# Patient Record
Sex: MALE | Race: BLACK OR AFRICAN AMERICAN | NOT HISPANIC OR LATINO | Employment: OTHER | URBAN - METROPOLITAN AREA
[De-identification: names, ages, dates, MRNs, and addresses within clinical notes are randomized per-mention and may not be internally consistent; named-entity substitution may affect disease eponyms.]

---

## 2017-01-13 ENCOUNTER — ALLSCRIPTS OFFICE VISIT (OUTPATIENT)
Dept: WOUND CARE | Facility: HOSPITAL | Age: 37
End: 2017-01-13
Payer: MEDICARE

## 2017-01-13 PROCEDURE — 97606 NEG PRS WND THER DME>50 SQCM: CPT | Performed by: SURGERY

## 2017-01-13 PROCEDURE — 11046 DBRDMT MUSC&/FSCA EA ADDL: CPT | Performed by: SURGERY

## 2017-01-13 PROCEDURE — 11043 DBRDMT MUSC&/FSCA 1ST 20/<: CPT | Performed by: SURGERY

## 2017-01-19 ENCOUNTER — ALLSCRIPTS OFFICE VISIT (OUTPATIENT)
Dept: OTHER | Facility: OTHER | Age: 37
End: 2017-01-19

## 2017-01-19 LAB — HBA1C MFR BLD HPLC: 9 %

## 2017-01-27 ENCOUNTER — ALLSCRIPTS OFFICE VISIT (OUTPATIENT)
Dept: WOUND CARE | Facility: HOSPITAL | Age: 37
End: 2017-01-27
Payer: MEDICARE

## 2017-01-27 PROCEDURE — 11046 DBRDMT MUSC&/FSCA EA ADDL: CPT | Performed by: SURGERY

## 2017-01-27 PROCEDURE — 97606 NEG PRS WND THER DME>50 SQCM: CPT | Performed by: SURGERY

## 2017-01-27 PROCEDURE — 11043 DBRDMT MUSC&/FSCA 1ST 20/<: CPT | Performed by: SURGERY

## 2017-02-02 ENCOUNTER — APPOINTMENT (OUTPATIENT)
Dept: LAB | Facility: HOSPITAL | Age: 37
End: 2017-02-02
Payer: MEDICARE

## 2017-02-02 DIAGNOSIS — A09 INFECTIOUS GASTROENTERITIS AND COLITIS: ICD-10-CM

## 2017-02-02 DIAGNOSIS — B96.89 OTHER SPECIFIED BACTERIAL AGENTS AS THE CAUSE OF DISEASES CLASSIFIED ELSEWHERE: ICD-10-CM

## 2017-02-02 PROCEDURE — 87493 C DIFF AMPLIFIED PROBE: CPT

## 2017-02-03 LAB — C DIFF TOX GENS STL QL NAA+PROBE: ABNORMAL

## 2017-02-08 ENCOUNTER — ALLSCRIPTS OFFICE VISIT (OUTPATIENT)
Dept: OTHER | Facility: OTHER | Age: 37
End: 2017-02-08

## 2017-02-10 ENCOUNTER — ALLSCRIPTS OFFICE VISIT (OUTPATIENT)
Dept: WOUND CARE | Facility: HOSPITAL | Age: 37
End: 2017-02-10
Payer: MEDICARE

## 2017-02-10 PROCEDURE — 11043 DBRDMT MUSC&/FSCA 1ST 20/<: CPT | Performed by: SURGERY

## 2017-02-10 PROCEDURE — 97606 NEG PRS WND THER DME>50 SQCM: CPT | Performed by: SURGERY

## 2017-02-10 PROCEDURE — 11046 DBRDMT MUSC&/FSCA EA ADDL: CPT | Performed by: SURGERY

## 2017-02-24 ENCOUNTER — ALLSCRIPTS OFFICE VISIT (OUTPATIENT)
Dept: WOUND CARE | Facility: HOSPITAL | Age: 37
End: 2017-02-24
Payer: MEDICARE

## 2017-02-24 PROCEDURE — 11046 DBRDMT MUSC&/FSCA EA ADDL: CPT | Performed by: SURGERY

## 2017-02-24 PROCEDURE — 11043 DBRDMT MUSC&/FSCA 1ST 20/<: CPT | Performed by: SURGERY

## 2017-02-24 PROCEDURE — 97605 NEG PRS WND THER DME<=50SQCM: CPT | Performed by: SURGERY

## 2017-03-08 ENCOUNTER — ALLSCRIPTS OFFICE VISIT (OUTPATIENT)
Dept: OTHER | Facility: OTHER | Age: 37
End: 2017-03-08

## 2017-03-10 ENCOUNTER — ALLSCRIPTS OFFICE VISIT (OUTPATIENT)
Dept: WOUND CARE | Facility: HOSPITAL | Age: 37
End: 2017-03-10
Payer: MEDICARE

## 2017-03-10 PROCEDURE — 11046 DBRDMT MUSC&/FSCA EA ADDL: CPT | Performed by: SURGERY

## 2017-03-10 PROCEDURE — 11043 DBRDMT MUSC&/FSCA 1ST 20/<: CPT | Performed by: SURGERY

## 2017-03-17 ENCOUNTER — HOSPITAL ENCOUNTER (INPATIENT)
Facility: HOSPITAL | Age: 37
LOS: 13 days | Discharge: HOME HEALTH | DRG: 871 | End: 2017-03-30
Attending: EMERGENCY MEDICINE | Admitting: INTERNAL MEDICINE
Payer: MEDICARE

## 2017-03-17 ENCOUNTER — APPOINTMENT (EMERGENCY)
Dept: RADIOLOGY | Facility: HOSPITAL | Age: 37
DRG: 871 | End: 2017-03-17
Payer: MEDICARE

## 2017-03-17 DIAGNOSIS — R65.10 SIRS (SYSTEMIC INFLAMMATORY RESPONSE SYNDROME) (HCC): ICD-10-CM

## 2017-03-17 DIAGNOSIS — IMO0001 HISTORY OF CLOSTRIDIUM DIFFICILE: ICD-10-CM

## 2017-03-17 DIAGNOSIS — L89.150 DECUBITUS ULCER OF SACRAL REGION, UNSTAGEABLE (HCC): ICD-10-CM

## 2017-03-17 DIAGNOSIS — Z43.3 COLOSTOMY CARE (HCC): ICD-10-CM

## 2017-03-17 DIAGNOSIS — M86.9: ICD-10-CM

## 2017-03-17 DIAGNOSIS — Z97.8 CHRONIC INDWELLING FOLEY CATHETER: ICD-10-CM

## 2017-03-17 DIAGNOSIS — L89.93 DECUBITUS ULCER, STAGE 3 WITH INFECTION (HCC): Primary | ICD-10-CM

## 2017-03-17 DIAGNOSIS — D64.9 ANEMIA: ICD-10-CM

## 2017-03-17 DIAGNOSIS — L98.429 ULCER OF SACRAL REGION, STAGE 4 (HCC): ICD-10-CM

## 2017-03-17 DIAGNOSIS — L08.9 DECUBITUS ULCER, STAGE 3 WITH INFECTION (HCC): Primary | ICD-10-CM

## 2017-03-17 DIAGNOSIS — G82.20 PARAPLEGIA (HCC): Chronic | ICD-10-CM

## 2017-03-17 LAB
ALBUMIN SERPL BCP-MCNC: 1.6 G/DL (ref 3.5–5)
ALP SERPL-CCNC: 81 U/L (ref 46–116)
ALT SERPL W P-5'-P-CCNC: 9 U/L (ref 12–78)
ANION GAP SERPL CALCULATED.3IONS-SCNC: 5 MMOL/L (ref 4–13)
ANISOCYTOSIS BLD QL SMEAR: PRESENT
APTT PPP: 37 SECONDS (ref 24–36)
AST SERPL W P-5'-P-CCNC: 11 U/L (ref 5–45)
BACTERIA UR QL AUTO: ABNORMAL /HPF
BASOPHILS # BLD MANUAL: 0 THOUSAND/UL (ref 0–0.1)
BASOPHILS NFR MAR MANUAL: 0 % (ref 0–1)
BILIRUB SERPL-MCNC: 0.13 MG/DL (ref 0.2–1)
BILIRUB UR QL STRIP: NEGATIVE
BUN SERPL-MCNC: 31 MG/DL (ref 5–25)
CALCIUM SERPL-MCNC: 8.1 MG/DL (ref 8.3–10.1)
CHLORIDE SERPL-SCNC: 108 MMOL/L (ref 100–108)
CLARITY UR: ABNORMAL
CO2 SERPL-SCNC: 27 MMOL/L (ref 21–32)
COLOR UR: YELLOW
CREAT SERPL-MCNC: 1.7 MG/DL (ref 0.6–1.3)
EOSINOPHIL # BLD MANUAL: 0 THOUSAND/UL (ref 0–0.4)
EOSINOPHIL NFR BLD MANUAL: 0 % (ref 0–6)
ERYTHROCYTE [DISTWIDTH] IN BLOOD BY AUTOMATED COUNT: 15.5 % (ref 11.6–15.1)
GFR SERPL CREATININE-BSD FRML MDRD: 55.5 ML/MIN/1.73SQ M
GLUCOSE SERPL-MCNC: 132 MG/DL (ref 65–140)
GLUCOSE SERPL-MCNC: 145 MG/DL (ref 65–140)
GLUCOSE SERPL-MCNC: 209 MG/DL (ref 65–140)
GLUCOSE SERPL-MCNC: 35 MG/DL (ref 65–140)
GLUCOSE SERPL-MCNC: 47 MG/DL (ref 65–140)
GLUCOSE UR STRIP-MCNC: NEGATIVE MG/DL
HCT VFR BLD AUTO: 22.4 % (ref 36.5–49.3)
HGB BLD-MCNC: 7 G/DL (ref 12–17)
HGB UR QL STRIP.AUTO: ABNORMAL
INR PPP: 1.16 (ref 0.86–1.16)
KETONES UR STRIP-MCNC: NEGATIVE MG/DL
LACTATE SERPL-SCNC: 1.5 MMOL/L (ref 0.5–2)
LEUKOCYTE ESTERASE UR QL STRIP: ABNORMAL
LYMPHOCYTES # BLD AUTO: 1.87 THOUSAND/UL (ref 0.6–4.47)
LYMPHOCYTES # BLD AUTO: 11 % (ref 14–44)
MCH RBC QN AUTO: 26.8 PG (ref 26.8–34.3)
MCHC RBC AUTO-ENTMCNC: 31.3 G/DL (ref 31.4–37.4)
MCV RBC AUTO: 86 FL (ref 82–98)
MONOCYTES # BLD AUTO: 0 THOUSAND/UL (ref 0–1.22)
MONOCYTES NFR BLD: 0 % (ref 4–12)
NEUTROPHILS # BLD MANUAL: 15.14 THOUSAND/UL (ref 1.85–7.62)
NEUTS BAND NFR BLD MANUAL: 2 % (ref 0–8)
NEUTS SEG NFR BLD AUTO: 87 % (ref 43–75)
NITRITE UR QL STRIP: POSITIVE
NON-SQ EPI CELLS URNS QL MICRO: ABNORMAL /HPF
PH UR STRIP.AUTO: 6.5 [PH] (ref 4.5–8)
PLATELET # BLD AUTO: 406 THOUSANDS/UL (ref 149–390)
PLATELET BLD QL SMEAR: ADEQUATE
PMV BLD AUTO: 9.5 FL (ref 8.9–12.7)
POTASSIUM SERPL-SCNC: 5 MMOL/L (ref 3.5–5.3)
PROT SERPL-MCNC: 7.4 G/DL (ref 6.4–8.2)
PROT UR STRIP-MCNC: ABNORMAL MG/DL
PROTHROMBIN TIME: 14.8 SECONDS (ref 12–14.3)
RBC # BLD AUTO: 2.61 MILLION/UL (ref 3.88–5.62)
RBC #/AREA URNS AUTO: ABNORMAL /HPF
SODIUM SERPL-SCNC: 140 MMOL/L (ref 136–145)
SP GR UR STRIP.AUTO: 1.02 (ref 1–1.03)
TOTAL CELLS COUNTED SPEC: 100
TRI-PHOS CRY URNS QL MICRO: ABNORMAL /HPF
UROBILINOGEN UR QL STRIP.AUTO: 0.2 E.U./DL
WBC # BLD AUTO: 17.01 THOUSAND/UL (ref 4.31–10.16)
WBC #/AREA URNS AUTO: ABNORMAL /HPF

## 2017-03-17 PROCEDURE — 82948 REAGENT STRIP/BLOOD GLUCOSE: CPT

## 2017-03-17 PROCEDURE — 36415 COLL VENOUS BLD VENIPUNCTURE: CPT | Performed by: EMERGENCY MEDICINE

## 2017-03-17 PROCEDURE — 71010 HB CHEST X-RAY 1 VIEW FRONTAL (PORTABLE): CPT

## 2017-03-17 PROCEDURE — 87086 URINE CULTURE/COLONY COUNT: CPT | Performed by: EMERGENCY MEDICINE

## 2017-03-17 PROCEDURE — 99285 EMERGENCY DEPT VISIT HI MDM: CPT

## 2017-03-17 PROCEDURE — 83036 HEMOGLOBIN GLYCOSYLATED A1C: CPT | Performed by: PHYSICIAN ASSISTANT

## 2017-03-17 PROCEDURE — 85007 BL SMEAR W/DIFF WBC COUNT: CPT | Performed by: EMERGENCY MEDICINE

## 2017-03-17 PROCEDURE — 85730 THROMBOPLASTIN TIME PARTIAL: CPT | Performed by: EMERGENCY MEDICINE

## 2017-03-17 PROCEDURE — 85610 PROTHROMBIN TIME: CPT | Performed by: EMERGENCY MEDICINE

## 2017-03-17 PROCEDURE — 87040 BLOOD CULTURE FOR BACTERIA: CPT | Performed by: EMERGENCY MEDICINE

## 2017-03-17 PROCEDURE — 81001 URINALYSIS AUTO W/SCOPE: CPT | Performed by: EMERGENCY MEDICINE

## 2017-03-17 PROCEDURE — 83605 ASSAY OF LACTIC ACID: CPT | Performed by: EMERGENCY MEDICINE

## 2017-03-17 PROCEDURE — 85027 COMPLETE CBC AUTOMATED: CPT | Performed by: EMERGENCY MEDICINE

## 2017-03-17 PROCEDURE — 80053 COMPREHEN METABOLIC PANEL: CPT | Performed by: EMERGENCY MEDICINE

## 2017-03-17 RX ORDER — AMPICILLIN AND SULBACTAM 1; .5 G/1; G/1
1.5 INJECTION, POWDER, FOR SOLUTION INTRAMUSCULAR; INTRAVENOUS EVERY 6 HOURS
Status: DISCONTINUED | OUTPATIENT
Start: 2017-03-17 | End: 2017-03-17 | Stop reason: CLARIF

## 2017-03-17 RX ORDER — DEXTROSE MONOHYDRATE 25 G/50ML
INJECTION, SOLUTION INTRAVENOUS
Status: COMPLETED
Start: 2017-03-17 | End: 2017-03-17

## 2017-03-17 RX ORDER — MORPHINE SULFATE 2 MG/ML
2 INJECTION, SOLUTION INTRAMUSCULAR; INTRAVENOUS EVERY 4 HOURS PRN
Status: DISCONTINUED | OUTPATIENT
Start: 2017-03-17 | End: 2017-03-18

## 2017-03-17 RX ORDER — SODIUM CHLORIDE 9 MG/ML
75 INJECTION, SOLUTION INTRAVENOUS CONTINUOUS
Status: DISCONTINUED | OUTPATIENT
Start: 2017-03-17 | End: 2017-03-22

## 2017-03-17 RX ORDER — MORPHINE SULFATE 2 MG/ML
2 INJECTION, SOLUTION INTRAMUSCULAR; INTRAVENOUS ONCE
Status: COMPLETED | OUTPATIENT
Start: 2017-03-17 | End: 2017-03-17

## 2017-03-17 RX ORDER — DEXTROSE MONOHYDRATE 25 G/50ML
25 INJECTION, SOLUTION INTRAVENOUS ONCE
Status: DISCONTINUED | OUTPATIENT
Start: 2017-03-17 | End: 2017-03-17

## 2017-03-17 RX ORDER — DILTIAZEM HYDROCHLORIDE 5 MG/ML
10 INJECTION INTRAVENOUS EVERY 6 HOURS PRN
Status: DISCONTINUED | OUTPATIENT
Start: 2017-03-17 | End: 2017-03-30 | Stop reason: HOSPADM

## 2017-03-17 RX ORDER — SODIUM CHLORIDE 9 MG/ML
125 INJECTION, SOLUTION INTRAVENOUS CONTINUOUS
Status: DISCONTINUED | OUTPATIENT
Start: 2017-03-17 | End: 2017-03-19

## 2017-03-17 RX ORDER — BACLOFEN 10 MG/1
20 TABLET ORAL 2 TIMES DAILY
Status: DISCONTINUED | OUTPATIENT
Start: 2017-03-17 | End: 2017-03-30 | Stop reason: HOSPADM

## 2017-03-17 RX ORDER — 0.9 % SODIUM CHLORIDE 0.9 %
3 VIAL (ML) INJECTION AS NEEDED
Status: DISCONTINUED | OUTPATIENT
Start: 2017-03-17 | End: 2017-03-30 | Stop reason: HOSPADM

## 2017-03-17 RX ORDER — ACETAMINOPHEN 325 MG/1
650 TABLET ORAL ONCE
Status: COMPLETED | OUTPATIENT
Start: 2017-03-17 | End: 2017-03-17

## 2017-03-17 RX ORDER — ASCORBIC ACID 500 MG
500 TABLET ORAL DAILY
Status: DISCONTINUED | OUTPATIENT
Start: 2017-03-18 | End: 2017-03-30 | Stop reason: HOSPADM

## 2017-03-17 RX ORDER — PANTOPRAZOLE SODIUM 40 MG/1
40 TABLET, DELAYED RELEASE ORAL
Status: DISCONTINUED | OUTPATIENT
Start: 2017-03-18 | End: 2017-03-30 | Stop reason: HOSPADM

## 2017-03-17 RX ORDER — MELATONIN
1000 DAILY
Status: DISCONTINUED | OUTPATIENT
Start: 2017-03-18 | End: 2017-03-22

## 2017-03-17 RX ORDER — FERROUS SULFATE 325(65) MG
325 TABLET ORAL
Status: DISCONTINUED | OUTPATIENT
Start: 2017-03-17 | End: 2017-03-30 | Stop reason: HOSPADM

## 2017-03-17 RX ORDER — DEXTROSE MONOHYDRATE 25 G/50ML
50 INJECTION, SOLUTION INTRAVENOUS ONCE
Status: COMPLETED | OUTPATIENT
Start: 2017-03-17 | End: 2017-03-17

## 2017-03-17 RX ADMIN — INSULIN DETEMIR 10 UNITS: 100 INJECTION, SOLUTION SUBCUTANEOUS at 21:19

## 2017-03-17 RX ADMIN — SODIUM CHLORIDE 1.5 G: 9 INJECTION, SOLUTION INTRAVENOUS at 21:19

## 2017-03-17 RX ADMIN — RIVAROXABAN 20 MG: 20 TABLET, FILM COATED ORAL at 20:00

## 2017-03-17 RX ADMIN — SODIUM CHLORIDE 2169 ML: 0.9 INJECTION, SOLUTION INTRAVENOUS at 13:10

## 2017-03-17 RX ADMIN — MORPHINE SULFATE 2 MG: 2 INJECTION, SOLUTION INTRAMUSCULAR; INTRAVENOUS at 21:20

## 2017-03-17 RX ADMIN — ACETAMINOPHEN 650 MG: 325 TABLET, FILM COATED ORAL at 14:02

## 2017-03-17 RX ADMIN — SODIUM CHLORIDE 3 G: 9 INJECTION, SOLUTION INTRAVENOUS at 15:12

## 2017-03-17 RX ADMIN — MORPHINE SULFATE 2 MG: 2 INJECTION, SOLUTION INTRAMUSCULAR; INTRAVENOUS at 16:31

## 2017-03-17 RX ADMIN — INSULIN LISPRO 1 UNITS: 100 INJECTION, SOLUTION INTRAVENOUS; SUBCUTANEOUS at 21:21

## 2017-03-17 RX ADMIN — SODIUM CHLORIDE 75 ML/HR: 0.9 INJECTION, SOLUTION INTRAVENOUS at 19:10

## 2017-03-17 RX ADMIN — Medication 325 MG: at 19:09

## 2017-03-17 RX ADMIN — DEXTROSE MONOHYDRATE 50 ML: 25 INJECTION, SOLUTION INTRAVENOUS at 14:51

## 2017-03-17 RX ADMIN — BACLOFEN 20 MG: 10 TABLET ORAL at 19:08

## 2017-03-18 LAB
ANION GAP SERPL CALCULATED.3IONS-SCNC: 8 MMOL/L (ref 4–13)
BACTERIA UR CULT: NORMAL
BUN SERPL-MCNC: 28 MG/DL (ref 5–25)
CALCIUM SERPL-MCNC: 7.4 MG/DL (ref 8.3–10.1)
CHLORIDE SERPL-SCNC: 106 MMOL/L (ref 100–108)
CO2 SERPL-SCNC: 22 MMOL/L (ref 21–32)
CREAT SERPL-MCNC: 1.66 MG/DL (ref 0.6–1.3)
ERYTHROCYTE [DISTWIDTH] IN BLOOD BY AUTOMATED COUNT: 15.8 % (ref 11.6–15.1)
EST. AVERAGE GLUCOSE BLD GHB EST-MCNC: 197 MG/DL
GFR SERPL CREATININE-BSD FRML MDRD: 57.1 ML/MIN/1.73SQ M
GLUCOSE SERPL-MCNC: 258 MG/DL (ref 65–140)
GLUCOSE SERPL-MCNC: 266 MG/DL (ref 65–140)
GLUCOSE SERPL-MCNC: 277 MG/DL (ref 65–140)
GLUCOSE SERPL-MCNC: 348 MG/DL (ref 65–140)
HBA1C MFR BLD: 8.5 % (ref 4.2–6.3)
HCT VFR BLD AUTO: 21.2 % (ref 36.5–49.3)
HGB BLD-MCNC: 6.7 G/DL (ref 12–17)
MCH RBC QN AUTO: 26.8 PG (ref 26.8–34.3)
MCHC RBC AUTO-ENTMCNC: 31.6 G/DL (ref 31.4–37.4)
MCV RBC AUTO: 85 FL (ref 82–98)
PLATELET # BLD AUTO: 370 THOUSANDS/UL (ref 149–390)
PMV BLD AUTO: 9.9 FL (ref 8.9–12.7)
POTASSIUM SERPL-SCNC: 4.8 MMOL/L (ref 3.5–5.3)
RBC # BLD AUTO: 2.5 MILLION/UL (ref 3.88–5.62)
SODIUM SERPL-SCNC: 136 MMOL/L (ref 136–145)
WBC # BLD AUTO: 12.05 THOUSAND/UL (ref 4.31–10.16)

## 2017-03-18 PROCEDURE — 85027 COMPLETE CBC AUTOMATED: CPT | Performed by: PHYSICIAN ASSISTANT

## 2017-03-18 PROCEDURE — 82948 REAGENT STRIP/BLOOD GLUCOSE: CPT

## 2017-03-18 PROCEDURE — 80048 BASIC METABOLIC PNL TOTAL CA: CPT | Performed by: PHYSICIAN ASSISTANT

## 2017-03-18 RX ORDER — HYDROMORPHONE HYDROCHLORIDE 2 MG/1
1 TABLET ORAL
Status: DISCONTINUED | OUTPATIENT
Start: 2017-03-18 | End: 2017-03-18

## 2017-03-18 RX ORDER — OXYCODONE HYDROCHLORIDE 10 MG/1
10 TABLET ORAL EVERY 4 HOURS PRN
Status: DISCONTINUED | OUTPATIENT
Start: 2017-03-18 | End: 2017-03-30 | Stop reason: HOSPADM

## 2017-03-18 RX ORDER — VANCOMYCIN HYDROCHLORIDE 1 G/200ML
15 INJECTION, SOLUTION INTRAVENOUS EVERY 24 HOURS
Status: DISCONTINUED | OUTPATIENT
Start: 2017-03-18 | End: 2017-03-27

## 2017-03-18 RX ADMIN — HYDROMORPHONE HYDROCHLORIDE 1 MG: 1 INJECTION, SOLUTION INTRAMUSCULAR; INTRAVENOUS; SUBCUTANEOUS at 15:51

## 2017-03-18 RX ADMIN — SODIUM CHLORIDE 75 ML/HR: 0.9 INJECTION, SOLUTION INTRAVENOUS at 08:55

## 2017-03-18 RX ADMIN — BACLOFEN 20 MG: 10 TABLET ORAL at 17:28

## 2017-03-18 RX ADMIN — VANCOMYCIN HYDROCHLORIDE 1000 MG: 1 INJECTION, SOLUTION INTRAVENOUS at 17:28

## 2017-03-18 RX ADMIN — VITAMIN D, TAB 1000IU (100/BT) 1000 UNITS: 25 TAB at 08:54

## 2017-03-18 RX ADMIN — HYDROMORPHONE HYDROCHLORIDE 1 MG: 1 INJECTION, SOLUTION INTRAMUSCULAR; INTRAVENOUS; SUBCUTANEOUS at 21:12

## 2017-03-18 RX ADMIN — BACLOFEN 20 MG: 10 TABLET ORAL at 08:54

## 2017-03-18 RX ADMIN — Medication 325 MG: at 17:28

## 2017-03-18 RX ADMIN — Medication 325 MG: at 12:15

## 2017-03-18 RX ADMIN — RIVAROXABAN 20 MG: 20 TABLET, FILM COATED ORAL at 17:28

## 2017-03-18 RX ADMIN — OXYCODONE HYDROCHLORIDE AND ACETAMINOPHEN 500 MG: 500 TABLET ORAL at 08:54

## 2017-03-18 RX ADMIN — SODIUM CHLORIDE 1.5 G: 9 INJECTION, SOLUTION INTRAVENOUS at 03:16

## 2017-03-18 RX ADMIN — MORPHINE SULFATE 2 MG: 2 INJECTION, SOLUTION INTRAMUSCULAR; INTRAVENOUS at 01:30

## 2017-03-18 RX ADMIN — INSULIN LISPRO 5 UNITS: 100 INJECTION, SOLUTION INTRAVENOUS; SUBCUTANEOUS at 21:11

## 2017-03-18 RX ADMIN — INSULIN LISPRO 2 UNITS: 100 INJECTION, SOLUTION INTRAVENOUS; SUBCUTANEOUS at 12:14

## 2017-03-18 RX ADMIN — MORPHINE SULFATE 2 MG: 2 INJECTION, SOLUTION INTRAMUSCULAR; INTRAVENOUS at 09:36

## 2017-03-18 RX ADMIN — INSULIN LISPRO 2 UNITS: 100 INJECTION, SOLUTION INTRAVENOUS; SUBCUTANEOUS at 09:01

## 2017-03-18 RX ADMIN — MORPHINE SULFATE 2 MG: 2 INJECTION, SOLUTION INTRAMUSCULAR; INTRAVENOUS at 05:41

## 2017-03-18 RX ADMIN — SODIUM CHLORIDE 1.5 G: 9 INJECTION, SOLUTION INTRAVENOUS at 09:00

## 2017-03-18 RX ADMIN — SODIUM CHLORIDE 1.5 G: 9 INJECTION, SOLUTION INTRAVENOUS at 14:30

## 2017-03-18 RX ADMIN — INSULIN LISPRO 4 UNITS: 100 INJECTION, SOLUTION INTRAVENOUS; SUBCUTANEOUS at 17:28

## 2017-03-18 RX ADMIN — INSULIN DETEMIR 10 UNITS: 100 INJECTION, SOLUTION SUBCUTANEOUS at 08:54

## 2017-03-18 RX ADMIN — Medication 325 MG: at 08:54

## 2017-03-18 RX ADMIN — PANTOPRAZOLE SODIUM 40 MG: 40 TABLET, DELAYED RELEASE ORAL at 08:54

## 2017-03-18 RX ADMIN — VANCOMYCIN HYDROCHLORIDE 125 MG: 500 INJECTION, POWDER, LYOPHILIZED, FOR SOLUTION INTRAVENOUS at 14:53

## 2017-03-18 RX ADMIN — INSULIN DETEMIR 10 UNITS: 100 INJECTION, SOLUTION SUBCUTANEOUS at 21:11

## 2017-03-19 ENCOUNTER — APPOINTMENT (INPATIENT)
Dept: CT IMAGING | Facility: HOSPITAL | Age: 37
DRG: 871 | End: 2017-03-19
Payer: MEDICARE

## 2017-03-19 LAB
ANION GAP SERPL CALCULATED.3IONS-SCNC: 6 MMOL/L (ref 4–13)
BASOPHILS # BLD AUTO: 0.02 THOUSANDS/ΜL (ref 0–0.1)
BASOPHILS NFR BLD AUTO: 0 % (ref 0–1)
BUN SERPL-MCNC: 33 MG/DL (ref 5–25)
CALCIUM SERPL-MCNC: 7.3 MG/DL (ref 8.3–10.1)
CHLORIDE SERPL-SCNC: 108 MMOL/L (ref 100–108)
CO2 SERPL-SCNC: 23 MMOL/L (ref 21–32)
CREAT SERPL-MCNC: 1.7 MG/DL (ref 0.6–1.3)
EOSINOPHIL # BLD AUTO: 0.12 THOUSAND/ΜL (ref 0–0.61)
EOSINOPHIL NFR BLD AUTO: 1 % (ref 0–6)
ERYTHROCYTE [DISTWIDTH] IN BLOOD BY AUTOMATED COUNT: 15.8 % (ref 11.6–15.1)
GFR SERPL CREATININE-BSD FRML MDRD: 55.5 ML/MIN/1.73SQ M
GLUCOSE SERPL-MCNC: 341 MG/DL (ref 65–140)
GLUCOSE SERPL-MCNC: 376 MG/DL (ref 65–140)
GLUCOSE SERPL-MCNC: 393 MG/DL (ref 65–140)
GLUCOSE SERPL-MCNC: 416 MG/DL (ref 65–140)
GLUCOSE SERPL-MCNC: 421 MG/DL (ref 65–140)
GLUCOSE SERPL-MCNC: 440 MG/DL (ref 65–140)
GLUCOSE SERPL-MCNC: >500 MG/DL (ref 65–140)
HCT VFR BLD AUTO: 18.7 % (ref 36.5–49.3)
HGB BLD-MCNC: 5.9 G/DL (ref 12–17)
LYMPHOCYTES # BLD AUTO: 1.65 THOUSANDS/ΜL (ref 0.6–4.47)
LYMPHOCYTES NFR BLD AUTO: 16 % (ref 14–44)
MCH RBC QN AUTO: 26.8 PG (ref 26.8–34.3)
MCHC RBC AUTO-ENTMCNC: 31.6 G/DL (ref 31.4–37.4)
MCV RBC AUTO: 85 FL (ref 82–98)
MONOCYTES # BLD AUTO: 0.85 THOUSAND/ΜL (ref 0.17–1.22)
MONOCYTES NFR BLD AUTO: 8 % (ref 4–12)
NEUTROPHILS # BLD AUTO: 7.75 THOUSANDS/ΜL (ref 1.85–7.62)
NEUTS SEG NFR BLD AUTO: 75 % (ref 43–75)
NRBC BLD AUTO-RTO: 0 /100 WBCS
PLATELET # BLD AUTO: 329 THOUSANDS/UL (ref 149–390)
PMV BLD AUTO: 10 FL (ref 8.9–12.7)
POTASSIUM SERPL-SCNC: 4.7 MMOL/L (ref 3.5–5.3)
RBC # BLD AUTO: 2.2 MILLION/UL (ref 3.88–5.62)
SODIUM SERPL-SCNC: 137 MMOL/L (ref 136–145)
WBC # BLD AUTO: 10.39 THOUSAND/UL (ref 4.31–10.16)

## 2017-03-19 PROCEDURE — 87493 C DIFF AMPLIFIED PROBE: CPT | Performed by: INTERNAL MEDICINE

## 2017-03-19 PROCEDURE — 74176 CT ABD & PELVIS W/O CONTRAST: CPT

## 2017-03-19 PROCEDURE — 80048 BASIC METABOLIC PNL TOTAL CA: CPT | Performed by: INTERNAL MEDICINE

## 2017-03-19 PROCEDURE — 82948 REAGENT STRIP/BLOOD GLUCOSE: CPT

## 2017-03-19 PROCEDURE — 85025 COMPLETE CBC W/AUTO DIFF WBC: CPT | Performed by: INTERNAL MEDICINE

## 2017-03-19 RX ORDER — FUROSEMIDE 10 MG/ML
20 INJECTION INTRAMUSCULAR; INTRAVENOUS DAILY
Status: COMPLETED | OUTPATIENT
Start: 2017-03-19 | End: 2017-03-20

## 2017-03-19 RX ORDER — FUROSEMIDE 10 MG/ML
20 INJECTION INTRAMUSCULAR; INTRAVENOUS DAILY
Status: DISCONTINUED | OUTPATIENT
Start: 2017-03-20 | End: 2017-03-19

## 2017-03-19 RX ADMIN — INSULIN LISPRO 8 UNITS: 100 INJECTION, SOLUTION INTRAVENOUS; SUBCUTANEOUS at 11:43

## 2017-03-19 RX ADMIN — PANTOPRAZOLE SODIUM 40 MG: 40 TABLET, DELAYED RELEASE ORAL at 06:04

## 2017-03-19 RX ADMIN — INSULIN DETEMIR 10 UNITS: 100 INJECTION, SOLUTION SUBCUTANEOUS at 08:24

## 2017-03-19 RX ADMIN — NYSTATIN 500000 UNITS: 100000 SUSPENSION ORAL at 22:13

## 2017-03-19 RX ADMIN — SODIUM CHLORIDE 75 ML/HR: 0.9 INJECTION, SOLUTION INTRAVENOUS at 00:47

## 2017-03-19 RX ADMIN — BACLOFEN 20 MG: 10 TABLET ORAL at 17:05

## 2017-03-19 RX ADMIN — HYDROMORPHONE HYDROCHLORIDE 1 MG: 1 INJECTION, SOLUTION INTRAMUSCULAR; INTRAVENOUS; SUBCUTANEOUS at 13:43

## 2017-03-19 RX ADMIN — Medication 325 MG: at 08:25

## 2017-03-19 RX ADMIN — INSULIN LISPRO 8 UNITS: 100 INJECTION, SOLUTION INTRAVENOUS; SUBCUTANEOUS at 17:05

## 2017-03-19 RX ADMIN — HYDROMORPHONE HYDROCHLORIDE 1 MG: 1 INJECTION, SOLUTION INTRAMUSCULAR; INTRAVENOUS; SUBCUTANEOUS at 22:12

## 2017-03-19 RX ADMIN — HYDROMORPHONE HYDROCHLORIDE 1 MG: 1 INJECTION, SOLUTION INTRAMUSCULAR; INTRAVENOUS; SUBCUTANEOUS at 05:33

## 2017-03-19 RX ADMIN — FUROSEMIDE 20 MG: 10 INJECTION, SOLUTION INTRAMUSCULAR; INTRAVENOUS at 19:15

## 2017-03-19 RX ADMIN — Medication 325 MG: at 17:05

## 2017-03-19 RX ADMIN — VANCOMYCIN HYDROCHLORIDE 1000 MG: 1 INJECTION, SOLUTION INTRAVENOUS at 16:10

## 2017-03-19 RX ADMIN — HYDROMORPHONE HYDROCHLORIDE 1 MG: 1 INJECTION, SOLUTION INTRAMUSCULAR; INTRAVENOUS; SUBCUTANEOUS at 17:05

## 2017-03-19 RX ADMIN — Medication 325 MG: at 11:42

## 2017-03-19 RX ADMIN — NYSTATIN 500000 UNITS: 100000 SUSPENSION ORAL at 17:04

## 2017-03-19 RX ADMIN — INSULIN LISPRO 4 UNITS: 100 INJECTION, SOLUTION INTRAVENOUS; SUBCUTANEOUS at 17:05

## 2017-03-19 RX ADMIN — HYDROMORPHONE HYDROCHLORIDE 1 MG: 1 INJECTION, SOLUTION INTRAMUSCULAR; INTRAVENOUS; SUBCUTANEOUS at 09:51

## 2017-03-19 RX ADMIN — RIVAROXABAN 20 MG: 20 TABLET, FILM COATED ORAL at 17:05

## 2017-03-19 RX ADMIN — SODIUM CHLORIDE 75 ML/HR: 0.9 INJECTION, SOLUTION INTRAVENOUS at 17:51

## 2017-03-19 RX ADMIN — VITAMIN D, TAB 1000IU (100/BT) 1000 UNITS: 25 TAB at 08:25

## 2017-03-19 RX ADMIN — INSULIN DETEMIR 15 UNITS: 100 INJECTION, SOLUTION SUBCUTANEOUS at 22:12

## 2017-03-19 RX ADMIN — HYDROMORPHONE HYDROCHLORIDE 1 MG: 1 INJECTION, SOLUTION INTRAMUSCULAR; INTRAVENOUS; SUBCUTANEOUS at 00:29

## 2017-03-19 RX ADMIN — OXYCODONE HYDROCHLORIDE AND ACETAMINOPHEN 500 MG: 500 TABLET ORAL at 08:25

## 2017-03-19 RX ADMIN — INSULIN LISPRO 5 UNITS: 100 INJECTION, SOLUTION INTRAVENOUS; SUBCUTANEOUS at 11:42

## 2017-03-19 RX ADMIN — INSULIN LISPRO 5 UNITS: 100 INJECTION, SOLUTION INTRAVENOUS; SUBCUTANEOUS at 08:25

## 2017-03-19 RX ADMIN — BACLOFEN 20 MG: 10 TABLET ORAL at 08:25

## 2017-03-20 PROBLEM — M86.9 OSTEOMYELITIS OF RIGHT FEMUR (HCC): Status: ACTIVE | Noted: 2017-03-20

## 2017-03-20 PROBLEM — A41.9 SEPSIS (HCC): Status: ACTIVE | Noted: 2017-03-17

## 2017-03-20 LAB
C DIFF TOX GENS STL QL NAA+PROBE: NORMAL
GLUCOSE SERPL-MCNC: 165 MG/DL (ref 65–140)
GLUCOSE SERPL-MCNC: 172 MG/DL (ref 65–140)
GLUCOSE SERPL-MCNC: 222 MG/DL (ref 65–140)
GLUCOSE SERPL-MCNC: 241 MG/DL (ref 65–140)

## 2017-03-20 PROCEDURE — 82948 REAGENT STRIP/BLOOD GLUCOSE: CPT

## 2017-03-20 RX ADMIN — INSULIN DETEMIR 15 UNITS: 100 INJECTION, SOLUTION SUBCUTANEOUS at 08:35

## 2017-03-20 RX ADMIN — HYDROMORPHONE HYDROCHLORIDE 1 MG: 1 INJECTION, SOLUTION INTRAMUSCULAR; INTRAVENOUS; SUBCUTANEOUS at 02:38

## 2017-03-20 RX ADMIN — INSULIN DETEMIR 15 UNITS: 100 INJECTION, SOLUTION SUBCUTANEOUS at 22:09

## 2017-03-20 RX ADMIN — BACLOFEN 20 MG: 10 TABLET ORAL at 17:28

## 2017-03-20 RX ADMIN — BACLOFEN 20 MG: 10 TABLET ORAL at 08:34

## 2017-03-20 RX ADMIN — RIVAROXABAN 20 MG: 20 TABLET, FILM COATED ORAL at 17:28

## 2017-03-20 RX ADMIN — Medication 325 MG: at 12:12

## 2017-03-20 RX ADMIN — NYSTATIN 500000 UNITS: 100000 SUSPENSION ORAL at 17:28

## 2017-03-20 RX ADMIN — Medication 325 MG: at 17:28

## 2017-03-20 RX ADMIN — VITAMIN D, TAB 1000IU (100/BT) 1000 UNITS: 25 TAB at 08:34

## 2017-03-20 RX ADMIN — NYSTATIN 500000 UNITS: 100000 SUSPENSION ORAL at 12:12

## 2017-03-20 RX ADMIN — HYDROMORPHONE HYDROCHLORIDE 1 MG: 1 INJECTION, SOLUTION INTRAMUSCULAR; INTRAVENOUS; SUBCUTANEOUS at 20:20

## 2017-03-20 RX ADMIN — INSULIN LISPRO 8 UNITS: 100 INJECTION, SOLUTION INTRAVENOUS; SUBCUTANEOUS at 09:01

## 2017-03-20 RX ADMIN — HYDROMORPHONE HYDROCHLORIDE 1 MG: 1 INJECTION, SOLUTION INTRAMUSCULAR; INTRAVENOUS; SUBCUTANEOUS at 15:11

## 2017-03-20 RX ADMIN — VANCOMYCIN HYDROCHLORIDE 1000 MG: 1 INJECTION, SOLUTION INTRAVENOUS at 17:28

## 2017-03-20 RX ADMIN — INSULIN LISPRO 2 UNITS: 100 INJECTION, SOLUTION INTRAVENOUS; SUBCUTANEOUS at 08:36

## 2017-03-20 RX ADMIN — OXYCODONE HYDROCHLORIDE AND ACETAMINOPHEN 500 MG: 500 TABLET ORAL at 08:34

## 2017-03-20 RX ADMIN — NYSTATIN 500000 UNITS: 100000 SUSPENSION ORAL at 22:10

## 2017-03-20 RX ADMIN — HYDROMORPHONE HYDROCHLORIDE 1 MG: 1 INJECTION, SOLUTION INTRAMUSCULAR; INTRAVENOUS; SUBCUTANEOUS at 10:19

## 2017-03-20 RX ADMIN — INSULIN LISPRO 2 UNITS: 100 INJECTION, SOLUTION INTRAVENOUS; SUBCUTANEOUS at 12:13

## 2017-03-20 RX ADMIN — INSULIN LISPRO 8 UNITS: 100 INJECTION, SOLUTION INTRAVENOUS; SUBCUTANEOUS at 18:01

## 2017-03-20 RX ADMIN — Medication 325 MG: at 08:33

## 2017-03-20 RX ADMIN — NYSTATIN 500000 UNITS: 100000 SUSPENSION ORAL at 08:41

## 2017-03-20 RX ADMIN — INSULIN LISPRO 2 UNITS: 100 INJECTION, SOLUTION INTRAVENOUS; SUBCUTANEOUS at 17:28

## 2017-03-20 RX ADMIN — HYDROMORPHONE HYDROCHLORIDE 1 MG: 1 INJECTION, SOLUTION INTRAMUSCULAR; INTRAVENOUS; SUBCUTANEOUS at 06:01

## 2017-03-20 RX ADMIN — FUROSEMIDE 20 MG: 10 INJECTION, SOLUTION INTRAMUSCULAR; INTRAVENOUS at 08:38

## 2017-03-20 RX ADMIN — IRON SUCROSE 100 MG: 20 INJECTION, SOLUTION INTRAVENOUS at 08:40

## 2017-03-20 RX ADMIN — INSULIN LISPRO 8 UNITS: 100 INJECTION, SOLUTION INTRAVENOUS; SUBCUTANEOUS at 12:19

## 2017-03-21 ENCOUNTER — ANESTHESIA EVENT (INPATIENT)
Dept: GASTROENTEROLOGY | Facility: HOSPITAL | Age: 37
DRG: 871 | End: 2017-03-21
Payer: MEDICARE

## 2017-03-21 PROBLEM — E87.5 HYPERKALEMIA: Status: ACTIVE | Noted: 2017-03-21

## 2017-03-21 LAB
ANION GAP SERPL CALCULATED.3IONS-SCNC: 4 MMOL/L (ref 4–13)
ATRIAL RATE: 79 BPM
BASOPHILS # BLD AUTO: 0.04 THOUSANDS/ΜL (ref 0–0.1)
BASOPHILS NFR BLD AUTO: 0 % (ref 0–1)
BUN SERPL-MCNC: 33 MG/DL (ref 5–25)
CALCIUM SERPL-MCNC: 7.9 MG/DL (ref 8.3–10.1)
CHLORIDE SERPL-SCNC: 111 MMOL/L (ref 100–108)
CO2 SERPL-SCNC: 23 MMOL/L (ref 21–32)
CREAT SERPL-MCNC: 1.44 MG/DL (ref 0.6–1.3)
EOSINOPHIL # BLD AUTO: 0.2 THOUSAND/ΜL (ref 0–0.61)
EOSINOPHIL NFR BLD AUTO: 2 % (ref 0–6)
ERYTHROCYTE [DISTWIDTH] IN BLOOD BY AUTOMATED COUNT: 15.4 % (ref 11.6–15.1)
FOLATE SERPL-MCNC: 12.2 NG/ML (ref 3.1–17.5)
GFR SERPL CREATININE-BSD FRML MDRD: >60 ML/MIN/1.73SQ M
GLUCOSE SERPL-MCNC: 151 MG/DL (ref 65–140)
GLUCOSE SERPL-MCNC: 188 MG/DL (ref 65–140)
GLUCOSE SERPL-MCNC: 190 MG/DL (ref 65–140)
GLUCOSE SERPL-MCNC: 221 MG/DL (ref 65–140)
GLUCOSE SERPL-MCNC: 282 MG/DL (ref 65–140)
HCT VFR BLD AUTO: 18.1 % (ref 36.5–49.3)
HEMOCCULT STL QL: POSITIVE
HGB BLD-MCNC: 5.5 G/DL (ref 12–17)
IRON SERPL-MCNC: 33 UG/DL (ref 65–175)
LYMPHOCYTES # BLD AUTO: 1.71 THOUSANDS/ΜL (ref 0.6–4.47)
LYMPHOCYTES NFR BLD AUTO: 17 % (ref 14–44)
MCH RBC QN AUTO: 26.2 PG (ref 26.8–34.3)
MCHC RBC AUTO-ENTMCNC: 30.4 G/DL (ref 31.4–37.4)
MCV RBC AUTO: 86 FL (ref 82–98)
MONOCYTES # BLD AUTO: 0.63 THOUSAND/ΜL (ref 0.17–1.22)
MONOCYTES NFR BLD AUTO: 6 % (ref 4–12)
NEUTROPHILS # BLD AUTO: 7.77 THOUSANDS/ΜL (ref 1.85–7.62)
NEUTS SEG NFR BLD AUTO: 75 % (ref 43–75)
P AXIS: 60 DEGREES
PLATELET # BLD AUTO: 406 THOUSANDS/UL (ref 149–390)
PMV BLD AUTO: 10.4 FL (ref 8.9–12.7)
POTASSIUM SERPL-SCNC: 4.5 MMOL/L (ref 3.5–5.3)
POTASSIUM SERPL-SCNC: 6.3 MMOL/L (ref 3.5–5.3)
PR INTERVAL: 146 MS
QRS AXIS: 67 DEGREES
QRSD INTERVAL: 82 MS
QT INTERVAL: 368 MS
QTC INTERVAL: 421 MS
RBC # BLD AUTO: 2.1 MILLION/UL (ref 3.88–5.62)
SODIUM SERPL-SCNC: 138 MMOL/L (ref 136–145)
T WAVE AXIS: 77 DEGREES
TIBC SERPL-MCNC: 193 UG/DL (ref 250–450)
VANCOMYCIN TROUGH SERPL-MCNC: 18.8 UG/ML (ref 10–20)
VENTRICULAR RATE: 79 BPM
WBC # BLD AUTO: 10.35 THOUSAND/UL (ref 4.31–10.16)

## 2017-03-21 PROCEDURE — 83550 IRON BINDING TEST: CPT | Performed by: INTERNAL MEDICINE

## 2017-03-21 PROCEDURE — 93005 ELECTROCARDIOGRAM TRACING: CPT | Performed by: INTERNAL MEDICINE

## 2017-03-21 PROCEDURE — 84132 ASSAY OF SERUM POTASSIUM: CPT | Performed by: INTERNAL MEDICINE

## 2017-03-21 PROCEDURE — 83540 ASSAY OF IRON: CPT | Performed by: INTERNAL MEDICINE

## 2017-03-21 PROCEDURE — 80048 BASIC METABOLIC PNL TOTAL CA: CPT | Performed by: INTERNAL MEDICINE

## 2017-03-21 PROCEDURE — 82746 ASSAY OF FOLIC ACID SERUM: CPT | Performed by: INTERNAL MEDICINE

## 2017-03-21 PROCEDURE — 80202 ASSAY OF VANCOMYCIN: CPT | Performed by: INTERNAL MEDICINE

## 2017-03-21 PROCEDURE — 82272 OCCULT BLD FECES 1-3 TESTS: CPT | Performed by: INTERNAL MEDICINE

## 2017-03-21 PROCEDURE — 85025 COMPLETE CBC W/AUTO DIFF WBC: CPT | Performed by: INTERNAL MEDICINE

## 2017-03-21 PROCEDURE — 82948 REAGENT STRIP/BLOOD GLUCOSE: CPT

## 2017-03-21 RX ORDER — DEXTROSE MONOHYDRATE 25 G/50ML
INJECTION, SOLUTION INTRAVENOUS
Status: DISPENSED
Start: 2017-03-21 | End: 2017-03-21

## 2017-03-21 RX ORDER — CALCIUM GLUCONATE 94 MG/ML
1 INJECTION, SOLUTION INTRAVENOUS ONCE
Status: DISCONTINUED | OUTPATIENT
Start: 2017-03-21 | End: 2017-03-21 | Stop reason: CLARIF

## 2017-03-21 RX ORDER — ALBUTEROL SULFATE 2.5 MG/3ML
2.5 SOLUTION RESPIRATORY (INHALATION) ONCE
Status: DISCONTINUED | OUTPATIENT
Start: 2017-03-21 | End: 2017-03-30

## 2017-03-21 RX ORDER — DEXTROSE MONOHYDRATE 25 G/50ML
50 INJECTION, SOLUTION INTRAVENOUS ONCE
Status: COMPLETED | OUTPATIENT
Start: 2017-03-21 | End: 2017-03-21

## 2017-03-21 RX ORDER — SODIUM POLYSTYRENE SULFONATE 15 G/60ML
30 SUSPENSION ORAL; RECTAL ONCE
Status: DISCONTINUED | OUTPATIENT
Start: 2017-03-21 | End: 2017-03-22

## 2017-03-21 RX ORDER — CYANOCOBALAMIN 1000 UG/ML
1000 INJECTION INTRAMUSCULAR; SUBCUTANEOUS
Status: DISCONTINUED | OUTPATIENT
Start: 2017-03-21 | End: 2017-03-30 | Stop reason: HOSPADM

## 2017-03-21 RX ADMIN — INSULIN DETEMIR 15 UNITS: 100 INJECTION, SOLUTION SUBCUTANEOUS at 21:16

## 2017-03-21 RX ADMIN — INSULIN LISPRO 1 UNITS: 100 INJECTION, SOLUTION INTRAVENOUS; SUBCUTANEOUS at 11:50

## 2017-03-21 RX ADMIN — HYDROMORPHONE HYDROCHLORIDE 1 MG: 1 INJECTION, SOLUTION INTRAMUSCULAR; INTRAVENOUS; SUBCUTANEOUS at 03:29

## 2017-03-21 RX ADMIN — INSULIN LISPRO 8 UNITS: 100 INJECTION, SOLUTION INTRAVENOUS; SUBCUTANEOUS at 17:38

## 2017-03-21 RX ADMIN — INSULIN DETEMIR 15 UNITS: 100 INJECTION, SOLUTION SUBCUTANEOUS at 11:51

## 2017-03-21 RX ADMIN — HYDROMORPHONE HYDROCHLORIDE 1 MG: 1 INJECTION, SOLUTION INTRAMUSCULAR; INTRAVENOUS; SUBCUTANEOUS at 09:40

## 2017-03-21 RX ADMIN — VANCOMYCIN HYDROCHLORIDE 1000 MG: 1 INJECTION, SOLUTION INTRAVENOUS at 17:36

## 2017-03-21 RX ADMIN — Medication 325 MG: at 08:35

## 2017-03-21 RX ADMIN — Medication 325 MG: at 17:35

## 2017-03-21 RX ADMIN — Medication 325 MG: at 11:50

## 2017-03-21 RX ADMIN — CALCIUM GLUCONATE 1 G: 94 INJECTION, SOLUTION INTRAVENOUS at 10:04

## 2017-03-21 RX ADMIN — INSULIN LISPRO 2 UNITS: 100 INJECTION, SOLUTION INTRAVENOUS; SUBCUTANEOUS at 17:36

## 2017-03-21 RX ADMIN — OXYCODONE HYDROCHLORIDE AND ACETAMINOPHEN 500 MG: 500 TABLET ORAL at 08:35

## 2017-03-21 RX ADMIN — NYSTATIN 500000 UNITS: 100000 SUSPENSION ORAL at 22:16

## 2017-03-21 RX ADMIN — CYANOCOBALAMIN 1000 MCG: 1000 INJECTION, SOLUTION INTRAMUSCULAR at 11:50

## 2017-03-21 RX ADMIN — BACLOFEN 20 MG: 10 TABLET ORAL at 17:35

## 2017-03-21 RX ADMIN — NYSTATIN 500000 UNITS: 100000 SUSPENSION ORAL at 11:50

## 2017-03-21 RX ADMIN — HYDROMORPHONE HYDROCHLORIDE 1 MG: 1 INJECTION, SOLUTION INTRAMUSCULAR; INTRAVENOUS; SUBCUTANEOUS at 14:31

## 2017-03-21 RX ADMIN — VITAMIN D, TAB 1000IU (100/BT) 1000 UNITS: 25 TAB at 08:35

## 2017-03-21 RX ADMIN — HYDROMORPHONE HYDROCHLORIDE 1 MG: 1 INJECTION, SOLUTION INTRAMUSCULAR; INTRAVENOUS; SUBCUTANEOUS at 20:01

## 2017-03-21 RX ADMIN — SODIUM CHLORIDE: 9 INJECTION INTRAMUSCULAR; INTRAVENOUS; SUBCUTANEOUS at 09:40

## 2017-03-21 RX ADMIN — BACLOFEN 20 MG: 10 TABLET ORAL at 08:35

## 2017-03-21 RX ADMIN — DEXTROSE MONOHYDRATE 50 ML: 25 INJECTION, SOLUTION INTRAVENOUS at 08:34

## 2017-03-21 RX ADMIN — INSULIN LISPRO 8 UNITS: 100 INJECTION, SOLUTION INTRAVENOUS; SUBCUTANEOUS at 12:00

## 2017-03-21 RX ADMIN — NYSTATIN 500000 UNITS: 100000 SUSPENSION ORAL at 17:35

## 2017-03-22 ENCOUNTER — GENERIC CONVERSION - ENCOUNTER (OUTPATIENT)
Dept: OTHER | Facility: OTHER | Age: 37
End: 2017-03-22

## 2017-03-22 ENCOUNTER — ANESTHESIA (INPATIENT)
Dept: GASTROENTEROLOGY | Facility: HOSPITAL | Age: 37
DRG: 871 | End: 2017-03-22
Payer: MEDICARE

## 2017-03-22 PROBLEM — E87.5 HYPERKALEMIA: Status: RESOLVED | Noted: 2017-03-21 | Resolved: 2017-03-22

## 2017-03-22 LAB
ANION GAP SERPL CALCULATED.3IONS-SCNC: 7 MMOL/L (ref 4–13)
ANISOCYTOSIS BLD QL SMEAR: PRESENT
BACTERIA BLD CULT: NORMAL
BACTERIA BLD CULT: NORMAL
BASOPHILS # BLD MANUAL: 0 THOUSAND/UL (ref 0–0.1)
BASOPHILS NFR MAR MANUAL: 0 % (ref 0–1)
BUN SERPL-MCNC: 36 MG/DL (ref 5–25)
CALCIUM SERPL-MCNC: 7.7 MG/DL (ref 8.3–10.1)
CHLORIDE SERPL-SCNC: 112 MMOL/L (ref 100–108)
CO2 SERPL-SCNC: 22 MMOL/L (ref 21–32)
CREAT SERPL-MCNC: 1.51 MG/DL (ref 0.6–1.3)
EOSINOPHIL # BLD MANUAL: 0.09 THOUSAND/UL (ref 0–0.4)
EOSINOPHIL NFR BLD MANUAL: 1 % (ref 0–6)
ERYTHROCYTE [DISTWIDTH] IN BLOOD BY AUTOMATED COUNT: 15.7 % (ref 11.6–15.1)
GFR SERPL CREATININE-BSD FRML MDRD: >60 ML/MIN/1.73SQ M
GLUCOSE SERPL-MCNC: 103 MG/DL (ref 65–140)
GLUCOSE SERPL-MCNC: 151 MG/DL (ref 65–140)
GLUCOSE SERPL-MCNC: 151 MG/DL (ref 65–140)
GLUCOSE SERPL-MCNC: 157 MG/DL (ref 65–140)
GLUCOSE SERPL-MCNC: 162 MG/DL (ref 65–140)
GLUCOSE SERPL-MCNC: 33 MG/DL (ref 65–140)
GLUCOSE SERPL-MCNC: 335 MG/DL (ref 65–140)
GLUCOSE SERPL-MCNC: 390 MG/DL (ref 65–140)
GLUCOSE SERPL-MCNC: 46 MG/DL (ref 65–140)
HCT VFR BLD AUTO: 15.7 % (ref 36.5–49.3)
HGB BLD-MCNC: 4.9 G/DL (ref 12–17)
HYPERCHROMIA BLD QL SMEAR: PRESENT
LYMPHOCYTES # BLD AUTO: 2.15 THOUSAND/UL (ref 0.6–4.47)
LYMPHOCYTES # BLD AUTO: 23 % (ref 14–44)
MCH RBC QN AUTO: 26.1 PG (ref 26.8–34.3)
MCHC RBC AUTO-ENTMCNC: 31.2 G/DL (ref 31.4–37.4)
MCV RBC AUTO: 84 FL (ref 82–98)
MONOCYTES # BLD AUTO: 0.65 THOUSAND/UL (ref 0–1.22)
MONOCYTES NFR BLD: 7 % (ref 4–12)
NEUTROPHILS # BLD MANUAL: 6.44 THOUSAND/UL (ref 1.85–7.62)
NEUTS BAND NFR BLD MANUAL: 5 % (ref 0–8)
NEUTS SEG NFR BLD AUTO: 64 % (ref 43–75)
NRBC BLD AUTO-RTO: 0 /100 WBCS
PLATELET # BLD AUTO: 358 THOUSANDS/UL (ref 149–390)
PLATELET BLD QL SMEAR: ADEQUATE
PMV BLD AUTO: 10.2 FL (ref 8.9–12.7)
POTASSIUM SERPL-SCNC: 5.1 MMOL/L (ref 3.5–5.3)
RBC # BLD AUTO: 1.88 MILLION/UL (ref 3.88–5.62)
SODIUM SERPL-SCNC: 141 MMOL/L (ref 136–145)
TOTAL CELLS COUNTED SPEC: 100
WBC # BLD AUTO: 9.33 THOUSAND/UL (ref 4.31–10.16)

## 2017-03-22 PROCEDURE — 0DJ08ZZ INSPECTION OF UPPER INTESTINAL TRACT, VIA NATURAL OR ARTIFICIAL OPENING ENDOSCOPIC: ICD-10-PCS | Performed by: INTERNAL MEDICINE

## 2017-03-22 PROCEDURE — 85007 BL SMEAR W/DIFF WBC COUNT: CPT | Performed by: INTERNAL MEDICINE

## 2017-03-22 PROCEDURE — 80048 BASIC METABOLIC PNL TOTAL CA: CPT | Performed by: INTERNAL MEDICINE

## 2017-03-22 PROCEDURE — 85027 COMPLETE CBC AUTOMATED: CPT | Performed by: INTERNAL MEDICINE

## 2017-03-22 PROCEDURE — 82948 REAGENT STRIP/BLOOD GLUCOSE: CPT

## 2017-03-22 RX ORDER — PROPOFOL 10 MG/ML
INJECTION, EMULSION INTRAVENOUS AS NEEDED
Status: DISCONTINUED | OUTPATIENT
Start: 2017-03-22 | End: 2017-03-22 | Stop reason: SURG

## 2017-03-22 RX ORDER — DEXTROSE MONOHYDRATE 25 G/50ML
25 INJECTION, SOLUTION INTRAVENOUS ONCE
Status: COMPLETED | OUTPATIENT
Start: 2017-03-22 | End: 2017-03-22

## 2017-03-22 RX ORDER — MORPHINE SULFATE 2 MG/ML
2 INJECTION, SOLUTION INTRAMUSCULAR; INTRAVENOUS ONCE
Status: COMPLETED | OUTPATIENT
Start: 2017-03-22 | End: 2017-03-22

## 2017-03-22 RX ADMIN — NYSTATIN 500000 UNITS: 100000 SUSPENSION ORAL at 22:05

## 2017-03-22 RX ADMIN — LIDOCAINE HYDROCHLORIDE 50 MG: 20 INJECTION, SOLUTION INTRAVENOUS at 15:06

## 2017-03-22 RX ADMIN — DEXTROSE MONOHYDRATE 25 ML: 25 INJECTION, SOLUTION INTRAVENOUS at 16:14

## 2017-03-22 RX ADMIN — BACLOFEN 20 MG: 10 TABLET ORAL at 17:38

## 2017-03-22 RX ADMIN — Medication 325 MG: at 17:38

## 2017-03-22 RX ADMIN — NYSTATIN 500000 UNITS: 100000 SUSPENSION ORAL at 17:38

## 2017-03-22 RX ADMIN — INSULIN LISPRO 5 UNITS: 100 INJECTION, SOLUTION INTRAVENOUS; SUBCUTANEOUS at 01:07

## 2017-03-22 RX ADMIN — INSULIN DETEMIR 15 UNITS: 100 INJECTION, SOLUTION SUBCUTANEOUS at 11:04

## 2017-03-22 RX ADMIN — HYDROMORPHONE HYDROCHLORIDE 1 MG: 1 INJECTION, SOLUTION INTRAMUSCULAR; INTRAVENOUS; SUBCUTANEOUS at 00:47

## 2017-03-22 RX ADMIN — MORPHINE SULFATE 2 MG: 2 INJECTION, SOLUTION INTRAMUSCULAR; INTRAVENOUS at 23:09

## 2017-03-22 RX ADMIN — IRON SUCROSE 100 MG: 20 INJECTION, SOLUTION INTRAVENOUS at 10:31

## 2017-03-22 RX ADMIN — HYDROMORPHONE HYDROCHLORIDE 1 MG: 1 INJECTION, SOLUTION INTRAMUSCULAR; INTRAVENOUS; SUBCUTANEOUS at 05:51

## 2017-03-22 RX ADMIN — VANCOMYCIN HYDROCHLORIDE 1000 MG: 1 INJECTION, SOLUTION INTRAVENOUS at 18:14

## 2017-03-22 RX ADMIN — HYDROMORPHONE HYDROCHLORIDE 1 MG: 1 INJECTION, SOLUTION INTRAMUSCULAR; INTRAVENOUS; SUBCUTANEOUS at 10:31

## 2017-03-22 RX ADMIN — INSULIN LISPRO 1 UNITS: 100 INJECTION, SOLUTION INTRAVENOUS; SUBCUTANEOUS at 17:38

## 2017-03-22 RX ADMIN — INSULIN DETEMIR 15 UNITS: 100 INJECTION, SOLUTION SUBCUTANEOUS at 21:58

## 2017-03-22 RX ADMIN — HYDROMORPHONE HYDROCHLORIDE 1 MG: 1 INJECTION, SOLUTION INTRAMUSCULAR; INTRAVENOUS; SUBCUTANEOUS at 14:06

## 2017-03-22 RX ADMIN — SODIUM CHLORIDE: 0.9 INJECTION, SOLUTION INTRAVENOUS at 15:00

## 2017-03-22 RX ADMIN — PROPOFOL 150 MG: 10 INJECTION, EMULSION INTRAVENOUS at 15:06

## 2017-03-22 RX ADMIN — INSULIN LISPRO 8 UNITS: 100 INJECTION, SOLUTION INTRAVENOUS; SUBCUTANEOUS at 17:40

## 2017-03-22 RX ADMIN — HYDROMORPHONE HYDROCHLORIDE 1 MG: 1 INJECTION, SOLUTION INTRAMUSCULAR; INTRAVENOUS; SUBCUTANEOUS at 17:40

## 2017-03-22 RX ADMIN — INSULIN LISPRO 6 UNITS: 100 INJECTION, SOLUTION INTRAVENOUS; SUBCUTANEOUS at 22:00

## 2017-03-23 LAB
ANION GAP SERPL CALCULATED.3IONS-SCNC: 6 MMOL/L (ref 4–13)
BASOPHILS # BLD AUTO: 0.05 THOUSANDS/ΜL (ref 0–0.1)
BASOPHILS NFR BLD AUTO: 1 % (ref 0–1)
BUN SERPL-MCNC: 31 MG/DL (ref 5–25)
CALCIUM SERPL-MCNC: 7.6 MG/DL (ref 8.3–10.1)
CHLORIDE SERPL-SCNC: 111 MMOL/L (ref 100–108)
CO2 SERPL-SCNC: 22 MMOL/L (ref 21–32)
CREAT SERPL-MCNC: 1.43 MG/DL (ref 0.6–1.3)
EOSINOPHIL # BLD AUTO: 0.21 THOUSAND/ΜL (ref 0–0.61)
EOSINOPHIL NFR BLD AUTO: 2 % (ref 0–6)
ERYTHROCYTE [DISTWIDTH] IN BLOOD BY AUTOMATED COUNT: 15.7 % (ref 11.6–15.1)
GFR SERPL CREATININE-BSD FRML MDRD: >60 ML/MIN/1.73SQ M
GLUCOSE SERPL-MCNC: 111 MG/DL (ref 65–140)
GLUCOSE SERPL-MCNC: 113 MG/DL (ref 65–140)
GLUCOSE SERPL-MCNC: 157 MG/DL (ref 65–140)
GLUCOSE SERPL-MCNC: 186 MG/DL (ref 65–140)
GLUCOSE SERPL-MCNC: 99 MG/DL (ref 65–140)
HCT VFR BLD AUTO: 17.6 % (ref 36.5–49.3)
HGB BLD-MCNC: 5.6 G/DL (ref 12–17)
LYMPHOCYTES # BLD AUTO: 1.94 THOUSANDS/ΜL (ref 0.6–4.47)
LYMPHOCYTES NFR BLD AUTO: 21 % (ref 14–44)
MCH RBC QN AUTO: 26.8 PG (ref 26.8–34.3)
MCHC RBC AUTO-ENTMCNC: 31.8 G/DL (ref 31.4–37.4)
MCV RBC AUTO: 84 FL (ref 82–98)
MONOCYTES # BLD AUTO: 0.52 THOUSAND/ΜL (ref 0.17–1.22)
MONOCYTES NFR BLD AUTO: 6 % (ref 4–12)
NEUTROPHILS # BLD AUTO: 6.43 THOUSANDS/ΜL (ref 1.85–7.62)
NEUTS SEG NFR BLD AUTO: 70 % (ref 43–75)
NRBC BLD AUTO-RTO: 0 /100 WBCS
PLATELET # BLD AUTO: 349 THOUSANDS/UL (ref 149–390)
PMV BLD AUTO: 10 FL (ref 8.9–12.7)
POTASSIUM SERPL-SCNC: 4.8 MMOL/L (ref 3.5–5.3)
RBC # BLD AUTO: 2.09 MILLION/UL (ref 3.88–5.62)
SODIUM SERPL-SCNC: 139 MMOL/L (ref 136–145)
WBC # BLD AUTO: 9.15 THOUSAND/UL (ref 4.31–10.16)

## 2017-03-23 PROCEDURE — 82948 REAGENT STRIP/BLOOD GLUCOSE: CPT

## 2017-03-23 PROCEDURE — 80048 BASIC METABOLIC PNL TOTAL CA: CPT | Performed by: INTERNAL MEDICINE

## 2017-03-23 PROCEDURE — 85025 COMPLETE CBC W/AUTO DIFF WBC: CPT | Performed by: INTERNAL MEDICINE

## 2017-03-23 RX ADMIN — NYSTATIN 500000 UNITS: 100000 SUSPENSION ORAL at 21:20

## 2017-03-23 RX ADMIN — INSULIN DETEMIR 15 UNITS: 100 INJECTION, SOLUTION SUBCUTANEOUS at 21:19

## 2017-03-23 RX ADMIN — INSULIN LISPRO 8 UNITS: 100 INJECTION, SOLUTION INTRAVENOUS; SUBCUTANEOUS at 18:48

## 2017-03-23 RX ADMIN — INSULIN LISPRO 1 UNITS: 100 INJECTION, SOLUTION INTRAVENOUS; SUBCUTANEOUS at 18:49

## 2017-03-23 RX ADMIN — RIVAROXABAN 20 MG: 20 TABLET, FILM COATED ORAL at 08:57

## 2017-03-23 RX ADMIN — HYDROMORPHONE HYDROCHLORIDE 1 MG: 1 INJECTION, SOLUTION INTRAMUSCULAR; INTRAVENOUS; SUBCUTANEOUS at 16:28

## 2017-03-23 RX ADMIN — INSULIN DETEMIR 15 UNITS: 100 INJECTION, SOLUTION SUBCUTANEOUS at 08:57

## 2017-03-23 RX ADMIN — BACLOFEN 20 MG: 10 TABLET ORAL at 08:57

## 2017-03-23 RX ADMIN — Medication 325 MG: at 12:09

## 2017-03-23 RX ADMIN — NYSTATIN 500000 UNITS: 100000 SUSPENSION ORAL at 12:09

## 2017-03-23 RX ADMIN — Medication 325 MG: at 08:57

## 2017-03-23 RX ADMIN — NYSTATIN 500000 UNITS: 100000 SUSPENSION ORAL at 08:57

## 2017-03-23 RX ADMIN — HYDROMORPHONE HYDROCHLORIDE 1 MG: 1 INJECTION, SOLUTION INTRAMUSCULAR; INTRAVENOUS; SUBCUTANEOUS at 02:45

## 2017-03-23 RX ADMIN — HYDROMORPHONE HYDROCHLORIDE 1 MG: 1 INJECTION, SOLUTION INTRAMUSCULAR; INTRAVENOUS; SUBCUTANEOUS at 10:08

## 2017-03-23 RX ADMIN — INSULIN LISPRO 8 UNITS: 100 INJECTION, SOLUTION INTRAVENOUS; SUBCUTANEOUS at 08:57

## 2017-03-23 RX ADMIN — NYSTATIN 500000 UNITS: 100000 SUSPENSION ORAL at 18:00

## 2017-03-23 RX ADMIN — INSULIN LISPRO 8 UNITS: 100 INJECTION, SOLUTION INTRAVENOUS; SUBCUTANEOUS at 12:09

## 2017-03-23 RX ADMIN — Medication 325 MG: at 16:28

## 2017-03-23 RX ADMIN — VANCOMYCIN HYDROCHLORIDE 1000 MG: 1 INJECTION, SOLUTION INTRAVENOUS at 16:27

## 2017-03-23 RX ADMIN — OXYCODONE HYDROCHLORIDE AND ACETAMINOPHEN 500 MG: 500 TABLET ORAL at 08:57

## 2017-03-23 RX ADMIN — BACLOFEN 20 MG: 10 TABLET ORAL at 18:00

## 2017-03-24 LAB
GLUCOSE SERPL-MCNC: 128 MG/DL (ref 65–140)
GLUCOSE SERPL-MCNC: 172 MG/DL (ref 65–140)
GLUCOSE SERPL-MCNC: 188 MG/DL (ref 65–140)
GLUCOSE SERPL-MCNC: 244 MG/DL (ref 65–140)
GLUCOSE SERPL-MCNC: 40 MG/DL (ref 65–140)

## 2017-03-24 PROCEDURE — 82948 REAGENT STRIP/BLOOD GLUCOSE: CPT

## 2017-03-24 RX ADMIN — INSULIN LISPRO 8 UNITS: 100 INJECTION, SOLUTION INTRAVENOUS; SUBCUTANEOUS at 18:05

## 2017-03-24 RX ADMIN — INSULIN DETEMIR 15 UNITS: 100 INJECTION, SOLUTION SUBCUTANEOUS at 09:07

## 2017-03-24 RX ADMIN — OXYCODONE HYDROCHLORIDE 10 MG: 10 TABLET ORAL at 21:52

## 2017-03-24 RX ADMIN — INSULIN LISPRO 1 UNITS: 100 INJECTION, SOLUTION INTRAVENOUS; SUBCUTANEOUS at 21:52

## 2017-03-24 RX ADMIN — INSULIN LISPRO 2 UNITS: 100 INJECTION, SOLUTION INTRAVENOUS; SUBCUTANEOUS at 09:08

## 2017-03-24 RX ADMIN — RIVAROXABAN 20 MG: 20 TABLET, FILM COATED ORAL at 09:07

## 2017-03-24 RX ADMIN — NYSTATIN 500000 UNITS: 100000 SUSPENSION ORAL at 18:05

## 2017-03-24 RX ADMIN — Medication 325 MG: at 13:22

## 2017-03-24 RX ADMIN — IRON SUCROSE 100 MG: 20 INJECTION, SOLUTION INTRAVENOUS at 09:28

## 2017-03-24 RX ADMIN — Medication 325 MG: at 18:05

## 2017-03-24 RX ADMIN — INSULIN LISPRO 8 UNITS: 100 INJECTION, SOLUTION INTRAVENOUS; SUBCUTANEOUS at 09:28

## 2017-03-24 RX ADMIN — NYSTATIN 500000 UNITS: 100000 SUSPENSION ORAL at 21:52

## 2017-03-24 RX ADMIN — VANCOMYCIN HYDROCHLORIDE 1000 MG: 1 INJECTION, SOLUTION INTRAVENOUS at 18:05

## 2017-03-24 RX ADMIN — OXYCODONE HYDROCHLORIDE AND ACETAMINOPHEN 500 MG: 500 TABLET ORAL at 09:07

## 2017-03-24 RX ADMIN — BACLOFEN 20 MG: 10 TABLET ORAL at 09:08

## 2017-03-24 RX ADMIN — NYSTATIN 500000 UNITS: 100000 SUSPENSION ORAL at 13:21

## 2017-03-24 RX ADMIN — Medication 325 MG: at 09:07

## 2017-03-24 RX ADMIN — INSULIN DETEMIR 15 UNITS: 100 INJECTION, SOLUTION SUBCUTANEOUS at 21:52

## 2017-03-24 RX ADMIN — BACLOFEN 20 MG: 10 TABLET ORAL at 18:07

## 2017-03-24 RX ADMIN — COLLAGENASE SANTYL: 250 OINTMENT TOPICAL at 09:12

## 2017-03-24 RX ADMIN — INSULIN LISPRO 8 UNITS: 100 INJECTION, SOLUTION INTRAVENOUS; SUBCUTANEOUS at 13:22

## 2017-03-24 RX ADMIN — INSULIN LISPRO 1 UNITS: 100 INJECTION, SOLUTION INTRAVENOUS; SUBCUTANEOUS at 13:23

## 2017-03-25 LAB
ANION GAP SERPL CALCULATED.3IONS-SCNC: 4 MMOL/L (ref 4–13)
BASOPHILS # BLD AUTO: 0.05 THOUSANDS/ΜL (ref 0–0.1)
BASOPHILS NFR BLD AUTO: 1 % (ref 0–1)
BUN SERPL-MCNC: 39 MG/DL (ref 5–25)
CALCIUM SERPL-MCNC: 7.8 MG/DL (ref 8.3–10.1)
CHLORIDE SERPL-SCNC: 112 MMOL/L (ref 100–108)
CO2 SERPL-SCNC: 23 MMOL/L (ref 21–32)
CREAT SERPL-MCNC: 1.59 MG/DL (ref 0.6–1.3)
EOSINOPHIL # BLD AUTO: 0.16 THOUSAND/ΜL (ref 0–0.61)
EOSINOPHIL NFR BLD AUTO: 2 % (ref 0–6)
ERYTHROCYTE [DISTWIDTH] IN BLOOD BY AUTOMATED COUNT: 16.4 % (ref 11.6–15.1)
GFR SERPL CREATININE-BSD FRML MDRD: 60 ML/MIN/1.73SQ M
GLUCOSE SERPL-MCNC: 117 MG/DL (ref 65–140)
GLUCOSE SERPL-MCNC: 207 MG/DL (ref 65–140)
GLUCOSE SERPL-MCNC: 231 MG/DL (ref 65–140)
GLUCOSE SERPL-MCNC: 299 MG/DL (ref 65–140)
GLUCOSE SERPL-MCNC: 78 MG/DL (ref 65–140)
HCT VFR BLD AUTO: 16.6 % (ref 36.5–49.3)
HGB BLD-MCNC: 5.2 G/DL (ref 12–17)
LYMPHOCYTES # BLD AUTO: 2.44 THOUSANDS/ΜL (ref 0.6–4.47)
LYMPHOCYTES NFR BLD AUTO: 31 % (ref 14–44)
MCH RBC QN AUTO: 26.5 PG (ref 26.8–34.3)
MCHC RBC AUTO-ENTMCNC: 31.3 G/DL (ref 31.4–37.4)
MCV RBC AUTO: 85 FL (ref 82–98)
MONOCYTES # BLD AUTO: 0.51 THOUSAND/ΜL (ref 0.17–1.22)
MONOCYTES NFR BLD AUTO: 6 % (ref 4–12)
NEUTROPHILS # BLD AUTO: 4.76 THOUSANDS/ΜL (ref 1.85–7.62)
NEUTS SEG NFR BLD AUTO: 60 % (ref 43–75)
NRBC BLD AUTO-RTO: 0 /100 WBCS
PLATELET # BLD AUTO: 353 THOUSANDS/UL (ref 149–390)
PMV BLD AUTO: 9.9 FL (ref 8.9–12.7)
POTASSIUM SERPL-SCNC: 4.9 MMOL/L (ref 3.5–5.3)
RBC # BLD AUTO: 1.96 MILLION/UL (ref 3.88–5.62)
SODIUM SERPL-SCNC: 139 MMOL/L (ref 136–145)
WBC # BLD AUTO: 7.92 THOUSAND/UL (ref 4.31–10.16)

## 2017-03-25 PROCEDURE — 82948 REAGENT STRIP/BLOOD GLUCOSE: CPT

## 2017-03-25 PROCEDURE — 85025 COMPLETE CBC W/AUTO DIFF WBC: CPT | Performed by: INTERNAL MEDICINE

## 2017-03-25 PROCEDURE — 80048 BASIC METABOLIC PNL TOTAL CA: CPT | Performed by: INTERNAL MEDICINE

## 2017-03-25 RX ORDER — SIMETHICONE 80 MG
80 TABLET,CHEWABLE ORAL EVERY 6 HOURS PRN
Status: DISCONTINUED | OUTPATIENT
Start: 2017-03-25 | End: 2017-03-30 | Stop reason: HOSPADM

## 2017-03-25 RX ADMIN — NYSTATIN 500000 UNITS: 100000 SUSPENSION ORAL at 21:06

## 2017-03-25 RX ADMIN — OXYCODONE HYDROCHLORIDE AND ACETAMINOPHEN 500 MG: 500 TABLET ORAL at 08:42

## 2017-03-25 RX ADMIN — NYSTATIN 500000 UNITS: 100000 SUSPENSION ORAL at 12:45

## 2017-03-25 RX ADMIN — INSULIN DETEMIR 15 UNITS: 100 INJECTION, SOLUTION SUBCUTANEOUS at 08:42

## 2017-03-25 RX ADMIN — INSULIN LISPRO 4 UNITS: 100 INJECTION, SOLUTION INTRAVENOUS; SUBCUTANEOUS at 21:07

## 2017-03-25 RX ADMIN — OXYCODONE HYDROCHLORIDE 10 MG: 10 TABLET ORAL at 15:46

## 2017-03-25 RX ADMIN — COLLAGENASE SANTYL: 250 OINTMENT TOPICAL at 08:43

## 2017-03-25 RX ADMIN — INSULIN LISPRO 8 UNITS: 100 INJECTION, SOLUTION INTRAVENOUS; SUBCUTANEOUS at 12:46

## 2017-03-25 RX ADMIN — OXYCODONE HYDROCHLORIDE 10 MG: 10 TABLET ORAL at 22:39

## 2017-03-25 RX ADMIN — BACLOFEN 20 MG: 10 TABLET ORAL at 17:59

## 2017-03-25 RX ADMIN — Medication 325 MG: at 15:46

## 2017-03-25 RX ADMIN — INSULIN LISPRO 8 UNITS: 100 INJECTION, SOLUTION INTRAVENOUS; SUBCUTANEOUS at 08:44

## 2017-03-25 RX ADMIN — RIVAROXABAN 20 MG: 20 TABLET, FILM COATED ORAL at 08:43

## 2017-03-25 RX ADMIN — NYSTATIN 500000 UNITS: 100000 SUSPENSION ORAL at 08:44

## 2017-03-25 RX ADMIN — INSULIN LISPRO 1 UNITS: 100 INJECTION, SOLUTION INTRAVENOUS; SUBCUTANEOUS at 08:44

## 2017-03-25 RX ADMIN — OXYCODONE HYDROCHLORIDE 10 MG: 10 TABLET ORAL at 02:51

## 2017-03-25 RX ADMIN — VANCOMYCIN HYDROCHLORIDE 1000 MG: 1 INJECTION, SOLUTION INTRAVENOUS at 15:42

## 2017-03-25 RX ADMIN — Medication 325 MG: at 12:43

## 2017-03-25 RX ADMIN — NYSTATIN 500000 UNITS: 100000 SUSPENSION ORAL at 18:00

## 2017-03-25 RX ADMIN — BACLOFEN 20 MG: 10 TABLET ORAL at 08:42

## 2017-03-25 RX ADMIN — INSULIN DETEMIR 15 UNITS: 100 INJECTION, SOLUTION SUBCUTANEOUS at 21:07

## 2017-03-25 RX ADMIN — Medication 325 MG: at 08:42

## 2017-03-25 RX ADMIN — OXYCODONE HYDROCHLORIDE 10 MG: 10 TABLET ORAL at 08:45

## 2017-03-26 ENCOUNTER — ANESTHESIA EVENT (INPATIENT)
Dept: GASTROENTEROLOGY | Facility: HOSPITAL | Age: 37
DRG: 871 | End: 2017-03-26
Payer: MEDICARE

## 2017-03-26 LAB
GLUCOSE SERPL-MCNC: 113 MG/DL (ref 65–140)
GLUCOSE SERPL-MCNC: 141 MG/DL (ref 65–140)
GLUCOSE SERPL-MCNC: 230 MG/DL (ref 65–140)
GLUCOSE SERPL-MCNC: 306 MG/DL (ref 65–140)

## 2017-03-26 PROCEDURE — 82948 REAGENT STRIP/BLOOD GLUCOSE: CPT

## 2017-03-26 RX ADMIN — INSULIN LISPRO 3 UNITS: 100 INJECTION, SOLUTION INTRAVENOUS; SUBCUTANEOUS at 22:17

## 2017-03-26 RX ADMIN — OXYCODONE HYDROCHLORIDE 10 MG: 10 TABLET ORAL at 20:12

## 2017-03-26 RX ADMIN — Medication 325 MG: at 16:52

## 2017-03-26 RX ADMIN — INSULIN LISPRO 8 UNITS: 100 INJECTION, SOLUTION INTRAVENOUS; SUBCUTANEOUS at 16:46

## 2017-03-26 RX ADMIN — NYSTATIN 500000 UNITS: 100000 SUSPENSION ORAL at 08:55

## 2017-03-26 RX ADMIN — INSULIN LISPRO 3 UNITS: 100 INJECTION, SOLUTION INTRAVENOUS; SUBCUTANEOUS at 16:46

## 2017-03-26 RX ADMIN — POLYETHYLENE GLYCOL 3350, SODIUM SULFATE ANHYDROUS, SODIUM BICARBONATE, SODIUM CHLORIDE, POTASSIUM CHLORIDE 4000 ML: 236; 22.74; 6.74; 5.86; 2.97 POWDER, FOR SOLUTION ORAL at 16:59

## 2017-03-26 RX ADMIN — Medication 325 MG: at 08:55

## 2017-03-26 RX ADMIN — BACLOFEN 20 MG: 10 TABLET ORAL at 08:54

## 2017-03-26 RX ADMIN — NYSTATIN 500000 UNITS: 100000 SUSPENSION ORAL at 16:54

## 2017-03-26 RX ADMIN — NYSTATIN 500000 UNITS: 100000 SUSPENSION ORAL at 22:16

## 2017-03-26 RX ADMIN — OXYCODONE HYDROCHLORIDE AND ACETAMINOPHEN 500 MG: 500 TABLET ORAL at 08:55

## 2017-03-26 RX ADMIN — OXYCODONE HYDROCHLORIDE 10 MG: 10 TABLET ORAL at 08:55

## 2017-03-26 RX ADMIN — INSULIN DETEMIR 15 UNITS: 100 INJECTION, SOLUTION SUBCUTANEOUS at 22:16

## 2017-03-26 RX ADMIN — OXYCODONE HYDROCHLORIDE 10 MG: 10 TABLET ORAL at 14:30

## 2017-03-26 RX ADMIN — NYSTATIN 500000 UNITS: 100000 SUSPENSION ORAL at 12:34

## 2017-03-26 RX ADMIN — INSULIN DETEMIR 15 UNITS: 100 INJECTION, SOLUTION SUBCUTANEOUS at 08:55

## 2017-03-26 RX ADMIN — Medication 325 MG: at 12:30

## 2017-03-26 RX ADMIN — BACLOFEN 20 MG: 10 TABLET ORAL at 16:52

## 2017-03-26 RX ADMIN — VANCOMYCIN HYDROCHLORIDE 1000 MG: 1 INJECTION, SOLUTION INTRAVENOUS at 16:46

## 2017-03-26 RX ADMIN — COLLAGENASE SANTYL: 250 OINTMENT TOPICAL at 08:55

## 2017-03-27 ENCOUNTER — GENERIC CONVERSION - ENCOUNTER (OUTPATIENT)
Dept: OTHER | Facility: OTHER | Age: 37
End: 2017-03-27

## 2017-03-27 ENCOUNTER — ANESTHESIA (INPATIENT)
Dept: GASTROENTEROLOGY | Facility: HOSPITAL | Age: 37
DRG: 871 | End: 2017-03-27
Payer: MEDICARE

## 2017-03-27 LAB
ANION GAP SERPL CALCULATED.3IONS-SCNC: 7 MMOL/L (ref 4–13)
BASOPHILS # BLD AUTO: 0.05 THOUSANDS/ΜL (ref 0–0.1)
BASOPHILS NFR BLD AUTO: 1 % (ref 0–1)
BUN SERPL-MCNC: 50 MG/DL (ref 5–25)
CALCIUM SERPL-MCNC: 7.8 MG/DL (ref 8.3–10.1)
CHLORIDE SERPL-SCNC: 109 MMOL/L (ref 100–108)
CO2 SERPL-SCNC: 25 MMOL/L (ref 21–32)
CREAT SERPL-MCNC: 1.61 MG/DL (ref 0.6–1.3)
EOSINOPHIL # BLD AUTO: 0.14 THOUSAND/ΜL (ref 0–0.61)
EOSINOPHIL NFR BLD AUTO: 3 % (ref 0–6)
ERYTHROCYTE [DISTWIDTH] IN BLOOD BY AUTOMATED COUNT: 17.1 % (ref 11.6–15.1)
GFR SERPL CREATININE-BSD FRML MDRD: 59.1 ML/MIN/1.73SQ M
GLUCOSE SERPL-MCNC: 117 MG/DL (ref 65–140)
GLUCOSE SERPL-MCNC: 119 MG/DL (ref 65–140)
GLUCOSE SERPL-MCNC: 137 MG/DL (ref 65–140)
GLUCOSE SERPL-MCNC: 160 MG/DL (ref 65–140)
GLUCOSE SERPL-MCNC: 25 MG/DL (ref 65–140)
GLUCOSE SERPL-MCNC: 292 MG/DL (ref 65–140)
GLUCOSE SERPL-MCNC: 300 MG/DL (ref 65–140)
GLUCOSE SERPL-MCNC: <20 MG/DL (ref 65–140)
HCT VFR BLD AUTO: 17.5 % (ref 36.5–49.3)
HGB BLD-MCNC: 5.6 G/DL (ref 12–17)
LYMPHOCYTES # BLD AUTO: 2.1 THOUSANDS/ΜL (ref 0.6–4.47)
LYMPHOCYTES NFR BLD AUTO: 38 % (ref 14–44)
MCH RBC QN AUTO: 26.9 PG (ref 26.8–34.3)
MCHC RBC AUTO-ENTMCNC: 32 G/DL (ref 31.4–37.4)
MCV RBC AUTO: 84 FL (ref 82–98)
MONOCYTES # BLD AUTO: 0.65 THOUSAND/ΜL (ref 0.17–1.22)
MONOCYTES NFR BLD AUTO: 12 % (ref 4–12)
NEUTROPHILS # BLD AUTO: 2.62 THOUSANDS/ΜL (ref 1.85–7.62)
NEUTS SEG NFR BLD AUTO: 46 % (ref 43–75)
NRBC BLD AUTO-RTO: 0 /100 WBCS
PLATELET # BLD AUTO: 356 THOUSANDS/UL (ref 149–390)
PMV BLD AUTO: 9.2 FL (ref 8.9–12.7)
POTASSIUM SERPL-SCNC: 4 MMOL/L (ref 3.5–5.3)
RBC # BLD AUTO: 2.08 MILLION/UL (ref 3.88–5.62)
SODIUM SERPL-SCNC: 141 MMOL/L (ref 136–145)
WBC # BLD AUTO: 5.56 THOUSAND/UL (ref 4.31–10.16)

## 2017-03-27 PROCEDURE — 80048 BASIC METABOLIC PNL TOTAL CA: CPT | Performed by: INTERNAL MEDICINE

## 2017-03-27 PROCEDURE — 82948 REAGENT STRIP/BLOOD GLUCOSE: CPT

## 2017-03-27 PROCEDURE — 0DJD8ZZ INSPECTION OF LOWER INTESTINAL TRACT, VIA NATURAL OR ARTIFICIAL OPENING ENDOSCOPIC: ICD-10-PCS | Performed by: INTERNAL MEDICINE

## 2017-03-27 PROCEDURE — 85025 COMPLETE CBC W/AUTO DIFF WBC: CPT | Performed by: INTERNAL MEDICINE

## 2017-03-27 RX ORDER — DEXTROSE AND SODIUM CHLORIDE 5; .9 G/100ML; G/100ML
75 INJECTION, SOLUTION INTRAVENOUS CONTINUOUS
Status: DISCONTINUED | OUTPATIENT
Start: 2017-03-27 | End: 2017-03-30

## 2017-03-27 RX ORDER — DEXTROSE MONOHYDRATE 25 G/50ML
INJECTION, SOLUTION INTRAVENOUS
Status: COMPLETED
Start: 2017-03-27 | End: 2017-03-27

## 2017-03-27 RX ORDER — PROPOFOL 10 MG/ML
INJECTION, EMULSION INTRAVENOUS AS NEEDED
Status: DISCONTINUED | OUTPATIENT
Start: 2017-03-27 | End: 2017-03-27 | Stop reason: SURG

## 2017-03-27 RX ADMIN — OXYCODONE HYDROCHLORIDE 10 MG: 10 TABLET ORAL at 00:31

## 2017-03-27 RX ADMIN — DEXTROSE AND SODIUM CHLORIDE 150 ML: 5; .9 INJECTION, SOLUTION INTRAVENOUS at 17:12

## 2017-03-27 RX ADMIN — DEXTROSE MONOHYDRATE 50 ML: 25 INJECTION, SOLUTION INTRAVENOUS at 07:20

## 2017-03-27 RX ADMIN — BACLOFEN 20 MG: 10 TABLET ORAL at 09:04

## 2017-03-27 RX ADMIN — OXYCODONE HYDROCHLORIDE 10 MG: 10 TABLET ORAL at 17:54

## 2017-03-27 RX ADMIN — OXYCODONE HYDROCHLORIDE 10 MG: 10 TABLET ORAL at 22:16

## 2017-03-27 RX ADMIN — DEXTROSE MONOHYDRATE 50 ML: 25 INJECTION, SOLUTION INTRAVENOUS at 07:15

## 2017-03-27 RX ADMIN — NYSTATIN 500000 UNITS: 100000 SUSPENSION ORAL at 17:56

## 2017-03-27 RX ADMIN — PROPOFOL 50 MG: 10 INJECTION, EMULSION INTRAVENOUS at 17:11

## 2017-03-27 RX ADMIN — COLLAGENASE SANTYL: 250 OINTMENT TOPICAL at 09:04

## 2017-03-27 RX ADMIN — PROPOFOL 100 MG: 10 INJECTION, EMULSION INTRAVENOUS at 17:05

## 2017-03-27 RX ADMIN — OXYCODONE HYDROCHLORIDE AND ACETAMINOPHEN 500 MG: 500 TABLET ORAL at 09:04

## 2017-03-27 RX ADMIN — PROPOFOL 50 MG: 10 INJECTION, EMULSION INTRAVENOUS at 17:10

## 2017-03-27 RX ADMIN — Medication 325 MG: at 17:54

## 2017-03-27 RX ADMIN — OXYCODONE HYDROCHLORIDE 10 MG: 10 TABLET ORAL at 09:08

## 2017-03-27 RX ADMIN — IRON SUCROSE 100 MG: 20 INJECTION, SOLUTION INTRAVENOUS at 09:02

## 2017-03-27 RX ADMIN — POLYETHYLENE GLYCOL 3350, SODIUM SULFATE ANHYDROUS, SODIUM BICARBONATE, SODIUM CHLORIDE, POTASSIUM CHLORIDE 4000 ML: 236; 22.74; 6.74; 5.86; 2.97 POWDER, FOR SOLUTION ORAL at 18:11

## 2017-03-27 RX ADMIN — NYSTATIN 500000 UNITS: 100000 SUSPENSION ORAL at 21:39

## 2017-03-27 RX ADMIN — BACLOFEN 20 MG: 10 TABLET ORAL at 17:54

## 2017-03-27 RX ADMIN — DEXTROSE AND SODIUM CHLORIDE 75 ML/HR: 5; .9 INJECTION, SOLUTION INTRAVENOUS at 10:30

## 2017-03-27 RX ADMIN — Medication 325 MG: at 09:03

## 2017-03-28 ENCOUNTER — ANESTHESIA EVENT (INPATIENT)
Dept: GASTROENTEROLOGY | Facility: HOSPITAL | Age: 37
DRG: 871 | End: 2017-03-28
Payer: MEDICARE

## 2017-03-28 LAB
GLUCOSE SERPL-MCNC: 142 MG/DL (ref 65–140)
GLUCOSE SERPL-MCNC: 219 MG/DL (ref 65–140)
GLUCOSE SERPL-MCNC: 284 MG/DL (ref 65–140)
GLUCOSE SERPL-MCNC: 462 MG/DL (ref 65–140)

## 2017-03-28 PROCEDURE — 82948 REAGENT STRIP/BLOOD GLUCOSE: CPT

## 2017-03-28 RX ORDER — ZINC SULFATE 50(220)MG
220 CAPSULE ORAL DAILY
Status: DISCONTINUED | OUTPATIENT
Start: 2017-03-28 | End: 2017-03-30 | Stop reason: HOSPADM

## 2017-03-28 RX ADMIN — OXYCODONE HYDROCHLORIDE 10 MG: 10 TABLET ORAL at 14:45

## 2017-03-28 RX ADMIN — INSULIN LISPRO 5 UNITS: 100 INJECTION, SOLUTION INTRAVENOUS; SUBCUTANEOUS at 12:41

## 2017-03-28 RX ADMIN — Medication 325 MG: at 12:41

## 2017-03-28 RX ADMIN — NYSTATIN 500000 UNITS: 100000 SUSPENSION ORAL at 17:44

## 2017-03-28 RX ADMIN — OXYCODONE HYDROCHLORIDE 10 MG: 10 TABLET ORAL at 08:52

## 2017-03-28 RX ADMIN — PANTOPRAZOLE SODIUM 40 MG: 40 TABLET, DELAYED RELEASE ORAL at 06:00

## 2017-03-28 RX ADMIN — NYSTATIN 500000 UNITS: 100000 SUSPENSION ORAL at 08:52

## 2017-03-28 RX ADMIN — BACLOFEN 20 MG: 10 TABLET ORAL at 17:45

## 2017-03-28 RX ADMIN — Medication 325 MG: at 17:45

## 2017-03-28 RX ADMIN — OXYCODONE HYDROCHLORIDE AND ACETAMINOPHEN 500 MG: 500 TABLET ORAL at 08:52

## 2017-03-28 RX ADMIN — POLYETHYLENE GLYCOL 3350, SODIUM SULFATE ANHYDROUS, SODIUM BICARBONATE, SODIUM CHLORIDE, POTASSIUM CHLORIDE 4000 ML: 236; 22.74; 6.74; 5.86; 2.97 POWDER, FOR SOLUTION ORAL at 17:59

## 2017-03-28 RX ADMIN — OXYCODONE HYDROCHLORIDE 10 MG: 10 TABLET ORAL at 02:19

## 2017-03-28 RX ADMIN — INSULIN LISPRO 2 UNITS: 100 INJECTION, SOLUTION INTRAVENOUS; SUBCUTANEOUS at 17:55

## 2017-03-28 RX ADMIN — Medication 1 TABLET: at 17:45

## 2017-03-28 RX ADMIN — NYSTATIN 500000 UNITS: 100000 SUSPENSION ORAL at 21:30

## 2017-03-28 RX ADMIN — OXYCODONE HYDROCHLORIDE 10 MG: 10 TABLET ORAL at 21:30

## 2017-03-28 RX ADMIN — INSULIN DETEMIR 15 UNITS: 100 INJECTION, SOLUTION SUBCUTANEOUS at 09:00

## 2017-03-28 RX ADMIN — INSULIN LISPRO 3 UNITS: 100 INJECTION, SOLUTION INTRAVENOUS; SUBCUTANEOUS at 08:54

## 2017-03-28 RX ADMIN — Medication 325 MG: at 08:52

## 2017-03-28 RX ADMIN — NYSTATIN 500000 UNITS: 100000 SUSPENSION ORAL at 12:41

## 2017-03-28 RX ADMIN — INSULIN LISPRO 8 UNITS: 100 INJECTION, SOLUTION INTRAVENOUS; SUBCUTANEOUS at 12:42

## 2017-03-28 RX ADMIN — Medication 220 MG: at 17:44

## 2017-03-28 RX ADMIN — INSULIN LISPRO 8 UNITS: 100 INJECTION, SOLUTION INTRAVENOUS; SUBCUTANEOUS at 17:44

## 2017-03-28 RX ADMIN — BACLOFEN 20 MG: 10 TABLET ORAL at 08:53

## 2017-03-28 RX ADMIN — COLLAGENASE SANTYL: 250 OINTMENT TOPICAL at 20:00

## 2017-03-29 ENCOUNTER — ANESTHESIA (INPATIENT)
Dept: GASTROENTEROLOGY | Facility: HOSPITAL | Age: 37
DRG: 871 | End: 2017-03-29
Payer: MEDICARE

## 2017-03-29 LAB
GLUCOSE SERPL-MCNC: 131 MG/DL (ref 65–140)
GLUCOSE SERPL-MCNC: 136 MG/DL (ref 65–140)
GLUCOSE SERPL-MCNC: 168 MG/DL (ref 65–140)
GLUCOSE SERPL-MCNC: 170 MG/DL (ref 65–140)
GLUCOSE SERPL-MCNC: 437 MG/DL (ref 65–140)
GLUCOSE SERPL-MCNC: 55 MG/DL (ref 65–140)
GLUCOSE SERPL-MCNC: 70 MG/DL (ref 65–140)

## 2017-03-29 PROCEDURE — 82948 REAGENT STRIP/BLOOD GLUCOSE: CPT

## 2017-03-29 PROCEDURE — 0DJD8ZZ INSPECTION OF LOWER INTESTINAL TRACT, VIA NATURAL OR ARTIFICIAL OPENING ENDOSCOPIC: ICD-10-PCS | Performed by: INTERNAL MEDICINE

## 2017-03-29 RX ORDER — DEXTROSE MONOHYDRATE 25 G/50ML
25 INJECTION, SOLUTION INTRAVENOUS ONCE
Status: DISCONTINUED | OUTPATIENT
Start: 2017-03-29 | End: 2017-03-29

## 2017-03-29 RX ORDER — PROPOFOL 10 MG/ML
INJECTION, EMULSION INTRAVENOUS AS NEEDED
Status: DISCONTINUED | OUTPATIENT
Start: 2017-03-29 | End: 2017-03-29 | Stop reason: SURG

## 2017-03-29 RX ORDER — SODIUM CHLORIDE 9 MG/ML
125 INJECTION, SOLUTION INTRAVENOUS CONTINUOUS
Status: CANCELLED | OUTPATIENT
Start: 2017-03-29

## 2017-03-29 RX ORDER — DEXTROSE MONOHYDRATE 25 G/50ML
INJECTION, SOLUTION INTRAVENOUS
Status: COMPLETED
Start: 2017-03-29 | End: 2017-03-29

## 2017-03-29 RX ORDER — SODIUM CHLORIDE 9 MG/ML
INJECTION, SOLUTION INTRAVENOUS CONTINUOUS PRN
Status: DISCONTINUED | OUTPATIENT
Start: 2017-03-29 | End: 2017-03-29 | Stop reason: SURG

## 2017-03-29 RX ADMIN — Medication 1 TABLET: at 08:45

## 2017-03-29 RX ADMIN — Medication 325 MG: at 16:57

## 2017-03-29 RX ADMIN — Medication 220 MG: at 08:44

## 2017-03-29 RX ADMIN — NYSTATIN 500000 UNITS: 100000 SUSPENSION ORAL at 08:46

## 2017-03-29 RX ADMIN — OXYCODONE HYDROCHLORIDE AND ACETAMINOPHEN 500 MG: 500 TABLET ORAL at 08:45

## 2017-03-29 RX ADMIN — INSULIN LISPRO 1 UNITS: 100 INJECTION, SOLUTION INTRAVENOUS; SUBCUTANEOUS at 16:57

## 2017-03-29 RX ADMIN — PROPOFOL 50 MG: 10 INJECTION, EMULSION INTRAVENOUS at 15:06

## 2017-03-29 RX ADMIN — OXYCODONE HYDROCHLORIDE 10 MG: 10 TABLET ORAL at 16:57

## 2017-03-29 RX ADMIN — PROPOFOL 10 MG: 10 INJECTION, EMULSION INTRAVENOUS at 15:14

## 2017-03-29 RX ADMIN — NYSTATIN 500000 UNITS: 100000 SUSPENSION ORAL at 19:53

## 2017-03-29 RX ADMIN — OXYCODONE HYDROCHLORIDE 10 MG: 10 TABLET ORAL at 08:45

## 2017-03-29 RX ADMIN — PROPOFOL 10 MG: 10 INJECTION, EMULSION INTRAVENOUS at 15:08

## 2017-03-29 RX ADMIN — DEXTROSE MONOHYDRATE 25 ML: 25 INJECTION, SOLUTION INTRAVENOUS at 01:55

## 2017-03-29 RX ADMIN — DEXTROSE AND SODIUM CHLORIDE 75 ML/HR: 5; .9 INJECTION, SOLUTION INTRAVENOUS at 08:48

## 2017-03-29 RX ADMIN — INSULIN LISPRO 6 UNITS: 100 INJECTION, SOLUTION INTRAVENOUS; SUBCUTANEOUS at 21:50

## 2017-03-29 RX ADMIN — NYSTATIN 500000 UNITS: 100000 SUSPENSION ORAL at 21:49

## 2017-03-29 RX ADMIN — OXYCODONE HYDROCHLORIDE 10 MG: 10 TABLET ORAL at 21:49

## 2017-03-29 RX ADMIN — OXYCODONE HYDROCHLORIDE 10 MG: 10 TABLET ORAL at 02:20

## 2017-03-29 RX ADMIN — INSULIN DETEMIR 15 UNITS: 100 INJECTION, SOLUTION SUBCUTANEOUS at 21:50

## 2017-03-29 RX ADMIN — Medication 325 MG: at 08:45

## 2017-03-29 RX ADMIN — INSULIN LISPRO 8 UNITS: 100 INJECTION, SOLUTION INTRAVENOUS; SUBCUTANEOUS at 16:57

## 2017-03-29 RX ADMIN — PROPOFOL 10 MG: 10 INJECTION, EMULSION INTRAVENOUS at 15:12

## 2017-03-29 RX ADMIN — BACLOFEN 20 MG: 10 TABLET ORAL at 17:03

## 2017-03-29 RX ADMIN — LIDOCAINE HYDROCHLORIDE 40 MG: 20 INJECTION, SOLUTION INTRAVENOUS at 15:06

## 2017-03-29 RX ADMIN — BACLOFEN 20 MG: 10 TABLET ORAL at 08:45

## 2017-03-29 RX ADMIN — SODIUM CHLORIDE: 0.9 INJECTION, SOLUTION INTRAVENOUS at 15:06

## 2017-03-29 RX ADMIN — PROPOFOL 20 MG: 10 INJECTION, EMULSION INTRAVENOUS at 15:15

## 2017-03-29 RX ADMIN — IRON SUCROSE 100 MG: 20 INJECTION, SOLUTION INTRAVENOUS at 08:44

## 2017-03-29 RX ADMIN — PROPOFOL 10 MG: 10 INJECTION, EMULSION INTRAVENOUS at 15:10

## 2017-03-30 VITALS
OXYGEN SATURATION: 99 % | WEIGHT: 159.39 LBS | HEIGHT: 76 IN | HEART RATE: 85 BPM | DIASTOLIC BLOOD PRESSURE: 85 MMHG | SYSTOLIC BLOOD PRESSURE: 137 MMHG | RESPIRATION RATE: 18 BRPM | BODY MASS INDEX: 19.41 KG/M2 | TEMPERATURE: 96.3 F

## 2017-03-30 LAB
GLUCOSE SERPL-MCNC: 154 MG/DL (ref 65–140)
GLUCOSE SERPL-MCNC: 380 MG/DL (ref 65–140)

## 2017-03-30 PROCEDURE — 82948 REAGENT STRIP/BLOOD GLUCOSE: CPT

## 2017-03-30 RX ORDER — HYDROMORPHONE HYDROCHLORIDE 4 MG/1
4 TABLET ORAL EVERY 4 HOURS PRN
Qty: 30 TABLET | Refills: 0 | Status: SHIPPED | OUTPATIENT
Start: 2017-03-30 | End: 2017-04-09

## 2017-03-30 RX ADMIN — OXYCODONE HYDROCHLORIDE 10 MG: 10 TABLET ORAL at 08:04

## 2017-03-30 RX ADMIN — Medication 1 TABLET: at 08:03

## 2017-03-30 RX ADMIN — NYSTATIN 500000 UNITS: 100000 SUSPENSION ORAL at 13:02

## 2017-03-30 RX ADMIN — Medication 325 MG: at 13:02

## 2017-03-30 RX ADMIN — INSULIN LISPRO 8 UNITS: 100 INJECTION, SOLUTION INTRAVENOUS; SUBCUTANEOUS at 08:02

## 2017-03-30 RX ADMIN — INSULIN LISPRO 1 UNITS: 100 INJECTION, SOLUTION INTRAVENOUS; SUBCUTANEOUS at 13:01

## 2017-03-30 RX ADMIN — OXYCODONE HYDROCHLORIDE AND ACETAMINOPHEN 500 MG: 500 TABLET ORAL at 08:04

## 2017-03-30 RX ADMIN — OXYCODONE HYDROCHLORIDE 10 MG: 10 TABLET ORAL at 13:03

## 2017-03-30 RX ADMIN — INSULIN LISPRO 5 UNITS: 100 INJECTION, SOLUTION INTRAVENOUS; SUBCUTANEOUS at 08:02

## 2017-03-30 RX ADMIN — Medication 325 MG: at 08:04

## 2017-03-30 RX ADMIN — BACLOFEN 20 MG: 10 TABLET ORAL at 08:03

## 2017-03-30 RX ADMIN — NYSTATIN 500000 UNITS: 100000 SUSPENSION ORAL at 08:03

## 2017-03-30 RX ADMIN — Medication 220 MG: at 08:03

## 2017-03-30 RX ADMIN — OXYCODONE HYDROCHLORIDE 10 MG: 10 TABLET ORAL at 03:25

## 2017-03-30 RX ADMIN — INSULIN LISPRO 8 UNITS: 100 INJECTION, SOLUTION INTRAVENOUS; SUBCUTANEOUS at 13:01

## 2017-03-30 RX ADMIN — COLLAGENASE SANTYL: 250 OINTMENT TOPICAL at 08:02

## 2017-03-30 RX ADMIN — INSULIN DETEMIR 15 UNITS: 100 INJECTION, SOLUTION SUBCUTANEOUS at 08:04

## 2017-04-03 ENCOUNTER — ALLSCRIPTS OFFICE VISIT (OUTPATIENT)
Dept: WOUND CARE | Facility: HOSPITAL | Age: 37
End: 2017-04-03
Payer: MEDICARE

## 2017-04-03 PROCEDURE — 11046 DBRDMT MUSC&/FSCA EA ADDL: CPT | Performed by: SURGERY

## 2017-04-03 PROCEDURE — 97606 NEG PRS WND THER DME>50 SQCM: CPT | Performed by: SURGERY

## 2017-04-03 PROCEDURE — 11043 DBRDMT MUSC&/FSCA 1ST 20/<: CPT | Performed by: SURGERY

## 2017-04-05 ENCOUNTER — ALLSCRIPTS OFFICE VISIT (OUTPATIENT)
Dept: OTHER | Facility: OTHER | Age: 37
End: 2017-04-05

## 2017-04-05 ENCOUNTER — GENERIC CONVERSION - ENCOUNTER (OUTPATIENT)
Dept: OTHER | Facility: OTHER | Age: 37
End: 2017-04-05

## 2017-04-05 DIAGNOSIS — D50.9 IRON DEFICIENCY ANEMIA: ICD-10-CM

## 2017-04-17 ENCOUNTER — ALLSCRIPTS OFFICE VISIT (OUTPATIENT)
Dept: WOUND CARE | Facility: HOSPITAL | Age: 37
End: 2017-04-17
Payer: MEDICARE

## 2017-04-17 ENCOUNTER — GENERIC CONVERSION - ENCOUNTER (OUTPATIENT)
Dept: OTHER | Facility: OTHER | Age: 37
End: 2017-04-17

## 2017-04-17 PROCEDURE — 97606 NEG PRS WND THER DME>50 SQCM: CPT | Performed by: SURGERY

## 2017-04-21 ENCOUNTER — HOSPITAL ENCOUNTER (EMERGENCY)
Facility: HOSPITAL | Age: 37
Discharge: HOME | End: 2017-04-21
Attending: EMERGENCY MEDICINE | Admitting: EMERGENCY MEDICINE
Payer: MEDICARE

## 2017-04-21 VITALS
TEMPERATURE: 98.3 F | HEART RATE: 75 BPM | RESPIRATION RATE: 16 BRPM | BODY MASS INDEX: 18.38 KG/M2 | WEIGHT: 151.01 LBS | SYSTOLIC BLOOD PRESSURE: 123 MMHG | DIASTOLIC BLOOD PRESSURE: 71 MMHG | OXYGEN SATURATION: 97 %

## 2017-04-21 DIAGNOSIS — E16.2 HYPOGLYCEMIA: ICD-10-CM

## 2017-04-21 DIAGNOSIS — S31.829A WOUND OF LEFT BUTTOCK, INITIAL ENCOUNTER: Primary | ICD-10-CM

## 2017-04-21 LAB
ANION GAP BLD CALC-SCNC: 17 MMOL/L (ref 4–13)
BUN BLD-MCNC: 25 MG/DL (ref 5–25)
CA-I BLD-SCNC: 1.08 MMOL/L (ref 1.12–1.32)
CHLORIDE BLD-SCNC: 110 MMOL/L (ref 100–108)
CREAT BLD-MCNC: 2 MG/DL (ref 0.6–1.3)
GFR SERPL CREATININE-BSD FRML MDRD: 46 ML/MIN/1.73SQ M
GLUCOSE SERPL-MCNC: 117 MG/DL (ref 65–140)
GLUCOSE SERPL-MCNC: 34 MG/DL (ref 65–140)
HCT VFR BLD CALC: 22 % (ref 36.5–49.3)
HGB BLDA-MCNC: 7.5 G/DL (ref 12–17)
PCO2 BLD: 19 MMOL/L (ref 21–32)
POTASSIUM BLD-SCNC: 4 MMOL/L (ref 3.5–5.3)
SODIUM BLD-SCNC: 140 MMOL/L (ref 136–145)
SPECIMEN SOURCE: ABNORMAL

## 2017-04-21 PROCEDURE — 99284 EMERGENCY DEPT VISIT MOD MDM: CPT

## 2017-04-21 PROCEDURE — 85014 HEMATOCRIT: CPT

## 2017-04-21 PROCEDURE — 82948 REAGENT STRIP/BLOOD GLUCOSE: CPT

## 2017-04-21 PROCEDURE — 80047 BASIC METABLC PNL IONIZED CA: CPT

## 2017-04-21 RX ORDER — HYDROMORPHONE HYDROCHLORIDE 4 MG/1
4 TABLET ORAL EVERY 4 HOURS PRN
COMMUNITY
End: 2017-10-11 | Stop reason: HOSPADM

## 2017-05-01 ENCOUNTER — ALLSCRIPTS OFFICE VISIT (OUTPATIENT)
Dept: WOUND CARE | Facility: HOSPITAL | Age: 37
End: 2017-05-01
Payer: MEDICARE

## 2017-05-01 PROCEDURE — 11043 DBRDMT MUSC&/FSCA 1ST 20/<: CPT | Performed by: SURGERY

## 2017-05-01 PROCEDURE — 11046 DBRDMT MUSC&/FSCA EA ADDL: CPT | Performed by: SURGERY

## 2017-05-01 PROCEDURE — 97605 NEG PRS WND THER DME<=50SQCM: CPT | Performed by: SURGERY

## 2017-05-01 PROCEDURE — 97606 NEG PRS WND THER DME>50 SQCM: CPT | Performed by: SURGERY

## 2017-05-15 ENCOUNTER — ALLSCRIPTS OFFICE VISIT (OUTPATIENT)
Dept: WOUND CARE | Facility: HOSPITAL | Age: 37
End: 2017-05-15
Payer: MEDICARE

## 2017-05-15 PROCEDURE — 97606 NEG PRS WND THER DME>50 SQCM: CPT | Performed by: SURGERY

## 2017-05-15 PROCEDURE — 11046 DBRDMT MUSC&/FSCA EA ADDL: CPT | Performed by: SURGERY

## 2017-05-15 PROCEDURE — 11043 DBRDMT MUSC&/FSCA 1ST 20/<: CPT | Performed by: SURGERY

## 2017-05-29 ENCOUNTER — LAB REQUISITION (OUTPATIENT)
Dept: LAB | Facility: HOSPITAL | Age: 37
End: 2017-05-29
Payer: MEDICARE

## 2017-05-29 ENCOUNTER — GENERIC CONVERSION - ENCOUNTER (OUTPATIENT)
Dept: OTHER | Facility: OTHER | Age: 37
End: 2017-05-29

## 2017-05-29 DIAGNOSIS — L89.154 STAGE IV PRESSURE ULCER OF SACRAL REGION (HCC): ICD-10-CM

## 2017-05-29 DIAGNOSIS — G82.20 PARAPLEGIA (HCC): ICD-10-CM

## 2017-05-29 DIAGNOSIS — Z43.3 ENCOUNTER FOR ATTENTION TO COLOSTOMY (HCC): ICD-10-CM

## 2017-05-29 DIAGNOSIS — E10.9 TYPE 1 DIABETES MELLITUS WITHOUT COMPLICATIONS (HCC): ICD-10-CM

## 2017-05-29 PROCEDURE — 87493 C DIFF AMPLIFIED PROBE: CPT | Performed by: FAMILY MEDICINE

## 2017-05-30 ENCOUNTER — TRANSCRIBE ORDERS (OUTPATIENT)
Dept: LAB | Facility: HOSPITAL | Age: 37
End: 2017-05-30

## 2017-05-30 LAB — C DIFF TOX GENS STL QL NAA+PROBE: ABNORMAL

## 2017-06-01 ENCOUNTER — GENERIC CONVERSION - ENCOUNTER (OUTPATIENT)
Dept: OTHER | Facility: OTHER | Age: 37
End: 2017-06-01

## 2017-06-05 ENCOUNTER — GENERIC CONVERSION - ENCOUNTER (OUTPATIENT)
Dept: OTHER | Facility: OTHER | Age: 37
End: 2017-06-05

## 2017-06-05 ENCOUNTER — ALLSCRIPTS OFFICE VISIT (OUTPATIENT)
Dept: WOUND CARE | Facility: HOSPITAL | Age: 37
End: 2017-06-05
Payer: MEDICARE

## 2017-06-05 PROCEDURE — 97606 NEG PRS WND THER DME>50 SQCM: CPT | Performed by: SURGERY

## 2017-06-05 PROCEDURE — 99213 OFFICE O/P EST LOW 20 MIN: CPT | Performed by: SURGERY

## 2017-06-05 PROCEDURE — 11043 DBRDMT MUSC&/FSCA 1ST 20/<: CPT | Performed by: SURGERY

## 2017-06-05 PROCEDURE — 11046 DBRDMT MUSC&/FSCA EA ADDL: CPT | Performed by: SURGERY

## 2017-06-06 ENCOUNTER — GENERIC CONVERSION - ENCOUNTER (OUTPATIENT)
Dept: OTHER | Facility: OTHER | Age: 37
End: 2017-06-06

## 2017-06-09 ENCOUNTER — GENERIC CONVERSION - ENCOUNTER (OUTPATIENT)
Dept: OTHER | Facility: OTHER | Age: 37
End: 2017-06-09

## 2017-06-12 ENCOUNTER — GENERIC CONVERSION - ENCOUNTER (OUTPATIENT)
Dept: OTHER | Facility: OTHER | Age: 37
End: 2017-06-12

## 2017-06-15 ENCOUNTER — GENERIC CONVERSION - ENCOUNTER (OUTPATIENT)
Dept: OTHER | Facility: OTHER | Age: 37
End: 2017-06-15

## 2017-06-15 ENCOUNTER — ANESTHESIA EVENT (OUTPATIENT)
Dept: GASTROENTEROLOGY | Facility: HOSPITAL | Age: 37
End: 2017-06-15
Payer: MEDICARE

## 2017-06-15 ENCOUNTER — HOSPITAL ENCOUNTER (OUTPATIENT)
Facility: HOSPITAL | Age: 37
Setting detail: OUTPATIENT SURGERY
Discharge: HOME | End: 2017-06-15
Attending: INTERNAL MEDICINE | Admitting: INTERNAL MEDICINE
Payer: MEDICARE

## 2017-06-15 ENCOUNTER — ANESTHESIA (OUTPATIENT)
Dept: GASTROENTEROLOGY | Facility: HOSPITAL | Age: 37
End: 2017-06-15
Payer: MEDICARE

## 2017-06-15 VITALS
RESPIRATION RATE: 16 BRPM | OXYGEN SATURATION: 100 % | TEMPERATURE: 98.3 F | WEIGHT: 184 LBS | DIASTOLIC BLOOD PRESSURE: 53 MMHG | HEIGHT: 75 IN | HEART RATE: 78 BPM | BODY MASS INDEX: 22.88 KG/M2 | SYSTOLIC BLOOD PRESSURE: 103 MMHG

## 2017-06-15 DEVICE — FECAL MICROBIOTA PREP FOR COLONOSCOPIC OR ENEMA ADMIN: Type: IMPLANTABLE DEVICE | Site: RECTUM | Status: FUNCTIONAL

## 2017-06-15 RX ORDER — PROPOFOL 10 MG/ML
INJECTION, EMULSION INTRAVENOUS CONTINUOUS PRN
Status: DISCONTINUED | OUTPATIENT
Start: 2017-06-15 | End: 2017-06-15 | Stop reason: SURG

## 2017-06-15 RX ORDER — LIDOCAINE HYDROCHLORIDE 10 MG/ML
INJECTION, SOLUTION EPIDURAL; INFILTRATION; INTRACAUDAL; PERINEURAL AS NEEDED
Status: DISCONTINUED | OUTPATIENT
Start: 2017-06-15 | End: 2017-06-15 | Stop reason: SURG

## 2017-06-15 RX ORDER — SODIUM CHLORIDE 9 MG/ML
125 INJECTION, SOLUTION INTRAVENOUS CONTINUOUS
Status: DISCONTINUED | OUTPATIENT
Start: 2017-06-15 | End: 2017-06-15 | Stop reason: HOSPADM

## 2017-06-15 RX ORDER — PROPOFOL 10 MG/ML
INJECTION, EMULSION INTRAVENOUS AS NEEDED
Status: DISCONTINUED | OUTPATIENT
Start: 2017-06-15 | End: 2017-06-15 | Stop reason: SURG

## 2017-06-15 RX ADMIN — LIDOCAINE HYDROCHLORIDE 50 MG: 10 INJECTION, SOLUTION EPIDURAL; INFILTRATION; INTRACAUDAL; PERINEURAL at 11:05

## 2017-06-15 RX ADMIN — SODIUM CHLORIDE: 0.9 INJECTION, SOLUTION INTRAVENOUS at 10:49

## 2017-06-15 RX ADMIN — PROPOFOL 120 MCG/KG/MIN: 10 INJECTION, EMULSION INTRAVENOUS at 11:06

## 2017-06-15 RX ADMIN — PROPOFOL 50 MG: 10 INJECTION, EMULSION INTRAVENOUS at 11:05

## 2017-06-15 NOTE — ANESTHESIA POSTPROCEDURE EVALUATION
Post-Op Assessment Note      CV Status:  Stable    Mental Status:  Awake and lethargic    Hydration Status:  Euvolemic    PONV Controlled:  None    Airway Patency:  Patent    Post Op Vitals Reviewed: Yes          Staff: CRNA           /53 (06/15/17 1134)    Temp      Pulse 76 (06/15/17 1134)   Resp 16 (06/15/17 1134)    SpO2 100 % (06/15/17 1134)

## 2017-06-19 ENCOUNTER — APPOINTMENT (OUTPATIENT)
Dept: WOUND CARE | Facility: HOSPITAL | Age: 37
End: 2017-06-19
Payer: MEDICARE

## 2017-06-19 PROCEDURE — 11044 DBRDMT BONE 1ST 20 SQ CM/<: CPT | Performed by: SURGERY

## 2017-06-19 PROCEDURE — 11047 DBRDMT BONE EACH ADDL: CPT | Performed by: SURGERY

## 2017-06-19 PROCEDURE — 11043 DBRDMT MUSC&/FSCA 1ST 20/<: CPT | Performed by: SURGERY

## 2017-06-19 PROCEDURE — 11046 DBRDMT MUSC&/FSCA EA ADDL: CPT | Performed by: SURGERY

## 2017-06-28 ENCOUNTER — APPOINTMENT (EMERGENCY)
Dept: RADIOLOGY | Facility: HOSPITAL | Age: 37
DRG: 811 | End: 2017-06-28
Payer: MEDICARE

## 2017-06-28 ENCOUNTER — HOSPITAL ENCOUNTER (INPATIENT)
Facility: HOSPITAL | Age: 37
LOS: 4 days | Discharge: HOME HEALTH | DRG: 811 | End: 2017-07-03
Attending: EMERGENCY MEDICINE | Admitting: INTERNAL MEDICINE
Payer: MEDICARE

## 2017-06-28 DIAGNOSIS — K92.2 GI BLEED: ICD-10-CM

## 2017-06-28 DIAGNOSIS — T14.8XXA WOUND INFECTION: ICD-10-CM

## 2017-06-28 DIAGNOSIS — L98.429 ULCER OF SACRAL REGION, STAGE 4 (HCC): ICD-10-CM

## 2017-06-28 DIAGNOSIS — G82.20 PARAPLEGIA (HCC): Chronic | ICD-10-CM

## 2017-06-28 DIAGNOSIS — N17.9 AKI (ACUTE KIDNEY INJURY) (HCC): ICD-10-CM

## 2017-06-28 DIAGNOSIS — L08.9 WOUND INFECTION: ICD-10-CM

## 2017-06-28 DIAGNOSIS — D64.9 ANEMIA: ICD-10-CM

## 2017-06-28 DIAGNOSIS — D62 ACUTE BLOOD LOSS ANEMIA: Primary | ICD-10-CM

## 2017-06-28 LAB
ANION GAP SERPL CALCULATED.3IONS-SCNC: 7 MMOL/L (ref 4–13)
BUN SERPL-MCNC: 35 MG/DL (ref 5–25)
CALCIUM SERPL-MCNC: 7.5 MG/DL (ref 8.3–10.1)
CHLORIDE SERPL-SCNC: 107 MMOL/L (ref 100–108)
CO2 SERPL-SCNC: 22 MMOL/L (ref 21–32)
CREAT SERPL-MCNC: 2.14 MG/DL (ref 0.6–1.3)
GFR SERPL CREATININE-BSD FRML MDRD: 42.6 ML/MIN/1.73SQ M
GLUCOSE SERPL-MCNC: 175 MG/DL (ref 65–140)
LACTATE SERPL-SCNC: 1.1 MMOL/L (ref 0.5–2)
POTASSIUM SERPL-SCNC: 4.2 MMOL/L (ref 3.5–5.3)
SODIUM SERPL-SCNC: 136 MMOL/L (ref 136–145)

## 2017-06-28 PROCEDURE — 87040 BLOOD CULTURE FOR BACTERIA: CPT | Performed by: EMERGENCY MEDICINE

## 2017-06-28 PROCEDURE — 86901 BLOOD TYPING SEROLOGIC RH(D): CPT | Performed by: EMERGENCY MEDICINE

## 2017-06-28 PROCEDURE — 83605 ASSAY OF LACTIC ACID: CPT | Performed by: EMERGENCY MEDICINE

## 2017-06-28 PROCEDURE — 36415 COLL VENOUS BLD VENIPUNCTURE: CPT | Performed by: EMERGENCY MEDICINE

## 2017-06-28 PROCEDURE — 96375 TX/PRO/DX INJ NEW DRUG ADDON: CPT

## 2017-06-28 PROCEDURE — 96365 THER/PROPH/DIAG IV INF INIT: CPT

## 2017-06-28 PROCEDURE — 72220 X-RAY EXAM SACRUM TAILBONE: CPT

## 2017-06-28 PROCEDURE — 80048 BASIC METABOLIC PNL TOTAL CA: CPT | Performed by: EMERGENCY MEDICINE

## 2017-06-28 PROCEDURE — 86900 BLOOD TYPING SEROLOGIC ABO: CPT | Performed by: EMERGENCY MEDICINE

## 2017-06-28 PROCEDURE — 86850 RBC ANTIBODY SCREEN: CPT | Performed by: EMERGENCY MEDICINE

## 2017-06-28 PROCEDURE — 85007 BL SMEAR W/DIFF WBC COUNT: CPT | Performed by: EMERGENCY MEDICINE

## 2017-06-28 PROCEDURE — 85027 COMPLETE CBC AUTOMATED: CPT | Performed by: EMERGENCY MEDICINE

## 2017-06-28 RX ORDER — ONDANSETRON 2 MG/ML
4 INJECTION INTRAMUSCULAR; INTRAVENOUS ONCE
Status: COMPLETED | OUTPATIENT
Start: 2017-06-28 | End: 2017-06-28

## 2017-06-28 RX ORDER — VANCOMYCIN HYDROCHLORIDE 1 G/200ML
15 INJECTION, SOLUTION INTRAVENOUS ONCE
Status: COMPLETED | OUTPATIENT
Start: 2017-06-28 | End: 2017-06-28

## 2017-06-28 RX ORDER — MORPHINE SULFATE 4 MG/ML
4 INJECTION, SOLUTION INTRAMUSCULAR; INTRAVENOUS ONCE
Status: COMPLETED | OUTPATIENT
Start: 2017-06-28 | End: 2017-06-28

## 2017-06-28 RX ADMIN — VANCOMYCIN HYDROCHLORIDE 1000 MG: 1 INJECTION, SOLUTION INTRAVENOUS at 22:36

## 2017-06-28 RX ADMIN — MORPHINE SULFATE 4 MG: 4 INJECTION, SOLUTION INTRAMUSCULAR; INTRAVENOUS at 22:34

## 2017-06-28 RX ADMIN — ONDANSETRON 4 MG: 2 INJECTION INTRAMUSCULAR; INTRAVENOUS at 22:32

## 2017-06-29 PROBLEM — L08.9 WOUND INFECTION: Status: ACTIVE | Noted: 2017-06-29

## 2017-06-29 PROBLEM — T14.8XXA WOUND INFECTION: Status: ACTIVE | Noted: 2017-06-29

## 2017-06-29 LAB
ABO GROUP BLD: NORMAL
ANION GAP SERPL CALCULATED.3IONS-SCNC: 7 MMOL/L (ref 4–13)
ANISOCYTOSIS BLD QL SMEAR: PRESENT
APTT PPP: 52 SECONDS (ref 23–35)
BASOPHILS # BLD AUTO: 0.02 THOUSANDS/ΜL (ref 0–0.1)
BASOPHILS # BLD AUTO: 0.11 THOUSAND/UL (ref 0–0.1)
BASOPHILS NFR BLD AUTO: 0 % (ref 0–1)
BASOPHILS NFR MAR MANUAL: 1 % (ref 0–1)
BLD GP AB SCN SERPL QL: NEGATIVE
BUN SERPL-MCNC: 33 MG/DL (ref 5–25)
CALCIUM SERPL-MCNC: 7.5 MG/DL (ref 8.3–10.1)
CHLORIDE SERPL-SCNC: 107 MMOL/L (ref 100–108)
CO2 SERPL-SCNC: 21 MMOL/L (ref 21–32)
CREAT SERPL-MCNC: 1.98 MG/DL (ref 0.6–1.3)
EOSINOPHIL # BLD AUTO: 0 THOUSAND/UL (ref 0–0.61)
EOSINOPHIL # BLD AUTO: 0.16 THOUSAND/ΜL (ref 0–0.61)
EOSINOPHIL NFR BLD AUTO: 2 % (ref 0–6)
EOSINOPHIL NFR BLD MANUAL: 0 % (ref 0–6)
ERYTHROCYTE [DISTWIDTH] IN BLOOD BY AUTOMATED COUNT: 17.9 % (ref 11.6–15.1)
ERYTHROCYTE [DISTWIDTH] IN BLOOD BY AUTOMATED COUNT: 18.1 % (ref 11.6–15.1)
GFR SERPL CREATININE-BSD FRML MDRD: 46.6 ML/MIN/1.73SQ M
GLUCOSE SERPL-MCNC: 101 MG/DL (ref 65–140)
GLUCOSE SERPL-MCNC: 222 MG/DL (ref 65–140)
GLUCOSE SERPL-MCNC: 397 MG/DL (ref 65–140)
GLUCOSE SERPL-MCNC: 427 MG/DL (ref 65–140)
GLUCOSE SERPL-MCNC: 498 MG/DL (ref 65–140)
HCT VFR BLD AUTO: 12.1 % (ref 36.5–49.3)
HCT VFR BLD AUTO: 12.6 % (ref 36.5–49.3)
HGB BLD-MCNC: 3.6 G/DL (ref 12–17)
HGB BLD-MCNC: 3.8 G/DL (ref 12–17)
HYPERCHROMIA BLD QL SMEAR: PRESENT
INR PPP: 1.64 (ref 0.86–1.16)
LYMPHOCYTES # BLD AUTO: 0.96 THOUSAND/UL (ref 0.6–4.47)
LYMPHOCYTES # BLD AUTO: 1.25 THOUSANDS/ΜL (ref 0.6–4.47)
LYMPHOCYTES # BLD AUTO: 9 %
LYMPHOCYTES NFR BLD AUTO: 12 % (ref 14–44)
MCH RBC QN AUTO: 22.8 PG (ref 26.8–34.3)
MCH RBC QN AUTO: 23.2 PG (ref 26.8–34.3)
MCHC RBC AUTO-ENTMCNC: 29.8 G/DL (ref 31.4–37.4)
MCHC RBC AUTO-ENTMCNC: 30.2 G/DL (ref 31.4–37.4)
MCV RBC AUTO: 77 FL (ref 82–98)
MCV RBC AUTO: 77 FL (ref 82–98)
MONOCYTES # BLD AUTO: 0.32 THOUSAND/UL (ref 0–1.22)
MONOCYTES # BLD AUTO: 0.79 THOUSAND/ΜL (ref 0.17–1.22)
MONOCYTES NFR BLD AUTO: 3 % (ref 4–12)
MONOCYTES NFR BLD AUTO: 8 % (ref 4–12)
NEUTROPHILS # BLD AUTO: 8.3 THOUSANDS/ΜL (ref 1.85–7.62)
NEUTS BAND NFR BLD MANUAL: 15 % (ref 0–8)
NEUTS SEG # BLD: 9.31 THOUSAND/UL (ref 1.81–6.82)
NEUTS SEG NFR BLD AUTO: 72 %
NEUTS SEG NFR BLD AUTO: 78 % (ref 43–75)
NRBC BLD AUTO-RTO: 0 /100 WBCS
NRBC BLD AUTO-RTO: 0 /100 WBCS
PLATELET # BLD AUTO: 403 THOUSANDS/UL (ref 149–390)
PLATELET # BLD AUTO: 422 THOUSANDS/UL (ref 149–390)
PLATELET BLD QL SMEAR: ABNORMAL
PMV BLD AUTO: 9.7 FL (ref 8.9–12.7)
PMV BLD AUTO: 9.8 FL (ref 8.9–12.7)
POIKILOCYTOSIS BLD QL SMEAR: PRESENT
POTASSIUM SERPL-SCNC: 4.8 MMOL/L (ref 3.5–5.3)
PROTHROMBIN TIME: 19.5 SECONDS (ref 12.1–14.4)
RBC # BLD AUTO: 1.58 MILLION/UL (ref 3.88–5.62)
RBC # BLD AUTO: 1.64 MILLION/UL (ref 3.88–5.62)
RH BLD: POSITIVE
SODIUM SERPL-SCNC: 135 MMOL/L (ref 136–145)
SPECIMEN EXPIRATION DATE: NORMAL
TARGETS BLD QL SMEAR: PRESENT
TOTAL CELLS COUNTED SPEC: 100
WBC # BLD AUTO: 10.52 THOUSAND/UL (ref 4.31–10.16)
WBC # BLD AUTO: 10.7 THOUSAND/UL (ref 4.31–10.16)

## 2017-06-29 PROCEDURE — 87186 SC STD MICRODIL/AGAR DIL: CPT | Performed by: EMERGENCY MEDICINE

## 2017-06-29 PROCEDURE — 87147 CULTURE TYPE IMMUNOLOGIC: CPT | Performed by: EMERGENCY MEDICINE

## 2017-06-29 PROCEDURE — 85610 PROTHROMBIN TIME: CPT | Performed by: EMERGENCY MEDICINE

## 2017-06-29 PROCEDURE — 36415 COLL VENOUS BLD VENIPUNCTURE: CPT | Performed by: EMERGENCY MEDICINE

## 2017-06-29 PROCEDURE — 85025 COMPLETE CBC W/AUTO DIFF WBC: CPT | Performed by: NURSE PRACTITIONER

## 2017-06-29 PROCEDURE — 99285 EMERGENCY DEPT VISIT HI MDM: CPT

## 2017-06-29 PROCEDURE — 87070 CULTURE OTHR SPECIMN AEROBIC: CPT | Performed by: EMERGENCY MEDICINE

## 2017-06-29 PROCEDURE — 80048 BASIC METABOLIC PNL TOTAL CA: CPT | Performed by: NURSE PRACTITIONER

## 2017-06-29 PROCEDURE — 87077 CULTURE AEROBIC IDENTIFY: CPT | Performed by: EMERGENCY MEDICINE

## 2017-06-29 PROCEDURE — 87205 SMEAR GRAM STAIN: CPT | Performed by: EMERGENCY MEDICINE

## 2017-06-29 PROCEDURE — 85730 THROMBOPLASTIN TIME PARTIAL: CPT | Performed by: EMERGENCY MEDICINE

## 2017-06-29 PROCEDURE — 82948 REAGENT STRIP/BLOOD GLUCOSE: CPT

## 2017-06-29 RX ORDER — MELATONIN
1000 DAILY
Status: DISCONTINUED | OUTPATIENT
Start: 2017-06-29 | End: 2017-06-30

## 2017-06-29 RX ORDER — BACLOFEN 10 MG/1
20 TABLET ORAL 2 TIMES DAILY
Status: DISCONTINUED | OUTPATIENT
Start: 2017-06-29 | End: 2017-07-03 | Stop reason: HOSPADM

## 2017-06-29 RX ORDER — OXYBUTYNIN CHLORIDE 5 MG/1
5 TABLET ORAL 3 TIMES DAILY
Status: DISCONTINUED | OUTPATIENT
Start: 2017-06-29 | End: 2017-07-03 | Stop reason: HOSPADM

## 2017-06-29 RX ORDER — HYDROMORPHONE HYDROCHLORIDE 2 MG/1
4 TABLET ORAL EVERY 4 HOURS PRN
Status: DISCONTINUED | OUTPATIENT
Start: 2017-06-29 | End: 2017-06-30

## 2017-06-29 RX ORDER — HYDROMORPHONE HYDROCHLORIDE 2 MG/1
4 TABLET ORAL EVERY 4 HOURS PRN
Status: DISCONTINUED | OUTPATIENT
Start: 2017-06-29 | End: 2017-06-29

## 2017-06-29 RX ORDER — ASCORBIC ACID 500 MG
500 TABLET ORAL DAILY
Status: DISCONTINUED | OUTPATIENT
Start: 2017-06-29 | End: 2017-07-03 | Stop reason: HOSPADM

## 2017-06-29 RX ORDER — FERROUS SULFATE 325(65) MG
325 TABLET ORAL
Status: DISCONTINUED | OUTPATIENT
Start: 2017-06-29 | End: 2017-07-02

## 2017-06-29 RX ADMIN — INSULIN LISPRO 2 UNITS: 100 INJECTION, SOLUTION INTRAVENOUS; SUBCUTANEOUS at 11:28

## 2017-06-29 RX ADMIN — OXYCODONE HYDROCHLORIDE AND ACETAMINOPHEN 500 MG: 500 TABLET ORAL at 08:52

## 2017-06-29 RX ADMIN — HYDROMORPHONE HYDROCHLORIDE 4 MG: 2 TABLET ORAL at 19:40

## 2017-06-29 RX ADMIN — INSULIN LISPRO 5 UNITS: 100 INJECTION, SOLUTION INTRAVENOUS; SUBCUTANEOUS at 15:56

## 2017-06-29 RX ADMIN — FERROUS SULFATE TAB 325 MG (65 MG ELEMENTAL FE) 325 MG: 325 (65 FE) TAB at 15:57

## 2017-06-29 RX ADMIN — OXYBUTYNIN CHLORIDE 5 MG: 5 TABLET ORAL at 08:52

## 2017-06-29 RX ADMIN — NYSTATIN 500000 UNITS: 100000 SUSPENSION ORAL at 08:52

## 2017-06-29 RX ADMIN — VITAMIN D, TAB 1000IU (100/BT) 1000 UNITS: 25 TAB at 08:52

## 2017-06-29 RX ADMIN — FERROUS SULFATE TAB 325 MG (65 MG ELEMENTAL FE) 325 MG: 325 (65 FE) TAB at 11:28

## 2017-06-29 RX ADMIN — BACLOFEN 20 MG: 20 TABLET ORAL at 17:18

## 2017-06-29 RX ADMIN — OXYBUTYNIN CHLORIDE 5 MG: 5 TABLET ORAL at 22:13

## 2017-06-29 RX ADMIN — FERROUS SULFATE TAB 325 MG (65 MG ELEMENTAL FE) 325 MG: 325 (65 FE) TAB at 08:52

## 2017-06-29 RX ADMIN — HYDROMORPHONE HYDROCHLORIDE 4 MG: 2 TABLET ORAL at 03:40

## 2017-06-29 RX ADMIN — INSULIN DETEMIR 16 UNITS: 100 INJECTION, SOLUTION SUBCUTANEOUS at 22:15

## 2017-06-29 RX ADMIN — INSULIN LISPRO 5 UNITS: 100 INJECTION, SOLUTION INTRAVENOUS; SUBCUTANEOUS at 22:15

## 2017-06-29 RX ADMIN — OXYBUTYNIN CHLORIDE 5 MG: 5 TABLET ORAL at 15:57

## 2017-06-29 RX ADMIN — BACLOFEN 20 MG: 20 TABLET ORAL at 08:52

## 2017-06-29 RX ADMIN — RIVAROXABAN 20 MG: 20 TABLET, FILM COATED ORAL at 09:48

## 2017-06-29 RX ADMIN — NYSTATIN 500000 UNITS: 100000 SUSPENSION ORAL at 17:18

## 2017-06-29 RX ADMIN — NYSTATIN 500000 UNITS: 100000 SUSPENSION ORAL at 11:28

## 2017-06-30 LAB
ANION GAP SERPL CALCULATED.3IONS-SCNC: 6 MMOL/L (ref 4–13)
ANION GAP SERPL CALCULATED.3IONS-SCNC: 8 MMOL/L (ref 4–13)
BACTERIA UR QL AUTO: ABNORMAL /HPF
BASOPHILS # BLD AUTO: 0.03 THOUSANDS/ΜL (ref 0–0.1)
BASOPHILS NFR BLD AUTO: 0 % (ref 0–1)
BILIRUB UR QL STRIP: NEGATIVE
BUN SERPL-MCNC: 37 MG/DL (ref 5–25)
BUN SERPL-MCNC: 38 MG/DL (ref 5–25)
CALCIUM SERPL-MCNC: 7.4 MG/DL (ref 8.3–10.1)
CALCIUM SERPL-MCNC: 7.6 MG/DL (ref 8.3–10.1)
CHLORIDE SERPL-SCNC: 104 MMOL/L (ref 100–108)
CHLORIDE SERPL-SCNC: 106 MMOL/L (ref 100–108)
CLARITY UR: CLEAR
CO2 SERPL-SCNC: 20 MMOL/L (ref 21–32)
CO2 SERPL-SCNC: 22 MMOL/L (ref 21–32)
COLOR UR: YELLOW
CREAT SERPL-MCNC: 2.03 MG/DL (ref 0.6–1.3)
CREAT SERPL-MCNC: 2.09 MG/DL (ref 0.6–1.3)
EOSINOPHIL # BLD AUTO: 0.14 THOUSAND/ΜL (ref 0–0.61)
EOSINOPHIL NFR BLD AUTO: 1 % (ref 0–6)
ERYTHROCYTE [DISTWIDTH] IN BLOOD BY AUTOMATED COUNT: 17.9 % (ref 11.6–15.1)
FERRITIN SERPL-MCNC: 30 NG/ML (ref 8–388)
FOLATE SERPL-MCNC: 6.6 NG/ML (ref 3.1–17.5)
GFR SERPL CREATININE-BSD FRML MDRD: 43.8 ML/MIN/1.73SQ M
GFR SERPL CREATININE-BSD FRML MDRD: 45.3 ML/MIN/1.73SQ M
GLUCOSE SERPL-MCNC: 193 MG/DL (ref 65–140)
GLUCOSE SERPL-MCNC: 207 MG/DL (ref 65–140)
GLUCOSE SERPL-MCNC: 225 MG/DL (ref 65–140)
GLUCOSE SERPL-MCNC: 229 MG/DL (ref 65–140)
GLUCOSE SERPL-MCNC: 256 MG/DL (ref 65–140)
GLUCOSE SERPL-MCNC: 353 MG/DL (ref 65–140)
GLUCOSE SERPL-MCNC: 405 MG/DL (ref 65–140)
GLUCOSE SERPL-MCNC: 460 MG/DL (ref 65–140)
GLUCOSE UR STRIP-MCNC: NEGATIVE MG/DL
HCT VFR BLD AUTO: 14.5 % (ref 36.5–49.3)
HGB BLD-MCNC: 4.1 G/DL (ref 12–17)
HGB BLD-MCNC: 4.4 G/DL (ref 12–17)
HGB UR QL STRIP.AUTO: ABNORMAL
IRON SATN MFR SERPL: 7 %
IRON SERPL-MCNC: 13 UG/DL (ref 65–175)
KETONES UR STRIP-MCNC: NEGATIVE MG/DL
LEUKOCYTE ESTERASE UR QL STRIP: ABNORMAL
LYMPHOCYTES # BLD AUTO: 1.54 THOUSANDS/ΜL (ref 0.6–4.47)
LYMPHOCYTES NFR BLD AUTO: 13 % (ref 14–44)
MCH RBC QN AUTO: 23.9 PG (ref 26.8–34.3)
MCHC RBC AUTO-ENTMCNC: 30.3 G/DL (ref 31.4–37.4)
MCV RBC AUTO: 79 FL (ref 82–98)
MONOCYTES # BLD AUTO: 0.51 THOUSAND/ΜL (ref 0.17–1.22)
MONOCYTES NFR BLD AUTO: 4 % (ref 4–12)
NEUTROPHILS # BLD AUTO: 10 THOUSANDS/ΜL (ref 1.85–7.62)
NEUTS SEG NFR BLD AUTO: 82 % (ref 43–75)
NITRITE UR QL STRIP: POSITIVE
NON-SQ EPI CELLS URNS QL MICRO: ABNORMAL /HPF
PH UR STRIP.AUTO: 7 [PH] (ref 4.5–8)
PLATELET # BLD AUTO: 561 THOUSANDS/UL (ref 149–390)
PMV BLD AUTO: 9.9 FL (ref 8.9–12.7)
POTASSIUM SERPL-SCNC: 4.3 MMOL/L (ref 3.5–5.3)
POTASSIUM SERPL-SCNC: 4.4 MMOL/L (ref 3.5–5.3)
PROT UR STRIP-MCNC: ABNORMAL MG/DL
RBC # BLD AUTO: 1.84 MILLION/UL (ref 3.88–5.62)
RBC #/AREA URNS AUTO: ABNORMAL /HPF
SODIUM SERPL-SCNC: 132 MMOL/L (ref 136–145)
SODIUM SERPL-SCNC: 134 MMOL/L (ref 136–145)
SP GR UR STRIP.AUTO: 1.01 (ref 1–1.03)
TIBC SERPL-MCNC: 186 UG/DL (ref 250–450)
UROBILINOGEN UR QL STRIP.AUTO: 0.2 E.U./DL
VIT B12 SERPL-MCNC: 1949 PG/ML (ref 100–900)
WBC # BLD AUTO: 12.22 THOUSAND/UL (ref 4.31–10.16)
WBC #/AREA URNS AUTO: ABNORMAL /HPF

## 2017-06-30 PROCEDURE — 81001 URINALYSIS AUTO W/SCOPE: CPT | Performed by: INTERNAL MEDICINE

## 2017-06-30 PROCEDURE — 82948 REAGENT STRIP/BLOOD GLUCOSE: CPT

## 2017-06-30 PROCEDURE — 83550 IRON BINDING TEST: CPT | Performed by: INTERNAL MEDICINE

## 2017-06-30 PROCEDURE — 82746 ASSAY OF FOLIC ACID SERUM: CPT | Performed by: INTERNAL MEDICINE

## 2017-06-30 PROCEDURE — 83540 ASSAY OF IRON: CPT | Performed by: INTERNAL MEDICINE

## 2017-06-30 PROCEDURE — 80048 BASIC METABOLIC PNL TOTAL CA: CPT | Performed by: NURSE PRACTITIONER

## 2017-06-30 PROCEDURE — 82607 VITAMIN B-12: CPT | Performed by: INTERNAL MEDICINE

## 2017-06-30 PROCEDURE — 85025 COMPLETE CBC W/AUTO DIFF WBC: CPT | Performed by: INTERNAL MEDICINE

## 2017-06-30 PROCEDURE — 85018 HEMOGLOBIN: CPT | Performed by: NURSE PRACTITIONER

## 2017-06-30 PROCEDURE — 82728 ASSAY OF FERRITIN: CPT | Performed by: INTERNAL MEDICINE

## 2017-06-30 RX ORDER — MORPHINE SULFATE 4 MG/ML
4 INJECTION, SOLUTION INTRAMUSCULAR; INTRAVENOUS EVERY 4 HOURS PRN
Status: DISCONTINUED | OUTPATIENT
Start: 2017-06-30 | End: 2017-07-03 | Stop reason: HOSPADM

## 2017-06-30 RX ORDER — MELATONIN
2000 DAILY
Status: DISCONTINUED | OUTPATIENT
Start: 2017-07-01 | End: 2017-07-03 | Stop reason: HOSPADM

## 2017-06-30 RX ADMIN — FERROUS SULFATE TAB 325 MG (65 MG ELEMENTAL FE) 325 MG: 325 (65 FE) TAB at 17:19

## 2017-06-30 RX ADMIN — BACLOFEN 20 MG: 20 TABLET ORAL at 09:38

## 2017-06-30 RX ADMIN — NYSTATIN 500000 UNITS: 100000 SUSPENSION ORAL at 11:31

## 2017-06-30 RX ADMIN — INSULIN LISPRO 4 UNITS: 100 INJECTION, SOLUTION INTRAVENOUS; SUBCUTANEOUS at 17:39

## 2017-06-30 RX ADMIN — INSULIN DETEMIR 20 UNITS: 100 INJECTION, SOLUTION SUBCUTANEOUS at 21:29

## 2017-06-30 RX ADMIN — MORPHINE SULFATE 4 MG: 4 INJECTION, SOLUTION INTRAMUSCULAR; INTRAVENOUS at 11:31

## 2017-06-30 RX ADMIN — OXYBUTYNIN CHLORIDE 5 MG: 5 TABLET ORAL at 21:25

## 2017-06-30 RX ADMIN — INSULIN LISPRO 4 UNITS: 100 INJECTION, SOLUTION INTRAVENOUS; SUBCUTANEOUS at 13:40

## 2017-06-30 RX ADMIN — INSULIN LISPRO 3 UNITS: 100 INJECTION, SOLUTION INTRAVENOUS; SUBCUTANEOUS at 09:39

## 2017-06-30 RX ADMIN — NYSTATIN 500000 UNITS: 100000 SUSPENSION ORAL at 09:37

## 2017-06-30 RX ADMIN — BACLOFEN 20 MG: 20 TABLET ORAL at 17:19

## 2017-06-30 RX ADMIN — OXYCODONE HYDROCHLORIDE AND ACETAMINOPHEN 500 MG: 500 TABLET ORAL at 09:38

## 2017-06-30 RX ADMIN — NYSTATIN 500000 UNITS: 100000 SUSPENSION ORAL at 17:20

## 2017-06-30 RX ADMIN — FERROUS SULFATE TAB 325 MG (65 MG ELEMENTAL FE) 325 MG: 325 (65 FE) TAB at 11:32

## 2017-06-30 RX ADMIN — FERROUS SULFATE TAB 325 MG (65 MG ELEMENTAL FE) 325 MG: 325 (65 FE) TAB at 09:37

## 2017-06-30 RX ADMIN — OXYBUTYNIN CHLORIDE 5 MG: 5 TABLET ORAL at 17:20

## 2017-06-30 RX ADMIN — RIVAROXABAN 20 MG: 20 TABLET, FILM COATED ORAL at 09:38

## 2017-06-30 RX ADMIN — INSULIN LISPRO 12 UNITS: 100 INJECTION, SOLUTION INTRAVENOUS; SUBCUTANEOUS at 02:18

## 2017-06-30 RX ADMIN — MORPHINE SULFATE 4 MG: 4 INJECTION, SOLUTION INTRAMUSCULAR; INTRAVENOUS at 15:59

## 2017-06-30 RX ADMIN — MORPHINE SULFATE 4 MG: 4 INJECTION, SOLUTION INTRAMUSCULAR; INTRAVENOUS at 21:25

## 2017-06-30 RX ADMIN — OXYBUTYNIN CHLORIDE 5 MG: 5 TABLET ORAL at 09:38

## 2017-06-30 RX ADMIN — NYSTATIN 500000 UNITS: 100000 SUSPENSION ORAL at 21:34

## 2017-06-30 RX ADMIN — VITAMIN D, TAB 1000IU (100/BT) 1000 UNITS: 25 TAB at 09:38

## 2017-07-01 LAB
ANION GAP SERPL CALCULATED.3IONS-SCNC: 6 MMOL/L (ref 4–13)
BACTERIA WND AEROBE CULT: NORMAL
BUN SERPL-MCNC: 39 MG/DL (ref 5–25)
CALCIUM SERPL-MCNC: 7.7 MG/DL (ref 8.3–10.1)
CHLORIDE SERPL-SCNC: 105 MMOL/L (ref 100–108)
CO2 SERPL-SCNC: 22 MMOL/L (ref 21–32)
CREAT SERPL-MCNC: 2.08 MG/DL (ref 0.6–1.3)
CREAT UR-MCNC: 35.3 MG/DL
GFR SERPL CREATININE-BSD FRML MDRD: 44 ML/MIN/1.73SQ M
GLUCOSE SERPL-MCNC: 238 MG/DL (ref 65–140)
GLUCOSE SERPL-MCNC: 240 MG/DL (ref 65–140)
GLUCOSE SERPL-MCNC: 244 MG/DL (ref 65–140)
GLUCOSE SERPL-MCNC: 324 MG/DL (ref 65–140)
GLUCOSE SERPL-MCNC: 333 MG/DL (ref 65–140)
GLUCOSE SERPL-MCNC: 336 MG/DL (ref 65–140)
GLUCOSE SERPL-MCNC: 343 MG/DL (ref 65–140)
GRAM STN SPEC: NORMAL
POTASSIUM SERPL-SCNC: 4.4 MMOL/L (ref 3.5–5.3)
PROT UR-MCNC: 176 MG/DL
PROT/CREAT UR: 4.99 MG/G{CREAT} (ref 0–0.1)
SODIUM SERPL-SCNC: 133 MMOL/L (ref 136–145)

## 2017-07-01 PROCEDURE — 80048 BASIC METABOLIC PNL TOTAL CA: CPT | Performed by: INTERNAL MEDICINE

## 2017-07-01 PROCEDURE — 82570 ASSAY OF URINE CREATININE: CPT | Performed by: PHYSICIAN ASSISTANT

## 2017-07-01 PROCEDURE — 82948 REAGENT STRIP/BLOOD GLUCOSE: CPT

## 2017-07-01 PROCEDURE — 84156 ASSAY OF PROTEIN URINE: CPT | Performed by: PHYSICIAN ASSISTANT

## 2017-07-01 RX ORDER — ONDANSETRON 2 MG/ML
4 INJECTION INTRAMUSCULAR; INTRAVENOUS EVERY 4 HOURS PRN
Status: DISCONTINUED | OUTPATIENT
Start: 2017-07-01 | End: 2017-07-03 | Stop reason: HOSPADM

## 2017-07-01 RX ORDER — SODIUM HYPOCHLORITE 2.5 MG/ML
1 SOLUTION TOPICAL DAILY
Status: DISCONTINUED | OUTPATIENT
Start: 2017-07-01 | End: 2017-07-03 | Stop reason: HOSPADM

## 2017-07-01 RX ORDER — ACETAMINOPHEN 325 MG/1
650 TABLET ORAL EVERY 4 HOURS PRN
Status: DISCONTINUED | OUTPATIENT
Start: 2017-07-01 | End: 2017-07-03 | Stop reason: HOSPADM

## 2017-07-01 RX ADMIN — NYSTATIN 500000 UNITS: 100000 SUSPENSION ORAL at 21:33

## 2017-07-01 RX ADMIN — INSULIN DETEMIR 26 UNITS: 100 INJECTION, SOLUTION SUBCUTANEOUS at 21:33

## 2017-07-01 RX ADMIN — MORPHINE SULFATE 4 MG: 4 INJECTION, SOLUTION INTRAMUSCULAR; INTRAVENOUS at 12:51

## 2017-07-01 RX ADMIN — NYSTATIN 500000 UNITS: 100000 SUSPENSION ORAL at 17:00

## 2017-07-01 RX ADMIN — NYSTATIN 500000 UNITS: 100000 SUSPENSION ORAL at 08:40

## 2017-07-01 RX ADMIN — HYOSCYAMINE SULFATE 1 APPLICATION: 16 SOLUTION at 13:30

## 2017-07-01 RX ADMIN — MORPHINE SULFATE 4 MG: 4 INJECTION, SOLUTION INTRAMUSCULAR; INTRAVENOUS at 16:56

## 2017-07-01 RX ADMIN — OXYBUTYNIN CHLORIDE 5 MG: 5 TABLET ORAL at 16:55

## 2017-07-01 RX ADMIN — MORPHINE SULFATE 4 MG: 4 INJECTION, SOLUTION INTRAMUSCULAR; INTRAVENOUS at 02:25

## 2017-07-01 RX ADMIN — FERROUS SULFATE TAB 325 MG (65 MG ELEMENTAL FE) 325 MG: 325 (65 FE) TAB at 16:55

## 2017-07-01 RX ADMIN — BACLOFEN 20 MG: 20 TABLET ORAL at 17:00

## 2017-07-01 RX ADMIN — FERROUS SULFATE TAB 325 MG (65 MG ELEMENTAL FE) 325 MG: 325 (65 FE) TAB at 08:40

## 2017-07-01 RX ADMIN — OXYCODONE HYDROCHLORIDE AND ACETAMINOPHEN 500 MG: 500 TABLET ORAL at 08:40

## 2017-07-01 RX ADMIN — RIVAROXABAN 20 MG: 20 TABLET, FILM COATED ORAL at 08:40

## 2017-07-01 RX ADMIN — IRON SUCROSE 100 MG: 20 INJECTION, SOLUTION INTRAVENOUS at 11:15

## 2017-07-01 RX ADMIN — FERROUS SULFATE TAB 325 MG (65 MG ELEMENTAL FE) 325 MG: 325 (65 FE) TAB at 11:43

## 2017-07-01 RX ADMIN — INSULIN LISPRO 8 UNITS: 100 INJECTION, SOLUTION INTRAVENOUS; SUBCUTANEOUS at 00:09

## 2017-07-01 RX ADMIN — INSULIN LISPRO 8 UNITS: 100 INJECTION, SOLUTION INTRAVENOUS; SUBCUTANEOUS at 06:31

## 2017-07-01 RX ADMIN — MORPHINE SULFATE 4 MG: 4 INJECTION, SOLUTION INTRAMUSCULAR; INTRAVENOUS at 21:33

## 2017-07-01 RX ADMIN — MORPHINE SULFATE 4 MG: 4 INJECTION, SOLUTION INTRAMUSCULAR; INTRAVENOUS at 08:41

## 2017-07-01 RX ADMIN — OXYBUTYNIN CHLORIDE 5 MG: 5 TABLET ORAL at 08:41

## 2017-07-01 RX ADMIN — VITAMIN D, TAB 1000IU (100/BT) 2000 UNITS: 25 TAB at 08:41

## 2017-07-01 RX ADMIN — NYSTATIN 500000 UNITS: 100000 SUSPENSION ORAL at 11:43

## 2017-07-01 RX ADMIN — BACLOFEN 20 MG: 20 TABLET ORAL at 08:41

## 2017-07-01 RX ADMIN — OXYBUTYNIN CHLORIDE 5 MG: 5 TABLET ORAL at 21:33

## 2017-07-02 LAB
ANION GAP SERPL CALCULATED.3IONS-SCNC: 3 MMOL/L (ref 4–13)
BUN SERPL-MCNC: 42 MG/DL (ref 5–25)
CA-I BLD-SCNC: 1.03 MMOL/L (ref 1.12–1.32)
CALCIUM SERPL-MCNC: 7.6 MG/DL (ref 8.3–10.1)
CHLORIDE SERPL-SCNC: 108 MMOL/L (ref 100–108)
CO2 SERPL-SCNC: 24 MMOL/L (ref 21–32)
CREAT SERPL-MCNC: 2 MG/DL (ref 0.6–1.3)
ERYTHROCYTE [DISTWIDTH] IN BLOOD BY AUTOMATED COUNT: 17.7 % (ref 11.6–15.1)
GFR SERPL CREATININE-BSD FRML MDRD: 46 ML/MIN/1.73SQ M
GLUCOSE SERPL-MCNC: 101 MG/DL (ref 65–140)
GLUCOSE SERPL-MCNC: 105 MG/DL (ref 65–140)
GLUCOSE SERPL-MCNC: 109 MG/DL (ref 65–140)
GLUCOSE SERPL-MCNC: 110 MG/DL (ref 65–140)
GLUCOSE SERPL-MCNC: 139 MG/DL (ref 65–140)
GLUCOSE SERPL-MCNC: 140 MG/DL (ref 65–140)
GLUCOSE SERPL-MCNC: 247 MG/DL (ref 65–140)
HCT VFR BLD AUTO: 12.3 % (ref 36.5–49.3)
HGB BLD-MCNC: 3.6 G/DL (ref 12–17)
MCH RBC QN AUTO: 22.9 PG (ref 26.8–34.3)
MCHC RBC AUTO-ENTMCNC: 29.3 G/DL (ref 31.4–37.4)
MCV RBC AUTO: 78 FL (ref 82–98)
PLATELET # BLD AUTO: 496 THOUSANDS/UL (ref 149–390)
PMV BLD AUTO: 9.9 FL (ref 8.9–12.7)
POTASSIUM SERPL-SCNC: 4.9 MMOL/L (ref 3.5–5.3)
RBC # BLD AUTO: 1.57 MILLION/UL (ref 3.88–5.62)
SODIUM SERPL-SCNC: 135 MMOL/L (ref 136–145)
WBC # BLD AUTO: 7.37 THOUSAND/UL (ref 4.31–10.16)

## 2017-07-02 PROCEDURE — 85027 COMPLETE CBC AUTOMATED: CPT | Performed by: INTERNAL MEDICINE

## 2017-07-02 PROCEDURE — 80048 BASIC METABOLIC PNL TOTAL CA: CPT | Performed by: PHYSICIAN ASSISTANT

## 2017-07-02 PROCEDURE — 87086 URINE CULTURE/COLONY COUNT: CPT | Performed by: INTERNAL MEDICINE

## 2017-07-02 PROCEDURE — 82330 ASSAY OF CALCIUM: CPT | Performed by: INTERNAL MEDICINE

## 2017-07-02 PROCEDURE — 82948 REAGENT STRIP/BLOOD GLUCOSE: CPT

## 2017-07-02 RX ORDER — CALCIUM CARBONATE 200(500)MG
500 TABLET,CHEWABLE ORAL 2 TIMES DAILY
Status: COMPLETED | OUTPATIENT
Start: 2017-07-02 | End: 2017-07-03

## 2017-07-02 RX ORDER — FAMOTIDINE 20 MG/1
20 TABLET, FILM COATED ORAL 2 TIMES DAILY
Status: DISCONTINUED | OUTPATIENT
Start: 2017-07-02 | End: 2017-07-03 | Stop reason: HOSPADM

## 2017-07-02 RX ADMIN — NYSTATIN 500000 UNITS: 100000 SUSPENSION ORAL at 13:00

## 2017-07-02 RX ADMIN — Medication 500 MG: at 17:19

## 2017-07-02 RX ADMIN — HYOSCYAMINE SULFATE 1 APPLICATION: 16 SOLUTION at 15:32

## 2017-07-02 RX ADMIN — OXYBUTYNIN CHLORIDE 5 MG: 5 TABLET ORAL at 17:00

## 2017-07-02 RX ADMIN — OXYBUTYNIN CHLORIDE 5 MG: 5 TABLET ORAL at 22:18

## 2017-07-02 RX ADMIN — FAMOTIDINE 20 MG: 20 TABLET ORAL at 17:19

## 2017-07-02 RX ADMIN — INSULIN LISPRO 4 UNITS: 100 INJECTION, SOLUTION INTRAVENOUS; SUBCUTANEOUS at 00:16

## 2017-07-02 RX ADMIN — BACLOFEN 20 MG: 20 TABLET ORAL at 09:29

## 2017-07-02 RX ADMIN — RIVAROXABAN 20 MG: 20 TABLET, FILM COATED ORAL at 09:30

## 2017-07-02 RX ADMIN — FERROUS SULFATE TAB 325 MG (65 MG ELEMENTAL FE) 325 MG: 325 (65 FE) TAB at 08:00

## 2017-07-02 RX ADMIN — NYSTATIN 500000 UNITS: 100000 SUSPENSION ORAL at 09:30

## 2017-07-02 RX ADMIN — MORPHINE SULFATE 4 MG: 4 INJECTION, SOLUTION INTRAMUSCULAR; INTRAVENOUS at 13:29

## 2017-07-02 RX ADMIN — NYSTATIN 500000 UNITS: 100000 SUSPENSION ORAL at 17:00

## 2017-07-02 RX ADMIN — MORPHINE SULFATE 4 MG: 4 INJECTION, SOLUTION INTRAMUSCULAR; INTRAVENOUS at 17:29

## 2017-07-02 RX ADMIN — INSULIN LISPRO 8 UNITS: 100 INJECTION, SOLUTION INTRAVENOUS; SUBCUTANEOUS at 13:57

## 2017-07-02 RX ADMIN — MORPHINE SULFATE 4 MG: 4 INJECTION, SOLUTION INTRAMUSCULAR; INTRAVENOUS at 09:29

## 2017-07-02 RX ADMIN — MORPHINE SULFATE 4 MG: 4 INJECTION, SOLUTION INTRAMUSCULAR; INTRAVENOUS at 02:20

## 2017-07-02 RX ADMIN — INSULIN LISPRO 8 UNITS: 100 INJECTION, SOLUTION INTRAVENOUS; SUBCUTANEOUS at 17:16

## 2017-07-02 RX ADMIN — BACLOFEN 20 MG: 20 TABLET ORAL at 17:19

## 2017-07-02 RX ADMIN — NYSTATIN 500000 UNITS: 100000 SUSPENSION ORAL at 22:18

## 2017-07-02 RX ADMIN — OXYCODONE HYDROCHLORIDE AND ACETAMINOPHEN 500 MG: 500 TABLET ORAL at 09:30

## 2017-07-02 RX ADMIN — VITAMIN D, TAB 1000IU (100/BT) 2000 UNITS: 25 TAB at 09:30

## 2017-07-02 RX ADMIN — OXYBUTYNIN CHLORIDE 5 MG: 5 TABLET ORAL at 09:31

## 2017-07-02 RX ADMIN — INSULIN DETEMIR 26 UNITS: 100 INJECTION, SOLUTION SUBCUTANEOUS at 22:20

## 2017-07-02 RX ADMIN — MORPHINE SULFATE 4 MG: 4 INJECTION, SOLUTION INTRAMUSCULAR; INTRAVENOUS at 22:18

## 2017-07-03 ENCOUNTER — GENERIC CONVERSION - ENCOUNTER (OUTPATIENT)
Dept: OTHER | Facility: OTHER | Age: 37
End: 2017-07-03

## 2017-07-03 VITALS
TEMPERATURE: 96.2 F | OXYGEN SATURATION: 100 % | DIASTOLIC BLOOD PRESSURE: 76 MMHG | HEART RATE: 93 BPM | SYSTOLIC BLOOD PRESSURE: 115 MMHG | WEIGHT: 155.42 LBS | RESPIRATION RATE: 18 BRPM | BODY MASS INDEX: 18.93 KG/M2 | HEIGHT: 76 IN

## 2017-07-03 PROBLEM — N17.9 AKI (ACUTE KIDNEY INJURY) (HCC): Status: ACTIVE | Noted: 2017-07-03

## 2017-07-03 PROBLEM — L89.90 DECUBITUS ULCERS: Status: ACTIVE | Noted: 2017-07-03

## 2017-07-03 PROBLEM — T14.8XXD WOUND HEALING, DELAYED: Status: ACTIVE | Noted: 2017-06-29

## 2017-07-03 PROBLEM — I10 HTN (HYPERTENSION), BENIGN: Status: ACTIVE | Noted: 2017-07-03

## 2017-07-03 PROBLEM — G89.29 CHRONIC PAIN: Status: ACTIVE | Noted: 2017-07-03

## 2017-07-03 PROBLEM — N31.9 NEUROGENIC BLADDER: Status: ACTIVE | Noted: 2017-07-03

## 2017-07-03 PROBLEM — B37.0 THRUSH: Status: ACTIVE | Noted: 2017-07-03

## 2017-07-03 PROBLEM — K21.9 GERD (GASTROESOPHAGEAL REFLUX DISEASE): Status: ACTIVE | Noted: 2017-07-03

## 2017-07-03 PROBLEM — D50.9 IRON DEFICIENCY ANEMIA: Status: ACTIVE | Noted: 2017-07-03

## 2017-07-03 LAB
BACTERIA UR CULT: NORMAL
GLUCOSE SERPL-MCNC: 177 MG/DL (ref 65–140)
GLUCOSE SERPL-MCNC: 179 MG/DL (ref 65–140)
GLUCOSE SERPL-MCNC: 191 MG/DL (ref 65–140)
GLUCOSE SERPL-MCNC: 208 MG/DL (ref 65–140)
GLUCOSE SERPL-MCNC: 232 MG/DL (ref 65–140)

## 2017-07-03 PROCEDURE — 82948 REAGENT STRIP/BLOOD GLUCOSE: CPT

## 2017-07-03 RX ORDER — ACETAMINOPHEN 325 MG/1
650 TABLET ORAL EVERY 6 HOURS PRN
Qty: 30 TABLET | Refills: 0 | Status: SHIPPED | OUTPATIENT
Start: 2017-07-03 | End: 2018-05-15 | Stop reason: ALTCHOICE

## 2017-07-03 RX ORDER — FAMOTIDINE 20 MG/1
20 TABLET, FILM COATED ORAL 2 TIMES DAILY
Qty: 60 TABLET | Refills: 0 | Status: SHIPPED | OUTPATIENT
Start: 2017-07-03 | End: 2018-05-28 | Stop reason: SDUPTHER

## 2017-07-03 RX ORDER — CHOLECALCIFEROL (VITAMIN D3) 25 MCG
2000 CAPSULE ORAL DAILY
Qty: 30 CAPSULE | Refills: 0 | Status: SHIPPED | OUTPATIENT
Start: 2017-07-03

## 2017-07-03 RX ORDER — SODIUM HYPOCHLORITE 2.5 MG/ML
1 SOLUTION TOPICAL DAILY
Qty: 473 ML | Refills: 0 | Status: SHIPPED | OUTPATIENT
Start: 2017-07-03 | End: 2018-09-07

## 2017-07-03 RX ADMIN — NYSTATIN 500000 UNITS: 100000 SUSPENSION ORAL at 11:56

## 2017-07-03 RX ADMIN — MORPHINE SULFATE 4 MG: 4 INJECTION, SOLUTION INTRAMUSCULAR; INTRAVENOUS at 16:59

## 2017-07-03 RX ADMIN — NYSTATIN 500000 UNITS: 100000 SUSPENSION ORAL at 17:01

## 2017-07-03 RX ADMIN — VITAMIN D, TAB 1000IU (100/BT) 2000 UNITS: 25 TAB at 08:11

## 2017-07-03 RX ADMIN — INSULIN LISPRO 4 UNITS: 100 INJECTION, SOLUTION INTRAVENOUS; SUBCUTANEOUS at 12:26

## 2017-07-03 RX ADMIN — OXYBUTYNIN CHLORIDE 5 MG: 5 TABLET ORAL at 08:11

## 2017-07-03 RX ADMIN — OXYBUTYNIN CHLORIDE 5 MG: 5 TABLET ORAL at 16:59

## 2017-07-03 RX ADMIN — NYSTATIN 500000 UNITS: 100000 SUSPENSION ORAL at 08:11

## 2017-07-03 RX ADMIN — FAMOTIDINE 20 MG: 20 TABLET ORAL at 17:00

## 2017-07-03 RX ADMIN — OXYCODONE HYDROCHLORIDE AND ACETAMINOPHEN 500 MG: 500 TABLET ORAL at 08:11

## 2017-07-03 RX ADMIN — FAMOTIDINE 20 MG: 20 TABLET ORAL at 08:11

## 2017-07-03 RX ADMIN — INSULIN LISPRO 2 UNITS: 100 INJECTION, SOLUTION INTRAVENOUS; SUBCUTANEOUS at 05:31

## 2017-07-03 RX ADMIN — INSULIN LISPRO 4 UNITS: 100 INJECTION, SOLUTION INTRAVENOUS; SUBCUTANEOUS at 00:45

## 2017-07-03 RX ADMIN — BACLOFEN 20 MG: 20 TABLET ORAL at 17:00

## 2017-07-03 RX ADMIN — MORPHINE SULFATE 4 MG: 4 INJECTION, SOLUTION INTRAMUSCULAR; INTRAVENOUS at 05:21

## 2017-07-03 RX ADMIN — MORPHINE SULFATE 4 MG: 4 INJECTION, SOLUTION INTRAMUSCULAR; INTRAVENOUS at 09:24

## 2017-07-03 RX ADMIN — INSULIN LISPRO 2 UNITS: 100 INJECTION, SOLUTION INTRAVENOUS; SUBCUTANEOUS at 17:01

## 2017-07-03 RX ADMIN — IRON SUCROSE 100 MG: 20 INJECTION, SOLUTION INTRAVENOUS at 08:11

## 2017-07-03 RX ADMIN — RIVAROXABAN 20 MG: 20 TABLET, FILM COATED ORAL at 08:11

## 2017-07-03 RX ADMIN — Medication 500 MG: at 08:10

## 2017-07-03 RX ADMIN — BACLOFEN 20 MG: 20 TABLET ORAL at 08:11

## 2017-07-03 RX ADMIN — HYOSCYAMINE SULFATE 1 APPLICATION: 16 SOLUTION at 11:08

## 2017-07-04 LAB
BACTERIA BLD CULT: NORMAL
BACTERIA BLD CULT: NORMAL

## 2017-07-05 ENCOUNTER — GENERIC CONVERSION - ENCOUNTER (OUTPATIENT)
Dept: OTHER | Facility: OTHER | Age: 37
End: 2017-07-05

## 2017-07-14 ENCOUNTER — APPOINTMENT (OUTPATIENT)
Dept: WOUND CARE | Facility: HOSPITAL | Age: 37
End: 2017-07-14
Payer: MEDICARE

## 2017-07-14 PROCEDURE — 11046 DBRDMT MUSC&/FSCA EA ADDL: CPT | Performed by: SURGERY

## 2017-07-14 PROCEDURE — 11042 DBRDMT SUBQ TIS 1ST 20SQCM/<: CPT | Performed by: SURGERY

## 2017-07-14 PROCEDURE — 11043 DBRDMT MUSC&/FSCA 1ST 20/<: CPT | Performed by: SURGERY

## 2017-07-14 PROCEDURE — 11047 DBRDMT BONE EACH ADDL: CPT | Performed by: SURGERY

## 2017-07-14 PROCEDURE — 11045 DBRDMT SUBQ TISS EACH ADDL: CPT | Performed by: SURGERY

## 2017-07-14 PROCEDURE — 11044 DBRDMT BONE 1ST 20 SQ CM/<: CPT | Performed by: SURGERY

## 2017-07-18 ENCOUNTER — ALLSCRIPTS OFFICE VISIT (OUTPATIENT)
Dept: OTHER | Facility: OTHER | Age: 37
End: 2017-07-18

## 2017-07-18 DIAGNOSIS — M79.89 OTHER DISORDERS OF SOFT TISSUE: ICD-10-CM

## 2017-07-18 DIAGNOSIS — D64.9 ANEMIA: ICD-10-CM

## 2017-07-18 DIAGNOSIS — A04.72 ENTEROCOLITIS DUE TO CLOSTRIDIUM DIFFICILE: ICD-10-CM

## 2017-07-18 DIAGNOSIS — Z79.01 LONG TERM CURRENT USE OF ANTICOAGULANT: ICD-10-CM

## 2017-07-18 DIAGNOSIS — I82.409 ACUTE EMBOLISM AND THROMBOSIS OF DEEP VEIN OF LOWER EXTREMITY (HCC): ICD-10-CM

## 2017-07-18 DIAGNOSIS — N18.30 CHRONIC KIDNEY DISEASE, STAGE III (MODERATE) (HCC): ICD-10-CM

## 2017-07-18 DIAGNOSIS — D50.9 IRON DEFICIENCY ANEMIA: ICD-10-CM

## 2017-07-18 DIAGNOSIS — E61.1 IRON DEFICIENCY: ICD-10-CM

## 2017-07-21 ENCOUNTER — APPOINTMENT (OUTPATIENT)
Dept: LAB | Facility: CLINIC | Age: 37
End: 2017-07-21
Payer: MEDICARE

## 2017-07-21 ENCOUNTER — ALLSCRIPTS OFFICE VISIT (OUTPATIENT)
Dept: OTHER | Facility: OTHER | Age: 37
End: 2017-07-21

## 2017-07-21 DIAGNOSIS — Z79.01 LONG TERM CURRENT USE OF ANTICOAGULANT: ICD-10-CM

## 2017-07-21 DIAGNOSIS — D64.9 ANEMIA: ICD-10-CM

## 2017-07-21 DIAGNOSIS — E61.1 IRON DEFICIENCY: ICD-10-CM

## 2017-07-21 DIAGNOSIS — N18.30 CHRONIC KIDNEY DISEASE, STAGE III (MODERATE) (HCC): ICD-10-CM

## 2017-07-21 LAB
ANION GAP SERPL CALCULATED.3IONS-SCNC: 2 MMOL/L (ref 4–13)
ANISOCYTOSIS BLD QL SMEAR: PRESENT
BASOPHILS # BLD AUTO: 0.1 THOUSAND/UL (ref 0–0.1)
BASOPHILS NFR MAR MANUAL: 1 % (ref 0–1)
BUN SERPL-MCNC: 23 MG/DL (ref 5–25)
CALCIUM SERPL-MCNC: 7.5 MG/DL (ref 8.3–10.1)
CHLORIDE SERPL-SCNC: 111 MMOL/L (ref 100–108)
CO2 SERPL-SCNC: 23 MMOL/L (ref 21–32)
CREAT SERPL-MCNC: 1.78 MG/DL (ref 0.6–1.3)
EOSINOPHIL # BLD AUTO: 0.1 THOUSAND/UL (ref 0–0.61)
EOSINOPHIL NFR BLD MANUAL: 1 % (ref 0–6)
ERYTHROCYTE [DISTWIDTH] IN BLOOD BY AUTOMATED COUNT: 21 % (ref 11.6–15.1)
GFR SERPL CREATININE-BSD FRML MDRD: 52.7 ML/MIN/1.73SQ M
GLUCOSE SERPL-MCNC: 217 MG/DL (ref 65–140)
HCT VFR BLD AUTO: 14.8 % (ref 36.5–49.3)
HGB BLD-MCNC: 4.4 G/DL (ref 12–17)
HYPERCHROMIA BLD QL SMEAR: PRESENT
IRON SATN MFR SERPL: 5 %
IRON SERPL-MCNC: 11 UG/DL (ref 65–175)
LYMPHOCYTES # BLD AUTO: 1.5 THOUSAND/UL (ref 0.6–4.47)
LYMPHOCYTES # BLD AUTO: 15 % (ref 14–44)
MCH RBC QN AUTO: 23.2 PG (ref 26.8–34.3)
MCHC RBC AUTO-ENTMCNC: 29.6 G/DL (ref 31.4–37.4)
MCV RBC AUTO: 78 FL (ref 82–98)
MONOCYTES # BLD AUTO: 0.2 THOUSAND/UL (ref 0–1.22)
MONOCYTES NFR BLD AUTO: 2 % (ref 4–12)
NEUTS BAND NFR BLD MANUAL: 3 % (ref 0–8)
NEUTS SEG # BLD: 8.1 THOUSAND/UL (ref 1.81–6.82)
NEUTS SEG NFR BLD AUTO: 78 % (ref 43–75)
OVALOCYTES BLD QL SMEAR: PRESENT
PLATELET # BLD AUTO: 500 THOUSANDS/UL (ref 149–390)
PLATELET BLD QL SMEAR: ABNORMAL
PMV BLD AUTO: 6.9 FL (ref 8.9–12.7)
POTASSIUM SERPL-SCNC: 4.8 MMOL/L (ref 3.5–5.3)
RBC # BLD AUTO: 1.89 MILLION/UL (ref 3.88–5.62)
SODIUM SERPL-SCNC: 136 MMOL/L (ref 136–145)
TARGETS BLD QL SMEAR: PRESENT
TIBC SERPL-MCNC: 204 UG/DL (ref 250–450)
TOTAL CELLS COUNTED SPEC: 100
WBC # BLD AUTO: 10 THOUSAND/UL (ref 4.31–10.16)
WBC NRBC COR # BLD: 10 THOUSAND/UL (ref 4.31–10.16)

## 2017-07-21 PROCEDURE — 85007 BL SMEAR W/DIFF WBC COUNT: CPT

## 2017-07-21 PROCEDURE — 83540 ASSAY OF IRON: CPT

## 2017-07-21 PROCEDURE — 80048 BASIC METABOLIC PNL TOTAL CA: CPT

## 2017-07-21 PROCEDURE — 85027 COMPLETE CBC AUTOMATED: CPT

## 2017-07-21 PROCEDURE — 83550 IRON BINDING TEST: CPT

## 2017-07-21 PROCEDURE — 36415 COLL VENOUS BLD VENIPUNCTURE: CPT

## 2017-07-25 ENCOUNTER — ALLSCRIPTS OFFICE VISIT (OUTPATIENT)
Dept: OTHER | Facility: OTHER | Age: 37
End: 2017-07-25

## 2017-07-27 RX ORDER — SODIUM CHLORIDE 9 MG/ML
20 INJECTION, SOLUTION INTRAVENOUS CONTINUOUS
Status: DISCONTINUED | OUTPATIENT
Start: 2017-07-28 | End: 2017-07-31 | Stop reason: HOSPADM

## 2017-07-28 ENCOUNTER — GENERIC CONVERSION - ENCOUNTER (OUTPATIENT)
Dept: OTHER | Facility: OTHER | Age: 37
End: 2017-07-28

## 2017-07-28 ENCOUNTER — HOSPITAL ENCOUNTER (OUTPATIENT)
Dept: INFUSION CENTER | Facility: CLINIC | Age: 37
Discharge: HOME | End: 2017-07-28
Payer: MEDICARE

## 2017-07-28 VITALS
TEMPERATURE: 97.7 F | DIASTOLIC BLOOD PRESSURE: 56 MMHG | SYSTOLIC BLOOD PRESSURE: 99 MMHG | HEART RATE: 101 BPM | RESPIRATION RATE: 16 BRPM

## 2017-07-28 LAB
ERYTHROCYTE [DISTWIDTH] IN BLOOD BY AUTOMATED COUNT: 20.3 % (ref 11.6–15.1)
GRANULOCYTES NFR BLD AUTO: 74.7 % (ref 47–80)
GRANULOCYTES NFR BLD: 6.3 THOUSAND/ΜL (ref 1.85–7.82)
HCT VFR BLD AUTO: 14.2 % (ref 36.5–49.3)
HGB BLD-MCNC: 4.1 G/DL (ref 12–17)
LYMPHOCYTES # BLD AUTO: 1.8 THOUSANDS/ΜL (ref 0.6–4.47)
LYMPHOCYTES NFR BLD AUTO: 21 % (ref 14–44)
MCH RBC QN AUTO: 22 PG (ref 26.8–34.3)
MCHC RBC AUTO-ENTMCNC: 28.8 G/DL (ref 31.4–37.4)
MCV RBC AUTO: 77 FL (ref 82–98)
MONOCYTES # BLD AUTO: 0.4 THOUSAND/ΜL (ref 0.17–1.22)
MONOCYTES NFR BLD AUTO: 4 % (ref 4–12)
PLATELET # BLD AUTO: 476 THOUSANDS/UL (ref 149–390)
PMV BLD AUTO: 7.2 FL (ref 8.9–12.7)
RBC # BLD AUTO: 1.85 MILLION/UL (ref 3.88–5.62)
WBC # BLD AUTO: 8.4 THOUSAND/UL (ref 4.31–10.16)
WBC NRBC COR # BLD: 8.4 THOUSAND/UL (ref 4.31–10.16)

## 2017-07-28 PROCEDURE — 85025 COMPLETE CBC W/AUTO DIFF WBC: CPT | Performed by: INTERNAL MEDICINE

## 2017-07-28 PROCEDURE — 96365 THER/PROPH/DIAG IV INF INIT: CPT

## 2017-07-28 RX ADMIN — IRON SUCROSE 200 MG: 20 INJECTION, SOLUTION INTRAVENOUS at 13:43

## 2017-07-28 RX ADMIN — SODIUM CHLORIDE 20 ML/HR: 0.9 INJECTION, SOLUTION INTRAVENOUS at 13:43

## 2017-07-28 NOTE — PROGRESS NOTES
Pt received IV venofer without complications  Pt aware of critically low hemoglobin level  States he does not wish to go to the hospital and will not accept a blood transfusion  Asymptomatic  Instructed to report to the ER if symptoms do arise  Pt aware of next infusion appointment  AVS provided

## 2017-07-28 NOTE — PROGRESS NOTES
CBC drawn  Hgb 4 1  Marie Villalobos RN aware  Xenia Fischer spoke with pt last week about low hemoglobin levels and states that pt refuses to go to the hospital or receive blood products (pt is a Faith)

## 2017-07-28 NOTE — PLAN OF CARE
Problem: Potential for Falls  Goal: Patient will remain free of falls  INTERVENTIONS:  - Assess patient frequently for physical needs  -  Identify cognitive and physical deficits and behaviors that affect risk of falls    -  [place] fall precautions as indicated by assessment   - Educate patient/family on patient safety including physical limitations  - Instruct patient to call for assistance with activity based on assessment  - Modify environment to reduce risk of injury  - Consider OT/PT consult to assist with strengthening/mobility   Outcome: Progressing

## 2017-08-03 RX ORDER — SODIUM CHLORIDE 9 MG/ML
20 INJECTION, SOLUTION INTRAVENOUS CONTINUOUS
Status: DISCONTINUED | OUTPATIENT
Start: 2017-08-04 | End: 2017-08-07 | Stop reason: HOSPADM

## 2017-08-04 ENCOUNTER — HOSPITAL ENCOUNTER (OUTPATIENT)
Dept: INFUSION CENTER | Facility: CLINIC | Age: 37
Discharge: HOME | End: 2017-08-04
Payer: MEDICARE

## 2017-08-04 VITALS
HEART RATE: 104 BPM | DIASTOLIC BLOOD PRESSURE: 58 MMHG | SYSTOLIC BLOOD PRESSURE: 107 MMHG | TEMPERATURE: 98.5 F | RESPIRATION RATE: 18 BRPM

## 2017-08-04 PROCEDURE — 96374 THER/PROPH/DIAG INJ IV PUSH: CPT

## 2017-08-04 RX ADMIN — SODIUM CHLORIDE 20 ML/HR: 0.9 INJECTION, SOLUTION INTRAVENOUS at 13:55

## 2017-08-04 RX ADMIN — IRON SUCROSE 200 MG: 20 INJECTION, SOLUTION INTRAVENOUS at 13:56

## 2017-08-04 NOTE — PLAN OF CARE
Problem: Potential for Falls  Goal: Patient will remain free of falls  INTERVENTIONS:  - Assess patient frequently for physical needs  -  Identify cognitive and physical deficits and behaviors that affect risk of falls    -  [place] fall precautions as indicated by assessment   - Educate patient/family on patient safety including physical limitations  - Instruct patient to call for assistance with activity based on assessment  - Modify environment to reduce risk of injury  - Consider OT/PT consult to assist with strengthening/mobility   Outcome: Progressing

## 2017-08-07 ENCOUNTER — APPOINTMENT (OUTPATIENT)
Dept: WOUND CARE | Facility: HOSPITAL | Age: 37
End: 2017-08-07
Payer: MEDICARE

## 2017-08-07 PROCEDURE — 11043 DBRDMT MUSC&/FSCA 1ST 20/<: CPT | Performed by: SURGERY

## 2017-08-07 PROCEDURE — 11045 DBRDMT SUBQ TISS EACH ADDL: CPT | Performed by: SURGERY

## 2017-08-07 PROCEDURE — 11046 DBRDMT MUSC&/FSCA EA ADDL: CPT | Performed by: SURGERY

## 2017-08-07 PROCEDURE — 99215 OFFICE O/P EST HI 40 MIN: CPT | Performed by: SURGERY

## 2017-08-07 PROCEDURE — 11042 DBRDMT SUBQ TIS 1ST 20SQCM/<: CPT | Performed by: SURGERY

## 2017-08-10 RX ORDER — SODIUM CHLORIDE 9 MG/ML
20 INJECTION, SOLUTION INTRAVENOUS CONTINUOUS
Status: DISCONTINUED | OUTPATIENT
Start: 2017-08-11 | End: 2017-08-14 | Stop reason: HOSPADM

## 2017-08-11 ENCOUNTER — HOSPITAL ENCOUNTER (OUTPATIENT)
Dept: INFUSION CENTER | Facility: CLINIC | Age: 37
Discharge: HOME | End: 2017-08-11
Payer: MEDICARE

## 2017-08-11 VITALS
SYSTOLIC BLOOD PRESSURE: 93 MMHG | DIASTOLIC BLOOD PRESSURE: 62 MMHG | RESPIRATION RATE: 18 BRPM | TEMPERATURE: 99.3 F | HEART RATE: 114 BPM

## 2017-08-11 PROCEDURE — 96365 THER/PROPH/DIAG IV INF INIT: CPT

## 2017-08-11 RX ADMIN — IRON SUCROSE 200 MG: 20 INJECTION, SOLUTION INTRAVENOUS at 13:43

## 2017-08-11 RX ADMIN — SODIUM CHLORIDE 20 ML/HR: 0.9 INJECTION, SOLUTION INTRAVENOUS at 13:28

## 2017-08-11 NOTE — PLAN OF CARE
Problem: Potential for Falls  Goal: Patient will remain free of falls  INTERVENTIONS:  - Assess patient frequently for physical needs  -  Identify cognitive and physical deficits and behaviors that affect risk of falls    -  [place] fall precautions as indicated by assessment   - Educate patient/family on patient safety including physical limitations  - Instruct patient to call for assistance with activity based on assessment  - Modify environment to reduce risk of injury  - Consider OT/PT consult to assist with strengthening/mobility   Outcome: Progressing

## 2017-08-15 ENCOUNTER — LAB REQUISITION (OUTPATIENT)
Dept: LAB | Facility: HOSPITAL | Age: 37
End: 2017-08-15
Payer: MEDICARE

## 2017-08-15 DIAGNOSIS — A04.72 ENTEROCOLITIS DUE TO CLOSTRIDIUM DIFFICILE: ICD-10-CM

## 2017-08-15 PROCEDURE — 87493 C DIFF AMPLIFIED PROBE: CPT | Performed by: FAMILY MEDICINE

## 2017-08-16 LAB — C DIFF TOX GENS STL QL NAA+PROBE: ABNORMAL

## 2017-08-17 ENCOUNTER — HOSPITAL ENCOUNTER (OUTPATIENT)
Dept: NON INVASIVE DIAGNOSTICS | Facility: CLINIC | Age: 37
Discharge: HOME | End: 2017-08-17
Payer: MEDICARE

## 2017-08-17 DIAGNOSIS — M79.89 OTHER DISORDERS OF SOFT TISSUE: ICD-10-CM

## 2017-08-17 DIAGNOSIS — I82.409 ACUTE EMBOLISM AND THROMBOSIS OF DEEP VEIN OF LOWER EXTREMITY (HCC): ICD-10-CM

## 2017-08-17 PROCEDURE — 93971 EXTREMITY STUDY: CPT

## 2017-08-17 RX ORDER — SODIUM CHLORIDE 9 MG/ML
20 INJECTION, SOLUTION INTRAVENOUS CONTINUOUS
Status: DISCONTINUED | OUTPATIENT
Start: 2017-08-18 | End: 2017-08-21 | Stop reason: HOSPADM

## 2017-08-18 ENCOUNTER — HOSPITAL ENCOUNTER (OUTPATIENT)
Dept: INFUSION CENTER | Facility: CLINIC | Age: 37
Discharge: HOME | End: 2017-08-18
Payer: MEDICARE

## 2017-08-24 RX ORDER — SODIUM CHLORIDE 9 MG/ML
20 INJECTION, SOLUTION INTRAVENOUS CONTINUOUS
Status: DISCONTINUED | OUTPATIENT
Start: 2017-08-25 | End: 2017-08-28 | Stop reason: HOSPADM

## 2017-08-25 ENCOUNTER — HOSPITAL ENCOUNTER (OUTPATIENT)
Dept: INFUSION CENTER | Facility: CLINIC | Age: 37
Discharge: HOME | End: 2017-08-25
Payer: MEDICARE

## 2017-08-28 DIAGNOSIS — Z79.01 LONG TERM CURRENT USE OF ANTICOAGULANT: ICD-10-CM

## 2017-08-28 DIAGNOSIS — N18.30 CHRONIC KIDNEY DISEASE, STAGE III (MODERATE) (HCC): ICD-10-CM

## 2017-08-28 DIAGNOSIS — G47.9 SLEEP DISORDER: ICD-10-CM

## 2017-08-28 DIAGNOSIS — E61.1 IRON DEFICIENCY: ICD-10-CM

## 2017-08-28 DIAGNOSIS — D64.9 ANEMIA: ICD-10-CM

## 2017-08-29 NOTE — ANESTHESIA PREPROCEDURE EVALUATION
Review of Systems/Medical History  Patient summary reviewed    No history of anesthetic complications     Cardiovascular  EKG reviewed, Dysrhythmias, atrial fibrillation,    Pulmonary  Smoker cigarette smoker , Tobacco cessation counseling given,   Comment: H/o Drug abuse. Denies any IV usage.     GI/Hepatic    GERD,          Comment: Suprapubic catheter due to hyperactive bladder     Endo/Other  Diabetes type 2 Insulin,      GYN       Hematology  Anemia anemia of chronic disease,     Musculoskeletal       Neurology    Neuromuscular disease , Spinal cord lesion,   Comment: Paraplegia due to some spinal cord compression? Spinal Blood clot? Psychology           Physical Exam    Airway    Mallampati score: II  TM Distance: >3 FB  Neck ROM: full     Dental   Comment: Poor dentation. Many missing teeth. Denies any loose teeth.,     Cardiovascular  Cardiovascular exam normal    Pulmonary  Pulmonary exam normal     Other Findings        Anesthesia Plan  ASA Score- 3       Anesthesia Type- IV sedation with anesthesia        Induction-       Informed Consent  Anesthetic plan and risks discussed with patient    
Family/Self

## 2017-09-01 ENCOUNTER — ALLSCRIPTS OFFICE VISIT (OUTPATIENT)
Dept: OTHER | Facility: OTHER | Age: 37
End: 2017-09-01

## 2017-09-05 ENCOUNTER — GENERIC CONVERSION - ENCOUNTER (OUTPATIENT)
Dept: OTHER | Facility: OTHER | Age: 37
End: 2017-09-05

## 2017-09-18 ENCOUNTER — APPOINTMENT (INPATIENT)
Dept: RADIOLOGY | Facility: HOSPITAL | Age: 37
DRG: 871 | End: 2017-09-18
Payer: MEDICARE

## 2017-09-18 ENCOUNTER — HOSPITAL ENCOUNTER (INPATIENT)
Facility: HOSPITAL | Age: 37
LOS: 23 days | Discharge: NURSING, REHAB OR LONG-TERM CARE | DRG: 871 | End: 2017-10-11
Attending: EMERGENCY MEDICINE | Admitting: INTERNAL MEDICINE
Payer: MEDICARE

## 2017-09-18 DIAGNOSIS — Z97.8 CHRONIC INDWELLING FOLEY CATHETER: ICD-10-CM

## 2017-09-18 DIAGNOSIS — F32.A DEPRESSION (EMOTION): ICD-10-CM

## 2017-09-18 DIAGNOSIS — N17.9 AKI (ACUTE KIDNEY INJURY) (HCC): ICD-10-CM

## 2017-09-18 DIAGNOSIS — Z02.79 ENCOUNTER FOR EVALUATION OF ABILITY TO MAKE DECISIONS REGARDING CARE: ICD-10-CM

## 2017-09-18 DIAGNOSIS — N48.21 PENILE ABSCESS: ICD-10-CM

## 2017-09-18 DIAGNOSIS — G82.20 PARAPLEGIA (HCC): ICD-10-CM

## 2017-09-18 DIAGNOSIS — D64.9 ANEMIA: ICD-10-CM

## 2017-09-18 DIAGNOSIS — N18.9 CHRONIC RENAL INSUFFICIENCY: ICD-10-CM

## 2017-09-18 DIAGNOSIS — L89.90 DECUBITUS SKIN ULCER: ICD-10-CM

## 2017-09-18 DIAGNOSIS — A41.9 SEPSIS (HCC): Primary | ICD-10-CM

## 2017-09-18 DIAGNOSIS — R41.0 DELIRIUM: ICD-10-CM

## 2017-09-18 DIAGNOSIS — R82.71 ASYMPTOMATIC BACTERIURIA: ICD-10-CM

## 2017-09-18 DIAGNOSIS — N39.0 UTI (URINARY TRACT INFECTION): ICD-10-CM

## 2017-09-18 DIAGNOSIS — T31.0 BURN ANY DEGREE INVOLVING LESS THAN 10 PERCENT OF BODY SURFACE: ICD-10-CM

## 2017-09-18 DIAGNOSIS — L89.90 DECUBITUS ULCERS: ICD-10-CM

## 2017-09-18 PROBLEM — N18.30 STAGE 3 CHRONIC KIDNEY DISEASE (HCC): Status: ACTIVE | Noted: 2017-09-18

## 2017-09-18 PROBLEM — R06.02 SOB (SHORTNESS OF BREATH): Status: ACTIVE | Noted: 2017-09-18

## 2017-09-18 PROBLEM — D75.839 THROMBOCYTOSIS: Status: ACTIVE | Noted: 2017-09-18

## 2017-09-18 LAB
ALBUMIN SERPL BCP-MCNC: 1.4 G/DL (ref 3.5–5)
ALP SERPL-CCNC: 107 U/L (ref 46–116)
ALT SERPL W P-5'-P-CCNC: 13 U/L (ref 12–78)
ANION GAP SERPL CALCULATED.3IONS-SCNC: 9 MMOL/L (ref 4–13)
ANISOCYTOSIS BLD QL SMEAR: PRESENT
APTT PPP: 33 SECONDS (ref 23–35)
AST SERPL W P-5'-P-CCNC: 18 U/L (ref 5–45)
ATRIAL RATE: 127 BPM
BACTERIA UR QL AUTO: ABNORMAL /HPF
BASOPHILS # BLD MANUAL: 0 THOUSAND/UL (ref 0–0.1)
BASOPHILS NFR MAR MANUAL: 0 % (ref 0–1)
BILIRUB SERPL-MCNC: 0.12 MG/DL (ref 0.2–1)
BILIRUB UR QL STRIP: NEGATIVE
BUN SERPL-MCNC: 32 MG/DL (ref 5–25)
CALCIUM SERPL-MCNC: 8 MG/DL (ref 8.3–10.1)
CHLORIDE SERPL-SCNC: 108 MMOL/L (ref 100–108)
CLARITY UR: ABNORMAL
CO2 SERPL-SCNC: 18 MMOL/L (ref 21–32)
COLOR UR: YELLOW
CREAT SERPL-MCNC: 2.01 MG/DL (ref 0.6–1.3)
EOSINOPHIL # BLD MANUAL: 0.14 THOUSAND/UL (ref 0–0.4)
EOSINOPHIL NFR BLD MANUAL: 1 % (ref 0–6)
ERYTHROCYTE [DISTWIDTH] IN BLOOD BY AUTOMATED COUNT: 21.6 % (ref 11.6–15.1)
GFR SERPL CREATININE-BSD FRML MDRD: 48 ML/MIN/1.73SQ M
GLUCOSE SERPL-MCNC: 115 MG/DL (ref 65–140)
GLUCOSE SERPL-MCNC: 55 MG/DL (ref 65–140)
GLUCOSE SERPL-MCNC: 85 MG/DL (ref 65–140)
GLUCOSE UR STRIP-MCNC: NEGATIVE MG/DL
HCT VFR BLD AUTO: 25.6 % (ref 36.5–49.3)
HGB BLD-MCNC: 8.2 G/DL (ref 12–17)
HGB UR QL STRIP.AUTO: ABNORMAL
INR PPP: 1.19 (ref 0.86–1.16)
KETONES UR STRIP-MCNC: NEGATIVE MG/DL
LACTATE SERPL-SCNC: 1.1 MMOL/L (ref 0.5–2)
LEUKOCYTE ESTERASE UR QL STRIP: ABNORMAL
LG PLATELETS BLD QL SMEAR: PRESENT
LYMPHOCYTES # BLD AUTO: 1.11 THOUSAND/UL (ref 0.6–4.47)
LYMPHOCYTES # BLD AUTO: 8 % (ref 14–44)
MCH RBC QN AUTO: 26.1 PG (ref 26.8–34.3)
MCHC RBC AUTO-ENTMCNC: 32 G/DL (ref 31.4–37.4)
MCV RBC AUTO: 82 FL (ref 82–98)
MONOCYTES # BLD AUTO: 0.42 THOUSAND/UL (ref 0–1.22)
MONOCYTES NFR BLD: 3 % (ref 4–12)
NEUTROPHILS # BLD MANUAL: 12.2 THOUSAND/UL (ref 1.85–7.62)
NEUTS BAND NFR BLD MANUAL: 4 % (ref 0–8)
NEUTS SEG NFR BLD AUTO: 84 % (ref 43–75)
NITRITE UR QL STRIP: POSITIVE
NON-SQ EPI CELLS URNS QL MICRO: ABNORMAL /HPF
NRBC BLD AUTO-RTO: 0 /100 WBCS
OVALOCYTES BLD QL SMEAR: PRESENT
P AXIS: 54 DEGREES
PH UR STRIP.AUTO: 5.5 [PH] (ref 4.5–8)
PLATELET # BLD AUTO: 432 THOUSANDS/UL (ref 149–390)
PLATELET BLD QL SMEAR: ABNORMAL
PMV BLD AUTO: 9.6 FL (ref 8.9–12.7)
POTASSIUM SERPL-SCNC: 4.3 MMOL/L (ref 3.5–5.3)
PR INTERVAL: 128 MS
PROT SERPL-MCNC: 7.7 G/DL (ref 6.4–8.2)
PROT UR STRIP-MCNC: ABNORMAL MG/DL
PROTHROMBIN TIME: 15.2 SECONDS (ref 12.1–14.4)
QRS AXIS: 85 DEGREES
QRSD INTERVAL: 78 MS
QT INTERVAL: 290 MS
QTC INTERVAL: 421 MS
RBC # BLD AUTO: 3.14 MILLION/UL (ref 3.88–5.62)
RBC #/AREA URNS AUTO: ABNORMAL /HPF
SODIUM SERPL-SCNC: 135 MMOL/L (ref 136–145)
SP GR UR STRIP.AUTO: 1.01 (ref 1–1.03)
T WAVE AXIS: 85 DEGREES
TOTAL CELLS COUNTED SPEC: 100
UROBILINOGEN UR QL STRIP.AUTO: 0.2 E.U./DL
VENTRICULAR RATE: 127 BPM
WBC # BLD AUTO: 13.86 THOUSAND/UL (ref 4.31–10.16)
WBC #/AREA URNS AUTO: ABNORMAL /HPF

## 2017-09-18 PROCEDURE — 85730 THROMBOPLASTIN TIME PARTIAL: CPT | Performed by: EMERGENCY MEDICINE

## 2017-09-18 PROCEDURE — 87086 URINE CULTURE/COLONY COUNT: CPT | Performed by: EMERGENCY MEDICINE

## 2017-09-18 PROCEDURE — 71020 HB CHEST X-RAY 2VW FRONTAL&LATL: CPT

## 2017-09-18 PROCEDURE — 36415 COLL VENOUS BLD VENIPUNCTURE: CPT | Performed by: EMERGENCY MEDICINE

## 2017-09-18 PROCEDURE — 82948 REAGENT STRIP/BLOOD GLUCOSE: CPT

## 2017-09-18 PROCEDURE — 85610 PROTHROMBIN TIME: CPT | Performed by: EMERGENCY MEDICINE

## 2017-09-18 PROCEDURE — 96365 THER/PROPH/DIAG IV INF INIT: CPT

## 2017-09-18 PROCEDURE — 85027 COMPLETE CBC AUTOMATED: CPT | Performed by: EMERGENCY MEDICINE

## 2017-09-18 PROCEDURE — 83605 ASSAY OF LACTIC ACID: CPT | Performed by: EMERGENCY MEDICINE

## 2017-09-18 PROCEDURE — 93005 ELECTROCARDIOGRAM TRACING: CPT | Performed by: EMERGENCY MEDICINE

## 2017-09-18 PROCEDURE — 99285 EMERGENCY DEPT VISIT HI MDM: CPT

## 2017-09-18 PROCEDURE — 87077 CULTURE AEROBIC IDENTIFY: CPT | Performed by: INTERNAL MEDICINE

## 2017-09-18 PROCEDURE — 85007 BL SMEAR W/DIFF WBC COUNT: CPT | Performed by: EMERGENCY MEDICINE

## 2017-09-18 PROCEDURE — 87186 SC STD MICRODIL/AGAR DIL: CPT | Performed by: EMERGENCY MEDICINE

## 2017-09-18 PROCEDURE — 87040 BLOOD CULTURE FOR BACTERIA: CPT | Performed by: EMERGENCY MEDICINE

## 2017-09-18 PROCEDURE — 81001 URINALYSIS AUTO W/SCOPE: CPT | Performed by: EMERGENCY MEDICINE

## 2017-09-18 PROCEDURE — 96375 TX/PRO/DX INJ NEW DRUG ADDON: CPT

## 2017-09-18 PROCEDURE — 87077 CULTURE AEROBIC IDENTIFY: CPT | Performed by: EMERGENCY MEDICINE

## 2017-09-18 PROCEDURE — 80053 COMPREHEN METABOLIC PANEL: CPT | Performed by: EMERGENCY MEDICINE

## 2017-09-18 RX ORDER — VANCOMYCIN HYDROCHLORIDE 1 G/200ML
15 INJECTION, SOLUTION INTRAVENOUS ONCE
Status: COMPLETED | OUTPATIENT
Start: 2017-09-18 | End: 2017-09-18

## 2017-09-18 RX ORDER — SODIUM CHLORIDE 9 MG/ML
125 INJECTION, SOLUTION INTRAVENOUS CONTINUOUS
Status: DISCONTINUED | OUTPATIENT
Start: 2017-09-18 | End: 2017-09-18

## 2017-09-18 RX ORDER — SODIUM HYPOCHLORITE 2.5 MG/ML
1 SOLUTION TOPICAL DAILY
Status: DISCONTINUED | OUTPATIENT
Start: 2017-09-19 | End: 2017-10-11 | Stop reason: HOSPADM

## 2017-09-18 RX ORDER — OXYBUTYNIN CHLORIDE 5 MG/1
15 TABLET, EXTENDED RELEASE ORAL DAILY
Status: DISCONTINUED | OUTPATIENT
Start: 2017-09-19 | End: 2017-09-26

## 2017-09-18 RX ORDER — HYDROMORPHONE HCL 110MG/55ML
1 PATIENT CONTROLLED ANALGESIA SYRINGE INTRAVENOUS ONCE AS NEEDED
Status: COMPLETED | OUTPATIENT
Start: 2017-09-18 | End: 2017-09-18

## 2017-09-18 RX ORDER — FERROUS SULFATE 325(65) MG
175 TABLET ORAL
Status: DISCONTINUED | OUTPATIENT
Start: 2017-09-19 | End: 2017-10-11 | Stop reason: HOSPADM

## 2017-09-18 RX ORDER — FAMOTIDINE 20 MG/1
20 TABLET, FILM COATED ORAL 2 TIMES DAILY
Status: DISCONTINUED | OUTPATIENT
Start: 2017-09-18 | End: 2017-10-11 | Stop reason: HOSPADM

## 2017-09-18 RX ORDER — BACLOFEN 10 MG/1
20 TABLET ORAL 2 TIMES DAILY
Status: DISCONTINUED | OUTPATIENT
Start: 2017-09-18 | End: 2017-10-11 | Stop reason: HOSPADM

## 2017-09-18 RX ORDER — ACETAMINOPHEN 325 MG/1
650 TABLET ORAL ONCE
Status: COMPLETED | OUTPATIENT
Start: 2017-09-18 | End: 2017-09-18

## 2017-09-18 RX ORDER — HYDROMORPHONE HYDROCHLORIDE 2 MG/1
4 TABLET ORAL EVERY 4 HOURS PRN
Status: DISCONTINUED | OUTPATIENT
Start: 2017-09-18 | End: 2017-10-06

## 2017-09-18 RX ORDER — ONDANSETRON 2 MG/ML
4 INJECTION INTRAMUSCULAR; INTRAVENOUS EVERY 6 HOURS PRN
Status: DISCONTINUED | OUTPATIENT
Start: 2017-09-18 | End: 2017-10-11 | Stop reason: HOSPADM

## 2017-09-18 RX ORDER — ASCORBIC ACID 500 MG
500 TABLET ORAL DAILY
Status: DISCONTINUED | OUTPATIENT
Start: 2017-09-19 | End: 2017-10-11 | Stop reason: HOSPADM

## 2017-09-18 RX ORDER — SODIUM CHLORIDE 9 MG/ML
75 INJECTION, SOLUTION INTRAVENOUS CONTINUOUS
Status: DISCONTINUED | OUTPATIENT
Start: 2017-09-18 | End: 2017-09-22

## 2017-09-18 RX ORDER — ACETAMINOPHEN 325 MG/1
650 TABLET ORAL EVERY 6 HOURS PRN
Status: DISCONTINUED | OUTPATIENT
Start: 2017-09-18 | End: 2017-10-10

## 2017-09-18 RX ADMIN — SODIUM CHLORIDE 75 ML/HR: 0.9 INJECTION, SOLUTION INTRAVENOUS at 21:29

## 2017-09-18 RX ADMIN — HYDROMORPHONE HYDROCHLORIDE 4 MG: 4 TABLET ORAL at 21:29

## 2017-09-18 RX ADMIN — SODIUM CHLORIDE 1000 ML: 0.9 INJECTION, SOLUTION INTRAVENOUS at 15:55

## 2017-09-18 RX ADMIN — CEFEPIME 2000 MG: 2 INJECTION, POWDER, FOR SOLUTION INTRAMUSCULAR; INTRAVENOUS at 16:22

## 2017-09-18 RX ADMIN — FAMOTIDINE 20 MG: 20 TABLET ORAL at 21:29

## 2017-09-18 RX ADMIN — BACLOFEN 20 MG: 20 TABLET ORAL at 21:28

## 2017-09-18 RX ADMIN — ACETAMINOPHEN 650 MG: 325 TABLET, FILM COATED ORAL at 16:05

## 2017-09-18 RX ADMIN — HYDROMORPHONE HYDROCHLORIDE 1 MG: 2 INJECTION, SOLUTION INTRAMUSCULAR; INTRAVENOUS; SUBCUTANEOUS at 17:40

## 2017-09-18 RX ADMIN — VANCOMYCIN HYDROCHLORIDE 1000 MG: 1 INJECTION, SOLUTION INTRAVENOUS at 16:59

## 2017-09-18 NOTE — ED PROVIDER NOTES
History  Chief Complaint   Patient presents with    Chills     pt arrives from wound clinic c/o fever and chill, pt states this has been going on 3-4 days, no other complaints at this time, no medications for fever    Wound Check     51-year-old male presents from the wound clinic for evaluation of fevers with shaking chills that have been going on for close to a week  The patient has a history of recurrent infections and sepsis due to some degree of noncompliance as well as multiple chronic wounds  The patient states that he has had no change in the loose output through his colostomy  He does noted new odor to the urine that is coming from his suprapubic catheter  He denies any cough or respiratory symptoms  History provided by:  Patient  Wound Check          Prior to Admission Medications   Prescriptions Last Dose Informant Patient Reported? Taking? Cholecalciferol (VITAMIN D-3) 1000 units CAPS   No Yes   Sig: Take 2 capsules by mouth daily   HYDROmorphone (DILAUDID) 4 mg tablet   Yes Yes   Sig: Take 4 mg by mouth every 4 (four) hours as needed for moderate pain   Rivaroxaban (XARELTO PO)   Yes Yes   Sig: Take 20 mg by mouth  acetaminophen (TYLENOL) 325 mg tablet   No Yes   Sig: Take 2 tablets by mouth every 6 (six) hours as needed for mild pain or fever   ascorbic acid (VITAMIN C) 250 mg tablet   Yes Yes   Sig: Take 500 mg by mouth daily     baclofen 20 mg tablet   Yes Yes   Sig: Take 20 mg by mouth 2 (two) times a day     famotidine (PEPCID) 20 mg tablet   No Yes   Sig: Take 1 tablet by mouth 2 (two) times a day   ferrous sulfate 325 (65 Fe) mg tablet   Yes Yes   Sig: Take 125 mg by mouth 3 (three) times a day with meals     insulin detemir (LEVEMIR) 100 units/mL subcutaneous injection   No Yes   Sig: Inject 26 Units under the skin daily at bedtime   insulin lispro (HumaLOG) 100 units/mL injection   No Yes   Sig: Inject 12 Units under the skin 3 (three) times a day with meals for 30 days oxybutynin (DITROPAN-XL) 5 mg 24 hr tablet   Yes Yes   Sig: Take 15 mg by mouth daily     sodium hypochlorite (DAKIN'S HALF-STRENGTH) external solution   No Yes   Sig: Apply 1 application topically daily      Facility-Administered Medications: None       Past Medical History:   Diagnosis Date    Ambulatory dysfunction     Atrial fibrillation     Chronic indwelling Ortega catheter     Clostridium difficile infection 08/11/2016    also positive 9/2016, 5/29/2017, 8/15/2017    Colostomy on examination     History of creation of ostomy     OAB (overactive bladder)     Paraplegia     S/P unilateral BKA (below knee amputation)     Right    Sebaceous cyst removed in 2017    Tobacco abuse     Type 1 diabetes mellitus        Past Surgical History:   Procedure Laterality Date    BELOW KNEE LEG AMPUTATION      COLONOSCOPY N/A 3/27/2017    Procedure: COLONOSCOPY;  Surgeon: Anais Hartman MD;  Location: [facility]; Service:     COLONOSCOPY N/A 3/29/2017    Procedure: COLONOSCOPY;  Surgeon: Anais Hartman MD;  Location: [facility]; Service:     COLONOSCOPY N/A 6/15/2017    Procedure: COLONOSCOPY with FMT;  Surgeon: Andreina Tran MD;  Location: [facility]; Service: Gastroenterology    ESOPHAGOGASTRODUODENOSCOPY N/A 3/22/2017    Procedure: ESOPHAGOGASTRODUODENOSCOPY (EGD); Surgeon: Matt Owusu DO;  Location: [facility]; Service:        Family History   Problem Relation Age of Onset    Hyperlipidemia Mother     Hypertension Mother     Leukemia Brother     Diabetes Paternal Grandfather      I have reviewed and agree with the history as documented      Social History   Substance Use Topics    Smoking status: Current Every Day Smoker     Types: Cigars    Smokeless tobacco: Current User      Comment: 4-5 cigars/day    Alcohol use No        Review of Systems    Physical Exam  ED Triage Vitals [09/18/17 1513]   Temperature Pulse Respirations Blood Pressure SpO2   100 3 °F (37 9 °C) (!) 125 20 128/81 100 % Temp Source Heart Rate Source Patient Position - Orthostatic VS BP Location FiO2 (%)   Oral Monitor -- Right arm --      Pain Score       6           Physical Exam   Constitutional: He is oriented to person, place, and time  He appears cachectic  He appears ill  HENT:   Head: Normocephalic and atraumatic  Right Ear: External ear normal    Left Ear: External ear normal    Eyes: EOM are normal  Pupils are equal, round, and reactive to light  Neck: Normal range of motion  No JVD present  Cardiovascular: Regular rhythm and normal heart sounds  Tachycardia present  No murmur heard  Pulmonary/Chest: Effort normal and breath sounds normal  No respiratory distress  Abdominal: Soft  There is no tenderness  Musculoskeletal: He exhibits no edema  Neurological: He is alert and oriented to person, place, and time  Skin:   Multiple decubitus ulcers noted  There are decubitus ulcers to the patient's right BKA, penis, sacrum, bilateral buttocks   Psychiatric: He has a normal mood and affect  Nursing note and vitals reviewed        ED Medications  Medications   sodium chloride 0 9 % bolus 1,000 mL (0 mL Intravenous Stopped 9/18/17 1738)   acetaminophen (TYLENOL) tablet 650 mg (650 mg Oral Given 9/18/17 1605)   vancomycin (VANCOCIN) IVPB (premix) 1,000 mg (1,000 mg Intravenous New Bag 9/18/17 1659)   cefepime (MAXIPIME) 2 g/50 mL dextrose IVPB (0 mg Intravenous Stopped 9/18/17 1657)   HYDROmorphone (DILAUDID) 2 mg/mL injection 1 mg (1 mg Intravenous Given 9/18/17 1740)       Diagnostic Studies  Labs Reviewed   PROTIME-INR - Abnormal        Result Value Ref Range Status    Protime 15 2 (*) 12 1 - 14 4 seconds Final    INR 1 19 (*) 0 86 - 1 16 Final   CBC AND DIFFERENTIAL - Abnormal     WBC 13 86 (*) 4 31 - 10 16 Thousand/uL Final    RBC 3 14 (*) 3 88 - 5 62 Million/uL Final    Hemoglobin 8 2 (*) 12 0 - 17 0 g/dL Final    Hematocrit 25 6 (*) 36 5 - 49 3 % Final    MCH 26 1 (*) 26 8 - 34 3 pg Final    RDW 21 6 (*) 11 6 - 15 1 % Final    Platelets 966 (*) 452 - 390 Thousands/uL Final    MCV 82  82 - 98 fL Final    MCHC 32 0  31 4 - 37 4 g/dL Final    MPV 9 6  8 9 - 12 7 fL Final    nRBC 0  /100 WBCs Final    Comment: This is an appended report  These results have been appended to a previously preliminary verified report  Narrative: This is an appended report  These results have been appended to a previously verified report  COMPREHENSIVE METABOLIC PANEL - Abnormal     Sodium 135 (*) 136 - 145 mmol/L Final    CO2 18 (*) 21 - 32 mmol/L Final    BUN 32 (*) 5 - 25 mg/dL Final    Creatinine 2 01 (*) 0 60 - 1 30 mg/dL Final    Comment: Standardized to IDMS reference method    Glucose 55 (*) 65 - 140 mg/dL Final    Comment:   If the patient is fasting, the ADA then defines impaired fasting glucose as > 100 mg/dL and diabetes as > or equal to 123 mg/dL  Specimen collection should occur prior to Sulfasalazine administration due to the potential for falsely depressed results  Specimen collection should occur prior to Sulfapyridine administration due to the potential for falsely elevated results  Calcium 8 0 (*) 8 3 - 10 1 mg/dL Final    Albumin 1 4 (*) 3 5 - 5 0 g/dL Final    Total Bilirubin 0 12 (*) 0 20 - 1 00 mg/dL Final    Potassium 4 3  3 5 - 5 3 mmol/L Final    Chloride 108  100 - 108 mmol/L Final    Anion Gap 9  4 - 13 mmol/L Final    AST 18  5 - 45 U/L Final    Comment:   Specimen collection should occur prior to Sulfasalazine administration due to the potential for falsely depressed results  ALT 13  12 - 78 U/L Final    Comment:   Specimen collection should occur prior to Sulfasalazine administration due to the potential for falsely depressed results       Alkaline Phosphatase 107  46 - 116 U/L Final    Total Protein 7 7  6 4 - 8 2 g/dL Final    eGFR 48  ml/min/1 73sq m Final    Narrative:     National Kidney Disease Education Program recommendations are as follows:  GFR calculation is accurate only with a steady state creatinine  Chronic Kidney disease less than 60 ml/min/1 73 sq  meters  Kidney failure less than 15 ml/min/1 73 sq  meters  UA W REFLEX TO MICROSCOPIC WITH REFLEX TO CULTURE - Abnormal     Leukocytes, UA Large (*) Negative Final    Nitrite, UA Positive (*) Negative Final    Protein,  (2+) (*) Negative mg/dl Final    Blood, UA Moderate (*) Negative, Trace-Intact Final    Color, UA Yellow   Final    Clarity, UA Slightly Cloudy   Final    Specific [place], UA 1 015  1 003 - 1 030 Final    pH, UA 5 5  4 5 - 8 0 Final    Glucose, UA Negative  Negative mg/dl Final    Ketones, UA Negative  Negative mg/dl Final    Urobilinogen, UA 0 2  0 2, 1 0 E U /dl E U /dl Final    Bilirubin, UA Negative  Negative Final   URINE MICROSCOPIC - Abnormal     RBC, UA 30-50 (*) None Seen, 0-5 /hpf Final    WBC, UA Innumerable (*) None Seen, 0-5, 5-55, 5-65 /hpf Final    Epithelial Cells Moderate (*) None Seen, Occasional /hpf Final    Bacteria, UA Moderate (*) None Seen, Occasional /hpf Final   MANUAL DIFFERENTIAL(PHLEBS DO NOT ORDER) - Abnormal     Segmented % 84 (*) 43 - 75 % Final    Lymphocytes % 8 (*) 14 - 44 % Final    Monocytes % 3 (*) 4 - 12 % Final    Absolute Neutrophils 12 20 (*) 1 85 - 7 62 Thousand/uL Final    Platelet Estimate Increased (*) Adequate Final    Bands % 4  0 - 8 % Final    Eosinophils % 1  0 - 6 % Final    Basophils % 0  0 - 1 % Final    Lymphocytes Absolute 1 11  0 60 - 4 47 Thousand/uL Final    Monocytes Absolute 0 42  0 00 - 1 22 Thousand/uL Final    Eosinophils Absolute 0 14  0 00 - 0 40 Thousand/uL Final    Basophils Absolute 0 00  0 00 - 0 10 Thousand/uL Final    Total Counted 100   Final    Anisocytosis Present   Final    Ovalocytes Present   Final    Large Platelet Present   Final   APTT - Normal    PTT 33  23 - 35 seconds Final    Narrative:      Therapeutic Heparin Range = 60-90 seconds   LACTIC ACID, PLASMA - Normal    LACTIC ACID 1 1  0 5 - 2 0 mmol/L Final    Narrative: Result may be elevated if tourniquet was used during collection     POCT GLUCOSE - Normal    POC Glucose 85  65 - 140 mg/dl Final   BLOOD CULTURE   BLOOD CULTURE   URINE CULTURE       XR chest pa & lateral    (Results Pending)       Procedures  ECG 12 Lead Documentation  Date/Time: 9/18/2017 3:35 PM  Performed by: Herve Whitaker  Authorized by: Santi JAY     Indications / Diagnosis:  Fever  ECG reviewed by me, the ED Provider: yes    Patient location:  ED  Previous ECG:     Previous ECG:  Unavailable  Interpretation:     Interpretation: normal    Rate:     ECG rate:  127    ECG rate assessment: tachycardic    Rhythm:     Rhythm: sinus tachycardia    Ectopy:     Ectopy: none    QRS:     QRS axis:  Normal    QRS intervals:  Normal  Conduction:     Conduction: normal    ST segments:     ST segments:  Normal  T waves:     T waves: normal    CriticalCare Time  Performed by: Herve Whitaker  Authorized by: Santi JAY     Critical care provider statement:     Critical care time (minutes):  35    Critical care time was exclusive of:  Separately billable procedures and treating other patients and teaching time    Critical care was necessary to treat or prevent imminent or life-threatening deterioration of the following conditions:  Sepsis    Critical care was time spent personally by me on the following activities:  Blood draw for specimens, obtaining history from patient or surrogate, development of treatment plan with patient or surrogate, discussions with consultants, evaluation of patient's response to treatment, examination of patient, ordering and performing treatments and interventions, ordering and review of laboratory studies, ordering and review of radiographic studies, re-evaluation of patient's condition, review of old charts and interpretation of cardiac output measurements    I assumed direction of critical care for this patient from another provider in my specialty: no            Phone Contacts  ED Phone Contact    ED Course  ED Course as of Sep 18 1848   Mon Sep 18, 2017   1702 I discussed the case with the hospitalist  We reviewed the HPI, pertinent PMH, ED course and workup  Hospitalist agreed with plan and will admit the patient to the hospital                           Initial Sepsis Screening     [address] Name 09/18/17 1604                Is the patient's history suggestive of a new or worsening infection? (!)  Yes (Proceed)  -ML        Suspected source of infection urinary tract infection;soft tissue  -ML        Are two or more of the following signs & symptoms of infection both present and new to the patient? (!)  Yes (Proceed)  -ML        Indicate SIRS criteria Tachycardia > 90 bpm;Leukocytosis (WBC > 62434 IJL)  -ML        If the answer is yes to both questions, suspicion of sepsis is present          If severe sepsis is present AND tissue hypoperfusion perists in the hour after fluid resuscitation or lactate > 4, the patient meets criteria for SEPTIC SHOCK          Are any of the following organ dysfunction criteria present within 6 hours of suspected infection and SIRS criteria that are NOT considered to be chronic conditions? (!)  Yes  -ML        Organ dysfunction          Date of presentation of severe sepsis          Time of presentation of severe sepsis          Tissue hypoperfusion persists in the hour after crystalloid fluid administration, evidenced, by either:          Was hypotension present within one hour of the conclusion of crystalloid fluid administration?           Date of presentation of septic shock          Time of presentation of septic shock            User Key  (r) = Recorded By, (t) = Taken By, (c) = Cosigned By    Initials Name Provider Type    REMBERTO Yoder DO Physician                  MDM  Number of Diagnoses or Management Options  Anemia: minor  Chronic renal insufficiency: minor  Decubitus skin ulcer: new and requires workup  Sepsis: new and requires workup  UTI (urinary tract infection): new and requires workup  Diagnosis management comments: Differential diagnosis:  Sepsis, pneumonia, urinary tract infection, decubitus ulcer infection, other    The plan is for a complete sepsis workup  The case was discussed with wound care who states that the patient likely does not have a primary source of infection from his chronic wounds  Amount and/or Complexity of Data Reviewed  Clinical lab tests: ordered and reviewed  Tests in the radiology section of CPT®: ordered and reviewed  Tests in the medicine section of CPT®: ordered and reviewed  Review and summarize past medical records: yes  Discuss the patient with other providers: yes  Independent visualization of images, tracings, or specimens: yes      CritCare Time    Disposition  Final diagnoses:   Sepsis   UTI (urinary tract infection)   Decubitus skin ulcer - multiple   Chronic renal insufficiency   Anemia     ED Disposition     ED Disposition Condition Comment    Admit  Case was discussed with Kim and the patient's admission status was agreed to be Admission Status: inpatient status to the service of Dr Bryan Moreira   Follow-up Information    None       Patient's Medications   Discharge Prescriptions    No medications on file     No discharge procedures on file      ED Provider  Electronically Signed by       Linda Hernandez DO  09/18/17 8310

## 2017-09-18 NOTE — ED NOTES
Multiple sacral wounds noted with dressing intact from wound care, do not remove dressings per Dr Cyndy Pierce as Dr Ryan Quezada is aware of wounds  Wound also noted to penis, right stump, and left lower leg        Emely Linton RN  09/18/17 0043

## 2017-09-18 NOTE — H&P
History and Physical - [facility] Internal Medicine    Patient Information: Jennie Borrego 40 y o  male MRN: [id number]  Unit/Bed#: ED 18 Encounter: [id number]  Admitting Physician: Shanna Antunez PA-C  PCP: Cornel Gu MD  Date of Admission:  09/18/17    Assessment/Plan:    Hospital Problem List:     Active Problems:    Paraplegia    Atrial fibrillation    Chronic suprapubic catheter    Colostomy care    S/P unilateral BKA (below knee amputation)    Tobacco abuse    Type 1 diabetes mellitus    Anemia    Sepsis    Wound healing, delayed    Decubitus ulcers    Chronic pain    Stage 3 chronic kidney disease    SOB (shortness of breath)    Thrombocytosis      Plan for the Primary Problem(s):  · Sepsis  · Suspect secondary to urinary tract infection less likely pneumonia  · By sinus tachycardia in the 121st, fever 101 3, and leukocytosis of 13 86  · Lactic acid 1 1, patient appears ill but nontoxic  · Urine culture is marked for moderate blood, positive nitrites and large leuks with innumerable WBCs and moderate bacteria, however this is not significantly changed from previous urinalyses, inpatient known for [facility] Klebsiella (CRE)  · D/w ID at length, given pt's primary s/sx are urine based will treat w/Cefepime and expand abx as needed given no evidence of severe sepsis or shock  · F/u blood cx, f/u urine cx  · D/w Dr Felipe Dunne who is an agreement    Plan for Additional Problems:   · SOB  · Sinus tachycardia on EKG w/o ST changes or T wave inversions in 2+ leads  · Pt on xarelto from previous arterial clots and A fib  · Will check CXR to r/o PNA at this time  · Will continue on telemetry for sepsis  · No further cardiac work up at this time    Pressure ulcers   Multiple sacral wounds, wound on penis/hallux of left foot 2* paraplegia   Per ED provider, discussion w/Dr Gael Kahnna wounds are doing well w/o obvious s/sx of infection   Wet to dry dressings   Consult [facility] for lwc mgmt   Turn pt q 2 h    Chronic pain   Pt on dilaudid PO for chronic sacral ulcerative pain   Will continue for now on current regimen    Paraplegia, T6   Consult PT/OT   Continue baclofen    A fib   Rate controlled previously off chronoctropic agents   Continue xarelto    CKD Stage III   BL appears to be 1 8 to 2 1 over last 3 mo   Bicarb of 18 concerning for non AG acidosis, lactic acid 1 1   Given mild hyponatremia will treat w/ NS 75cc/hr IVF   Repeat BMP in AM     Anemia   Normocytic   Pt w/hx of profound anemia concerning for iron deficiency on chronic kidney disease, now improved after IV venofer   Previously refused transfusion, significant improvement w/OP Venofer   No overt s/sx of GIB   Continue to monitor, continue FeSO4 from OP regimen     Thrombocytosis   Acute phase reactant   Continue to monitor    Chronic suprapubic cath   2* neurogenic bladder   Continue oxybutynin     DM I   Continue OP regimen    Tobacco abuse   Nicotine patch provided            VTE Prophylaxis: Heparin  / sequential compression device   Code Status: Full Code  POLST: There is no POLST form on file for this patient (pre-hospital)    Anticipated Length of Stay:  Patient will be admitted on an Inpatient basis with an anticipated length of stay of  Greater than 2 midnights  Justification for Hospital Stay: sepsis    Total Time for Visit, including Counseling / Coordination of Care: 45 minutes  Greater than 50% of this total time spent on direct patient counseling and coordination of care  Chief Complaint:   chills    History of Present Illness:    Kati Villagran  is a 40 y o  male who presents with PMH of paraplegia, suprapubic catheter secondary to neurogenic bladder, colostomy, multiple sacral wounds coming in the ED for chills sent in from the wound care clinic today  Patient is a patient of Dr Denys Salas is  He has been followed due to sacral wound as well as a wound on his penis in left lower extremity    He was seen by Dr Denys Salas who believed his wounds were improving and without infection per ED provider, however because the patient looked ill and was with rigors was sent to the ED  The patient he was found to be septic by tachycardia and fever and leukocytosis  On interview with the patient, he can't pinpoint any other symptom in a Mary however he does report that he had had a little bit more shortness of breath last night although he denies cough or sore throat or sinus congestion or sick contacts  He also noted that his suprapubic catheter we in bag was with more sediment and more malodorous the normal   He reports he has a history of thrush but was doing measures medical mild wash with improvement  He was last here for anemia which was suspected to be iron deficiency superimposed on chronic kidney disease  He was evaluated by EGD previously which was unremarkable but refused blood transfusions multiple times despite the critical low values  He was amenable to IV I benefit her that time and appears to have had improvement in his hemoglobin since  Review of Systems:    Review of Systems   Constitutional: Positive for chills  Negative for fever  HENT: Negative for congestion, rhinorrhea and sore throat  Respiratory: Positive for shortness of breath  Negative for cough and chest tightness  Cardiovascular: Negative for chest pain, palpitations and leg swelling  Gastrointestinal: Negative for abdominal pain, diarrhea, nausea and vomiting  Genitourinary: Negative for discharge, dysuria, frequency, hematuria and penile pain  Musculoskeletal: Positive for back pain  Skin: Positive for wound  Neurological: Positive for weakness  Negative for light-headedness and headaches  All other systems reviewed and are negative        Past Medical and Surgical History:     Past Medical History:   Diagnosis Date    Ambulatory dysfunction     Atrial fibrillation     Chronic indwelling Ortega catheter     Clostridium difficile infection 08/11/2016    also positive 9/2016, 5/29/2017, 8/15/2017    Colostomy on examination     History of creation of ostomy     OAB (overactive bladder)     Paraplegia     S/P unilateral BKA (below knee amputation)     Right    Sebaceous cyst removed in 2017    Tobacco abuse     Type 1 diabetes mellitus        Past Surgical History:   Procedure Laterality Date    BELOW KNEE LEG AMPUTATION      COLONOSCOPY N/A 3/27/2017    Procedure: COLONOSCOPY;  Surgeon: Alin Littlejohn MD;  [facility]; Service:     COLONOSCOPY N/A 3/29/2017    Procedure: COLONOSCOPY;  Surgeon: Alin Littlejohn MD;  [facility]; Service:     COLONOSCOPY N/A 6/15/2017    Procedure: COLONOSCOPY with FMT;  Surgeon: Ignacio Robbins MD;  [facility]: [facility]; Service: Gastroenterology    ESOPHAGOGASTRODUODENOSCOPY N/A 3/22/2017    Procedure: ESOPHAGOGASTRODUODENOSCOPY (EGD); Surgeon: Rachael Elizabeth DO;  [facility]: [facility]; Service:        Meds/Allergies:    Prior to Admission medications    Medication Sig Start Date End Date Taking? Authorizing Provider   acetaminophen (TYLENOL) 325 mg tablet Take 2 tablets by mouth every 6 (six) hours as needed for mild pain or fever 7/3/17  Yes Edi Banks MD   ascorbic acid (VITAMIN C) 250 mg tablet Take 500 mg by mouth daily     Yes Historical Provider, MD   baclofen 20 mg tablet Take 20 mg by mouth 2 (two) times a day     Yes Historical Provider, MD   Cholecalciferol (VITAMIN D-3) 1000 units CAPS Take 2 capsules by mouth daily 7/3/17  Yes Edi Banks MD   famotidine (PEPCID) 20 mg tablet Take 1 tablet by mouth 2 (two) times a day 7/3/17  Yes Edi Banks MD   ferrous sulfate 325 (65 Fe) mg tablet Take 125 mg by mouth 3 (three) times a day with meals     Yes Historical Provider, MD   HYDROmorphone (DILAUDID) 4 mg tablet Take 4 mg by mouth every 4 (four) hours as needed for moderate pain   Yes Historical Provider, MD   insulin detemir (LEVEMIR) 100 units/mL subcutaneous injection Inject 26 Units under the skin daily at bedtime 7/3/17  Yes Sharon Mclean MD   insulin lispro (HumaLOG) 100 units/mL injection Inject 12 Units under the skin 3 (three) times a day with meals for 30 days 7/3/17 9/18/17 Yes Sharon Mclean MD   oxybutynin (DITROPAN-XL) 5 mg 24 hr tablet Take 15 mg by mouth daily     Yes Historical Provider, MD   Rivaroxaban (XARELTO PO) Take 20 mg by mouth  Yes Historical Provider, MD   sodium hypochlorite (DAKIN'S HALF-STRENGTH) external solution Apply 1 application topically daily 7/3/17  Yes Sharon Mcelan MD     I have reviewed home medications with patient personally  Allergies: Allergies   Allergen Reactions    Ciprofloxacin Hcl     Cymbalta [Duloxetine Hcl]     Lyrica [Pregabalin]     Polymyxin B        Social History:     Marital Status: Single   Occupation:   Patient Pre-hospital Living Situation:   Patient Pre-hospital Level of Mobility:   Patient Pre-hospital Diet Restrictions:   Substance Use History:   History   Alcohol Use No     History   Smoking Status    Current Every Day Smoker    Types: Cigars   Smokeless Tobacco    Current User     Comment: 4-5 cigars/day     History   Drug Use    Types: Marijuana     Comment: every 3 months       Family History:    Family History   Problem Relation Age of Onset    Hyperlipidemia Mother     Hypertension Mother     Leukemia Brother     Diabetes Paternal Grandfather        Physical Exam:     Vitals:   Blood Pressure: 110/60 (09/18/17 1739)  Pulse: (!) 110 (09/18/17 1739)  Temperature: (!) 100 9 °F (38 3 °C) (09/18/17 1702)  Temp Source: Oral (09/18/17 1702)  Respirations: 20 (09/18/17 1739)  Weight - Scale: 68 4 kg (150 lb 12 7 oz) (09/18/17 1513)  SpO2: 100 % (09/18/17 1739)    Physical Exam   Constitutional: He appears well-developed  No distress  Appears ill, nontoxic, room is odorous   HENT:   Head: Normocephalic and atraumatic     Right Ear: External ear normal    Left Ear: External ear normal  Mouth/Throat: Oropharynx is clear and moist    Poor dentition noted   Eyes: Conjunctivae are normal  Pupils are equal, round, and reactive to light  Right eye exhibits no discharge  Left eye exhibits no discharge  Cardiovascular: Normal rate, normal heart sounds and intact distal pulses  Exam reveals no gallop and no friction rub  No murmur heard  Pulmonary/Chest: Effort normal  No respiratory distress  He has no wheezes  He has no rales  He exhibits no tenderness  Abdominal: Soft  He exhibits no distension  There is no tenderness  There is no rebound and no guarding  Colostomy noted, no surrounding erythema  Suprapubic catheter noted, no surrounding erythema   Musculoskeletal: He exhibits no edema  Right BKA noted   Lymphadenopathy:     He has no cervical adenopathy  Neurological: He is alert  Skin: Skin is warm and dry  No erythema  Sacral wounds dressed, open no surrounding erythema on sacral wound   Psychiatric: He has a normal mood and affect  Vitals reviewed  (  Be Sure to Include Physical Exam: Delete this entire line when you have entered your exam)    Additional Data:     Lab Results: I have personally reviewed pertinent reports  Results from last 7 days  Lab Units 09/18/17  1533   WBC Thousand/uL 13 86*   HEMOGLOBIN g/dL 8 2*   HEMATOCRIT % 25 6*   PLATELETS Thousands/uL 432*   LYMPHO PCT % 8*   MONO PCT MAN % 3*   EOSINO PCT MANUAL % 1       Results from last 7 days  Lab Units 09/18/17  1534   SODIUM mmol/L 135*   POTASSIUM mmol/L 4 3   CHLORIDE mmol/L 108   CO2 mmol/L 18*   BUN mg/dL 32*   CREATININE mg/dL 2 01*   CALCIUM mg/dL 8 0*   TOTAL PROTEIN g/dL 7 7   BILIRUBIN TOTAL mg/dL 0 12*   ALK PHOS U/L 107   ALT U/L 13   AST U/L 18   GLUCOSE RANDOM mg/dL 55*       Results from last 7 days  Lab Units 09/18/17  1534   INR  1 19*       Imaging: will follow up CXR  No results found      EKG, Pathology, and Other Studies Reviewed on Admission:   · EKG: Sinus Tachycardia  · No axis deviation, no ST to T-wave inversions in 2+ leads    Allscripts / Epic Records Reviewed: Yes     ** Please Note: This note has been constructed using a voice recognition system   **

## 2017-09-18 NOTE — ED NOTES
Lactic acid x 2 not needed as first lactic acid <2, ok to cancel per Dr Wagner Salinas, RN  09/18/17 0760

## 2017-09-19 ENCOUNTER — APPOINTMENT (INPATIENT)
Dept: CT IMAGING | Facility: HOSPITAL | Age: 37
DRG: 871 | End: 2017-09-19
Payer: MEDICARE

## 2017-09-19 PROBLEM — T31.0 BURN ANY DEGREE INVOLVING LESS THAN 10 PERCENT OF BODY SURFACE: Status: ACTIVE | Noted: 2017-09-18

## 2017-09-19 LAB
ANION GAP SERPL CALCULATED.3IONS-SCNC: 11 MMOL/L (ref 4–13)
BUN SERPL-MCNC: 29 MG/DL (ref 5–25)
CALCIUM SERPL-MCNC: 7.9 MG/DL (ref 8.3–10.1)
CHLORIDE SERPL-SCNC: 110 MMOL/L (ref 100–108)
CO2 SERPL-SCNC: 17 MMOL/L (ref 21–32)
CREAT SERPL-MCNC: 1.68 MG/DL (ref 0.6–1.3)
ERYTHROCYTE [DISTWIDTH] IN BLOOD BY AUTOMATED COUNT: 21.7 % (ref 11.6–15.1)
GFR SERPL CREATININE-BSD FRML MDRD: 59 ML/MIN/1.73SQ M
GLUCOSE SERPL-MCNC: 118 MG/DL (ref 65–140)
GLUCOSE SERPL-MCNC: 132 MG/DL (ref 65–140)
GLUCOSE SERPL-MCNC: 141 MG/DL (ref 65–140)
GLUCOSE SERPL-MCNC: 159 MG/DL (ref 65–140)
GLUCOSE SERPL-MCNC: 233 MG/DL (ref 65–140)
GLUCOSE SERPL-MCNC: 289 MG/DL (ref 65–140)
HCT VFR BLD AUTO: 24.3 % (ref 36.5–49.3)
HGB BLD-MCNC: 7.4 G/DL (ref 12–17)
MAGNESIUM SERPL-MCNC: 1.8 MG/DL (ref 1.6–2.6)
MCH RBC QN AUTO: 25.2 PG (ref 26.8–34.3)
MCHC RBC AUTO-ENTMCNC: 30.5 G/DL (ref 31.4–37.4)
MCV RBC AUTO: 83 FL (ref 82–98)
PHOSPHATE SERPL-MCNC: 3.7 MG/DL (ref 2.7–4.5)
PLATELET # BLD AUTO: 486 THOUSANDS/UL (ref 149–390)
PMV BLD AUTO: 10.6 FL (ref 8.9–12.7)
POTASSIUM SERPL-SCNC: 4.2 MMOL/L (ref 3.5–5.3)
RBC # BLD AUTO: 2.94 MILLION/UL (ref 3.88–5.62)
SODIUM SERPL-SCNC: 138 MMOL/L (ref 136–145)
WBC # BLD AUTO: 15.72 THOUSAND/UL (ref 4.31–10.16)

## 2017-09-19 PROCEDURE — 85027 COMPLETE CBC AUTOMATED: CPT | Performed by: PHYSICIAN ASSISTANT

## 2017-09-19 PROCEDURE — 72192 CT PELVIS W/O DYE: CPT

## 2017-09-19 PROCEDURE — 84100 ASSAY OF PHOSPHORUS: CPT | Performed by: PHYSICIAN ASSISTANT

## 2017-09-19 PROCEDURE — 83735 ASSAY OF MAGNESIUM: CPT | Performed by: PHYSICIAN ASSISTANT

## 2017-09-19 PROCEDURE — 80048 BASIC METABOLIC PNL TOTAL CA: CPT | Performed by: PHYSICIAN ASSISTANT

## 2017-09-19 PROCEDURE — 82948 REAGENT STRIP/BLOOD GLUCOSE: CPT

## 2017-09-19 RX ADMIN — INSULIN LISPRO 12 UNITS: 100 INJECTION, SOLUTION INTRAVENOUS; SUBCUTANEOUS at 09:17

## 2017-09-19 RX ADMIN — RIVAROXABAN 20 MG: 20 TABLET, FILM COATED ORAL at 09:18

## 2017-09-19 RX ADMIN — SODIUM CHLORIDE 75 ML/HR: 0.9 INJECTION, SOLUTION INTRAVENOUS at 11:37

## 2017-09-19 RX ADMIN — OXYCODONE HYDROCHLORIDE AND ACETAMINOPHEN 500 MG: 500 TABLET ORAL at 09:18

## 2017-09-19 RX ADMIN — CEFAZOLIN SODIUM 1000 MG: 1 SOLUTION INTRAVENOUS at 16:24

## 2017-09-19 RX ADMIN — INSULIN LISPRO 4 UNITS: 100 INJECTION, SOLUTION INTRAVENOUS; SUBCUTANEOUS at 22:08

## 2017-09-19 RX ADMIN — HYDROMORPHONE HYDROCHLORIDE 4 MG: 4 TABLET ORAL at 09:19

## 2017-09-19 RX ADMIN — HYDROMORPHONE HYDROCHLORIDE 4 MG: 4 TABLET ORAL at 01:45

## 2017-09-19 RX ADMIN — INSULIN LISPRO 2 UNITS: 100 INJECTION, SOLUTION INTRAVENOUS; SUBCUTANEOUS at 16:24

## 2017-09-19 RX ADMIN — CEFEPIME HYDROCHLORIDE 2000 MG: 2 INJECTION, POWDER, FOR SOLUTION INTRAVENOUS at 04:43

## 2017-09-19 RX ADMIN — VANCOMYCIN 125 MG: KIT at 16:24

## 2017-09-19 RX ADMIN — FERROUS SULFATE TAB 325 MG (65 MG ELEMENTAL FE) 162.5 MG: 325 (65 FE) TAB at 16:24

## 2017-09-19 RX ADMIN — OXYBUTYNIN CHLORIDE 15 MG: 5 TABLET, EXTENDED RELEASE ORAL at 09:18

## 2017-09-19 RX ADMIN — BACLOFEN 20 MG: 20 TABLET ORAL at 09:18

## 2017-09-19 RX ADMIN — HYDROMORPHONE HYDROCHLORIDE 4 MG: 4 TABLET ORAL at 17:37

## 2017-09-19 RX ADMIN — INSULIN DETEMIR 13 UNITS: 100 INJECTION, SOLUTION SUBCUTANEOUS at 22:03

## 2017-09-19 RX ADMIN — BACLOFEN 20 MG: 20 TABLET ORAL at 17:31

## 2017-09-19 RX ADMIN — FAMOTIDINE 20 MG: 20 TABLET ORAL at 17:31

## 2017-09-19 RX ADMIN — INSULIN LISPRO 12 UNITS: 100 INJECTION, SOLUTION INTRAVENOUS; SUBCUTANEOUS at 16:23

## 2017-09-19 RX ADMIN — CEFAZOLIN SODIUM 1000 MG: 1 SOLUTION INTRAVENOUS at 22:03

## 2017-09-19 RX ADMIN — HYDROMORPHONE HYDROCHLORIDE 4 MG: 4 TABLET ORAL at 22:02

## 2017-09-19 RX ADMIN — VANCOMYCIN 125 MG: KIT at 22:03

## 2017-09-19 RX ADMIN — FAMOTIDINE 20 MG: 20 TABLET ORAL at 09:19

## 2017-09-19 RX ADMIN — FERROUS SULFATE TAB 325 MG (65 MG ELEMENTAL FE) 162.5 MG: 325 (65 FE) TAB at 09:18

## 2017-09-19 RX ADMIN — HYOSCYAMINE SULFATE 1 APPLICATION: 16 SOLUTION at 11:42
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
## 2017-09-19 NOTE — CASE MANAGEMENT
Initial Clinical Review    Admission: Date/Time/Statement: 9/18/17 @ 1709     Orders Placed This Encounter   Procedures    Inpatient Admission (expected length of stay for this patient is greater than two midnights)     Standing Status:   Standing     Number of Occurrences:   1     Order Specific Question:   Admitting Physician     Answer:   Shelly Mota [[id number]]     Order Specific Question:   Level of Care     Answer:   Med Surg [[id number]]     Order Specific Question:   Estimated length of stay     Answer:   More than 2 Midnights     Order Specific Question:   Certification     Answer:   I certify that inpatient services are medically necessary for this patient for a duration of greater than two midnights  See H&P and MD Progress Notes for additional information about the patient's course of treatment  ED: Date/Time/Mode of Arrival:   ED Arrival Information     Expected Arrival Acuity Means of Arrival Escorted By Service Admission Type    - 9/18/2017 14:57 Emergent Wheelchair Self General Medicine Emergency    Arrival Complaint    Wound Check; Chills        Chief Complaint:   Chief Complaint   Patient presents with    Chills     pt arrives from wound clinic c/o fever and chill, pt states this has been going on 3-4 days, no other complaints at this time, no medications for fever    Wound Check       History of Illness: 42-year-old male presents from the wound clinic for evaluation of fevers with shaking chills that have been going on for close to a week  The patient has a history of recurrent infections and sepsis due to some degree of noncompliance as well as multiple chronic wounds  The patient states that he has had no change in the loose output through his colostomy  He does noted new odor to the urine that is coming from his suprapubic catheter  He denies any cough or respiratory symptoms  Multiple decubitus ulcers noted    There are decubitus ulcers to the patient's right BKA, penis, sacrum, bilateral buttocks     ED Vital Signs:   ED Triage Vitals   Temperature Pulse Respirations Blood Pressure SpO2   09/18/17 1513 09/18/17 1513 09/18/17 1513 09/18/17 1513 09/18/17 1513   100 3 °F (37 9 °C) (!) 125 20 128/81 100 %      Temp Source Heart Rate Source Patient Position - Orthostatic VS BP Location FiO2 (%)   09/18/17 1513 09/18/17 1513 09/18/17 1900 09/18/17 1513 --   Oral Monitor Lying Right arm       Pain Score       09/18/17 1513       6        Wt Readings from Last 1 Encounters:   09/18/17 68 4 kg (150 lb 12 7 oz)       Vital Signs (abnormal):   09/18/17 1900 97 8 °F (36 6 °C) 100 21 95/69 100 % -- CP   09/18/17 1830 98 8 °F (37 1 °C) 101 18 110/62 100 % -- SUZI   09/18/17 1739 --  110 20 110/60 100 % -- SUZI   09/18/17 1702  100 9 °F (38 3 °C) -- -- -- -- -- KMS   09/18/17 1626  101 3 °F (38 5 °C)  125 20 133/67 100 %         Abnormal Labs/Diagnostic Test Results:  Wbc's 13 86,   H/h 8 2 / 25 6,   Plats 432,  Na 135,  co2  18,  Bun 32,  Cr 2 01,  Glu 55,  Ca 8 0,  Alb 1 4,  t bili 0 12,  PT 15 2,  INR 1 19,   segs 84%,  Abs nuet 12 20  Urine: large leukocytes,  + nitrite,  2+ protein,  Mod blood; Mod bacteria,  innum wbc's  Urine and BC's  pending  CXR: No active pulmonary disease    EKG: Sinus tachycardia    ED Treatment:   Medication Administration from 09/18/2017 1457 to 09/18/2017 1924       Date/Time Order Dose Route Action Action by Comments     09/18/2017 1738 sodium chloride 0 9 % bolus 1,000 mL 0 mL Intravenous Stopped Jos Meyer RN      09/18/2017 1555 sodium chloride 0 9 % bolus 1,000 mL 1,000 mL Intravenous [place]      09/18/2017 1605 acetaminophen (TYLENOL) tablet 650 mg 650 mg Oral Given Jos Meyer RN      09/18/2017 2735 vancomycin (VANCOCIN) IVPB (premix) 1,000 mg 0 mg/kg Intravenous Stopped Jos Meyer RN      09/18/2017 9096 vancomycin (VANCOCIN) IVPB (premix) 1,000 mg 1,000 mg Intravenous Brittany [address] Shawna Ogren, [place]      09/18/2017 8837 cefepime (MAXIPIME) 2 g/50 mL dextrose IVPB 0 mg Intravenous Stopped Jayden Koo RN      09/18/2017 1622 cefepime (MAXIPIME) 2 g/50 mL dextrose IVPB 2,000 mg Intravenous New Bag Alan Figueroa, [facility]      09/18/2017 1588 HYDROmorphone (DILAUDID) 2 mg/mL injection 1 mg 1 mg Intravenous Given Alan Figueroa RN           Past Medical/Surgical History:    Active Ambulatory Problems     Diagnosis Date Noted    Diabetes mellitus     Paraplegia     Paraplegia     Atrial fibrillation     Chronic suprapubic catheter     Colostomy care     OAB (overactive bladder)     S/P unilateral BKA (below knee amputation)     Sebaceous cyst     Tobacco abuse     Type 1 diabetes mellitus     Ulcer of sacral region, stage 4 09/01/2016    Anemia 09/03/2016    Chronic indwelling Ortega catheter     Sepsis 03/17/2017    Osteomyelitis of right femur 03/20/2017    Wound healing, delayed 06/29/2017    Iron deficiency anemia 07/03/2017    Decubitus ulcers 07/03/2017    HTN (hypertension), benign 07/03/2017    LEONILA (acute kidney injury) 07/03/2017    Neurogenic bladder 07/03/2017    GERD (gastroesophageal reflux disease) 07/03/2017    Thrush 07/03/2017    Chronic pain 07/03/2017     Resolved Ambulatory Problems     Diagnosis Date Noted    Clostridium difficile infection 08/11/2016    Hyperkalemia 03/21/2017     Past Medical History:   Diagnosis Date    Ambulatory dysfunction     Atrial fibrillation     Chronic indwelling Ortega catheter     Clostridium difficile infection 08/11/2016    Colostomy on examination     History of creation of ostomy     OAB (overactive bladder)     Paraplegia     S/P unilateral BKA (below knee amputation)     Sebaceous cyst removed in 2017    Tobacco abuse     Type 1 diabetes mellitus        Admitting Diagnosis: Chills [R68 83]  UTI (urinary tract infection) [N39 0]  Anemia [D64 9]  Decubitus skin ulcer [L89 90]  Decubitus ulcers [L89 90]  Chronic renal insufficiency [N18 9]  Sepsis [A41 9]    Age/Sex: 40 y o  male    Assessment/Plan:   Plan for the Primary Problem(s):  · Sepsis  ? Suspect secondary to urinary tract infection less likely pneumonia  ? By sinus tachycardia in the 121st, fever 101 3, and leukocytosis of 13 86  ? Lactic acid 1 1, patient appears ill but nontoxic  ? Urine culture is marked for moderate blood, positive nitrites and large leuks with innumerable WBCs and moderate bacteria, however this is not significantly changed from previous urinalyses, inpatient known for [facility] Klebsiella (CRE)  ? D/w ID at length, given pt's primary s/sx are urine based will treat w/Cefepime and expand abx as needed given no evidence of severe sepsis or shock  ? F/u blood cx, f/u urine cx  ? D/w Dr Ya Reis who is an agreement     Plan for Additional Problems:   · SOB  ? Sinus tachycardia on EKG w/o ST changes or T wave inversions in 2+ leads  ? Pt on xarelto from previous arterial clots and A fib  ? Will check CXR to r/o PNA at this time  ? Will continue on telemetry for sepsis  ?  No further cardiac work up at this time     Pressure ulcers                  Multiple sacral wounds, wound on penis/hallux of left foot 2* paraplegia                  Per ED provider, discussion w/Dr Abram Evans wounds are doing well w/o obvious s/sx of infection                  Wet to dry dressings                  Consult john kiran lw mgmt                  Turn pt q 2 h     Chronic pain                  Pt on dilaudid PO for chronic sacral ulcerative pain                  Will continue for now on current regimen     Paraplegia, T6                  Consult PT/OT                  Continue baclofen     A fib                  Rate controlled previously off chronoctropic agents                  Continue xarelto     CKD Stage III                  BL appears to be 1 8 to 2 1 over last 3 mo                  Bicarb of 18 concerning for non AG acidosis, lactic acid 1 1                  Given mild hyponatremia will treat w/ NS 75cc/hr IVF                  Repeat BMP in AM                    Anemia                  Normocytic                  Pt w/hx of profound anemia concerning for iron deficiency on chronic kidney disease, now improved after IV venofer                  Previously refused transfusion, significant improvement w/OP Venofer                  No overt s/sx of GIB                  Continue to monitor, continue FeSO4 from OP regimen     Thrombocytosis                  Acute phase reactant                  Continue to monitor     Chronic suprapubic cath                  2* neurogenic bladder                  Continue oxybutynin                    DM I                  Continue OP regimen     Tobacco abuse                  Nicotine patch provided     VTE Prophylaxis: Heparin  / sequential compression device   Code Status: Full Code  POLST: There is no POLST form on file for this patient (pre-hospital)  Anticipated Length of Stay:  Patient will be admitted on an Inpatient basis with an anticipated length of stay of  Greater than 2 midnights  Justification for Hospital Stay: sepsis    Admission Orders:  Contact Isolation  TELE  Consult ID  Consult Surgery  SCD's    Scheduled Meds:   ascorbic acid 500 mg Oral Daily   baclofen 20 mg Oral BID   cefepime 2,000 mg Intravenous Q12H   famotidine 20 mg Oral BID   ferrous sulfate 162 5 mg Oral TID With Meals   insulin detemir 26 Units Subcutaneous HS   insulin lispro 1-5 Units Subcutaneous TID AC   insulin lispro 1-6 Units Subcutaneous HS   insulin lispro 12 Units Subcutaneous TID With Meals   nicotine 1 patch Transdermal Daily   oxybutynin 15 mg Oral Daily   rivaroxaban 20 mg Oral Daily With Breakfast   sodium hypochlorite 1 application Topical Daily     Continuous Infusions:   sodium chloride 75 mL/hr Last Rate: 75 mL/hr (09/19/17 1137)     PRN Meds:   acetaminophen    HYDROmorphone IV x 1 and po x 1;    And x po 2 9/19    ondansetron
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
## 2017-09-19 NOTE — PLAN OF CARE
Problem: Potential for Falls  Goal: Patient will remain free of falls  INTERVENTIONS:  - Assess patient frequently for physical needs  -  Identify cognitive and physical deficits and behaviors that affect risk of falls  -  [place] fall precautions as indicated by assessment   - Educate patient/family on patient safety including physical limitations  - Instruct patient to call for assistance with activity based on assessment  - Modify environment to reduce risk of injury  - Consider OT/PT consult to assist with strengthening/mobility   Outcome: Progressing      Problem: Prexisting or High Potential for Compromised Skin Integrity  Goal: Skin integrity is maintained or improved  INTERVENTIONS:  - Identify patients at risk for skin breakdown  - Assess and monitor skin integrity  - Assess and monitor nutrition and hydration status  - Monitor labs (i e  albumin)  - Assess for incontinence   - Turn and reposition patient  - Assist with mobility/ambulation  - Relieve pressure over bony prominences  - Avoid friction and shearing  - Provide appropriate hygiene as needed including keeping skin clean and dry  - Evaluate need for skin moisturizer/barrier cream  - Collaborate with interdisciplinary team (i e  Nutrition, Rehabilitation, etc )   - Patient/family teaching   Outcome: Progressing      Problem: Nutrition/Hydration-ADULT  Goal: Nutrient/Hydration intake appropriate for improving, restoring or maintaining nutritional needs  Monitor and assess patient's nutrition/hydration status for malnutrition (ex- brittle hair, bruises, dry skin, pale skin and conjunctiva, muscle wasting, smooth red tongue, and disorientation)  Collaborate with interdisciplinary team and initiate plan and interventions as ordered  Monitor patient's weight and dietary intake as ordered or per policy  Utilize nutrition screening tool and intervene per policy   Determine patient's food preferences and provide high-protein, high-caloric foods as appropriate       INTERVENTIONS:  - Monitor oral intake, urinary output, labs, and treatment plans  - Assess nutrition and hydration status and recommend course of action  - Evaluate amount of meals eaten  - Assist patient with eating if necessary   - Allow adequate time for meals  - Recommend/ encourage appropriate diets, oral nutritional supplements, and vitamin/mineral supplements  - Order, calculate, and assess calorie counts as needed  - Recommend, monitor, and adjust tube feedings and TPN/PPN based on assessed needs  - Assess need for intravenous fluids  - Provide specific nutrition/hydration education as appropriate  - Include patient/family/caregiver in decisions related to nutrition   Outcome: Progressing      Problem: PAIN - ADULT  Goal: Verbalizes/displays adequate comfort level or baseline comfort level  Interventions:  - Encourage patient to monitor pain and request assistance  - Assess pain using appropriate pain scale  - Administer analgesics based on type and severity of pain and evaluate response  - Implement non-pharmacological measures as appropriate and evaluate response  - Consider cultural and social influences on pain and pain management  - Notify physician/advanced practitioner if interventions unsuccessful or patient reports new pain  Outcome: Progressing      Problem: INFECTION - ADULT  Goal: Absence or prevention of progression during hospitalization  INTERVENTIONS:  - Assess and monitor for signs and symptoms of infection  - Monitor lab/diagnostic results  - Monitor all insertion sites, i e  indwelling lines, tubes, and drains  - Monitor endotracheal (as able) and nasal secretions for changes in amount and color  - [place] appropriate cooling/warming therapies per order  - Administer medications as ordered  - Instruct and encourage patient and family to use good hand hygiene technique  - Identify and instruct in appropriate isolation precautions for identified infection/condition  Outcome: Progressing    Goal: Absence of fever/infection during neutropenic period  INTERVENTIONS:  - Monitor WBC  - Implement neutropenic guidelines  Outcome: Progressing      Problem: SAFETY ADULT  Goal: Maintain or return to baseline ADL function  INTERVENTIONS:  -  Assess patient's ability to carry out ADLs; assess patient's baseline for ADL function and identify physical deficits which impact ability to perform ADLs (bathing, care of mouth/teeth, toileting, grooming, dressing, etc )  - Assess/evaluate cause of self-care deficits   - Assess range of motion  - Assess patient's mobility; develop plan if impaired  - Assess patient's need for assistive devices and provide as appropriate  - Encourage maximum independence but intervene and supervise when necessary  ¯ Involve family in performance of ADLs  ¯ Assess for home care needs following discharge   ¯ Request OT consult to assist with ADL evaluation and planning for discharge  ¯ Provide patient education as appropriate  Outcome: Progressing    Goal: Maintain or return mobility status to optimal level  INTERVENTIONS:  - Assess patient's baseline mobility status (ambulation, transfers, stairs, etc )    - Identify cognitive and physical deficits and behaviors that affect mobility  - Identify mobility aids required to assist with transfers and/or ambulation (gait belt, sit-to-stand, lift, walker, cane, etc )  - [place] fall precautions as indicated by assessment  - Record patient progress and toleration of activity level on Mobility SBAR; progress patient to next Phase/Stage  - Instruct patient to call for assistance with activity based on assessment  - Request Rehabilitation consult to assist with strengthening/weightbearing, etc   Outcome: Progressing      Problem: DISCHARGE PLANNING  Goal: Discharge to home or other facility with appropriate resources  INTERVENTIONS:  - Identify barriers to discharge w/patient and caregiver  - Arrange for needed discharge resources and transportation as appropriate  - Identify discharge learning needs (meds, wound care, etc )  - Arrange for interpretive services to assist at discharge as needed  - Refer to Case Management Department for coordinating discharge planning if the patient needs post-hospital services based on physician/advanced practitioner order or complex needs related to functional status, cognitive ability, or social support system  Outcome: Progressing      Problem: Knowledge Deficit  Goal: Patient/family/caregiver demonstrates understanding of disease process, treatment plan, medications, and discharge instructions  Complete learning assessment and assess knowledge base    Interventions:  - Provide teaching at level of understanding  - Provide teaching via preferred learning methods  Outcome: Progressing

## 2017-09-20 ENCOUNTER — ANESTHESIA EVENT (OUTPATIENT)
Dept: PERIOP | Facility: HOSPITAL | Age: 37
End: 2017-09-20

## 2017-09-20 ENCOUNTER — ANESTHESIA (OUTPATIENT)
Dept: PERIOP | Facility: HOSPITAL | Age: 37
End: 2017-09-20

## 2017-09-20 LAB
ANION GAP SERPL CALCULATED.3IONS-SCNC: 10 MMOL/L (ref 4–13)
APTT PPP: 56 SECONDS (ref 23–35)
BUN SERPL-MCNC: 32 MG/DL (ref 5–25)
CALCIUM SERPL-MCNC: 8.2 MG/DL (ref 8.3–10.1)
CHLORIDE SERPL-SCNC: 111 MMOL/L (ref 100–108)
CO2 SERPL-SCNC: 18 MMOL/L (ref 21–32)
CREAT SERPL-MCNC: 1.96 MG/DL (ref 0.6–1.3)
ERYTHROCYTE [DISTWIDTH] IN BLOOD BY AUTOMATED COUNT: 21.3 % (ref 11.6–15.1)
GFR SERPL CREATININE-BSD FRML MDRD: 49 ML/MIN/1.73SQ M
GLUCOSE SERPL-MCNC: 164 MG/DL (ref 65–140)
GLUCOSE SERPL-MCNC: 166 MG/DL (ref 65–140)
GLUCOSE SERPL-MCNC: 180 MG/DL (ref 65–140)
GLUCOSE SERPL-MCNC: 198 MG/DL (ref 65–140)
GLUCOSE SERPL-MCNC: 198 MG/DL (ref 65–140)
GLUCOSE SERPL-MCNC: 237 MG/DL (ref 65–140)
HCT VFR BLD AUTO: 25.4 % (ref 36.5–49.3)
HGB BLD-MCNC: 8.1 G/DL (ref 12–17)
INR PPP: 1.84 (ref 0.86–1.16)
MCH RBC QN AUTO: 26 PG (ref 26.8–34.3)
MCHC RBC AUTO-ENTMCNC: 31.9 G/DL (ref 31.4–37.4)
MCV RBC AUTO: 81 FL (ref 82–98)
PLATELET # BLD AUTO: 470 THOUSANDS/UL (ref 149–390)
PMV BLD AUTO: 9.7 FL (ref 8.9–12.7)
POTASSIUM SERPL-SCNC: 4.7 MMOL/L (ref 3.5–5.3)
PROTHROMBIN TIME: 21.4 SECONDS (ref 12.1–14.4)
RBC # BLD AUTO: 3.12 MILLION/UL (ref 3.88–5.62)
SODIUM SERPL-SCNC: 139 MMOL/L (ref 136–145)
WBC # BLD AUTO: 14.21 THOUSAND/UL (ref 4.31–10.16)

## 2017-09-20 PROCEDURE — 85610 PROTHROMBIN TIME: CPT | Performed by: INTERNAL MEDICINE

## 2017-09-20 PROCEDURE — 82948 REAGENT STRIP/BLOOD GLUCOSE: CPT

## 2017-09-20 PROCEDURE — 80048 BASIC METABOLIC PNL TOTAL CA: CPT | Performed by: INTERNAL MEDICINE

## 2017-09-20 PROCEDURE — 85730 THROMBOPLASTIN TIME PARTIAL: CPT | Performed by: INTERNAL MEDICINE

## 2017-09-20 PROCEDURE — 85027 COMPLETE CBC AUTOMATED: CPT | Performed by: INTERNAL MEDICINE

## 2017-09-20 RX ORDER — CLINDAMYCIN PHOSPHATE 600 MG/50ML
600 INJECTION INTRAVENOUS EVERY 8 HOURS
Status: DISCONTINUED | OUTPATIENT
Start: 2017-09-20 | End: 2017-09-21

## 2017-09-20 RX ORDER — FENTANYL CITRATE 50 UG/ML
50 INJECTION, SOLUTION INTRAMUSCULAR; INTRAVENOUS
Status: CANCELLED | OUTPATIENT
Start: 2017-09-20

## 2017-09-20 RX ORDER — HYDROMORPHONE HCL 110MG/55ML
0.5 PATIENT CONTROLLED ANALGESIA SYRINGE INTRAVENOUS
Status: CANCELLED | OUTPATIENT
Start: 2017-09-20

## 2017-09-20 RX ORDER — ONDANSETRON 2 MG/ML
4 INJECTION INTRAMUSCULAR; INTRAVENOUS EVERY 6 HOURS PRN
Status: CANCELLED | OUTPATIENT
Start: 2017-09-20

## 2017-09-20 RX ORDER — VANCOMYCIN HYDROCHLORIDE 1 G/200ML
15 INJECTION, SOLUTION INTRAVENOUS EVERY 12 HOURS
Status: DISCONTINUED | OUTPATIENT
Start: 2017-09-20 | End: 2017-09-22

## 2017-09-20 RX ADMIN — FERROUS SULFATE TAB 325 MG (65 MG ELEMENTAL FE) 162.5 MG: 325 (65 FE) TAB at 10:41

## 2017-09-20 RX ADMIN — INSULIN LISPRO 12 UNITS: 100 INJECTION, SOLUTION INTRAVENOUS; SUBCUTANEOUS at 12:16

## 2017-09-20 RX ADMIN — INSULIN DETEMIR 13 UNITS: 100 INJECTION, SOLUTION SUBCUTANEOUS at 22:22

## 2017-09-20 RX ADMIN — FAMOTIDINE 20 MG: 20 TABLET ORAL at 10:40

## 2017-09-20 RX ADMIN — PIPERACILLIN SODIUM AND TAZOBACTAM SODIUM 3.38 G: 36; 4.5 INJECTION, POWDER, FOR SOLUTION INTRAVENOUS at 16:40

## 2017-09-20 RX ADMIN — HYDROMORPHONE HYDROCHLORIDE 4 MG: 4 TABLET ORAL at 22:22

## 2017-09-20 RX ADMIN — HYDROMORPHONE HYDROCHLORIDE 4 MG: 4 TABLET ORAL at 06:11

## 2017-09-20 RX ADMIN — CEFAZOLIN SODIUM 1000 MG: 1 SOLUTION INTRAVENOUS at 06:11

## 2017-09-20 RX ADMIN — VANCOMYCIN 125 MG: KIT at 10:41

## 2017-09-20 RX ADMIN — HYOSCYAMINE SULFATE 1 APPLICATION: 16 SOLUTION at 10:42

## 2017-09-20 RX ADMIN — FAMOTIDINE 20 MG: 20 TABLET ORAL at 18:32

## 2017-09-20 RX ADMIN — INSULIN LISPRO 2 UNITS: 100 INJECTION, SOLUTION INTRAVENOUS; SUBCUTANEOUS at 22:21

## 2017-09-20 RX ADMIN — INSULIN LISPRO 12 UNITS: 100 INJECTION, SOLUTION INTRAVENOUS; SUBCUTANEOUS at 18:32

## 2017-09-20 RX ADMIN — OXYBUTYNIN CHLORIDE 15 MG: 5 TABLET, EXTENDED RELEASE ORAL at 10:40

## 2017-09-20 RX ADMIN — VANCOMYCIN 125 MG: KIT at 22:22

## 2017-09-20 RX ADMIN — VANCOMYCIN HYDROCHLORIDE 1000 MG: 1 INJECTION, SOLUTION INTRAVENOUS at 18:38

## 2017-09-20 RX ADMIN — SODIUM CHLORIDE 75 ML/HR: 0.9 INJECTION, SOLUTION INTRAVENOUS at 20:01

## 2017-09-20 RX ADMIN — FERROUS SULFATE TAB 325 MG (65 MG ELEMENTAL FE) 162.5 MG: 325 (65 FE) TAB at 16:39

## 2017-09-20 RX ADMIN — INSULIN LISPRO 2 UNITS: 100 INJECTION, SOLUTION INTRAVENOUS; SUBCUTANEOUS at 18:33

## 2017-09-20 RX ADMIN — BACLOFEN 20 MG: 20 TABLET ORAL at 18:31

## 2017-09-20 RX ADMIN — PIPERACILLIN SODIUM AND TAZOBACTAM SODIUM 3.38 G: 36; 4.5 INJECTION, POWDER, FOR SOLUTION INTRAVENOUS at 22:22

## 2017-09-20 RX ADMIN — INSULIN LISPRO 1 UNITS: 100 INJECTION, SOLUTION INTRAVENOUS; SUBCUTANEOUS at 12:17

## 2017-09-20 RX ADMIN — AMPICILLIN SODIUM AND SULBACTAM SODIUM 3 G: 2; 1 INJECTION, POWDER, FOR SOLUTION INTRAMUSCULAR; INTRAVENOUS at 12:18

## 2017-09-20 RX ADMIN — BACLOFEN 20 MG: 20 TABLET ORAL at 10:41

## 2017-09-20 RX ADMIN — OXYCODONE HYDROCHLORIDE AND ACETAMINOPHEN 500 MG: 500 TABLET ORAL at 10:40

## 2017-09-20 RX ADMIN — CLINDAMYCIN PHOSPHATE 600 MG: 12 INJECTION, SOLUTION INTRAMUSCULAR; INTRAVENOUS at 17:39
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
## 2017-09-20 NOTE — PLAN OF CARE

## 2017-09-20 NOTE — PLAN OF CARE
DISCHARGE PLANNING     Discharge to home or other facility with appropriate resources Progressing        INFECTION - ADULT     Absence or prevention of progression during hospitalization Progressing     Absence of fever/infection during neutropenic period Progressing        Knowledge Deficit     Patient/family/caregiver demonstrates understanding of disease process, treatment plan, medications, and discharge instructions Progressing        Nutrition/Hydration-ADULT     Nutrient/Hydration intake appropriate for improving, restoring or maintaining nutritional needs Progressing        PAIN - ADULT     Verbalizes/displays adequate comfort level or baseline comfort level Progressing        Potential for Falls     Patient will remain free of falls Progressing        Prexisting or High Potential for Compromised Skin Integrity     Skin integrity is maintained or improved Progressing        SAFETY ADULT     Maintain or return to baseline ADL function Progressing     Maintain or return mobility status to optimal level Progressing
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
## 2017-09-20 NOTE — SOCIAL WORK
CM met with patient to address d/c needs; CM introduced self and discussed role  Patient resides with this mother in a ranch home with ramp entrance  Patient has a electric w/c, hospital bed, and slide board at home  Patient reports having home care approx 3 days per week, stating services are provided by [facility]  [facility] [address]  Previous CM notes indicate patient had [facility]  for home care; referral sent to check status  Patient reports he will need transportation assistance at d/c  Rx coverage available, scripts filled at [facility]  Patient reports having family support available  Cm following

## 2017-09-21 LAB
BACTERIA UR CULT: NORMAL
BACTERIA UR CULT: NORMAL
GLUCOSE SERPL-MCNC: 100 MG/DL (ref 65–140)
GLUCOSE SERPL-MCNC: 154 MG/DL (ref 65–140)
GLUCOSE SERPL-MCNC: 192 MG/DL (ref 65–140)
GLUCOSE SERPL-MCNC: 211 MG/DL (ref 65–140)
GLUCOSE SERPL-MCNC: 48 MG/DL (ref 65–140)

## 2017-09-21 PROCEDURE — 0VJSXZZ INSPECTION OF PENIS, EXTERNAL APPROACH: ICD-10-PCS | Performed by: UROLOGY

## 2017-09-21 PROCEDURE — 82948 REAGENT STRIP/BLOOD GLUCOSE: CPT

## 2017-09-21 RX ORDER — HEPARIN SODIUM 5000 [USP'U]/ML
5000 INJECTION, SOLUTION INTRAVENOUS; SUBCUTANEOUS EVERY 8 HOURS SCHEDULED
Status: DISCONTINUED | OUTPATIENT
Start: 2017-09-21 | End: 2017-09-23

## 2017-09-21 RX ORDER — DEXTROSE MONOHYDRATE 25 G/50ML
INJECTION, SOLUTION INTRAVENOUS
Status: COMPLETED
Start: 2017-09-21 | End: 2017-09-21

## 2017-09-21 RX ORDER — CLINDAMYCIN PHOSPHATE 900 MG/50ML
900 INJECTION INTRAVENOUS EVERY 8 HOURS
Status: DISCONTINUED | OUTPATIENT
Start: 2017-09-21 | End: 2017-09-22

## 2017-09-21 RX ADMIN — FERROUS SULFATE TAB 325 MG (65 MG ELEMENTAL FE) 162.5 MG: 325 (65 FE) TAB at 09:36

## 2017-09-21 RX ADMIN — OXYBUTYNIN CHLORIDE 15 MG: 5 TABLET, EXTENDED RELEASE ORAL at 09:35

## 2017-09-21 RX ADMIN — CLINDAMYCIN PHOSPHATE 600 MG: 12 INJECTION, SOLUTION INTRAMUSCULAR; INTRAVENOUS at 09:36

## 2017-09-21 RX ADMIN — CLINDAMYCIN PHOSPHATE 900 MG: 18 INJECTION, SOLUTION INTRAMUSCULAR; INTRAVENOUS at 17:02

## 2017-09-21 RX ADMIN — INSULIN DETEMIR 13 UNITS: 100 INJECTION, SOLUTION SUBCUTANEOUS at 21:36

## 2017-09-21 RX ADMIN — BACLOFEN 20 MG: 20 TABLET ORAL at 09:35

## 2017-09-21 RX ADMIN — VANCOMYCIN 125 MG: KIT at 21:37

## 2017-09-21 RX ADMIN — PIPERACILLIN SODIUM AND TAZOBACTAM SODIUM 3.38 G: 36; 4.5 INJECTION, POWDER, FOR SOLUTION INTRAVENOUS at 11:23

## 2017-09-21 RX ADMIN — VANCOMYCIN HYDROCHLORIDE 1000 MG: 1 INJECTION, SOLUTION INTRAVENOUS at 18:27

## 2017-09-21 RX ADMIN — INSULIN LISPRO 12 UNITS: 100 INJECTION, SOLUTION INTRAVENOUS; SUBCUTANEOUS at 18:30

## 2017-09-21 RX ADMIN — PIPERACILLIN SODIUM AND TAZOBACTAM SODIUM 3.38 G: 36; 4.5 INJECTION, POWDER, FOR SOLUTION INTRAVENOUS at 17:58

## 2017-09-21 RX ADMIN — INSULIN LISPRO 12 UNITS: 100 INJECTION, SOLUTION INTRAVENOUS; SUBCUTANEOUS at 09:37

## 2017-09-21 RX ADMIN — OXYCODONE HYDROCHLORIDE AND ACETAMINOPHEN 500 MG: 500 TABLET ORAL at 09:36

## 2017-09-21 RX ADMIN — PIPERACILLIN SODIUM AND TAZOBACTAM SODIUM 3.38 G: 36; 4.5 INJECTION, POWDER, FOR SOLUTION INTRAVENOUS at 03:17

## 2017-09-21 RX ADMIN — SODIUM CHLORIDE 75 ML/HR: 0.9 INJECTION, SOLUTION INTRAVENOUS at 11:24

## 2017-09-21 RX ADMIN — HYDROMORPHONE HYDROCHLORIDE 4 MG: 4 TABLET ORAL at 09:35

## 2017-09-21 RX ADMIN — VANCOMYCIN 125 MG: KIT at 09:37

## 2017-09-21 RX ADMIN — CLINDAMYCIN PHOSPHATE 600 MG: 12 INJECTION, SOLUTION INTRAMUSCULAR; INTRAVENOUS at 00:28

## 2017-09-21 RX ADMIN — HEPARIN SODIUM 5000 UNITS: 5000 INJECTION, SOLUTION INTRAVENOUS; SUBCUTANEOUS at 21:36

## 2017-09-21 RX ADMIN — DEXTROSE MONOHYDRATE 50 ML: 25 INJECTION, SOLUTION INTRAVENOUS at 11:24

## 2017-09-21 RX ADMIN — BACLOFEN 20 MG: 20 TABLET ORAL at 17:02

## 2017-09-21 RX ADMIN — PIPERACILLIN SODIUM AND TAZOBACTAM SODIUM 3.38 G: 36; 4.5 INJECTION, POWDER, FOR SOLUTION INTRAVENOUS at 21:37

## 2017-09-21 RX ADMIN — FERROUS SULFATE TAB 325 MG (65 MG ELEMENTAL FE) 162.5 MG: 325 (65 FE) TAB at 17:02

## 2017-09-21 RX ADMIN — FAMOTIDINE 20 MG: 20 TABLET ORAL at 17:02

## 2017-09-21 RX ADMIN — HEPARIN SODIUM 5000 UNITS: 5000 INJECTION, SOLUTION INTRAVENOUS; SUBCUTANEOUS at 14:25

## 2017-09-21 RX ADMIN — FAMOTIDINE 20 MG: 20 TABLET ORAL at 09:36

## 2017-09-21 RX ADMIN — INSULIN LISPRO 2 UNITS: 100 INJECTION, SOLUTION INTRAVENOUS; SUBCUTANEOUS at 21:36

## 2017-09-21 RX ADMIN — INSULIN LISPRO 3 UNITS: 100 INJECTION, SOLUTION INTRAVENOUS; SUBCUTANEOUS at 18:30

## 2017-09-21 RX ADMIN — VANCOMYCIN HYDROCHLORIDE 1000 MG: 1 INJECTION, SOLUTION INTRAVENOUS at 04:09

## 2017-09-21 NOTE — PROGRESS NOTES
The patient was very sleepy today but he has been afebrile overnight  His white blood count decreased to 14,000 yesterday  No hemodynamic instability  I inspected the wound  No changes to my exam   I pushed on the boil in the right proximal shaft and was able to express some pus and then I was actually able to place my finger in through the boil in place my finger up along the shaft of the penis under the skin both proximally and distally  I was able to connect the distal coil with the proximal boil with my finger and then I packed this with Kerlix  About 5 cc of pus came out  I probed with my finger until I could go no further  Although a formal debridement would be ideal, given that he is still undecided about having surgery in the operating room, we may be able to make some progress with the debridement I did at the bedside  I informed the patient my findings and what I did and we will not proceed with surgery today with see how he does with the opening of this wound and having it packed wet to dry  The gas in his corporal bodies near his ischial tuberosities may be communication from his decubitus of the ischial tuberosities and may be chronic, but it would not be a bad thing to explored in the operating room clean it out a bit    See how he does with the dressing changes etc
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
## 2017-09-21 NOTE — PROGRESS NOTES
Wounds checked  Lower back and Right hip are granulating well with some slough  Ischial wound large with some slough but could not manually connect through perineum    Penis wounds packed  Continue dakins to wounds and kerlex to penis wound
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
## 2017-09-22 ENCOUNTER — HOSPITAL ENCOUNTER (OUTPATIENT)
Dept: INFUSION CENTER | Facility: CLINIC | Age: 37
Discharge: HOME | End: 2017-09-22
Payer: MEDICARE

## 2017-09-22 LAB
ANION GAP SERPL CALCULATED.3IONS-SCNC: 8 MMOL/L (ref 4–13)
BUN SERPL-MCNC: 26 MG/DL (ref 5–25)
CALCIUM SERPL-MCNC: 8.3 MG/DL (ref 8.3–10.1)
CHLORIDE SERPL-SCNC: 109 MMOL/L (ref 100–108)
CO2 SERPL-SCNC: 21 MMOL/L (ref 21–32)
CREAT SERPL-MCNC: 1.73 MG/DL (ref 0.6–1.3)
ERYTHROCYTE [DISTWIDTH] IN BLOOD BY AUTOMATED COUNT: 21.2 % (ref 11.6–15.1)
GFR SERPL CREATININE-BSD FRML MDRD: 57 ML/MIN/1.73SQ M
GLUCOSE SERPL-MCNC: 106 MG/DL (ref 65–140)
GLUCOSE SERPL-MCNC: 106 MG/DL (ref 65–140)
GLUCOSE SERPL-MCNC: 141 MG/DL (ref 65–140)
GLUCOSE SERPL-MCNC: 27 MG/DL (ref 65–140)
GLUCOSE SERPL-MCNC: 372 MG/DL (ref 65–140)
GLUCOSE SERPL-MCNC: 391 MG/DL (ref 65–140)
GLUCOSE SERPL-MCNC: 87 MG/DL (ref 65–140)
HCT VFR BLD AUTO: 27.2 % (ref 36.5–49.3)
HGB BLD-MCNC: 8.7 G/DL (ref 12–17)
MCH RBC QN AUTO: 25.9 PG (ref 26.8–34.3)
MCHC RBC AUTO-ENTMCNC: 32 G/DL (ref 31.4–37.4)
MCV RBC AUTO: 81 FL (ref 82–98)
PLATELET # BLD AUTO: 477 THOUSANDS/UL (ref 149–390)
PMV BLD AUTO: 9.6 FL (ref 8.9–12.7)
POTASSIUM SERPL-SCNC: 4.2 MMOL/L (ref 3.5–5.3)
RBC # BLD AUTO: 3.36 MILLION/UL (ref 3.88–5.62)
SODIUM SERPL-SCNC: 138 MMOL/L (ref 136–145)
VANCOMYCIN TROUGH SERPL-MCNC: 51 UG/ML (ref 10–20)
WBC # BLD AUTO: 11.72 THOUSAND/UL (ref 4.31–10.16)

## 2017-09-22 PROCEDURE — 85027 COMPLETE CBC AUTOMATED: CPT | Performed by: INTERNAL MEDICINE

## 2017-09-22 PROCEDURE — 80048 BASIC METABOLIC PNL TOTAL CA: CPT | Performed by: INTERNAL MEDICINE

## 2017-09-22 PROCEDURE — 80202 ASSAY OF VANCOMYCIN: CPT | Performed by: INTERNAL MEDICINE

## 2017-09-22 PROCEDURE — 82948 REAGENT STRIP/BLOOD GLUCOSE: CPT

## 2017-09-22 RX ORDER — DEXTROSE MONOHYDRATE 25 G/50ML
INJECTION, SOLUTION INTRAVENOUS
Status: COMPLETED
Start: 2017-09-22 | End: 2017-09-22

## 2017-09-22 RX ADMIN — FAMOTIDINE 20 MG: 20 TABLET ORAL at 09:14

## 2017-09-22 RX ADMIN — CLINDAMYCIN PHOSPHATE 900 MG: 18 INJECTION, SOLUTION INTRAMUSCULAR; INTRAVENOUS at 09:13

## 2017-09-22 RX ADMIN — BACLOFEN 20 MG: 20 TABLET ORAL at 18:21

## 2017-09-22 RX ADMIN — INSULIN LISPRO 4 UNITS: 100 INJECTION, SOLUTION INTRAVENOUS; SUBCUTANEOUS at 18:20

## 2017-09-22 RX ADMIN — OXYBUTYNIN CHLORIDE 15 MG: 5 TABLET, EXTENDED RELEASE ORAL at 09:14

## 2017-09-22 RX ADMIN — PIPERACILLIN SODIUM AND TAZOBACTAM SODIUM 3.38 G: 36; 4.5 INJECTION, POWDER, FOR SOLUTION INTRAVENOUS at 10:15

## 2017-09-22 RX ADMIN — INSULIN DETEMIR 5 UNITS: 100 INJECTION, SOLUTION SUBCUTANEOUS at 21:40

## 2017-09-22 RX ADMIN — SODIUM CHLORIDE 75 ML/HR: 0.9 INJECTION, SOLUTION INTRAVENOUS at 03:11

## 2017-09-22 RX ADMIN — VANCOMYCIN 125 MG: KIT at 21:40

## 2017-09-22 RX ADMIN — PIPERACILLIN SODIUM AND TAZOBACTAM SODIUM 3.38 G: 36; 4.5 INJECTION, POWDER, FOR SOLUTION INTRAVENOUS at 21:47

## 2017-09-22 RX ADMIN — CLINDAMYCIN PHOSPHATE 900 MG: 18 INJECTION, SOLUTION INTRAMUSCULAR; INTRAVENOUS at 00:29

## 2017-09-22 RX ADMIN — INSULIN LISPRO 6 UNITS: 100 INJECTION, SOLUTION INTRAVENOUS; SUBCUTANEOUS at 21:41

## 2017-09-22 RX ADMIN — PIPERACILLIN SODIUM AND TAZOBACTAM SODIUM 3.38 G: 36; 4.5 INJECTION, POWDER, FOR SOLUTION INTRAVENOUS at 04:20

## 2017-09-22 RX ADMIN — FERROUS SULFATE TAB 325 MG (65 MG ELEMENTAL FE) 162.5 MG: 325 (65 FE) TAB at 18:19

## 2017-09-22 RX ADMIN — OXYCODONE HYDROCHLORIDE AND ACETAMINOPHEN 500 MG: 500 TABLET ORAL at 09:14

## 2017-09-22 RX ADMIN — PIPERACILLIN SODIUM AND TAZOBACTAM SODIUM 3.38 G: 36; 4.5 INJECTION, POWDER, FOR SOLUTION INTRAVENOUS at 18:20

## 2017-09-22 RX ADMIN — HEPARIN SODIUM 5000 UNITS: 5000 INJECTION, SOLUTION INTRAVENOUS; SUBCUTANEOUS at 14:21

## 2017-09-22 RX ADMIN — FERROUS SULFATE TAB 325 MG (65 MG ELEMENTAL FE) 162.5 MG: 325 (65 FE) TAB at 09:14

## 2017-09-22 RX ADMIN — HEPARIN SODIUM 5000 UNITS: 5000 INJECTION, SOLUTION INTRAVENOUS; SUBCUTANEOUS at 21:40

## 2017-09-22 RX ADMIN — SODIUM CHLORIDE 75 ML/HR: 0.9 INJECTION, SOLUTION INTRAVENOUS at 04:21

## 2017-09-22 RX ADMIN — DEXTROSE MONOHYDRATE 50 ML: 25 INJECTION, SOLUTION INTRAVENOUS at 08:22

## 2017-09-22 RX ADMIN — BACLOFEN 20 MG: 20 TABLET ORAL at 09:14

## 2017-09-22 RX ADMIN — FAMOTIDINE 20 MG: 20 TABLET ORAL at 18:19

## 2017-09-22 NOTE — PROGRESS NOTES
PT Vancomycin Trough drawn at 03:30 09/22/2017  Vancomycin Trough is returned with a result of 51 0  PT 04:00 administration of vancomycin (VANCOCIN) IVPB (premix) 1,000 mg is held  PT is resting comfortably in bed with the call bell within reach  Will continue to monitor

## 2017-09-22 NOTE — PROGRESS NOTES
Progress Note - Christine Plan  40 y o  male MRN: [id number]    Unit/Bed#: [place] 2 -01 Encounter: [id number]    Assessment/Plan:    Sepsis    Gram-positive shima bacteremia      Monitor with surgery and Infectious Disease      Been on clindamycin Zosyn and vancomycin      Holding vancomycin with elevated trough    Abscess   Perineum wound surgery evaluation      Continue local care and Dakin's    Chronic kidney disease 3 baseline creatinine 1 8 to 2 0 stable    Diabetes   low glucose this a m  will decrease Levemir and Humalog      Continue sliding scale and ADA    T6 paraplegia   baclofen and supportive measures     Chronic sacral decubitus ulcers monitor with Dr Arizmendi Current present on admission    AFib     holding Xarelto for OR       Rate controlled    Chronic anemia   S/P IV iron hemoglobin stable 8 1 continue p o  iron           Subjective:   Feels sleepy today, denies chest pain shortness of breath nausea vomiting diarrhea appetite okay chronic pain remains    Objective:     Vitals: Blood pressure 137/69, pulse 74, temperature (!) 96 9 °F (36 1 °C), temperature source Tympanic, resp  rate 18, height 6' 4" (1 93 m), weight 68 4 kg (150 lb 12 7 oz), SpO2 100 %  ,Body mass index is 18 36 kg/m²  Results from last 7 days  Lab Units 09/20/17  0444   WBC Thousand/uL 14 21*   HEMOGLOBIN g/dL 8 1*   HEMATOCRIT % 25 4*   PLATELETS Thousands/uL 470*   INR  1 84*       Results from last 7 days  Lab Units 09/20/17  0444  09/18/17  1534   SODIUM mmol/L 139  < > 135*   POTASSIUM mmol/L 4 7  < > 4 3   CHLORIDE mmol/L 111*  < > 108   CO2 mmol/L 18*  < > 18*   BUN mg/dL 32*  < > 32*   CREATININE mg/dL 1 96*  < > 2 01*   CALCIUM mg/dL 8 2*  < > 8 0*   TOTAL PROTEIN g/dL  --   --  7 7   BILIRUBIN TOTAL mg/dL  --   --  0 12*   ALK PHOS U/L  --   --  107   ALT U/L  --   --  13   AST U/L  --   --  18   GLUCOSE RANDOM mg/dL 198*  < > 55*   < > = values in this interval not displayed      Scheduled Meds:    ascorbic acid 500 mg Oral Daily   baclofen 20 mg Oral BID   clindamycin 900 mg Intravenous Q8H   famotidine 20 mg Oral BID   ferrous sulfate 162 5 mg Oral TID With Meals   heparin (porcine) 5,000 Units Subcutaneous Q8H [facility]   insulin detemir 5 Units Subcutaneous HS   insulin lispro 1-5 Units Subcutaneous TID AC   insulin lispro 1-6 Units Subcutaneous HS   insulin lispro 10 Units Subcutaneous TID With Meals   nicotine 1 patch Transdermal Daily   oxybutynin 15 mg Oral Daily   piperacillin-tazobactam 3 375 g Intravenous Q6H   sodium hypochlorite 1 application Topical Daily   vancomycin 125 mg Oral Q12H [facility]       Continuous Infusions:    sodium chloride 75 mL/hr Last Rate: 75 mL/hr (09/22/17 0421)      Physical exam:  General appearance:  Alert no distress interaction appropriate   Head/Eyes:  Supple pale PERRL EOMI  Neck:  Supple  Lungs:  Decreased BS bilateral no wheezing rhonchi or rales  Heart: normal S1 S2 regular  Abdomen: Soft nontender with bowel sounds  Extremities:  Right BKA no edema  Skin: no rash    Invasive Devices     Peripheral Intravenous Line            Peripheral IV 09/18/17 Right Antecubital 3 days          Drain            Suprapubic Catheter Latex 44358 days    Colostomy Descending/sigmoid  days                  VTE Pharmacologic Prophylaxis:  heparin   VTE Mechanical Prophylaxis:  heparin         Counseling / Coordination of Care  Total floor / unit time spent today 30   minutes  Greater than 50% of total time was spent with the patient and / or family counseling and / or coordination of care  A description of the counseling / coordination of care:  Discussed with surgery

## 2017-09-22 NOTE — CASE MANAGEMENT
Continued Stay Review    Date: 9/22/2017    Vital Signs: /90   Pulse 93   Temp (!) 96 6 °F (35 9 °C) (Tympanic)   Resp 18   Ht 6' 4" (1 93 m)   Wt 68 4 kg (150 lb 12 7 oz)   SpO2 100%   BMI 18 36 kg/m²     Medications:   Scheduled Meds:   ascorbic acid 500 mg Oral Daily   baclofen 20 mg Oral BID   famotidine 20 mg Oral BID   ferrous sulfate 162 5 mg Oral TID With Meals   heparin (porcine) 5,000 Units Subcutaneous Q8H [address]   insulin detemir 5 Units Subcutaneous HS   insulin lispro 1-5 Units Subcutaneous TID AC   insulin lispro 1-6 Units Subcutaneous HS   insulin lispro 10 Units Subcutaneous TID With Meals   nicotine 1 patch Transdermal Daily   oxybutynin 15 mg Oral Daily   piperacillin-tazobactam 3 375 g Intravenous Q6H   sodium hypochlorite 1 application Topical Daily   vancomycin 125 mg Oral Q12H [address]     Continuous Infusions:    PRN Meds:   acetaminophen    HYDROmorphone    ondansetron    WOUND repacked  with [place] soaked with sterile saline and packed deeply  no plans for the operating room at this time        Age/Sex: 40 y o  male   Feels sleepy today, denies chest pain shortness of breath nausea vomiting diarrhea appetite okay chronic pain remains    Assessment/Plan:   Sepsis                                                                             Gram-positive shima bacteremia                                                                                          Monitor with surgery and Infectious Disease                                                                                          Been on clindamycin Zosyn and vancomycin                                                                                          Holding vancomycin with elevated trough     Abscess                                                                Perineum wound surgery evaluation                                                                                          Continue local care and Dakin's     Chronic kidney disease 3                 baseline creatinine 1 8 to 2 0 stable     Diabetes                                                                low glucose this a m  will decrease Levemir and Humalog                                                                                          Continue sliding scale and ADA     T6 paraplegia                                                        baclofen and supportive measures                   Chronic sacral decubitus ulcers                   monitor with Dr Nella Esposito present on admission     AFib                                                                                                       holding Xarelto for OR                                                                                                                Rate controlled     Chronic anemia                                                                 S/P IV iron hemoglobin stable 8 1 continue p o  iron                                                                                                                       Discharge Plan: to be determined
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
## 2017-09-22 NOTE — PROGRESS NOTES
Afebrile and vital signs stable  Overall the appearance of the penis looks better  No erythema and think that the swelling at the bases going down  Nursing is doing a good job of packing the wound with Kerlix  I removed the Kerlix and probed the wound  No new pus pockets  I was able to get my finger under the skin all the way to near the pubic symphysis along side the corporal bodies and the tunic albuginea, and the penis appears to be intact  I was able to get my finger all the way through under the skin to along the glans as well  I repacked the entire will wound with [place] soaked with sterile saline and packed deeply  I have no plans for the operating room at this time  We can continue doing the dressing changes as they are and I think he will heal   I reassured the patient that I think he is making good progress with this wound care  I would aggressively probed with my finger for other pus pockets and can find none other

## 2017-09-23 LAB
GLUCOSE SERPL-MCNC: 301 MG/DL (ref 65–140)
GLUCOSE SERPL-MCNC: 331 MG/DL (ref 65–140)
GLUCOSE SERPL-MCNC: 341 MG/DL (ref 65–140)
GLUCOSE SERPL-MCNC: 380 MG/DL (ref 65–140)
LABORATORY COMMENT REPORT: NORMAL

## 2017-09-23 PROCEDURE — 82948 REAGENT STRIP/BLOOD GLUCOSE: CPT

## 2017-09-23 RX ADMIN — RIVAROXABAN 20 MG: 20 TABLET, FILM COATED ORAL at 16:48

## 2017-09-23 RX ADMIN — FAMOTIDINE 20 MG: 20 TABLET ORAL at 17:49

## 2017-09-23 RX ADMIN — VANCOMYCIN 125 MG: KIT at 21:39

## 2017-09-23 RX ADMIN — HYOSCYAMINE SULFATE 1 APPLICATION: 16 SOLUTION at 08:54

## 2017-09-23 RX ADMIN — PIPERACILLIN SODIUM AND TAZOBACTAM SODIUM 3.38 G: 36; 4.5 INJECTION, POWDER, FOR SOLUTION INTRAVENOUS at 21:40

## 2017-09-23 RX ADMIN — HYDROMORPHONE HYDROCHLORIDE 4 MG: 4 TABLET ORAL at 06:31

## 2017-09-23 RX ADMIN — FAMOTIDINE 20 MG: 20 TABLET ORAL at 08:45

## 2017-09-23 RX ADMIN — PIPERACILLIN SODIUM AND TAZOBACTAM SODIUM 3.38 G: 36; 4.5 INJECTION, POWDER, FOR SOLUTION INTRAVENOUS at 16:48

## 2017-09-23 RX ADMIN — PIPERACILLIN SODIUM AND TAZOBACTAM SODIUM 3.38 G: 36; 4.5 INJECTION, POWDER, FOR SOLUTION INTRAVENOUS at 04:09

## 2017-09-23 RX ADMIN — INSULIN LISPRO 5 UNITS: 100 INJECTION, SOLUTION INTRAVENOUS; SUBCUTANEOUS at 21:39

## 2017-09-23 RX ADMIN — METOPROLOL TARTRATE 25 MG: 25 TABLET ORAL at 11:30

## 2017-09-23 RX ADMIN — VANCOMYCIN 125 MG: KIT at 08:45

## 2017-09-23 RX ADMIN — INSULIN DETEMIR 10 UNITS: 100 INJECTION, SOLUTION SUBCUTANEOUS at 21:39

## 2017-09-23 RX ADMIN — METOPROLOL TARTRATE 25 MG: 25 TABLET ORAL at 21:39

## 2017-09-23 RX ADMIN — HEPARIN SODIUM 5000 UNITS: 5000 INJECTION, SOLUTION INTRAVENOUS; SUBCUTANEOUS at 06:31

## 2017-09-23 RX ADMIN — BACLOFEN 20 MG: 20 TABLET ORAL at 08:44

## 2017-09-23 RX ADMIN — INSULIN LISPRO 4 UNITS: 100 INJECTION, SOLUTION INTRAVENOUS; SUBCUTANEOUS at 13:25

## 2017-09-23 RX ADMIN — PIPERACILLIN SODIUM AND TAZOBACTAM SODIUM 3.38 G: 36; 4.5 INJECTION, POWDER, FOR SOLUTION INTRAVENOUS at 10:28

## 2017-09-23 RX ADMIN — INSULIN LISPRO 4 UNITS: 100 INJECTION, SOLUTION INTRAVENOUS; SUBCUTANEOUS at 08:45

## 2017-09-23 RX ADMIN — HYDROMORPHONE HYDROCHLORIDE 4 MG: 4 TABLET ORAL at 16:47

## 2017-09-23 RX ADMIN — HYDROMORPHONE HYDROCHLORIDE 4 MG: 4 TABLET ORAL at 22:28

## 2017-09-23 RX ADMIN — OXYBUTYNIN CHLORIDE 15 MG: 5 TABLET, EXTENDED RELEASE ORAL at 08:45

## 2017-09-23 RX ADMIN — FERROUS SULFATE TAB 325 MG (65 MG ELEMENTAL FE) 162.5 MG: 325 (65 FE) TAB at 13:23

## 2017-09-23 RX ADMIN — OXYCODONE HYDROCHLORIDE AND ACETAMINOPHEN 500 MG: 500 TABLET ORAL at 08:44

## 2017-09-23 RX ADMIN — HYDROMORPHONE HYDROCHLORIDE 4 MG: 4 TABLET ORAL at 01:34

## 2017-09-23 RX ADMIN — FERROUS SULFATE TAB 325 MG (65 MG ELEMENTAL FE) 162.5 MG: 325 (65 FE) TAB at 16:48

## 2017-09-23 RX ADMIN — FERROUS SULFATE TAB 325 MG (65 MG ELEMENTAL FE) 162.5 MG: 325 (65 FE) TAB at 06:31

## 2017-09-23 RX ADMIN — BACLOFEN 20 MG: 20 TABLET ORAL at 17:49

## 2017-09-23 RX ADMIN — INSULIN LISPRO 3 UNITS: 100 INJECTION, SOLUTION INTRAVENOUS; SUBCUTANEOUS at 17:49
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
## 2017-09-23 NOTE — PROGRESS NOTES
Patient awake, resting comfortably in bed  Denies any pains/concerns at this time  Dressings clean dry and intact  No changes from previous assessment  Will continue to monitor patient for changes

## 2017-09-23 NOTE — PROGRESS NOTES
Progress Note - Deion Vazquez  40 y o  male MRN: [id number]    Unit/Bed#: [place] [facility] 2 -01 Encounter: [id number]    Assessment/Plan:    Lactobacillus Sepsis  patient is now stable continue Zosyn per ID    UTI/Klebsiella   Zosyn patient denies symptoms associated         Penile cellulitis/abscess Reviewed with ID and Urology      Improving with local care and antibiotics    Chronic sacral decubitus ulcer continue local care and position change    History of C diff  continue p o  Vanco while on antibiotics     Anemia   appears chronic continue p o  iron and monitor    Diabetes   Erratic intake erratic control      Increase AC Humalog with detemir      Continue sliding scale and ADA     CKD 3    appears baseline creatinine 1 6 to 2 0 tobacco abuse    Tobacco abuse  patch provided recommendations cessation    T6 injury   paraplegia continue baclofen    AFib    add beta-blocker for rate control and restart Xarelto     Subjective:   Feels better denies chest pain shortness of breath nausea vomiting no fevers or chills appetite good    Objective:     Vitals: Blood pressure 169/60, pulse 84, temperature 97 8 °F (36 6 °C), temperature source Tympanic, resp  rate 18, height 6' 4" (1 93 m), weight 68 4 kg (150 lb 12 7 oz), SpO2 100 %  ,Body mass index is 18 36 kg/m²          Results from last 7 days  Lab Units 09/22/17  1154 09/20/17  0444   WBC Thousand/uL 11 72* 14 21*   HEMOGLOBIN g/dL 8 7* 8 1*   HEMATOCRIT % 27 2* 25 4*   PLATELETS Thousands/uL 477* 470*   INR   --  1 84*       Results from last 7 days  Lab Units 09/22/17  1154  09/18/17  1534   SODIUM mmol/L 138  < > 135*   POTASSIUM mmol/L 4 2  < > 4 3   CHLORIDE mmol/L 109*  < > 108   CO2 mmol/L 21  < > 18*   BUN mg/dL 26*  < > 32*   CREATININE mg/dL 1 73*  < > 2 01*   CALCIUM mg/dL 8 3  < > 8 0*   TOTAL PROTEIN g/dL  --   --  7 7   BILIRUBIN TOTAL mg/dL  --   --  0 12*   ALK PHOS U/L  --   --  107   ALT U/L  --   --  13   AST U/L  --   --  18   GLUCOSE RANDOM mg/dL 141*  < > 55*   < > = values in this interval not displayed  Scheduled Meds:    ascorbic acid 500 mg Oral Daily   baclofen 20 mg Oral BID   famotidine 20 mg Oral BID   ferrous sulfate 162 5 mg Oral TID With Meals   heparin (porcine) 5,000 Units Subcutaneous Q8H [facility]   insulin detemir 5 Units Subcutaneous HS   insulin lispro 1-5 Units Subcutaneous TID AC   insulin lispro 1-6 Units Subcutaneous HS   insulin lispro 10 Units Subcutaneous TID With Meals   nicotine 1 patch Transdermal Daily   oxybutynin 15 mg Oral Daily   piperacillin-tazobactam 3 375 g Intravenous Q6H   sodium hypochlorite 1 application Topical Daily   vancomycin 125 mg Oral Q12H [facility]       Continuous Infusions:     Physical exam:  General appearance:  Alert and orient x3 no distress interaction appropriate  Head/Eyes:  Nonicteric pale PERRL EOMI  Neck:  Supple  Lungs: CTA bilateral no wheezing rhonchi or rales  Heart: normal S1 S2 regular  Abdomen: Soft nontender with bowel sounds  Extremities: 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  Skin: no rash    Invasive Devices     Peripheral Intravenous Line            Peripheral IV 09/23/17 Right Forearm less than 1 day          Drain            Suprapubic Catheter Latex 43948 days    Colostomy Descending/sigmoid  days                  VTE Pharmacologic Prophylaxis:  hep   VTE Mechanical Prophylaxis:  Xarelto                     Counseling / Coordination of Care  Total floor / unit time spent today 30  minutes  Greater than 50% of total time was spent with the patient and / or family counseling and / or coordination of care  A description of the counseling / coordination of care: Lupillo Wesley   Discussed with ID and urology

## 2017-09-24 LAB
GLUCOSE SERPL-MCNC: 123 MG/DL (ref 65–140)
GLUCOSE SERPL-MCNC: 237 MG/DL (ref 65–140)
GLUCOSE SERPL-MCNC: 376 MG/DL (ref 65–140)
GLUCOSE SERPL-MCNC: 469 MG/DL (ref 65–140)
GLUCOSE SERPL-MCNC: 484 MG/DL (ref 65–140)

## 2017-09-24 PROCEDURE — 82948 REAGENT STRIP/BLOOD GLUCOSE: CPT

## 2017-09-24 RX ORDER — METOPROLOL TARTRATE 50 MG/1
50 TABLET, FILM COATED ORAL EVERY 12 HOURS SCHEDULED
Status: DISCONTINUED | OUTPATIENT
Start: 2017-09-24 | End: 2017-10-08

## 2017-09-24 RX ADMIN — METOPROLOL TARTRATE 25 MG: 25 TABLET ORAL at 08:34

## 2017-09-24 RX ADMIN — BACLOFEN 20 MG: 20 TABLET ORAL at 08:35

## 2017-09-24 RX ADMIN — INSULIN LISPRO 4 UNITS: 100 INJECTION, SOLUTION INTRAVENOUS; SUBCUTANEOUS at 13:09

## 2017-09-24 RX ADMIN — VANCOMYCIN 125 MG: KIT at 21:16

## 2017-09-24 RX ADMIN — INSULIN DETEMIR 20 UNITS: 100 INJECTION, SOLUTION SUBCUTANEOUS at 21:17

## 2017-09-24 RX ADMIN — METOPROLOL TARTRATE 50 MG: 50 TABLET ORAL at 21:17

## 2017-09-24 RX ADMIN — INSULIN LISPRO 2 UNITS: 100 INJECTION, SOLUTION INTRAVENOUS; SUBCUTANEOUS at 16:52

## 2017-09-24 RX ADMIN — HYDROMORPHONE HYDROCHLORIDE 4 MG: 4 TABLET ORAL at 22:39

## 2017-09-24 RX ADMIN — FERROUS SULFATE TAB 325 MG (65 MG ELEMENTAL FE) 162.5 MG: 325 (65 FE) TAB at 16:52

## 2017-09-24 RX ADMIN — INSULIN LISPRO 5 UNITS: 100 INJECTION, SOLUTION INTRAVENOUS; SUBCUTANEOUS at 08:40

## 2017-09-24 RX ADMIN — HYDROMORPHONE HYDROCHLORIDE 4 MG: 4 TABLET ORAL at 17:26

## 2017-09-24 RX ADMIN — HYOSCYAMINE SULFATE 1 APPLICATION: 16 SOLUTION at 08:39

## 2017-09-24 RX ADMIN — VANCOMYCIN 125 MG: KIT at 08:37

## 2017-09-24 RX ADMIN — OXYCODONE HYDROCHLORIDE AND ACETAMINOPHEN 500 MG: 500 TABLET ORAL at 08:35

## 2017-09-24 RX ADMIN — PIPERACILLIN SODIUM AND TAZOBACTAM SODIUM 3.38 G: 36; 4.5 INJECTION, POWDER, FOR SOLUTION INTRAVENOUS at 10:47

## 2017-09-24 RX ADMIN — FERROUS SULFATE TAB 325 MG (65 MG ELEMENTAL FE) 162.5 MG: 325 (65 FE) TAB at 08:35

## 2017-09-24 RX ADMIN — HYDROMORPHONE HYDROCHLORIDE 4 MG: 4 TABLET ORAL at 11:00

## 2017-09-24 RX ADMIN — RIVAROXABAN 20 MG: 20 TABLET, FILM COATED ORAL at 16:52

## 2017-09-24 RX ADMIN — FERROUS SULFATE TAB 325 MG (65 MG ELEMENTAL FE) 162.5 MG: 325 (65 FE) TAB at 13:09

## 2017-09-24 RX ADMIN — PIPERACILLIN SODIUM AND TAZOBACTAM SODIUM 3.38 G: 36; 4.5 INJECTION, POWDER, FOR SOLUTION INTRAVENOUS at 21:17

## 2017-09-24 RX ADMIN — OXYBUTYNIN CHLORIDE 15 MG: 5 TABLET, EXTENDED RELEASE ORAL at 08:34

## 2017-09-24 RX ADMIN — PIPERACILLIN SODIUM AND TAZOBACTAM SODIUM 3.38 G: 36; 4.5 INJECTION, POWDER, FOR SOLUTION INTRAVENOUS at 03:59

## 2017-09-24 RX ADMIN — BACLOFEN 20 MG: 20 TABLET ORAL at 17:04

## 2017-09-24 RX ADMIN — FAMOTIDINE 20 MG: 20 TABLET ORAL at 08:35

## 2017-09-24 RX ADMIN — PIPERACILLIN SODIUM AND TAZOBACTAM SODIUM 3.38 G: 36; 4.5 INJECTION, POWDER, FOR SOLUTION INTRAVENOUS at 16:51

## 2017-09-24 RX ADMIN — FAMOTIDINE 20 MG: 20 TABLET ORAL at 17:04

## 2017-09-24 NOTE — PROGRESS NOTES
Progress Note - Urology Progress  Dariela Polio  40 y o  male MRN: [id number]  Unit/Bed#: [place] -01 Encounter: [id number]    Assessment:  Wound care progressing  Discussed with ID doc, we're not sure if this penile wound communicates to the sacrum or not  Will need re-CT scan and input from Gen Surg / wound care  Plan:        Subjective/Objective   Chief Complaint:       Subjective:       Objective:       Blood pressure 154/82, pulse 85, temperature (!) 97 2 °F (36 2 °C), temperature source Tympanic, resp  rate 20, height 6' 4" (1 93 m), weight 68 4 kg (150 lb 12 7 oz), SpO2 99 %  ,Body mass index is 18 36 kg/m²        Intake/Output Summary (Last 24 hours) at 09/24/17 0911  Last data filed at 09/24/17 0700   Gross per 24 hour   Intake                0 ml   Output             4375 ml   Net            -4375 ml       Invasive Devices     Peripheral Intravenous Line            Peripheral IV 09/23/17 Right Forearm 1 day          Drain            Suprapubic Catheter Latex 43916 days    Colostomy Descending/sigmoid  days                Physical Exam: General appearance: alert, appears stated age and cooperative  Male genitalia: wound packing in place, no active purulence    Lab, Imaging and other studies:CBC: No results found for: WBC, HGB, HCT, MCV, PLT, ADJUSTEDWBC, MCH, MCHC, RDW, MPV, NRBC, CMP: No results found for: NA, K, CL, CO2, ANIONGAP, BUN, CREATININE, GLUCOSE, CALCIUM, AST, ALT, ALKPHOS, PROT, ALBUMIN, BILITOT, EGFR  }
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
## 2017-09-24 NOTE — PROGRESS NOTES
Progress Note - Zachery Black  40 y o  male MRN: [id number]    Unit/Bed#: [place] [facility] 2 -01 Encounter: [id number]    Assessment/Plan:    Lactobacillus sepsis  clinically improving continue antibiotics Zosyn    Penile cellulitis/abscess reviewed ID and Urology notes       Patient has refer refused surgical exploration      Discussing reimaging OR exploration with Urology/surgery        Chronic sacral decubitus ulcer local care and position changes    Hypertension   elevated will increase Lopressor to 50 milligrams twice daily     Diabetes   poor control will increase addendum ear and Humalog      Discussed carbohydrate restriction with patient    Klebsiella bacteriuria continue Zosyn patient has chronic suprapubic cath    CKD 3    stable creatinine    T6 injury   functional paraplegia, continue baclofen    AFib    rate control with beta-blocker and continue Xarelto    Subjective:   Feels better denies chest pain shortness of breath nausea vomiting diarrhea no fevers or chills appetite good    Objective:     Vitals: Blood pressure 154/82, pulse 85, temperature (!) 97 2 °F (36 2 °C), temperature source Tympanic, resp  rate 20, height 6' 4" (1 93 m), weight 68 4 kg (150 lb 12 7 oz), SpO2 99 %  ,Body mass index is 18 36 kg/m²          Results from last 7 days  Lab Units 09/22/17  1154 09/20/17  0444   WBC Thousand/uL 11 72* 14 21*   HEMOGLOBIN g/dL 8 7* 8 1*   HEMATOCRIT % 27 2* 25 4*   PLATELETS Thousands/uL 477* 470*   INR   --  1 84*       Results from last 7 days  Lab Units 09/22/17  1154  09/18/17  1534   SODIUM mmol/L 138  < > 135*   POTASSIUM mmol/L 4 2  < > 4 3   CHLORIDE mmol/L 109*  < > 108   CO2 mmol/L 21  < > 18*   BUN mg/dL 26*  < > 32*   CREATININE mg/dL 1 73*  < > 2 01*   CALCIUM mg/dL 8 3  < > 8 0*   TOTAL PROTEIN g/dL  --   --  7 7   BILIRUBIN TOTAL mg/dL  --   --  0 12*   ALK PHOS U/L  --   --  107   ALT U/L  --   --  13   AST U/L  --   --  18   GLUCOSE RANDOM mg/dL 141*  < > 55*   < > = values in this interval not displayed  Scheduled Meds:    ascorbic acid 500 mg Oral Daily   baclofen 20 mg Oral BID   famotidine 20 mg Oral BID   ferrous sulfate 162 5 mg Oral TID With Meals   insulin detemir 20 Units Subcutaneous HS   insulin lispro 1-5 Units Subcutaneous TID AC   insulin lispro 1-6 Units Subcutaneous HS   insulin lispro 20 Units Subcutaneous TID With Meals   metoprolol tartrate 50 mg Oral Q12H [facility]   nicotine 1 patch Transdermal Daily   oxybutynin 15 mg Oral Daily   piperacillin-tazobactam 3 375 g Intravenous Q6H   rivaroxaban 20 mg Oral Daily With Dinner   sodium hypochlorite 1 application Topical Daily   vancomycin 125 mg Oral Q12H [facility]       Continuous Infusions:     Physical exam:  General appearance:  Alert orient x3 interaction appropriate   Head/Eyes:  Nonicteric pale PERRL EOMI  Neck:  Supple  Lungs: CTA bilateral no wheezing rhonchi or rales  Heart: normal S1 S2 regular  Abdomen: Soft nontender with bowel sounds  Extremities: 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

## 2017-09-25 ENCOUNTER — APPOINTMENT (INPATIENT)
Dept: MRI IMAGING | Facility: HOSPITAL | Age: 37
DRG: 871 | End: 2017-09-25
Payer: MEDICARE

## 2017-09-25 ENCOUNTER — APPOINTMENT (INPATIENT)
Dept: CT IMAGING | Facility: HOSPITAL | Age: 37
DRG: 871 | End: 2017-09-25
Payer: MEDICARE

## 2017-09-25 PROBLEM — Z86.19 HISTORY OF CLOSTRIDIUM DIFFICILE INFECTION: Status: ACTIVE | Noted: 2017-09-25

## 2017-09-25 PROBLEM — N48.29: Status: ACTIVE | Noted: 2017-09-18

## 2017-09-25 PROBLEM — N48.21 PENILE ABSCESS: Status: ACTIVE | Noted: 2017-09-18

## 2017-09-25 PROBLEM — R82.71 ASYMPTOMATIC BACTERIURIA: Status: ACTIVE | Noted: 2017-09-25

## 2017-09-25 LAB
BACTERIA BLD CULT: NORMAL
GLUCOSE SERPL-MCNC: 151 MG/DL (ref 65–140)
GLUCOSE SERPL-MCNC: 183 MG/DL (ref 65–140)
GLUCOSE SERPL-MCNC: 297 MG/DL (ref 65–140)
GLUCOSE SERPL-MCNC: 320 MG/DL (ref 65–140)
GRAM STN SPEC: NORMAL

## 2017-09-25 PROCEDURE — 72192 CT PELVIS W/O DYE: CPT

## 2017-09-25 PROCEDURE — 82948 REAGENT STRIP/BLOOD GLUCOSE: CPT

## 2017-09-25 RX ADMIN — HYOSCYAMINE SULFATE 1 APPLICATION: 16 SOLUTION at 10:57

## 2017-09-25 RX ADMIN — VANCOMYCIN 125 MG: KIT at 08:52

## 2017-09-25 RX ADMIN — METOPROLOL TARTRATE 50 MG: 50 TABLET ORAL at 08:45

## 2017-09-25 RX ADMIN — INSULIN LISPRO 2 UNITS: 100 INJECTION, SOLUTION INTRAVENOUS; SUBCUTANEOUS at 13:15

## 2017-09-25 RX ADMIN — PIPERACILLIN SODIUM AND TAZOBACTAM SODIUM 3.38 G: 36; 4.5 INJECTION, POWDER, FOR SOLUTION INTRAVENOUS at 10:57

## 2017-09-25 RX ADMIN — OXYCODONE HYDROCHLORIDE AND ACETAMINOPHEN 500 MG: 500 TABLET ORAL at 08:45

## 2017-09-25 RX ADMIN — HYDROMORPHONE HYDROCHLORIDE 4 MG: 4 TABLET ORAL at 21:28

## 2017-09-25 RX ADMIN — INSULIN LISPRO 8 UNITS: 100 INJECTION, SOLUTION INTRAVENOUS; SUBCUTANEOUS at 18:02

## 2017-09-25 RX ADMIN — RIVAROXABAN 20 MG: 20 TABLET, FILM COATED ORAL at 17:44

## 2017-09-25 RX ADMIN — OXYBUTYNIN CHLORIDE 15 MG: 5 TABLET, EXTENDED RELEASE ORAL at 08:45

## 2017-09-25 RX ADMIN — PIPERACILLIN SODIUM AND TAZOBACTAM SODIUM 3.38 G: 36; 4.5 INJECTION, POWDER, FOR SOLUTION INTRAVENOUS at 04:30

## 2017-09-25 RX ADMIN — VANCOMYCIN 125 MG: KIT at 21:30

## 2017-09-25 RX ADMIN — INSULIN LISPRO 1 UNITS: 100 INJECTION, SOLUTION INTRAVENOUS; SUBCUTANEOUS at 08:47

## 2017-09-25 RX ADMIN — METOPROLOL TARTRATE 50 MG: 50 TABLET ORAL at 21:30

## 2017-09-25 RX ADMIN — BACLOFEN 20 MG: 20 TABLET ORAL at 08:45

## 2017-09-25 RX ADMIN — INSULIN LISPRO 4 UNITS: 100 INJECTION, SOLUTION INTRAVENOUS; SUBCUTANEOUS at 21:46

## 2017-09-25 RX ADMIN — PIPERACILLIN SODIUM AND TAZOBACTAM SODIUM 3.38 G: 36; 4.5 INJECTION, POWDER, FOR SOLUTION INTRAVENOUS at 16:47

## 2017-09-25 RX ADMIN — HYDROMORPHONE HYDROCHLORIDE 4 MG: 4 TABLET ORAL at 04:40

## 2017-09-25 RX ADMIN — INSULIN DETEMIR 10 UNITS: 100 INJECTION, SOLUTION SUBCUTANEOUS at 10:57

## 2017-09-25 RX ADMIN — FAMOTIDINE 20 MG: 20 TABLET ORAL at 08:45

## 2017-09-25 RX ADMIN — PIPERACILLIN SODIUM AND TAZOBACTAM SODIUM 3.38 G: 36; 4.5 INJECTION, POWDER, FOR SOLUTION INTRAVENOUS at 21:31

## 2017-09-25 RX ADMIN — HYDROMORPHONE HYDROCHLORIDE 4 MG: 4 TABLET ORAL at 17:46

## 2017-09-25 RX ADMIN — INSULIN DETEMIR 10 UNITS: 100 INJECTION, SOLUTION SUBCUTANEOUS at 21:30

## 2017-09-25 RX ADMIN — BACLOFEN 20 MG: 20 TABLET ORAL at 17:44

## 2017-09-25 RX ADMIN — FERROUS SULFATE TAB 325 MG (65 MG ELEMENTAL FE) 162.5 MG: 325 (65 FE) TAB at 13:14

## 2017-09-25 RX ADMIN — FERROUS SULFATE TAB 325 MG (65 MG ELEMENTAL FE) 162.5 MG: 325 (65 FE) TAB at 17:45

## 2017-09-25 RX ADMIN — FERROUS SULFATE TAB 325 MG (65 MG ELEMENTAL FE) 162.5 MG: 325 (65 FE) TAB at 08:45

## 2017-09-25 RX ADMIN — FAMOTIDINE 20 MG: 20 TABLET ORAL at 17:46
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
## 2017-09-25 NOTE — CONSULTS
Patient has multiple pressure injury wounds being followed by Dr Josephine Mcdonough from the acute care surgery team (he sees the patient in the outpatient wound care center as well) and a penile abscess being followed by Dr Greg Molina from urology  Patient seen to ensure proper bed surface and preventative measures are in use--patient has a clinitron bed at home  Verbal order was placed for clinitron bed to be delivered per conversation with Dr Marcelo Rivas  Wound care orders also placed as verbal order per Dr Judie Licona instructions  Assisted patient in obtaining necessary ostomy supplies to change his colostomy appliance  Patient is independent in changing pouch  Plan of care reviewed with patient, primary RN, Javid Marin, and Dr Marcelo Rivas  Wound care team will sign-off and acute care surgery will continue to manage pressure injury wounds  Urology following penile abscess  Please contact the wound care team with any questions at extension [phone]    Yash SHARIFN, RN, CCRN

## 2017-09-25 NOTE — PLAN OF CARE
INFECTION - ADULT     Absence or prevention of progression during hospitalization Progressing     Absence of fever/infection during neutropenic period Progressing        Knowledge Deficit     Patient/family/caregiver demonstrates understanding of disease process, treatment plan, medications, and discharge instructions Progressing        Nutrition/Hydration-ADULT     Nutrient/Hydration intake appropriate for improving, restoring or maintaining nutritional needs Progressing        PAIN - ADULT     Verbalizes/displays adequate comfort level or baseline comfort level Progressing        Potential for Falls     Patient will remain free of falls Progressing        Prexisting or High Potential for Compromised Skin Integrity     Skin integrity is maintained or improved Progressing        SAFETY ADULT     Maintain or return to baseline ADL function Progressing     Maintain or return mobility status to optimal level Progressing

## 2017-09-25 NOTE — PLAN OF CARE
Problem: Potential for Falls  Goal: Patient will remain free of falls  INTERVENTIONS:  - Assess patient frequently for physical needs  -  Identify cognitive and physical deficits and behaviors that affect risk of falls  -  [place] fall precautions as indicated by assessment   - Educate patient/family on patient safety including physical limitations  - Instruct patient to call for assistance with activity based on assessment  - Modify environment to reduce risk of injury  - Consider OT/PT consult to assist with strengthening/mobility   Outcome: Progressing      Problem: Prexisting or High Potential for Compromised Skin Integrity  Goal: Skin integrity is maintained or improved  INTERVENTIONS:  - Identify patients at risk for skin breakdown  - Assess and monitor skin integrity  - Assess and monitor nutrition and hydration status  - Monitor labs (i e  albumin)  - Assess for incontinence   - Turn and reposition patient  - Assist with mobility/ambulation  - Relieve pressure over bony prominences  - Avoid friction and shearing  - Provide appropriate hygiene as needed including keeping skin clean and dry  - Evaluate need for skin moisturizer/barrier cream  - Collaborate with interdisciplinary team (i e  Nutrition, Rehabilitation, etc )   - Patient/family teaching   Outcome: Progressing      Problem: Nutrition/Hydration-ADULT  Goal: Nutrient/Hydration intake appropriate for improving, restoring or maintaining nutritional needs  Monitor and assess patient's nutrition/hydration status for malnutrition (ex- brittle hair, bruises, dry skin, pale skin and conjunctiva, muscle wasting, smooth red tongue, and disorientation)  Collaborate with interdisciplinary team and initiate plan and interventions as ordered  Monitor patient's weight and dietary intake as ordered or per policy  Utilize nutrition screening tool and intervene per policy   Determine patient's food preferences and provide high-protein, high-caloric foods as appropriate       INTERVENTIONS:  - Monitor oral intake, urinary output, labs, and treatment plans  - Assess nutrition and hydration status and recommend course of action  - Evaluate amount of meals eaten  - Assist patient with eating if necessary   - Allow adequate time for meals  - Recommend/ encourage appropriate diets, oral nutritional supplements, and vitamin/mineral supplements  - Order, calculate, and assess calorie counts as needed  - Recommend, monitor, and adjust tube feedings and TPN/PPN based on assessed needs  - Assess need for intravenous fluids  - Provide specific nutrition/hydration education as appropriate  - Include patient/family/caregiver in decisions related to nutrition   Outcome: Progressing      Problem: PAIN - ADULT  Goal: Verbalizes/displays adequate comfort level or baseline comfort level  Interventions:  - Encourage patient to monitor pain and request assistance  - Assess pain using appropriate pain scale  - Administer analgesics based on type and severity of pain and evaluate response  - Implement non-pharmacological measures as appropriate and evaluate response  - Consider cultural and social influences on pain and pain management  - Notify physician/advanced practitioner if interventions unsuccessful or patient reports new pain   Outcome: Progressing      Problem: INFECTION - ADULT  Goal: Absence or prevention of progression during hospitalization  INTERVENTIONS:  - Assess and monitor for signs and symptoms of infection  - Monitor lab/diagnostic results  - Monitor all insertion sites, i e  indwelling lines, tubes, and drains  - Monitor endotracheal (as able) and nasal secretions for changes in amount and color  - [place] appropriate cooling/warming therapies per order  - Administer medications as ordered  - Instruct and encourage patient and family to use good hand hygiene technique  - Identify and instruct in appropriate isolation precautions for identified infection/condition Outcome: Progressing    Goal: Absence of fever/infection during neutropenic period  INTERVENTIONS:  - Monitor WBC  - Implement neutropenic guidelines   Outcome: Progressing      Problem: SAFETY ADULT  Goal: Maintain or return to baseline ADL function  INTERVENTIONS:  -  Assess patient's ability to carry out ADLs; assess patient's baseline for ADL function and identify physical deficits which impact ability to perform ADLs (bathing, care of mouth/teeth, toileting, grooming, dressing, etc )  - Assess/evaluate cause of self-care deficits   - Assess range of motion  - Assess patient's mobility; develop plan if impaired  - Assess patient's need for assistive devices and provide as appropriate  - Encourage maximum independence but intervene and supervise when necessary  ¯ Involve family in performance of ADLs  ¯ Assess for home care needs following discharge   ¯ Request OT consult to assist with ADL evaluation and planning for discharge  ¯ Provide patient education as appropriate   Outcome: Progressing    Goal: Maintain or return mobility status to optimal level  INTERVENTIONS:  - Assess patient's baseline mobility status (ambulation, transfers, stairs, etc )    - Identify cognitive and physical deficits and behaviors that affect mobility  - Identify mobility aids required to assist with transfers and/or ambulation (gait belt, sit-to-stand, lift, walker, cane, etc )  - [place] fall precautions as indicated by assessment  - Record patient progress and toleration of activity level on Mobility SBAR; progress patient to next Phase/Stage  - Instruct patient to call for assistance with activity based on assessment  - Request Rehabilitation consult to assist with strengthening/weightbearing, etc    Outcome: Progressing      Problem: DISCHARGE PLANNING  Goal: Discharge to home or other facility with appropriate resources  INTERVENTIONS:  - Identify barriers to discharge w/patient and caregiver  - Arrange for needed discharge resources and transportation as appropriate  - Identify discharge learning needs (meds, wound care, etc )  - Arrange for interpretive services to assist at discharge as needed  - Refer to Case Management Department for coordinating discharge planning if the patient needs post-hospital services based on physician/advanced practitioner order or complex needs related to functional status, cognitive ability, or social support system   Outcome: Progressing      Problem: Knowledge Deficit  Goal: Patient/family/caregiver demonstrates understanding of disease process, treatment plan, medications, and discharge instructions  Complete learning assessment and assess knowledge base    Interventions:  - Provide teaching at level of understanding  - Provide teaching via preferred learning methods   Outcome: Progressing      Problem: DISCHARGE PLANNING - CARE MANAGEMENT  Goal: Discharge to post-acute care or home with appropriate resources  INTERVENTIONS:  - Conduct assessment to determine patient/family and health care team treatment goals, and need for post-acute services based on payer coverage, community resources, and patient preferences, and barriers to discharge  - Address psychosocial, clinical, and financial barriers to discharge as identified in assessment in conjunction with the patient/family and health care team  - Arrange appropriate level of post-acute services according to patient's   needs and preference and payer coverage in collaboration with the physician and health care team  - Communicate with and update the patient/family, physician, and health care team regarding progress on the discharge plan  - Arrange appropriate transportation to post-acute venues   Outcome: Progressing

## 2017-09-26 PROBLEM — R33.9 URINARY RETENTION: Status: ACTIVE | Noted: 2017-09-26

## 2017-09-26 LAB
ALBUMIN SERPL BCP-MCNC: 1.3 G/DL (ref 3.5–5)
ALP SERPL-CCNC: 75 U/L (ref 46–116)
ALT SERPL W P-5'-P-CCNC: 8 U/L (ref 12–78)
ANION GAP SERPL CALCULATED.3IONS-SCNC: 11 MMOL/L (ref 4–13)
AST SERPL W P-5'-P-CCNC: 10 U/L (ref 5–45)
BILIRUB SERPL-MCNC: 0.2 MG/DL (ref 0.2–1)
BUN SERPL-MCNC: 41 MG/DL (ref 5–25)
CALCIUM SERPL-MCNC: 8.1 MG/DL (ref 8.3–10.1)
CHLORIDE SERPL-SCNC: 109 MMOL/L (ref 100–108)
CO2 SERPL-SCNC: 19 MMOL/L (ref 21–32)
CREAT SERPL-MCNC: 2.69 MG/DL (ref 0.6–1.3)
ERYTHROCYTE [DISTWIDTH] IN BLOOD BY AUTOMATED COUNT: 20.8 % (ref 11.6–15.1)
GFR SERPL CREATININE-BSD FRML MDRD: 33 ML/MIN/1.73SQ M
GLUCOSE SERPL-MCNC: 183 MG/DL (ref 65–140)
GLUCOSE SERPL-MCNC: 185 MG/DL (ref 65–140)
GLUCOSE SERPL-MCNC: 197 MG/DL (ref 65–140)
GLUCOSE SERPL-MCNC: 216 MG/DL (ref 65–140)
GLUCOSE SERPL-MCNC: 219 MG/DL (ref 65–140)
GLUCOSE SERPL-MCNC: 81 MG/DL (ref 65–140)
HCT VFR BLD AUTO: 24.9 % (ref 36.5–49.3)
HGB BLD-MCNC: 7.7 G/DL (ref 12–17)
MCH RBC QN AUTO: 25.2 PG (ref 26.8–34.3)
MCHC RBC AUTO-ENTMCNC: 30.9 G/DL (ref 31.4–37.4)
MCV RBC AUTO: 81 FL (ref 82–98)
PLATELET # BLD AUTO: 425 THOUSANDS/UL (ref 149–390)
PMV BLD AUTO: 9.7 FL (ref 8.9–12.7)
POTASSIUM SERPL-SCNC: 4.8 MMOL/L (ref 3.5–5.3)
PROT SERPL-MCNC: 7.4 G/DL (ref 6.4–8.2)
RBC # BLD AUTO: 3.06 MILLION/UL (ref 3.88–5.62)
SODIUM SERPL-SCNC: 139 MMOL/L (ref 136–145)
WBC # BLD AUTO: 13.16 THOUSAND/UL (ref 4.31–10.16)

## 2017-09-26 PROCEDURE — 85027 COMPLETE CBC AUTOMATED: CPT | Performed by: INTERNAL MEDICINE

## 2017-09-26 PROCEDURE — 82948 REAGENT STRIP/BLOOD GLUCOSE: CPT

## 2017-09-26 PROCEDURE — 80053 COMPREHEN METABOLIC PANEL: CPT | Performed by: INTERNAL MEDICINE

## 2017-09-26 RX ORDER — QUETIAPINE FUMARATE 25 MG/1
25 TABLET, FILM COATED ORAL
Status: DISCONTINUED | OUTPATIENT
Start: 2017-09-26 | End: 2017-09-27

## 2017-09-26 RX ORDER — SODIUM CHLORIDE 9 MG/ML
75 INJECTION, SOLUTION INTRAVENOUS CONTINUOUS
Status: DISCONTINUED | OUTPATIENT
Start: 2017-09-26 | End: 2017-09-29

## 2017-09-26 RX ADMIN — BACLOFEN 20 MG: 20 TABLET ORAL at 09:55

## 2017-09-26 RX ADMIN — SODIUM CHLORIDE 75 ML/HR: 0.9 INJECTION, SOLUTION INTRAVENOUS at 09:55

## 2017-09-26 RX ADMIN — INSULIN DETEMIR 12 UNITS: 100 INJECTION, SOLUTION SUBCUTANEOUS at 09:56

## 2017-09-26 RX ADMIN — FERROUS SULFATE TAB 325 MG (65 MG ELEMENTAL FE) 162.5 MG: 325 (65 FE) TAB at 12:43

## 2017-09-26 RX ADMIN — INSULIN LISPRO 4 UNITS: 100 INJECTION, SOLUTION INTRAVENOUS; SUBCUTANEOUS at 13:24

## 2017-09-26 RX ADMIN — FERROUS SULFATE TAB 325 MG (65 MG ELEMENTAL FE) 162.5 MG: 325 (65 FE) TAB at 16:54

## 2017-09-26 RX ADMIN — FAMOTIDINE 20 MG: 20 TABLET ORAL at 09:55

## 2017-09-26 RX ADMIN — METOPROLOL TARTRATE 50 MG: 50 TABLET ORAL at 20:19

## 2017-09-26 RX ADMIN — PIPERACILLIN SODIUM AND TAZOBACTAM SODIUM 3.38 G: 36; 4.5 INJECTION, POWDER, FOR SOLUTION INTRAVENOUS at 16:12

## 2017-09-26 RX ADMIN — PIPERACILLIN SODIUM AND TAZOBACTAM SODIUM 3.38 G: 36; 4.5 INJECTION, POWDER, FOR SOLUTION INTRAVENOUS at 04:10

## 2017-09-26 RX ADMIN — FERROUS SULFATE TAB 325 MG (65 MG ELEMENTAL FE) 162.5 MG: 325 (65 FE) TAB at 09:56

## 2017-09-26 RX ADMIN — VANCOMYCIN 125 MG: KIT at 20:19

## 2017-09-26 RX ADMIN — INSULIN LISPRO 2 UNITS: 100 INJECTION, SOLUTION INTRAVENOUS; SUBCUTANEOUS at 09:56

## 2017-09-26 RX ADMIN — HYDROMORPHONE HYDROCHLORIDE 4 MG: 4 TABLET ORAL at 20:19

## 2017-09-26 RX ADMIN — BACLOFEN 20 MG: 20 TABLET ORAL at 17:08

## 2017-09-26 RX ADMIN — APIXABAN 2.5 MG: 2.5 TABLET, FILM COATED ORAL at 09:55

## 2017-09-26 RX ADMIN — VANCOMYCIN 125 MG: KIT at 09:56

## 2017-09-26 RX ADMIN — OXYCODONE HYDROCHLORIDE AND ACETAMINOPHEN 500 MG: 500 TABLET ORAL at 09:55

## 2017-09-26 RX ADMIN — INSULIN DETEMIR 12 UNITS: 100 INJECTION, SOLUTION SUBCUTANEOUS at 22:00

## 2017-09-26 RX ADMIN — METOPROLOL TARTRATE 50 MG: 50 TABLET ORAL at 09:55

## 2017-09-26 RX ADMIN — PIPERACILLIN SODIUM AND TAZOBACTAM SODIUM 3.38 G: 36; 4.5 INJECTION, POWDER, FOR SOLUTION INTRAVENOUS at 21:54

## 2017-09-26 RX ADMIN — FAMOTIDINE 20 MG: 20 TABLET ORAL at 17:08

## 2017-09-26 RX ADMIN — APIXABAN 2.5 MG: 2.5 TABLET, FILM COATED ORAL at 17:08

## 2017-09-26 RX ADMIN — PIPERACILLIN SODIUM AND TAZOBACTAM SODIUM 3.38 G: 36; 4.5 INJECTION, POWDER, FOR SOLUTION INTRAVENOUS at 09:55

## 2017-09-26 RX ADMIN — INSULIN LISPRO 2 UNITS: 100 INJECTION, SOLUTION INTRAVENOUS; SUBCUTANEOUS at 16:55
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
## 2017-09-26 NOTE — SOCIAL WORK
Met with patient to discuss his goals for d/c  Patient wants to go home with home therapy   Cm also gave Medicare Bundle letter and explanation of Bundle program

## 2017-09-26 NOTE — PLAN OF CARE
Problem: Potential for Falls  Goal: Patient will remain free of falls  INTERVENTIONS:  - Assess patient frequently for physical needs  -  Identify cognitive and physical deficits and behaviors that affect risk of falls  -  [place] fall precautions as indicated by assessment   - Educate patient/family on patient safety including physical limitations  - Instruct patient to call for assistance with activity based on assessment  - Modify environment to reduce risk of injury  - Consider OT/PT consult to assist with strengthening/mobility   Outcome: Progressing      Problem: Prexisting or High Potential for Compromised Skin Integrity  Goal: Skin integrity is maintained or improved  INTERVENTIONS:  - Identify patients at risk for skin breakdown  - Assess and monitor skin integrity  - Assess and monitor nutrition and hydration status  - Monitor labs (i e  albumin)  - Assess for incontinence   - Turn and reposition patient  - Assist with mobility/ambulation  - Relieve pressure over bony prominences  - Avoid friction and shearing  - Provide appropriate hygiene as needed including keeping skin clean and dry  - Evaluate need for skin moisturizer/barrier cream  - Collaborate with interdisciplinary team (i e  Nutrition, Rehabilitation, etc )   - Patient/family teaching   Outcome: Progressing      Problem: Nutrition/Hydration-ADULT  Goal: Nutrient/Hydration intake appropriate for improving, restoring or maintaining nutritional needs  Monitor and assess patient's nutrition/hydration status for malnutrition (ex- brittle hair, bruises, dry skin, pale skin and conjunctiva, muscle wasting, smooth red tongue, and disorientation)  Collaborate with interdisciplinary team and initiate plan and interventions as ordered  Monitor patient's weight and dietary intake as ordered or per policy  Utilize nutrition screening tool and intervene per policy   Determine patient's food preferences and provide high-protein, high-caloric foods as appropriate       INTERVENTIONS:  - Monitor oral intake, urinary output, labs, and treatment plans  - Assess nutrition and hydration status and recommend course of action  - Evaluate amount of meals eaten  - Assist patient with eating if necessary   - Allow adequate time for meals  - Recommend/ encourage appropriate diets, oral nutritional supplements, and vitamin/mineral supplements  - Order, calculate, and assess calorie counts as needed  - Recommend, monitor, and adjust tube feedings and TPN/PPN based on assessed needs  - Assess need for intravenous fluids  - Provide specific nutrition/hydration education as appropriate  - Include patient/family/caregiver in decisions related to nutrition   Outcome: Progressing      Problem: PAIN - ADULT  Goal: Verbalizes/displays adequate comfort level or baseline comfort level  Interventions:  - Encourage patient to monitor pain and request assistance  - Assess pain using appropriate pain scale  - Administer analgesics based on type and severity of pain and evaluate response  - Implement non-pharmacological measures as appropriate and evaluate response  - Consider cultural and social influences on pain and pain management  - Notify physician/advanced practitioner if interventions unsuccessful or patient reports new pain   Outcome: Progressing      Problem: INFECTION - ADULT  Goal: Absence or prevention of progression during hospitalization  INTERVENTIONS:  - Assess and monitor for signs and symptoms of infection  - Monitor lab/diagnostic results  - Monitor all insertion sites, i e  indwelling lines, tubes, and drains  - Monitor endotracheal (as able) and nasal secretions for changes in amount and color  - [place] appropriate cooling/warming therapies per order  - Administer medications as ordered  - Instruct and encourage patient and family to use good hand hygiene technique  - Identify and instruct in appropriate isolation precautions for identified infection/condition Outcome: Progressing    Goal: Absence of fever/infection during neutropenic period  INTERVENTIONS:  - Monitor WBC  - Implement neutropenic guidelines   Outcome: Progressing      Problem: SAFETY ADULT  Goal: Maintain or return to baseline ADL function  INTERVENTIONS:  -  Assess patient's ability to carry out ADLs; assess patient's baseline for ADL function and identify physical deficits which impact ability to perform ADLs (bathing, care of mouth/teeth, toileting, grooming, dressing, etc )  - Assess/evaluate cause of self-care deficits   - Assess range of motion  - Assess patient's mobility; develop plan if impaired  - Assess patient's need for assistive devices and provide as appropriate  - Encourage maximum independence but intervene and supervise when necessary  ¯ Involve family in performance of ADLs  ¯ Assess for home care needs following discharge   ¯ Request OT consult to assist with ADL evaluation and planning for discharge  ¯ Provide patient education as appropriate   Outcome: Progressing    Goal: Maintain or return mobility status to optimal level  INTERVENTIONS:  - Assess patient's baseline mobility status (ambulation, transfers, stairs, etc )    - Identify cognitive and physical deficits and behaviors that affect mobility  - Identify mobility aids required to assist with transfers and/or ambulation (gait belt, sit-to-stand, lift, walker, cane, etc )  - [place] fall precautions as indicated by assessment  - Record patient progress and toleration of activity level on Mobility SBAR; progress patient to next Phase/Stage  - Instruct patient to call for assistance with activity based on assessment  - Request Rehabilitation consult to assist with strengthening/weightbearing, etc    Outcome: Progressing      Problem: DISCHARGE PLANNING  Goal: Discharge to home or other facility with appropriate resources  INTERVENTIONS:  - Identify barriers to discharge w/patient and caregiver  - Arrange for needed discharge resources and transportation as appropriate  - Identify discharge learning needs (meds, wound care, etc )  - Arrange for interpretive services to assist at discharge as needed  - Refer to Case Management Department for coordinating discharge planning if the patient needs post-hospital services based on physician/advanced practitioner order or complex needs related to functional status, cognitive ability, or social support system   Outcome: Progressing      Problem: Knowledge Deficit  Goal: Patient/family/caregiver demonstrates understanding of disease process, treatment plan, medications, and discharge instructions  Complete learning assessment and assess knowledge base    Interventions:  - Provide teaching at level of understanding  - Provide teaching via preferred learning methods   Outcome: Progressing      Problem: DISCHARGE PLANNING - CARE MANAGEMENT  Goal: Discharge to post-acute care or home with appropriate resources  INTERVENTIONS:  - Conduct assessment to determine patient/family and health care team treatment goals, and need for post-acute services based on payer coverage, community resources, and patient preferences, and barriers to discharge  - Address psychosocial, clinical, and financial barriers to discharge as identified in assessment in conjunction with the patient/family and health care team  - Arrange appropriate level of post-acute services according to patient's   needs and preference and payer coverage in collaboration with the physician and health care team  - Communicate with and update the patient/family, physician, and health care team regarding progress on the discharge plan  - Arrange appropriate transportation to post-acute venues   Outcome: Progressing      Problem: NEUROSENSORY - ADULT  Goal: Achieves maximal functionality and self care  INTERVENTIONS  - Monitor swallowing and airway patency with patient fatigue and changes in neurological status  - Encourage and assist patient to increase activity and self care with guidance from rehab services  - Encourage visually impaired, hearing impaired and aphasic patients to use assistive/communication devices  Outcome: Progressing      Problem: GENITOURINARY - ADULT  Goal: Maintains or returns to baseline urinary function  INTERVENTIONS:  - Assess urinary function  - Encourage oral fluids to ensure adequate hydration  - Administer IV fluids as ordered to ensure adequate hydration  - Administer ordered medications as needed  - Offer frequent toileting  - Follow urinary retention protocol if ordered  Outcome: Progressing    Goal: Urinary catheter remains patent  INTERVENTIONS:  - Assess patency of urinary catheter  - If patient has a chronic hanks, consider changing catheter if non-functioning  - Follow guidelines for intermittent irrigation of non-functioning urinary catheter  Outcome: Progressing      Problem: METABOLIC, FLUID AND ELECTROLYTES - ADULT  Goal: Glucose maintained within target range  INTERVENTIONS:  - Monitor Blood Glucose as ordered  - Assess for signs and symptoms of hyperglycemia and hypoglycemia  - Administer ordered medications to maintain glucose within target range  - Assess nutritional intake and initiate nutrition service referral as needed  Outcome: Progressing      Problem: SKIN/TISSUE INTEGRITY - ADULT  Goal: Skin integrity remains intact  INTERVENTIONS  - Identify patients at risk for skin breakdown  - Assess and monitor skin integrity  - Assess and monitor nutrition and hydration status  - Monitor labs (i e  albumin)  - Assess for incontinence   - Turn and reposition patient  - Assist with mobility/ambulation  - Relieve pressure over bony prominences  - Avoid friction and shearing  - Provide appropriate hygiene as needed including keeping skin clean and dry  - Evaluate need for skin moisturizer/barrier cream  - Collaborate with interdisciplinary team (i e  Nutrition, Rehabilitation, etc )   - Patient/family teaching  Outcome: Progressing    Goal: Incision(s), wounds(s) or drain site(s) healing without S/S of infection  INTERVENTIONS  - Assess and document risk factors for skin impairment   - Assess and document dressing, incision, wound bed, drain sites and surrounding tissue  - Initiate Nutrition services consult and/or wound management as needed  Outcome: Progressing
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
## 2017-09-26 NOTE — PLAN OF CARE
DISCHARGE PLANNING     Discharge to home or other facility with appropriate resources Progressing        GENITOURINARY - ADULT     Maintains or returns to baseline urinary function Progressing     Urinary catheter remains patent Progressing        INFECTION - ADULT     Absence or prevention of progression during hospitalization Progressing     Absence of fever/infection during neutropenic period Progressing        Knowledge Deficit     Patient/family/caregiver demonstrates understanding of disease process, treatment plan, medications, and discharge instructions Progressing        METABOLIC, FLUID AND ELECTROLYTES - ADULT     Glucose maintained within target range Progressing        NEUROSENSORY - ADULT     Achieves maximal functionality and self care Progressing        Nutrition/Hydration-ADULT     Nutrient/Hydration intake appropriate for improving, restoring or maintaining nutritional needs Progressing        PAIN - ADULT     Verbalizes/displays adequate comfort level or baseline comfort level Progressing        Potential for Falls     Patient will remain free of falls Progressing        Prexisting or High Potential for Compromised Skin Integrity     Skin integrity is maintained or improved Progressing        SAFETY ADULT     Maintain or return to baseline ADL function Progressing     Maintain or return mobility status to optimal level Progressing        SKIN/TISSUE INTEGRITY - ADULT     Skin integrity remains intact Progressing     Incision(s), wounds(s) or drain site(s) healing without S/S of infection Progressing

## 2017-09-26 NOTE — PROGRESS NOTES
Called by nursing in regards to results of CT abdomen and pelvis which revealed marked bladder distention despite suprapubic catheter  Pt is without complaints however is paraplegic  Will bladder scan to verify amount of fluid present and irrigate catheter for possible blockage  Monitor output of catheter

## 2017-09-26 NOTE — PROGRESS NOTES
Suprapubic catheter irrigated with 60 cc sterile NACL,removed 60 cc fluid cloudy urine  1150 collected from drainage bag  5 cc sterile H2O added to catheter ballloon  Balloon appearedcto be pulling through insertion site  Urine draining without issue  New secure system placed for catheter

## 2017-09-26 NOTE — PROGRESS NOTES
Pt disrobing and pulling at [facility] cath  Reoriented patient  Helped patient redress and gown applied  Pt stating " Get me that oatmeal over there", no oatmeal noted in room and reoriented patient and did note wound care supplies and basin near where patient was pointing  Patient afebrile, temp 96 9   BG =219  Dr Gaffney Estimable, attending paged and updated regarding patient  Will monitor

## 2017-09-26 NOTE — PROGRESS NOTES
When providing dinner tray to patient, pt stated to nurse " I need to tell you something as this is real"  Nurse listened and provided 1:1  Pt teary eyed and stated " this is reality and no one knows, look at that rosalia over there he knows this is reality and he has a heart"  This nurse reoriented patient and stated that I did not see another person in the room with us  This nurse asked patient if he was knew where he was, he refused to answer then stated " Please don't insult my intelligence"  This nurse reoriented patient that he is at [facility] in [facility] and we are here to care for him as he is a patient here  Nurse offered tissue to patient and he refused and stated " I dont need that , im stronger than that"  Pt continued to state this is real and this is reality but would not further elaborate  Pt accepted his dinner tray and nurse set patient up to eat

## 2017-09-27 LAB
GLUCOSE SERPL-MCNC: 110 MG/DL (ref 65–140)
GLUCOSE SERPL-MCNC: 138 MG/DL (ref 65–140)
GLUCOSE SERPL-MCNC: 212 MG/DL (ref 65–140)
GLUCOSE SERPL-MCNC: 26 MG/DL (ref 65–140)
GLUCOSE SERPL-MCNC: 486 MG/DL (ref 65–140)
GLUCOSE SERPL-MCNC: 70 MG/DL (ref 65–140)
GLUCOSE SERPL-MCNC: 77 MG/DL (ref 65–140)

## 2017-09-27 PROCEDURE — 82948 REAGENT STRIP/BLOOD GLUCOSE: CPT

## 2017-09-27 RX ORDER — DEXTROSE MONOHYDRATE 25 G/50ML
INJECTION, SOLUTION INTRAVENOUS
Status: COMPLETED
Start: 2017-09-27 | End: 2017-09-27

## 2017-09-27 RX ORDER — QUETIAPINE FUMARATE 25 MG/1
25 TABLET, FILM COATED ORAL
Status: DISCONTINUED | OUTPATIENT
Start: 2017-09-27 | End: 2017-10-11 | Stop reason: HOSPADM

## 2017-09-27 RX ORDER — HALOPERIDOL 5 MG/ML
2.5 INJECTION INTRAMUSCULAR EVERY 6 HOURS PRN
Status: DISCONTINUED | OUTPATIENT
Start: 2017-09-27 | End: 2017-10-02

## 2017-09-27 RX ADMIN — PIPERACILLIN SODIUM AND TAZOBACTAM SODIUM 3.38 G: 36; 4.5 INJECTION, POWDER, FOR SOLUTION INTRAVENOUS at 11:25

## 2017-09-27 RX ADMIN — SODIUM CHLORIDE 75 ML/HR: 0.9 INJECTION, SOLUTION INTRAVENOUS at 00:59

## 2017-09-27 RX ADMIN — QUETIAPINE FUMARATE 25 MG: 25 TABLET ORAL at 00:58

## 2017-09-27 RX ADMIN — HYOSCYAMINE SULFATE 1 APPLICATION: 16 SOLUTION at 11:32

## 2017-09-27 RX ADMIN — INSULIN LISPRO 6 UNITS: 100 INJECTION, SOLUTION INTRAVENOUS; SUBCUTANEOUS at 22:45

## 2017-09-27 RX ADMIN — APIXABAN 2.5 MG: 2.5 TABLET, FILM COATED ORAL at 22:13

## 2017-09-27 RX ADMIN — QUETIAPINE FUMARATE 25 MG: 25 TABLET ORAL at 22:15

## 2017-09-27 RX ADMIN — FAMOTIDINE 20 MG: 20 TABLET ORAL at 22:13

## 2017-09-27 RX ADMIN — BACLOFEN 20 MG: 20 TABLET ORAL at 22:13

## 2017-09-27 RX ADMIN — PIPERACILLIN SODIUM AND TAZOBACTAM SODIUM 3.38 G: 36; 4.5 INJECTION, POWDER, FOR SOLUTION INTRAVENOUS at 04:51

## 2017-09-27 RX ADMIN — DEXTROSE MONOHYDRATE 50 ML: 25 INJECTION, SOLUTION INTRAVENOUS at 08:02

## 2017-09-27 RX ADMIN — INSULIN DETEMIR 12 UNITS: 100 INJECTION, SOLUTION SUBCUTANEOUS at 22:44

## 2017-09-27 RX ADMIN — VANCOMYCIN 125 MG: KIT at 22:45

## 2017-09-27 NOTE — PROGRESS NOTES
[facility] Progress Note  Patient: Larry Chambers 40 y o  male   MRN: [id number]  PCP: Jose Domingo MD  Unit/Bed#: [facility] 2 -01 Encounter: [id number]  Date Of Visit: 09/27/17    Assessment  Principal Problem:    Penile abscess  Active Problems:    Paraplegia    Atrial fibrillation    Chronic suprapubic catheter    Colostomy care    S/P unilateral BKA (below knee amputation)    Tobacco abuse    Type 1 diabetes mellitus    Anemia    Sepsis    Decubitus ulcers    HTN (hypertension), benign    LEONILA (acute kidney injury)    Stage 3 chronic kidney disease    Thrombocytosis    Asymptomatic bacteriuria    History of Clostridium difficile infection    Urinary retention  Resolved Problems:    * No resolved hospital problems  *        Plan  1  Lactobacillus sepsis  Likely secondary to wounds and penile abscess  On Zosyn per Infectious Disease  2  Penile abscess  With gas tracking from ischial wound  Had lengthy discussion with surgery and Urology  At this point patient reluctant to undergo further surgery, surgeons to discuss with patient  3  Diabetes mellitus with hypoglycemia  Will decrease lispro  Continue current dose of Levemir 12 units b i d   4  History of C difficile  Continue vancomycin for suppression  5  Paroxysmal atrial fibrillation  Continue metoprolol and Eliquis for now  6  Acute kidney injury on chronic kidney disease stage 3  Likely secondary to urinary retention and sepsis  Unable to check labs today  Continue fluids for now and recheck labs in the morning  Suprapubic tube appears to be draining now  7  Essential hypertension  8  Metabolic encephalopathy  May be related to renal dysfunction however may have underlying mood disorder  Will have psychiatry evaluate    Discontinue quetiapine      VTE Prophylaxis: Apixaban (Eliquis)  Education and Discussions:  Discussed with surgery and Urology  Discharge Plan:  Still requiring IV antibiotics  Time Spent for Care: 35 Mins  More than 50% of total time spent on counseling and coordination of care as described above    ______________________________________________________________________________    Subjective:   Patient seen and examined  More bizarre today  Last night per nursing, bit off IV  Agitated; hypoglycemic    Objective:   Vitals: Blood pressure 162/86, pulse 73, temperature (!) 95 5 °F (35 3 °C), temperature source Tympanic, resp  rate 14, height 6' 4" (1 93 m), weight 68 4 kg (150 lb 12 7 oz), SpO2 100 %  Physical Exam:   General appearance: appears stated age, no distress and slowed mentation  Head: Normocephalic, without obvious abnormality, atraumatic  Lungs: clear to auscultation bilaterally  Heart: regular rate and rhythm  Abdomen: soft, ostomy in place  Back: negative  Extremities: s/p right BKA  Neurologic: lethargic    Additional Data:   Labs:    Results from last 7 days  Lab Units 09/26/17  0531 09/22/17  1154   WBC Thousand/uL 13 16* 11 72*   HEMOGLOBIN g/dL 7 7* 8 7*   HEMATOCRIT % 24 9* 27 2*   MCV fL 81* 81*   PLATELETS Thousands/uL 425* 477*       Results from last 7 days  Lab Units 09/26/17  0531 09/22/17  1154   SODIUM mmol/L 139 138   POTASSIUM mmol/L 4 8 4 2   CHLORIDE mmol/L 109* 109*   CO2 mmol/L 19* 21   ANION GAP mmol/L 11 8   BUN mg/dL 41* 26*   CREATININE mg/dL 2 69* 1 73*   CALCIUM mg/dL 8 1* 8 3   ALBUMIN g/dL 1 3*  --    BILIRUBIN TOTAL mg/dL 0 20  --    ALK PHOS U/L 75  --    ALT U/L 8*  --    AST U/L 10  --    EGFR ml/min/1 73sq m 33 57   GLUCOSE RANDOM mg/dL 197* 141*                        Results from last 7 days  Lab Units 09/27/17  0819 09/27/17  0757 09/27/17  0356 09/26/17  2102 09/26/17  1617 09/26/17  1146 09/26/17  0747 09/26/17  0020 09/25/17  2039 09/25/17  1745 09/25/17  1059 09/25/17  0732   POC GLUCOSE mg/dl 138 26* 110 81 183* 219* 185* 216* 297* 320* 183* 151*             * I Have Reviewed All Lab Data Listed Above      Cultures:           Imaging:  Imaging Reports Reviewed Today Include:       Scheduled Meds:  apixaban 2 5 mg Oral BID   ascorbic acid 500 mg Oral Daily   baclofen 20 mg Oral BID   famotidine 20 mg Oral BID   ferrous sulfate 162 5 mg Oral TID With Meals   insulin detemir 12 Units Subcutaneous Q12H [address]   insulin lispro 1-6 Units Subcutaneous HS   insulin lispro 2-12 Units Subcutaneous TID AC   insulin lispro 8 Units Subcutaneous TID With Meals   metoprolol tartrate 50 mg Oral Q12H [address]   nicotine 1 patch Transdermal Daily   piperacillin-tazobactam 3 375 g Intravenous Q6H   QUEtiapine 25 mg Oral HS   sodium hypochlorite 1 application Topical Daily   vancomycin 125 mg Oral Q12H [address]     Continuous Infusions:  sodium chloride 75 mL/hr Last Rate: 75 mL/hr (09/27/17 0059)     PRN Meds:    acetaminophen    HYDROmorphone    ondansetron     Mary Bradshaw DO

## 2017-09-27 NOTE — PROGRESS NOTES
Received a call from Olvin (patient's mother)  Mother stated she was concerned her son sounded tired over the telephone this afternoon when she called to check on him  Informed mother that per day shift report, Dr Alo Mcgee was contacted to make aware that patient was not sleeping and prescribed sleep aid at HS  Patient seen at the start of shift and seemed tired  Informed patient's mother that patient was seen at the bedside and was explained what medication he would receive for this shift  Patient verbalized understanding and was oriented X3 when seen tonight  Patient's mother satisfied with the provided information  Will continue to monitor  SD 9/26/2017 at 2004    Patient re-assessed and found confused while giving 2200 medication  Spoke to [employer] to make aware while she was rounding on the unit  Discussed patient previously medicated with prn Dilaudid for severe pain and has been sleeping ever since  Discussed if 2200 ordered dose of Seroquel should be held  [employer] stated to hold dose but to give dose if patient wakes up within the hour unable to fall back to sleep  Patient's BG was 81 mg/dl  Patient received juice and crackers prior to Lantus administration  Patient is currently sleeping  Will continue to monitor   SD 9/26/2017 at 2211

## 2017-09-27 NOTE — PROGRESS NOTES
Case discussed with patient's mother as patient lethargic  Patient was given 25 mg of quetiapine last night which resulted in excessive lethargy  Mother states that patient does not sleep well at home and does get confused at times  Will continue to monitor

## 2017-09-27 NOTE — PROGRESS NOTES
Pt is now awake and oriented to person, place, and situation  He is still agitated, but is now up and eating lunch  When asked if I could help him tidy up, he swatted my hand away and said "none of that"  Still seems confused, but improved since this AM  Jah aware of pt status
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
## 2017-09-27 NOTE — PROGRESS NOTES
Upon entering room pt was lethargic  Pt was unable to answer questions to identify orientation  BG 26 this am, 50mL dextrose given  BG now 138  Dr Robinson Stone notified

## 2017-09-27 NOTE — PROGRESS NOTES
Buttock/perineal wound reexamined today  The tissue was mostly granulation tissue with some mild amount of fibropurulent tissue  There is no obvious connection between this wound and the wounds in his penis  Continue with Dakin's  Penile abscess - plan per Urology  We will be available as needed
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
## 2017-09-27 NOTE — PROGRESS NOTES
Patient bit his IV line off  Blood and IV fluid noted on bed  Patient confused and unaware how this happened  IV was capped immediately  IV site remains intact  Patient appears confused  Paged on call RRT (Jayashree Enamorado) at [phone]  Awaiting response  Paged on call RRT again at [phone]  Received a call back from Jayashree Enamorado at   Suggested 1:1 supervision  Jayashree Enamorado declined and suggested Mitts and to give Seroquel instead  Will continue to monitor  SD 9/26/2017 at 2352     also notified via telephone

## 2017-09-27 NOTE — PROGRESS NOTES
The patient is afebrile and his vital signs are stable, but he is barely responding  When I tried to wake him up, all he would do is open his eyes  I cannot meaningfully communicate with him  On exam his suprapubic tube is draining urine with some sediment and, and the penis is basically unchanged  There is still some pus coming around the Kerlix that is packed into the wound  On repeat CT scan, he has gas in the corporal bodies tracking up from his ischial decubitus  Strictly speaking, this should be debrided and explored, but I do not think he can give consent at this time and he has steadfastly refused exploration prior to this because it may result in penectomy  I have read notes and I am not sure what is the source of his mental status changes and lethargy  We will follow along, and continue to do the dressing changes, but I doubt that he will ultimately undergo exploration

## 2017-09-28 PROBLEM — R41.0 DELIRIUM: Status: ACTIVE | Noted: 2017-09-28

## 2017-09-28 LAB
GLUCOSE SERPL-MCNC: 238 MG/DL (ref 65–140)
GLUCOSE SERPL-MCNC: 284 MG/DL (ref 65–140)
GLUCOSE SERPL-MCNC: 400 MG/DL (ref 65–140)
GLUCOSE SERPL-MCNC: 401 MG/DL (ref 65–140)
PREALB SERPL-MCNC: 14.6 MG/DL (ref 18–40)
T4 FREE SERPL-MCNC: 0.91 NG/DL (ref 0.76–1.46)
TSH SERPL DL<=0.05 MIU/L-ACNC: 0.63 UIU/ML (ref 0.36–3.74)

## 2017-09-28 PROCEDURE — 84134 ASSAY OF PREALBUMIN: CPT | Performed by: NURSE PRACTITIONER

## 2017-09-28 PROCEDURE — 84443 ASSAY THYROID STIM HORMONE: CPT | Performed by: NURSE PRACTITIONER

## 2017-09-28 PROCEDURE — 82948 REAGENT STRIP/BLOOD GLUCOSE: CPT

## 2017-09-28 PROCEDURE — 84439 ASSAY OF FREE THYROXINE: CPT | Performed by: NURSE PRACTITIONER

## 2017-09-28 RX ORDER — METRONIDAZOLE 500 MG/1
500 TABLET ORAL EVERY 8 HOURS SCHEDULED
Status: DISCONTINUED | OUTPATIENT
Start: 2017-09-28 | End: 2017-10-01

## 2017-09-28 RX ORDER — CEFDINIR 300 MG/1
300 CAPSULE ORAL EVERY 12 HOURS SCHEDULED
Status: DISCONTINUED | OUTPATIENT
Start: 2017-09-28 | End: 2017-10-01

## 2017-09-28 RX ADMIN — INSULIN LISPRO 6 UNITS: 100 INJECTION, SOLUTION INTRAVENOUS; SUBCUTANEOUS at 17:08

## 2017-09-28 RX ADMIN — HYDROMORPHONE HYDROCHLORIDE 4 MG: 4 TABLET ORAL at 09:28

## 2017-09-28 RX ADMIN — FERROUS SULFATE TAB 325 MG (65 MG ELEMENTAL FE) 162.5 MG: 325 (65 FE) TAB at 09:18

## 2017-09-28 RX ADMIN — VANCOMYCIN 125 MG: KIT at 09:20

## 2017-09-28 RX ADMIN — CEFDINIR 300 MG: 300 CAPSULE ORAL at 20:32

## 2017-09-28 RX ADMIN — METRONIDAZOLE 500 MG: 500 TABLET ORAL at 14:57

## 2017-09-28 RX ADMIN — INSULIN LISPRO 12 UNITS: 100 INJECTION, SOLUTION INTRAVENOUS; SUBCUTANEOUS at 12:33

## 2017-09-28 RX ADMIN — INSULIN DETEMIR 12 UNITS: 100 INJECTION, SOLUTION SUBCUTANEOUS at 21:14

## 2017-09-28 RX ADMIN — FAMOTIDINE 20 MG: 20 TABLET ORAL at 09:18

## 2017-09-28 RX ADMIN — METOPROLOL TARTRATE 50 MG: 50 TABLET ORAL at 20:33

## 2017-09-28 RX ADMIN — QUETIAPINE FUMARATE 25 MG: 25 TABLET ORAL at 20:34

## 2017-09-28 RX ADMIN — FERROUS SULFATE TAB 325 MG (65 MG ELEMENTAL FE) 162.5 MG: 325 (65 FE) TAB at 12:31

## 2017-09-28 RX ADMIN — INSULIN DETEMIR 12 UNITS: 100 INJECTION, SOLUTION SUBCUTANEOUS at 09:16

## 2017-09-28 RX ADMIN — FAMOTIDINE 20 MG: 20 TABLET ORAL at 17:17

## 2017-09-28 RX ADMIN — BACLOFEN 20 MG: 20 TABLET ORAL at 09:19

## 2017-09-28 RX ADMIN — HYDROMORPHONE HYDROCHLORIDE 4 MG: 4 TABLET ORAL at 20:41

## 2017-09-28 RX ADMIN — METRONIDAZOLE 500 MG: 500 TABLET ORAL at 21:24

## 2017-09-28 RX ADMIN — BACLOFEN 20 MG: 20 TABLET ORAL at 17:17

## 2017-09-28 RX ADMIN — FERROUS SULFATE TAB 325 MG (65 MG ELEMENTAL FE) 162.5 MG: 325 (65 FE) TAB at 16:00

## 2017-09-28 RX ADMIN — INSULIN LISPRO 3 UNITS: 100 INJECTION, SOLUTION INTRAVENOUS; SUBCUTANEOUS at 21:14

## 2017-09-28 RX ADMIN — METOPROLOL TARTRATE 50 MG: 50 TABLET ORAL at 09:19

## 2017-09-28 RX ADMIN — VANCOMYCIN 125 MG: KIT at 20:32

## 2017-09-28 RX ADMIN — INSULIN LISPRO 12 UNITS: 100 INJECTION, SOLUTION INTRAVENOUS; SUBCUTANEOUS at 09:17

## 2017-09-28 RX ADMIN — CEFDINIR 300 MG: 300 CAPSULE ORAL at 12:32

## 2017-09-28 RX ADMIN — APIXABAN 2.5 MG: 2.5 TABLET, FILM COATED ORAL at 17:17

## 2017-09-28 RX ADMIN — APIXABAN 2.5 MG: 2.5 TABLET, FILM COATED ORAL at 09:18

## 2017-09-28 NOTE — CONSULTS
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 inguinal region lymph node measures 3 1 x 1 6 cm  Moderate degenerative changes are seen within the right hip joint with the chondrocalcinosis  Degenerative changes are also seen around the left hip joint with chondrocalcinosis  Heterotopic ossification seen around the left hip joint Calcification along the bilateral iliopsoas tendons noted  Skin and PERINEUM and PENIS; there is deep decubitus ulceration noted along the right greater trochanter there is a associated periostitis along the right greater trochanter  A large area of ulceration is noted in the perineum and also in relation to the right ischial tuberosity  There is some associated the calcification and bone formation in this area  This large area of foot ulceration appears to communicate with the large amount of air within the right carpus cavernosum  Air is also noted within the left carpus cavernosum (associated likely fistulous communication between the right corpus cavernosum and ulceration noted within the perineum extending up to the right ischial tuberosity)    08/17/2017 Venous duplex UEs:RIGHT UPPER LIMB LIMITED: Evaluation shows no evidence of thrombus in the internal jugular vein, subclavian vein, and the brachiocephalic vein   LEFT UPPER LIMB: There is chronic superficial thrombophlebitis in the cephalic vein from the  distal forearm to the antecubital fossa  (known) No evidence of acute or chronic deep vein thrombosis  11/2014 Venous duplex UEs: RIGHT UPPER LIMB: ABNORMAL  Findings suggest subacute deep vein thrombosis in the subclavian vein  There is chronic superficial thrombophlebitis in the basilic vein in the proximal and mid upper arm  LEFT UPPER LIMB: ABNORMAL  No evidence of acute or chronic deep vein thrombosis  There is chronic superficial thrombophlebitis in the cephalic vein from the mid-forearm to the mid-upper arm, where it can no longer definitely be identified       VTE Prophylaxis: Sequential compression device (Venodyne)  Eliquis

## 2017-09-28 NOTE — PROGRESS NOTES
[facility] Internal [facility] Progress Note  Patient: Xenia Nicholson 40 y o  male   MRN: [id number]  PCP: Juan Antonio Miller MD  Unit/Bed#: [facility] 2 [facility] 87 207-01 Encounter: [id number]  Date Of Visit: 09/28/17    Assessment  Principal Problem:    Penile abscess  Active Problems:    Paraplegia    Atrial fibrillation    Chronic suprapubic catheter    Colostomy care    S/P unilateral BKA (below knee amputation)    Tobacco abuse    Type 1 diabetes mellitus    Ulcer of sacral region, stage 4    Anemia    Sepsis    Decubitus ulcers    HTN (hypertension), benign    LEONILA (acute kidney injury)    Stage 3 chronic kidney disease    Thrombocytosis    Asymptomatic bacteriuria    History of Clostridium difficile infection    Urinary retention    Delirium  Resolved Problems:    * No resolved hospital problems  *        Plan  1  Lactobacillus sepsis  Currently refusing treatment  Discussed case at length with patient's mother who wishes to standby his decision however will have Neurology evaluate to ensure patient is competent and reversible causes of delirium has been investigated  Patient and mother understands this could be life threatening and may lead to death  2  Penile abscess with gangrene  Patient refusing surgical amputation  3  Acute kidney injury on chronic kidney disease stage 3  Refusing blood work  4  Insulin-dependent diabetes with hyperglycemia  Refused insulin last night  Restarted this morning  Follow sugars  5  Paraplegia with history of spinal cord injury  6  Delirium  May be related to sleep disturbance versus antibiotics  Continue quetiapine  Neurology to evaluate  7  Paroxysmal atrial fibrillation  On metoprolol at Eliquis substituted for Xarelto given acute kidney injury  8  History of C difficile  On vancomycin oral for suppression while on antibiotics      VTE Prophylaxis: Apixaban (Eliquis)  Education and Discussions:  Extensive discussion with the patient's mother    Patient currently refusing treatment even antibiotics  ? competent, have Neurology evaluate for delirium  Discharge Plan:    Time Spent for Care:  35 Mins  More than 50% of total time spent on counseling and coordination of care as described above    ______________________________________________________________________________    Subjective:   Patient seen and examined  Less agitated did sleep last night  Still refusing treatment    Objective:   Vitals: Blood pressure 142/81, pulse 91, temperature 97 8 °F (36 6 °C), temperature source Tympanic, resp  rate 18, height 6' 4" (1 93 m), weight 68 4 kg (150 lb 12 7 oz), SpO2 100 %      Physical Exam:   General appearance: alert, appears stated age and cooperative  Head: Normocephalic, without obvious abnormality, atraumatic  Lungs: clear to auscultation bilaterally  Heart: regular rate and rhythm  Abdomen: Soft, ostomy in place  Back: negative  Extremities: status post right below-knee amputation  Neurologic:  Paraplegic, speech slow but appropriate    Additional Data:   Labs:    Results from last 7 days  Lab Units 09/26/17  0531 09/22/17  1154   WBC Thousand/uL 13 16* 11 72*   HEMOGLOBIN g/dL 7 7* 8 7*   HEMATOCRIT % 24 9* 27 2*   MCV fL 81* 81*   PLATELETS Thousands/uL 425* 477*       Results from last 7 days  Lab Units 09/26/17  0531 09/22/17  1154   SODIUM mmol/L 139 138   POTASSIUM mmol/L 4 8 4 2   CHLORIDE mmol/L 109* 109*   CO2 mmol/L 19* 21   ANION GAP mmol/L 11 8   BUN mg/dL 41* 26*   CREATININE mg/dL 2 69* 1 73*   CALCIUM mg/dL 8 1* 8 3   ALBUMIN g/dL 1 3*  --    BILIRUBIN TOTAL mg/dL 0 20  --    ALK PHOS U/L 75  --    ALT U/L 8*  --    AST U/L 10  --    EGFR ml/min/1 73sq m 33 57   GLUCOSE RANDOM mg/dL 197* 141*       Results from last 7 days  Lab Units 09/28/17  0815 09/27/17  2110 09/27/17  1648 09/27/17  1106 09/27/17  1023 09/27/17  0819 09/27/17  0757 09/27/17  0356 09/26/17  2102 09/26/17  1617 09/26/17  1146 09/26/17  0747   POC GLUCOSE mg/dl 400* 486* 212* 77 70 138 26* 110 81 183* 219* 185*             * I Have Reviewed All Lab Data Listed Above      Cultures:           Imaging:  Imaging Reports Reviewed Today Include:       Scheduled Meds:  apixaban 2 5 mg Oral BID   ascorbic acid 500 mg Oral Daily   baclofen 20 mg Oral BID   famotidine 20 mg Oral BID   ferrous sulfate 162 5 mg Oral TID With Meals   insulin detemir 12 Units Subcutaneous Q12H [address]   insulin lispro 1-6 Units Subcutaneous HS   insulin lispro 2-12 Units Subcutaneous TID AC   insulin lispro 5 Units Subcutaneous TID With Meals   metoprolol tartrate 50 mg Oral Q12H [address]   nicotine 1 patch Transdermal Daily   piperacillin-tazobactam 3 375 g Intravenous Q6H   QUEtiapine 25 mg Oral HS   sodium hypochlorite 1 application Topical Daily   vancomycin 125 mg Oral Q12H [address]     Continuous Infusions:  sodium chloride 75 mL/hr Last Rate: 75 mL/hr (09/27/17 0059)     PRN Meds:    acetaminophen    haloperidol lactate    HYDROmorphone    ondansetron     Jg Syed DO

## 2017-09-28 NOTE — PROGRESS NOTES
Progress Note - Urology Progress  Didi Urias  40 y o  male MRN: [id number]  Unit/Bed#: [facility] 2 -01 Encounter: [id number]    Assessment:  Penile abscess    Plan:  Pt is still declining exploration but he notes he does wish to talk to Dr Abraham Kuhn  Await Neuropsych evaluation re competency  Discussed with medicine        Subjective: No complaints  More awake today    Objective:    Blood pressure 125/73, pulse 88, temperature (!) 96 8 °F (36 °C), temperature source Tympanic, resp  rate 18, height 6' 4" (1 93 m), weight 68 4 kg (150 lb 12 7 oz), SpO2 100 %  ,Body mass index is 18 36 kg/m²        Intake/Output Summary (Last 24 hours) at 09/28/17 1722  Last data filed at 09/28/17 1444   Gross per 24 hour   Intake              700 ml   Output             3875 ml   Net            -3175 ml       Invasive Devices     Drain            Suprapubic Catheter Latex 27460 days    Colostomy Descending/sigmoid  days                Physical Exam: General appearance: appears stated age, cooperative and fatigued  Head: Normocephalic, without obvious abnormality  Neck: supple, symmetrical, trachea midline  Back: No CVAT  Abdomen: soft, non-tender; bowel sounds normal; no masses,  no organomegaly and +ostomy, SPT draining well  Penis- no erythema or crepitus- skin is viable, packing in place    Lab, Imaging and other studies:CBC: No results found for: WBC, HGB, HCT, MCV, PLT, ADJUSTEDWBC, MCH, MCHC, RDW, MPV, NRBC
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
## 2017-09-28 NOTE — PROGRESS NOTES
Pt has no IV access and refusing to let one be placed  Dr Leon Kahn made aware during the day that pt no longer has access

## 2017-09-28 NOTE — PLAN OF CARE
DISCHARGE PLANNING     Discharge to home or other facility with appropriate resources Progressing        DISCHARGE PLANNING - CARE MANAGEMENT     Discharge to post-acute care or home with appropriate resources Progressing        GENITOURINARY - ADULT     Maintains or returns to baseline urinary function Progressing     Urinary catheter remains patent Progressing        INFECTION - ADULT     Absence or prevention of progression during hospitalization Progressing     Absence of fever/infection during neutropenic period Progressing        Knowledge Deficit     Patient/family/caregiver demonstrates understanding of disease process, treatment plan, medications, and discharge instructions Progressing        METABOLIC, FLUID AND ELECTROLYTES - ADULT     Glucose maintained within target range Progressing        NEUROSENSORY - ADULT     Achieves maximal functionality and self care Progressing        Nutrition/Hydration-ADULT     Nutrient/Hydration intake appropriate for improving, restoring or maintaining nutritional needs Progressing        PAIN - ADULT     Verbalizes/displays adequate comfort level or baseline comfort level Progressing        Potential for Falls     Patient will remain free of falls Progressing        Prexisting or High Potential for Compromised Skin Integrity     Skin integrity is maintained or improved Progressing        SAFETY ADULT     Maintain or return to baseline ADL function Progressing     Maintain or return mobility status to optimal level Progressing        SKIN/TISSUE INTEGRITY - ADULT     Skin integrity remains intact Progressing     Incision(s), wounds(s) or drain site(s) healing without S/S of infection Progressing

## 2017-09-29 PROBLEM — Z91.19 NONCOMPLIANCE BY REFUSING INTERVENTION OR SUPPORT: Status: ACTIVE | Noted: 2017-09-29

## 2017-09-29 PROBLEM — Z91.199 NONCOMPLIANCE BY REFUSING INTERVENTION OR SUPPORT: Status: ACTIVE | Noted: 2017-09-29

## 2017-09-29 LAB
ALBUMIN SERPL BCP-MCNC: 1.5 G/DL (ref 3.5–5)
ALP SERPL-CCNC: 75 U/L (ref 46–116)
ALT SERPL W P-5'-P-CCNC: 12 U/L (ref 12–78)
ANION GAP SERPL CALCULATED.3IONS-SCNC: 9 MMOL/L (ref 4–13)
AST SERPL W P-5'-P-CCNC: 12 U/L (ref 5–45)
BILIRUB SERPL-MCNC: 0.13 MG/DL (ref 0.2–1)
BUN SERPL-MCNC: 46 MG/DL (ref 5–25)
CALCIUM SERPL-MCNC: 8.2 MG/DL (ref 8.3–10.1)
CHLORIDE SERPL-SCNC: 111 MMOL/L (ref 100–108)
CO2 SERPL-SCNC: 19 MMOL/L (ref 21–32)
CREAT SERPL-MCNC: 2.05 MG/DL (ref 0.6–1.3)
ERYTHROCYTE [DISTWIDTH] IN BLOOD BY AUTOMATED COUNT: 21.7 % (ref 11.6–15.1)
FOLATE SERPL-MCNC: 5.1 NG/ML (ref 3.1–17.5)
GFR SERPL CREATININE-BSD FRML MDRD: 46 ML/MIN/1.73SQ M
GLUCOSE SERPL-MCNC: 151 MG/DL (ref 65–140)
GLUCOSE SERPL-MCNC: 186 MG/DL (ref 65–140)
GLUCOSE SERPL-MCNC: 191 MG/DL (ref 65–140)
GLUCOSE SERPL-MCNC: 198 MG/DL (ref 65–140)
GLUCOSE SERPL-MCNC: 201 MG/DL (ref 65–140)
GLUCOSE SERPL-MCNC: 286 MG/DL (ref 65–140)
HCT VFR BLD AUTO: 23.6 % (ref 36.5–49.3)
HGB BLD-MCNC: 7.3 G/DL (ref 12–17)
MCH RBC QN AUTO: 25.5 PG (ref 26.8–34.3)
MCHC RBC AUTO-ENTMCNC: 30.9 G/DL (ref 31.4–37.4)
MCV RBC AUTO: 83 FL (ref 82–98)
PLATELET # BLD AUTO: 407 THOUSANDS/UL (ref 149–390)
PMV BLD AUTO: 9.6 FL (ref 8.9–12.7)
POTASSIUM SERPL-SCNC: 4.8 MMOL/L (ref 3.5–5.3)
PROT SERPL-MCNC: 7.9 G/DL (ref 6.4–8.2)
RBC # BLD AUTO: 2.86 MILLION/UL (ref 3.88–5.62)
SODIUM SERPL-SCNC: 139 MMOL/L (ref 136–145)
WBC # BLD AUTO: 16.42 THOUSAND/UL (ref 4.31–10.16)

## 2017-09-29 PROCEDURE — 84425 ASSAY OF VITAMIN B-1: CPT | Performed by: NURSE PRACTITIONER

## 2017-09-29 PROCEDURE — 82948 REAGENT STRIP/BLOOD GLUCOSE: CPT

## 2017-09-29 PROCEDURE — 80053 COMPREHEN METABOLIC PANEL: CPT | Performed by: INTERNAL MEDICINE

## 2017-09-29 PROCEDURE — 82746 ASSAY OF FOLIC ACID SERUM: CPT | Performed by: NURSE PRACTITIONER

## 2017-09-29 PROCEDURE — 85027 COMPLETE CBC AUTOMATED: CPT | Performed by: INTERNAL MEDICINE

## 2017-09-29 RX ORDER — HYDROMORPHONE HCL 110MG/55ML
1 PATIENT CONTROLLED ANALGESIA SYRINGE INTRAVENOUS ONCE
Status: COMPLETED | OUTPATIENT
Start: 2017-09-29 | End: 2017-09-29

## 2017-09-29 RX ORDER — SODIUM CHLORIDE 9 MG/ML
125 INJECTION, SOLUTION INTRAVENOUS CONTINUOUS
Status: DISCONTINUED | OUTPATIENT
Start: 2017-09-29 | End: 2017-10-02

## 2017-09-29 RX ADMIN — METRONIDAZOLE 500 MG: 500 TABLET ORAL at 05:33

## 2017-09-29 RX ADMIN — CEFDINIR 300 MG: 300 CAPSULE ORAL at 21:00

## 2017-09-29 RX ADMIN — BACLOFEN 20 MG: 20 TABLET ORAL at 08:19

## 2017-09-29 RX ADMIN — VANCOMYCIN 125 MG: KIT at 08:28

## 2017-09-29 RX ADMIN — HYDROMORPHONE HYDROCHLORIDE 4 MG: 4 TABLET ORAL at 18:25

## 2017-09-29 RX ADMIN — CEFDINIR 300 MG: 300 CAPSULE ORAL at 08:19

## 2017-09-29 RX ADMIN — FAMOTIDINE 20 MG: 20 TABLET ORAL at 08:20

## 2017-09-29 RX ADMIN — METOPROLOL TARTRATE 50 MG: 50 TABLET ORAL at 21:04

## 2017-09-29 RX ADMIN — QUETIAPINE FUMARATE 25 MG: 25 TABLET ORAL at 20:00

## 2017-09-29 RX ADMIN — HYDROMORPHONE HYDROCHLORIDE 4 MG: 4 TABLET ORAL at 06:17

## 2017-09-29 RX ADMIN — METRONIDAZOLE 500 MG: 500 TABLET ORAL at 21:08

## 2017-09-29 RX ADMIN — METRONIDAZOLE 500 MG: 500 TABLET ORAL at 15:03

## 2017-09-29 RX ADMIN — APIXABAN 2.5 MG: 2.5 TABLET, FILM COATED ORAL at 17:06

## 2017-09-29 RX ADMIN — FERROUS SULFATE TAB 325 MG (65 MG ELEMENTAL FE) 162.5 MG: 325 (65 FE) TAB at 17:06

## 2017-09-29 RX ADMIN — SODIUM CHLORIDE 100 ML/HR: 0.9 INJECTION, SOLUTION INTRAVENOUS at 17:22

## 2017-09-29 RX ADMIN — METOPROLOL TARTRATE 50 MG: 50 TABLET ORAL at 08:19

## 2017-09-29 RX ADMIN — HYOSCYAMINE SULFATE 1 APPLICATION: 16 SOLUTION at 10:23

## 2017-09-29 RX ADMIN — HYDROMORPHONE HYDROCHLORIDE 1 MG: 2 INJECTION, SOLUTION INTRAMUSCULAR; INTRAVENOUS; SUBCUTANEOUS at 21:01

## 2017-09-29 RX ADMIN — INSULIN LISPRO 2 UNITS: 100 INJECTION, SOLUTION INTRAVENOUS; SUBCUTANEOUS at 08:22

## 2017-09-29 RX ADMIN — APIXABAN 2.5 MG: 2.5 TABLET, FILM COATED ORAL at 08:20

## 2017-09-29 RX ADMIN — INSULIN LISPRO 2 UNITS: 100 INJECTION, SOLUTION INTRAVENOUS; SUBCUTANEOUS at 17:07

## 2017-09-29 RX ADMIN — INSULIN DETEMIR 12 UNITS: 100 INJECTION, SOLUTION SUBCUTANEOUS at 21:04

## 2017-09-29 RX ADMIN — FERROUS SULFATE TAB 325 MG (65 MG ELEMENTAL FE) 162.5 MG: 325 (65 FE) TAB at 08:18

## 2017-09-29 RX ADMIN — FERROUS SULFATE TAB 325 MG (65 MG ELEMENTAL FE) 162.5 MG: 325 (65 FE) TAB at 13:13

## 2017-09-29 RX ADMIN — FAMOTIDINE 20 MG: 20 TABLET ORAL at 17:06

## 2017-09-29 RX ADMIN — INSULIN LISPRO 2 UNITS: 100 INJECTION, SOLUTION INTRAVENOUS; SUBCUTANEOUS at 21:05

## 2017-09-29 RX ADMIN — INSULIN DETEMIR 12 UNITS: 100 INJECTION, SOLUTION SUBCUTANEOUS at 08:16

## 2017-09-29 RX ADMIN — OXYCODONE HYDROCHLORIDE AND ACETAMINOPHEN 500 MG: 500 TABLET ORAL at 08:20

## 2017-09-29 RX ADMIN — INSULIN LISPRO 2 UNITS: 100 INJECTION, SOLUTION INTRAVENOUS; SUBCUTANEOUS at 13:14

## 2017-09-29 RX ADMIN — VANCOMYCIN 125 MG: KIT at 21:08

## 2017-09-29 RX ADMIN — BACLOFEN 20 MG: 20 TABLET ORAL at 17:06

## 2017-09-29 NOTE — PROGRESS NOTES
[facility] Progress Note  Patient: Rosana Mendes  40 y o  male   MRN: [id number]  PCP: Goran Almanza MD  [facility] Encounter: [id number]  Date Of Visit: 09/29/17    Assessment  Principal Problem:    Penile abscess  Active Problems:    Paraplegia    Atrial fibrillation    Chronic suprapubic catheter    Colostomy care    S/P unilateral BKA (below knee amputation)    Tobacco abuse    Type 1 diabetes mellitus    Ulcer of sacral region, stage 4    Anemia    Sepsis    Decubitus ulcers    HTN (hypertension), benign    LEONILA (acute kidney injury)    Stage 3 chronic kidney disease    Thrombocytosis    Asymptomatic bacteriuria    History of Clostridium difficile infection    Urinary retention    Delirium    Noncompliance by refusing intervention or support  Resolved Problems:    * No resolved hospital problems  *        Plan  1  Lactobacillus sepsis  Secondary to penile cellulitis/abscess and possible gangrene  Cefdinir and metronidazole per Infectious Diseases  2  Penile cellulitis/abscess  Patient admittedly refusing surgical resection or penectomy  Understands risks of worsening infection which may lead to worsening sepsis or death  Patient mentation has much improved over the last 24 hours and seen by neuropsychology deemed competent to make medical necessary decisions  He wishes to seek second opinion with a urologist at [facility]  3  Acute kidney injury on chronic kidney disease stage 3  Secondary to obstructive nephropathy  Improved, continue IV fluids  4  Insulin pen Diabetes with hyperglycemia  Stabilizing as insulin has been restarted  5  History of C difficile infection  On vancomycin for suppression while on antibiotics  6  Paroxysmal atrial fibrillation  On metoprolol, Eliquis substituted for Xarelto given a KI  7  Delirium  Likely secondary to sleep disturbance, improved with quetiapine    [address] Neurology in neuropsychology evaluation  8  Possible depression  Psychiatry to evaluate  9  Paraplegia with history of spinal cord injury      VTE Prophylaxis: Apixaban (Eliquis)  Education and Discussions:  Patient clear that he does not want surgical intervention understanding that this may lead to poor outcome and death  He wishes to follow up with a urologist with [facility] for second opinion  Discharge Plan:  Monitor over weekend to ensure stability with possible discharge home Monday so patient can follow up with a urologist for second opinion  Time Spent for Care:  35 Mins  More than 50% of total time spent on counseling and coordination of care as described above    ______________________________________________________________________________    Subjective:   Patient seen and examined  Wishes to continue antibiotics however not amendable to surgery    Objective:   Vitals: Blood pressure 140/84, pulse 91, temperature 97 8 °F (36 6 °C), temperature source Tympanic, resp  rate 18, height 6' 4" (1 93 m), weight 68 4 kg (150 lb 12 7 oz), SpO2 100 %      Physical Exam:   General appearance: alert, appears stated age and cooperative  Head: Normocephalic, without obvious abnormality, atraumatic  Lungs: clear to auscultation bilaterally  Heart: regular rate and rhythm  Abdomen: Soft, ostomy in place  Back: negative, range of motion normal  Extremities: Status post right BKA  Neurologic: Grossly normal    Additional Data:   Labs:    Results from last 7 days  Lab Units 09/29/17  1148 09/26/17  0531   WBC Thousand/uL 16 42* 13 16*   HEMOGLOBIN g/dL 7 3* 7 7*   HEMATOCRIT % 23 6* 24 9*   MCV fL 83 81*   PLATELETS Thousands/uL 407* 425*       Results from last 7 days  Lab Units 09/29/17  1148 09/26/17  0531   SODIUM mmol/L 139 139   POTASSIUM mmol/L 4 8 4 8   CHLORIDE mmol/L 111* 109*   CO2 mmol/L 19* 19*   ANION GAP mmol/L 9 11   BUN mg/dL 46* 41*   CREATININE mg/dL 2 05* 2 69*   CALCIUM mg/dL 8 2* 8 1*   ALBUMIN g/dL 1 5* 1 3*   BILIRUBIN TOTAL mg/dL 0 13* 0 20   ALK PHOS U/L 75 75   ALT U/L 12 8*   AST U/L 12 10   EGFR ml/min/1 73sq m 46 33   GLUCOSE RANDOM mg/dL 191* 197*                        Results from last 7 days  Lab Units 09/29/17  1546 09/29/17  1129 09/29/17  0737 09/29/17  0109 09/28/17  2053 09/28/17  1623 09/28/17  1114 09/28/17  0815 09/27/17  2110 09/27/17  1648 09/27/17  1106 09/27/17  1023   POC GLUCOSE mg/dl 186* 198* 151* 286* 238* 284* 401* 400* 486* 212* 77 70           Results from last 7 days  Lab Units 09/28/17  0531   TSH 3RD GENERATON uIU/mL 0 634   FREE T4 ng/dL 0 91     * I Have Reviewed All Lab Data Listed Above      Cultures:           Imaging:  Imaging Reports Reviewed Today Include:       Scheduled Meds:  apixaban 2 5 mg Oral BID   ascorbic acid 500 mg Oral Daily   baclofen 20 mg Oral BID   cefdinir 300 mg Oral Q12H [address]   famotidine 20 mg Oral BID   ferrous sulfate 162 5 mg Oral TID With Meals   insulin detemir 12 Units Subcutaneous Q12H [address]   insulin lispro 1-6 Units Subcutaneous HS   insulin lispro 2-12 Units Subcutaneous TID AC   insulin lispro 5 Units Subcutaneous TID With Meals   metoprolol tartrate 50 mg Oral Q12H [address]   metroNIDAZOLE 500 mg Oral Q8H [address]   nicotine 1 patch Transdermal Daily   QUEtiapine 25 mg Oral HS   sodium hypochlorite 1 application Topical Daily   vancomycin 125 mg Oral Q12H [address]     Continuous Infusions:  sodium chloride 75 mL/hr Last Rate: 75 mL/hr (09/27/17 0059)     PRN Meds:    acetaminophen    haloperidol lactate    HYDROmorphone    ondansetron     Ole Maria L, DO

## 2017-09-29 NOTE — CONSULTS
Consultation - Neuropsychology/Psychology Department  Lea Armstrong  40 y o  male MRN: [id number]  Unit/Bed#: [facility] 2 -01 Encounter: [id number]        BACKGROUND:  Lea Armstrong  is a 40y o  year old male who was referred for a Neuropsychological Exam to assess cognitive functioning and comment on capacity to make medical decisions  History of Present Illness  Penile abscess  Physician Requesting Consult: Massiel Ramírez DO  Consults      Historical Information   Past Medical History:   Diagnosis Date    Ambulatory dysfunction     Anemia, iron deficiency     transfusion requiring    Atrial fibrillation     AVM (arteriovenous malformation) of duodenum, 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    Chronic deep vein thrombosis (DVT)     Chronic pain     Chronic suprapubic catheter     CKD (chronic kidney disease), stage III     Clostridium difficile infection 08/11/2016    also positive 9/2016, 5/29/2017, 8/15/2017  S/P fecal transplant    Colostomy on examination     GERD (gastroesophageal reflux disease)     History of creation of ostomy     Memory impairment 2011    s/p diabetic coma    Neurogenic bladder     OAB (overactive bladder)     Paraplegia     T3 transverse myelitis vs spinal stoke (AVM); 2012 extensive epidural abscess C7=> conus 2nd extension from deep parspinal abscess L4-S2/sacral decubitus; cord atrophy/myelomalcia T3=>conus     S/P unilateral BKA (below knee amputation)     Right    Sebaceous cyst removed in 2017    Tobacco abuse     Type 1 diabetes mellitus     w/ neuropathy, gastroparesis, retinopathy    Wounds, multiple     pressure ulcers with delayed healing     Past Surgical History:   Procedure Laterality Date    BELOW KNEE LEG AMPUTATION Right 2009    COLONOSCOPY N/A 3/27/2017    Procedure: COLONOSCOPY;  Surgeon: Chelsea Rocha MD;  Location: [facility];   Service:     COLONOSCOPY N/A 3/29/2017    Procedure: COLONOSCOPY;  Surgeon: Chelsea Rocha MD;  [facility];  Service:     COLONOSCOPY N/A 6/15/2017    Procedure: COLONOSCOPY with FMT;  Surgeon: Izabella Merino MD;  Location: [facility]; Service: Gastroenterology    ESOPHAGOGASTRODUODENOSCOPY N/A 3/22/2017    Procedure: ESOPHAGOGASTRODUODENOSCOPY (EGD); Surgeon: Mago Thibodeaux DO;  Location: [facility];   Service:      Social History   History   Alcohol Use No     History   Drug Use    Types: Marijuana     Comment: every 3 months     History   Smoking Status    Current Every Day Smoker    Types: Cigars   Smokeless Tobacco    Current User     Comment: 4-5 cigars/day     Family History:   Family History   Problem Relation Age of Onset    Hyperlipidemia Mother     Hypertension Mother     Leukemia Brother     Diabetes Paternal Grandfather        Meds/Allergies   current meds:   Current Facility-Administered Medications   Medication Dose Route Frequency    acetaminophen (TYLENOL) tablet 650 mg  650 mg Oral Q6H PRN    apixaban (ELIQUIS) tablet 2 5 mg  2 5 mg Oral BID    ascorbic acid (VITAMIN C) tablet 500 mg  500 mg Oral Daily    baclofen tablet 20 mg  20 mg Oral BID    cefdinir (OMNICEF) capsule 300 mg  300 mg Oral Q12H [address]    famotidine (PEPCID) tablet 20 mg  20 mg Oral BID    ferrous sulfate tablet 162 5 mg  162 5 mg Oral TID With Meals    haloperidol lactate (HALDOL) injection 2 5 mg  2 5 mg Intramuscular Q6H PRN    HYDROmorphone (DILAUDID) tablet 4 mg  4 mg Oral Q4H PRN    insulin detemir (LEVEMIR) subcutaneous injection 12 Units  12 Units Subcutaneous Q12H [address]    insulin lispro (HumaLOG) 100 units/mL subcutaneous injection 1-6 Units  1-6 Units Subcutaneous HS    insulin lispro (HumaLOG) 100 units/mL subcutaneous injection 2-12 Units  2-12 Units Subcutaneous TID AC    insulin lispro (HumaLOG) 100 units/mL subcutaneous injection 5 Units  5 Units Subcutaneous TID With Meals    metoprolol tartrate (LOPRESSOR) tablet 50 mg  50 mg Oral Q12H BERTHA    metroNIDAZOLE (FLAGYL) tablet 500 mg  500 mg Oral Q8H [address]    nicotine (NICODERM CQ) 7 mg/24hr TD 24 hr patch 1 patch  1 patch Transdermal Daily    ondansetron (ZOFRAN) injection 4 mg  4 mg Intravenous Q6H PRN    QUEtiapine (SEROquel) tablet 25 mg  25 mg Oral HS    sodium chloride 0 9 % infusion  75 mL/hr Intravenous Continuous    sodium hypochlorite (DAKIN'S HALF-STRENGTH) 0 25 % topical solution 1 application  1 application Topical Daily    vancomycin (VANCOCIN) oral solution 125 mg  125 mg Oral Q12H [address]       Allergies   Allergen Reactions    Ciprofloxacin Hcl     Cymbalta [Duloxetine Hcl]     Lyrica [Pregabalin]     Polymyxin B        Imaging Studies:       Family and Social Support:   No Data Recorded     Behavioral Observations: Alert, oriented x 3, cooperative; affect appeared pleasant, denied depressed mood and anxiety; no overt evidence of psychotic process; denied auditory and visual hallucinations; patient was fully aware of reason for hospitalization and discussed concerns regarding surgical procedure regarding penile abscess  Patient acknowledged that he may require surgery if the current treatment is unsuccessful  He admitted to difficulty with memory  Cognitive Examination    General Cognitive Functioning  MMSE = Oriented x 3    Attention/Concertration  Auditory Selective Attention = Average; Auditory Vigilance = Average; Information Processing Speed = Appears WNL's    Frontal Systems/Executive Functioning  Mental Flexibility/Cognitive Control = Borderline/mildly Impaired; Working Memory = Impaired; Abstract Reasoning = Average; Generative Ability = Low Average; Spatial Conceptualization = Average; Commonsense Reasoning and Judgement = Average/Low Average    Language Functioning  Confrontation naming = Average; Phonemic Fluency = Low Average; Semantic Retrieval = Low Average; Comprehension of Complex Ideational Material = Average;     Memory Functioning  Narrative Recall - Short Delay = Impaired;  Long Delay Narrative Recall = Impaired; Visual Recognition = Average    Visuo-Spatial Abilities Not Assessed    Functional Knowledge ® Health & Safety Knowledge = Within Normal Limits; Emotional Functioning: Denied depressed mood and anxiety  Summary/Impression: Results of Neuropsychological Exam revealed deficits in recall, working memory and mental flexibility  His profile is consistent with Mild Neurocognitive Disorder  On a measure assessing awareness of personal health status and ability to evaluate health problems, handle medical emergencies and take safety precautions, patient performed within normal limits  At this time, patient appears to have capacity to make informed medical decisions

## 2017-09-29 NOTE — PROGRESS NOTES
Patient assessed at the bedside  Patient has been AOX3  Pleasant and cooperative  Family was present visiting  Joseline well managed with prn Dilaudid (see MARS)  No significant changes from previous shifts  Called on call RRT and spoke with [facility]) regarding screening sepsis results (HR of 95 and elevated WBC)  No new order received  Ortega remains intact and in place  Patient ate 1/2 a turkey sandwich this evening  Discussed food intake related to blood sugar monitoring and control  Patient verbalized understanding regarding need to frequently monitor BG to promote adequate control  Patient has been ringing appropriately for this shift to request staff assistance  Bedside table and and call bell within reach  Frequent rounding implemented  Will continue to monitor   SD 9/28/2017
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
## 2017-09-29 NOTE — PROGRESS NOTES
Progress Note - General Surgery   Dariela Polio  40 y o  male MRN: [id number]  [facility] Encounter: [id number]    Assessment:  The patient has known penile abscess with air in the corpora cavernosa and has refused surgical debridement  Active problems include paraplegia atrial fibrillation chronic indwelling suprapubic catheter draining bladder colostomy history of below-knee amputation unilaterally tobacco abuse type 1 diabetes sacral ulcer anemia sepsis hypertension and acute kidney injury  Plan:  Continue present therapy with antibiotics fluid hydration and generalize support  Still suggest surgical debridement/penile amputation with patient refusing    Subjective/Objective     Subjective:  Refusing treatment surgically    Objective:     Blood pressure 159/89, pulse 84, temperature (!) 96 8 °F (36 °C), temperature source Tympanic, resp  rate 18, height 6' 4" (1 93 m), weight 68 4 kg (150 lb 12 7 oz), SpO2 100 %  ,Body mass index is 18 36 kg/m²        Intake/Output Summary (Last 24 hours) at 09/29/17 1033  Last data filed at 09/29/17 0601   Gross per 24 hour   Intake             1200 ml   Output             2125 ml   Net             -925 ml       Invasive Devices     Drain            Suprapubic Catheter Latex 24156 days    Colostomy Descending/sigmoid  days                Physical Exam:   General appearance Sullen,sleepy  Head normocephalic  Pulmonary clear  Heart regular rate rhythm  Abdomen soft with ostomy viable  All extremities right below-knee amputation  Suprapubic tube intact and draining clear urine  Penis skin appears viable with packing in place, no crepitus appreciated and no erythema  Lab, Imaging and other studies:CBC: No results found for: WBC, HGB, HCT, MCV, PLT, ADJUSTEDWBC, MCH, MCHC, RDW, MPV, NRBC, CMP: No results found for: NA, K, CL, CO2, ANIONGAP, BUN, CREATININE, GLUCOSE, CALCIUM, AST, ALT, ALKPHOS, PROT, ALBUMIN, BILITOT, EGFR   CT PELVIS WITHOUT IV CONTRAST     INDICATION:  Follow-up penile abscess       COMPARISON:  9/19/2017     TECHNIQUE: CT examination of the pelvis was performed without intravenous contrast   Reformatted images were created in axial, sagittal, and coronal planes        Radiation dose length product (DLP) for this visit:  412 mGy-cm   This examination, like all CT scans performed in the [facility], was performed utilizing techniques to minimize radiation dose exposure, including the use of iterative   reconstruction and automated exposure control       Enteric contrast was administered       FINDINGS:     VISUALIZED KIDNEYS/URETERS:  No significant abnormality identified in the partially imaged kidneys and ureters      REPRODUCTIVE ORGANS:  Unremarkable for patient's age      URINARY BLADDER:  There is bladder distention despite the presence of a suprapubic catheter      APPENDIX:  No findings to suggest appendicitis      VISUALIZED BOWEL:  Unremarkable      ABDOMINOPELVIC CAVITY:  No ascites or free intraperitoneal air  There is moderate bilateral pelvic lymphadenopathy as seen on the prior study though evaluation is limited due to the lack of intravenous and oral contrast      VISUALIZED VESSELS:  Unremarkable for patient's age  ABDOMINOPELVIC WALL/INGUINAL REGIONS:  Again seen is soft tissue gas extending from the perineum into the bilateral corpora cavernosa  The amount of gas may be slightly decreased compared to the prior study  There is a small focus of gas superficial to   the right corpora cavernosa which was not present previously  This gas extends to the skin surface] be related to interval treatment patient  There is diffuse subcutaneous edema   There is mild bilateral inguinal lymphadenopathy, similar to the prior   study      OSSEOUS STRUCTURES:  Again seen is a large ulceration overlying the right ischial tuberosity though there is no osseous destruction      IMPRESSION:     1   Large decubitus ulcer overlying the right ischial tuberosity again seen with gas extending into the perineum and corpora cavernosa bilaterally  The amount of gas appears slightly decreased compared to the prior study  However, there is a new focus of   soft tissue gas superficial to the right corpora cavernosa  This may be related to spread of infection versus the sequela of instrumentation and clinical correlation is recommended      2   Stable bilateral inguinal and pelvic lymphadenopathy though not well visualized due to lack of oral and intravenous contrast      3   Marked bladder distention despite suprapubic catheter      VTE Pharmacologic Prophylaxis: Reason for no pharmacologic prophylaxis on apixaban  VTE Mechanical Prophylaxis: reason for no mechanical VTE prophylaxis bka      On close examination of the patient's phallus, the skin appears to be completely viable except for an ulcerated area approximately 3 cm in diameter located on the dorsal lateral right penile shaft 1/3 of the way up from the penile pubic junction  There is no crepitus and no drainage  There does appear to be some granulation tissue at the base of the ulcer which appears to involve at least the right corpora cavernosum  At the present time the patient is still refusing any operative intervention and in view of his stability and in fact clinical improvement over the past number of days I suggest a more conservative approach in regard to any potential penile amputation  I do feel that at some point some surgical debridement  may be necessary, however at this time I do not feel that I would proceed with amputation unless the patient had recurrent uncontrolled sepsis or obvious progressive tissue necrosis

## 2017-09-30 LAB
BACTERIA ISLT: ABNORMAL
GLUCOSE SERPL-MCNC: 212 MG/DL (ref 65–140)
GLUCOSE SERPL-MCNC: 221 MG/DL (ref 65–140)
GLUCOSE SERPL-MCNC: 294 MG/DL (ref 65–140)
GLUCOSE SERPL-MCNC: 345 MG/DL (ref 65–140)

## 2017-09-30 PROCEDURE — 82948 REAGENT STRIP/BLOOD GLUCOSE: CPT

## 2017-09-30 RX ADMIN — HYOSCYAMINE SULFATE 1 APPLICATION: 16 SOLUTION at 09:23

## 2017-09-30 RX ADMIN — CEFDINIR 300 MG: 300 CAPSULE ORAL at 09:16

## 2017-09-30 RX ADMIN — INSULIN LISPRO 6 UNITS: 100 INJECTION, SOLUTION INTRAVENOUS; SUBCUTANEOUS at 09:14

## 2017-09-30 RX ADMIN — SODIUM CHLORIDE 100 ML/HR: 0.9 INJECTION, SOLUTION INTRAVENOUS at 21:42

## 2017-09-30 RX ADMIN — HYDROMORPHONE HYDROCHLORIDE 4 MG: 4 TABLET ORAL at 00:37

## 2017-09-30 RX ADMIN — APIXABAN 2.5 MG: 2.5 TABLET, FILM COATED ORAL at 09:16

## 2017-09-30 RX ADMIN — VANCOMYCIN 125 MG: KIT at 21:44

## 2017-09-30 RX ADMIN — INSULIN LISPRO 4 UNITS: 100 INJECTION, SOLUTION INTRAVENOUS; SUBCUTANEOUS at 17:23

## 2017-09-30 RX ADMIN — FERROUS SULFATE TAB 325 MG (65 MG ELEMENTAL FE) 162.5 MG: 325 (65 FE) TAB at 17:21

## 2017-09-30 RX ADMIN — METOPROLOL TARTRATE 50 MG: 50 TABLET ORAL at 09:16

## 2017-09-30 RX ADMIN — BACLOFEN 20 MG: 20 TABLET ORAL at 17:23

## 2017-09-30 RX ADMIN — FERROUS SULFATE TAB 325 MG (65 MG ELEMENTAL FE) 162.5 MG: 325 (65 FE) TAB at 13:08

## 2017-09-30 RX ADMIN — FAMOTIDINE 20 MG: 20 TABLET ORAL at 09:16

## 2017-09-30 RX ADMIN — FERROUS SULFATE TAB 325 MG (65 MG ELEMENTAL FE) 162.5 MG: 325 (65 FE) TAB at 09:15

## 2017-09-30 RX ADMIN — QUETIAPINE FUMARATE 25 MG: 25 TABLET ORAL at 21:42

## 2017-09-30 RX ADMIN — OXYCODONE HYDROCHLORIDE AND ACETAMINOPHEN 500 MG: 500 TABLET ORAL at 09:16

## 2017-09-30 RX ADMIN — CEFDINIR 300 MG: 300 CAPSULE ORAL at 21:42

## 2017-09-30 RX ADMIN — INSULIN LISPRO 8 UNITS: 100 INJECTION, SOLUTION INTRAVENOUS; SUBCUTANEOUS at 13:04

## 2017-09-30 RX ADMIN — INSULIN LISPRO 2 UNITS: 100 INJECTION, SOLUTION INTRAVENOUS; SUBCUTANEOUS at 21:43

## 2017-09-30 RX ADMIN — INSULIN DETEMIR 12 UNITS: 100 INJECTION, SOLUTION SUBCUTANEOUS at 09:13

## 2017-09-30 RX ADMIN — METRONIDAZOLE 500 MG: 500 TABLET ORAL at 21:42

## 2017-09-30 RX ADMIN — METOPROLOL TARTRATE 50 MG: 50 TABLET ORAL at 21:42

## 2017-09-30 RX ADMIN — VANCOMYCIN 125 MG: KIT at 09:22

## 2017-09-30 RX ADMIN — SODIUM CHLORIDE 100 ML/HR: 0.9 INJECTION, SOLUTION INTRAVENOUS at 12:59

## 2017-09-30 RX ADMIN — SODIUM CHLORIDE 100 ML/HR: 0.9 INJECTION, SOLUTION INTRAVENOUS at 03:10

## 2017-09-30 RX ADMIN — BACLOFEN 20 MG: 20 TABLET ORAL at 09:16

## 2017-09-30 RX ADMIN — FAMOTIDINE 20 MG: 20 TABLET ORAL at 17:22

## 2017-09-30 RX ADMIN — METRONIDAZOLE 500 MG: 500 TABLET ORAL at 05:12

## 2017-09-30 RX ADMIN — APIXABAN 2.5 MG: 2.5 TABLET, FILM COATED ORAL at 17:23

## 2017-09-30 RX ADMIN — INSULIN DETEMIR 12 UNITS: 100 INJECTION, SOLUTION SUBCUTANEOUS at 21:43

## 2017-09-30 RX ADMIN — METRONIDAZOLE 500 MG: 500 TABLET ORAL at 14:00

## 2017-09-30 NOTE — PROGRESS NOTES
UROLOGY PROGRESS NOTE   Patient Identifiers: eCcile Mi (MRN [id number])  Date of Service: 9/30/2017        Assessment:   Patient with penile abscess and air in corpora cavernosa likely extending from decubitus ulcer  Has refused surgical exploration and aggressive debridement  No apparent change in examination this morning as compared to previous examinations and notes      Plan:   -continue serial examinations and local wound care, the patient changes mind about surgical exploration under general anesthesia this can be arranged in the future, also potentially as an outpatient  Subjective:     24 HR EVENTS:   no significant events  Patient has  no complaints        Objective:     VITALS:    Vitals:    09/30/17 0734   BP: 146/83   Pulse: 88   Resp: 18   Temp: 99 1 °F (37 3 °C)   SpO2: 100%       INS & OUTS:  [unfilled]    LABS:  Lab Results   Component Value Date    HGB 7 3 (L) 09/29/2017    HCT 23 6 (L) 09/29/2017    WBC 16 42 (H) 09/29/2017     (H) 09/29/2017   ]    Lab Results   Component Value Date     09/29/2017    K 4 8 09/29/2017     (H) 09/29/2017    CO2 19 (L) 09/29/2017    BUN 46 (H) 09/29/2017    CREATININE 2 05 (H) 09/29/2017    CALCIUM 8 2 (L) 09/29/2017    GLUCOSE 191 (H) 09/29/2017   ]    INPATIENT MEDS:    Current Facility-Administered Medications:     acetaminophen (TYLENOL) tablet 650 mg, 650 mg, Oral, Q6H PRN, Elisa iPerce PA-C    apixaban (ELIQUIS) tablet 2 5 mg, 2 5 mg, Oral, BID, Milton Tyson DO, 2 5 mg at 09/29/17 1706    ascorbic acid (VITAMIN C) tablet 500 mg, 500 mg, Oral, Daily, Elisa Pierce PA-C, 500 mg at 09/29/17 0820    baclofen tablet 20 mg, 20 mg, Oral, BID, Elisa Pierce PA-C, 20 mg at 09/29/17 1706    cefdinir (OMNICEF) capsule 300 mg, 300 mg, Oral, Q12H [facility], Violette Ricks MD, 300 mg at 09/29/17 2100    famotidine (PEPCID) tablet 20 mg, 20 mg, Oral, BID, Elisa Pierce PA-C, 20 mg at 09/29/17 1706    ferrous sulfate tablet 162 5 mg, 162 5 mg, Oral, TID With Meals, Elisa Pierce PA-C, 162 5 mg at 09/29/17 1706    haloperidol lactate (HALDOL) injection 2 5 mg, 2 5 mg, Intramuscular, Q6H PRN, Yarelis Calderon,     HYDROmorphone (DILAUDID) tablet 4 mg, 4 mg, Oral, Q4H PRN, Elisa Pierce PA-C, 4 mg at 09/30/17 0037    insulin detemir (LEVEMIR) subcutaneous injection 12 Units, 12 Units, Subcutaneous, Q12H [address], Milton Tyson DO, 12 Units at 09/29/17 2104    insulin lispro (HumaLOG) 100 units/mL subcutaneous injection 1-6 Units, 1-6 Units, Subcutaneous, HS, Milton Tyson DO, 2 Units at 09/29/17 2105    insulin lispro (HumaLOG) 100 units/mL subcutaneous injection 2-12 Units, 2-12 Units, Subcutaneous, TID AC, 2 Units at 09/29/17 1707 **AND** Fingerstick Glucose (POCT), , , TID AC, Milton Tyson DO    insulin lispro (HumaLOG) 100 units/mL subcutaneous injection 5 Units, 5 Units, Subcutaneous, TID With Meals, Yarelis Calderon DO, 5 Units at 09/29/17 1707    metoprolol tartrate (LOPRESSOR) tablet 50 mg, 50 mg, Oral, Q12H [address], Maryam Bahena DO, 50 mg at 09/29/17 2104    metroNIDAZOLE (FLAGYL) tablet 500 mg, 500 mg, Oral, Q8H [address], Reinaldo Mota MD, 500 mg at 09/30/17 0512    nicotine (NICODERM CQ) 7 mg/24hr TD 24 hr patch 1 patch, 1 patch, Transdermal, Daily, Elisa Pierce PA-C    ondansetron (ZOFRAN) injection 4 mg, 4 mg, Intravenous, Q6H PRN, Elisa Pierce PA-C    QUEtiapine (SEROquel) tablet 25 mg, 25 mg, Oral, HS, Milton Tyson DO, 25 mg at 09/29/17 2000    sodium chloride 0 9 % infusion, 100 mL/hr, Intravenous, Continuous, Milton Tyson DO, Last Rate: 100 mL/hr at 09/30/17 0310, 100 mL/hr at 09/30/17 0310    sodium hypochlorite (DAKIN'S HALF-STRENGTH) 0 25 % topical solution 1 application, 1 application, Topical, Daily, Elisa Pierce PA-C, 1 application at 77/24/69 1023    vancomycin (VANCOCIN) oral solution 125 mg, 125 mg, Oral, Q12H [address], Reinaldo Mota MD, 125 mg at 09/29/17 2108      Physical Exam:   /83   Pulse 88   Temp 99 1 °F (37 3 °C) (Tympanic)   Resp 18   Ht 6' 4" (1 93 m)   Wt 68 4 kg (150 lb 12 7 oz)   SpO2 100%   BMI 18 36 kg/m²   GEN: Alert to examiner, tired and groggy    RESP: breathing comfortably with no accessory muscle use    ABD: mildly distended   INCISION: Patient has no incision, but the area of fibrinous tissue was palpated as indurated but does not appear to be particularly tender when examined   EXT: Patient with peripheral contractures   DRAINS: none  MCFADDEN: Suprapubic catheter and in place draining clear yellow urine  no clots       RADIOLOGY:   Reviewed pertinent studies

## 2017-09-30 NOTE — PROGRESS NOTES
Pt complaining of IV being uncomfortable  New IV placed in opposite arm for continuous IV fluids  Tolerated well

## 2017-10-01 ENCOUNTER — APPOINTMENT (INPATIENT)
Dept: CT IMAGING | Facility: HOSPITAL | Age: 37
DRG: 871 | End: 2017-10-01
Payer: MEDICARE

## 2017-10-01 LAB
ALBUMIN SERPL BCP-MCNC: 1.5 G/DL (ref 3.5–5)
ALP SERPL-CCNC: 72 U/L (ref 46–116)
ALT SERPL W P-5'-P-CCNC: 10 U/L (ref 12–78)
AMMONIA PLAS-SCNC: 18 UMOL/L (ref 11–35)
ANION GAP SERPL CALCULATED.3IONS-SCNC: 10 MMOL/L (ref 4–13)
ANION GAP SERPL CALCULATED.3IONS-SCNC: 14 MMOL/L (ref 4–13)
ARTERIAL PATENCY WRIST A: YES
AST SERPL W P-5'-P-CCNC: 7 U/L (ref 5–45)
BASE EXCESS BLDA CALC-SCNC: -13.8 MMOL/L
BASOPHILS # BLD MANUAL: 0 THOUSAND/UL (ref 0–0.1)
BASOPHILS NFR MAR MANUAL: 0 % (ref 0–1)
BILIRUB SERPL-MCNC: 0.18 MG/DL (ref 0.2–1)
BUN SERPL-MCNC: 57 MG/DL (ref 5–25)
BUN SERPL-MCNC: 64 MG/DL (ref 5–25)
CALCIUM SERPL-MCNC: 7.6 MG/DL (ref 8.3–10.1)
CALCIUM SERPL-MCNC: 8.1 MG/DL (ref 8.3–10.1)
CHLORIDE SERPL-SCNC: 114 MMOL/L (ref 100–108)
CHLORIDE SERPL-SCNC: 120 MMOL/L (ref 100–108)
CK SERPL-CCNC: 89 U/L (ref 39–308)
CO2 SERPL-SCNC: 14 MMOL/L (ref 21–32)
CO2 SERPL-SCNC: 14 MMOL/L (ref 21–32)
CREAT SERPL-MCNC: 2.97 MG/DL (ref 0.6–1.3)
CREAT SERPL-MCNC: 3.74 MG/DL (ref 0.6–1.3)
EOSINOPHIL # BLD MANUAL: 0.15 THOUSAND/UL (ref 0–0.4)
EOSINOPHIL NFR BLD MANUAL: 1 % (ref 0–6)
ERYTHROCYTE [DISTWIDTH] IN BLOOD BY AUTOMATED COUNT: 21.9 % (ref 11.6–15.1)
GFR SERPL CREATININE-BSD FRML MDRD: 22 ML/MIN/1.73SQ M
GFR SERPL CREATININE-BSD FRML MDRD: 30 ML/MIN/1.73SQ M
GLUCOSE SERPL-MCNC: 100 MG/DL (ref 65–140)
GLUCOSE SERPL-MCNC: 149 MG/DL (ref 65–140)
GLUCOSE SERPL-MCNC: 281 MG/DL (ref 65–140)
GLUCOSE SERPL-MCNC: 487 MG/DL (ref 65–140)
GLUCOSE SERPL-MCNC: 71 MG/DL (ref 65–140)
GLUCOSE SERPL-MCNC: 73 MG/DL (ref 65–140)
GLUCOSE SERPL-MCNC: 87 MG/DL (ref 65–140)
GLUCOSE SERPL-MCNC: 89 MG/DL (ref 65–140)
HCO3 BLDA-SCNC: 12.1 MMOL/L (ref 22–28)
HCT VFR BLD AUTO: 22.7 % (ref 36.5–49.3)
HGB BLD-MCNC: 6.9 G/DL (ref 12–17)
LACTATE SERPL-SCNC: 0.7 MMOL/L (ref 0.5–2)
LYMPHOCYTES # BLD AUTO: 1.49 THOUSAND/UL (ref 0.6–4.47)
LYMPHOCYTES # BLD AUTO: 10 % (ref 14–44)
MCH RBC QN AUTO: 25 PG (ref 26.8–34.3)
MCHC RBC AUTO-ENTMCNC: 30.4 G/DL (ref 31.4–37.4)
MCV RBC AUTO: 82 FL (ref 82–98)
MONOCYTES # BLD AUTO: 0.3 THOUSAND/UL (ref 0–1.22)
MONOCYTES NFR BLD: 2 % (ref 4–12)
NEUTROPHILS # BLD MANUAL: 12.93 THOUSAND/UL (ref 1.85–7.62)
NEUTS BAND NFR BLD MANUAL: 3 % (ref 0–8)
NEUTS SEG NFR BLD AUTO: 84 % (ref 43–75)
NON VENT ROOM AIR: 21 %
NRBC BLD AUTO-RTO: 0 /100 WBCS
O2 CT BLDA-SCNC: 10.1 ML/DL (ref 16–23)
OXYHGB MFR BLDA: 96.1 % (ref 94–97)
PCO2 BLDA: 27.6 MM HG (ref 36–44)
PH BLDA: 7.26 [PH] (ref 7.35–7.45)
PLATELET # BLD AUTO: 349 THOUSANDS/UL (ref 149–390)
PLATELET BLD QL SMEAR: ADEQUATE
PMV BLD AUTO: 10.1 FL (ref 8.9–12.7)
PO2 BLDA: 104.7 MM HG (ref 75–129)
POTASSIUM SERPL-SCNC: 5 MMOL/L (ref 3.5–5.3)
POTASSIUM SERPL-SCNC: 5.7 MMOL/L (ref 3.5–5.3)
PROT SERPL-MCNC: 7.6 G/DL (ref 6.4–8.2)
RBC # BLD AUTO: 2.76 MILLION/UL (ref 3.88–5.62)
SCHISTOCYTES BLD QL SMEAR: PRESENT
SODIUM SERPL-SCNC: 142 MMOL/L (ref 136–145)
SODIUM SERPL-SCNC: 144 MMOL/L (ref 136–145)
SPECIMEN SOURCE: ABNORMAL
TOTAL CELLS COUNTED SPEC: 100
WBC # BLD AUTO: 14.86 THOUSAND/UL (ref 4.31–10.16)

## 2017-10-01 PROCEDURE — 80048 BASIC METABOLIC PNL TOTAL CA: CPT | Performed by: NURSE PRACTITIONER

## 2017-10-01 PROCEDURE — 80053 COMPREHEN METABOLIC PANEL: CPT | Performed by: NURSE PRACTITIONER

## 2017-10-01 PROCEDURE — 85027 COMPLETE CBC AUTOMATED: CPT | Performed by: NURSE PRACTITIONER

## 2017-10-01 PROCEDURE — 82948 REAGENT STRIP/BLOOD GLUCOSE: CPT

## 2017-10-01 PROCEDURE — 82140 ASSAY OF AMMONIA: CPT | Performed by: NURSE PRACTITIONER

## 2017-10-01 PROCEDURE — 72125 CT NECK SPINE W/O DYE: CPT

## 2017-10-01 PROCEDURE — 70450 CT HEAD/BRAIN W/O DYE: CPT

## 2017-10-01 PROCEDURE — 82550 ASSAY OF CK (CPK): CPT | Performed by: INTERNAL MEDICINE

## 2017-10-01 PROCEDURE — 82805 BLOOD GASES W/O2 SATURATION: CPT | Performed by: NURSE PRACTITIONER

## 2017-10-01 PROCEDURE — 85007 BL SMEAR W/DIFF WBC COUNT: CPT | Performed by: NURSE PRACTITIONER

## 2017-10-01 PROCEDURE — 83605 ASSAY OF LACTIC ACID: CPT | Performed by: NURSE PRACTITIONER

## 2017-10-01 RX ORDER — FUROSEMIDE 10 MG/ML
40 INJECTION INTRAMUSCULAR; INTRAVENOUS ONCE
Status: COMPLETED | OUTPATIENT
Start: 2017-10-01 | End: 2017-10-01

## 2017-10-01 RX ORDER — DEXTROSE MONOHYDRATE 25 G/50ML
25 INJECTION, SOLUTION INTRAVENOUS ONCE
Status: COMPLETED | OUTPATIENT
Start: 2017-10-01 | End: 2017-10-01

## 2017-10-01 RX ADMIN — SODIUM BICARBONATE 100 ML/HR: 84 INJECTION, SOLUTION INTRAVENOUS at 17:48

## 2017-10-01 RX ADMIN — METOPROLOL TARTRATE 50 MG: 50 TABLET ORAL at 21:14

## 2017-10-01 RX ADMIN — PIPERACILLIN SODIUM,TAZOBACTAM SODIUM 2.25 G: 2; .25 INJECTION, POWDER, FOR SOLUTION INTRAVENOUS at 13:15

## 2017-10-01 RX ADMIN — INSULIN DETEMIR 12 UNITS: 100 INJECTION, SOLUTION SUBCUTANEOUS at 21:13

## 2017-10-01 RX ADMIN — VANCOMYCIN HYDROCHLORIDE 750 MG: 750 INJECTION, SOLUTION INTRAVENOUS at 04:26

## 2017-10-01 RX ADMIN — FUROSEMIDE 40 MG: 10 INJECTION, SOLUTION INTRAMUSCULAR; INTRAVENOUS at 11:57

## 2017-10-01 RX ADMIN — SODIUM CHLORIDE 1000 ML: 0.9 INJECTION, SOLUTION INTRAVENOUS at 03:45

## 2017-10-01 RX ADMIN — FAMOTIDINE 20 MG: 20 TABLET ORAL at 17:18

## 2017-10-01 RX ADMIN — APIXABAN 2.5 MG: 2.5 TABLET, FILM COATED ORAL at 17:18

## 2017-10-01 RX ADMIN — SODIUM CHLORIDE 100 ML/HR: 0.9 INJECTION, SOLUTION INTRAVENOUS at 03:42

## 2017-10-01 RX ADMIN — NICOTINE 1 PATCH: 7 PATCH, EXTENDED RELEASE TRANSDERMAL at 09:20

## 2017-10-01 RX ADMIN — INSULIN LISPRO 6 UNITS: 100 INJECTION, SOLUTION INTRAVENOUS; SUBCUTANEOUS at 21:14

## 2017-10-01 RX ADMIN — CEFEPIME HYDROCHLORIDE 1000 MG: 1 INJECTION, POWDER, FOR SOLUTION INTRAMUSCULAR; INTRAVENOUS at 04:25

## 2017-10-01 RX ADMIN — FERROUS SULFATE TAB 325 MG (65 MG ELEMENTAL FE) 162.5 MG: 325 (65 FE) TAB at 17:18

## 2017-10-01 RX ADMIN — DEXTROSE MONOHYDRATE 25 ML: 25 INJECTION, SOLUTION INTRAVENOUS at 11:56

## 2017-10-01 RX ADMIN — SODIUM BICARBONATE 100 ML/HR: 84 INJECTION, SOLUTION INTRAVENOUS at 04:50

## 2017-10-01 RX ADMIN — HYOSCYAMINE SULFATE 1 APPLICATION: 16 SOLUTION at 09:20

## 2017-10-01 RX ADMIN — INSULIN LISPRO 6 UNITS: 100 INJECTION, SOLUTION INTRAVENOUS; SUBCUTANEOUS at 16:43

## 2017-10-01 RX ADMIN — HYDROMORPHONE HYDROCHLORIDE 4 MG: 4 TABLET ORAL at 17:53

## 2017-10-01 RX ADMIN — PIPERACILLIN SODIUM,TAZOBACTAM SODIUM 2.25 G: 2; .25 INJECTION, POWDER, FOR SOLUTION INTRAVENOUS at 17:53

## 2017-10-01 RX ADMIN — QUETIAPINE FUMARATE 25 MG: 25 TABLET ORAL at 21:14

## 2017-10-01 RX ADMIN — BACLOFEN 20 MG: 20 TABLET ORAL at 17:18

## 2017-10-01 NOTE — PROGRESS NOTES
Spoke with patients mother, Maryam Pyle, at length regarding patients fall,  infection and need for surgical intervention  She states her son does not want any intervention and is aware he likely will die without further treatment  She states her son does not want anymore procedures and has been through enough in his life  She elected to make her son a DNR/DNI  She does not believe her son would want any aggressive treatment for his ongoing infection

## 2017-10-01 NOTE — PROGRESS NOTES
PT is a 40 YOM admitted for a Penile abscess  PT has refused surgery to amputate his gangrenous penis  PT was to be discharged on Monday home  PT has possibly become septic  PT is paraplegic and has a RT BKA, a suprapubic hanks catheter and a LLQ colostomy  PT was in a specialty bed due to stage IV decubitus ulcers on his buttocks bilaterally and his sacrum  PT is DM Type I  At 23:00 9/30/17 the RRNP was paged to update on PT condition, PT is tachy, 99 7 temp , hallucinating, urine milky tan/green, confused and lethargic  PT was then made Q4 VS    At 02:28 10/1/17 while rounding on the PT he  was found on the floor   PT had 3 side rails up on specialty bed  PT VS at this time T 97 5 Â°F (36 4 Â°C)   HR 93    RR18   /89    SaO2 100 %  A RR was called and PT was boarded and collared while C-spine was held  PT was transported to the ED for a  head CT  PT was then transferred to the step down unit bed 2

## 2017-10-01 NOTE — PROGRESS NOTES
Called CRNP to make aware of patient's status -- tachy, 99 7 temp , hallucinating, urine milky tan/green, confused and lethargic  No new orders   Will continue to monitor

## 2017-10-01 NOTE — CONSULTS
Consultation - Nephrology   Deion Vazquez  40 y o  male MRN: [id number]  Unit/Bed#: ICU 02 Encounter: [id number]    ASSESSMENT/PLAN:  1  Acute Kidney Injury: Most likely secondary to worsening sepsis, possible urinary retention, +/- ATN  Need to R/o Rhabdo  Currently on IVF with bicarb at 100 ml/hour  Will treat conservatively at this time until seen by palliative care  -Check CPK  -Consider U/A with micro  -Consider renal U/S if renal worsens  -Monitor lab work closely  -No urgent need for RRT at this time, will D/W primary care team     2  Chronic Kidney Disease III:  Likely secondary to DMI, HTN, and recurrent LEONILA in past  -Does not follow with nephrology    3  Acute metabolic encephalopathy:  likely secondary to progressing sepsis:   -Previously refusing ABX-  -Currently on ABX in ICU    4  Penile abscess: refusing surgical intervention  -Urology and surgery following    5  Paroxysmal atrial fibrillation    6  Diabetes mellitis: with hyperglycemia type II    7  Paraplegia:       HISTORY OF PRESENT ILLNESS:  Requesting Physician: Terri Espinal DO  Reason for Consult: LEONILA    Deion Vazquez  is a 40y o  year old male who has a history paraplegia, suprapubic catheter secondary to neurogenic bladder, colostomy, CKD III, previous LEONILA, DM I, and multiple sacral wounds who presented to ED from wound clinic with chills on 9/18/2017  He has been under care with the wound center for wound on his penis in left lower extremity  He has been diagnosed with lactobacillus sepsis from a penile abscess and has declined surgical intervention since admission  He even was evaluated by neuropsych and deemed component to make decisions  Over the last 24 hours, he has been having increasing confusion and falls and now transferred to ICU for acute metabolic encephalopathy and progressive sepsis along with worsening renal failure  A renal consultation is requested today for assistance in the management of LEONILA    On discussion the patient is not responding except for answering yes to everything  Per RN report, his catheter was blocked for some time with decrease U/O and after flushing, U/O increased  Bicarb drip started last evening for decrease bicarb  Per report, palliative care consulted for patient and family has expressed not wanting any further treatment to include surgical intervention or dialysis and now DNR/DNI  PAST MEDICAL HISTORY:  Past Medical History:   Diagnosis Date    Ambulatory dysfunction     Anemia, iron deficiency     transfusion requiring    Atrial fibrillation     AVM (arteriovenous malformation) of duodenum, 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    Chronic deep vein thrombosis (DVT)     Chronic pain     Chronic suprapubic catheter     CKD (chronic kidney disease), stage III     Clostridium difficile infection 08/11/2016    also positive 9/2016, 5/29/2017, 8/15/2017  S/P fecal transplant    Colostomy on examination     GERD (gastroesophageal reflux disease)     History of creation of ostomy     Memory impairment 2011    s/p diabetic coma    Neurogenic bladder     OAB (overactive bladder)     Paraplegia     T3 transverse myelitis vs spinal stoke (AVM); 2012 extensive epidural abscess C7=> conus 2nd extension from deep parspinal abscess L4-S2/sacral decubitus; cord atrophy/myelomalcia T3=>conus     S/P unilateral BKA (below knee amputation)     Right    Sebaceous cyst removed in 2017    Tobacco abuse     Type 1 diabetes mellitus     w/ neuropathy, gastroparesis, retinopathy    Wounds, multiple     pressure ulcers with delayed healing       PAST SURGICAL HISTORY:  Past Surgical History:   Procedure Laterality Date    BELOW KNEE LEG AMPUTATION Right 2009    COLONOSCOPY N/A 3/27/2017    Procedure: COLONOSCOPY;  Surgeon: Alin Littlejohn MD;  [facility]; Service:     COLONOSCOPY N/A 3/29/2017    Procedure: COLONOSCOPY;  Surgeon: Alin Littlejohn MD;  [facility];   Service:  COLONOSCOPY N/A 6/15/2017    Procedure: COLONOSCOPY with FMT;  Surgeon: Andreina Tran MD;  Location:  GI LAB; Service: Gastroenterology    ESOPHAGOGASTRODUODENOSCOPY N/A 3/22/2017    Procedure: ESOPHAGOGASTRODUODENOSCOPY (EGD); Surgeon: Matt Owusu DO;  Location: [facility];   Service:        ALLERGIES:  Allergies   Allergen Reactions    Ciprofloxacin Hcl     Cymbalta [Duloxetine Hcl]     Lyrica [Pregabalin]     Polymyxin B        SOCIAL HISTORY:  History   Alcohol Use No     History   Drug Use    Types: Marijuana     Comment: every 3 months     History   Smoking Status    Current Every Day Smoker    Types: Cigars   Smokeless Tobacco    Current User     Comment: 4-5 cigars/day       FAMILY HISTORY:  Family History   Problem Relation Age of Onset    Hyperlipidemia Mother     Hypertension Mother     Leukemia Brother     Diabetes Paternal Grandfather        MEDICATIONS:    Current Facility-Administered Medications:     acetaminophen (TYLENOL) tablet 650 mg, 650 mg, Oral, Q6H PRN, Elisa Pierce PA-C    apixaban (ELIQUIS) tablet 2 5 mg, 2 5 mg, Oral, BID, Milton Tyson DO, 2 5 mg at 09/30/17 1723    ascorbic acid (VITAMIN C) tablet 500 mg, 500 mg, Oral, Daily, Elisa Pierce PA-C, 500 mg at 09/30/17 0916    baclofen tablet 20 mg, 20 mg, Oral, BID, Elisa Pierce PA-C, 20 mg at 09/30/17 1723    cefepime (MAXIPIME) 1 g/50 mL dextrose IVPB, 1,000 mg, Intravenous, Q12H, Memory Ding, ERNESTO, Stopped at 10/01/17 0455    famotidine (PEPCID) tablet 20 mg, 20 mg, Oral, BID, Elisa Pierce PA-C, 20 mg at 09/30/17 1722    ferrous sulfate tablet 162 5 mg, 162 5 mg, Oral, TID With Meals, Elisa Pierce PA-C, 162 5 mg at 09/30/17 1721    haloperidol lactate (HALDOL) injection 2 5 mg, 2 5 mg, Intramuscular, Q6H PRN, Yojana Lawson DO    HYDROmorphone (DILAUDID) tablet 4 mg, 4 mg, Oral, Q4H PRN, Elisa Pierce PA-C, 4 mg at 09/30/17 0037    insulin detemir (LEVEMIR) subcutaneous injection 12 Units, 12 Units, Subcutaneous, Q12H [address], Milton Jah, DO, 12 Units at 09/30/17 2143    insulin lispro (HumaLOG) 100 units/mL subcutaneous injection 1-6 Units, 1-6 Units, Subcutaneous, HS, Milton Jah, DO, 2 Units at 09/30/17 2143    insulin lispro (HumaLOG) 100 units/mL subcutaneous injection 2-12 Units, 2-12 Units, Subcutaneous, TID AC, 4 Units at 09/30/17 1723 **AND** Fingerstick Glucose (POCT), , , TID AC, Milton Parkerng, DO    insulin lispro (HumaLOG) 100 units/mL subcutaneous injection 5 Units, 5 Units, Subcutaneous, TID With Meals, Ameena Lyn, DO, 5 Units at 09/30/17 1723    metoprolol tartrate (LOPRESSOR) tablet 50 mg, 50 mg, Oral, Q12H [address], Nohemy Herrera, DO, 50 mg at 09/30/17 2142    metroNIDAZOLE (FLAGYL) tablet 500 mg, 500 mg, Oral, Q8H [address], Meenu Nguyen MD, 500 mg at 09/30/17 2142    nicotine (NICODERM CQ) 7 mg/24hr TD 24 hr patch 1 patch, 1 patch, Transdermal, Daily, Elisa Pierce PA-C    ondansetron (ZOFRAN) injection 4 mg, 4 mg, Intravenous, Q6H PRN, Elisa Pierce PA-C    QUEtiapine (SEROquel) tablet 25 mg, 25 mg, Oral, HS, Milton Tyson, DO, 25 mg at 09/30/17 2142    sodium bicarbonate 150 mEq in dextrose 5 % 1,000 mL infusion, 100 mL/hr, Intravenous, Continuous, ERNESTO Lee, Last Rate: 100 mL/hr at 10/01/17 0450, 100 mL/hr at 10/01/17 0450    sodium chloride 0 9 % infusion, 125 mL/hr, Intravenous, Continuous, ERNESTO Barroso, Stopped at 10/01/17 0504    sodium hypochlorite (DAKIN'S HALF-STRENGTH) 0 25 % topical solution 1 application, 1 application, Topical, Daily, Elisa Pierce PA-C, 1 application at 31/04/23 0923    vancomycin (VANCOCIN) IVPB (premix) 750 mg, 12 5 mg/kg, Intravenous, Q12H, ERNESTO Barroso, Stopped at 10/01/17 1038    REVIEW OF SYSTEMS:  Unable to get ROS secondary to patients confusion       PHYSICAL EXAM:  Current Weight: Weight - Scale: 68 4 kg (150 lb 12 7 oz)  First Weight: Weight - Scale: 68 4 kg (150 lb 12 7 oz)  Vitals:    10/01/17 0740   BP: 159/94   Pulse: 72   Resp: (!) 8   Temp:    SpO2: 100%       Intake/Output Summary (Last 24 hours) at 10/01/17 0856  Last data filed at 10/01/17 0800   Gross per 24 hour   Intake          4771 66 ml   Output             5900 ml   Net         -1128 34 ml     General: not in acute distress  Skin: no rash  Eyes: pale conjunctivae, anicteric sclerae  ENT: Dry lips and mucous membranes  Neck: supple, no JVD  Chest: Essentially clear breath sounds, decreased bases  CVS:  normal rate, regular rhythm  Abdomen: soft, non-tender, non-distended, normoactive bowel sounds  Extremities: no edema of both legs, right BKA, left foot wounds  Neuro: awake, alert       Lab Results:   Lab Results   Component Value Date    WBC 14 86 (H) 10/01/2017    HGB 6 9 (LL) 10/01/2017    HCT 22 7 (L) 10/01/2017    MCV 82 10/01/2017     10/01/2017     Lab Results   Component Value Date    GLUCOSE 89 10/01/2017    CALCIUM 8 1 (L) 10/01/2017     10/01/2017    K 5 0 10/01/2017    CO2 14 (L) 10/01/2017     (H) 10/01/2017    BUN 64 (H) 10/01/2017    CREATININE 3 74 (H) 10/01/2017     Lab Results   Component Value Date    CALCIUM 8 1 (L) 10/01/2017    PHOS 3 7 09/19/2017     No results found for: LABPROT    Other Studies:
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
## 2017-10-01 NOTE — RAPID RESPONSE
Progress Note - Rapid Response   Bailee Garcia  40 y o  male MRN: [id number]    Time Called ( Time): 0216  Room#: 10/1/17  Arrival Time ( Time): 0217  Event End Time ( Time):0247  MEWS score at time of Rapid Response: 1  Primary reason for call: Other fall  Interventions:  Airway/Breathing:  No Intervention  Circulation: IV Fluid Bolus  Other Treatments: Ct head and neck       Assessment:   1  Acute metabolic encephalopathy likely secondary to progressing sepsis  2  Mechanical fall likely secondary to encephalopathy and sepsis  3  Sepsis without shock secondary to penile abscess  4  Penile abscess: refusing surgical intervention  5  Acute kidney injury on chronic kidney disease  6  Paroxysmal atrial fibrillation  7  Diabetes mellitis with hyperglycemia type II  8  Paraplegia  9  History of C  diff    Plan:   · Cat scan of head and neck  · CBC, CMP, lactate, ABG, ammonia  · IV fluids       HPI/Chief Complaint (Background/Situation):   Bailee Garcia  is a 40y o  year old male who has been hospitalized for the past 13 days for lactobacillus sepsis from  a penile abscess  The patient has declined surgical intervention since admission  He was seen by neuropsych and was deemed compenent to make his own decision  Over the course of his hospital stay the patients has had periods of confusion and agitation and then periods of lucidity  Over the course of the last 24 hours the patient has been having increasing lethargy with periods of confusion  The patient was seen at approximately 2300, and at that time was sleeping  At 0230, the nursing staff went to check on the patient and he was found on the floor  It appeared the patient was reaching for something and rolled out of bed  Upon my arrival, the patient was laying on the floor  vital signs were within normal limits  He was placed on a backboard for c-spine protection and will be transferred to cat scan       Historical Information Past Medical History:   Diagnosis Date    Ambulatory dysfunction     Anemia, iron deficiency     transfusion requiring    Atrial fibrillation     AVM (arteriovenous malformation) of duodenum, 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    Chronic deep vein thrombosis (DVT)     Chronic pain     Chronic suprapubic catheter     CKD (chronic kidney disease), stage III     Clostridium difficile infection 08/11/2016    also positive 9/2016, 5/29/2017, 8/15/2017  S/P fecal transplant    Colostomy on examination     GERD (gastroesophageal reflux disease)     History of creation of ostomy     Memory impairment 2011    s/p diabetic coma    Neurogenic bladder     OAB (overactive bladder)     Paraplegia     T3 transverse myelitis vs spinal stoke (AVM); 2012 extensive epidural abscess C7=> conus 2nd extension from deep parspinal abscess L4-S2/sacral decubitus; cord atrophy/myelomalcia T3=>conus     S/P unilateral BKA (below knee amputation)     Right    Sebaceous cyst removed in 2017    Tobacco abuse     Type 1 diabetes mellitus     w/ neuropathy, gastroparesis, retinopathy    Wounds, multiple     pressure ulcers with delayed healing     Past Surgical History:   Procedure Laterality Date    BELOW KNEE LEG AMPUTATION Right 2009    COLONOSCOPY N/A 3/27/2017    Procedure: COLONOSCOPY;  Surgeon: Kayy Thompson MD;  [facility]; Service:     COLONOSCOPY N/A 3/29/2017    Procedure: COLONOSCOPY;  Surgeon: Kayy Thompson MD;  [facility]; Service:     COLONOSCOPY N/A 6/15/2017    Procedure: COLONOSCOPY with FMT;  Surgeon: Callie Lozano MD;  [facility]; Service: Gastroenterology    ESOPHAGOGASTRODUODENOSCOPY N/A 3/22/2017    Procedure: ESOPHAGOGASTRODUODENOSCOPY (EGD); Surgeon: Joan Pemberton DO;  [facility];   Service:      Social History   History   Alcohol Use No     History   Drug Use    Types: Marijuana     Comment: every 3 months     History   Smoking Status    Current Every Day Smoker    Types: Cigars   Smokeless Tobacco    Current User     Comment: 4-5 cigars/day     Family History: non-contributory    Meds/Allergies     apixaban 2 5 mg Oral BID   ascorbic acid 500 mg Oral Daily   baclofen 20 mg Oral BID   cefdinir 300 mg Oral Q12H [address]   famotidine 20 mg Oral BID   ferrous sulfate 162 5 mg Oral TID With Meals   insulin detemir 12 Units Subcutaneous Q12H [address]   insulin lispro 1-6 Units Subcutaneous HS   insulin lispro 2-12 Units Subcutaneous TID AC   insulin lispro 5 Units Subcutaneous TID With Meals   metoprolol tartrate 50 mg Oral Q12H [address]   metroNIDAZOLE 500 mg Oral Q8H [address]   nicotine 1 patch Transdermal Daily   QUEtiapine 25 mg Oral HS   sodium hypochlorite 1 application Topical Daily   vancomycin 125 mg Oral Q12H [address]         sodium chloride 100 mL/hr Last Rate: 100 mL/hr (09/30/17 2142)       Allergies   Allergen Reactions    Ciprofloxacin Hcl     Cymbalta [Duloxetine Hcl]     Lyrica [Pregabalin]     Polymyxin B        ROS: unable to assess    Physical Exam:  Gen: lethragic  HEENT: NC/AT PERRLA  Neck: supple, no JVD, trachea midline, no adenopathy  Chest: CTA  Cor: Clear S1 and S2  Abd: Soft NT/ND +BS  Ext: no edema  Neuro: respondes to noxious stimuli, moves spontaneously  Skin: multiple abrasions, and decub      Intake/Output Summary (Last 24 hours) at 10/01/17 0246  Last data filed at 09/30/17 2317   Gross per 24 hour   Intake             1720 ml   Output             3150 ml   Net            -1430 ml       Respiratory    Lab Data (Last 4 hours)    None         O2/Vent Data (Last 4 hours)    None              Invasive Devices     Peripheral Intravenous Line            Peripheral IV 09/29/17 Left Forearm 1 day          Drain            Suprapubic Catheter Latex 67608 days    Colostomy Descending/sigmoid  days                DIAGNOSTIC DATA:    Lab: I have personally reviewed pertinent lab results     CBC:     Results from last 7 days  Lab Units 09/29/17  1148   WBC Thousand/uL 16 42*   HEMOGLOBIN g/dL 7 3*   HEMATOCRIT % 23 6*   PLATELETS Thousands/uL 407*     CMP:     Results from last 7 days  Lab Units 09/29/17  1148 09/26/17  0531   SODIUM mmol/L 139 139   POTASSIUM mmol/L 4 8 4 8   CHLORIDE mmol/L 111* 109*   CO2 mmol/L 19* 19*   BUN mg/dL 46* 41*   CREATININE mg/dL 2 05* 2 69*   CALCIUM mg/dL 8 2* 8 1*   TOTAL PROTEIN g/dL 7 9 7 4   BILIRUBIN TOTAL mg/dL 0 13* 0 20   ALK PHOS U/L 75 75   ALT U/L 12 8*   AST U/L 12 10   GLUCOSE RANDOM mg/dL 191* 197*     PT/INR:   No results found for: PT, INR,   Magnesium: No components found for: MAG,   Phosphorous: No results found for: PHOS    Microbiology:  Lab Results   Component Value Date    BLOODCX Lactobacillus 09/18/2017    BLOODCX Lactobacillus 09/18/2017    BLOODCX No Growth After 5 Days  06/28/2017    URINECX >100,000 cfu/ml - strain 1 Klebsiella pneumoniae 09/18/2017    URINECX >100,000 cfu/ml - strain 2 Klebsiella pneumoniae 09/18/2017    URINECX >100,000 cfu/ml Mixed Contaminants X4 07/03/2017    WOUNDCULT 4+ Growth of Beta Hemolytic Streptococcus Group C 06/29/2017    WOUNDCULT 3+ Growth of Escherichia coli 06/29/2017    WOUNDCULT 3+ Growth of Proteus mirabilis 06/29/2017         OUTCOME:   Transfer to step down unit  Family notified of transfer: yes  Family member contacted: Mother  Code Status: Prior  Critical Care Time: Total Critical Care time spent 15 minutes excluding procedures, teaching and family updates
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
## 2017-10-01 NOTE — PROGRESS NOTES
UROLOGY PROGRESS NOTE   Patient Identifiers: Carlin Montague (MRN [id number])  Date of Service: 10/1/2017        Assessment:   26-year-old man with multiple medical problems including amputations suprapubic catheter and penile soft tissue infection refusing surgical intervention     Plan:   -patient appears to have clinically worsened since yesterday and remains disoriented  Has refused aggressive debridement in the past and I verified this with multiple family members at the bedside this morning  I again described to them the pathologic process and that this may require complete penectomy for adequate debridement and they are steadfast that he would not want this and does not give informed consent to this procedure        Subjective:     24 HR EVENTS:   Patient was found down due to delirium or acute mental status changes due to progressing infection, rapid response was called      Patient has  Patient has no complaints to me this morning however he does not have meaningful responses to what I asked him      Objective:     VITALS:    Vitals:    10/01/17 1143   BP: 159/91   Pulse: 72   Resp: (!) 9   Temp: (!) 97 1 °F (36 2 °C)   SpO2: 100%       INS & OUTS:  [unfilled]    LABS:  Lab Results   Component Value Date    HGB 6 9 (LL) 10/01/2017    HCT 22 7 (L) 10/01/2017    WBC 14 86 (H) 10/01/2017     10/01/2017   ]    Lab Results   Component Value Date     10/01/2017    K 5 7 (H) 10/01/2017     (H) 10/01/2017    CO2 14 (L) 10/01/2017    BUN 57 (H) 10/01/2017    CREATININE 2 97 (H) 10/01/2017    CALCIUM 7 6 (L) 10/01/2017    GLUCOSE 71 10/01/2017   ]    INPATIENT MEDS:    Current Facility-Administered Medications:     acetaminophen (TYLENOL) tablet 650 mg, 650 mg, Oral, Q6H PRN, Elisa Pierce PA-C    apixaban (ELIQUIS) tablet 2 5 mg, 2 5 mg, Oral, BID, Milton Tyson DO, 2 5 mg at 09/30/17 1723    ascorbic acid (VITAMIN C) tablet 500 mg, 500 mg, Oral, Daily, Elisa Pierce PA-C, 500 mg at 09/30/17 0916    baclofen tablet 20 mg, 20 mg, Oral, BID, Elisa Pierce PA-C, 20 mg at 09/30/17 1723    famotidine (PEPCID) tablet 20 mg, 20 mg, Oral, BID, Elisa Pierce PA-C, 20 mg at 09/30/17 1722    ferrous sulfate tablet 162 5 mg, 162 5 mg, Oral, TID With Meals, Elisa Pierce PA-C, 162 5 mg at 09/30/17 1721    haloperidol lactate (HALDOL) injection 2 5 mg, 2 5 mg, Intramuscular, Q6H PRN, Cuba Blackburn DO    HYDROmorphone (DILAUDID) tablet 4 mg, 4 mg, Oral, Q4H PRN, Elisa Pierce PA-C, 4 mg at 09/30/17 0037    insulin detemir (LEVEMIR) subcutaneous injection 12 Units, 12 Units, Subcutaneous, Q12H [address], Milton Tyson DO, 12 Units at 09/30/17 2143    insulin lispro (HumaLOG) 100 units/mL subcutaneous injection 1-6 Units, 1-6 Units, Subcutaneous, HS, Milton Tyson DO, 2 Units at 09/30/17 2143    insulin lispro (HumaLOG) 100 units/mL subcutaneous injection 2-12 Units, 2-12 Units, Subcutaneous, TID AC, 4 Units at 09/30/17 1723 **AND** Fingerstick Glucose (POCT), , , TID AC, Milton Tyson DO    insulin lispro (HumaLOG) 100 units/mL subcutaneous injection 5 Units, 5 Units, Subcutaneous, TID With Meals, Cuba Blackburn DO, 5 Units at 09/30/17 1723    metoprolol tartrate (LOPRESSOR) tablet 50 mg, 50 mg, Oral, Q12H [address], Anoop Schofield, , 50 mg at 09/30/17 2142    nicotine (NICODERM CQ) 7 mg/24hr TD 24 hr patch 1 patch, 1 patch, Transdermal, Daily, Elisa Pierce PA-C, 1 patch at 10/01/17 0920    ondansetron (ZOFRAN) injection 4 mg, 4 mg, Intravenous, Q6H PRN, Elisa Pierce PA-C    piperacillin-tazobactam (ZOSYN) 2 25 g in sodium chloride 0 9 % 50 mL IVPB, 2 25 g, Intravenous, Q6H, Megan Faria MD    QUEtiapine (SEROquel) tablet 25 mg, 25 mg, Oral, HS, Milton Tyson DO, 25 mg at 09/30/17 2142    sodium bicarbonate 150 mEq in dextrose 5 % 1,000 mL infusion, 100 mL/hr, Intravenous, Continuous, ERNESTO Lee, Last Rate: 100 mL/hr at 10/01/17 0450, 100 mL/hr at 10/01/17 0450    sodium chloride 0 9 % infusion, 125 mL/hr, Intravenous, Continuous, Dharmesh, CRRIKI, Stopped at 10/01/17 0504    sodium hypochlorite (DAKIN'S HALF-STRENGTH) 0 25 % topical solution 1 application, 1 application, Topical, Daily, Elisa Pierce PA-C, 1 application at 51/14/09 0920      Physical Exam:   /91   Pulse 72   Temp (!) 97 1 °F (36 2 °C) (Temporal)   Resp (!) 9   Ht 6' 4" (1 93 m)   Wt 68 4 kg (150 lb 12 7 oz)   SpO2 100%   BMI 18 36 kg/m²   GEN: Disoriented, intermittently opens eyes, contracted    RESP: Patient breathing very slowly    ABD: mildly distended   INCISION: Not applicable   EXT: Amputations and contractures present   DRAINS: none  MCFADDEN: Suprapubic catheter is in place and draining urine with debris  no clots      Genitourinary:  The patient does have an opening on the right aspect of his penis with fibrinous debris at its base and apparent necrotic tissue with some pus that can be expressed  This represents breakdown of the area seen on examination yesterday indicating likely progressing infection      RADIOLOGY:   No new films for my review

## 2017-10-01 NOTE — PROGRESS NOTES
Family meeting    Present:  Patient, mother, father, sisters and relatives  Patient was transferred to step-down due to worsening encephalopathy  The patient has eyes closed however at times does open eyes and appears to understand current disease process and consequences if does not agree with recommended treatment  Family present also reiterates that patient is "stubborn" and is currently aware of situation  Family understands current disease process has been a gradual decline  At home, the patient was noncompliant and would sit in wheelchair for 8 hours at a time refusing to move  Even late night or early morning, he would be in same position playing video games  The patient has significant infection including gas and gangrene extending from decubitus ulcer into perineum and corpora cavernosum  This is likely the cause of the patient's Lactobacillus sepsis  Attempt to trial oral antibiotics failed  Currently on IV antibiotics however still refusing surgical debridement  Patient understands that without surgical debridement of decubitus wound and penile abscess with possible penectomy the patient may die  This is evidence by events last night including worsening renal failure and encephalopathy  At this point family members present understands severity  They wish to have private time to discuss with patient to help him make the best decision possible in his interest       Ct Head Wo Contrast  Result Date: 10/1/2017  Impression: No acute intracranial abnormality  Findings consistent with preliminary report issued by [email or web address]  Precocious vascular calcifications  Workstation performed: [id number]     Ct Spine Cervical Wo Contrast  Result Date: 10/1/2017  Impression: No cervical spine fracture or traumatic malalignment  Findings consistent with preliminary report issued by [facility]       Workstation performed: [id number]     Ct Pelvis Wo Contrast  Result Date: 9/25/2017  Impression: 1  Large decubitus ulcer overlying the right ischial tuberosity again seen with gas extending into the perineum and corpora cavernosa bilaterally  The amount of gas appears slightly decreased compared to the prior study  However, there is a new focus of  soft tissue gas superficial to the right corpora cavernosa  This may be related to spread of infection versus the sequela of instrumentation and clinical correlation is recommended  2   Stable bilateral inguinal and pelvic lymphadenopathy though not well visualized due to lack of oral and intravenous contrast  3   Marked bladder distention despite suprapubic catheter   ##imslh##imslh Workstation performed: [id number]

## 2017-10-02 LAB
ALBUMIN SERPL BCP-MCNC: 1.5 G/DL (ref 3.5–5)
ALP SERPL-CCNC: 75 U/L (ref 46–116)
ALT SERPL W P-5'-P-CCNC: 10 U/L (ref 12–78)
ANION GAP SERPL CALCULATED.3IONS-SCNC: 9 MMOL/L (ref 4–13)
AST SERPL W P-5'-P-CCNC: 8 U/L (ref 5–45)
BASOPHILS # BLD AUTO: 0.09 THOUSANDS/ΜL (ref 0–0.1)
BASOPHILS NFR BLD AUTO: 1 % (ref 0–1)
BILIRUB SERPL-MCNC: 0.13 MG/DL (ref 0.2–1)
BLD SMEAR INTERP: NORMAL
BUN SERPL-MCNC: 49 MG/DL (ref 5–25)
CALCIUM SERPL-MCNC: 8.1 MG/DL (ref 8.3–10.1)
CHLORIDE SERPL-SCNC: 111 MMOL/L (ref 100–108)
CK SERPL-CCNC: 16 U/L (ref 39–308)
CO2 SERPL-SCNC: 22 MMOL/L (ref 21–32)
CREAT SERPL-MCNC: 2.84 MG/DL (ref 0.6–1.3)
EOSINOPHIL # BLD AUTO: 0.26 THOUSAND/ΜL (ref 0–0.61)
EOSINOPHIL NFR BLD AUTO: 2 % (ref 0–6)
ERYTHROCYTE [DISTWIDTH] IN BLOOD BY AUTOMATED COUNT: 22.5 % (ref 11.6–15.1)
GFR SERPL CREATININE-BSD FRML MDRD: 31 ML/MIN/1.73SQ M
GLUCOSE SERPL-MCNC: 109 MG/DL (ref 65–140)
GLUCOSE SERPL-MCNC: 209 MG/DL (ref 65–140)
GLUCOSE SERPL-MCNC: 263 MG/DL (ref 65–140)
GLUCOSE SERPL-MCNC: 268 MG/DL (ref 65–140)
GLUCOSE SERPL-MCNC: 41 MG/DL (ref 65–140)
GLUCOSE SERPL-MCNC: 55 MG/DL (ref 65–140)
GLUCOSE SERPL-MCNC: 81 MG/DL (ref 65–140)
HCT VFR BLD AUTO: 21.5 % (ref 36.5–49.3)
HGB BLD-MCNC: 6.6 G/DL (ref 12–17)
LDH SERPL-CCNC: 141 U/L (ref 81–234)
LYMPHOCYTES # BLD AUTO: 1.59 THOUSANDS/ΜL (ref 0.6–4.47)
LYMPHOCYTES NFR BLD AUTO: 12 % (ref 14–44)
MCH RBC QN AUTO: 25.5 PG (ref 26.8–34.3)
MCHC RBC AUTO-ENTMCNC: 30.7 G/DL (ref 31.4–37.4)
MCV RBC AUTO: 83 FL (ref 82–98)
MONOCYTES # BLD AUTO: 0.94 THOUSAND/ΜL (ref 0.17–1.22)
MONOCYTES NFR BLD AUTO: 7 % (ref 4–12)
NEUTROPHILS # BLD AUTO: 10.26 THOUSANDS/ΜL (ref 1.85–7.62)
NEUTS SEG NFR BLD AUTO: 78 % (ref 43–75)
PLATELET # BLD AUTO: 403 THOUSANDS/UL (ref 149–390)
PMV BLD AUTO: 10.2 FL (ref 8.9–12.7)
POTASSIUM SERPL-SCNC: 4.3 MMOL/L (ref 3.5–5.3)
PROT SERPL-MCNC: 7.7 G/DL (ref 6.4–8.2)
RBC # BLD AUTO: 2.59 MILLION/UL (ref 3.88–5.62)
SODIUM SERPL-SCNC: 142 MMOL/L (ref 136–145)
VIT B1 BLD-SCNC: 174.5 NMOL/L (ref 66.5–200)
WBC # BLD AUTO: 13.14 THOUSAND/UL (ref 4.31–10.16)

## 2017-10-02 PROCEDURE — 82550 ASSAY OF CK (CPK): CPT | Performed by: NURSE PRACTITIONER

## 2017-10-02 PROCEDURE — 83010 ASSAY OF HAPTOGLOBIN QUANT: CPT | Performed by: INTERNAL MEDICINE

## 2017-10-02 PROCEDURE — 83615 LACTATE (LD) (LDH) ENZYME: CPT | Performed by: INTERNAL MEDICINE

## 2017-10-02 PROCEDURE — 82948 REAGENT STRIP/BLOOD GLUCOSE: CPT

## 2017-10-02 PROCEDURE — 80053 COMPREHEN METABOLIC PANEL: CPT | Performed by: INTERNAL MEDICINE

## 2017-10-02 PROCEDURE — 85025 COMPLETE CBC W/AUTO DIFF WBC: CPT | Performed by: INTERNAL MEDICINE

## 2017-10-02 RX ORDER — HYDROMORPHONE HCL 110MG/55ML
0.2 PATIENT CONTROLLED ANALGESIA SYRINGE INTRAVENOUS EVERY 4 HOURS PRN
Status: DISCONTINUED | OUTPATIENT
Start: 2017-10-02 | End: 2017-10-06

## 2017-10-02 RX ADMIN — BACLOFEN 20 MG: 20 TABLET ORAL at 17:21

## 2017-10-02 RX ADMIN — PIPERACILLIN SODIUM,TAZOBACTAM SODIUM 2.25 G: 2; .25 INJECTION, POWDER, FOR SOLUTION INTRAVENOUS at 11:45

## 2017-10-02 RX ADMIN — HYDROMORPHONE HYDROCHLORIDE 0.2 MG: 1 INJECTION, SOLUTION INTRAMUSCULAR; INTRAVENOUS; SUBCUTANEOUS at 15:23

## 2017-10-02 RX ADMIN — PIPERACILLIN SODIUM,TAZOBACTAM SODIUM 2.25 G: 2; .25 INJECTION, POWDER, FOR SOLUTION INTRAVENOUS at 18:05

## 2017-10-02 RX ADMIN — INSULIN LISPRO 6 UNITS: 100 INJECTION, SOLUTION INTRAVENOUS; SUBCUTANEOUS at 16:50

## 2017-10-02 RX ADMIN — PIPERACILLIN SODIUM,TAZOBACTAM SODIUM 2.25 G: 2; .25 INJECTION, POWDER, FOR SOLUTION INTRAVENOUS at 00:19

## 2017-10-02 RX ADMIN — HYDROMORPHONE HYDROCHLORIDE 4 MG: 4 TABLET ORAL at 09:48

## 2017-10-02 RX ADMIN — HYDROMORPHONE HYDROCHLORIDE 4 MG: 4 TABLET ORAL at 20:22

## 2017-10-02 RX ADMIN — HYDROMORPHONE HYDROCHLORIDE 0.2 MG: 2 INJECTION, SOLUTION INTRAMUSCULAR; INTRAVENOUS; SUBCUTANEOUS at 22:23

## 2017-10-02 RX ADMIN — PIPERACILLIN SODIUM,TAZOBACTAM SODIUM 2.25 G: 2; .25 INJECTION, POWDER, FOR SOLUTION INTRAVENOUS at 05:44

## 2017-10-02 RX ADMIN — FAMOTIDINE 20 MG: 20 TABLET ORAL at 08:14

## 2017-10-02 RX ADMIN — INSULIN DETEMIR 12 UNITS: 100 INJECTION, SOLUTION SUBCUTANEOUS at 08:14

## 2017-10-02 RX ADMIN — INSULIN LISPRO 3 UNITS: 100 INJECTION, SOLUTION INTRAVENOUS; SUBCUTANEOUS at 22:25

## 2017-10-02 RX ADMIN — FERROUS SULFATE TAB 325 MG (65 MG ELEMENTAL FE) 162.5 MG: 325 (65 FE) TAB at 11:45

## 2017-10-02 RX ADMIN — HYOSCYAMINE SULFATE 1 APPLICATION: 16 SOLUTION at 08:15

## 2017-10-02 RX ADMIN — APIXABAN 2.5 MG: 2.5 TABLET, FILM COATED ORAL at 08:14

## 2017-10-02 RX ADMIN — FAMOTIDINE 20 MG: 20 TABLET ORAL at 17:21

## 2017-10-02 RX ADMIN — FERROUS SULFATE TAB 325 MG (65 MG ELEMENTAL FE) 162.5 MG: 325 (65 FE) TAB at 08:14

## 2017-10-02 RX ADMIN — BACLOFEN 20 MG: 20 TABLET ORAL at 08:14

## 2017-10-02 RX ADMIN — INSULIN DETEMIR 8 UNITS: 100 INJECTION, SOLUTION SUBCUTANEOUS at 22:25

## 2017-10-02 RX ADMIN — OXYCODONE HYDROCHLORIDE AND ACETAMINOPHEN 500 MG: 500 TABLET ORAL at 08:14

## 2017-10-02 RX ADMIN — METOPROLOL TARTRATE 50 MG: 50 TABLET ORAL at 08:14

## 2017-10-02 RX ADMIN — FERROUS SULFATE TAB 325 MG (65 MG ELEMENTAL FE) 162.5 MG: 325 (65 FE) TAB at 17:21

## 2017-10-02 RX ADMIN — APIXABAN 2.5 MG: 2.5 TABLET, FILM COATED ORAL at 17:21

## 2017-10-02 RX ADMIN — QUETIAPINE FUMARATE 25 MG: 25 TABLET ORAL at 20:22

## 2017-10-02 RX ADMIN — METOPROLOL TARTRATE 50 MG: 50 TABLET ORAL at 22:25

## 2017-10-02 NOTE — WOUND OSTOMY CARE
Patient seen for bed re-evaluation--nursing team asking how to clean the clinitron bed (becoming stained with odor due to wound drainage)  Spoke with Dr Jacqueline Maxwell about switching to a low air-loss mattress to manage the moisture better  Dr Jacqueline Maxwell prefers the patient stay on the Clinitron bed for his wounds  [facility] contacted--to perform bed cover change 10/2/17 or 10/3/2017  Call confirmation #: [id number]  Patient and staff made aware  Please contact the wound care team with any questions at extension [phone]    Yamil SHARIFN, RN, CCRN
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
## 2017-10-02 NOTE — CONSULTS
Consultation - [place] Thomas Santana  40 y o  male MRN: [id number]  Unit/Bed#: ICU 02 Encounter: [id number]      Assessment/Plan     Assessment:  1  Sepsis/Bacteremia due to penile abscess with gas and gangrene, lactobacillus bacteremia  2  Paraplegia due to transverse myelitis  3  Suprapubic catheter  4  IDDM  5  H/O C Diff with stool transplant  6  LEONILA on CKD  7  H/O BKA  8  Chronic pain syndrome  9  Stage IV sacral ulcer  10  Delirium     Plan:  1  Add IV Dilaudid 0 2 mg q4H PRN for severe pain  2  Continue treatment for delirium and regulation of circadian rhythm   3  Goals- Long conversation with patient at bedside  He is able to tell me his medical diagnoses and proposed treatments but does not feel like he has been refusing interventions  He would like to ensure conservative measures are exhausted before discussion of aggressive measures  He feels that he needs time to determine how these treatments will affect him  (ie  He had refused antibiotics as he knew it could worsen kidney failure and he does not want dialysis-- but now that he has thought about it further, he is willing to proceed ) He worries that blood products will react to him and feels uncomfortable not knowing the human donor, however he states if it is necessary for survival, he would consent to blood  He would not engage in conversation about surgical debridement as he feels it is not indicated at this time as he wants longer time with antibiotics to see if this works for him  He is not ready for a comfort only approach to his care  I discussed this with his mother and she agrees with his requests  Discussed with both patient and mother that- although we will respect his wishes- without definitive treatment of infection, it is highly unlikely this will heal and we will need to have ongoing discussions  They agree to a multi-disciplinary team meeting- this is scheduled for Wednesday 10/4 at 0930 in patient's room   I will ask for SLIM and a representative from either Urology or ID to be in attendance  History of Present Illness   Physician Requesting Consult: Cheng Carlson MD  Reason for Consult / Principal Problem: goals  Hx and PE limited by: NA  HPI: Shanell Casillsa  is a 40y o  year old male who presented to [facility] on 9/18 for chills  He has a complex medical history including paraplegia, suprapubic catheter, colostomy, multiple sacral wounds and others as listed below  He was sent in from the wound care clinic due to concern for sepsis  He is currently on HD 14 and pertinent details of this prolonged hospitalization include:  - recurrent sepsis due to penile abscess and gas gangrene extending from perineum to corpus cavernosum  Multiple conversations have occurred between patient and urology, but has not consented to any procedure for debridement    - Gram positive shima bacteremia  - intermittent notes stating hallucinations  - On 9/26- he was confused and bit his IV line and had ongoing agitation throughout the night  - On 9/27- similar incident occurred with patient removing IV site and refusing medications  - On 9/29- deemed to have capacity by neuropsychiatry   - On 10/1 - RRT was called after he fell, he was more confused and transferred to the ICU  His mother was notified and she made him a DNR/DNI stating that he has had worsening quality of life and does not want aggressive interventions  Patient seen and examined today, no family available at bedside  He states that he is having ongoing issues with pain  Uses PO Dilaudid at home but "keeps busy" and now that he is not doing normal activity, he finds it fatiguing and fears that he is missing what doctors are telling him  He requests IV for the interim as he feels this is shorter acting  Opposed to ER opioids as this is how "junkies are made"     We had a long conversation about his current medical condition and his refusal of interventions, please see PLAN as this will indicate pertinent details of our conversation  I spoke with his mom as well- she agrees with Ovidio's plan of care for now  Inpatient consult to Palliative Care  Consult performed by: Niurka Yanez  Consult ordered by: Krystin Somers          Review of Systems   Constitutional: Positive for appetite change  Genitourinary: Positive for genital sores and penile pain  All other systems reviewed and are negative  Historical Information   Past Medical History:   Diagnosis Date    Ambulatory dysfunction     Anemia, iron deficiency     transfusion requiring    Atrial fibrillation     AVM (arteriovenous malformation) of duodenum, 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    Chronic deep vein thrombosis (DVT)     Chronic pain     Chronic suprapubic catheter     CKD (chronic kidney disease), stage III     Clostridium difficile infection 08/11/2016    also positive 9/2016, 5/29/2017, 8/15/2017  S/P fecal transplant    Colostomy on examination     GERD (gastroesophageal reflux disease)     History of creation of ostomy     Memory impairment 2011    s/p diabetic coma    Neurogenic bladder     OAB (overactive bladder)     Paraplegia     T3 transverse myelitis vs spinal stoke (AVM); 2012 extensive epidural abscess C7=> conus 2nd extension from deep parspinal abscess L4-S2/sacral decubitus; cord atrophy/myelomalcia T3=>conus     S/P unilateral BKA (below knee amputation)     Right    Sebaceous cyst removed in 2017    Tobacco abuse     Type 1 diabetes mellitus     w/ neuropathy, gastroparesis, retinopathy    Wounds, multiple     pressure ulcers with delayed healing     Past Surgical History:   Procedure Laterality Date    BELOW KNEE LEG AMPUTATION Right 2009    COLONOSCOPY N/A 3/27/2017    Procedure: COLONOSCOPY;  Surgeon: Angelina Hernández MD;  Location: [facility]; Service:     COLONOSCOPY N/A 3/29/2017    Procedure: COLONOSCOPY;  Surgeon: Angelina Hernández MD;  Location: [facility];   Service:    Yoav Bello COLONOSCOPY N/A 6/15/2017    Procedure: COLONOSCOPY with FMT;  Surgeon: Palma Bailey MD;  Location: [facility]; Service: Gastroenterology    ESOPHAGOGASTRODUODENOSCOPY N/A 3/22/2017    Procedure: ESOPHAGOGASTRODUODENOSCOPY (EGD); Surgeon: Ankita Wang DO;  Location: [facility];   Service:      Social History     Social History    Marital status: Single     Spouse name: N/A    Number of children: N/A    Years of education: N/A     Social History Main Topics    Smoking status: Current Every Day Smoker     Types: Cigars    Smokeless tobacco: Current User      Comment: 4-5 cigars/day    Alcohol use No    Drug use:      Types: Marijuana      Comment: every 3 months    Sexual activity: Not Asked     Other Topics Concern    None     Social History Narrative    None     Family History   Problem Relation Age of Onset    Hyperlipidemia Mother     Hypertension Mother     Leukemia Brother     Diabetes Paternal Grandfather        Meds/Allergies   all current active meds have been reviewed and current meds:   Current Facility-Administered Medications   Medication Dose Route Frequency    acetaminophen (TYLENOL) tablet 650 mg  650 mg Oral Q6H PRN    apixaban (ELIQUIS) tablet 2 5 mg  2 5 mg Oral BID    ascorbic acid (VITAMIN C) tablet 500 mg  500 mg Oral Daily    baclofen tablet 20 mg  20 mg Oral BID    famotidine (PEPCID) tablet 20 mg  20 mg Oral BID    ferrous sulfate tablet 162 5 mg  162 5 mg Oral TID With Meals    haloperidol lactate (HALDOL) injection 2 5 mg  2 5 mg Intramuscular Q6H PRN    HYDROmorphone (DILAUDID) tablet 4 mg  4 mg Oral Q4H PRN    insulin detemir (LEVEMIR) subcutaneous injection 12 Units  12 Units Subcutaneous Q12H [address]    insulin lispro (HumaLOG) 100 units/mL subcutaneous injection 1-6 Units  1-6 Units Subcutaneous HS    insulin lispro (HumaLOG) 100 units/mL subcutaneous injection 2-12 Units  2-12 Units Subcutaneous TID AC    insulin lispro (HumaLOG) 100 units/mL subcutaneous injection 5 Units  5 Units Subcutaneous TID With Meals    metoprolol tartrate (LOPRESSOR) tablet 50 mg  50 mg Oral Q12H [address]    nicotine (NICODERM CQ) 7 mg/24hr TD 24 hr patch 1 patch  1 patch Transdermal Daily    ondansetron (ZOFRAN) injection 4 mg  4 mg Intravenous Q6H PRN    piperacillin-tazobactam (ZOSYN) 2 25 g in sodium chloride 0 9 % 50 mL IVPB  2 25 g Intravenous Q6H    QUEtiapine (SEROquel) tablet 25 mg  25 mg Oral HS    sodium hypochlorite (DAKIN'S HALF-STRENGTH) 0 25 % topical solution 1 application  1 application Topical Daily       Palliative Care Medications: PO Dilaudid 4 mg PRN    Allergies   Allergen Reactions    Ciprofloxacin Hcl     Cymbalta [Duloxetine Hcl]     Lyrica [Pregabalin]     Polymyxin B        Objective     Physical Exam   Constitutional: He is oriented to person, place, and time  Chronically ill, no distress   HENT:   Head: Normocephalic and atraumatic  Mouth/Throat: No oropharyngeal exudate  Eyes: Right eye exhibits no discharge  Left eye exhibits no discharge  No scleral icterus  Neck: No JVD present  No thyromegaly present  Cardiovascular:   Tachycardic and regular    Pulmonary/Chest: Effort normal and breath sounds normal  No stridor  No respiratory distress  Abdominal: Soft  Bowel sounds are normal  He exhibits no distension  +ostomy with good output   Musculoskeletal: He exhibits no edema  S/p BKA   Neurological: He is alert and oriented to person, place, and time  Skin: Skin is warm and dry  Psychiatric: He has a normal mood and affect  Nursing note and vitals reviewed  Lab Results:   I have personally reviewed pertinent labs  , CBC:   Lab Results   Component Value Date    WBC 13 14 (H) 10/02/2017    HGB 6 6 (LL) 10/02/2017    HCT 21 5 (L) 10/02/2017    MCV 83 10/02/2017     (H) 10/02/2017    MCH 25 5 (L) 10/02/2017    MCHC 30 7 (L) 10/02/2017    RDW 22 5 (H) 10/02/2017    MPV 10 2 10/02/2017   , CMP:   Lab Results   Component Value Date  10/02/2017    K 4 3 10/02/2017     (H) 10/02/2017    CO2 22 10/02/2017    ANIONGAP 9 10/02/2017    BUN 49 (H) 10/02/2017    CREATININE 2 84 (H) 10/02/2017    GLUCOSE 209 (H) 10/02/2017    CALCIUM 8 1 (L) 10/02/2017    AST 8 10/02/2017    ALT 10 (L) 10/02/2017    ALKPHOS 75 10/02/2017    PROT 7 7 10/02/2017    ALBUMIN 1 5 (L) 10/02/2017    BILITOT 0 13 (L) 10/02/2017    EGFR 31 10/02/2017     Imaging Studies: I have personally reviewed pertinent reports  EKG, Pathology, and Other Studies: I have personally reviewed pertinent reports  Code Status: Level 3 - DNAR and DNI  Advance Directive and Living Will:      Power of :    POLST:      Counseling / Coordination of Care  Total floor / unit time spent today 75 minutes  Greater than 50% of total time was spent with the patient and / or family counseling and / or coordination of care   A description of the counseling / coordination of care: goals of care, options of care, psychosocial support

## 2017-10-02 NOTE — PROGRESS NOTES
Progress Note - Aleta Nova  40 y o  male MRN: [id number]    Unit/Bed#: ICU 02 Encounter: [id number]      Assessment/Plan:  1-lactobacillus sepsis, present on admission:  Most likely secondary to ulcers, gas and gangrene extending from his sacral decubitus ulcer into his perineum, and his corporate cavernosum  -patient has refused surgical debridement and intervention    -on antibiotics with Zosyn    2-decubitus ulcers with cellulitis, gas, in gangrene: extending from the sacral decubitus ulcer into the perineum and corpus cavernosum:  Patient has repeatedly refused surgical intervention and debridement  The urology service recommends surgical debridement, removal of gangrenous tissue, as well as possible penectomy, which patient has refused  -status post minor bedside manual debridement/drainage   -Patient had been seen by the [facility] neuropsychology service and deemed competent to make his medical decisions  Patient's family supports his right to make his own decisions  3-acute kidney injury on chronic kidney disease stage III:  Patient has a history of chronic kidney disease stage 3, with baseline creatinine approximately 1 8-2 1  He had worsening of his renal function, felt to be secondary to urinary obstruction from occluded catheter, however may also be secondary to worsening sepsis/ATN  -patient was transferred to the step-down unit   -started on IV fluids with bicarb    -creatinine slightly improved today    4-chronic sacral decubitus ulcers:  Continue local wound care: Followed by Dr Rosaria Jeans CAUTI versus asymptomatic bacteriuria: With chronic suprapubic catheter   -this was evaluated by the Urology service:  Continue antibiotics    6-history of recurrent C diff:   With previous fecal transplant:   -on p o  vancomycin for prophylaxis    7-status post toxic metabolic encephalopathy:  Patient with increased confusion yesterday, felt to be multifactorial, secondary to acute kidney injury, and infection  Improved today  8-paroxysmal atrial fibrillation:  Currently rate controlled  Continue metoprolol  On anticoagulation with Eliquis  9-T6 paraplegia:  Supportive measures    10-insulin-dependent diabetes/diabetes type 1:  With poor p  o  intake and hypoglycemia: We will decrease standing Levemir dose  Will eliminate pre meal Humalog  We will liberalize diet  11-chronic anemia:  Patient has a history of chronic, iron deficiency anemia  He had responded to IV Venofer as an outpatient  Patient had adamantly refused blood transfusions in the past    -once acute infection has resolved will order for IV Venofer     12-chronic pain:  Continue p o  Dilaudid  Continue IV Dilaudid for palliative care    13-history of multiple arterial clots:  Patient was on chronic anticoagulation with Xarelto as an outpatient  This is been switched to Eliquis due to his renal function  14-family: called mother and gave update  She verbalizes that patient continues to refuse surgery, despite their long family discussions  Palliative care team consultation appreciated    VTE Pharmacologic Prophylaxis: RX contraindicated due to: Eliquis  VTE Mechanical Prophylaxis: sequential compression device    Certification Statement: The patient will continue to require additional inpatient hospital stay due to need for further acute intervention for sepsis on IV antibiotics    Status: inpatient     Total time spent today:  Including reviewing patient's chart, discussion with patient, interview and exam, discussion with patient's nurse and his mother:  47 minutes    ===================================================================    Subjective:  Patient is examined and interviewed  His chart and records are reviewed  His hospital chart was reviewed  Patient currently notes he has pain in his buttock region  He notes the IV Dilaudid alleviates his pain    He declines any change in the IV Dilaudid dose  He denies any pain anywhere else  He denies any abdominal pain or cramping  He denies any headache or chest pain  He denies any nausea, vomiting, diarrhea  He denies any bloating or gas  He notes he is passing flatus  He denies any shortness of breath or cough  He denies any dizziness or lightheadedness  He denies any other complaints  Physical Exam:   Temp:  [97 8 °F (36 6 °C)-98 2 °F (36 8 °C)] 97 8 °F (36 6 °C)  HR:  [] 104  Resp:  [10-18] 15  BP: (129-179)/() 147/89    Gen:  Pleasant, non-tachypnic, non-dyspnic  Conversant  Sitting up in bed watching TV  Communicative  Smiling and joking  Heart: regular rate and rhythm, S1S2 present, no murmur, rub or gallop  Lungs: clear to ausculatation bilaterally  No wheezing, crackles, or rhonchi  No accessory muscle use or respiratory distress  Abd: soft, non-tender, mildly-distended  NABS, no guarding, rebound or peritoneal signs  Extremities:  Right BKA:  no clubbing, cyanosis or edema  1+pedal pulses left foot  Full range of motion bilateral lower extremity  Neuro: awake, alert  Fluent and goal directed speech  Follows commands  Good eye contact  Skin: warm and dry: no petechiae, purpura and rash  LABS:     Results from last 7 days  Lab Units 10/02/17  0449 10/01/17  0245 09/29/17  1148   WBC Thousand/uL 13 14* 14 86* 16 42*   HEMOGLOBIN g/dL 6 6* 6 9* 7 3*   HEMATOCRIT % 21 5* 22 7* 23 6*   PLATELETS Thousands/uL 403* 349 407*       Results from last 7 days  Lab Units 10/02/17  0449 10/01/17  0938 10/01/17  0246   SODIUM mmol/L 142 144 142   POTASSIUM mmol/L 4 3 5 7* 5 0   CHLORIDE mmol/L 111* 120* 114*   CO2 mmol/L 22 14* 14*   BUN mg/dL 49* 57* 64*   CREATININE mg/dL 2 84* 2 97* 3 74*   GLUCOSE RANDOM mg/dL 209* 71 89   CALCIUM mg/dL 8 1* 7 6* 8 1*       Hospital Data:    10/1:  CT cervical spine:No cervical spine fracture or traumatic malalignment  10/1:  CT brain: No acute intracranial abnormality   Findings consistent with preliminary report issued by [facility]  Precocious vascular calcifications    9/25:  CT abdomen/pelvis:  1   Large decubitus ulcer overlying the right ischial tuberosity again seen with gas extending into the perineum and corpora cavernosa bilaterally  The amount of gas appears slightly decreased compared to the prior study  However, there is a new focus of   soft tissue gas superficial to the right corpora cavernosa  This may be related to spread of infection versus the sequela of instrumentation and clinical correlation is recommended  2   Stable bilateral inguinal and pelvic lymphadenopathy though not well visualized due to lack of oral and intravenous contrast   3   Marked bladder distention despite suprapubic catheter    9/20:  CT abdomen/pelvis:  Large amount of  air noted within the right and left corpus cavernosum with associated likely fistulous communication between the right corpus cavernosum and ulceration noted within the perineum extending up to the right ischial tuberosity  Decubitus ulceration along the right greater trochanter with associated periostitis    9/19:  Chest x-ray:  No acute disease    9/18:  Urine culture:  Klebsiella pneumoniae x2 strain    9/18:  Blood cultures:  Lactobacillus x2      ---------------------------------------------------------------------------------------------------------------  This note has been constructed using a voice recognition system

## 2017-10-02 NOTE — PROGRESS NOTES
Patient requested his blood sugar be checked, when sugar was checked, it was 41  Patient was AXO and able to swallow, 2 cups of apple juice were given to patient  Patient requested je mayberry  Recheck of blood sugar was 55  Patient states he "feels better " Will continue to monitor blood sugar  Lunch tray was ordered
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
## 2017-10-02 NOTE — PROGRESS NOTES
Patient requesting pain medication for 7/10 lower back pain, where his wounds are  Patient is refusing to take the PO dilaudid that is ordered for him  Patient is requesting "morphine " Explained to patient that we do not want his pain level to go any higher, and that his pain might worsen when we do his dressing changes, however, patient continues to refuse PO pain medication  Will contact Dr Lorna Coates

## 2017-10-02 NOTE — PROGRESS NOTES
NEPHROLOGY PROGRESS NOTE    Shanell Casillas  40 y o  male MRN: [id number]  Unit/Bed#: ICU 02 Encounter: [id number]  Reason for Consult:  Acute on chronic kidney disease and metabolic acidosis    ASSESSMENT/PLAN:  1  Renal   Patient has acute on chronic kidney disease baseline creatinine reportedly is 2  He has had some improvement over the last 24 hours  Patient had extreme response to diuretic with 8 L of urine output  He presently is off IV fluids metabolic status improved with improved potassium level as well as bicarbonate level  All long this was a non gap metabolic acidosis there is improvement with after bicarb infusion will monitor off IV fluids as they were turned off this morning  BMP in a m  Monitor polyuria  Monitor for need of IV fluids    2  Sacral decubitus ulcer  Wound care and antibiotics  3   Penile soft tissue infection  Urology was following apparently patient did not want surgical intervention  SUBJECTIVE:  Review of Systems   Constitution: Negative for chills and fever  HENT: Negative  Eyes: Negative  Cardiovascular: Negative  Respiratory: Negative  Musculoskeletal:        Complains of pain in his buttock   Gastrointestinal: Negative  Genitourinary:        Catheter       OBJECTIVE:  Current Weight: Weight - Scale: 68 4 kg (150 lb 12 7 oz)  Vitals:Temp (24hrs), Av 9 °F (36 1 °C), Min:94 7 °F (34 8 °C), Max:98 2 °F (36 8 °C)  Current: Temperature: 98 2 °F (36 8 °C)   Blood pressure 156/98, pulse 76, temperature 98 2 °F (36 8 °C), temperature source Temporal, resp  rate (!) 10, height 6' 4" (1 93 m), weight 68 4 kg (150 lb 12 7 oz), SpO2 100 %  ,Body mass index is 18 36 kg/m²        Intake/Output Summary (Last 24 hours) at 10/02/17 0834  Last data filed at 10/02/17 0800   Gross per 24 hour   Intake             2110 ml   Output             9425 ml   Net            -7315 ml       Physical Exam: /98   Pulse 76   Temp 98 2 °F (36 8 °C) (Temporal) Resp (!) 10   Ht 6' 4" (1 93 m)   Wt 68 4 kg (150 lb 12 7 oz)   SpO2 100%   BMI 18 36 kg/m²   Physical Exam   Constitutional: No distress  HENT:   Mouth/Throat: No oropharyngeal exudate  Eyes: No scleral icterus  Neck: No JVD present  Cardiovascular: Normal rate  Exam reveals no friction rub  Pulmonary/Chest: Effort normal  No respiratory distress  Abdominal: Soft  Bowel sounds are normal  He exhibits no distension  There is no tenderness  Musculoskeletal: He exhibits no edema         Medications:    Current Facility-Administered Medications:     acetaminophen (TYLENOL) tablet 650 mg, 650 mg, Oral, Q6H PRN, Elisa Pierce PA-C    apixaban (ELIQUIS) tablet 2 5 mg, 2 5 mg, Oral, BID, Milton Tyson DO, 2 5 mg at 10/02/17 0814    ascorbic acid (VITAMIN C) tablet 500 mg, 500 mg, Oral, Daily, Elisa Pierce PA-C, 500 mg at 10/02/17 8280    baclofen tablet 20 mg, 20 mg, Oral, BID, Elisa Pierce PA-C, 20 mg at 10/02/17 0814    famotidine (PEPCID) tablet 20 mg, 20 mg, Oral, BID, Elisa Pierce PA-C, 20 mg at 10/02/17 0814    ferrous sulfate tablet 162 5 mg, 162 5 mg, Oral, TID With Meals, Elisa Pierce PA-C, 162 5 mg at 10/02/17 0814    haloperidol lactate (HALDOL) injection 2 5 mg, 2 5 mg, Intramuscular, Q6H PRN, Iggy Pelaez DO    HYDROmorphone (DILAUDID) tablet 4 mg, 4 mg, Oral, Q4H PRN, Elisa Pierce PA-C, 4 mg at 10/01/17 1753    insulin detemir (LEVEMIR) subcutaneous injection 12 Units, 12 Units, Subcutaneous, Q12H [address], Milton Tyson DO, 12 Units at 10/02/17 0814    insulin lispro (HumaLOG) 100 units/mL subcutaneous injection 1-6 Units, 1-6 Units, Subcutaneous, HS, Milton Tyson DO, 6 Units at 10/01/17 2114    insulin lispro (HumaLOG) 100 units/mL subcutaneous injection 2-12 Units, 2-12 Units, Subcutaneous, TID AC, 6 Units at 10/01/17 1643 **AND** Fingerstick Glucose (POCT), , , TID GABE, Milton Tyson,     insulin lispro (HumaLOG) 100 units/mL subcutaneous injection 5 Units, 5 Units, Subcutaneous, TID With Meals, Meraridarlene Fredericko, DO, 5 Units at 10/02/17 0815    metoprolol tartrate (LOPRESSOR) tablet 50 mg, 50 mg, Oral, Q12H [address], Kathryn Dankier, DO, 50 mg at 10/02/17 0814    nicotine (NICODERM CQ) 7 mg/24hr TD 24 hr patch 1 patch, 1 patch, Transdermal, Daily, Elisa Pierce PA-C, 1 patch at 10/01/17 0920    ondansetron (ZOFRAN) injection 4 mg, 4 mg, Intravenous, Q6H PRN, Elisa Pierce PA-C    piperacillin-tazobactam (ZOSYN) 2 25 g in sodium chloride 0 9 % 50 mL IVPB, 2 25 g, Intravenous, Q6H, Miguel Paulino MD, Last Rate: 100 mL/hr at 10/02/17 0544, 2 25 g at 10/02/17 0544    QUEtiapine (SEROquel) tablet 25 mg, 25 mg, Oral, HS, Milton Tyson DO, 25 mg at 10/01/17 2114    sodium hypochlorite (DAKIN'S HALF-STRENGTH) 0 25 % topical solution 1 application, 1 application, Topical, Daily, Elisa Pierce PA-C, 1 application at 91/40/90 0815    Laboratory Results:  Lab Results   Component Value Date    WBC 13 14 (H) 10/02/2017    HGB 6 6 (LL) 10/02/2017    HCT 21 5 (L) 10/02/2017    MCV 83 10/02/2017     (H) 10/02/2017     Lab Results   Component Value Date    GLUCOSE 209 (H) 10/02/2017    CALCIUM 8 1 (L) 10/02/2017     10/02/2017    K 4 3 10/02/2017    CO2 22 10/02/2017     (H) 10/02/2017    BUN 49 (H) 10/02/2017    CREATININE 2 84 (H) 10/02/2017     Lab Results   Component Value Date    CALCIUM 8 1 (L) 10/02/2017    PHOS 3 7 09/19/2017     No results found for: LABPROT

## 2017-10-03 LAB
BACTERIA BLD CULT: ABNORMAL
GLUCOSE SERPL-MCNC: 294 MG/DL (ref 65–140)
GLUCOSE SERPL-MCNC: 386 MG/DL (ref 65–140)
GLUCOSE SERPL-MCNC: 394 MG/DL (ref 65–140)
GLUCOSE SERPL-MCNC: 471 MG/DL (ref 65–140)
GRAM STN SPEC: ABNORMAL
HAPTOGLOB SERPL-MCNC: 214 MG/DL (ref 34–200)

## 2017-10-03 PROCEDURE — 82948 REAGENT STRIP/BLOOD GLUCOSE: CPT

## 2017-10-03 RX ADMIN — FAMOTIDINE 20 MG: 20 TABLET ORAL at 08:17

## 2017-10-03 RX ADMIN — FERROUS SULFATE TAB 325 MG (65 MG ELEMENTAL FE) 162.5 MG: 325 (65 FE) TAB at 16:46

## 2017-10-03 RX ADMIN — OXYCODONE HYDROCHLORIDE AND ACETAMINOPHEN 500 MG: 500 TABLET ORAL at 08:17

## 2017-10-03 RX ADMIN — INSULIN LISPRO 10 UNITS: 100 INJECTION, SOLUTION INTRAVENOUS; SUBCUTANEOUS at 08:14

## 2017-10-03 RX ADMIN — HYDROMORPHONE HYDROCHLORIDE 4 MG: 4 TABLET ORAL at 04:11

## 2017-10-03 RX ADMIN — METOPROLOL TARTRATE 50 MG: 50 TABLET ORAL at 08:17

## 2017-10-03 RX ADMIN — INSULIN LISPRO 10 UNITS: 100 INJECTION, SOLUTION INTRAVENOUS; SUBCUTANEOUS at 12:38

## 2017-10-03 RX ADMIN — QUETIAPINE FUMARATE 25 MG: 25 TABLET ORAL at 21:24

## 2017-10-03 RX ADMIN — INSULIN DETEMIR 8 UNITS: 100 INJECTION, SOLUTION SUBCUTANEOUS at 08:26

## 2017-10-03 RX ADMIN — BACLOFEN 20 MG: 20 TABLET ORAL at 08:17

## 2017-10-03 RX ADMIN — INSULIN LISPRO 6 UNITS: 100 INJECTION, SOLUTION INTRAVENOUS; SUBCUTANEOUS at 21:28

## 2017-10-03 RX ADMIN — FERROUS SULFATE TAB 325 MG (65 MG ELEMENTAL FE) 162.5 MG: 325 (65 FE) TAB at 12:37

## 2017-10-03 RX ADMIN — HYDROMORPHONE HYDROCHLORIDE 0.2 MG: 2 INJECTION, SOLUTION INTRAMUSCULAR; INTRAVENOUS; SUBCUTANEOUS at 08:13

## 2017-10-03 RX ADMIN — HYOSCYAMINE SULFATE 1 APPLICATION: 16 SOLUTION at 08:27

## 2017-10-03 RX ADMIN — INSULIN LISPRO 6 UNITS: 100 INJECTION, SOLUTION INTRAVENOUS; SUBCUTANEOUS at 16:46

## 2017-10-03 RX ADMIN — PIPERACILLIN SODIUM,TAZOBACTAM SODIUM 2.25 G: 2; .25 INJECTION, POWDER, FOR SOLUTION INTRAVENOUS at 00:42

## 2017-10-03 RX ADMIN — BACLOFEN 20 MG: 20 TABLET ORAL at 18:43

## 2017-10-03 RX ADMIN — HYDROMORPHONE HYDROCHLORIDE 0.2 MG: 2 INJECTION, SOLUTION INTRAMUSCULAR; INTRAVENOUS; SUBCUTANEOUS at 12:16

## 2017-10-03 RX ADMIN — PIPERACILLIN SODIUM,TAZOBACTAM SODIUM 2.25 G: 2; .25 INJECTION, POWDER, FOR SOLUTION INTRAVENOUS at 18:43

## 2017-10-03 RX ADMIN — HYDROMORPHONE HYDROCHLORIDE 0.2 MG: 2 INJECTION, SOLUTION INTRAMUSCULAR; INTRAVENOUS; SUBCUTANEOUS at 02:26

## 2017-10-03 RX ADMIN — INSULIN DETEMIR 8 UNITS: 100 INJECTION, SOLUTION SUBCUTANEOUS at 21:28

## 2017-10-03 RX ADMIN — HYDROMORPHONE HYDROCHLORIDE 4 MG: 4 TABLET ORAL at 15:07

## 2017-10-03 RX ADMIN — PIPERACILLIN SODIUM,TAZOBACTAM SODIUM 2.25 G: 2; .25 INJECTION, POWDER, FOR SOLUTION INTRAVENOUS at 12:29

## 2017-10-03 RX ADMIN — HYDROMORPHONE HYDROCHLORIDE 4 MG: 4 TABLET ORAL at 10:15

## 2017-10-03 RX ADMIN — FAMOTIDINE 20 MG: 20 TABLET ORAL at 18:43

## 2017-10-03 RX ADMIN — PIPERACILLIN SODIUM,TAZOBACTAM SODIUM 2.25 G: 2; .25 INJECTION, POWDER, FOR SOLUTION INTRAVENOUS at 05:55

## 2017-10-03 RX ADMIN — METOPROLOL TARTRATE 50 MG: 50 TABLET ORAL at 21:25

## 2017-10-03 RX ADMIN — APIXABAN 2.5 MG: 2.5 TABLET, FILM COATED ORAL at 18:43

## 2017-10-03 RX ADMIN — HYDROMORPHONE HYDROCHLORIDE 0.2 MG: 2 INJECTION, SOLUTION INTRAMUSCULAR; INTRAVENOUS; SUBCUTANEOUS at 16:47

## 2017-10-03 RX ADMIN — FERROUS SULFATE TAB 325 MG (65 MG ELEMENTAL FE) 162.5 MG: 325 (65 FE) TAB at 08:16

## 2017-10-03 RX ADMIN — APIXABAN 2.5 MG: 2.5 TABLET, FILM COATED ORAL at 08:17

## 2017-10-03 RX ADMIN — HYDROMORPHONE HYDROCHLORIDE 4 MG: 4 TABLET ORAL at 21:24
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
## 2017-10-03 NOTE — SOCIAL WORK
Dr Verenice Tucker requested CM arrange a family meeting for Oct 4th at [address] spoke with Dr Maya Guerrero who agreed  CM also spoke with Dr Gildardo Travis who requested CM contat Karyle Miser at his office  Karyle Miser is working on getting a member of the Urology team at the meeting  Cm will discuss with Dr Marcelo Rivas

## 2017-10-03 NOTE — PROGRESS NOTES
NEPHROLOGY PROGRESS NOTE    Mike Borges  40 y o  male MRN: [id number]  Unit/Bed#: [place]  Encounter: [id number]  Reason for Consult:  Acute on chronic kidney disease    ASSESSMENT/PLAN:  1  Renal     The patient had acute renal failure and baseline creatinine is 2  Looking at output polyuria has subsided and he made less than 3 L of urine yesterday  He has now been off IV fluids and will monitor labs  BMP was not done today but will monitor to make sure creatinine declines to baseline and electrolytes remained normal as well  No other significant changes  2   Sacral decubitus ulcer    3  Penil soft tissue infection  SUBJECTIVE:  Review of Systems   Constitution: Negative for chills and fever  HENT: Negative  Eyes: Negative  Cardiovascular: Negative  Respiratory: Negative for cough and shortness of breath  Musculoskeletal:        Discomfort in sacral area   Gastrointestinal: Negative  OBJECTIVE:  Current Weight: Weight - Scale: 68 4 kg (150 lb 12 7 oz)  Vitals:Temp (24hrs), Av 4 °F (36 3 °C), Min:96 6 °F (35 9 °C), Max:98 2 °F (36 8 °C)  Current: Temperature: (!) 96 7 °F (35 9 °C)   Blood pressure 165/91, pulse 87, temperature (!) 96 7 °F (35 9 °C), temperature source Tympanic, resp  rate 20, height 6' 4" (1 93 m), weight 68 4 kg (150 lb 12 7 oz), SpO2 99 %  ,Body mass index is 18 36 kg/m²  Intake/Output Summary (Last 24 hours) at 10/03/17 1226  Last data filed at 10/03/17 0101   Gross per 24 hour   Intake               50 ml   Output             1945 ml   Net            -1895 ml       Physical Exam: /91   Pulse 87   Temp (!) 96 7 °F (35 9 °C) (Tympanic)   Resp 20   Ht 6' 4" (1 93 m)   Wt 68 4 kg (150 lb 12 7 oz)   SpO2 99%   BMI 18 36 kg/m²   Physical Exam   Constitutional: No distress  HENT:   Mouth/Throat: No oropharyngeal exudate  Eyes: No scleral icterus  Neck: No JVD present  Cardiovascular: Normal rate    Exam reveals no friction rub     Pulmonary/Chest: Effort normal and breath sounds normal  No respiratory distress  He has no wheezes  He has no rales  Abdominal: Soft  Bowel sounds are normal  He exhibits no distension  There is no tenderness  There is no rebound         Medications:    Current Facility-Administered Medications:     acetaminophen (TYLENOL) tablet 650 mg, 650 mg, Oral, Q6H PRN, Elisa Pierce PA-C    apixaban (ELIQUIS) tablet 2 5 mg, 2 5 mg, Oral, BID, Milton Tyson DO, 2 5 mg at 10/03/17 0817    ascorbic acid (VITAMIN C) tablet 500 mg, 500 mg, Oral, Daily, Elisa Pierce PA-C, 500 mg at 10/03/17 0817    baclofen tablet 20 mg, 20 mg, Oral, BID, Elisa Pierce PA-C, 20 mg at 10/03/17 0817    famotidine (PEPCID) tablet 20 mg, 20 mg, Oral, BID, Elisa Pierce PA-C, 20 mg at 10/03/17 4472    ferrous sulfate tablet 162 5 mg, 162 5 mg, Oral, TID With Meals, Elisa Pierce PA-C, 162 5 mg at 10/03/17 0816    HYDROmorphone (DILAUDID) 2 mg/mL injection 0 2 mg, 0 2 mg, Intravenous, Q4H PRN, Heydi Aguiar MD, 0 2 mg at 10/03/17 1216    HYDROmorphone (DILAUDID) tablet 4 mg, 4 mg, Oral, Q4H PRN, Elisa Pierce PA-C, 4 mg at 10/03/17 1015    insulin detemir (LEVEMIR) subcutaneous injection 8 Units, 8 Units, Subcutaneous, Q12H [address], Heydi Aguiar MD, 8 Units at 10/03/17 0826    insulin lispro (HumaLOG) 100 units/mL subcutaneous injection 1-6 Units, 1-6 Units, Subcutaneous, HS, Milton Tyson DO, 3 Units at 10/02/17 2225    insulin lispro (HumaLOG) 100 units/mL subcutaneous injection 2-12 Units, 2-12 Units, Subcutaneous, TID AC, 10 Units at 10/03/17 0814 **AND** Fingerstick Glucose (POCT), , , TID AC, Milton Tyson DO    metoprolol tartrate (LOPRESSOR) tablet 50 mg, 50 mg, Oral, Q12H [address], Meño Jean Pierres, DO, 50 mg at 10/03/17 0817    ondansetron (ZOFRAN) injection 4 mg, 4 mg, Intravenous, Q6H PRN, Elisa Pierce PA-C    piperacillin-tazobactam (ZOSYN) 2 25 g in sodium chloride 0 9 % 50 mL IVPB, 2 25 g, Intravenous, Q6H, Jay Gallagher MD, Last Rate: 100 mL/hr at 10/03/17 0555, 2 25 g at 10/03/17 0555    QUEtiapine (SEROquel) tablet 25 mg, 25 mg, Oral, HS, Milton Tyson, , 25 mg at 10/02/17 2022    sodium hypochlorite (DAKIN'S HALF-STRENGTH) 0 25 % topical solution 1 application, 1 application, Topical, Daily, Elisa Pierce PA-C, 1 application at 19/33/13 0827    Laboratory Results:  Lab Results   Component Value Date    WBC 13 14 (H) 10/02/2017    HGB 6 6 (LL) 10/02/2017    HCT 21 5 (L) 10/02/2017    MCV 83 10/02/2017     (H) 10/02/2017     Lab Results   Component Value Date    GLUCOSE 209 (H) 10/02/2017    CALCIUM 8 1 (L) 10/02/2017     10/02/2017    K 4 3 10/02/2017    CO2 22 10/02/2017     (H) 10/02/2017    BUN 49 (H) 10/02/2017    CREATININE 2 84 (H) 10/02/2017     Lab Results   Component Value Date    CALCIUM 8 1 (L) 10/02/2017    PHOS 3 7 09/19/2017     No results found for: LABPROT

## 2017-10-03 NOTE — SOCIAL WORK
Family meeting with Palliative care and SLIM and CM tomorrow  Dr Alex Mejia is unable to attend  CM asked if he could be called at the office if the family has questions he agreed
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
## 2017-10-03 NOTE — PROGRESS NOTES
Progress Note - Marcia Gaspar  40 y o  male MRN: [id number]    Unit/Bed#: [facility] 68 2 -01 Encounter: [id number]      Assessment/Plan:  1-lactobacillus sepsis, present on admission:  Most likely secondary to ulcers, gas and gangrene extending from his sacral decubitus ulcer into his perineum, and his corporate cavernosum  -patient has refused surgical debridement and intervention up til now  He notes he is reconsidering this  -on antibiotics with Zosyn     2-decubitus ulcers with cellulitis, gas, in gangrene: extending from the sacral decubitus ulcer into the perineum and corpus cavernosum:  Patient has repeatedly refused surgical intervention and debridement during this admission  The urology service recommends surgical debridement, removal of gangrenous tissue, as well as possible penectomy, which patient has refused  -status post minor bedside manual debridement/drainage                        -Patient had been seen by the neuropsychology service and deemed competent to make his medical decisions  Patient's family supports his right to make his own decisions      3-acute kidney injury on chronic kidney disease stage III:  Patient has a history of chronic kidney disease stage 3, with baseline creatinine approximately 1 8-2 1  He had worsening of his renal function, felt to be secondary to urinary obstruction from occluded catheter, however may also be secondary to worsening sepsis/ATN  -s/p IV fluids with bicarb                        -creatinine slightly improved again today     4-chronic sacral decubitus ulcers:  Continue local wound care: Followed by Dr Feliz Merlin     5-Klebsiella CAUTI versus asymptomatic bacteriuria: With chronic suprapubic catheter                        -this was evaluated by the Urology service:  Continue antibiotics     6-history of recurrent C diff:   With previous fecal transplant: -on p o  vancomycin for prophylaxis     7-status post toxic metabolic encephalopathy:  Patient with increased confusion over the weekend, felt to be multifactorial, secondary to acute kidney injury, and infection  Improved  Now awake and alert      8-paroxysmal atrial fibrillation:  Currently rate controlled  Continue metoprolol  On anticoagulation with Eliquis      9-T6 paraplegia:  Supportive measures     10-insulin-dependent diabetes/diabetes type 1:  With variable BS  Pt with variable appetite and p  o  Intake, with prior hypoglycemia:     -will increase lantus dose again   -reorder pre-meal humalog      11-chronic anemia:  Patient has a history of chronic, iron deficiency anemia  He had responded to IV Venofer as an outpatient  Patient had adamantly refused blood transfusions in the past                         -once acute infection has resolved will order for IV Venofer      12-chronic pain:  Continue p o  Dilaudid  Continue IV Dilaudid for palliative care     13-history of multiple arterial clots:  Patient was on chronic anticoagulation with Xarelto as an outpatient  This is been switched to Eliquis due to his renal function     14-family: updated mother and sister at bedside with pt's permission  Dispo:  Pt now considering surgery- wishes to think further  Family meeting in am with palliative care  D/w urology NP- no one available from their service tomorrow morning for meeting'  D/w Dr Hugo Stone      VTE Pharmacologic Prophylaxis: RX contraindicated due to: Eliquis  VTE Mechanical Prophylaxis: sequential compression device      Certification Statement: The patient will continue to require additional inpatient hospital stay due to need for further acute intervention for sepsis, iv abx  Status: inpatient     ===================================================================    Subjective:  Pt denies any pain  Notes the IV dilaudid is alleviating his pain   Relates that the pain is mainly in his sacrum, buttocks, but well controlled  Denies any other pain  Denies any HA, cp, back pain or abd pain  Denies any sob/cough  Denies any n/v/d  Notes he is passing "normal" BM  In colostomy  Denies any dizziness, lightheadedness  Tolerating po  Pt notes he is considering having the surgery now  Physical Exam:   Temp:  [96 6 °F (35 9 °C)-99 2 °F (37 3 °C)] 99 2 °F (37 3 °C)  HR:  [] 102  Resp:  [16-20] 20  BP: (134-165)/(74-91) 134/74    Gen:  Pleasant, non-tachypnic, non-dyspnic  Conversant  Heart: regular rate and rhythm, S1S2 present, no murmur, rub or gallop  Lungs: clear to ausculatation bilaterally  No wheezing, crackles, or rhonchi  No accessory muscle use or respiratory distress  Abd: soft, non-tender, non-distended  NABS, no guarding, rebound or peritoneal signs  Extremities: R BKA  no clubbing, cyanosis or edema  2+left pedal pulses  Neuro: awake, alert  Fluent speech  LABS:     Results from last 7 days  Lab Units 10/02/17  0449 10/01/17  0245 09/29/17  1148   WBC Thousand/uL 13 14* 14 86* 16 42*   HEMOGLOBIN g/dL 6 6* 6 9* 7 3*   HEMATOCRIT % 21 5* 22 7* 23 6*   PLATELETS Thousands/uL 403* 349 407*       Results from last 7 days  Lab Units 10/02/17  0449 10/01/17  0938 10/01/17  0246   SODIUM mmol/L 142 144 142   POTASSIUM mmol/L 4 3 5 7* 5 0   CHLORIDE mmol/L 111* 120* 114*   CO2 mmol/L 22 14* 14*   BUN mg/dL 49* 57* 64*   CREATININE mg/dL 2 84* 2 97* 3 74*   GLUCOSE RANDOM mg/dL 209* 71 89   CALCIUM mg/dL 8 1* 7 6* 8 1*       Hospital Data:    10/1:  CT cervical spine:No cervical spine fracture or traumatic malalignment      10/1:  CT brain: No acute intracranial abnormality   Findings consistent with preliminary report issued by [facility]    Precocious vascular calcifications     9/25:  CT abdomen/pelvis:  1   Large decubitus ulcer overlying the right ischial tuberosity again seen with gas extending into the perineum and corpora cavernosa bilaterally  The amount of gas appears slightly decreased compared to the prior study  However, there is a new focus of   soft tissue gas superficial to the right corpora cavernosa  This may be related to spread of infection versus the sequela of instrumentation and clinical correlation is recommended  2   Stable bilateral inguinal and pelvic lymphadenopathy though not well visualized due to lack of oral and intravenous contrast   3   Marked bladder distention despite suprapubic catheter     9/20:  CT abdomen/pelvis:  Large amount of  air noted within the right and left corpus cavernosum with associated likely fistulous communication between the right corpus cavernosum and ulceration noted within the perineum extending up to the right ischial tuberosity  Decubitus ulceration along the right greater trochanter with associated periostitis     9/19:  Chest x-ray:  No acute disease     9/18:  Urine culture:  Klebsiella pneumoniae x2 strain     9/18:  Blood cultures:  Lactobacillus x2        ---------------------------------------------------------------------------------------------------------------  This note has been constructed using a voice recognition system

## 2017-10-04 ENCOUNTER — ANESTHESIA EVENT (INPATIENT)
Dept: PERIOP | Facility: HOSPITAL | Age: 37
DRG: 871 | End: 2017-10-04
Payer: MEDICARE

## 2017-10-04 LAB
GLUCOSE SERPL-MCNC: 375 MG/DL (ref 65–140)
GLUCOSE SERPL-MCNC: 418 MG/DL (ref 65–140)
GLUCOSE SERPL-MCNC: 419 MG/DL (ref 65–140)
GLUCOSE SERPL-MCNC: 466 MG/DL (ref 65–140)
GLUCOSE SERPL-MCNC: 487 MG/DL (ref 65–140)
GLUCOSE SERPL-MCNC: 494 MG/DL (ref 65–140)

## 2017-10-04 PROCEDURE — 82948 REAGENT STRIP/BLOOD GLUCOSE: CPT

## 2017-10-04 RX ADMIN — HYDROMORPHONE HYDROCHLORIDE 0.2 MG: 2 INJECTION, SOLUTION INTRAMUSCULAR; INTRAVENOUS; SUBCUTANEOUS at 16:27

## 2017-10-04 RX ADMIN — PIPERACILLIN SODIUM,TAZOBACTAM SODIUM 2.25 G: 2; .25 INJECTION, POWDER, FOR SOLUTION INTRAVENOUS at 12:08

## 2017-10-04 RX ADMIN — APIXABAN 2.5 MG: 2.5 TABLET, FILM COATED ORAL at 09:24

## 2017-10-04 RX ADMIN — INSULIN LISPRO 5 UNITS: 100 INJECTION, SOLUTION INTRAVENOUS; SUBCUTANEOUS at 13:18

## 2017-10-04 RX ADMIN — PIPERACILLIN SODIUM,TAZOBACTAM SODIUM 2.25 G: 2; .25 INJECTION, POWDER, FOR SOLUTION INTRAVENOUS at 18:33

## 2017-10-04 RX ADMIN — INSULIN LISPRO 12 UNITS: 100 INJECTION, SOLUTION INTRAVENOUS; SUBCUTANEOUS at 16:27

## 2017-10-04 RX ADMIN — HYDROMORPHONE HYDROCHLORIDE 4 MG: 4 TABLET ORAL at 18:33

## 2017-10-04 RX ADMIN — INSULIN LISPRO 10 UNITS: 100 INJECTION, SOLUTION INTRAVENOUS; SUBCUTANEOUS at 18:33

## 2017-10-04 RX ADMIN — BACLOFEN 20 MG: 20 TABLET ORAL at 09:24

## 2017-10-04 RX ADMIN — HYDROMORPHONE HYDROCHLORIDE 0.2 MG: 2 INJECTION, SOLUTION INTRAMUSCULAR; INTRAVENOUS; SUBCUTANEOUS at 12:06

## 2017-10-04 RX ADMIN — INSULIN LISPRO 12 UNITS: 100 INJECTION, SOLUTION INTRAVENOUS; SUBCUTANEOUS at 09:21

## 2017-10-04 RX ADMIN — PIPERACILLIN SODIUM,TAZOBACTAM SODIUM 2.25 G: 2; .25 INJECTION, POWDER, FOR SOLUTION INTRAVENOUS at 06:35

## 2017-10-04 RX ADMIN — INSULIN LISPRO 12 UNITS: 100 INJECTION, SOLUTION INTRAVENOUS; SUBCUTANEOUS at 13:19

## 2017-10-04 RX ADMIN — HYDROMORPHONE HYDROCHLORIDE 4 MG: 4 TABLET ORAL at 22:17

## 2017-10-04 RX ADMIN — INSULIN DETEMIR 16 UNITS: 100 INJECTION, SOLUTION SUBCUTANEOUS at 22:17

## 2017-10-04 RX ADMIN — PIPERACILLIN SODIUM,TAZOBACTAM SODIUM 2.25 G: 2; .25 INJECTION, POWDER, FOR SOLUTION INTRAVENOUS at 00:40

## 2017-10-04 RX ADMIN — HYDROMORPHONE HYDROCHLORIDE 0.2 MG: 2 INJECTION, SOLUTION INTRAMUSCULAR; INTRAVENOUS; SUBCUTANEOUS at 07:47

## 2017-10-04 RX ADMIN — BACLOFEN 20 MG: 20 TABLET ORAL at 18:33

## 2017-10-04 RX ADMIN — FAMOTIDINE 20 MG: 20 TABLET ORAL at 18:33

## 2017-10-04 RX ADMIN — INSULIN DETEMIR 12 UNITS: 100 INJECTION, SOLUTION SUBCUTANEOUS at 09:27

## 2017-10-04 RX ADMIN — METOPROLOL TARTRATE 50 MG: 50 TABLET ORAL at 09:24

## 2017-10-04 RX ADMIN — INSULIN LISPRO 5 UNITS: 100 INJECTION, SOLUTION INTRAVENOUS; SUBCUTANEOUS at 16:26

## 2017-10-04 RX ADMIN — QUETIAPINE FUMARATE 25 MG: 25 TABLET ORAL at 22:23

## 2017-10-04 RX ADMIN — OXYCODONE HYDROCHLORIDE AND ACETAMINOPHEN 500 MG: 500 TABLET ORAL at 09:24

## 2017-10-04 RX ADMIN — FAMOTIDINE 20 MG: 20 TABLET ORAL at 09:23

## 2017-10-04 RX ADMIN — METOPROLOL TARTRATE 50 MG: 50 TABLET ORAL at 22:21

## 2017-10-04 RX ADMIN — FERROUS SULFATE TAB 325 MG (65 MG ELEMENTAL FE) 162.5 MG: 325 (65 FE) TAB at 13:19

## 2017-10-04 RX ADMIN — FERROUS SULFATE TAB 325 MG (65 MG ELEMENTAL FE) 162.5 MG: 325 (65 FE) TAB at 09:24

## 2017-10-04 RX ADMIN — FERROUS SULFATE TAB 325 MG (65 MG ELEMENTAL FE) 162.5 MG: 325 (65 FE) TAB at 16:26

## 2017-10-04 RX ADMIN — INSULIN LISPRO 5 UNITS: 100 INJECTION, SOLUTION INTRAVENOUS; SUBCUTANEOUS at 09:21

## 2017-10-04 RX ADMIN — INSULIN LISPRO 6 UNITS: 100 INJECTION, SOLUTION INTRAVENOUS; SUBCUTANEOUS at 22:18

## 2017-10-04 NOTE — PROGRESS NOTES
Progress Note - Rosana Mendes  40 y o  male MRN: [id number]    Unit/Bed#: [facility] 2 -01 Encounter: [id number]      Assessment/Plan:  1-lactobacillus sepsis, present on admission:  Most likely secondary to ulcers, gas and gangrene extending from his sacral decubitus ulcer into his perineum, and his corporate cavernosum                         -patient has refused surgical debridement and intervention up til now  he discuss this with Urology this morning, and is now agreeable to surgery which is planned tomorrow                         -continue antibiotics with Zosyn     2-decubitus ulcers with cellulitis, gas, in gangrene: extending from the sacral decubitus ulcer into the perineum and corpus cavernosum:  Patient has repeatedly refused surgical intervention and debridement bus far during this admission               -The urology service recommends surgical debridement, removal of gangrenous tissue, as well as possible penectomy, which patient now agrees to, and is scheduled for tomorrow                           -status post minor bedside manual debridement/drainage                        -Patient had been seen by the neuropsychology service and deemed competent to make his medical decisions   Patient's family supports his right to make his own decisions  -preoperatively, his Eliquis will be held              -will type and cross in anticipation of blood transfusion preoperatively/perioperatively     3-acute kidney injury on chronic kidney disease stage III:  Patient has a history of chronic kidney disease stage 3, with baseline creatinine approximately 1 8-2  1  Raad Tirado had worsening of his renal function, felt to be secondary to urinary obstruction from occluded catheter, however may also be secondary to worsening sepsis/ATN                        -s/p IV fluids with bicarb                        -patient has refused blood work the past 2 days      4-chronic sacral decubitus ulcers:  Continue local wound care:  Followed by Dr Marcelo Syed asymptomatic bacteriuria:  With chronic suprapubic catheter                        -this was evaluated by the Urology service:  Continue antibiotics     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:                        -on p o  vancomycin for prophylaxis     7-status post toxic metabolic encephalopathy:  Patient with increased confusion over the weekend, felt to be multifactorial, secondary to acute kidney injury, and infection   Improved  Now awake and alert      8-paroxysmal atrial fibrillation:  Currently rate controlled   Continue metoprolol   On anticoagulation with Eliquis               -will hold Eliquis preoperatively      9-T6 paraplegia:  Supportive measures     10-insulin-dependent diabetes/diabetes type 1:  Patient had prior hypoglycemia while he was in the step-down unit  His insulin regimen had been decreased and his diet was liberalized to a regular diet                 -patient has had persistent hyperglycemia today, with excellent appetite and p o  Intake                   -will increase Levemir, increased pre meal Humalog  I will change diet back to a diabetic diet      11-chronic anemia:  Patient has a history of chronic, iron deficiency anemia   He had responded to IV Venofer as an outpatient  Rox Wray had adamantly refused blood transfusions in the past                         -once acute infection has resolved will order for IV Venofer              -during the palliative care meeting this morning, it was discussed with patient that if he were to proceed with surgery, he would require blood transfusions     12-chronic pain:  Continue p o  Dilaudid   Continue IV Dilaudid for palliative care     13-history of multiple arterial clots:  Patient was on chronic anticoagulation with Xarelto as an outpatient   This is been switched to Eliquis due to his renal function       14-family: updated mother via phone:  Updated her that pt had d/w the urologist proceeding with surgery in am       VTE Pharmacologic Prophylaxis: RX contraindicated due to: Eliquis-will hold this evening for surgery  VTE Mechanical Prophylaxis: sequential compression device    Certification Statement: The patient will continue to require additional inpatient hospital stay due to need for further acute intervention for surgery and iv abx    Status: inpatient     ===================================================================    Subjective:  Pt denies any current pain anywhere  He denies any shortness of breath or cough  He denies any nausea, vomiting  Denies any diarrhea in his colostomy bag  Notes he is eating with an excellent appetite  Denies any other complaints  Physical Exam:   Temp:  [98 9 °F (37 2 °C)-99 5 °F (37 5 °C)] 99 °F (37 2 °C)  HR:  [86-90] 90  Resp:  [19-20] 19  BP: (156-168)/(85-95) 160/85    Gen:  Pleasant, non-tachypnic, non-dyspnic  Conversant  Heart: regular rate and rhythm, S1S2 present, no murmur, rub or gallop  Lungs: clear to ausculatation bilaterally  No wheezing, crackles, or rhonchi  No accessory muscle use or respiratory distress  Abd: soft, non-tender, non-distended  NABS, no guarding, rebound or peritoneal signs  Extremities:  Right BKA  Left lower extremity without edema  2+ left pedal pulses  Neuro: awake, alert  Fluent and goal directed speech  Interactive        LABS:     Results from last 7 days  Lab Units 10/02/17  0449 10/01/17  0245 09/29/17  1148   WBC Thousand/uL 13 14* 14 86* 16 42*   HEMOGLOBIN g/dL 6 6* 6 9* 7 3*   HEMATOCRIT % 21 5* 22 7* 23 6*   PLATELETS Thousands/uL 403* 349 407*       Results from last 7 days  Lab Units 10/02/17  0449 10/01/17  0938 10/01/17  0246   SODIUM mmol/L 142 144 142   POTASSIUM mmol/L 4 3 5 7* 5 0   CHLORIDE mmol/L 111* 120* 114*   CO2 mmol/L 22 14* 14*   BUN mg/dL 49* 57* 64*   CREATININE mg/dL 2 84* 2 97* 3 74*   GLUCOSE RANDOM mg/dL 209* 71 89 CALCIUM mg/dL 8 1* 7 6* 8 1*       Hospital Data:  10/1:  CT cervical spine:No cervical spine fracture or traumatic malalignment      10/1:  CT brain: No acute intracranial abnormality   Findings consistent with preliminary report issued by [facility]   Precocious vascular calcifications     9/25:  CT abdomen/pelvis:  1   Large decubitus ulcer overlying the right ischial tuberosity again seen with gas extending into the perineum and corpora cavernosa bilaterally  The amount of gas appears slightly decreased compared to the prior study  However, there is a new focus of   soft tissue gas superficial to the right corpora cavernosa  This may be related to spread of infection versus the sequela of instrumentation and clinical correlation is recommended  2   Stable bilateral inguinal and pelvic lymphadenopathy though not well visualized due to lack of oral and intravenous contrast   3   Marked bladder distention despite suprapubic catheter     9/20:  CT abdomen/pelvis:  Large amount of  air noted within the right and left corpus cavernosum with associated likely fistulous communication between the right corpus cavernosum and ulceration noted within the perineum extending up to the right ischial tuberosity  Decubitus ulceration along the right greater trochanter with associated periostitis     9/19:  Chest x-ray:  No acute disease     9/18: Lucho Choi culture: Marques Blackburn pneumoniae x2 strain     9/18:  Blood cultures:  Lactobacillus x2             ---------------------------------------------------------------------------------------------------------------  This note has been constructed using a voice recognition system

## 2017-10-04 NOTE — PROGRESS NOTES
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 in the past polyuria has improved and will monitor electrolytes once these are attainable to make sure he is keeping up off of IV fluids with oral intake of fluids  3   Septicemia  Felt due to penile abscess with gas gangrene  Surgery seeing patient continue antibiotics but likely need surgical debridement to help heal the infection  4   Disposition  In reviewing the chart and other noted appears a palliative Care was consulted and have had some discussions with family and they are thinking about    SUBJECTIVE:  Review of Systems   Constitution: Positive for chills  Negative for fever  HENT: Negative  Eyes: Negative  Cardiovascular: Negative for chest pain and orthopnea  Respiratory: Negative for cough and shortness of breath  Musculoskeletal:        Pain in the perineal area   Gastrointestinal: Negative for bloating, abdominal pain and vomiting  OBJECTIVE:  Current Weight: Weight - Scale: 68 4 kg (150 lb 12 7 oz)  Vitals:Temp (24hrs), Av 2 °F (37 3 °C), Min:98 9 °F (37 2 °C), Max:99 5 °F (37 5 °C)  Current: Temperature: 98 9 °F (37 2 °C)   Blood pressure 156/91, pulse 86, temperature 98 9 °F (37 2 °C), temperature source Tympanic, resp  rate 20, height 6' 4" (1 93 m), weight 68 4 kg (150 lb 12 7 oz), SpO2 100 %  ,Body mass index is 18 36 kg/m²  Intake/Output Summary (Last 24 hours) at 10/04/17 1207  Last data filed at 10/04/17 0110   Gross per 24 hour   Intake              650 ml   Output             3150 ml   Net            -2500 ml       Physical Exam: /91   Pulse 86   Temp 98 9 °F (37 2 °C) (Tympanic)   Resp 20   Ht 6' 4" (1 93 m)   Wt 68 4 kg (150 lb 12 7 oz)   SpO2 100%   BMI 18 36 kg/m²   Physical Exam   Constitutional: No distress  HENT:   Mouth/Throat: No oropharyngeal exudate  Neck: No JVD present  Cardiovascular: Normal rate  Exam reveals no friction rub  Pulmonary/Chest: Effort normal  No respiratory distress  He has no wheezes  He has no rales  Abdominal: Soft  Bowel sounds are normal  He exhibits no distension  There is no tenderness  There is no rebound         Medications:    Current Facility-Administered Medications:     acetaminophen (TYLENOL) tablet 650 mg, 650 mg, Oral, Q6H PRN, Elisa Pierce PA-C    apixaban (ELIQUIS) tablet 2 5 mg, 2 5 mg, Oral, BID, Milton Tyson DO, 2 5 mg at 10/04/17 0924    ascorbic acid (VITAMIN C) tablet 500 mg, 500 mg, Oral, Daily, Elisa Pierce PA-C, 500 mg at 10/04/17 3624    baclofen tablet 20 mg, 20 mg, Oral, BID, MERRILL Keith-C, 20 mg at 10/04/17 0924    famotidine (PEPCID) tablet 20 mg, 20 mg, Oral, BID, SONA KeithC, 20 mg at 10/04/17 2778    ferrous sulfate tablet 162 5 mg, 162 5 mg, Oral, TID With Meals, Elisa Pierce PA-C, 162 5 mg at 10/04/17 0924    HYDROmorphone (DILAUDID) 2 mg/mL injection 0 2 mg, 0 2 mg, Intravenous, Q4H PRN, Diogenes Menard MD, 0 2 mg at 10/04/17 1206    HYDROmorphone (DILAUDID) tablet 4 mg, 4 mg, Oral, Q4H PRN, Elisa Pierce PA-C, 4 mg at 10/03/17 2124    insulin detemir (LEVEMIR) subcutaneous injection 12 Units, 12 Units, Subcutaneous, Q12H [facility], Diogenes Menard MD, 12 Units at 10/04/17 0927    insulin lispro (HumaLOG) 100 units/mL subcutaneous injection 1-6 Units, 1-6 Units, Subcutaneous, HS, Randi Bile, DO, 6 Units at 10/03/17 2128    insulin lispro (HumaLOG) 100 units/mL subcutaneous injection 2-12 Units, 2-12 Units, Subcutaneous, TID AC, 12 Units at 10/04/17 0921 **AND** Fingerstick Glucose (POCT), , , TID AC, Milton Tyson DO    insulin lispro (HumaLOG) 100 units/mL subcutaneous injection 5 Units, 5 Units, Subcutaneous, TID With Meals, Betty Titus MD, 5 Units at 10/04/17 0921    metoprolol tartrate (LOPRESSOR) tablet 50 mg, 50 mg, Oral, Q12H [address], Deb Sixto, DO, 50 mg at 10/04/17 0924    ondansetron (ZOFRAN) injection 4 mg, 4 mg, Intravenous, Q6H PRN, Elisa Pierce PA-C    piperacillin-tazobactam (ZOSYN) 2 25 g in sodium chloride 0 9 % 50 mL IVPB, 2 25 g, Intravenous, Q6H, Henry Heller MD, Last Rate: 100 mL/hr at 10/04/17 0635, 2 25 g at 10/04/17 0635    QUEtiapine (SEROquel) tablet 25 mg, 25 mg, Oral, HS, Milton Tyson DO, 25 mg at 10/03/17 2124    sodium hypochlorite (DAKIN'S HALF-STRENGTH) 0 25 % topical solution 1 application, 1 application, Topical, Daily, Elisa Pierce PA-C, 1 application at 25/52/84 0827    Laboratory Results:  Lab Results   Component Value Date    WBC 13 14 (H) 10/02/2017    HGB 6 6 (LL) 10/02/2017    HCT 21 5 (L) 10/02/2017    MCV 83 10/02/2017     (H) 10/02/2017     Lab Results   Component Value Date    GLUCOSE 209 (H) 10/02/2017    CALCIUM 8 1 (L) 10/02/2017     10/02/2017    K 4 3 10/02/2017    CO2 22 10/02/2017     (H) 10/02/2017    BUN 49 (H) 10/02/2017    CREATININE 2 84 (H) 10/02/2017     Lab Results   Component Value Date    CALCIUM 8 1 (L) 10/02/2017    PHOS 3 7 09/19/2017     No results found for: LABPROT

## 2017-10-04 NOTE — CASE MANAGEMENT
Continued Stay Review    Date: 10/4/2017    Vital Signs: /91   Pulse 86   Temp 98 9 °F (37 2 °C) (Tympanic)   Resp 20   Ht 6' 4" (1 93 m)   Wt 68 4 kg (150 lb 12 7 oz)   SpO2 100%   BMI 18 36 kg/m²     Medications:   Scheduled Meds:   apixaban 2 5 mg Oral BID   ascorbic acid 500 mg Oral Daily   baclofen 20 mg Oral BID   famotidine 20 mg Oral BID   ferrous sulfate 162 5 mg Oral TID With Meals   insulin detemir 12 Units Subcutaneous Q12H BERTHA   insulin lispro 1-6 Units Subcutaneous HS   insulin lispro 2-12 Units Subcutaneous TID AC   insulin lispro 5 Units Subcutaneous TID With Meals   metoprolol tartrate 50 mg Oral Q12H BERTHA   piperacillin-tazobactam 2 25 g Intravenous Q6H   QUEtiapine 25 mg Oral HS   sodium hypochlorite 1 application Topical Daily     Continuous Infusions:    PRN Meds:   acetaminophen    HYDROmorphone IV x 2    HYDROmorphone    ondansetron    Abnormal Labs/Diagnostic Results: no labs    Age/Sex: 40 y o  male     Assessment/Plan:   Assessment:  1  Sepsis/Bacteremia due to penile abscess with gas and gangrene, lactobacillus bacteremia  2  Paraplegia due to transverse myelitis  3  Suprapubic catheter  4  IDDM  5  H/O C Diff with stool transplant  6  LEONILA on CKD  7  H/O BKA  8  Chronic pain syndrome  9  Stage IV sacral ulcer  10  Delirium      Meeting with Palliative Care, CM and Attending  TX options dicussed    Currently on OR schedule for am    Discharge Plan: to be determined

## 2017-10-04 NOTE — PROGRESS NOTES
I had a discussion with the patient today  I had heard that he is now interested in surgery to include debridement of his penile wounds, and this will necessitate clean out the corporal bodies that are infected from his ischial tuberosity sacral decubitus  I explained the procedure to him that the bodies of erection will be debrided, asked him if he understood the risks and he said yes  Clinically he looks much better  He just finished breakfast so we cannot proceed today  He says he definitely "wishes to proceed with penile incision and debridement  Penectomy of course is a complication to be avoided, but sometimes it could not be avoided in these circumstances  NPO after midnight and we will put him on the schedule for tomorrow
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
## 2017-10-04 NOTE — ANESTHESIA PREPROCEDURE EVALUATION
Review of Systems/Medical History  Patient summary reviewed        Cardiovascular  Hypertension ,    Pulmonary       GI/Hepatic    GERD ,        Kidney disease CKD,        Endo/Other  Diabetes ,      GYN       Hematology  Anemia ,     Musculoskeletal    Comment: Chronic pain  Sacral decubs      Neurology    Neuromuscular disease ,   Comment: Paraplegia   Psychology           Physical Exam    Airway    Mallampati score: II  TM Distance: >3 FB  Neck ROM: full     Dental   No notable dental hx     Cardiovascular  Rhythm: regular, Rate: normal, Cardiovascular exam normal    Pulmonary  Pulmonary exam normal Breath sounds clear to auscultation,     Other Findings        Anesthesia Plan  ASA Score- 4       Anesthesia Type- general        Induction- intravenous      Informed Consent  Anesthetic plan and risks discussed with patient

## 2017-10-04 NOTE — PROGRESS NOTES
Progress note - Palliative and Supportive Care   Kim Damian  40 y o  male [id number]    Assessment:  1  Sepsis/Bacteremia due to penile abscess with gas and gangrene, lactobacillus bacteremia  2  Paraplegia due to transverse myelitis  3  Suprapubic catheter  4  IDDM  5  H/O C Diff with stool transplant  6  LEONILA on CKD  7  H/O BKA  8  Chronic pain syndrome  9  Stage IV sacral ulcer  10  Delirium     Plan:  1  Continue PRN use of IV and PO Dilaudid  2  Goals - Family meeting today with myself, POLLY Tracy, Car Briseno from Case Management, Dr Ronak Christian, the patient and his mother and at one point Dr Antonia Martines was on the phone as well to offer further insight  Pertinent details of this discussion:  - Review of infection and ID's recommendations -- specifically with a focus that infection cure will likely not be achieved without source control (surgery)  - If no surgery, but continued IV antibiotics, would need to prepare for the eventual "failure"   - Concern for CDiff recurrence  - Dr Antonia Martines spoke about his sacral wounds and did note that he may not be a candidate for a muscle flap  - Nutrition optimization and tight glucose control to promote healing  - Urologic surgery which details are deferred to their team  - Overall picture of what this will "look like" - ie  Rehab, possible future surgeries, need for complete compliance with blood draws, blood products, etc    - Alternate route of hospice care  Time was spent allowing patient and mom to ask questions and express concerns and all of this was addressed to their satisfaction  They would like time to think about this further    Tentative follow up Friday AM     NOTE: It does appear Mr Kaitlin Gross is on the OR schedule for tomorrow per Dr Karol Blevins     Code Status: DNR/DNI - Level 3   Power of :  presumed to be Mother by PA Act 169   Advance Directive / Living Will: no   POLST:  no      Interval history:       Patient seen today for family meeting  Pain controlled  Details of family discussion as above  MEDICATIONS / ALLERGIES:    all current active meds have been reviewed and current meds:   Current Facility-Administered Medications   Medication Dose Route Frequency    acetaminophen (TYLENOL) tablet 650 mg  650 mg Oral Q6H PRN    apixaban (ELIQUIS) tablet 2 5 mg  2 5 mg Oral BID    ascorbic acid (VITAMIN C) tablet 500 mg  500 mg Oral Daily    baclofen tablet 20 mg  20 mg Oral BID    famotidine (PEPCID) tablet 20 mg  20 mg Oral BID    ferrous sulfate tablet 162 5 mg  162 5 mg Oral TID With Meals    HYDROmorphone (DILAUDID) 2 mg/mL injection 0 2 mg  0 2 mg Intravenous Q4H PRN    HYDROmorphone (DILAUDID) tablet 4 mg  4 mg Oral Q4H PRN    insulin detemir (LEVEMIR) subcutaneous injection 12 Units  12 Units Subcutaneous Q12H [address]    insulin lispro (HumaLOG) 100 units/mL subcutaneous injection 1-6 Units  1-6 Units Subcutaneous HS    insulin lispro (HumaLOG) 100 units/mL subcutaneous injection 2-12 Units  2-12 Units Subcutaneous TID AC    insulin lispro (HumaLOG) 100 units/mL subcutaneous injection 5 Units  5 Units Subcutaneous TID With Meals    metoprolol tartrate (LOPRESSOR) tablet 50 mg  50 mg Oral Q12H BERTHA    ondansetron (ZOFRAN) injection 4 mg  4 mg Intravenous Q6H PRN    piperacillin-tazobactam (ZOSYN) 2 25 g in sodium chloride 0 9 % 50 mL IVPB  2 25 g Intravenous Q6H    QUEtiapine (SEROquel) tablet 25 mg  25 mg Oral HS    sodium hypochlorite (DAKIN'S HALF-STRENGTH) 0 25 % topical solution 1 application  1 application Topical Daily       Allergies   Allergen Reactions    Ciprofloxacin Hcl     Cymbalta [Duloxetine Hcl]     Lyrica [Pregabalin]     Polymyxin B        OBJECTIVE:    Physical Exam  Physical Exam   Constitutional: He is oriented to person, place, and time  No distress  Chronically ill appearing    HENT:   Head: Normocephalic and atraumatic  Eyes: EOM are normal  Right eye exhibits no discharge     Cardiovascular: Normal rate  Pulmonary/Chest: Effort normal  No respiratory distress  Abdominal: Soft  +ostomy   Genitourinary:   Genitourinary Comments: Did not examine   Musculoskeletal:   S/p BKA  S/p TMA   Neurological: He is alert and oriented to person, place, and time  Skin: Skin is warm and dry  There is pallor  Psychiatric: He has a normal mood and affect  Nursing note and vitals reviewed  Lab Results: I have personally reviewed pertinent labs  , CBC: No results found for: WBC, HGB, HCT, MCV, PLT, ADJUSTEDWBC, MCH, MCHC, RDW, MPV, NRBC, CMP: No results found for: NA, K, CL, CO2, ANIONGAP, BUN, CREATININE, GLUCOSE, CALCIUM, AST, ALT, ALKPHOS, PROT, ALBUMIN, BILITOT, EGFR  Imaging Studies: reviewed  EKG, Pathology, and Other Studies: reviewed    Counseling / Coordination of Care  Total floor / unit time spent today 75+ minutes  Greater than 50% of total time was spent with the patient and / or family counseling and / or coordination of care   A description of the counseling / coordination of care: goals of care, support

## 2017-10-04 NOTE — SOCIAL WORK
CARE CONFERENCE: at 0930 this morning:    Present and participating :  Patient   Rufino Arnold patients mother     Dr Abril Neff via telephone    Stew 4 RN       [facility] Student Shadowing      Team discussed with patient and his mother his disease process and asked him to explain in his own words  Patient Dr Jose Montiel explained that she spoke with Dr Ely Hicks and once the antibiotics are complete it is believed the infection will return and case the patient to go into septic shock  Dr Jose Montiel explained what septic shock was  Dr China Vences and Dr Jose Montiel explained that the patient needs multiple surgeries and a long at least 1 year period of healing and recovery  Dr Jose Montiel explained to the patient that  If he is going to have surgery he will need multiple blood transfusions and he will need to follow a diet and get his blood sugars under better control  Plus the paitent will need extensive rehab  Dr China Vences was unsure if the patient will qualify for a skin flap on his decubitus ulcers  He deferred the debridement of the patient's penis to Urology who could not be present but will see patient at a later time  Patients mother told the patient that the decision was his and his family would support him 100%  Dr Jose Montiel gave the patient until Friday to make a decision as to which directions he wants to go for treatment  Palliative VS curative

## 2017-10-05 ENCOUNTER — ANESTHESIA (INPATIENT)
Dept: PERIOP | Facility: HOSPITAL | Age: 37
DRG: 871 | End: 2017-10-05
Payer: MEDICARE

## 2017-10-05 LAB
ABO GROUP BLD: NORMAL
ANION GAP SERPL CALCULATED.3IONS-SCNC: 5 MMOL/L (ref 4–13)
BLD GP AB SCN SERPL QL: NEGATIVE
BUN SERPL-MCNC: 57 MG/DL (ref 5–25)
CALCIUM SERPL-MCNC: 8.5 MG/DL (ref 8.3–10.1)
CHLORIDE SERPL-SCNC: 111 MMOL/L (ref 100–108)
CO2 SERPL-SCNC: 22 MMOL/L (ref 21–32)
CREAT SERPL-MCNC: 2.66 MG/DL (ref 0.6–1.3)
ERYTHROCYTE [DISTWIDTH] IN BLOOD BY AUTOMATED COUNT: 22.2 % (ref 11.6–15.1)
GFR SERPL CREATININE-BSD FRML MDRD: 34 ML/MIN/1.73SQ M
GLUCOSE SERPL-MCNC: 115 MG/DL (ref 65–140)
GLUCOSE SERPL-MCNC: 154 MG/DL (ref 65–140)
GLUCOSE SERPL-MCNC: 268 MG/DL (ref 65–140)
GLUCOSE SERPL-MCNC: 46 MG/DL (ref 65–140)
GLUCOSE SERPL-MCNC: 47 MG/DL (ref 65–140)
GLUCOSE SERPL-MCNC: 55 MG/DL (ref 65–140)
GLUCOSE SERPL-MCNC: 63 MG/DL (ref 65–140)
GLUCOSE SERPL-MCNC: 68 MG/DL (ref 65–140)
GLUCOSE SERPL-MCNC: 74 MG/DL (ref 65–140)
GLUCOSE SERPL-MCNC: 81 MG/DL (ref 65–140)
GLUCOSE SERPL-MCNC: 91 MG/DL (ref 65–140)
HCT VFR BLD AUTO: 21.7 % (ref 36.5–49.3)
HGB BLD-MCNC: 6.5 G/DL (ref 12–17)
INR PPP: 1.04 (ref 0.86–1.16)
MCH RBC QN AUTO: 25.2 PG (ref 26.8–34.3)
MCHC RBC AUTO-ENTMCNC: 30 G/DL (ref 31.4–37.4)
MCV RBC AUTO: 84 FL (ref 82–98)
PLATELET # BLD AUTO: 373 THOUSANDS/UL (ref 149–390)
PMV BLD AUTO: 10.4 FL (ref 8.9–12.7)
POTASSIUM SERPL-SCNC: 4.7 MMOL/L (ref 3.5–5.3)
PROTHROMBIN TIME: 13.6 SECONDS (ref 12.1–14.4)
RBC # BLD AUTO: 2.58 MILLION/UL (ref 3.88–5.62)
RH BLD: POSITIVE
SODIUM SERPL-SCNC: 138 MMOL/L (ref 136–145)
SPECIMEN EXPIRATION DATE: NORMAL
WBC # BLD AUTO: 10.43 THOUSAND/UL (ref 4.31–10.16)

## 2017-10-05 PROCEDURE — 86923 COMPATIBILITY TEST ELECTRIC: CPT

## 2017-10-05 PROCEDURE — 80048 BASIC METABOLIC PNL TOTAL CA: CPT | Performed by: INTERNAL MEDICINE

## 2017-10-05 PROCEDURE — 85610 PROTHROMBIN TIME: CPT | Performed by: INTERNAL MEDICINE

## 2017-10-05 PROCEDURE — 85027 COMPLETE CBC AUTOMATED: CPT | Performed by: INTERNAL MEDICINE

## 2017-10-05 PROCEDURE — 86850 RBC ANTIBODY SCREEN: CPT | Performed by: INTERNAL MEDICINE

## 2017-10-05 PROCEDURE — 86900 BLOOD TYPING SEROLOGIC ABO: CPT | Performed by: INTERNAL MEDICINE

## 2017-10-05 PROCEDURE — 86901 BLOOD TYPING SEROLOGIC RH(D): CPT | Performed by: INTERNAL MEDICINE

## 2017-10-05 PROCEDURE — 30253N1 TRANSFUSE NONAUT RED BLOOD CELLS IN PERIPH ART, PERC: ICD-10-PCS | Performed by: INTERNAL MEDICINE

## 2017-10-05 PROCEDURE — P9021 RED BLOOD CELLS UNIT: HCPCS

## 2017-10-05 PROCEDURE — 82948 REAGENT STRIP/BLOOD GLUCOSE: CPT

## 2017-10-05 RX ORDER — SODIUM CHLORIDE 9 MG/ML
125 INJECTION, SOLUTION INTRAVENOUS CONTINUOUS
Status: DISCONTINUED | OUTPATIENT
Start: 2017-10-05 | End: 2017-10-05

## 2017-10-05 RX ORDER — DEXTROSE MONOHYDRATE 25 G/50ML
INJECTION, SOLUTION INTRAVENOUS
Status: COMPLETED
Start: 2017-10-05 | End: 2017-10-05

## 2017-10-05 RX ORDER — DEXTROSE AND SODIUM CHLORIDE 5; .9 G/100ML; G/100ML
75 INJECTION, SOLUTION INTRAVENOUS CONTINUOUS
Status: DISCONTINUED | OUTPATIENT
Start: 2017-10-05 | End: 2017-10-06

## 2017-10-05 RX ORDER — ONDANSETRON 2 MG/ML
4 INJECTION INTRAMUSCULAR; INTRAVENOUS ONCE AS NEEDED
Status: DISCONTINUED | OUTPATIENT
Start: 2017-10-05 | End: 2017-10-06

## 2017-10-05 RX ORDER — HYDROMORPHONE HCL 110MG/55ML
0.5 PATIENT CONTROLLED ANALGESIA SYRINGE INTRAVENOUS
Status: DISCONTINUED | OUTPATIENT
Start: 2017-10-05 | End: 2017-10-06

## 2017-10-05 RX ORDER — FENTANYL CITRATE/PF 50 MCG/ML
25 SYRINGE (ML) INJECTION
Status: DISCONTINUED | OUTPATIENT
Start: 2017-10-05 | End: 2017-10-06

## 2017-10-05 RX ORDER — DEXTROSE MONOHYDRATE 25 G/50ML
25 INJECTION, SOLUTION INTRAVENOUS ONCE
Status: COMPLETED | OUTPATIENT
Start: 2017-10-05 | End: 2017-10-05

## 2017-10-05 RX ADMIN — DEXTROSE MONOHYDRATE 25 ML: 25 INJECTION, SOLUTION INTRAVENOUS at 13:57

## 2017-10-05 RX ADMIN — METOPROLOL TARTRATE 50 MG: 50 TABLET ORAL at 09:11

## 2017-10-05 RX ADMIN — METOPROLOL TARTRATE 50 MG: 50 TABLET ORAL at 20:49

## 2017-10-05 RX ADMIN — HYDROMORPHONE HYDROCHLORIDE 0.2 MG: 2 INJECTION, SOLUTION INTRAMUSCULAR; INTRAVENOUS; SUBCUTANEOUS at 22:16

## 2017-10-05 RX ADMIN — OXYCODONE HYDROCHLORIDE AND ACETAMINOPHEN 500 MG: 500 TABLET ORAL at 09:11

## 2017-10-05 RX ADMIN — BACLOFEN 20 MG: 20 TABLET ORAL at 09:11

## 2017-10-05 RX ADMIN — DEXTROSE MONOHYDRATE 50 ML: 25 INJECTION, SOLUTION INTRAVENOUS at 08:11

## 2017-10-05 RX ADMIN — HYDROMORPHONE HYDROCHLORIDE 4 MG: 4 TABLET ORAL at 20:52

## 2017-10-05 RX ADMIN — FERROUS SULFATE TAB 325 MG (65 MG ELEMENTAL FE) 162.5 MG: 325 (65 FE) TAB at 17:09

## 2017-10-05 RX ADMIN — BACLOFEN 20 MG: 20 TABLET ORAL at 17:09

## 2017-10-05 RX ADMIN — FAMOTIDINE 20 MG: 20 TABLET ORAL at 17:09

## 2017-10-05 RX ADMIN — INSULIN LISPRO 2 UNITS: 100 INJECTION, SOLUTION INTRAVENOUS; SUBCUTANEOUS at 22:13

## 2017-10-05 RX ADMIN — PIPERACILLIN SODIUM,TAZOBACTAM SODIUM 2.25 G: 2; .25 INJECTION, POWDER, FOR SOLUTION INTRAVENOUS at 06:20

## 2017-10-05 RX ADMIN — FAMOTIDINE 20 MG: 20 TABLET ORAL at 09:11

## 2017-10-05 RX ADMIN — INSULIN DETEMIR 8 UNITS: 100 INJECTION, SOLUTION SUBCUTANEOUS at 22:18

## 2017-10-05 RX ADMIN — PIPERACILLIN SODIUM,TAZOBACTAM SODIUM 2.25 G: 2; .25 INJECTION, POWDER, FOR SOLUTION INTRAVENOUS at 18:09

## 2017-10-05 RX ADMIN — QUETIAPINE FUMARATE 25 MG: 25 TABLET ORAL at 20:49

## 2017-10-05 RX ADMIN — PIPERACILLIN SODIUM,TAZOBACTAM SODIUM 2.25 G: 2; .25 INJECTION, POWDER, FOR SOLUTION INTRAVENOUS at 00:20

## 2017-10-05 RX ADMIN — PIPERACILLIN SODIUM,TAZOBACTAM SODIUM 2.25 G: 2; .25 INJECTION, POWDER, FOR SOLUTION INTRAVENOUS at 12:06

## 2017-10-05 RX ADMIN — DEXTROSE MONOHYDRATE 50 ML: 25 INJECTION, SOLUTION INTRAVENOUS at 11:25

## 2017-10-05 RX ADMIN — INSULIN DETEMIR 8 UNITS: 100 INJECTION, SOLUTION SUBCUTANEOUS at 09:10

## 2017-10-05 NOTE — PROGRESS NOTES
Pt seen in holding and consent signed after discussion of reasons, risks and alternative treatments  Consent signed  After thinking it over, he withdrew consent while still in holding  He was being transfused at the time  Procedure cancelled after further discussion with pt and he was returned to floor

## 2017-10-05 NOTE — PROGRESS NOTES
Patient resting in bed, repositioned patient  Penile wound re-packed, R BKA dsg changed  Pt denies any pain/problems at this time  No changes from previous assessment  Will continue to monitor patient for changes
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
## 2017-10-05 NOTE — OR NURSING
Patient to holding room, signed consent with Dr Claire Multani   Conversation on DNR status discussed between  anesthesia and patient, patient states he does not want breathing tube or surgery  " This is much more than I thought I have status that has already been discussed, this is not what I want , I do not want surgery no matter these are my wishes "

## 2017-10-05 NOTE — PROGRESS NOTES
Pt with  DNR  And  DNI  Status   Pt  Refuses temporary use of ETT during procedure   Understands the risks   No longer wants surgery   Surgeon aware   All in agreement to cx case

## 2017-10-05 NOTE — PROGRESS NOTES
Progress Note - Kim Damian  40 y o  male MRN: [id number]    Unit/Bed#: [facility] -01 Encounter: [id number]      Assessment/Plan:  1-lactobacillus sepsis, present on admission:  Most likely secondary to ulcers, gas and gangrene extending from his sacral decubitus ulcer into his perineum, and his corporate cavernosum                         -after multiple discussions and family meetings, patient relates that he does wish to proceed with the scheduled surgery                        -continue antibiotics with Zosyn     2-decubitus ulcers with cellulitis, gas, in gangrene: extending from the sacral decubitus ulcer into the perineum and corpus cavernosum:  Patient has repeatedly refused surgical intervention and debridement bus far during this admission                                    -The urology service recommends surgical debridement, removal of gangrenous tissue, as well as possible penectomy, which patient agrees to, and is scheduled for this afternoon                                    -status post minor bedside manual debridement/drainage                                 -Patient had been seen by the neuropsychology service and deemed competent to make his medical decisions   Patient's family supports his right to make his own decisions  -preoperatively, his Eliquis will be held                                   -will type and cross in anticipation of blood transfusion preoperatively/perioperatively     3-acute kidney injury on chronic kidney disease stage III:  Patient has a history of chronic kidney disease stage 3, with baseline creatinine approximately 1 8-2  1  Noemi Silverman had worsening of his renal function, felt to be secondary to urinary obstruction from occluded catheter, however may also be secondary to worsening sepsis/ATN                                    -creatinine improved today to 2 6    Had peaked at 3 7     4-chronic sacral decubitus ulcers:  Continue local wound care:  Followed by Dr Patti Mathew asymptomatic bacteriuria:  With chronic suprapubic catheter                         -this was evaluated by the Urology service:  Continue antibiotics     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:                        -on p o  vancomycin for prophylaxis     7-status post toxic metabolic encephalopathy:  Patient with increased confusion over the weekend, felt to be multifactorial, secondary to acute kidney injury, and infection   Improved  Now awake and alert       8-paroxysmal atrial fibrillation:  Currently rate controlled   Continue metoprolol   On anticoagulation with Eliquis                                    -will hold Eliquis preoperatively      9-T6 paraplegia:  Supportive measures     10-insulin-dependent diabetes/diabetes type 1:  Patient has had variable blood sugars  Patient initially had hypoglycemia in the ICU so his insulin regimen was decrease in his diet was liberalized  He then had hyperglycemia the last few days on the medical floor  His insulin regimen was increased and his diet was restricted to a diabetic diet  Patient's blood sugar was low this morning however improved after D50   -as patient is a type 1 diabetic he was given Levemir this morning:  Instead of his scheduled 16 units he was given half of that dose at 8 units  -patient does have hypoglycemia this afternoon  He will be started on dextrose IV fluid infusion and given an amp of D50 now    Will monitor his blood sugars     11-chronic anemia:  Patient has a history of chronic, iron deficiency anemia   He had responded to IV Venofer as an outpatient   Patient had adamantly refused blood transfusions in the past                         -during the palliative care meeting yesterday, it was discussed with patient that if he were to proceed with surgery, he would require blood transfusions               -would recommend transfusing 2 units PRBC osiel-operatively, due to signficant anemia, bleeding concerns on recent NOAC, and need for adequate blood perfusion for post-operative healing/recovery              -I spoke with patient at the bedside regarding a blood transfusion  Patient notes that he DOES agree to the blood transfusion  As patient had previously declined blood transfusions, I had a prolonged discussion with him, to ensure that he understood that he was consenting to the transfusion, that he would be receiving blood products, and that he would be receiving the transfusion today  Pt verbalized understanding, and signed the consent form  He related that he had received transfusions in the distant past as well      12-chronic pain:  Continue p o  Dilaudid   Continue IV Dilaudid for palliative care     13-history of multiple arterial clots:  Patient was on chronic anticoagulation with Xarelto as an outpatient   This is been switched to Eliquis due to his renal function              -Eliquis on hold for surgery today  Will restart after surgery     14-family: updated mother via phone last evening  Spoke with her via phone this morning, as well as at bedside       VTE Pharmacologic Prophylaxis: RX contraindicated due to: Eliquis-will hold this evening for surgery  VTE Mechanical Prophylaxis: sequential compression device    Certification Statement: The patient will continue to require additional inpatient hospital stay due to need for further acute intervention for surgery today    Status: inpatient     D/w Urology service: They are aware he received eliquis yesterday morning but pm dose held  Agree with blood transfusion    ===================================================================    Subjective:  Patient denies any current pain  He notes his pain is controlled on his current medication doses  He denies any shortness of breath or cough  He denies any chest congestion or phlegm  He denies any nausea or vomiting    He notes yesterday he was tolerating adequate p  o   Patient's blood sugar was very low this morning  He relates he did feel shaky and lightheaded however he feels better now  Patient communicates that he is aware he is going for the surgery today  He communicates that he did agree to the surgery  He communicates that he does not have any additional questions about the surgery  Physical Exam:   Temp:  [97 2 °F (36 2 °C)-99 2 °F (37 3 °C)] 97 2 °F (36 2 °C)  HR:  [84-90] 84  Resp:  [16-19] 16  BP: (121-171)/(70-85) 121/70    Gen:  Pleasant, non-tachypnic, non-dyspnic  Heart: regular rate and rhythm, S1S2 present, no murmur, rub or gallop  Lungs: clear to ausculatation bilaterally  No wheezing, crackles, or rhonchi  No accessory muscle use or respiratory distress  Abd: soft, non-tender, non-distended  NABS, no guarding, rebound or peritoneal signs  Neuro:  Sleeping, but awakens to voice  Then becomes alert and communicative  Follows commands  Response to questions          LABS:     Results from last 7 days  Lab Units 10/05/17  0641 10/02/17  0449 10/01/17  0245   WBC Thousand/uL 10 43* 13 14* 14 86*   HEMOGLOBIN g/dL 6 5* 6 6* 6 9*   HEMATOCRIT % 21 7* 21 5* 22 7*   PLATELETS Thousands/uL 373 403* 349       Results from last 7 days  Lab Units 10/05/17  0641 10/02/17  0449 10/01/17  0938   SODIUM mmol/L 138 142 144   POTASSIUM mmol/L 4 7 4 3 5 7*   CHLORIDE mmol/L 111* 111* 120*   CO2 mmol/L 22 22 14*   BUN mg/dL 57* 49* 57*   CREATININE mg/dL 2 66* 2 84* 2 97*   GLUCOSE RANDOM mg/dL 74 209* 71   CALCIUM mg/dL 8 5 8 1* 7 6*       Hospital Data:    10/1:  CT cervical spine:No cervical spine fracture or traumatic malalignment      10/1:  CT brain: No acute intracranial abnormality   Findings consistent with preliminary report issued by [facility]   Precocious vascular calcifications     9/25:  CT abdomen/pelvis:  1   Large decubitus ulcer overlying the right ischial tuberosity again seen with gas extending into the perineum and corpora cavernosa bilaterally  The amount of gas appears slightly decreased compared to the prior study  However, there is a new focus of   soft tissue gas superficial to the right corpora cavernosa  This may be related to spread of infection versus the sequela of instrumentation and clinical correlation is recommended  2   Stable bilateral inguinal and pelvic lymphadenopathy though not well visualized due to lack of oral and intravenous contrast   3   Marked bladder distention despite suprapubic catheter     9/20:  CT abdomen/pelvis:  Large amount of  air noted within the right and left corpus cavernosum with associated likely fistulous communication between the right corpus cavernosum and ulceration noted within the perineum extending up to the right ischial tuberosity  Decubitus ulceration along the right greater trochanter with associated periostitis     9/19:  Chest x-ray:  No acute disease     9/18: Cindy Nash culture: Francisco Javier Chavira pneumoniae x2 strain     9/18:  Blood cultures:  Lactobacillus x2           ---------------------------------------------------------------------------------------------------------------  This note has been constructed using a voice recognition system

## 2017-10-05 NOTE — PROGRESS NOTES
NEPHROLOGY PROGRESS NOTE    Shanell Casillas  40 y o  male MRN: [id number]  Unit/Bed#: [facility] [address] 209- Encounter: [id number]  Reason for Consult:  Acute on chronic kidney disease    ASSESSMENT/PLAN:  1  Renal   Patient has acute on chronic kidney disease creatinine was checked it was slowly improving and has remained stable over the last few days  Continue to monitor on IV fluids  Maintenance IV fluids for now    2  Hypernatremia  Water deficit has been repleted monitor  3   Decubitus ulcer and gangrene  Patient has decided for surgery and this will be performed by Urology  SUBJECTIVE:  Review of Systems   Constitution: Negative for chills  Cardiovascular: Negative for chest pain and orthopnea  Respiratory: Negative for cough and shortness of breath  Gastrointestinal: Negative for bloating, abdominal pain and nausea  OBJECTIVE:  Current Weight: Weight - Scale: 68 4 kg (150 lb 12 7 oz)  Vitals:Temp (24hrs), Av 5 °F (36 9 °C), Min:97 2 °F (36 2 °C), Max:99 2 °F (37 3 °C)  Current: Temperature: (!) 97 2 °F (36 2 °C)   Blood pressure 121/70, pulse 84, temperature (!) 97 2 °F (36 2 °C), temperature source Tympanic, resp  rate 16, height 6' 4" (1 93 m), weight 68 4 kg (150 lb 12 7 oz), SpO2 100 %  ,Body mass index is 18 36 kg/m²  Intake/Output Summary (Last 24 hours) at 10/05/17 1234  Last data filed at 10/05/17 1122   Gross per 24 hour   Intake                0 ml   Output             3225 ml   Net            -3225 ml       Physical Exam: /70   Pulse 84   Temp (!) 97 2 °F (36 2 °C) (Tympanic)   Resp 16   Ht 6' 4" (1 93 m)   Wt 68 4 kg (150 lb 12 7 oz)   SpO2 100%   BMI 18 36 kg/m²   Physical Exam   Constitutional: No distress  Eyes: No scleral icterus  Neck: No JVD present  Cardiovascular: Normal rate  Exam reveals no friction rub  Pulmonary/Chest: Effort normal  No respiratory distress  He has no wheezes  He has no rales  Abdominal: Soft   Bowel sounds are normal  He exhibits no distension  There is no tenderness     Musculoskeletal:   Pain in the perineal area       Medications:    Current Facility-Administered Medications:     acetaminophen (TYLENOL) tablet 650 mg, 650 mg, Oral, Q6H PRN, Elisa Pierce PA-C    ascorbic acid (VITAMIN C) tablet 500 mg, 500 mg, Oral, Daily, Elisa Pierce PA-C, 500 mg at 10/05/17 0911    baclofen tablet 20 mg, 20 mg, Oral, BID, Elisa Pierce PA-C, 20 mg at 10/05/17 0911    dextrose 5 % and sodium chloride 0 9 % infusion, 75 mL/hr, Intravenous, Continuous, Ignatius Meckel, MD    dextrose 50 % IV solution 25 mL, 25 mL, Intravenous, Once, Ignatius Meckel, MD    famotidine (PEPCID) tablet 20 mg, 20 mg, Oral, BID, Elisa Pierce PA-C, 20 mg at 10/05/17 0911    ferrous sulfate tablet 162 5 mg, 162 5 mg, Oral, TID With Meals, Elisa Pierce PA-C, 162 5 mg at 10/04/17 1626    HYDROmorphone (DILAUDID) 2 mg/mL injection 0 2 mg, 0 2 mg, Intravenous, Q4H PRN, Ignatius Meckel, MD, 0 2 mg at 10/04/17 1627    HYDROmorphone (DILAUDID) tablet 4 mg, 4 mg, Oral, Q4H PRN, Elisa Pierce PA-C, 4 mg at 10/04/17 2217    insulin detemir (LEVEMIR) subcutaneous injection 8 Units, 8 Units, Subcutaneous, HS, Ignatius Meckel, MD    insulin lispro (HumaLOG) 100 units/mL subcutaneous injection 1-6 Units, 1-6 Units, Subcutaneous, HS, Milton Tyson DO, 6 Units at 10/04/17 2218    insulin lispro (HumaLOG) 100 units/mL subcutaneous injection 15 Units, 15 Units, Subcutaneous, TID With Meals, Ignatius Meckel, MD    insulin lispro (HumaLOG) 100 units/mL subcutaneous injection 2-12 Units, 2-12 Units, Subcutaneous, TID AC, 12 Units at 10/04/17 1627 **AND** Fingerstick Glucose (POCT), , , TID AC, Milton Tyson DO    metoprolol tartrate (LOPRESSOR) tablet 50 mg, 50 mg, Oral, Q12H [address], Nohemy Herrera DO, 50 mg at 10/05/17 0911    ondansetron (ZOFRAN) injection 4 mg, 4 mg, Intravenous, Q6H PRN, Alfredo Christian PA-C   piperacillin-tazobactam (ZOSYN) 2 25 g in sodium chloride 0 9 % 50 mL IVPB, 2 25 g, Intravenous, Q6H, Joanne Park MD, Last Rate: 100 mL/hr at 10/05/17 1206, 2 25 g at 10/05/17 1206    QUEtiapine (SEROquel) tablet 25 mg, 25 mg, Oral, HS, Milton Tyson, DO, 25 mg at 10/04/17 2223    sodium hypochlorite (DAKIN'S HALF-STRENGTH) 0 25 % topical solution 1 application, 1 application, Topical, Daily, Elisa Pierce PA-C, 1 application at 99/97/12 0827    Laboratory Results:  Lab Results   Component Value Date    WBC 10 43 (H) 10/05/2017    HGB 6 5 (LL) 10/05/2017    HCT 21 7 (L) 10/05/2017    MCV 84 10/05/2017     10/05/2017     Lab Results   Component Value Date    GLUCOSE 74 10/05/2017    CALCIUM 8 5 10/05/2017     10/05/2017    K 4 7 10/05/2017    CO2 22 10/05/2017     (H) 10/05/2017    BUN 57 (H) 10/05/2017    CREATININE 2 66 (H) 10/05/2017     Lab Results   Component Value Date    CALCIUM 8 5 10/05/2017    PHOS 3 7 09/19/2017     No results found for: LABPROT

## 2017-10-05 NOTE — PROGRESS NOTES
Brief Palliative Progress Note:    Events noted of patient's refusal of surgery  Follow up goals of care meeting planned for tomorrow AM at 0930  D/W Dr Johnson Slider       Eric Wynn,   Palliative and Supportive Care  [phone]

## 2017-10-06 LAB
ABO GROUP BLD BPU: NORMAL
BPU ID: NORMAL
GLUCOSE SERPL-MCNC: 105 MG/DL (ref 65–140)
GLUCOSE SERPL-MCNC: 110 MG/DL (ref 65–140)
GLUCOSE SERPL-MCNC: 208 MG/DL (ref 65–140)
GLUCOSE SERPL-MCNC: 289 MG/DL (ref 65–140)
GLUCOSE SERPL-MCNC: 314 MG/DL (ref 65–140)
UNIT DISPENSE STATUS: NORMAL
UNIT PRODUCT CODE: NORMAL
UNIT RH: NORMAL

## 2017-10-06 PROCEDURE — 82948 REAGENT STRIP/BLOOD GLUCOSE: CPT

## 2017-10-06 RX ORDER — HYDROMORPHONE HYDROCHLORIDE 2 MG/1
2 TABLET ORAL EVERY 4 HOURS PRN
Status: DISCONTINUED | OUTPATIENT
Start: 2017-10-06 | End: 2017-10-11 | Stop reason: HOSPADM

## 2017-10-06 RX ADMIN — FERROUS SULFATE TAB 325 MG (65 MG ELEMENTAL FE) 162.5 MG: 325 (65 FE) TAB at 09:04

## 2017-10-06 RX ADMIN — FAMOTIDINE 20 MG: 20 TABLET ORAL at 17:55

## 2017-10-06 RX ADMIN — HYDROMORPHONE HYDROCHLORIDE 4 MG: 4 TABLET ORAL at 03:39

## 2017-10-06 RX ADMIN — HYOSCYAMINE SULFATE 1 APPLICATION: 16 SOLUTION at 09:11

## 2017-10-06 RX ADMIN — FAMOTIDINE 20 MG: 20 TABLET ORAL at 09:05

## 2017-10-06 RX ADMIN — METOPROLOL TARTRATE 50 MG: 50 TABLET ORAL at 21:35

## 2017-10-06 RX ADMIN — FERROUS SULFATE TAB 325 MG (65 MG ELEMENTAL FE) 162.5 MG: 325 (65 FE) TAB at 12:57

## 2017-10-06 RX ADMIN — HYDROMORPHONE HYDROCHLORIDE 4 MG: 4 TABLET ORAL at 11:32

## 2017-10-06 RX ADMIN — APIXABAN 2.5 MG: 2.5 TABLET, FILM COATED ORAL at 11:32

## 2017-10-06 RX ADMIN — APIXABAN 2.5 MG: 2.5 TABLET, FILM COATED ORAL at 17:55

## 2017-10-06 RX ADMIN — PIPERACILLIN SODIUM,TAZOBACTAM SODIUM 2.25 G: 2; .25 INJECTION, POWDER, FOR SOLUTION INTRAVENOUS at 00:02

## 2017-10-06 RX ADMIN — INSULIN LISPRO 4 UNITS: 100 INJECTION, SOLUTION INTRAVENOUS; SUBCUTANEOUS at 12:58

## 2017-10-06 RX ADMIN — QUETIAPINE FUMARATE 25 MG: 25 TABLET ORAL at 21:35

## 2017-10-06 RX ADMIN — INSULIN DETEMIR 8 UNITS: 100 INJECTION, SOLUTION SUBCUTANEOUS at 21:36

## 2017-10-06 RX ADMIN — HYDROMORPHONE HYDROCHLORIDE 2 MG: 2 TABLET ORAL at 21:42

## 2017-10-06 RX ADMIN — OXYCODONE HYDROCHLORIDE AND ACETAMINOPHEN 500 MG: 500 TABLET ORAL at 09:05

## 2017-10-06 RX ADMIN — INSULIN LISPRO 6 UNITS: 100 INJECTION, SOLUTION INTRAVENOUS; SUBCUTANEOUS at 09:05

## 2017-10-06 RX ADMIN — METOPROLOL TARTRATE 50 MG: 50 TABLET ORAL at 09:04

## 2017-10-06 RX ADMIN — FERROUS SULFATE TAB 325 MG (65 MG ELEMENTAL FE) 162.5 MG: 325 (65 FE) TAB at 17:55

## 2017-10-06 RX ADMIN — BACLOFEN 20 MG: 20 TABLET ORAL at 09:05

## 2017-10-06 RX ADMIN — BACLOFEN 20 MG: 20 TABLET ORAL at 17:55

## 2017-10-06 NOTE — PALLIATIVE CARE CONFERENCE
Brief Palliative and Supportive Care note:    Update to decisional apparatus:  Alternate decision-maker: cousin Ayaan Mancia - [phone]    Other family: mother Loi Hinton - [phone]     : sister Huma Garrison - [phone]     We have discussed the life-and-death choice that is presented to pt at the current moment between curative surgery for deep-seated infection, and comfort-focused care with either no or suppressive antibiotics by mouth  In our interactions today, he was not competent  Multiple phone calls to multiple family members thus far have only yielded a willingness to participate in decision-making from pt's cousin Ayaan Mancia  At this moment, Ayaan Mancia is at bedside, and has conversed with pt about surgery  After discussing indications and risks Ayaan Mancia believe that the pt is willing to assent to surgery, and Ayaan Mancia himself will sign consent as Healthcare Representative per Act 169  After discussing with [facility] colleagues, they do not feel comfortable attempting any consent for procedure that the pt himself has twice revoked consent to  Hospice is again offered to Ayaan Mancia, and he would prefer to seek second opinion at [facility]      Janell Burgess MD  Palliative and Supportive Care  Pager: [phone]

## 2017-10-06 NOTE — PROGRESS NOTES
I spoke with Dr Tan Client today re possibility of surgery  The patient has refused surgery 2x- both times when mentally competent and with clear sensorium  I did do a modified drainage at the bedside while he was awake  He is adamant against taking the riskof penectomy, which may be necessary to rid him of infection  Given his steadfast refusal x 2, we cannot proceed with surgery  He also, due to his behaviors at home, at high risk for incurring these kinds of infections again  We will sign off and see peripherally  Maintain SPT and change monthly  His prognosis is very poor, and even if he had had aggressive early debridement, his overall prognosis would be poor due to his comorbidities

## 2017-10-06 NOTE — SOCIAL WORK
CM met with pt, pt's cousin Ankita Thompson [phone] and pt's dad Millie Conner wants to be transferred to [facility]: CM explained that the family will be responsible for finding an accepting MD at [facility]  Provided them "[phone]" -- family called while CM was present, was given the number for [facility] (who has admitting privileges at [facility])  CM suggested they call tomorrow as it is after hours now  CM explained that if there is a physician who is willing to consider case at [facility], [address] [facility] will send pt's medical information as needed  Family asked if pt can be d/c'ed from [facility] and family takes pt to ED at [address] [facility] will need to explore this further  CM following

## 2017-10-06 NOTE — PROGRESS NOTES
Progress note - Palliative and Supportive Care   Carles Sandifer  40 y o  male [id number]    Patient Active Problem List   Diagnosis    Diabetes mellitus ([place] [address] )    Paraplegia ([place] [address] )    Paraplegia ([place] [address] )    Atrial fibrillation ([facility] )    Chronic suprapubic catheter ([facility] )    Colostomy care ([place] [address] )    OAB (overactive bladder)    S/P unilateral BKA (below knee amputation) ([place] [address] )    Sebaceous cyst    Tobacco abuse    Type 1 diabetes mellitus (HCC)    Ulcer of sacral region, stage 4 (HCC)    Anemia    Chronic indwelling Ortega catheter    Sepsis ([place] [address] )    Osteomyelitis of right femur ([place] [address] )    Wound healing, delayed    Iron deficiency anemia    Decubitus ulcers    HTN (hypertension), benign    LEONILA (acute kidney injury) ([place] [address] )    Neurogenic bladder    GERD (gastroesophageal reflux disease)    Thrush    Chronic pain    Stage 3 chronic kidney disease    SOB (shortness of breath)    Thrombocytosis ([facility]    Penile abscess    Asymptomatic bacteriuria    History of Clostridium difficile infection    Urinary retention    Delirium    Noncompliance by refusing intervention or support      Plan:  1  Continue PRN use of IV and PO Dilaudid  2  Goals - evolving towards comfort cares   - Pt was not fully competent in my exam this morning, but clearly dissented from both surgical and medical cares within the past 12-24 hrs    - Dr Malik Quijano has had a clear and appropriate conversation with pt at bedside after breakfast, and they agreed to comfort cares at home, with no further hospital stay  - will need to confirm this plan with pt's family, as -- regardless of his decisional capacity -- he is a vulnerable adult and will need to have good support from family to have a safe and appropriate dispo plan  - We do believe that to not address this deep seated infection surgically, and to only use antibiotics, constitutes futile or non-beneficial treatment    This is based on our understanding that the infectious source of necrosis within the corpora cavernosa cannot be reached nor penetrated with antibiotics alone  The organisms within this nidus are certainly not benign, and it is my hypothesis that they -- or the inflammatory cascade that they have triggered -- are the reason for the pt's acute change in mental status  There is a significant chance that these organisms may evolve within this pt to resist suppressive antimicrobial therapy, and that without surgical excision of infected tissue, the pt will one day die of overwhelming infection by MDROs  Code Status: DNR/DNI - Level 3   Power of :  presumed to be mother Ezekiel Purcell by [place] Act 169   Advance Directive / Living Will: no   POLST:  no    Edie Gore MD  Palliative and Supportive Care  Pager: [phone]       Interval history:       Patient seen today for family meeting  Pain control acceptable  Appetite good  This fellow -- over the past 24 hrs -- has had several contacts and informants within our system, including input from ID, Uro, Gen Surg, [facility], and [facility]  His mother has been included in this process, given his baseline paraplegia and need for post-operative support and assistance, as well his fluctuating mental status  As recently as Wednesday, he was judged by multiple teammates, including Psych and NeuroPsych and PSC, to have adequate capacity to guide medical plan of care  At that time, during a prolonged family meeting, he was able to reflect, contextualize, and articulate his concerns, desires, and goals  At that time, he was disagreeable to surgery to remove infected/necrotic tissue and or his penis  He expressed an understanding that this decision would potentially allow a life-threatening infection to develop     This morning, JUDY Natarajan and I followed up the events of yesterday, in which he tore up his own consent paper in the PACU, and was transferred back to floor in the PM   In the middle of the night, he was verbally abusive to a nurse, and actively declined to participate with use of IV ABx  In conversation with me, he used inappropriate and odd words, and suggested that "I need a different view, you know, a view"  He then contnued to ernie, suggesting that the reason he is not healing "from the hole in my butt" is because he's not been allowed to eat, and that the reason he cancelled surgery last night was because "they put me in a box and started talking about product," referencing blood products  I have not yet been able to reach family to further discuss these events; they were scheduled to meet us at bedside today over an hour ago      MEDICATIONS / ALLERGIES:    all current active meds have been reviewed and current meds:   Current Facility-Administered Medications   Medication Dose Route Frequency    acetaminophen (TYLENOL) tablet 650 mg  650 mg Oral Q6H PRN    ascorbic acid (VITAMIN C) tablet 500 mg  500 mg Oral Daily    baclofen tablet 20 mg  20 mg Oral BID    dextrose 5 % and sodium chloride 0 9 % infusion  75 mL/hr Intravenous Continuous    famotidine (PEPCID) tablet 20 mg  20 mg Oral BID    fentaNYL (SUBLIMAZE) injection 25 mcg  25 mcg Intravenous Q5 Min PRN    ferrous sulfate tablet 162 5 mg  162 5 mg Oral TID With Meals    HYDROmorphone (DILAUDID) 2 mg/mL injection 0 2 mg  0 2 mg Intravenous Q4H PRN    HYDROmorphone (DILAUDID) 2 mg/mL injection 0 5 mg  0 5 mg Intravenous Q5 Min PRN    HYDROmorphone (DILAUDID) tablet 4 mg  4 mg Oral Q4H PRN    insulin detemir (LEVEMIR) subcutaneous injection 8 Units  8 Units Subcutaneous HS    insulin lispro (HumaLOG) 100 units/mL subcutaneous injection 1-6 Units  1-6 Units Subcutaneous HS    insulin lispro (HumaLOG) 100 units/mL subcutaneous injection 15 Units  15 Units Subcutaneous TID With Meals    insulin lispro (HumaLOG) 100 units/mL subcutaneous injection 2-12 Units  2-12 Units Subcutaneous TID AC    metoprolol tartrate (LOPRESSOR) tablet 50 mg  50 mg Oral Q12H [address]    ondansetron (ZOFRAN) injection 4 mg  4 mg Intravenous Q6H PRN    ondansetron (ZOFRAN) injection 4 mg  4 mg Intravenous Once PRN    piperacillin-tazobactam (ZOSYN) 2 25 g in sodium chloride 0 9 % 50 mL IVPB  2 25 g Intravenous Q6H    QUEtiapine (SEROquel) tablet 25 mg  25 mg Oral HS    sodium hypochlorite (DAKIN'S HALF-STRENGTH) 0 25 % topical solution 1 application  1 application Topical Daily       Allergies   Allergen Reactions    Ciprofloxacin Hcl     Cymbalta [Duloxetine Hcl]     Lyrica [Pregabalin]     Polymyxin B        OBJECTIVE:    Physical Exam  Physical Exam   Constitutional: No distress  Frail   HENT:   Head: Normocephalic and atraumatic  Right Ear: External ear normal    Left Ear: External ear normal    Mouth/Throat: Oropharynx is clear and moist    Missing several teeth   Eyes: Conjunctivae and EOM are normal  Pupils are equal, round, and reactive to light  Right eye exhibits no discharge  Left eye exhibits no discharge  Neck: No tracheal deviation present  Cardiovascular: Normal rate and regular rhythm  Pulmonary/Chest: Effort normal  No stridor  No respiratory distress  Abdominal: Soft  He exhibits no distension  scaphoid   Musculoskeletal: He exhibits deformity (BKA, as per baseline  I also appreciate muscular spasm and deformities of BUE consistent with baseline spinal injuries)  Neurological: He is alert  No cranial nerve deficit  Coordination normal    Baseline paraplegia   Skin: Skin is warm and dry  No rash noted  He is not diaphoretic  No erythema  No pallor  Psychiatric:   To my exam, this individual lacks good judgment and insight to participate in decision making  Lab Results: I have personally reviewed pertinent labs  , CBC: No results found for: WBC, HGB, HCT, MCV, PLT, ADJUSTEDWBC, MCH, MCHC, RDW, MPV, NRBC, CMP: No results found for: NA, K, CL, CO2, ANIONGAP, BUN, CREATININE, GLUCOSE, CALCIUM, AST, ALT, ALKPHOS, PROT, ALBUMIN, BILITOT, EGFR Several instances of low blood sugar are noted since yesterday AM when pt was made NPO  No new blood work today to otherwise illuminate reason for acute mental status change  Imaging Studies: none new  Reviewed imaging of brain, showing no sign to me of massive stroke that would account for propensity for delirium  EKG, Pathology, and Other Studies: none pertinent    Counseling / Coordination of Care  Total floor / unit time spent today 85+ minutes  Greater than 50% of total time was spent with the patient and / or family counseling and / or coordination of care  A description of the counseling / coordination of care: goals of care, support, consideration of hospice eligibility, review of decisional capacity and apparatus

## 2017-10-06 NOTE — PROGRESS NOTES
Progress Note - Regis Fernando  40 y o  male MRN: [id number]    Unit/Bed#: [place] [facility] 2 -01 Encounter: [id number]    Assessment/Plan:    Lactobacillus sepsis  continue Zosyn per Infectious Disease      Decubitus ulcers  cellulitis gas gangrene sacral decubitus into the perineum      And corpus cavernosum, patient possible surgery yesterday      But then refused at the last moment      Discussion today without of care consider home hospice      With refusing care repetitively    CKD 3    Cr stable at 2 66      Monitor with Cr    Anemia   accepted blood finally yesterday       Chronic with kidney failure and ongoing infection    Chronic sacral decubitus ulcer POA ongoing care provided by surgery    Diabetes   erratic intake erratic control      Continue detemir and Humalog encourage consistent intake      Continue ADA and sliding scale    AFib    rate control metoprolol restart Eliquis    T6 paraplegia  supportive measures continue     Subjective:   Feels okay today denies chest pain shortness of breath nausea vomiting diarrhea no fevers or chills tolerated breakfast no pain      Objective:     Vitals: Blood pressure 139/84, pulse 95, temperature 98 5 °F (36 9 °C), temperature source Tympanic, resp  rate 20, height 6' 4" (1 93 m), weight 68 4 kg (150 lb 12 7 oz), SpO2 99 %  ,Body mass index is 18 36 kg/m²          Results from last 7 days  Lab Units 10/05/17  0641   WBC Thousand/uL 10 43*   HEMOGLOBIN g/dL 6 5*   HEMATOCRIT % 21 7*   PLATELETS Thousands/uL 373   INR  1 04       Results from last 7 days  Lab Units 10/05/17  0641 10/02/17  0449   SODIUM mmol/L 138 142   POTASSIUM mmol/L 4 7 4 3   CHLORIDE mmol/L 111* 111*   CO2 mmol/L 22 22   BUN mg/dL 57* 49*   CREATININE mg/dL 2 66* 2 84*   CALCIUM mg/dL 8 5 8 1*   TOTAL PROTEIN g/dL  --  7 7   BILIRUBIN TOTAL mg/dL  --  0 13*   ALK PHOS U/L  --  75   ALT U/L  --  10*   AST U/L  --  8   GLUCOSE RANDOM mg/dL 74 209*       Scheduled Meds:    ascorbic acid 500 mg Oral Daily   baclofen 20 mg Oral BID   famotidine 20 mg Oral BID   ferrous sulfate 162 5 mg Oral TID With Meals   insulin detemir 8 Units Subcutaneous HS   insulin lispro 1-6 Units Subcutaneous HS   insulin lispro 15 Units Subcutaneous TID With Meals   insulin lispro 2-12 Units Subcutaneous TID AC   metoprolol tartrate 50 mg Oral Q12H BERTHA   piperacillin-tazobactam 2 25 g Intravenous Q6H   QUEtiapine 25 mg Oral HS   sodium hypochlorite 1 application Topical Daily       Continuous Infusions:    dextrose 5 % and sodium chloride 0 9 % 75 mL/hr     Physical exam:  General appearance:  Alert no distress  Head/Eyes:  Nonicteric pale PERRL EOMI  Neck:  Supple  Lungs:  Decreased BS bilateral no wheezing rhonchi or rales  Heart: normal S1 S2 irregular  Abdomen: Soft nontender with decreased bowel sounds colostomy    Invasive Devices     Drain            Suprapubic Catheter Latex 25729 days    Colostomy Descending/sigmoid  days                  VTE Pharmacologic Prophylaxis:  Eliquis   VTE Mechanical Prophylaxis:  Eliquis                     Counseling / Coordination of Care  Total floor / unit time spent today  30   minutes  Greater than 50% of total time was spent with the patient and / or family counseling and / or coordination of care    A description of the counseling / coordination of care:  Discussed with palliative care

## 2017-10-06 NOTE — PROGRESS NOTES
NEPHROLOGY PROGRESS NOTE    Christine Plan  40 y o  male MRN: [id number]  Unit/Bed#: [place] 2 - Encounter: [id number]  Reason for Consult:  Acute on chronic kidney disease    ASSESSMENT/PLAN:  1  Renal   Patient acute on chronic kidney disease reported baseline around 2 it has been running a little bit higher but has been improving when last checked  At this point patient has some chronic infection requiring surgical debridement but he is undecided whether not he is going to have this done in this is likely contributing in causing some nonoliguric ATN  Continue supportive care and monitor  Last sodium concentration had normalized  2   Sepsis and penile infection with deep tissue infection  The patient was actually scheduled to undergo surgery yesterday but apparently canceled 5 minutes before the operation now they are again having discussions about aggressiveness of care put potentially hospice care  Will await family and patient's decision  SUBJECTIVE:  Review of Systems   Constitution: Negative for chills  HENT: Negative  Eyes: Negative  Cardiovascular: Negative for chest pain and orthopnea  Respiratory: Negative for cough and shortness of breath  Gastrointestinal: Negative  OBJECTIVE:  Current Weight: Weight - Scale: 68 4 kg (150 lb 12 7 oz)  Vitals:Temp (24hrs), Av 3 °F (36 3 °C), Min:96 1 °F (35 6 °C), Max:98 5 °F (36 9 °C)  Current: Temperature: 98 5 °F (36 9 °C)   Blood pressure 139/84, pulse 95, temperature 98 5 °F (36 9 °C), temperature source Tympanic, resp  rate 20, height 6' 4" (1 93 m), weight 68 4 kg (150 lb 12 7 oz), SpO2 99 %  ,Body mass index is 18 36 kg/m²        Intake/Output Summary (Last 24 hours) at 10/06/17 1142  Last data filed at 10/06/17 0002   Gross per 24 hour   Intake              870 ml   Output             2075 ml   Net            -1205 ml       Physical Exam: /84   Pulse 95   Temp 98 5 °F (36 9 °C) (Tympanic)   Resp 20   Ht 6' 4" (1 93 m)   Wt 68 4 kg (150 lb 12 7 oz)   SpO2 99%   BMI 18 36 kg/m²   Physical Exam   Constitutional: No distress  HENT:   Mouth/Throat: No oropharyngeal exudate  Neck: Neck supple  No JVD present  Cardiovascular: Normal rate  Exam reveals no friction rub  Pulmonary/Chest: Effort normal and breath sounds normal  He has no wheezes  He has no rales  Abdominal: Soft  Bowel sounds are normal  He exhibits no distension  There is no tenderness         Medications:    Current Facility-Administered Medications:     acetaminophen (TYLENOL) tablet 650 mg, 650 mg, Oral, Q6H PRN, Elisa Pierce PA-C    apixaban (ELIQUIS) tablet 2 5 mg, 2 5 mg, Oral, BID, Marvin Shi DO, 2 5 mg at 10/06/17 1132    ascorbic acid (VITAMIN C) tablet 500 mg, 500 mg, Oral, Daily, Elisa Pierce PA-C, 500 mg at 10/06/17 0905    baclofen tablet 20 mg, 20 mg, Oral, BID, Elisa Pierce PA-C, 20 mg at 10/06/17 0905    famotidine (PEPCID) tablet 20 mg, 20 mg, Oral, BID, Elisa Pierce PA-C, 20 mg at 10/06/17 0905    ferrous sulfate tablet 162 5 mg, 162 5 mg, Oral, TID With Meals, Elisa Pierce PA-C, 162 5 mg at 10/06/17 0904    HYDROmorphone (DILAUDID) tablet 4 mg, 4 mg, Oral, Q4H PRN, Elisa Pierce PA-C, 4 mg at 10/06/17 1132    insulin detemir (LEVEMIR) subcutaneous injection 8 Units, 8 Units, Subcutaneous, HS, Deion Gamble MD, 8 Units at 10/05/17 2218    insulin lispro (HumaLOG) 100 units/mL subcutaneous injection 1-6 Units, 1-6 Units, Subcutaneous, HS, Milton Tyson DO, 2 Units at 10/05/17 2213    insulin lispro (HumaLOG) 100 units/mL subcutaneous injection 15 Units, 15 Units, Subcutaneous, TID With Meals, Deion Gamble MD, 15 Units at 10/06/17 0907    insulin lispro (HumaLOG) 100 units/mL subcutaneous injection 2-12 Units, 2-12 Units, Subcutaneous, TID AC, 6 Units at 10/06/17 0905 **AND** Fingerstick Glucose (POCT), , , TID AC, Milton Tyson DO    metoprolol tartrate (LOPRESSOR) tablet 50 mg, 50 mg, Oral, Q12H [address], Kathrynlauren Weemsier, DO, 50 mg at 10/06/17 0904    ondansetron (ZOFRAN) injection 4 mg, 4 mg, Intravenous, Q6H PRN, Elisa Pierce PA-C    ondansetron (ZOFRAN) injection 4 mg, 4 mg, Intravenous, Once PRN, Jose Lopez DO    piperacillin-tazobactam (ZOSYN) 2 25 g in sodium chloride 0 9 % 50 mL IVPB, 2 25 g, Intravenous, Q6H, Miguel Paulino MD, Last Rate: 100 mL/hr at 10/06/17 0002, 2 25 g at 10/06/17 0002    QUEtiapine (SEROquel) tablet 25 mg, 25 mg, Oral, HS, Milton Tyson, DO, 25 mg at 10/05/17 2049    sodium hypochlorite (DAKIN'S HALF-STRENGTH) 0 25 % topical solution 1 application, 1 application, Topical, Daily, Elisa Pierce PA-C, 1 application at 93/67/87 0911    Laboratory Results:  Lab Results   Component Value Date    WBC 10 43 (H) 10/05/2017    HGB 6 5 (LL) 10/05/2017    HCT 21 7 (L) 10/05/2017    MCV 84 10/05/2017     10/05/2017     Lab Results   Component Value Date    GLUCOSE 74 10/05/2017    CALCIUM 8 5 10/05/2017     10/05/2017    K 4 7 10/05/2017    CO2 22 10/05/2017     (H) 10/05/2017    BUN 57 (H) 10/05/2017    CREATININE 2 66 (H) 10/05/2017     Lab Results   Component Value Date    CALCIUM 8 5 10/05/2017    PHOS 3 7 09/19/2017     No results found for: LABPROT

## 2017-10-06 NOTE — PROGRESS NOTES
Remaining IV  Dislodged, removed per protocol  Refuses to have another IV installed   " I don't want no more of this  You can go now!  You ain't no doctor!"
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
## 2017-10-07 LAB
GLUCOSE SERPL-MCNC: 212 MG/DL (ref 65–140)
GLUCOSE SERPL-MCNC: 247 MG/DL (ref 65–140)
GLUCOSE SERPL-MCNC: 361 MG/DL (ref 65–140)
GLUCOSE SERPL-MCNC: 363 MG/DL (ref 65–140)
GLUCOSE SERPL-MCNC: 434 MG/DL (ref 65–140)
GLUCOSE SERPL-MCNC: 437 MG/DL (ref 65–140)

## 2017-10-07 PROCEDURE — 82948 REAGENT STRIP/BLOOD GLUCOSE: CPT

## 2017-10-07 RX ADMIN — INSULIN LISPRO 10 UNITS: 100 INJECTION, SOLUTION INTRAVENOUS; SUBCUTANEOUS at 12:57

## 2017-10-07 RX ADMIN — FERROUS SULFATE TAB 325 MG (65 MG ELEMENTAL FE) 162.5 MG: 325 (65 FE) TAB at 08:40

## 2017-10-07 RX ADMIN — METOPROLOL TARTRATE 50 MG: 50 TABLET ORAL at 08:41

## 2017-10-07 RX ADMIN — FERROUS SULFATE TAB 325 MG (65 MG ELEMENTAL FE) 162.5 MG: 325 (65 FE) TAB at 12:58

## 2017-10-07 RX ADMIN — OXYCODONE HYDROCHLORIDE AND ACETAMINOPHEN 500 MG: 500 TABLET ORAL at 08:41

## 2017-10-07 RX ADMIN — INSULIN LISPRO 12 UNITS: 100 INJECTION, SOLUTION INTRAVENOUS; SUBCUTANEOUS at 08:42

## 2017-10-07 RX ADMIN — HYDROMORPHONE HYDROCHLORIDE 2 MG: 2 TABLET ORAL at 20:31

## 2017-10-07 RX ADMIN — INSULIN LISPRO 3 UNITS: 100 INJECTION, SOLUTION INTRAVENOUS; SUBCUTANEOUS at 21:51

## 2017-10-07 RX ADMIN — FERROUS SULFATE TAB 325 MG (65 MG ELEMENTAL FE) 162.5 MG: 325 (65 FE) TAB at 17:56

## 2017-10-07 RX ADMIN — BACLOFEN 20 MG: 20 TABLET ORAL at 08:41

## 2017-10-07 RX ADMIN — FAMOTIDINE 20 MG: 20 TABLET ORAL at 08:41

## 2017-10-07 RX ADMIN — APIXABAN 2.5 MG: 2.5 TABLET, FILM COATED ORAL at 08:41

## 2017-10-07 RX ADMIN — INSULIN LISPRO 4 UNITS: 100 INJECTION, SOLUTION INTRAVENOUS; SUBCUTANEOUS at 17:56

## 2017-10-07 RX ADMIN — APIXABAN 2.5 MG: 2.5 TABLET, FILM COATED ORAL at 17:56

## 2017-10-07 RX ADMIN — BACLOFEN 20 MG: 20 TABLET ORAL at 17:56

## 2017-10-07 RX ADMIN — FAMOTIDINE 20 MG: 20 TABLET ORAL at 17:56

## 2017-10-07 RX ADMIN — HYOSCYAMINE SULFATE 1 APPLICATION: 16 SOLUTION at 17:55

## 2017-10-07 RX ADMIN — INSULIN DETEMIR 10 UNITS: 100 INJECTION, SOLUTION SUBCUTANEOUS at 21:51

## 2017-10-07 RX ADMIN — PIPERACILLIN SODIUM,TAZOBACTAM SODIUM 2.25 G: 2; .25 INJECTION, POWDER, FOR SOLUTION INTRAVENOUS at 22:15

## 2017-10-07 NOTE — PROGRESS NOTES
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,Ii the setting of a recent burn  CT suggests possible connection with ischial/perineal wounds although not appreciated on exam  Consider polymicrobial infection GPC/GNR/anaerobes  Status post minor bedside manual debridement/drainage  Improved but suspect still has necrotic/devitalized tissue given persistent drainage  However, unfortunately, patient continues to refuse further surgery  Continue Zosyn for now  If patient continues to refuse surgery, likely treat x 6 weeks empirically  Probability of relapse without surgery is essentially 100%      3   Chronic sacral decub ulcers  Not infected by report but may be communicating with penile process  Local wound care for now      4   Klebsiella CAUTI versus asymptomatic bacteruria  In the setting of a chronic SPC  Improved status post 14 days antibiotics  No further antibiotic needed for this      5   History of recurrent C  Diff  History of fecal transplant  High risk for reactivation in setting of broad-spectrum antibiotics  Continue p o  vancomycin prophylaxis while on systemic antibiotic      6   LEONILA on CKD  Seems due to blocked SPC, now draining  Creatinine slowly improving  Antibiotic dosages adjusted accordingly  Monitor creatinine      7   Disposition  Patient continues to refuse definitive surgery  I discussed with him in detail  Patient understands that infection cannot be eradicated by antibiotics alone    He will most likely die from this infection      Discussed 10 43* 13 14* 14 86*   HEMOGLOBIN g/dL 6 5* 6 6* 6 9*   PLATELETS Thousands/uL 373 403* 349           Imaging Studies:   I have personally reviewed pertinent imaging study reports and images in PACS  EKG, Pathology, and Other Studies:   I have personally reviewed pertinent reports

## 2017-10-07 NOTE — PROGRESS NOTES
Patient with diminished urine output this shift  Bladder scanned for >999 mL  Irrigated catheter with 60 mL NSS  60 mL of dark yellow cloudy urine returned  No clots noted  Catheter patent with 1250 mL cloudy yellow urine  Will continue to monitor and assess

## 2017-10-07 NOTE — PROGRESS NOTES
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 on zosyn in which he will need at least 6 weeks of IV antibiotics  Pt is currently refusing surgery  Pts family is working on transfer to "[facility]"  I spoke with his mother who stated Terry Liu will talk to the doctor on Monday  Case management is also helping with this  His family is well aware that without surgery this infection has a high rate of reoccurrence  Urology was able to preform a modified drainage but this will not be enough with out further surgery  Spoke with Terry Liu  He wants pt to have surgery  However urology refused to have surgery as pt refused this when competent  Psychiatry did evaluate the pt and he is not competetent to make medical decisions     Active problems  1  Chronic sacral decub ulcers- continue wound care    2  Klebsiella cauti vrs asymptomatic bacteruria due to chronic suprapubic catheter- pt improved with antibiotics  No further treatment needed    3  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  He is a high risk in the setting of broad spectrum antibiotic  Continue po vancomycin    4  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 now draining well  Creatinine improved  Check bmp in am       5  Acute/chronic anemia due to infection and ckd- pt had blood transfusion yesterday  Will check cbc this am       6  Type 2 diabetes- now with hyperglycemia  Will increase levemir slowly due to hypoglycemia in the past  Will continue to monitor       7  afib- continue rate control with metoprolol  Continue eliquis  8  T6 paraplegia- continue supportive measures       dispo-Pt not competent to make medical decisions   Will discuss with urology again on Monday                      Subjective:   Pt seen and examined  Pt lost IV site  Pt refused another IV placed  However he stated if his family was okay with it he will have it placed  Spoke with both his mother and zeina both agreed to have IV replaced  Pt was informed and he is okay with it  No other problems noted  Objective:     Vitals: Blood pressure 142/74, pulse 95, temperature 98 5 °F (36 9 °C), temperature source Tympanic, resp  rate 20, height 6' 4" (1 93 m), weight 68 4 kg (150 lb 12 7 oz), SpO2 99 %  ,Body mass index is 18 36 kg/m²  Lab, Imaging and other studies:    Results from last 7 days  Lab Units 10/05/17  0641   WBC Thousand/uL 10 43*   HEMOGLOBIN g/dL 6 5*   HEMATOCRIT % 21 7*   PLATELETS Thousands/uL 373   INR  1 04       Results from last 7 days  Lab Units 10/05/17  0641 10/02/17  0449   SODIUM mmol/L 138 142   POTASSIUM mmol/L 4 7 4 3   CHLORIDE mmol/L 111* 111*   CO2 mmol/L 22 22   BUN mg/dL 57* 49*   CREATININE mg/dL 2 66* 2 84*   CALCIUM mg/dL 8 5 8 1*   TOTAL PROTEIN g/dL  --  7 7   BILIRUBIN TOTAL mg/dL  --  0 13*   ALK PHOS U/L  --  75   ALT U/L  --  10*   AST U/L  --  8   GLUCOSE RANDOM mg/dL 74 209*       Results from last 7 days  Lab Units 10/02/17  0449 10/01/17  0938   CK TOTAL U/L 16* 89     Lab Results   Component Value Date    BLOODCX Lactobacillus species (A) 09/18/2017    BLOODCX Lactobacillus 09/18/2017    BLOODCX No Growth After 5 Days   06/28/2017    URINECX >100,000 cfu/ml - strain 1 Klebsiella pneumoniae 09/18/2017    URINECX >100,000 cfu/ml - strain 2 Klebsiella pneumoniae 09/18/2017    URINECX >100,000 cfu/ml Mixed Contaminants X4 07/03/2017    WOUNDCULT 4+ Growth of Beta Hemolytic Streptococcus Group C 06/29/2017    WOUNDCULT 3+ Growth of Escherichia coli 06/29/2017    WOUNDCULT 3+ Growth of Proteus mirabilis 06/29/2017     Physical exam:  Physical Exam  General appearance: alert, appears stated age and cooperative  Head: Normocephalic, without obvious abnormality, atraumatic  Eyes: conjunctivae/corneas clear  PERRL, EOM's intact  Fundi benign  Neck: no adenopathy, no carotid bruit, no JVD, supple, symmetrical, trachea midline and thyroid not enlarged, symmetric, no tenderness/mass/nodules  Lungs: listened anteriorly but ctabl  Heart: regular rate and rhythm, S1, S2 normal, no murmur, click, rub or gallop  Abdomen: soft, non-tender; bowel sounds normal; no masses,  no organomegaly  Neurologic: awake alert oriented x3 with prompting but pt could not tell me why he was in the hospital or what had happened during his hospital stay         VTE Pharmacologic Prophylaxis: eliquis  VTE Mechanical Prophylaxis: sequential compression device    Counseling / Coordination of Care  Total floor / unit time spent today 20 minutes     Current Length of Stay: 19 day(s)    Current Patient Status: Inpatient       Code Status: Level 3 - DNAR and DNI

## 2017-10-07 NOTE — PLAN OF CARE
Problem: Potential for Falls  Goal: Patient will remain free of falls  INTERVENTIONS:  - Assess patient frequently for physical needs  -  Identify cognitive and physical deficits and behaviors that affect risk of falls  -  [place] fall precautions as indicated by assessment   - Educate patient/family on patient safety including physical limitations  - Instruct patient to call for assistance with activity based on assessment  - Modify environment to reduce risk of injury  - Consider OT/PT consult to assist with strengthening/mobility   Outcome: Progressing      Problem: Prexisting or High Potential for Compromised Skin Integrity  Goal: Skin integrity is maintained or improved  INTERVENTIONS:  - Identify patients at risk for skin breakdown  - Assess and monitor skin integrity  - Assess and monitor nutrition and hydration status  - Monitor labs (i e  albumin)  - Assess for incontinence   - Turn and reposition patient  - Assist with mobility/ambulation  - Relieve pressure over bony prominences  - Avoid friction and shearing  - Provide appropriate hygiene as needed including keeping skin clean and dry  - Evaluate need for skin moisturizer/barrier cream  - Collaborate with interdisciplinary team (i e  Nutrition, Rehabilitation, etc )   - Patient/family teaching   Outcome: Progressing      Problem: Nutrition/Hydration-ADULT  Goal: Nutrient/Hydration intake appropriate for improving, restoring or maintaining nutritional needs  Monitor and assess patient's nutrition/hydration status for malnutrition (ex- brittle hair, bruises, dry skin, pale skin and conjunctiva, muscle wasting, smooth red tongue, and disorientation)  Collaborate with interdisciplinary team and initiate plan and interventions as ordered  Monitor patient's weight and dietary intake as ordered or per policy  Utilize nutrition screening tool and intervene per policy   Determine patient's food preferences and provide high-protein, high-caloric foods as appropriate       INTERVENTIONS:  - Monitor oral intake, urinary output, labs, and treatment plans  - Assess nutrition and hydration status and recommend course of action  - Evaluate amount of meals eaten  - Assist patient with eating if necessary   - Allow adequate time for meals  - Recommend/ encourage appropriate diets, oral nutritional supplements, and vitamin/mineral supplements  - Order, calculate, and assess calorie counts as needed  - Recommend, monitor, and adjust tube feedings and TPN/PPN based on assessed needs  - Assess need for intravenous fluids  - Provide specific nutrition/hydration education as appropriate  - Include patient/family/caregiver in decisions related to nutrition   Outcome: Progressing      Problem: PAIN - ADULT  Goal: Verbalizes/displays adequate comfort level or baseline comfort level  Interventions:  - Encourage patient to monitor pain and request assistance  - Assess pain using appropriate pain scale  - Administer analgesics based on type and severity of pain and evaluate response  - Implement non-pharmacological measures as appropriate and evaluate response  - Consider cultural and social influences on pain and pain management  - Notify physician/advanced practitioner if interventions unsuccessful or patient reports new pain   Outcome: Progressing      Problem: INFECTION - ADULT  Goal: Absence or prevention of progression during hospitalization  INTERVENTIONS:  - Assess and monitor for signs and symptoms of infection  - Monitor lab/diagnostic results  - Monitor all insertion sites, i e  indwelling lines, tubes, and drains  - Monitor endotracheal (as able) and nasal secretions for changes in amount and color  - [place] appropriate cooling/warming therapies per order  - Administer medications as ordered  - Instruct and encourage patient and family to use good hand hygiene technique  - Identify and instruct in appropriate isolation precautions for identified infection/condition Outcome: Progressing      Problem: SAFETY ADULT  Goal: Maintain or return to baseline ADL function  INTERVENTIONS:  -  Assess patient's ability to carry out ADLs; assess patient's baseline for ADL function and identify physical deficits which impact ability to perform ADLs (bathing, care of mouth/teeth, toileting, grooming, dressing, etc )  - Assess/evaluate cause of self-care deficits   - Assess range of motion  - Assess patient's mobility; develop plan if impaired  - Assess patient's need for assistive devices and provide as appropriate  - Encourage maximum independence but intervene and supervise when necessary  ¯ Involve family in performance of ADLs  ¯ Assess for home care needs following discharge   ¯ Request OT consult to assist with ADL evaluation and planning for discharge  ¯ Provide patient education as appropriate   Outcome: Progressing    Goal: Maintain or return mobility status to optimal level  INTERVENTIONS:  - Assess patient's baseline mobility status (ambulation, transfers, stairs, etc )    - Identify cognitive and physical deficits and behaviors that affect mobility  - Identify mobility aids required to assist with transfers and/or ambulation (gait belt, sit-to-stand, lift, walker, cane, etc )  - [place] fall precautions as indicated by assessment  - Record patient progress and toleration of activity level on Mobility SBAR; progress patient to next Phase/Stage  - Instruct patient to call for assistance with activity based on assessment  - Request Rehabilitation consult to assist with strengthening/weightbearing, etc    Outcome: Progressing      Problem: DISCHARGE PLANNING  Goal: Discharge to home or other facility with appropriate resources  INTERVENTIONS:  - Identify barriers to discharge w/patient and caregiver  - Arrange for needed discharge resources and transportation as appropriate  - Identify discharge learning needs (meds, wound care, etc )  - Arrange for interpretive services to assist at discharge as needed  - Refer to Case Management Department for coordinating discharge planning if the patient needs post-hospital services based on physician/advanced practitioner order or complex needs related to functional status, cognitive ability, or social support system   Outcome: Progressing      Problem: Knowledge Deficit  Goal: Patient/family/caregiver demonstrates understanding of disease process, treatment plan, medications, and discharge instructions  Complete learning assessment and assess knowledge base    Interventions:  - Provide teaching at level of understanding  - Provide teaching via preferred learning methods   Outcome: Progressing      Problem: DISCHARGE PLANNING - CARE MANAGEMENT  Goal: Discharge to post-acute care or home with appropriate resources  INTERVENTIONS:  - Conduct assessment to determine patient/family and health care team treatment goals, and need for post-acute services based on payer coverage, community resources, and patient preferences, and barriers to discharge  - Address psychosocial, clinical, and financial barriers to discharge as identified in assessment in conjunction with the patient/family and health care team  - Arrange appropriate level of post-acute services according to patient's   needs and preference and payer coverage in collaboration with the physician and health care team  - Communicate with and update the patient/family, physician, and health care team regarding progress on the discharge plan  - Arrange appropriate transportation to post-acute venues   Outcome: Progressing      Problem: NEUROSENSORY - ADULT  Goal: Achieves maximal functionality and self care  INTERVENTIONS  - Monitor swallowing and airway patency with patient fatigue and changes in neurological status  - Encourage and assist patient to increase activity and self care with guidance from rehab services  - Encourage visually impaired, hearing impaired and aphasic patients to use assistive/communication devices   Outcome: Progressing      Problem: GENITOURINARY - ADULT  Goal: Maintains or returns to baseline urinary function  INTERVENTIONS:  - Assess urinary function  - Encourage oral fluids to ensure adequate hydration  - Administer IV fluids as ordered to ensure adequate hydration  - Administer ordered medications as needed  - Offer frequent toileting  - Follow urinary retention protocol if ordered   Outcome: Progressing    Goal: Urinary catheter remains patent  INTERVENTIONS:  - Assess patency of urinary catheter  - If patient has a chronic hanks, consider changing catheter if non-functioning  - Follow guidelines for intermittent irrigation of non-functioning urinary catheter   Outcome: Progressing      Problem: METABOLIC, FLUID AND ELECTROLYTES - ADULT  Goal: Glucose maintained within target range  INTERVENTIONS:  - Monitor Blood Glucose as ordered  - Assess for signs and symptoms of hyperglycemia and hypoglycemia  - Administer ordered medications to maintain glucose within target range  - Assess nutritional intake and initiate nutrition service referral as needed   Outcome: Progressing      Problem: SKIN/TISSUE INTEGRITY - ADULT  Goal: Skin integrity remains intact  INTERVENTIONS  - Identify patients at risk for skin breakdown  - Assess and monitor skin integrity  - Assess and monitor nutrition and hydration status  - Monitor labs (i e  albumin)  - Assess for incontinence   - Turn and reposition patient  - Assist with mobility/ambulation  - Relieve pressure over bony prominences  - Avoid friction and shearing  - Provide appropriate hygiene as needed including keeping skin clean and dry  - Evaluate need for skin moisturizer/barrier cream  - Collaborate with interdisciplinary team (i e  Nutrition, Rehabilitation, etc )   - Patient/family teaching   Outcome: Progressing    Goal: Incision(s), wounds(s) or drain site(s) healing without S/S of infection  INTERVENTIONS  - Assess and document risk factors for skin impairment   - Assess and document dressing, incision, wound bed, drain sites and surrounding tissue  - Initiate Nutrition services consult and/or wound management as needed   Outcome: Progressing

## 2017-10-08 LAB
ANION GAP SERPL CALCULATED.3IONS-SCNC: 11 MMOL/L (ref 4–13)
BUN SERPL-MCNC: 75 MG/DL (ref 5–25)
CALCIUM SERPL-MCNC: 8.5 MG/DL (ref 8.3–10.1)
CHLORIDE SERPL-SCNC: 113 MMOL/L (ref 100–108)
CO2 SERPL-SCNC: 17 MMOL/L (ref 21–32)
CREAT SERPL-MCNC: 3 MG/DL (ref 0.6–1.3)
ERYTHROCYTE [DISTWIDTH] IN BLOOD BY AUTOMATED COUNT: 21.4 % (ref 11.6–15.1)
GFR SERPL CREATININE-BSD FRML MDRD: 29 ML/MIN/1.73SQ M
GLUCOSE SERPL-MCNC: 120 MG/DL (ref 65–140)
GLUCOSE SERPL-MCNC: 167 MG/DL (ref 65–140)
GLUCOSE SERPL-MCNC: 262 MG/DL (ref 65–140)
GLUCOSE SERPL-MCNC: 280 MG/DL (ref 65–140)
GLUCOSE SERPL-MCNC: 440 MG/DL (ref 65–140)
HCT VFR BLD AUTO: 26.7 % (ref 36.5–49.3)
HGB BLD-MCNC: 8.3 G/DL (ref 12–17)
MCH RBC QN AUTO: 26.3 PG (ref 26.8–34.3)
MCHC RBC AUTO-ENTMCNC: 31.1 G/DL (ref 31.4–37.4)
MCV RBC AUTO: 85 FL (ref 82–98)
PLATELET # BLD AUTO: 303 THOUSANDS/UL (ref 149–390)
PMV BLD AUTO: 10.4 FL (ref 8.9–12.7)
POTASSIUM SERPL-SCNC: 5.3 MMOL/L (ref 3.5–5.3)
RBC # BLD AUTO: 3.16 MILLION/UL (ref 3.88–5.62)
SODIUM SERPL-SCNC: 141 MMOL/L (ref 136–145)
WBC # BLD AUTO: 7.6 THOUSAND/UL (ref 4.31–10.16)

## 2017-10-08 PROCEDURE — 82948 REAGENT STRIP/BLOOD GLUCOSE: CPT

## 2017-10-08 PROCEDURE — 85027 COMPLETE CBC AUTOMATED: CPT | Performed by: INTERNAL MEDICINE

## 2017-10-08 PROCEDURE — 80048 BASIC METABOLIC PNL TOTAL CA: CPT | Performed by: INTERNAL MEDICINE

## 2017-10-08 RX ADMIN — FAMOTIDINE 20 MG: 20 TABLET ORAL at 08:35

## 2017-10-08 RX ADMIN — BACLOFEN 20 MG: 20 TABLET ORAL at 18:24

## 2017-10-08 RX ADMIN — PIPERACILLIN SODIUM,TAZOBACTAM SODIUM 2.25 G: 2; .25 INJECTION, POWDER, FOR SOLUTION INTRAVENOUS at 05:08

## 2017-10-08 RX ADMIN — PIPERACILLIN SODIUM,TAZOBACTAM SODIUM 2.25 G: 2; .25 INJECTION, POWDER, FOR SOLUTION INTRAVENOUS at 10:54

## 2017-10-08 RX ADMIN — INSULIN LISPRO 12 UNITS: 100 INJECTION, SOLUTION INTRAVENOUS; SUBCUTANEOUS at 08:31

## 2017-10-08 RX ADMIN — FERROUS SULFATE TAB 325 MG (65 MG ELEMENTAL FE) 162.5 MG: 325 (65 FE) TAB at 08:33

## 2017-10-08 RX ADMIN — FERROUS SULFATE TAB 325 MG (65 MG ELEMENTAL FE) 162.5 MG: 325 (65 FE) TAB at 18:24

## 2017-10-08 RX ADMIN — APIXABAN 2.5 MG: 2.5 TABLET, FILM COATED ORAL at 08:35

## 2017-10-08 RX ADMIN — INSULIN DETEMIR 15 UNITS: 100 INJECTION, SOLUTION SUBCUTANEOUS at 22:16

## 2017-10-08 RX ADMIN — SODIUM BICARBONATE 75 ML/HR: 84 INJECTION, SOLUTION INTRAVENOUS at 16:14

## 2017-10-08 RX ADMIN — FAMOTIDINE 20 MG: 20 TABLET ORAL at 18:24

## 2017-10-08 RX ADMIN — PIPERACILLIN SODIUM,TAZOBACTAM SODIUM 2.25 G: 2; .25 INJECTION, POWDER, FOR SOLUTION INTRAVENOUS at 17:44

## 2017-10-08 RX ADMIN — OXYCODONE HYDROCHLORIDE AND ACETAMINOPHEN 500 MG: 500 TABLET ORAL at 08:35

## 2017-10-08 RX ADMIN — HYDROMORPHONE HYDROCHLORIDE 2 MG: 2 TABLET ORAL at 13:47

## 2017-10-08 RX ADMIN — BACLOFEN 20 MG: 20 TABLET ORAL at 08:34

## 2017-10-08 RX ADMIN — METOPROLOL TARTRATE 50 MG: 50 TABLET ORAL at 08:35

## 2017-10-08 RX ADMIN — FERROUS SULFATE TAB 325 MG (65 MG ELEMENTAL FE) 162.5 MG: 325 (65 FE) TAB at 12:21

## 2017-10-08 RX ADMIN — HYOSCYAMINE SULFATE 1 APPLICATION: 16 SOLUTION at 08:36

## 2017-10-08 RX ADMIN — INSULIN LISPRO 1 UNITS: 100 INJECTION, SOLUTION INTRAVENOUS; SUBCUTANEOUS at 22:16

## 2017-10-08 RX ADMIN — PIPERACILLIN SODIUM,TAZOBACTAM SODIUM 2.25 G: 2; .25 INJECTION, POWDER, FOR SOLUTION INTRAVENOUS at 22:16

## 2017-10-08 RX ADMIN — APIXABAN 2.5 MG: 2.5 TABLET, FILM COATED ORAL at 18:24

## 2017-10-08 RX ADMIN — INSULIN LISPRO 6 UNITS: 100 INJECTION, SOLUTION INTRAVENOUS; SUBCUTANEOUS at 12:19

## 2017-10-08 NOTE — PROGRESS NOTES
Progress Note - Infectious Disease   [facility]  40 y o  male MRN: [id number]  Unit/Bed#: [facility] [address] 225-01 Encounter: [id number]      Impression/Recommendations:  1   Lactobacillus sepsis, POA, with fever and leukocytosis  This is most likely due to penile cellulitis/abscess  Patient is clinically improved  Sepsis resolved  Fever resolved  WBC near normalized  Continue antibiotics as below  Monitor temperature/WBC      2   Penile cellulitis/abscess and possible gangrene,Ii the setting of a recent burn  CT suggests possible connection with ischial/perineal wounds although not appreciated on exam  Consider polymicrobial infection GPC/GNR/anaerobes  Status post minor bedside manual debridement/drainage  Improved but suspect still has necrotic/devitalized tissue given persistent drainage  However, unfortunately, patient continues to refuse further surgery  Continue Zosyn for now  If patient continues to refuse surgery, likely treat x 6 weeks empirically  Probability of relapse without surgery is essentially 100%      3   Chronic sacral decub ulcers  Not infected by report but may be communicating with penile process  Local wound care for now      4   Klebsiella CAUTI versus asymptomatic bacteruria  In the setting of a chronic SPC  Improved status post 14 days antibiotics  No further antibiotic needed for this      5   History of recurrent C  Diff  History of fecal transplant  High risk for reactivation in setting of broad-spectrum antibiotics  Continue p o  vancomycin prophylaxis while on systemic antibiotic      6   LEONILA on CKD  Seems due to blocked SPC, now draining  Creatinine slowly improving but worse today  Antibiotic dosages adjusted accordingly  Monitor creatinine      7   Disposition  Patient continues to refuse definitive surgery  Issue regarding patient's incompetent; decision as outlined by Medicine Service    Awaiting urology re-evaluation for surgery      Discussed with patient in detail regarding the above plan      Antibiotics:  Zosyn (Antibiotics #18)  PO Vancomycin     Subjective:  Patient is stable  Pain controlled  Temperature stays down  No chills  He is tolerating antibiotic well  No nausea, vomiting or diarrhea  Objective:  Vitals:  HR:  [] 99  Resp:  [18-19] 18  BP: (129-166)/(70-89) 166/83  SpO2:  [96 %-100 %] 100 %  Temp (24hrs), Av 1 °F (36 2 °C), Min:96 2 °F (35 7 °C), Max:98 2 °F (36 8 °C)  Current: Temperature: 98 2 °F (36 8 °C)    Physical Exam:     General: Awake, alert, cooperative, no distress  Lungs: Expansion symmetric, no rales, no wheezing, respirations unlabored  Heart[de-identified]  Tachycardic with regular rhythm, S1 and S2 normal, no murmur  Abdomen: Soft, nondistended, non-tender, bowel sounds active all four quadrants,        no masses, no organomegaly  :  Stable purulent drainage  No tenderness  Extremities: No edema  No erythema/warmth  No ulcer  Nontender to palpation  Skin:  No rash  Invasive Devices     Peripheral Intravenous Line            Peripheral IV 10/07/17 Left Forearm less than 1 day          Drain            Suprapubic Catheter Latex 27977 days    Colostomy Descending/sigmoid  days                Labs studies:   I have personally reviewed pertinent labs      Results from last 7 days  Lab Units 10/08/17  0512 10/05/17  0641 10/02/17  0449   SODIUM mmol/L 141 138 142   POTASSIUM mmol/L 5 3 4 7 4 3   CHLORIDE mmol/L 113* 111* 111*   CO2 mmol/L 17* 22 22   ANION GAP mmol/L 11 5 9   BUN mg/dL 75* 57* 49*   CREATININE mg/dL 3 00* 2 66* 2 84*   EGFR ml/min/1 73sq m 29 34 31   GLUCOSE RANDOM mg/dL 280* 74 209*   CALCIUM mg/dL 8 5 8 5 8 1*   AST U/L  --   --  8   ALT U/L  --   --  10*   ALK PHOS U/L  --   --  75   TOTAL PROTEIN g/dL  --   --  7 7   ALBUMIN g/dL  --   --  1 5*   BILIRUBIN TOTAL mg/dL  --   --  0 13*       Results from last 7 days  Lab Units 10/08/17  0512 10/05/17  0641 10/02/17  0449   WBC Thousand/uL 7 60 10 43* 13 14*   HEMOGLOBIN g/dL 8 3* 6 5* 6 6*   PLATELETS Thousands/uL 303 373 403*           Imaging Studies:   I have personally reviewed pertinent imaging study reports and images in PACS  EKG, Pathology, and Other Studies:   I have personally reviewed pertinent reports

## 2017-10-08 NOTE — PROGRESS NOTES
NEPHROLOGY PROGRESS NOTE   Kath Jin  40 y o  male MRN: [id number]  Unit/Bed#: [facility] [address] 2 [address] 225-01 Encounter: [id number]  Reason for Consult: LEONILA    ASSESSMENT/PLAN:  1  Acute Kidney Injury- ATN secondary to sepsis  - restart IVF  2  Chronic Kidney Disease stage IV- Baseline creatinine 1 5 to 2  Etiology secondary to diabetes, hypertension, recurrent LEONILA  - does not follow with nephrology as an outpatient  3  Metabolic Acidosis  - start bicarbonate drip  4  Penile Abscess  - ? Surgical intervention as patient refused it when he was competent but family pushing for it to be done  - urology not comfortable doing surgery in patient who already refused  - patient may obtain a second opinion at [facility] AT THE [facility]  - receiving abx (zosyn)  5  Hypertension- BP controlled  6  Anemia- no blood products  7  Paraplegia    Disposition:  Need to clarify goals of care  Possible transfer to [facility] for a second opinion  SUBJECTIVE:  Patient agreeable to fluids  States he is going to transfer to [facility] tomorrow  States he is eating well  Denies SOB      OBJECTIVE:  Current Weight: Weight - Scale: 68 4 kg (150 lb 12 7 oz)  Vitals:    10/07/17 1544 10/07/17 2031 10/07/17 2300 10/08/17 0700   BP: 129/70 142/84 155/89 166/83   Pulse: 91 103 103 99   Resp: 19  18 18   Temp: (!) 97 °F (36 1 °C)  (!) 96 2 °F (35 7 °C) 98 2 °F (36 8 °C)   TempSrc: Tympanic  Tympanic Tympanic   SpO2: 98%  96% 100%   Weight:       Height:           Intake/Output Summary (Last 24 hours) at 10/08/17 1521  Last data filed at 10/08/17 1100   Gross per 24 hour   Intake             1030 ml   Output             2425 ml   Net            -1395 ml     General: NAD  Skin: no rash  HEENT: normocephalic atraumatic  Neck: supple  Chest: CTAB  Heart: RRR  Abdomen: soft, nt, nd  Extremities: no edema  Neuro: alert awake    Medications:    Current Facility-Administered Medications:     acetaminophen (TYLENOL) tablet 650 mg, 650 mg, Oral, Q6H PRN, Pieter Fernandez PA-C   apixaban (ELIQUIS) tablet 2 5 mg, 2 5 mg, Oral, BID, Deb Henson DO, 2 5 mg at 10/08/17 0835    ascorbic acid (VITAMIN C) tablet 500 mg, 500 mg, Oral, Daily, Elisa Pierce PA-C, 500 mg at 10/08/17 0386    baclofen tablet 20 mg, 20 mg, Oral, BID, Elisa Pierce PA-C, 20 mg at 10/08/17 0834    famotidine (PEPCID) tablet 20 mg, 20 mg, Oral, BID, Elisa Pierce PA-C, 20 mg at 10/08/17 8775    ferrous sulfate tablet 162 5 mg, 162 5 mg, Oral, TID With Meals, Elisa Pierce PA-C, 162 5 mg at 10/08/17 1221    HYDROmorphone (DILAUDID) tablet 2 mg, 2 mg, Oral, Q4H PRN, Deb Henson DO, 2 mg at 10/08/17 1347    insulin detemir (LEVEMIR) subcutaneous injection 15 Units, 15 Units, Subcutaneous, HS, Kay Carrillo DO    insulin lispro (HumaLOG) 100 units/mL subcutaneous injection 1-6 Units, 1-6 Units, Subcutaneous, HS, Milton Tyson DO, 3 Units at 10/07/17 2151    insulin lispro (HumaLOG) 100 units/mL subcutaneous injection 15 Units, 15 Units, Subcutaneous, TID With Meals, Betty Titus MD, 15 Units at 10/08/17 1219    insulin lispro (HumaLOG) 100 units/mL subcutaneous injection 2-12 Units, 2-12 Units, Subcutaneous, TID AC, 6 Units at 10/08/17 1219 **AND** Fingerstick Glucose (POCT), , , TID AC, Milton Tyson DO    metoprolol tartrate (LOPRESSOR) tablet 75 mg, 75 mg, Oral, Q12H [address], Edilma Smiley PA-C    ondansetron (ZOFRAN) injection 4 mg, 4 mg, Intravenous, Q6H PRN, Elisa Pierce PA-C    piperacillin-tazobactam (ZOSYN) 2 25 g in sodium chloride 0 9 % 50 mL IVPB, 2 25 g, Intravenous, Q6H, Henry Heller MD, Stopped at 10/08/17 1124    QUEtiapine (SEROquel) tablet 25 mg, 25 mg, Oral, HS, Milotn Tyson DO, 25 mg at 10/06/17 2135    sodium hypochlorite (DAKIN'S HALF-STRENGTH) 0 25 % topical solution 1 application, 1 application, Topical, Daily, Elisa Pierce PA-C, 1 application at 38/36/22 9702    Laboratory Results:    Results from last 7 days  Lab Units 10/08/17  9994 10/05/17  0641 10/02/17  0449   WBC Thousand/uL 7 60 10 43* 13 14*   HEMOGLOBIN g/dL 8 3* 6 5* 6 6*   HEMATOCRIT % 26 7* 21 7* 21 5*   PLATELETS Thousands/uL 303 373 403*   SODIUM mmol/L 141 138 142   POTASSIUM mmol/L 5 3 4 7 4 3   CHLORIDE mmol/L 113* 111* 111*   CO2 mmol/L 17* 22 22   BUN mg/dL 75* 57* 49*   CREATININE mg/dL 3 00* 2 66* 2 84*   CALCIUM mg/dL 8 5 8 5 8 1*   ALBUMIN g/dL  --   --  1 5*   TOTAL PROTEIN g/dL  --   --  7 7   GLUCOSE RANDOM mg/dL 280* 74 209*

## 2017-10-08 NOTE — PROGRESS NOTES
[facility] Internal Medicine Progress Note  Patient: Christine Sierra  40 y o  male   MRN: [id number]  PCP: Burak Cruz MD  Unit/Bed#: [facility] 225-01 Encounter: [id number]  Date Of Visit: 10/08/17      Assessment/plan  Principal problem  1  Lactobacillus sepsis- on zosyn per ID     2  Penile cellulitis/abscess with possible gangrene on zosyn in which he will need at least 6 weeks of IV antibiotics if pt does not have surgery  Pt is currently refusing surgery  Pts family is working on transfer to [facility]  I spoke with his mother who stated Rosanne Waite will talk to the doctor on Monday  Case management is also helping with this  His family is well aware that without surgery this infection has a high rate of reoccurrence  Urology was able to preform a modified drainage but this will not be enough with out further surgery  Spoke with Rosanne Waite  He wants pt to have surgery  However urology refused to have surgery as pt refused this when competent  Psychiatry did evaluate the pt and he is not competetent to make medical decisions  Will need to rediscuss with urology on Monday  Will also need to continue speaking with Rosanne Waite about this       Active problems  1  Chronic sacral decub ulcers- continue wound care     2  Klebsiella cauti vrs asymptomatic bacteruria due to chronic suprapubic catheter- pt improved with antibiotics  No further treatment needed     3  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  He is a high risk in the setting of broad spectrum antibiotic  Continue po vancomycin     4  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 now draining well  Creatinine worsened  Nephrology following  Check bmp in am       5  Acute/chronic anemia due to infection and ckd- h/h stable after blood transfusion       6  Type 2 diabetes- now with hyperglycemia  Continue to increase levemir slowly due to hypoglycemia in the past       7  afib- continue rate control with metoprolol  Continue eliquis     8  T6 paraplegia- continue supportive measures       dispo-Pt not competent to make medical decisions  Will discuss with urology again on Monday  Will need to discuss with zeina on Monday also  Will discuss again the extensiveness of the surgery          Subjective:   Pt seen and examined  Pt could not tell me the year or month  He did not know about a surgery  No other problems today  IV is working well  Objective:     Vitals: Blood pressure 166/83, pulse 99, temperature 98 2 °F (36 8 °C), temperature source Tympanic, resp  rate 18, height 6' 4" (1 93 m), weight 68 4 kg (150 lb 12 7 oz), SpO2 100 %  ,Body mass index is 18 36 kg/m²  Lab, Imaging and other studies:    Results from last 7 days  Lab Units 10/08/17  0512 10/05/17  0641   WBC Thousand/uL 7 60 10 43*   HEMOGLOBIN g/dL 8 3* 6 5*   HEMATOCRIT % 26 7* 21 7*   PLATELETS Thousands/uL 303 373   INR   --  1 04       Results from last 7 days  Lab Units 10/08/17  0512  10/02/17  0449   SODIUM mmol/L 141  < > 142   POTASSIUM mmol/L 5 3  < > 4 3   CHLORIDE mmol/L 113*  < > 111*   CO2 mmol/L 17*  < > 22   BUN mg/dL 75*  < > 49*   CREATININE mg/dL 3 00*  < > 2 84*   CALCIUM mg/dL 8 5  < > 8 1*   TOTAL PROTEIN g/dL  --   --  7 7   BILIRUBIN TOTAL mg/dL  --   --  0 13*   ALK PHOS U/L  --   --  75   ALT U/L  --   --  10*   AST U/L  --   --  8   GLUCOSE RANDOM mg/dL 280*  < > 209*   < > = values in this interval not displayed  Results from last 7 days  Lab Units 10/02/17  0449   CK TOTAL U/L 16*     Lab Results   Component Value Date    BLOODCX Lactobacillus species (A) 09/18/2017    BLOODCX Lactobacillus 09/18/2017    BLOODCX No Growth After 5 Days   06/28/2017    URINECX >100,000 cfu/ml - strain 1 Klebsiella pneumoniae 09/18/2017    URINECX >100,000 cfu/ml - strain 2 Klebsiella pneumoniae 09/18/2017    URINECX >100,000 cfu/ml Mixed Contaminants X4 07/03/2017    WOUNDCULT 4+ Growth of Beta Hemolytic Streptococcus Group C 06/29/2017    WOUNDCULT 3+ Growth of Escherichia coli 06/29/2017    WOUNDCULT 3+ Growth of Proteus mirabilis 06/29/2017     Physical exam:  Physical Exam  General appearance: alert, appears stated age and cooperative  Head: Normocephalic, without obvious abnormality, atraumatic  Eyes: conjunctivae/corneas clear  PERRL, EOM's intact  Fundi benign  Neck: no adenopathy, no carotid bruit, no JVD, supple, symmetrical, trachea midline and thyroid not enlarged, symmetric, no tenderness/mass/nodules  Lungs: clear to auscultation bilaterally  Heart: regular rate and rhythm, S1, S2 normal, no murmur, click, rub or gallop  Abdomen: soft, non-tender; bowel sounds normal; no masses,  no organomegaly  Extremities: extremities normal, atraumatic, no cyanosis or edema  Pulses: 2+ and symmetric  Skin: Skin color, texture, turgor normal  No rashes or lesions  Neurologic: awake alert oriented x2 person, place         VTE Pharmacologic Prophylaxis: eliquis  VTE Mechanical Prophylaxis: sequential compression device    Counseling / Coordination of Care  Total floor / unit time spent today 20 minutes     Current Length of Stay: 20 day(s)    Current Patient Status: Inpatient       Code Status: Level 3 - DNAR and DNI

## 2017-10-08 NOTE — PROGRESS NOTES
Pt  With BG of 440 this AM  Due to receive total of 27 units of Humalog between sliding scale and order insulin  Dr Gold Givens made aware  Will continue to monitor

## 2017-10-08 NOTE — PLAN OF CARE
DISCHARGE PLANNING     Discharge to home or other facility with appropriate resources Progressing        DISCHARGE PLANNING - CARE MANAGEMENT     Discharge to post-acute care or home with appropriate resources Progressing        GENITOURINARY - ADULT     Maintains or returns to baseline urinary function Progressing     Urinary catheter remains patent Progressing        INFECTION - ADULT     Absence or prevention of progression during hospitalization Progressing        Knowledge Deficit     Patient/family/caregiver demonstrates understanding of disease process, treatment plan, medications, and discharge instructions Progressing        METABOLIC, FLUID AND ELECTROLYTES - ADULT     Glucose maintained within target range Progressing        NEUROSENSORY - ADULT     Achieves maximal functionality and self care Progressing        Nutrition/Hydration-ADULT     Nutrient/Hydration intake appropriate for improving, restoring or maintaining nutritional needs Progressing        PAIN - ADULT     Verbalizes/displays adequate comfort level or baseline comfort level Progressing        Potential for Falls     Patient will remain free of falls Progressing        Prexisting or High Potential for Compromised Skin Integrity     Skin integrity is maintained or improved Progressing        SAFETY ADULT     Maintain or return to baseline ADL function Progressing     Maintain or return mobility status to optimal level Progressing        SKIN/TISSUE INTEGRITY - ADULT     Skin integrity remains intact Progressing     Incision(s), wounds(s) or drain site(s) healing without S/S of infection Progressing

## 2017-10-09 ENCOUNTER — GENERIC CONVERSION - ENCOUNTER (OUTPATIENT)
Dept: OTHER | Facility: OTHER | Age: 37
End: 2017-10-09

## 2017-10-09 LAB
ANION GAP SERPL CALCULATED.3IONS-SCNC: 8 MMOL/L (ref 4–13)
BUN SERPL-MCNC: 67 MG/DL (ref 5–25)
CALCIUM SERPL-MCNC: 8.8 MG/DL (ref 8.3–10.1)
CHLORIDE SERPL-SCNC: 112 MMOL/L (ref 100–108)
CO2 SERPL-SCNC: 21 MMOL/L (ref 21–32)
CREAT SERPL-MCNC: 3.32 MG/DL (ref 0.6–1.3)
GFR SERPL CREATININE-BSD FRML MDRD: 26 ML/MIN/1.73SQ M
GLUCOSE SERPL-MCNC: 171 MG/DL (ref 65–140)
GLUCOSE SERPL-MCNC: 175 MG/DL (ref 65–140)
GLUCOSE SERPL-MCNC: 185 MG/DL (ref 65–140)
GLUCOSE SERPL-MCNC: 325 MG/DL (ref 65–140)
GLUCOSE SERPL-MCNC: 342 MG/DL (ref 65–140)
GLUCOSE SERPL-MCNC: 38 MG/DL (ref 65–140)
GLUCOSE SERPL-MCNC: 47 MG/DL (ref 65–140)
POTASSIUM SERPL-SCNC: 5.4 MMOL/L (ref 3.5–5.3)
SODIUM SERPL-SCNC: 141 MMOL/L (ref 136–145)

## 2017-10-09 PROCEDURE — 82948 REAGENT STRIP/BLOOD GLUCOSE: CPT

## 2017-10-09 PROCEDURE — 80048 BASIC METABOLIC PNL TOTAL CA: CPT | Performed by: PHYSICIAN ASSISTANT

## 2017-10-09 RX ORDER — DEXTROSE MONOHYDRATE 25 G/50ML
INJECTION, SOLUTION INTRAVENOUS
Status: COMPLETED
Start: 2017-10-09 | End: 2017-10-09

## 2017-10-09 RX ORDER — DEXTROSE MONOHYDRATE 25 G/50ML
50 INJECTION, SOLUTION INTRAVENOUS ONCE
Status: COMPLETED | OUTPATIENT
Start: 2017-10-09 | End: 2017-10-09

## 2017-10-09 RX ADMIN — BACLOFEN 20 MG: 20 TABLET ORAL at 08:57

## 2017-10-09 RX ADMIN — SODIUM CHLORIDE 1000 ML: 0.9 INJECTION, SOLUTION INTRAVENOUS at 12:30

## 2017-10-09 RX ADMIN — PIPERACILLIN SODIUM,TAZOBACTAM SODIUM 2.25 G: 2; .25 INJECTION, POWDER, FOR SOLUTION INTRAVENOUS at 22:07

## 2017-10-09 RX ADMIN — METOPROLOL TARTRATE 75 MG: 50 TABLET ORAL at 08:57

## 2017-10-09 RX ADMIN — BACLOFEN 20 MG: 20 TABLET ORAL at 17:57

## 2017-10-09 RX ADMIN — DEXTROSE MONOHYDRATE 50 ML: 25 INJECTION, SOLUTION INTRAVENOUS at 12:24

## 2017-10-09 RX ADMIN — PIPERACILLIN SODIUM,TAZOBACTAM SODIUM 2.25 G: 2; .25 INJECTION, POWDER, FOR SOLUTION INTRAVENOUS at 16:37

## 2017-10-09 RX ADMIN — SODIUM BICARBONATE 75 ML/HR: 84 INJECTION, SOLUTION INTRAVENOUS at 10:23

## 2017-10-09 RX ADMIN — FERROUS SULFATE TAB 325 MG (65 MG ELEMENTAL FE) 162.5 MG: 325 (65 FE) TAB at 13:58

## 2017-10-09 RX ADMIN — FAMOTIDINE 20 MG: 20 TABLET ORAL at 08:57

## 2017-10-09 RX ADMIN — INSULIN LISPRO 2 UNITS: 100 INJECTION, SOLUTION INTRAVENOUS; SUBCUTANEOUS at 08:56

## 2017-10-09 RX ADMIN — FERROUS SULFATE TAB 325 MG (65 MG ELEMENTAL FE) 162.5 MG: 325 (65 FE) TAB at 17:57

## 2017-10-09 RX ADMIN — FERROUS SULFATE TAB 325 MG (65 MG ELEMENTAL FE) 162.5 MG: 325 (65 FE) TAB at 08:57

## 2017-10-09 RX ADMIN — HYOSCYAMINE SULFATE 1 APPLICATION: 16 SOLUTION at 08:55

## 2017-10-09 RX ADMIN — OXYCODONE HYDROCHLORIDE AND ACETAMINOPHEN 500 MG: 500 TABLET ORAL at 08:58

## 2017-10-09 RX ADMIN — METOPROLOL TARTRATE 75 MG: 50 TABLET ORAL at 22:07

## 2017-10-09 RX ADMIN — PIPERACILLIN SODIUM,TAZOBACTAM SODIUM 2.25 G: 2; .25 INJECTION, POWDER, FOR SOLUTION INTRAVENOUS at 11:21

## 2017-10-09 RX ADMIN — APIXABAN 2.5 MG: 2.5 TABLET, FILM COATED ORAL at 08:58

## 2017-10-09 RX ADMIN — PIPERACILLIN SODIUM,TAZOBACTAM SODIUM 2.25 G: 2; .25 INJECTION, POWDER, FOR SOLUTION INTRAVENOUS at 05:04

## 2017-10-09 RX ADMIN — FAMOTIDINE 20 MG: 20 TABLET ORAL at 17:57

## 2017-10-09 RX ADMIN — QUETIAPINE FUMARATE 25 MG: 25 TABLET ORAL at 22:07

## 2017-10-09 RX ADMIN — INSULIN DETEMIR 10 UNITS: 100 INJECTION, SOLUTION SUBCUTANEOUS at 22:07

## 2017-10-09 RX ADMIN — APIXABAN 2.5 MG: 2.5 TABLET, FILM COATED ORAL at 17:57

## 2017-10-09 RX ADMIN — INSULIN LISPRO 8 UNITS: 100 INJECTION, SOLUTION INTRAVENOUS; SUBCUTANEOUS at 16:37

## 2017-10-09 RX ADMIN — INSULIN LISPRO 5 UNITS: 100 INJECTION, SOLUTION INTRAVENOUS; SUBCUTANEOUS at 22:42

## 2017-10-09 NOTE — CASE MANAGEMENT
Continued Stay Review    Date:   10/9/2017    PROGRESS NOTE  10/5  Patient to holding room, signed consent with Dr Lyndsey Chou  Conversation on DNR status discussed between  anesthesia and patient, patient states he does not want breathing tube or surgery  " This is much more than I thought I have status that has already been discussed, this is not what I want , I do not want surgery no matter these are my wishes    Vital Signs: /86   Pulse 86   Temp (!) 96 °F (35 6 °C) (Tympanic)   Resp 18   Ht 6' 4" (1 93 m)   Wt 68 4 kg (150 lb 12 7 oz)   SpO2 100%   BMI 18 36 kg/m²     Medications:   Scheduled Meds:   apixaban 2 5 mg Oral BID   ascorbic acid 500 mg Oral Daily   baclofen 20 mg Oral BID   famotidine 20 mg Oral BID   ferrous sulfate 162 5 mg Oral TID With Meals   insulin detemir 10 Units Subcutaneous HS   insulin lispro 1-6 Units Subcutaneous HS   insulin lispro 15 Units Subcutaneous TID With Meals   insulin lispro 2-12 Units Subcutaneous TID AC   metoprolol tartrate 75 mg Oral Q12H BERTHA   piperacillin-tazobactam 2 25 g Intravenous Q6H   QUEtiapine 25 mg Oral HS   sodium hypochlorite 1 application Topical Daily     Continuous Infusions:   sodium bicarbonate infusion 75 mL/hr Last Rate: 75 mL/hr (10/09/17 1501)     PRN Meds:   acetaminophen    HYDROmorphone    ondansetron    Abnormal Labs/Diagnostic Results:    K  5 4  Chloride   112  BUN/Creat   67/3 32    Age/Sex: 40 y o  male     Assessment/Plan:   Lactobacillus sepsis- on zosyn per ID     2  Penile cellulitis/abscess with possible gangrene on zosyn in which he will need at least 6 weeks of IV antibiotics if pt does not have surgery  ID has stated that there is 100% chance that his infection will not be cured with IV antibiotics  There is also the risk of having a resistant bacteria after prolonged antibiotic use  Pt has both refused and agreed to surgery during his hospital stay  He is currently agreeing to it   Urology is hesitant to preform surgery since he continues to change his mind on going forward surgery or not due to how extensive the surgery will be  Pts family is working on transfer to [facility]  HIs cousin Edmar Madrigal is awaiting a call back for 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 now draining well  Creatinine worsened again today  Nephrology following  Possible degree of pre renal/dehydration  Giving 1 liter of NS  Check bmp in am       5  Acute/chronic anemia due to infection and ckd- h/h stable after blood transfusion  Check h/h in am  Type 2 diabetes- difficult to control  Pt had hyperglycemia yesterday  He has hypoglycemia today  Will decrease lantus  Will give an amp d50 now since his blood glucose is still in the 40s after eating  His control of diabetes is likely affected by his worsening creatinine        7  afib- continue rate control with metoprolol  Continue eliquis       8  T6 paraplegia- continue supportive measures       9  Hyperkalemia- per nephro  Should improve with IVF  Check bmp in am      Discharge Plan:   dispo-Pt not competent to make medical decisions  pts cousin wants pt go forward with surgery  Pt currently is agreeing to surgery  Awaiting to hear back from pts cousin and if [facility] will accept pt       I personally spoke with urology at [facility]  Dr Linda Rueda  He is going to talk it over with his chief but he does not think they will be accepting as we have these surgical services at our hospital  He urged me to talk with our urology team again

## 2017-10-09 NOTE — PROGRESS NOTES
Pt  BG at 1116 was 37  Pt  Given 8 oz OJ  BG re-checked, 47  Dr Patti Choi aware  Ordered to give one amp of D50  BG recheck after, 171  Dr Patti Choi again made aware  Ordered to hold lunch time insulin

## 2017-10-09 NOTE — PROGRESS NOTES
NEPHROLOGY PROGRESS NOTE   Benita Drake  40 y o  male MRN: [id number]  Unit/Bed#: [facility] 2 -01 Encounter: [id number]  Reason for Consult:  Acute kidney injury     ASSESSMENT/PLAN:  1  Acute kidney injury, suspecting ATN from sepsis  2  Acidosis, improved, on sodium bicarbonate drip  3  Suprapubic catheter placement  4  Paraplegia  5  Diarrhea  6  Stage 4 chronic kidney disease, baseline creatinine 1 5-2 0  7  Penile abscess/cellulitis  8  Hypertension  9  Anemia of chronic disease    · Overall patient has a negative balance given patient's urine output and diarrhea, slightly worse renal function  · Give 1 L normal saline bolus continue with IV fluids, sodium bicarb for the next 24 hours, more even to positive balance  · Hopeful plateau in serum creatinine      SUBJECTIVE:  Seen and examined  Patient awake alert  Pain seems to be under good control  No chest pain or shortness of breath      OBJECTIVE:  Current Weight: Weight - Scale: 68 4 kg (150 lb 12 7 oz)  Vitals:    10/08/17 0700 10/08/17 1614 10/08/17 2317 10/09/17 0725   BP: 166/83 155/89 147/79 (!) 175/91   Pulse: 99 83 101 86   Resp: 18 18 18 18   Temp: 98 2 °F (36 8 °C) (!) 95 4 °F (35 2 °C) (!) 96 °F (35 6 °C) (!) 96 °F (35 6 °C)   TempSrc: Tympanic Tympanic Tympanic Tympanic   SpO2: 100% 96% 99% 100%   Weight:       Height:           Intake/Output Summary (Last 24 hours) at 10/09/17 1123  Last data filed at 10/09/17 1022   Gross per 24 hour   Intake              600 ml   Output             5550 ml   Net            -4950 ml     GENERAL :  No distress  NECK:  Supple  CV:  Regular  RESP:  Clear to auscultation  ABD:  Soft  EXT:  No significant edema, noted wound  Psych:  Appropriate  SKIN:  No rash    Medications:    Current Facility-Administered Medications:     acetaminophen (TYLENOL) tablet 650 mg, 650 mg, Oral, Q6H PRN, Elisa Pierce PA-C    apixaban (ELIQUIS) tablet 2 5 mg, 2 5 mg, Oral, BID, Gina Innocent, DO, 2 5 mg at 10/09/17 4959    ascorbic acid (VITAMIN C) tablet 500 mg, 500 mg, Oral, Daily, Elisa Pierce PA-C, 500 mg at 10/09/17 0858    baclofen tablet 20 mg, 20 mg, Oral, BID, Elisa Pierce PA-C, 20 mg at 10/09/17 0857    famotidine (PEPCID) tablet 20 mg, 20 mg, Oral, BID, Elisa Pierce PA-C, 20 mg at 10/09/17 0857    ferrous sulfate tablet 162 5 mg, 162 5 mg, Oral, TID With Meals, Elisa Pierce PA-C, 162 5 mg at 10/09/17 0857    HYDROmorphone (DILAUDID) tablet 2 mg, 2 mg, Oral, Q4H PRN, Karen Nation DO, 2 mg at 10/08/17 1347    insulin detemir (LEVEMIR) subcutaneous injection 15 Units, 15 Units, Subcutaneous, HS, Kay Carrillo DO, 15 Units at 10/08/17 2216    insulin lispro (HumaLOG) 100 units/mL subcutaneous injection 1-6 Units, 1-6 Units, Subcutaneous, HS, Milton Tyson DO, 1 Units at 10/08/17 2216    insulin lispro (HumaLOG) 100 units/mL subcutaneous injection 15 Units, 15 Units, Subcutaneous, TID With Meals, Jamel Gifford MD, 15 Units at 10/09/17 0856    insulin lispro (HumaLOG) 100 units/mL subcutaneous injection 2-12 Units, 2-12 Units, Subcutaneous, TID AC, 2 Units at 10/09/17 0856 **AND** Fingerstick Glucose (POCT), , , TID AC, Milton Tyson DO    metoprolol tartrate (LOPRESSOR) tablet 75 mg, 75 mg, Oral, Q12H [address], Edilma Smiley PA-C, 75 mg at 10/09/17 0857    ondansetron (ZOFRAN) injection 4 mg, 4 mg, Intravenous, Q6H PRN, Elisa Pierce PA-C    piperacillin-tazobactam (ZOSYN) 2 25 g in sodium chloride 0 9 % 50 mL IVPB, 2 25 g, Intravenous, Q6H, Jay Gallagher MD, Last Rate: 100 mL/hr at 10/09/17 1121, 2 25 g at 10/09/17 1121    QUEtiapine (SEROquel) tablet 25 mg, 25 mg, Oral, HS, Milton Tyson DO, 25 mg at 10/06/17 2135    sodium bicarbonate 150 mEq in sterile water 1,000 mL infusion, 75 mL/hr, Intravenous, Continuous, Edilma Smiley PA-C, Last Rate: 75 mL/hr at 10/09/17 1023, 75 mL/hr at 10/09/17 1023    sodium chloride 0 9 % bolus 1,000 mL, 1,000 mL, Intravenous, Once, Areli Damico DO    sodium hypochlorite (DAKIN'S HALF-STRENGTH) 0 25 % topical solution 1 application, 1 application, Topical, Daily, Luisa Sanz PA-C, 1 application at 40/76/70 0855    Laboratory Results:    Results from last 7 days  Lab Units 10/09/17  0750 10/08/17  0512 10/05/17  0641   WBC Thousand/uL  --  7 60 10 43*   HEMOGLOBIN g/dL  --  8 3* 6 5*   HEMATOCRIT %  --  26 7* 21 7*   PLATELETS Thousands/uL  --  303 373   SODIUM mmol/L 141 141 138   POTASSIUM mmol/L 5 4* 5 3 4 7   CHLORIDE mmol/L 112* 113* 111*   CO2 mmol/L 21 17* 22   BUN mg/dL 67* 75* 57*   CREATININE mg/dL 3 32* 3 00* 2 66*   CALCIUM mg/dL 8 8 8 5 8 5   GLUCOSE RANDOM mg/dL 175* 280* 74

## 2017-10-09 NOTE — PROGRESS NOTES
[facility] Progress Note  Patient: Margareth Rivas  40 y o  male   MRN: [id number]  PCP: Faustino Bowen MD  Unit/Bed#: [facility] 225-01 Encounter: [id number]  Date Of Visit: 10/09/17   Addendum: spoke with Dr Hanane Gonzalez, Dr Tabatha Goodman, and cousin zeina  Dr Tabatha Goodman said that his surgery would be extensive  It would be difficult for him to heal from the surgery  He may need revisions  Also that pt had said he would go for surgery and then right before surgery he would refuse  He said he would be willing to preform surgery if pt consistently states he would like to go forward with surgery  Also that it would be good to optimize pt prior, such as nutrition status  I spoke with Pts cousin Mechelle Rodriguez about picc and IV antibiotics at [facility]  That way we could continue to manage pt and if he consistently states he would go forward with surgery over 2 to 3 weeks then we would pursue surgery  His cousin is okay with this plan  Will alert case management  I also updated his mother who agreed with plan  She also agreed to picc line if pt consented to it  I spoke with milad who consented to the picc line  Assessment/plan  Principal problem  1  Lactobacillus sepsis- on zosyn per ID     2  Penile cellulitis/abscess with possible gangrene on zosyn in which he will need at least 6 weeks of IV antibiotics if pt does not have surgery  ID has stated that there is 100% chance that his infection will not be cured with IV antibiotics  There is also the risk of having a resistant bacteria after prolonged antibiotic use  Pt has both refused and agreed to surgery during his hospital stay  He is currently agreeing to it  Urology is hesitant to preform surgery since he continues to change his mind on going forward surgery or not due to how extensive the surgery will be  Pts family is working on transfer to [facility] [employer]  HIs cousin Jurgen Cramer is awaiting a call back for [facility] urology  I did call Mechelle Rodriguez and left a voice message                         Active problems  1  Chronic sacral decub ulcers- continue wound care     2  Klebsiella cauti vrs asymptomatic bacteruria due to chronic suprapubic catheter- pt improved with antibiotics  No further treatment needed     3  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  He is a high risk in the setting of broad spectrum antibiotic  Continue po vancomycin     4  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 now draining well  Creatinine worsened again today  Nephrology following  Possible degree of pre renal/dehydration  Giving 1 liter of NS  Check bmp in am       5  Acute/chronic anemia due to infection and ckd- h/h stable after blood transfusion  Check h/h in am     6  Type 2 diabetes- difficult to control  Pt had hyperglycemia yesterday  He has hypoglycemia today  Will decrease lantus  Will give an amp d50 now since his blood glucose is still in the 40s after eating  His control of diabetes is likely affected by his worsening creatinine        7  afib- continue rate control with metoprolol  Continue eliquis       8  T6 paraplegia- continue supportive measures  9  Hyperkalemia- per nephro  Should improve with IVF  Check bmp in am       dispo-Pt not competent to make medical decisions  pts cousin wants pt go forward with surgery  Pt currently is agreeing to surgery  Awaiting to hear back from pts cousin and if [facility] will accept pt  I personally spoke with urology at [facility]  He is going to talk it over with his chief but he does not think they will be accepting as we have these surgical services at our hospital  He urged me to talk with our urology team again  Subjective:   Pt seen and examined  Pt is lucid today  He denies any problems  He understands why he is in the hospital  He understands what type of surgery is needed  He is agreeing to surgery  He has had low blood sugars today  No lightheadedness   No dizziness no f/c no cp no sob no n/v no abd pain    Objective:     Vitals: Blood pressure 158/86, pulse 86, temperature (!) 96 °F (35 6 °C), temperature source Tympanic, resp  rate 18, height 6' 4" (1 93 m), weight 68 4 kg (150 lb 12 7 oz), SpO2 100 %  ,Body mass index is 18 36 kg/m²  Lab, Imaging and other studies:    Results from last 7 days  Lab Units 10/08/17  0512 10/05/17  0641   WBC Thousand/uL 7 60 10 43*   HEMOGLOBIN g/dL 8 3* 6 5*   HEMATOCRIT % 26 7* 21 7*   PLATELETS Thousands/uL 303 373   INR   --  1 04       Results from last 7 days  Lab Units 10/09/17  0750   SODIUM mmol/L 141   POTASSIUM mmol/L 5 4*   CHLORIDE mmol/L 112*   CO2 mmol/L 21   BUN mg/dL 67*   CREATININE mg/dL 3 32*   CALCIUM mg/dL 8 8   GLUCOSE RANDOM mg/dL 175*         Lab Results   Component Value Date    BLOODCX Lactobacillus species (A) 09/18/2017    BLOODCX Lactobacillus 09/18/2017    BLOODCX No Growth After 5 Days  06/28/2017    URINECX >100,000 cfu/ml - strain 1 Klebsiella pneumoniae 09/18/2017    URINECX >100,000 cfu/ml - strain 2 Klebsiella pneumoniae 09/18/2017    URINECX >100,000 cfu/ml Mixed Contaminants X4 07/03/2017    WOUNDCULT 4+ Growth of Beta Hemolytic Streptococcus Group C 06/29/2017    WOUNDCULT 3+ Growth of Escherichia coli 06/29/2017    WOUNDCULT 3+ Growth of Proteus mirabilis 06/29/2017     Physical exam:  Physical Exam  General appearance: alert, appears stated age and cooperative  Head: Normocephalic, without obvious abnormality, atraumatic  Eyes: conjunctivae/corneas clear  PERRL, EOM's intact  Fundi benign  Neck: no adenopathy, no carotid bruit, no JVD, supple, symmetrical, trachea midline and thyroid not enlarged, symmetric, no tenderness/mass/nodules  Lungs: clear to auscultation bilaterally  Heart: regular rate and rhythm, S1, S2 normal, no murmur, click, rub or gallop  Abdomen: soft nt nd +bs   Skin: Skin color, texture, turgor normal  No rashes or lesions  Neurologic awake alert oriented x3 with very minimal confusion   Pt knew about why he was in the hospital  He knew what the surgery would entail  VTE Pharmacologic Prophylaxis: eliquis  VTE Mechanical Prophylaxis: sequential compression device    Counseling / Coordination of Care  Total floor / unit time spent today 30 minutes  minutes  Greater than 50% of total time was spent with the patient and / or family counseling and / or coordination of care  A description of the counseling / coordination of care: speaking with pt, With cousin zeina, with transfer center and will be speaking with urology at [facility]        Current Length of Stay: 21 day(s)    Current Patient Status: Inpatient       Code Status: Level 3 - DNAR and DNI

## 2017-10-09 NOTE — PROGRESS NOTES
Progress note - Palliative and Supportive Care   Shanell Casillas  40 y o  male [id number]    Patient Active Problem List   Diagnosis    Diabetes mellitus ([facility] )    Paraplegia ([facility] )    Paraplegia ([facility] )    Atrial fibrillation ([facility] )    Chronic suprapubic catheter ([facility] )    Colostomy care ([facility] )    OAB (overactive bladder)    S/P unilateral BKA (below knee amputation) ([facility] )    Sebaceous cyst    Tobacco abuse    Type 1 diabetes mellitus (HCC)    Ulcer of sacral region, stage 4 (HCC)    Anemia    Chronic indwelling Ortega catheter    Sepsis ([facility] )    Osteomyelitis of right femur ([place] [id number] )    Wound healing, delayed    Iron deficiency anemia    Decubitus ulcers    HTN (hypertension), benign    LEONILA (acute kidney injury) ([facility] )    Neurogenic bladder    GERD (gastroesophageal reflux disease)    Thrush    Chronic pain    Stage 3 chronic kidney disease    SOB (shortness of breath)    Thrombocytosis (HCC)    Penile abscess    Asymptomatic bacteriuria    History of Clostridium difficile infection    Urinary retention    Delirium    Noncompliance by refusing intervention or support      Plan:  1  Symptom management - wound pain, delirium   - Continue PO hydromorphone as first line for pain   - Will re-allow IV hydromorphone for breakthrough/severe pain, as pt has not recovered delirium since this med was held  Will restart at lower dose of 0 5mg q3hrs PRN  - Consider low-dose scheduled or drip medications for pain, depending on opioid demands and toxicities  2  Goals - limited life-prolonging   - Pt continues to show limited and fluctuating competence for decision-making  His cousin Faheem Knox has stepped forward as only available and willing family member to participate in decisions    - Given pt's previous ambivalence, and the significance of surgical risks and complications, [facility] is no longer offering surgical intervention for penile/corpus abscess     - Medical interventions for this abscess, per ID and SLIM services, are suppressive, but not curative  - When competent, pt previously requested DNR/DNI  - PSC to continue to facilitate discussions re: goals and means  We may need to transfer care to another network, vs indefinite ABx therapy at SNF or [facility]  Code Status: DNR/DNI - Level 3   Power of :  presumed to be cousin Gideon Srivastava () by [place]  Pt's mother, father, sisters have all declined to participate in decisions about cares  Advance Directive / Living Will: none   POLST:  none     We do believe that to not address this deep seated infection surgically, and to only use antibiotics, constitutes futile or non-beneficial treatment  This is based on our understanding that the infectious source of necrosis within the corpora cavernosa cannot be reached nor penetrated with antibiotics alone  The organisms within this nidus are certainly not benign, and it is my hypothesis that they -- or the inflammatory cascade that they have triggered -- are the reason for the pt's acute and now persistent change in mental status  There is a significant chance that these organisms may evolve within this pt to resist suppressive antimicrobial therapy, and that without surgical excision of infected tissue, the pt will one day die of overwhelming infection by MDROs  Brent Barboza MD  Palliative and Supportive Care  Pager: [phone]       Interval history:   After multiple contacts and f/ups on Friday, pt was felt to have poor capacity for decision-making, and his cousin Gideon Srivastava presented to bedside to lead decisional apparatus  Gideon Srivastava continued to request life-prolonging cares, including potentially disfiguring surgery  Ultimately, [facility] declined to continue to participate in discussing these cares, given concerns about pt's capacity and the perceived inappropriateness of surrogate decision-making by Gideon Srivastava    At that point, SLIM MD on Friday elected to drawdown opioid medications, in order to attempt to improve pt's alertness and appropriateness  Through the weekend, pt did not regain good capacity, and has been judged now by Psych to have poor capacity for decision-making  Pt has -- to multiple providers on multiple occasions over past three days -- demonstrated consistently poor orientation to time, place, and situation, and consistent dissent from surgery that would risk amputation of the penis  This morning in my conversation, he is able to articulate a choice between preserving his 'manhood' and prolonging his life by curing infection  He can also suggest that infection is a risk of surgery, but this is also the reason for the surgery to be done  He appeared to struggle a bit with this apparent contradiction, but then confirmed that he respects the opinion of his providers that this is a life-and-death scenario  Again, he confirmed that he is 'not using my manhood,' and that he does not wish any longer to  the way of life-sustaining cares  In separate conversations with CM/SW and Urology today, we agree that investigating outside/second opinions is likely the best approach in this gentleman's care  Efforts are ongoing to coordinate with Urology at [facility], per pt/family request   Family (mother Frieda Sandoval and cousin Rolanda Cintron) are also updated by phone, and in understanding -- if not agreement -- with proceedings  Rolanda Cintron did not  - an informative but de-identified Lizeth Grayson was left on         MEDICATIONS / ALLERGIES:    all current active meds have been reviewed and current meds:   Current Facility-Administered Medications   Medication Dose Route Frequency    acetaminophen (TYLENOL) tablet 650 mg  650 mg Oral Q6H PRN    apixaban (ELIQUIS) tablet 2 5 mg  2 5 mg Oral BID    ascorbic acid (VITAMIN C) tablet 500 mg  500 mg Oral Daily    baclofen tablet 20 mg  20 mg Oral BID    famotidine (PEPCID) tablet 20 mg  20 mg Oral BID    ferrous sulfate tablet 162 5 mg  162 5 mg Oral TID With Meals    HYDROmorphone (DILAUDID) tablet 2 mg  2 mg Oral Q4H PRN    insulin detemir (LEVEMIR) subcutaneous injection 15 Units  15 Units Subcutaneous HS    insulin lispro (HumaLOG) 100 units/mL subcutaneous injection 1-6 Units  1-6 Units Subcutaneous HS    insulin lispro (HumaLOG) 100 units/mL subcutaneous injection 15 Units  15 Units Subcutaneous TID With Meals    insulin lispro (HumaLOG) 100 units/mL subcutaneous injection 2-12 Units  2-12 Units Subcutaneous TID AC    metoprolol tartrate (LOPRESSOR) tablet 75 mg  75 mg Oral Q12H [facility]    ondansetron (ZOFRAN) injection 4 mg  4 mg Intravenous Q6H PRN    piperacillin-tazobactam (ZOSYN) 2 25 g in sodium chloride 0 9 % 50 mL IVPB  2 25 g Intravenous Q6H    QUEtiapine (SEROquel) tablet 25 mg  25 mg Oral HS    sodium bicarbonate 150 mEq in sterile water 1,000 mL infusion  75 mL/hr Intravenous Continuous    sodium hypochlorite (DAKIN'S HALF-STRENGTH) 0 25 % topical solution 1 application  1 application Topical Daily       Allergies   Allergen Reactions    Ciprofloxacin Hcl     Cymbalta [Duloxetine Hcl]     Lyrica [Pregabalin]     Polymyxin B        OBJECTIVE:    Physical Exam  Physical Exam   Constitutional: No distress  Frail, slender   HENT:   Head: Normocephalic and atraumatic  Right Ear: External ear normal    Left Ear: External ear normal    Eyes: Conjunctivae and EOM are normal  Pupils are equal, round, and reactive to light  Right eye exhibits no discharge  Left eye exhibits no discharge  Neck: No tracheal deviation present  Cardiovascular:   tachy   Pulmonary/Chest: Effort normal  No stridor  No respiratory distress  Abdominal: Soft    scaphoid   Musculoskeletal: He exhibits deformity (contractures of hands, wrists  Noticeable loss of muscle mass of interosseous areas, as well as face and forearms)  He exhibits no edema  Neurological: He is alert  He exhibits abnormal muscle tone (no movement of BLE  Spasms and contractures appreciated in bilat hands and wrists)  Orientation fair to person and situation  Skin: Skin is warm and dry  No rash noted  He is not diaphoretic  No erythema  No pallor  Psychiatric:   Pt has odd word choice, is hypophonic, but is not seeming to respond to internal stimuli today  He is redirectable, and answers fairly appropriately, given his educational and intellectual capacities  His eye contact is good, and he is consistent with his reasoning in our conversations  His associations are a bit loose  Lab Results: I have personally reviewed pertinent labs  , CBC: No results found for: WBC, HGB, HCT, MCV, PLT, ADJUSTEDWBC, MCH, MCHC, RDW, MPV, NRBC, CMP:   Lab Results   Component Value Date     10/09/2017    K 5 4 (H) 10/09/2017     (H) 10/09/2017    CO2 21 10/09/2017    ANIONGAP 8 10/09/2017    BUN 67 (H) 10/09/2017    CREATININE 3 32 (H) 10/09/2017    GLUCOSE 175 (H) 10/09/2017    CALCIUM 8 8 10/09/2017    EGFR 26 10/09/2017   progressive, dramatic injury to kidneys, presumably from dehydration and poor PO, possibly worsened by prolonged ABx therapy +/- sepsis    Imaging Studies: none new  Reviewed imaging of brain, showing no sign to me of massive stroke that would account for propensity for delirium  EKG, Pathology, and Other Studies: none pertinent    Counseling / Coordination of Care  Total floor / unit time spent today 85+ minutes  Greater than 50% of total time was spent with the patient and / or family counseling and / or coordination of care  A description of the counseling / coordination of care: goals of care, support, consideration of hospice eligibility, review of decisional capacity and apparatus  Extensive care coordination with SW/CM teams, legal/risk depts, and Uro service re: these issues
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
## 2017-10-10 ENCOUNTER — GENERIC CONVERSION - ENCOUNTER (OUTPATIENT)
Dept: OTHER | Facility: OTHER | Age: 37
End: 2017-10-10

## 2017-10-10 LAB
ANION GAP SERPL CALCULATED.3IONS-SCNC: 8 MMOL/L (ref 4–13)
BUN SERPL-MCNC: 47 MG/DL (ref 5–25)
CALCIUM SERPL-MCNC: 8.3 MG/DL (ref 8.3–10.1)
CHLORIDE SERPL-SCNC: 110 MMOL/L (ref 100–108)
CO2 SERPL-SCNC: 22 MMOL/L (ref 21–32)
CREAT SERPL-MCNC: 2.53 MG/DL (ref 0.6–1.3)
ERYTHROCYTE [DISTWIDTH] IN BLOOD BY AUTOMATED COUNT: 21.8 % (ref 11.6–15.1)
GFR SERPL CREATININE-BSD FRML MDRD: 36 ML/MIN/1.73SQ M
GLUCOSE SERPL-MCNC: 224 MG/DL (ref 65–140)
GLUCOSE SERPL-MCNC: 248 MG/DL (ref 65–140)
GLUCOSE SERPL-MCNC: 255 MG/DL (ref 65–140)
GLUCOSE SERPL-MCNC: 322 MG/DL (ref 65–140)
GLUCOSE SERPL-MCNC: 85 MG/DL (ref 65–140)
HCT VFR BLD AUTO: 28.1 % (ref 36.5–49.3)
HGB BLD-MCNC: 8.7 G/DL (ref 12–17)
MCH RBC QN AUTO: 26.3 PG (ref 26.8–34.3)
MCHC RBC AUTO-ENTMCNC: 31 G/DL (ref 31.4–37.4)
MCV RBC AUTO: 85 FL (ref 82–98)
PLATELET # BLD AUTO: 297 THOUSANDS/UL (ref 149–390)
PMV BLD AUTO: 10.3 FL (ref 8.9–12.7)
POTASSIUM SERPL-SCNC: 4.8 MMOL/L (ref 3.5–5.3)
RBC # BLD AUTO: 3.31 MILLION/UL (ref 3.88–5.62)
SODIUM SERPL-SCNC: 140 MMOL/L (ref 136–145)
WBC # BLD AUTO: 6.68 THOUSAND/UL (ref 4.31–10.16)

## 2017-10-10 PROCEDURE — C1751 CATH, INF, PER/CENT/MIDLINE: HCPCS

## 2017-10-10 PROCEDURE — 80048 BASIC METABOLIC PNL TOTAL CA: CPT | Performed by: INTERNAL MEDICINE

## 2017-10-10 PROCEDURE — 82948 REAGENT STRIP/BLOOD GLUCOSE: CPT

## 2017-10-10 PROCEDURE — 36569 INSJ PICC 5 YR+ W/O IMAGING: CPT

## 2017-10-10 PROCEDURE — 05H933Z INSERTION OF INFUSION DEVICE INTO RIGHT BRACHIAL VEIN, PERCUTANEOUS APPROACH: ICD-10-PCS | Performed by: INTERNAL MEDICINE

## 2017-10-10 PROCEDURE — 85027 COMPLETE CBC AUTOMATED: CPT | Performed by: INTERNAL MEDICINE

## 2017-10-10 RX ORDER — ACETAMINOPHEN 325 MG/1
650 TABLET ORAL 4 TIMES DAILY PRN
Status: DISCONTINUED | OUTPATIENT
Start: 2017-10-10 | End: 2017-10-11 | Stop reason: HOSPADM

## 2017-10-10 RX ADMIN — SODIUM CHLORIDE 1000 ML: 0.9 INJECTION, SOLUTION INTRAVENOUS at 10:13

## 2017-10-10 RX ADMIN — APIXABAN 2.5 MG: 2.5 TABLET, FILM COATED ORAL at 17:47

## 2017-10-10 RX ADMIN — FERROUS SULFATE TAB 325 MG (65 MG ELEMENTAL FE) 162.5 MG: 325 (65 FE) TAB at 09:04

## 2017-10-10 RX ADMIN — METOPROLOL TARTRATE 75 MG: 50 TABLET ORAL at 09:04

## 2017-10-10 RX ADMIN — INSULIN LISPRO 4 UNITS: 100 INJECTION, SOLUTION INTRAVENOUS; SUBCUTANEOUS at 09:03

## 2017-10-10 RX ADMIN — INSULIN LISPRO 5 UNITS: 100 INJECTION, SOLUTION INTRAVENOUS; SUBCUTANEOUS at 21:43

## 2017-10-10 RX ADMIN — HYOSCYAMINE SULFATE 1 APPLICATION: 16 SOLUTION at 09:07

## 2017-10-10 RX ADMIN — INSULIN LISPRO 4 UNITS: 100 INJECTION, SOLUTION INTRAVENOUS; SUBCUTANEOUS at 17:50

## 2017-10-10 RX ADMIN — PIPERACILLIN SODIUM,TAZOBACTAM SODIUM 2.25 G: 2; .25 INJECTION, POWDER, FOR SOLUTION INTRAVENOUS at 10:12

## 2017-10-10 RX ADMIN — PIPERACILLIN SODIUM,TAZOBACTAM SODIUM 2.25 G: 2; .25 INJECTION, POWDER, FOR SOLUTION INTRAVENOUS at 17:47

## 2017-10-10 RX ADMIN — HYDROMORPHONE HYDROCHLORIDE 2 MG: 2 TABLET ORAL at 15:31

## 2017-10-10 RX ADMIN — QUETIAPINE FUMARATE 25 MG: 25 TABLET ORAL at 21:40

## 2017-10-10 RX ADMIN — SODIUM BICARBONATE 75 ML/HR: 84 INJECTION, SOLUTION INTRAVENOUS at 04:20

## 2017-10-10 RX ADMIN — FERROUS SULFATE TAB 325 MG (65 MG ELEMENTAL FE) 162.5 MG: 325 (65 FE) TAB at 12:38

## 2017-10-10 RX ADMIN — BACLOFEN 20 MG: 20 TABLET ORAL at 09:04

## 2017-10-10 RX ADMIN — METOPROLOL TARTRATE 75 MG: 50 TABLET ORAL at 21:42

## 2017-10-10 RX ADMIN — OXYCODONE HYDROCHLORIDE AND ACETAMINOPHEN 500 MG: 500 TABLET ORAL at 09:04

## 2017-10-10 RX ADMIN — FERROUS SULFATE TAB 325 MG (65 MG ELEMENTAL FE) 162.5 MG: 325 (65 FE) TAB at 17:47

## 2017-10-10 RX ADMIN — PIPERACILLIN SODIUM,TAZOBACTAM SODIUM 2.25 G: 2; .25 INJECTION, POWDER, FOR SOLUTION INTRAVENOUS at 04:20

## 2017-10-10 RX ADMIN — PIPERACILLIN SODIUM,TAZOBACTAM SODIUM 2.25 G: 2; .25 INJECTION, POWDER, FOR SOLUTION INTRAVENOUS at 23:00

## 2017-10-10 RX ADMIN — HYDROMORPHONE HYDROCHLORIDE 2 MG: 2 TABLET ORAL at 21:53

## 2017-10-10 RX ADMIN — FAMOTIDINE 20 MG: 20 TABLET ORAL at 09:04

## 2017-10-10 RX ADMIN — SODIUM BICARBONATE 75 ML/HR: 84 INJECTION, SOLUTION INTRAVENOUS at 22:58

## 2017-10-10 RX ADMIN — BACLOFEN 20 MG: 20 TABLET ORAL at 17:47

## 2017-10-10 RX ADMIN — INSULIN DETEMIR 10 UNITS: 100 INJECTION, SOLUTION SUBCUTANEOUS at 21:45

## 2017-10-10 RX ADMIN — FAMOTIDINE 20 MG: 20 TABLET ORAL at 17:47

## 2017-10-10 RX ADMIN — APIXABAN 2.5 MG: 2.5 TABLET, FILM COATED ORAL at 09:04

## 2017-10-10 NOTE — PROGRESS NOTES
NEPHROLOGY PROGRESS NOTE   Lea Armstrong  40 y o  male MRN: [id number]  Unit/Bed#: [facility] [address] 2 [address] 87 [id number] Encounter: [id number]  Reason for Consult: LEONILA    ASSESSMENT/PLAN:  1  Acute kidney injury, suspecting ATN from sepsis  2  Acidosis, improved, on sodium bicarbonate drip  3  Suprapubic catheter placement  4  Paraplegia  5  Diarrhea  6  Stage 4 chronic kidney disease, baseline creatinine 1 5-2 0  7  Penile abscess/cellulitis  8  Hypertension  9  Anemia of chronic disease    · Renal function has improved today, again suspecting some component of ATN now with significant diuresis  · Continue with sodium bicarbonate, acidosis slow improving  · Given additional 1 L normal saline, still with a negative balance overall  · At 2 5 mg amlodipine      SUBJECTIVE:  Seen and examined  Patient awake alert  Still with significant diarrhea  Appetite remains fairly normal   Also states he had some leaking around his suprapubic catheter but this seems to have resolved      OBJECTIVE:  Current Weight: Weight - Scale: 68 4 kg (150 lb 12 7 oz)  Vitals:    10/09/17 1648 10/09/17 2215 10/09/17 2254 10/10/17 0902   BP: 160/96 163/91 159/92 (!) 177/88   Pulse:  (!) 107 (!) 110 84   Resp:   19 18   Temp:   98 1 °F (36 7 °C) (!) 96 4 °F (35 8 °C)   TempSrc:   Tympanic Tympanic   SpO2:   96% 100%   Weight:       Height:           Intake/Output Summary (Last 24 hours) at 10/10/17 0953  Last data filed at 10/10/17 0911   Gross per 24 hour   Intake             1050 ml   Output             5575 ml   Net            -4525 ml     GENERAL :  No distress, appears comfortable  NECK:  Supple  CV:  Regular  RESP:  Clear to auscultation  ABD:  Soft, nontender, noted suprapubic catheter  EXT:  No significant edema  Psych:  Appropriate  SKIN:  Noted in lower extremity wounds    Medications:    Current Facility-Administered Medications:     acetaminophen (TYLENOL) tablet 650 mg, 650 mg, Oral, Q6H PRN, Elisa Pierce PA-C    apixaban (ELIQUIS) tablet 2 5 mg, 2 5 mg, Oral, BID, Harrison Kappa, DO, 2 5 mg at 10/10/17 0904    ascorbic acid (VITAMIN C) tablet 500 mg, 500 mg, Oral, Daily, Elisa Pierce PA-C, 500 mg at 10/10/17 0904    baclofen tablet 20 mg, 20 mg, Oral, BID, Elisa Pierce PA-C, 20 mg at 10/10/17 0904    famotidine (PEPCID) tablet 20 mg, 20 mg, Oral, BID, Elisa Pierce PA-C, 20 mg at 10/10/17 0904    ferrous sulfate tablet 162 5 mg, 162 5 mg, Oral, TID With Meals, Elisa Pierce PA-C, 162 5 mg at 10/10/17 0904    HYDROmorphone (DILAUDID) tablet 2 mg, 2 mg, Oral, Q4H PRN, Harrison Kappa, DO, 2 mg at 10/08/17 1347    insulin detemir (LEVEMIR) subcutaneous injection 10 Units, 10 Units, Subcutaneous, HS, Kay Carrillo DO, 10 Units at 10/09/17 2207    insulin lispro (HumaLOG) 100 units/mL subcutaneous injection 1-6 Units, 1-6 Units, Subcutaneous, HS, Milton Tyson DO, 5 Units at 10/09/17 2242    insulin lispro (HumaLOG) 100 units/mL subcutaneous injection 15 Units, 15 Units, Subcutaneous, TID With Meals, Johana Mckeon MD, 15 Units at 10/10/17 0904    insulin lispro (HumaLOG) 100 units/mL subcutaneous injection 2-12 Units, 2-12 Units, Subcutaneous, TID AC, 4 Units at 10/10/17 0903 **AND** Fingerstick Glucose (POCT), , , TID AC, Milton Tyson DO    metoprolol tartrate (LOPRESSOR) tablet 75 mg, 75 mg, Oral, Q12H [facility], Edilma Smiley PA-C, 75 mg at 10/10/17 0904    ondansetron (ZOFRAN) injection 4 mg, 4 mg, Intravenous, Q6H PRN, Elisa Pierce PA-C    piperacillin-tazobactam (ZOSYN) 2 25 g in sodium chloride 0 9 % 50 mL IVPB, 2 25 g, Intravenous, Q6H, Reinaldo Mota MD, Last Rate: 100 mL/hr at 10/10/17 0420, 2 25 g at 10/10/17 0420    QUEtiapine (SEROquel) tablet 25 mg, 25 mg, Oral, HS, Milton Tyson DO, 25 mg at 10/09/17 2207    sodium bicarbonate 150 mEq in sterile water 1,000 mL infusion, 75 mL/hr, Intravenous, Continuous, Edilma Smiley PA-C, Last Rate: 75 mL/hr at 10/10/17 0420, 75 mL/hr at 10/10/17 0    sodium chloride 0 9 % bolus 1,000 mL, 1,000 mL, Intravenous, Once, Cruzito Damico, DO    sodium hypochlorite (DAKIN'S HALF-STRENGTH) 0 25 % topical solution 1 application, 1 application, Topical, Daily, Geronimo Muhammad PA-C, 1 application at 93/06/48 7071    Laboratory Results:    Results from last 7 days  Lab Units 10/10/17  0652 10/09/17  0750 10/08/17  0512 10/05/17  0641   WBC Thousand/uL 6 68  --  7 60 10 43*   HEMOGLOBIN g/dL 8 7*  --  8 3* 6 5*   HEMATOCRIT % 28 1*  --  26 7* 21 7*   PLATELETS Thousands/uL 297  --  303 373   SODIUM mmol/L 140 141 141 138   POTASSIUM mmol/L 4 8 5 4* 5 3 4 7   CHLORIDE mmol/L 110* 112* 113* 111*   CO2 mmol/L 22 21 17* 22   BUN mg/dL 47* 67* 75* 57*   CREATININE mg/dL 2 53* 3 32* 3 00* 2 66*   CALCIUM mg/dL 8 3 8 8 8 5 8 5   GLUCOSE RANDOM mg/dL 255* 175* 280* 74

## 2017-10-10 NOTE — PROGRESS NOTES
[facility] Internal Medicine Progress Note  Patient: Bennett Johnson  40 y o  male   MRN: [id number]  PCP: Linda Rodriguez MD  Unit/Bed#: [address] 2 [place] [address] Encounter: [id number]  Date Of Visit: 10/10/17      Assessment/plan  Principal problem  1  Lactobacillus sepsis- on zosyn per ID     2  Penile cellulitis/abscess with possible gangrene on zosyn  Pt will need 6 weeks of zosyn  He has 22 days left  ID has stated that there is 100% chance that his infection will not be cured with IV antibiotics  There is also the risk of having a resistant bacteria after prolonged antibiotic use  Pt has both refused and agreed to surgery during his hospital stay  He is currently agreeing to it  Urology is hesitant to preform surgery since he continues to change his mind on going forward surgery or not due to how extensive the surgery will be and the fact that it will be a very difficult surgery to preform in which he could need multiple revisions  Pt has been lucid for the last two days  He wants to go forward with the surgery  We are trying to optimize his chances by increasing his nutritional status and working on his renal failure  In the meantime ltach would be appropriate  This way urology can be reassured that pt wants to continue with surgery  S/p picc                         Active problems  1  Chronic sacral decub ulcers- continue wound care     2  Klebsiella cauti vrs asymptomatic bacteruria due to chronic suprapubic catheter- pt improved with antibiotics  No further treatment needed     3  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  He is a high risk in the setting of broad spectrum antibiotic  Continue po vancomycin     4  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 now draining well and pre renal creatinine improved  Appreciate nephrology recommendations  Giving another liter of normal saline  Check bmp in am  There could be a component of atn       5   Acute/chronic anemia due to infection and ckd- h/h stable after blood transfusion  H/h stable       6  Type 2 diabetes- difficult to control  Continue current insulin dose  Will continue to monitor       7  afib- continue rate control with metoprolol  Continue eliquis       8  T6 paraplegia- continue supportive measures       9  Hyperkalemia- resolved    10  htn- continue current treatment and adjust medications if bp remains elevated  dispo- looking for ltach for pt  He is s/p picc  If pt continues to consent for surgery will then proceed with surgery  Would not increase pain medications as pt has been lucid for the last two days  Subjective:   Pt seen and examined  Pt states he wants to go forward with the surgery  He understands what the surgery entails  No other problems  He is s/p picc    Objective:     Vitals: Blood pressure (!) 177/88, pulse 84, temperature (!) 96 4 °F (35 8 °C), temperature source Tympanic, resp  rate 18, height 6' 4" (1 93 m), weight 68 4 kg (150 lb 12 7 oz), SpO2 100 %  ,Body mass index is 18 36 kg/m²  Lab, Imaging and other studies:    Results from last 7 days  Lab Units 10/10/17  0652  10/05/17  0641   WBC Thousand/uL 6 68  < > 10 43*   HEMOGLOBIN g/dL 8 7*  < > 6 5*   HEMATOCRIT % 28 1*  < > 21 7*   PLATELETS Thousands/uL 297  < > 373   INR   --   --  1 04   < > = values in this interval not displayed  Results from last 7 days  Lab Units 10/10/17  0652   SODIUM mmol/L 140   POTASSIUM mmol/L 4 8   CHLORIDE mmol/L 110*   CO2 mmol/L 22   BUN mg/dL 47*   CREATININE mg/dL 2 53*   CALCIUM mg/dL 8 3   GLUCOSE RANDOM mg/dL 255*         Lab Results   Component Value Date    BLOODCX Lactobacillus species (A) 09/18/2017    BLOODCX Lactobacillus 09/18/2017    BLOODCX No Growth After 5 Days   06/28/2017    URINECX >100,000 cfu/ml - strain 1 Klebsiella pneumoniae 09/18/2017    URINECX >100,000 cfu/ml - strain 2 Klebsiella pneumoniae 09/18/2017    URINECX >100,000 cfu/ml Mixed Contaminants X4 07/03/2017    WOUNDCULT 4+ Growth of Beta Hemolytic Streptococcus Group C 06/29/2017    WOUNDCULT 3+ Growth of Escherichia coli 06/29/2017    WOUNDCULT 3+ Growth of Proteus mirabilis 06/29/2017       Physical exam:  Physical Exam  General appearance: alert, appears stated age and cooperative  Head: Normocephalic, without obvious abnormality, atraumatic  Eyes: conjunctivae/corneas clear  PERRL, EOM's intact  Fundi benign  Neck: no adenopathy, no carotid bruit, no JVD, supple, symmetrical, trachea midline and thyroid not enlarged, symmetric, no tenderness/mass/nodules  Lungs: clear to auscultation bilaterally  Heart:irregularly irregular s1 s2  Abdomen: soft, non-tender; bowel sounds normal; no masses,  no organomegaly  Extremities: bandage RLE c/d/I  Pulses: 2+ and symmetric  Skin: bandage RLE C/D/I  Neurologic:awake alert oriented x3  He understands why he is in the hospital  He understands what surgery he needs        VTE Pharmacologic Prophylaxis: eliquis  VTE Mechanical Prophylaxis: sequential compression device    Counseling / Coordination of Care  Total floor / unit time spent today 20 minutes      Current Length of Stay: 22 day(s)    Current Patient Status: Inpatient       Code Status: Level 3 - DNAR and DNI

## 2017-10-10 NOTE — PROCEDURES
Insert PICC line  Date/Time: 10/10/2017 1:43 PM  Performed by: Kofi Marcelo by: Kindra Sanz     Patient location:  Bedside  Consent:     Consent obtained:  Verbal (verbal consent by patient and Wesley Barger pts mother via phone)    Consent given by:  Patient and parent    Procedural risks discussed: consent obtained by physician  [place] protocol:     Procedure explained and questions answered to patient or proxy's satisfaction: yes      Relevant documents present and verified: yes      Test results available and properly labeled: yes      Imaging studies available: yes      Required blood products, implants, devices, and special equipment available: yes      Site/side marked: yes      Immediately prior to procedure, a time out was called: yes      Patient identity confirmed:  Verbally with patient, arm band, provided demographic data and hospital-assigned identification number  Pre-procedure details:     Hand hygiene: Hand hygiene performed prior to insertion      Sterile barrier technique: All elements of maximal sterile technique followed      Skin preparation:  ChloraPrep  Indications:     PICC line indications: long term antibiotics    Anesthesia (see MAR for exact dosages):      Anesthesia method:  Local infiltration (3ml)    Local anesthetic:  Lidocaine 1% w/o epi  Procedure details:     Location:  Brachial    Vessel type: vein      Laterality:  Right    Approach: percutaneous technique used      Patient position:  Flat    Procedural supplies:  Double lumen    Catheter size:  5 Fr    Landmarks identified: yes      Ultrasound guidance: yes      Sterile ultrasound techniques: Sterile gel and sterile probe covers were used      Number of attempts:  1    Successful placement: yes      Vessel of catheter tip end:  Sherlock 3CG confirmed (3cg procedure record sent to medical records)    Total catheter length (cm):  41    Catheter out on skin (cm):  0    Max flow rate:  999ml/hr    Arm circumference:

## 2017-10-10 NOTE — PROGRESS NOTES
Called to pts room to assess picc  Pt c/o picc did not feel right  Site wnl, dsg wnl, arm circumference 28cm, both ports positive blood return and flushes easily  Pt repositioned  Pt now drinking coffee and stated he feels better   Will cont to monitor
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
## 2017-10-11 VITALS
TEMPERATURE: 97.4 F | BODY MASS INDEX: 18.36 KG/M2 | HEIGHT: 76 IN | SYSTOLIC BLOOD PRESSURE: 147 MMHG | DIASTOLIC BLOOD PRESSURE: 89 MMHG | OXYGEN SATURATION: 99 % | WEIGHT: 150.79 LBS | RESPIRATION RATE: 18 BRPM | HEART RATE: 98 BPM

## 2017-10-11 LAB
GLUCOSE SERPL-MCNC: 371 MG/DL (ref 65–140)
GLUCOSE SERPL-MCNC: 484 MG/DL (ref 65–140)

## 2017-10-11 PROCEDURE — 82948 REAGENT STRIP/BLOOD GLUCOSE: CPT

## 2017-10-11 RX ORDER — HYDROMORPHONE HYDROCHLORIDE 2 MG/1
2 TABLET ORAL EVERY 4 HOURS PRN
Qty: 30 TABLET | Refills: 0
Start: 2017-10-11 | End: 2017-10-21

## 2017-10-11 RX ORDER — METOPROLOL TARTRATE 75 MG/1
75 TABLET, FILM COATED ORAL EVERY 12 HOURS SCHEDULED
Refills: 0
Start: 2017-10-11 | End: 2019-05-07

## 2017-10-11 RX ADMIN — APIXABAN 2.5 MG: 2.5 TABLET, FILM COATED ORAL at 08:26

## 2017-10-11 RX ADMIN — INSULIN LISPRO 12 UNITS: 100 INJECTION, SOLUTION INTRAVENOUS; SUBCUTANEOUS at 12:36

## 2017-10-11 RX ADMIN — FAMOTIDINE 20 MG: 20 TABLET ORAL at 08:27

## 2017-10-11 RX ADMIN — OXYCODONE HYDROCHLORIDE AND ACETAMINOPHEN 500 MG: 500 TABLET ORAL at 08:26

## 2017-10-11 RX ADMIN — BACLOFEN 20 MG: 20 TABLET ORAL at 08:26

## 2017-10-11 RX ADMIN — HYOSCYAMINE SULFATE 1 APPLICATION: 16 SOLUTION at 08:30

## 2017-10-11 RX ADMIN — PIPERACILLIN SODIUM,TAZOBACTAM SODIUM 2.25 G: 2; .25 INJECTION, POWDER, FOR SOLUTION INTRAVENOUS at 04:53

## 2017-10-11 RX ADMIN — SODIUM CHLORIDE 1000 ML: 0.9 INJECTION, SOLUTION INTRAVENOUS at 11:11

## 2017-10-11 RX ADMIN — FERROUS SULFATE TAB 325 MG (65 MG ELEMENTAL FE) 162.5 MG: 325 (65 FE) TAB at 08:26

## 2017-10-11 RX ADMIN — FERROUS SULFATE TAB 325 MG (65 MG ELEMENTAL FE) 162.5 MG: 325 (65 FE) TAB at 12:34

## 2017-10-11 RX ADMIN — METOPROLOL TARTRATE 75 MG: 50 TABLET ORAL at 08:29

## 2017-10-11 RX ADMIN — PIPERACILLIN SODIUM,TAZOBACTAM SODIUM 2.25 G: 2; .25 INJECTION, POWDER, FOR SOLUTION INTRAVENOUS at 10:21

## 2017-10-11 NOTE — PALLIATIVE CARE CONFERENCE
Brief Palliative and Supportive Care note:     Since our last visit, the [facility] attending JUDY Monreal has devised an appropriate plan of medical optimization prior to planned debridement, in order to promote pt's request to provide life-prolonging cares  As such, CM/SW team have organized and approved transfer of cares to [facility]  The goal will be improvement in pt's overall health -- via nutritional support, IV ABx, skilled wound cares, and safe and structured living conditions -- so that a potentially complicated and morbid surgery will have less chance of complications, such as wound dehiscence  The pt has, as stated during our last interview, requested life prolonging cares, and has been documented as in agreeement with this plan  Importantly, the pt's cousin Earl Jansen, most recently acting as HCRep, is also agreeable with this plan  Despite the fact that nearest appropriate and accepting facility is closer to [place], pt's mother Reagan Ash is called directly by me today, and offered this option for care  Whereas Reagan Ash has previously declined to consent her son either for surgery or hospice, she today appreciates that [facility] will serve his needs best at this time, and she is in agreement with the referral made by VALERIANO Ayoub  Please note that Reagan Ash still holds decisional capacity for this pt with limited capacity for clear decision-making; Earl Jansen was only used as an alternate when Reagan Ash specifically deferred the decisions about surgery vs hospice  We find it medically indicated, ethically responsible, and legally defensible to move forward with transfer to [facility] in [place]      Edwina Craft MD  Palliative and Supportive Care  Pager: [phone]

## 2017-10-11 NOTE — NURSING NOTE
Patient discharged via stretcher with transport team  Report called to Jillian Oliver at receiving facility
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
## 2017-10-11 NOTE — SOCIAL WORK
CM called [facility]  They can have a drive come to the hospital between 0700 and 0730 on Oct 12, 2017 to deliver the patients wheelchair to his home  CM spoke with patients mother Eleuterio Velázquez she is aware patient will receive a bill for the wheelchair being delivered to his home  CM notified RN

## 2017-10-11 NOTE — DISCHARGE INSTRUCTIONS
Check blood sugars 4 times daily 0700 1100 1600 2100  Position change every 2 hours  Wounds per wound care instructions

## 2017-10-11 NOTE — PROGRESS NOTES
Patient has been complaining about newly inserted PICC line  Patient reported it had been painful all day  Upon assessment, patient stated site felt painful to touch  Dressing remains intact  Site remained negative for swelling, redness or drainage  Patient refused to have PICC flushed  Patient also declined to have PICC wrapped in Kerlix to prevent inadvertently removing it at HS  Patient requested to contact on call RRT to report his discomfort  Called and spoke with [facility]  Explained to CRNP the patient's concern about newly inserted PICC  PICC previously assessed during day shift  Patient declined any nursing interventions including warm packs or prn  Offered active listening and emotional support  Patient and family reported feeling better being able to verbalize pt's needs  Bedside table and call bell remained within reach for safety  Patient did not have any further complaints and/or report   SD 10/10/2017 at 2356

## 2017-10-11 NOTE — SOCIAL WORK
Spoke with patients mother she stated that no one in the family can transport the patients wheelchair home
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
## 2017-10-11 NOTE — PROGRESS NOTES
Progress Note - Marcia Gaspar  40 y o  male MRN: [id number]    Unit/Bed#: [place] [address] 2 -01 Encounter: [id number]    Assessment/Plan:    Lactobacillus sepsis  complete 6 weeks of Zosyn 21 more day    Penile cellulitis/abscess possible gangrene patient has refused surgery with Urology      Planned discharge to [facility] and re-evaluate for surgery    Chronic sacral decubitus ulcer continue local care with position change    A KI/CKD    creatinine appears baseline 2 53    Anemia    hemoglobin stable at 8 7    Diabetes    erratic intake equals erratic control       Continue detemir plus sliding scale plus ADA    AFib     rate control with metoprolol continue Eliquis    T6 paraplegia    continue supportive measures    Subjective:   Feels good today offers no complaints denies chest pain shortness of breath nausea vomiting diarrhea no fevers appetite good    Objective:     Vitals: Blood pressure 147/89, pulse 98, temperature (!) 97 4 °F (36 3 °C), temperature source Tympanic, resp  rate 18, height 6' 4" (1 93 m), weight 68 4 kg (150 lb 12 7 oz), SpO2 99 %  ,Body mass index is 18 36 kg/m²  Results from last 7 days  Lab Units 10/10/17  0652  10/05/17  0641   WBC Thousand/uL 6 68  < > 10 43*   HEMOGLOBIN g/dL 8 7*  < > 6 5*   HEMATOCRIT % 28 1*  < > 21 7*   PLATELETS Thousands/uL 297  < > 373   INR   --   --  1 04   < > = values in this interval not displayed      Results from last 7 days  Lab Units 10/10/17  0652   SODIUM mmol/L 140   POTASSIUM mmol/L 4 8   CHLORIDE mmol/L 110*   CO2 mmol/L 22   BUN mg/dL 47*   CREATININE mg/dL 2 53*   CALCIUM mg/dL 8 3   GLUCOSE RANDOM mg/dL 255*       Scheduled Meds:    apixaban 2 5 mg Oral BID   ascorbic acid 500 mg Oral Daily   baclofen 20 mg Oral BID   famotidine 20 mg Oral BID   ferrous sulfate 162 5 mg Oral TID With Meals   insulin detemir 10 Units Subcutaneous HS   insulin lispro 1-6 Units Subcutaneous HS   insulin lispro 15 Units Subcutaneous TID With Meals   insulin lispro 2-12 Units Subcutaneous TID AC   metoprolol tartrate 75 mg Oral Q12H [address]   piperacillin-tazobactam 2 25 g Intravenous Q6H   QUEtiapine 25 mg Oral HS   sodium hypochlorite 1 application Topical Daily       Continuous Infusions:    sodium bicarbonate infusion 75 mL/hr Last Rate: Stopped (10/10/17 0734)     Physical exam:  General appearance:  Alert no distress interaction appropriate   Head/Eyes:  Nonicteric pale GERD EOMI  Neck:  Supple  Lungs:  Decreased BS bilateral no wheezing rhonchi or rales  Heart: normal S1 S2 irregular  Abdomen: Soft nontender with bowel sounds   Extremities:  Trace edema   Skin: no rash    Invasive Devices     Peripherally Inserted Central Catheter Line            PICC Line 10/10/17 Right Brachial less than 1 day          Peripheral Intravenous Line            Peripheral IV 10/07/17 Left Forearm 3 days          Drain            Suprapubic Catheter Latex 20010 days    Colostomy Descending/sigmoid  days                  VTE Pharmacologic Prophylaxis:  Eliquis   VTE Mechanical Prophylaxis:  Eliquis                    Counseling / Coordination of Care  Total floor / unit time spent today  30  minutes  Greater than 50% of total time was spent with the patient and / or family counseling and / or coordination of care    A description of the counseling / coordination of care:

## 2017-10-11 NOTE — PROGRESS NOTES
NEPHROLOGY PROGRESS NOTE   Ameena Gallardo  40 y o  male MRN: [id number]  Unit/Bed#: [place] 2 -01 Encounter: [id number]  Reason for Consult:  Acute kidney injury    ASSESSMENT/PLAN:  1  Acute kidney injury, suspecting ATN from sepsis  2  Stage 4 chronic kidney disease, baseline creatinine 1 5-2 0  3  Acidosis, improved, on sodium bicarbonate drip  4  Suprapubic catheter placement  5  Paraplegia  6  Diarrhea  7  Penile abscess/cellulitis  8  Hypertension  9  Anemia of chronic disease    Blood pressure appears slightly improved this morning  Continue with IV fluids, will give another 1 L saline bolus  Repeat serum chemistries and renal function, yesterday was improving  Urine output is adequate  Antibiotics per Infectious Disease    SUBJECTIVE:  Seen and examined  Patient awake alert  Still has fairly significant diarrhea although appetite remained stable, no nausea or vomiting  Denies any chest pain or shortness of breath      OBJECTIVE:  Current Weight: Weight - Scale: 68 4 kg (150 lb 12 7 oz)  Vitals:    10/10/17 1531 10/10/17 2137 10/10/17 2337 10/11/17 0737   BP: 162/94 (!) 172/94 (!) 174/97 147/89   Pulse: 93 94 86 98   Resp: 19 18 18 18   Temp: 98 8 °F (37 1 °C) (!) 97 4 °F (36 3 °C) 97 6 °F (36 4 °C) (!) 97 4 °F (36 3 °C)   TempSrc: Tympanic Tympanic Tympanic Tympanic   SpO2: 100%  100% 99%   Weight:       Height:           Intake/Output Summary (Last 24 hours) at 10/11/17 1036  Last data filed at 10/11/17 0700   Gross per 24 hour   Intake          4538 75 ml   Output             5750 ml   Net         -1211 25 ml     GENERAL :  No distress, appears comfortable  NECK:  Supple  CV:  Regular  RESP:  Clear to auscultation  ABD:  Soft  EXT:  No significant edema  Psych:  Appropriate  SKIN:  Multiple lower extremity wounds    Medications:    Current Facility-Administered Medications:     acetaminophen (TYLENOL) tablet 650 mg, 650 mg, Oral, 4x Daily PRN, ERNESTO Joseph    apixaban (ELIQUIS) tablet 2 5 mg, 2 5 mg, Oral, BID, Leanne Salinas DO, 2 5 mg at 10/11/17 1476    ascorbic acid (VITAMIN C) tablet 500 mg, 500 mg, Oral, Daily, Elisa Pierce PA-C, 500 mg at 10/11/17 8346    baclofen tablet 20 mg, 20 mg, Oral, BID, Elisa Pierce PA-C, 20 mg at 10/11/17 0826    famotidine (PEPCID) tablet 20 mg, 20 mg, Oral, BID, Elisa Pierce PA-C, 20 mg at 10/11/17 0827    ferrous sulfate tablet 162 5 mg, 162 5 mg, Oral, TID With Meals, Elisa Pierce PA-C, 162 5 mg at 10/11/17 0826    HYDROmorphone (DILAUDID) tablet 2 mg, 2 mg, Oral, Q4H PRN, Leanne Salinas DO, 2 mg at 10/10/17 2153    insulin detemir (LEVEMIR) subcutaneous injection 10 Units, 10 Units, Subcutaneous, HS, Kay Carrillo, , 10 Units at 10/10/17 2145    insulin lispro (HumaLOG) 100 units/mL subcutaneous injection 1-6 Units, 1-6 Units, Subcutaneous, HS, Milton Tyson DO, 5 Units at 10/10/17 2143    insulin lispro (HumaLOG) 100 units/mL subcutaneous injection 15 Units, 15 Units, Subcutaneous, TID With Meals, Fernando Laurent MD, 15 Units at 10/10/17 1750    insulin lispro (HumaLOG) 100 units/mL subcutaneous injection 2-12 Units, 2-12 Units, Subcutaneous, TID AC, 4 Units at 10/10/17 1750 **AND** Fingerstick Glucose (POCT), , , TID AC, Milton Tyson DO    metoprolol tartrate (LOPRESSOR) tablet 75 mg, 75 mg, Oral, Q12H [address], Edilma Smiley PA-C, 75 mg at 10/11/17 0829    ondansetron (ZOFRAN) injection 4 mg, 4 mg, Intravenous, Q6H PRN, Elisa Pierce PA-C    piperacillin-tazobactam (ZOSYN) 2 25 g in sodium chloride 0 9 % 50 mL IVPB, 2 25 g, Intravenous, Q6H, Kristen Riley MD, Last Rate: 100 mL/hr at 10/11/17 1021, 2 25 g at 10/11/17 1021    QUEtiapine (SEROquel) tablet 25 mg, 25 mg, Oral, HS, Milton Tyson DO, 25 mg at 10/10/17 2140    sodium bicarbonate 150 mEq in sterile water 1,000 mL infusion, 75 mL/hr, Intravenous, Continuous, Edilma Smiley PA-C, Stopped at 10/10/17 225    sodium hypochlorite (DAKIN'S HALF-STRENGTH) 0 25 % topical solution 1 application, 1 application, Topical, Daily, Giovanna Hackett PA-C, 1 application at 51/74/51 0830    Laboratory Results:    Results from last 7 days  Lab Units 10/10/17  0652 10/09/17  0750 10/08/17  0512 10/05/17  0641   WBC Thousand/uL 6 68  --  7 60 10 43*   HEMOGLOBIN g/dL 8 7*  --  8 3* 6 5*   HEMATOCRIT % 28 1*  --  26 7* 21 7*   PLATELETS Thousands/uL 297  --  303 373   SODIUM mmol/L 140 141 141 138   POTASSIUM mmol/L 4 8 5 4* 5 3 4 7   CHLORIDE mmol/L 110* 112* 113* 111*   CO2 mmol/L 22 21 17* 22   BUN mg/dL 47* 67* 75* 57*   CREATININE mg/dL 2 53* 3 32* 3 00* 2 66*   CALCIUM mg/dL 8 3 8 8 8 5 8 5   GLUCOSE RANDOM mg/dL 255* 175* 280* 74

## 2017-10-11 NOTE — SOCIAL WORK
Pt accepted at [facility]  In [facility]  SLETS arranged for 230 pm later called and changed to 2pm  Mother Trude Halsted notified and told of transport   CM asked family  wheelchair from Hospital

## 2017-10-12 ENCOUNTER — GENERIC CONVERSION - ENCOUNTER (OUTPATIENT)
Dept: OTHER | Facility: OTHER | Age: 37
End: 2017-10-12

## 2017-10-25 NOTE — PROGRESS NOTES
Assessment  1  Iron deficiency (280 9) (E61 1)   2  Chronic anticoagulation (V58 61) (Z79 01)   3  Stage III chronic kidney disease (585 3) (N18 3)   4  Difficulty sleeping (780 50) (G47 9)   5  Anemia (285 9) (D64 9)    Plan  Anemia, Chronic anticoagulation, Difficulty sleeping, Iron deficiency, Stage III chronic  kidney disease    · Temazepam 15 MG Oral Capsule; TAKE 1 CAPSULE AT BEDTIME AS NEEDED  FOR SLEEP   Rx By: Cleo Pope; Dispense: 30 Days ; #:30 Capsule; Refill: 0;For: Anemia, Chronic anticoagulation, Difficulty sleeping, Iron deficiency, Stage III chronic kidney disease; REID = N; Print Rx   · (1) CBC/PLT/DIFF; Status:Active; Requested for:01Sep2017;    Perform:[facility]; Due:01Sep2018; Ordered; For:Anemia, Chronic anticoagulation, Difficulty sleeping, Iron deficiency, Stage III chronic kidney disease; Ordered By:Dmitri Johnson;   · (1) CBC/PLT/DIFF; Status:Active; Requested for:27Nov2017;    Perform:[facility]; [facility]K:[id number]; Ordered; For:Anemia, Chronic anticoagulation, Difficulty sleeping, Iron deficiency, Stage III chronic kidney disease; Ordered By:Dmitri Johnson;   · (1) IRON SATURATION %, TIBC; Status:Active; Requested for:01Sep2017;    Perform:[facility]; Due:01Sep2018; Ordered; For:Anemia, Chronic anticoagulation, Difficulty sleeping, Iron deficiency, Stage III chronic kidney disease; Ordered By:Dmitri Johnson;   · (1) IRON SATURATION %, TIBC; Status:Active; Requested for:27Nov2017;    Perform:[facility]; [facility]:[id number]; Ordered; For:Anemia, Chronic anticoagulation, Difficulty sleeping, Iron deficiency, Stage III chronic kidney disease; Ordered By:Dmitri Johnson;   · (1) IRON; Status:Active; Requested for:01Sep2017;    Perform:[facility]; Due:01Sep2018; Ordered; For:Anemia, Chronic anticoagulation, Difficulty sleeping, Iron deficiency, Stage III chronic kidney disease; Ordered By:Dmitri Johnson;   · (1) IRON; Status:Active; Requested for:79Vay5641;    Perform:[facility]; [facility]:[id number]; Ordered; For:Anemia, Chronic anticoagulation, Difficulty sleeping, Iron deficiency, Stage III chronic kidney disease; Ordered By:Dmitri Johnson;   · Follow-up visit in 3 months Evaluation and Treatment  Follow-up  Status: Complete   Done: 42FOD1888 03:20PM   Ordered; For: Anemia, Chronic anticoagulation, Difficulty sleeping, Iron deficiency, Stage III chronic kidney disease; Ordered By: Darren Toney Performed:  Due: 34VAW4585; Last Updated By: Darryn Robertson; 9/1/2017 2:28:23 PM    Discussion/Summary  Discussion Summary:   There has been severe anemia likely based upon iron deficiency  In addition there is likely anemia of chronic kidney disease  In addition there may be anemia of chronic disease i e  skin ulcerations with delayed wound healing  The patient declined RBC transfusion until August 26, 2017 at which time he presented with severe symptomatic anemia and received transfusion of 4 units of red blood cells while an inpatient at [facility]  Esophagogastroduodenoscopy was done on August 28, 2017, 2 small duodenal AVMs which were treated successfully with APC by Dr Braulio Herrera, gastric biopsy was obtained  The patient is agreeable to proceeding with further iron sucrose provided that laboratory testing confirms iron deficiency as suspected  He is aware of the risk of iron sucrose including risk of hypersensitivity infusion reaction which could be potentially life-threatening  In that case that iron deficiency is documented, the plan is iron sucrose 200 mg IV weekly ? 3  In the case of progressive symptomatic anemia RBC transfusion is to be ordered if the patient is agreeable   hemoglobin does not improve to at least 10 g/dL with iron replacement, he may be a candidate for erythropoiesis stimulating agent provided that the erythropoietin level is inappropriately low    patient is to follow-up with his gastroenterologist Dr Porfirio Caceres including review of recent gastric biopsy  attention is to be sought for lightheadedness, shortness of breath, excessive fatigue, or if other new problems arise  Otherwise, I plan to see him again in 3 months  Chief Complaint  Chief Complaint Free Text Note Form: Lakhwinder Saavedra was seen in follow-up today in regards to iron deficiency anemia  History of Present Illness  HPI: [facility] from June 28, 2017 to July 3, 2017 with anemia and delayed wound healing of decubitus ulcerations of the presacral area, left medial foot, and posterior aspect of the upper right calf at the distal end of the right BKA  During the hospital stay he received iron sucrose 100 mg on 7/1/17 and 7/3/17 without incident  He continues to follow-up with Dr Dallas Mohamud in regards to wound care at the sites  He has been taking rivaroxaban 20 mg daily, there's been no nosebleeds, gingival bleeding or other bleeding  received 3 doses of iron sucrose 200 mg on 7/28/2017, 8//4/17, and 8/11/2017 which were well-tolerated  Because of severe symptomatic anemia i e  lethargy and syncope with hemoglobin 4 8 g/dL, he was admitted to [facility] from August 26, 2017 to August 30, 2017  He received transfusion of 4 units of red blood cells  (Previously he had declined RBC transfusion based upon Sabianist belief)  Esophagogastroduodenoscopy was done on August 28, 2017 there was mild diffuse erythema of the gastric body along the lesser curvature and 2 small duodenal AVMs which were treated successfully with APC by Dr Chasity Escobar, gastric biopsy was obtained  general sense of well-being has improved post RBC transfusion  He notes difficulty sleeping at nighttime and requested a sleeping medication  Previous Therapy:   1  Iron deficiency anemia   [facility], 3/24/17, 3/27/17, and 3/29/17 without incident  Long-term anticoagulation for management of 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 and paraplegia below T6 level  Review of Systems  Complete-Male:   Constitutional: He has been feeling well  Eyes: no eyesight problems  ENT: no nosebleeds-- and-- no hearing loss  Cardiovascular: no chest pain-- and-- no extremity edema  Respiratory: no shortness of breath  Gastrointestinal: His appetite has been good  , but-- no abdominal pain  Musculoskeletal: He has chronic mid back pain  Neurological: He is paraplegic , but-- no headache  Psychiatric: no emotional problems  Active Problems  1  Abnormal finding on urinalysis (791 9) (R82 90)   2  Abnormal kidney function study (794 4) (R94 4)   3  Abscess of face (682 0) (L02 01)   4  Atrial fibrillation (427 31) (I48 91)   5  Broken tooth with complication (675 49) (T03  5XXA)   6  C  difficile colitis (008 45) (A04 7)   7  Chronic anticoagulation (V58 61) (Z79 01)   8  Chronic pain syndrome (338 4) (G89 4)   9  Chronic skin ulcer (707 9) (L98 499)   10  Cigarette smoker (305 1) (F17 210)   11  Clostridium difficile infection (041 84) (B96 89)   12  Colostomy in place (V44 3) (Z93 3)   13  Cracked nails (703 8) (L60 3)   14  Decubitus ulcer of left hip, unstageable (707 04,707 25) (L89 220)   15  Decubitus ulcer of lower back, stage 3 (707 03,707 23) (L89 103)   16  Decubitus ulcer of right ischial area, stage III (017 48,214 35) (L89 313)   17  Diabetes mellitus (250 00) (E11 9)   18  Diabetic eye exam (V72 0,250 00) (E11 9,Z01 00)   19  Diabetic gastroparesis (250 60,536 3) (E11 43,K31 84)   20  Diabetic gastroparesis associated with type 1 diabetes mellitus (250 61,536 3)    (E10 43,K31 84)   21  Diarrhea of presumed infectious origin (009 3) (A09)   22  Dry skin (701 1) (L85 3)   23  DVT (deep venous thrombosis) (453 40) (I82 409)   24   Dysuria (788 1) (R30 0) 25  Encounter for diabetic foot exam (250 00) (E11 9)   26  GERD (gastroesophageal reflux disease) (530 81) (K21 9)   27  Infected sebaceous cyst (706 2) (L72 3,L08 9)   28  Insomnia (780 52) (G47 00)   29  Iron deficiency (280 9) (E61 1)   30  Iron deficiency anemia (280 9) (D50 9)   31  Iron deficiency anemia (280 9) (D50 9)   32  Iron deficiency anemia (280 9) (D50 9)   33  Leg muscle spasm (728 85) (M62 838)   34  Lesion of subcutaneous tissue (709 9) (L98 9)   35  Loose stools (787 7) (R19 5)   36  Neurogenic bladder (596 54) (N31 9)   37  Open wound of back, right, subsequent encounter (V58 89,876 0) (S21 201D)   38  Open wound of left foot with complication, initial encounter (892 1) (S91 302A)   39  Open wound of left foot with complication, subsequent encounter (V58 89,892 1)    (S91 302D)   40  Open wound of right lower extremity, initial encounter (891 0) (S81 801A)   41  Open wound of right lower extremity, subsequent encounter (V58 89,891 0) (S81 801D)   42  Oral thrush (112 0) (B37 0)   43  Paraplegic spinal paralysis (344 1) (G82 20)   44  Preop examination (V72 84) (Z01 818)   45  Prophylactic antibiotic (V58 62) (Z79 2)   46  Pustular folliculitis (172 7) (Y97 46)   47  Screening for depression (V79 0) (Z13 89)   48  Sebaceous cyst (706 2) (L72 3)   49  Stage III chronic kidney disease (585 3) (N18 3)   50  Status post below knee amputation of right lower extremity (V49 75) (Z89 511)   51  Swelling of lower extremity (729 81) (M79 89)   52  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)   53  Type 1 diabetes mellitus with sensory neuropathy (250 61,357 2) (E10 40)   54  Urinary tract infection (599 0) (N39 0)   55  Urinary tract infection associated with catheterization of urinary tract, unspecified    indwelling urinary catheter type, initial encounter (996 64,599 0) (T83 511A,N39 0)   56  UTI (urinary tract infection) (599 0) (N39 0)   57  Vitamin B deficiency (266 9) (E53 9)   58   Vitamin C deficiency (267) (E54)   59  Vitamin D deficiency (268 9) (E55 9)    Surgical History  1  History of Amputation Of Leg Below Knee   2  History of Back Surgery   3  History of Bladder Surgery   4  History of Surgical Delay Of Flap At Trunk    Family History  Mother    1  Family history of Hypertension  Father    2  No pertinent family history    Social History   · Alcohol use   · Cigarette smoker (305 1) (F17 210)   · Current every day smoker (305 1) (F17 200)    Current Meds   1  Alcohol Swabs 70 % Pad; USE 3 TIMES A DAY; Therapy: 93NVL0921 to (Evaluate:34Shj9450)  Requested for: 44ZNW2490; Last   Rx:06Jan2016 Ordered   2  Ammonium Lactate 12 % External Cream; APPLY  AND RUB  IN A THIN FILM TO   AFFECTED AREAS TWICE DAILY  (AM AND PM); Therapy: 28Dqq2890 to (Last Rx:90Apk8597)  Requested for: 41Mhv3133 Ordered   3  Baclofen 20 MG Oral Tablet; Take 1 tablet twice daily; Therapy: 55QAB6744 to (Evaluate:50Vaj8403)  Requested for: 33Rnf6489; Last   Rx:70Fqi2415 Ordered   4  Eda Contour Test In [employer]; TEST 4 TIMES A DAY; Therapy: 79OUY0916 to (Last Rx:32Arz1221)  Requested for: 36Brb4866 Ordered   5  Clindamycin Phosphate 1 % External Gel; APPLY AND GENTLY MASSAGE INTO   AFFECTED AREA(S) ONCE DAILY; Therapy: 91XIS9381 to (Evaluate:20Mar2017)  Requested for: 78YPF0057; Last   Rx:19Jan2017 Ordered   6  [employer] Vitamin B-12 1000 MCG Oral Tablet; take 1 capsule by mouth daily; Therapy: 72KKG4517 to (Evaluate:74Bjp4313)  Requested for: 22QHW8410; Last   Rx:03Jan2017 Ordered   7  Dakins (1/2 strength) 0 2-0 25 % External Solution; USE AS DIRECTED; Therapy: 79ZNP3451 to (Last Rx:10Mar2017)  Requested for: 00DQW1669 Ordered   8  Famotidine 20 MG Oral Tablet; take 1 tablet twice a day; Therapy: 33QIB8073 to (Anthony Carranza)  Requested for: 49Htz5881; Last   Rx:04Aug2017 Ordered   9  Ferrous Sulfate 325 (65 Fe) MG Oral Tablet; TAKE 1 TABLET BY MOUTH 3 TIMES A DAY;    Therapy: 90PMX3350 to (Evaluate:13Sja9237)  Requested for: 20Tsq9520; Last   Rx:79Qji1799 Ordered   10  FerrouSul 325 (65 Fe) MG Oral Tablet; TAKE 1 TABLET 3 TIMES DAILY; Therapy: 51FRD1353 to (Richard Mcbride)  Requested for: 76DIZ3802; Last    Rx:92Plz8407 Ordered   11  Glucerna Oral Liquid; Drink 2 cans a day; Therapy: 81DCY3354 to (Evaluate:43Qcw2566); Last Rx:86Gjz0328 Ordered   12  HumaLOG KwikPen 100 UNIT/ML Subcutaneous Solution Pen-injector; INJECT 4 UNITS    BEFORE MEALS; Therapy: 83Map9056 to (Evaluate:37Hqs1901)  Requested for: 61Ylj1551; Last    Rx:46Mkm5679 Ordered   13  Lancets Miscellaneous; TEST BLOOD SUGAR 3 TIMES A DAY; Therapy: 02JST2054 to (Evaluate:38Oll8313)  Requested for: 98BKY8474; Last    Rx:06Jan2016 Ordered   14  Levemir FlexTouch 100 UNIT/ML Subcutaneous Solution Pen-injector; 14 units in the    morning and 12 units in the evening; Therapy: 66ESK2823 to (Last Rx:72Mnl6249)  Requested for: 19Nyf2740 Ordered   15  Lidocaine HCl - 2 % External Gel; Apply to wound(s) PRN in [address] [place] [address]    prior to debridement for pain control; Therapy: (Recorded:13Jan2017) to Recorded   16  Multi-Vitamin Oral Tablet; take one tablet by mouth daily; Therapy: 19QIF1898 to Recorded   17  Nystatin 928107 UNIT/ML Mouth/Throat Suspension; SWISH AND SWALLOW 5 ML 4    TIMES A DAY  THEN CONTINUE FOR 48 HOURS AFTER THRUSH RESOLVES; Therapy: 08Aif8149 to (Evaluate:49Jmj6357)  Requested for: 09EAZ5743; Last    Rx:41Vbt3687 Ordered   18  Nystatin 819047 UNIT/ML Mouth/Throat Suspension; Therapy: (Recorded:65Fcy6140) to Recorded   19  Oxybutynin Chloride 5 MG Oral Tablet; TAKE 3 TABLET Daily; Therapy: 81OMQ5540 to (Evaluate:10Oct2017)  Requested for: 12Jun2017; Last    Rx:12Jun2017 Ordered   20  Vancomycin HCl - 125 MG Oral Capsule; Take 1 pill every 6 hours; Therapy: 98SAV9774 to (Tellis Pellet)  Requested for: 95DPN4777; Last    Rx:19Vgr9976 Ordered   21   Vitamin C 250 MG Oral Tablet; TAKE 2 TABLETS EVERY DAY; Therapy: 74HEE6341 to (Evaluate:02Jun2017)  Requested for: 22GXX6517; Last    QJ:51NCV4799 Ordered   22  Vitamin C 500 MG Oral Capsule; TAKE 1 CAPSULE DAILY; Therapy: 16GKC8131 to Recorded   23  Vitamin D3 1000 UNIT Oral Tablet; TAKE 1 TABLET DAILY; Therapy: 64UCP3381 to (Evaluate:09Uiy1107)  Requested for: 02Zgd5048; Last    Rx:61Yrb7721 Ordered   24  Xarelto 20 MG Oral Tablet; Take 1 tablet a day with food and stop coumadin; Therapy: 80CFT1824 to (Leatha Hernandez)  Requested for: 36NHH0053; Last    Rx:03Jan2017 Ordered    Allergies  1  Cipro SOLN   2  Cymbalta CPEP   3  Lyrica CAPS    Vitals  Vital Signs    Recorded: 01Sep2017 01:46PM   Temperature 97 3 F   Heart Rate 84   Respiration 16   Systolic 322   Diastolic 70   Height Unobtainable Yes   Weight Unobtainable Yes   O2 Saturation 96   Pain Scale 0     Physical Exam    Constitutional He appears well  Eyes EOMI  Ears, Nose, Mouth, and Throat Oropharynx clear  Pulmonary   Auscultation of lungs: Clear to auscultation, equal breath sounds bilaterally, no wheezes, no rales, no rhonci  Cardiovascular Heart has regular rate and rhythm  -- No lower extremity edema  Abdomen The left lower quadrant colostomy site is unremarkable  The suprapubic catheter exit site is unremarkable  Liver and spleen: No hepatomegaly or splenomegaly  Lymphatic   Palpation of lymph nodes in neck: No lymphadenopathy  -- No axillary or inguinal lymphadenopathy  Musculoskeletal He came to the office in a high back motorized wheelchair  -- The left foot is in a padded boot  Neurologic He is paraplegic  Psychiatric   Orientation to person, place and time: Normal     Mood and affect: Normal         ECOG 3       Results/Data  Results Form:   Results   Lab Results From July 21, 2017: Hemoglobin 4 4 with MCV 78  August 30, 2017: WBC 12 4, hemoglobin 8 2 with MCV 81, platelets 599, creatinine 2 08, alkaline phosphatase 105, bili 0 1, AST 7, ALT 13, PT/INR 1 5        Future Appointments    Date/Time Provider Specialty Site   12/01/2017 03:20 PM JUDY Turner   Hematology [facility]cology [facility]   09/20/2017 09:45 AM JUDY Pagan   Electronically signed by : JUDY Alcantara ; Sep  1 2017  8:05PM EST                       (Author)

## 2017-11-27 DIAGNOSIS — E61.1 IRON DEFICIENCY: ICD-10-CM

## 2017-11-27 DIAGNOSIS — N18.30 CHRONIC KIDNEY DISEASE, STAGE III (MODERATE) (HCC): ICD-10-CM

## 2017-11-27 DIAGNOSIS — D64.9 ANEMIA: ICD-10-CM

## 2017-11-27 DIAGNOSIS — G47.9 SLEEP DISORDER: ICD-10-CM

## 2017-11-27 DIAGNOSIS — Z79.01 LONG TERM CURRENT USE OF ANTICOAGULANT: ICD-10-CM

## 2017-12-09 ENCOUNTER — HOSPITAL ENCOUNTER (EMERGENCY)
Facility: HOSPITAL | Age: 37
Discharge: HOME | End: 2017-12-10
Attending: EMERGENCY MEDICINE | Admitting: EMERGENCY MEDICINE
Payer: MEDICARE

## 2017-12-09 DIAGNOSIS — T83.090A BLOCKED SUPRAPUBIC CATHETER, INITIAL ENCOUNTER (HCC): ICD-10-CM

## 2017-12-09 DIAGNOSIS — Z43.5 ENCOUNTER FOR CARE OR REPLACEMENT OF SUPRAPUBIC TUBE (HCC): ICD-10-CM

## 2017-12-09 DIAGNOSIS — N39.0 COMPLICATED UTI (URINARY TRACT INFECTION): Primary | ICD-10-CM

## 2017-12-09 LAB
BACTERIA UR QL AUTO: ABNORMAL /HPF
BILIRUB UR QL STRIP: NEGATIVE
CLARITY UR: ABNORMAL
COLOR UR: YELLOW
GLUCOSE SERPL-MCNC: 35 MG/DL (ref 65–140)
GLUCOSE SERPL-MCNC: 70 MG/DL (ref 65–140)
GLUCOSE UR STRIP-MCNC: NEGATIVE MG/DL
HGB UR QL STRIP.AUTO: ABNORMAL
KETONES UR STRIP-MCNC: NEGATIVE MG/DL
LEUKOCYTE ESTERASE UR QL STRIP: ABNORMAL
NITRITE UR QL STRIP: NEGATIVE
NON-SQ EPI CELLS URNS QL MICRO: ABNORMAL /HPF
PH UR STRIP.AUTO: 6 [PH] (ref 4.5–8)
PROT UR STRIP-MCNC: ABNORMAL MG/DL
RBC #/AREA URNS AUTO: ABNORMAL /HPF
SP GR UR STRIP.AUTO: 1.01 (ref 1–1.03)
UROBILINOGEN UR QL STRIP.AUTO: 0.2 E.U./DL
WBC #/AREA URNS AUTO: ABNORMAL /HPF

## 2017-12-09 PROCEDURE — 82948 REAGENT STRIP/BLOOD GLUCOSE: CPT

## 2017-12-09 PROCEDURE — 87086 URINE CULTURE/COLONY COUNT: CPT | Performed by: EMERGENCY MEDICINE

## 2017-12-09 PROCEDURE — 87077 CULTURE AEROBIC IDENTIFY: CPT | Performed by: EMERGENCY MEDICINE

## 2017-12-09 PROCEDURE — 87186 SC STD MICRODIL/AGAR DIL: CPT | Performed by: EMERGENCY MEDICINE

## 2017-12-09 PROCEDURE — 81001 URINALYSIS AUTO W/SCOPE: CPT | Performed by: EMERGENCY MEDICINE

## 2017-12-09 RX ORDER — LIDOCAINE HYDROCHLORIDE 20 MG/ML
JELLY TOPICAL ONCE
Status: COMPLETED | OUTPATIENT
Start: 2017-12-09 | End: 2017-12-09

## 2017-12-09 RX ORDER — CEPHALEXIN 500 MG/1
500 CAPSULE ORAL 4 TIMES DAILY
Qty: 28 CAPSULE | Refills: 0 | Status: SHIPPED | OUTPATIENT
Start: 2017-12-09 | End: 2017-12-16

## 2017-12-09 RX ORDER — CEPHALEXIN 250 MG/1
500 CAPSULE ORAL ONCE
Status: COMPLETED | OUTPATIENT
Start: 2017-12-09 | End: 2017-12-09

## 2017-12-09 RX ADMIN — CEPHALEXIN 500 MG: 250 CAPSULE ORAL at 22:09

## 2017-12-09 RX ADMIN — LIDOCAINE HYDROCHLORIDE: 20 JELLY TOPICAL at 21:00

## 2017-12-10 VITALS
OXYGEN SATURATION: 98 % | HEART RATE: 84 BPM | WEIGHT: 160 LBS | SYSTOLIC BLOOD PRESSURE: 135 MMHG | RESPIRATION RATE: 20 BRPM | BODY MASS INDEX: 19.48 KG/M2 | DIASTOLIC BLOOD PRESSURE: 72 MMHG | TEMPERATURE: 97.7 F

## 2017-12-10 LAB — GLUCOSE SERPL-MCNC: 133 MG/DL (ref 65–140)

## 2017-12-10 PROCEDURE — 82948 REAGENT STRIP/BLOOD GLUCOSE: CPT

## 2017-12-10 PROCEDURE — 99284 EMERGENCY DEPT VISIT MOD MDM: CPT

## 2017-12-10 NOTE — ED NOTES
Suprapubic catheter replaced by Dr Monty Amin with a 16F  Return of yellow hazy urine observed   Sample obtained     Omar Jordan RN  12/09/17 [id number]

## 2017-12-10 NOTE — DISCHARGE INSTRUCTIONS
Catheter-associated Urinary Tract Infection   WHAT YOU NEED TO KNOW:   A catheter-associated urinary tract infection (CAUTI) is an infection caused by an indwelling urinary catheter  An indwelling urinary catheter is a thin, flexible tube that is inserted into the bladder  It is left in place to drain urine  The infection may travel along the catheter and into the bladder or kidneys  DISCHARGE INSTRUCTIONS:   Return to the emergency department if:   · You have severe pain in your lower back or abdomen  · You have blood in your urine  · You stop urinating, or you urinate much less than usual   Contact your healthcare provider if:   · You have a fever  · Your symptoms do not improve or get worse  · You have questions or concerns about your condition or care  Medicines: You may need any of the following:  · Antibiotics  help treat an infection caused by bacteria  · Antifungals  help treat an infection caused by fungus  · Acetaminophen  decreases pain and fever  It is available without a doctor's order  Ask how much to take and how often to take it  Follow directions  Read the labels of all other medicines you are using to see if they also contain acetaminophen, or ask your doctor or pharmacist  Acetaminophen can cause liver damage if not taken correctly  Do not use more than 4 grams (4,000 milligrams) total of acetaminophen in one day  · NSAIDs , such as ibuprofen, help decrease swelling, pain, and fever  This medicine is available with or without a doctor's order  NSAIDs can cause stomach bleeding or kidney problems in certain people  If you take blood thinner medicine, always ask your healthcare provider if NSAIDs are safe for you  Always read the medicine label and follow directions  · Take your medicine as directed  Contact your healthcare provider if you think your medicine is not helping or if you have side effects  Tell him of her if you are allergic to any medicine   Keep a list of the medicines, vitamins, and herbs you take  Include the amounts, and when and why you take them  Bring the list or the pill bottles to follow-up visits  Carry your medicine list with you in case of an emergency  Self-care:   · Drink fluids as directed  Fluids may help your kidneys and bladder get rid of the infection  · Keep the catheter area clean  Clean your skin around the catheter as directed  Shower once a day  Do not take baths or go in hot tubs until your infection is gone  · Do not have sex  until your healthcare provider says it is okay  Sex may delay healing or cause another UTI  Prevent another CAUTI:   · Wash your hands before and after you use the bathroom or touch the catheter  Wash your hands to prevent the spread of infection to your urinary tract  · Clean all parts of your catheter as directed  Keep your catheter tubing clean  Do not place the catheter on the ground  Do not allow the drainage spout to touch the toilet  Use an alcohol swab to clean the end of drainage spout as directed  · Keep the drainage bag below your waist   This may prevent urine from moving back into your bladder, which can cause an infection  · Empty the urine bag as directed  This may prevent urine from flowing back into your bladder  · Women should wipe front to back  after a bowel movement  This may prevent germs from getting into the urinary tract  · Keep the catheter secured to your leg as directed  Use tape or a special catheter joaquin to prevent your catheter from being pulled  This may also prevent kinks that could cause the urine to move back into the bladder  Follow up with your healthcare provider as directed:  Write down your questions so you remember to ask them during your visits  © 2017 [employer] Information is for End User's use only and may not be sold, redistributed or otherwise used for commercial purposes   All illustrations and images included in [facility] are the copyrighted property of [employer]  or [employer]  The above information is an  only  It is not intended as medical advice for individual conditions or treatments  Talk to your doctor, nurse or pharmacist before following any medical regimen to see if it is safe and effective for you  How to Care for Your Suprapubic Catheter   WHAT YOU NEED TO KNOW:   A suprapubic catheter is a sterile (germ-free) tube that drains urine out of your bladder  It is inserted through a stoma (created opening) in your abdomen and into the bladder  The catheter has a small balloon filled with solution that holds the catheter inside your bladder  Suprapubic catheters are used when you have problems urinating because of a medical condition  A suprapubic catheter is also called an indwelling urinary catheter  DISCHARGE INSTRUCTIONS:   Catheter problem signs:  Know the signs of problems related to your catheter:  · Lower abdomen pain:  This can be a sign of infection  You may also have pain if the catheter is in the wrong place  · Urine leaking from your stoma or urethra:  Wet clothes or a wet bed may be signs that your catheter is not draining as it should  You may get some leaking of urine from your stoma or urethra if you have bladder spasms  A lot of urine leaking is not normal  The catheter may be blocked or in the wrong position  The drainage tubing may be blocked or kinked  Leaking urine can also be a sign of infection  · No urine draining from the catheter:  No urine drainage for 6 to 8 hours is a sign that your catheter is not working properly  Pain or fullness in you lower abdomen can also be signs of catheter blockage or infection  You may also feel restless  If you have any of these signs, do the following:     ¨ Check to see if the urine tubing is twisted or bent or if you are lying on the catheter or tubing      ¨ Make sure the urine bag and tubing are located below the level of your waist    ¨ Move to a different position  ¨ Contact your healthcare provider immediately if there is still no urine draining or if you continue to have pain or fullness or feel restless  Suprapubic catheter change problems:  Know what to do if you have any of these problems:  · Unplanned catheter change: Your catheter may accidently fall out or be pulled out  You or a person who helps care for you will need to learn how to put in a new catheter  This must be done right away  Your stoma can start to close up quickly if it does not have a catheter in it  · Old catheter does not come out easily:  Some types of catheter balloons get ridges on them or may hold their inflated shape after the water is removed  You may need a different type of catheter if this happens  A catheter that does not come out easily can damage your stoma and increase your risk for infection  Tell your healthcare provider if you have problems with removing your old catheter  · Not able to insert the new catheter: If you have trouble, cover the stoma with a sterile bandage and call your healthcare provider right away  · New catheter balloon in the wrong place:  A catheter balloon that is in the wrong place may cause pain when you try to fill it  ¨ Not inserted deep enough: This can cause pain and may damage your stoma if the catheter balloon is in the stoma when the balloon is filled  Damage to your stoma can make inserting a new catheter harder or lead to an infection  Insert more of the catheter and try to fill the balloon again  ¨ Inserted too deep:  A catheter that is inserted too far into your bladder can pass into your urethra  This can cause pain and leaking urine when the balloon is filled  In women the end of the new catheter may poke out of the urethra  Your catheter will not drain urine as it should if this happens and may damage your urethra   Pull the catheter back several inches and try to fill the balloon again  Tell your healthcare provider if this happens  Prevent infections:  Urinary catheter-based infections are common and can lead to serious illness and death  Proper care and cleaning of the catheter, the insertion site, and the urine drainage bag can help prevent infection  The following are ways you can help prevent catheter-based infections:  · Drinking liquids:  Adults should drink about 9 to 13 cups of liquid each day  One cup is 8 ounces  Good choices of liquids for most people include water, juice, and milk  Coffee, soup, and fruit may be counted in your daily liquid amount  Ask your caregiver how much liquid you should drink each day  · Good hand hygiene is the best way to prevent infections   Always wash your hands with soap and water before and after you touch your catheter, tubing, or drainage bag  Do this to remove germs on your hands before you touch these items  Do this after you touch these items to remove germs that may have been on them  Wear clean medical gloves when you care for your catheter or disconnect the drainage bag  This will help stop germs from getting into your catheter  Remind anyone who cares for your catheter or drainage system to wash his or her hands  · Ask your healthcare provider when your catheter will be removed or replaced with a new one  Your risk for infection is greater the longer you have a catheter  · Always do proper care and cleaning of the catheter, its insertion site, and the drainage bag  · Keep the catheter drainage system closed  · Keep the catheter tube secured to your skin or leg so that it drains well  Prevent drainage bag problems:   · Use good hand hygiene:  Keep your hands clean and as free of germs as possible  Always wash your hands before and after you touch the catheter or the insertion site  Wear clean medical gloves when you care for your catheter      · Allow gravity drainage and position the drainage bag properly:  Do not loop or kink the tubing so urine can flow out  Keep the drainage bag below the level of your waist  This helps stop urine from moving back up the tubing and into your bladder  · Keep the bag off the floor:  Do not let the drainage bag touch or lie on the floor  · Empty the drainage bag when needed: The weight of a full drainage bag can pull on and hurt your stoma  Empty the drainage bag every 3 to 6 hours or when it is ½ to ? full  · Clean and change the drainage bag as directed:  Ask your healthcare provider how often you should change the drainage bag  You may buy a special solution to clean the drainage bag, or you may make a solution with household bleach or vinegar and tap water  Wear medical gloves if you must disconnect the tubing  Do not allow the end of the catheter or tubing to touch anything  Clean the ends with a new alcohol pad or as directed before you reconnect them  Prevent catheter and stoma problems:   · Stoma site care:  Clean the skin around your stoma every day or as directed by your healthcare provider  Keep the area clean and dry to prevent skin problems  Look for redness, skin injuries, red spots, drainage, and swelling  Report any skin changes to your healthcare provider  · Know how far to insert your new catheter:  Know how far to insert the new catheter to prevent it from being in the stoma or urethra  Check the catheter position if you have discomfort or pain when you fill the catheter balloon  · Prevent stoma damage or loss of stoma:  Tell your healthcare provider if you have problems removing a catheter  A catheter that does not come out easily can damage your stoma and increases your risk for infection  You may need a different type of catheter  Your stoma can start to close off quickly if you wait longer than 5 to 10 minutes to insert a new one  Make sure you always have enough supplies to be able to change your catheter   Always keep an extra catheter with you  Healthcare providers may be able to save your stoma if you seek care immediately   · Prevent blockage of catheter or tubing:  Signs that your catheter or tubing is blocked or kinked include urine leaking from your stoma or urethra or urine not draining at all  Your risk for infection increases if the tube is blocked  Keep the tubing in a straight line when you hang your drainage bag to prevent it from getting blocked  · Secure your catheter well:  Catheters that are not secured can pull at the insertion site  This causes the stoma to get bigger and urine may start to leak out of the stoma  Make sure the device holding your catheter does not block the tubing  Change how you secure the catheter if you develop an overgrowth of skin at the insertion site  Secure the catheter in a different direction than usual, or secure the drainage bag to your other leg  Take your medicine as directed  Contact your healthcare provider if you think your medicine is not helping or if you have side effects  Tell him of her if you are allergic to any medicine  Keep a list of the medicines, vitamins, and herbs you take  Include the amounts, and when and why you take them  Bring the list or the pill bottles to follow-up visits  Carry your medicine list with you in case of an emergency  Follow up with your healthcare provider as directed:  Write down your questions so you remember to ask them during your visits  Contact your healthcare provider if:   · You have a fever  · You have changes in how your urine looks or smells, or you have blood in your urine  · You have overgrowth of skin at the insertion site that is getting larger  · Urine keeps draining out of the catheter insertion site or from your urethra  · There is less urine than usual or no urine draining into the drainage bag  · The closed drainage system has accidently come open or apart       · Your catheter keeps getting blocked  · Your catheter came out and you have replaced it with a new one  Return to the emergency department if:   · Your catheter becomes blocked and you cannot reach your healthcare provider  · Your catheter comes out and you cannot replace it yourself  · Your insertion site is red, has green or yellow discharge, smells bad, or is bleeding more than usual     · You have pain in your hip, back, pelvis, or lower abdomen  · You are confused or have other changes in the way that you think  © 2017 [facility] Information is for End User's use only and may not be sold, redistributed or otherwise used for commercial purposes  All illustrations and images included in [employer]Notes® are the copyrighted property of [employer]  or [employer]  The above information is an  only  It is not intended as medical advice for individual conditions or treatments  Talk to your doctor, nurse or pharmacist before following any medical regimen to see if it is safe and effective for you

## 2017-12-10 NOTE — ED NOTES
POC glucose = 133  Sleeping, aroused with stimulation, alert W/A  Speech clear, awaiting transport  Tolerating PO fluids        Anya Martin RN  12/10/17 [id number]

## 2017-12-10 NOTE — ED NOTES
Pt with slurred speech and diaphoresis - blood sugar check - 34  Pt had a PB&J sandwich at bedside - encouraged to eat, also provided with apple juice   Will continue to monitor     Renetta Shabazz RN  12/09/17 [id number]

## 2017-12-10 NOTE — ED NOTES
Pt for discharge  PCT calling transport companies for transport       Angella Days, RN  12/09/17 [id number]

## 2017-12-10 NOTE — ED PROVIDER NOTES
History  Chief Complaint   Patient presents with    Urinary Catheter Problem     Pt has supra-pubic catheter that will not flush  Sent from [facility] for a clogged suprapubic catheter  Is paraplegic and has a long standing suprapubic catheter  Normally has a 16F or 18F catheter but with a recent admission in [place] had it replaced with an 8F  Now clogged and unable to flush or drain  History provided by:  Patient and EMS personnel   used: No    Urinary Catheter Problem   Location:  Suprapubic  Quality:  Clogged/not draining  Severity:  Severe  Onset quality:  Gradual  Duration: repeated clogging for days  tonight unabel to flush and now not draining  Timing:  Constant  Progression:  Unchanged  Chronicity:  Recurrent  Context:  Had 8F placed during admission in [place]  Relieved by:  N/a  Worsened by:  N/a  Ineffective treatments:  N/a  Associated symptoms: no abdominal pain, no cough, no fever, no myalgias, no nausea, no shortness of breath and no vomiting    Associated symptoms comment:  Urine cloudy for a couple of days  States his urine is cloudy when he gets an infection      Prior to Admission Medications   Prescriptions Last Dose Informant Patient Reported? Taking? Cholecalciferol (VITAMIN D-3) 1000 units CAPS   No No   Sig: Take 2 capsules by mouth daily   acetaminophen (TYLENOL) 325 mg tablet   No No   Sig: Take 2 tablets by mouth every 6 (six) hours as needed for mild pain or fever   apixaban (ELIQUIS) 2 5 mg   No No   Sig: Take 1 tablet by mouth 2 (two) times a day   ascorbic acid (VITAMIN C) 250 mg tablet   Yes No   Sig: Take 500 mg by mouth daily     baclofen 20 mg tablet   Yes No   Sig: Take 20 mg by mouth 2 (two) times a day     famotidine (PEPCID) 20 mg tablet   No No   Sig: Take 1 tablet by mouth 2 (two) times a day   ferrous sulfate 325 (65 Fe) mg tablet   Yes No   Sig: Take 125 mg by mouth 3 (three) times a day with meals     insulin detemir (LEVEMIR) 100 units/mL subcutaneous injection   No No   Sig: Inject 10 Units under the skin daily at bedtime   insulin lispro (HumaLOG) 100 units/mL injection   No No   Sig: Inject 12 Units under the skin 3 (three) times a day with meals for 30 days   metoprolol tartrate 75 MG TABS   No No   Sig: Take 1 tablet by mouth every 12 (twelve) hours   oxybutynin (DITROPAN-XL) 5 mg 24 hr tablet   Yes No   Sig: Take 15 mg by mouth daily     sodium hypochlorite (DAKIN'S HALF-STRENGTH) external solution   No No   Sig: Apply 1 application topically daily      Facility-Administered Medications: None       Past Medical History:   Diagnosis Date    Ambulatory dysfunction     Anemia, iron deficiency     transfusion requiring    Atrial fibrillation ([facility] )     AVM (arteriovenous malformation) of duodenum, 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    [facility]DVT) ([facility])     Chronic pain     Chronic suprapubic catheter ([facility] )     CKD (chronic kidney disease), stage III     Clostridium difficile infection 08/11/2016    also positive 9/2016, 5/29/2017, 8/15/2017   S/P fecal transplant    Colostomy on examination ([facility] )     GERD (gastroesophageal reflux disease)     History of creation of ostomy     Memory impairment 2011    s/p diabetic coma    Neurogenic bladder     OAB (overactive bladder)     Paraplegia ([facility])     T3 transverse myelitis vs spinal stoke (AVM); 2012 extensive epidural abscess C7=> conus 2nd extension from deep parspinal abscess L4-S2/sacral decubitus; cord atrophy/myelomalcia T3=>conus     S/P unilateral BKA (below knee amputation) ([facility] )     Right    Sebaceous cyst removed in 2017    Tobacco abuse     Type 1 diabetes mellitus (HCC)     w/ neuropathy, gastroparesis, retinopathy    Wounds, multiple     pressure ulcers with delayed healing       Past Surgical History:   Procedure Laterality Date    BELOW KNEE LEG AMPUTATION Right 2009    COLONOSCOPY N/A 3/27/2017    Procedure: COLONOSCOPY; Surgeon: Aster Jimenez MD;  Location: [facility]; Service:     COLONOSCOPY N/A 3/29/2017    Procedure: COLONOSCOPY;  Surgeon: Aster Jimenez MD;  [facility]: [facility]; Service:     COLONOSCOPY N/A 6/15/2017    Procedure: COLONOSCOPY with FMT;  Surgeon: Marek Garrett MD;  Location:  GI LAB; Service: Gastroenterology    ESOPHAGOGASTRODUODENOSCOPY N/A 3/22/2017    Procedure: ESOPHAGOGASTRODUODENOSCOPY (EGD); Surgeon: Evelyn Stone DO;  Location: [facility]; Service:        Family History   Problem Relation Age of Onset    Hyperlipidemia Mother     Hypertension Mother     Leukemia Brother     Diabetes Paternal Grandfather      I have reviewed and agree with the history as documented  Social History   Substance Use Topics    Smoking status: Current Every Day Smoker     Types: Cigars    Smokeless tobacco: Current User      Comment: 4-5 cigars/day    Alcohol use No        Review of Systems   Constitutional: Negative for fever  Respiratory: Negative for cough and shortness of breath  Gastrointestinal: Negative for abdominal pain, nausea and vomiting  Musculoskeletal: Negative for myalgias  All other systems reviewed and are negative  Physical Exam  ED Triage Vitals [12/09/17 2042]   Temperature Pulse Respirations Blood Pressure SpO2   97 7 °F (36 5 °C) 84 17 117/73 100 %      Temp Source Heart Rate Source Patient Position - Orthostatic VS BP Location FiO2 (%)   Tympanic Monitor Sitting Right arm --      Pain Score       No Pain           Orthostatic Vital Signs  Vitals:    12/10/17 0031 12/10/17 0212 12/10/17 0213 12/10/17 0300   BP: (!) 142/101  145/73 135/72   Pulse: 88 84     Patient Position - Orthostatic VS: Lying Lying Lying Lying       Physical Exam   Constitutional: He appears well-developed and well-nourished  HENT:   Nose: Nose normal    Eyes: Conjunctivae are normal    Neck: Neck supple  Cardiovascular: Normal rate and regular rhythm      Pulmonary/Chest: Effort normal and breath sounds normal    Abdominal: Soft  Bowel sounds are increased  Musculoskeletal:   Right BKA   Neurological: He is alert  Skin: Skin is warm  Psychiatric: He has a normal mood and affect  Nursing note and vitals reviewed  ED Medications  Medications   lidocaine (URO-JET) 2 % topical gel ( Topical Given by Other 12/9/17 2100)   cephalexin (KEFLEX) capsule 500 mg (500 mg Oral Given 12/9/17 2209)       Diagnostic Studies  Results Reviewed     Procedure Component Value Units Date/Time    Fingerstick Glucose (POCT) [[id number]]  (Normal) Collected:  12/10/17 0030    Lab Status:  Final result Updated:  12/10/17 0033     POC Glucose 133 mg/dl     Fingerstick Glucose (POCT) [[id number]]  (Normal) Collected:  12/09/17 2302    Lab Status:  Final result Updated:  12/09/17 2303     POC Glucose 70 mg/dl     Fingerstick Glucose (POCT) [[id number]]  (Abnormal) Collected:  12/09/17 2233    Lab Status:  Final result Updated:  12/09/17 2259     POC Glucose 35 (LL) mg/dl     Urine Microscopic [[id number]]  (Abnormal) Collected:  12/09/17 2116    Lab Status:  Final result Specimen:  Urine from Urine, Suprapubic catheter Updated:  12/09/17 2158     RBC, UA Innumerable (A) /hpf      WBC, UA Innumerable (A) /hpf      Epithelial Cells  /hpf      Field obscured, unable to enumerate (A)     Bacteria, UA Innumerable (A) /hpf     Urine culture [[id number]] Collected:  12/09/17 2116    Lab Status:   In process Specimen:  Urine from Urine, Suprapubic catheter Updated:  12/09/17 2158    UA w Reflex to Microscopic w Reflex to Culture [[id number]]  (Abnormal) Collected:  12/09/17 2116    Lab Status:  Final result Specimen:  Urine from Urine, Suprapubic catheter Updated:  12/09/17 2150     Color, UA Yellow     Clarity, UA Turbid     Specific [place], UA 1 015     pH, UA 6 0     Leukocytes, UA Large (A)     Nitrite, UA Negative     Protein,  (2+) (A) mg/dl      Glucose, UA Negative mg/dl      Ketones, UA Negative mg/dl Urobilinogen, UA 0 2 E U /dl      Bilirubin, UA Negative     Blood, UA Large (A)                 No orders to display              Procedures  General Procedure  Date/Time: 12/9/2017 9:00 PM  Performed by: Patel Brooke  Authorized by: Patel Brooke     Patient location:  ED  Assisting Provider(s): No    Consent:     Consent obtained:  Verbal    Consent given by:  Patient    Risks discussed:  Bleeding, infection and pain    Alternatives discussed:  No treatment  Pre-procedure details:     Skin preparation:  Betadine    Preparation: Patient was prepped and draped in the usual sterile fashion    Anesthesia (see MAR for exact dosages): Anesthesia method:  Topical application    Topical anesthetic:  Lidocaine gel  Procedure Detail:     Equipment used:  16F hanks    Procedure note (site, laterality, method, findings):  8F suprapubic catheter was removed - able to successfully place a 16F catheter with good urine drainage             Phone Contacts  ED Phone Contact    ED Course  ED Course                                MDM  Number of Diagnoses or Management Options  Blocked suprapubic catheter, initial encounter [facility]): new and requires workup  Complicated UTI (urinary tract infection): new and requires workup  Encounter for care or replacement of suprapubic tube [facility]): new and requires workup     Amount and/or Complexity of Data Reviewed  Clinical lab tests: ordered and reviewed  Obtain history from someone other than the patient: yes      CritCare Time    Disposition  Final diagnoses:   Complicated UTI (urinary tract infection)   Blocked suprapubic catheter, initial encounter ([facility] )   Encounter for care or replacement of suprapubic tube [facility])     Time reflects when diagnosis was documented in both MDM as applicable and the Disposition within this note     Time User Action Codes Description Comment    12/9/2017 10:02 PM Carmen Brothers Add [G80 1] Complicated UTI (urinary tract infection)     12/9/2017 10:03 PM Kera Finn Add [T83 090A] Blocked suprapubic catheter, initial encounter ([facility] )     12/9/2017 10:03 PM Carmen Brothers Add [Z43 5] Encounter for care or replacement of suprapubic tube [facility])       ED Disposition     ED Disposition Condition Comment    Discharge  Benita Drake  discharge to home/self care      Condition at discharge: Good        Follow-up Information     Follow up With Specialties Details Why [address], [place] Family Medicine In 1 week If symptoms worsen [address]  [address] [place] [address]  [address]  [address]          Discharge Medication List as of 12/9/2017 10:06 PM      START taking these medications    Details   cephalexin (KEFLEX) 500 mg capsule Take 1 capsule by mouth 4 (four) times a day for 7 days, Starting Sat 12/9/2017, Until Sat 12/16/2017, Print         CONTINUE these medications which have NOT CHANGED    Details   acetaminophen (TYLENOL) 325 mg tablet Take 2 tablets by mouth every 6 (six) hours as needed for mild pain or fever, Starting Mon 7/3/2017, Print      apixaban (ELIQUIS) 2 5 mg Take 1 tablet by mouth 2 (two) times a day, Starting Wed 10/11/2017, No Print      ascorbic acid (VITAMIN C) 250 mg tablet Take 500 mg by mouth daily  , Until Discontinued, Historical Med      baclofen 20 mg tablet Take 20 mg by mouth 2 (two) times a day , Until Discontinued, Historical Med      Cholecalciferol (VITAMIN D-3) 1000 units CAPS Take 2 capsules by mouth daily, Starting Mon 7/3/2017, Print      famotidine (PEPCID) 20 mg tablet Take 1 tablet by mouth 2 (two) times a day, Starting Mon 7/3/2017, Print      ferrous sulfate 325 (65 Fe) mg tablet Take 125 mg by mouth 3 (three) times a day with meals  , Until Discontinued, Historical Med      insulin detemir (LEVEMIR) 100 units/mL subcutaneous injection Inject 10 Units under the skin daily at bedtime, Starting Wed 10/11/2017, No Print      insulin lispro (HumaLOG) 100 units/mL injection Inject 12 Units under the skin 3 (three) times a day with meals for 30 days, Starting Mon 7/3/2017, Until Mon 9/18/2017, No Print      metoprolol tartrate 75 MG TABS Take 1 tablet by mouth every 12 (twelve) hours, Starting Wed 10/11/2017, No Print      oxybutynin (DITROPAN-XL) 5 mg 24 hr tablet Take 15 mg by mouth daily  , Historical Med      sodium hypochlorite (DAKIN'S HALF-STRENGTH) external solution Apply 1 application topically daily, Starting Mon 7/3/2017, Print           No discharge procedures on file      ED Provider  Electronically Signed by           Blanca Mondragon DO  12/10/17 8894

## 2017-12-13 LAB
BACTERIA UR CULT: ABNORMAL

## 2017-12-15 LAB — EXTERNAL HIV SCREEN: NORMAL

## 2018-01-04 ENCOUNTER — GENERIC CONVERSION - ENCOUNTER (OUTPATIENT)
Dept: OTHER | Facility: OTHER | Age: 38
End: 2018-01-04

## 2018-01-09 NOTE — MISCELLANEOUS
Message  Received phone call from Doug Putnam at [facility] requesting visit notes in order to establish medical necessity for hospital bed  Office visit note from patient's visit at the [facility] on 9/9/16 was faxed to [facility] today  Active Problems    1  Abnormal finding on urinalysis (791 9) (R82 90)   2  Abnormal kidney function study (794 4) (R94 4)   3  Atrial fibrillation (427 31) (I48 91)   4  Broken tooth with complication (140 01) (T00  5XXA)   5  Chronic pain syndrome (338 4) (G89 4)   6  Chronic skin ulcer (707 9) (L98 499)   7  Cigarette smoker (305 1) (F17 210)   8  Clostridium difficile infection (041 84) (B96 89)   9  Colostomy in place (V44 3) (Z93 3)   10  Cracked nails (703 8) (L60 3)   11  Decubitus ulcer of right ischial area, stage III (616 58,535 36) (L89 313)   12  Decubitus ulcer, lower back, stage III (707 03,707 23) (L89 103)   13  Diabetes mellitus (250 00) (E11 9)   14  Diabetic gastroparesis (250 60,536 3) (P25 47,Y10 37)   15  Diabetic gastroparesis associated with type 1 diabetes mellitus (250 61,536 3)    (E10 43,K31 84)   16  Diarrhea of presumed infectious origin (009 3) (A09)   17  Dry skin (701 1) (L85 3)   18  DVT (deep venous thrombosis) (453 40) (I82 409)   19  Dysuria (788 1) (R30 0)   20  Encounter for diabetic foot exam (250 00) (E11 9)   21  GERD (gastroesophageal reflux disease) (530 81) (K21 9)   22  Infected sebaceous cyst (706 2) (L72 3,L08 9)   23  Insomnia (780 52) (G47 00)   24  Iron deficiency anemia (280 9) (D50 9)   25  Leg muscle spasm (728 85) (M62 838)   26  Lesion of subcutaneous tissue (709 9) (L98 9)   27  Loose stools (787 7) (R19 5)   28  Neurogenic bladder (596 54) (N31 9)   29  Open wound of back, right, subsequent encounter (V58 89,876 0) (S21 201D)   30  Paraplegic spinal paralysis (344 1) (G82 20)   31  Preop examination (V72 84) (Z01 818)   32  Prophylactic antibiotic (V58 62) (Z79 2)   33  Screening for depression (V79 0) (Z13 89)   34   Sebaceous cyst (706 2) (L72 3)   35  Status post below knee amputation of right lower extremity (V49 75) (Z89 511)   36  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)   37  Type 1 diabetes mellitus with sensory neuropathy (250 61,357 2) (E10 40)   38  Urinary tract infection (599 0) (N39 0)   39  Urinary tract infection associated with catheterization of urinary tract, unspecified    indwelling urinary catheter type, initial encounter (996 64,599 0) (T83 51XA,N39 0)   40  UTI (urinary tract infection) (599 0) (N39 0)   41  Vitamin B deficiency (266 9) (E53 9)   42  Vitamin C deficiency (267) (E54)   43  Vitamin D deficiency (268 9) (E55 9)    Current Meds   1  Alcohol Swabs 70 % Pad; USE 3 TIMES A DAY; Therapy: 14FSU6903 to (Evaluate:81Ttp2698)  Requested for: 45DII1072; Last   Rx:06Jan2016 Ordered   2  Ammonium Lactate 12 % External Cream; APPLY  AND RUB  IN A THIN FILM TO   AFFECTED AREAS TWICE DAILY  (AM AND PM); Therapy: 44Icz0622 to (Last Rx:64Air4553)  Requested for: 02Xkg2796 Ordered   3  Baclofen 20 MG Oral Tablet; Take 1 tablet twice daily; Therapy: 00PII8865 to (Denice Howell)  Requested for: 74Utk9180; Last   Rx:09Lyc7810 Ordered   4  Eda Contour Test In [employer]; TEST 4 TIMES A DAY; Therapy: 64NLA6602 to (Last Rx:63Tww9157)  Requested for: 81Nht6810 Ordered   5  [employer] Vitamin B-12 1000 MCG Oral Tablet; take 1 capsule by mouth daily; Therapy: 78ZTS5456 to (Evaluate:01Jan2016)  Requested for: 77CET6100; Last   Rx:05Jun2015 Ordered   6  FerrouSul 325 (65 Fe) MG Oral Tablet; TAKE 1 TABLET 3 TIMES DAILY; Therapy: 06XXB1911 to (Karina Ham)  Requested for: 37TTS4771; Last   Rx:47Lvu1816 Ordered   7  Glucerna Oral Liquid; Drink 2 cans a day; Therapy: 06ZCT2716 to (Evaluate:12Elb4244); Last Rx:27Jun2016 Ordered   8  HumaLOG KwikPen 100 UNIT/ML Subcutaneous Solution Pen-injector; INJECT 4   UNITS BEFORE MEALS;    Therapy: 20Jul2016 to (Evaluate:97Qvr1202)  Requested for: 22Jul2016; Last   Rx:20Jul2016 Ordered   9  Lancets Miscellaneous; TEST BLOOD SUGAR 3 TIMES A DAY; Therapy: 45THW6451 to (Evaluate:38Tij5254)  Requested for: 20WFQ3597; Last   Rx:06Jan2016 Ordered   10  Levemir FlexTouch 100 UNIT/ML Subcutaneous Solution Pen-injector; 14 units in the    morning and 12 units in the evening; Therapy: 13UGP0502 to (Last Rx:66Akc1513)  Requested for: 85Dfp1976 Ordered   11  Multi-Vitamin Oral Tablet; take one tablet by mouth daily; Therapy: 01JLT3652 to Recorded   12  Oxybutynin Chloride 5 MG Oral Tablet; TAKE 3 TABLET Daily; Therapy: 52NYK2810 to (Evaluate:59Pam3379)  Requested for: 37NPC1622; Last    Rx:87Wlh0233 Ordered   13  OxyCONTIN 20 MG Oral Tablet ER 12 Hour Abuse-Deterrent; TAKE 1 TABLET EVERY    12 HOURS; Therapy: 96PQJ4280 to (Evaluate:16Pwq7260) Recorded   14  Pantoprazole Sodium 40 MG Oral Tablet Delayed Release; TAKE 1 TABLET DAILY; Therapy: 76KWL4214 to (Evaluate:10Zlf4450)  Requested for: 13Vry6181; Last    Rx:74Dxl6996 Ordered   15  Penicillin V Potassium 500 MG Oral Tablet; TAKE 1 TABLET 3 TIMES DAILY UNTIL    GONE;    Therapy: 38KKE6828 to (Evaluate:42Jlh7320)  Requested for: 93UJG6707; Last    Rx:57Bid3747 Ordered   16  Sulfamethoxazole-Trimethoprim 800-160 MG Oral Tablet (Bactrim DS); TAKE 1 TABLET    TWICE DAILY UNTIL FINISHED; Therapy: 50Xgx7751 to (Francena Aver)  Requested for: 36Cqx0501; Last    Rx:25Lof3657 Ordered   17  Tetracycline HCl - 250 MG Oral Capsule; TAKE 1 CAPSULE EVERY 6 HOURS ON    EMPTY STOMACH; Therapy: 41Vgm2476 to (Francena Aver)  Requested for: 36Tvs0123; Last    Rx:07Tdq1042 Ordered   18  TraZODone HCl - 50 MG Oral Tablet; 1/2 tablet hs;    Therapy: 11QIG7468 to (Last Rx:43Yrj2887)  Requested for: 63TPB7703 Ordered   19  Vitamin C 250 MG Oral Tablet; TAKE 2 TABLETS EVERY DAY; Therapy: 96Gic6778 to (Evaluate:50Typ1307)  Requested for: 01UZX0498; Last    Rx:10Izg3253 Ordered   20   Vitamin C 500 MG Oral Capsule; TAKE 1 CAPSULE DAILY; Therapy: 07PET3818 to Recorded   21  Vitamin D3 1000 UNIT Oral Tablet; TAKE 1 TABLET DAILY; Therapy: 44QBR9593 to (Evaluate:23Ipn6501)  Requested for: 50VCK0362; Last    Rx:31Zvy6281 Ordered   22  Xarelto 20 MG Oral Tablet; Take 1 tablet a day with food and stop coumadin; Therapy: 16ARE9436 to (Evaluate:14Nov2016)  Requested for: 01XCB8976; Last    Rx:27Tid7815 Ordered    Allergies    1  Cipro SOLN   2  Cymbalta CPEP   3   Lyrica CAPS    Signatures   Electronically signed by : Natali Huerta RN; Sep 20 2016  3:55PM EST                       (Author)

## 2018-01-09 NOTE — MISCELLANEOUS
Discussion/Summary  Discussion Summary:   Patient canceled his appointment and could not come in until 15 days after discharge  We tried to contact and accommodate the patient as much as possible  History of Present Illness  TCM Communication [facility]: The patient is being contacted for follow-up after hospitalization  He was hospitalized at [facility]  The dates of hospitalization:, date of admission: 06/28/2017, date of discharge: 07/03/2017  Diagnosis: SACRAL ULCER; WOUND INFECTION  He was discharged to home  Medications reviewed and updated today  He scheduled a follow up appointment  The patient is currently asymptomatic  Counseling was provided to patient's family  PATIENTS MOTHER AURORA  Communication performed and completed by Kade aLzo   HPI: Patients mother Kuldip Salazar) has been contacted and was informed to bring the updated medication list and discharge   information  IN58      Active Problems    1  Abnormal finding on urinalysis (791 9) (R82 90)   2  Abnormal kidney function study (794 4) (R94 4)   3  Abscess of face (682 0) (L02 01)   4  Atrial fibrillation (427 31) (I48 91)   5  Broken tooth with complication (888 34) (H30  5XXA)   6  C  difficile colitis (008 45) (A04 7)   7  Chronic pain syndrome (338 4) (G89 4)   8  Chronic skin ulcer (707 9) (L98 499)   9  Cigarette smoker (305 1) (F17 210)   10  Clostridium difficile infection (041 84) (B96 89)   11  Colostomy in place (V44 3) (Z93 3)   12  Cracked nails (703 8) (L60 3)   13  Decubitus ulcer of left hip, unstageable (707 04,707 25) (L89 220)   14  Decubitus ulcer of lower back, stage 3 (707 03,707 23) (L89 103)   15  Decubitus ulcer of right ischial area, stage III (404 15,310 61) (L89 313)   16  Diabetes mellitus (250 00) (E11 9)   17  Diabetic eye exam (V72 0,250 00) (E11 9,Z01 00)   18  Diabetic gastroparesis (250 60,536 3) (E11 43,K31 84)   19  Diabetic gastroparesis associated with type 1 diabetes mellitus (860 75,276  3) (E10 43,K31 84)   20  Diarrhea of presumed infectious origin (009 3) (A09)   21  Dry skin (701 1) (L85 3)   22  DVT (deep venous thrombosis) (453 40) (I82 409)   23  Dysuria (788 1) (R30 0)   24  Encounter for diabetic foot exam (250 00) (E11 9)   25  GERD (gastroesophageal reflux disease) (530 81) (K21 9)   26  Infected sebaceous cyst (706 2) (L72 3,L08 9)   27  Insomnia (780 52) (G47 00)   28  Iron deficiency anemia (280 9) (D50 9)   29  Leg muscle spasm (728 85) (M62 838)   30  Lesion of subcutaneous tissue (709 9) (L98 9)   31  Loose stools (787 7) (R19 5)   32  Neurogenic bladder (596 54) (N31 9)   33  Open wound of back, right, subsequent encounter (V58 89,876 0) (S21 201D)   34  Open wound of left foot with complication, initial encounter (892 1) (S91 302A)   35  Open wound of left foot with complication, subsequent encounter (V58 89,892 1)    (S91 302D)   36  Open wound of right lower extremity, initial encounter (891 0) (S81 801A)   37  Open wound of right lower extremity, subsequent encounter (V58 89,891 0) (S81 801D)   38  Oral thrush (112 0) (B37 0)   39  Paraplegic spinal paralysis (344 1) (G82 20)   40  Preop examination (V72 84) (Z01 818)   41  Prophylactic antibiotic (V58 62) (Z79 2)   42  Pustular folliculitis (093 5) (J55 01)   43  Screening for depression (V79 0) (Z13 89)   44  Sebaceous cyst (706 2) (L72 3)   45  Status post below knee amputation of right lower extremity (V49 75) (Z89 511)   46  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)   47  Type 1 diabetes mellitus with sensory neuropathy (250 61,357 2) (E10 40)   48  Urinary tract infection (599 0) (N39 0)   49  Urinary tract infection associated with catheterization of urinary tract, unspecified    indwelling urinary catheter type, initial encounter (996 64,599 0) (T83 511A,N39 0)   50  UTI (urinary tract infection) (599 0) (N39 0)   51  Vitamin B deficiency (266 9) (E53 9)   52  Vitamin C deficiency (267) (E54)   53   Vitamin D deficiency (268 9) (E55 9)    Surgical History    1  History of Amputation Of Leg Below Knee   2  History of Back Surgery   3  History of Bladder Surgery   4  History of Surgical Delay Of Flap At Trunk    Family History  Mother    1  Family history of Hypertension  Father    2  No pertinent family history    Social History    · Alcohol use   · Cigarette smoker (305 1) (F17 210)   · Current every day smoker (305 1) (F17 200)    Current Meds   1  Alcohol Swabs 70 % Pad; USE 3 TIMES A DAY; Therapy: 14MEX9570 to (Evaluate:28Ffs3597)  Requested for: 26CNP2343; Last   Rx:2016 Ordered   2  Ammonium Lactate 12 % External Cream; APPLY  AND RUB  IN A THIN FILM TO   AFFECTED AREAS TWICE DAILY  (AM AND PM); Therapy: 83Ycy6774 to (Last Rx:21Lrs0145)  Requested for: 29Rwz6354 Ordered   3  Baclofen 20 MG Oral Tablet; Take 1 tablet twice daily; Therapy: 84EXG6934 to (Evaluate:50Qfj2710)  Requested for: 60Vdx8933; Last   Rx:62Ebt8577 Ordered   4  Eda Contour Test In [employer]; TEST 4 TIMES A DAY; Therapy: 30JKA0151 to (Last Rx:50Qdn5723)  Requested for: 35Pir9159 Ordered   5  Clindamycin Phosphate 1 % External Gel; APPLY AND GENTLY MASSAGE INTO   AFFECTED AREA(S) ONCE DAILY; Therapy: 59NAC4859 to (Evaluate:2017)  Requested for: 18WGJ8321; Last   Rx:2017 Ordered   6  [employer] Vitamin B-12 1000 MCG Oral Tablet; take 1 capsule by mouth daily; Therapy: 56VGB3098 to (Evaluate:75Abu0824)  Requested for: 63DHU6071; Last   Rx:2017 Ordered   7  Dakins (1/2 strength) 0 2-0 25 % External Solution; USE AS DIRECTED; Therapy: 50NXB1732 to (Last Rx:2017)  Requested for: 03GNI1491 Ordered   8  FerrouSul 325 (65 Fe) MG Oral Tablet; TAKE 1 TABLET 3 TIMES DAILY; Therapy: 52TEV4650 to (Clara Machado)  Requested for: 81YAR5704; Last   Rx:50Zhl4358 Ordered   9  Glucerna Oral Liquid; Drink 2 cans a day; Therapy: 72BJB6778 to (Evaluate:36Vfd9673); Last P86DME3905 Ordered   10   HumaLOG KwikPen 100 UNIT/ML Subcutaneous Solution Pen-injector; INJECT 4 UNITS    BEFORE MEALS; Therapy: 60Nla8724 to (Evaluate:95Yom5010)  Requested for: 48Zfz2810; Last    Rx:17Kzz7759 Ordered   11  Lancets Miscellaneous; TEST BLOOD SUGAR 3 TIMES A DAY; Therapy: 07HUR6845 to (Evaluate:39Yyn4200)  Requested for: 11RAV3516; Last    Rx:06Jan2016 Ordered   12  Levemir FlexTouch 100 UNIT/ML Subcutaneous Solution Pen-injector; 14 units in the    morning and 12 units in the evening; Therapy: 74RXD8136 to (Last Rx:44Nul6548)  Requested for: 41Oqb0127 Ordered   13  Lidocaine HCl - 2 % External Gel; Apply to wound(s) PRN in [facility]    prior to debridement for pain control; Therapy: (Recorded:13Jan2017) to Recorded   14  Multi-Vitamin Oral Tablet; take one tablet by mouth daily; Therapy: 67IDG7836 to Recorded   15  Nystatin 637240 UNIT/ML Mouth/Throat Suspension; SWISH AND SWALLOW  5 ML 4    times a day  then continue for 48 hours after thrush resolves; Therapy: 09Vck2312 to (Evaluate:22Mar2017)  Requested for: 43Pqb4539; Last    Rx:24Otf0695 Ordered   16  Nystatin 952263 UNIT/ML Mouth/Throat Suspension; Therapy: (Recorded:96Rfa3250) to Recorded   17  Oxybutynin Chloride 5 MG Oral Tablet; TAKE 3 TABLET Daily; Therapy: 02JPP0173 to (Evaluate:10Oct2017)  Requested for: 12Jun2017; Last    Rx:12Jun2017 Ordered   18  OxyCONTIN 20 MG Oral Tablet ER 12 Hour Abuse-Deterrent; TAKE 1 TABLET EVERY 12    HOURS; Therapy: 26ITN0567 to (Evaluate:81Fhv6630) Recorded   19  Pantoprazole Sodium 40 MG Oral Tablet Delayed Release; TAKE 1 TABLET DAILY; Therapy: 27ZRF9307 to (Evaluate:10Klt2016)  Requested for: 92Ozr8739; Last    Rx:17Rvn4270 Ordered   20  Vancomycin HCl - 125 MG Oral Capsule; TAKE 1 CAPSULE 4 TIMES DAILY; Therapy: 58CGV8135 to (Evaluate:29Jun2017)  Requested for: 54TYA3454; Last    Rx:45Rrx5969 Ordered   21  Vancomycin HCl - 125 MG Oral Capsule; Therapy: (Recorded:05Jun2017) to Recorded   22   Vitamin C 250 MG Oral Tablet; TAKE 2 TABLETS EVERY DAY; Therapy: 38HXQ9244 to (Evaluate:02Jun2017)  Requested for: 43IPT0117; Last    TH:33JGH5335 Ordered   23  Vitamin C 500 MG Oral Capsule; TAKE 1 CAPSULE DAILY; Therapy: 17IEJ1688 to Recorded   24  Vitamin D3 1000 UNIT Oral Tablet; TAKE 1 TABLET DAILY; Therapy: 84ENN0543 to (Evaluate:78Ajp9040)  Requested for: 27AXH1592; Last    Rx:45Pim1881 Ordered   25  Xarelto 20 MG Oral Tablet; Take 1 tablet a day with food and stop coumadin; Therapy: 71GVW2149 to (Nick Malhotra)  Requested for: 25FKL5151; Last    Rx:03Jan2017 Ordered    Allergies    1  Cipro SOLN   2  Cymbalta CPEP   3  Lyrica CAPS    Vitals  Signs   Recorded: 03BZZ5290 11:00AM   Temperature: 97 9 F  Heart Rate: 78  Respiration: 16  Systolic: 269  Diastolic: 66  O2 Saturation: 100    Future Appointments    Date/Time Provider Specialty Site   07/21/2017 11:00 AM JUDY Marc   Hematology Oncology [facility] MEDICAL ONCOLOGY   08/30/2017 01:30 PM JUDY Welch  Plastic Surgery BODY EVOLUTION PLASTIC RECON SURG   07/25/2017 03:00 PM Valery Saavedra, [facility] Gastroenterology Adult [facility] 9     Signatures   Electronically signed by : HAJA Herrear; Jul 19 2017 11:56AM EST                       (Author)    Electronically signed by : JUDY Knott ; Jul 19 2017 12:30PM EST

## 2018-01-10 NOTE — MISCELLANEOUS
To Whom It May Concern:                           Murphy Mckeon ,  [age], is a paraplegic gentleman that has von using the Quantum Q6 Edge Power  Wheelchair  He has a diagnosis of g82 20 and M48 00  He  insurance ID  number [id number]  and his secondary is [id number]  It is his primary means of mobility and depend on this power wheelchair for all his MRADLs  Saba Whelan still uses this power wheel chair and it requires repairs  Saba Whelan requires the following parts:  [employer] Part # [id number], Assy, W/electronics plate, tur-blance 2 power recline, version 2, e-13 which  does not hold the joystick securly  [employer] Part # [id number]- cushion, assy, back, 18" W, 24H-25"H, BREANNA-comfort plus which is torn  [employer] Part #[id number]-prbnq, assy, flat free, 14"x 3" drive, silver rim/gray tire, [id number]  ecach due to cracked spikes  Quantum Rehab Part # [id number]-Klqymh, assy, waterfall, black, 14", full length-2 each due to tear  Quantum [employer] Part #[id number]-controller, joystick module q-logic 2, 4 key, w/lcd color display and controll-1 each  which is cracked and does not work consistently  Quantum Rehab Part #[id number]-hneak, assy, low impact, black rim/gray tire, 6x2", w/bearings and spacer, e-120-4 each due to loss of treading   Please also provide him with any other repairs and  that labor necessary to do the repairs  The repair will require 16 units of labor to remove the old parts, install the new parts and perform diagnostic and safety inspection  The repair is necessary  to keep the power wheelchair safe and operational for Saba Whelan  As the treating physician following the car of Saba Whelan I have read and agree with the information in this report  I concur that the above mentioned items are  necessary          Electronically signed by:Christina Tate [facility]  Aug  5 2016 10:49AM EST

## 2018-01-10 NOTE — MISCELLANEOUS
Message  GI Reminder Recall [address]:   Date: 04/17/2017   Dear Esthela Sotelo:     Review of our records shows you are due for the following: Colonoscopy  Please call the following office to schedule your appointment:   [address], [address], [place], [place], [address] [facility] [phone]  We look forward to hearing from you!      Sincerely,   [facility]         Signatures   Electronically signed by : Citlalli Johnson, ; Apr 17 2017  3:31PM EST                       (Author)

## 2018-01-10 NOTE — RESULT NOTES
Verified Results  (1) C  DIFFICILE TOXIN BY PCR 09VOV9512 10:47AM Edmundo Ryan    Order Number: [id number]     Test Name Result Flag Reference   C  DIFFICILE TOXIN BY PCR  A NEGATIVE for C difficle toxin by PCR  POSITIVE for C difficle toxin by PCR  Plan  Clostridium difficile infection    · First-Vancomycin 25 25 MG/ML Oral Solution;  Take 5 mL (125mg) by mouth every  6 hours for 14 days

## 2018-01-10 NOTE — MISCELLANEOUS
History of Present Illness  TCM Communication [facility]: The patient is being contacted for follow-up after hospitalization  He was hospitalized at [facility]  The date of admission: 09/18/17, date of discharge: 10/11/2017  Diagnosis: SEPSIS, PENILE CELLULITIS  He was discharged [address]  Medications were not reviewed today  He did not schedule a follow up appointment  Counseling was provided to patient's family  I SPOKE WITH PT'S MOM AURORA TCMNV/NC  Communication performed and completed by Andreea Hicks CMA      Active Problems    1  Abnormal finding on urinalysis (791 9) (R82 90)   2  Abnormal kidney function study (794 4) (R94 4)   3  Abscess of face (682 0) (L02 01)   4  Anemia (285 9) (D64 9)   5  Atrial fibrillation (427 31) (I48 91)   6  Broken tooth with complication (298 93) (V43  5XXA)   7  C  difficile colitis (008 45) (A04 72)   8  Chronic anticoagulation (V58 61) (Z79 01)   9  Chronic pain syndrome (338 4) (G89 4)   10  Chronic skin ulcer (707 9) (L98 499)   11  Cigarette smoker (305 1) (F17 210)   12  Clostridium difficile infection (041 84) (B96 89)   13  Colostomy in place (V44 3) (Z93 3)   14  Cracked nails (703 8) (L60 3)   15  Decubitus ulcer of left hip, unstageable (707 04,707 25) (L89 220)   16  Decubitus ulcer of lower back, stage 3 (707 03,707 23) (L89 103)   17  Decubitus ulcer of right ischial area, stage III (052 00,227 28) (L89 313)   18  Diabetes mellitus (250 00) (E11 9)   19  Diabetic eye exam (V72 0,250 00) (E11 9,Z01 00)   20  Diabetic gastroparesis (250 60,536 3) (N41 20,I94 52)   21  Diabetic gastroparesis associated with type 1 diabetes mellitus (250 61,536 3)    (E10 43,K31 84)   22  Diarrhea of presumed infectious origin (009 3) (A09)   23  Difficulty sleeping (780 50) (G47 9)   24  Dry skin (701 1) (L85 3)   25  DVT (deep venous thrombosis) (453 40) (I82 409)   26  Dysuria (788 1) (R30 0)   27   Encounter for diabetic foot exam (250 00) (E11 9)   28  GERD (gastroesophageal reflux disease) (530 81) (K21 9)   29  Infected sebaceous cyst (706 2) (L72 3,L08 9)   30  Insomnia (780 52) (G47 00)   31  Iron deficiency (280 9) (E61 1)   32  Iron deficiency anemia (280 9) (D50 9)   33  Iron deficiency anemia (280 9) (D50 9)   34  Iron deficiency anemia (280 9) (D50 9)   35  Leg muscle spasm (728 85) (M62 838)   36  Lesion of subcutaneous tissue (709 9) (L98 9)   37  Loose stools (787 7) (R19 5)   38  Neurogenic bladder (596 54) (N31 9)   39  Open wound of back, right, subsequent encounter (V58 89,876 0) (S21 201D)   40  Open wound of left foot with complication, initial encounter (892 1) (S91 302A)   41  Open wound of left foot with complication, subsequent encounter (V58 89,892 1)    (S91 302D)   42  Open wound of right lower extremity, initial encounter (891 0) (S81 801A)   43  Open wound of right lower extremity, subsequent encounter (V58 89,891 0) (S81 801D)   44  Oral thrush (112 0) (B37 0)   45  Paraplegic spinal paralysis (344 1) (G82 20)   46  Preop examination (V72 84) (Z01 818)   47  Prophylactic antibiotic (V58 62) (Z79 2)   48  Pustular folliculitis (920 9) (N15 52)   49  Screening for depression (V79 0) (Z13 89)   50  Sebaceous cyst (706 2) (L72 3)   51  Stage III chronic kidney disease (585 3) (N18 3)   52  Status post below knee amputation of right lower extremity (V49 75) (Z89 511)   53  Swelling of lower extremity (729 81) (M79 89)   54  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)   55  Type 1 diabetes mellitus with sensory neuropathy (250 61,357 2) (E10 40)   56  Urinary tract infection (599 0) (N39 0)   57  Urinary tract infection associated with catheterization of urinary tract, unspecified    indwelling urinary catheter type, initial encounter (996 64,599 0) (T83 511A,N39 0)   58  UTI (urinary tract infection) (599 0) (N39 0)   59  Vitamin B deficiency (266 9) (E53 9)   60  Vitamin C deficiency (267) (E54)   61   Vitamin D deficiency (268 9) (E55 9)    Surgical History    1  History of Amputation Of Leg Below Knee   2  History of Back Surgery   3  History of Bladder Surgery   4  History of Surgical Delay Of Flap At Trunk    Family History  Mother    1  Family history of Hypertension  Father    2  No pertinent family history    Social History    · Alcohol use   · Cigarette smoker (305 1) (F17 210)   · Current every day smoker (305 1) (F17 200)    Current Meds   1  Alcohol Swabs 70 % Pad; USE 3 TIMES A DAY; Therapy: 43NMK9969 to (Evaluate:13Ear9333)  Requested for: 50KXC7737; Last   Rx:06Jan2016 Ordered   2  Ammonium Lactate 12 % External Cream; APPLY  AND RUB  IN A THIN FILM TO   AFFECTED AREAS TWICE DAILY  (AM AND PM); Therapy: 06Djf8405 to (Last Rx:80Mrn0259)  Requested for: 09Jue0133 Ordered   3  Baclofen 20 MG Oral Tablet; Take 1 tablet twice daily; Therapy: 06ZEC1033 to (Evaluate:81Hcu4562)  Requested for: 69Axr4683; Last   Rx:38Hcu8499 Ordered   4  Eda Contour Test In [employer]; TEST 4 TIMES A DAY; Therapy: 29ETG5168 to (Last Rx:58Wso3803)  Requested for: 14Bgp8731 Ordered   5  Clindamycin Phosphate 1 % External Gel; APPLY AND GENTLY MASSAGE INTO   AFFECTED AREA(S) ONCE DAILY; Therapy: 05DIM6738 to (Evaluate:20Mar2017)  Requested for: 84KXV0412; Last   Rx:19Jan2017 Ordered   6  [employer] Vitamin B-12 1000 MCG Oral Tablet; take 1 capsule by mouth daily; Therapy: 44TAQ1141 to (Evaluate:00Iyv4162)  Requested for: 80PJD2105; Last   Rx:03Jan2017 Ordered   7  Dakins (1/2 strength) 0 2-0 25 % External Solution; USE AS DIRECTED; Therapy: 60BSK1782 to (Last Rx:10Mar2017)  Requested for: 98UCR3624 Ordered   8  Famotidine 20 MG Oral Tablet; take 1 tablet twice a day; Therapy: 53YPE4941 to (Saige Olivarez)  Requested for: 67Src8736; Last   Rx:04Aug2017 Ordered   9  Ferrous Sulfate 325 (65 Fe) MG Oral Tablet; TAKE 1 TABLET BY MOUTH 3 TIMES A DAY;    Therapy: 47NHZ1317 to (Evaluate:75Rni7291)  Requested for: 66Xhu1671; Last   Rx:57Jnm1380 Ordered   10  FerrouSul 325 (65 Fe) MG Oral Tablet; TAKE 1 TABLET 3 TIMES DAILY; Therapy: 88TCP3453 to ([facility])  Requested for: 49AJW8596; Last    Rx:95Emr6855 Ordered   11  Glucerna Oral Liquid; Drink 2 cans a day; Therapy: 10UNY9588 to (Evaluate:30Hxl1525); Last Rx:29Xlf2439 Ordered   12  HumaLOG KwikPen 100 UNIT/ML Subcutaneous Solution Pen-injector; INJECT 4 UNITS    BEFORE MEALS; Therapy: 34Cbf4296 to (Evaluate:29Zdj2009)  Requested for: 77Kti1439; Last    Rx:04Fyn0021 Ordered   13  Lancets Miscellaneous; TEST BLOOD SUGAR 3 TIMES A DAY; Therapy: 01NAM6189 to (Evaluate:50Fah2628)  Requested for: 19LAH3906; Last    Rx:06Jan2016 Ordered   14  Levemir FlexTouch 100 UNIT/ML Subcutaneous Solution Pen-injector; INJECT 10 UNITS    TWICE A DAY SUBCUTANEOUSLY; Therapy: 22DZH9226 to (77 873 135)  Requested for: 41Wqy4848; Last    Rx:07Sep2017 Ordered   15  Lidocaine HCl - 2 % External Gel; Apply to wound(s) PRN in [address] [place] [address]    prior to debridement for pain control; Therapy: (Recorded:13Jan2017) to Recorded   16  Multi-Vitamin Oral Tablet; take one tablet by mouth daily; Therapy: 02ILF2450 to Recorded   17  Nystatin 580750 UNIT/ML Mouth/Throat Suspension; SWISH AND SWALLOW 5 ML 4    TIMES A DAY  THEN CONTINUE FOR 48 HOURS AFTER THRUSH RESOLVES; Therapy: 31Ftg6542 to (Evaluate:72Ejv5229)  Requested for: 16FGO3331; Last    Rx:21Dbv1383 Ordered   18  Nystatin 714832 UNIT/ML Mouth/Throat Suspension; Therapy: (Recorded:12Nfl4515) to Recorded   19  Oxybutynin Chloride 5 MG Oral Tablet; TAKE 3 TABLET Daily; Therapy: 01CND0819 to (Evaluate:10Oct2017)  Requested for: 12Jun2017; Last    Rx:12Jun2017 Ordered   20  Temazepam 15 MG Oral Capsule; TAKE 1 CAPSULE AT BEDTIME AS NEEDED FOR    SLEEP; Therapy: 71Pxh0116 to (Evaluate:01Oct2017); Last Rx:01Sep2017 Ordered   21  Vancomycin HCl - 125 MG Oral Capsule; Take 1 pill every 6 hours;     Therapy: 57NKP9016 to (Wendelin Gitelman)  Requested for: 73KDQ6477; Last    Rx:37Kwo6025 Ordered   22  Vitamin C 250 MG Oral Tablet; TAKE 2 TABLETS EVERY DAY; Therapy: 14VXM5317 to (Evaluate:02Jun2017)  Requested for: 39VTQ8031; Last    II:51QTU0756 Ordered   23  Vitamin C 500 MG Oral Capsule; TAKE 1 CAPSULE DAILY; Therapy: 33GPO3028 to Recorded   24  Vitamin D3 1000 UNIT Oral Tablet; TAKE 1 TABLET DAILY; Therapy: 24RKU7130 to (Evaluate:67Nab5238)  Requested for: 55Agf4695; Last    Rx:36Aef3162 Ordered   25  Xarelto 10 MG Oral Tablet; TAKE 1 TABLET (10 MG TOTAL) BY MOUTH DAILY WITH    DINNER; Therapy: 81Qyt8397 to (Evaluate:13Mar2018)  Requested for: 20Dqv9930; Last    Rx:93Ggv2983 Ordered   26  Xarelto 20 MG Oral Tablet; Take 1 tablet a day with food and stop coumadin; Therapy: 46PEC5747 to (Evaluate:12Mar2018)  Requested for: 12Rei1975; Last    Rx:97Bhw7561 Ordered   27  Xarelto 20 MG Oral Tablet; Take 1 tablet a day with food and stop coumadin; Therapy: 85CXO6238 to (Evaluate:12Mar2018)  Requested for: 73Ivf8715; Last    Rx:74Vnv3465 Ordered    Allergies    1  Cipro SOLN   2  Cymbalta CPEP   3   Lyrica CAPS    Message   Recorded as Task   Date: 10/11/2017 02:23 PM, Created By: System   Task Name: Hospital RONI   Assigned To: [facility],CLINICAL TEAM   Regarding Patient: Gilmer Bhatt, Status: Active   Comment:    System - 11 Oct 2017 2:23 PM     Patient discharged from hospital   Patient Name: Dede Mon  Patient YOB: 1980  Discharge Date: 10/11/2017  [facility] - 11 Oct 2017 2:26 PM     TASK REASSIGNED: Previously Assigned To Bubba GARNICA - 11 Oct 2017 4:14 PM     TASK REPLIED TO: Previously Assigned To [facility],CLINICAL TEAM  from reading the note looks like the patient has been transferred to [address] [place] [address] in [place] - 12 Oct 2017 12:16 PM     TASK REASSIGNED: Previously Assigned To [facility],CLINICAL TEAM  I talked to pt's mom Marley Fails and she confirms pt was transferred to [facility] in [facility]  TCMNV/NC     Future Appointments    Date/Time Provider Specialty Site   12/01/2017 03:20 PM JUDY Osman   Hematology Oncology [facility]CER CARE MEDICAL ONCOLOGY     Signatures   Electronically signed by : Paula Fernandez, ; Oct 12 2017 12:19PM EST                       (Author)    Electronically signed by : Heidy Walters [facility]; Oct 12 2017  1:32PM EST                          Electronically signed by : Mariah Opitz, M D ; Oct 12 2017  1:36PM EST                       (Author)

## 2018-01-11 NOTE — PROCEDURES
Procedures by Althea Vizcaino RN at 10/10/2017  1:43 PM      Author: Althea Vizcaino RN Service:  (none) Author Type:  Registered Nurse    Filed:  10/10/2017  1:46 PM Date of Service:  10/10/2017  1:43 PM Status:  Signed    : Althea Vizcaino RN (Registered Nurse)        Procedure Orders:       1  Insert PICC line [[id number]] ordered by Ronaldo Littlejohn DO at 10/09/17 1344                  Insert PICC  line  Date/Time: 10/10/2017 1:43 PM  Performed by: Vera Campos by: Wan Ellis     Patient location:  Bedside  Consent:     Consent obtained:  Verbal (verbal consent by patient and Mary Klineh pts mother via phone)    Consent given by:  Patient and parent    Procedural risks discussed: consent obtained by physician  Robert protocol:     Procedure explained and questions answered to patient or proxy's satisfaction: yes      Relevant documents present and verified: yes      Test results available and properly labeled: yes       Imaging studies available: yes      Required blood products, implants, devices, and special equipment available: yes      Site/side marked: yes      Immediately prior to procedure, a time out was called: yes      Patient identity confirmed:  Verbally with patient, arm band, provided demographic data and hospital-assigned identification number  Pre-procedure details:     Hand hygiene: Hand hygiene performed prior to insertion      Sterile barrier technique: All elements of maximal sterile technique followed      Skin preparation:  ChloraPrep  Indications:     PICC line indications: long term antibiotics    Anesthesia (see MAR for exact dosages):      Anesthesia method:  Local infiltration (3ml)    Local anesthetic:  Lidocaine 1% w/o epi  Procedure details:     Location:  Brachial    Vessel type: vein      Laterality:  Right    Approach: percutaneous technique used      Patient position:  Flat    Procedural supplies:  Double lumen    Catheter size:  5 Fr Landmarks identified: yes      Ultrasound guidance: yes      Sterile ultrasound techniques: Sterile gel and sterile probe covers were used      Number of attempts:  1    Successful placement: yes      Vessel of catheter tip end:  Sherlock 3CG confirmed (3cg procedure record sent to medical records)    Total catheter length (cm):  41    Catheter out on skin (cm):  0    Max flow rate:  999ml/hr    Arm circumference:  28cm  Post-procedure details:     Post-procedure:  Dressing applied and securement device placed    Assessment:  Blood return through all ports and free fluid flow (sherlock 3cg confirmed placement see procedure record)    Post-procedure complications: none      Patient tolerance of procedure:   Tolerated well, no immediate complications  Comments:      Lot#[id number]                     Received for:Provider  EPIC   Oct 10 2017  1:47PM [facility] Standard Time

## 2018-01-12 VITALS
HEART RATE: 80 BPM | TEMPERATURE: 97.1 F | OXYGEN SATURATION: 99 % | RESPIRATION RATE: 18 BRPM | SYSTOLIC BLOOD PRESSURE: 118 MMHG | DIASTOLIC BLOOD PRESSURE: 72 MMHG

## 2018-01-12 NOTE — MISCELLANEOUS
To Whom It May Concern:                           Gabriella Degroot ,  [age], is a paraplegic gentleman that has von using the Quantum Q6 Edge Power  Wheelchair  He has a diagnosis of g82 20 and M48 00  He  insurance ID  number [id number]  and his secondary is [id number]  It is his primary means of mobility and depend on this power wheelchair for all his MRADLs  Davon Jacob still uses this power wheel chair and it requires repairs  Davon Jacob requires [employer] part [id number], [id number], [id number], [id number],  These repairs require 5 units of labs and are necessary to keep  the power chair safe and operational   Thank you  Electronically signed by:Christina Tate [facility]  Aug  2 2016  1:14PM EST      Electronically signed by:Montse FERGUSON  Aug  2 2016   2:19PM EST    AMENDMENTS:  1         Electronically signed by:Christina Tate PAC  Sep  7 2016  4:27PM EST

## 2018-01-12 NOTE — MISCELLANEOUS
Provider Comments  Provider Comments:   Dear Sidney Camacho,    You missed your follow up appointment with Dr Ashly Al on 10/09/2017; please contact our office to reschedule at [phone]  We understand that many situations arise that occasionally prevents patients from keeping scheduled appointments  It is the policy of [facility] that patients notify us 24 hours in advance if unable to keep a scheduled appointment  Missed appointments jeopardize strong physician-patient relationships  The appointment you missed could have easily been made available to another patient if you had contacted us to cancel  We like to accommodate all of our patients, but when patients miss an appointment it prevents us from being able to help everyone  In the future, we request at least 24 hours' notice of cancellation so we can make your appointment available to someone else in need  Sincerely,    The Physicians and Staff of [facility]          Signatures   Electronically signed by :  Leida Valverde, ; Oct  9 2017  2:02PM EST                       (Author)

## 2018-01-12 NOTE — RESULT NOTES
Verified Results  (1) URINALYSIS w URINE C/S REFLEX (will reflex a microscopy if leukocytes, occult blood, or nitrites are not within normal limits) 35Xfq6904 03:10PM Cristian Rickettsod Order Number: [id number]   Order Number: [id number]     Test Name Result Flag Reference   COLOR Yellow     CLARITY Cloudy     PH UA 6 0  4 5-8 0   LEUKOCYTE ESTERASE UA Large A Negative   NITRITE UA Positive A Negative   PROTEIN  (2+) mg/dl A Negative   GLUCOSE  (1/2%) mg/dl A Negative   KETONES UA Negative mg/dl  Negative   UROBILINOGEN UA 0 2 E U /dl  0 2, 1 0 E U /dl   BILIRUBIN UA Negative  Negative   BLOOD UA Small A Negative   SPECIFIC GRAVITY UA 1 017  1 003-1 030   BACTERIA Innumerable /hpf A None Seen, Occasional   EPITHELIAL CELLS None Seen /hpf  None Seen, Occasional   OTHER OBSERVATIONS      WBCs Clumped  Yeast Cells Present   RBC UA None Seen /hpf  None Seen   WBC UA Innumerable /hpf A None Seen

## 2018-01-13 VITALS
HEIGHT: 76 IN | OXYGEN SATURATION: 97 % | HEART RATE: 101 BPM | BODY MASS INDEX: 22.16 KG/M2 | SYSTOLIC BLOOD PRESSURE: 110 MMHG | DIASTOLIC BLOOD PRESSURE: 60 MMHG | TEMPERATURE: 96.9 F | WEIGHT: 182 LBS

## 2018-01-13 VITALS
DIASTOLIC BLOOD PRESSURE: 84 MMHG | TEMPERATURE: 98.1 F | BODY MASS INDEX: 22.53 KG/M2 | WEIGHT: 185 LBS | RESPIRATION RATE: 18 BRPM | SYSTOLIC BLOOD PRESSURE: 138 MMHG | HEIGHT: 76 IN | HEART RATE: 92 BPM

## 2018-01-13 VITALS
WEIGHT: 185 LBS | TEMPERATURE: 97.9 F | HEART RATE: 92 BPM | SYSTOLIC BLOOD PRESSURE: 122 MMHG | RESPIRATION RATE: 18 BRPM | DIASTOLIC BLOOD PRESSURE: 68 MMHG | BODY MASS INDEX: 22.53 KG/M2 | HEIGHT: 76 IN

## 2018-01-13 VITALS
HEART RATE: 72 BPM | WEIGHT: 185 LBS | RESPIRATION RATE: 18 BRPM | BODY MASS INDEX: 22.53 KG/M2 | SYSTOLIC BLOOD PRESSURE: 116 MMHG | HEIGHT: 76 IN | DIASTOLIC BLOOD PRESSURE: 58 MMHG | TEMPERATURE: 97.4 F

## 2018-01-13 VITALS
HEART RATE: 84 BPM | TEMPERATURE: 97.3 F | OXYGEN SATURATION: 96 % | RESPIRATION RATE: 16 BRPM | DIASTOLIC BLOOD PRESSURE: 70 MMHG | SYSTOLIC BLOOD PRESSURE: 106 MMHG

## 2018-01-13 VITALS
TEMPERATURE: 97.4 F | RESPIRATION RATE: 18 BRPM | DIASTOLIC BLOOD PRESSURE: 60 MMHG | HEIGHT: 76 IN | OXYGEN SATURATION: 98 % | SYSTOLIC BLOOD PRESSURE: 110 MMHG | HEART RATE: 64 BPM

## 2018-01-13 NOTE — MISCELLANEOUS
Discussion/Summary  Received call from Marilee Pritchett, visiting nurse, with concern of foul smelling liquid diarrhea from patient's ostomy stoma  States patient with a history of c diff  Verbal order given for stool specimen for C-diff PCR  Also notes changes to the wound he is treating  Recommend follow up with patient's wound care provider at wound management       Signatures   Electronically signed by : Sanjuanita Sanchez; May 29 2017  9:21AM EST                       (Author)

## 2018-01-13 NOTE — RESULT NOTES
Message  We have tried to contact you multiple times regarding your recent positive c diff  I recommended that you take Vanco 125mg four times a day  I also feel that you should have a fecal transplant as we had previously discussed in the office & this should be done as soon as possible  Please contact our office at your earliest convenience at [phone]    Thanks  Lovely Whitman   Electronically signed by : Trini Velarde [facility]; Jun 1 2017 12:56PM EST                       (Author)

## 2018-01-13 NOTE — RESULT NOTES
Verified Results  (1) C  DIFFICILE TOXIN BY PCR 13Sep2016 09:18AM Yifan Moise Order Number: [id number]     Test Name Result Flag Reference   C  DIFFICILE TOXIN BY PCR   NEGATIVE for C difficle toxin by PCR  NEGATIVE for C difficle toxin by PCR      (1) URINALYSIS w URINE C/S REFLEX (will reflex a microscopy if leukocytes, occult blood, or nitrites are not within normal limits) 12Sep2016 03:10PM Yifan Moise Order Number: [id number]   Order Number: [id number]     Test Name Result Flag Reference   COLOR Yellow     CLARITY Cloudy     PH UA 6 0  4 5-8 0   LEUKOCYTE ESTERASE UA Large A Negative   NITRITE UA Positive A Negative   PROTEIN  (2+) mg/dl A Negative   GLUCOSE  (1/2%) mg/dl A Negative   KETONES UA Negative mg/dl  Negative   UROBILINOGEN UA 0 2 E U /dl  0 2, 1 0 E U /dl   BILIRUBIN UA Negative  Negative   BLOOD UA Small A Negative   SPECIFIC GRAVITY UA 1 017  1 003-1 030   BACTERIA Innumerable /hpf A None Seen, Occasional   EPITHELIAL CELLS None Seen /hpf  None Seen, Occasional   OTHER OBSERVATIONS      WBCs Clumped  Yeast Cells Present   RBC UA None Seen /hpf  None Seen   WBC UA Innumerable /hpf A None Seen   CLINICAL REPORT (Report) A    Test:        Urine culture  Specimen Type:   Urine  Specimen Date:   9/12/2016 3:10 PM  Result Date:    9/14/2016 12:11 PM  Result Status:   Final result  Abnormal:      Yes  Resulting Lab:   [place] [address]            Tel: [phone]      CULTURE                                       ------------------                                   >100,000 cfu/ml Klebsiella pneumoniae ESBL (Abnormal)     *** An Extended Spectrum Beta Lactamasae is being produced by this gram      negative organism  The antibiotics of choice are the carbapenems-imipenem, meropenem and      ertapenem  Treatment failure with cephems (cefoxitin and cefotetan) are common  Please note: This patient requires contact precautions   ***      SUSCEPTIBILITY                                   ------------------                                                       Klebsiella pneumoniae ESBL  METHOD                 DARREN  -------------------------------------  --------------------------  AMIKACIN ($$)              <=16 ug/ml  Susceptible  AMPICILLIN ($$)             >16 00 ug/ml Resistant  AMPICILLIN + SULBACTAM ($)       >16/8 ug/ml  Resistant  AZTREONAM ($$$)             >16 ug/ml   Resistant  CEFAZOLIN ($)              >16 00 ug/ml Resistant  CEFEPIME ($)              >16 ug/ml   Resistant  CEFOTAXIME ($)             >32 ug/ml   Resistant  CEFTAZIDIME ($$)            >16 ug/ml   Resistant  CEFTRIAXONE ($$)            >32 00 ug/ml Resistant  CEFUROXIME ($$)             >16 ug/ml   Resistant  CIPROFLOXACIN ($)            >2 00 ug/ml  Resistant  ERTAPENEM ($$$)             <=2 0 ug/ml  Susceptible  GENTAMICIN ($$)             8 ug/ml    Intermediate  IMIPENEM                <=4 ug/ml   Susceptible  LEVOFLOXACIN ($)            >4 00 ug/ml  Resistant  MEROPENEM ($$)             <=4 00 ug/ml Susceptible  NITROFURANTOIN             >64 ug/ml   Resistant  PIPERACILLIN + TAZOBACTAM ($$$)     >64 ug/ml   Resistant  TETRACYCLINE              <=4 ug/ml   Susceptible  TICARCILLIN/K CLAVULANATE        >64 ug/ml   Resistant  TOBRAMYCIN ($)             >8 ug/ml   Resistant  TRIMETHOPRIM + SULFAMETHOXAZOLE ($$$)  >2/38 ug/ml  Resistant

## 2018-01-14 VITALS
HEART RATE: 78 BPM | OXYGEN SATURATION: 100 % | SYSTOLIC BLOOD PRESSURE: 124 MMHG | DIASTOLIC BLOOD PRESSURE: 66 MMHG | TEMPERATURE: 97.9 F | RESPIRATION RATE: 16 BRPM

## 2018-01-14 VITALS
OXYGEN SATURATION: 99 % | BODY MASS INDEX: 20.95 KG/M2 | HEIGHT: 76 IN | HEART RATE: 71 BPM | SYSTOLIC BLOOD PRESSURE: 108 MMHG | TEMPERATURE: 97.7 F | WEIGHT: 172 LBS | DIASTOLIC BLOOD PRESSURE: 72 MMHG

## 2018-01-14 VITALS
SYSTOLIC BLOOD PRESSURE: 130 MMHG | RESPIRATION RATE: 18 BRPM | TEMPERATURE: 98.5 F | DIASTOLIC BLOOD PRESSURE: 70 MMHG | HEART RATE: 88 BPM

## 2018-01-14 VITALS
WEIGHT: 185 LBS | BODY MASS INDEX: 22.53 KG/M2 | HEART RATE: 100 BPM | TEMPERATURE: 100.8 F | RESPIRATION RATE: 18 BRPM | HEIGHT: 76 IN | SYSTOLIC BLOOD PRESSURE: 130 MMHG | DIASTOLIC BLOOD PRESSURE: 80 MMHG

## 2018-01-14 VITALS
DIASTOLIC BLOOD PRESSURE: 60 MMHG | HEIGHT: 76 IN | HEART RATE: 109 BPM | TEMPERATURE: 99.5 F | SYSTOLIC BLOOD PRESSURE: 118 MMHG | RESPIRATION RATE: 16 BRPM | OXYGEN SATURATION: 99 % | WEIGHT: 170 LBS | BODY MASS INDEX: 20.7 KG/M2

## 2018-01-14 VITALS
WEIGHT: 182 LBS | HEIGHT: 76 IN | SYSTOLIC BLOOD PRESSURE: 122 MMHG | BODY MASS INDEX: 22.16 KG/M2 | DIASTOLIC BLOOD PRESSURE: 68 MMHG | RESPIRATION RATE: 18 BRPM | TEMPERATURE: 97.9 F | HEART RATE: 92 BPM

## 2018-01-14 NOTE — RESULT NOTES
Verified Results  (1) STOOL CULTURE [id number] 09:18AM Dionna Course Order Number: [id number]     Test Name Result Flag Reference   CLINICAL REPORT (Report)     Test:        Stool culture  Specimen Type:   Stool  Specimen Date:   9/13/2016 9:18 AM  Result Date:    9/16/2016 9:30 AM  Result Status:   Final result  Resulting Lab:   [place] [address]            Tel: [phone]      CULTURE                                       ------------------                                   No Salmonella, Shigella or Campylobacter isolated      Shiga Toxin 1 NOT detected, Shiga Toxin 2 NOT detected

## 2018-01-15 VITALS
SYSTOLIC BLOOD PRESSURE: 130 MMHG | BODY MASS INDEX: 22.53 KG/M2 | WEIGHT: 185 LBS | DIASTOLIC BLOOD PRESSURE: 70 MMHG | HEART RATE: 104 BPM | TEMPERATURE: 98.1 F | RESPIRATION RATE: 18 BRPM | HEIGHT: 76 IN

## 2018-01-15 VITALS
WEIGHT: 185 LBS | RESPIRATION RATE: 18 BRPM | HEART RATE: 106 BPM | BODY MASS INDEX: 22.53 KG/M2 | DIASTOLIC BLOOD PRESSURE: 70 MMHG | TEMPERATURE: 99.2 F | SYSTOLIC BLOOD PRESSURE: 115 MMHG | HEIGHT: 76 IN

## 2018-01-15 NOTE — MISCELLANEOUS
To Whom It May Concern:                           Gabriella Degroot ,  [age], is a paraplegic gentleman that has von using the Quantum  Q6 Edge Power Wheelchair  He has a diagnosis of g82 20 and M48 00  He  insurance ID  number [id number]  and his secondary is [id number]  It is his primary means of mobility and depend on this power wheelchair for all  his MRADLs  Davon Jacob still uses this power wheel chair and it requires repairs  Davon Jacob requires the [employer], ticketstreetystick module Q-logi 2,4 key, w/LCD color display and control- 1 ach  The repair  will require 5 units of labor to remove the old parts, install the new parts and perform diagnostic and safety inspection  The repair is necessary to keep the power wheelchair safe and operational  for Davon Jacob  As the treating physician following the car of Davon Nidia I have read and agree with the information in this report  I concur that the above mentioned items are necessary  Electronically signed by:[facility]  [facility]  Aug  2 2016  1:14PM EST        Electronically signed by:Montse FERGUSON  Aug  2 2016  2:19PM EST       AMENDMENTS:  1  Patient also needs ROHO cushion due to history of sacral ulcers    Electronically signed by:Christina  [facility]  Aug  2 2016  2:22PM EST      Electronically signed by:Montse FERGUSON    Aug  2 2016  3:18PM EST

## 2018-01-16 NOTE — MISCELLANEOUS
June 6, 2017        Dear Marlene Odonnell:    Sahara Ferrell are scripts for bloodwork and stool specimen needed prior to your upcoming Fecal Transplant Procedure  Please have them done as soon as possible if you haven't done so already  Please call me at [phone] with any questions        Thank you,        OLGA Ramirez       Electronically signed by:Isabella Lujan   Jun 6 2017  2:11PM EST Author

## 2018-01-16 NOTE — MISCELLANEOUS
To Whom It May Concern;  Klaus Soliz, 1980, is in need of repair of his Quantum Q6 Edge wheelchair  He is in need of mulitple repairs and needs the left shoulder, joystick, seat and on/off to be repaired  Angella Castillo is a  type 1 diabetic paraplegic gentelman that will be wheelchair bound for life  Thank you               Electronically signed by:Christina SMYTH  Jun 2 2016  1:11PM EST

## 2018-01-16 NOTE — MISCELLANEOUS
History of Present Illness  TCM Communication [facility]: The patient is being contacted for follow-up after hospitalization and 9/16/16 AT [facility]  He was hospitalized at [facility]  The date of admission: 8/31/16, date of discharge: 9/3/16  Diagnosis: ULCER OF SACRAL REGION  He was discharged to home  Follow-up appointments with other specialists: GI FOR ANEMIA EVAL, DR Blaine Rocha W/I 1 WK FOR WOUND CARE  Communication performed and completed by ALISON ESQUEDA   HPI: IT WAS [address] [facility] [address] PANEL,FERRITIN,TIBC BUT PT DECLINED STAYING [facility] FOR FURTHER WORK UP AND WANTED TO GO HOME TO CONTINUE WORK UP AS OUTPT  STATED HE FELT WELL AND DENIED ANY WEAKNESS,FATIGUE,SOB,BLEEDING OR MELENA  Patient came in for [address] instead of appointmetn      Active Problems     1  Abnormal finding on urinalysis (791 9) (R82 90)   2  Abnormal kidney function study (794 4) (R94 4)   3  Atrial fibrillation (427 31) (I48 91)   4  Broken tooth with complication (936 10) (P86  5XXA)   5  Chronic pain syndrome (338 4) (G89 4)   6  Chronic skin ulcer (707 9) (L98 499)   7  Cigarette smoker (305 1) (F17 210)   8  Clostridium difficile infection (041 84) (B96 89)   9  Cracked nails (703 8) (L60 3)   10  Diabetic gastroparesis (250 60,536 3) (W60 77,W16 81)   11  Diabetic gastroparesis associated with type 1 diabetes mellitus (250 61,536 3)    (E10 43,K31 84)   12  Dry skin (701 1) (L85 3)   13  DVT (deep venous thrombosis) (453 40) (I82 409)   14  Dysuria (788 1) (R30 0)   15  Encounter for diabetic foot exam (250 00) (E11 9)   16  GERD (gastroesophageal reflux disease) (530 81) (K21 9)   17  Infected sebaceous cyst (706 2) (L72 3,L08 9)   18  Insomnia (780 52) (G47 00)   19  Iron deficiency anemia (280 9) (D50 9)   20  Leg muscle spasm (728 85) (M62 838)   21  Lesion of subcutaneous tissue (709 9) (L98 9)   22  Loose stools (787 7) (R19 5)   23  Neurogenic bladder (596 54) (N31 9)   24  Preop examination (V72 84) (Z01 818)   25  Prophylactic antibiotic (V58 62) (Z79 2)   26  Screening for depression (V79 0) (Z13 89)   27  Sebaceous cyst (706 2) (L72 3)   28  Status post below knee amputation of right lower extremity (V49 75) (Z89 511)   29  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)   30  Type 1 diabetes mellitus with sensory neuropathy (250 61,357 2) (E10 40)   31  Vitamin B deficiency (266 9) (E53 9)   32  Vitamin C deficiency (267) (E54)   33  Vitamin D deficiency (268 9) (E55 9)    Diabetes mellitus (250 00) (E11 9)       Paraplegic spinal paralysis (344 1) (G82 20)       Colostomy in place (V44 3) (Z93 3)       UTI (urinary tract infection) (599 0) (N39 0)          Surgical History    1  History of Amputation Of Leg Below Knee   2  History of Back Surgery   3  History of Bladder Surgery   4  History of Surgical Delay Of Flap At Trunk    Family History  Mother    1  Family history of Hypertension  Father    2  No pertinent family history    Social History    · Alcohol use   · Cigarette smoker (305 1) (F17 210)   · Current every day smoker (305 1) (F17 200)    Current Meds   1  Alcohol Swabs 70 % Pad; USE 3 TIMES A DAY; Therapy: 87NGR4482 to (Evaluate:30Zzx7215)  Requested for: 37ZEH0166; Last   Rx:06Jan2016 Ordered   2  Ammonium Lactate 12 % External Cream; APPLY  AND RUB  IN A THIN FILM TO   AFFECTED AREAS TWICE DAILY  (AM AND PM); Therapy: 09Vtx9459 to (Last Rx:80Zxr4978)  Requested for: 15Tyr9300 Ordered   3  Baclofen 20 MG Oral Tablet; Take 1 tablet twice daily; Therapy: 78KGN0557 to ([place])  Requested for: 93Ifa2078; Last   Rx:97Ydo9154 Ordered   4  Eda Contour Test In [employer]; TEST 4 TIMES A DAY; Therapy: 36WZG8660 to (Last Rx:28Mak0110)  Requested for: 29Kbi0464 Ordered   5  [employer] Vitamin B-12 1000 MCG Oral Tablet; take 1 capsule by mouth daily; Therapy: 84VCJ1948 to (Evaluate:01Jan2016)  Requested for: 31LME7446; Last   Rx:05Jun2015 Ordered   6   FerrouSul 325 (65 Fe) MG Oral Tablet; TAKE 1 TABLET 3 TIMES DAILY; Therapy: 47BJQ9665 to (Evaluate:27Jun2016)  Requested for: 13Vzt2943; Last   Rx:20Apr2016 Ordered   7  Glucerna Oral Liquid; Drink 2 cans a day; Therapy: 04DEL1733 to (Evaluate:23Wio1235); Last Rx:27Jun2016 Ordered   8  HumaLOG KwikPen 100 UNIT/ML Subcutaneous Solution Pen-injector; INJECT 4 UNITS   BEFORE MEALS; Therapy: 88Idk3167 to (Evaluate:17Oet2491)  Requested for: 97Fdi4338; Last   Rx:48Ptj4509 Ordered   9  Lancets Miscellaneous; TEST BLOOD SUGAR 3 TIMES A DAY; Therapy: 37WHW2993 to (Evaluate:23Jae5562)  Requested for: 76KRO0672; Last   Rx:06Jan2016 Ordered   10  Levemir FlexTouch 100 UNIT/ML Subcutaneous Solution Pen-injector; 14 units in the    morning and 12 units in the evening; Therapy: 13RAO4353 to (Last Rx:20Apr2016)  Requested for: 10Sco5275 Ordered   11  MetroNIDAZOLE 500 MG Oral Tablet; TAKE 1 TABLET 3 TIMES DAILY UNTIL GONE;    Therapy: 56BZR7300 to (Evaluate:79Eju2340)  Requested for: 59Rsx9771; Last    Rx:27Xsc2460 Ordered   12  Multi-Vitamin Oral Tablet; take one tablet by mouth daily; Therapy: 99RFS3044 to Recorded   13  Oxybutynin Chloride 5 MG Oral Tablet; TAKE 3 TABLET Daily; Therapy: 34IZE1999 to (Evaluate:12Oct2016)  Requested for: 81CHP5423; Last    Rx:14Jun2016 Ordered   14  OxyCONTIN 20 MG Oral Tablet ER 12 Hour Abuse-Deterrent; TAKE 1 TABLET EVERY 12    HOURS; Therapy: 12VPW8326 to (Evaluate:22Sqh5873) Recorded   15  Pantoprazole Sodium 40 MG Oral Tablet Delayed Release; TAKE 1 TABLET DAILY; Therapy: 68LQP6322 to (Evaluate:62Okh9811)  Requested for: 34Opp8638; Last    Rx:94Bjt0329 Ordered   16  Penicillin V Potassium 500 MG Oral Tablet; TAKE 1 TABLET 3 TIMES DAILY UNTIL GONE;    Therapy: 83ITY1830 to (Evaluate:56Jag3053)  Requested for: 77IPJ2235; Last    Rx:78Aze6922 Ordered   17  TraZODone HCl - 50 MG Oral Tablet; 1/2 tablet hs;    Therapy: 08AAF8682 to (Last Rx:97Qhv4170)  Requested for: 40YOC1133 Ordered   18   Vitamin C 250 MG Oral Tablet; TAKE 2 TABLETS EVERY DAY; Therapy: 22Xiu7668 to (Evaluate:53Xhp6800)  Requested for: 65JWG0174; Last    Rx:17Kqp0842 Ordered   19  Vitamin C 500 MG Oral Capsule; TAKE 1 CAPSULE DAILY; Therapy: 43XHR0412 to Recorded   20  Vitamin D3 1000 UNIT Oral Tablet; TAKE 1 TABLET DAILY; Therapy: 06RZG8357 to (Evaluate:24Sca2889)  Requested for: 51TEV7805; Last    Rx:14Nle4532 Ordered   21  Warfarin Sodium 1 MG Oral Tablet; take 1 tablet by mouth daily; Therapy: 47SPE4399 to (Evaluate:34Xqe3627)  Requested for: 13IQY1647; Last    Rx:33Wmd6280 Ordered   22  Xarelto 20 MG Oral Tablet; Take 1 tablet a day with food and stop coumadin; Therapy: 94ESJ7719 to (Ramiro Jamison)  Requested for: 19CXL7881; Last    Rx:06Sgc6682 Ordered   23  Xarelto 20 MG Oral Tablet; Take 1 tablet a day with food and stop coumadin; Therapy: 34EVH6076 to (Evaluate:14Nov2016)  Requested for: 59YUP6526; Last    Rx:93Cki3467 Ordered    Allergies    1  Cipro SOLN   2  Cymbalta CPEP   3   Lyrica CAPS    Future Appointments    Date/Time Provider Specialty Site   09/23/2016 11:15 AM Niraj Saha MD General Surgery [facility] WOUND [facility]   09/21/2016 11:20 AM Jeaneth Newsome MD Gastroenterology Adult [facility] 9     Signatures   Electronically signed by : Heidy Walters, [facility]; Sep 16 2016  9:39AM EST                       (Author)    Electronically signed by : Mariah Opitz, M D ; Sep 16 2016  2:00PM EST

## 2018-01-17 NOTE — MISCELLANEOUS
Dear Piper Iverson,  The Physicians and staff of [address] [place] [address]  wish to remind and invite you to take full advantage of the new preventative service offered by Medicare AT NO COST TO YOU! The name of the service is the [address]    This new service is NOT to replace  any routine of scheduled visits for acute illness, chronic disease management or office check-ups  **The Annual Wellness Visit is NOT a physical**  These new free to you annual visits are designed to address any  PREVENTATIVE SERVICES that you may require and DESERVE as well as identify any other specific risk factors that may exist relating to your overall health and well-being  Additionally, you will receive a [address] to address any needed  preventative services or health services developed around your own specific needs  At the time of this visit you may receive referrals for any needed services in regards to specific problems, social/medical concerns or health risk factors  You are entitled  to the free Annual Wellness Visit every 12 months with no out of pocket cost to you for the services performed in our office  If you participate with Medicare Advantage Plan (Medicare Blue, Aetna Medicare, [employer] - is now [employer] or other [employer]) you ARE eligible for this service  CALL TODAY AND SCHEDULE YOUR APPOINTMENT!  [phone]       Sincerely,            Estimado  Melvinia Christine y personal [address] [facility] recordarle e invitarle a aprovechar al jeannine el nuevo servicio preventivo ofrecido  por Medicare SIN COSTO ALGUNO PARA USTED  [place] del servicio es la "[place]"  Monse nuevo servicio NO debe reemplazar ninguna rutina de visitas programadas por enfermedad graves, manejo de enfermedades  crónicas o chequeos de oficina      ** La visita anual de bienestar NO es un examen físico **    Estas nuevas visitas anuales "gratuitas para usted"  están diseñadas para atender cualquier SERVICIO PREVENTATIVO que usted pueda requerir y [place]on identificar otros factores de riesgo específicos que puedan existir relacionados con smith john paul y bienestar general   Miguel Francesl  un PLAN DE ACCIÓN DE JOHN PAUL PERSONALIZADA para atender cualquier servicio preventivo o servicios de john paul necesarios desarrollados en torno a sergio propias necesidades específicas  En el momento de esta visita, puede recibir referencias para cualquier  servicio necesario en relación con problemas específicos, preocupaciones sociales / médicas o factores de riesgo para la john paul  Usted tiene derecho a la Visita Anual de Bienestar gratuita cada 12 meses sin ningún costo de bolsillo para los  servicios que se realizan en nuestra oficina  Si participa con el Plan Medicare Advantage (Medicare Blue, [employer], [employer] u otros planes de [employer]),  es elegible para anna servicio  PRASANTH ARREDONDO PARA PROGRAMAR SMITH LIVIA! !  [phone]       Sinceramente,

## 2018-01-18 NOTE — MISCELLANEOUS
Message  Message Free Text Note Form: Received call from Lanie Severs, visiting nurse on 9/11/16  Reports that when she visited patient, he had a temperature of 100 4  Patient reported to her that he noted stool from his colostomy which was the smell and consistency of c diff, which he has had in the past  She did recommend that patient consider going to ED for temp, diarrhea, possible dehydration or possible infection from his multiple pressure ulcers  Patient preferred to not go to ED  Recommended that patient increase PO fluids  If temp increases to 101 or greater, he was advised to go to ED  He was advised to call office on Monday am for appointment or come in as acute visit        Signatures   Electronically signed by : ERNESTO Maravilla; Sep 12 2016  6:00AM EST                       (Author)

## 2018-01-18 NOTE — MISCELLANEOUS
History of Present Illness  [facility] Communication [facility]: The patient is being contacted for follow-up after hospitalization  He was hospitalized at [facility]  The date of admission: 08/26/17, date of discharge: 08/30/17  Diagnosis: Low Hemoglobin  He was discharged to home  Medications were not reviewed today  He scheduled a follow up appointment  The patient is currently asymptomatic  Counseling was provided to the patient  I advised to bring d/c papers from [facility]  Communication performed and completed by Francy Goyal CMA      Active Problems    1  Abnormal finding on urinalysis (791 9) (R82 90)   2  Abnormal kidney function study (794 4) (R94 4)   3  Abscess of face (682 0) (L02 01)   4  Anemia (285 9) (D64 9)   5  Atrial fibrillation (427 31) (I48 91)   6  Broken tooth with complication (063 15) (P33  5XXA)   7  C  difficile colitis (008 45) (A04 7)   8  Chronic anticoagulation (V58 61) (Z79 01)   9  Chronic pain syndrome (338 4) (G89 4)   10  Chronic skin ulcer (707 9) (L98 499)   11  Cigarette smoker (305 1) (F17 210)   12  Clostridium difficile infection (041 84) (B96 89)   13  Colostomy in place (V44 3) (Z93 3)   14  Cracked nails (703 8) (L60 3)   15  Decubitus ulcer of left hip, unstageable (707 04,707 25) (L89 220)   16  Decubitus ulcer of lower back, stage 3 (707 03,707 23) (L89 103)   17  Decubitus ulcer of right ischial area, stage III (521 03,722 90) (L89 313)   18  Diabetes mellitus (250 00) (E11 9)   19  Diabetic eye exam (V72 0,250 00) (E11 9,Z01 00)   20  Diabetic gastroparesis (250 60,536 3) (Q20 34,O64 00)   21  Diabetic gastroparesis associated with type 1 diabetes mellitus (250 61,536 3)    (E10 43,K31 84)   22  Diarrhea of presumed infectious origin (009 3) (A09)   23  Difficulty sleeping (780 50) (G47 9)   24  Dry skin (701 1) (L85 3)   25  DVT (deep venous thrombosis) (453 40) (I82 409)   26  Dysuria (788 1) (R30 0)   27  Encounter for diabetic foot exam (250 00) (E11 9)   28   GERD (gastroesophageal reflux disease) (530 81) (K21 9)   29  Infected sebaceous cyst (706 2) (L72 3,L08 9)   30  Insomnia (780 52) (G47 00)   31  Iron deficiency (280 9) (E61 1)   32  Iron deficiency anemia (280 9) (D50 9)   33  Iron deficiency anemia (280 9) (D50 9)   34  Iron deficiency anemia (280 9) (D50 9)   35  Leg muscle spasm (728 85) (M62 838)   36  Lesion of subcutaneous tissue (709 9) (L98 9)   37  Loose stools (787 7) (R19 5)   38  Neurogenic bladder (596 54) (N31 9)   39  Open wound of back, right, subsequent encounter (V58 89,876 0) (S21 201D)   40  Open wound of left foot with complication, initial encounter (892 1) (S91 302A)   41  Open wound of left foot with complication, subsequent encounter (V58 89,892 1)    (S91 302D)   42  Open wound of right lower extremity, initial encounter (891 0) (S81 801A)   43  Open wound of right lower extremity, subsequent encounter (V58 89,891 0) (S81 801D)   44  Oral thrush (112 0) (B37 0)   45  Paraplegic spinal paralysis (344 1) (G82 20)   46  Preop examination (V72 84) (Z01 818)   47  Prophylactic antibiotic (V58 62) (Z79 2)   48  Pustular folliculitis (591 1) (H43 73)   49  Screening for depression (V79 0) (Z13 89)   50  Sebaceous cyst (706 2) (L72 3)   51  Stage III chronic kidney disease (585 3) (N18 3)   52  Status post below knee amputation of right lower extremity (V49 75) (Z89 511)   53  Swelling of lower extremity (729 81) (M79 89)   54  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)   55  Type 1 diabetes mellitus with sensory neuropathy (250 61,357 2) (E10 40)   56  Urinary tract infection (599 0) (N39 0)   57  Urinary tract infection associated with catheterization of urinary tract, unspecified    indwelling urinary catheter type, initial encounter (996 64,599 0) (T83 511A,N39 0)   58  UTI (urinary tract infection) (599 0) (N39 0)   59  Vitamin B deficiency (266 9) (E53 9)   60  Vitamin C deficiency (267) (E54)   61   Vitamin D deficiency (268 9) (E55 9)    Surgical History    1  History of Amputation Of Leg Below Knee   2  History of Back Surgery   3  History of Bladder Surgery   4  History of Surgical Delay Of Flap At Trunk    Family History  Mother    1  Family history of Hypertension  Father    2  No pertinent family history    Social History    · Alcohol use   · Cigarette smoker (305 1) (F17 210)   · Current every day smoker (305 1) (F17 200)    Current Meds   1  Alcohol Swabs 70 % Pad; USE 3 TIMES A DAY; Therapy: 74MBX0624 to (Evaluate:78Vjg7523)  Requested for: 56BCO3541; Last   Rx:06Jan2016 Ordered   2  Ammonium Lactate 12 % External Cream; APPLY  AND RUB  IN A THIN FILM TO   AFFECTED AREAS TWICE DAILY  (AM AND PM); Therapy: 72Cjj0919 to (Last Rx:50Hll4400)  Requested for: 36Chr4768 Ordered   3  Baclofen 20 MG Oral Tablet; Take 1 tablet twice daily; Therapy: 01DVN1387 to (Evaluate:43Dsj4007)  Requested for: 72Rxb8834; Last   Rx:82Odu0050 Ordered   4  Eda Contour Test In [employer]; TEST 4 TIMES A DAY; Therapy: 31XZO7448 to (Last Rx:99Nis1548)  Requested for: 73Vdp1678 Ordered   5  Clindamycin Phosphate 1 % External Gel; APPLY AND GENTLY MASSAGE INTO   AFFECTED AREA(S) ONCE DAILY; Therapy: 03IRJ4673 to (Evaluate:20Mar2017)  Requested for: 48JXX8008; Last   Rx:19Jan2017 Ordered   6  [employer] Vitamin B-12 1000 MCG Oral Tablet; take 1 capsule by mouth daily; Therapy: 11QHG4063 to (Evaluate:35Ikw2436)  Requested for: 17DYN3177; Last   Rx:03Jan2017 Ordered   7  Dakins (1/2 strength) 0 2-0 25 % External Solution; USE AS DIRECTED; Therapy: 13TSH5743 to (Last Rx:10Mar2017)  Requested for: 66AQD8350 Ordered   8  Famotidine 20 MG Oral Tablet; take 1 tablet twice a day; Therapy: 00CUH2130 to (Reece Millan)  Requested for: 45Qnc5243; Last   Rx:68Zsy2366 Ordered   9  Ferrous Sulfate 325 (65 Fe) MG Oral Tablet; TAKE 1 TABLET BY MOUTH 3 TIMES A DAY; Therapy: 12PVP2235 to (Evaluate:86Iby7578)  Requested for: 60Naa9391; Last   Rx:68Aot8312 Ordered   10   FerrouSul 325 (65 Fe) MG Oral Tablet; TAKE 1 TABLET 3 TIMES DAILY; Therapy: 69DOD8180 to (Floyce Curlin)  Requested for: 25ROB0995; Last    Rx:98Mgp3194 Ordered   11  Glucerna Oral Liquid; Drink 2 cans a day; Therapy: 68UUQ4656 to (Evaluate:86Zgc1124); Last Rx:76Sus1358 Ordered   12  HumaLOG KwikPen 100 UNIT/ML Subcutaneous Solution Pen-injector; INJECT 4 UNITS    BEFORE MEALS; Therapy: 78Fim0960 to (Evaluate:34Myg1506)  Requested for: 80Sem7586; Last    Rx:05Izh3855 Ordered   13  Lancets Miscellaneous; TEST BLOOD SUGAR 3 TIMES A DAY; Therapy: 36NRD0295 to (Evaluate:05Ekz8492)  Requested for: 66NOS9017; Last    Rx:06Jan2016 Ordered   14  Levemir FlexTouch 100 UNIT/ML Subcutaneous Solution Pen-injector; 14 units in the    morning and 12 units in the evening; Therapy: 50TSK4165 to (Last Rx:97Typ2352)  Requested for: 54Vpe6366 Ordered   15  Lidocaine HCl - 2 % External Gel; Apply to wound(s) PRN in [address] [place] [address]    prior to debridement for pain control; Therapy: (Recorded:13Jan2017) to Recorded   16  Multi-Vitamin Oral Tablet; take one tablet by mouth daily; Therapy: 03YEW9373 to Recorded   17  Nystatin 838181 UNIT/ML Mouth/Throat Suspension; SWISH AND SWALLOW 5 ML 4    TIMES A DAY  THEN CONTINUE FOR 48 HOURS AFTER THRUSH RESOLVES; Therapy: 01Lft3114 to (Evaluate:49Nzg4135)  Requested for: 75FJG9175; Last    Rx:38Ccg0762 Ordered   18  Nystatin 185987 UNIT/ML Mouth/Throat Suspension; Therapy: (Recorded:18Zpi5730) to Recorded   19  Oxybutynin Chloride 5 MG Oral Tablet; TAKE 3 TABLET Daily; Therapy: 71AQU5607 to (Evaluate:10Oct2017)  Requested for: 12Jun2017; Last    Rx:12Jun2017 Ordered   20  Temazepam 15 MG Oral Capsule; TAKE 1 CAPSULE AT BEDTIME AS NEEDED FOR    SLEEP; Therapy: 25Xes9918 to (Evaluate:01Oct2017); Last Rx:17Nej3588 Ordered   21  Vancomycin HCl - 125 MG Oral Capsule; Take 1 pill every 6 hours;     Therapy: 27JOA4776 to (Angelo Nageotte)  Requested for: 58KIM5596; Last Rx:60Rgu4852 Ordered   22  Vitamin C 250 MG Oral Tablet; TAKE 2 TABLETS EVERY DAY; Therapy: 64TIN0639 to (Evaluate:02Jun2017)  Requested for: 86OTB3818; Last    BM:12KCE6065 Ordered   23  Vitamin C 500 MG Oral Capsule; TAKE 1 CAPSULE DAILY; Therapy: 62MZP3569 to Recorded   24  Vitamin D3 1000 UNIT Oral Tablet; TAKE 1 TABLET DAILY; Therapy: 61CSZ2527 to (Evaluate:82Cxu6143)  Requested for: 13Vol3049; Last    Rx:06Lzp5403 Ordered   25  Xarelto 20 MG Oral Tablet; Take 1 tablet a day with food and stop coumadin; Therapy: 63AQJ8296 to (Forrest Chatterjee)  Requested for: 55RMX6968; Last    Rx:03Jan2017 Ordered    Allergies    1  Cipro SOLN   2  Cymbalta CPEP   3  Lyrica CAPS    Message   Recorded as Task   Date: 09/05/2017 09:54 AM, Created By: Elba Potter   Task Name: Follow Up   Assigned To: Maddie Mckee   Regarding Patient: Saurav Teixeira, Status: Active   Comment:    Elba Potter - 05 Sep 2017 9:54 AM     TASK CREATED    RONI    HOSPITAL: [facility]  ADMITTED: 8/26/17  AND    D/C: 8/30/17  REASON: LOW HEMOGLOBIN    PLEASE SPEAK TO MOTHER  AURORA Todd  [phone]   Heena Mendez - 05 Sep 2017 11:00 AM     TASK REASSIGNED: Previously Assigned To [facility] AT,CLINICAL TEAM  I spoke with Mrs Umair Castorena and pt can't be seen for next week due to others specialist sally already made  I need to contact again for week of 09/18   Maddie Mckee - 05 Sep 2017 1:38 PM     TASK REASSIGNED: Previously Assigned To Heena Mendez  Pt called us back to schedule his sally for next week  It was schedule for 09/13 at 1 pm with marcin  I advise pt's mom to bring d/c papers  Future Appointments    Date/Time Provider Specialty Site   12/01/2017 03:20 PM JUDY Yanez   Hematology Oncology [facility]ER CARE MEDICAL ONCOLOGY   09/20/2017 09:45 AM JUDY Corrigan  Plastic Surgery BODY EVOLUTION PLASTIC RECON SURG   09/13/2017 01:00 PM Marcin Tate, [facility] Signatures   Electronically signed by : Preet Thakur, ; Sep  5 2017  1:41PM EST                       (Author)    Electronically signed by : Svetlana Martinez, [facility]; Sep 22 2017 12:28PM EST                       (Author)    Electronically signed by : JUDY Stokes ; Sep 22 2017  2:20PM EST

## 2018-01-18 NOTE — MISCELLANEOUS
Message   Recorded as Task   Date: 07/03/2017 01:53 PM, Created By: Jovana Aleman   Task Name: Call Back   Assigned To: Tim Berg   Regarding Patient: Favio Small, Status: Active   Nany Martini - 03 Jul 2017 1:53 PM     TASK CREATED  Caller: Dr Maya Guerrero; Other  stated that the Dr Brook Corbett saw this pt at the [place] [address] and would like for us to get the pt scheduled for an iron infusion  Natasha [facility] - 90 Jul 2017 3:00 PM     TASK EDITED  Please schedule the patient for a hospital FU    scheduled patient a hospital f/u to see Dr Brook Corbett 7/21/17 at 11:00am  Patient is aware  Active Problems    1  Abnormal finding on urinalysis (791 9) (R82 90)   2  Abnormal kidney function study (794 4) (R94 4)   3  Abscess of face (682 0) (L02 01)   4  Atrial fibrillation (427 31) (I48 91)   5  Broken tooth with complication (764 36) (V96  5XXA)   6  C  difficile colitis (008 45) (A04 7)   7  Chronic pain syndrome (338 4) (G89 4)   8  Chronic skin ulcer (707 9) (L98 499)   9  Cigarette smoker (305 1) (F17 210)   10  Clostridium difficile infection (041 84) (B96 89)   11  Colostomy in place (V44 3) (Z93 3)   12  Cracked nails (703 8) (L60 3)   13  Decubitus ulcer of left hip, unstageable (707 04,707 25) (L89 220)   14  Decubitus ulcer of lower back, stage 3 (707 03,707 23) (L89 103)   15  Decubitus ulcer of right ischial area, stage III (271 51,659 53) (L89 313)   16  Diabetes mellitus (250 00) (E11 9)   17  Diabetic eye exam (V72 0,250 00) (E11 9,Z01 00)   18  Diabetic gastroparesis (250 60,536 3) (E11 43,K31 84)   19  Diabetic gastroparesis associated with type 1 diabetes mellitus (250 61,536 3)    (E10 43,K31 84)   20  Diarrhea of presumed infectious origin (009 3) (A09)   21  Dry skin (701 1) (L85 3)   22  DVT (deep venous thrombosis) (453 40) (I82 409)   23  Dysuria (788 1) (R30 0)   24  Encounter for diabetic foot exam (250 00) (E11 9)   25   GERD (gastroesophageal reflux disease) (530 81) (K21 9)   26  Infected sebaceous cyst (706 2) (L72 3,L08 9)   27  Insomnia (780 52) (G47 00)   28  Iron deficiency anemia (280 9) (D50 9)   29  Leg muscle spasm (728 85) (M62 838)   30  Lesion of subcutaneous tissue (709 9) (L98 9)   31  Loose stools (787 7) (R19 5)   32  Neurogenic bladder (596 54) (N31 9)   33  Open wound of back, right, subsequent encounter (V58 89,876 0) (S21 201D)   34  Open wound of left foot with complication, initial encounter (892 1) (S91 302A)   35  Open wound of left foot with complication, subsequent encounter (V58 89,892 1)    (S91 302D)   36  Open wound of right lower extremity, initial encounter (891 0) (S81 801A)   37  Open wound of right lower extremity, subsequent encounter (V58 89,891 0) (S81 801D)   38  Oral thrush (112 0) (B37 0)   39  Paraplegic spinal paralysis (344 1) (G82 20)   40  Preop examination (V72 84) (Z01 818)   41  Prophylactic antibiotic (V58 62) (Z79 2)   42  Pustular folliculitis (526 7) (Y96 64)   43  Screening for depression (V79 0) (Z13 89)   44  Sebaceous cyst (706 2) (L72 3)   45  Status post below knee amputation of right lower extremity (V49 75) (Z89 511)   46  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)   47  Type 1 diabetes mellitus with sensory neuropathy (250 61,357 2) (E10 40)   48  Urinary tract infection (599 0) (N39 0)   49  Urinary tract infection associated with catheterization of urinary tract, unspecified    indwelling urinary catheter type, initial encounter (996 64,599 0) (T83 511A,N39 0)   50  UTI (urinary tract infection) (599 0) (N39 0)   51  Vitamin B deficiency (266 9) (E53 9)   52  Vitamin C deficiency (267) (E54)   53  Vitamin D deficiency (268 9) (E55 9)    Current Meds   1  Alcohol Swabs 70 % Pad; USE 3 TIMES A DAY; Therapy: 74ESE1386 to (Evaluate:02Jmx9445)  Requested for: 63CZF6600; Last   Rx:06Jan2016 Ordered   2  Ammonium Lactate 12 % External Cream; APPLY  AND RUB  IN A THIN FILM TO   AFFECTED AREAS TWICE DAILY  (AM AND PM);   Therapy: 03MDI4265 to (Last Rx:59Tbk4514)  Requested for: 67Hkd1954 Ordered   3  Baclofen 20 MG Oral Tablet; Take 1 tablet twice daily; Therapy: 95AUR6517 to (Evaluate:80Wga7182)  Requested for: 64Tyt2646; Last   Rx:30Fwx7825 Ordered   4  Eda Contour Test In [employer]; TEST 4 TIMES A DAY; Therapy: 06SJM7205 to (Last Rx:17Wqs9019)  Requested for: 67Hek0861 Ordered   5  Clindamycin Phosphate 1 % External Gel; APPLY AND GENTLY MASSAGE INTO   AFFECTED AREA(S) ONCE DAILY; Therapy: 52SRU7463 to (Evaluate:20Mar2017)  Requested for: 64EUT4444; Last   Rx:19Jan2017 Ordered   6  [employer] Vitamin B-12 1000 MCG Oral Tablet; take 1 capsule by mouth daily; Therapy: 05QBA5494 to (Evaluate:81Qhk2593)  Requested for: 44ZLF5771; Last   Rx:03Jan2017 Ordered   7  Dakins (1/2 strength) 0 2-0 25 % External Solution; USE AS DIRECTED; Therapy: 13HRB6964 to (Last Rx:10Mar2017)  Requested for: 00BKW7640 Ordered   8  FerrouSul 325 (65 Fe) MG Oral Tablet; TAKE 1 TABLET 3 TIMES DAILY; Therapy: 37RPY0329 to (Ken Grossman)  Requested for: 63NHA8540; Last   Rx:12Tvi7162 Ordered   9  Glucerna Oral Liquid; Drink 2 cans a day; Therapy: 78LLG1831 to (Evaluate:34Cpz4437); Last US:57HDS7407 Ordered   10  HumaLOG KwikPen 100 UNIT/ML Subcutaneous Solution Pen-injector; INJECT 4    UNITS BEFORE MEALS; Therapy: 04Tlb8550 to (Evaluate:54Nzu5712)  Requested for: 37Bpc7268; Last    Rx:56Qwy4891 Ordered   11  Lancets Miscellaneous; TEST BLOOD SUGAR 3 TIMES A DAY; Therapy: 08VEI8681 to (Evaluate:65Bnl1034)  Requested for: 52NML4476; Last    Rx:06Jan2016 Ordered   12  Levemir FlexTouch 100 UNIT/ML Subcutaneous Solution Pen-injector; 14 units in the    morning and 12 units in the evening; Therapy: 25PZN5801 to (Last Rx:20Apr2016)  Requested for: 20Apr2016 Ordered   13  Lidocaine HCl - 2 % External Gel; Apply to wound(s) PRN in [address]    prior to debridement for pain control;     Therapy: (Recorded:13Jan2017) to Recorded   14  Multi-Vitamin Oral Tablet; take one tablet by mouth daily; Therapy: 65GOY0188 to Recorded   15  Nystatin 181971 UNIT/ML Mouth/Throat Suspension; SWISH AND SWALLOW  5 ML 4    times a day  then continue for 48 hours after thrush resolves; Therapy: 31Lmk1330 to (Evaluate:22Mar2017)  Requested for: 64Abv8331; Last    Rx:06Emg5939 Ordered   16  Nystatin 474002 UNIT/ML Mouth/Throat Suspension; Therapy: (Recorded:93Bgc0158) to Recorded   17  Oxybutynin Chloride 5 MG Oral Tablet; TAKE 3 TABLET Daily; Therapy: 66HEM6333 to (Evaluate:10Oct2017)  Requested for: 12Jun2017; Last    Rx:12Jun2017 Ordered   18  OxyCONTIN 20 MG Oral Tablet ER 12 Hour Abuse-Deterrent; TAKE 1 TABLET EVERY    12 HOURS; Therapy: 09USD9743 to (Evaluate:94Ubw0491) Recorded   19  Pantoprazole Sodium 40 MG Oral Tablet Delayed Release; TAKE 1 TABLET DAILY; Therapy: 19VXJ5099 to (Evaluate:07Bkz0533)  Requested for: 69Ziq4437; Last    Rx:75Jwm3942 Ordered   20  Vancomycin HCl - 125 MG Oral Capsule (Vancocin HCl); TAKE 1 CAPSULE 4 TIMES    DAILY; Therapy: 61QYW8032 to (Evaluate:29Jun2017)  Requested for: 26YJP7338; Last    Rx:77Uoe7970 Ordered   21  Vancomycin HCl - 125 MG Oral Capsule; Therapy: (Recorded:05Jun2017) to Recorded   22  Vitamin C 250 MG Oral Tablet; TAKE 2 TABLETS EVERY DAY; Therapy: 12SCK7646 to (Evaluate:02Jun2017)  Requested for: 44EJA5037; Last    WC:76HWH0601 Ordered   23  Vitamin C 500 MG Oral Capsule; TAKE 1 CAPSULE DAILY; Therapy: 39NEW0075 to Recorded   24  Vitamin D3 1000 UNIT Oral Tablet; TAKE 1 TABLET DAILY; Therapy: 55UJX2891 to (Evaluate:14Vjx5258)  Requested for: 54FYA4475; Last    Rx:20Oli5378 Ordered   25  Xarelto 20 MG Oral Tablet; Take 1 tablet a day with food and stop coumadin; Therapy: 54IGD7751 to (24-20-52-61)  Requested for: 58HEH9147; Last    Rx:03Jan2017 Ordered    Allergies    1  Cipro SOLN   2  Cymbalta CPEP   3   Lyrica CAPS    Signatures   Electronically signed by : Angelina Mandel, ; Jul  3 2017  3:05PM EST                       (Author)

## 2018-01-22 VITALS
DIASTOLIC BLOOD PRESSURE: 72 MMHG | SYSTOLIC BLOOD PRESSURE: 124 MMHG | TEMPERATURE: 98.6 F | RESPIRATION RATE: 18 BRPM | OXYGEN SATURATION: 100 % | HEART RATE: 88 BPM

## 2018-01-22 VITALS
WEIGHT: 185 LBS | BODY MASS INDEX: 22.53 KG/M2 | DIASTOLIC BLOOD PRESSURE: 72 MMHG | TEMPERATURE: 98.9 F | SYSTOLIC BLOOD PRESSURE: 136 MMHG | RESPIRATION RATE: 16 BRPM | HEIGHT: 76 IN | HEART RATE: 74 BPM

## 2018-01-23 NOTE — PROGRESS NOTES
Assessment    1  Decubitus ulcer of right ischial area, stage III (370 11,056 12) (L89 313)   2  Decubitus ulcer of lower back, stage 3 (707 03,707 23) (L89 103)   3  Open wound of right lower extremity, subsequent encounter (V58 89,891 0) (S81 801D)   4  Open wound of left foot with complication, initial encounter (892 1) (U29 119G)    Wound Care Orders/Instructions    Wound Identification and Instructions   Wound #2: right ischium    Wound Care Instructions  Aster Bliss  Dressing Type: Negative Pressure Wound Therapy Dressing  Directions:   Wash with mild soap and water, normal saline, wound cleanser  As specified, use: NSS  Apply 2% Topical Lidocaine anesthetic gel PRN to wound/ulcer prior to debridement for pain control  To periwound apply: Skin prep barrier  Dressing change frequency: Three times per week, M-W-Fto-- ROHO cushion  Pressure redistribution: [place]ron Bed, turn side to side, no back  Comments/Other:   NPWT @ 125 mmHg Continous   Wound #3: right lower back   Wound Care Instructions  Aster Bliss  Dressing Type: Negative Pressure Wound Therapy Dressing  Directions:   Wash with mild soap and water, normal saline, wound cleanser  As specified, use: NSS  Apply 2% Topical Lidocaine anesthetic gel PRN to wound/ulcer prior to debridement for pain control  To periwound apply: Skin prep barrier  Dressing change frequency: Three times per weekto-- ROHO cushion  Pressure redistribution: [place] Bed, turn side to side, no back  Comments/Other:   NPWT @ 125mmHg continuous   Wound #5: L posterior hip   Wound Care Instructions  Aster Bliss  Dressing Type: Plain gauze  Directions:   Secondary dressing apply: 1/2 ABD as needed  Secure with: Tape, medfix  Dressing change frequency: Three times per week  Comments/Other:   keep pressure off L hip as much as possible   Wound #6: R lateral Fibula   Wound Care Instructions  Aster Bliss     Dressing Type: Silver Alginate (Maxsorb AG, Silvercell, Aquacel Silver)  Directions:   Wash with mild soap and water, normal saline, wound cleanser  As specified, use: NSS  Apply 2% Topical Lidocaine anesthetic gel PRN to wound/ulcer prior to debridement for pain control  Secondary dressing apply: Gauze  Secure with: Chandler, tape  Dressing change frequency: Three times per week  Comments/Other:   keep pressure off L fibula   Wound #7: L medial foot multi   Wound Care Instructions  [address]  Dressing Type: Silver Alginate (Maxsorb AG, Silvercell, Aquacel Silver)  Wash with mild soap and water, normal saline, wound cleanser  As specified, use: NSS  Apply 2% Topical Lidocaine anesthetic gel PRN to wound/ulcer prior to debridement for pain control  Secondary dressing apply: Gauze  Secure with: Kerlix, tape  Dressing change frequency: Three times per weekto-- N pressure to affected area      Wound Goals  Wound Goals:   Healing Goals:   Fair healing potential, expect slow healing progress secondary to co-morbid conditions and advanced age  Wound base will be 25%    Patient will achieve appropriate pressure redistribution for wound prevention       Discussion/Summary    Mr Lali James Is a 25-year-old gentleman with a significant history of Paraplegia is here for long-term follow-up of his decubitus ulcers  The right ischial wound is improving with a very good base of granulation tissue  There is some undermining but the majority of the edge is showing epithelialization  The back/sacral wound has Some areas of dark discoloration consistent with ongoing pressure areas  There's been some improvement in other areas  Overall the wound is approximately the same size  Discussed options for possible evaluation by plastic surgery for flap closure  He is about plastic surgery at the end of the month  He is currently being treated for recurrent C  difficile colitis        Chief Complaint  Follow up for wound right ischium and lower back, L posterior hip and R fibula wounds  Patient presents with 2 new wounds on the L medial foot as a result of a "wrap being left on too long and too tight"  History of Present Illness    Wound Identification HPI   Wound #2: right ischium    Wound #3: right lower back    Wound #5: left posterior hip    Wound #6: R lateral Fibula    Wound #7: L medial foot multi          The patient came to Wound Care via wheelchair  The patient is being seen for a follow-up with MD at the [address] [place] [address]  The patient's identification was verified  A secondary verification process was completed  Orientation: oriented to person, oriented to place and oriented to time  Blood Glucose:  124 mg/dL as reported by patient  9/9/2016 Patient arrived with dressings intact  Copious amounts of strike through drainage is present from Right Ischial wound  Patient states that he has had this wound for over one year  Patient was in the hospital from 9/2/2016-9/3/2016 for evaluation of Right Ischial wound  Wound is also malodorous  Right Ischial and right lower back wounds presented with Alginate, 4x4,5x9 and Medfix tape  Right shoulder wound presents with packing, 4x4 and Medfix tape  While in the hospital patient had and I and D of a sebaceous cyst on his right shoulder  Right lower back wound has been open for two months  Patient is a Paraplegic and uses a ROHO cushion to offload while he is in his wheelchair  10/3/16 Patient arrived with dressings intact to right shoulder and right lower back and NPWT intact and sealed at 125mmHg continuous to right ischium  10/24/16 Pt arrived in electric wheelchair with NPWT running at 125 mmHg, dressing intact  no complaints offered    Wounds now measure larger  Patient using ROHO cushion in wheelchair and grp 2 mattress in bed    12/9/16 Pt arrived in electric wheelchair with NPWT intact running at 125 HHmg to R ischium wound and and R lower back with 150 cc of drainage in the yamila    Pt reports an inpatient hospital stay, he was admitted over a month ago and stayed in the hospital for four weeks  Pt was in the hospital for Pneumonia, and urosepsis  Pt also reports a fall while in the hospital and hurt his back and currently has some back pain and is using pain medication, oxycodone, to help the pain  Pt reports having cysts on face since his hospital stay  12-23-16 arrived today with NPWT dressing intact to ischium and back wounds  NPWT @ 125 mm/Hg continuous  Patient has what is believed by patient, a cyst on the R temple which he had since his last visit here  he reports the cyst is painful and has increased in size  He reports having "chills" last night and his blood sugars have been higher and more erratic than usual  He also reports "more sediment" in his urine  The cyst was injected with 5cc 1% lidocaine and lanced by Dr Laila Wooten, There was a moderate amount of purulent material expressed  1-13-17 Arrived today with NPWT dressing in place to R ischial and lower back wounds  He reports an increase in drainage in these wounds since last week and has had to change the canister with each dressing change  He reports feeling "more chills than usual" for the "last few days"  Temp today is 99 2  He reports he saw Dr Conor Smalls who is recommending surgery and the patient is declining this treatment at this time  He reports his [place]ron bed will be arriving today and is anxious to get back on it  The wound on the R temple is healed today  there is a small cyst like looking area on the R cheek today that he is requesting evaluation for 1/27/17 Patient arrives with NPWT intact on back and right hip Lower back wound increased is size and depth  Patient did receive his WAVE bed and has been sleeping in it and turning q 2 hours to reduce pressure  He has reduced the amount of milk he drinks and has been encouraged to resume the same amount   Patient has cyst on chin and cheek that have reduced in size over the last few days and no longer draining  2/10/17 Patient arrives with NPWT intact on lower and hip  New area of necrosis noted on lower back  Patient reports Wave bed is working correctly and he has tried to increase protein in his diet but unsure of the amount he is taking in daily  Adonis Ingles His face is clear of any cysts and he has a cleansing regime and completed antibiotics  Wound bases are pink and bleed easily  New undermining noted at lower back  2/24/17 Pt arrived in  transferred self into bed with minimal assist , Wound vac intact to Right ischium and Right lower back, with appx 200 cc of serosanguineous drainage noted  Wound is malordous , with increase in overall wound measurements noted  Pt states he has a issue with the bed, he says theres a lump right in the middle of the bed where he lays  1200- Spoke with Frances at [facility] and reported worsening wounds and that the patient feels "a bar across his back when he is in his bed"  Frances reports she will send a tech to evaluate the bed today  Jacques Villafuerte is aware of this and agreeable  3/10/2017 Patient arrived with NWPT on, intact, and set at 125mmHg continuos suction  Right lower back wound measures larger in size and depth, wound is malodorous and presents with a large amount of thick yellow drainage  Patient also presents with a 100 8 temperature  Patient states that he is feeling fine but two days ago he noticed an odor from his wound vac  Dr Jil Melgoza and nursing staff instructed patient to monitor his temperature and if he starts to feel sick to f/u with PCP or go to the Emergency Room  4/3/17 Patient arrives after discharge last week from [facility] with Systemic inflammatory response syndrome and treated for wound infections  He was discharged on wet todry dressings changes and is being seen for follow up today  He brought NPWT with him for application  Wounds with light green drainage very little odor  Patient admits to sitting up in his wheelchair 10 to 12 hours daily causing extended pressure to his sacrum and Ischuim  Patient was told on other occasions not to sit up for that period of time even with properly inflated ROHO cushion  Suprapubic catheter is patent and draining clear light yellow urine  Patient also has a colostomy  4/17/17: Arrived with wound vac intact to right ischium and right lower back wound at 125mmhg  States new wound on left hip and right LE since last visit  ABD to left posterior hip on arrival and gauze and timothy to right LE   5-1-17 arrived today with NPWT dressing intact to the R ischial and R lower back wounds  There is foul odor from these wounds  There is a dressing on the R fibula  Reports he now open to discussing flap surgery with Dr Connor Distance today  He reports he uses his clinitron bed and is in it "most of the time"  Reports he was hospitalized since his last visit with cold, fever of up to 101 then 100 6  He was treated with IV antibiotics  He reports his hgb was low and he was offered blood transfusion which he declined for Restorationist reasons but accepted treatment with iron  supplements  He was hospitalized for 1 week, according to patient  5/15/2017 Here for wound care follow-up, Patient arrived with NPWT off, patient states that lastnight at 1am the vac drape came apart and the machine was beeping so he turned it off and disconnected the machine from the wound  R fibula and L posterior hip both had dressings intact upon arrival  Patient denies any other problems at this time  6-5-17 Arrives today with NPWT dressing inplace to R ischium and R lower back wounds, dressings in place to R lateral fubula and L medial foot multi  He reports the new wounds on the L medial foot are a result of having a "wrap" on too long and too tight  He reports they have been present for approx 2 weeks  He reports he "has c diff again" for the last week  He is taking vancomycin caps   He is unsure of the reason he developed this again but was recently taking mystatin swish and swallow for a mouth condition  He reports he has re-positioned himself in his clinitron bed and so now has "no problems' with the bed, altho the lower back wound has some areas of DTI noted today  He reports he was running a fever "a couple of days ago" which was up to 101 3 but it is "normal again" at home and in the center today  He is tired today, sleeping most of the visit- he reports he "was up all night" reading  His face and lower extremities seem more edematous today and he tells us he has been eating fast food sandwiches and drinking chicken broth in an effort to "gain weight"  Discussed the connection between high sodium foods and weight gain and swelling  History of Falling: Yes = 25   Secondary Diagnosis: Yes = 15   Ambulatory Aid Furniture = 30   No = 0   Gait: Impaired = 20 -- Short steps with shuffle, may have difficulty arising from chair, head down, significantly impaired balance, requiring furniture, support person, or walking aid to walk  Mental Status: Oriented to own ability = 0   Total Score: 90     > 45 = High Risk       The most recent fall occurred Patient denies any recent falls, 5/1/2017  Number of falls in the last year were 1  Nutrition Assessment Screening: Food intake over the last 3 months due to the loss of appetite, digestive problems, chewing or swallowing difficulties is graded as: 2 = no decrease in food intake   Weight loss during the last 3 months: 3 = no weight loss   Mobility scored as: 0 = bed or chair bound  Psychological Stress and Acute Disease Scored as: 2 = The patient has not experienced psychological stress or acute disease in the last 3 months  Neuropsychological problems scored as: 2 = no psychological problems  Body Mass Index (BMI) scored as: 2 = BMI 21 to less than 23  Nutritional Assessment Screening Score: 8 - 11 points - At risk of malnutrition             Provider Wound Care HPI: Patient is here for follow-up for his right ischial and lower back decubitus ulcers  He still has a VAC dressing on his wounds  He was Hospitalized again and is here for follow-up  He has a new wound on his right BKA stump  He also has a new wound on his left ischium  He has 2 new wounds on his left foot         Pain Assessment   the patient states they do not have pain  (on a scale of 0 to 10, the patient rates the pain at 0 )   Abuse And Domestic Violence Screen    Yes, the patient is safe at home  The patient states no one is hurting them  Depression And Suicide Screen  No, the patient has not had thoughts of hurting themself  No, the patient has not felt depressed in the past 7 days  Prefered Language is  [place]  Primary Language is  English  Readiness To Learn: Receptive  Barriers To Learning: none  Preferred Learning: demonstration   Education Completed: disease/condition, further treatment/follow-up and treatment/procedure   Teaching Method: verbal and demonstration   Person Taught: patient   Evaluation Of Learning: verbalized/demonstrated understanding      Active Problems    1  Abnormal finding on urinalysis (791 9) (R82 90)   2  Abnormal kidney function study (794 4) (R94 4)   3  Abscess of face (682 0) (L02 01)   4  Atrial fibrillation (427 31) (I48 91)   5  Broken tooth with complication (933 87) (P99  5XXA)   6  C  difficile colitis (008 45) (A04 7)   7  Chronic pain syndrome (338 4) (G89 4)   8  Chronic skin ulcer (707 9) (L98 499)   9  Cigarette smoker (305 1) (F17 210)   10  Clostridium difficile infection (041 84) (B96 89)   11  Colostomy in place (V44 3) (Z93 3)   12  Cracked nails (703 8) (L60 3)   13  Decubitus ulcer of left hip, unstageable (707 04,707 25) (L89 220)   14  Decubitus ulcer of lower back, stage 3 (707 03,707 23) (L89 103)   15  Decubitus ulcer of right ischial area, stage III (534 96,619 68) (L89 313)   16  Diabetes mellitus (250 00) (E11 9)   17   Diabetic eye exam (V72 0,250 00) (E11 9,Z01 00)   18  Diabetic gastroparesis (250 60,536 3) (E11 43,K31 84)   19  Diabetic gastroparesis associated with type 1 diabetes mellitus (250 61,536 3)    (E10 43,K31 84)   20  Diarrhea of presumed infectious origin (009 3) (A09)   21  Dry skin (701 1) (L85 3)   22  DVT (deep venous thrombosis) (453 40) (I82 409)   23  Dysuria (788 1) (R30 0)   24  Encounter for diabetic foot exam (250 00) (E11 9)   25  GERD (gastroesophageal reflux disease) (530 81) (K21 9)   26  Infected sebaceous cyst (706 2) (L72 3,L08 9)   27  Insomnia (780 52) (G47 00)   28  Iron deficiency anemia (280 9) (D50 9)   29  Leg muscle spasm (728 85) (M62 838)   30  Lesion of subcutaneous tissue (709 9) (L98 9)   31  Loose stools (787 7) (R19 5)   32  Neurogenic bladder (596 54) (N31 9)   33  Open wound of back, right, subsequent encounter (V58 89,876 0) (S21 201D)   34  Open wound of right lower extremity, initial encounter (891 0) (S81 801A)   35  Open wound of right lower extremity, subsequent encounter (V58 89,891 0) (S81 801D)   36  Oral thrush (112 0) (B37 0)   37  Paraplegic spinal paralysis (344 1) (G82 20)   38  Preop examination (V72 84) (Z01 818)   39  Prophylactic antibiotic (V58 62) (Z79 2)   40  Pustular folliculitis (729 6) (E87 15)   41  Screening for depression (V79 0) (Z13 89)   42  Sebaceous cyst (706 2) (L72 3)   43  Status post below knee amputation of right lower extremity (V49 75) (Z89 511)   44  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)   45  Type 1 diabetes mellitus with sensory neuropathy (250 61,357 2) (E10 40)   46  Urinary tract infection (599 0) (N39 0)   47  Urinary tract infection associated with catheterization of urinary tract, unspecified    indwelling urinary catheter type, initial encounter (996 64,599 0) (T83 511A,N39 0)   48  UTI (urinary tract infection) (599 0) (N39 0)   49  Vitamin B deficiency (266 9) (E53 9)   50  Vitamin C deficiency (267) (E54)   51   Vitamin D deficiency (268 9) (E55 9)    Past Medical History    The active problems and past medical history were reviewed and updated today  Surgical History    1  History of Amputation Of Leg Below Knee   2  History of Back Surgery   3  History of Bladder Surgery   4  History of Surgical Delay Of Flap At Trunk    The surgical history was reviewed and updated today  Family History    1  Family history of Hypertension    2  No pertinent family history    The family history was reviewed and updated today  Social History    · Alcohol use   · Cigarette smoker (305 1) (F17 210)   · Current every day smoker (305 1) (F17 200)  The social history was reviewed and is unchanged  Current Meds   1  Alcohol Swabs 70 % Pad; USE 3 TIMES A DAY; Therapy: 74BZN2696 to (Evaluate:06Cyt8803)  Requested for: 10VZV1619; Last   Rx:06Jan2016 Ordered   2  Ammonium Lactate 12 % External Cream; APPLY  AND RUB  IN A THIN FILM TO   AFFECTED AREAS TWICE DAILY  (AM AND PM); Therapy: 96Jvy1292 to (Last Rx:92Kkb6138)  Requested for: 63Yrl6275 Ordered   3  Baclofen 20 MG Oral Tablet; Take 1 tablet twice daily; Therapy: 08UUG6560 to (Evaluate:64Ukr8774)  Requested for: 91Ztr6169; Last   Rx:84Ifi0427 Ordered   4  Eda Contour Test In [employer]; TEST 4 TIMES A DAY; Therapy: 22KRP9385 to (Last Rx:25Lxp8431)  Requested for: 04Gqa8556 Ordered   5  Clindamycin Phosphate 1 % External Gel; APPLY AND GENTLY MASSAGE INTO   AFFECTED AREA(S) ONCE DAILY; Therapy: 06VAL4813 to (Evaluate:20Mar2017)  Requested for: 39XZE7056; Last   Rx:19Jan2017 Ordered   6  [employer] Vitamin B-12 1000 MCG Oral Tablet; take 1 capsule by mouth daily; Therapy: 01IEG3928 to (Evaluate:33Pkl5422)  Requested for: 85TTG0356; Last   Rx:03Jan2017 Ordered   7  Dakins (1/2 strength) 0 2-0 25 % External Solution; USE AS DIRECTED; Therapy: 91DVA6404 to (Last Rx:10Mar2017)  Requested for: 11UOI3134 Ordered   8  FerrouSul 325 (65 Fe) MG Oral Tablet; TAKE 1 TABLET 3 TIMES DAILY;    Therapy: 58EXX8343 to Jeffrey Blackburn)  Requested for: 62QHS0560; Last   Rx:18Rkl3726 Ordered   9  Glucerna Oral Liquid; Drink 2 cans a day; Therapy: 17MRQ0872 to (Evaluate:15Xyj4174); Last NE:16PNP8999 Ordered   10  HumaLOG KwikPen 100 UNIT/ML Subcutaneous Solution Pen-injector; INJECT 4 UNITS    BEFORE MEALS; Therapy: 96Rro9593 to (Evaluate:07Dlo6128)  Requested for: 90Zhj9469; Last    Rx:11Xsp8980 Ordered   11  Lancets Miscellaneous; TEST BLOOD SUGAR 3 TIMES A DAY; Therapy: 18PYG7913 to (Evaluate:38Uuc2333)  Requested for: 70YWL9930; Last    Rx:06Jan2016 Ordered   12  Levemir FlexTouch 100 UNIT/ML Subcutaneous Solution Pen-injector; 14 units in the    morning and 12 units in the evening; Therapy: 77AWB1265 to (Last Rx:20Apr2016)  Requested for: 13Aao9029 Ordered   13  Lidocaine HCl - 2 % External Gel; Apply to wound(s) PRN in [address] [place] [address]    prior to debridement for pain control; Therapy: (Recorded:13Jan2017) to Recorded   14  Multi-Vitamin Oral Tablet; take one tablet by mouth daily; Therapy: 08PZF1291 to Recorded   15  Nystatin 288863 UNIT/ML Mouth/Throat Suspension; SWISH AND SWALLOW  5 ML 4    times a day  then continue for 48 hours after thrush resolves; Therapy: 21Rim8730 to (Evaluate:22Mar2017)  Requested for: 48Jmg1635; Last    Rx:26Ujf0229 Ordered   16  Nystatin 589290 UNIT/ML Mouth/Throat Suspension; Therapy: (Recorded:65Jup5805) to Recorded   17  Oxybutynin Chloride 5 MG Oral Tablet; TAKE 3 TABLET Daily; Therapy: 21AZL1709 to (Evaluate:14Jun2017)  Requested for: 25NHT3202; Last    Rx:66Gwd0541 Ordered   18  OxyCONTIN 20 MG Oral Tablet ER 12 Hour Abuse-Deterrent; TAKE 1 TABLET EVERY 12    HOURS; Therapy: 76DOL6172 to (Evaluate:52Ldw8409) Recorded   19  Pantoprazole Sodium 40 MG Oral Tablet Delayed Release; TAKE 1 TABLET DAILY; Therapy: 11LNI3269 to (Evaluate:02Oos3940)  Requested for: 58Zrz2741; Last    Rx:54Xiv1675 Ordered   20   Vancomycin HCl - 125 MG Oral Capsule; TAKE 1 CAPSULE 4 TIMES DAILY; Therapy: 77QJL8320 to (Evaluate:29Jun2017)  Requested for: 81RPW2968; Last    Rx:97Htj1294 Ordered   21  Vancomycin HCl - 125 MG Oral Capsule; Therapy: (Recorded:05Jun2017) to Recorded   22  Vitamin C 250 MG Oral Tablet; TAKE 2 TABLETS EVERY DAY; Therapy: 58SYC0955 to (Evaluate:02Jun2017)  Requested for: 59QMW1905; Last    CD:52OGG4581 Ordered   23  Vitamin C 500 MG Oral Capsule; TAKE 1 CAPSULE DAILY; Therapy: 53RJJ2450 to Recorded   24  Vitamin D3 1000 UNIT Oral Tablet; TAKE 1 TABLET DAILY; Therapy: 99VCL9144 to (Evaluate:10Sep2017)  Requested for: 87BDK8939; Last    Rx:21Lse9804 Ordered   25  Xarelto 20 MG Oral Tablet; Take 1 tablet a day with food and stop coumadin; Therapy: 75FJH3022 to (467 20 767)  Requested for: 15RDB5513; Last    Rx:03Jan2017 Ordered    The medication list was reviewed and updated today  Allergies    1  Cipro SOLN   2  Cymbalta CPEP   3  Lyrica CAPS    Vitals  Vital Signs    Recorded: 14PNY0574 03:11PM   Temperature 98 9 F, Tympanic   Heart Rate 74   Respiration 16   Systolic 165   Diastolic 72   Height 6 ft 4 in   Weight 185 lb    BMI Calculated 22 52   BSA Calculated 2 14   Pain Scale 0     Physical Exam    Wound #2 Assessment wound #2 Location:, R ischium, Care for this wound started on 9/9/2016  Wound Status: not healed  PressureUlcer Grade: Stage III  Length: 6cm x Width: 4 2cm x Depth: 1 5cm   Total: 25 2sq cm   Wound Volume: 37 8cm3       Undermining 1 6cm from 9 o'clock to 11 o'clock      Tissue type: Subcutaneous, Granulation and Slough   Color of Wound: Red - 50% and Pink - 50%   Exudate Amount: Large   Exudate Type: Serosangiunous   Odor: Malodorous   Exudate Color: dark red   Wound Edges: Intact, Rolled (epibolized) and detached edges   Periwound Skin Condition: Intact         Physician/Provider Wound #2 Exam   I agree with the nursing assessment and documentation       Wound #3 Assessment wound #3 Location:, R lower back  Care for this wound started on 9/9/2016   Wound Status: not healed  PressureUlcer Grade: Stage IV  Length: 9cm x Width: 13 5cm x Depth: 1 6cm   Total: 121 5sq cm   Wound Volume: 194 4cm3       Undermining 2 3cm from 11 o'clock to 12 o'clock   Undermining 2 2cm from 6 o'clock to 8 o'clock   Undermining 3 8cm from 1 o'clock to 3 o'clock    Tissue type: Subcutaneous, Slough, Fibrin, Eschar and SDTI   Color of Wound: Yellow - 30%, Black -, Pink - 50% and Grey - 10% 10% SDTI   Exudate Amount: Large   Exudate Type: Serosangiunous   Odor: Malodorous   Exudate Color: dark red   Wound Edges: Intact, Rolled (epibolized) and detached edges   Periwound Skin Condition: Intact   Comments: wound has deteriorated today  Physician/Provider Wound #3 Exam   I agree with the nursing assessment and documentation  Wound #5 Assessment wound #5 Location:, left posterior hip, Care for this wound started on 4/17/17  Wound Status: not healed  Pressure Ulcer Grade: Unstageable  Length: 4 2cm x Width: 2cm x Depth: 0cm   Total: 8 4sq cm   Wound Volume: 0cm3           Tissue type: Eschar and lifting at edges 100% dry brown eschar   Exudate Amount: None   Odor: None   Wound Edges: Intact   Periwound Skin Condition: Intact   Comments: wound is improving  Physician/Provider Wound #5 Exam   I agree with the nursing assessment and documentation  Wound #6 Assessment wound #6 Location:, right fibula, started on 4/17/17, Care for this wound started on  Wound Status: not healed  Pressure Ulcer Grade: Stage III  Length: 2 3cm x Width: 2 1cm x Depth: 0 1cm   Total: 4 83sq cm   Wound Volume: 0 483cm3       Undermining 0 3cm from 12 o'clock to 3 o'clock      Tissue type: Subcutaneous, Slough and pink non granulating tissue   Color of Wound: Yellow - 20% and Pink - 80%   Exudate Amount: Minimal   Exudate Type: Serosangiunous   Odor: None   Exudate Color:  Tan   Wound Edges: Intact   Periwound Skin Condition: Intact Physician/Provider Wound #6 Exam   I agree with the nursing assessment and documentation  Wound #7 Assessment wound #7 Location:, L medial foot multi, started on 5/22/17, Care for this wound started on 6-5-17  Wound Status: not healed  Pressure Ulcer Grade: Stage III  Length: 1 4cm x Width: 4cm x Depth: 0 2cm   Total: 5 6sq cm   Wound Volume: 1 12cm3           Tissue type: Subcutaneous and Slough   Color of Wound: Yellow - 60% and Pink - 40%   Exudate Amount: Minimal   Exudate Type: Serosangiunous   Exudate Color: Tan   Wound Edges: Intact and irregular   Periwound Skin Condition: Intact, Scaly, Edema   Comments: 2 areas measured  Physician/Provider Wound #7 Exam   I agree with the nursing assessment and documentation  Joshua Plascencia 1: Wound Nursing Care Plan   Problem: pressure injury to R ischium and R lower back, R fibula and left hip   Impaired Tissue Integrity related to:   Risk for Infection related to open wound:   Imbalanced Nutrition, less than body requirements related to inadequate intake and/or disease process:   Impaired Mobility related to:   Self Care Deficit related to wound dressing changes:   Risk for Unstable Blood Glucose related to increased metabolic demand required for wound healing and/or immune response to wound infection:   Risk for Fall related to criteria noted on Fall Risk Assessment:   Risk for Bleeding related to anticoagulant therapy:   Goals   Patient will achieve 100% epithelialization:  Patient will maintain skin integrity:  Patient will verbalize signs and symptoms of infection and when to notify physician or [facility]:    Patient will verbalize knowledge of and demonstrate adherence to interventions to achieve optimal nutrition required for wound healing:  Patient will not experience a fall:  Wound Nursing Care Interventions:   Provide moist wound healing:     Evaluate current medication regime for medications which may slow/impede wound healing:  Implement offloading measures (turn & reposition schedule/method, ortho shoes/boots, floating heels, crutches, specialty surfaces:  Teach and evaluate effectiveness of offloading measures:  Assess patient's nutrition on each wound care visit (including protein and fluid intake): Tomeka Guido Educate on diet and hydration required to enhance wound healing:  Assess mobility and how it may affect wound healing:  Assess patient's knowledge of diabetes management:  Complete Fall Risk Assessment upon admission to wound program and with change in condition/implement identified interventions:    Review medication record for anticoagulant therapy/notify physician if taking anticoagulants:  Other:  plan of care initiated 10/3/2016, reviewed and updated 12-23-16,1/27/17,4/3/17  Care plan updated 4/17/17, reviewed 6-5-17      Procedure      Wound #2: R ischium     Nurse Dressing Change:   Wound #2 The wound located on the R ischium  Applied 2% topical Lidocaine gel to wound/ulcer prior to debridement for pain control  Wound care rendered as per Physician/Advanced Practitioner order/plan  Order sent to Home Care  Return to [address] [place] [address] 2 weeks  Comments: [facility] to [facility],   It was noted that and 5 pieces of black sponge were removed from the wound bed  The canister contained largecc, of dark red drainage  Negative Pressure Wound Therapy Dressing Removal:  Prior to the procedure, the patient was identified using two identifiers, the general consent was signed, the proper site of procedure was identified, and a time out was taken  Patient arrived with negative pressure wound therapy dressing sealed and intact with the pressure set at 125mmHg continuous  Comments: no sticker on wound dressing upon arrival 2 pieces of black foam placed into the wound bed and 1 pieces of black sponge used as a bridge The foam dressing was sealed with clear adhesive film  Negative Pressure Wound Therapy was set at 125 mmHg as ordered  Comments: good seal obtained  CPT Code(s)   16986 VAC Application/Assess/Dsg Change > 50 sq cm    Debridement Muscle Fascia:   Wound was debrided to muscle/fascia  Prior to the procedure, the patient was identified using two identifiers, the general consent was signed, the proper site of procedure was identified, and a time out was taken  Anesthesia: Local anesthesia with 2% topical lidocaine was utilized prior to the procedure for pain control  #15 surgical blade and a curette was utilized to surgically excise devitalized tissue and/or slough through epidermis, dermis, subcutaneous tissue and into the muscle/fascia  The total sq cm excised was 25sq cm  There was minimal bleeding controlled with gentle pressure  the patient tolerated the procedure well without complication  CPT Code(s)   26810 - Excisional DebridementTo Muscle and/or Fascia; first 20 sq cm   43605 - Each Additional 20 sq cm - Excisional DebridementTo Muscle and/or Fascia  Wound #3: R lower back     Nurse Dressing Change:   Wound #2 The wound located on the R lower back  Applied 2% topical Lidocaine gel to wound/ulcer prior to debridement for pain control  Wound care rendered as per Physician/Advanced Practitioner order/plan  Order sent to Home Care  Return to [facility] 2 weeks  ,   It was noted that and 5 pieces of black sponge were removed from the wound bed  The canister contained largecc, of dark red drainage  Negative Pressure Wound Therapy Dressing Removal:  Prior to the procedure, the patient was identified using two identifiers, the general consent was signed, the proper site of procedure was identified, and a time out was taken  Patient arrived with negative pressure wound therapy dressing sealed and intact with the pressure set at 125mmHg continuous     Comments: no sticker on wound dressing upon arrival   Application of Negative Pressure Wound Therapy:   Prior to the procedure, the patient was identified using two identifiers, the general consent was signed, the proper site of procedure was identified, and a time out was taken  2 pieces of black foam placed into the wound bed and 1 pieces of black sponge used as a bridge The foam dressing was sealed with clear adhesive film  Negative Pressure Wound Therapy was set at 125 mmHg as ordered  Comments: good seal opbtained  CPT Code(s)   42011 VAC Application/Assess/Dsg Change > 50 sq cm    Debridement Muscle Fascia:   Wound was debrided to muscle/fascia  Prior to the procedure, the patient was identified using two identifiers, the general consent was signed, the proper site of procedure was identified, and a time out was taken  Anesthesia: Local anesthesia with 2% topical lidocaine was utilized prior to the procedure for pain control  #15 surgical blade and a curette was utilized to surgically excise devitalized tissue and/or slough through epidermis, dermis, subcutaneous tissue and into the muscle/fascia  The total sq cm excised was 95sq cm  There was minimal bleeding controlled with gentle pressure  the patient tolerated the procedure well without complication  CPT Code(s)   40406 - Excisional DebridementTo Muscle and/or Fascia; first 20 sq cm   69122 - Each Additional 20 sq cm - Excisional DebridementTo Muscle and/or Fascia  Wound #5: L posterior hip     Nurse Dressing Change:   Wound #5 The wound located on the L popsterior hip  Wound care rendered as per Physician/Advanced Practitioner order/plan  Order sent to Home Care  Return to [address] [place] [address] 2 weeks  Wound #6: R lateral fibula     Nurse Dressing Change:   Wound #6 The wound located on the R lateral fibula  Applied 2% topical Lidocaine gel to wound/ulcer prior to debridement for pain control  Wound care rendered as per Physician/Advanced Practitioner order/plan  Order sent to Home Care      [facility]  Debridement Muscle Fascia:   Wound was debrided to muscle/fascia  Prior to the procedure, the patient was identified using two identifiers, the general consent was signed, the proper site of procedure was identified, and a time out was taken  Anesthesia: Local anesthesia with 2% topical lidocaine was utilized prior to the procedure for pain control  A curette was utilized to surgically excise devitalized tissue and/or slough through epidermis, dermis, subcutaneous tissue and into the muscle/fascia  The total sq cm excised was 4sq cm  There was minimal bleeding controlled with gentle pressure  the patient tolerated the procedure well without complication  CPT Code(s)   97247 - Excisional DebridementTo Muscle and/or Fascia; first 20 sq cm  Wound #7: L medial foot multi     Nurse Dressing Change:   Wound #7 The wound located on the L medial foot multi  Applied 2% topical Lidocaine gel to wound/ulcer prior to debridement for pain control  Wound care rendered as per Physician/Advanced Practitioner order/plan  Order sent to Home Care  Return to [address] [place] [address] 2 weeks        Future Appointments    Date/Time Provider Specialty Site   06/19/2017 02:00 PM Chelsea Wiseman   06/15/2017 11:00 AM Elena Merino MD Gastroenterology Adult [address]   07/20/2017 02:30 PM JUDY Rodriguez Johnnie   Electronically signed by : Ranulfo Saavedra MD; Jun 14 2017 11:11AM EST                       (Author)

## 2018-01-23 NOTE — PROGRESS NOTES
Assessment    1  Oral thrush (112 0) (B37 0)   2  Decubitus ulcer of lower back, stage 3 (707 03,707 23) (L89 103)   3  Decubitus ulcer of right ischial area, stage III (988 46,780 90) (L89 313)   4  Paraplegic spinal paralysis (344 1) (G82 20)    Plan  Oral thrush    · Nystatin 304973 UNIT/ML Mouth/Throat Suspension; SWISH AND SWALLOW  5 ML  4 times a day  then continue for 48 hours after thrush resolves   Rx By: Christina Tate; Dispense: 30 Days ; #:600 ML; Refill: 0; For: Oral thrush; REID = N; Verified Transmission to [facility]/PHARM[facility]Y #[id number] Last Updated By: System, [employer]; 2/20/2017 3:45:24 PM    Wound Care Orders/Instructions    Wound Identification and Instructions   Wound #1: right shoulder- healed 10/3/16    Wound #2: right ischium    Wound Care Instructions  Discussed with Patient/Caregiver  Aster Bliss  Dressing Type: Negative Pressure Wound Therapy Dressing  Wash with mild soap and water, normal saline, wound cleanser  As specified, use: NSS  Apply specified dressing to wound base/bed  To periwound apply: Skin prep barrier  Dressing change frequency: Three times per week, M-W-Fto-- ROHO cushion  Pressure redistribution: Group II mattress, Clinitron Bed, turn side to side, no back  Comments/Other:   Apply 2% lidocaine gel to wounds prior to debridement  NPWT @ 125 mmHg continous   Wound #3: right lower back   Wound Care Instructions  Discussed with Patient/Caregiver  Aster Bliss  Dressing Type: Negative Pressure Wound Therapy Dressing  Wash with mild soap and water, normal saline, wound cleanser  As specified, use: NSS  Apply specified dressing to wound base/bed  To periwound apply: Skin prep barrier  Dressing change frequency:  Three times per weekto-- ROHO cushion  Pressure redistribution: Group II mattress, Cllinitron Bed, turn side to side, no back  Comments/Other:   Apply 2% lidocaine gel to wounds prior to debridement  NPWT @ 125 mmHg continous   Wound #4: R temple (HEALED 1-13-17)      Wound Goals  Wound Goals:   Healing Goals:   Fair healing potential secondary to moderate comorbid conditions  Wound base will be 25%    Patient will achieve appropriate pressure redistribution for wound prevention       Discussion/Summary    Mr Reji Reid Is a 59-year-old gentleman with a significant history of Paraplegia is here for long-term follow-up of his decubitus ulcers  He had a recent hospitalization at [facility] but is currently back at home  The right ischial wound is improving with a very good base of granulation tissue  The lower back wound has increased in size significantly with some necrotic tissue that was debrided  The quality of tissue is deteriorating  The patient states that his bed has a lump in the middle right at his lower back as the upper mattress and the bottom mattress are separate and the connection is in the middle  We will have someone evaluate his bed as this appears to be an ongoing source of pressure causing deterioration of his wound  Chief Complaint  Follow up for wound right ischium and lower back      History of Present Illness    Wound Identification HPI   Wound #1: right shoulder- healed 10/3/16    Wound #2: right ischium    Wound #3: right lower back    Wound #4: R temple (HEALED 1-13-17)          The patient came to Wound Care via wheelchair  The patient is being seen for a follow-up with MD at the [address] [place] [address]  A secondary verification process was completed  Orientation: oriented to person, oriented to place and oriented to time  Blood Glucose:  did not take today, yesterday was 346 mg/dL as reported by patient  9/9/2016 Patient arrived with dressings intact  Copious amounts of strike through drainage is present from Right Ischial wound  Patient states that he has had this wound for over one year  Patient was in the hospital from 9/2/2016-9/3/2016 for evaluation of Right Ischial wound   Wound is also malodorous  Right Ischial and right lower back wounds presented with Alginate, 4x4,5x9 and Medfix tape  Right shoulder wound presents with packing, 4x4 and Medfix tape  While in the hospital patient had and I and D of a sebaceous cyst on his right shoulder  Right lower back wound has been open for two months  Patient is a Paraplegic and uses a ROHO cushion to offload while he is in his wheelchair  10/3/16 Patient arrived with dressings intact to right shoulder and right lower back and NPWT intact and sealed at 125mmHg continuous to right ischium  10/24/16 Pt arrived in electric wheelchair with NPWT running at 125 mmHg, dressing intact  no complaints offered    Wounds now measure larger  Patient using ROHO cushion in wheelchair and grp 2 mattress in bed  12/9/16 Pt arrived in electric wheelchair with NPWT intact running at 125 HHmg to R ischium wound and and R lower back with 150 cc of drainage in the canister    Pt reports an inpatient hospital stay, he was admitted over a month ago and stayed in the hospital for four weeks  Pt was in the hospital for Pneumonia, and urosepsis  Pt also reports a fall while in the hospital and hurt his back and currently has some back pain and is using pain medication, oxycodone, to help the pain  Pt reports having cysts on face since his hospital stay  12-23-16 arrived today with NPWT dressing intact to ischium and back wounds  NPWT @ 125 mm/Hg continuous  Patient has what is believed by patient, a cyst on the R temple which he had since his last visit here  he reports the cyst is painful and has increased in size  He reports having "chills" last night and his blood sugars have been higher and more erratic than usual  He also reports "more sediment" in his urine  The cyst was injected with 5cc 1% lidocaine and lanced by Dr Franca Amado, There was a moderate amount of purulent material expressed  1-13-17 Arrived today with NPWT dressing in place to R ischial and lower back wounds   He reports an increase in drainage in these wounds since last week and has had to change the canister with each dressing change  He reports feeling "more chills than usual" for the "last few days"  Temp today is 99 2  He reports he saw Dr Georgie Clemons who is recommending surgery and the patient is declining this treatment at this time  He reports his Clintron bed will be arriving today and is anxious to get back on it  The wound on the R temple is healed today  there is a small cyst like looking area on the R cheek today that he is requesting evaluation for 1/27/17 Patient arrives with NPWT intact on back and right hip Lower back wound increased is size and depth  Patient did receive his WAVE bed and has been sleeping in it and turning q 2 hours to reduce pressure  He has reduced the amount of milk he drinks and has been encouraged to resume the same amount  Patient has cyst on chin and cheek that have reduced in size over the last few days and no longer draining  2/10/17 Patient arrives with NPWT intact on lower and hip  New area of necrosis noted on lower back  Patient reports Wave bed is working correctly and he has tried to increase protein in his diet but unsure of the amount he is taking in daily  Amber Prows His face is clear of any cysts and he has a cleansing regime and completed antibiotics  Wound bases are pink and bleed easily  New undermining noted at lower back  2/24/17 Pt arrived in  transferred self into bed with minimal assist , Wound vac intact to Right ischium and Right lower back, with appx 200 cc of serosanguineous drainage noted  Wound is malordous , with increase in overall wound measurements noted  Pt states he has a issue with the bed, he says theres a lump right in the middle of the bed where he lays  1200- Spoke with Frances at [facility] and reported worsening wounds and that the patient feels "a bar across his back when he is in his bed"   Frances reports she will send a tech to evaluate the bed today  America Slick is aware of this and agreeable  History of Falling: Yes = 25   Secondary Diagnosis: Yes = 15   Ambulatory Aid Furniture = 30   No = 0   Gait: Impaired = 20 -- Short steps with shuffle, may have difficulty arising from chair, head down, significantly impaired balance, requiring furniture, support person, or walking aid to walk  Mental Status: Oriented to own ability = 0   Total Score: 90     > 45 = High Risk       The most recent fall occurred 30 day(s) ago and Fall during hospital stay 11/1/16, denies falls since last visit 1-13-17  Number of falls in the last year were 1  Nutrition Assessment Screening: Food intake over the last 3 months due to the loss of appetite, digestive problems, chewing or swallowing difficulties is graded as: 2 = no decrease in food intake   Weight loss during the last 3 months: 3 = no weight loss   Mobility scored as: 0 = bed or chair bound  Psychological Stress and Acute Disease Scored as: 2 = The patient has not experienced psychological stress or acute disease in the last 3 months  Neuropsychological problems scored as: 2 = no psychological problems  Body Mass Index (BMI) scored as: 2 = BMI 21 to less than 23  Nutritional Assessment Screening Score: 8 - 11 points - At risk of malnutrition  Provider Wound Care HPI: Patient is here for follow-up for his right ischial and lower back decubitus ulcers  He still has a VAC dressing on his wounds  Pain Assessment   the patient states they do not have pain  (on a scale of 0 to 10, the patient rates the pain at 0 )   Abuse And Domestic Violence Screen    Yes, the patient is safe at home  The patient states no one is hurting them  Depression And Suicide Screen  No, the patient has not had thoughts of hurting themself  No, the patient has not felt depressed in the past 7 days  Prefered Language is  [place]  Primary Language is  English  Readiness To Learn: Receptive     Barriers To Learning: none  Preferred Learning: demonstration   Education Completed: disease/condition, further treatment/follow-up and treatment/procedure   Teaching Method: verbal and demonstration   Person Taught: patient   Evaluation Of Learning: verbalized/demonstrated understanding      Active Problems    1  Abnormal finding on urinalysis (791 9) (R82 90)   2  Abnormal kidney function study (794 4) (R94 4)   3  Abscess of face (682 0) (L02 01)   4  Atrial fibrillation (427 31) (I48 91)   5  Broken tooth with complication (752 28) (S61  5XXA)   6  Chronic pain syndrome (338 4) (G89 4)   7  Chronic skin ulcer (707 9) (L98 499)   8  Cigarette smoker (305 1) (F17 210)   9  Clostridium difficile infection (041 84) (B96 89)   10  Colostomy in place (V44 3) (Z93 3)   11  Cracked nails (703 8) (L60 3)   12  Decubitus ulcer of lower back, stage 3 (707 03,707 23) (L89 103)   13  Decubitus ulcer of right ischial area, stage III (913 13,437 16) (L89 313)   14  Diabetes mellitus (250 00) (E11 9)   15  Diabetic gastroparesis (250 60,536 3) (E11 43,K31 84)   16  Diabetic gastroparesis associated with type 1 diabetes mellitus (250 61,536 3)    (E10 43,K31 84)   17  Diarrhea of presumed infectious origin (009 3) (A09)   18  Dry skin (701 1) (L85 3)   19  DVT (deep venous thrombosis) (453 40) (I82 409)   20  Dysuria (788 1) (R30 0)   21  Encounter for diabetic foot exam (250 00) (E11 9)   22  GERD (gastroesophageal reflux disease) (530 81) (K21 9)   23  Infected sebaceous cyst (706 2) (L72 3,L08 9)   24  Insomnia (780 52) (G47 00)   25  Iron deficiency anemia (280 9) (D50 9)   26  Leg muscle spasm (728 85) (M62 838)   27  Lesion of subcutaneous tissue (709 9) (L98 9)   28  Loose stools (787 7) (R19 5)   29  Neurogenic bladder (596 54) (N31 9)   30  Open wound of back, right, subsequent encounter (V58 89,876 0) (S21 201D)   31  Oral thrush (112 0) (B37 0)   32  Paraplegic spinal paralysis (344 1) (G82 20)   33   Preop examination (V72 84) (Z01 818)   34  Prophylactic antibiotic (V58 62) (Z79 2)   35  Pustular folliculitis (195 5) (Q84 65)   36  Screening for depression (V79 0) (Z13 89)   37  Sebaceous cyst (706 2) (L72 3)   38  Status post below knee amputation of right lower extremity (V49 75) (Z89 511)   39  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)   40  Type 1 diabetes mellitus with sensory neuropathy (250 61,357 2) (E10 40)   41  Urinary tract infection (599 0) (N39 0)   42  Urinary tract infection associated with catheterization of urinary tract, unspecified    indwelling urinary catheter type, initial encounter (996 64,599 0) (T83 511A,N39 0)   43  UTI (urinary tract infection) (599 0) (N39 0)   44  Vitamin B deficiency (266 9) (E53 9)   45  Vitamin C deficiency (267) (E54)   46  Vitamin D deficiency (268 9) (E55 9)    Past Medical History    The active problems and past medical history were reviewed and updated today  Surgical History    1  History of Amputation Of Leg Below Knee   2  History of Back Surgery   3  History of Bladder Surgery   4  History of Surgical Delay Of Flap At Trunk    The surgical history was reviewed and updated today  Family History    1  Family history of Hypertension    2  No pertinent family history    The family history was reviewed and updated today  Social History    · Alcohol use   · Cigarette smoker (305 1) (F17 210)   · Current every day smoker (305 1) (F17 200)  The social history was reviewed and updated today  Current Meds   1  Alcohol Swabs 70 % Pad; USE 3 TIMES A DAY; Therapy: 66OBV5592 to (Evaluate:81Jlh1481)  Requested for: 96UGQ4820; Last   Rx:06Jan2016 Ordered   2  Ammonium Lactate 12 % External Cream; APPLY  AND RUB  IN A THIN FILM TO   AFFECTED AREAS TWICE DAILY  (AM AND PM); Therapy: 65Hya8552 to (Last Rx:42Ynb3035)  Requested for: 44Mxs2849 Ordered   3  Baclofen 20 MG Oral Tablet; Take 1 tablet twice daily;    Therapy: 73FRX4637 to (Evaluate:21Fwf0139)  Requested for: 42Clt6900; Last   Rx:91Pmz6626 Ordered   4  Eda Contour Test In [employer]; TEST 4 TIMES A DAY; Therapy: 54GFY2615 to (Last Rx:33Atl9129)  Requested for: 39Jgc1284 Ordered   5  Clindamycin Phosphate 1 % External Gel; APPLY AND GENTLY MASSAGE INTO   AFFECTED AREA(S) ONCE DAILY; Therapy: 43EOX9881 to (Evaluate:20Mar2017)  Requested for: 05CGO3849; Last   Rx:19Jan2017 Ordered   6  [employer] Vitamin B-12 1000 MCG Oral Tablet; take 1 capsule by mouth daily; Therapy: 27MFD7547 to (Evaluate:67Tsn3992)  Requested for: 18EBH7303; Last   Rx:03Jan2017 Ordered   7  FerrouSul 325 (65 Fe) MG Oral Tablet; TAKE 1 TABLET 3 TIMES DAILY; Therapy: 21OGB2069 to (Yanci Black)  Requested for: 70WLJ0915; Last   Rx:40Xig1718 Ordered   8  First-Vancomycin 25 25 MG/ML Oral Solution; Take 5 mL (125mg) by mouth every 6 hours   for 14 days; Therapy: 16UVB9477 to (22 570743)  Requested for: 28WEY0020; Last   Rx:55Phm6467 Ordered   9  Glucerna Oral Liquid; Drink 2 cans a day; Therapy: 08VNE5811 to (Evaluate:47Wqy1344); Last UD:75CLT9923 Ordered   10  HumaLOG KwikPen 100 UNIT/ML Subcutaneous Solution Pen-injector; INJECT 4 UNITS    BEFORE MEALS; Therapy: 23Ybd9152 to (Evaluate:91Vtg5984)  Requested for: 93Lbs5248; Last    Rx:43Tzk0775 Ordered   11  Lancets Miscellaneous; TEST BLOOD SUGAR 3 TIMES A DAY; Therapy: 51OKV4198 to (Evaluate:31Zsi4776)  Requested for: 87DPZ9616; Last    Rx:06Jan2016 Ordered   12  Levemir FlexTouch 100 UNIT/ML Subcutaneous Solution Pen-injector; 14 units in the    morning and 12 units in the evening; Therapy: 68OAZ0005 to (Last Rx:43Qhp7486)  Requested for: 48Gcv7203 Ordered   13  Lidocaine HCl - 2 % External Gel; Apply to wound(s) PRN in [address] [place] [address]    prior to debridement for pain control; Therapy: (Recorded:13Jan2017) to Recorded   14  Multi-Vitamin Oral Tablet; take one tablet by mouth daily; Therapy: 96KHB2951 to Recorded   15   Nystatin 637561 UNIT/ML Mouth/Throat Suspension; Therapy: (Recorded:91Qic9114) to Recorded   16  Oxybutynin Chloride 5 MG Oral Tablet; TAKE 3 TABLET Daily; Therapy: 36VNW0532 to (Evaluate:14Jun2017)  Requested for: 22GJB2760; Last    Rx:33Axp0020 Ordered   17  OxyCONTIN 20 MG Oral Tablet ER 12 Hour Abuse-Deterrent; TAKE 1 TABLET EVERY 12    HOURS; Therapy: 81YSO5601 to (Evaluate:31Dlj4939) Recorded   18  Pantoprazole Sodium 40 MG Oral Tablet Delayed Release; TAKE 1 TABLET DAILY; Therapy: 00OPH5738 to (Evaluate:09Rek6440)  Requested for: 72Zkj3408; Last    Rx:46Jfb1344 Ordered   19  Penicillin V Potassium 500 MG Oral Tablet; TAKE 1 TABLET 3 TIMES DAILY UNTIL GONE;    Therapy: 76CVF6544 to (Evaluate:10Mpx5777)  Requested for: 41PYS4172; Last    Rx:79Phi3936 Ordered   20  Tetracycline HCl - 250 MG Oral Capsule; TAKE 1 CAPSULE EVERY 6 HOURS ON EMPTY    STOMACH; Therapy: 51Uya7442 to (Mary Rome)  Requested for: 75Vol7273; Last    Rx:46Xyr0974 Ordered   21  Vancomycin HCl - 125 MG Oral Capsule; Take 1 pill every 6 hours; Therapy: 08JOP7892 to (Evaluate:77Ktr3147)  Requested for: 85JYY0283; Last    Rx:14Box1886 Ordered   22  Vancomycin HCl - 125 MG Oral Capsule; take 1 pill q 6 hrs x 1 wk, take 1 pill q    8hrs x 1 wk, 1 pill q 12hrs x 1 wk, 1 pill daily x 1 wk, 1 pill QOD x 1    wk; Therapy: 32KMR1583 to (Last Rx:83Nap0543)  Requested for: 50MOM5914 Ordered   23  Vitamin C 250 MG Oral Tablet; TAKE 2 TABLETS EVERY DAY; Therapy: 12BYZ3972 to (Evaluate:02Jun2017)  Requested for: 15KDN0476; Last    DM:78XSA8093 Ordered   24  Vitamin C 500 MG Oral Capsule; TAKE 1 CAPSULE DAILY; Therapy: 29STN3051 to Recorded   25  Vitamin D3 1000 UNIT Oral Tablet; TAKE 1 TABLET DAILY; Therapy: 87WJK8520 to (Evaluate:98Eqg3837)  Requested for: 09XQT0673; Last    Rx:69Nrn3848 Ordered   26  Xarelto 20 MG Oral Tablet; Take 1 tablet a day with food and stop coumadin;     Therapy: 24UUO1810 to (Rupa Marinelli)  Requested for: 70PKP4517; Last    RU:00PVT3579 Ordered    The medication list was reviewed and updated today  Allergies    1  Cipro SOLN   2  Cymbalta CPEP   3  Lyrica CAPS    Physical Exam    Wound #2 Assessment wound #2 Location:, R ischium, Care for this wound started on 9/9/2016  Wound Status: not healed  Non Healing Surgical: Full Thickness  Length: 6cm x Width: 6cm x Depth: 1 3cm   Total: 36sq cm   Wound Volume: 46 8cm3       Undermining 1 3cm from 1 o'clock to 3 o'clock      Tissue type: Subcutaneous, Granulation and Slough   Color of Wound: Red - 70%, Yellow - 30% and Pink -   Exudate Amount: Moderate   Exudate Type: Serosangiunous   Odor: Malodorous   Exudate Color: dark red   Wound Edges: Intact, Rolled (epibolized) and detached from wound bed   Periwound Skin Condition: Intact        Physician/Provider Wound #2 Exam   I agree with the nursing assessment and documentation  Wound #3 Assessment wound #3 Location:, R lower back  Care for this wound started on 9/9/2016   Wound Status: not healed  PressureUlcer Grade: Stage IV  Non Healing Surgical: Full Thickness  Length: 7cm x Width: 10cm x Depth: 1 3cm   Total: 70sq cm   Wound Volume: 91cm3       Undermining 3cm from 1 o'clock to 8 o'clock      Tissue type: Subcutaneous, Granulation and Slough   Color of Wound: Red - 20%, Yellow - 80% and Pink - 90%   Exudate Amount: Moderate   Exudate Type: Serosangiunous   Odor: None   Exudate Color: Tan and dark red   Wound Edges: Intact and Rolled (epibolized)   Periwound Skin Condition: Intact         Physician/Provider Wound #3 Exam   I agree with the nursing assessment and documentation  Joshua Plascencia 1:    Wound Nursing Care Plan   Problem: pressure injury to R ischium and R lower back   Impaired Tissue Integrity related to:   Risk for Infection related to open wound:   Imbalanced Nutrition, less than body requirements related to inadequate intake and/or disease process:   Impaired Mobility related to: Self Care Deficit related to wound dressing changes:   Risk for Unstable Blood Glucose related to increased metabolic demand required for wound healing and/or immune response to wound infection:   Risk for Fall related to criteria noted on Fall Risk Assessment:   Risk for Bleeding related to anticoagulant therapy:   Goals   Patient will achieve 100% epithelialization:  Patient will maintain skin integrity:  Patient will verbalize signs and symptoms of infection and when to notify physician or [facility]:    Patient will verbalize knowledge of and demonstrate adherence to interventions to achieve optimal nutrition required for wound healing:  Patient will not experience a fall:  Wound Nursing Care Interventions:   Provide moist wound healing:  Evaluate current medication regime for medications which may slow/impede wound healing:  Implement offloading measures (turn & reposition schedule/method, ortho shoes/boots, floating heels, crutches, specialty surfaces:  Teach and evaluate effectiveness of offloading measures:  Assess patient's nutrition on each wound care visit (including protein and fluid intake): Magali Montano Educate on diet and hydration required to enhance wound healing:  Assess mobility and how it may affect wound healing:  Assess patient's knowledge of diabetes management:  Complete Fall Risk Assessment upon admission to wound program and with change in condition/implement identified interventions:    Review medication record for anticoagulant therapy/notify physician if taking anticoagulants:  Other:  plan of care initiated 10/3/2016, reviewed and updated 12-23-16,1/27/17      Procedure      Wound #2: R ischium     Nurse Dressing Change:   Wound #2 The wound located on the R ischium  Wound care rendered as per Physician/Advanced Practitioner order/plan  Order sent to Home Care  Return to [address] [place] [address] 2 weeks    Comments:, Applied 2% lidiocaine gel to wounds prior to debridement  [facility] to [facility]  It was noted that and 1 pieces of black sponge were removed from the wound bed  The canister contained largecc, of dark red drainage  Negative Pressure Wound Therapy Dressing Removal:  Prior to the procedure, the patient was identified using two identifiers, the general consent was signed, the proper site of procedure was identified, and a time out was taken  Patient arrived with negative pressure wound therapy dressing sealed and intact with the pressure set at 125mmHg continuous  Comments: no communication sticker, 1 piece black removed from wound and 1 piece black for bridge  Application of Negative Pressure Wound Therapy:   Prior to the procedure, the patient was identified using two identifiers, the general consent was signed, the proper site of procedure was identified, and a time out was taken  1 pieces of black foam placed into the wound bed and 1 pieces of black sponge used as a bridge The foam dressing was sealed with clear adhesive film  Negative Pressure Wound Therapy was set at 125 mmHg as ordered  Comments: good seal obtained  CPT Code(s)   36881 VAC Application/Assess/Dsg Change > 50 sq cm    Debridement Muscle Fascia:   Wound was debrided to muscle/fascia  Prior to the procedure, the patient was identified using two identifiers, the general consent was signed, the proper site of procedure was identified, and a time out was taken  Anesthesia: was utilized prior to the procedure for pain control  2% lidocaine  A curette was utilized to surgically excise devitalized tissue and/or slough through epidermis, dermis, subcutaneous tissue and into the muscle/fascia  The total sq cm excised was 33sq cm  the patient tolerated the procedure well without complication  CPT Code(s)   80319 - Excisional DebridementTo Muscle and/or Fascia; first 20 sq cm   90353 - Each Additional 20 sq cm - Excisional DebridementTo Muscle and/or Fascia     Wound #3: R lower back     Nurse Dressing Change:   Wound #3 The wound located on the R lower back  Wound care rendered as per Physician/Advanced Practitioner order/plan  Order sent to Home Care  Return to [address] [place] [address] 2 weeks  Comments:, Applied 2% lidiocaine gel to wounds prior to debridement  Orders sent to [facility],   It was noted that and 1 pieces of black sponge were removed from the wound bed  The canister contained largecc, of dark red drainage  Negative Pressure Wound Therapy Dressing Removal:  Prior to the procedure, the patient was identified using two identifiers, the general consent was signed, the proper site of procedure was identified, and a time out was taken  Patient arrived with negative pressure wound therapy dressing sealed and intact with the pressure set at 125mmHg continuous  Comments: 1 piece black foam removed from wound bed and 1 piece for bridge; no communication sticker in place  Application of Negative Pressure Wound Therapy:   Prior to the procedure, the patient was identified using two identifiers, the general consent was signed, the proper site of procedure was identified, and a time out was taken  1 pieces of black foam placed into the wound bed and 1 pieces of black sponge used as a bridge The foam dressing was sealed with clear adhesive film  Negative Pressure Wound Therapy was set at 125 mmHg as ordered  Comments: good seal obtained  CPT Code(s)   73810 VAC Application/Assess/Dsg Change > 50 sq cm    Debridement Muscle Fascia:   Wound was debrided to muscle/fascia  Prior to the procedure, the patient was identified using two identifiers, the general consent was signed, the proper site of procedure was identified, and a time out was taken  Anesthesia: was utilized prior to the procedure for pain control  2% lidocaine     A curette was utilized to surgically excise devitalized tissue and/or slough through epidermis, dermis, subcutaneous tissue and into the muscle/fascia  The total sq cm excised was 50sq cm  There was minimal bleeding controlled with gentle pressure  the patient tolerated the procedure well without complication  CPT Code(s)   95805 - Excisional DebridementTo Muscle and/or Fascia; first 20 sq cm   30645 - Each Additional 20 sq cm - Excisional DebridementTo Muscle and/or Fascia        Future Appointments    Date/Time Provider Specialty Site   03/10/2017 11:15 AM Hiral Farooq MD General Surgery [facility]   03/08/2017 02:30 [facility]     Signatures   Electronically signed by : Haresh Basilio MD; Feb 27 2017  2:17PM EST                       (Author)

## 2018-01-23 NOTE — MISCELLANEOUS
Please contact our office at your convenience  It is important that we speak to you  REGARDING:   Porfavor contacte a  nuestra oficina  Es muy importante que hablemos con usted   SOBRE:         X Scheduling an  Appointment/ Programar siena Kath          Rescheduling an Appointment/ Re-programar siena Kath     Cancelling an Appointment/Cancelar smith Kath     Your Lab/ X-ray Results/Resultados         X Updating your Phone number or Address/ Actualizar smith [employer]          X  Verify your Primary Providor/ Verificar smith Proveedor primario     Other/Otro:    Please Contact Our Office to Schedule Your Appointment  It is Very Important That  You See Your Provider for your  Routine Medical Care  If you are seeing a New Providor,  Please Contact our Office so we can update our Records  Thank You  Comuníquese con nuestra oficina para programar smith kath  Es Muy Importante que usted robin smith proveedor para smith    [address] [facility] Floor de [place]  Si usted esta viendo un Proveedor  [place], [place] con Kern Jaswinder oficina para actualizar   nuestro registros  Umu

## 2018-01-24 NOTE — MISCELLANEOUS
Assessment   1  Chronic skin ulcer (707 9) (L98 499)1   2  Atrial fibrillation (427 31) (I48 91)1   3  Iron deficiency anemia (280 9) (D50 9)1      1 Amended By: Agustín Santiago; Apr 05 2017 2:31 PM EST    Discussion/Summary  Discussion Summary:   Continue follow-up with wound care, gastroenterology, endocrinology  Discussed with him decreasing unsure of the foods in his diet  He does currently have a lemonade with him and chicken nuggets  We discussed trying to avoid carbohydrate rich foods  He was started on sliding scale recently  I suggest that we repeat blood work in another 2 weeks to check his CBC  He is taking iron once a day was instructed to take it 3 times a day as it was prescribed  1    Medication SE Review and Pt Understands Tx: Possible side effects of new medications were reviewed with the patient/guardian today1  The treatment plan was reviewed with the patient/guardian  The patient/guardian understands and agrees with the treatment plan1    Self Referrals:   Self Referrals: No1        1 Amended By: Agustín Santiago; Apr 05 2017 2:32 PM EST    Chief Complaint  Chief Complaint Free Text Note Form: [facility] FU for sepsis  1        1 Amended By: Laurann Alpers; Apr 05 2017 2:05 PM EST    History of Present Illness  TCM Communication [facility]: The patient is being contacted for follow-up after hospitalization  He was hospitalized at [facility]  The date of admission: 3/17/17, date of discharge: 3/30/17  Diagnosis: SEPSIS  He was discharged to home  Medications were not reviewed today  He scheduled a follow up appointment  Follow-up appointments with other specialists: PCP-4/05/17 2PM  The patient is currently asymptomatic  Communication performed and completed by Johnny Ybarra   HPI: ADVISED PT TO BRING UPDATED MED LIST  NO QUESTIONS OR CONCERNS AT THIS TIME  70-year-old male admitted to [place] [address] for SIRS from decubitus ulcers   He was found to be anemic and was worn while in the hospital  He has slight pain over sacral ulcer only  He had positive stool and a colonoscopy was performed  Colonoscopy did have poor prep but nothing was found when done  Hgb decreased to 5  They had recommended blood transfussion  He is Muslim and refused blood  He denies any fatigue  No black stool or blood in stool  NO heartburn or acid  He is only taking iron 1 time a day  He has GI f/u  He did see endocrine yesterday and they started him on sliding scale  He does eat frequently and does not follow a diabetic diet  1        1 Amended By: Ese Peoples; Apr 05 2017 2:20 PM EST    Review of Systems  Complete-Male:   Constitutional:1  No fever or chills, feels well, no tiredness, no recent weight gain or weight loss1   Eyes:1  No complaints of eye pain, no red eyes, no discharge from eyes, no itchy eyes1   ENT:1  no complaints of earache, no hearing loss, no nosebleeds, no nasal discharge, no sore throat, no hoarseness1   Cardiovascular: 1  No complaints of slow heart rate, no fast heart rate, no chest pain, no palpitations, no leg claudication, no lower extremity1   Respiratory:1  No complaints of shortness of breath, no wheezing, no cough, no SOB on exertion, no orthopnea or PND1   Gastrointestinal:1  No complaints of abdominal pain, no constipation, no nausea or vomiting, no diarrhea or bloody stools1   Genitourinary:1  No complaints of dysuria, no incontinence, no hesitancy, no nocturia, no genital lesion, no testicular pain1   Musculoskeletal:1  back pain1 , but No complaints of arthralgia, no myalgias, no joint swelling or stiffness, no limb pain or swelling1   Integumentary:1  skin lesion1 , but No complaints of skin rash or skin lesions, no itching, no skin wound, no dry skin1   Neurological:1  No compliants of headache, no confusion, no convulsions, no numbness or tingling, no dizziness or fainting, no limb weakness, no difficulty walking1      Psychiatric:1  Is not suicidal, no sleep disturbances, no anxiety or depression, no change in personality, no emotional problems1   Endocrine:1  No complaints of proptosis, no hot flashes, no muscle weakness, no erectile dysfunction, no deepening of the voice, no feelings of weakness1   Hematologic/Lymphatic:1  No complaints of swollen glands, no swollen glands in the neck, does not bleed easily, no easy bruising1         1 Amended By: Krystina Hernadez; Apr 05 2017 2:30 PM EST    Active Problems   1  Abnormal finding on urinalysis (791 9) (R82 90)  2  Abnormal kidney function study (794 4) (R94 4)  3  Abscess of face (682 0) (L02 01)  4  Atrial fibrillation (427 31) (I48 91)  5  Broken tooth with complication (617 62) (Y07  5XXA)  6  Chronic pain syndrome (338 4) (G89 4)  7  Chronic skin ulcer (707 9) (L98 499)  8  Cigarette smoker (305 1) (F17 210)  9  Clostridium difficile infection (041 84) (B96 89)  10  Colostomy in place (V44 3) (Z93 3)  11  Cracked nails (703 8) (L60 3)  12  Decubitus ulcer of lower back, stage 3 (707 03,707 23) (L89 103)  13  Decubitus ulcer of right ischial area, stage III (637 95,914 52) (L89 313)  14  Diabetes mellitus (250 00) (E11 9)  15  Diabetic gastroparesis (250 60,536 3) (E11 43,K31 84)  16  Diabetic gastroparesis associated with type 1 diabetes mellitus (250 61,536 3)    (E10 43,K31 84)  17  Diarrhea of presumed infectious origin (009 3) (A09)  18  Dry skin (701 1) (L85 3)  19  DVT (deep venous thrombosis) (453 40) (I82 409)  20  Dysuria (788 1) (R30 0)  21  Encounter for diabetic foot exam (250 00) (E11 9)  22  GERD (gastroesophageal reflux disease) (530 81) (K21 9)  23  Infected sebaceous cyst (706 2) (L72 3,L08 9)  24  Insomnia (780 52) (G47 00)  25  Iron deficiency anemia (280 9) (D50 9)  26  Leg muscle spasm (728 85) (M62 838)  27  Lesion of subcutaneous tissue (709 9) (L98 9)  28  Loose stools (787 7) (R19 5)  29  Neurogenic bladder (596 54) (N31 9)  30   Open wound of back, right, subsequent encounter (V58 89,876 0) (S21 201D)  31  Oral thrush (112 0) (B37 0)  32  Paraplegic spinal paralysis (344 1) (G82 20)  33  Preop examination (V72 84) (Z01 818)  34  Prophylactic antibiotic (V58 62) (Z79 2)  35  Pustular folliculitis (632 9) (Q49 06)  36  Screening for depression (V79 0) (Z13 89)  37  Sebaceous cyst (706 2) (L72 3)  38  Status post below knee amputation of right lower extremity (V49 75) (Z89 511)  39  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)  40  Type 1 diabetes mellitus with sensory neuropathy (250 61,357 2) (E10 40)  41  Urinary tract infection (599 0) (N39 0)  42  Urinary tract infection associated with catheterization of urinary tract, unspecified    indwelling urinary catheter type, initial encounter (996 64,599 0) (T83 511A,N39 0)  43  UTI (urinary tract infection) (599 0) (N39 0)  44  Vitamin B deficiency (266 9) (E53 9)  45  Vitamin C deficiency (267) (E54)  46  Vitamin D deficiency (268 9) (E55 9)    Surgical History   1  History of Amputation Of Leg Below Knee  2  History of Back Surgery  3  History of Bladder Surgery  4  History of Surgical Delay Of Flap At Trunk  Surgical History Reviewed: The surgical history was reviewed and updated today  1        1 Amended By: Lexie Coto; Apr 05 2017 2:30 PM EST    Family History  Mother   1  Family history of Hypertension  Father   2  No pertinent family history  Family History Reviewed: The family history was reviewed and updated today  1        1 Amended By: Lexie Coto; Apr 05 2017 2:30 PM EST    Social History    · Alcohol use   · Cigarette smoker (305 1) (F17 210)   · Current every day smoker (305 1) (F17 200)    Social History Reviewed: The social history was reviewed and updated today  1        1 Amended By: Lexie Coto; Apr 05 2017 2:30 PM EST    Current Meds  1  Alcohol Swabs 70 % Pad; USE 3 TIMES A DAY; Therapy: 16NCE2862 to (Evaluate:42Enj8546)  Requested for: 28MNU1393; Last   Rx:06Jan2016 Ordered  2   Ammonium Lactate 12 % External Cream; APPLY  AND RUB IN A THIN FILM TO   AFFECTED AREAS TWICE DAILY  (AM AND PM); Therapy: 20Ztm3117 to (Last Rx:22Wst5525)  Requested for: 66Sge5682 Ordered  3  Baclofen 20 MG Oral Tablet; Take 1 tablet twice daily; Therapy: 84BEW8265 to (Evaluate:49Bjf3458)  Requested for: 12Ktm0961; Last   Rx:77Ixm8608 Ordered  4  Eda Contour Test In [employer]; TEST 4 TIMES A DAY; Therapy: 84GJO2471 to (Last Rx:80Xxm0256)  Requested for: 32Dxm3512 Ordered  5  Clindamycin Phosphate 1 % External Gel; APPLY AND GENTLY MASSAGE INTO   AFFECTED AREA(S) ONCE DAILY; Therapy: 83CAB8639 to (Evaluate:20Mar2017)  Requested for: 30YAN8277; Last   Rx:19Jan2017 Ordered  6  [employer] Vitamin B-12 1000 MCG Oral Tablet; take 1 capsule by mouth daily; Therapy: 46BAK3290 to (Evaluate:11Esj2521)  Requested for: 21IBV7864; Last   Rx:03Jan2017 Ordered  7  Dakins (1/2 strength) 0 2-0 25 % External Solution; USE AS DIRECTED; Therapy: 65HPR4431 to (Last Rx:10Mar2017)  Requested for: 14XWZ5686 Ordered  8  FerrouSul 325 (65 Fe) MG Oral Tablet; TAKE 1 TABLET 3 TIMES DAILY; Therapy: 66EGE6469 to (Evelina Elizondo)  Requested for: 35LOK8127; Last   Rx:47Fbr6576 Ordered  9  First-Vancomycin 25 25 MG/ML Oral Solution; Take 5 mL (125mg) by mouth every 6 hours   for 14 days; Therapy: 83LHO4442 to (77 873 135)  Requested for: 99MEU7827; Last   Rx:39Rcd3449 Ordered  10  Glucerna Oral Liquid; Drink 2 cans a day; Therapy: 64NXO9236 to (Evaluate:85Soo3615); Last Rx:35Zoq4939 Ordered  11  HumaLOG KwikPen 100 UNIT/ML Subcutaneous Solution Pen-injector; INJECT 4 UNITS    BEFORE MEALS; Therapy: 61Rfc7977 to (Evaluate:09Dvs3159)  Requested for: 66Aqq2414; Last    Rx:80Rhc7033 Ordered  12  Lancets Miscellaneous; TEST BLOOD SUGAR 3 TIMES A DAY; Therapy: 11LOE8544 to (Evaluate:69Duv5137)  Requested for: 37MXS9777; Last    Rx:06Jan2016 Ordered  13   Levemir FlexTouch 100 UNIT/ML Subcutaneous Solution Pen-injector; 14 units in the    morning and 12 units in the evening; Therapy: 81IUJ9935 to (Last Rx:20Apr2016)  Requested for: 27Nxh0806 Ordered  14  Lidocaine HCl - 2 % External Gel; Apply to wound(s) PRN in [facility]    prior to debridement for pain control; Therapy: (Recorded:13Jan2017) to Recorded  15  Multi-Vitamin Oral Tablet; take one tablet by mouth daily; Therapy: 09DUK8003 to Recorded  16  Nystatin 507362 UNIT/ML Mouth/Throat Suspension; SWISH AND SWALLOW  5 ML 4    times a day  then continue for 48 hours after thrush resolves; Therapy: 42Hpt5343 to (Evaluate:22Mar2017)  Requested for: 44Jhq1963; Last    Rx:16Uik0283 Ordered  17  Nystatin 554938 UNIT/ML Mouth/Throat Suspension; Therapy: (Recorded:82Ycg9720) to Recorded  18  Oxybutynin Chloride 5 MG Oral Tablet; TAKE 3 TABLET Daily; Therapy: 06LNL5203 to (Evaluate:14Jun2017)  Requested for: 00MLF8712; Last    Rx:38Tfz9313 Ordered  19  OxyCONTIN 20 MG Oral Tablet ER 12 Hour Abuse-Deterrent; TAKE 1 TABLET EVERY 12    HOURS; Therapy: 23AJN7228 to (Evaluate:37Lqr2804) Recorded  20  Pantoprazole Sodium 40 MG Oral Tablet Delayed Release; TAKE 1 TABLET DAILY; Therapy: 22TYP5221 to (Evaluate:89Llf9976)  Requested for: 03Sar0849; Last    Rx:93Ymd1848 Ordered  21  Penicillin V Potassium 500 MG Oral Tablet; TAKE 1 TABLET 3 TIMES DAILY UNTIL GONE;    Therapy: 11RVE6999 to (Evaluate:41Jql4492)  Requested for: 35KFJ1959; Last    Rx:14Uyf2119 Ordered  22  Tetracycline HCl - 250 MG Oral Capsule; TAKE 1 CAPSULE EVERY 6 HOURS ON EMPTY    STOMACH; Therapy: 28Xhp0579 to (Usama Ureña)  Requested for: 00Cpp2335; Last    Rx:23Tsd9987 Ordered  23  Vancomycin HCl - 125 MG Oral Capsule (Vancocin HCl); take 1 pill q 6 hrs x 1 wk, take 1    pill q 8hrs x 1 wk, 1 pill q 12hrs x 1 wk, 1 pill daily x 1 wk, 1 pill    QOD x 1 wk; Therapy: 25SSA7737 to (Last Rx:30Efg3940)  Requested for: 61TGG1581 Ordered  24  Vancomycin HCl - 125 MG Oral Capsule; Take 1 pill every 6 hours;     Therapy: 02VGL0240 to (Evaluate:89Cbw5164)  Requested for: 79TOC8913; Last    Rx:28Wjw4654 Ordered  25  Vitamin C 250 MG Oral Tablet; TAKE 2 TABLETS EVERY DAY; Therapy: 97HAN3019 to (Evaluate:44Pqs8307)  Requested for: 13QNA5290; Last    VU:29ECN6300 Ordered  26  Vitamin C 500 MG Oral Capsule; TAKE 1 CAPSULE DAILY; Therapy: 08UPL8277 to Recorded  27  Vitamin D3 1000 UNIT Oral Tablet; TAKE 1 TABLET DAILY; Therapy: 11UQA2465 to (Evaluate:91Sqi4414)  Requested for: 97VVD6451; Last    Rx:82Mhw8368 Ordered  28  Xarelto 20 MG Oral Tablet; Take 1 tablet a day with food and stop coumadin; Therapy: 52GDF2651 to (Chelsey Larson)  Requested for: 89WJP1389; Last    NU:39YQM0386 Ordered  Medication List Reviewed: The medication list was reviewed and updated today  1        1 Amended By: Giovanna Molina; Apr 05 2017 2:30 PM EST    Allergies   1  Cipro SOLN  2  Cymbalta CPEP  3  Lyrica CAPS    Physical Exam    Constitutional1    General appearance: No acute distress, well appearing and well nourished  1    Eyes1    Conjunctiva and lids: No swelling, erythema, or discharge  1    Ears, Nose, Mouth, and Throat1    External inspection of ears and nose: Normal 1    Pulmonary1    Respiratory effort: No increased work of breathing or signs of respiratory distress  1    Auscultation of lungs: Clear to auscultation, equal breath sounds bilaterally, no wheezes, no rales, no rhonci  1    Cardiovascular1    Palpation of heart: Normal PMI, no thrills  1    Auscultation of heart: Normal rate and rhythm, normal S1 and S2, without murmurs  1    Abdomen1    Abdomen: Non-tender, no masses  1    Liver and spleen: No hepatomegaly or splenomegaly  Frederick Crews 761   Psychiatric1    Orientation to person, place and time: Normal 1    Mood and affect: Normal 1          1 Amended By: Giovanna Molina;  Apr 05 2017 2:30 PM EST    Future Appointments    Date/Time Provider Specialty Site   05/16/2017 11:00 AM Nelson Saldivar MD Gastroenterology Adult  [facility] GASTROENTEROLOGY [place]   04/05/2017 02:00 PM Christina Tate, [address] [facility]     Signatures   Electronically signed by : HAJA Craig; Apr  3 2017 12:50PM EST                          Electronically signed by : JUDY Zamarripa ; Apr 4 2017 12:35PM EST                          Electronically signed by : HAJA Craig;  Apr 5 2017  2:33PM EST                       (Author)    Electronically signed by : JUDY Zamarripa ; Apr 5 2017  3:42PM EST

## 2018-02-06 LAB — HCV AB SER-ACNC: NEGATIVE

## 2018-04-23 ENCOUNTER — TELEPHONE (OUTPATIENT)
Dept: FAMILY MEDICINE CLINIC | Facility: CLINIC | Age: 38
End: 2018-04-23

## 2018-05-01 ENCOUNTER — TELEPHONE (OUTPATIENT)
Dept: FAMILY MEDICINE CLINIC | Facility: CLINIC | Age: 38
End: 2018-05-01

## 2018-05-03 NOTE — TELEPHONE ENCOUNTER
I contact main number and talked to pt's mom Heena Crandall and she states pt was back and for from [address] and [place] [address] reason of admission last time at [facility] was wound infection  and pt also got blood transfusion  Pt was d/c from [facility] last month and states x week  [facility],(Pt is bringing d/c papers), Kira is schedule for 05/15 1 pm with Christina DEGROOT

## 2018-05-03 NOTE — TELEPHONE ENCOUNTER
He needs to get it from his hematologist Dr Morteza Shelley  He is with [facility]  We also havent seen him in more than 6 months and wouldn't even have the right documentation for a prior auth  He should schedule follow up here also

## 2018-05-03 NOTE — TELEPHONE ENCOUNTER
I spoke again with Scotty Manzano and she states she left message for Nephrology office waiting on their call

## 2018-05-10 ENCOUNTER — APPOINTMENT (OUTPATIENT)
Dept: WOUND CARE | Facility: HOSPITAL | Age: 38
End: 2018-05-10
Payer: MEDICARE

## 2018-05-10 PROCEDURE — 11045 DBRDMT SUBQ TISS EACH ADDL: CPT | Performed by: FAMILY MEDICINE

## 2018-05-10 PROCEDURE — 11042 DBRDMT SUBQ TIS 1ST 20SQCM/<: CPT | Performed by: FAMILY MEDICINE

## 2018-05-10 PROCEDURE — 99215 OFFICE O/P EST HI 40 MIN: CPT | Performed by: FAMILY MEDICINE

## 2018-05-15 ENCOUNTER — OFFICE VISIT (OUTPATIENT)
Dept: FAMILY MEDICINE CLINIC | Facility: CLINIC | Age: 38
End: 2018-05-15
Payer: MEDICARE

## 2018-05-15 VITALS
HEART RATE: 100 BPM | SYSTOLIC BLOOD PRESSURE: 100 MMHG | DIASTOLIC BLOOD PRESSURE: 60 MMHG | TEMPERATURE: 98.7 F | OXYGEN SATURATION: 100 % | RESPIRATION RATE: 16 BRPM

## 2018-05-15 DIAGNOSIS — N48.21 PENILE ABSCESS: ICD-10-CM

## 2018-05-15 DIAGNOSIS — E10.22 TYPE 1 DIABETES MELLITUS WITH CHRONIC KIDNEY DISEASE ON CHRONIC DIALYSIS (HCC): ICD-10-CM

## 2018-05-15 DIAGNOSIS — N18.6 TYPE 1 DIABETES MELLITUS WITH CHRONIC KIDNEY DISEASE ON CHRONIC DIALYSIS (HCC): ICD-10-CM

## 2018-05-15 DIAGNOSIS — E16.2 HYPOGLYCEMIA: Primary | ICD-10-CM

## 2018-05-15 DIAGNOSIS — G82.20 PARAPLEGIA (HCC): Chronic | ICD-10-CM

## 2018-05-15 DIAGNOSIS — Z99.2 TYPE 1 DIABETES MELLITUS WITH CHRONIC KIDNEY DISEASE ON CHRONIC DIALYSIS (HCC): ICD-10-CM

## 2018-05-15 DIAGNOSIS — L98.429 ULCER OF SACRAL REGION, STAGE 4 (HCC): ICD-10-CM

## 2018-05-15 PROBLEM — N17.9 AKI (ACUTE KIDNEY INJURY) (HCC): Status: RESOLVED | Noted: 2017-07-03 | Resolved: 2018-05-15

## 2018-05-15 LAB
SL AMB POCT GLUCOSE BLD: 52
SL AMB POCT GLUCOSE BLD: 55
SL AMB POCT GLUCOSE BLD: 68
SL AMB POCT HEMOGLOBIN AIC: 8

## 2018-05-15 PROCEDURE — 83036 HEMOGLOBIN GLYCOSYLATED A1C: CPT | Performed by: PHYSICIAN ASSISTANT

## 2018-05-15 PROCEDURE — 99214 OFFICE O/P EST MOD 30 MIN: CPT | Performed by: PHYSICIAN ASSISTANT

## 2018-05-15 PROCEDURE — 82948 REAGENT STRIP/BLOOD GLUCOSE: CPT | Performed by: PHYSICIAN ASSISTANT

## 2018-05-15 RX ORDER — DICYCLOMINE HYDROCHLORIDE 10 MG/1
CAPSULE ORAL
COMMUNITY
Start: 2018-04-06 | End: 2018-12-04 | Stop reason: SDUPTHER

## 2018-05-15 RX ORDER — CARVEDILOL 6.25 MG/1
6.25 TABLET ORAL 2 TIMES DAILY
Refills: 0 | COMMUNITY
Start: 2018-04-26 | End: 2018-05-28 | Stop reason: SDUPTHER

## 2018-05-15 RX ORDER — MULTIVITAMIN
1 TABLET ORAL DAILY
COMMUNITY
Start: 2014-11-18 | End: 2018-12-04 | Stop reason: SDUPTHER

## 2018-05-15 RX ORDER — ZINC SULFATE 50(220)MG
220 CAPSULE ORAL DAILY
Qty: 30 CAPSULE | Refills: 5 | Status: SHIPPED | OUTPATIENT
Start: 2018-05-15 | End: 2018-11-20 | Stop reason: SDUPTHER

## 2018-05-15 RX ORDER — INSULIN ASPART 100 [IU]/ML
INJECTION, SOLUTION INTRAVENOUS; SUBCUTANEOUS
COMMUNITY
Start: 2018-04-21 | End: 2019-11-21 | Stop reason: SDUPTHER

## 2018-05-15 RX ORDER — RISPERIDONE 0.5 MG/1
0.5 TABLET, FILM COATED ORAL 2 TIMES DAILY
Refills: 0 | COMMUNITY
Start: 2018-04-26 | End: 2018-12-18 | Stop reason: SDUPTHER

## 2018-05-15 RX ORDER — OMEGA-3/DHA/EPA/FISH OIL 35-113.5MG
1000 TABLET,CHEWABLE ORAL DAILY
Refills: 5 | COMMUNITY
Start: 2018-04-11 | End: 2018-07-13 | Stop reason: SDUPTHER

## 2018-05-15 RX ORDER — ATORVASTATIN CALCIUM 20 MG/1
20 TABLET, FILM COATED ORAL
Refills: 0 | COMMUNITY
Start: 2018-04-26 | End: 2018-05-28 | Stop reason: SDUPTHER

## 2018-05-15 RX ORDER — CHOLECALCIFEROL (VITAMIN D3) 25 MCG
6 TABLET ORAL
COMMUNITY

## 2018-05-15 RX ORDER — HYDROMORPHONE HYDROCHLORIDE 2 MG/1
TABLET ORAL
Refills: 0 | COMMUNITY
Start: 2018-04-26 | End: 2018-10-09

## 2018-05-15 RX ORDER — INSULIN DETEMIR 100 [IU]/ML
INJECTION, SOLUTION SUBCUTANEOUS
COMMUNITY
Start: 2018-04-21 | End: 2018-07-11 | Stop reason: SDUPTHER

## 2018-05-15 NOTE — PROGRESS NOTES
Assessment/Plan:     Stage 5 chronic kidney disease on chronic dialysis ([facility] [address] )  ON dialysis M/W/F    Type 1 diabetes mellitus with chronic kidney disease on chronic dialysis (HCC)  A1C today was 8 0  He will be seeing endocrinology at [facility] soon  I have not changed anything to insulin regimen now  BS has been improving since hospitalized    Paraplegia [facility])  Rx for new wheelchair given    Penile abscess  Continue follow up with urology    Patient initially hypoglycemic and ate peanut butter crackers  Then given orange juice  Rechecked and by discharged blood sugar was improving and he was feeling better  Problem List Items Addressed This Visit        Endocrine    Type 1 diabetes mellitus with chronic kidney disease on chronic dialysis ([facility] [address] )     A1C today was 8 0  He will be seeing endocrinology at [facility] soon  I have not changed anything to insulin regimen now  BS has been improving since hospitalized         Relevant Medications    zinc sulfate (ZINCATE) 220 mg capsule    Blood Glucose Monitoring Suppl ([employer] CONTOUR MONITOR) w/Device KIT    glucose blood ([employer] CONTOUR TEST) test strip    SUSAN MICROLET LANCETS lancets    NOVOLOG FLEXPEN 100 UNIT/ML SOPN    NOVOLOG 100 UNIT/ML injection    LEVEMIR FLEXTOUCH subcutaneous injection pen 100 units/mL    Other Relevant Orders    POCT hemoglobin A1c (Completed)       Nervous and Auditory    Paraplegia (HCC) (Chronic)     Rx for new wheelchair given         Relevant Orders    Wheelchair       Musculoskeletal and Integument    Ulcer of sacral region, stage 4 (HCC)    Relevant Medications    zinc sulfate (ZINCATE) 220 mg capsule       Genitourinary    Penile abscess     Continue follow up with urology           Other Visit Diagnoses     Hypoglycemia    -  Primary    Relevant Orders    POCT blood glucose (Completed)    POCT blood glucose (Completed)    POCT blood glucose (Completed)           Subjective:     Patient ID: Anastacio Butt  is a 40 y o  male      HPI here for follow up from [facility] for multiple admissions  Last seen here on 7/18/17 and has been admitted to [facility] 12 times since then  He was last admitted on 5/4-5/8 for fever and hypotension  Maira Mejia He was initially treated with broad spectrum antibiotics  Plastics was consulted as well as urology and ID  Antibiotics  Were held per ID  He has hx of penile abscess and debridement due to fistula formation from ulcer in January  Blood cultures were negative and fevers resolved after 3 days  He saw wound care last week  He has not had fevers  He will be seeing endocrinology soon  BS has been more level  Today was 99  He has not eaten in hours though and is now having hypoglycemia  He does carry peanut butter crackers with him and is eating them now to increase his sugary  He is going to dialysis 3 times a week m/w/f  This started about 3 months  He is going in [place]  He is seeing nephrology and they recommended him to have a new wheelchair  Review of Systems   Constitutional: Negative for activity change, appetite change, fatigue, fever and unexpected weight change  HENT: Negative for dental problem, ear pain, hearing loss and sore throat  Eyes: Negative for visual disturbance  Respiratory: Negative for cough and wheezing  Gastrointestinal: Negative for abdominal pain, constipation, diarrhea and vomiting  Genitourinary: Negative for difficulty urinating and dysuria  Musculoskeletal: Positive for back pain and myalgias  Negative for arthralgias  Skin: Negative for rash  Neurological: Negative for dizziness and headaches  Psychiatric/Behavioral: Negative for behavioral problems and dysphoric mood  The patient is not nervous/anxious  Objective:     Physical Exam   Constitutional: He is oriented to person, place, and time  He appears well-developed and well-nourished  No distress  HENT:   Head: Normocephalic and atraumatic     Right Ear: External ear normal    Left Ear: External ear normal    Eyes: Conjunctivae are normal    Neck: Normal range of motion  Neck supple  No thyromegaly present  Cardiovascular: Normal rate, regular rhythm and normal heart sounds  No murmur heard  Pulmonary/Chest: Effort normal and breath sounds normal  No respiratory distress  He has no wheezes  Musculoskeletal:   Mechanical wheelchair   Lymphadenopathy:     He has no cervical adenopathy  Neurological: He is alert and oriented to person, place, and time  Psychiatric: He has a normal mood and affect  His behavior is normal    Nursing note and vitals reviewed  Vitals:    05/15/18 1313   BP: 100/60   BP Location: Right arm   Patient Position: Sitting   Cuff Size: Standard   Pulse: 100   Resp: 16   Temp: 98 7 °F (37 1 °C)   TempSrc: Tympanic   SpO2: 100%       Transitional Care Management Review:  Sarah Bustamante  is a 40 y o  male here for TCM follow up       During the TCM phone call patient stated:               To Silverman PA-C

## 2018-05-15 NOTE — ASSESSMENT & PLAN NOTE
A1C today was 8 0  He will be seeing endocrinology at [facility] soon  I have not changed anything to insulin regimen now    BS has been improving since hospitalized

## 2018-05-16 PROBLEM — B37.0 THRUSH: Status: RESOLVED | Noted: 2017-07-03 | Resolved: 2018-05-16

## 2018-05-16 RX ORDER — OXYBUTYNIN CHLORIDE 5 MG/1
15 TABLET ORAL DAILY
Refills: 0 | COMMUNITY
Start: 2018-04-26 | End: 2019-07-26 | Stop reason: SDUPTHER

## 2018-05-22 ENCOUNTER — TELEPHONE (OUTPATIENT)
Dept: FAMILY MEDICINE CLINIC | Facility: CLINIC | Age: 38
End: 2018-05-22

## 2018-05-22 DIAGNOSIS — N18.5 ANEMIA IN STAGE 5 CHRONIC KIDNEY DISEASE, NOT ON CHRONIC DIALYSIS (HCC): Primary | ICD-10-CM

## 2018-05-22 DIAGNOSIS — D63.1 ANEMIA IN STAGE 5 CHRONIC KIDNEY DISEASE, NOT ON CHRONIC DIALYSIS (HCC): Primary | ICD-10-CM

## 2018-05-22 NOTE — TELEPHONE ENCOUNTER
Mother called and stated that son was in the hospital on 5/4/18 -requested: Vitamin Capsules & Folic Acid (but  mother was unsure of the exact names and doses) -   If possible please send prescriptions

## 2018-05-23 NOTE — TELEPHONE ENCOUNTER
I sent in what was rx  I dont know if insurance willconver it since it is a vitamin   It was a bcomplex vitamin with folic acid

## 2018-05-28 DIAGNOSIS — E78.5 HYPERLIPIDEMIA, UNSPECIFIED HYPERLIPIDEMIA TYPE: ICD-10-CM

## 2018-05-28 DIAGNOSIS — I10 BENIGN HYPERTENSION: ICD-10-CM

## 2018-05-28 DIAGNOSIS — I82.90 BLOOD CLOT IN VEIN: ICD-10-CM

## 2018-05-28 DIAGNOSIS — K21.9 GASTROESOPHAGEAL REFLUX DISEASE, ESOPHAGITIS PRESENCE NOT SPECIFIED: Primary | ICD-10-CM

## 2018-05-28 RX ORDER — ATORVASTATIN CALCIUM 20 MG/1
TABLET, FILM COATED ORAL
Qty: 30 TABLET | Refills: 3 | Status: SHIPPED | OUTPATIENT
Start: 2018-05-28 | End: 2018-09-22 | Stop reason: SDUPTHER

## 2018-05-28 RX ORDER — APIXABAN 2.5 MG/1
TABLET, FILM COATED ORAL
Qty: 60 TABLET | Refills: 3 | Status: SHIPPED | OUTPATIENT
Start: 2018-05-28 | End: 2018-09-22 | Stop reason: SDUPTHER

## 2018-05-28 RX ORDER — FAMOTIDINE 20 MG/1
TABLET, FILM COATED ORAL
Qty: 30 TABLET | Refills: 3 | Status: SHIPPED | OUTPATIENT
Start: 2018-05-28 | End: 2018-09-22 | Stop reason: SDUPTHER

## 2018-05-28 RX ORDER — CARVEDILOL 6.25 MG/1
TABLET ORAL
Qty: 60 TABLET | Refills: 3 | Status: SHIPPED | OUTPATIENT
Start: 2018-05-28 | End: 2018-09-22 | Stop reason: SDUPTHER

## 2018-05-28 RX ORDER — OXYBUTYNIN CHLORIDE 5 MG/1
TABLET ORAL
Qty: 90 TABLET | OUTPATIENT
Start: 2018-05-28

## 2018-05-31 ENCOUNTER — TELEPHONE (OUTPATIENT)
Dept: FAMILY MEDICINE CLINIC | Facility: CLINIC | Age: 38
End: 2018-05-31

## 2018-05-31 ENCOUNTER — APPOINTMENT (OUTPATIENT)
Dept: WOUND CARE | Facility: HOSPITAL | Age: 38
End: 2018-05-31
Payer: MEDICARE

## 2018-05-31 PROCEDURE — 11045 DBRDMT SUBQ TISS EACH ADDL: CPT | Performed by: FAMILY MEDICINE

## 2018-05-31 PROCEDURE — 11042 DBRDMT SUBQ TIS 1ST 20SQCM/<: CPT | Performed by: FAMILY MEDICINE

## 2018-05-31 PROCEDURE — 11043 DBRDMT MUSC&/FSCA 1ST 20/<: CPT | Performed by: FAMILY MEDICINE

## 2018-05-31 PROCEDURE — 11046 DBRDMT MUSC&/FSCA EA ADDL: CPT | Performed by: FAMILY MEDICINE

## 2018-05-31 NOTE — TELEPHONE ENCOUNTER
Pt mom is calling requesting a new script be sent to [facility]    States script needs to say repairs for power wheelchair     [phone] phone  Pt spoke to oscar Flores pt    md signature

## 2018-06-14 ENCOUNTER — APPOINTMENT (OUTPATIENT)
Dept: WOUND CARE | Facility: HOSPITAL | Age: 38
End: 2018-06-14
Payer: MEDICARE

## 2018-06-14 PROCEDURE — 11042 DBRDMT SUBQ TIS 1ST 20SQCM/<: CPT | Performed by: FAMILY MEDICINE

## 2018-06-14 PROCEDURE — 11045 DBRDMT SUBQ TISS EACH ADDL: CPT | Performed by: FAMILY MEDICINE

## 2018-06-14 NOTE — TELEPHONE ENCOUNTER
[facility] faxed over a new form as the one filled out was not the correct one  Placed in your bin

## 2018-07-04 DIAGNOSIS — E10.22 TYPE 1 DIABETES MELLITUS WITH CHRONIC KIDNEY DISEASE ON CHRONIC DIALYSIS (HCC): ICD-10-CM

## 2018-07-04 DIAGNOSIS — N18.6 TYPE 1 DIABETES MELLITUS WITH CHRONIC KIDNEY DISEASE ON CHRONIC DIALYSIS (HCC): ICD-10-CM

## 2018-07-04 DIAGNOSIS — Z99.2 TYPE 1 DIABETES MELLITUS WITH CHRONIC KIDNEY DISEASE ON CHRONIC DIALYSIS (HCC): ICD-10-CM

## 2018-07-05 RX ORDER — INSULIN DETEMIR 100 [IU]/ML
INJECTION, SOLUTION SUBCUTANEOUS
Refills: 0 | Status: CANCELLED | OUTPATIENT
Start: 2018-07-05

## 2018-07-07 RX ORDER — OMEGA-3/DHA/EPA/FISH OIL 35-113.5MG
TABLET,CHEWABLE ORAL
Qty: 30 TABLET | Refills: 2 | Status: CANCELLED | OUTPATIENT
Start: 2018-07-07

## 2018-07-11 DIAGNOSIS — N18.6 TYPE 1 DIABETES MELLITUS WITH CHRONIC KIDNEY DISEASE ON CHRONIC DIALYSIS (HCC): ICD-10-CM

## 2018-07-11 DIAGNOSIS — E10.8 TYPE 1 DIABETES MELLITUS WITH COMPLICATION (HCC): Primary | ICD-10-CM

## 2018-07-11 DIAGNOSIS — E10.22 TYPE 1 DIABETES MELLITUS WITH CHRONIC KIDNEY DISEASE ON CHRONIC DIALYSIS (HCC): ICD-10-CM

## 2018-07-11 DIAGNOSIS — Z99.2 TYPE 1 DIABETES MELLITUS WITH CHRONIC KIDNEY DISEASE ON CHRONIC DIALYSIS (HCC): ICD-10-CM

## 2018-07-11 RX ORDER — PEN NEEDLE, DIABETIC 32GX 5/32"
NEEDLE, DISPOSABLE MISCELLANEOUS
Refills: 0 | COMMUNITY
Start: 2018-06-07 | End: 2018-07-11 | Stop reason: SDUPTHER

## 2018-07-11 NOTE — TELEPHONE ENCOUNTER
How much insulin is he taking  Also Christina's notes states that he has an upcoming appointment with Endocrinology, is he still planning on seeing them?

## 2018-07-12 ENCOUNTER — APPOINTMENT (OUTPATIENT)
Dept: WOUND CARE | Facility: HOSPITAL | Age: 38
End: 2018-07-12
Payer: MEDICARE

## 2018-07-12 PROCEDURE — 11042 DBRDMT SUBQ TIS 1ST 20SQCM/<: CPT | Performed by: FAMILY MEDICINE

## 2018-07-12 PROCEDURE — 11045 DBRDMT SUBQ TISS EACH ADDL: CPT | Performed by: FAMILY MEDICINE

## 2018-07-13 DIAGNOSIS — E53.9 VITAMIN B DEFICIENCY: Primary | ICD-10-CM

## 2018-07-13 NOTE — TELEPHONE ENCOUNTER
I spoke with pts mom who states pt does plan on still seeing endo but will need refills til that appt   He is taking levemir 14units 2X a day and Novolog in on a sliding scale depending on his sugar levels and 3xs a day

## 2018-07-18 RX ORDER — INSULIN DETEMIR 100 [IU]/ML
14 INJECTION, SOLUTION SUBCUTANEOUS 2 TIMES DAILY
Qty: 5 PEN | Refills: 1 | Status: SHIPPED | OUTPATIENT
Start: 2018-07-18 | End: 2018-10-09

## 2018-07-18 RX ORDER — OMEGA-3/DHA/EPA/FISH OIL 35-113.5MG
1000 TABLET,CHEWABLE ORAL DAILY
Qty: 90 TABLET | Refills: 0 | Status: SHIPPED | OUTPATIENT
Start: 2018-07-18 | End: 2018-09-03 | Stop reason: SDUPTHER

## 2018-07-18 RX ORDER — PEN NEEDLE, DIABETIC 32GX 5/32"
NEEDLE, DISPOSABLE MISCELLANEOUS
Qty: 100 EACH | Refills: 1 | Status: SHIPPED | OUTPATIENT
Start: 2018-07-18 | End: 2019-05-29 | Stop reason: SDUPTHER

## 2018-08-02 ENCOUNTER — APPOINTMENT (OUTPATIENT)
Dept: WOUND CARE | Facility: HOSPITAL | Age: 38
End: 2018-08-02
Payer: MEDICARE

## 2018-08-02 PROCEDURE — 11042 DBRDMT SUBQ TIS 1ST 20SQCM/<: CPT | Performed by: FAMILY MEDICINE

## 2018-08-02 PROCEDURE — 11043 DBRDMT MUSC&/FSCA 1ST 20/<: CPT | Performed by: FAMILY MEDICINE

## 2018-08-02 PROCEDURE — 11045 DBRDMT SUBQ TISS EACH ADDL: CPT | Performed by: FAMILY MEDICINE

## 2018-08-02 PROCEDURE — 11046 DBRDMT MUSC&/FSCA EA ADDL: CPT | Performed by: FAMILY MEDICINE

## 2018-08-16 ENCOUNTER — APPOINTMENT (OUTPATIENT)
Dept: WOUND CARE | Facility: HOSPITAL | Age: 38
End: 2018-08-16
Payer: MEDICARE

## 2018-08-16 PROCEDURE — 11042 DBRDMT SUBQ TIS 1ST 20SQCM/<: CPT | Performed by: FAMILY MEDICINE

## 2018-08-16 PROCEDURE — 11046 DBRDMT MUSC&/FSCA EA ADDL: CPT | Performed by: FAMILY MEDICINE

## 2018-08-16 PROCEDURE — 11045 DBRDMT SUBQ TISS EACH ADDL: CPT | Performed by: FAMILY MEDICINE

## 2018-08-16 PROCEDURE — 11043 DBRDMT MUSC&/FSCA 1ST 20/<: CPT | Performed by: FAMILY MEDICINE

## 2018-08-27 RX ORDER — PEN NEEDLE, DIABETIC 32GX 5/32"
NEEDLE, DISPOSABLE MISCELLANEOUS
Qty: 100 EACH | Refills: 5 | Status: SHIPPED | OUTPATIENT
Start: 2018-08-27 | End: 2019-05-29 | Stop reason: SDUPTHER

## 2018-08-29 ENCOUNTER — TELEPHONE (OUTPATIENT)
Dept: FAMILY MEDICINE CLINIC | Facility: CLINIC | Age: 38
End: 2018-08-29

## 2018-08-29 NOTE — TELEPHONE ENCOUNTER
pts mom is requesting a script to [address] [place] [address] for    Bed pads  X-large gloves  X-large stretch briefs    Also a script for dakin's solution half strength  For his wounds

## 2018-08-30 ENCOUNTER — APPOINTMENT (OUTPATIENT)
Dept: WOUND CARE | Facility: HOSPITAL | Age: 38
End: 2018-08-30
Payer: MEDICARE

## 2018-08-30 PROCEDURE — 97597 DBRDMT OPN WND 1ST 20 CM/<: CPT

## 2018-08-30 PROCEDURE — 11042 DBRDMT SUBQ TIS 1ST 20SQCM/<: CPT

## 2018-08-30 PROCEDURE — 11046 DBRDMT MUSC&/FSCA EA ADDL: CPT

## 2018-08-30 PROCEDURE — 11045 DBRDMT SUBQ TISS EACH ADDL: CPT

## 2018-08-30 PROCEDURE — 11043 DBRDMT MUSC&/FSCA 1ST 20/<: CPT

## 2018-08-31 NOTE — TELEPHONE ENCOUNTER
I need to know how many a month for gloves, how many a day for others and how ofter he is using the solution

## 2018-09-03 DIAGNOSIS — E53.9 VITAMIN B DEFICIENCY: ICD-10-CM

## 2018-09-03 RX ORDER — OMEGA-3/DHA/EPA/FISH OIL 35-113.5MG
TABLET,CHEWABLE ORAL
Qty: 30 TABLET | Refills: 1 | Status: SHIPPED | OUTPATIENT
Start: 2018-09-03 | End: 2018-11-01 | Stop reason: SDUPTHER

## 2018-09-07 DIAGNOSIS — N32.81 OAB (OVERACTIVE BLADDER): Primary | ICD-10-CM

## 2018-09-07 DIAGNOSIS — G82.20 PARAPLEGIA (HCC): Chronic | ICD-10-CM

## 2018-09-07 DIAGNOSIS — E10.22 TYPE 1 DIABETES MELLITUS WITH CHRONIC KIDNEY DISEASE ON CHRONIC DIALYSIS (HCC): ICD-10-CM

## 2018-09-07 DIAGNOSIS — L98.429 ULCER OF SACRAL REGION, STAGE 4 (HCC): ICD-10-CM

## 2018-09-07 DIAGNOSIS — Z99.2 TYPE 1 DIABETES MELLITUS WITH CHRONIC KIDNEY DISEASE ON CHRONIC DIALYSIS (HCC): ICD-10-CM

## 2018-09-07 DIAGNOSIS — N18.6 TYPE 1 DIABETES MELLITUS WITH CHRONIC KIDNEY DISEASE ON CHRONIC DIALYSIS (HCC): ICD-10-CM

## 2018-09-07 RX ORDER — DIAPER,BRIEF,ADULT, DISPOSABLE
EACH MISCELLANEOUS
Qty: 60 EACH | Refills: 11 | Status: SHIPPED | OUTPATIENT
Start: 2018-09-07 | End: 2019-01-30 | Stop reason: SDUPTHER

## 2018-09-07 RX ORDER — SODIUM HYPOCHLORITE 2.5 MG/ML
SOLUTION TOPICAL
Qty: 473 ML | Refills: 3 | Status: SHIPPED | OUTPATIENT
Start: 2018-09-07 | End: 2019-11-15 | Stop reason: SDUPTHER

## 2018-09-07 RX ORDER — UNDERPADS 23" X 36"
EACH MISCELLANEOUS
Qty: 60 EACH | Refills: 11 | Status: SHIPPED | OUTPATIENT
Start: 2018-09-07 | End: 2019-01-30 | Stop reason: SDUPTHER

## 2018-09-11 ENCOUNTER — TELEPHONE (OUTPATIENT)
Dept: FAMILY MEDICINE CLINIC | Facility: CLINIC | Age: 38
End: 2018-09-11

## 2018-09-20 ENCOUNTER — APPOINTMENT (OUTPATIENT)
Dept: WOUND CARE | Facility: HOSPITAL | Age: 38
End: 2018-09-20
Payer: MEDICARE

## 2018-09-20 PROCEDURE — 99214 OFFICE O/P EST MOD 30 MIN: CPT | Performed by: FAMILY MEDICINE

## 2018-09-21 ENCOUNTER — TELEPHONE (OUTPATIENT)
Dept: FAMILY MEDICINE CLINIC | Facility: CLINIC | Age: 38
End: 2018-09-21

## 2018-09-21 NOTE — TELEPHONE ENCOUNTER
Pt  Mother- Aleisha Tipton called to check on status of scripts for: sodium Hypochlorite - external solution, Gloves, Disposable undergarments, and Underpads -      Called [facility] - paperwork was faxed by BILL but [facility] do not have these supplies  Spoke to [employer]   And was told to fax to [employer] at [phone]    Faxed at 12:34 pm     Pt's mother was informed

## 2018-09-22 DIAGNOSIS — E78.5 HYPERLIPIDEMIA, UNSPECIFIED HYPERLIPIDEMIA TYPE: ICD-10-CM

## 2018-09-22 DIAGNOSIS — K21.9 GASTROESOPHAGEAL REFLUX DISEASE, ESOPHAGITIS PRESENCE NOT SPECIFIED: ICD-10-CM

## 2018-09-22 DIAGNOSIS — I10 BENIGN HYPERTENSION: ICD-10-CM

## 2018-09-22 DIAGNOSIS — I82.90 BLOOD CLOT IN VEIN: ICD-10-CM

## 2018-09-26 RX ORDER — ATORVASTATIN CALCIUM 20 MG/1
TABLET, FILM COATED ORAL
Qty: 30 TABLET | Refills: 0 | Status: SHIPPED | OUTPATIENT
Start: 2018-09-26 | End: 2018-10-23 | Stop reason: SDUPTHER

## 2018-09-26 RX ORDER — APIXABAN 2.5 MG/1
TABLET, FILM COATED ORAL
Qty: 60 TABLET | Refills: 3 | Status: SHIPPED | OUTPATIENT
Start: 2018-09-26 | End: 2019-01-14 | Stop reason: SDUPTHER

## 2018-09-26 RX ORDER — FAMOTIDINE 20 MG/1
TABLET, FILM COATED ORAL
Qty: 30 TABLET | Refills: 0 | Status: SHIPPED | OUTPATIENT
Start: 2018-09-26 | End: 2018-10-23 | Stop reason: SDUPTHER

## 2018-09-26 RX ORDER — CARVEDILOL 6.25 MG/1
TABLET ORAL
Qty: 60 TABLET | Refills: 0 | Status: SHIPPED | OUTPATIENT
Start: 2018-09-26 | End: 2018-10-23 | Stop reason: SDUPTHER

## 2018-10-04 ENCOUNTER — APPOINTMENT (OUTPATIENT)
Dept: WOUND CARE | Facility: HOSPITAL | Age: 38
End: 2018-10-04
Payer: MEDICARE

## 2018-10-04 PROCEDURE — 11046 DBRDMT MUSC&/FSCA EA ADDL: CPT | Performed by: FAMILY MEDICINE

## 2018-10-04 PROCEDURE — 99214 OFFICE O/P EST MOD 30 MIN: CPT | Performed by: FAMILY MEDICINE

## 2018-10-04 PROCEDURE — 11042 DBRDMT SUBQ TIS 1ST 20SQCM/<: CPT | Performed by: FAMILY MEDICINE

## 2018-10-04 PROCEDURE — 10060 I&D ABSCESS SIMPLE/SINGLE: CPT | Performed by: FAMILY MEDICINE

## 2018-10-04 PROCEDURE — 11043 DBRDMT MUSC&/FSCA 1ST 20/<: CPT | Performed by: FAMILY MEDICINE

## 2018-10-04 PROCEDURE — 11045 DBRDMT SUBQ TISS EACH ADDL: CPT | Performed by: FAMILY MEDICINE

## 2018-10-09 ENCOUNTER — OFFICE VISIT (OUTPATIENT)
Dept: FAMILY MEDICINE CLINIC | Facility: CLINIC | Age: 38
End: 2018-10-09
Payer: MEDICARE

## 2018-10-09 VITALS
TEMPERATURE: 96.2 F | SYSTOLIC BLOOD PRESSURE: 120 MMHG | HEART RATE: 80 BPM | DIASTOLIC BLOOD PRESSURE: 80 MMHG | RESPIRATION RATE: 18 BRPM | OXYGEN SATURATION: 98 %

## 2018-10-09 DIAGNOSIS — N18.6 STAGE 5 CHRONIC KIDNEY DISEASE ON CHRONIC DIALYSIS (HCC): ICD-10-CM

## 2018-10-09 DIAGNOSIS — Z99.2 TYPE 1 DIABETES MELLITUS WITH CHRONIC KIDNEY DISEASE ON CHRONIC DIALYSIS (HCC): Primary | ICD-10-CM

## 2018-10-09 DIAGNOSIS — E10.22 TYPE 1 DIABETES MELLITUS WITH CHRONIC KIDNEY DISEASE ON CHRONIC DIALYSIS (HCC): Primary | ICD-10-CM

## 2018-10-09 DIAGNOSIS — N18.6 TYPE 1 DIABETES MELLITUS WITH CHRONIC KIDNEY DISEASE ON CHRONIC DIALYSIS (HCC): Primary | ICD-10-CM

## 2018-10-09 DIAGNOSIS — Z86.19 HISTORY OF CLOSTRIDIUM DIFFICILE INFECTION: ICD-10-CM

## 2018-10-09 DIAGNOSIS — Z99.2 STAGE 5 CHRONIC KIDNEY DISEASE ON CHRONIC DIALYSIS (HCC): ICD-10-CM

## 2018-10-09 LAB — SL AMB POCT HEMOGLOBIN AIC: 7.1

## 2018-10-09 PROCEDURE — 83036 HEMOGLOBIN GLYCOSYLATED A1C: CPT | Performed by: PHYSICIAN ASSISTANT

## 2018-10-09 PROCEDURE — 99214 OFFICE O/P EST MOD 30 MIN: CPT | Performed by: PHYSICIAN ASSISTANT

## 2018-10-09 RX ORDER — OXYCODONE HYDROCHLORIDE 10 MG/1
10 TABLET ORAL 3 TIMES DAILY
Refills: 0 | COMMUNITY
Start: 2018-09-14 | End: 2021-02-09

## 2018-10-09 RX ORDER — POLYHEXAM BIGUAN/GAUZE BANDAGE 0.2%-2"X2"
BANDAGE TOPICAL
Refills: 5 | COMMUNITY
Start: 2018-09-17 | End: 2019-11-21 | Stop reason: SDUPTHER

## 2018-10-09 RX ORDER — FLASH GLUCOSE SENSOR
KIT MISCELLANEOUS
Qty: 2 EACH | Refills: 0 | Status: SHIPPED | OUTPATIENT
Start: 2018-10-09 | End: 2018-11-21

## 2018-10-09 RX ORDER — BLOOD-GLUCOSE METER
1 KIT MISCELLANEOUS 4 TIMES DAILY
Qty: 1 EACH | Refills: 0 | Status: SHIPPED | OUTPATIENT
Start: 2018-10-09 | End: 2018-10-15 | Stop reason: CLARIF

## 2018-10-09 NOTE — PROGRESS NOTES
Assessment/Plan:     Type 1 diabetes mellitus with chronic kidney disease on chronic dialysis [facility])  Lab Results   Component Value Date    HGBA1C 7 1 10/09/2018       No results for input(s): POCGLU in the last 72 hours  Blood Sugar Average: Last 72 hrs:  A1C is improved  Recommend continued follow up with endocrinology  He had influenza vaccine in [facility]  He should continue levemir 8 units BID and novolog 3 times a day  Referred for eye exam   Unable to do iris exam in room due to paraplegia  Stage 5 chronic kidney disease on chronic dialysis ([facility] )  Continue dialysis M/W/F    History of Clostridium difficile infection  Infection has resolved         Problem List Items Addressed This Visit        Endocrine    Type 1 diabetes mellitus with chronic kidney disease on chronic dialysis [facility]) - Primary     Lab Results   Component Value Date    HGBA1C 7 1 10/09/2018       No results for input(s): POCGLU in the last 72 hours  Blood Sugar Average: Last 72 hrs:  A1C is improved  Recommend continued follow up with endocrinology  He had influenza vaccine in [facility]  He should continue levemir 8 units BID and novolog 3 times a day  Referred for eye exam   Unable to do iris exam in room due to paraplegia  Relevant Medications    glucose monitoring kit (FREESTYLE) monitoring kit    Continuous Blood Gluc Sensor (FREESTYLE DARIEN SENSOR SYSTEM) MISC    insulin detemir (LEVEMIR) 100 units/mL subcutaneous injection    Other Relevant Orders    POCT hemoglobin A1c (Completed)    Ambulatory referral to Ophthalmology       Genitourinary    Stage 5 chronic kidney disease on chronic dialysis ([facility] )     Continue dialysis M/W/F            Other    History of Clostridium difficile infection     Infection has resolved                Subjective:     Patient ID: Maryann Castro  is a 45 y o  male      HPI 46 y/o M with type 1 DM with nephropathy, paraplegia, A  Fib, recurrent c diff with colostomy who went to [address] [place] [address] 321 ED on 9/13 for n/v and black stools  Admitted for 6 days after being found to be + for c  Diff again  Prior to this admission he was hospitalized for Bacteroides sepsis secondary to UTI, discharged on 7 days of Flagyl 500 mg tid  He was started on vancomycin at time of hospitalization and d/c with 9 more days of vanco  Stool is now well formed  He denies any n/v/abdomen pain  Blood sugar are typically in 140s but about 2 time a   8 units levemir of BID is now being used  He is using 4 units of novolog with meals and then adds in with carbs  He does need a new glucometer and he would like to switch to freestyle seamus  He goes to dialysis M/W/F    He continues to see PM and pain is better controlled on percocet now when compared to hydromorphone  Review of Systems   Constitutional: Negative for activity change, appetite change, fatigue, fever and unexpected weight change  HENT: Negative for dental problem, ear pain, hearing loss and sore throat  Eyes: Negative for visual disturbance  Respiratory: Negative for cough and wheezing  Gastrointestinal: Negative for abdominal pain, constipation, diarrhea and vomiting  Genitourinary: Negative for difficulty urinating and dysuria  Musculoskeletal: Positive for back pain  Negative for arthralgias and myalgias  Skin: Negative for rash  Neurological: Negative for dizziness and headaches  Psychiatric/Behavioral: Negative for behavioral problems  Objective:     Physical Exam   Constitutional: He is oriented to person, place, and time  He appears well-developed and well-nourished  No distress  HENT:   Head: Normocephalic and atraumatic  Right Ear: External ear normal    Left Ear: External ear normal    Eyes: Conjunctivae are normal    Neck: Normal range of motion  Neck supple  No thyromegaly present  Cardiovascular: Normal rate, regular rhythm and normal heart sounds  No murmur heard    Pulmonary/Chest: Effort normal and breath sounds normal  No respiratory distress  He has no wheezes  Abdominal: Soft  He exhibits no mass  There is no tenderness  There is no rebound and no guarding  Lymphadenopathy:     He has no cervical adenopathy  Neurological: He is alert and oriented to person, place, and time  Psychiatric: He has a normal mood and affect  His behavior is normal    Nursing note and vitals reviewed  Vitals:    10/09/18 1514   BP: 120/80   BP Location: Right arm   Patient Position: Sitting   Cuff Size: Standard   Pulse: 80   Resp: 18   Temp: (!) 96 2 °F (35 7 °C)   TempSrc: Tympanic   SpO2: 98%       Transitional Care Management Review:  Oneida Foote  is a 45 y o  male here for TCM follow up       During the TCM phone call patient stated:               Chelsea Melgar PA-C

## 2018-10-09 NOTE — ASSESSMENT & PLAN NOTE
Lab Results   Component Value Date    HGBA1C 7 1 10/09/2018       No results for input(s): POCGLU in the last 72 hours  Blood Sugar Average: Last 72 hrs:  A1C is improved  Recommend continued follow up with endocrinology  He [facility]  He should continue levemir 8 units BID and novolog 3 times a day  Referred for eye exam   Unable to do iris exam in room due to paraplegia

## 2018-10-15 DIAGNOSIS — N18.6 TYPE 1 DIABETES MELLITUS WITH CHRONIC KIDNEY DISEASE ON CHRONIC DIALYSIS (HCC): ICD-10-CM

## 2018-10-15 DIAGNOSIS — Z99.2 TYPE 1 DIABETES MELLITUS WITH CHRONIC KIDNEY DISEASE ON CHRONIC DIALYSIS (HCC): ICD-10-CM

## 2018-10-15 DIAGNOSIS — E10.22 TYPE 1 DIABETES MELLITUS WITH CHRONIC KIDNEY DISEASE ON CHRONIC DIALYSIS (HCC): ICD-10-CM

## 2018-10-18 ENCOUNTER — APPOINTMENT (OUTPATIENT)
Dept: WOUND CARE | Facility: HOSPITAL | Age: 38
End: 2018-10-18
Payer: MEDICARE

## 2018-10-18 ENCOUNTER — TELEPHONE (OUTPATIENT)
Dept: FAMILY MEDICINE CLINIC | Facility: CLINIC | Age: 38
End: 2018-10-18

## 2018-10-18 PROCEDURE — 11042 DBRDMT SUBQ TIS 1ST 20SQCM/<: CPT | Performed by: FAMILY MEDICINE

## 2018-10-18 PROCEDURE — 11045 DBRDMT SUBQ TISS EACH ADDL: CPT | Performed by: FAMILY MEDICINE

## 2018-10-23 DIAGNOSIS — E78.5 HYPERLIPIDEMIA, UNSPECIFIED HYPERLIPIDEMIA TYPE: ICD-10-CM

## 2018-10-23 DIAGNOSIS — K21.9 GASTROESOPHAGEAL REFLUX DISEASE, ESOPHAGITIS PRESENCE NOT SPECIFIED: ICD-10-CM

## 2018-10-23 DIAGNOSIS — I10 BENIGN HYPERTENSION: ICD-10-CM

## 2018-10-23 RX ORDER — FAMOTIDINE 20 MG/1
TABLET, FILM COATED ORAL
Qty: 30 TABLET | Refills: 5 | Status: SHIPPED | OUTPATIENT
Start: 2018-10-23 | End: 2020-02-28

## 2018-10-23 RX ORDER — ATORVASTATIN CALCIUM 20 MG/1
TABLET, FILM COATED ORAL
Qty: 30 TABLET | Refills: 5 | Status: SHIPPED | OUTPATIENT
Start: 2018-10-23 | End: 2019-05-31 | Stop reason: SDUPTHER

## 2018-10-23 RX ORDER — CARVEDILOL 6.25 MG/1
TABLET ORAL
Qty: 60 TABLET | Refills: 5 | Status: SHIPPED | OUTPATIENT
Start: 2018-10-23 | End: 2019-05-07

## 2018-11-01 DIAGNOSIS — E53.9 VITAMIN B DEFICIENCY: ICD-10-CM

## 2018-11-01 RX ORDER — OMEGA-3/DHA/EPA/FISH OIL 35-113.5MG
TABLET,CHEWABLE ORAL
Qty: 30 TABLET | Refills: 5 | Status: SHIPPED | OUTPATIENT
Start: 2018-11-01 | End: 2019-04-21 | Stop reason: SDUPTHER

## 2018-11-03 DIAGNOSIS — E10.22 TYPE 1 DIABETES MELLITUS WITH CHRONIC KIDNEY DISEASE ON CHRONIC DIALYSIS (HCC): ICD-10-CM

## 2018-11-03 DIAGNOSIS — Z99.2 TYPE 1 DIABETES MELLITUS WITH CHRONIC KIDNEY DISEASE ON CHRONIC DIALYSIS (HCC): ICD-10-CM

## 2018-11-03 DIAGNOSIS — N18.6 TYPE 1 DIABETES MELLITUS WITH CHRONIC KIDNEY DISEASE ON CHRONIC DIALYSIS (HCC): ICD-10-CM

## 2018-11-05 RX ORDER — INSULIN DETEMIR 100 [IU]/ML
14 INJECTION, SOLUTION SUBCUTANEOUS 2 TIMES DAILY
Qty: 5 PEN | Refills: 3 | Status: SHIPPED | OUTPATIENT
Start: 2018-11-05 | End: 2019-02-23 | Stop reason: SDUPTHER

## 2018-11-06 ENCOUNTER — TELEPHONE (OUTPATIENT)
Dept: FAMILY MEDICINE CLINIC | Facility: CLINIC | Age: 38
End: 2018-11-06

## 2018-11-06 NOTE — TELEPHONE ENCOUNTER
I contacted pt's bill Santiago and informed about refill, now [employer] states milad is using 10 units BID  I recommended to use as last discharge instructions from [facility] advised

## 2018-11-07 DIAGNOSIS — D63.8 ANEMIA OF CHRONIC DISEASE: Primary | ICD-10-CM

## 2018-11-08 ENCOUNTER — APPOINTMENT (OUTPATIENT)
Dept: WOUND CARE | Facility: HOSPITAL | Age: 38
End: 2018-11-08
Payer: MEDICARE

## 2018-11-08 PROCEDURE — 97598 DBRDMT OPN WND ADDL 20CM/<: CPT | Performed by: REGISTERED NURSE

## 2018-11-08 PROCEDURE — 97597 DBRDMT OPN WND 1ST 20 CM/<: CPT | Performed by: REGISTERED NURSE

## 2018-11-08 RX ORDER — FERROUS SULFATE 325(65) MG
TABLET ORAL
Qty: 100 TABLET | Refills: 2 | Status: SHIPPED | OUTPATIENT
Start: 2018-11-08 | End: 2019-02-08 | Stop reason: SDUPTHER

## 2018-11-20 DIAGNOSIS — N18.6 TYPE 1 DIABETES MELLITUS WITH CHRONIC KIDNEY DISEASE ON CHRONIC DIALYSIS (HCC): ICD-10-CM

## 2018-11-20 DIAGNOSIS — Z99.2 TYPE 1 DIABETES MELLITUS WITH CHRONIC KIDNEY DISEASE ON CHRONIC DIALYSIS (HCC): ICD-10-CM

## 2018-11-20 DIAGNOSIS — E10.22 TYPE 1 DIABETES MELLITUS WITH CHRONIC KIDNEY DISEASE ON CHRONIC DIALYSIS (HCC): ICD-10-CM

## 2018-11-20 DIAGNOSIS — L98.429 ULCER OF SACRAL REGION, STAGE 4 (HCC): ICD-10-CM

## 2018-11-20 RX ORDER — UREA 10 %
LOTION (ML) TOPICAL
Qty: 30 EACH | Refills: 5 | Status: SHIPPED | OUTPATIENT
Start: 2018-11-20 | End: 2019-05-25 | Stop reason: SDUPTHER

## 2018-11-21 ENCOUNTER — OFFICE VISIT (OUTPATIENT)
Dept: FAMILY MEDICINE CLINIC | Facility: CLINIC | Age: 38
End: 2018-11-21
Payer: MEDICARE

## 2018-11-21 VITALS
TEMPERATURE: 97.8 F | HEART RATE: 112 BPM | SYSTOLIC BLOOD PRESSURE: 90 MMHG | RESPIRATION RATE: 16 BRPM | OXYGEN SATURATION: 98 % | DIASTOLIC BLOOD PRESSURE: 60 MMHG

## 2018-11-21 DIAGNOSIS — Z99.2 TYPE 1 DIABETES MELLITUS WITH CHRONIC KIDNEY DISEASE ON CHRONIC DIALYSIS (HCC): Primary | ICD-10-CM

## 2018-11-21 DIAGNOSIS — N18.6 STAGE 5 CHRONIC KIDNEY DISEASE ON CHRONIC DIALYSIS (HCC): ICD-10-CM

## 2018-11-21 DIAGNOSIS — E10.22 TYPE 1 DIABETES MELLITUS WITH CHRONIC KIDNEY DISEASE ON CHRONIC DIALYSIS (HCC): Primary | ICD-10-CM

## 2018-11-21 DIAGNOSIS — Z99.2 STAGE 5 CHRONIC KIDNEY DISEASE ON CHRONIC DIALYSIS (HCC): ICD-10-CM

## 2018-11-21 DIAGNOSIS — B37.0 THRUSH: ICD-10-CM

## 2018-11-21 DIAGNOSIS — N18.6 TYPE 1 DIABETES MELLITUS WITH CHRONIC KIDNEY DISEASE ON CHRONIC DIALYSIS (HCC): Primary | ICD-10-CM

## 2018-11-21 PROCEDURE — 99214 OFFICE O/P EST MOD 30 MIN: CPT | Performed by: PHYSICIAN ASSISTANT

## 2018-11-21 RX ORDER — BLOOD SUGAR DIAGNOSTIC
STRIP MISCELLANEOUS
Qty: 300 EACH | Refills: 5 | Status: SHIPPED | OUTPATIENT
Start: 2018-11-21

## 2018-11-21 NOTE — ASSESSMENT & PLAN NOTE
Lab Results   Component Value Date    HGBA1C 7 1 10/09/2018       No results for input(s): POCGLU in the last 72 hours  Blood Sugar Average: Last 72 hrs:  Blood sugar has been improving   Continue regular follow up with endocrinology and check blood sugars regularly

## 2018-11-21 NOTE — PROGRESS NOTES
Assessment/Plan:    Type 1 diabetes mellitus with chronic kidney disease on chronic dialysis [facility])  Lab Results   Component Value Date    HGBA1C 7 1 10/09/2018       No results for input(s): POCGLU in the last 72 hours  Blood Sugar Average: Last 72 hrs:  Blood sugar has been improving  Continue regular follow up with endocrinology and check blood sugars regularly    Stage 5 chronic kidney disease on chronic dialysis ([facility] )  Continue dialysis         Problem List Items Addressed This Visit        Endocrine    Type 1 diabetes mellitus with chronic kidney disease on chronic dialysis [facility]) - Primary     Lab Results   Component Value Date    HGBA1C 7 1 10/09/2018       No results for input(s): POCGLU in the last 72 hours  Blood Sugar Average: Last 72 hrs:  Blood sugar has been improving  Continue regular follow up with endocrinology and check blood sugars regularly         Relevant Medications    Alcohol Swabs (ALCOHOL PADS) 70 % PADS       Genitourinary    Stage 5 chronic kidney disease on chronic dialysis ([facility] )     Continue dialysis           Other Visit Diagnoses     Thrush        Relevant Medications    nystatin (MYCOSTATIN) 100,000 units/mL suspension            Subjective:      Patient ID: Kade Amezcua  is a 45 y o  male  HPI 44 y/o M with DM on dialysis here for follow up of abdominal pain and fever  CT of abdomen showed possible fistula and abscess formation and surgery was consulted who did not feel he needed surgery  He was given IV ancef and transitioned to keflex  He denies any abdomen pain now  BM, urination and appetite all normal     He has noted thrush and is has decreased taste and coating on tongue  He has mild sorethoat with it  Blood sugar has been improved and endocrinology is considering putting him on pump  His insurance would not cover freestyle seamus        The following portions of the patient's history were reviewed and updated as appropriate:   He  has a past medical history of Ambulatory dysfunction; Anemia, iron deficiency; Atrial fibrillation ([facility] ); AVM (arteriovenous malformation) of duodenum, acquired; Chronic deep vein thrombosis (DVT) ([place] [address] ); Chronic pain; Chronic suprapubic catheter ([place] [address] ); CKD (chronic kidney disease), stage III ([place] [address] ); Clostridium difficile infection (08/11/2016); Colostomy on examination [facility]); GERD (gastroesophageal reflux disease); History of creation of ostomy [facility]); Memory impairment (2011); Neurogenic bladder; OAB (overactive bladder); Paraplegia ([place] [address] ); S/P unilateral BKA (below knee amputation) ([place] [address] ); Sebaceous cyst (removed in 2017); Tobacco abuse; and Wounds, multiple    He   Patient Active Problem List    Diagnosis Date Noted    Noncompliance by refusing intervention or support 09/29/2017    Delirium 09/28/2017    Urinary retention 09/26/2017    Asymptomatic bacteriuria 09/25/2017    History of Clostridium difficile infection 09/25/2017    Stage 5 chronic kidney disease on chronic dialysis ([facility] ) 09/18/2017    SOB (shortness of breath) 09/18/2017    Thrombocytosis ([place] [address] ) 09/18/2017    Penile abscess 09/18/2017    Iron deficiency anemia 07/03/2017    Decubitus ulcers 07/03/2017    HTN (hypertension), benign 07/03/2017    Neurogenic bladder 07/03/2017    GERD (gastroesophageal reflux disease) 07/03/2017    Chronic pain 07/03/2017    Wound healing, delayed 06/29/2017    Osteomyelitis of right femur ([facility] ) 03/20/2017    Sepsis ([facility] ) 03/17/2017    Chronic indwelling Ortega catheter     Anemia 09/03/2016    Ulcer of sacral region, stage 4 ([facility] ) 09/01/2016    Diabetes mellitus ([place] [address] )     Paraplegia (HCC)     Atrial fibrillation (HCC)     Chronic suprapubic catheter ([place] [address] )     Colostomy care ([place] [address] )     OAB (overactive bladder)     S/P unilateral BKA (below knee amputation) ([place] [address] )     Sebaceous cyst     Tobacco abuse     Type 1 diabetes mellitus with chronic kidney disease on chronic dialysis [facility])      He  has a past surgical history that includes Below knee leg amputation (Right, 2009); Colonoscopy (N/A, 3/27/2017); Esophagogastroduodenoscopy (N/A, 3/22/2017); Colonoscopy (N/A, 3/29/2017); and Colonoscopy (N/A, 6/15/2017)  His family history includes Diabetes in his paternal grandfather; Hyperlipidemia in his mother; Hypertension in his mother; Leukemia in his brother  He  reports that he has been smoking Cigars  He uses smokeless tobacco  He reports that he uses drugs, including Marijuana  He reports that he does not drink alcohol  Current Outpatient Prescriptions   Medication Sig Dispense Refill    Alcohol Swabs (ALCOHOL PADS) 70 % PADS by Does not apply route 5 (five) times a day 300 each 5    ascorbic acid (VITAMIN C) 250 mg tablet Take 500 mg by mouth daily        atorvastatin (LIPITOR) 20 mg tablet TAKE 1 TABLET BY MOUTH EVERYDAY AT BEDTIME 30 tablet 5    B Complex-C-Folic Acid TABS Take 1 capsule by mouth once for 1 dose 30 each 5    baclofen 20 mg tablet Take 20 mg by mouth 2 (two) times a day        [employer] MICROLET LANCETS lancets Use as instructed to check blood sugar 4 times a day  Dx e10 29 200 each 5    BD PEN NEEDLE KEL U/F 32G X 4 MM MISC USE 2 TIMES DAILY WITH LEVEMIR 100 each 5    BD PEN NEEDLE KEL U/F 32G X 4 MM MISC Use 2 times daily with levemir 100 each 1    Blood Glucose Monitoring Suppl ([employer] CONTOUR NEXT USB MONITOR) w/Device KIT Testing 4 times a day 1 kit 0    carvedilol (COREG) 6 25 mg tablet TAKE 1 TABLET BY MOUTH TWICE A DAY 60 tablet 5    Cholecalciferol (VITAMIN D-3) 1000 units CAPS Take 2 capsules by mouth daily 30 capsule 0    [employer] VITAMIN B-12 1000 MCG tablet TAKE 1 TABLET BY MOUTH EVERY DAY 30 tablet 5    dicyclomine (BENTYL) 10 mg capsule       Disposable Gloves MISC by Does not apply route 6 (six) times a day dx 180 each 11    ELIQUIS 2 5 MG TAKE 1 TABLET TWICE A DAY 60 tablet 3    epoetin kaushik (EPOGEN,PROCRIT) 20,000 units/mL Inject 10,000 Units under the skin      famotidine (PEPCID) 20 mg tablet TAKE 1 TABLET BY MOUTH EVERYDAY AT BEDTIME 30 tablet 5    ferrous sulfate 325 (65 Fe) mg tablet TAKE 1 TABLET BY MOUTH 3 TIMES A  tablet 2    glucose blood (SUSAN CONTOUR TEST) test strip Use as instructed to test blood sugar 4 times a day  Dx e10 29 200 each 3    glucose blood (SUSAN CONTOUR TEST) test strip Testing 4 times a day 100 each 5    Incontinence Supply Disposable (UNDERGARMENT) MISC Use 2 a day 60 each 11    Incontinence Supply Disposable (UNDERPADS) MISC Use 2 a day 60 each 11    insulin detemir (LEVEMIR) 100 units/mL subcutaneous injection Inject 8 Units under the skin 2 (two) times a day      insulin lispro (HumaLOG) 100 units/mL injection Inject 12 Units under the skin 3 (three) times a day with meals for 30 days 7 2 mL 0    LEVEMIR FLEXTOUCH 100 units/mL injection pen INJECT 14 UNITS UNDER THE SKIN 2 (TWO) TIMES A DAY 5 pen 3    Melatonin ER 3 MG TBCR Take 6 mg by mouth      metoprolol tartrate 75 MG TABS Take 1 tablet by mouth every 12 (twelve) hours  0    Multiple Vitamin (MULTI-VITAMIN DAILY) TABS Take 1 tablet by mouth daily      NOVOLOG 100 UNIT/ML injection       NOVOLOG FLEXPEN 100 UNIT/ML SOPN       nystatin (MYCOSTATIN) 100,000 units/mL suspension Apply 5 mL (500,000 Units total) to the mouth or throat 4 (four) times a day 473 mL 0    oxybutynin (DITROPAN) 5 mg tablet Take 15 mg by mouth daily  0    oxyCODONE (ROXICODONE) 10 MG TABS Take 10 mg by mouth 3 (three) times a day  0    risperiDONE (RisperDAL) 0 5 mg tablet Take 0 5 mg by mouth 2 (two) times a day  0    sertraline (ZOLOFT) 50 mg tablet Take 50 mg by mouth daily  0    sodium hypochlorite (DAKIN'S HALF-STRENGTH) external solution Use 2 times a day 473 mL 3    SUPER B COMPLEX & C TABS TAKE 1 CAPSULE BY MOUTH ONCE FOR 1 DOSE AS DIRECTED  5    Zinc Sulfate 220 (50 Zn) MG TABS TAKE 1 TABLET BY MOUTH ONCE A DAY 30 each 5     No current facility-administered medications for this visit  Current Outpatient Prescriptions on File Prior to Visit   Medication Sig    ascorbic acid (VITAMIN C) 250 mg tablet Take 500 mg by mouth daily      atorvastatin (LIPITOR) 20 mg tablet TAKE 1 TABLET BY MOUTH EVERYDAY AT BEDTIME    B Complex-C-Folic Acid TABS Take 1 capsule by mouth once for 1 dose    baclofen 20 mg tablet Take 20 mg by mouth 2 (two) times a day      SUSAN MICROLET LANCETS lancets Use as instructed to check blood sugar 4 times a day  Dx e10 29    BD PEN NEEDLE KEL U/F 32G X 4 MM MISC USE 2 TIMES DAILY WITH LEVEMIR    BD PEN NEEDLE KEL U/F 32G X 4 MM MISC Use 2 times daily with levemir    Blood Glucose Monitoring Suppl (SUSAN CONTOUR NEXT USB MONITOR) w/Device KIT Testing 4 times a day    carvedilol (COREG) 6 25 mg tablet TAKE 1 TABLET BY MOUTH TWICE A DAY    Cholecalciferol (VITAMIN D-3) 1000 units CAPS Take 2 capsules by mouth daily    [facility]S VITAMIN B-12 1000 MCG tablet TAKE 1 TABLET BY MOUTH EVERY DAY    dicyclomine (BENTYL) 10 mg capsule     Disposable Gloves MISC by Does not apply route 6 (six) times a day dx    ELIQUIS 2 5 MG TAKE 1 TABLET TWICE A DAY    epoetin kaushik (EPOGEN,PROCRIT) 20,000 units/mL Inject 10,000 Units under the skin    famotidine (PEPCID) 20 mg tablet TAKE 1 TABLET BY MOUTH EVERYDAY AT BEDTIME    ferrous sulfate 325 (65 Fe) mg tablet TAKE 1 TABLET BY MOUTH 3 TIMES A DAY    glucose blood (SUSAN CONTOUR TEST) test strip Use as instructed to test blood sugar 4 times a day  Dx e10 29    glucose blood (SUSAN CONTOUR TEST) test strip Testing 4 times a day    Incontinence Supply Disposable (UNDERGARMENT) MISC Use 2 a day    Incontinence Supply Disposable (UNDERPADS) MISC Use 2 a day    insulin detemir (LEVEMIR) 100 units/mL subcutaneous injection Inject 8 Units under the skin 2 (two) times a day    insulin lispro (HumaLOG) 100 units/mL injection Inject 12 Units under the skin 3 (three) times a day with meals for 30 days    LEVEMIR FLEXTOUCH 100 units/mL injection pen INJECT 14 UNITS UNDER THE SKIN 2 (TWO) TIMES A DAY    Melatonin ER 3 MG TBCR Take 6 mg by mouth    metoprolol tartrate 75 MG TABS Take 1 tablet by mouth every 12 (twelve) hours    Multiple Vitamin (MULTI-VITAMIN DAILY) TABS Take 1 tablet by mouth daily    NOVOLOG 100 UNIT/ML injection     NOVOLOG FLEXPEN 100 UNIT/ML SOPN     oxybutynin (DITROPAN) 5 mg tablet Take 15 mg by mouth daily    oxyCODONE (ROXICODONE) 10 MG TABS Take 10 mg by mouth 3 (three) times a day    risperiDONE (RisperDAL) 0 5 mg tablet Take 0 5 mg by mouth 2 (two) times a day    sertraline (ZOLOFT) 50 mg tablet Take 50 mg by mouth daily    sodium hypochlorite (DAKIN'S HALF-STRENGTH) external solution Use 2 times a day    SUPER B COMPLEX & C TABS TAKE 1 CAPSULE BY MOUTH ONCE FOR 1 DOSE AS DIRECTED    Zinc Sulfate 220 (50 Zn) MG TABS TAKE 1 TABLET BY MOUTH ONCE A DAY    [DISCONTINUED] Continuous Blood Gluc Sensor (Vov[employer]E SENSOR SYSTEM) MISC Insert sensor into upper arm and change every 2 weeks     No current facility-administered medications on file prior to visit  He is allergic to ciprofloxacin hcl; cymbalta [duloxetine hcl]; lyrica [pregabalin]; and polymyxin b       Review of Systems   Constitutional: Negative for activity change, appetite change, chills, fatigue and fever  HENT: Positive for mouth sores  Respiratory: Negative for shortness of breath  Cardiovascular: Negative for chest pain  Gastrointestinal: Negative for abdominal pain and diarrhea  Genitourinary: Negative for difficulty urinating  Skin: Positive for wound (chronic and healing)  Negative for rash  Objective:      BP 90/60 (BP Location: Right arm, Patient Position: Sitting, Cuff Size: Standard)   Pulse (!) 112   Temp 97 8 °F (36 6 °C) (Tympanic)   Resp 16   SpO2 98%          Physical Exam   Constitutional: He is oriented to person, place, and time  He appears well-developed and well-nourished  No distress     HENT: Head: Normocephalic and atraumatic  Right Ear: External ear normal    Left Ear: External ear normal    White coating on tongue   Eyes: Conjunctivae are normal    Neck: Normal range of motion  Neck supple  No thyromegaly present  Cardiovascular: Normal rate, regular rhythm and normal heart sounds  No murmur heard  Pulmonary/Chest: Effort normal and breath sounds normal  No respiratory distress  He has no wheezes  Abdominal: Soft  Bowel sounds are normal  He exhibits no distension  There is no tenderness  There is no rebound  Lymphadenopathy:     He has no cervical adenopathy  Neurological: He is alert and oriented to person, place, and time  Psychiatric: He has a normal mood and affect  His behavior is normal    Nursing note and vitals reviewed

## 2018-11-26 ENCOUNTER — TELEPHONE (OUTPATIENT)
Dept: FAMILY MEDICINE CLINIC | Facility: CLINIC | Age: 38
End: 2018-11-26

## 2018-11-26 NOTE — TELEPHONE ENCOUNTER
Just a fyi call    grady from [address] is calling to let us know that pt had a high blood pressure this morning before his medication of 180/96    States he took his medication right after     Call back number if needed   [phone]

## 2018-11-29 ENCOUNTER — APPOINTMENT (OUTPATIENT)
Dept: WOUND CARE | Facility: HOSPITAL | Age: 38
End: 2018-11-29
Payer: MEDICARE

## 2018-11-29 PROCEDURE — 11045 DBRDMT SUBQ TISS EACH ADDL: CPT | Performed by: SURGERY

## 2018-11-29 PROCEDURE — 11042 DBRDMT SUBQ TIS 1ST 20SQCM/<: CPT | Performed by: SURGERY

## 2018-12-04 DIAGNOSIS — Z00.00 HEALTHCARE MAINTENANCE: ICD-10-CM

## 2018-12-04 DIAGNOSIS — K58.9 IRRITABLE BOWEL SYNDROME, UNSPECIFIED TYPE: Primary | ICD-10-CM

## 2018-12-05 RX ORDER — DICYCLOMINE HYDROCHLORIDE 10 MG/1
10 CAPSULE ORAL
Qty: 90 CAPSULE | Refills: 1 | Status: SHIPPED | OUTPATIENT
Start: 2018-12-05 | End: 2019-01-26 | Stop reason: SDUPTHER

## 2018-12-05 RX ORDER — MULTIVITAMIN
1 TABLET ORAL DAILY
Qty: 90 TABLET | Refills: 2 | Status: SHIPPED | OUTPATIENT
Start: 2018-12-05 | End: 2019-08-20 | Stop reason: SDUPTHER

## 2018-12-13 DIAGNOSIS — D63.1 ANEMIA IN STAGE 5 CHRONIC KIDNEY DISEASE, NOT ON CHRONIC DIALYSIS (HCC): ICD-10-CM

## 2018-12-13 DIAGNOSIS — N18.5 ANEMIA IN STAGE 5 CHRONIC KIDNEY DISEASE, NOT ON CHRONIC DIALYSIS (HCC): ICD-10-CM

## 2018-12-13 RX ORDER — POLYHEXAM BIGUAN/GAUZE BANDAGE 0.2%-2"X2"
BANDAGE TOPICAL
Qty: 30 TABLET | Refills: 5 | Status: SHIPPED | OUTPATIENT
Start: 2018-12-13 | End: 2019-05-29 | Stop reason: SDUPTHER

## 2018-12-18 DIAGNOSIS — F31.9 BIPOLAR DEPRESSION (HCC): Primary | ICD-10-CM

## 2018-12-19 RX ORDER — RISPERIDONE 0.5 MG/1
0.5 TABLET, FILM COATED ORAL 2 TIMES DAILY
Qty: 60 TABLET | Refills: 1 | Status: SHIPPED | OUTPATIENT
Start: 2018-12-19 | End: 2019-02-06 | Stop reason: SDUPTHER

## 2018-12-20 ENCOUNTER — APPOINTMENT (OUTPATIENT)
Dept: WOUND CARE | Facility: HOSPITAL | Age: 38
End: 2018-12-20
Payer: MEDICARE

## 2018-12-20 PROCEDURE — 97598 DBRDMT OPN WND ADDL 20CM/<: CPT | Performed by: SURGERY

## 2018-12-20 PROCEDURE — 97597 DBRDMT OPN WND 1ST 20 CM/<: CPT | Performed by: SURGERY

## 2019-01-10 ENCOUNTER — APPOINTMENT (OUTPATIENT)
Dept: WOUND CARE | Facility: HOSPITAL | Age: 39
End: 2019-01-10
Payer: MEDICARE

## 2019-01-10 PROCEDURE — 97597 DBRDMT OPN WND 1ST 20 CM/<: CPT | Performed by: SURGERY

## 2019-01-10 PROCEDURE — 97598 DBRDMT OPN WND ADDL 20CM/<: CPT | Performed by: SURGERY

## 2019-01-11 ENCOUNTER — TELEPHONE (OUTPATIENT)
Dept: FAMILY MEDICINE CLINIC | Facility: CLINIC | Age: 39
End: 2019-01-11

## 2019-01-11 NOTE — TELEPHONE ENCOUNTER
Just a fyi call    [facility] is calling just to inform us they will continue his wound care services there       [phone]

## 2019-01-14 ENCOUNTER — TELEPHONE (OUTPATIENT)
Dept: FAMILY MEDICINE CLINIC | Facility: CLINIC | Age: 39
End: 2019-01-14

## 2019-01-14 DIAGNOSIS — G82.20 PARAPLEGIA (HCC): Primary | Chronic | ICD-10-CM

## 2019-01-14 DIAGNOSIS — I82.90 BLOOD CLOT IN VEIN: ICD-10-CM

## 2019-01-14 RX ORDER — APIXABAN 2.5 MG/1
TABLET, FILM COATED ORAL
Qty: 60 TABLET | Refills: 3 | Status: SHIPPED | OUTPATIENT
Start: 2019-01-14 | End: 2019-07-26 | Stop reason: SDUPTHER

## 2019-01-14 RX ORDER — WHEELCHAIR
EACH MISCELLANEOUS DAILY
Qty: 1 EACH | Refills: 0 | Status: SHIPPED | OUTPATIENT
Start: 2019-01-14

## 2019-01-14 RX ORDER — WHEELCHAIR
EACH MISCELLANEOUS DAILY
Qty: 1 EACH | Refills: 0 | Status: SHIPPED | OUTPATIENT
Start: 2019-01-14 | End: 2019-01-14 | Stop reason: SDUPTHER

## 2019-01-14 NOTE — TELEPHONE ENCOUNTER
Pt's mom states she needs a script sent to insurance for a ramp to be approved   She purchased it out of her pocket but is looking to be reimbursed

## 2019-01-26 DIAGNOSIS — K58.9 IRRITABLE BOWEL SYNDROME, UNSPECIFIED TYPE: ICD-10-CM

## 2019-01-28 RX ORDER — DICYCLOMINE HYDROCHLORIDE 10 MG/1
10 CAPSULE ORAL
Qty: 90 CAPSULE | Refills: 1 | Status: SHIPPED | OUTPATIENT
Start: 2019-01-28 | End: 2019-03-22 | Stop reason: SDUPTHER

## 2019-01-30 ENCOUNTER — TELEPHONE (OUTPATIENT)
Dept: FAMILY MEDICINE CLINIC | Facility: CLINIC | Age: 39
End: 2019-01-30

## 2019-01-30 DIAGNOSIS — N32.81 OAB (OVERACTIVE BLADDER): ICD-10-CM

## 2019-01-30 DIAGNOSIS — E10.22 TYPE 1 DIABETES MELLITUS WITH CHRONIC KIDNEY DISEASE ON CHRONIC DIALYSIS (HCC): ICD-10-CM

## 2019-01-30 DIAGNOSIS — N18.6 TYPE 1 DIABETES MELLITUS WITH CHRONIC KIDNEY DISEASE ON CHRONIC DIALYSIS (HCC): ICD-10-CM

## 2019-01-30 DIAGNOSIS — Z99.2 TYPE 1 DIABETES MELLITUS WITH CHRONIC KIDNEY DISEASE ON CHRONIC DIALYSIS (HCC): ICD-10-CM

## 2019-01-30 DIAGNOSIS — G82.20 PARAPLEGIA (HCC): Chronic | ICD-10-CM

## 2019-01-30 DIAGNOSIS — L98.429 ULCER OF SACRAL REGION, STAGE 4 (HCC): ICD-10-CM

## 2019-01-30 RX ORDER — UNDERPADS 23" X 36"
EACH MISCELLANEOUS
Qty: 60 EACH | Refills: 11 | Status: SHIPPED | OUTPATIENT
Start: 2019-01-30 | End: 2019-05-07 | Stop reason: SDUPTHER

## 2019-01-30 RX ORDER — DIAPER,BRIEF,ADULT, DISPOSABLE
EACH MISCELLANEOUS
Qty: 60 EACH | Refills: 11 | Status: SHIPPED | OUTPATIENT
Start: 2019-01-30 | End: 2019-05-07 | Stop reason: SDUPTHER

## 2019-01-30 NOTE — TELEPHONE ENCOUNTER
Pharmacy called stating the script for gloves was sent over but the gloves only come in quantity of 100 per box  So the script would need to say quantity 200 changing 7 times a day

## 2019-01-31 ENCOUNTER — APPOINTMENT (OUTPATIENT)
Dept: WOUND CARE | Facility: HOSPITAL | Age: 39
End: 2019-01-31
Payer: MEDICARE

## 2019-01-31 DIAGNOSIS — L98.429 ULCER OF SACRAL REGION, STAGE 4 (HCC): ICD-10-CM

## 2019-01-31 DIAGNOSIS — Z99.2 TYPE 1 DIABETES MELLITUS WITH CHRONIC KIDNEY DISEASE ON CHRONIC DIALYSIS (HCC): Primary | ICD-10-CM

## 2019-01-31 DIAGNOSIS — G82.20 PARAPLEGIA (HCC): Chronic | ICD-10-CM

## 2019-01-31 DIAGNOSIS — N32.81 OAB (OVERACTIVE BLADDER): ICD-10-CM

## 2019-01-31 DIAGNOSIS — E10.22 TYPE 1 DIABETES MELLITUS WITH CHRONIC KIDNEY DISEASE ON CHRONIC DIALYSIS (HCC): Primary | ICD-10-CM

## 2019-01-31 DIAGNOSIS — N18.6 TYPE 1 DIABETES MELLITUS WITH CHRONIC KIDNEY DISEASE ON CHRONIC DIALYSIS (HCC): Primary | ICD-10-CM

## 2019-01-31 PROCEDURE — 11045 DBRDMT SUBQ TISS EACH ADDL: CPT | Performed by: FAMILY MEDICINE

## 2019-01-31 PROCEDURE — 11042 DBRDMT SUBQ TIS 1ST 20SQCM/<: CPT | Performed by: FAMILY MEDICINE

## 2019-02-06 DIAGNOSIS — F31.9 BIPOLAR DEPRESSION (HCC): ICD-10-CM

## 2019-02-07 ENCOUNTER — TELEPHONE (OUTPATIENT)
Dept: FAMILY MEDICINE CLINIC | Facility: CLINIC | Age: 39
End: 2019-02-07

## 2019-02-07 DIAGNOSIS — R82.90 MALODOROUS URINE: Primary | ICD-10-CM

## 2019-02-07 RX ORDER — RISPERIDONE 0.5 MG/1
0.5 TABLET, FILM COATED ORAL 2 TIMES DAILY
Qty: 60 TABLET | Refills: 1 | Status: SHIPPED | OUTPATIENT
Start: 2019-02-07 | End: 2019-05-07 | Stop reason: SDUPTHER

## 2019-02-07 NOTE — TELEPHONE ENCOUNTER
Nurse called in stated pt urine has foul smell and is cloudy  Pt stated he has no pain and is drinking water  Nurse is wondering if you can order urine culture

## 2019-02-08 ENCOUNTER — TELEPHONE (OUTPATIENT)
Dept: FAMILY MEDICINE CLINIC | Facility: CLINIC | Age: 39
End: 2019-02-08

## 2019-02-08 DIAGNOSIS — D63.8 ANEMIA OF CHRONIC DISEASE: ICD-10-CM

## 2019-02-08 RX ORDER — FERROUS SULFATE 325(65) MG
TABLET ORAL
Qty: 100 TABLET | Refills: 1 | Status: SHIPPED | OUTPATIENT
Start: 2019-02-08 | End: 2019-05-07 | Stop reason: SDUPTHER

## 2019-02-11 ENCOUNTER — TELEPHONE (OUTPATIENT)
Dept: FAMILY MEDICINE CLINIC | Facility: CLINIC | Age: 39
End: 2019-02-11

## 2019-02-11 NOTE — TELEPHONE ENCOUNTER
[facility] is calling to let you know that pt is currently admitted to [facility]

## 2019-02-13 DIAGNOSIS — F31.9 BIPOLAR DEPRESSION (HCC): ICD-10-CM

## 2019-02-14 ENCOUNTER — TELEPHONE (OUTPATIENT)
Dept: FAMILY MEDICINE CLINIC | Facility: CLINIC | Age: 39
End: 2019-02-14

## 2019-02-14 ENCOUNTER — TRANSITIONAL CARE MANAGEMENT (OUTPATIENT)
Dept: FAMILY MEDICINE CLINIC | Facility: CLINIC | Age: 39
End: 2019-02-14

## 2019-02-20 ENCOUNTER — TELEPHONE (OUTPATIENT)
Dept: OTHER | Facility: OTHER | Age: 39
End: 2019-02-20

## 2019-02-21 DIAGNOSIS — D50.8 IRON DEFICIENCY ANEMIA SECONDARY TO INADEQUATE DIETARY IRON INTAKE: Primary | ICD-10-CM

## 2019-02-21 DIAGNOSIS — B37.0 THRUSH: ICD-10-CM

## 2019-02-21 DIAGNOSIS — R11.2 NAUSEA AND VOMITING, INTRACTABILITY OF VOMITING NOT SPECIFIED, UNSPECIFIED VOMITING TYPE: ICD-10-CM

## 2019-02-21 RX ORDER — ONDANSETRON 4 MG/1
4 TABLET, ORALLY DISINTEGRATING ORAL EVERY 8 HOURS PRN
COMMUNITY
Start: 2018-07-13 | End: 2019-02-21 | Stop reason: SDUPTHER

## 2019-02-21 RX ORDER — ASCORBIC ACID 250 MG
500 TABLET ORAL
COMMUNITY
Start: 2014-01-08 | End: 2021-02-25 | Stop reason: SDUPTHER

## 2019-02-21 NOTE — TELEPHONE ENCOUNTER
Abdoul Blanca called into the service to let the office know the patient needs his Vitamin C and  Nystain refilled

## 2019-02-22 RX ORDER — ONDANSETRON 4 MG/1
4 TABLET, ORALLY DISINTEGRATING ORAL EVERY 8 HOURS PRN
Qty: 20 TABLET | Refills: 2 | Status: SHIPPED | OUTPATIENT
Start: 2019-02-22 | End: 2019-08-06 | Stop reason: SDUPTHER

## 2019-02-22 RX ORDER — MULTIVIT WITH MINERALS/LUTEIN
500 TABLET ORAL DAILY
Qty: 90 TABLET | Refills: 1 | Status: SHIPPED | OUTPATIENT
Start: 2019-02-22 | End: 2019-05-07 | Stop reason: SDUPTHER

## 2019-02-23 DIAGNOSIS — Z99.2 TYPE 1 DIABETES MELLITUS WITH CHRONIC KIDNEY DISEASE ON CHRONIC DIALYSIS (HCC): ICD-10-CM

## 2019-02-23 DIAGNOSIS — N18.6 TYPE 1 DIABETES MELLITUS WITH CHRONIC KIDNEY DISEASE ON CHRONIC DIALYSIS (HCC): ICD-10-CM

## 2019-02-23 DIAGNOSIS — E10.22 TYPE 1 DIABETES MELLITUS WITH CHRONIC KIDNEY DISEASE ON CHRONIC DIALYSIS (HCC): ICD-10-CM

## 2019-02-24 RX ORDER — INSULIN DETEMIR 100 [IU]/ML
14 INJECTION, SOLUTION SUBCUTANEOUS 2 TIMES DAILY
Qty: 5 PEN | Refills: 3 | Status: SHIPPED | OUTPATIENT
Start: 2019-02-24 | End: 2019-10-03 | Stop reason: SDUPTHER

## 2019-02-28 ENCOUNTER — APPOINTMENT (OUTPATIENT)
Dept: WOUND CARE | Facility: HOSPITAL | Age: 39
End: 2019-02-28
Payer: MEDICARE

## 2019-02-28 PROCEDURE — 11045 DBRDMT SUBQ TISS EACH ADDL: CPT

## 2019-02-28 PROCEDURE — 11042 DBRDMT SUBQ TIS 1ST 20SQCM/<: CPT

## 2019-03-12 LAB
LEFT EYE DIABETIC RETINOPATHY: NORMAL
RIGHT EYE DIABETIC RETINOPATHY: NORMAL

## 2019-03-21 ENCOUNTER — APPOINTMENT (OUTPATIENT)
Dept: WOUND CARE | Facility: HOSPITAL | Age: 39
End: 2019-03-21
Payer: MEDICARE

## 2019-03-21 PROCEDURE — 11045 DBRDMT SUBQ TISS EACH ADDL: CPT | Performed by: FAMILY MEDICINE

## 2019-03-21 PROCEDURE — 11042 DBRDMT SUBQ TIS 1ST 20SQCM/<: CPT | Performed by: FAMILY MEDICINE

## 2019-03-22 DIAGNOSIS — K58.9 IRRITABLE BOWEL SYNDROME, UNSPECIFIED TYPE: ICD-10-CM

## 2019-03-22 RX ORDER — DICYCLOMINE HYDROCHLORIDE 10 MG/1
10 CAPSULE ORAL
Qty: 90 CAPSULE | Refills: 1 | Status: SHIPPED | OUTPATIENT
Start: 2019-03-22 | End: 2019-05-18 | Stop reason: SDUPTHER

## 2019-04-02 ENCOUNTER — TELEPHONE (OUTPATIENT)
Dept: FAMILY MEDICINE CLINIC | Facility: CLINIC | Age: 39
End: 2019-04-02

## 2019-04-11 ENCOUNTER — APPOINTMENT (OUTPATIENT)
Dept: WOUND CARE | Facility: HOSPITAL | Age: 39
End: 2019-04-11
Payer: MEDICARE

## 2019-04-11 PROCEDURE — 11042 DBRDMT SUBQ TIS 1ST 20SQCM/<: CPT | Performed by: FAMILY MEDICINE

## 2019-04-11 PROCEDURE — 11045 DBRDMT SUBQ TISS EACH ADDL: CPT | Performed by: FAMILY MEDICINE

## 2019-04-21 DIAGNOSIS — E53.9 VITAMIN B DEFICIENCY: ICD-10-CM

## 2019-04-22 RX ORDER — OMEGA-3/DHA/EPA/FISH OIL 35-113.5MG
TABLET,CHEWABLE ORAL
Qty: 30 TABLET | Refills: 5 | Status: SHIPPED | OUTPATIENT
Start: 2019-04-22 | End: 2019-09-24 | Stop reason: SDUPTHER

## 2019-04-26 ENCOUNTER — TELEPHONE (OUTPATIENT)
Dept: FAMILY MEDICINE CLINIC | Facility: CLINIC | Age: 39
End: 2019-04-26

## 2019-04-26 DIAGNOSIS — R82.998 DARK URINE: Primary | ICD-10-CM

## 2019-04-29 ENCOUNTER — TELEPHONE (OUTPATIENT)
Dept: FAMILY MEDICINE CLINIC | Facility: CLINIC | Age: 39
End: 2019-04-29

## 2019-04-29 DIAGNOSIS — N30.00 ACUTE CYSTITIS WITHOUT HEMATURIA: Primary | ICD-10-CM

## 2019-04-29 RX ORDER — SULFAMETHOXAZOLE AND TRIMETHOPRIM 800; 160 MG/1; MG/1
1 TABLET ORAL EVERY 12 HOURS SCHEDULED
Qty: 10 TABLET | Refills: 0 | Status: SHIPPED | OUTPATIENT
Start: 2019-04-29 | End: 2019-05-04

## 2019-05-02 ENCOUNTER — APPOINTMENT (OUTPATIENT)
Dept: WOUND CARE | Facility: HOSPITAL | Age: 39
End: 2019-05-02
Payer: MEDICARE

## 2019-05-02 PROCEDURE — 11042 DBRDMT SUBQ TIS 1ST 20SQCM/<: CPT | Performed by: FAMILY MEDICINE

## 2019-05-02 PROCEDURE — 11045 DBRDMT SUBQ TISS EACH ADDL: CPT | Performed by: FAMILY MEDICINE

## 2019-05-07 ENCOUNTER — OFFICE VISIT (OUTPATIENT)
Dept: FAMILY MEDICINE CLINIC | Facility: CLINIC | Age: 39
End: 2019-05-07

## 2019-05-07 ENCOUNTER — TELEPHONE (OUTPATIENT)
Dept: FAMILY MEDICINE CLINIC | Facility: CLINIC | Age: 39
End: 2019-05-07

## 2019-05-07 VITALS
RESPIRATION RATE: 16 BRPM | TEMPERATURE: 98.7 F | SYSTOLIC BLOOD PRESSURE: 90 MMHG | OXYGEN SATURATION: 97 % | HEART RATE: 100 BPM | DIASTOLIC BLOOD PRESSURE: 60 MMHG

## 2019-05-07 DIAGNOSIS — D75.839 THROMBOCYTOSIS: ICD-10-CM

## 2019-05-07 DIAGNOSIS — B07.9 FACIAL WART: ICD-10-CM

## 2019-05-07 DIAGNOSIS — N18.6 TYPE 1 DIABETES MELLITUS WITH CHRONIC KIDNEY DISEASE ON CHRONIC DIALYSIS (HCC): ICD-10-CM

## 2019-05-07 DIAGNOSIS — D63.8 ANEMIA OF CHRONIC DISEASE: ICD-10-CM

## 2019-05-07 DIAGNOSIS — G82.20 PARAPLEGIA (HCC): Chronic | ICD-10-CM

## 2019-05-07 DIAGNOSIS — Z99.2 TYPE 1 DIABETES MELLITUS WITH CHRONIC KIDNEY DISEASE ON CHRONIC DIALYSIS (HCC): ICD-10-CM

## 2019-05-07 DIAGNOSIS — F31.9 BIPOLAR DEPRESSION (HCC): ICD-10-CM

## 2019-05-07 DIAGNOSIS — B35.4 TINEA CORPORIS: ICD-10-CM

## 2019-05-07 DIAGNOSIS — L98.429 ULCER OF SACRAL REGION, STAGE 4 (HCC): ICD-10-CM

## 2019-05-07 DIAGNOSIS — D50.8 IRON DEFICIENCY ANEMIA SECONDARY TO INADEQUATE DIETARY IRON INTAKE: ICD-10-CM

## 2019-05-07 DIAGNOSIS — E10.22 TYPE 1 DIABETES MELLITUS WITH CHRONIC KIDNEY DISEASE ON CHRONIC DIALYSIS (HCC): ICD-10-CM

## 2019-05-07 DIAGNOSIS — Z43.3 COLOSTOMY CARE (HCC): ICD-10-CM

## 2019-05-07 DIAGNOSIS — I48.91 ATRIAL FIBRILLATION, UNSPECIFIED TYPE (HCC): Primary | Chronic | ICD-10-CM

## 2019-05-07 DIAGNOSIS — N32.81 OAB (OVERACTIVE BLADDER): ICD-10-CM

## 2019-05-07 DIAGNOSIS — Z79.4 INSULIN LONG-TERM USE (HCC): ICD-10-CM

## 2019-05-07 DIAGNOSIS — B35.4 TINEA CORPORIS: Primary | ICD-10-CM

## 2019-05-07 PROCEDURE — 99214 OFFICE O/P EST MOD 30 MIN: CPT | Performed by: PHYSICIAN ASSISTANT

## 2019-05-07 RX ORDER — UNDERPADS 23" X 36"
EACH MISCELLANEOUS
Qty: 60 EACH | Refills: 11 | Status: SHIPPED | OUTPATIENT
Start: 2019-05-07 | End: 2019-07-12 | Stop reason: SDUPTHER

## 2019-05-07 RX ORDER — RISPERIDONE 0.5 MG/1
0.5 TABLET, FILM COATED ORAL 2 TIMES DAILY
Qty: 60 TABLET | Refills: 5 | Status: SHIPPED | OUTPATIENT
Start: 2019-05-07 | End: 2019-09-25 | Stop reason: SDUPTHER

## 2019-05-07 RX ORDER — DIAPER,BRIEF,ADULT, DISPOSABLE
EACH MISCELLANEOUS
Qty: 4 EACH | Refills: 11 | Status: SHIPPED | OUTPATIENT
Start: 2019-05-07 | End: 2019-05-07 | Stop reason: SDUPTHER

## 2019-05-07 RX ORDER — CARVEDILOL 3.12 MG/1
3.12 TABLET ORAL 2 TIMES DAILY WITH MEALS
Qty: 180 TABLET | Refills: 1 | Status: SHIPPED | OUTPATIENT
Start: 2019-05-07 | End: 2019-10-28 | Stop reason: SDUPTHER

## 2019-05-07 RX ORDER — CLOTRIMAZOLE AND BETAMETHASONE DIPROPIONATE 10; .64 MG/G; MG/G
CREAM TOPICAL 2 TIMES DAILY
Qty: 30 G | Refills: 0 | Status: SHIPPED | OUTPATIENT
Start: 2019-05-07 | End: 2020-02-28

## 2019-05-07 RX ORDER — MULTIVIT WITH MINERALS/LUTEIN
500 TABLET ORAL DAILY
Qty: 90 TABLET | Refills: 1 | Status: SHIPPED | OUTPATIENT
Start: 2019-05-07 | End: 2019-05-29 | Stop reason: SDUPTHER

## 2019-05-07 RX ORDER — DIAPER,BRIEF,ADULT, DISPOSABLE
EACH MISCELLANEOUS
Qty: 4 EACH | Refills: 11 | Status: SHIPPED | OUTPATIENT
Start: 2019-05-07 | End: 2021-06-01 | Stop reason: SDUPTHER

## 2019-05-07 RX ORDER — FERROUS SULFATE 325(65) MG
TABLET ORAL
Qty: 100 TABLET | Refills: 1 | Status: SHIPPED | OUTPATIENT
Start: 2019-05-07 | End: 2019-06-05 | Stop reason: SDUPTHER

## 2019-05-08 ENCOUNTER — TELEPHONE (OUTPATIENT)
Dept: FAMILY MEDICINE CLINIC | Facility: CLINIC | Age: 39
End: 2019-05-08

## 2019-05-08 PROBLEM — Z99.2 DIALYSIS PATIENT (HCC): Status: ACTIVE | Noted: 2019-05-08

## 2019-05-08 PROBLEM — D75.839 THROMBOCYTOSIS: Status: RESOLVED | Noted: 2017-09-18 | Resolved: 2019-05-08

## 2019-05-08 PROBLEM — A41.9 SEPSIS (HCC): Status: RESOLVED | Noted: 2017-03-17 | Resolved: 2019-05-08

## 2019-05-08 PROBLEM — Z79.4 INSULIN LONG-TERM USE (HCC): Status: ACTIVE | Noted: 2019-05-08

## 2019-05-16 ENCOUNTER — APPOINTMENT (OUTPATIENT)
Dept: WOUND CARE | Facility: HOSPITAL | Age: 39
End: 2019-05-16
Payer: MEDICARE

## 2019-05-16 PROCEDURE — 11042 DBRDMT SUBQ TIS 1ST 20SQCM/<: CPT | Performed by: FAMILY MEDICINE

## 2019-05-16 PROCEDURE — 11045 DBRDMT SUBQ TISS EACH ADDL: CPT | Performed by: FAMILY MEDICINE

## 2019-05-18 DIAGNOSIS — K58.9 IRRITABLE BOWEL SYNDROME, UNSPECIFIED TYPE: ICD-10-CM

## 2019-05-21 ENCOUNTER — HOSPITAL ENCOUNTER (OUTPATIENT)
Dept: RADIOLOGY | Facility: HOSPITAL | Age: 39
Discharge: HOME | End: 2019-05-21
Attending: FAMILY MEDICINE
Payer: MEDICARE

## 2019-05-21 ENCOUNTER — TRANSCRIBE ORDERS (OUTPATIENT)
Dept: ADMINISTRATIVE | Facility: HOSPITAL | Age: 39
End: 2019-05-21

## 2019-05-21 DIAGNOSIS — L89.324 PRESSURE ULCER OF LEFT BUTTOCK, STAGE 4 (HCC): ICD-10-CM

## 2019-05-21 DIAGNOSIS — L89.324 PRESSURE ULCER OF LEFT BUTTOCK, STAGE 4 (HCC): Primary | ICD-10-CM

## 2019-05-21 PROCEDURE — 72220 X-RAY EXAM SACRUM TAILBONE: CPT

## 2019-05-21 PROCEDURE — 72170 X-RAY EXAM OF PELVIS: CPT

## 2019-05-23 RX ORDER — DICYCLOMINE HYDROCHLORIDE 10 MG/1
10 CAPSULE ORAL
Qty: 90 CAPSULE | Refills: 1 | Status: SHIPPED | OUTPATIENT
Start: 2019-05-23 | End: 2019-06-21 | Stop reason: SDUPTHER

## 2019-05-25 DIAGNOSIS — N18.6 TYPE 1 DIABETES MELLITUS WITH CHRONIC KIDNEY DISEASE ON CHRONIC DIALYSIS (HCC): ICD-10-CM

## 2019-05-25 DIAGNOSIS — L98.429 ULCER OF SACRAL REGION, STAGE 4 (HCC): ICD-10-CM

## 2019-05-25 DIAGNOSIS — E10.22 TYPE 1 DIABETES MELLITUS WITH CHRONIC KIDNEY DISEASE ON CHRONIC DIALYSIS (HCC): ICD-10-CM

## 2019-05-25 DIAGNOSIS — Z99.2 TYPE 1 DIABETES MELLITUS WITH CHRONIC KIDNEY DISEASE ON CHRONIC DIALYSIS (HCC): ICD-10-CM

## 2019-05-28 RX ORDER — UREA 10 %
LOTION (ML) TOPICAL
Qty: 30 EACH | Refills: 5 | Status: SHIPPED | OUTPATIENT
Start: 2019-05-28 | End: 2020-04-30 | Stop reason: SDUPTHER

## 2019-05-29 ENCOUNTER — TRANSITIONAL CARE MANAGEMENT (OUTPATIENT)
Dept: FAMILY MEDICINE CLINIC | Facility: CLINIC | Age: 39
End: 2019-05-29

## 2019-05-29 RX ORDER — MIDODRINE HYDROCHLORIDE 5 MG/1
5 TABLET ORAL
COMMUNITY
Start: 2019-02-13 | End: 2020-02-28

## 2019-05-29 RX ORDER — PANTOPRAZOLE SODIUM 20 MG/1
20 TABLET, DELAYED RELEASE ORAL DAILY
COMMUNITY
End: 2019-08-06 | Stop reason: SDUPTHER

## 2019-05-31 DIAGNOSIS — E78.5 HYPERLIPIDEMIA, UNSPECIFIED HYPERLIPIDEMIA TYPE: ICD-10-CM

## 2019-05-31 RX ORDER — ATORVASTATIN CALCIUM 20 MG/1
TABLET, FILM COATED ORAL
Qty: 30 TABLET | Refills: 5 | Status: SHIPPED | OUTPATIENT
Start: 2019-05-31 | End: 2019-09-24 | Stop reason: SDUPTHER

## 2019-06-05 DIAGNOSIS — D63.8 ANEMIA OF CHRONIC DISEASE: ICD-10-CM

## 2019-06-05 RX ORDER — FERROUS SULFATE 325(65) MG
TABLET ORAL
Qty: 100 TABLET | Refills: 2 | Status: SHIPPED | OUTPATIENT
Start: 2019-06-05 | End: 2019-08-12 | Stop reason: SDUPTHER

## 2019-06-06 ENCOUNTER — APPOINTMENT (OUTPATIENT)
Dept: WOUND CARE | Facility: HOSPITAL | Age: 39
End: 2019-06-06
Payer: MEDICARE

## 2019-06-06 PROCEDURE — 97597 DBRDMT OPN WND 1ST 20 CM/<: CPT | Performed by: FAMILY MEDICINE

## 2019-06-14 ENCOUNTER — TELEPHONE (OUTPATIENT)
Dept: FAMILY MEDICINE CLINIC | Facility: CLINIC | Age: 39
End: 2019-06-14

## 2019-06-17 DIAGNOSIS — R33.9 URINARY RETENTION: ICD-10-CM

## 2019-06-17 DIAGNOSIS — N31.9 NEUROGENIC BLADDER: ICD-10-CM

## 2019-06-17 DIAGNOSIS — N32.81 OAB (OVERACTIVE BLADDER): ICD-10-CM

## 2019-06-17 DIAGNOSIS — E10.22 TYPE 1 DIABETES MELLITUS WITH CHRONIC KIDNEY DISEASE ON CHRONIC DIALYSIS (HCC): Primary | ICD-10-CM

## 2019-06-17 DIAGNOSIS — N18.6 TYPE 1 DIABETES MELLITUS WITH CHRONIC KIDNEY DISEASE ON CHRONIC DIALYSIS (HCC): Primary | ICD-10-CM

## 2019-06-17 DIAGNOSIS — Z99.2 TYPE 1 DIABETES MELLITUS WITH CHRONIC KIDNEY DISEASE ON CHRONIC DIALYSIS (HCC): Primary | ICD-10-CM

## 2019-06-17 DIAGNOSIS — Z99.2 STAGE 5 CHRONIC KIDNEY DISEASE ON CHRONIC DIALYSIS (HCC): ICD-10-CM

## 2019-06-17 DIAGNOSIS — Z93.59 CHRONIC SUPRAPUBIC CATHETER (HCC): Chronic | ICD-10-CM

## 2019-06-17 DIAGNOSIS — N18.6 STAGE 5 CHRONIC KIDNEY DISEASE ON CHRONIC DIALYSIS (HCC): ICD-10-CM

## 2019-06-17 RX ORDER — UNDERPADS 23" X 36"
EACH MISCELLANEOUS
Qty: 180 EACH | Refills: 11 | Status: SHIPPED | OUTPATIENT
Start: 2019-06-17 | End: 2019-06-17 | Stop reason: SDUPTHER

## 2019-06-17 RX ORDER — UNDERPADS 23" X 36"
EACH MISCELLANEOUS
Qty: 180 EACH | Refills: 11 | Status: SHIPPED | OUTPATIENT
Start: 2019-06-17 | End: 2021-06-01 | Stop reason: SDUPTHER

## 2019-06-18 ENCOUNTER — TELEPHONE (OUTPATIENT)
Dept: FAMILY MEDICINE CLINIC | Facility: CLINIC | Age: 39
End: 2019-06-18

## 2019-06-18 NOTE — TELEPHONE ENCOUNTER
Giuliano Cruise from [employer] called asking for a script that needs to say adult briefs qty 180, 6 per day      [phone]  [phone]

## 2019-06-21 DIAGNOSIS — K58.9 IRRITABLE BOWEL SYNDROME, UNSPECIFIED TYPE: ICD-10-CM

## 2019-06-21 RX ORDER — DICYCLOMINE HYDROCHLORIDE 10 MG/1
10 CAPSULE ORAL
Qty: 90 CAPSULE | Refills: 3 | Status: SHIPPED | OUTPATIENT
Start: 2019-06-21 | End: 2019-09-13 | Stop reason: SDUPTHER

## 2019-06-27 ENCOUNTER — APPOINTMENT (OUTPATIENT)
Dept: WOUND CARE | Facility: HOSPITAL | Age: 39
End: 2019-06-27
Payer: MEDICARE

## 2019-06-27 PROCEDURE — 11045 DBRDMT SUBQ TISS EACH ADDL: CPT

## 2019-06-27 PROCEDURE — 11042 DBRDMT SUBQ TIS 1ST 20SQCM/<: CPT

## 2019-07-08 RX ORDER — PANTOPRAZOLE SODIUM 20 MG/1
20 TABLET, DELAYED RELEASE ORAL DAILY
Qty: 90 TABLET | Refills: 0 | OUTPATIENT
Start: 2019-07-08

## 2019-07-08 NOTE — TELEPHONE ENCOUNTER
In our records we have that he is taking famotidine and not taking the pantoprazole  It looks like according to [facility] records however it is not updated he is only taking the pantoprazole    I would recommend he continue just with the famotidine

## 2019-07-11 DIAGNOSIS — N32.81 OAB (OVERACTIVE BLADDER): ICD-10-CM

## 2019-07-11 DIAGNOSIS — N18.6 TYPE 1 DIABETES MELLITUS WITH CHRONIC KIDNEY DISEASE ON CHRONIC DIALYSIS (HCC): ICD-10-CM

## 2019-07-11 DIAGNOSIS — L98.429 ULCER OF SACRAL REGION, STAGE 4 (HCC): ICD-10-CM

## 2019-07-11 DIAGNOSIS — Z99.2 TYPE 1 DIABETES MELLITUS WITH CHRONIC KIDNEY DISEASE ON CHRONIC DIALYSIS (HCC): ICD-10-CM

## 2019-07-11 DIAGNOSIS — E10.22 TYPE 1 DIABETES MELLITUS WITH CHRONIC KIDNEY DISEASE ON CHRONIC DIALYSIS (HCC): ICD-10-CM

## 2019-07-11 DIAGNOSIS — G82.20 PARAPLEGIA (HCC): Chronic | ICD-10-CM

## 2019-07-11 NOTE — TELEPHONE ENCOUNTER
Mother of pt is requesting if possible to renew script for blue bed pads  Please send script to [facility]

## 2019-07-12 NOTE — TELEPHONE ENCOUNTER
Month ago I sent a script for under pads for 2 a day  I assume that is what the script was for  The blue chux pads  Can you call [facility] to find out if they just didn't get script or if maybe it is another underpad he was getting like a washable one

## 2019-07-12 NOTE — TELEPHONE ENCOUNTER
Script went to [facility], not [facility]  Pended again with [facility] as the the intended receiving pharmacy  I don't see any documentation that a physical script was faxed to [facility] for that

## 2019-07-15 RX ORDER — UNDERPADS 23" X 36"
EACH MISCELLANEOUS
Qty: 60 EACH | Refills: 11 | Status: SHIPPED | OUTPATIENT
Start: 2019-07-15 | End: 2021-06-01 | Stop reason: SDUPTHER

## 2019-07-18 ENCOUNTER — APPOINTMENT (OUTPATIENT)
Dept: WOUND CARE | Facility: HOSPITAL | Age: 39
End: 2019-07-18
Payer: MEDICARE

## 2019-07-18 PROCEDURE — 11045 DBRDMT SUBQ TISS EACH ADDL: CPT | Performed by: FAMILY MEDICINE

## 2019-07-18 PROCEDURE — 11042 DBRDMT SUBQ TIS 1ST 20SQCM/<: CPT | Performed by: FAMILY MEDICINE

## 2019-07-19 ENCOUNTER — TELEPHONE (OUTPATIENT)
Dept: FAMILY MEDICINE CLINIC | Facility: CLINIC | Age: 39
End: 2019-07-19

## 2019-07-19 DIAGNOSIS — R82.90 CLOUDY URINE: Primary | ICD-10-CM

## 2019-07-19 NOTE — TELEPHONE ENCOUNTER
Spoke with Mercy Mtz, RN / [facility] and he requested order be faxed to him at [phone]    It has been faxed, received fax transmittal confirmation "ok"

## 2019-07-19 NOTE — TELEPHONE ENCOUNTER
Josh from [facility] was checking pt caterer and noticed a foul smell and cloudiness in urine  He took a sample and would like to know if he can get an order to get the sample sent to urinalysis

## 2019-07-22 DIAGNOSIS — T83.511A URINARY TRACT INFECTION ASSOCIATED WITH INDWELLING URETHRAL CATHETER, INITIAL ENCOUNTER (HCC): Primary | ICD-10-CM

## 2019-07-22 DIAGNOSIS — N39.0 URINARY TRACT INFECTION ASSOCIATED WITH INDWELLING URETHRAL CATHETER, INITIAL ENCOUNTER (HCC): Primary | ICD-10-CM

## 2019-07-22 RX ORDER — SULFAMETHOXAZOLE AND TRIMETHOPRIM 800; 160 MG/1; MG/1
1 TABLET ORAL EVERY 12 HOURS SCHEDULED
Qty: 20 TABLET | Refills: 0 | Status: SHIPPED | OUTPATIENT
Start: 2019-07-22 | End: 2019-08-01

## 2019-07-26 DIAGNOSIS — N32.81 OAB (OVERACTIVE BLADDER): Primary | ICD-10-CM

## 2019-07-26 DIAGNOSIS — I82.90 BLOOD CLOT IN VEIN: ICD-10-CM

## 2019-07-26 RX ORDER — OXYBUTYNIN CHLORIDE 5 MG/1
15 TABLET ORAL DAILY
Qty: 90 TABLET | Refills: 3 | Status: SHIPPED | OUTPATIENT
Start: 2019-07-26 | End: 2019-10-14 | Stop reason: SDUPTHER

## 2019-08-06 ENCOUNTER — TELEPHONE (OUTPATIENT)
Dept: FAMILY MEDICINE CLINIC | Facility: CLINIC | Age: 39
End: 2019-08-06

## 2019-08-06 ENCOUNTER — OFFICE VISIT (OUTPATIENT)
Dept: FAMILY MEDICINE CLINIC | Facility: CLINIC | Age: 39
End: 2019-08-06

## 2019-08-06 DIAGNOSIS — N18.6 TYPE 1 DIABETES MELLITUS WITH CHRONIC KIDNEY DISEASE ON CHRONIC DIALYSIS (HCC): ICD-10-CM

## 2019-08-06 DIAGNOSIS — Z99.3 WHEELCHAIR DEPENDENT: ICD-10-CM

## 2019-08-06 DIAGNOSIS — R11.2 NAUSEA AND VOMITING, INTRACTABILITY OF VOMITING NOT SPECIFIED, UNSPECIFIED VOMITING TYPE: ICD-10-CM

## 2019-08-06 DIAGNOSIS — G89.29 CHRONIC RIGHT-SIDED THORACIC BACK PAIN: Primary | ICD-10-CM

## 2019-08-06 DIAGNOSIS — M54.6 CHRONIC RIGHT-SIDED THORACIC BACK PAIN: Primary | ICD-10-CM

## 2019-08-06 DIAGNOSIS — E10.22 TYPE 1 DIABETES MELLITUS WITH CHRONIC KIDNEY DISEASE ON CHRONIC DIALYSIS (HCC): ICD-10-CM

## 2019-08-06 DIAGNOSIS — J30.9 ALLERGIC RHINITIS, UNSPECIFIED SEASONALITY, UNSPECIFIED TRIGGER: ICD-10-CM

## 2019-08-06 DIAGNOSIS — K21.9 GASTROESOPHAGEAL REFLUX DISEASE WITHOUT ESOPHAGITIS: ICD-10-CM

## 2019-08-06 DIAGNOSIS — Z99.2 TYPE 1 DIABETES MELLITUS WITH CHRONIC KIDNEY DISEASE ON CHRONIC DIALYSIS (HCC): ICD-10-CM

## 2019-08-06 PROCEDURE — 99213 OFFICE O/P EST LOW 20 MIN: CPT | Performed by: PHYSICIAN ASSISTANT

## 2019-08-06 RX ORDER — ONDANSETRON 4 MG/1
4 TABLET, ORALLY DISINTEGRATING ORAL EVERY 8 HOURS PRN
Qty: 20 TABLET | Refills: 2 | Status: SHIPPED | OUTPATIENT
Start: 2019-08-06 | End: 2020-05-21 | Stop reason: SDUPTHER

## 2019-08-06 RX ORDER — LORATADINE 10 MG/1
10 TABLET ORAL DAILY
Qty: 90 TABLET | Refills: 1 | Status: SHIPPED | OUTPATIENT
Start: 2019-08-06 | End: 2020-02-28

## 2019-08-06 RX ORDER — PANTOPRAZOLE SODIUM 40 MG/1
40 TABLET, DELAYED RELEASE ORAL DAILY
Qty: 90 TABLET | Refills: 1 | Status: SHIPPED | OUTPATIENT
Start: 2019-08-06 | End: 2020-01-27 | Stop reason: SDUPTHER

## 2019-08-06 NOTE — TELEPHONE ENCOUNTER
Patient was seen today states if you are going to refer him to a podiatrist due to him having problem with his feet?

## 2019-08-06 NOTE — PROGRESS NOTES
Assessment/Plan:    Wheelchair dependent  Patient is in need to continue with power mobility device  Problem List Items Addressed This Visit        Digestive    GERD (gastroesophageal reflux disease)    Relevant Medications    pantoprazole (PROTONIX) 40 mg tablet       Endocrine    Type 1 diabetes mellitus with chronic kidney disease on chronic dialysis [facility])    Relevant Orders    Ambulatory referral to Podiatry       Other    Wheelchair dependent     Patient is in need to continue with power mobility device  Other Visit Diagnoses     Chronic right-sided thoracic back pain    -  Primary    Relevant Orders    XR spine thoracic 3 vw    Nausea and vomiting, intractability of vomiting not specified, unspecified vomiting type        Relevant Medications    pantoprazole (PROTONIX) 40 mg tablet    ondansetron (ZOFRAN-ODT) 4 mg disintegrating tablet    Allergic rhinitis, unspecified seasonality, unspecified trigger        Relevant Medications    loratadine (CLARITIN) 10 mg tablet            Subjective:      Patient ID: Carles Sandifer  is a 45 y o  male  HPI   27-year-old paraplegic male here for power mobility exam      He currently has an electric wheelchair and needs a new one  He has a history of paraplegia due to transverse myelitis and has an extensive history of lumbar laminectomies due to 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  He has medial history significant of injury to T6 causing paraplegia, diabetes, stroke, neurogenic bladder, chronic kidney disease stage 5 on dialysis, atrial fibrillation with DVT, ulcers of the sacral region  Due to spinal cord injury he  has no control or sensation of his  Legs  He also has a below the knee amputation on the right  Due to lack of ability to move below the waist cane,  Walker or scooter would not be sufficient  He is physically mentally able to operate a power mobility device in his home    His condition has been stable since diagnosed with paraplegia and would not be expected to improve  He has had duration and other medical problems due to this and has had sacral, lumbar ulcers in the past due to his ambulatory dysfunction and poor ability to shift weight     He has been fully  wheelchair dependent since 2014 and has been using power mobility equipment since  The following portions of the patient's history were reviewed and updated as appropriate:   He  has a past medical history of Ambulatory dysfunction, Anemia, iron deficiency, Atrial fibrillation ([facility] ), AVM (arteriovenous malformation) of duodenum, acquired, Chronic deep vein thrombosis (DVT) ([facility] ), Chronic pain, Chronic suprapubic catheter ([place] [address] ), Clostridium difficile infection (08/11/2016), Colostomy on examination ([place] [address] ), GERD (gastroesophageal reflux disease), Memory impairment (2011), Neurogenic bladder, OAB (overactive bladder), Paraplegia ([place] [address] ), S/P unilateral BKA (below knee amputation) ([place] [address] ), Sebaceous cyst (removed in 2017), Tobacco abuse, and Wounds, multiple    He   Patient Active Problem List    Diagnosis Date Noted    Wheelchair dependent 08/07/2019    Dialysis patient ([place] [address] ) 05/08/2019    Insulin long-term use ([place] [address] ) 05/08/2019    Noncompliance by refusing intervention or support 09/29/2017    Delirium 09/28/2017    Urinary retention 09/26/2017    Asymptomatic bacteriuria 09/25/2017    History of Clostridium difficile infection 09/25/2017    Stage 5 chronic kidney disease on chronic dialysis ([place] [address] ) 09/18/2017    SOB (shortness of breath) 09/18/2017    Penile abscess 09/18/2017    Iron deficiency anemia 07/03/2017    Decubitus ulcers 07/03/2017    HTN (hypertension), benign 07/03/2017    Neurogenic bladder 07/03/2017    GERD (gastroesophageal reflux disease) 07/03/2017    Chronic pain 07/03/2017    Wound healing, delayed 06/29/2017    Osteomyelitis of right femur ([facility] ) 03/20/2017    Chronic indwelling Ortega catheter     Anemia 09/03/2016    Ulcer of sacral region, stage 4 (HCC) 09/01/2016    Paraplegia (HCC)     Atrial fibrillation (HCC)     [facility]ub[facility] catheter [facility] )     Colostomy care ([facility] )     OAB (overactive bladder)     S/P unilateral BKA (below knee amputation) ([facility] )     Sebaceous cyst     Tobacco abuse     Type 1 diabetes mellitus with chronic kidney disease on chronic dialysis [facility])      He  has a past surgical history that includes Below knee leg amputation (Right, 2009); Colonoscopy (N/A, 3/27/2017); Esophagogastroduodenoscopy (N/A, 3/22/2017); Colonoscopy (N/A, 3/29/2017); and Colonoscopy (N/A, 6/15/2017)  His family history includes Diabetes in his paternal grandfather; Hyperlipidemia in his mother; Hypertension in his mother; Leukemia in his brother  He  reports that he has been smoking cigars  He uses smokeless tobacco  He reports that he has current or past drug history  Drug: Marijuana  He reports that he does not drink alcohol  Current Outpatient Medications   Medication Sig Dispense Refill    Alcohol Swabs (ALCOHOL PADS) 70 % PADS by Does not apply route 5 (five) times a day 300 each 5    apixaban (ELIQUIS) 2 5 mg Take 2 tablets (5 mg total) by mouth 2 (two) times a day 60 tablet 5    ascorbic acid (VITAMIN C) 250 MG tablet Take 500 mg by mouth      atorvastatin (LIPITOR) 20 mg tablet TAKE 1 TABLET BY MOUTH EVERYDAY AT BEDTIME 30 tablet 5    baclofen 20 mg tablet Take 20 mg by mouth 2 (two) times a day        [employer] MICROLET LANCETS lancets Use as instructed to check blood sugar 4 times a day  Dx e10 29 200 each 5    Blood Glucose Monitoring Suppl ([employer] CONTOUR NEXT USB MONITOR) w/Device KIT Testing 4 times a day 1 kit 0    carvedilol (COREG) 3 125 mg tablet Take 1 tablet (3 125 mg total) by mouth 2 (two) times a day with meals 180 tablet 1    Cholecalciferol (VITAMIN D-3) 1000 units CAPS Take 2 capsules by mouth daily 30 capsule 0    clotrimazole-betamethasone (LOTRISONE) 1-0 05 % cream Apply topically 2 (two) times a day 30 g 0    [employer] VITAMIN B-12 1000 MCG tablet TAKE 1 TABLET BY MOUTH EVERY DAY 30 tablet 5    dicyclomine (BENTYL) 10 mg capsule TAKE 1 CAPSULE (10 MG TOTAL) BY MOUTH 3 (THREE) TIMES A DAY BEFORE MEALS 90 capsule 3    Disposable Gloves MISC Use 7 times a day, 4 boxes of gloves XL  Dx type 1 DM, paraplegia 4 each 11    epoetin kaushik (EPOGEN,PROCRIT) 20,000 units/mL Inject 10,000 Units under the skin      famotidine (PEPCID) 20 mg tablet TAKE 1 TABLET BY MOUTH EVERYDAY AT BEDTIME 30 tablet 5    ferrous sulfate 325 (65 Fe) mg tablet TAKE 1 TABLET BY MOUTH THREE TIMES A  tablet 2    glucose blood (SUSAN CONTOUR TEST) test strip Use as instructed to test blood sugar 4 times a day  Dx e10 29 200 each 3    glucose blood (SUSAN CONTOUR TEST) test strip Testing 4 times a day 100 each 5    Incontinence Supply Disposable (INCONTINENCE BRIEF LARGE) MISC by Does not apply route 6 (six) times a day Size xl 180 each 11    Incontinence Supply Disposable (UNDERGARMENT) MISC Use 6 a day, 4 boxes, xl  Dx R51, paraplegia 4 each 11    Incontinence Supply Disposable (UNDERPADS) MISC Use 2 a day 60 each 11    insulin detemir (LEVEMIR) 100 units/mL subcutaneous injection Inject 8 Units under the skin 2 (two) times a day      LEVEMIR FLEXTOUCH 100 units/mL injection pen INJECT 14 UNITS UNDER THE SKIN 2 (TWO) TIMES A DAY 5 pen 3    Melatonin ER 3 MG TBCR Take 6 mg by mouth      midodrine (PROAMATINE) 5 mg tablet Take 5 mg by mouth      Misc   Devices [facility]) MISC by Does not apply route daily Wheelchair ramp, dx g82 20 1 each 0    Multiple Vitamin (MULTI-VITAMIN DAILY) TABS Take 1 tablet by mouth daily 90 tablet 2    NOVOLOG 100 UNIT/ML injection       NOVOLOG FLEXPEN 100 UNIT/ML SOPN       nystatin-triamcinolone (MYCOLOG-II) cream Apply topically 2 (two) times a day 30 g 0    ondansetron (ZOFRAN-ODT) 4 mg disintegrating tablet Take 1 tablet (4 mg total) by mouth every 8 (eight) hours as needed for nausea or vomiting 20 tablet 2    oxybutynin (DITROPAN) 5 mg tablet Take 3 tablets (15 mg total) by mouth daily 90 tablet 3    oxyCODONE (ROXICODONE) 10 MG TABS Take 10 mg by mouth 3 (three) times a day  0    pantoprazole (PROTONIX) 40 mg tablet Take 1 tablet (40 mg total) by mouth daily 90 tablet 1    risperiDONE (RisperDAL) 0 5 mg tablet Take 1 tablet (0 5 mg total) by mouth 2 (two) times a day 60 tablet 5    sertraline (ZOLOFT) 50 mg tablet Take 1 tablet (50 mg total) by mouth daily 30 tablet 5    sodium hypochlorite (DAKIN'S HALF-STRENGTH) external solution Use 2 times a day 473 mL 3    SUPER B COMPLEX & C TABS TAKE 1 CAPSULE BY MOUTH ONCE FOR 1 DOSE AS DIRECTED  5    Zinc Sulfate 220 (50 Zn) MG TABS TAKE 1 TABLET BY MOUTH EVERY DAY 30 each 5    insulin lispro (HumaLOG) 100 units/mL injection Inject 12 Units under the skin 3 (three) times a day with meals for 30 days 7 2 mL 0    loratadine (CLARITIN) 10 mg tablet Take 1 tablet (10 mg total) by mouth daily for 90 days 90 tablet 1     No current facility-administered medications for this visit  Current Outpatient Medications on File Prior to Visit   Medication Sig    Alcohol Swabs (ALCOHOL PADS) 70 % PADS by Does not apply route 5 (five) times a day    apixaban (ELIQUIS) 2 5 mg Take 2 tablets (5 mg total) by mouth 2 (two) times a day    ascorbic acid (VITAMIN C) 250 MG tablet Take 500 mg by mouth    atorvastatin (LIPITOR) 20 mg tablet TAKE 1 TABLET BY MOUTH EVERYDAY AT BEDTIME    baclofen 20 mg tablet Take 20 mg by mouth 2 (two) times a day      [employer]AN MICROLET LANCETS lancets Use as instructed to check blood sugar 4 times a day  Dx e10 29    Blood Glucose Monitoring Suppl ([employer] CONTOUR NEXT USB MONITOR) w/Device KIT Testing 4 times a day    carvedilol (COREG) 3 125 mg tablet Take 1 tablet (3 125 mg total) by mouth 2 (two) times a day with meals    Cholecalciferol (VITAMIN D-3) 1000 units CAPS Take 2 capsules by mouth daily    clotrimazole-betamethasone (LOTRISONE) 1-0 05 % cream Apply topically 2 (two) times a day    [facility]S VITAMIN B-12 1000 MCG tablet TAKE 1 TABLET BY MOUTH EVERY DAY    dicyclomine (BENTYL) 10 mg capsule TAKE 1 CAPSULE (10 MG TOTAL) BY MOUTH 3 (THREE) TIMES A DAY BEFORE MEALS    Disposable Gloves MISC Use 7 times a day, 4 boxes of gloves XL  Dx type 1 DM, paraplegia    epoetin kaushik (EPOGEN,PROCRIT) 20,000 units/mL Inject 10,000 Units under the skin    famotidine (PEPCID) 20 mg tablet TAKE 1 TABLET BY MOUTH EVERYDAY AT BEDTIME    ferrous sulfate 325 (65 Fe) mg tablet TAKE 1 TABLET BY MOUTH THREE TIMES A DAY    glucose blood (SUSAN CONTOUR TEST) test strip Use as instructed to test blood sugar 4 times a day  Dx e10 29    glucose blood (SUSAN CONTOUR TEST) test strip Testing 4 times a day    Incontinence Supply Disposable (INCONTINENCE BRIEF LARGE) MISC by Does not apply route 6 (six) times a day Size xl    Incontinence Supply Disposable (UNDERGARMENT) MISC Use 6 a day, 4 boxes, xl  Dx R51, paraplegia    Incontinence Supply Disposable (UNDERPADS) MISC Use 2 a day    insulin detemir (LEVEMIR) 100 units/mL subcutaneous injection Inject 8 Units under the skin 2 (two) times a day    LEVEMIR FLEXTOUCH 100 units/mL injection pen INJECT 14 UNITS UNDER THE SKIN 2 (TWO) TIMES A DAY    Melatonin ER 3 MG TBCR Take 6 mg by mouth    midodrine (PROAMATINE) 5 mg tablet Take 5 mg by mouth    Misc   Devices [facility]) MISC by Does not apply route daily Wheelchair ramp, dx g82 20    Multiple Vitamin (MULTI-VITAMIN DAILY) TABS Take 1 tablet by mouth daily    NOVOLOG 100 UNIT/ML injection     NOVOLOG FLEXPEN 100 UNIT/ML SOPN     nystatin-triamcinolone (MYCOLOG-II) cream Apply topically 2 (two) times a day    oxybutynin (DITROPAN) 5 mg tablet Take 3 tablets (15 mg total) by mouth daily    oxyCODONE (ROXICODONE) 10 MG TABS Take 10 mg by mouth 3 (three) times a day    risperiDONE (RisperDAL) 0 5 mg tablet Take 1 tablet (0 5 mg total) by mouth 2 (two) times a day    sertraline (ZOLOFT) 50 mg tablet Take 1 tablet (50 mg total) by mouth daily    sodium hypochlorite (DAKIN'S HALF-STRENGTH) external solution Use 2 times a day    SUPER B COMPLEX & C TABS TAKE 1 CAPSULE BY MOUTH ONCE FOR 1 DOSE AS DIRECTED    Zinc Sulfate 220 (50 Zn) MG TABS TAKE 1 TABLET BY MOUTH EVERY DAY    insulin lispro (HumaLOG) 100 units/mL injection Inject 12 Units under the skin 3 (three) times a day with meals for 30 days     No current facility-administered medications on file prior to visit  He is allergic to ciprofloxacin hcl; cymbalta [duloxetine hcl]; lyrica [pregabalin]; and polymyxin b       Review of Systems   Constitutional: Negative for chills and fever  HENT: Positive for congestion, postnasal drip and rhinorrhea  Cardiovascular: Negative for leg swelling  Gastrointestinal: Negative for diarrhea  Genitourinary: Negative for difficulty urinating  Musculoskeletal: Positive for back pain  Skin: Positive for wound (on left hand has 2 blisters x 2 weeks which have been present after bumping into a doord  no redness)  Objective:      /78 (BP Location: Left arm, Patient Position: Sitting, Cuff Size: Standard)   Pulse (!) 107   Temp 98 °F (36 7 °C) (Temporal)   Resp 18   Ht 6' 4" (1 93 m)   Wt 72 6 kg (160 lb)   SpO2 98%   BMI 19 48 kg/m²          Physical Exam   Constitutional: He is oriented to person, place, and time  He appears well-developed and well-nourished  HENT:   Head: Normocephalic and atraumatic  Right Ear: External ear normal    Left Ear: External ear normal    Cardiovascular: Normal rate and regular rhythm  Pulmonary/Chest: Effort normal and breath sounds normal    Abdominal:   Colostomy     Musculoskeletal: He exhibits tenderness (right thoracic, lumbar tenderness)  Wheelchair bound  Unable to assess standing balance  0/5 strength BLE  No AROM BLE    Flaccid tone BLE    UE 4/5 strength, normal AROM, normal tone  No LE sensation   Neurological: He is alert and oriented to person, place, and time  He displays abnormal reflex  A sensory deficit is present  He exhibits abnormal muscle tone  Coordination abnormal    Skin:   Blister 2cm  Left webspace1-2nd digit  Clear fluid oozing      Nursing note and vitals reviewed

## 2019-08-07 ENCOUNTER — TELEPHONE (OUTPATIENT)
Dept: FAMILY MEDICINE CLINIC | Facility: CLINIC | Age: 39
End: 2019-08-07

## 2019-08-07 VITALS
HEART RATE: 107 BPM | HEIGHT: 76 IN | RESPIRATION RATE: 18 BRPM | BODY MASS INDEX: 19.48 KG/M2 | TEMPERATURE: 98 F | WEIGHT: 160 LBS | SYSTOLIC BLOOD PRESSURE: 122 MMHG | DIASTOLIC BLOOD PRESSURE: 78 MMHG | OXYGEN SATURATION: 98 %

## 2019-08-07 PROBLEM — Z99.3 WHEELCHAIR DEPENDENT: Status: ACTIVE | Noted: 2019-08-07

## 2019-08-07 NOTE — TELEPHONE ENCOUNTER
I called pt and he had me call his mother to schedule the podiatry apt  I LM on VM([phone]) of mother to call back

## 2019-08-09 DIAGNOSIS — N18.5 ANEMIA IN STAGE 5 CHRONIC KIDNEY DISEASE, NOT ON CHRONIC DIALYSIS (HCC): ICD-10-CM

## 2019-08-09 DIAGNOSIS — D63.1 ANEMIA IN STAGE 5 CHRONIC KIDNEY DISEASE, NOT ON CHRONIC DIALYSIS (HCC): ICD-10-CM

## 2019-08-09 RX ORDER — B-COMPLEX WITH VITAMIN C
CAPSULE ORAL
Qty: 30 CAPSULE | Refills: 5 | Status: SHIPPED | OUTPATIENT
Start: 2019-08-09 | End: 2020-02-05

## 2019-08-12 DIAGNOSIS — D63.8 ANEMIA OF CHRONIC DISEASE: ICD-10-CM

## 2019-08-12 RX ORDER — FERROUS SULFATE 325(65) MG
TABLET ORAL
Qty: 90 TABLET | Refills: 3 | Status: SHIPPED | OUTPATIENT
Start: 2019-08-12 | End: 2019-10-07 | Stop reason: SDUPTHER

## 2019-08-15 ENCOUNTER — APPOINTMENT (OUTPATIENT)
Dept: WOUND CARE | Facility: HOSPITAL | Age: 39
End: 2019-08-15
Payer: MEDICARE

## 2019-08-15 PROCEDURE — 11042 DBRDMT SUBQ TIS 1ST 20SQCM/<: CPT | Performed by: EMERGENCY MEDICINE

## 2019-08-15 PROCEDURE — 11045 DBRDMT SUBQ TISS EACH ADDL: CPT | Performed by: FAMILY MEDICINE

## 2019-08-20 DIAGNOSIS — Z00.00 HEALTHCARE MAINTENANCE: ICD-10-CM

## 2019-08-20 RX ORDER — MULTIVITAMIN WITH FOLIC ACID 400 MCG
TABLET ORAL
Qty: 90 TABLET | Refills: 2 | Status: SHIPPED | OUTPATIENT
Start: 2019-08-20 | End: 2020-08-17

## 2019-08-23 ENCOUNTER — TELEPHONE (OUTPATIENT)
Dept: FAMILY MEDICINE CLINIC | Facility: CLINIC | Age: 39
End: 2019-08-23

## 2019-08-23 NOTE — TELEPHONE ENCOUNTER
Nurse line message: mother states she needs script sent to waiver program for additional hours  I called her to get more info, she said she just needs a script stating he requires 5 additional hours for his level of care faxed to Brisa Mtz at [phone] and she would like to be notified once it's faxed so she can follow up herself

## 2019-08-28 ENCOUNTER — TELEPHONE (OUTPATIENT)
Dept: FAMILY MEDICINE CLINIC | Facility: CLINIC | Age: 39
End: 2019-08-28

## 2019-08-28 DIAGNOSIS — G82.20 PARAPLEGIA (HCC): Chronic | ICD-10-CM

## 2019-08-28 DIAGNOSIS — E10.22 TYPE 1 DIABETES MELLITUS WITH CHRONIC KIDNEY DISEASE ON CHRONIC DIALYSIS (HCC): Primary | ICD-10-CM

## 2019-08-28 DIAGNOSIS — L89.159 PRESSURE INJURY OF SKIN OF SACRAL REGION, UNSPECIFIED INJURY STAGE: ICD-10-CM

## 2019-08-28 DIAGNOSIS — N18.6 TYPE 1 DIABETES MELLITUS WITH CHRONIC KIDNEY DISEASE ON CHRONIC DIALYSIS (HCC): Primary | ICD-10-CM

## 2019-08-28 DIAGNOSIS — Z99.2 TYPE 1 DIABETES MELLITUS WITH CHRONIC KIDNEY DISEASE ON CHRONIC DIALYSIS (HCC): Primary | ICD-10-CM

## 2019-08-28 NOTE — TELEPHONE ENCOUNTER
Pts mother called back regaridng this request again   Also Update on the FAX number [phone] BERNICE UOXLG

## 2019-08-28 NOTE — TELEPHONE ENCOUNTER
Consulted with Christina and she advised me she faxed a letter stating how many additional hours are needed for pt's home care and the reason why  Spoke with Lavinia Landaverde @ [facility] and asked her if she received letter  She advised me that the hours and reason for additional hours must be on a script  Advised Christina of my conversation with Lavinia Landaverde and what she is requesting now  She advised me she will take care of it

## 2019-08-28 NOTE — TELEPHONE ENCOUNTER
Pt's mother stated Tayebrianna Yareli from [address] [place] [address] never received form signed from doc to authorize more hours of care  Saw this was faxed on 8/20/19, advised her I will refax  She provided me Julia's fax # [phone]  She requested I put it to Julia's attention  Received fax transmittal response "ok"  Pt's mother had no other questions or concerns at the time

## 2019-08-30 ENCOUNTER — TELEPHONE (OUTPATIENT)
Dept: FAMILY MEDICINE CLINIC | Facility: CLINIC | Age: 39
End: 2019-08-30

## 2019-08-30 NOTE — TELEPHONE ENCOUNTER
Faxed current med list to Linda Plaza at [facility], she will be going to see him next week

## 2019-09-05 ENCOUNTER — APPOINTMENT (OUTPATIENT)
Dept: WOUND CARE | Facility: HOSPITAL | Age: 39
End: 2019-09-05
Payer: MEDICARE

## 2019-09-05 PROCEDURE — 11042 DBRDMT SUBQ TIS 1ST 20SQCM/<: CPT

## 2019-09-05 PROCEDURE — 11045 DBRDMT SUBQ TISS EACH ADDL: CPT

## 2019-09-05 NOTE — TELEPHONE ENCOUNTER
Attempted to contact Kwabena Holden / [facility] to see if she has received fax    LM on VM for her to contact me back and confirm

## 2019-09-05 NOTE — TELEPHONE ENCOUNTER
Mehdi Wong called me back and advised me she has not received Rx from [address] [place] [address] regarding 5 additional hours  She provided me fax # again, [phone]  Refaxed Rx and received fax transmittal confirmation "ok"  Requested on fax cover sheet for a confirmation call from Mehdi Wong  Have  Not received this call yet

## 2019-09-06 ENCOUNTER — TELEPHONE (OUTPATIENT)
Dept: FAMILY MEDICINE CLINIC | Facility: CLINIC | Age: 39
End: 2019-09-06

## 2019-09-06 DIAGNOSIS — R19.5 LOOSE STOOLS: Primary | ICD-10-CM

## 2019-09-06 NOTE — TELEPHONE ENCOUNTER
Visiting nurse from [facility] left message stating pt has been having on and off loose stools in his colostomy  Wanted to know if you want to order a stool sample

## 2019-09-11 NOTE — TELEPHONE ENCOUNTER
Refaxed script for additional hours to [facility] @ [facility]  Received fax transmittal response "ok"  Contacted [facility] and she confirmed she now received the fax  She is now stating she cannot submit this script because pt's caregiver is not trained for wound care  She advised me pt is currently receiving 52 hours of home care and if pt needs 5 additional hours, it would have to be for something that caregiver can provide  I explained to her that this was the info that was given to us for the script  She then stated that if pt needs additional hours, he has to be reassessed by provider  I let her know I truly wanted to help but all we have been speaking of for the past 2 weeks is a script and never an appt for pt to be reassessed  I asked her to please confirm if an appt is required so I can contact pt's mother and schedule  She then stated if pt's mother does not want to bring pt in to be reassessed she would just need a new script that would state a reason for the hours and it must be some type of care that pt's caregiver, who is a relative, can provide  I let her know that there is no other reason that was provided to us  She stated she will speak with pt's mother regarding her request for additional hours and let her know that if it is for wound care, they will not approve it because pt's caregiver is not a trained professional     Is there anything else you may think of that pt's mother would need additional hours for?

## 2019-09-11 NOTE — TELEPHONE ENCOUNTER
pt's mom Radha Duran is calling stating again ayanna has not received the script for more care hours    She would like a call from you to see if she able to receive the script herself      Please advise

## 2019-09-12 ENCOUNTER — TELEPHONE (OUTPATIENT)
Dept: FAMILY MEDICINE CLINIC | Facility: CLINIC | Age: 39
End: 2019-09-12

## 2019-09-12 NOTE — TELEPHONE ENCOUNTER
Pt's mother, Angelica Martinez, stated she spoke with Janna Perez @ [address] [place] [address] Drive told her she has not received script for additional hours  I advised her I spoke with Janna Perez yesterday regarding the hours and she did receive the script, which she confirmed over the phone with me yesterday, but she stated the additional hours will not be approved based on the reason stated on the script  I explained to her that Janna Perez stated whoever takes care of pt is not trained in wound care and that is why it won't be approved  Angelica Martinez stated she does not do wound care and Janna Perez knows that so she is not sure why the hours would be for wound care  I reminded her that that is what she had told me 2 weeks ago that the hours were for  I reminded her she told me pt needs additional hours because he has a wound that needs to be tended to  I advised her to contact Janna Perez and see what type of care would be approved and if there is any care that pt needs that she is trained to do and we can submit another script, to let us know  She agreed  She had no other questions or concerns at the time

## 2019-09-12 NOTE — TELEPHONE ENCOUNTER
Patient has outstanding X-ray imaging orders to be done  Mailed patient reminder letter, imaging orders, and walk-in office location guide

## 2019-09-13 ENCOUNTER — TELEPHONE (OUTPATIENT)
Dept: FAMILY MEDICINE CLINIC | Facility: CLINIC | Age: 39
End: 2019-09-13

## 2019-09-13 DIAGNOSIS — K58.9 IRRITABLE BOWEL SYNDROME, UNSPECIFIED TYPE: ICD-10-CM

## 2019-09-13 RX ORDER — DICYCLOMINE HYDROCHLORIDE 10 MG/1
10 CAPSULE ORAL
Qty: 270 CAPSULE | Refills: 1 | Status: SHIPPED | OUTPATIENT
Start: 2019-09-13 | End: 2020-03-27 | Stop reason: SDUPTHER

## 2019-09-13 NOTE — TELEPHONE ENCOUNTER
Luisa the  from [facility] called to verify where the pts hospital bed is from   I informed her I did not see any documentation of where but based on pts insurance it could have been sent to [facility]

## 2019-09-16 ENCOUNTER — TRANSITIONAL CARE MANAGEMENT (OUTPATIENT)
Dept: FAMILY MEDICINE CLINIC | Facility: CLINIC | Age: 39
End: 2019-09-16

## 2019-09-16 ENCOUNTER — TELEPHONE (OUTPATIENT)
Dept: FAMILY MEDICINE CLINIC | Facility: CLINIC | Age: 39
End: 2019-09-16

## 2019-09-16 NOTE — TELEPHONE ENCOUNTER
While on the phone with pt's mother for a TCM, mother advised me she spoke with Mathew Prescott again @ [facility] and she was instructed to contact us to update the reason for the additional 5 hours  She stated it must be read on the Rx as follows:    5 additional hours  Reason: To assist with patient's basic medical needs  Can you please place another order and let me know once it's signed so I may fax to Mathew Prescott? Thanks

## 2019-09-18 ENCOUNTER — TELEPHONE (OUTPATIENT)
Dept: FAMILY MEDICINE CLINIC | Facility: CLINIC | Age: 39
End: 2019-09-18

## 2019-09-18 DIAGNOSIS — G82.20 PARAPLEGIA (HCC): Primary | ICD-10-CM

## 2019-09-18 DIAGNOSIS — L89.159 PRESSURE INJURY OF SKIN OF SACRAL REGION, UNSPECIFIED INJURY STAGE: ICD-10-CM

## 2019-09-18 DIAGNOSIS — Z99.3 WHEELCHAIR DEPENDENT: ICD-10-CM

## 2019-09-18 DIAGNOSIS — E10.22 TYPE 1 DIABETES MELLITUS WITH CHRONIC KIDNEY DISEASE ON CHRONIC DIALYSIS (HCC): ICD-10-CM

## 2019-09-18 DIAGNOSIS — Z99.2 STAGE 5 CHRONIC KIDNEY DISEASE ON CHRONIC DIALYSIS (HCC): ICD-10-CM

## 2019-09-18 DIAGNOSIS — D63.1 ANEMIA IN STAGE 5 CHRONIC KIDNEY DISEASE, NOT ON CHRONIC DIALYSIS (HCC): ICD-10-CM

## 2019-09-18 DIAGNOSIS — Z99.2 TYPE 1 DIABETES MELLITUS WITH CHRONIC KIDNEY DISEASE ON CHRONIC DIALYSIS (HCC): ICD-10-CM

## 2019-09-18 DIAGNOSIS — N18.6 STAGE 5 CHRONIC KIDNEY DISEASE ON CHRONIC DIALYSIS (HCC): ICD-10-CM

## 2019-09-18 DIAGNOSIS — N18.5 ANEMIA IN STAGE 5 CHRONIC KIDNEY DISEASE, NOT ON CHRONIC DIALYSIS (HCC): ICD-10-CM

## 2019-09-18 DIAGNOSIS — N18.6 TYPE 1 DIABETES MELLITUS WITH CHRONIC KIDNEY DISEASE ON CHRONIC DIALYSIS (HCC): ICD-10-CM

## 2019-09-18 NOTE — TELEPHONE ENCOUNTER
New, updated Rx for 5 additional hours of home care faxed to [facility]    Received fax transmittal response "ok:"

## 2019-09-20 ENCOUNTER — TELEPHONE (OUTPATIENT)
Dept: FAMILY MEDICINE CLINIC | Facility: CLINIC | Age: 39
End: 2019-09-20

## 2019-09-20 NOTE — TELEPHONE ENCOUNTER
Form was dropped by patient, forwarded to provider        Extended family care    Placed in your folder(came through)

## 2019-09-24 DIAGNOSIS — E78.5 HYPERLIPIDEMIA, UNSPECIFIED HYPERLIPIDEMIA TYPE: ICD-10-CM

## 2019-09-24 DIAGNOSIS — E53.9 VITAMIN B DEFICIENCY: ICD-10-CM

## 2019-09-24 RX ORDER — ATORVASTATIN CALCIUM 20 MG/1
TABLET, FILM COATED ORAL
Qty: 90 TABLET | Refills: 1 | Status: SHIPPED | OUTPATIENT
Start: 2019-09-24 | End: 2020-03-30

## 2019-09-24 RX ORDER — OMEGA-3/DHA/EPA/FISH OIL 35-113.5MG
TABLET,CHEWABLE ORAL
Qty: 90 TABLET | Refills: 1 | Status: SHIPPED | OUTPATIENT
Start: 2019-09-24 | End: 2020-03-30

## 2019-09-25 DIAGNOSIS — F31.9 BIPOLAR DEPRESSION (HCC): ICD-10-CM

## 2019-09-25 RX ORDER — RISPERIDONE 0.5 MG/1
0.5 TABLET, FILM COATED ORAL 2 TIMES DAILY
Qty: 180 TABLET | Refills: 1 | Status: SHIPPED | OUTPATIENT
Start: 2019-09-25 | End: 2020-03-30

## 2019-09-25 NOTE — TELEPHONE ENCOUNTER
Being sent to [facility]   The [phone] is for [facility]

## 2019-09-25 NOTE — TELEPHONE ENCOUNTER
Patient's mom called stating they people for the waiver program hasn't received the letter and it be faxed to her at [phone]   She will make sure they get it

## 2019-09-25 NOTE — TELEPHONE ENCOUNTER
Is it being sent to mom now or the [place]    We keep doing his and we keep getting fax confirmation and they keep saying they arent getting it

## 2019-10-02 ENCOUNTER — TELEPHONE (OUTPATIENT)
Dept: FAMILY MEDICINE CLINIC | Facility: CLINIC | Age: 39
End: 2019-10-02

## 2019-10-02 NOTE — TELEPHONE ENCOUNTER
SIGNATURES NEEDED FOR [facility] HEALTH CARE FORM RECEIVED VIA FAX  Master Wolf PLACED IN YOUR BIN

## 2019-10-03 ENCOUNTER — TELEPHONE (OUTPATIENT)
Dept: FAMILY MEDICINE CLINIC | Facility: CLINIC | Age: 39
End: 2019-10-03

## 2019-10-03 DIAGNOSIS — N18.6 TYPE 1 DIABETES MELLITUS WITH CHRONIC KIDNEY DISEASE ON CHRONIC DIALYSIS (HCC): ICD-10-CM

## 2019-10-03 DIAGNOSIS — Z99.2 TYPE 1 DIABETES MELLITUS WITH CHRONIC KIDNEY DISEASE ON CHRONIC DIALYSIS (HCC): ICD-10-CM

## 2019-10-03 DIAGNOSIS — E10.22 TYPE 1 DIABETES MELLITUS WITH CHRONIC KIDNEY DISEASE ON CHRONIC DIALYSIS (HCC): ICD-10-CM

## 2019-10-03 RX ORDER — INSULIN DETEMIR 100 [IU]/ML
14 INJECTION, SOLUTION SUBCUTANEOUS 2 TIMES DAILY
Qty: 15 PEN | Refills: 3 | Status: SHIPPED | OUTPATIENT
Start: 2019-10-03 | End: 2020-02-26 | Stop reason: SDUPTHER

## 2019-10-03 NOTE — TELEPHONE ENCOUNTER
SIGNATURES NEEDED FOR extended family care FORM RECEIVED VIA FAX  Joaquín Garcia PLACED IN YOUR BIN

## 2019-10-03 NOTE — TELEPHONE ENCOUNTER
SIGNATURES NEEDED FOR [facility] HEALTH CARE FORM  FORM RECEIVED VIA FAX  Viaziz [employer] PLACED IN YOUR BIN

## 2019-10-07 ENCOUNTER — TELEPHONE (OUTPATIENT)
Dept: FAMILY MEDICINE CLINIC | Facility: CLINIC | Age: 39
End: 2019-10-07

## 2019-10-07 DIAGNOSIS — D63.8 ANEMIA OF CHRONIC DISEASE: ICD-10-CM

## 2019-10-07 RX ORDER — FERROUS SULFATE 325(65) MG
TABLET ORAL
Qty: 90 TABLET | Refills: 3 | Status: SHIPPED | OUTPATIENT
Start: 2019-10-07 | End: 2021-07-15 | Stop reason: SDUPTHER

## 2019-10-07 NOTE — TELEPHONE ENCOUNTER
SIGNATURES NEEDED FOR [employer] FORM RECEIVED VIA FAX  Jeremy Ríos PLACED IN YOUR BIN

## 2019-10-11 RX ORDER — CINACALCET 90 MG/1
90 TABLET, FILM COATED ORAL
COMMUNITY
Start: 2019-09-14 | End: 2019-12-13

## 2019-10-14 DIAGNOSIS — N32.81 OAB (OVERACTIVE BLADDER): ICD-10-CM

## 2019-10-14 RX ORDER — CINACALCET 90 MG/1
90 TABLET, FILM COATED ORAL
OUTPATIENT
Start: 2019-10-14 | End: 2020-01-12

## 2019-10-14 RX ORDER — OXYBUTYNIN CHLORIDE 5 MG/1
TABLET ORAL
Qty: 90 TABLET | Refills: 3 | Status: SHIPPED | OUTPATIENT
Start: 2019-10-14 | End: 2020-01-09

## 2019-10-14 NOTE — TELEPHONE ENCOUNTER
Spoke with Patti Lynn / [employer] pharmacy tech and advised him they should request this from nephrologist   He will update pt's file

## 2019-10-15 ENCOUNTER — TELEPHONE (OUTPATIENT)
Dept: FAMILY MEDICINE CLINIC | Facility: CLINIC | Age: 39
End: 2019-10-15

## 2019-10-15 NOTE — TELEPHONE ENCOUNTER
SIGNATURES NEEDED FOR [facility] HEALTH FORM RECEIVED VIA FAX  Irl Axel  PLACED IN YOUR BI N

## 2019-10-17 ENCOUNTER — APPOINTMENT (OUTPATIENT)
Dept: WOUND CARE | Facility: HOSPITAL | Age: 39
End: 2019-10-17
Payer: MEDICARE

## 2019-10-17 PROCEDURE — 97606 NEG PRS WND THER DME>50 SQCM: CPT

## 2019-10-17 PROCEDURE — 11045 DBRDMT SUBQ TISS EACH ADDL: CPT

## 2019-10-17 PROCEDURE — 11042 DBRDMT SUBQ TIS 1ST 20SQCM/<: CPT

## 2019-10-28 DIAGNOSIS — I48.91 ATRIAL FIBRILLATION, UNSPECIFIED TYPE (HCC): Chronic | ICD-10-CM

## 2019-10-28 RX ORDER — CARVEDILOL 3.12 MG/1
TABLET ORAL
Qty: 180 TABLET | Refills: 1 | Status: SHIPPED | OUTPATIENT
Start: 2019-10-28 | End: 2020-05-28

## 2019-11-05 ENCOUNTER — TELEPHONE (OUTPATIENT)
Dept: FAMILY MEDICINE CLINIC | Facility: CLINIC | Age: 39
End: 2019-11-05

## 2019-11-05 DIAGNOSIS — R19.7 DIARRHEA, UNSPECIFIED TYPE: Primary | ICD-10-CM

## 2019-11-05 NOTE — TELEPHONE ENCOUNTER
SIGNATURES NEEDED FOR extended family care FORM RECEIVED VIA FAX  Zari Baires WILL BE PLACED IN YOUR BIN

## 2019-11-05 NOTE — TELEPHONE ENCOUNTER
Gael Bustamante from home health called asking for either a faxed order or a verbal order to do a stool test for C  Diff  Gael Bustamante states that pts stool is very soft and has an odor

## 2019-11-06 ENCOUNTER — TELEPHONE (OUTPATIENT)
Dept: FAMILY MEDICINE CLINIC | Facility: CLINIC | Age: 39
End: 2019-11-06

## 2019-11-11 NOTE — PLAN OF CARE
DISCHARGE PLANNING     Discharge to home or other facility with appropriate resources Progressing        DISCHARGE PLANNING - CARE MANAGEMENT     Discharge to post-acute care or home with appropriate resources Progressing        GENITOURINARY - ADULT     Maintains or returns to baseline urinary function Progressing     Urinary catheter remains patent Progressing        INFECTION - ADULT     Absence or prevention of progression during hospitalization Progressing     Absence of fever/infection during neutropenic period Progressing        Knowledge Deficit     Patient/family/caregiver demonstrates understanding of disease process, treatment plan, medications, and discharge instructions Progressing        METABOLIC, FLUID AND ELECTROLYTES - ADULT     Glucose maintained within target range Progressing        NEUROSENSORY - ADULT     Achieves maximal functionality and self care Progressing        Nutrition/Hydration-ADULT     Nutrient/Hydration intake appropriate for improving, restoring or maintaining nutritional needs Progressing        PAIN - ADULT     Verbalizes/displays adequate comfort level or baseline comfort level Progressing        Potential for Falls     Patient will remain free of falls Progressing        Prexisting or High Potential for Compromised Skin Integrity     Skin integrity is maintained or improved Progressing        SAFETY ADULT     Maintain or return to baseline ADL function Progressing     Maintain or return mobility status to optimal level Progressing        SKIN/TISSUE INTEGRITY - ADULT     Skin integrity remains intact Progressing     Incision(s), wounds(s) or drain site(s) healing without S/S of infection Progressing Sent rx for ferrous sulfate 325 mg, 1 tab 2 times a day to patient's pharmacy. Patient informed.

## 2019-11-12 NOTE — TELEPHONE ENCOUNTER
Extended care facility called asking to please fill out form asap that way it is not out of compliance

## 2019-11-14 ENCOUNTER — APPOINTMENT (OUTPATIENT)
Dept: WOUND CARE | Facility: HOSPITAL | Age: 39
End: 2019-11-14
Payer: MEDICARE

## 2019-11-14 PROCEDURE — 11045 DBRDMT SUBQ TISS EACH ADDL: CPT

## 2019-11-14 PROCEDURE — 11042 DBRDMT SUBQ TIS 1ST 20SQCM/<: CPT

## 2019-11-14 PROCEDURE — 97605 NEG PRS WND THER DME<=50SQCM: CPT

## 2019-11-15 DIAGNOSIS — E10.22 TYPE 1 DIABETES MELLITUS WITH CHRONIC KIDNEY DISEASE ON CHRONIC DIALYSIS (HCC): ICD-10-CM

## 2019-11-15 DIAGNOSIS — Z99.2 TYPE 1 DIABETES MELLITUS WITH CHRONIC KIDNEY DISEASE ON CHRONIC DIALYSIS (HCC): ICD-10-CM

## 2019-11-15 DIAGNOSIS — N18.6 TYPE 1 DIABETES MELLITUS WITH CHRONIC KIDNEY DISEASE ON CHRONIC DIALYSIS (HCC): ICD-10-CM

## 2019-11-15 DIAGNOSIS — N32.81 OAB (OVERACTIVE BLADDER): ICD-10-CM

## 2019-11-15 DIAGNOSIS — L98.429 ULCER OF SACRAL REGION, STAGE 4 (HCC): ICD-10-CM

## 2019-11-15 DIAGNOSIS — G82.20 PARAPLEGIA (HCC): Chronic | ICD-10-CM

## 2019-11-15 RX ORDER — SODIUM HYPOCHLORITE 2.5 MG/ML
SOLUTION TOPICAL
Qty: 473 ML | Refills: 2 | Status: SHIPPED | OUTPATIENT
Start: 2019-11-15 | End: 2020-02-28

## 2019-11-19 LAB
LEFT EYE DIABETIC RETINOPATHY: NORMAL
RIGHT EYE DIABETIC RETINOPATHY: NORMAL

## 2019-11-21 ENCOUNTER — OFFICE VISIT (OUTPATIENT)
Dept: FAMILY MEDICINE CLINIC | Facility: CLINIC | Age: 39
End: 2019-11-21

## 2019-11-21 VITALS
SYSTOLIC BLOOD PRESSURE: 150 MMHG | BODY MASS INDEX: 19.48 KG/M2 | HEART RATE: 102 BPM | DIASTOLIC BLOOD PRESSURE: 90 MMHG | OXYGEN SATURATION: 97 % | TEMPERATURE: 98.5 F | HEIGHT: 76 IN | RESPIRATION RATE: 16 BRPM

## 2019-11-21 DIAGNOSIS — Z23 NEED FOR INFLUENZA VACCINATION: Primary | ICD-10-CM

## 2019-11-21 DIAGNOSIS — N18.6 TYPE 1 DIABETES MELLITUS WITH CHRONIC KIDNEY DISEASE ON CHRONIC DIALYSIS (HCC): ICD-10-CM

## 2019-11-21 DIAGNOSIS — R19.5 WATERY STOOLS: ICD-10-CM

## 2019-11-21 DIAGNOSIS — E10.22 TYPE 1 DIABETES MELLITUS WITH CHRONIC KIDNEY DISEASE ON CHRONIC DIALYSIS (HCC): ICD-10-CM

## 2019-11-21 DIAGNOSIS — I10 HTN (HYPERTENSION), BENIGN: ICD-10-CM

## 2019-11-21 DIAGNOSIS — Z99.2 TYPE 1 DIABETES MELLITUS WITH CHRONIC KIDNEY DISEASE ON CHRONIC DIALYSIS (HCC): ICD-10-CM

## 2019-11-21 PROBLEM — A04.71 RECURRENT CLOSTRIDIOIDES DIFFICILE DIARRHEA: Status: ACTIVE | Noted: 2019-11-21

## 2019-11-21 PROCEDURE — 99213 OFFICE O/P EST LOW 20 MIN: CPT | Performed by: PHYSICIAN ASSISTANT

## 2019-11-21 RX ORDER — CYCLOBENZAPRINE HCL 5 MG
5 TABLET ORAL 3 TIMES DAILY PRN
Refills: 0 | COMMUNITY
Start: 2019-09-20 | End: 2020-02-28

## 2019-11-21 RX ORDER — CALCIUM ACETATE 667 MG/1
CAPSULE ORAL
Refills: 3 | COMMUNITY
Start: 2019-08-26 | End: 2021-02-09

## 2019-11-21 RX ORDER — AMLODIPINE BESYLATE 10 MG/1
10 TABLET ORAL DAILY
COMMUNITY
Start: 2019-10-08 | End: 2020-10-07 | Stop reason: ALTCHOICE

## 2019-11-21 RX ORDER — CARVEDILOL 6.25 MG/1
6.25 TABLET ORAL
COMMUNITY
Start: 2019-10-07 | End: 2021-02-25

## 2019-11-21 NOTE — PROGRESS NOTES
Assessment/Plan:    HTN (hypertension), benign   Blood pressure was a little bit elevated today but recommend he continue with amlodipine, losartan and carvedilol  He will be having blood pressure recheck tomorrow with visiting nurse and I recommended that they call here with his levels  Recurrent Clostridioides difficile diarrhea    Stool culture was ordered and he is seeing GI         Problem List Items Addressed This Visit        Endocrine    Type 1 diabetes mellitus with chronic kidney disease on chronic dialysis [facility])       Cardiovascular and Mediastinum    HTN (hypertension), benign      Blood pressure was a little bit elevated today but recommend he continue with amlodipine, losartan and carvedilol  He will be having blood pressure recheck tomorrow with visiting nurse and I recommended that they call here with his levels  Relevant Medications    carvedilol (COREG) 6 25 mg tablet    amLODIPine (NORVASC) 10 mg tablet      Other Visit Diagnoses     Need for influenza vaccination    -  Primary    Watery stools        Relevant Orders    Clostridium difficile toxin by PCR            Subjective:      Patient ID: Tulio Sumner  is a 44 y o  male  HPI   80-year-old male with type 1 diabetes and chronic kidney disease on dialysis here for follow-up  From admission to [facility] on September 27th of October 7th for sepsis  He normally goes to dialysis M/W/F  He was given ceftriaxone and was transitioned to levofloxacin  He did have penile discharge that grew out E coli and Morganella and Proteus he is being 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  He is currently taking amlodipine 10 mg arm carvedilol 6 25 mg twice a day and losartan 100 mg  His blood pressure has been good at home  Initially it was higher but now it has been stable  EFRAIN Edwards is coming tomorrow  He has had loose stool since hospitalized  He denies abdomen pain   He has had multiple episodes of c diff in the past         The following portions of the patient's history were reviewed and updated as appropriate:   He  has a past medical history of Ambulatory dysfunction, Anemia, iron deficiency, Atrial fibrillation ([place] [address] ), AVM (arteriovenous malformation) of duodenum, acquired, Chronic deep vein thrombosis (DVT) ([place] [address] ), Chronic pain, Chronic suprapubic catheter ([place] [address] ), Clostridium difficile infection (08/11/2016), Colostomy on examination ([place] [address] ), GERD (gastroesophageal reflux disease), Memory impairment (2011), Neurogenic bladder, OAB (overactive bladder), Paraplegia ([place] [address] ), S/P unilateral BKA (below knee amputation) ([place] [address] ), Sebaceous cyst (removed in 2017), Tobacco abuse, and Wounds, multiple    He   Patient Active Problem List    Diagnosis Date Noted    Recurrent Clostridioides difficile diarrhea 11/21/2019    Wheelchair dependent 08/07/2019    Dialysis patient ([place] [address] ) 05/08/2019    Insulin long-term use ([place] [address] ) 05/08/2019    Noncompliance by refusing intervention or support 09/29/2017    Delirium 09/28/2017    Urinary retention 09/26/2017    Asymptomatic bacteriuria 09/25/2017    History of Clostridium difficile infection 09/25/2017    Stage 5 chronic kidney disease on chronic dialysis ([place] [address] ) 09/18/2017    SOB (shortness of breath) 09/18/2017    Penile abscess 09/18/2017    Iron deficiency anemia 07/03/2017    Decubitus ulcers 07/03/2017    HTN (hypertension), benign 07/03/2017    Neurogenic bladder 07/03/2017    GERD (gastroesophageal reflux disease) 07/03/2017    Chronic pain 07/03/2017    Wound healing, delayed 06/29/2017    Osteomyelitis of right femur ([place] [address] ) 03/20/2017    Chronic indwelling Ortega catheter     Anemia 09/03/2016    Ulcer of sacral region, stage 4 ([place] [address] ) 09/01/2016    Paraplegia (HCC)     Atrial fibrillation (HCC)     Chronic suprapubic catheter ([place] [address] )     Colostomy care ([place] [address] )     OAB (overactive bladder)     S/P unilateral BKA (below knee amputation) ([facility] [address] )     Sebaceous cyst     Tobacco abuse     Type 1 diabetes mellitus with chronic kidney disease on chronic dialysis [facility])      He  has a past surgical history that includes Below knee leg amputation (Right, 2009); Colonoscopy (N/A, 3/27/2017); Esophagogastroduodenoscopy (N/A, 3/22/2017); Colonoscopy (N/A, 3/29/2017); and Colonoscopy (N/A, 6/15/2017)  His family history includes Diabetes in his paternal grandfather; Hyperlipidemia in his mother; Hypertension in his mother; Leukemia in his brother  He  reports that he has been smoking cigars  He has never used smokeless tobacco  He reports that he drinks alcohol  He reports that he has current or past drug history  Drug: Marijuana  Current Outpatient Medications   Medication Sig Dispense Refill    Alcohol Swabs (ALCOHOL PADS) 70 % PADS by Does not apply route 5 (five) times a day 300 each 5    amLODIPine (NORVASC) 10 mg tablet Take 10 mg by mouth daily      apixaban (ELIQUIS) 2 5 mg Take 2 tablets (5 mg total) by mouth 2 (two) times a day 60 tablet 5    ascorbic acid (VITAMIN C) 250 MG tablet Take 500 mg by mouth      atorvastatin (LIPITOR) 20 mg tablet TAKE 1 TABLET BY MOUTH EVERYDAY AT BEDTIME 90 tablet 1    B Complex-C (SUPER B/C) CAPS TAKE 1 CAPSULE BY MOUTH ONCE FOR 1 DOSE AS DIRECTED 30 capsule 5    baclofen 20 mg tablet Take 20 mg by mouth 2 (two) times a day        SUSAN MICROLET LANCETS lancets Use as instructed to check blood sugar 4 times a day  Dx e10 29 200 each 5    Blood Glucose Monitoring Suppl (SUSAN CONTOUR NEXT USB MONITOR) w/Device KIT Testing 4 times a day 1 kit 0    carvedilol (COREG) 3 125 mg tablet TAKE 1 TABLET BY MOUTH TWICE A DAY WITH MEALS 180 tablet 1    carvedilol (COREG) 6 25 mg tablet Take 6 25 mg by mouth      Cholecalciferol (VITAMIN D-3) 1000 units CAPS Take 2 capsules by mouth daily 30 capsule 0    [facility]S VITAMIN B-12 1000 MCG tablet TAKE 1 TABLET BY MOUTH EVERY DAY 90 tablet 1    dicyclomine (BENTYL) 10 mg capsule TAKE 1 CAPSULE (10 MG TOTAL) BY MOUTH 3 (THREE) TIMES A DAY BEFORE MEALS 270 capsule 1    Disposable Gloves MISC Use 7 times a day, 4 boxes of gloves XL  Dx type 1 DM, paraplegia 4 each 11    epoetin kaushik (EPOGEN,PROCRIT) 20,000 units/mL Inject 10,000 Units under the skin      famotidine (PEPCID) 20 mg tablet TAKE 1 TABLET BY MOUTH EVERYDAY AT BEDTIME 30 tablet 5    ferrous sulfate 325 (65 Fe) mg tablet TAKE 1 TABLET BY MOUTH 3 TIMES A DAY 90 tablet 3    glucose blood (SUSAN CONTOUR TEST) test strip Use as instructed to test blood sugar 4 times a day  Dx e10 29 200 each 3    Incontinence Supply Disposable (INCONTINENCE BRIEF LARGE) MISC by Does not apply route 6 (six) times a day Size xl 180 each 11    Incontinence Supply Disposable (UNDERGARMENT) MISC Use 6 a day, 4 boxes, xl  Dx R51, paraplegia 4 each 11    Incontinence Supply Disposable (UNDERPADS) MISC Use 2 a day 60 each 11    insulin detemir (LEVEMIR) 100 units/mL subcutaneous injection Inject 6 5 Units under the skin 2 (two) times a day       Melatonin ER 3 MG TBCR Take 6 mg by mouth      midodrine (PROAMATINE) 5 mg tablet Take 5 mg by mouth      Misc   Devices [facility]) MISC by Does not apply route daily Wheelchair ramp, dx g82 20 1 each 0    Multiple Vitamin (DAILY-LICHA) TABS TAKE 1 TABLET BY MOUTH EVERY DAY 90 tablet 2    NOVOLOG 100 UNIT/ML injection Inject 5 Units under the skin 3 (three) times a day with meals       ondansetron (ZOFRAN-ODT) 4 mg disintegrating tablet Take 1 tablet (4 mg total) by mouth every 8 (eight) hours as needed for nausea or vomiting 20 tablet 2    oxybutynin (DITROPAN) 5 mg tablet TAKE 3 TABLETS BY MOUTH DAILY 90 tablet 3    oxyCODONE (ROXICODONE) 10 MG TABS Take 10 mg by mouth 3 (three) times a day  0    pantoprazole (PROTONIX) 40 mg tablet Take 1 tablet (40 mg total) by mouth daily 90 tablet 1    risperiDONE (RisperDAL) 0 5 mg tablet TAKE 1 TABLET (0 5 MG TOTAL) BY MOUTH 2 (TWO) TIMES A  tablet 1    sertraline (ZOLOFT) 50 mg tablet TAKE 1 TABLET BY MOUTH EVERY DAY 90 tablet 1    Zinc Sulfate 220 (50 Zn) MG TABS TAKE 1 TABLET BY MOUTH EVERY DAY 30 each 5    calcium acetate (PHOSLO) capsule TAKE 1 CAPSULE BY MOUTH THREE TIMES DAILY WITH MEALS  3    cinacalcet (SENSIPAR) 90 MG tablet Take 90 mg by mouth      clotrimazole-betamethasone (LOTRISONE) 1-0 05 % cream Apply topically 2 (two) times a day (Patient not taking: Reported on 11/21/2019) 30 g 0    cyclobenzaprine (FLEXERIL) 5 mg tablet Take 5 mg by mouth 3 (three) times a day as needed  0    insulin lispro (HumaLOG) 100 units/mL injection Inject 12 Units under the skin 3 (three) times a day with meals for 30 days (Patient not taking: Reported on 11/21/2019) 7 2 mL 0    LEVEMIR FLEXTOUCH 100 units/mL injection pen INJECT 14 UNITS UNDER THE SKIN 2 (TWO) TIMES A DAY (Patient not taking: Reported on 11/21/2019) 15 pen 3    loratadine (CLARITIN) 10 mg tablet Take 1 tablet (10 mg total) by mouth daily for 90 days 90 tablet 1    nystatin-triamcinolone (MYCOLOG-II) cream Apply topically 2 (two) times a day (Patient not taking: Reported on 11/21/2019) 30 g 0    sodium hypochlorite (DAKIN'S HALF-STRENGTH) external solution Use 2 times a day 473 mL 2     No current facility-administered medications for this visit  Current Outpatient Medications on File Prior to Visit   Medication Sig    Alcohol Swabs (ALCOHOL PADS) 70 % PADS by Does not apply route 5 (five) times a day    amLODIPine (NORVASC) 10 mg tablet Take 10 mg by mouth daily    apixaban (ELIQUIS) 2 5 mg Take 2 tablets (5 mg total) by mouth 2 (two) times a day    ascorbic acid (VITAMIN C) 250 MG tablet Take 500 mg by mouth    atorvastatin (LIPITOR) 20 mg tablet TAKE 1 TABLET BY MOUTH EVERYDAY AT BEDTIME    B Complex-C (SUPER B/C) CAPS TAKE 1 CAPSULE BY MOUTH ONCE FOR 1 DOSE AS DIRECTED    baclofen 20 mg tablet Take 20 mg by mouth 2 (two) times a day     [address] [facility] lancets Use as instructed to check blood sugar 4 times a day  Dx e10 29    Blood Glucose Monitoring Suppl (SUSAN CONTOUR NEXT USB MONITOR) w/Device KIT Testing 4 times a day    carvedilol (COREG) 3 125 mg tablet TAKE 1 TABLET BY MOUTH TWICE A DAY WITH MEALS    carvedilol (COREG) 6 25 mg tablet Take 6 25 mg by mouth    Cholecalciferol (VITAMIN D-3) 1000 units CAPS Take 2 capsules by mouth daily    CVS VITAMIN B-12 1000 MCG tablet TAKE 1 TABLET BY MOUTH EVERY DAY    dicyclomine (BENTYL) 10 mg capsule TAKE 1 CAPSULE (10 MG TOTAL) BY MOUTH 3 (THREE) TIMES A DAY BEFORE MEALS    Disposable Gloves MISC Use 7 times a day, 4 boxes of gloves XL  Dx type 1 DM, paraplegia    epoetin kaushik (EPOGEN,PROCRIT) 20,000 units/mL Inject 10,000 Units under the skin    famotidine (PEPCID) 20 mg tablet TAKE 1 TABLET BY MOUTH EVERYDAY AT BEDTIME    ferrous sulfate 325 (65 Fe) mg tablet TAKE 1 TABLET BY MOUTH 3 TIMES A DAY    glucose blood (SUSAN CONTOUR TEST) test strip Use as instructed to test blood sugar 4 times a day  Dx e10 29    Incontinence Supply Disposable (INCONTINENCE BRIEF LARGE) MISC by Does not apply route 6 (six) times a day Size xl    Incontinence Supply Disposable (UNDERGARMENT) MISC Use 6 a day, 4 boxes, xl  Dx R51, paraplegia    Incontinence Supply Disposable (UNDERPADS) MISC Use 2 a day    insulin detemir (LEVEMIR) 100 units/mL subcutaneous injection Inject 6 5 Units under the skin 2 (two) times a day     Melatonin ER 3 MG TBCR Take 6 mg by mouth    midodrine (PROAMATINE) 5 mg tablet Take 5 mg by mouth    Misc   Devices [facility]) MISC by Does not apply route daily Wheelchair ramp, dx g82 20    Multiple Vitamin (DAILY-LICHA) TABS TAKE 1 TABLET BY MOUTH EVERY DAY    NOVOLOG 100 UNIT/ML injection Inject 5 Units under the skin 3 (three) times a day with meals     ondansetron (ZOFRAN-ODT) 4 mg disintegrating tablet Take 1 tablet (4 mg total) by mouth every 8 (eight) hours as needed for nausea or vomiting  oxybutynin (DITROPAN) 5 mg tablet TAKE 3 TABLETS BY MOUTH DAILY    oxyCODONE (ROXICODONE) 10 MG TABS Take 10 mg by mouth 3 (three) times a day    pantoprazole (PROTONIX) 40 mg tablet Take 1 tablet (40 mg total) by mouth daily    risperiDONE (RisperDAL) 0 5 mg tablet TAKE 1 TABLET (0 5 MG TOTAL) BY MOUTH 2 (TWO) TIMES A DAY    sertraline (ZOLOFT) 50 mg tablet TAKE 1 TABLET BY MOUTH EVERY DAY    Zinc Sulfate 220 (50 Zn) MG TABS TAKE 1 TABLET BY MOUTH EVERY DAY    calcium acetate (PHOSLO) capsule TAKE 1 CAPSULE BY MOUTH THREE TIMES DAILY WITH MEALS    cinacalcet (SENSIPAR) 90 MG tablet Take 90 mg by mouth    clotrimazole-betamethasone (LOTRISONE) 1-0 05 % cream Apply topically 2 (two) times a day (Patient not taking: Reported on 11/21/2019)    cyclobenzaprine (FLEXERIL) 5 mg tablet Take 5 mg by mouth 3 (three) times a day as needed    insulin lispro (HumaLOG) 100 units/mL injection Inject 12 Units under the skin 3 (three) times a day with meals for 30 days (Patient not taking: Reported on 11/21/2019)    LEVEMIR FLEXTOUCH 100 units/mL injection pen INJECT 14 UNITS UNDER THE SKIN 2 (TWO) TIMES A DAY (Patient not taking: Reported on 11/21/2019)    loratadine (CLARITIN) 10 mg tablet Take 1 tablet (10 mg total) by mouth daily for 90 days    nystatin-triamcinolone (MYCOLOG-II) cream Apply topically 2 (two) times a day (Patient not taking: Reported on 11/21/2019)    sodium hypochlorite (DAKIN'S HALF-STRENGTH) external solution Use 2 times a day    [DISCONTINUED] glucose blood (SUSAN CONTOUR TEST) test strip Testing 4 times a day    [DISCONTINUED] NOVOLOG FLEXPEN 100 UNIT/ML SOPN     [DISCONTINUED] SUPER B COMPLEX & C TABS TAKE 1 CAPSULE BY MOUTH ONCE FOR 1 DOSE AS DIRECTED     No current facility-administered medications on file prior to visit        He is allergic to cefepime; ciprofloxacin hcl; cymbalta [duloxetine hcl]; lac bovis; lactose; lyrica [pregabalin]; penicillins; polymyxin b; and rosuvastatin       Review of Systems   Constitutional: Negative for activity change, appetite change, fatigue, fever and unexpected weight change  HENT: Negative for ear pain, hearing loss and sore throat  Eyes: Negative for visual disturbance  Respiratory: Negative for cough, shortness of breath and wheezing  Cardiovascular: Negative for chest pain  Gastrointestinal: Negative for abdominal pain, constipation, diarrhea and vomiting  Genitourinary: Negative for difficulty urinating and dysuria  Musculoskeletal: Positive for back pain and neck pain  Negative for arthralgias and myalgias  Skin:        Lesion on the lips   Neurological: Negative for dizziness and headaches  Psychiatric/Behavioral: Negative for behavioral problems  Objective:      /90 (BP Location: Left arm, Patient Position: Sitting, Cuff Size: Adult)   Pulse 102   Temp 98 5 °F (36 9 °C) (Tympanic)   Resp 16   Ht 6' 4" (1 93 m)   SpO2 97%   BMI 19 48 kg/m²          Physical Exam   Constitutional: He is oriented to person, place, and time  He appears well-developed and well-nourished  No distress  HENT:   Head: Normocephalic and atraumatic  Right Ear: External ear normal    Left Ear: External ear normal    Eyes: Conjunctivae are normal    Neck: Normal range of motion  Neck supple  No thyromegaly present  Cardiovascular: Normal rate, regular rhythm and normal heart sounds  No murmur heard  Pulmonary/Chest: Effort normal and breath sounds normal  No respiratory distress  He has no wheezes  Abdominal:   Colostomy intact     Lymphadenopathy:     He has no cervical adenopathy  Neurological: He is alert and oriented to person, place, and time  Psychiatric: He has a normal mood and affect  His behavior is normal    Nursing note and vitals reviewed

## 2019-11-22 ENCOUNTER — TELEPHONE (OUTPATIENT)
Dept: FAMILY MEDICINE CLINIC | Facility: CLINIC | Age: 39
End: 2019-11-22

## 2019-11-27 NOTE — TELEPHONE ENCOUNTER
Faxed forms Via [employer] to the following fax number [phone] I did receive confirmation

## 2019-11-29 ENCOUNTER — TELEPHONE (OUTPATIENT)
Dept: FAMILY MEDICINE CLINIC | Facility: CLINIC | Age: 39
End: 2019-11-29

## 2019-11-29 NOTE — TELEPHONE ENCOUNTER
SIGNATURES NEEDED FOR [facility] FORM RECEIVED VIA FAX  [employer] WILL BE PLACED IN YOUR BIN

## 2019-12-03 ENCOUNTER — TELEPHONE (OUTPATIENT)
Dept: FAMILY MEDICINE CLINIC | Facility: CLINIC | Age: 39
End: 2019-12-03

## 2019-12-03 NOTE — TELEPHONE ENCOUNTER
Contacted pt and spoke with mom, Tl Adames  Offered her an appt for pt but she advised he is booked all December so appt would have to be in January  She declined scheduling appt today and stated she will call back  She had no complaints for pt at the moment

## 2019-12-04 ENCOUNTER — TELEPHONE (OUTPATIENT)
Dept: FAMILY MEDICINE CLINIC | Facility: CLINIC | Age: 39
End: 2019-12-04

## 2019-12-04 NOTE — TELEPHONE ENCOUNTER
SIGNATURES NEEDED FOR extended family care FORM RECEIVED VIA FAX  WILL BE PLACED IN YOUR BIN AT ASSIGNED DELIVERY TIMES

## 2019-12-05 ENCOUNTER — TELEPHONE (OUTPATIENT)
Dept: FAMILY MEDICINE CLINIC | Facility: CLINIC | Age: 39
End: 2019-12-05

## 2019-12-05 ENCOUNTER — APPOINTMENT (OUTPATIENT)
Dept: WOUND CARE | Facility: HOSPITAL | Age: 39
End: 2019-12-05
Payer: MEDICARE

## 2019-12-05 PROCEDURE — 11045 DBRDMT SUBQ TISS EACH ADDL: CPT

## 2019-12-05 PROCEDURE — 11042 DBRDMT SUBQ TIS 1ST 20SQCM/<: CPT

## 2019-12-06 ENCOUNTER — TELEPHONE (OUTPATIENT)
Dept: FAMILY MEDICINE CLINIC | Facility: CLINIC | Age: 39
End: 2019-12-06

## 2019-12-06 NOTE — TELEPHONE ENCOUNTER
SIGNATURES NEEDED FOR [facility] home  Care  FORM RECEIVED VIA FAX  WILL BE PLACED IN YOUR BIN AT ASSIGNED DELIVERY TIMES        Order Number: [id number]

## 2019-12-09 NOTE — TELEPHONE ENCOUNTER
Faxed [facility] to the following fax number [phone] I did receive confirmation

## 2019-12-15 DIAGNOSIS — I82.90 BLOOD CLOT IN VEIN: ICD-10-CM

## 2019-12-15 DIAGNOSIS — D50.8 IRON DEFICIENCY ANEMIA SECONDARY TO INADEQUATE DIETARY IRON INTAKE: ICD-10-CM

## 2019-12-16 RX ORDER — ASCORBIC ACID 250 MG
TABLET ORAL
Qty: 90 TABLET | Refills: 1 | Status: SHIPPED | OUTPATIENT
Start: 2019-12-16 | End: 2020-03-18

## 2019-12-16 RX ORDER — APIXABAN 2.5 MG/1
TABLET, FILM COATED ORAL
Qty: 60 TABLET | Refills: 5 | Status: SHIPPED | OUTPATIENT
Start: 2019-12-16 | End: 2020-02-27

## 2019-12-26 ENCOUNTER — APPOINTMENT (OUTPATIENT)
Dept: WOUND CARE | Facility: HOSPITAL | Age: 39
End: 2019-12-26
Payer: MEDICARE

## 2019-12-26 PROCEDURE — 11042 DBRDMT SUBQ TIS 1ST 20SQCM/<: CPT

## 2019-12-26 PROCEDURE — 11045 DBRDMT SUBQ TISS EACH ADDL: CPT

## 2020-01-07 ENCOUNTER — TELEPHONE (OUTPATIENT)
Dept: FAMILY MEDICINE CLINIC | Facility: CLINIC | Age: 40
End: 2020-01-07

## 2020-01-07 ENCOUNTER — TRANSITIONAL CARE MANAGEMENT (OUTPATIENT)
Dept: FAMILY MEDICINE CLINIC | Facility: CLINIC | Age: 40
End: 2020-01-07

## 2020-01-07 NOTE — TELEPHONE ENCOUNTER
[address] [place] [address] care FORM RECEIVED VIA FAX  WILL BE PLACED IN YOUR BIN AT ASSIGNED DELIVERY TIMES

## 2020-01-09 ENCOUNTER — TELEPHONE (OUTPATIENT)
Dept: FAMILY MEDICINE CLINIC | Facility: CLINIC | Age: 40
End: 2020-01-09

## 2020-01-09 DIAGNOSIS — N32.81 OAB (OVERACTIVE BLADDER): ICD-10-CM

## 2020-01-09 RX ORDER — OXYBUTYNIN CHLORIDE 5 MG/1
TABLET ORAL
Qty: 270 TABLET | Refills: 0 | Status: SHIPPED | OUTPATIENT
Start: 2020-01-09 | End: 2020-04-06

## 2020-01-10 ENCOUNTER — TELEPHONE (OUTPATIENT)
Dept: FAMILY MEDICINE CLINIC | Facility: CLINIC | Age: 40
End: 2020-01-10

## 2020-01-15 ENCOUNTER — TELEPHONE (OUTPATIENT)
Dept: FAMILY MEDICINE CLINIC | Facility: CLINIC | Age: 40
End: 2020-01-15

## 2020-01-15 NOTE — TELEPHONE ENCOUNTER
SIGNATURES NEEDED FOR [facility] FORM RECEIVED VIA FAX  WILL BE PLACED IN YOUR BIN AT ASSIGNED DELIVERY TIMES        ORDER NUMBER [id number]

## 2020-01-17 ENCOUNTER — TELEPHONE (OUTPATIENT)
Dept: FAMILY MEDICINE CLINIC | Facility: CLINIC | Age: 40
End: 2020-01-17

## 2020-01-17 NOTE — TELEPHONE ENCOUNTER
Patient called and stated his feet are swollen and he is requesting a prescription for KEDS stockings to be able to wear for the swelling

## 2020-01-19 DIAGNOSIS — R60.0 LOWER EXTREMITY EDEMA: Primary | ICD-10-CM

## 2020-01-20 DIAGNOSIS — R60.0 LOWER EXTREMITY EDEMA: Primary | ICD-10-CM

## 2020-01-27 DIAGNOSIS — K21.9 GASTROESOPHAGEAL REFLUX DISEASE WITHOUT ESOPHAGITIS: ICD-10-CM

## 2020-01-27 DIAGNOSIS — R11.2 NAUSEA AND VOMITING, INTRACTABILITY OF VOMITING NOT SPECIFIED, UNSPECIFIED VOMITING TYPE: ICD-10-CM

## 2020-01-27 RX ORDER — PANTOPRAZOLE SODIUM 40 MG/1
TABLET, DELAYED RELEASE ORAL
Qty: 90 TABLET | Refills: 0 | Status: SHIPPED | OUTPATIENT
Start: 2020-01-27 | End: 2020-04-24

## 2020-01-31 ENCOUNTER — TELEPHONE (OUTPATIENT)
Dept: FAMILY MEDICINE CLINIC | Facility: CLINIC | Age: 40
End: 2020-01-31

## 2020-01-31 NOTE — TELEPHONE ENCOUNTER
SIGNATURES NEEDED FOR  FORM RECEIVED VIA FAX  WILL BE PLACED IN YOUR BIN AT ASSIGNED DELIVERY TIMES      Physicians orders

## 2020-02-04 DIAGNOSIS — N18.5 ANEMIA IN STAGE 5 CHRONIC KIDNEY DISEASE, NOT ON CHRONIC DIALYSIS (HCC): ICD-10-CM

## 2020-02-04 DIAGNOSIS — D63.1 ANEMIA IN STAGE 5 CHRONIC KIDNEY DISEASE, NOT ON CHRONIC DIALYSIS (HCC): ICD-10-CM

## 2020-02-05 RX ORDER — POLYHEXAM BIGUAN/GAUZE BANDAGE 0.2%-2"X2"
BANDAGE TOPICAL
Qty: 30 TABLET | Refills: 6 | Status: SHIPPED | OUTPATIENT
Start: 2020-02-05 | End: 2020-10-09

## 2020-02-07 ENCOUNTER — TELEPHONE (OUTPATIENT)
Dept: FAMILY MEDICINE CLINIC | Facility: CLINIC | Age: 40
End: 2020-02-07

## 2020-02-07 NOTE — TELEPHONE ENCOUNTER
Dr Jenifer Lema from [facility] called stating patient is being discharged today   TCM needed  He is being discharged on a coumadin regiment for AFIB  Visiting nurses are being set up for pt

## 2020-02-07 NOTE — TELEPHONE ENCOUNTER
Noted, will check back for his discharge summary on Monday and we will contact him to schedule an appt  I will f/u on his INR as well

## 2020-02-10 ENCOUNTER — TRANSITIONAL CARE MANAGEMENT (OUTPATIENT)
Dept: FAMILY MEDICINE CLINIC | Facility: CLINIC | Age: 40
End: 2020-02-10

## 2020-02-10 ENCOUNTER — TELEPHONE (OUTPATIENT)
Dept: FAMILY MEDICINE CLINIC | Facility: CLINIC | Age: 40
End: 2020-02-10

## 2020-02-10 NOTE — RESULT ENCOUNTER NOTE
Patients INR was in range  Per [facility] note he was to be taking 8mg  I would recommend recheck on Friday since he is new to medication

## 2020-02-10 NOTE — TELEPHONE ENCOUNTER
Mom called to schedule appt states pt is only available on 02/25/20 and 02/27/20 as he is already booked with other appts the whole month, and only would like to see Christina   Appt scheduled on 02/27/20 @320PM as an OVL

## 2020-02-10 NOTE — TELEPHONE ENCOUNTER
Mother called back, confirmed he got his blood drawn this morning by [facility]  I -called them at [phone] and they will call me with the results  They said [facility] so if I don't hear anything my later this afternoon, I will call there to see if I can get them   Just looping you in

## 2020-02-10 NOTE — TELEPHONE ENCOUNTER
LM for Pamela to call the office back   He was supposed to have INR done today, I don't know where that's being done (outpt lab, home draw) so I know where/when to expect results

## 2020-02-10 NOTE — TELEPHONE ENCOUNTER
[facility] called, patient was discharged from hospital recently, they need an order for Ambulatory referral for [address], teresita fax to [phone] or call [phone] per caller, did not leave name

## 2020-02-11 ENCOUNTER — TELEPHONE (OUTPATIENT)
Dept: FAMILY MEDICINE CLINIC | Facility: CLINIC | Age: 40
End: 2020-02-11

## 2020-02-11 NOTE — TELEPHONE ENCOUNTER
I guess not  The other INR from yesterday was 2 3  I think there is a delay because it was done on 2/7  I would stay the same and recheck in 3 days  I am a little confused as to what is going on since he has has been on the same dose and results are quite different   Maybe diet

## 2020-02-11 NOTE — TELEPHONE ENCOUNTER
Not sure, I saw a note from [facility] that they had dropped him to 8mg from 10mg because he had too much of a jump between INR checks  So we'll see what he is again on Friday since that's when Ambrosio Davis sees him again  I gave Ambrosio Davis a verbal to do the INR at his visit Friday   He will again call with results so we can give instruction

## 2020-02-11 NOTE — TELEPHONE ENCOUNTER
Received message from Yao De La Cruz nurse, PT/INR was 1 8/21 3    Patient taking 8mg daily and [facility] had put orders for him to have INR every 3rd day until he's therapeutic so I assume you don't want to change anything?

## 2020-02-12 DIAGNOSIS — N18.6 TYPE 1 DIABETES MELLITUS WITH CHRONIC KIDNEY DISEASE ON CHRONIC DIALYSIS (HCC): ICD-10-CM

## 2020-02-12 DIAGNOSIS — G82.20 PARAPLEGIA (HCC): ICD-10-CM

## 2020-02-12 DIAGNOSIS — Z99.2 TYPE 1 DIABETES MELLITUS WITH CHRONIC KIDNEY DISEASE ON CHRONIC DIALYSIS (HCC): ICD-10-CM

## 2020-02-12 DIAGNOSIS — E10.22 TYPE 1 DIABETES MELLITUS WITH CHRONIC KIDNEY DISEASE ON CHRONIC DIALYSIS (HCC): ICD-10-CM

## 2020-02-12 DIAGNOSIS — R11.2 NAUSEA AND VOMITING, INTRACTABILITY OF VOMITING NOT SPECIFIED, UNSPECIFIED VOMITING TYPE: Primary | ICD-10-CM

## 2020-02-12 DIAGNOSIS — I48.91 ATRIAL FIBRILLATION, UNSPECIFIED TYPE (HCC): ICD-10-CM

## 2020-02-12 DIAGNOSIS — N32.81 OAB (OVERACTIVE BLADDER): ICD-10-CM

## 2020-02-14 ENCOUNTER — TELEPHONE (OUTPATIENT)
Dept: FAMILY MEDICINE CLINIC | Facility: CLINIC | Age: 40
End: 2020-02-14

## 2020-02-14 NOTE — TELEPHONE ENCOUNTER
SIGNATURES NEEDED FOR [facility] FORM RECEIVED VIA FAX  WILL BE PLACED IN YOUR BIN AT ASSIGNED DELIVERY TIMES       Physician Orders

## 2020-02-19 ENCOUNTER — TELEPHONE (OUTPATIENT)
Dept: FAMILY MEDICINE CLINIC | Facility: CLINIC | Age: 40
End: 2020-02-19

## 2020-02-20 ENCOUNTER — TELEPHONE (OUTPATIENT)
Dept: FAMILY MEDICINE CLINIC | Facility: CLINIC | Age: 40
End: 2020-02-20

## 2020-02-24 ENCOUNTER — TRANSITIONAL CARE MANAGEMENT (OUTPATIENT)
Dept: FAMILY MEDICINE CLINIC | Facility: CLINIC | Age: 40
End: 2020-02-24

## 2020-02-24 ENCOUNTER — TELEPHONE (OUTPATIENT)
Dept: FAMILY MEDICINE CLINIC | Facility: CLINIC | Age: 40
End: 2020-02-24

## 2020-02-24 DIAGNOSIS — I48.91 ATRIAL FIBRILLATION, UNSPECIFIED TYPE (HCC): Primary | ICD-10-CM

## 2020-02-24 NOTE — TELEPHONE ENCOUNTER
When should they recheck?  Pt will also need an updated TCM call/updated appt 2/27 since he was released again it looks like

## 2020-02-25 ENCOUNTER — TELEPHONE (OUTPATIENT)
Dept: FAMILY MEDICINE CLINIC | Facility: CLINIC | Age: 40
End: 2020-02-25

## 2020-02-26 DIAGNOSIS — Z99.2 TYPE 1 DIABETES MELLITUS WITH CHRONIC KIDNEY DISEASE ON CHRONIC DIALYSIS (HCC): ICD-10-CM

## 2020-02-26 DIAGNOSIS — E10.22 TYPE 1 DIABETES MELLITUS WITH CHRONIC KIDNEY DISEASE ON CHRONIC DIALYSIS (HCC): ICD-10-CM

## 2020-02-26 DIAGNOSIS — N18.6 TYPE 1 DIABETES MELLITUS WITH CHRONIC KIDNEY DISEASE ON CHRONIC DIALYSIS (HCC): ICD-10-CM

## 2020-02-26 RX ORDER — INSULIN DETEMIR 100 [IU]/ML
14 INJECTION, SOLUTION SUBCUTANEOUS 2 TIMES DAILY
Qty: 15 PEN | Refills: 3 | Status: SHIPPED | OUTPATIENT
Start: 2020-02-26

## 2020-02-27 ENCOUNTER — OFFICE VISIT (OUTPATIENT)
Dept: FAMILY MEDICINE CLINIC | Facility: CLINIC | Age: 40
End: 2020-02-27

## 2020-02-27 VITALS
HEART RATE: 97 BPM | DIASTOLIC BLOOD PRESSURE: 82 MMHG | OXYGEN SATURATION: 98 % | SYSTOLIC BLOOD PRESSURE: 120 MMHG | RESPIRATION RATE: 18 BRPM | TEMPERATURE: 99 F

## 2020-02-27 DIAGNOSIS — D50.9 IRON DEFICIENCY ANEMIA, UNSPECIFIED IRON DEFICIENCY ANEMIA TYPE: ICD-10-CM

## 2020-02-27 DIAGNOSIS — K08.109 TOOTH LOSS: Primary | ICD-10-CM

## 2020-02-27 DIAGNOSIS — R56.9 SEIZURE (HCC): ICD-10-CM

## 2020-02-27 DIAGNOSIS — F31.9 BIPOLAR DEPRESSION (HCC): ICD-10-CM

## 2020-02-27 DIAGNOSIS — Z99.2 DIALYSIS PATIENT (HCC): ICD-10-CM

## 2020-02-27 PROCEDURE — 99496 TRANSJ CARE MGMT HIGH F2F 7D: CPT | Performed by: PHYSICIAN ASSISTANT

## 2020-02-27 NOTE — TELEPHONE ENCOUNTER
Faxed [facility] to the following number [phone] I did receive confirmation  In process to scan to pt's chart!

## 2020-02-27 NOTE — PROGRESS NOTES
Assessment/Plan:    Seizure (HCC)   Continue Keppra 100 b i d  And Dilantin 200 b i d  He is scheduled to see neurology  Bipolar depression ([place] [address] )    Doing well continue with Risperdal and Zoloft  Dialysis patient [facility])    Recommend that he discuss with his dialysis center if he is having a problem with the nursing staff there  He states that he feels more comfortable with some nurses and not others    Iron deficiency anemia   Continue with iron and vitamin C which according to [facility] records have been switch to once a day    Atrial fibrillation ([place] [address] )   Continue with Coumadin due for recheck tomorrow         Problem List Items Addressed This Visit        Other    Iron deficiency anemia      Continue with iron and vitamin C which according to [facility] records have been switch to once a day         Relevant Medications    epoetin kaushik (EPOGEN,PROCRIT) 20,000 units/mL    Dialysis patient [facility])       Recommend that he discuss with his dialysis center if he is having a problem with the nursing staff there  He states that he feels more comfortable with some nurses and not others         Bipolar depression ([place] [address] )       Doing well continue with Risperdal and Zoloft  Relevant Medications    hydrOXYzine HCL (ATARAX) 25 mg tablet    Seizure (HCC)      Continue Keppra 100 b i d  And Dilantin 200 b i d  He is scheduled to see neurology  Other Visit Diagnoses     Tooth loss    -  Primary    Relevant Orders    Ambulatory referral to Oral Maxillofacial Surgery            Subjective:      Patient ID: Neisha Borrero  is a 44 y o  male  HPI 15-year-old paraplegic male with type 1 diabetes and chronic kidney disease on dialysis here for follow-up from hospitalization  He was hospitalized  at [facility] on February 18th for urinary tract infection and SIRS  He was placed on a 3 regimen treatment of antibiotics    Infectious disease was consulted and was then monitored off antibiotics and he remained afebrile  He also has a complicated penile wound with fistula and history of neurogenic bladder  He was discharged on February 21st   His INR was not therapeutic while there and he was recommended that he continue 7 5 milligrams of Coumadin daily with a recheck on 2/24  VNA is coming tomorrow though  He was told he may get intermittent fevers because his bladder is colonized with bacter  He has had 2 fevers since his discharge   Fevers were 106 and 108  The tylenol kat it down  He is trying     Prior to this hospitalization he was hospitalized for seizures on 1/20  He was seen to have generalized shaking for about 1 minute  HE was kat to ED for eval and neuro was consulted  He had just finished dialysis when seizure occurred  He was found EGD to have frontal lobe seizures and was started on IV valproic acid Pes  He then spiked a fever and had blood cultures which were negative  He was placed IV antibiotics  He then suffered another seizure was transferred to the ICU where he was started on IV fosphenytoin and valproate  He was started on Keppra and Dilantin and his seizures had resolved on EEG  Is there was a concern that the Dilantin would decrease the anticoagulation affective his Eliquis and he was switched to Coumadin at that time  He has not since had any seizures and he does have an appointment neurology in the future      PHQ-9 Depression Screening    PHQ-9:    Frequency of the following problems over the past two weeks:       Little interest or pleasure in doing things:  0 - not at all  Feeling down, depressed, or hopeless:  0 - not at all  PHQ-2 Score:  0         TCM Call (since 1/28/2020)     Date and time call was made  2/24/2020  2:55 [facility] [place]  [place] reviewed    Patient was hospitial[facility] at  [facility]    Date of Admission  02/18/20    Date of discharge  02/20/20    Diagnosis  fever    Disposition  Home; Home health services    Were the patients medications reviewed and updated  No    Current Symptoms  None      TCM Call (since 1/28/2020)     Post hospital issues  None    Should patient be enrolled in anticoag monitoring? Yes <img src='C:FILES (X86)    Scheduled for follow up? Yes    Did you obtain your prescribed medications  Yes    Do you need help managing your prescriptions or medications  No    Is transportation to your appointment needed  No    I have advised the patient to call PCP with any new or worsening symptoms  Marzena Steele RN BSN        He needs to have dental work  They said he neeed to have teeth pulled in [place]  The following portions of the patient's history were reviewed and updated as appropriate:   He  has a past medical history of Ambulatory dysfunction, Anemia, iron deficiency, Atrial fibrillation ([place] [address] ), AVM (arteriovenous malformation) of duodenum, acquired, Chronic deep vein thrombosis (DVT) ([place] [address] ), Chronic pain, Chronic suprapubic catheter ([place] [address] ), Clostridium difficile infection (08/11/2016), Colostomy on examination ([place] [address] ), GERD (gastroesophageal reflux disease), Memory impairment (2011), Neurogenic bladder, OAB (overactive bladder), Paraplegia ([place] [address] ), S/P unilateral BKA (below knee amputation) ([place] [address] ), Sebaceous cyst (removed in 2017), Tobacco abuse, and Wounds, multiple    He   Patient Active Problem List    Diagnosis Date Noted    Bipolar depression ([place] [address] ) 02/27/2020    Seizure ([place] [address] ) 01/20/2020    Recurrent Clostridioides difficile diarrhea 11/21/2019    Wheelchair dependent 08/07/2019    Dialysis patient ([place] [address] ) 05/08/2019    Insulin long-term use ([place] [address] ) 05/08/2019    Noncompliance by refusing intervention or support 09/29/2017    Delirium 09/28/2017    Urinary retention 09/26/2017    Asymptomatic bacteriuria 09/25/2017    History of Clostridium difficile infection 09/25/2017    Stage 5 chronic kidney disease on chronic dialysis ([place] [address] ) 09/18/2017    SOB (shortness of breath) 09/18/2017    Penile abscess 09/18/2017    Iron deficiency anemia 07/03/2017    Decubitus ulcers 07/03/2017    HTN (hypertension), benign 07/03/2017    Neurogenic bladder 07/03/2017    GERD (gastroesophageal reflux disease) 07/03/2017    Chronic pain 07/03/2017    Wound healing, delayed 06/29/2017    Osteomyelitis of right femur ([facility] ) 03/20/2017    Chronic indwelling Ortega catheter     Anemia 09/03/2016    Ulcer of sacral region, stage 4 ([facility] ) 09/01/2016    Paraplegia (HCC)     Atrial fibrillation (HCC)     Chronic suprapubic catheter ([facility] )     [facility]     OAB (overactive bladder)     S/P unilateral BKA (below knee amputation) ([facility] )     Sebaceous cyst     Tobacco abuse     Type 1 diabetes mellitus with chronic kidney disease on chronic dialysis ([facility] )     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[facility] ) 12/02/2014     He  has a past surgical history that includes Below knee leg amputation (Right, 2009); Colonoscopy (N/A, 3/27/2017); Esophagogastroduodenoscopy (N/A, 3/22/2017); Colonoscopy (N/A, 3/29/2017); and Colonoscopy (N/A, 6/15/2017)  His family history includes Diabetes in his paternal grandfather; Hyperlipidemia in his mother; Hypertension in his mother; Leukemia in his brother  He  reports that he has been smoking cigars  He has never used smokeless tobacco  He reports that he drank alcohol  He reports that he has current or past drug history  Drug: Marijuana  Current Outpatient Medications   Medication Sig Dispense Refill    Alcohol Swabs (ALCOHOL PADS) 70 % PADS by Does not apply route 5 (five) times a day 300 each 5    amLODIPine (NORVASC) 10 mg tablet Take 10 mg by mouth daily      ascorbic acid (VITAMIN C) 250 MG tablet Take 500 mg by mouth      atorvastatin (LIPITOR) 20 mg tablet TAKE 1 TABLET BY MOUTH EVERYDAY AT BEDTIME 90 tablet 1    baclofen 20 mg tablet Take 20 mg by mouth 2 (two) times a day        SUSAN MICROLET LANCETS lancets Use as instructed to check blood sugar 4 times a day  Dx e10 29 200 each 5    Blood Glucose Monitoring Suppl (SUSAN CONTOUR NEXT USB MONITOR) w/Device KIT Testing 4 times a day 1 kit 0    calcitriol (ROCALTROL) 0 5 MCG capsule Take 2 mcg by mouth      calcium acetate (PHOSLO) capsule TAKE 1 CAPSULE BY MOUTH THREE TIMES DAILY WITH MEALS  3    carvedilol (COREG) 3 125 mg tablet TAKE 1 TABLET BY MOUTH TWICE A DAY WITH MEALS 180 tablet 1    carvedilol (COREG) 6 25 mg tablet Take 6 25 mg by mouth      Cholecalciferol (VITAMIN D-3) 1000 units CAPS Take 2 capsules by mouth daily 30 capsule 0    cinacalcet (SENSIPAR) 60 MG tablet Take 120 mg by mouth      CVS VITAMIN B-12 1000 MCG tablet TAKE 1 TABLET BY MOUTH EVERY DAY 90 tablet 1    CVS VITAMIN C 250 MG tablet TAKE 2 TABLETS (500 MG TOTAL) BY MOUTH DAILY 90 tablet 1    dicyclomine (BENTYL) 10 mg capsule TAKE 1 CAPSULE (10 MG TOTAL) BY MOUTH 3 (THREE) TIMES A DAY BEFORE MEALS 270 capsule 1    Disposable Gloves MISC Use 7 times a day, 4 boxes of gloves XL  Dx type 1 DM, paraplegia 4 each 11    epoetin kaushik (EPOGEN,PROCRIT) 20,000 units/mL Inject 10,000 Units under the skin      epoetin kaushik (EPOGEN,PROCRIT) 20,000 units/mL Inject 5,000 Units under the skin      ferrous sulfate 325 (65 Fe) mg tablet TAKE 1 TABLET BY MOUTH 3 TIMES A DAY 90 tablet 3    glucose blood (SUSAN CONTOUR TEST) test strip Use as instructed to test blood sugar 4 times a day  Dx e10 29 200 each 3    hydrOXYzine HCL (ATARAX) 25 mg tablet Take 25 mg by mouth 2 (two) times a day as needed      Incontinence Supply Disposable (INCONTINENCE BRIEF LARGE) MISC by Does not apply route 6 (six) times a day Size xl 180 each 11    Incontinence Supply Disposable (UNDERGARMENT) MISC Use 6 a day, 4 boxes, xl  Dx R51, paraplegia 4 each 11    Incontinence Supply Disposable (UNDERPADS) MISC Use 2 a day 60 each 11    insulin detemir (LEVEMIR) 100 units/mL subcutaneous injection Inject 6 5 Units under the skin 2 (two) times a day       LEVEMIR FLEXTOUCH 100 units/mL injection pen Inject 14 Units under the skin 2 (two) times a day 15 pen 3    levETIRAcetam (KEPPRA) 500 mg tablet Take 500 mg by mouth 2 (two) times a day      losartan (COZAAR) 100 MG tablet Take 100 mg by mouth daily      Melatonin ER 3 MG TBCR Take 6 mg by mouth      Misc  Devices [facility]) MISC by Does not apply route daily Wheelchair ramp, dx g82 20 1 each 0    Multiple Vitamin (DAILY-LICHA) TABS TAKE 1 TABLET BY MOUTH EVERY DAY 90 tablet 2    NOVOLOG 100 UNIT/ML injection Inject 5 Units under the skin 3 (three) times a day with meals       ondansetron (ZOFRAN-ODT) 4 mg disintegrating tablet Take 1 tablet (4 mg total) by mouth every 8 (eight) hours as needed for nausea or vomiting 20 tablet 2    oxybutynin (DITROPAN) 5 mg tablet TAKE 3 TABLETS BY MOUTH EVERY  tablet 0    oxyCODONE (ROXICODONE) 10 MG TABS Take 10 mg by mouth 3 (three) times a day  0    pantoprazole (PROTONIX) 40 mg tablet TAKE 1 TABLET BY MOUTH EVERY DAY 90 tablet 0    phenytoin extended (DILANTIN) 200 MG ER capsule Take 200 mg by mouth 2 (two) times a day      risperiDONE (RisperDAL) 0 5 mg tablet TAKE 1 TABLET (0 5 MG TOTAL) BY MOUTH 2 (TWO) TIMES A  tablet 1    sertraline (ZOLOFT) 50 mg tablet TAKE 1 TABLET BY MOUTH EVERY DAY 90 tablet 1    SUPER B COMPLEX & C TABS TAKE 1 CAPSULE BY MOUTH ONCE FOR 1 DOSE AS DIRECTED 30 tablet 6    warfarin (COUMADIN) 5 mg tablet Take 7 5 mg by mouth daily      Zinc Sulfate 220 (50 Zn) MG TABS TAKE 1 TABLET BY MOUTH EVERY DAY 30 each 5    carvedilol (COREG) 25 mg tablet Take 25 mg by mouth 2 (two) times a day with meals      CVS ACETAMINOPHEN EX  MG tablet TAKE 2 TABLETS BY MOUTH EVERY 8 HOURS AS NEEDED FOR MILD OR MODERATE PAIN (PAIN SCORE 1 6)   cyclobenzaprine (FLEXERIL) 10 mg tablet Take 10 mg by mouth 3 (three) times a day as needed  0     No current facility-administered medications for this visit        Current Outpatient Medications on File Prior to Visit   Medication Sig    Alcohol Swabs (ALCOHOL PADS) 70 % PADS by Does not apply route 5 (five) times a day    amLODIPine (NORVASC) 10 mg tablet Take 10 mg by mouth daily    ascorbic acid (VITAMIN C) 250 MG tablet Take 500 mg by mouth    atorvastatin (LIPITOR) 20 mg tablet TAKE 1 TABLET BY MOUTH EVERYDAY AT BEDTIME    baclofen 20 mg tablet Take 20 mg by mouth 2 (two) times a day      SUSAN MICROLET LANCETS lancets Use as instructed to check blood sugar 4 times a day  Dx e10 29    Blood Glucose Monitoring Suppl (SUSAN CONTOUR NEXT USB MONITOR) w/Device KIT Testing 4 times a day    calcitriol (ROCALTROL) 0 5 MCG capsule Take 2 mcg by mouth    calcium acetate (PHOSLO) capsule TAKE 1 CAPSULE BY MOUTH THREE TIMES DAILY WITH MEALS    carvedilol (COREG) 3 125 mg tablet TAKE 1 TABLET BY MOUTH TWICE A DAY WITH MEALS    carvedilol (COREG) 6 25 mg tablet Take 6 25 mg by mouth    Cholecalciferol (VITAMIN D-3) 1000 units CAPS Take 2 capsules by mouth daily    cinacalcet (SENSIPAR) 60 MG tablet Take 120 mg by mouth    CVS VITAMIN B-12 1000 MCG tablet TAKE 1 TABLET BY MOUTH EVERY DAY    CVS VITAMIN C 250 MG tablet TAKE 2 TABLETS (500 MG TOTAL) BY MOUTH DAILY    dicyclomine (BENTYL) 10 mg capsule TAKE 1 CAPSULE (10 MG TOTAL) BY MOUTH 3 (THREE) TIMES A DAY BEFORE MEALS    Disposable Gloves MISC Use 7 times a day, 4 boxes of gloves XL  Dx type 1 DM, paraplegia    epoetin kaushik (EPOGEN,PROCRIT) 20,000 units/mL Inject 10,000 Units under the skin    epoetin kaushik (EPOGEN,PROCRIT) 20,000 units/mL Inject 5,000 Units under the skin    ferrous sulfate 325 (65 Fe) mg tablet TAKE 1 TABLET BY MOUTH 3 TIMES A DAY    glucose blood (SUSAN CONTOUR TEST) test strip Use as instructed to test blood sugar 4 times a day  Dx e10 29    hydrOXYzine HCL (ATARAX) 25 mg tablet Take 25 mg by mouth 2 (two) times a day as needed    Incontinence Supply Disposable (INCONTINENCE BRIEF LARGE) MISC by Does not apply route 6 (six) times a day Size xl    Incontinence Supply Disposable (UNDERGARMENT) MISC Use 6 a day, 4 boxes, xl  Dx R51, paraplegia    Incontinence Supply Disposable (UNDERPADS) MISC Use 2 a day    insulin detemir (LEVEMIR) 100 units/mL subcutaneous injection Inject 6 5 Units under the skin 2 (two) times a day     LEVEMIR FLEXTOUCH 100 units/mL injection pen Inject 14 Units under the skin 2 (two) times a day    levETIRAcetam (KEPPRA) 500 mg tablet Take 500 mg by mouth 2 (two) times a day    losartan (COZAAR) 100 MG tablet Take 100 mg by mouth daily    Melatonin ER 3 MG TBCR Take 6 mg by mouth    Misc   Devices [facility]) MISC by Does not apply route daily Wheelchair ramp, dx g82 20    Multiple Vitamin (DAILY-LICHA) TABS TAKE 1 TABLET BY MOUTH EVERY DAY    NOVOLOG 100 UNIT/ML injection Inject 5 Units under the skin 3 (three) times a day with meals     ondansetron (ZOFRAN-ODT) 4 mg disintegrating tablet Take 1 tablet (4 mg total) by mouth every 8 (eight) hours as needed for nausea or vomiting    oxybutynin (DITROPAN) 5 mg tablet TAKE 3 TABLETS BY MOUTH EVERY DAY    oxyCODONE (ROXICODONE) 10 MG TABS Take 10 mg by mouth 3 (three) times a day    pantoprazole (PROTONIX) 40 mg tablet TAKE 1 TABLET BY MOUTH EVERY DAY    phenytoin extended (DILANTIN) 200 MG ER capsule Take 200 mg by mouth 2 (two) times a day    risperiDONE (RisperDAL) 0 5 mg tablet TAKE 1 TABLET (0 5 MG TOTAL) BY MOUTH 2 (TWO) TIMES A DAY    sertraline (ZOLOFT) 50 mg tablet TAKE 1 TABLET BY MOUTH EVERY DAY    SUPER B COMPLEX & C TABS TAKE 1 CAPSULE BY MOUTH ONCE FOR 1 DOSE AS DIRECTED    warfarin (COUMADIN) 5 mg tablet Take 7 5 mg by mouth daily    Zinc Sulfate 220 (50 Zn) MG TABS TAKE 1 TABLET BY MOUTH EVERY DAY    [DISCONTINUED] cyclobenzaprine (FLEXERIL) 5 mg tablet Take 5 mg by mouth 3 (three) times a day as needed    [DISCONTINUED] famotidine (PEPCID) 20 mg tablet TAKE 1 TABLET BY MOUTH EVERYDAY AT BEDTIME    carvedilol (COREG) 25 mg tablet Take 25 mg by mouth 2 (two) times a day with meals    CVS ACETAMINOPHEN EX  MG tablet TAKE 2 TABLETS BY MOUTH EVERY 8 HOURS AS NEEDED FOR MILD OR MODERATE PAIN (PAIN SCORE 1 6)   cyclobenzaprine (FLEXERIL) 10 mg tablet Take 10 mg by mouth 3 (three) times a day as needed    [DISCONTINUED] clotrimazole-betamethasone (LOTRISONE) 1-0 05 % cream Apply topically 2 (two) times a day (Patient not taking: Reported on 11/21/2019)    [DISCONTINUED] insulin lispro (HumaLOG) 100 units/mL injection Inject 12 Units under the skin 3 (three) times a day with meals for 30 days (Patient not taking: Reported on 11/21/2019)    [DISCONTINUED] loratadine (CLARITIN) 10 mg tablet Take 1 tablet (10 mg total) by mouth daily for 90 days    [DISCONTINUED] midodrine (PROAMATINE) 5 mg tablet Take 5 mg by mouth    [DISCONTINUED] nystatin-triamcinolone (MYCOLOG-II) cream Apply topically 2 (two) times a day (Patient not taking: Reported on 11/21/2019)    [DISCONTINUED] sodium hypochlorite (DAKIN'S HALF-STRENGTH) external solution Use 2 times a day (Patient not taking: Reported on 2/27/2020)     No current facility-administered medications on file prior to visit  He is allergic to cefepime; ciprofloxacin hcl; cymbalta [duloxetine hcl]; lac bovis; lactose; lyrica [pregabalin]; penicillins; polymyxin b; and rosuvastatin       Review of Systems   Constitutional: Positive for fever  Negative for chills  HENT: Positive for dental problem  Negative for hearing loss  Eyes: Negative for visual disturbance  Respiratory: Negative for cough and shortness of breath  Cardiovascular: Negative for chest pain  Gastrointestinal: Negative for blood in stool, diarrhea and vomiting  Genitourinary:         No discolored urine or changes in urination   Musculoskeletal: Positive for back pain  Neurological: Negative for seizures  Psychiatric/Behavioral: Negative for agitation and dysphoric mood  The patient is not nervous/anxious  Objective:      /82 (BP Location: Left arm, Patient Position: Sitting, Cuff Size: Standard)   Pulse 97   Temp 99 °F (37 2 °C) (Temporal)   Resp 18   SpO2 98%          Physical Exam   Constitutional: He is oriented to person, place, and time  He appears well-developed and well-nourished  No distress  HENT:   Head: Normocephalic and atraumatic  Right Ear: External ear normal    Left Ear: External ear normal    Eyes: Conjunctivae are normal    Neck: Normal range of motion  Neck supple  No thyromegaly present  Cardiovascular: Normal rate, regular rhythm and normal heart sounds  No murmur heard  Pulmonary/Chest: Effort normal and breath sounds normal  No respiratory distress  He has no wheezes  Musculoskeletal:     Wheelchair-bound   Lymphadenopathy:     He has no cervical adenopathy  Neurological: He is alert and oriented to person, place, and time  Psychiatric: He has a normal mood and affect  His behavior is normal    Nursing note and vitals reviewed

## 2020-02-28 ENCOUNTER — TELEPHONE (OUTPATIENT)
Dept: FAMILY MEDICINE CLINIC | Facility: CLINIC | Age: 40
End: 2020-02-28

## 2020-02-28 PROBLEM — R56.9 SEIZURE (HCC): Status: ACTIVE | Noted: 2020-01-20

## 2020-02-28 RX ORDER — LEVETIRACETAM 500 MG/1
500 TABLET ORAL 2 TIMES DAILY
COMMUNITY
Start: 2020-02-07 | End: 2020-05-21

## 2020-02-28 RX ORDER — WARFARIN SODIUM 5 MG/1
7.5 TABLET ORAL DAILY
COMMUNITY
Start: 2020-02-17 | End: 2020-03-18 | Stop reason: SDUPTHER

## 2020-02-28 RX ORDER — PHENYTOIN SODIUM 200 MG/1
200 CAPSULE, EXTENDED RELEASE ORAL 2 TIMES DAILY
COMMUNITY
Start: 2020-02-18 | End: 2021-01-29 | Stop reason: SDUPTHER

## 2020-02-28 RX ORDER — LOSARTAN POTASSIUM 100 MG/1
100 TABLET ORAL DAILY
COMMUNITY
Start: 2019-10-08 | End: 2020-10-07 | Stop reason: ALTCHOICE

## 2020-02-28 RX ORDER — CALCITRIOL 0.5 UG/1
2 CAPSULE, LIQUID FILLED ORAL
COMMUNITY
Start: 2020-01-06

## 2020-02-28 RX ORDER — CYCLOBENZAPRINE HCL 10 MG
10 TABLET ORAL 3 TIMES DAILY PRN
Refills: 0 | COMMUNITY
Start: 2019-11-28

## 2020-02-28 RX ORDER — CINACALCET 60 MG/1
120 TABLET, FILM COATED ORAL
COMMUNITY
Start: 2020-01-06 | End: 2020-04-05

## 2020-02-28 RX ORDER — CARVEDILOL 25 MG/1
25 TABLET ORAL 2 TIMES DAILY WITH MEALS
COMMUNITY
Start: 2020-02-07 | End: 2020-08-21

## 2020-02-28 RX ORDER — GLYCERIN/MIN OIL/POLYCARBOPHIL
GEL WITH APPLICATOR (GRAM) VAGINAL
COMMUNITY
Start: 2020-02-17

## 2020-02-28 RX ORDER — HYDROXYZINE HYDROCHLORIDE 25 MG/1
25 TABLET, FILM COATED ORAL 2 TIMES DAILY PRN
COMMUNITY
Start: 2020-02-07 | End: 2020-09-17 | Stop reason: SDUPTHER

## 2020-02-28 NOTE — ASSESSMENT & PLAN NOTE
Recommend that he discuss with his dialysis center if he is having a problem with the nursing staff there    He states that he feels more comfortable with some nurses and not others

## 2020-02-28 NOTE — ASSESSMENT & PLAN NOTE
Continue with iron and vitamin C which according to [facility] records have been switch to once a day

## 2020-03-02 ENCOUNTER — TELEPHONE (OUTPATIENT)
Dept: FAMILY MEDICINE CLINIC | Facility: CLINIC | Age: 40
End: 2020-03-02

## 2020-03-03 ENCOUNTER — TELEPHONE (OUTPATIENT)
Dept: FAMILY MEDICINE CLINIC | Facility: CLINIC | Age: 40
End: 2020-03-03

## 2020-03-03 NOTE — TELEPHONE ENCOUNTER
SIGNATURES NEEDED FOR [address] [facility] FORM RECEIVED VIA FAX  WILL BE PLACED IN YOUR BIN AT ASSIGNED DELIVERY TIMES

## 2020-03-05 ENCOUNTER — APPOINTMENT (OUTPATIENT)
Dept: WOUND CARE | Facility: HOSPITAL | Age: 40
End: 2020-03-05
Payer: MEDICARE

## 2020-03-05 PROCEDURE — 99214 OFFICE O/P EST MOD 30 MIN: CPT

## 2020-03-06 ENCOUNTER — TELEPHONE (OUTPATIENT)
Dept: FAMILY MEDICINE CLINIC | Facility: CLINIC | Age: 40
End: 2020-03-06

## 2020-03-06 NOTE — TELEPHONE ENCOUNTER
Pts mom called requesting we fax over pts problem list to her stating pt insurance is coming to the house for an evaluation      Fax number [phone]

## 2020-03-15 ENCOUNTER — TELEPHONE (OUTPATIENT)
Dept: OTHER | Facility: OTHER | Age: 40
End: 2020-03-15

## 2020-03-15 NOTE — TELEPHONE ENCOUNTER
Patient states he was put on medications during his admission in February and he has been itching since then and is wondering if it could be due to one of those meds  Has pus pimples on chest and head but itching not limited to just those areas

## 2020-03-16 ENCOUNTER — TELEPHONE (OUTPATIENT)
Dept: FAMILY MEDICINE CLINIC | Facility: CLINIC | Age: 40
End: 2020-03-16

## 2020-03-16 DIAGNOSIS — E10.22 TYPE 1 DIABETES MELLITUS WITH CHRONIC KIDNEY DISEASE ON CHRONIC DIALYSIS (HCC): ICD-10-CM

## 2020-03-16 DIAGNOSIS — L29.9 ITCHING: Primary | ICD-10-CM

## 2020-03-16 DIAGNOSIS — G82.20 PARAPLEGIA (HCC): Chronic | ICD-10-CM

## 2020-03-16 DIAGNOSIS — N18.6 TYPE 1 DIABETES MELLITUS WITH CHRONIC KIDNEY DISEASE ON CHRONIC DIALYSIS (HCC): ICD-10-CM

## 2020-03-16 DIAGNOSIS — L98.429 ULCER OF SACRAL REGION, STAGE 4 (HCC): ICD-10-CM

## 2020-03-16 DIAGNOSIS — N32.81 OAB (OVERACTIVE BLADDER): ICD-10-CM

## 2020-03-16 DIAGNOSIS — Z99.2 TYPE 1 DIABETES MELLITUS WITH CHRONIC KIDNEY DISEASE ON CHRONIC DIALYSIS (HCC): ICD-10-CM

## 2020-03-16 RX ORDER — SODIUM HYPOCHLORITE 2.5 MG/ML
SOLUTION TOPICAL
Qty: 473 ML | Refills: 2 | Status: SHIPPED | OUTPATIENT
Start: 2020-03-16 | End: 2020-04-06 | Stop reason: SDUPTHER

## 2020-03-16 RX ORDER — CETIRIZINE HYDROCHLORIDE 5 MG/1
5 TABLET ORAL DAILY
Qty: 90 TABLET | Refills: 0 | Status: SHIPPED | OUTPATIENT
Start: 2020-03-16 | End: 2020-07-05 | Stop reason: SDUPTHER

## 2020-03-16 NOTE — TELEPHONE ENCOUNTER
It may be one of the meds  I would recommend he call his neurologist   Meds were anti-seizure and coumadin  It is difficult to take him off these without consequences  I can all in cetirizine to help with itch

## 2020-03-16 NOTE — TELEPHONE ENCOUNTER
Please advise, looks like he was started on multiple meds at discharge  I also left a message with [facility], he was due for an INR last week and I didn't receive anything   I had LM for Hyacinth Merritt with instruction on 3/2/20

## 2020-03-16 NOTE — TELEPHONE ENCOUNTER
SIGNATURES NEEDED FOR [facility]  FORM RECEIVED VIA FAX  WILL BE PLACED IN YOUR BIN AT ASSIGNED DELIVERY TIMES

## 2020-03-18 DIAGNOSIS — I48.91 ATRIAL FIBRILLATION, UNSPECIFIED TYPE (HCC): Primary | Chronic | ICD-10-CM

## 2020-03-18 DIAGNOSIS — D50.8 IRON DEFICIENCY ANEMIA SECONDARY TO INADEQUATE DIETARY IRON INTAKE: ICD-10-CM

## 2020-03-18 RX ORDER — WARFARIN SODIUM 5 MG/1
7.5 TABLET ORAL DAILY
Qty: 135 TABLET | Refills: 1 | Status: SHIPPED | OUTPATIENT
Start: 2020-03-18 | End: 2020-09-07 | Stop reason: SDUPTHER

## 2020-03-18 RX ORDER — ASCORBIC ACID 250 MG
TABLET ORAL
Qty: 90 TABLET | Refills: 1 | Status: SHIPPED | OUTPATIENT
Start: 2020-03-18 | End: 2020-05-14

## 2020-03-20 LAB — INR PPP: 2.8 (ref 0.84–1.19)

## 2020-03-21 ENCOUNTER — ANTICOAG VISIT (OUTPATIENT)
Dept: FAMILY MEDICINE CLINIC | Facility: CLINIC | Age: 40
End: 2020-03-21

## 2020-03-21 DIAGNOSIS — I48.91 ATRIAL FIBRILLATION, UNSPECIFIED TYPE (HCC): Primary | ICD-10-CM

## 2020-03-27 DIAGNOSIS — K58.9 IRRITABLE BOWEL SYNDROME, UNSPECIFIED TYPE: ICD-10-CM

## 2020-03-27 RX ORDER — DICYCLOMINE HYDROCHLORIDE 10 MG/1
10 CAPSULE ORAL
Qty: 270 CAPSULE | Refills: 1 | Status: SHIPPED | OUTPATIENT
Start: 2020-03-27 | End: 2020-09-17

## 2020-03-27 NOTE — TELEPHONE ENCOUNTER
[address] [facility]  FORM RECEIVED VIA FAX  WILL BE PLACED IN YOUR BIN AT ASSIGNED DELIVERY TIMES

## 2020-03-30 DIAGNOSIS — E78.5 HYPERLIPIDEMIA, UNSPECIFIED HYPERLIPIDEMIA TYPE: ICD-10-CM

## 2020-03-30 DIAGNOSIS — F31.9 BIPOLAR DEPRESSION (HCC): ICD-10-CM

## 2020-03-30 DIAGNOSIS — E53.9 VITAMIN B DEFICIENCY: ICD-10-CM

## 2020-03-30 RX ORDER — RISPERIDONE 0.5 MG/1
0.5 TABLET, FILM COATED ORAL 2 TIMES DAILY
Qty: 180 TABLET | Refills: 1 | Status: SHIPPED | OUTPATIENT
Start: 2020-03-30 | End: 2020-09-22

## 2020-03-30 RX ORDER — OMEGA-3/DHA/EPA/FISH OIL 35-113.5MG
TABLET,CHEWABLE ORAL
Qty: 90 TABLET | Refills: 1 | Status: SHIPPED | OUTPATIENT
Start: 2020-03-30 | End: 2020-08-19

## 2020-03-30 RX ORDER — ATORVASTATIN CALCIUM 20 MG/1
TABLET, FILM COATED ORAL
Qty: 90 TABLET | Refills: 1 | Status: SHIPPED | OUTPATIENT
Start: 2020-03-30 | End: 2020-07-28 | Stop reason: SDUPTHER

## 2020-04-01 NOTE — TELEPHONE ENCOUNTER
Fax [facility]  to the following fax number [phone] I did receive confirmation, In process to be scanned into the pt's chart

## 2020-04-03 ENCOUNTER — ANTICOAG VISIT (OUTPATIENT)
Dept: FAMILY MEDICINE CLINIC | Facility: CLINIC | Age: 40
End: 2020-04-03

## 2020-04-03 DIAGNOSIS — E10.22 TYPE 1 DIABETES MELLITUS WITH CHRONIC KIDNEY DISEASE ON CHRONIC DIALYSIS (HCC): ICD-10-CM

## 2020-04-03 DIAGNOSIS — N18.6 TYPE 1 DIABETES MELLITUS WITH CHRONIC KIDNEY DISEASE ON CHRONIC DIALYSIS (HCC): ICD-10-CM

## 2020-04-03 DIAGNOSIS — G82.20 PARAPLEGIA (HCC): Chronic | ICD-10-CM

## 2020-04-03 DIAGNOSIS — L98.429 ULCER OF SACRAL REGION, STAGE 4 (HCC): ICD-10-CM

## 2020-04-03 DIAGNOSIS — Z99.2 TYPE 1 DIABETES MELLITUS WITH CHRONIC KIDNEY DISEASE ON CHRONIC DIALYSIS (HCC): ICD-10-CM

## 2020-04-03 DIAGNOSIS — I48.91 ATRIAL FIBRILLATION, UNSPECIFIED TYPE (HCC): Primary | ICD-10-CM

## 2020-04-03 DIAGNOSIS — N32.81 OAB (OVERACTIVE BLADDER): ICD-10-CM

## 2020-04-03 LAB — INR PPP: 3.3 (ref 0.84–1.19)

## 2020-04-05 DIAGNOSIS — N32.81 OAB (OVERACTIVE BLADDER): ICD-10-CM

## 2020-04-06 RX ORDER — SODIUM HYPOCHLORITE 2.5 MG/ML
SOLUTION TOPICAL
Qty: 473 ML | Refills: 2 | Status: SHIPPED | OUTPATIENT
Start: 2020-04-06 | End: 2021-02-25 | Stop reason: SDUPTHER

## 2020-04-06 RX ORDER — OXYBUTYNIN CHLORIDE 5 MG/1
TABLET ORAL
Qty: 270 TABLET | Refills: 0 | Status: SHIPPED | OUTPATIENT
Start: 2020-04-06 | End: 2020-07-05 | Stop reason: SDUPTHER

## 2020-04-10 ENCOUNTER — ANTICOAG VISIT (OUTPATIENT)
Dept: FAMILY MEDICINE CLINIC | Facility: CLINIC | Age: 40
End: 2020-04-10

## 2020-04-10 ENCOUNTER — TELEPHONE (OUTPATIENT)
Dept: FAMILY MEDICINE CLINIC | Facility: CLINIC | Age: 40
End: 2020-04-10

## 2020-04-10 DIAGNOSIS — I48.91 ATRIAL FIBRILLATION, UNSPECIFIED TYPE (HCC): Primary | ICD-10-CM

## 2020-04-10 LAB — INR PPP: 1.9 (ref 0.84–1.19)

## 2020-04-13 ENCOUNTER — TELEPHONE (OUTPATIENT)
Dept: FAMILY MEDICINE CLINIC | Facility: CLINIC | Age: 40
End: 2020-04-13

## 2020-04-14 ENCOUNTER — TELEPHONE (OUTPATIENT)
Dept: FAMILY MEDICINE CLINIC | Facility: CLINIC | Age: 40
End: 2020-04-14

## 2020-04-20 ENCOUNTER — TRANSITIONAL CARE MANAGEMENT (OUTPATIENT)
Dept: FAMILY MEDICINE CLINIC | Facility: CLINIC | Age: 40
End: 2020-04-20

## 2020-04-21 ENCOUNTER — TELEPHONE (OUTPATIENT)
Dept: FAMILY MEDICINE CLINIC | Facility: CLINIC | Age: 40
End: 2020-04-21

## 2020-04-23 DIAGNOSIS — R11.2 NAUSEA AND VOMITING, INTRACTABILITY OF VOMITING NOT SPECIFIED, UNSPECIFIED VOMITING TYPE: ICD-10-CM

## 2020-04-23 DIAGNOSIS — K21.9 GASTROESOPHAGEAL REFLUX DISEASE WITHOUT ESOPHAGITIS: ICD-10-CM

## 2020-04-24 ENCOUNTER — TRANSITIONAL CARE MANAGEMENT (OUTPATIENT)
Dept: FAMILY MEDICINE CLINIC | Facility: CLINIC | Age: 40
End: 2020-04-24

## 2020-04-24 ENCOUNTER — TELEPHONE (OUTPATIENT)
Dept: FAMILY MEDICINE CLINIC | Facility: CLINIC | Age: 40
End: 2020-04-24

## 2020-04-24 RX ORDER — PANTOPRAZOLE SODIUM 40 MG/1
TABLET, DELAYED RELEASE ORAL
Qty: 90 TABLET | Refills: 0 | Status: SHIPPED | OUTPATIENT
Start: 2020-04-24 | End: 2020-07-16

## 2020-04-27 ENCOUNTER — TELEPHONE (OUTPATIENT)
Dept: FAMILY MEDICINE CLINIC | Facility: CLINIC | Age: 40
End: 2020-04-27

## 2020-04-28 ENCOUNTER — OFFICE VISIT (OUTPATIENT)
Dept: FAMILY MEDICINE CLINIC | Facility: CLINIC | Age: 40
End: 2020-04-28

## 2020-04-28 ENCOUNTER — TELEPHONE (OUTPATIENT)
Dept: FAMILY MEDICINE CLINIC | Facility: CLINIC | Age: 40
End: 2020-04-28

## 2020-04-28 VITALS
DIASTOLIC BLOOD PRESSURE: 80 MMHG | TEMPERATURE: 99.5 F | RESPIRATION RATE: 16 BRPM | HEART RATE: 90 BPM | OXYGEN SATURATION: 99 % | SYSTOLIC BLOOD PRESSURE: 128 MMHG

## 2020-04-28 DIAGNOSIS — K13.0 LIP LESION: ICD-10-CM

## 2020-04-28 DIAGNOSIS — R06.02 SOB (SHORTNESS OF BREATH): Primary | ICD-10-CM

## 2020-04-28 DIAGNOSIS — Z00.00 MEDICARE ANNUAL WELLNESS VISIT, SUBSEQUENT: ICD-10-CM

## 2020-04-28 DIAGNOSIS — I48.91 ATRIAL FIBRILLATION, UNSPECIFIED TYPE (HCC): Chronic | ICD-10-CM

## 2020-04-28 DIAGNOSIS — L70.0 ACNE VULGARIS: ICD-10-CM

## 2020-04-28 DIAGNOSIS — K21.9 GASTROESOPHAGEAL REFLUX DISEASE WITHOUT ESOPHAGITIS: ICD-10-CM

## 2020-04-28 PROCEDURE — 99495 TRANSJ CARE MGMT MOD F2F 14D: CPT | Performed by: PHYSICIAN ASSISTANT

## 2020-04-28 PROCEDURE — G0439 PPPS, SUBSEQ VISIT: HCPCS | Performed by: PHYSICIAN ASSISTANT

## 2020-04-28 PROCEDURE — 3066F NEPHROPATHY DOC TX: CPT | Performed by: PHYSICIAN ASSISTANT

## 2020-04-28 RX ORDER — CLINDAMYCIN PHOSPHATE 10 MG/G
GEL TOPICAL 2 TIMES DAILY
Qty: 60 G | Refills: 2 | Status: SHIPPED | OUTPATIENT
Start: 2020-04-28 | End: 2020-10-19 | Stop reason: SDUPTHER

## 2020-04-28 RX ORDER — FAMOTIDINE 20 MG/1
20 TABLET, FILM COATED ORAL
Qty: 90 TABLET | Refills: 1 | Status: SHIPPED | OUTPATIENT
Start: 2020-04-28 | End: 2020-08-21

## 2020-04-30 ENCOUNTER — TELEPHONE (OUTPATIENT)
Dept: FAMILY MEDICINE CLINIC | Facility: CLINIC | Age: 40
End: 2020-04-30

## 2020-04-30 DIAGNOSIS — N18.6 TYPE 1 DIABETES MELLITUS WITH CHRONIC KIDNEY DISEASE ON CHRONIC DIALYSIS (HCC): ICD-10-CM

## 2020-04-30 DIAGNOSIS — Z99.2 TYPE 1 DIABETES MELLITUS WITH CHRONIC KIDNEY DISEASE ON CHRONIC DIALYSIS (HCC): ICD-10-CM

## 2020-04-30 DIAGNOSIS — E10.22 TYPE 1 DIABETES MELLITUS WITH CHRONIC KIDNEY DISEASE ON CHRONIC DIALYSIS (HCC): ICD-10-CM

## 2020-04-30 DIAGNOSIS — L98.429 ULCER OF SACRAL REGION, STAGE 4 (HCC): ICD-10-CM

## 2020-04-30 LAB — INR PPP: 2.2 (ref 0.84–1.19)

## 2020-04-30 RX ORDER — UREA 10 %
1 LOTION (ML) TOPICAL DAILY
Qty: 30 EACH | Refills: 5 | Status: SHIPPED | OUTPATIENT
Start: 2020-04-30 | End: 2020-12-15

## 2020-05-01 ENCOUNTER — TELEPHONE (OUTPATIENT)
Dept: FAMILY MEDICINE CLINIC | Facility: CLINIC | Age: 40
End: 2020-05-01

## 2020-05-04 ENCOUNTER — ANTICOAG VISIT (OUTPATIENT)
Dept: FAMILY MEDICINE CLINIC | Facility: CLINIC | Age: 40
End: 2020-05-04

## 2020-05-04 DIAGNOSIS — I48.91 ATRIAL FIBRILLATION, UNSPECIFIED TYPE (HCC): Primary | ICD-10-CM

## 2020-05-05 ENCOUNTER — TELEMEDICINE (OUTPATIENT)
Dept: DERMATOLOGY | Facility: CLINIC | Age: 40
End: 2020-05-05
Payer: MEDICARE

## 2020-05-05 DIAGNOSIS — L70.0 ACNE VULGARIS: ICD-10-CM

## 2020-05-05 DIAGNOSIS — K13.0 CHEILITIS: Primary | ICD-10-CM

## 2020-05-05 DIAGNOSIS — K13.0 LIP LESION: ICD-10-CM

## 2020-05-05 DIAGNOSIS — I48.91 ATRIAL FIBRILLATION, UNSPECIFIED TYPE (HCC): Primary | Chronic | ICD-10-CM

## 2020-05-05 PROCEDURE — 99203 OFFICE O/P NEW LOW 30 MIN: CPT | Performed by: STUDENT IN AN ORGANIZED HEALTH CARE EDUCATION/TRAINING PROGRAM

## 2020-05-05 RX ORDER — KETOCONAZOLE 20 MG/G
CREAM TOPICAL
Qty: 15 G | Refills: 1 | Status: SHIPPED | OUTPATIENT
Start: 2020-05-05 | End: 2020-08-21

## 2020-05-05 RX ORDER — DOXYCYCLINE 100 MG/1
TABLET ORAL
Qty: 60 TABLET | Refills: 0 | Status: SHIPPED | OUTPATIENT
Start: 2020-05-05 | End: 2020-05-27

## 2020-05-06 ENCOUNTER — TELEPHONE (OUTPATIENT)
Dept: FAMILY MEDICINE CLINIC | Facility: CLINIC | Age: 40
End: 2020-05-06

## 2020-05-07 ENCOUNTER — TELEPHONE (OUTPATIENT)
Dept: FAMILY MEDICINE CLINIC | Facility: CLINIC | Age: 40
End: 2020-05-07

## 2020-05-11 ENCOUNTER — TELEPHONE (OUTPATIENT)
Dept: FAMILY MEDICINE CLINIC | Facility: CLINIC | Age: 40
End: 2020-05-11

## 2020-05-12 ENCOUNTER — TRANSITIONAL CARE MANAGEMENT (OUTPATIENT)
Dept: FAMILY MEDICINE CLINIC | Facility: CLINIC | Age: 40
End: 2020-05-12

## 2020-05-13 ENCOUNTER — TELEPHONE (OUTPATIENT)
Dept: FAMILY MEDICINE CLINIC | Facility: CLINIC | Age: 40
End: 2020-05-13

## 2020-05-14 DIAGNOSIS — D50.8 IRON DEFICIENCY ANEMIA SECONDARY TO INADEQUATE DIETARY IRON INTAKE: ICD-10-CM

## 2020-05-14 RX ORDER — ASCORBIC ACID 250 MG
TABLET ORAL
Qty: 60 TABLET | Refills: 4 | Status: SHIPPED | OUTPATIENT
Start: 2020-05-14 | End: 2020-08-07

## 2020-05-20 ENCOUNTER — TELEPHONE (OUTPATIENT)
Dept: FAMILY MEDICINE CLINIC | Facility: CLINIC | Age: 40
End: 2020-05-20

## 2020-05-21 ENCOUNTER — TELEMEDICINE (OUTPATIENT)
Dept: FAMILY MEDICINE CLINIC | Facility: CLINIC | Age: 40
End: 2020-05-21

## 2020-05-21 VITALS — DIASTOLIC BLOOD PRESSURE: 75 MMHG | SYSTOLIC BLOOD PRESSURE: 131 MMHG

## 2020-05-21 DIAGNOSIS — N18.6 TYPE 1 DIABETES MELLITUS WITH CHRONIC KIDNEY DISEASE ON CHRONIC DIALYSIS (HCC): ICD-10-CM

## 2020-05-21 DIAGNOSIS — E10.22 TYPE 1 DIABETES MELLITUS WITH CHRONIC KIDNEY DISEASE ON CHRONIC DIALYSIS (HCC): ICD-10-CM

## 2020-05-21 DIAGNOSIS — L98.429 ULCER OF SACRAL REGION, STAGE 4 (HCC): ICD-10-CM

## 2020-05-21 DIAGNOSIS — R06.02 SOB (SHORTNESS OF BREATH): Primary | ICD-10-CM

## 2020-05-21 DIAGNOSIS — Z99.2 TYPE 1 DIABETES MELLITUS WITH CHRONIC KIDNEY DISEASE ON CHRONIC DIALYSIS (HCC): ICD-10-CM

## 2020-05-21 DIAGNOSIS — R56.9 SEIZURE (HCC): ICD-10-CM

## 2020-05-21 DIAGNOSIS — R11.2 NAUSEA AND VOMITING, INTRACTABILITY OF VOMITING NOT SPECIFIED, UNSPECIFIED VOMITING TYPE: ICD-10-CM

## 2020-05-21 PROCEDURE — 99214 OFFICE O/P EST MOD 30 MIN: CPT | Performed by: PHYSICIAN ASSISTANT

## 2020-05-21 RX ORDER — ONDANSETRON 4 MG/1
4 TABLET, ORALLY DISINTEGRATING ORAL EVERY 8 HOURS PRN
Qty: 30 TABLET | Refills: 1 | Status: SHIPPED | OUTPATIENT
Start: 2020-05-21 | End: 2020-08-21

## 2020-05-21 RX ORDER — LEVETIRACETAM 250 MG/1
TABLET ORAL
COMMUNITY
Start: 2020-04-28

## 2020-05-27 ENCOUNTER — TELEPHONE (OUTPATIENT)
Dept: FAMILY MEDICINE CLINIC | Facility: CLINIC | Age: 40
End: 2020-05-27

## 2020-05-27 DIAGNOSIS — L70.0 ACNE VULGARIS: ICD-10-CM

## 2020-05-27 RX ORDER — DOXYCYCLINE 100 MG/1
TABLET ORAL
Qty: 60 TABLET | Refills: 0 | Status: SHIPPED | OUTPATIENT
Start: 2020-05-27 | End: 2020-06-29

## 2020-05-28 ENCOUNTER — APPOINTMENT (OUTPATIENT)
Dept: WOUND CARE | Facility: HOSPITAL | Age: 40
End: 2020-05-28
Payer: MEDICARE

## 2020-05-28 DIAGNOSIS — I48.91 ATRIAL FIBRILLATION, UNSPECIFIED TYPE (HCC): Chronic | ICD-10-CM

## 2020-05-28 PROCEDURE — 97598 DBRDMT OPN WND ADDL 20CM/<: CPT

## 2020-05-28 PROCEDURE — 97597 DBRDMT OPN WND 1ST 20 CM/<: CPT

## 2020-05-28 PROCEDURE — 11042 DBRDMT SUBQ TIS 1ST 20SQCM/<: CPT

## 2020-05-28 PROCEDURE — 11045 DBRDMT SUBQ TISS EACH ADDL: CPT

## 2020-05-28 RX ORDER — CARVEDILOL 3.12 MG/1
TABLET ORAL
Qty: 180 TABLET | Refills: 1 | Status: SHIPPED | OUTPATIENT
Start: 2020-05-28 | End: 2020-11-04

## 2020-05-29 ENCOUNTER — TELEPHONE (OUTPATIENT)
Dept: FAMILY MEDICINE CLINIC | Facility: CLINIC | Age: 40
End: 2020-05-29

## 2020-05-29 ENCOUNTER — TELEPHONE (OUTPATIENT)
Dept: OTHER | Facility: OTHER | Age: 40
End: 2020-05-29

## 2020-05-29 LAB — INR PPP: 1.8 (ref 0.84–1.19)

## 2020-06-01 ENCOUNTER — ANTICOAG VISIT (OUTPATIENT)
Dept: FAMILY MEDICINE CLINIC | Facility: CLINIC | Age: 40
End: 2020-06-01

## 2020-06-01 DIAGNOSIS — I48.91 ATRIAL FIBRILLATION, UNSPECIFIED TYPE (HCC): Primary | ICD-10-CM

## 2020-06-02 ENCOUNTER — TELEPHONE (OUTPATIENT)
Dept: FAMILY MEDICINE CLINIC | Facility: CLINIC | Age: 40
End: 2020-06-02

## 2020-06-02 DIAGNOSIS — I10 HTN (HYPERTENSION), BENIGN: Primary | ICD-10-CM

## 2020-06-03 RX ORDER — CARVEDILOL 25 MG/1
TABLET ORAL
Qty: 180 TABLET | Refills: 0 | OUTPATIENT
Start: 2020-06-03

## 2020-06-05 ENCOUNTER — TELEPHONE (OUTPATIENT)
Dept: FAMILY MEDICINE CLINIC | Facility: CLINIC | Age: 40
End: 2020-06-05

## 2020-06-05 ENCOUNTER — ANTICOAG VISIT (OUTPATIENT)
Dept: FAMILY MEDICINE CLINIC | Facility: CLINIC | Age: 40
End: 2020-06-05

## 2020-06-05 DIAGNOSIS — I48.91 ATRIAL FIBRILLATION, UNSPECIFIED TYPE (HCC): Primary | ICD-10-CM

## 2020-06-05 LAB — INR PPP: 2.6 (ref 0.84–1.19)

## 2020-06-08 ENCOUNTER — TELEPHONE (OUTPATIENT)
Dept: FAMILY MEDICINE CLINIC | Facility: CLINIC | Age: 40
End: 2020-06-08

## 2020-06-09 ENCOUNTER — TELEPHONE (OUTPATIENT)
Dept: FAMILY MEDICINE CLINIC | Facility: CLINIC | Age: 40
End: 2020-06-09

## 2020-06-10 ENCOUNTER — TELEPHONE (OUTPATIENT)
Dept: FAMILY MEDICINE CLINIC | Facility: CLINIC | Age: 40
End: 2020-06-10

## 2020-06-11 ENCOUNTER — TELEPHONE (OUTPATIENT)
Dept: FAMILY MEDICINE CLINIC | Facility: CLINIC | Age: 40
End: 2020-06-11

## 2020-06-11 DIAGNOSIS — R19.5 CHANGE IN CONSISTENCY OF STOOL: Primary | ICD-10-CM

## 2020-06-11 NOTE — TELEPHONE ENCOUNTER
Patient called stating he thinks that he has c-diff again and wanted to talk to dr about getting a stool sample  Please advise?

## 2020-06-15 ENCOUNTER — TELEPHONE (OUTPATIENT)
Dept: FAMILY MEDICINE CLINIC | Facility: CLINIC | Age: 40
End: 2020-06-15

## 2020-06-15 DIAGNOSIS — N30.00 ACUTE CYSTITIS WITHOUT HEMATURIA: Primary | ICD-10-CM

## 2020-06-15 RX ORDER — SULFAMETHOXAZOLE AND TRIMETHOPRIM 800; 160 MG/1; MG/1
1 TABLET ORAL DAILY
Qty: 5 TABLET | Refills: 0 | Status: SHIPPED | OUTPATIENT
Start: 2020-06-15 | End: 2020-06-20

## 2020-06-16 ENCOUNTER — TELEPHONE (OUTPATIENT)
Dept: FAMILY MEDICINE CLINIC | Facility: CLINIC | Age: 40
End: 2020-06-16

## 2020-06-16 DIAGNOSIS — N30.00 ACUTE CYSTITIS WITHOUT HEMATURIA: Primary | ICD-10-CM

## 2020-06-16 RX ORDER — CIPROFLOXACIN 500 MG/1
500 TABLET, FILM COATED ORAL EVERY 24 HOURS
Qty: 7 TABLET | Refills: 0 | Status: SHIPPED | OUTPATIENT
Start: 2020-06-16 | End: 2020-06-23

## 2020-06-18 ENCOUNTER — APPOINTMENT (OUTPATIENT)
Dept: WOUND CARE | Facility: HOSPITAL | Age: 40
End: 2020-06-18
Payer: MEDICARE

## 2020-06-18 ENCOUNTER — TELEPHONE (OUTPATIENT)
Dept: FAMILY MEDICINE CLINIC | Facility: CLINIC | Age: 40
End: 2020-06-18

## 2020-06-18 PROCEDURE — 11045 DBRDMT SUBQ TISS EACH ADDL: CPT

## 2020-06-18 PROCEDURE — 11042 DBRDMT SUBQ TIS 1ST 20SQCM/<: CPT

## 2020-06-19 LAB — INR PPP: 2.9 (ref 0.84–1.19)

## 2020-06-22 ENCOUNTER — TELEPHONE (OUTPATIENT)
Dept: FAMILY MEDICINE CLINIC | Facility: CLINIC | Age: 40
End: 2020-06-22

## 2020-06-22 ENCOUNTER — ANTICOAG VISIT (OUTPATIENT)
Dept: FAMILY MEDICINE CLINIC | Facility: CLINIC | Age: 40
End: 2020-06-22

## 2020-06-22 DIAGNOSIS — I48.91 ATRIAL FIBRILLATION, UNSPECIFIED TYPE (HCC): Primary | ICD-10-CM

## 2020-06-22 NOTE — RESULT ENCOUNTER NOTE
INR was good   I recommend recheck this week on thur or Friday when [facility]NA comes due to being on antibiotics

## 2020-06-23 ENCOUNTER — TELEPHONE (OUTPATIENT)
Dept: FAMILY MEDICINE CLINIC | Facility: CLINIC | Age: 40
End: 2020-06-23

## 2020-06-23 NOTE — TELEPHONE ENCOUNTER
SIGNATURES NEEDED FOR [facility] health FORM RECEIVED VIA FAX  WILL BE PLACED IN YOUR BIN AT ASSIGNED DELIVERY TIMES      Order number [id number]        fabby dickerson

## 2020-06-24 ENCOUNTER — TELEPHONE (OUTPATIENT)
Dept: FAMILY MEDICINE CLINIC | Facility: CLINIC | Age: 40
End: 2020-06-24

## 2020-06-25 LAB — INR PPP: 3.9 (ref 0.84–1.19)

## 2020-06-26 ENCOUNTER — TELEPHONE (OUTPATIENT)
Dept: FAMILY MEDICINE CLINIC | Facility: CLINIC | Age: 40
End: 2020-06-26

## 2020-06-26 ENCOUNTER — ANTICOAG VISIT (OUTPATIENT)
Dept: FAMILY MEDICINE CLINIC | Facility: CLINIC | Age: 40
End: 2020-06-26

## 2020-06-29 ENCOUNTER — TELEPHONE (OUTPATIENT)
Dept: FAMILY MEDICINE CLINIC | Facility: CLINIC | Age: 40
End: 2020-06-29

## 2020-06-29 DIAGNOSIS — L70.0 ACNE VULGARIS: ICD-10-CM

## 2020-06-29 RX ORDER — DOXYCYCLINE 100 MG/1
TABLET ORAL
Qty: 60 TABLET | Refills: 0 | Status: SHIPPED | OUTPATIENT
Start: 2020-06-29 | End: 2020-06-29 | Stop reason: SINTOL

## 2020-07-01 ENCOUNTER — ANTICOAG VISIT (OUTPATIENT)
Dept: FAMILY MEDICINE CLINIC | Facility: CLINIC | Age: 40
End: 2020-07-01

## 2020-07-01 ENCOUNTER — TELEPHONE (OUTPATIENT)
Dept: FAMILY MEDICINE CLINIC | Facility: CLINIC | Age: 40
End: 2020-07-01

## 2020-07-01 LAB — INR PPP: 2.9 (ref 0.84–1.19)

## 2020-07-01 NOTE — TELEPHONE ENCOUNTER
Do we know where this form is as I have the company asking for the form  If unable to locate form there is another copy scanned into the patient's chart

## 2020-07-01 NOTE — TELEPHONE ENCOUNTER
SIGNATURES NEEDED FOR [facility] HEALTH FORM RECEIVED VIA FAX  WILL BE PLACED IN YOUR BIN AT ASSIGNED DELIVERY TIMES        ORDER # [phone] 7

## 2020-07-03 DIAGNOSIS — N32.81 OAB (OVERACTIVE BLADDER): ICD-10-CM

## 2020-07-03 DIAGNOSIS — L29.9 ITCHING: ICD-10-CM

## 2020-07-05 RX ORDER — OXYBUTYNIN CHLORIDE 5 MG/1
TABLET ORAL
Qty: 270 TABLET | Refills: 0 | Status: SHIPPED | OUTPATIENT
Start: 2020-07-05 | End: 2020-08-21

## 2020-07-05 RX ORDER — CETIRIZINE HYDROCHLORIDE 5 MG/1
TABLET ORAL
Qty: 90 TABLET | Refills: 0 | Status: SHIPPED | OUTPATIENT
Start: 2020-07-05 | End: 2020-08-31

## 2020-07-07 ENCOUNTER — TELEPHONE (OUTPATIENT)
Dept: FAMILY MEDICINE CLINIC | Facility: CLINIC | Age: 40
End: 2020-07-07

## 2020-07-07 NOTE — TELEPHONE ENCOUNTER
SIGNATURES NEEDED FOR [facility] FORM RECEIVED VIA FAX  WILL BE PLACED IN YOUR BIN AT ASSIGNED DELIVERY TIMES            certification period 06/29/2020-08/27/2020

## 2020-07-11 LAB — INR PPP: 2.1 (ref 0.84–1.19)

## 2020-07-13 ENCOUNTER — TELEPHONE (OUTPATIENT)
Dept: FAMILY MEDICINE CLINIC | Facility: CLINIC | Age: 40
End: 2020-07-13

## 2020-07-13 ENCOUNTER — ANTICOAG VISIT (OUTPATIENT)
Dept: FAMILY MEDICINE CLINIC | Facility: CLINIC | Age: 40
End: 2020-07-13

## 2020-07-13 NOTE — TELEPHONE ENCOUNTER
Pts mom states [facility] called and told her pt needs to have his INR read weekly, not biweekly  Their phone number is [phone]  Please advise

## 2020-07-16 ENCOUNTER — APPOINTMENT (OUTPATIENT)
Dept: WOUND CARE | Facility: HOSPITAL | Age: 40
End: 2020-07-16
Payer: MEDICARE

## 2020-07-16 DIAGNOSIS — K21.9 GASTROESOPHAGEAL REFLUX DISEASE WITHOUT ESOPHAGITIS: ICD-10-CM

## 2020-07-16 DIAGNOSIS — R11.2 NAUSEA AND VOMITING, INTRACTABILITY OF VOMITING NOT SPECIFIED, UNSPECIFIED VOMITING TYPE: ICD-10-CM

## 2020-07-16 PROCEDURE — 11042 DBRDMT SUBQ TIS 1ST 20SQCM/<: CPT

## 2020-07-16 PROCEDURE — 99214 OFFICE O/P EST MOD 30 MIN: CPT

## 2020-07-16 RX ORDER — PANTOPRAZOLE SODIUM 40 MG/1
TABLET, DELAYED RELEASE ORAL
Qty: 90 TABLET | Refills: 0 | Status: SHIPPED | OUTPATIENT
Start: 2020-07-16 | End: 2020-08-24

## 2020-07-17 ENCOUNTER — ANTICOAG VISIT (OUTPATIENT)
Dept: FAMILY MEDICINE CLINIC | Facility: CLINIC | Age: 40
End: 2020-07-17

## 2020-07-17 DIAGNOSIS — I48.91 ATRIAL FIBRILLATION, UNSPECIFIED TYPE (HCC): Primary | ICD-10-CM

## 2020-07-17 LAB — INR PPP: 4 (ref 0.84–1.19)

## 2020-07-21 ENCOUNTER — TELEPHONE (OUTPATIENT)
Dept: FAMILY MEDICINE CLINIC | Facility: CLINIC | Age: 40
End: 2020-07-21

## 2020-07-21 NOTE — TELEPHONE ENCOUNTER
SIGNATURES NEEDED FOR [facility] FORM RECEIVED VIA FAX  WILL BE PLACED IN YOUR BIN AT ASSIGNED DELIVERY TIMES        pcp pica

## 2020-07-22 ENCOUNTER — TELEPHONE (OUTPATIENT)
Dept: FAMILY MEDICINE CLINIC | Facility: CLINIC | Age: 40
End: 2020-07-22

## 2020-07-22 NOTE — TELEPHONE ENCOUNTER
SIGNATURES NEEDED FOR [address] [facility] FORM RECEIVED VIA FAX  WILL BE PLACED IN YOUR BIN AT ASSIGNED DELIVERY TIMES        Account # [de-identified]    Pica PCP

## 2020-07-23 ENCOUNTER — TELEPHONE (OUTPATIENT)
Dept: FAMILY MEDICINE CLINIC | Facility: CLINIC | Age: 40
End: 2020-07-23

## 2020-07-23 NOTE — TELEPHONE ENCOUNTER
[facility] lvm stating pt is being discharged from the hospital today  Pt was admitted for back pain  Pts INR was 1 1 today, pt was given 10mg of warfarin 7/22 and was given 15 mg of warfarin 7/23  Hospital ordering repeat INR to be drawn tomorrow and asks PCP to follow up on it, they will fax and call with the results  Pt to take 15mg of warfarin tomorrow  No signifcant findings related to back pain, recommend repeat ESR, CRP, and thoracic and lumber MRI   TCM call to be made once pt is D/C'd, appointment made

## 2020-07-24 ENCOUNTER — TELEPHONE (OUTPATIENT)
Dept: FAMILY MEDICINE CLINIC | Facility: CLINIC | Age: 40
End: 2020-07-24

## 2020-07-24 ENCOUNTER — ANTICOAG VISIT (OUTPATIENT)
Dept: FAMILY MEDICINE CLINIC | Facility: CLINIC | Age: 40
End: 2020-07-24

## 2020-07-24 NOTE — TELEPHONE ENCOUNTER
Toño Holland from [facility] home health called to inform you pt was recent;y d/c from the [facility] and they will now be resuming their care  States he will be seeing the pt 2xs a week  Wanted to inform you pts BP has been slightly elevated and has been on this trend recently  Today pts BP was 158/96 pt took his BP meds 45mins prior to this  States when he see's the pt next week he will contact us with an update

## 2020-07-24 NOTE — PROGRESS NOTES
Patient is at dialysis until 5:30/6pm and she tells me the discharge instructions have her giving him 10 and 15mg through the weekend  I was not comfortable with her giving him that much so I told her to return to 7 5mg every day except Monday 10mg and I'll follow up with her Monday morning  She is able to do an INR on him tomorrow and is aware of his goal range and to call the answering service to speak with a doctor if it's terribly under or over his 2-3 goal     They did not do an INR on him at home prior to dialysis

## 2020-07-27 ENCOUNTER — TELEPHONE (OUTPATIENT)
Dept: FAMILY MEDICINE CLINIC | Facility: CLINIC | Age: 40
End: 2020-07-27

## 2020-07-27 ENCOUNTER — ANTICOAG VISIT (OUTPATIENT)
Dept: FAMILY MEDICINE CLINIC | Facility: CLINIC | Age: 40
End: 2020-07-27

## 2020-07-27 LAB — INR PPP: 1.4 (ref 0.84–1.19)

## 2020-07-27 NOTE — PROGRESS NOTES
For now, have instructed to increase 10mg 2 additional days this week to try  Recheck next Monday and when Colonel Brian returns to the office tomorrow, if she wishes to provide other instruction, I will give her a call back

## 2020-07-28 ENCOUNTER — TELEMEDICINE (OUTPATIENT)
Dept: FAMILY MEDICINE CLINIC | Facility: CLINIC | Age: 40
End: 2020-07-28

## 2020-07-28 DIAGNOSIS — B37.0 THRUSH: ICD-10-CM

## 2020-07-28 DIAGNOSIS — R93.7 ABNORMAL MRI, THORACIC SPINE: ICD-10-CM

## 2020-07-28 DIAGNOSIS — H60.501 ACUTE OTITIS EXTERNA OF RIGHT EAR, UNSPECIFIED TYPE: Primary | ICD-10-CM

## 2020-07-28 DIAGNOSIS — R93.7 BONE MARROW EDEMA: ICD-10-CM

## 2020-07-28 DIAGNOSIS — E78.5 HYPERLIPIDEMIA, UNSPECIFIED HYPERLIPIDEMIA TYPE: ICD-10-CM

## 2020-07-28 DIAGNOSIS — R93.7 ABNORMAL MRI, LUMBAR SPINE: ICD-10-CM

## 2020-07-28 PROCEDURE — 99214 OFFICE O/P EST MOD 30 MIN: CPT | Performed by: PHYSICIAN ASSISTANT

## 2020-07-28 PROCEDURE — 3066F NEPHROPATHY DOC TX: CPT | Performed by: PHYSICIAN ASSISTANT

## 2020-07-28 RX ORDER — ATORVASTATIN CALCIUM 20 MG/1
TABLET, FILM COATED ORAL
Qty: 90 TABLET | Refills: 1 | Status: SHIPPED | OUTPATIENT
Start: 2020-07-28 | End: 2021-03-01

## 2020-07-28 RX ORDER — OFLOXACIN 3 MG/ML
10 SOLUTION AURICULAR (OTIC) DAILY
Qty: 10 ML | Refills: 0 | Status: SHIPPED | OUTPATIENT
Start: 2020-07-28 | End: 2021-02-11

## 2020-07-28 NOTE — PROGRESS NOTES
Assessment/Plan:    No problem-specific Assessment & Plan notes found for this encounter  Problem List Items Addressed This Visit     None      Visit Diagnoses     Acute otitis externa of right ear, unspecified type    -  Primary    Relevant Medications    ofloxacin (FLOXIN) 0 3 % otic solution    Abnormal MRI, lumbar spine        Relevant Orders    MRI thoracic spine wo contrast    MRI lumbar spine wo contrast    Abnormal MRI, thoracic spine        Relevant Orders    MRI thoracic spine wo contrast    MRI lumbar spine wo contrast    Bone marrow edema        Relevant Orders    MRI thoracic spine wo contrast    MRI lumbar spine wo contrast    C-reactive protein    Sedimentation rate, automated    Thrush        Relevant Medications    fluconazole (DIFLUCAN) 150 mg tablet            Subjective:      Patient ID: Boyd You  is a 44 y o  male  HPI  [de-identified] year male with history of transverse myelitis, diabetes type 1 with chronic kidney disease on dialysis calling for follow-up admission to [facility] on July 18th for increased back pain and nausea  He had increased back pain over the T5 region of the spine and MRI lumbar and thoracic spine were ordered abnormal edema was seen within the L3 bone marrow  He also underwent a MRI of the thoracic spine on July 19th  This showed significant edema of the lower thoracic and visualized upper lumbar posterior spinal muscles and soft tissues  An MRI of the lumbar spine was also obtained after the reading from thoracic spine  They had seen abnormal edema within the L3 marrow with the fracture deformity that could be osteomyelitis  There was also increased thickening and T1 signal shortening along the posterior longitudinal ligament region at L2 and L3 which they said could be ligamentous mineralization 1st epidural hemorrhage  There was also similar increased T2 signal shortening within the L3-L4 and L2/L3 disc    There was found to be severe canal stenosis at the level of L2 and L3 with crowding of the nerve roots of the cauda equine a and there was markedly abnormal increase edema within the paraspinal musculature along the iliopsoas and posterior paraspinal muscles He had an MRI of the cervical spine done on July 21st   He had increased signal of the C2-C3, C3/-C4, C4-C5, C5-C6 and C6-C7  This did not seem to be changed from 10/1/2019  The cervical spinal cord was suboptimally visualized however due to motion  Infectious disease was consulted for possibility of osteomyelitis but he was afebrile without leukocytosis  He was not given antibiotics  He was recommended to have a repeat CRP and sed rate done and have the MRIs of the thoracic and lumbar repeated in 6-8 weeks  Inflammatory markers are frequently elevated due to his chronic medical conditions  CRP was 172 and sed rate was 64  He does follow with pain management outpatient is pain is unchanged  He has not had any fevers or chills  He also has been having right ear discomfort for the last week  He did put a Q-tip in his ear and he had pain  He also had an abnormal color discharged had a foul odor  No fevers  He also notes increased white discoloration inside his mouth and in the corners of his mouth  He did see dermatology for this and they had recommended nystatin swish and swallow  He does not feel like it is helping  He does have a follow-up with them next month      The following portions of the patient's history were reviewed and updated as appropriate:   He  has a past medical history of Ambulatory dysfunction, Anemia, iron deficiency, Atrial fibrillation ([address] ), AVM (arteriovenous malformation) of duodenum, acquired, Chronic deep vein thrombosis (DVT) ([address] ), Chronic pain, Chronic suprapubic catheter ([address] ), Clostridium difficile infection (08/11/2016), Colostomy on examination [facility]), GERD (gastroesophageal reflux disease), Memory impairment (2011), Neurogenic bladder, OAB (overactive bladder), Paraplegia ([facility] ), S/P unilateral BKA (below knee amputation) ([place] [address] ), Sebaceous cyst (removed in 2017), Tobacco abuse, and Wounds, multiple  He   Patient Active Problem List    Diagnosis Date Noted    Bipolar depression ([place] [address] ) 02/27/2020    Seizure ([place] [address] ) 01/20/2020    Recurrent Clostridioides difficile diarrhea 11/21/2019    Wheelchair dependent 08/07/2019    Dialysis patient ([place] [address] ) 05/08/2019    Insulin long-term use ([place] [address] ) 05/08/2019    Noncompliance by refusing intervention or support 09/29/2017    Delirium 09/28/2017    Urinary retention 09/26/2017    Asymptomatic bacteriuria 09/25/2017    History of Clostridium difficile infection 09/25/2017    Stage 5 chronic kidney disease on chronic dialysis ([place] [address] ) 09/18/2017    SOB (shortness of breath) 09/18/2017    Penile abscess 09/18/2017    Iron deficiency anemia 07/03/2017    Decubitus ulcers 07/03/2017    HTN (hypertension), benign 07/03/2017    Neurogenic bladder 07/03/2017    GERD (gastroesophageal reflux disease) 07/03/2017    Chronic pain 07/03/2017    Wound healing, delayed 06/29/2017    Osteomyelitis of right femur ([facility] ) 03/20/2017    Chronic indwelling Ortega catheter     Anemia 09/03/2016    Ulcer of sacral region, stage 4 ([place] [address] ) 09/01/2016    Paraplegia (HCC)     Atrial fibrillation (HCC)     Chronic suprapubic catheter ([place] [address] )     Colostomy care ([place] [address] )     OAB (overactive bladder)     S/P unilateral BKA (below knee amputation) ([place] [address] )     Sebaceous cyst     Tobacco abuse     Type 1 diabetes mellitus with chronic kidney disease on chronic dialysis ([place] [address] )     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[place] [address] ) 12/02/2014     He  has a past surgical history that includes Below knee leg amputation (Right, 2009); Colonoscopy (N/A, 3/27/2017); Esophagogastroduodenoscopy (N/A, 3/22/2017);  Colonoscopy (N/A, 3/29/2017); and Colonoscopy (N/A, 6/15/2017)  His family history includes Diabetes in his paternal grandfather; Hyperlipidemia in his mother; Hypertension in his mother; Leukemia in his brother  He  reports that he has been smoking cigars  He has never used smokeless tobacco  He reports that he drank alcohol  He reports that he has current or past drug history  Drug: Marijuana  Current Outpatient Medications   Medication Sig Dispense Refill    Alcohol Swabs (ALCOHOL PADS) 70 % PADS by Does not apply route 5 (five) times a day 300 each 5    amLODIPine (NORVASC) 10 mg tablet Take 10 mg by mouth daily      ascorbic acid (VITAMIN C) 250 MG tablet Take 500 mg by mouth      atorvastatin (LIPITOR) 20 mg tablet TAKE 1 TABLET BY MOUTH EVERYDAY AT BEDTIME 90 tablet 1    baclofen 20 mg tablet Take 20 mg by mouth 2 (two) times a day   [employer] MICROLET LANCETS lancets Use as instructed to check blood sugar 4 times a day  Dx e10 29 200 each 5    Blood Glucose Monitoring Suppl ([employer] CONTOUR NEXT USB MONITOR) w/Device KIT Testing 4 times a day 1 kit 0    calcitriol (ROCALTROL) 0 5 MCG capsule Take 2 mcg by mouth      calcium acetate (PHOSLO) capsule TAKE 1 CAPSULE BY MOUTH THREE TIMES DAILY WITH MEALS  3    carvedilol (COREG) 25 mg tablet Take 25 mg by mouth 2 (two) times a day with meals      carvedilol (COREG) 3 125 mg tablet TAKE 1 TABLET BY MOUTH TWICE A DAY WITH MEALS 180 tablet 1    carvedilol (COREG) 6 25 mg tablet Take 6 25 mg by mouth      cetirizine (ZyrTEC) 5 MG tablet TAKE 1 TABLET BY MOUTH EVERY DAY 90 tablet 0    Cholecalciferol (VITAMIN D-3) 1000 units CAPS Take 2 capsules by mouth daily 30 capsule 0    clindamycin (CLINDAGEL) 1 % gel Apply topically 2 (two) times a day 60 g 2    [facility] ACETAMINOPHEN EX  MG tablet TAKE 2 TABLETS BY MOUTH EVERY 8 HOURS AS NEEDED FOR MILD OR MODERATE PAIN (PAIN SCORE 1 6)        [facility] VITAMIN B-12 1000 MCG tablet TAKE 1 TABLET BY MOUTH EVERY DAY 90 tablet 1    [facility] VITAMIN C 250 MG tablet TAKE 2 TABLETS (500 MG TOTAL) BY MOUTH DAILY 60 tablet 4    cyclobenzaprine (FLEXERIL) 10 mg tablet Take 10 mg by mouth 3 (three) times a day as needed  0    dicyclomine (BENTYL) 10 mg capsule Take 1 capsule (10 mg total) by mouth 3 (three) times a day before meals 270 capsule 1    Disposable Gloves MISC Use 7 times a day, 4 boxes of gloves XL  Dx type 1 DM, paraplegia 4 each 11    epoetin kaushik (EPOGEN,PROCRIT) 20,000 units/mL Inject 10,000 Units under the skin      epoetin kaushik (EPOGEN,PROCRIT) 20,000 units/mL Inject 5,000 Units under the skin      famotidine (PEPCID) 20 mg tablet Take 1 tablet (20 mg total) by mouth daily at bedtime 90 tablet 1    ferrous sulfate 325 (65 Fe) mg tablet TAKE 1 TABLET BY MOUTH 3 TIMES A DAY 90 tablet 3    fluconazole (DIFLUCAN) 150 mg tablet Take 1 tablet (150 mg total) by mouth once for 1 dose 1 tablet 0    glucose blood ([employer] CONTOUR TEST) test strip Use as instructed to test blood sugar 4 times a day  Dx e10 29 200 each 3    hydrOXYzine HCL (ATARAX) 25 mg tablet Take 25 mg by mouth 2 (two) times a day as needed      Incontinence Supply Disposable (INCONTINENCE BRIEF LARGE) MISC by Does not apply route 6 (six) times a day Size xl 180 each 11    Incontinence Supply Disposable (UNDERGARMENT) MISC Use 6 a day, 4 boxes, xl  Dx R51, paraplegia 4 each 11    Incontinence Supply Disposable (UNDERPADS) MISC Use 2 a day 60 each 11    insulin detemir (LEVEMIR) 100 units/mL subcutaneous injection Inject 6 5 Units under the skin 2 (two) times a day       ketoconazole (NIZORAL) 2 % cream Apply small amount to edges of mouth twice daily  Do not swallow 15 g 1    LEVEMIR FLEXTOUCH 100 units/mL injection pen Inject 14 Units under the skin 2 (two) times a day 15 pen 3    levETIRAcetam (KEPPRA) 250 mg tablet TAKE 1 TABLET (250 MG TOTAL) BY MOUTH 3 (THREE) TIMES A WEEK (MWF) AT 1200        losartan (COZAAR) 100 MG tablet Take 100 mg by mouth daily      Melatonin ER 3 MG TBCR Take 6 mg by mouth      Misc  Devices [facility]) MISC by Does not apply route daily Wheelchair ramp, dx g82 20 1 each 0    Multiple Vitamin (DAILY-LICHA) TABS TAKE 1 TABLET BY MOUTH EVERY DAY 90 tablet 2    NOVOLOG 100 UNIT/ML injection Inject 5 Units under the skin 3 (three) times a day with meals       ofloxacin (FLOXIN) 0 3 % otic solution Administer 10 drops to the right ear daily 10 mL 0    ondansetron (ZOFRAN-ODT) 4 mg disintegrating tablet Take 1 tablet (4 mg total) by mouth every 8 (eight) hours as needed for nausea or vomiting 30 tablet 1    oxybutynin (DITROPAN) 5 mg tablet TAKE 3 TABLETS BY MOUTH EVERY  tablet 0    oxyCODONE (ROXICODONE) 10 MG TABS Take 10 mg by mouth 3 (three) times a day  0    pantoprazole (PROTONIX) 40 mg tablet TAKE 1 TABLET BY MOUTH EVERY DAY 90 tablet 0    phenytoin extended (DILANTIN) 200 MG ER capsule Take 200 mg by mouth 2 (two) times a day      risperiDONE (RisperDAL) 0 5 mg tablet TAKE 1 TABLET (0 5 MG TOTAL) BY MOUTH 2 (TWO) TIMES A  tablet 1    sertraline (ZOLOFT) 50 mg tablet TAKE 1 TABLET BY MOUTH EVERY DAY 90 tablet 1    sodium hypochlorite (DAKIN'S HALF-STRENGTH) external solution USE AS DIRECTED TWICE DAILY 473 mL 2    SUPER B COMPLEX & C TABS TAKE 1 CAPSULE BY MOUTH ONCE FOR 1 DOSE AS DIRECTED 30 tablet 6    warfarin (COUMADIN) 5 mg tablet Take 1 5 tablets (7 5 mg total) by mouth daily 135 tablet 1    Zinc Sulfate 220 (50 Zn) MG TABS Take 1 tablet by mouth daily 30 each 5     No current facility-administered medications for this visit  Current Outpatient Medications on File Prior to Visit   Medication Sig    Alcohol Swabs (ALCOHOL PADS) 70 % PADS by Does not apply route 5 (five) times a day    amLODIPine (NORVASC) 10 mg tablet Take 10 mg by mouth daily    ascorbic acid (VITAMIN C) 250 MG tablet Take 500 mg by mouth    baclofen 20 mg tablet Take 20 mg by mouth 2 (two) times a day      [employer] MICROLET LANCETS lancets Use as instructed to check blood sugar 4 times a day  Dx e10 29    Blood Glucose Monitoring Suppl ([employer] CONTOUR NEXT USB MONITOR) w/Device KIT Testing 4 times a day    calcitriol (ROCALTROL) 0 5 MCG capsule Take 2 mcg by mouth    calcium acetate (PHOSLO) capsule TAKE 1 CAPSULE BY MOUTH THREE TIMES DAILY WITH MEALS    carvedilol (COREG) 25 mg tablet Take 25 mg by mouth 2 (two) times a day with meals    carvedilol (COREG) 3 125 mg tablet TAKE 1 TABLET BY MOUTH TWICE A DAY WITH MEALS    carvedilol (COREG) 6 25 mg tablet Take 6 25 mg by mouth    cetirizine (ZyrTEC) 5 MG tablet TAKE 1 TABLET BY MOUTH EVERY DAY    Cholecalciferol (VITAMIN D-3) 1000 units CAPS Take 2 capsules by mouth daily    clindamycin (CLINDAGEL) 1 % gel Apply topically 2 (two) times a day    [facility] ACETAMINOPHEN EX  MG tablet TAKE 2 TABLETS BY MOUTH EVERY 8 HOURS AS NEEDED FOR MILD OR MODERATE PAIN (PAIN SCORE 1 6)      [facility]S VITAMIN B-12 1000 MCG tablet TAKE 1 TABLET BY MOUTH EVERY DAY    [facility] VITAMIN C 250 MG tablet TAKE 2 TABLETS (500 MG TOTAL) BY MOUTH DAILY    cyclobenzaprine (FLEXERIL) 10 mg tablet Take 10 mg by mouth 3 (three) times a day as needed    dicyclomine (BENTYL) 10 mg capsule Take 1 capsule (10 mg total) by mouth 3 (three) times a day before meals    Disposable Gloves MISC Use 7 times a day, 4 boxes of gloves XL  Dx type 1 DM, paraplegia    epoetin kaushik (EPOGEN,PROCRIT) 20,000 units/mL Inject 10,000 Units under the skin    epoetin kaushik (EPOGEN,PROCRIT) 20,000 units/mL Inject 5,000 Units under the skin    famotidine (PEPCID) 20 mg tablet Take 1 tablet (20 mg total) by mouth daily at bedtime    ferrous sulfate 325 (65 Fe) mg tablet TAKE 1 TABLET BY MOUTH 3 TIMES A DAY    glucose blood (SUSAN CONTOUR TEST) test strip Use as instructed to test blood sugar 4 times a day  Dx e10 29    hydrOXYzine HCL (ATARAX) 25 mg tablet Take 25 mg by mouth 2 (two) times a day as needed    Incontinence Supply Disposable (INCONTINENCE BRIEF LARGE) MISC by Does not apply route 6 (six) times a day Size xl    Incontinence Supply Disposable (UNDERGARMENT) MISC Use 6 a day, 4 boxes, xl  Dx R51, paraplegia    Incontinence Supply Disposable (UNDERPADS) MISC Use 2 a day    insulin detemir (LEVEMIR) 100 units/mL subcutaneous injection Inject 6 5 Units under the skin 2 (two) times a day     ketoconazole (NIZORAL) 2 % cream Apply small amount to edges of mouth twice daily  Do not swallow    LEVEMIR FLEXTOUCH 100 units/mL injection pen Inject 14 Units under the skin 2 (two) times a day    levETIRAcetam (KEPPRA) 250 mg tablet TAKE 1 TABLET (250 MG TOTAL) BY MOUTH 3 (THREE) TIMES A WEEK (MWF) AT 1200   losartan (COZAAR) 100 MG tablet Take 100 mg by mouth daily    Melatonin ER 3 MG TBCR Take 6 mg by mouth    Misc   Devices [facility]) MISC by Does not apply route daily Wheelchair ramp, dx g82 20    Multiple Vitamin (DAILY-LICHA) TABS TAKE 1 TABLET BY MOUTH EVERY DAY    NOVOLOG 100 UNIT/ML injection Inject 5 Units under the skin 3 (three) times a day with meals     ondansetron (ZOFRAN-ODT) 4 mg disintegrating tablet Take 1 tablet (4 mg total) by mouth every 8 (eight) hours as needed for nausea or vomiting    oxybutynin (DITROPAN) 5 mg tablet TAKE 3 TABLETS BY MOUTH EVERY DAY    oxyCODONE (ROXICODONE) 10 MG TABS Take 10 mg by mouth 3 (three) times a day    pantoprazole (PROTONIX) 40 mg tablet TAKE 1 TABLET BY MOUTH EVERY DAY    phenytoin extended (DILANTIN) 200 MG ER capsule Take 200 mg by mouth 2 (two) times a day    risperiDONE (RisperDAL) 0 5 mg tablet TAKE 1 TABLET (0 5 MG TOTAL) BY MOUTH 2 (TWO) TIMES A DAY    sertraline (ZOLOFT) 50 mg tablet TAKE 1 TABLET BY MOUTH EVERY DAY    sodium hypochlorite (DAKIN'S HALF-STRENGTH) external solution USE AS DIRECTED TWICE DAILY    SUPER B COMPLEX & C TABS TAKE 1 CAPSULE BY MOUTH ONCE FOR 1 DOSE AS DIRECTED    warfarin (COUMADIN) 5 mg tablet Take 1 5 tablets (7 5 mg total) by mouth daily    Zinc Sulfate 220 (50 Zn) MG TABS Take 1 tablet by mouth daily    [DISCONTINUED] atorvastatin (LIPITOR) 20 mg tablet TAKE 1 TABLET BY MOUTH EVERYDAY AT BEDTIME     No current facility-administered medications on file prior to visit  He is allergic to cefepime; ciprofloxacin hcl; cymbalta [duloxetine hcl]; lac bovis; lactose; lyrica [pregabalin]; penicillins; polymyxin b; and rosuvastatin       Review of Systems   Constitutional: Negative for activity change, appetite change, fatigue, fever and unexpected weight change  HENT: Positive for ear pain  Negative for dental problem, hearing loss and sore throat  Eyes: Negative for visual disturbance  Respiratory: Negative for cough and wheezing  Cardiovascular: Negative for chest pain and leg swelling  Gastrointestinal: Negative for abdominal pain, constipation, diarrhea and vomiting  Genitourinary: Negative for difficulty urinating and dysuria  Musculoskeletal: Positive for back pain  Negative for arthralgias and myalgias  Skin: Positive for rash (in mouth)  Neurological: Negative for dizziness and headaches  Psychiatric/Behavioral: Negative for behavioral problems  Objective: There were no vitals taken for this visit  Physical Exam   Constitutional: He is oriented to person, place, and time  He appears well-developed and well-nourished  HENT:   Head: Normocephalic and atraumatic  Eyes: Conjunctivae are normal    Neck:   No visible masses   Pulmonary/Chest: Effort normal    Musculoskeletal:   wheelchair   Neurological: He is alert and oriented to person, place, and time  Psychiatric: He has a normal mood and affect   His behavior is normal

## 2020-07-29 RX ORDER — FLUCONAZOLE 150 MG/1
150 TABLET ORAL ONCE
Qty: 1 TABLET | Refills: 0 | Status: SHIPPED | OUTPATIENT
Start: 2020-07-29 | End: 2020-07-29

## 2020-07-30 ENCOUNTER — TELEPHONE (OUTPATIENT)
Dept: FAMILY MEDICINE CLINIC | Facility: CLINIC | Age: 40
End: 2020-07-30

## 2020-07-30 NOTE — TELEPHONE ENCOUNTER
SIGNATURES NEEDED FOR [facility] health FORM RECEIVED VIA FAX  WILL BE PLACED IN YOUR BIN AT ASSIGNED DELIVERY TIMES        Order number [id number]

## 2020-07-31 ENCOUNTER — TELEPHONE (OUTPATIENT)
Dept: FAMILY MEDICINE CLINIC | Facility: CLINIC | Age: 40
End: 2020-07-31

## 2020-07-31 NOTE — TELEPHONE ENCOUNTER
SIGNATURES NEEDED FOR [facility] HEALTH CARE FORM RECEIVED VIA FAX  WILL BE PLACED IN YOUR BIN AT ASSIGNED DELIVERY TIMES        Order # [id number] [phone]

## 2020-08-03 ENCOUNTER — ANTICOAG VISIT (OUTPATIENT)
Dept: FAMILY MEDICINE CLINIC | Facility: CLINIC | Age: 40
End: 2020-08-03

## 2020-08-03 LAB — INR PPP: 2.8 (ref 0.84–1.19)

## 2020-08-04 NOTE — TELEPHONE ENCOUNTER
[facility] form faxed to [phone] on 8/4     Fax confirmation received

## 2020-08-06 DIAGNOSIS — D50.8 IRON DEFICIENCY ANEMIA SECONDARY TO INADEQUATE DIETARY IRON INTAKE: ICD-10-CM

## 2020-08-06 LAB — INR PPP: 2.3 (ref 0.84–1.19)

## 2020-08-07 ENCOUNTER — ANTICOAG VISIT (OUTPATIENT)
Dept: FAMILY MEDICINE CLINIC | Facility: CLINIC | Age: 40
End: 2020-08-07

## 2020-08-07 RX ORDER — ASCORBIC ACID 250 MG
TABLET ORAL
Qty: 180 TABLET | Refills: 1 | Status: SHIPPED | OUTPATIENT
Start: 2020-08-07 | End: 2021-01-11 | Stop reason: SDUPTHER

## 2020-08-11 ENCOUNTER — TELEMEDICINE (OUTPATIENT)
Dept: FAMILY MEDICINE CLINIC | Facility: CLINIC | Age: 40
End: 2020-08-11

## 2020-08-11 DIAGNOSIS — I10 HTN (HYPERTENSION), BENIGN: ICD-10-CM

## 2020-08-11 DIAGNOSIS — M25.512 ACUTE PAIN OF LEFT SHOULDER: Primary | ICD-10-CM

## 2020-08-11 PROCEDURE — G2025 DIS SITE TELE SVCS RHC/FQHC: HCPCS | Performed by: PHYSICIAN ASSISTANT

## 2020-08-12 RX ORDER — LIDOCAINE 50 MG/G
OINTMENT TOPICAL AS NEEDED
Qty: 35.44 G | Refills: 0 | Status: SHIPPED | OUTPATIENT
Start: 2020-08-12

## 2020-08-12 NOTE — PROGRESS NOTES
Virtual Brief Visit    Assessment/Plan:    Problem List Items Addressed This Visit        Cardiovascular and Mediastinum    HTN (hypertension), benign     Continue on current medications at this time due to blood pressure being normal at dialysis at home  Recommend [facility] contact us if blood pressure is elevated tomorrow  Also they did not perform the labs that were ordered which was sed rate and CRP and I did refax it to them           Other Visit Diagnoses     Acute pain of left shoulder    -  Primary    Relevant Medications    lidocaine (XYLOCAINE) 5 % ointment                Reason for visit is   Chief Complaint   Patient presents with    Follow-up     lab work    Virtual Brief Visit        Encounter provider [address] [place] [address], [place]    Provider located at [address] [place] [address]  [address] [address] [phone]    Recent Visits  Date Type Provider Dept   08/11/20 Telemedicine Christina Tate PA-C  [facility]   Showing recent visits within past 7 days and meeting all other requirements     Future Appointments  No visits were found meeting these conditions  Showing future appointments within next 150 days and meeting all other requirements        After connecting through telephone, the patient was identified by name and date of birth  Terry Tamayo  was informed that this is a telemedicine visit and that the visit is being conducted through telephone  My office door was closed  No one else was in the room  He acknowledged consent and understanding of privacy and security of the platform  The patient has agreed to participate and understands he can discontinue the visit at any time  It was my intent to perform this visit via video technology but the patient was not able to do a video connection so the visit was completed via audio telephone alone  Patient is aware this is a billable service       Subjective    Terry Tamayo  is a 44 y o  male calling for follow-up of hypertension atrial fibrillation  He believes that his [facility] may have scheduled the appointment for him  The did see him last Friday and his blood pressure was running high at that time, but they are not coming back until tomorrow to recheck him  He does have a blood pressure cuff with him and he did check it today and was 118/65  While he has been at dialysis he has not had any issues with his blood pressure  He is feeling in his normal state health  He is not having any palpitations or chest pain  Karrie Nissen He does have a pain management appointment next week but has been having and electrical sensation on the side of his neck  At 1st it was just intermittent but now has been constant for about 4 days  He is not having any upper extremity weakness or numbness  No neck pain  HPI     Past Medical History:   Diagnosis Date    Ambulatory dysfunction     Anemia, iron deficiency     transfusion requiring    [facility]rial fibrillation [facility]     AVM (arteriovenous malformation) of duodenum, 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    [facility] deep vein thrombosis [facility]DVT) ([facility]     Chronic pain     Chronic suprapubic catheter ([facility] )     Clostridium difficile infection 08/11/2016    also positive 9/2016, 5/29/2017, 8/15/2017   S/P fecal transplant    Colostomy on examination ([facility]     GERD (gastroesophageal reflux disease)     Memory impairment 2011    s/p diabetic coma    Neurogenic bladder     OAB (overactive bladder)     Paraplegia (HCC)     T3 transverse myelitis vs spinal stoke (AVM); 2012 extensive epidural abscess C7=> conus 2nd extension from deep parspinal abscess L4-S2/sacral decubitus; cord atrophy/myelomalcia T3=>conus     S/P unilateral BKA (below knee amputation) ([facility] )     Right    Sebaceous cyst removed in 2017    Tobacco abuse     Wounds, multiple     pressure ulcers with delayed healing       Past Surgical History:   Procedure Laterality Date    BELOW KNEE LEG AMPUTATION Right 2009    COLONOSCOPY N/A 3/27/2017    Procedure: COLONOSCOPY;  Surgeon: Nils Moritz, MD;  Location: [facility]; Service:     COLONOSCOPY N/A 3/29/2017    Procedure: COLONOSCOPY;  Surgeon: Nils Moritz, MD;  Location: [facility]; Service:     COLONOSCOPY N/A 6/15/2017    Procedure: COLONOSCOPY with FMT;  Surgeon: Gary Toledo MD;  Location: [facility]; Service: Gastroenterology    ESOPHAGOGASTRODUODENOSCOPY N/A 3/22/2017    Procedure: ESOPHAGOGASTRODUODENOSCOPY (EGD); Surgeon: Devi Antonio DO;  Location: [facility]; Service:        Current Outpatient Medications   Medication Sig Dispense Refill    Alcohol Swabs (ALCOHOL PADS) 70 % PADS by Does not apply route 5 (five) times a day 300 each 5    amLODIPine (NORVASC) 10 mg tablet Take 10 mg by mouth daily      ascorbic acid (VITAMIN C) 250 MG tablet Take 500 mg by mouth      atorvastatin (LIPITOR) 20 mg tablet TAKE 1 TABLET BY MOUTH EVERYDAY AT BEDTIME 90 tablet 1    baclofen 20 mg tablet Take 20 mg by mouth 2 (two) times a day   Tel[employer]ix MICROLET LANCETS lancets Use as instructed to check blood sugar 4 times a day  Dx e10 29 200 each 5    Blood Glucose Monitoring Suppl ([employer] CONTOUR NEXT USB MONITOR) w/Device KIT Testing 4 times a day 1 kit 0    carvedilol (COREG) 25 mg tablet Take 25 mg by mouth 2 (two) times a day with meals      cetirizine (ZyrTEC) 5 MG tablet TAKE 1 TABLET BY MOUTH EVERY DAY 90 tablet 0    [employer] ACETAMINOPHEN EX  MG tablet TAKE 2 TABLETS BY MOUTH EVERY 8 HOURS AS NEEDED FOR MILD OR MODERATE PAIN (PAIN SCORE 1 6)        [facility] VITAMIN B-12 1000 MCG tablet TAKE 1 TABLET BY MOUTH EVERY DAY 90 tablet 1    [facility] Vitamin C 250 MG tablet TAKE 2 TABLETS (500 MG TOTAL) BY MOUTH DAILY 180 tablet 1    cyclobenzaprine (FLEXERIL) 10 mg tablet Take 10 mg by mouth 3 (three) times a day as needed  0    dicyclomine (BENTYL) 10 mg capsule Take 1 capsule (10 mg total) by mouth 3 (three) times a day before meals 270 capsule 1    Disposable Gloves MISC Use 7 times a day, 4 boxes of gloves XL  Dx type 1 DM, paraplegia 4 each 11    epoetin kaushik (EPOGEN,PROCRIT) 20,000 units/mL Inject 10,000 Units under the skin      epoetin kaushik (EPOGEN,PROCRIT) 20,000 units/mL Inject 5,000 Units under the skin      ferrous sulfate 325 (65 Fe) mg tablet TAKE 1 TABLET BY MOUTH 3 TIMES A DAY 90 tablet 3    glucose blood (SUSAN CONTOUR TEST) test strip Use as instructed to test blood sugar 4 times a day  Dx e10 29 200 each 3    hydrOXYzine HCL (ATARAX) 25 mg tablet Take 25 mg by mouth 2 (two) times a day as needed      Incontinence Supply Disposable (INCONTINENCE BRIEF LARGE) MISC by Does not apply route 6 (six) times a day Size xl 180 each 11    Incontinence Supply Disposable (UNDERGARMENT) MISC Use 6 a day, 4 boxes, xl  Dx R51, paraplegia 4 each 11    Incontinence Supply Disposable (UNDERPADS) MISC Use 2 a day 60 each 11    insulin detemir (LEVEMIR) 100 units/mL subcutaneous injection Inject 6 5 Units under the skin 2 (two) times a day       ketoconazole (NIZORAL) 2 % cream Apply small amount to edges of mouth twice daily  Do not swallow 15 g 1    LEVEMIR FLEXTOUCH 100 units/mL injection pen Inject 14 Units under the skin 2 (two) times a day 15 pen 3    losartan (COZAAR) 100 MG tablet Take 100 mg by mouth daily      Melatonin ER 3 MG TBCR Take 6 mg by mouth      Misc   Devices [facility]) MISC by Does not apply route daily Wheelchair ramp, dx g82 20 1 each 0    Multiple Vitamin (DAILY-LICHA) TABS TAKE 1 TABLET BY MOUTH EVERY DAY 90 tablet 2    NOVOLOG 100 UNIT/ML injection Inject 5 Units under the skin 3 (three) times a day with meals       ofloxacin (FLOXIN) 0 3 % otic solution Administer 10 drops to the right ear daily 10 mL 0    ondansetron (ZOFRAN-ODT) 4 mg disintegrating tablet Take 1 tablet (4 mg total) by mouth every 8 (eight) hours as needed for nausea or vomiting 30 tablet 1    oxybutynin (DITROPAN) 5 mg tablet TAKE 3 TABLETS BY MOUTH EVERY  tablet 0  oxyCODONE (ROXICODONE) 10 MG TABS Take 10 mg by mouth 3 (three) times a day  0    pantoprazole (PROTONIX) 40 mg tablet TAKE 1 TABLET BY MOUTH EVERY DAY 90 tablet 0    phenytoin extended (DILANTIN) 200 MG ER capsule Take 200 mg by mouth 2 (two) times a day      sertraline (ZOLOFT) 50 mg tablet TAKE 1 TABLET BY MOUTH EVERY DAY 90 tablet 1    sodium hypochlorite (DAKIN'S HALF-STRENGTH) external solution USE AS DIRECTED TWICE DAILY 473 mL 2    SUPER B COMPLEX & C TABS TAKE 1 CAPSULE BY MOUTH ONCE FOR 1 DOSE AS DIRECTED 30 tablet 6    warfarin (COUMADIN) 5 mg tablet Take 1 5 tablets (7 5 mg total) by mouth daily 135 tablet 1    Zinc Sulfate 220 (50 Zn) MG TABS Take 1 tablet by mouth daily 30 each 5    calcitriol (ROCALTROL) 0 5 MCG capsule Take 2 mcg by mouth      calcium acetate (PHOSLO) capsule TAKE 1 CAPSULE BY MOUTH THREE TIMES DAILY WITH MEALS  3    carvedilol (COREG) 3 125 mg tablet TAKE 1 TABLET BY MOUTH TWICE A DAY WITH MEALS (Patient not taking: Reported on 8/11/2020) 180 tablet 1    carvedilol (COREG) 6 25 mg tablet Take 6 25 mg by mouth      Cholecalciferol (VITAMIN D-3) 1000 units CAPS Take 2 capsules by mouth daily (Patient not taking: Reported on 8/11/2020) 30 capsule 0    clindamycin (CLINDAGEL) 1 % gel Apply topically 2 (two) times a day (Patient not taking: Reported on 8/11/2020) 60 g 2    famotidine (PEPCID) 20 mg tablet Take 1 tablet (20 mg total) by mouth daily at bedtime (Patient not taking: Reported on 8/11/2020) 90 tablet 1    levETIRAcetam (KEPPRA) 250 mg tablet TAKE 1 TABLET (250 MG TOTAL) BY MOUTH 3 (THREE) TIMES A WEEK (MWF) AT 1200   lidocaine (XYLOCAINE) 5 % ointment Apply topically as needed for mild pain 35 44 g 0    risperiDONE (RisperDAL) 0 5 mg tablet TAKE 1 TABLET (0 5 MG TOTAL) BY MOUTH 2 (TWO) TIMES A DAY (Patient not taking: Reported on 8/11/2020) 180 tablet 1     No current facility-administered medications for this visit           Allergies   Allergen Reactions    Cefepime      Other reaction(s): Other (See Comments)  encephalopathy; tolerates cefazolin 6/14, miguel Ceftriaxone    Ciprofloxacin Hcl     Cymbalta [Duloxetine Hcl]     Lac Bovis GI Intolerance    Lactose GI Intolerance    Lyrica [Pregabalin]     Penicillins Other (See Comments)     miguel Zosyn 08/28/17  Tolerates Ancef  Miguel Augmentin    Polymyxin B     Rosuvastatin Other (See Comments) and Palpitations     Weakness       Review of Systems   Constitutional: Negative for activity change, appetite change, fatigue, fever and unexpected weight change  HENT: Negative for ear pain and sore throat  Eyes: Negative for visual disturbance  Respiratory: Negative for cough and wheezing  Gastrointestinal: Negative for abdominal pain, constipation, diarrhea and vomiting  Genitourinary: Negative for difficulty urinating and dysuria  Musculoskeletal: Positive for back pain and neck pain  Negative for arthralgias and myalgias  Skin: Positive for wound (chornic, no change)  Negative for rash  Neurological: Negative for dizziness and headaches  Psychiatric/Behavioral: Negative for behavioral problems  There were no vitals filed for this visit  I spent 10 minutes directly with the patient during this visit    [address] Alexandra Almanzar  acknowledges that he has consented to an online visit or consultation  He understands that the online visit is based solely on information provided by him, and that, in the absence of a face-to-face physical evaluation by the physician, the diagnosis he receives is both limited and provisional in terms of accuracy and completeness  This is not intended to replace a full medical face-to-face evaluation by the physician  Edel Jovel  understands and accepts these terms

## 2020-08-12 NOTE — ASSESSMENT & PLAN NOTE
Continue on current medications at this time due to blood pressure being normal at dialysis at home  Recommend [facility] contact us if blood pressure is elevated tomorrow    Also they did not perform the labs that were ordered which was sed rate and CRP and I did refax it to them

## 2020-08-13 ENCOUNTER — OFFICE VISIT (OUTPATIENT)
Dept: WOUND CARE | Facility: HOSPITAL | Age: 40
End: 2020-08-13
Payer: MEDICARE

## 2020-08-13 VITALS
TEMPERATURE: 97.1 F | RESPIRATION RATE: 20 BRPM | HEART RATE: 72 BPM | SYSTOLIC BLOOD PRESSURE: 122 MMHG | DIASTOLIC BLOOD PRESSURE: 78 MMHG

## 2020-08-13 DIAGNOSIS — E11.65 UNCONTROLLED TYPE 2 DIABETES MELLITUS WITH HYPERGLYCEMIA (HCC): ICD-10-CM

## 2020-08-13 DIAGNOSIS — F17.200 NICOTINE DEPENDENCE, UNCOMPLICATED, UNSPECIFIED NICOTINE PRODUCT TYPE: ICD-10-CM

## 2020-08-13 DIAGNOSIS — L89.154 PRESSURE INJURY OF SACRAL REGION, STAGE 4 (HCC): Primary | ICD-10-CM

## 2020-08-13 DIAGNOSIS — L89.324 PRESSURE INJURY OF LEFT BUTTOCK, STAGE 4 (HCC): ICD-10-CM

## 2020-08-13 DIAGNOSIS — R10.13 EPIGASTRIC PAIN: ICD-10-CM

## 2020-08-13 DIAGNOSIS — L89.313 PRESSURE ULCER OF RIGHT BUTTOCK, STAGE 3 (HCC): ICD-10-CM

## 2020-08-13 DIAGNOSIS — R11.0 NAUSEA: ICD-10-CM

## 2020-08-13 DIAGNOSIS — L89.894 PRESSURE ULCER OF OTHER SITE, STAGE 4 (HCC): ICD-10-CM

## 2020-08-13 LAB — INR PPP: 3.2 (ref 0.84–1.19)

## 2020-08-13 PROCEDURE — 3066F NEPHROPATHY DOC TX: CPT | Performed by: FAMILY MEDICINE

## 2020-08-13 PROCEDURE — 11042 DBRDMT SUBQ TIS 1ST 20SQCM/<: CPT | Performed by: FAMILY MEDICINE

## 2020-08-13 PROCEDURE — 11045 DBRDMT SUBQ TISS EACH ADDL: CPT | Performed by: FAMILY MEDICINE

## 2020-08-13 PROCEDURE — 99213 OFFICE O/P EST LOW 20 MIN: CPT | Performed by: FAMILY MEDICINE

## 2020-08-13 PROCEDURE — NC001 PR NO CHARGE: Performed by: FAMILY MEDICINE

## 2020-08-13 RX ORDER — LIDOCAINE HYDROCHLORIDE 40 MG/ML
5 SOLUTION TOPICAL ONCE
Status: COMPLETED | OUTPATIENT
Start: 2020-08-13 | End: 2020-08-13

## 2020-08-13 RX ADMIN — LIDOCAINE HYDROCHLORIDE 5 ML: 40 SOLUTION TOPICAL at 12:55

## 2020-08-13 NOTE — PROGRESS NOTES
Same treatment applied to all four wounds    Wounds cleansed with Normal saline  Applied calcium alginate into wounds  Covered with 4x4 and ABD and secured with Medfix tape

## 2020-08-13 NOTE — PATIENT INSTRUCTIONS
Orders Placed This Encounter   Procedures    Wound cleansing and dressings     Treatment for all wounds:  Wash your hands with soap and water  Remove old dressing, discard into plastic bag and place in trash  Stop Dakins at this time and Cleanse the wound with saline or mild soap and water prior to applying a clean dressing  Do not use tissue or cotton balls  Do not scrub the wound  Pat dry using gauze  Shower yes do not get dressing wet  Apply Skin prep to skin surrounding wound  Apply calcium alginate to the all the wound    Cover with 4x4 and ABD  Secure with Medfix tape  Change dressing twice a day     Standing Status:   Future     Standing Expiration Date:   8/13/2021    Wound off loading     Continue to try and limit the amount of time spent sitting to get pressure off the wounds     Standing Status:   Future     Standing Expiration Date:   8/13/2021    Wound miscellaneous orders     Standing Status:   Future     Standing Expiration Date:   8/13/2021     Scheduling Instructions:      Continue to try and increase your protein      Continue to try and keep blood sugars as low as possible      Stop Smoking    Wound home care     Continue to see the patient twice a week for dressing changes     Standing Status:   Future     Standing Expiration Date:   8/13/2021

## 2020-08-13 NOTE — RESULT ENCOUNTER NOTE
INR was elevated  I would recommend we cut him back to only 10mg on Wednesday  Recheck next Monday   Also I keep faxing labs to [facility] to be done for cbc, crp and sedrate and I havent had them done yet

## 2020-08-13 NOTE — PROGRESS NOTES
Patient ID: Alex Munoz  is a 36 y o  male Date of Birth 1980     Chief Complaint  Chief Complaint   Patient presents with    Follow Up Wound Care Visit     Follow up for wounds to sacral and ischial areas       Allergies  Cefepime; Ciprofloxacin hcl; Cymbalta [duloxetine hcl]; Lac bovis; Lactose; Lyrica [pregabalin]; Penicillins; Polymyxin b; and Rosuvastatin    Assessment:  Stage IV pressure ulcers of the sacrum and buttocks  All surgically debrided today  No signs of infection  Nausea and abdominal pain with slow bowel transit via ostomy  Poor POA intake including fluids  No problem-specific Assessment & Plan notes found for this encounter  Diagnoses and all orders for this visit:    Pressure injury of sacral region, stage 4 (HCC)  -     Wound cleansing and dressings; Future  -     Wound off loading; Future  -     Wound miscellaneous orders; Future  -     Wound home care; Future  -     lidocaine (XYLOCAINE) 4 % topical solution 5 mL  -     Debridement    Pressure injury of left buttock, stage 4 (HCC)  -     lidocaine (XYLOCAINE) 4 % topical solution 5 mL  -     Debridement    Pressure ulcer of right buttock, stage 3 (HCC)  -     Debridement  -     lidocaine (XYLOCAINE) 4 % topical solution 5 mL    Pressure ulcer of other site, stage 4 (HCC)  -     lidocaine (XYLOCAINE) 4 % topical solution 5 mL  -     Debridement    Uncontrolled type 2 diabetes mellitus with hyperglycemia (HCC)    Nicotine dependence, uncomplicated, unspecified nicotine product type    Epigastric pain    Nausea    Other orders  -     Cancel: Debridement              Wound Ischium Left (Active)   Wound Image   08/13/20 1150   Wound Description Granulation tissue;Pink 08/13/20 1129   Pressure Injury Stage 4 08/13/20 1129   Irene-wound Assessment Intact; [place] 08/13/20 1129   Wound Length (cm) 4 5 cm 08/13/20 1129   Wound Width (cm) 3 5 cm 08/13/20 1129   Wound Depth (cm) 1 2 cm 08/13/20 1129   Wound Surface Area (cm^2) 15 75 cm^2 08/13/20 1129   Wound Volume (cm^3) 18 9 cm^3 08/13/20 1129   Calculated Wound Volume (cm^3) 18 9 cm^3 08/13/20 1129   Undermining 3 08/13/20 1129   Undermining is depth extending from 12-9 08/13/20 1129   Drainage Amount Moderate 08/13/20 1129   Drainage Description Serosanguineous 08/13/20 1129   Non-staged Wound Description Full thickness 08/13/20 1129   Treatments Cleansed 08/13/20 1129   Dressing Changed Changed 08/13/20 1129   Patient Tolerance Tolerated well 08/13/20 1129   Dressing Status Removed 08/13/20 1129       Wound Perineum (Active)   Wound Image   08/13/20 1150   Wound Description Granulation tissue;Pink 08/13/20 1127   Pressure Injury Stage 4 08/13/20 1127   Irene-wound Assessment [place] 08/13/20 1127   Wound Length (cm) 3 5 cm 08/13/20 1127   Wound Width (cm) 3 cm 08/13/20 1127   Wound Depth (cm) 1 cm 08/13/20 1127   Wound Surface Area (cm^2) 10 5 cm^2 08/13/20 1127   Wound Volume (cm^3) 10 5 cm^3 08/13/20 1127   Calculated Wound Volume (cm^3) 10 5 cm^3 08/13/20 1127   Undermining 1 08/13/20 1127   Undermining is depth extending from 5-10 08/13/20 1127   Drainage Amount Moderate 08/13/20 1127   Drainage Description Serosanguineous 08/13/20 1127   Non-staged Wound Description Full thickness 08/13/20 1127   Treatments Cleansed 08/13/20 1127   Dressing Changed Changed 08/13/20 1127   Patient Tolerance Tolerated well 08/13/20 1127   Dressing Status Removed 08/13/20 1127       Wound Ischium Right (Active)   Wound Image   08/13/20 1151   Wound Description Granulation tissue;Pink 08/13/20 1126   Pressure Injury Stage 3 08/13/20 1126   Irene-wound Assessment Intact;Dry;[place] 08/13/20 1126   Wound Length (cm) 1 cm 08/13/20 1126   Wound Width (cm) 2 cm 08/13/20 1126   Wound Depth (cm) 0 8 cm 08/13/20 1126   Wound Surface Area (cm^2) 2 cm^2 08/13/20 1126   Wound Volume (cm^3) 1 6 cm^3 08/13/20 1126   Calculated Wound Volume (cm^3) 1 6 cm^3 08/13/20 1126   Drainage Amount Small 08/13/20 1126   Drainage Description Serous 08/13/20 1126   Non-staged Wound Description Full thickness 08/13/20 1126   Treatments Cleansed 08/13/20 1126   Dressing Changed Changed 08/13/20 1126   Patient Tolerance Tolerated well 08/13/20 1126   Dressing Status Removed 08/13/20 1126       Wound Sacrum (Active)   Wound Image   08/13/20 1149   Wound Description Epithelialization;Granulation tissue; Other (Comment) 08/13/20 1124   Pressure Injury Stage 4 08/13/20 1124   Irene-wound Assessment Dry;Pink 08/13/20 1124   Wound Length (cm) 7 5 cm 08/13/20 1124   Wound Width (cm) 10 cm 08/13/20 1124   Wound Depth (cm) 1 cm 08/13/20 1124   Wound Surface Area (cm^2) 75 cm^2 08/13/20 1124   Wound Volume (cm^3) 75 cm^3 08/13/20 1124   Calculated Wound Volume (cm^3) 75 cm^3 08/13/20 1124   Undermining 3 08/13/20 1124   Undermining is depth extending from 4-8 08/13/20 1124   Drainage Amount Moderate 08/13/20 1124   Non-staged Wound Description Full thickness 08/13/20 1124   Treatments Cleansed 08/13/20 1124   Dressing Changed Changed 08/13/20 1124   Patient Tolerance Tolerated well 08/13/20 1124   Dressing Status Removed 08/13/20 1124                       Debridement   Wound Ischium Left    Consent obtained? verbal and written  Consent given by: patient  Immediately prior to the procedure a time out was called  Performed by: physician  Debridement type: surgical  Level of debridement: subcutaneous tissue  Pain control: lidocaine 5%  Post-debridement measurements  Length (cm): 4 5  Width (cm): 3 5  Depth (cm): 1 3  Percent debrided: 100%  Surface Area (cm^2): 15 75  Area debrided (cm^2): 15 75  Volume (cm^3): 20 48  Tissue and other material debrided: subcutaneous tissue  Devitalized tissue debrided: fibrin and necrotic debris  Instrument(s) utilized: curette  Bleeding: small  Hemostasis obtained with: not applicable  Procedural pain (0-10): insensate  Post-procedural pain: insensate   Response to treatment: procedure was tolerated well    Debridement Wound Ischium Right    Consent obtained? verbal and written  Consent given by: patient  Immediately prior to the procedure a time out was called  Performed by: physician  Debridement type: surgical  Level of debridement: subcutaneous tissue  Pain control: lidocaine 5%  Post-debridement measurements  Length (cm): 1  Width (cm): 2  Depth (cm): 0 9  Percent debrided: 100%  Surface Area (cm^2): 2  Area debrided (cm^2):  2  Volume (cm^3): 1 8  Tissue and other material debrided: subcutaneous tissue  Devitalized tissue debrided: fibrin and necrotic debris  Instrument(s) utilized: curette  Bleeding: medium and small  Hemostasis obtained with: not applicable  Procedural pain (0-10): insensate  Post-procedural pain: insensate   Response to treatment: procedure was tolerated well    Debridement   Wound Perineum    Consent obtained? verbal and written  Consent given by: patient  Immediately prior to the procedure a time out was called  Performed by: physician  Debridement type: surgical  Level of debridement: subcutaneous tissue  Pain control: lidocaine 5%  Post-debridement measurements  Length (cm): 3 5  Width (cm): 3  Depth (cm): 1 1  Percent debrided: 100%  Surface Area (cm^2): 10 5  Area debrided (cm^2): 10 5  Volume (cm^3): 11 55  Tissue and other material debrided: subcutaneous tissue  Devitalized tissue debrided: fibrin and necrotic debris  Instrument(s) utilized: curette  Bleeding: medium  Hemostasis obtained with: not applicable  Procedural pain (0-10): insensate  Post-procedural pain: insensate   Response to treatment: procedure was tolerated well    Debridement   Wound Sacrum    Consent obtained? verbal and written  Consent given by: patient  Immediately prior to the procedure a time out was called  Performed by: physician  Debridement type: surgical  Level of debridement: subcutaneous tissue  Pain control: lidocaine 5% and lidocaine 4%  Post-debridement measurements  Length (cm): 7 5  Width (cm): 10  Depth (cm): 1 1  Percent debrided: 100%  Surface Area (cm^2): 75  Area debrided (cm^2): 75  Volume (cm^3): 82 5  Tissue and other material debrided: subcutaneous tissue  Devitalized tissue debrided: fibrin  Instrument(s) utilized: curette and blade  Bleeding: large  Comment regarding bleeding: Surgifoam used in addition to pressure  Hemostasis obtained with: not applicable  Procedural pain (0-10): insensate  Post-procedural pain: insensate   Response to treatment: procedure was tolerated well          Plan:   See orders  Stop the Dakin's soaks  Alginate to the all the wounds  Advised to go to the emergency room for further evaluation of his abdominal  pain and nausea  Subjective:        Patient comes wound center in follow-up of his multiple pressure ulcers of the sacrum and buttocks  He states for the last three days he has not been feeling well with abdominal pains and nausea  His colostomy is not functioning as usual   He was recently hospitalized for spinal pain  He notes that is p o  Intake is not good  He has not had any fever or chills  He also believes he has lost weight  The following portions of the patient's history were reviewed and updated as appropriate: allergies, current medications, past family history, past medical history, past social history, past surgical history and problem list     Review of Systems   Constitutional: Positive for appetite change and unexpected weight change  Negative for activity change, chills, fatigue and fever  HENT: Negative for congestion, hearing loss and postnasal drip  Eyes: Negative for visual disturbance  Respiratory: Negative for cough and shortness of breath  Cardiovascular: Negative for chest pain and leg swelling  Gastrointestinal: Positive for abdominal pain, constipation (Slight) and nausea  Negative for diarrhea and vomiting  Genitourinary: Negative for dysuria and urgency          Indwelling Ortega catheter   Musculoskeletal: Positive for gait problem (Nonambulatory)  Skin: Positive for wound  Negative for rash  Neurological: Positive for weakness  Hematological: Does not bruise/bleed easily  Psychiatric/Behavioral: Positive for dysphoric mood  Objective:            /78   Pulse 72   Temp (!) 97 1 °F (36 2 °C)   Resp 20     Physical Exam  Vitals signs and nursing note reviewed  Constitutional:       Appearance: Normal appearance  He is underweight  HENT:      Head: Normocephalic and atraumatic  Abdominal:      General: Abdomen is flat  Comments: The abdomen around the ostomy is slightly full and tender  Skin:     Findings: Wound (Both buttocks and sacrum) present  Neurological:      Mental Status: He is alert and oriented to person, place, and time  Psychiatric:         Mood and Affect: Affect normal          Cognition and Memory: Cognition normal            Wound Instructions:  Orders Placed This Encounter   Procedures    Wound cleansing and dressings     Treatment for all wounds:  Wash your hands with soap and water  Remove old dressing, discard into plastic bag and place in trash  Stop Dakins at this time and Cleanse the wound with saline or mild soap and water prior to applying a clean dressing  Do not use tissue or cotton balls  Do not scrub the wound  Pat dry using gauze  Shower yes do not get dressing wet  Apply Skin prep to skin surrounding wound  Apply calcium alginate to the all the wound    Cover with 4x4 and ABD  Secure with Medfix tape  Change dressing twice a day     Standing Status:   Future     Standing Expiration Date:   8/13/2021    Wound off loading     Continue to try and limit the amount of time spent sitting to get pressure off the wounds     Standing Status:   Future     Standing Expiration Date:   8/13/2021    Wound miscellaneous orders     Standing Status:   Future     Standing Expiration Date:   8/13/2021     Scheduling Instructions:      Continue to try and increase your protein Continue to try and keep blood sugars as low as possible      Stop Smoking    Wound home care     Continue to see the patient twice a week for dressing changes     Standing Status:   Future     Standing Expiration Date:   8/13/2021    Debridement     This order was created via procedure documentation    Debridement     This order was created via procedure documentation    Debridement     This order was created via procedure documentation    Debridement     This order was created via procedure documentation

## 2020-08-14 ENCOUNTER — ANTICOAG VISIT (OUTPATIENT)
Dept: FAMILY MEDICINE CLINIC | Facility: CLINIC | Age: 40
End: 2020-08-14

## 2020-08-17 DIAGNOSIS — Z00.00 HEALTHCARE MAINTENANCE: ICD-10-CM

## 2020-08-17 LAB — INR PPP: 2.7 (ref 0.84–1.19)

## 2020-08-17 RX ORDER — MULTIVITAMIN WITH FOLIC ACID 400 MCG
TABLET ORAL
Qty: 90 TABLET | Refills: 2 | Status: SHIPPED | OUTPATIENT
Start: 2020-08-17 | End: 2021-06-29

## 2020-08-18 ENCOUNTER — TELEPHONE (OUTPATIENT)
Dept: FAMILY MEDICINE CLINIC | Facility: CLINIC | Age: 40
End: 2020-08-18

## 2020-08-18 ENCOUNTER — ANTICOAG VISIT (OUTPATIENT)
Dept: FAMILY MEDICINE CLINIC | Facility: CLINIC | Age: 40
End: 2020-08-18

## 2020-08-18 NOTE — TELEPHONE ENCOUNTER
[address] [place] [address] Plan of Care  FORM RECEIVED VIA FAX  WILL BE PLACED IN YOUR BIN AT ASSIGNED DELIVERY TIMES      8/6-10/4/2020

## 2020-08-19 DIAGNOSIS — E53.9 VITAMIN B DEFICIENCY: ICD-10-CM

## 2020-08-19 RX ORDER — OMEGA-3/DHA/EPA/FISH OIL 35-113.5MG
TABLET,CHEWABLE ORAL
Qty: 30 TABLET | Refills: 5 | Status: SHIPPED | OUTPATIENT
Start: 2020-08-19 | End: 2021-02-21 | Stop reason: SDUPTHER

## 2020-08-20 DIAGNOSIS — K13.0 LIP LESION: ICD-10-CM

## 2020-08-20 DIAGNOSIS — R11.2 NAUSEA AND VOMITING, INTRACTABILITY OF VOMITING NOT SPECIFIED, UNSPECIFIED VOMITING TYPE: ICD-10-CM

## 2020-08-20 DIAGNOSIS — K21.9 GASTROESOPHAGEAL REFLUX DISEASE WITHOUT ESOPHAGITIS: ICD-10-CM

## 2020-08-20 DIAGNOSIS — I10 HTN (HYPERTENSION), BENIGN: Primary | ICD-10-CM

## 2020-08-20 DIAGNOSIS — N32.81 OAB (OVERACTIVE BLADDER): ICD-10-CM

## 2020-08-21 ENCOUNTER — ANTICOAG VISIT (OUTPATIENT)
Dept: FAMILY MEDICINE CLINIC | Facility: CLINIC | Age: 40
End: 2020-08-21

## 2020-08-21 LAB — INR PPP: 2.7 (ref 0.84–1.19)

## 2020-08-21 RX ORDER — CARVEDILOL 25 MG/1
TABLET ORAL
Qty: 180 TABLET | Refills: 0 | Status: SHIPPED | OUTPATIENT
Start: 2020-08-21 | End: 2020-11-17

## 2020-08-21 RX ORDER — FAMOTIDINE 20 MG/1
TABLET, FILM COATED ORAL
Qty: 90 TABLET | Refills: 1 | Status: SHIPPED | OUTPATIENT
Start: 2020-08-21 | End: 2021-04-15

## 2020-08-21 RX ORDER — KETOCONAZOLE 20 MG/G
CREAM TOPICAL
Qty: 15 G | Refills: 1 | Status: SHIPPED | OUTPATIENT
Start: 2020-08-21 | End: 2022-03-14 | Stop reason: SDUPTHER

## 2020-08-21 RX ORDER — ONDANSETRON 4 MG/1
TABLET, ORALLY DISINTEGRATING ORAL
Qty: 30 TABLET | Refills: 1 | Status: SHIPPED | OUTPATIENT
Start: 2020-08-21 | End: 2021-03-29

## 2020-08-21 RX ORDER — OXYBUTYNIN CHLORIDE 5 MG/1
TABLET ORAL
Qty: 270 TABLET | Refills: 0 | Status: SHIPPED | OUTPATIENT
Start: 2020-08-21 | End: 2020-11-17

## 2020-08-24 RX ORDER — PANTOPRAZOLE SODIUM 40 MG/1
TABLET, DELAYED RELEASE ORAL
Qty: 90 TABLET | Refills: 0 | Status: SHIPPED | OUTPATIENT
Start: 2020-08-24 | End: 2021-04-23

## 2020-08-28 ENCOUNTER — ANTICOAG VISIT (OUTPATIENT)
Dept: FAMILY MEDICINE CLINIC | Facility: CLINIC | Age: 40
End: 2020-08-28

## 2020-08-28 LAB — INR PPP: 2.7 (ref 0.84–1.19)

## 2020-08-29 DIAGNOSIS — L29.9 ITCHING: ICD-10-CM

## 2020-08-31 RX ORDER — CETIRIZINE HYDROCHLORIDE 5 MG/1
TABLET ORAL
Qty: 90 TABLET | Refills: 1 | Status: SHIPPED | OUTPATIENT
Start: 2020-08-31 | End: 2021-11-08

## 2020-09-02 ENCOUNTER — TELEPHONE (OUTPATIENT)
Dept: FAMILY MEDICINE CLINIC | Facility: CLINIC | Age: 40
End: 2020-09-02

## 2020-09-02 NOTE — TELEPHONE ENCOUNTER
I didn't find any notes either  Cristobal James says he does  She said they've been good but he drank a whole bunch of tea with sugar last night   She is aware to call them if he has continued higher than usual sugars

## 2020-09-02 NOTE — TELEPHONE ENCOUNTER
[facility] nurse states pt reported his fasting sugar read HI then was 540 when he checked it with her there  Took his insulin, is currently asymptomatic

## 2020-09-02 NOTE — TELEPHONE ENCOUNTER
SIGNATURES NEEDED FOR ([facility])FORM RECEIVED VIA FAX  WILL BE PLACED IN YOUR BIN AT ASSIGNED DELIVERY TIMES

## 2020-09-02 NOTE — TELEPHONE ENCOUNTER
I was trying to look through notes for endocrine  He was seeing [facility] endo but I dont know the last he was seen   I know he was still going because last time he was talking about gettitng a pump    Can you recommend to them calling their office

## 2020-09-03 ENCOUNTER — TELEPHONE (OUTPATIENT)
Dept: FAMILY MEDICINE CLINIC | Facility: CLINIC | Age: 40
End: 2020-09-03

## 2020-09-03 DIAGNOSIS — B37.0 THRUSH: Primary | ICD-10-CM

## 2020-09-03 RX ORDER — FLUCONAZOLE 150 MG/1
150 TABLET ORAL ONCE
Qty: 1 TABLET | Refills: 1 | Status: SHIPPED | OUTPATIENT
Start: 2020-09-03 | End: 2020-09-03

## 2020-09-03 NOTE — TELEPHONE ENCOUNTER
SIGNATURES NEEDED FOR [facility]  FORM RECEIVED VIA FAX  WILL BE PLACED IN YOUR BIN AT ASSIGNED DELIVERY TIMES        Order number [id number]      Faxed [phone]

## 2020-09-04 ENCOUNTER — ANTICOAG VISIT (OUTPATIENT)
Dept: FAMILY MEDICINE CLINIC | Facility: CLINIC | Age: 40
End: 2020-09-04

## 2020-09-04 ENCOUNTER — TELEPHONE (OUTPATIENT)
Dept: FAMILY MEDICINE CLINIC | Facility: CLINIC | Age: 40
End: 2020-09-04

## 2020-09-04 LAB — INR PPP: 3.2 (ref 0.84–1.19)

## 2020-09-06 DIAGNOSIS — I48.91 ATRIAL FIBRILLATION, UNSPECIFIED TYPE (HCC): Chronic | ICD-10-CM

## 2020-09-07 RX ORDER — WARFARIN SODIUM 5 MG/1
7.5 TABLET ORAL DAILY
Qty: 135 TABLET | Refills: 1 | Status: SHIPPED | OUTPATIENT
Start: 2020-09-07 | End: 2021-01-29

## 2020-09-10 ENCOUNTER — ANTICOAG VISIT (OUTPATIENT)
Dept: FAMILY MEDICINE CLINIC | Facility: CLINIC | Age: 40
End: 2020-09-10

## 2020-09-10 LAB — INR PPP: 3.4 (ref 0.84–1.19)

## 2020-09-17 ENCOUNTER — ANTICOAG VISIT (OUTPATIENT)
Dept: FAMILY MEDICINE CLINIC | Facility: CLINIC | Age: 40
End: 2020-09-17

## 2020-09-17 DIAGNOSIS — L29.9 ITCHING: Primary | ICD-10-CM

## 2020-09-17 DIAGNOSIS — K58.9 IRRITABLE BOWEL SYNDROME, UNSPECIFIED TYPE: ICD-10-CM

## 2020-09-17 DIAGNOSIS — F31.9 BIPOLAR DEPRESSION (HCC): ICD-10-CM

## 2020-09-17 LAB — INR PPP: 2.2 (ref 0.84–1.19)

## 2020-09-17 RX ORDER — DICYCLOMINE HYDROCHLORIDE 10 MG/1
10 CAPSULE ORAL
Qty: 270 CAPSULE | Refills: 1 | Status: SHIPPED | OUTPATIENT
Start: 2020-09-17 | End: 2021-03-08 | Stop reason: SDUPTHER

## 2020-09-17 RX ORDER — HYDROXYZINE HYDROCHLORIDE 25 MG/1
25 TABLET, FILM COATED ORAL 2 TIMES DAILY PRN
Qty: 30 TABLET | Refills: 1 | Status: SHIPPED | OUTPATIENT
Start: 2020-09-17 | End: 2020-09-24

## 2020-09-22 DIAGNOSIS — F31.9 BIPOLAR DEPRESSION (HCC): ICD-10-CM

## 2020-09-22 RX ORDER — RISPERIDONE 0.5 MG/1
0.5 TABLET, FILM COATED ORAL 2 TIMES DAILY
Qty: 180 TABLET | Refills: 1 | Status: SHIPPED | OUTPATIENT
Start: 2020-09-22 | End: 2021-03-14

## 2020-09-23 ENCOUNTER — TELEPHONE (OUTPATIENT)
Dept: FAMILY MEDICINE CLINIC | Facility: CLINIC | Age: 40
End: 2020-09-23

## 2020-09-23 NOTE — TELEPHONE ENCOUNTER
Via nurse line:    Dangelo Links from [facility] states that pt is having swelling of both ears and they would like to know if pt can use the ofloxacin (FLOXIN) 0 3 % otic solution or should pt be scheduled for an appt?

## 2020-09-23 NOTE — TELEPHONE ENCOUNTER
Called Dustin MOSER, Unable to LVM mailbox full    Called nasim MOSER, LVM with detail message as per PCP

## 2020-09-23 NOTE — TELEPHONE ENCOUNTER
They can try that and if not any better after another 48 hours schedule same day if I dont have anything

## 2020-09-24 DIAGNOSIS — L29.9 ITCHING: ICD-10-CM

## 2020-09-24 RX ORDER — HYDROXYZINE HYDROCHLORIDE 25 MG/1
25 TABLET, FILM COATED ORAL 2 TIMES DAILY PRN
Qty: 90 TABLET | Refills: 0 | Status: SHIPPED | OUTPATIENT
Start: 2020-09-24 | End: 2020-11-09

## 2020-09-29 ENCOUNTER — TELEPHONE (OUTPATIENT)
Dept: FAMILY MEDICINE CLINIC | Facility: CLINIC | Age: 40
End: 2020-09-29

## 2020-10-07 ENCOUNTER — TELEPHONE (OUTPATIENT)
Dept: FAMILY MEDICINE CLINIC | Facility: CLINIC | Age: 40
End: 2020-10-07

## 2020-10-09 DIAGNOSIS — D63.1 ANEMIA IN STAGE 5 CHRONIC KIDNEY DISEASE, NOT ON CHRONIC DIALYSIS (HCC): ICD-10-CM

## 2020-10-09 DIAGNOSIS — N18.5 ANEMIA IN STAGE 5 CHRONIC KIDNEY DISEASE, NOT ON CHRONIC DIALYSIS (HCC): ICD-10-CM

## 2020-10-09 RX ORDER — POLYHEXAM BIGUAN/GAUZE BANDAGE 0.2%-2"X2"
BANDAGE TOPICAL
Qty: 90 TABLET | Refills: 2 | Status: SHIPPED | OUTPATIENT
Start: 2020-10-09

## 2020-10-14 ENCOUNTER — TRANSITIONAL CARE MANAGEMENT (OUTPATIENT)
Dept: FAMILY MEDICINE CLINIC | Facility: CLINIC | Age: 40
End: 2020-10-14

## 2020-10-19 DIAGNOSIS — L70.0 ACNE VULGARIS: ICD-10-CM

## 2020-10-19 RX ORDER — CLINDAMYCIN PHOSPHATE 10 MG/G
GEL TOPICAL
Qty: 60 G | Refills: 2 | Status: SHIPPED | OUTPATIENT
Start: 2020-10-19 | End: 2021-01-22

## 2020-10-22 ENCOUNTER — OFFICE VISIT (OUTPATIENT)
Dept: WOUND CARE | Facility: HOSPITAL | Age: 40
End: 2020-10-22
Payer: MEDICARE

## 2020-10-22 VITALS
RESPIRATION RATE: 18 BRPM | SYSTOLIC BLOOD PRESSURE: 118 MMHG | HEART RATE: 74 BPM | DIASTOLIC BLOOD PRESSURE: 64 MMHG | TEMPERATURE: 97.5 F

## 2020-10-22 DIAGNOSIS — L89.324 PRESSURE INJURY OF LEFT BUTTOCK, STAGE 4 (HCC): ICD-10-CM

## 2020-10-22 DIAGNOSIS — L89.894 PRESSURE ULCER OF OTHER SITE, STAGE 4 (HCC): ICD-10-CM

## 2020-10-22 DIAGNOSIS — L89.154 PRESSURE INJURY OF SACRAL REGION, STAGE 4 (HCC): Primary | ICD-10-CM

## 2020-10-22 PROCEDURE — 11045 DBRDMT SUBQ TISS EACH ADDL: CPT | Performed by: FAMILY MEDICINE

## 2020-10-22 PROCEDURE — 11042 DBRDMT SUBQ TIS 1ST 20SQCM/<: CPT | Performed by: FAMILY MEDICINE

## 2020-10-22 PROCEDURE — 99213 OFFICE O/P EST LOW 20 MIN: CPT | Performed by: FAMILY MEDICINE

## 2020-10-22 RX ORDER — LIDOCAINE HYDROCHLORIDE 40 MG/ML
5 SOLUTION TOPICAL ONCE
Status: COMPLETED | OUTPATIENT
Start: 2020-10-22 | End: 2020-10-22

## 2020-10-22 RX ADMIN — LIDOCAINE HYDROCHLORIDE 5 ML: 40 SOLUTION TOPICAL at 12:22

## 2020-10-30 ENCOUNTER — TELEPHONE (OUTPATIENT)
Dept: FAMILY MEDICINE CLINIC | Facility: CLINIC | Age: 40
End: 2020-10-30

## 2020-11-03 ENCOUNTER — OFFICE VISIT (OUTPATIENT)
Dept: FAMILY MEDICINE CLINIC | Facility: CLINIC | Age: 40
End: 2020-11-03

## 2020-11-03 ENCOUNTER — TELEPHONE (OUTPATIENT)
Dept: FAMILY MEDICINE CLINIC | Facility: CLINIC | Age: 40
End: 2020-11-03

## 2020-11-03 VITALS
HEART RATE: 97 BPM | DIASTOLIC BLOOD PRESSURE: 62 MMHG | SYSTOLIC BLOOD PRESSURE: 112 MMHG | RESPIRATION RATE: 18 BRPM | TEMPERATURE: 98.7 F | OXYGEN SATURATION: 98 %

## 2020-11-03 DIAGNOSIS — E10.22 TYPE 1 DIABETES MELLITUS WITH CHRONIC KIDNEY DISEASE ON CHRONIC DIALYSIS (HCC): ICD-10-CM

## 2020-11-03 DIAGNOSIS — Z99.2 TYPE 1 DIABETES MELLITUS WITH CHRONIC KIDNEY DISEASE ON CHRONIC DIALYSIS (HCC): ICD-10-CM

## 2020-11-03 DIAGNOSIS — R56.9 SEIZURE (HCC): ICD-10-CM

## 2020-11-03 DIAGNOSIS — Z23 FLU VACCINE NEED: Primary | ICD-10-CM

## 2020-11-03 DIAGNOSIS — N18.6 TYPE 1 DIABETES MELLITUS WITH CHRONIC KIDNEY DISEASE ON CHRONIC DIALYSIS (HCC): ICD-10-CM

## 2020-11-03 DIAGNOSIS — F31.9 BIPOLAR DEPRESSION (HCC): ICD-10-CM

## 2020-11-03 PROCEDURE — 99214 OFFICE O/P EST MOD 30 MIN: CPT | Performed by: PHYSICIAN ASSISTANT

## 2020-11-03 PROCEDURE — 90682 RIV4 VACC RECOMBINANT DNA IM: CPT

## 2020-11-03 PROCEDURE — G0008 ADMIN INFLUENZA VIRUS VAC: HCPCS

## 2020-11-03 RX ORDER — URINE ACETONE TEST STRIPS
STRIP MISCELLANEOUS
COMMUNITY
Start: 2020-09-18

## 2020-11-04 ENCOUNTER — TELEPHONE (OUTPATIENT)
Dept: FAMILY MEDICINE CLINIC | Facility: CLINIC | Age: 40
End: 2020-11-04

## 2020-11-04 DIAGNOSIS — R05.9 COUGH: Primary | ICD-10-CM

## 2020-11-04 PROBLEM — M86.9 OSTEOMYELITIS OF RIGHT FEMUR (HCC): Status: RESOLVED | Noted: 2017-03-20 | Resolved: 2020-11-04

## 2020-11-04 RX ORDER — BENZONATATE 100 MG/1
100 CAPSULE ORAL 3 TIMES DAILY PRN
Qty: 30 CAPSULE | Refills: 0 | Status: SHIPPED | OUTPATIENT
Start: 2020-11-04 | End: 2020-12-11

## 2020-11-09 DIAGNOSIS — L29.9 ITCHING: ICD-10-CM

## 2020-11-09 RX ORDER — HYDROXYZINE HYDROCHLORIDE 25 MG/1
25 TABLET, FILM COATED ORAL 2 TIMES DAILY PRN
Qty: 90 TABLET | Refills: 2 | Status: SHIPPED | OUTPATIENT
Start: 2020-11-09 | End: 2020-11-19 | Stop reason: SDUPTHER

## 2020-11-13 ENCOUNTER — TELEPHONE (OUTPATIENT)
Dept: FAMILY MEDICINE CLINIC | Facility: CLINIC | Age: 40
End: 2020-11-13

## 2020-11-13 DIAGNOSIS — R19.7 DIARRHEA OF PRESUMED INFECTIOUS ORIGIN: Primary | ICD-10-CM

## 2020-11-17 DIAGNOSIS — N32.81 OAB (OVERACTIVE BLADDER): ICD-10-CM

## 2020-11-17 DIAGNOSIS — I10 HTN (HYPERTENSION), BENIGN: ICD-10-CM

## 2020-11-17 RX ORDER — OXYBUTYNIN CHLORIDE 5 MG/1
TABLET ORAL
Qty: 270 TABLET | Refills: 1 | Status: SHIPPED | OUTPATIENT
Start: 2020-11-17 | End: 2021-07-15 | Stop reason: SDUPTHER

## 2020-11-17 RX ORDER — CARVEDILOL 25 MG/1
TABLET ORAL
Qty: 180 TABLET | Refills: 1 | Status: SHIPPED | OUTPATIENT
Start: 2020-11-17 | End: 2021-07-15 | Stop reason: SDUPTHER

## 2020-11-18 ENCOUNTER — TELEPHONE (OUTPATIENT)
Dept: FAMILY MEDICINE CLINIC | Facility: CLINIC | Age: 40
End: 2020-11-18

## 2020-11-19 ENCOUNTER — OFFICE VISIT (OUTPATIENT)
Dept: WOUND CARE | Facility: HOSPITAL | Age: 40
End: 2020-11-19
Payer: MEDICARE

## 2020-11-19 ENCOUNTER — TELEPHONE (OUTPATIENT)
Dept: FAMILY MEDICINE CLINIC | Facility: CLINIC | Age: 40
End: 2020-11-19

## 2020-11-19 VITALS
SYSTOLIC BLOOD PRESSURE: 160 MMHG | DIASTOLIC BLOOD PRESSURE: 90 MMHG | RESPIRATION RATE: 20 BRPM | TEMPERATURE: 98.9 F | HEART RATE: 100 BPM

## 2020-11-19 DIAGNOSIS — Z99.2 TYPE 1 DIABETES MELLITUS WITH CHRONIC KIDNEY DISEASE ON CHRONIC DIALYSIS (HCC): ICD-10-CM

## 2020-11-19 DIAGNOSIS — L89.894 PRESSURE ULCER OF OTHER SITE, STAGE 4 (HCC): ICD-10-CM

## 2020-11-19 DIAGNOSIS — L29.9 ITCHING: ICD-10-CM

## 2020-11-19 DIAGNOSIS — E10.22 TYPE 1 DIABETES MELLITUS WITH CHRONIC KIDNEY DISEASE ON CHRONIC DIALYSIS (HCC): ICD-10-CM

## 2020-11-19 DIAGNOSIS — L89.154 PRESSURE INJURY OF SACRAL REGION, STAGE 4 (HCC): Primary | ICD-10-CM

## 2020-11-19 DIAGNOSIS — L89.314 PRESSURE INJURY OF RIGHT ISCHIUM, STAGE 4 (HCC): ICD-10-CM

## 2020-11-19 DIAGNOSIS — N18.6 TYPE 1 DIABETES MELLITUS WITH CHRONIC KIDNEY DISEASE ON CHRONIC DIALYSIS (HCC): ICD-10-CM

## 2020-11-19 DIAGNOSIS — L89.324 PRESSURE INJURY OF LEFT ISCHIUM, STAGE 4 (HCC): ICD-10-CM

## 2020-11-19 PROCEDURE — 99214 OFFICE O/P EST MOD 30 MIN: CPT | Performed by: FAMILY MEDICINE

## 2020-11-19 PROCEDURE — 99213 OFFICE O/P EST LOW 20 MIN: CPT | Performed by: FAMILY MEDICINE

## 2020-11-19 RX ORDER — LIDOCAINE HYDROCHLORIDE 40 MG/ML
5 SOLUTION TOPICAL ONCE
Status: COMPLETED | OUTPATIENT
Start: 2020-11-19 | End: 2020-11-19

## 2020-11-19 RX ORDER — HYDROXYZINE HYDROCHLORIDE 25 MG/1
25 TABLET, FILM COATED ORAL 2 TIMES DAILY PRN
Qty: 90 TABLET | Refills: 2 | Status: SHIPPED | OUTPATIENT
Start: 2020-11-19 | End: 2021-02-09

## 2020-11-19 RX ADMIN — LIDOCAINE HYDROCHLORIDE 5 ML: 40 SOLUTION TOPICAL at 14:00

## 2020-11-20 ENCOUNTER — TELEPHONE (OUTPATIENT)
Dept: FAMILY MEDICINE CLINIC | Facility: CLINIC | Age: 40
End: 2020-11-20

## 2020-11-20 DIAGNOSIS — I10 HTN (HYPERTENSION), BENIGN: Primary | ICD-10-CM

## 2020-11-20 DIAGNOSIS — Z43.3 COLOSTOMY CARE (HCC): ICD-10-CM

## 2020-11-20 DIAGNOSIS — B37.0 THRUSH: ICD-10-CM

## 2020-11-20 RX ORDER — DOXAZOSIN 2 MG/1
2 TABLET ORAL EVERY EVENING
COMMUNITY
Start: 2020-10-12 | End: 2020-11-20 | Stop reason: SDUPTHER

## 2020-11-20 RX ORDER — HYDRALAZINE HYDROCHLORIDE 25 MG/1
TABLET, FILM COATED ORAL
COMMUNITY
Start: 2020-10-12 | End: 2020-11-20 | Stop reason: SDUPTHER

## 2020-11-20 RX ORDER — FLUCONAZOLE 150 MG/1
TABLET ORAL
COMMUNITY
Start: 2020-09-17 | End: 2020-11-20 | Stop reason: SDUPTHER

## 2020-11-20 RX ORDER — FLUCONAZOLE 150 MG/1
150 TABLET ORAL ONCE
Qty: 1 TABLET | Refills: 0 | Status: SHIPPED | OUTPATIENT
Start: 2020-11-20 | End: 2020-12-14

## 2020-11-20 RX ORDER — HYDRALAZINE HYDROCHLORIDE 25 MG/1
25 TABLET, FILM COATED ORAL 3 TIMES DAILY
Qty: 90 TABLET | Refills: 5 | Status: SHIPPED | OUTPATIENT
Start: 2020-11-20 | End: 2021-02-09

## 2020-11-20 RX ORDER — DOXAZOSIN 2 MG/1
2 TABLET ORAL EVERY EVENING
Qty: 30 TABLET | Refills: 2 | Status: SHIPPED | OUTPATIENT
Start: 2020-11-20 | End: 2021-02-11

## 2020-11-24 ENCOUNTER — TELEPHONE (OUTPATIENT)
Dept: FAMILY MEDICINE CLINIC | Facility: CLINIC | Age: 40
End: 2020-11-24

## 2020-12-07 ENCOUNTER — TELEPHONE (OUTPATIENT)
Dept: FAMILY MEDICINE CLINIC | Facility: CLINIC | Age: 40
End: 2020-12-07

## 2020-12-10 DIAGNOSIS — R05.9 COUGH: ICD-10-CM

## 2020-12-10 DIAGNOSIS — I10 HTN (HYPERTENSION), BENIGN: Primary | ICD-10-CM

## 2020-12-11 RX ORDER — AMLODIPINE BESYLATE 10 MG/1
TABLET ORAL
Qty: 90 TABLET | Refills: 3 | Status: SHIPPED | OUTPATIENT
Start: 2020-12-11 | End: 2021-07-15 | Stop reason: SDUPTHER

## 2020-12-11 RX ORDER — BENZONATATE 100 MG/1
CAPSULE ORAL
Qty: 30 CAPSULE | Refills: 0 | Status: SHIPPED | OUTPATIENT
Start: 2020-12-11 | End: 2022-02-17

## 2020-12-12 DIAGNOSIS — B37.0 THRUSH: ICD-10-CM

## 2020-12-14 RX ORDER — FLUCONAZOLE 150 MG/1
TABLET ORAL
Qty: 1 TABLET | Refills: 0 | Status: SHIPPED | OUTPATIENT
Start: 2020-12-14 | End: 2021-01-11 | Stop reason: SDUPTHER

## 2020-12-15 DIAGNOSIS — N18.6 TYPE 1 DIABETES MELLITUS WITH CHRONIC KIDNEY DISEASE ON CHRONIC DIALYSIS (HCC): ICD-10-CM

## 2020-12-15 DIAGNOSIS — L98.429 ULCER OF SACRAL REGION, STAGE 4 (HCC): ICD-10-CM

## 2020-12-15 DIAGNOSIS — Z99.2 TYPE 1 DIABETES MELLITUS WITH CHRONIC KIDNEY DISEASE ON CHRONIC DIALYSIS (HCC): ICD-10-CM

## 2020-12-15 DIAGNOSIS — E10.22 TYPE 1 DIABETES MELLITUS WITH CHRONIC KIDNEY DISEASE ON CHRONIC DIALYSIS (HCC): ICD-10-CM

## 2020-12-15 RX ORDER — UREA 10 %
LOTION (ML) TOPICAL
Qty: 90 EACH | Refills: 1 | Status: SHIPPED | OUTPATIENT
Start: 2020-12-15 | End: 2021-02-11 | Stop reason: SDUPTHER

## 2020-12-28 ENCOUNTER — TELEPHONE (OUTPATIENT)
Dept: FAMILY MEDICINE CLINIC | Facility: CLINIC | Age: 40
End: 2020-12-28

## 2020-12-29 ENCOUNTER — TELEPHONE (OUTPATIENT)
Dept: FAMILY MEDICINE CLINIC | Facility: CLINIC | Age: 40
End: 2020-12-29

## 2020-12-31 ENCOUNTER — OFFICE VISIT (OUTPATIENT)
Dept: WOUND CARE | Facility: HOSPITAL | Age: 40
End: 2020-12-31
Payer: MEDICARE

## 2020-12-31 VITALS
RESPIRATION RATE: 16 BRPM | TEMPERATURE: 99.6 F | SYSTOLIC BLOOD PRESSURE: 120 MMHG | DIASTOLIC BLOOD PRESSURE: 66 MMHG | HEART RATE: 90 BPM

## 2020-12-31 DIAGNOSIS — L89.154 PRESSURE INJURY OF SACRAL REGION, STAGE 4 (HCC): Primary | ICD-10-CM

## 2020-12-31 DIAGNOSIS — L89.314 PRESSURE INJURY OF RIGHT ISCHIUM, STAGE 4 (HCC): ICD-10-CM

## 2020-12-31 DIAGNOSIS — L89.324 PRESSURE INJURY OF LEFT ISCHIUM, STAGE 4 (HCC): ICD-10-CM

## 2020-12-31 DIAGNOSIS — L89.894 PRESSURE ULCER OF OTHER SITE, STAGE 4 (HCC): ICD-10-CM

## 2020-12-31 PROCEDURE — 99214 OFFICE O/P EST MOD 30 MIN: CPT | Performed by: FAMILY MEDICINE

## 2020-12-31 PROCEDURE — 99213 OFFICE O/P EST LOW 20 MIN: CPT | Performed by: FAMILY MEDICINE

## 2020-12-31 RX ORDER — LIDOCAINE HYDROCHLORIDE 40 MG/ML
5 SOLUTION TOPICAL ONCE
Status: COMPLETED | OUTPATIENT
Start: 2020-12-31 | End: 2020-12-31

## 2020-12-31 RX ADMIN — LIDOCAINE HYDROCHLORIDE 5 ML: 40 SOLUTION TOPICAL at 13:44

## 2021-01-05 ENCOUNTER — TELEPHONE (OUTPATIENT)
Dept: FAMILY MEDICINE CLINIC | Facility: CLINIC | Age: 41
End: 2021-01-05

## 2021-01-05 NOTE — TELEPHONE ENCOUNTER
SIGNATURES NEEDED FOR [employer] (switchbox, buttons, armrest body, armrest pad) work order# [id number] FORM RECEIVED VIA FAX  WILL BE PLACED IN YOUR BIN AT ASSIGNED DELIVERY TIMES

## 2021-01-06 ENCOUNTER — TELEPHONE (OUTPATIENT)
Dept: FAMILY MEDICINE CLINIC | Facility: CLINIC | Age: 41
End: 2021-01-06

## 2021-01-06 NOTE — TELEPHONE ENCOUNTER
SIGNATURES NEEDED FOR [facility] [place] (plan of care) FORM RECEIVED VIA FAX  WILL BE PLACED IN YOUR BIN AT ASSIGNED DELIVERY TIMES

## 2021-01-07 ENCOUNTER — TELEPHONE (OUTPATIENT)
Dept: FAMILY MEDICINE CLINIC | Facility: CLINIC | Age: 41
End: 2021-01-07

## 2021-01-07 NOTE — TELEPHONE ENCOUNTER
pts mom called left msg on the nurse line stating [employer] faxed a form to us 5xs to be completed and faxed back to them so they can fix the pts scooter  I called pts mom Ophelia Jillian and informed her that I do not see that we ever received the form  I confirmed that fax number and informed her I will keep her looped in if and when we receive the form

## 2021-01-07 NOTE — TELEPHONE ENCOUNTER
SIGNATURES NEEDED FOR [employer] FORM RECEIVED VIA FAX  WILL BE PLACED IN YOUR BIN AT ASSIGNED DELIVERY TIMES    (RE: Wheelchair Repairs)

## 2021-01-08 DIAGNOSIS — D50.8 IRON DEFICIENCY ANEMIA SECONDARY TO INADEQUATE DIETARY IRON INTAKE: ICD-10-CM

## 2021-01-08 DIAGNOSIS — B37.0 THRUSH: ICD-10-CM

## 2021-01-08 NOTE — TELEPHONE ENCOUNTER
Kitty Moore from [facility] is just calling on the status of this     Callback number [phone]

## 2021-01-11 RX ORDER — MULTIVIT WITH MINERALS/LUTEIN
TABLET ORAL
Qty: 180 TABLET | Refills: 1 | Status: SHIPPED | OUTPATIENT
Start: 2021-01-11

## 2021-01-11 RX ORDER — FLUCONAZOLE 150 MG/1
TABLET ORAL
Qty: 1 TABLET | Refills: 0 | Status: SHIPPED | OUTPATIENT
Start: 2021-01-11 | End: 2021-01-12

## 2021-01-14 ENCOUNTER — TELEPHONE (OUTPATIENT)
Dept: FAMILY MEDICINE CLINIC | Facility: CLINIC | Age: 41
End: 2021-01-14

## 2021-01-14 NOTE — TELEPHONE ENCOUNTER
Leonardo Carrera from [address] [place] [address] [facility] called stating pt has an upcoming preop  Asking if you can fax his paper work when completed to [phone] along with a list of his medications   Thank you

## 2021-01-14 NOTE — TELEPHONE ENCOUNTER
SIGNATURES NEEDED FOR [facility] care aid (order#[id number])FORM RECEIVED VIA FAX  WILL BE PLACED IN YOUR BIN AT ASSIGNED DELIVERY TIMES

## 2021-01-15 ENCOUNTER — TELEPHONE (OUTPATIENT)
Dept: FAMILY MEDICINE CLINIC | Facility: CLINIC | Age: 41
End: 2021-01-15

## 2021-01-15 NOTE — TELEPHONE ENCOUNTER
SIGNATURES NEEDED FOR [facility] FORM RECEIVED VIA FAX  WILL BE PLACED IN YOUR BIN AT ASSIGNED DELIVERY TIMES        Thanks,

## 2021-01-21 DIAGNOSIS — L70.0 ACNE VULGARIS: ICD-10-CM

## 2021-01-22 RX ORDER — CLINDAMYCIN PHOSPHATE 10 MG/G
GEL TOPICAL
Qty: 60 G | Refills: 2 | Status: SHIPPED | OUTPATIENT
Start: 2021-01-22 | End: 2021-04-23

## 2021-01-25 ENCOUNTER — TRANSITIONAL CARE MANAGEMENT (OUTPATIENT)
Dept: FAMILY MEDICINE CLINIC | Facility: CLINIC | Age: 41
End: 2021-01-25

## 2021-01-26 ENCOUNTER — TELEPHONE (OUTPATIENT)
Dept: FAMILY MEDICINE CLINIC | Facility: CLINIC | Age: 41
End: 2021-01-26

## 2021-01-26 NOTE — TELEPHONE ENCOUNTER
[address] [place] [address] [place] FORM RECEIVED VIA FAX  WILL BE PLACED IN YOUR BIN AT ASSIGNED DELIVERY TIMES        [facility]#[id number]

## 2021-01-28 ENCOUNTER — OFFICE VISIT (OUTPATIENT)
Dept: WOUND CARE | Facility: HOSPITAL | Age: 41
End: 2021-01-28
Payer: MEDICARE

## 2021-01-28 ENCOUNTER — TELEPHONE (OUTPATIENT)
Dept: FAMILY MEDICINE CLINIC | Facility: CLINIC | Age: 41
End: 2021-01-28

## 2021-01-28 VITALS
SYSTOLIC BLOOD PRESSURE: 106 MMHG | DIASTOLIC BLOOD PRESSURE: 68 MMHG | RESPIRATION RATE: 18 BRPM | HEART RATE: 88 BPM | TEMPERATURE: 98.6 F

## 2021-01-28 DIAGNOSIS — L89.220 PRESSURE ULCER OF LEFT HIP, UNSTAGEABLE (HCC): ICD-10-CM

## 2021-01-28 DIAGNOSIS — L89.324 PRESSURE INJURY OF LEFT ISCHIUM, STAGE 4 (HCC): ICD-10-CM

## 2021-01-28 DIAGNOSIS — L89.894 PRESSURE ULCER OF OTHER SITE, STAGE 4 (HCC): ICD-10-CM

## 2021-01-28 DIAGNOSIS — L89.154 PRESSURE INJURY OF SACRAL REGION, STAGE 4 (HCC): Primary | ICD-10-CM

## 2021-01-28 PROCEDURE — 11045 DBRDMT SUBQ TISS EACH ADDL: CPT | Performed by: FAMILY MEDICINE

## 2021-01-28 PROCEDURE — 99213 OFFICE O/P EST LOW 20 MIN: CPT | Performed by: FAMILY MEDICINE

## 2021-01-28 PROCEDURE — 11042 DBRDMT SUBQ TIS 1ST 20SQCM/<: CPT | Performed by: FAMILY MEDICINE

## 2021-01-28 PROCEDURE — 99214 OFFICE O/P EST MOD 30 MIN: CPT | Performed by: FAMILY MEDICINE

## 2021-01-28 RX ORDER — LIDOCAINE HYDROCHLORIDE 40 MG/ML
5 SOLUTION TOPICAL ONCE
Status: COMPLETED | OUTPATIENT
Start: 2021-01-28 | End: 2021-01-28

## 2021-01-28 RX ADMIN — LIDOCAINE HYDROCHLORIDE 5 ML: 40 SOLUTION TOPICAL at 15:33

## 2021-01-28 NOTE — PROGRESS NOTES
Patient ID: Santosh Jimenez  is a 36 y o  male Date of Birth 1980       Chief Complaint   Patient presents with    Follow Up Wound Care Visit     Follow up for perineum, left and right ischium, and sacral wounds  Allergies:  Cefepime, Ciprofloxacin hcl, Cymbalta [duloxetine hcl], Lac bovis, Lactose, Lyrica [pregabalin], Penicillins, Polymyxin b, and Rosuvastatin    Diagnosis:   Diagnosis ICD-10-CM Associated Orders   1  Pressure injury of sacral region, stage 4 ([facility])  L89 154 Wound cleansing and dressings     lidocaine (XYLOCAINE) 4 % topical solution 5 mL     Debridement   2  Pressure injury of left ischium, stage 4 ([facility])  L89 324 Wound cleansing and dressings     lidocaine (XYLOCAINE) 4 % topical solution 5 mL   3  Pressure ulcer of other site, stage 4 ([facility])  L89 894 Wound cleansing and dressings     lidocaine (XYLOCAINE) 4 % topical solution 5 mL     Debridement   4  Pressure ulcer of left hip, unstageable ([facility])  L89 220 Wound cleansing and dressings         Assessment & Plan:  Stage IV pressure injury of the sacrum which was surgically debrided today  Slight improvement  Stage IV pressure injury of the perineum surgically debrided today  Again, slight improvement  New unstageable pressure injury of the left hip after recent hospitalization  See orders  Will use Betadine on hypergranulation tissue of the left ischium and also on the unstageable pressure injury of the left hip  Continue with Dakin's packing on the other pressure injuries  Continued offload as much as possible  We will see patient again in one month  Subjective:   10/22/20:  Followup multiple pressure injuries to the buttocks and sacrum  He was hospitalized for back problems recently  Continues on hemodialysis  The patient states that his albumin has improved so that he may be able to get flap surgery  However when checking his most recent albumin was only 1 8  His total protein is elevated      11/19/20:  Followup multiple stage IV pressure or injuries to the buttocks and sacrum  Continues on hemodialysis  He is scheduled for plastic surgery for cyst on his forehead  Plastic surgery still will not do any flaps to his sacrum or buttocks because his albumin remains low  He has no other complaints  No fever, chills or cough  12/31/20: Followup multiple stage IV pressure injuries of the buttocks and sacrum  She notes some increased drainage and slight more odor  Never had his plastic surgery appointment for the cyst on his forehead  Denies any fever or chills  1/28/21:  Followup multiple stage IV pressure injuries of the buttocks, sacrum and perineum  Unfortunately, the patient was hospitalized for sepsis and back pain  Was found to have a fracture L3  He was given a back brace but only wears at home  He states that really does not give him very much relief  Unfortunately also while in the hospital, he developed a new pressure injury of his left hip          The following portions of the patient's history were reviewed and updated as appropriate:   Patient Active Problem List   Diagnosis    [facility]leg[facility]    Atrial fibrillation [facility]    Chronic suprapubic catheter ([facility]    Colostomy care ([facility]    OAB (overactive bladder)    S/P unilateral BKA (below knee amputation) ([facility] [address] [facility]    Sebaceous cyst    Tobacco abuse    Type 1 diabetes mellitus with chronic kidney disease on chronic dialysis ([facility] [address] [facility]    Ulcer of sacral region, stage 4 ([address] )    Anemia    Chronic indwelling Ortega catheter    Wound healing, delayed    Iron deficiency anemia    Decubitus ulcers    HTN (hypertension), benign    Neurogenic bladder    GERD (gastroesophageal reflux disease)    Chronic pain    Stage 5 chronic kidney disease on chronic dialysis (HCC)    SOB (shortness of breath)    Penile abscess    Asymptomatic bacteriuria    History of Clostridium difficile infection    Urinary retention    Delirium    Noncompliance by refusing intervention or support    Dialysis patient ([place] [address] )    Insulin long-term use ([place] [address] )    Wheelchair dependent    Recurrent Clostridioides difficile diarrhea    Bipolar depression ([place] [address] )    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[place] [address] )    Seizure ([place] [address] )    Pressure ulcer of sacral region, stage 4 ([place] [address] )     Past Medical History:   Diagnosis Date    Ambulatory dysfunction     Anemia, iron deficiency     transfusion requiring    Atrial fibrillation ([place] [address] )     AVM (arteriovenous malformation) of duodenum, 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    [facility] deep vein thrombosis (DVT) [facility])     Chronic pain     Chronic suprapubic catheter ([place] [address] )     Clostridium difficile infection 08/11/2016    also positive 9/2016, 5/29/2017, 8/15/2017  S/P fecal transplant    Colostomy on examination ([place] [address] )     GERD (gastroesophageal reflux disease)     Memory impairment 2011    s/p diabetic coma    Neurogenic bladder     OAB (overactive bladder)     Paraplegia (HCC)     T3 transverse myelitis vs spinal stoke ([facility]); 2012 extensive epidural abscess C7=> conus 2nd extension from deep parspinal abscess L4-S2/sacral decubitus; cord atrophy/myelomalcia T3=>conus     S/P unilateral BKA (below knee amputation) ([place] [address] )     Right    Sebaceous cyst removed in 2017    Tobacco abuse     Wounds, multiple     pressure ulcers with delayed healing     Past Surgical History:   Procedure Laterality Date    BELOW KNEE LEG AMPUTATION Right 2009    COLONOSCOPY N/A 3/27/2017    Procedure: COLONOSCOPY;  Surgeon: Francine Heard MD;  Location: [facility]; Service:     COLONOSCOPY N/A 3/29/2017    Procedure: COLONOSCOPY;  Surgeon: Francine Heard MD;  [facility]; Service:     COLONOSCOPY N/A 6/15/2017    Procedure: COLONOSCOPY with FMT;  Surgeon: Margot Blackburn MD;  [facility]: [facility]; Service: Gastroenterology    ESOPHAGOGASTRODUODENOSCOPY N/A 3/22/2017    Procedure: ESOPHAGOGASTRODUODENOSCOPY (EGD);   Surgeon: Poornima Herzog DO;  Location: [facility]; Service:      Family History   Problem Relation Age of Onset    Hyperlipidemia Mother     Hypertension Mother     Leukemia Brother     Diabetes Paternal Grandfather      Social History     Socioeconomic History    Marital status: Single     Spouse name: None    Number of children: None    Years of education: None    Highest education level: None   Occupational History    None   Social Needs    Financial resource strain: None    Food insecurity     Worry: None     Inability: None    Transportation needs     Medical: None     Non-medical: None   Tobacco Use    Smoking status: Current Every Day Smoker     Types: Cigars    Smokeless tobacco: Never Used    Tobacco comment: about 2 cigars/day   Substance and Sexual Activity    Alcohol use: Not Currently     Frequency: Monthly or less     Drinks per session: 1 or 2     Binge frequency: Never     Comment: 1 cup of wine every 3-4 months    Drug use: Not Currently     Types: Marijuana     Comment: rarely; once every 1-2 years    Sexual activity: None   Lifestyle    Physical activity     Days per week: None     Minutes per session: None    Stress: None   Relationships    Social connections     Talks on phone: None     Gets together: None     Attends Zoroastrian service: None     Active member of club or organization: None     Attends meetings of clubs or organizations: None     Relationship status: None    Intimate partner violence     Fear of current or ex partner: None     Emotionally abused: None     Physically abused: None     Forced sexual activity: None   Other Topics Concern    None   Social History Narrative    None       Current Outpatient Medications:     Alcohol Swabs (ALCOHOL PADS) 70 % PADS, by Does not apply route 5 (five) times a day, Disp: 300 each, Rfl: 5    amLODIPine (NORVASC) 10 mg tablet, TAKE 1 TABLET BY MOUTH EVERY DAY, Disp: 90 tablet, Rfl: 3    ammonium lactate (LAC-HYDRIN) 12 % cream, Apply topically, Disp: , Rfl:    ammonium lactate (LAC-HYDRIN) 12 % cream, , Disp: , Rfl:     apixaban (ELIQUIS) 5 mg, Take 5 mg by mouth 2 (two) times a day, Disp: , Rfl:     ascorbic acid (VITAMIN C) 250 MG tablet, Take 500 mg by mouth, Disp: , Rfl:     ascorbic acid (VITAMIN C) 250 mg tablet, TAKE 2 TABLETS (500 MG TOTAL) BY MOUTH DAILY, Disp: 180 tablet, Rfl: 1    atorvastatin (LIPITOR) 20 mg tablet, TAKE 1 TABLET BY MOUTH EVERYDAY AT BEDTIME, Disp: 90 tablet, Rfl: 1    B Complex-C-Folic Acid (Super B-Complex/Vit C/FA) TABS, , Disp: , Rfl:     baclofen 20 mg tablet, Take 20 mg by mouth 2 (two) times a day , Disp: , Rfl:     [employer] MICROLET LANCETS lancets, Use as instructed to check blood sugar 4 times a day Dx e10 29, Disp: 200 each, Rfl: 5    benzonatate (TESSALON PERLES) 100 mg capsule, TAKE 1 CAPSULE BY MOUTH THREE TIMES A DAY AS NEEDED FOR COUGH, Disp: 30 capsule, Rfl: 0    Blood Glucose Monitoring Suppl ([employer] CONTOUR NEXT USB MONITOR) w/Device KIT, Testing 4 times a day, Disp: 1 kit, Rfl: 0    calcitriol (ROCALTROL) 0 5 MCG capsule, Take 2 mcg by mouth, Disp: , Rfl:     calcium acetate (PHOSLO) capsule, TAKE 1 CAPSULE BY MOUTH THREE TIMES DAILY WITH MEALS, Disp: , Rfl: 3    carvedilol (COREG) 25 mg tablet, TAKE 1 TABLET BY MOUTH TWICE A DAY, Disp: 180 tablet, Rfl: 1    carvedilol (COREG) 6 25 mg tablet, Take 6 25 mg by mouth, Disp: , Rfl:     cetirizine (ZyrTEC) 10 mg tablet, Take 10 mg by mouth daily, Disp: , Rfl:     cetirizine (ZyrTEC) 5 MG tablet, TAKE 1 TABLET BY MOUTH EVERY DAY, Disp: 90 tablet, Rfl: 1    Cholecalciferol (VITAMIN D-3) 1000 units CAPS, Take 2 capsules by mouth daily (Patient not taking: Reported on 8/11/2020), Disp: 30 capsule, Rfl: 0    clindamycin (CLINDAGEL) 1 % gel, APPLY TO AFFECTED AREA TWICE A DAY, Disp: 60 g, Rfl: 2    [employer] ACETAMINOPHEN EX  MG tablet, TAKE 2 TABLETS BY MOUTH EVERY 8 HOURS AS NEEDED FOR MILD OR MODERATE PAIN (PAIN SCORE 1 6)  , Disp: , Rfl:     [employer] Vitamin B-12 1000 MCG tablet, TAKE 1 TABLET BY MOUTH EVERY DAY, Disp: 30 tablet, Rfl: 5    cyclobenzaprine (FLEXERIL) 10 mg tablet, Take 10 mg by mouth 3 (three) times a day as needed, Disp: , Rfl: 0    cyclobenzaprine (FLEXERIL) 5 mg tablet, , Disp: , Rfl:     dicyclomine (BENTYL) 10 mg capsule, TAKE 1 CAPSULE (10 MG TOTAL) BY MOUTH 3 (THREE) TIMES A DAY BEFORE MEALS, Disp: 270 capsule, Rfl: 1    Disposable Gloves MISC, Use 7 times a day, 4 boxes of gloves XL Dx type 1 DM, paraplegia, Disp: 4 each, Rfl: 11    doxazosin (CARDURA) 2 mg tablet, Take 1 tablet (2 mg total) by mouth every evening, Disp: 30 tablet, Rfl: 2    Eliquis 5 MG, Take 5 mg by mouth 2 (two) times a day, Disp: , Rfl:     epoetin kaushik (EPOGEN,PROCRIT) 20,000 units/mL, Inject 10,000 Units under the skin, Disp: , Rfl:     epoetin kaushik (EPOGEN,PROCRIT) 20,000 units/mL, Inject 5,000 Units under the skin, Disp: , Rfl:     famotidine (PEPCID) 20 mg tablet, TAKE 1 TABLET BY MOUTH DAILY AT BEDTIME, Disp: 90 tablet, Rfl: 1    ferrous sulfate 325 (65 Fe) mg tablet, TAKE 1 TABLET BY MOUTH 3 TIMES A DAY, Disp: 90 tablet, Rfl: 3    gabapentin (NEURONTIN) 300 mg capsule, , Disp: , Rfl:     glucose blood (SUSAN CONTOUR TEST) test strip, Use as instructed to test blood sugar 4 times a day Dx e10 29, Disp: 200 each, Rfl: 3    Heparin Sodium, Porcine, (heparin, porcine,) 1,000 units/mL, Heparin Sodium (Porcine) 1,000 Units/mL Catheter Lock Venous, Disp: , Rfl:     Heparin Sodium, Porcine, (heparin, porcine,) 1,000 units/mL, Heparin Sodium (Porcine) 1,000 Units/mL Systemic, Disp: , Rfl:     hydrALAZINE (APRESOLINE) 25 mg tablet, Take 1 tablet (25 mg total) by mouth 3 (three) times a day, Disp: 90 tablet, Rfl: 5    hydrOXYzine HCL (ATARAX) 25 mg tablet, Take 1 tablet (25 mg total) by mouth 2 (two) times a day as needed for itching, Disp: 90 tablet, Rfl: 2    Incontinence Supply Disposable (INCONTINENCE BRIEF LARGE) MISC, by Does not apply route 6 (six) times a day Size xl, Disp: 180 each, Rfl: 11    Incontinence Supply Disposable (UNDERGARMENT) MISC, Use 6 a day, 4 boxes, xl Dx R51, paraplegia, Disp: 4 each, Rfl: 11    Incontinence Supply Disposable (UNDERPADS) MISC, Use 2 a day, Disp: 60 each, Rfl: 11    insulin detemir (LEVEMIR) 100 units/mL subcutaneous injection, Inject 6 5 Units under the skin 2 (two) times a day , Disp: , Rfl:     ketoconazole (NIZORAL) 2 % cream, APPLY SMALL AMOUNT TO EDGES OF MOUTH TWICE DAILY  DO NOT SWALLOW, Disp: 15 g, Rfl: 1    Ketostix strip, USE WITH HYPERGLYCEMIA E10 65 **NOT COVERED**, Disp: , Rfl:     LEVEMIR FLEXTOUCH 100 units/mL injection pen, Inject 14 Units under the skin 2 (two) times a day, Disp: 15 pen, Rfl: 3    levETIRAcetam (KEPPRA) 250 mg tablet, TAKE 1 TABLET (250 MG TOTAL) BY MOUTH 3 (THREE) TIMES A WEEK (MW) AT 1200 , Disp: , Rfl:     lidocaine (XYLOCAINE) 5 % ointment, Apply topically as needed for mild pain, Disp: 35 44 g, Rfl: 0    Lidocaine HCl 4 % LIQD, Apply 1 application topically 3 (three) times a day as needed (pain), Disp: 73 mL, Rfl: 5    Lokelma 10 g PACK, , Disp: , Rfl:     Melatonin ER 3 MG TBCR, Take 6 mg by mouth, Disp: , Rfl:     Methoxy PEG-Epoetin Beta (MIRCERA IJ), 75 mcg Once every 2 weeks, Disp: , Rfl:     Misc   Devices [facility]) MISC, by Does not apply route daily Wheelchair ramp, dx g82 20, Disp: 1 each, Rfl: 0    Multiple Vitamin (Daily-Enriqueta) TABS, TAKE 1 TABLET BY MOUTH EVERY DAY, Disp: 90 tablet, Rfl: 2    Narcan 4 MG/0 1ML nasal spray, , Disp: , Rfl:     NOVOLOG 100 UNIT/ML injection, Inject 5 Units under the skin 3 (three) times a day with meals , Disp: , Rfl:     ofloxacin (FLOXIN) 0 3 % otic solution, Administer 10 drops to the right ear daily, Disp: 10 mL, Rfl: 0    ondansetron (ZOFRAN-ODT) 4 mg disintegrating tablet, DISSOLVE 1 TABLET IN MOUTH EVERY 8 HOURS AS NEEDED FOR NAUSEA AND VOMITING, Disp: 30 tablet, Rfl: 1    oxybutynin (DITROPAN) 5 mg tablet, TAKE 3 TABLETS BY MOUTH EVERY DAY, Disp: 270 tablet, Rfl: 1    oxyCODONE (ROXICODONE) 10 MG TABS, Take 10 mg by mouth 3 (three) times a day, Disp: , Rfl: 0    pantoprazole (PROTONIX) 40 mg tablet, TAKE 1 TABLET BY MOUTH EVERY DAY, Disp: 90 tablet, Rfl: 0    phenytoin extended (DILANTIN) 200 MG ER capsule, Take 200 mg by mouth 2 (two) times a day, Disp: , Rfl:     risperiDONE (RisperDAL) 0 5 mg tablet, TAKE 1 TABLET (0 5 MG TOTAL) BY MOUTH 2 (TWO) TIMES A DAY, Disp: 180 tablet, Rfl: 1    sodium hypochlorite (DAKIN'S HALF-STRENGTH) external solution, USE AS DIRECTED TWICE DAILY, Disp: 473 mL, Rfl: 2    Sodium Zirconium Cyclosilicate 10 g PACK, Take 10 g by mouth daily, Disp: , Rfl:     SUPER B COMPLEX & C TABS, TAKE 1 CAPSULE BY MOUTH ONCE FOR 1 DOSE AS DIRECTED, Disp: 90 tablet, Rfl: 2    warfarin (COUMADIN) 5 mg tablet, TAKE 1 5 TABLETS (7 5 MG TOTAL) BY MOUTH DAILY, Disp: 135 tablet, Rfl: 1    Zinc Sulfate 220 (50 Zn) MG TABS, TAKE 1 TABLET BY MOUTH EVERY DAY, Disp: 90 each, Rfl: 1    Current Facility-Administered Medications:     lidocaine (XYLOCAINE) 4 % topical solution 5 mL, 5 mL, Topical, Once, Maryam Salinas MD    Review of Systems   Constitutional: Negative for activity change, appetite change (Improving), chills, fatigue, fever and unexpected weight change  HENT: Negative for congestion, hearing loss and postnasal drip  Eyes: Negative for visual disturbance  Respiratory: Negative for cough and shortness of breath  Cardiovascular: Negative for chest pain and leg swelling  Gastrointestinal: Negative for abdominal pain, constipation (Slight), diarrhea, nausea and vomiting  Genitourinary: Negative for dysuria and urgency  Indwelling Ortega catheter   Musculoskeletal: Positive for gait problem (Nonambulatory)  Skin: Positive for wound (Sacral left ischial and left hip)  Negative for rash  Cyst on right  cheek   Neurological: Positive for weakness  Hematological: Does not bruise/bleed easily  Psychiatric/Behavioral: Positive for dysphoric mood  Objective:  /68   Pulse 88   Temp 98 6 °F (37 °C)   Resp 18   Pain Score: 0-No pain     Physical Exam  Vitals signs and nursing note reviewed  Constitutional:       Appearance: Normal appearance  He is underweight  HENT:      Head: Normocephalic and atraumatic  Abdominal:      General: Abdomen is flat  Comments: The abdomen around the ostomy is slightly full and tender  Skin:     Findings: Wound (Both buttocks and sacrum) present  Comments: Sacral ulcer with biofilm and slightly less undermining  Left ischial ulcer also with biofilm and undermining  Fibrin in both ulcers  The right buttock ulcer remains closed  There is a new pressure injury of the left hip  It is completely covered by dry eschar and therefore unstageable  Neurological:      Mental Status: He is alert and oriented to person, place, and time  Psychiatric:         Mood and Affect: Affect normal          Cognition and Memory: Cognition normal                          Wound 01/28/21 Pressure Injury Hip Left;Lateral (Active)   Wound Image Images linked 01/28/21 1443   Wound Description Eschar 01/28/21 1445   Pressure Injury Stage Unstageable 01/28/21 1445   Irene-wound Assessment Dry; Intact 01/28/21 1445   Wound Length (cm) 3 1 cm 01/28/21 1445   Wound Width (cm) 3 cm 01/28/21 1445   Wound Depth (cm) 0 cm 01/28/21 1445   Wound Surface Area (cm^2) 9 3 cm^2 01/28/21 1445   Wound Volume (cm^3) 0 cm^3 01/28/21 1445   Calculated Wound Volume (cm^3) 0 cm^3 01/28/21 1445   Drainage Amount Scant 01/28/21 1445   Drainage Description Serosanguineous; Mendieta 01/28/21 1445   Non-staged Wound Description Full thickness 01/28/21 1445       Debridement   Wound Sacrum    [facility] Protocol:  Consent: Verbal consent obtained  Written consent obtained    Consent given by: patient  Time out: Immediately prior to procedure a "time out" was called to verify the correct patient, procedure, equipment, support staff and site/side marked as required  Patient identity confirmed: verbally with patient      Performed by: physician  Debridement type: surgical  Level of debridement: subcutaneous tissue  Pain control: lidocaine 4%  Post-debridement measurements  Length (cm): 6 2  Width (cm): 11  Depth (cm): 1 6  Percent debrided: 100%  Surface Area (cm^2): 68 2  Area debrided (cm^2): 68 2  Volume (cm^3): 109 12  Tissue and other material debrided: dermis and subcutaneous tissue  Devitalized tissue debrided: biofilm and fibrin  Instrument(s) utilized: curette  Bleeding: medium  Hemostasis obtained with: pressure  Procedural pain (0-10): insensate  Post-procedural pain: insensate   Response to treatment: procedure was tolerated well  Debridement Comments: Extensive biofilm and fibrin was debrided from the base of the ulcer and the undermined areas  Debridement   Wound Perineum    Universal Protocol:  Consent: Verbal consent obtained  Written consent obtained  Consent given by: patient  Time out: Immediately prior to procedure a "time out" was called to verify the correct patient, procedure, equipment, support staff and site/side marked as required    Patient identity confirmed: verbally with patient      Performed by: physician  Debridement type: surgical  Level of debridement: subcutaneous tissue  Pain control: lidocaine 4%  Post-debridement measurements  Length (cm): 4 6  Width (cm): 3 7  Depth (cm): 1 2  Percent debrided: 100%  Surface Area (cm^2): 17 02  Area debrided (cm^2): 17 02  Volume (cm^3): 20 42  Tissue and other material debrided: dermis and subcutaneous tissue  Devitalized tissue debrided: biofilm and fibrin  Instrument(s) utilized: curette  Bleeding: medium  Hemostasis obtained with: pressure  Procedural pain (0-10): insensate  Post-procedural pain: insensate   Response to treatment: procedure was tolerated well  Debridement Comments: Biofilm and fibrin was debrided from the entire base of the ulcer including the undermined areas well  Wound Instructions:  Orders Placed This Encounter   Procedures    Wound cleansing and dressings     Wound cleansing and dressings   Sacral and perineal wounds:  Wash your hands with soap and water  Remove old dressing, discard into plastic bag and place in trash  Cleanse the wound with saline or mild soap and water prior to applying a clean dressing  Do not use tissue or cotton balls  Do not scrub the wound  Pat dry using gauze  Shower no; do not get dressing wet  Apply dakins moistened packing to left ischium, perineum, and sacrum  Cover with 4x4 and ABD  Secure with Medfix tape  Change dressing daily and PRN for breakthrough drainage (visiting nurses to do twice per week and family in between)    Left ischial wound:  Cleanse the wound with saline or mild soap and water prior to applying a clean dressing    Paint wound bed with betadine solution and allow to dry  Cover with dry gauze and 1/2 ABD  Secure with medfix tape  Change dressing daily and PRN  For strike-through drainage     Left hip dressing  Paint with betadine and allow to dry  Cover with bordered foam   Change dressing 3 x per week    Continue clinitron bed at home and turn at least every 1-2 hours  To try and limit the amount of time spent sitting in the wheelchair  Keep pressure off the wounds as much as humanly possible     Continue to try and increase your protein to at least 3 servings or more if possible                                  Continue to try and keep blood sugars as low as possible  Stop Smoking     Continue visiting nurses twice per week for dressing changes    Follow up 4 weeks    Today's wound treatment note:  Wounds cleansed with NSS  All wounds redressed as ordered above     Standing Status:   Future     Standing Expiration Date:   1/28/2022    Debridement     This order was created via procedure documentation    Debridement     This order was created via procedure documentation         Gabrielle Romero MD, CHT, CWS       Portions of the record may have been created with voice recognition software  Occasional wrong word or "sound alike" substitutions may have occurred due to the inherent limitations of voice recognition software  Read the chart carefully and recognize, using context, where substitutions have occurred

## 2021-01-28 NOTE — PATIENT INSTRUCTIONS
Orders Placed This Encounter   Procedures    Wound cleansing and dressings     Wound cleansing and dressings   Sacral and perineal wounds:  Wash your hands with soap and water  Remove old dressing, discard into plastic bag and place in trash  Cleanse the wound with saline or mild soap and water prior to applying a clean dressing  Do not use tissue or cotton balls  Do not scrub the wound  Pat dry using gauze  Shower no; do not get dressing wet  Apply dakins moistened packing to left ischium, perineum, and sacrum  Cover with 4x4 and ABD  Secure with Medfix tape  Change dressing daily and PRN for breakthrough drainage (visiting nurses to do twice per week and family in between)    Left ischial wound:  Cleanse the wound with saline or mild soap and water prior to applying a clean dressing    Paint wound bed with betadine solution and allow to dry  Cover with dry gauze and 1/2 ABD  Secure with medfix tape  Change dressing daily and PRN  For strike-through drainage     Left hip dressing  Paint with betadine and allow to dry  Cover with bordered foam   Change dressing 3 x per week    Continue clinitron bed at home and turn at least every 1-2 hours  To try and limit the amount of time spent sitting in the wheelchair  Keep pressure off the wounds as much as humanly possible     Continue to try and increase your protein to at least 3 servings or more if possible                                  Continue to try and keep blood sugars as low as possible  Stop Smoking     Continue visiting nurses twice per week for dressing changes    Follow up 4 weeks    Today's wound treatment note:  Wounds cleansed with NSS  All wounds redressed as ordered above     Standing Status:   Future     Standing Expiration Date:   1/28/2022    Debridement     This order was created via procedure documentation    Debridement     This order was created via procedure documentation

## 2021-01-28 NOTE — TELEPHONE ENCOUNTER
SIGNATURES NEEDED FOR [facility]  FORM RECEIVED VIA FAX  WILL BE PLACED IN YOUR BIN AT ASSIGNED DELIVERY TIMES

## 2021-01-29 ENCOUNTER — TELEPHONE (OUTPATIENT)
Dept: FAMILY MEDICINE CLINIC | Facility: CLINIC | Age: 41
End: 2021-01-29

## 2021-01-29 DIAGNOSIS — I48.91 ATRIAL FIBRILLATION, UNSPECIFIED TYPE (HCC): Primary | Chronic | ICD-10-CM

## 2021-01-29 DIAGNOSIS — R56.9 SEIZURE (HCC): ICD-10-CM

## 2021-01-29 RX ORDER — PHENYTOIN SODIUM 200 MG/1
200 CAPSULE, EXTENDED RELEASE ORAL 2 TIMES DAILY
Qty: 60 CAPSULE | Refills: 2 | Status: SHIPPED | OUTPATIENT
Start: 2021-01-29 | End: 2021-03-29 | Stop reason: SDUPTHER

## 2021-01-29 NOTE — TELEPHONE ENCOUNTER
[facility]  RECEIVED VIA FAX  WILL BE PLACED IN YOUR BIN AT ASSIGNED DELIVERY TIMES        Order #[id number]

## 2021-01-29 NOTE — TELEPHONE ENCOUNTER
I sent them both in  I would recommend getting the Dilantin from Neurology in the future    Just because they may want to adjust his dose

## 2021-01-29 NOTE — TELEPHONE ENCOUNTER
Patient's mother called trying to get status on medication  She said he is completely out and he needs then really bad  She said when he was inpatient that's when both if these meds were prescribed for him

## 2021-02-04 ENCOUNTER — TELEPHONE (OUTPATIENT)
Dept: FAMILY MEDICINE CLINIC | Facility: CLINIC | Age: 41
End: 2021-02-04

## 2021-02-04 NOTE — TELEPHONE ENCOUNTER
SIGNATURES NEEDED FOR [facility]  FORM RECEIVED VIA FAX  WILL BE PLACED IN YOUR BIN AT ASSIGNED DELIVERY TIMES

## 2021-02-05 RX ORDER — SODIUM HYPOCHLORITE 0.5 %
SOLUTION, NON-ORAL MISCELLANEOUS
COMMUNITY
Start: 2021-01-04 | End: 2021-02-25

## 2021-02-05 RX ORDER — HYDROMORPHONE HYDROCHLORIDE 4 MG/1
1 TABLET ORAL EVERY 4 HOURS
COMMUNITY
Start: 2021-01-22 | End: 2021-02-25 | Stop reason: SDUPTHER

## 2021-02-05 RX ORDER — SENNOSIDES 8.6 MG
TABLET ORAL
COMMUNITY
Start: 2021-01-21 | End: 2021-07-15 | Stop reason: SDUPTHER

## 2021-02-05 RX ORDER — SENNA PLUS 8.6 MG/1
8.6 TABLET ORAL 2 TIMES DAILY
COMMUNITY
Start: 2021-01-21 | End: 2021-02-25 | Stop reason: SDUPTHER

## 2021-02-05 RX ORDER — HYDROMORPHONE HYDROCHLORIDE 4 MG/1
TABLET ORAL
COMMUNITY
Start: 2021-01-25

## 2021-02-09 ENCOUNTER — CONSULT (OUTPATIENT)
Dept: FAMILY MEDICINE CLINIC | Facility: CLINIC | Age: 41
End: 2021-02-09

## 2021-02-09 ENCOUNTER — TELEPHONE (OUTPATIENT)
Dept: FAMILY MEDICINE CLINIC | Facility: CLINIC | Age: 41
End: 2021-02-09

## 2021-02-09 VITALS
SYSTOLIC BLOOD PRESSURE: 118 MMHG | OXYGEN SATURATION: 99 % | DIASTOLIC BLOOD PRESSURE: 72 MMHG | HEART RATE: 98 BPM | TEMPERATURE: 98.3 F

## 2021-02-09 DIAGNOSIS — K02.9 DENTAL CARIES: ICD-10-CM

## 2021-02-09 DIAGNOSIS — Z01.818 PREOP EXAM FOR INTERNAL MEDICINE: Primary | ICD-10-CM

## 2021-02-09 DIAGNOSIS — E10.22 TYPE 1 DIABETES MELLITUS WITH CHRONIC KIDNEY DISEASE ON CHRONIC DIALYSIS (HCC): ICD-10-CM

## 2021-02-09 DIAGNOSIS — B37.0 THRUSH: ICD-10-CM

## 2021-02-09 DIAGNOSIS — N18.6 TYPE 1 DIABETES MELLITUS WITH CHRONIC KIDNEY DISEASE ON CHRONIC DIALYSIS (HCC): ICD-10-CM

## 2021-02-09 DIAGNOSIS — Z99.2 TYPE 1 DIABETES MELLITUS WITH CHRONIC KIDNEY DISEASE ON CHRONIC DIALYSIS (HCC): ICD-10-CM

## 2021-02-09 DIAGNOSIS — R20.2 NOTALGIA PARESTHETICA: ICD-10-CM

## 2021-02-09 PROCEDURE — 99214 OFFICE O/P EST MOD 30 MIN: CPT | Performed by: PHYSICIAN ASSISTANT

## 2021-02-09 NOTE — TELEPHONE ENCOUNTER
SIGNATURES NEEDED FOR [facility]ED [facility]ILY [facility] FORM RECEIVED VIA FAX  WILL BE PLACED IN YOUR BIN AT ASSIGNED DELIVERY TIMES    *RESUMPTION OF CARE ORDERS

## 2021-02-09 NOTE — PROGRESS NOTES
Assessment/Plan:    Dental caries  Cleared for surgery  Can stop eliquis 2 days prior    Type 1 diabetes mellitus with chronic kidney disease on chronic dialysis [facility])    Lab Results   Component Value Date    HGBA1C 8 0 (H) 01/10/2021   Following with endo  Blood sugar have been improving and he will be getting insulin pump in the near future         Problem List Items Addressed This Visit        Digestive    Dental caries     Cleared for surgery  Can stop eliquis 2 days prior            Endocrine    Type 1 diabetes mellitus with chronic kidney disease on chronic dialysis [facility])       Lab Results   Component Value Date    HGBA1C 8 0 (H) 01/10/2021   Following with endo  Blood sugar have been improving and he will be getting insulin pump in the near future         Relevant Medications    glucose 4-6 GM-MG      Other Visit Diagnoses     Preop exam for internal medicine    -  Primary    Thrush        Relevant Medications    nystatin (MYCOSTATIN) 500,000 units/5 mL suspension    Notalgia paresthetica        Relevant Medications    Capsaicin 0 033 % CREA            Subjective:      Patient ID: Génesis Hawkins  is a 36 y o  male  HPI  Pre-Op Visit (Brief): The patient is being seen for a preoperative visit  The procedure is dental extraction  with Dr Cesar Diego  The indication for surgery is dental caries  Surgery is on 3/8/2021  He does also need clearance for Dr Kwan Barrera to have 2 large epidermal cysts removed on his face  Surgical Risk Assessment:   Prior Anesthesia: Yes   Prior adverse reaction to general anesthesia:No      Pertinent Past Medical History  Neck osteoarthrosis: No  Seizure disorder:Yes   Currently medicated and controlled  Last one about 5-6 months ago  Asthma:No  Angina:No  Arrhythmia:Yes A  Fib  Rate controlled and anticoagulated  CAD:No  CHF:Yes   Liver disease:No  Coagulation delay:No  Hypercoagulable state:Yes   pulmonary embolism:No  DVT:No  Use anticoagulants:Yes   Diabetes: Yes Insulin use:Yes   Thyroid disease:No  TMJ osteoarthrosis:No  Wear dentures:No  CVA:No  COPD:No  BROOK:No  Renal disease:Yes CKD stage V on dialysis  Low serum albumin:Yes   Obesity:No    Exercise Capacity  Are you able to walk two flights of stairs::No, wheelchair bound  Are you able to walk four blocks without symptoms:No, w/c bound    Lifestyle Factors  Alcohol use:No  Tobacco use:Yes   Illegal drug use:No    Symptoms  Easy bleeding:No  Easy bruising:No  Frequent nosebleeds:No  Chest pain:No  Cough:No  Dyspnea:No  Edema:No  Palpitations:No  Wheezing:No    Pertinent Family History:  Any family members with the following problems:  Rx to anesthesia:No  Bleeding problems:No  Sudden cardiac deaths:No        The following portions of the patient's history were reviewed and updated as appropriate:   He  has a past medical history of Ambulatory dysfunction, Anemia, iron deficiency, Atrial fibrillation ([place] [address] ), AVM (arteriovenous malformation) of duodenum, acquired, Chronic deep vein thrombosis (DVT) ([place] [address] ), Chronic pain, Chronic suprapubic catheter ([place] [address] ), Clostridium difficile infection (08/11/2016), Colostomy on examination ([place] [address] ), GERD (gastroesophageal reflux disease), Memory impairment (2011), Neurogenic bladder, OAB (overactive bladder), Paraplegia ([facility] ), S/P unilateral BKA (below knee amputation) ([facility] ), Sebaceous cyst (removed in 2017), Tobacco abuse, and Wounds, multiple    He   Patient Active Problem List    Diagnosis Date Noted    Chronic diastolic heart failure ([place] [address] ) 02/10/2021    Dental caries 02/10/2021    [facility] ulcer [facility] sacral region, [facility] 4 ([facility] ) 08/13/2020    Bipolar depression ([facility] ) 02/27/2020    Seizure ([facility] ) 01/20/2020    Recurrent Clostridioides difficile diarrhea 11/21/2019    Wheelchair dependent 08/07/2019    Dialysis patient ([facility] ) 05/08/2019    Insulin long-term use ([facility] ) 05/08/2019    Chronic narcotic dependence ([place] [address] ) 02/07/2018    Noncompliance by refusing intervention or support 09/29/2017    Delirium 09/28/2017    Urinary retention 09/26/2017    Asymptomatic bacteriuria 09/25/2017    History of Clostridium difficile infection 09/25/2017    Stage 5 chronic kidney disease on chronic dialysis [facility] ) 09/18/2017    SOB (shortness of breath) 09/18/2017    Penile abscess 09/18/2017    AVM (arteriovenous malformation) of duodenum, acquired 08/30/2017    Iron deficiency anemia 07/03/2017    Decubitus ulcers 07/03/2017    HTN (hypertension), benign 07/03/2017    Neurogenic bladder 07/03/2017    GERD (gastroesophageal reflux disease) 07/03/2017    Chronic pain 07/03/2017    Wound healing, delayed 06/29/2017    Chronic indwelling Ortega catheter     Anemia 09/03/2016    Ulcer of sacral region, stage 4 ([facility] ) 09/01/2016    Paraplegia (HCC)     Atrial fibrillation (HCC)     Chronic suprapubic catheter ([facility] )     Colostomy care ([place] [address] )     OAB (overactive bladder)     S/P unilateral BKA (below knee amputation) ([facility] )     Sebaceous cyst     Tobacco abuse     Type 1 diabetes mellitus with chronic kidney disease on chronic dialysis ([facility] )     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[facility] ) 12/02/2014     He  has a past surgical history that includes Below knee leg amputation (Right, 2009); Colonoscopy (N/A, 3/27/2017); Esophagogastroduodenoscopy (N/A, 3/22/2017); Colonoscopy (N/A, 3/29/2017); and Colonoscopy (N/A, 6/15/2017)  His family history includes Diabetes in his paternal grandfather; Hyperlipidemia in his mother; Hypertension in his mother; Leukemia in his brother  He  reports that he has been smoking cigars  He has never used smokeless tobacco  He reports previous alcohol use  He reports previous drug use  Drug: Marijuana    Current Outpatient Medications   Medication Sig Dispense Refill    Alcohol Swabs (ALCOHOL PADS) 70 % PADS by Does not apply route 5 (five) times a day 300 each 5    amLODIPine (NORVASC) 10 mg tablet TAKE 1 TABLET BY MOUTH EVERY DAY 90 tablet 3    ammonium lactate (LAC-HYDRIN) 12 % cream Apply topically      ammonium lactate (LAC-HYDRIN) 12 % cream       apixaban (ELIQUIS) 5 mg Take 1 tablet (5 mg total) by mouth 2 (two) times a day 60 tablet 8    ascorbic acid (VITAMIN C) 250 MG tablet Take 500 mg by mouth      ascorbic acid (VITAMIN C) 250 mg tablet TAKE 2 TABLETS (500 MG TOTAL) BY MOUTH DAILY 180 tablet 1    atorvastatin (LIPITOR) 20 mg tablet TAKE 1 TABLET BY MOUTH EVERYDAY AT BEDTIME 90 tablet 1    B Complex-C-Folic Acid (Super B-Complex/Vit C/FA) TABS       baclofen 20 mg tablet Take 20 mg by mouth 2 (two) times a day   Phizzle MICROLET LANCETS lancets Use as instructed to check blood sugar 4 times a day  Dx e10 29 200 each 5    benzonatate (TESSALON PERLES) 100 mg capsule TAKE 1 CAPSULE BY MOUTH THREE TIMES A DAY AS NEEDED FOR COUGH 30 capsule 0    Blood Glucose Monitoring Suppl (Phizzle CONTOUR NEXT USB MONITOR) w/Device KIT Testing 4 times a day 1 kit 0    calcitriol (ROCALTROL) 0 5 MCG capsule Take 2 mcg by mouth      cetirizine (ZyrTEC) 10 mg tablet Take 10 mg by mouth daily      cetirizine (ZyrTEC) 5 MG tablet TAKE 1 TABLET BY MOUTH EVERY DAY 90 tablet 1    Cholecalciferol (VITAMIN D-3) 1000 units CAPS Take 2 capsules by mouth daily 30 capsule 0    clindamycin (CLINDAGEL) 1 % gel APPLY TO AFFECTED AREA TWICE A DAY 60 g 2    CVS ACETAMINOPHEN EX  MG tablet TAKE 2 TABLETS BY MOUTH EVERY 8 HOURS AS NEEDED FOR MILD OR MODERATE PAIN (PAIN SCORE 1 6)        CVS Vitamin B-12 1000 MCG tablet TAKE 1 TABLET BY MOUTH EVERY DAY 30 tablet 5    cyclobenzaprine (FLEXERIL) 10 mg tablet Take 10 mg by mouth 3 (three) times a day as needed  0    cyclobenzaprine (FLEXERIL) 5 mg tablet       dicyclomine (BENTYL) 10 mg capsule TAKE 1 CAPSULE (10 MG TOTAL) BY MOUTH 3 (THREE) TIMES A DAY BEFORE MEALS 270 capsule 1    Disposable Gloves MISC Use 7 times a day, 4 boxes of gloves XL  Dx type 1 DM, paraplegia 4 each 11    doxazosin (CARDURA) 2 mg tablet Take 1 tablet (2 mg total) by mouth every evening 30 tablet 2    Eliquis 5 MG Take 5 mg by mouth 2 (two) times a day      epoetin kaushik (EPOGEN,PROCRIT) 20,000 units/mL Inject 10,000 Units under the skin      epoetin kaushik (EPOGEN,PROCRIT) 20,000 units/mL Inject 5,000 Units under the skin      famotidine (PEPCID) 20 mg tablet TAKE 1 TABLET BY MOUTH DAILY AT BEDTIME 90 tablet 1    ferrous sulfate 325 (65 Fe) mg tablet TAKE 1 TABLET BY MOUTH 3 TIMES A DAY 90 tablet 3    gabapentin (NEURONTIN) 300 mg capsule       glucose blood (SUSAN CONTOUR TEST) test strip Use as instructed to test blood sugar 4 times a day  Dx e10 29 200 each 3    Heparin Sodium, Porcine, (heparin, porcine,) 1,000 units/mL Heparin Sodium (Porcine) 1,000 Units/mL Catheter Lock Venous      HYDROmorphone (Dilaudid) 4 mg tablet Take 1 tablet by mouth every 4 (four) hours      HYDROmorphone (DILAUDID) 4 mg tablet TAKE 1 TABLET BY MOUTH EVERY 4 HOURS AS NEEDED FOR MODERATE PAIN (PAIN SCORE 4 6)  MAX  24 MG/DAY      Incontinence Supply Disposable (INCONTINENCE BRIEF LARGE) MISC by Does not apply route 6 (six) times a day Size xl 180 each 11    Incontinence Supply Disposable (UNDERGARMENT) MISC Use 6 a day, 4 boxes, xl  Dx R51, paraplegia 4 each 11    Incontinence Supply Disposable (UNDERPADS) MISC Use 2 a day 60 each 11    insulin detemir (LEVEMIR) 100 units/mL subcutaneous injection Inject 6 5 Units under the skin 2 (two) times a day       ketoconazole (NIZORAL) 2 % cream APPLY SMALL AMOUNT TO EDGES OF MOUTH TWICE DAILY  DO NOT SWALLOW 15 g 1    Ketostix strip USE WITH HYPERGLYCEMIA E10 65 **NOT COVERED**      LEVEMIR FLEXTOUCH 100 units/mL injection pen Inject 14 Units under the skin 2 (two) times a day 15 pen 3    levETIRAcetam (KEPPRA) 250 mg tablet TAKE 1 TABLET (250 MG TOTAL) BY MOUTH 3 (THREE) TIMES A WEEK (MWF) AT 1200        lidocaine (XYLOCAINE) 5 % ointment Apply topically as needed for mild pain 35 44 g 0    Lidocaine HCl 4 % LIQD Apply 1 application topically 3 (three) times a day as needed (pain) 73 mL 5    Lokelma 10 g PACK       Melatonin ER 3 MG TBCR Take 6 mg by mouth      Methoxy PEG-Epoetin Beta (MIRCERA IJ) 75 mcg Once every 2 weeks      Misc  Devices [facility]) [address] [facility] by Does not apply route daily Wheelchair ramp, dx g82 20 1 each 0    Multiple Vitamin (Daily-Enriqueta) TABS TAKE 1 TABLET BY MOUTH EVERY DAY 90 tablet 2    Narcan 4 MG/0 1ML nasal spray       NOVOLOG 100 UNIT/ML injection Inject 5 Units under the skin 3 (three) times a day with meals       ofloxacin (FLOXIN) 0 3 % otic solution Administer 10 drops to the right ear daily 10 mL 0    ondansetron (ZOFRAN-ODT) 4 mg disintegrating tablet DISSOLVE 1 TABLET IN MOUTH EVERY 8 HOURS AS NEEDED FOR NAUSEA AND VOMITING 30 tablet 1    oxybutynin (DITROPAN) 5 mg tablet TAKE 3 TABLETS BY MOUTH EVERY  tablet 1    pantoprazole (PROTONIX) 40 mg tablet TAKE 1 TABLET BY MOUTH EVERY DAY 90 tablet 0    phenytoin extended (DILANTIN) 200 MG ER capsule Take 1 capsule (200 mg total) by mouth 2 (two) times a day 60 capsule 2    risperiDONE (RisperDAL) 0 5 mg tablet TAKE 1 TABLET (0 5 MG TOTAL) BY MOUTH 2 (TWO) TIMES A  tablet 1    senna (Senokot) 8 6 MG tablet Take 8 6 mg by mouth 2 (two) times a day      senna (SENOKOT) 8 6 mg TAKE 1 TABLET (8 6 MG TOTAL) BY MOUTH 2 (TWO) TIMES A DAY   sodium hypochlorite (DAKIN'S HALF-STRENGTH) external solution USE AS DIRECTED TWICE DAILY 473 mL 2    sodium hypochlorite 0 5 percent USE AS DIRECTED **NOT COVERED**      Sodium Zirconium Cyclosilicate 10 g PACK Take 10 g by mouth daily      SUPER B COMPLEX & C TABS TAKE 1 CAPSULE BY MOUTH ONCE FOR 1 DOSE AS DIRECTED 90 tablet 2    Zinc Sulfate 220 (50 Zn) MG TABS TAKE 1 TABLET BY MOUTH EVERY DAY 90 each 1    Capsaicin 0 033 % CREA 5 times a day for 1st week   Then 3 times a day for next 3 weeks 56 6 g 2    carvedilol (COREG) 25 mg tablet TAKE 1 TABLET BY MOUTH TWICE A  tablet 1  carvedilol (COREG) 6 25 mg tablet Take 6 25 mg by mouth      glucose 4-6 GM-MG Chew 2 tablets daily as needed for low blood sugar 10 tablet 1    nystatin (MYCOSTATIN) 500,000 units/5 mL suspension Apply 5 mL (500,000 Units total) to the mouth or throat 4 (four) times a day 473 mL 1     No current facility-administered medications for this visit  Current Outpatient Medications on File Prior to Visit   Medication Sig    Alcohol Swabs (ALCOHOL PADS) 70 % PADS by Does not apply route 5 (five) times a day    amLODIPine (NORVASC) 10 mg tablet TAKE 1 TABLET BY MOUTH EVERY DAY    ammonium lactate (LAC-HYDRIN) 12 % cream Apply topically    ammonium lactate (LAC-HYDRIN) 12 % cream     apixaban (ELIQUIS) 5 mg Take 1 tablet (5 mg total) by mouth 2 (two) times a day    ascorbic acid (VITAMIN C) 250 MG tablet Take 500 mg by mouth    ascorbic acid (VITAMIN C) 250 mg tablet TAKE 2 TABLETS (500 MG TOTAL) BY MOUTH DAILY    atorvastatin (LIPITOR) 20 mg tablet TAKE 1 TABLET BY MOUTH EVERYDAY AT BEDTIME    B Complex-C-Folic Acid (Super B-Complex/Vit C/FA) TABS     baclofen 20 mg tablet Take 20 mg by mouth 2 (two) times a day   ProNerve MICROLET LANCETS lancets Use as instructed to check blood sugar 4 times a day  Dx e10 29    benzonatate (TESSALON PERLES) 100 mg capsule TAKE 1 CAPSULE BY MOUTH THREE TIMES A DAY AS NEEDED FOR COUGH    Blood Glucose Monitoring Suppl (ProNerve CONTOUR NEXT USB MONITOR) w/Device KIT Testing 4 times a day    calcitriol (ROCALTROL) 0 5 MCG capsule Take 2 mcg by mouth    cetirizine (ZyrTEC) 10 mg tablet Take 10 mg by mouth daily    cetirizine (ZyrTEC) 5 MG tablet TAKE 1 TABLET BY MOUTH EVERY DAY    Cholecalciferol (VITAMIN D-3) 1000 units CAPS Take 2 capsules by mouth daily    clindamycin (CLINDAGEL) 1 % gel APPLY TO AFFECTED AREA TWICE A DAY    CVS ACETAMINOPHEN EX  MG tablet TAKE 2 TABLETS BY MOUTH EVERY 8 HOURS AS NEEDED FOR MILD OR MODERATE PAIN (PAIN SCORE 1 6)      CVS Vitamin B-12 1000 MCG tablet TAKE 1 TABLET BY MOUTH EVERY DAY    cyclobenzaprine (FLEXERIL) 10 mg tablet Take 10 mg by mouth 3 (three) times a day as needed    cyclobenzaprine (FLEXERIL) 5 mg tablet     dicyclomine (BENTYL) 10 mg capsule TAKE 1 CAPSULE (10 MG TOTAL) BY MOUTH 3 (THREE) TIMES A DAY BEFORE MEALS    Disposable Gloves MISC Use 7 times a day, 4 boxes of gloves XL  Dx type 1 DM, paraplegia    doxazosin (CARDURA) 2 mg tablet Take 1 tablet (2 mg total) by mouth every evening    Eliquis 5 MG Take 5 mg by mouth 2 (two) times a day    epoetin kaushik (EPOGEN,PROCRIT) 20,000 units/mL Inject 10,000 Units under the skin    epoetin kaushik (EPOGEN,PROCRIT) 20,000 units/mL Inject 5,000 Units under the skin    famotidine (PEPCID) 20 mg tablet TAKE 1 TABLET BY MOUTH DAILY AT BEDTIME    ferrous sulfate 325 (65 Fe) mg tablet TAKE 1 TABLET BY MOUTH 3 TIMES A DAY    gabapentin (NEURONTIN) 300 mg capsule     glucose blood (SUSAN CONTOUR TEST) test strip Use as instructed to test blood sugar 4 times a day  Dx e10 29    Heparin Sodium, Porcine, (heparin, porcine,) 1,000 units/mL Heparin Sodium (Porcine) 1,000 Units/mL Catheter Lock Venous    HYDROmorphone (Dilaudid) 4 mg tablet Take 1 tablet by mouth every 4 (four) hours    HYDROmorphone (DILAUDID) 4 mg tablet TAKE 1 TABLET BY MOUTH EVERY 4 HOURS AS NEEDED FOR MODERATE PAIN (PAIN SCORE 4 6)  MAX  24 MG/DAY    Incontinence Supply Disposable (INCONTINENCE BRIEF LARGE) MISC by Does not apply route 6 (six) times a day Size xl    Incontinence Supply Disposable (UNDERGARMENT) MISC Use 6 a day, 4 boxes, xl  Dx R51, paraplegia    Incontinence Supply Disposable (UNDERPADS) MISC Use 2 a day    insulin detemir (LEVEMIR) 100 units/mL subcutaneous injection Inject 6 5 Units under the skin 2 (two) times a day     ketoconazole (NIZORAL) 2 % cream APPLY SMALL AMOUNT TO EDGES OF MOUTH TWICE DAILY   DO NOT SWALLOW    Ketostix strip USE WITH HYPERGLYCEMIA E10 65 **NOT COVERED**  LEVEMIR FLEXTOUCH 100 units/mL injection pen Inject 14 Units under the skin 2 (two) times a day    levETIRAcetam (KEPPRA) 250 mg tablet TAKE 1 TABLET (250 MG TOTAL) BY MOUTH 3 (THREE) TIMES A WEEK (MWF) AT 1200   lidocaine (XYLOCAINE) 5 % ointment Apply topically as needed for mild pain    Lidocaine HCl 4 % LIQD Apply 1 application topically 3 (three) times a day as needed (pain)    Lokelma 10 g PACK     Melatonin ER 3 MG TBCR Take 6 mg by mouth    Methoxy PEG-Epoetin Beta (MIRCERA IJ) 75 mcg Once every 2 weeks    Misc  Devices [facility]) [address] [facility] by Does not apply route daily Wheelchair ramp, dx g82 20    Multiple Vitamin (Daily-Enriqueta) TABS TAKE 1 TABLET BY MOUTH EVERY DAY    Narcan 4 MG/0 1ML nasal spray     NOVOLOG 100 UNIT/ML injection Inject 5 Units under the skin 3 (three) times a day with meals     ofloxacin (FLOXIN) 0 3 % otic solution Administer 10 drops to the right ear daily    ondansetron (ZOFRAN-ODT) 4 mg disintegrating tablet DISSOLVE 1 TABLET IN MOUTH EVERY 8 HOURS AS NEEDED FOR NAUSEA AND VOMITING    oxybutynin (DITROPAN) 5 mg tablet TAKE 3 TABLETS BY MOUTH EVERY DAY    pantoprazole (PROTONIX) 40 mg tablet TAKE 1 TABLET BY MOUTH EVERY DAY    phenytoin extended (DILANTIN) 200 MG ER capsule Take 1 capsule (200 mg total) by mouth 2 (two) times a day    risperiDONE (RisperDAL) 0 5 mg tablet TAKE 1 TABLET (0 5 MG TOTAL) BY MOUTH 2 (TWO) TIMES A DAY    senna (Senokot) 8 6 MG tablet Take 8 6 mg by mouth 2 (two) times a day    senna (SENOKOT) 8 6 mg TAKE 1 TABLET (8 6 MG TOTAL) BY MOUTH 2 (TWO) TIMES A DAY      sodium hypochlorite (DAKIN'S HALF-STRENGTH) external solution USE AS DIRECTED TWICE DAILY    sodium hypochlorite 0 5 percent USE AS DIRECTED **NOT COVERED**    Sodium Zirconium Cyclosilicate 10 g PACK Take 10 g by mouth daily    SUPER B COMPLEX & C TABS TAKE 1 CAPSULE BY MOUTH ONCE FOR 1 DOSE AS DIRECTED    Zinc Sulfate 220 (50 Zn) MG TABS TAKE 1 TABLET BY MOUTH EVERY DAY    carvedilol (COREG) 25 mg tablet TAKE 1 TABLET BY MOUTH TWICE A DAY    carvedilol (COREG) 6 25 mg tablet Take 6 25 mg by mouth    [DISCONTINUED] CVS Vitamin C 250 MG tablet TAKE 2 TABLETS (500 MG TOTAL) BY MOUTH DAILY    [DISCONTINUED] fluconazole (DIFLUCAN) 150 mg tablet TAKE 1 TABLET BY MOUTH AS ONE DOSE     No current facility-administered medications on file prior to visit  He is allergic to cefepime; ciprofloxacin hcl; cymbalta [duloxetine hcl]; lac bovis; lactose; lyrica [pregabalin]; penicillins; polymyxin b; and rosuvastatin       Review of Systems   Constitutional: Negative for activity change, appetite change, fatigue, fever and unexpected weight change  HENT: Positive for dental problem  Negative for ear pain, hearing loss and sore throat  White coating on tongue   Eyes: Negative for visual disturbance  Respiratory: Negative for cough and wheezing  Cardiovascular: Negative for chest pain  Gastrointestinal: Negative for abdominal pain, constipation, diarrhea (BM are loose  no c  diff stools) and vomiting  Endocrine: Negative for polydipsia, polyphagia and polyuria  Genitourinary: Negative for difficulty urinating and dysuria  Musculoskeletal: Positive for back pain  Negative for arthralgias and myalgias  Skin: Positive for wound (chronic)  Negative for rash  Skin itching     Neurological: Negative for dizziness, seizures and headaches  Psychiatric/Behavioral: Negative for behavioral problems  Objective:      /72 (BP Location: Left arm, Patient Position: Sitting, Cuff Size: Standard)   Pulse 98   Temp 98 3 °F (36 8 °C) (Temporal)   SpO2 99%          Physical Exam  Vitals signs and nursing note reviewed  Constitutional:       General: He is not in acute distress  Appearance: He is well-developed  HENT:      Head: Normocephalic and atraumatic        Right Ear: External ear normal       Left Ear: External ear normal    Eyes: Conjunctiva/sclera: Conjunctivae normal    Neck:      Musculoskeletal: Normal range of motion and neck supple  Thyroid: No thyromegaly  Cardiovascular:      Rate and Rhythm: Normal rate and regular rhythm  Heart sounds: Normal heart sounds  No murmur  Pulmonary:      Effort: Pulmonary effort is normal  No respiratory distress  Breath sounds: Normal breath sounds  No wheezing  Lymphadenopathy:      Cervical: No cervical adenopathy  Skin:     Findings: Lesion (right cheek cyst and cyst midforehead) present  No rash (no rash on back)  Neurological:      Mental Status: He is alert and oriented to person, place, and time     Psychiatric:         Mood and Affect: Mood normal          Behavior: Behavior normal

## 2021-02-09 NOTE — PROGRESS NOTES
Assessment/Plan:    No problem-specific Assessment & Plan notes found for this encounter  Problem List Items Addressed This Visit     None            Subjective:      Patient ID: Neisha Borrero  is a 36 y o  male  HPI  TCM Call (since 1/9/2021)     Date and time call was made  1/25/2021 12:56 PM    Hospital care reviewed  Records reviewed    Patient was hospitialized at  [facility]        Date of Admission  01/09/21    Date of discharge  01/21/21    Diagnosis  Sepsis    Disposition  Home    Were the patients medications reviewed and updated  No    Current Symptoms  None      TCM Call (since 1/9/2021)     Post hospital issues  None    Should patient be enrolled in anticoag monitoring? No    Scheduled for follow up? Yes    Did you obtain your prescribed medications  Yes    Do you need help managing your prescriptions or medications  No    Is transportation to your appointment needed  No    I have advised the patient to call PCP with any new or worsening symptoms  Elvira Paniagua/MA    Counseling  Family          The following portions of the patient's history were reviewed and updated as appropriate:   He  has a past medical history of Ambulatory dysfunction, Anemia, iron deficiency, Atrial fibrillation ([facility] [address] ), AVM (arteriovenous malformation) of duodenum, acquired, Chronic deep vein thrombosis (DVT) ([facility] [address] ), Chronic pain, Chronic suprapubic catheter ([facility] [address] ), Clostridium difficile infection (08/11/2016), [facility] ), GERD (gastroesophageal reflux disease), Memory impairment (2011), Neurogenic bladder, OAB (overactive bladder), Paraplegia ([facility] [address] ), S/P unilateral BKA (below knee amputation) ([facility] [address] ), Sebaceous cyst (removed in 2017), Tobacco abuse, and Wounds, multiple    He   Patient Active Problem List    Diagnosis Date Noted    Pressure ulcer of sacral region, stage 4 ([address] ) 08/13/2020    Bipolar depression ([facility] [address] ) 02/27/2020    Seizure ([facility] [address] ) 01/20/2020    Recurrent Clostridioides difficile diarrhea 11/21/2019    Wheelchair dependent 08/07/2019    Dialysis patient ([place] [address] ) 05/08/2019    Insulin long-term use ([place] [address] ) 05/08/2019    Noncompliance by refusing intervention or support 09/29/2017    Delirium 09/28/2017    Urinary retention 09/26/2017    Asymptomatic bacteriuria 09/25/2017    History of Clostridium difficile infection 09/25/2017    Stage 5 chronic kidney disease on chronic dialysis ([place] [address] ) 09/18/2017    SOB (shortness of breath) 09/18/2017    Penile abscess 09/18/2017    Iron deficiency anemia 07/03/2017    Decubitus ulcers 07/03/2017    HTN (hypertension), benign 07/03/2017    Neurogenic bladder 07/03/2017    GERD (gastroesophageal reflux disease) 07/03/2017    Chronic pain 07/03/2017    Wound healing, delayed 06/29/2017    Chronic indwelling Ortega catheter     Anemia 09/03/2016    Ulcer of sacral region, stage 4 ([place] [address] ) 09/01/2016    Paraplegia (HCC)     Atrial fibrillation (HCC)     Chronic suprapubic catheter ([place] [address] )     Colostomy care ([place] [address] )     OAB (overactive bladder)     S/P unilateral BKA (below knee amputation) ([place] [address] )     Sebaceous cyst     Tobacco abuse     Type 1 diabetes mellitus with chronic kidney disease on chronic dialysis ([place] [address] )     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[place] [address] ) 12/02/2014     He  has a past surgical history that includes Below knee leg amputation (Right, 2009); Colonoscopy (N/A, 3/27/2017); Esophagogastroduodenoscopy (N/A, 3/22/2017); Colonoscopy (N/A, 3/29/2017); and Colonoscopy (N/A, 6/15/2017)  His family history includes Diabetes in his paternal grandfather; Hyperlipidemia in his mother; Hypertension in his mother; Leukemia in his brother  He  reports that he has been smoking cigars  He has never used smokeless tobacco  He reports previous alcohol use  He reports previous drug use  Drug: Marijuana    Current Outpatient Medications   Medication Sig Dispense Refill    Alcohol Swabs (ALCOHOL PADS) 70 % PADS by Does not apply route 5 (five) times a day 300 each 5    amLODIPine (NORVASC) 10 mg tablet TAKE 1 TABLET BY MOUTH EVERY DAY 90 tablet 3    ammonium lactate (LAC-HYDRIN) 12 % cream Apply topically      ammonium lactate (LAC-HYDRIN) 12 % cream       apixaban (ELIQUIS) 5 mg Take 1 tablet (5 mg total) by mouth 2 (two) times a day 60 tablet 8    ascorbic acid (VITAMIN C) 250 MG tablet Take 500 mg by mouth      ascorbic acid (VITAMIN C) 250 mg tablet TAKE 2 TABLETS (500 MG TOTAL) BY MOUTH DAILY 180 tablet 1    atorvastatin (LIPITOR) 20 mg tablet TAKE 1 TABLET BY MOUTH EVERYDAY AT BEDTIME 90 tablet 1    B Complex-C-Folic Acid (Super B-Complex/Vit C/FA) TABS       baclofen 20 mg tablet Take 20 mg by mouth 2 (two) times a day   Videoflot MICROLET LANCETS lancets Use as instructed to check blood sugar 4 times a day  Dx e10 29 200 each 5    benzonatate (TESSALON PERLES) 100 mg capsule TAKE 1 CAPSULE BY MOUTH THREE TIMES A DAY AS NEEDED FOR COUGH 30 capsule 0    Blood Glucose Monitoring Suppl (Videoflot CONTOUR NEXT USB MONITOR) w/Device KIT Testing 4 times a day 1 kit 0    calcitriol (ROCALTROL) 0 5 MCG capsule Take 2 mcg by mouth      calcium acetate (PHOSLO) capsule TAKE 1 CAPSULE BY MOUTH THREE TIMES DAILY WITH MEALS  3    cetirizine (ZyrTEC) 10 mg tablet Take 10 mg by mouth daily      cetirizine (ZyrTEC) 5 MG tablet TAKE 1 TABLET BY MOUTH EVERY DAY 90 tablet 1    Cholecalciferol (VITAMIN D-3) 1000 units CAPS Take 2 capsules by mouth daily 30 capsule 0    clindamycin (CLINDAGEL) 1 % gel APPLY TO AFFECTED AREA TWICE A DAY 60 g 2    CVS ACETAMINOPHEN EX  MG tablet TAKE 2 TABLETS BY MOUTH EVERY 8 HOURS AS NEEDED FOR MILD OR MODERATE PAIN (PAIN SCORE 1 6)        CVS Vitamin B-12 1000 MCG tablet TAKE 1 TABLET BY MOUTH EVERY DAY 30 tablet 5    cyclobenzaprine (FLEXERIL) 10 mg tablet Take 10 mg by mouth 3 (three) times a day as needed  0    cyclobenzaprine (FLEXERIL) 5 mg tablet       dicyclomine (BENTYL) 10 mg capsule TAKE 1 CAPSULE (10 MG TOTAL) BY MOUTH 3 (THREE) TIMES A DAY BEFORE MEALS 270 capsule 1    Disposable Gloves MISC Use 7 times a day, 4 boxes of gloves XL  Dx type 1 DM, paraplegia 4 each 11    doxazosin (CARDURA) 2 mg tablet Take 1 tablet (2 mg total) by mouth every evening 30 tablet 2    Eliquis 5 MG Take 5 mg by mouth 2 (two) times a day      epoetin kaushik (EPOGEN,PROCRIT) 20,000 units/mL Inject 10,000 Units under the skin      epoetin kaushik (EPOGEN,PROCRIT) 20,000 units/mL Inject 5,000 Units under the skin      famotidine (PEPCID) 20 mg tablet TAKE 1 TABLET BY MOUTH DAILY AT BEDTIME 90 tablet 1    ferrous sulfate 325 (65 Fe) mg tablet TAKE 1 TABLET BY MOUTH 3 TIMES A DAY 90 tablet 3    gabapentin (NEURONTIN) 300 mg capsule       glucose blood (SUSAN CONTOUR TEST) test strip Use as instructed to test blood sugar 4 times a day  Dx e10 29 200 each 3    Heparin Sodium, Porcine, (heparin, porcine,) 1,000 units/mL Heparin Sodium (Porcine) 1,000 Units/mL Catheter Lock Venous      hydrALAZINE (APRESOLINE) 25 mg tablet Take 1 tablet (25 mg total) by mouth 3 (three) times a day 90 tablet 5    HYDROmorphone (Dilaudid) 4 mg tablet Take 1 tablet by mouth every 4 (four) hours      HYDROmorphone (DILAUDID) 4 mg tablet TAKE 1 TABLET BY MOUTH EVERY 4 HOURS AS NEEDED FOR MODERATE PAIN (PAIN SCORE 4 6)   MAX  24 MG/DAY      hydrOXYzine HCL (ATARAX) 25 mg tablet Take 1 tablet (25 mg total) by mouth 2 (two) times a day as needed for itching 90 tablet 2    Incontinence Supply Disposable (INCONTINENCE BRIEF LARGE) MISC by Does not apply route 6 (six) times a day Size xl 180 each 11    Incontinence Supply Disposable (UNDERGARMENT) MISC Use 6 a day, 4 boxes, xl  Dx R51, paraplegia 4 each 11    Incontinence Supply Disposable (UNDERPADS) MISC Use 2 a day 60 each 11    insulin detemir (LEVEMIR) 100 units/mL subcutaneous injection Inject 6 5 Units under the skin 2 (two) times a day       ketoconazole (NIZORAL) 2 % cream APPLY SMALL AMOUNT TO EDGES OF MOUTH TWICE DAILY  DO NOT SWALLOW 15 g 1    Ketostix strip USE WITH HYPERGLYCEMIA E10 65 **NOT COVERED**      LEVEMIR FLEXTOUCH 100 units/mL injection pen Inject 14 Units under the skin 2 (two) times a day 15 pen 3    levETIRAcetam (KEPPRA) 250 mg tablet TAKE 1 TABLET (250 MG TOTAL) BY MOUTH 3 (THREE) TIMES A WEEK (MWF) AT 1200   lidocaine (XYLOCAINE) 5 % ointment Apply topically as needed for mild pain 35 44 g 0    Lidocaine HCl 4 % LIQD Apply 1 application topically 3 (three) times a day as needed (pain) 73 mL 5    Lokelma 10 g PACK       Melatonin ER 3 MG TBCR Take 6 mg by mouth      Methoxy PEG-Epoetin Beta (MIRCERA IJ) 75 mcg Once every 2 weeks      Misc  Devices [facility]) [facility] by Does not apply route daily Wheelchair ramp, dx g82 20 1 each 0    Multiple Vitamin (Daily-Enriqueta) TABS TAKE 1 TABLET BY MOUTH EVERY DAY 90 tablet 2    Narcan 4 MG/0 1ML nasal spray       NOVOLOG 100 UNIT/ML injection Inject 5 Units under the skin 3 (three) times a day with meals       ofloxacin (FLOXIN) 0 3 % otic solution Administer 10 drops to the right ear daily 10 mL 0    ondansetron (ZOFRAN-ODT) 4 mg disintegrating tablet DISSOLVE 1 TABLET IN MOUTH EVERY 8 HOURS AS NEEDED FOR NAUSEA AND VOMITING 30 tablet 1    oxybutynin (DITROPAN) 5 mg tablet TAKE 3 TABLETS BY MOUTH EVERY  tablet 1    pantoprazole (PROTONIX) 40 mg tablet TAKE 1 TABLET BY MOUTH EVERY DAY 90 tablet 0    phenytoin extended (DILANTIN) 200 MG ER capsule Take 1 capsule (200 mg total) by mouth 2 (two) times a day 60 capsule 2    risperiDONE (RisperDAL) 0 5 mg tablet TAKE 1 TABLET (0 5 MG TOTAL) BY MOUTH 2 (TWO) TIMES A  tablet 1    senna (Senokot) 8 6 MG tablet Take 8 6 mg by mouth 2 (two) times a day      senna (SENOKOT) 8 6 mg TAKE 1 TABLET (8 6 MG TOTAL) BY MOUTH 2 (TWO) TIMES A DAY        sodium hypochlorite (DAKIN'S HALF-STRENGTH) external solution USE AS DIRECTED TWICE DAILY 473 mL 2    sodium hypochlorite 0 5 percent USE AS DIRECTED **NOT COVERED**      Sodium Zirconium Cyclosilicate 10 g PACK Take 10 g by mouth daily      SUPER B COMPLEX & C TABS TAKE 1 CAPSULE BY MOUTH ONCE FOR 1 DOSE AS DIRECTED 90 tablet 2    Zinc Sulfate 220 (50 Zn) MG TABS TAKE 1 TABLET BY MOUTH EVERY DAY 90 each 1    carvedilol (COREG) 25 mg tablet TAKE 1 TABLET BY MOUTH TWICE A  tablet 1    carvedilol (COREG) 6 25 mg tablet Take 6 25 mg by mouth      oxyCODONE (ROXICODONE) 10 MG TABS Take 10 mg by mouth 3 (three) times a day  0     No current facility-administered medications for this visit  Current Outpatient Medications on File Prior to Visit   Medication Sig    Alcohol Swabs (ALCOHOL PADS) 70 % PADS by Does not apply route 5 (five) times a day    amLODIPine (NORVASC) 10 mg tablet TAKE 1 TABLET BY MOUTH EVERY DAY    ammonium lactate (LAC-HYDRIN) 12 % cream Apply topically    ammonium lactate (LAC-HYDRIN) 12 % cream     apixaban (ELIQUIS) 5 mg Take 1 tablet (5 mg total) by mouth 2 (two) times a day    ascorbic acid (VITAMIN C) 250 MG tablet Take 500 mg by mouth    ascorbic acid (VITAMIN C) 250 mg tablet TAKE 2 TABLETS (500 MG TOTAL) BY MOUTH DAILY    atorvastatin (LIPITOR) 20 mg tablet TAKE 1 TABLET BY MOUTH EVERYDAY AT BEDTIME    B Complex-C-Folic Acid (Super B-Complex/Vit C/FA) TABS     baclofen 20 mg tablet Take 20 mg by mouth 2 (two) times a day      Insight Plus MICROLET LANCETS lancets Use as instructed to check blood sugar 4 times a day  Dx e10 29    benzonatate (TESSALON PERLES) 100 mg capsule TAKE 1 CAPSULE BY MOUTH THREE TIMES A DAY AS NEEDED FOR COUGH    Blood Glucose Monitoring Suppl (Insight Plus CONTOUR NEXT USB MONITOR) w/Device KIT Testing 4 times a day    calcitriol (ROCALTROL) 0 5 MCG capsule Take 2 mcg by mouth    calcium acetate (PHOSLO) capsule TAKE 1 CAPSULE BY MOUTH THREE TIMES DAILY WITH MEALS    cetirizine (ZyrTEC) 10 mg tablet Take 10 mg by mouth daily    cetirizine (ZyrTEC) 5 MG tablet TAKE 1 TABLET BY MOUTH EVERY DAY    Cholecalciferol (VITAMIN D-3) 1000 units CAPS Take 2 capsules by mouth daily    clindamycin (CLINDAGEL) 1 % gel APPLY TO AFFECTED AREA TWICE A DAY    CVS ACETAMINOPHEN EX  MG tablet TAKE 2 TABLETS BY MOUTH EVERY 8 HOURS AS NEEDED FOR MILD OR MODERATE PAIN (PAIN SCORE 1 6)   CVS Vitamin B-12 1000 MCG tablet TAKE 1 TABLET BY MOUTH EVERY DAY    cyclobenzaprine (FLEXERIL) 10 mg tablet Take 10 mg by mouth 3 (three) times a day as needed    cyclobenzaprine (FLEXERIL) 5 mg tablet     dicyclomine (BENTYL) 10 mg capsule TAKE 1 CAPSULE (10 MG TOTAL) BY MOUTH 3 (THREE) TIMES A DAY BEFORE MEALS    Disposable Gloves MISC Use 7 times a day, 4 boxes of gloves XL  Dx type 1 DM, paraplegia    doxazosin (CARDURA) 2 mg tablet Take 1 tablet (2 mg total) by mouth every evening    Eliquis 5 MG Take 5 mg by mouth 2 (two) times a day    epoetin kaushik (EPOGEN,PROCRIT) 20,000 units/mL Inject 10,000 Units under the skin    epoetin kaushik (EPOGEN,PROCRIT) 20,000 units/mL Inject 5,000 Units under the skin    famotidine (PEPCID) 20 mg tablet TAKE 1 TABLET BY MOUTH DAILY AT BEDTIME    ferrous sulfate 325 (65 Fe) mg tablet TAKE 1 TABLET BY MOUTH 3 TIMES A DAY    gabapentin (NEURONTIN) 300 mg capsule     glucose blood (SUSAN CONTOUR TEST) test strip Use as instructed to test blood sugar 4 times a day  Dx e10 29    Heparin Sodium, Porcine, (heparin, porcine,) 1,000 units/mL Heparin Sodium (Porcine) 1,000 Units/mL Catheter Lock Venous    hydrALAZINE (APRESOLINE) 25 mg tablet Take 1 tablet (25 mg total) by mouth 3 (three) times a day    HYDROmorphone (Dilaudid) 4 mg tablet Take 1 tablet by mouth every 4 (four) hours    HYDROmorphone (DILAUDID) 4 mg tablet TAKE 1 TABLET BY MOUTH EVERY 4 HOURS AS NEEDED FOR MODERATE PAIN (PAIN SCORE 4 6)   MAX  24 MG/DAY    hydrOXYzine HCL (ATARAX) 25 mg tablet Take 1 tablet (25 mg total) by mouth 2 (two) times a day as needed for itching    Incontinence Supply Disposable (INCONTINENCE BRIEF LARGE) MISC by Does not apply route 6 (six) times a day Size xl    Incontinence Supply Disposable (UNDERGARMENT) MISC Use 6 a day, 4 boxes, xl  Dx R51, paraplegia    Incontinence Supply Disposable (UNDERPADS) MISC Use 2 a day    insulin detemir (LEVEMIR) 100 units/mL subcutaneous injection Inject 6 5 Units under the skin 2 (two) times a day     ketoconazole (NIZORAL) 2 % cream APPLY SMALL AMOUNT TO EDGES OF MOUTH TWICE DAILY  DO NOT SWALLOW    Ketostix strip USE WITH HYPERGLYCEMIA E10 65 **NOT COVERED**    LEVEMIR FLEXTOUCH 100 units/mL injection pen Inject 14 Units under the skin 2 (two) times a day    levETIRAcetam (KEPPRA) 250 mg tablet TAKE 1 TABLET (250 MG TOTAL) BY MOUTH 3 (THREE) TIMES A WEEK (MWF) AT 1200   lidocaine (XYLOCAINE) 5 % ointment Apply topically as needed for mild pain    Lidocaine HCl 4 % LIQD Apply 1 application topically 3 (three) times a day as needed (pain)    Lokelma 10 g PACK     Melatonin ER 3 MG TBCR Take 6 mg by mouth    Methoxy PEG-Epoetin Beta (MIRCERA IJ) 75 mcg Once every 2 weeks    Misc   Devices [facility]) MISC by Does not apply route daily Wheelchair ramp, dx g82 20    Multiple Vitamin (Daily-Enriqueta) TABS TAKE 1 TABLET BY MOUTH EVERY DAY    Narcan 4 MG/0 1ML nasal spray     NOVOLOG 100 UNIT/ML injection Inject 5 Units under the skin 3 (three) times a day with meals     ofloxacin (FLOXIN) 0 3 % otic solution Administer 10 drops to the right ear daily    ondansetron (ZOFRAN-ODT) 4 mg disintegrating tablet DISSOLVE 1 TABLET IN MOUTH EVERY 8 HOURS AS NEEDED FOR NAUSEA AND VOMITING    oxybutynin (DITROPAN) 5 mg tablet TAKE 3 TABLETS BY MOUTH EVERY DAY    pantoprazole (PROTONIX) 40 mg tablet TAKE 1 TABLET BY MOUTH EVERY DAY    phenytoin extended (DILANTIN) 200 MG ER capsule Take 1 capsule (200 mg total) by mouth 2 (two) times a day    risperiDONE (RisperDAL) 0 5 mg tablet TAKE 1 TABLET (0 5 MG TOTAL) BY MOUTH 2 (TWO) TIMES A DAY    senna (Senokot) 8 6 MG tablet Take 8 6 mg by mouth 2 (two) times a day    senna (SENOKOT) 8 6 mg TAKE 1 TABLET (8 6 MG TOTAL) BY MOUTH 2 (TWO) TIMES A DAY   sodium hypochlorite (DAKIN'S HALF-STRENGTH) external solution USE AS DIRECTED TWICE DAILY    sodium hypochlorite 0 5 percent USE AS DIRECTED **NOT COVERED**    Sodium Zirconium Cyclosilicate 10 g PACK Take 10 g by mouth daily    SUPER B COMPLEX & C TABS TAKE 1 CAPSULE BY MOUTH ONCE FOR 1 DOSE AS DIRECTED    Zinc Sulfate 220 (50 Zn) MG TABS TAKE 1 TABLET BY MOUTH EVERY DAY    carvedilol (COREG) 25 mg tablet TAKE 1 TABLET BY MOUTH TWICE A DAY    carvedilol (COREG) 6 25 mg tablet Take 6 25 mg by mouth    oxyCODONE (ROXICODONE) 10 MG TABS Take 10 mg by mouth 3 (three) times a day    [DISCONTINUED] CVS Vitamin C 250 MG tablet TAKE 2 TABLETS (500 MG TOTAL) BY MOUTH DAILY    [DISCONTINUED] fluconazole (DIFLUCAN) 150 mg tablet TAKE 1 TABLET BY MOUTH AS ONE DOSE     No current facility-administered medications on file prior to visit  He is allergic to cefepime; ciprofloxacin hcl; cymbalta [duloxetine hcl]; lac bovis; lactose; lyrica [pregabalin]; penicillins; polymyxin b; and rosuvastatin       Review of Systems      Objective:      /72 (BP Location: Left arm, Patient Position: Sitting, Cuff Size: Standard)   Pulse 98   Temp 98 3 °F (36 8 °C) (Temporal)   SpO2 99%          Physical Exam

## 2021-02-10 PROBLEM — I50.32 CHRONIC DIASTOLIC HEART FAILURE (HCC): Status: ACTIVE | Noted: 2021-02-10

## 2021-02-10 PROBLEM — K02.9 DENTAL CARIES: Status: ACTIVE | Noted: 2021-02-10

## 2021-02-10 PROBLEM — F11.20 CHRONIC NARCOTIC DEPENDENCE (HCC): Status: ACTIVE | Noted: 2018-02-07

## 2021-02-10 PROBLEM — K31.819 AVM (ARTERIOVENOUS MALFORMATION) OF DUODENUM, ACQUIRED: Status: ACTIVE | Noted: 2017-08-30

## 2021-02-10 NOTE — ASSESSMENT & PLAN NOTE
Lab Results   Component Value Date    HGBA1C 8 0 (H) 01/10/2021   Following with endo    Blood sugar have been improving and he will be getting insulin pump in the near future

## 2021-02-11 DIAGNOSIS — I10 HTN (HYPERTENSION), BENIGN: Primary | ICD-10-CM

## 2021-02-11 DIAGNOSIS — N18.6 TYPE 1 DIABETES MELLITUS WITH CHRONIC KIDNEY DISEASE ON CHRONIC DIALYSIS (HCC): ICD-10-CM

## 2021-02-11 DIAGNOSIS — Z99.2 TYPE 1 DIABETES MELLITUS WITH CHRONIC KIDNEY DISEASE ON CHRONIC DIALYSIS (HCC): ICD-10-CM

## 2021-02-11 DIAGNOSIS — L98.429 ULCER OF SACRAL REGION, STAGE 4 (HCC): ICD-10-CM

## 2021-02-11 DIAGNOSIS — I10 HTN (HYPERTENSION), BENIGN: ICD-10-CM

## 2021-02-11 DIAGNOSIS — E10.22 TYPE 1 DIABETES MELLITUS WITH CHRONIC KIDNEY DISEASE ON CHRONIC DIALYSIS (HCC): ICD-10-CM

## 2021-02-11 RX ORDER — LOSARTAN POTASSIUM 100 MG/1
100 TABLET ORAL DAILY
Qty: 90 TABLET | Refills: 1 | Status: SHIPPED | OUTPATIENT
Start: 2021-02-11 | End: 2021-07-15 | Stop reason: SDUPTHER

## 2021-02-11 RX ORDER — DOXAZOSIN 2 MG/1
2 TABLET ORAL EVERY EVENING
Qty: 90 TABLET | Refills: 1 | Status: SHIPPED | OUTPATIENT
Start: 2021-02-11 | End: 2021-07-15 | Stop reason: SDUPTHER

## 2021-02-11 RX ORDER — UREA 10 %
1 LOTION (ML) TOPICAL DAILY
Qty: 90 EACH | Refills: 1 | Status: SHIPPED | OUTPATIENT
Start: 2021-02-11 | End: 2021-06-04

## 2021-02-11 NOTE — TELEPHONE ENCOUNTER
Mom called requesting refills on, however I dont see them on his med list      larsatan 100 mg     Zinc 220mg       Please advise

## 2021-02-16 RX ORDER — IBUPROFEN 600 MG/1
TABLET ORAL
Refills: 0 | OUTPATIENT
Start: 2021-02-16

## 2021-02-21 DIAGNOSIS — E53.9 VITAMIN B DEFICIENCY: ICD-10-CM

## 2021-02-21 RX ORDER — OMEGA-3/DHA/EPA/FISH OIL 35-113.5MG
TABLET,CHEWABLE ORAL
Qty: 30 TABLET | Refills: 5 | Status: SHIPPED | OUTPATIENT
Start: 2021-02-21 | End: 2021-07-15 | Stop reason: SDUPTHER

## 2021-02-22 ENCOUNTER — TELEPHONE (OUTPATIENT)
Dept: FAMILY MEDICINE CLINIC | Facility: CLINIC | Age: 41
End: 2021-02-22

## 2021-02-22 NOTE — TELEPHONE ENCOUNTER
SIGNATURES NEEDED FOR HOME HEALTH CARE FORM RECEIVED VIA FAX  WILL BE PLACED IN YOUR BIN AT ASSIGNED DELIVERY TIMES      ORDER # [id number]

## 2021-02-24 ENCOUNTER — TRANSITIONAL CARE MANAGEMENT (OUTPATIENT)
Dept: FAMILY MEDICINE CLINIC | Facility: CLINIC | Age: 41
End: 2021-02-24

## 2021-02-25 ENCOUNTER — OFFICE VISIT (OUTPATIENT)
Dept: WOUND CARE | Facility: HOSPITAL | Age: 41
End: 2021-02-25
Payer: MEDICARE

## 2021-02-25 VITALS
DIASTOLIC BLOOD PRESSURE: 60 MMHG | HEART RATE: 78 BPM | TEMPERATURE: 97.8 F | SYSTOLIC BLOOD PRESSURE: 100 MMHG | RESPIRATION RATE: 18 BRPM

## 2021-02-25 DIAGNOSIS — L89.894 PRESSURE ULCER OF OTHER SITE, STAGE 4 (HCC): ICD-10-CM

## 2021-02-25 DIAGNOSIS — L89.324 PRESSURE INJURY OF LEFT ISCHIUM, STAGE 4 (HCC): ICD-10-CM

## 2021-02-25 DIAGNOSIS — G82.20 PARAPLEGIA (HCC): Chronic | ICD-10-CM

## 2021-02-25 DIAGNOSIS — N18.6 TYPE 1 DIABETES MELLITUS WITH CHRONIC KIDNEY DISEASE ON CHRONIC DIALYSIS (HCC): ICD-10-CM

## 2021-02-25 DIAGNOSIS — N32.81 OAB (OVERACTIVE BLADDER): ICD-10-CM

## 2021-02-25 DIAGNOSIS — E10.22 TYPE 1 DIABETES MELLITUS WITH CHRONIC KIDNEY DISEASE ON CHRONIC DIALYSIS (HCC): ICD-10-CM

## 2021-02-25 DIAGNOSIS — Z99.2 TYPE 1 DIABETES MELLITUS WITH CHRONIC KIDNEY DISEASE ON CHRONIC DIALYSIS (HCC): ICD-10-CM

## 2021-02-25 DIAGNOSIS — L89.154 PRESSURE INJURY OF SACRAL REGION, STAGE 4 (HCC): Primary | ICD-10-CM

## 2021-02-25 DIAGNOSIS — L98.429 ULCER OF SACRAL REGION, STAGE 4 (HCC): ICD-10-CM

## 2021-02-25 PROCEDURE — 99214 OFFICE O/P EST MOD 30 MIN: CPT | Performed by: FAMILY MEDICINE

## 2021-02-25 PROCEDURE — 87070 CULTURE OTHR SPECIMN AEROBIC: CPT | Performed by: FAMILY MEDICINE

## 2021-02-25 PROCEDURE — 99213 OFFICE O/P EST LOW 20 MIN: CPT | Performed by: FAMILY MEDICINE

## 2021-02-25 PROCEDURE — 87077 CULTURE AEROBIC IDENTIFY: CPT | Performed by: FAMILY MEDICINE

## 2021-02-25 PROCEDURE — 87186 SC STD MICRODIL/AGAR DIL: CPT | Performed by: FAMILY MEDICINE

## 2021-02-25 PROCEDURE — 87205 SMEAR GRAM STAIN: CPT | Performed by: FAMILY MEDICINE

## 2021-02-25 RX ORDER — SODIUM HYPOCHLORITE 2.5 MG/ML
SOLUTION TOPICAL
Qty: 473 ML | Refills: 2 | Status: SHIPPED | OUTPATIENT
Start: 2021-02-25 | End: 2021-03-05 | Stop reason: SDUPTHER

## 2021-02-25 RX ORDER — LIDOCAINE HYDROCHLORIDE 40 MG/ML
5 SOLUTION TOPICAL ONCE
Status: COMPLETED | OUTPATIENT
Start: 2021-02-25 | End: 2021-02-25

## 2021-02-25 RX ADMIN — LIDOCAINE HYDROCHLORIDE 5 ML: 40 SOLUTION TOPICAL at 14:34

## 2021-02-25 NOTE — PRE-PROCEDURE INSTRUCTIONS
Pre-Surgery Instructions:   Medication Instructions    amLODIPine (NORVASC) 10 mg tablet Instructed patient per Anesthesia Guidelines   ammonium lactate (LAC-HYDRIN) 12 % cream Instructed patient per Anesthesia Guidelines   ammonium lactate (LAC-HYDRIN) 12 % cream Instructed patient per Anesthesia Guidelines   apixaban (ELIQUIS) 5 mg Instructed patient per Anesthesia Guidelines   ascorbic acid (VITAMIN C) 250 mg tablet Instructed patient per Anesthesia Guidelines   atorvastatin (LIPITOR) 20 mg tablet Instructed patient per Anesthesia Guidelines   B Complex-C-Folic Acid (Super B-Complex/Vit C/FA) TABS Instructed patient per Anesthesia Guidelines   baclofen 20 mg tablet Instructed patient per Anesthesia Guidelines   benzonatate (TESSALON PERLES) 100 mg capsule Instructed patient per Anesthesia Guidelines   calcitriol (ROCALTROL) 0 5 MCG capsule Instructed patient per Anesthesia Guidelines   Capsaicin 0 033 % CREA Instructed patient per Anesthesia Guidelines   carvedilol (COREG) 25 mg tablet Instructed patient per Anesthesia Guidelines   carvedilol (COREG) 6 25 mg tablet Instructed patient per Anesthesia Guidelines   cetirizine (ZyrTEC) 10 mg tablet Instructed patient per Anesthesia Guidelines   cetirizine (ZyrTEC) 5 MG tablet Instructed patient per Anesthesia Guidelines   Cholecalciferol (VITAMIN D-3) 1000 units CAPS Instructed patient per Anesthesia Guidelines   clindamycin (CLINDAGEL) 1 % gel Instructed patient per Anesthesia Guidelines   CVS ACETAMINOPHEN EX  MG tablet Instructed patient per Anesthesia Guidelines   CVS Vitamin B-12 1000 MCG tablet Instructed patient per Anesthesia Guidelines   cyclobenzaprine (FLEXERIL) 10 mg tablet Instructed patient per Anesthesia Guidelines   dicyclomine (BENTYL) 10 mg capsule Instructed patient per Anesthesia Guidelines   doxazosin (CARDURA) 2 mg tablet Instructed patient per Anesthesia Guidelines      epoetin kaushik (EPOGEN,PROCRIT) 20,000 units/mL Instructed patient per Anesthesia Guidelines   epoetin kaushik (EPOGEN,PROCRIT) 20,000 units/mL Instructed patient per Anesthesia Guidelines   famotidine (PEPCID) 20 mg tablet Instructed patient per Anesthesia Guidelines   ferrous sulfate 325 (65 Fe) mg tablet Instructed patient per Anesthesia Guidelines   gabapentin (NEURONTIN) 300 mg capsule Instructed patient per Anesthesia Guidelines   glucose 4-6 GM-MG Instructed patient per Anesthesia Guidelines   Heparin Sodium, Porcine, (heparin, porcine,) 1,000 units/mL Instructed patient per Anesthesia Guidelines   HYDROmorphone (DILAUDID) 4 mg tablet Instructed patient per Anesthesia Guidelines   insulin detemir (LEVEMIR) 100 units/mL subcutaneous injection Instructed patient per Anesthesia Guidelines   ketoconazole (NIZORAL) 2 % cream Instructed patient per Anesthesia Guidelines   LEVEMIR FLEXTOUCH 100 units/mL injection pen Instructed patient per Anesthesia Guidelines   levETIRAcetam (KEPPRA) 250 mg tablet Instructed patient per Anesthesia Guidelines   lidocaine (XYLOCAINE) 5 % ointment Instructed patient per Anesthesia Guidelines   Lidocaine HCl 4 % LIQD Instructed patient per Anesthesia Guidelines   Lokelma 10 g PACK Instructed patient per Anesthesia Guidelines   losartan (COZAAR) 100 MG tablet Instructed patient per Anesthesia Guidelines   Melatonin ER 3 MG TBCR Instructed patient per Anesthesia Guidelines   Methoxy PEG-Epoetin Beta (MIRCERA IJ) Instructed patient per Anesthesia Guidelines   Multiple Vitamin (Daily-Enriqueta) TABS Instructed patient per Anesthesia Guidelines   Narcan 4 MG/0 1ML nasal spray Instructed patient per Anesthesia Guidelines   NOVOLOG 100 UNIT/ML injection Instructed patient per Anesthesia Guidelines   nystatin (MYCOSTATIN) 500,000 units/5 mL suspension Instructed patient per Anesthesia Guidelines      ondansetron (ZOFRAN-ODT) 4 mg disintegrating tablet Instructed patient per Anesthesia Guidelines   oxybutynin (DITROPAN) 5 mg tablet Instructed patient per Anesthesia Guidelines   pantoprazole (PROTONIX) 40 mg tablet Instructed patient per Anesthesia Guidelines   phenytoin extended (DILANTIN) 200 MG ER capsule Instructed patient per Anesthesia Guidelines   risperiDONE (RisperDAL) 0 5 mg tablet Instructed patient per Anesthesia Guidelines   senna (SENOKOT) 8 6 mg Instructed patient per Anesthesia Guidelines   sodium hypochlorite (DAKIN'S HALF-STRENGTH) external solution Instructed patient per Anesthesia Guidelines   sodium hypochlorite 0 5 percent Instructed patient per Anesthesia Guidelines   Sodium Zirconium Cyclosilicate 10 g PACK Instructed patient per Anesthesia Guidelines   SUPER B COMPLEX & C TABS Instructed patient per Anesthesia Guidelines   Zinc Sulfate 220 (50 Zn) MG TABS Instructed patient per Anesthesia Guidelines  Hemodyalsis pt  Per physician, stop Eloquis 2 days prior to surgery      Verbalizes understanding of bathing instructions

## 2021-02-25 NOTE — PROGRESS NOTES
Patient ID: Xander Joiner  is a 36 y o  male Date of Birth 1980       Chief Complaint   Patient presents with    Follow Up Wound Care Visit     sacral wound       Allergies:  Cefepime, Ciprofloxacin hcl, Cymbalta [duloxetine hcl], Lac bovis, Lactose, Lyrica [pregabalin], Penicillins, Polymyxin b, and Rosuvastatin    Diagnosis:   Diagnosis ICD-10-CM Associated Orders   1  Pressure injury of sacral region, stage 4 ([facility])  L89 154 Wound cleansing and dressings     lidocaine (XYLOCAINE) 4 % topical solution 5 mL   2  Pressure injury of left ischium, stage 4 ([facility])  L89 324 Wound cleansing and dressings     lidocaine (XYLOCAINE) 4 % topical solution 5 mL   3  Pressure ulcer of other site, stage 4 ([facility])  L89 894 Wound cleansing and dressings     lidocaine (XYLOCAINE) 4 % topical solution 5 mL        Assessment :  Breakdown of left hip with stage IV pressure injury  No bone is exposed  There is malodor  No purulent drainage  Extensive tunneling and undermining  Other stage IV pressure injuries are unchanged or minimally improved  Recent hospitalization for sepsis  Unknown etiology at this time  Patient is going back for IR intervention for biopsy of L3  They do not want him on antibiotics  Plan:  Culture has been obtained from the left hip  Will only treat this if significant as per above  Will use Dakin's packing daily to the left hip  Wound Care to all the other pressure injuries as previous  Followup with his plastic surgeon as a ready scheduled and for his IR appointment  Subjective:   10/22/20:  Followup multiple pressure injuries to the buttocks and sacrum  He was hospitalized for back problems recently  Continues on hemodialysis  The patient states that his albumin has improved so that he may be able to get flap surgery  However when checking his most recent albumin was only 1 8  His total protein is elevated      11/19/20:  Followup multiple stage IV pressure or injuries to the buttocks and sacrum  Continues on hemodialysis  He is scheduled for plastic surgery for cyst on his forehead  Plastic surgery still will not do any flaps to his sacrum or buttocks because his albumin remains low  He has no other complaints  No fever, chills or cough  12/31/20: Followup multiple stage IV pressure injuries of the buttocks and sacrum  She notes some increased drainage and slight more odor  Never had his plastic surgery appointment for the cyst on his forehead  Denies any fever or chills  1/28/21:  Followup multiple stage IV pressure injuries of the buttocks, sacrum and perineum  Unfortunately, the patient was hospitalized for sepsis and back pain  Was found to have a fracture L3  He was given a back brace but only wears at home  He states that really does not give him very much relief  Unfortunately also while in the hospital, he developed a new pressure injury of his left hip  2/25/21:  Followup multiple stage IV pressure ulcers of the buttocks sacrum and perineum  Patient was hospitalized earlier this month for possible sepsis  He was found to have a burst fracture or osteo of L3  IR obtained specimen and was culture negative  However, no bone was obtained  He is scheduled to have another IR intervention March 16th for bone biopsy  Left hip ulcer broke down with large cavity  Otherwise, cultures were negative  He was discharged on no medications  He has not had any recent fever or chills    He did have fever when he was admitted to the hospital         The following portions of the patient's history were reviewed and updated as appropriate:   Patient Active Problem List   Diagnosis    Paraplegia ([facility] [address] [facility]    Atrial fibrillation ([facility] [facility]    Chronic suprapubic catheter ([facility] [address] [facility]    Colostomy care ([facility] [address] [facility]    OAB (overactive bladder)    S/P unilateral BKA (below knee amputation) ([facility] [address] )    Sebaceous cyst    Tobacco abuse    Type 1 diabetes mellitus with chronic kidney disease on chronic dialysis ([place] [address] )    Ulcer of sacral region, stage 4 ([facility])    Anemia    Chronic indwelling Ortega catheter    Wound healing, delayed    Iron deficiency anemia    Decubitus ulcers    HTN (hypertension), benign    Neurogenic bladder    GERD (gastroesophageal reflux disease)    Chronic pain    Stage 5 chronic kidney disease on chronic dialysis ([facility])    SOB (shortness of breath)    Penile abscess    Asymptomatic bacteriuria    History of Clostridium difficile infection    Urinary retention    Delirium    Noncompliance by refusing intervention or support    Dialysis patient ([place] [address] )    Insulin long-term use ([place] [address] )    Wheelchair dependent    Recurrent Clostridioides difficile diarrhea    Bipolar depression ([place] [address] )    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[place] [address] )    Seizure ([place] [address] )    Pressure ulcer of sacral region, stage 4 ([place] [address] )    AVM (arteriovenous malformation) of duodenum, acquired    Chronic narcotic dependence ([facility])    Chronic diastolic heart failure ([facility])    Dental caries     Past Medical History:   Diagnosis Date    Ambulatory dysfunction     Anemia     Anemia, iron deficiency     transfusion requiring    Atrial fibrillation ([place] [address] )     AVM (arteriovenous malformation) of duodenum, 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    Bacteriuria, asymptomatic     Bipolar disorder ([facility])     Chronic deep vein thrombosis (DVT) ([facility])     Chronic indwelling Ortega catheter     Chronic kidney disease     Chronic pain     Chronic pain disorder     Chronic suprapubic catheter ([place] [address] )     Clostridium difficile infection 08/11/2016    also positive 9/2016, 5/29/2017, 8/15/2017   S/P fecal transplant    Colostomy on examination ([place] [address] )     Decubitus ulcer     Delirium     Diabetes mellitus ([place] [address] )     GERD (gastroesophageal reflux disease)     Hemodialysis patient ([place] [address] )     Hypertension     Memory impairment 2011    s/p diabetic coma    Neurogenic bladder     OAB (overactive bladder)     Paraplegia ([facility])     T3 transverse myelitis vs spinal stoke (AVM); 2012 extensive epidural abscess C7=> conus 2nd extension from deep parspinal abscess L4-S2/sacral decubitus; cord atrophy/myelomalcia T3=>conus     Penile abscess     S/P unilateral BKA (below knee amputation) ([facility])     Right    Sebaceous cyst removed in 2017    Seizures ([facility])     Shortness of breath     Tobacco abuse     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[address] )     Urinary retention     Wheelchair dependent     Wounds, multiple     pressure ulcers with delayed healing     Past Surgical History:   Procedure Laterality Date    BELOW KNEE LEG AMPUTATION Right 2009    COLONOSCOPY N/A 3/27/2017    Procedure: COLONOSCOPY;  Surgeon: Cele Genao MD;  Location: [facility]; Service:     COLONOSCOPY N/A 3/29/2017    Procedure: COLONOSCOPY;  Surgeon: Cele Genao MD;  [facility]; Service:     COLONOSCOPY N/A 6/15/2017    Procedure: COLONOSCOPY with FMT;  Surgeon: Tonya Fernando MD;  Location: [facility]; Service: Gastroenterology    ESOPHAGOGASTRODUODENOSCOPY N/A 3/22/2017    Procedure: ESOPHAGOGASTRODUODENOSCOPY (EGD); Surgeon: Melissa Hatch DO;  Location: [facility];   Service:      Family History   Problem Relation Age of Onset    Hyperlipidemia Mother     Hypertension Mother     Leukemia Brother     Diabetes Paternal Grandfather      Social History     Socioeconomic History    Marital status: Single     Spouse name: None    Number of children: None    Years of education: None    Highest education level: None   Occupational History    None   Social Needs    Financial resource strain: None    Food insecurity     Worry: None     Inability: None    Transportation needs     Medical: None     Non-medical: None   Tobacco Use    Smoking status: Current Every Day Smoker     Types: Cigars    Smokeless tobacco: Never Used    Tobacco comment: about 2 cigars/day   Substance and Sexual Activity    Alcohol use: Not Currently     Frequency: Monthly or less     Drinks per session: 1 or 2     Binge frequency: Never     Comment: 1 cup of wine every 3-4 months    Drug use: Not Currently     Types: Marijuana     Comment: rarely; once every 1-2 years    Sexual activity: None   Lifestyle    Physical activity     Days per week: None     Minutes per session: None    Stress: None   Relationships    Social connections     Talks on phone: None     Gets together: None     Attends Sabianist service: None     Active member of club or organization: None     Attends meetings of clubs or organizations: None     Relationship status: None    Intimate partner violence     Fear of current or ex partner: None     Emotionally abused: None     Physically abused: None     Forced sexual activity: None   Other Topics Concern    None   Social History Narrative    None       Current Outpatient Medications:     Alcohol Swabs (ALCOHOL PADS) 70 % PADS, by Does not apply route 5 (five) times a day, Disp: 300 each, Rfl: 5    amLODIPine (NORVASC) 10 mg tablet, TAKE 1 TABLET BY MOUTH EVERY DAY, Disp: 90 tablet, Rfl: 3    ammonium lactate (LAC-HYDRIN) 12 % cream, Apply topically, Disp: , Rfl:     ammonium lactate (LAC-HYDRIN) 12 % cream, , Disp: , Rfl:     apixaban (ELIQUIS) 5 mg, Take 1 tablet (5 mg total) by mouth 2 (two) times a day (Patient taking differently: Take 5 mg by mouth 2 (two) times a day Per physician, stop 2 days prior to procedure), Disp: 60 tablet, Rfl: 8    ascorbic acid (VITAMIN C) 250 mg tablet, TAKE 2 TABLETS (500 MG TOTAL) BY MOUTH DAILY, Disp: 180 tablet, Rfl: 1    atorvastatin (LIPITOR) 20 mg tablet, TAKE 1 TABLET BY MOUTH EVERYDAY AT BEDTIME, Disp: 90 tablet, Rfl: 1    B Complex-C-Folic Acid (Super B-Complex/Vit C/FA) TABS, , Disp: , Rfl:     baclofen 20 mg tablet, Take 20 mg by mouth 2 (two) times a day , Disp: , Rfl:     SUSAN MICROLET LANCETS lancets, Use as instructed to check blood sugar 4 times a day Dx e10 29, Disp: 200 each, Rfl: 5    benzonatate (TESSALON PERLES) 100 mg capsule, TAKE 1 CAPSULE BY MOUTH THREE TIMES A DAY AS NEEDED FOR COUGH, Disp: 30 capsule, Rfl: 0    Blood Glucose Monitoring Suppl (SUSAN CONTOUR NEXT USB MONITOR) w/Device KIT, Testing 4 times a day, Disp: 1 kit, Rfl: 0    calcitriol (ROCALTROL) 0 5 MCG capsule, Take 2 mcg by mouth, Disp: , Rfl:     Capsaicin 0 033 % CREA, 5 times a day for 1st week  Then 3 times a day for next 3 weeks, Disp: 56 6 g, Rfl: 2    carvedilol (COREG) 25 mg tablet, TAKE 1 TABLET BY MOUTH TWICE A DAY, Disp: 180 tablet, Rfl: 1    carvedilol (COREG) 6 25 mg tablet, Take 6 25 mg by mouth, Disp: , Rfl:     cetirizine (ZyrTEC) 10 mg tablet, Take 10 mg by mouth daily, Disp: , Rfl:     cetirizine (ZyrTEC) 5 MG tablet, TAKE 1 TABLET BY MOUTH EVERY DAY, Disp: 90 tablet, Rfl: 1    Cholecalciferol (VITAMIN D-3) 1000 units CAPS, Take 2 capsules by mouth daily, Disp: 30 capsule, Rfl: 0    clindamycin (CLINDAGEL) 1 % gel, APPLY TO AFFECTED AREA TWICE A DAY, Disp: 60 g, Rfl: 2    [employer] ACETAMINOPHEN EX  MG tablet, TAKE 2 TABLETS BY MOUTH EVERY 8 HOURS AS NEEDED FOR MILD OR MODERATE PAIN (PAIN SCORE 1 6)  , Disp: , Rfl:     CVS Vitamin B-12 1000 MCG tablet, TAKE 1 TABLET BY MOUTH EVERY DAY, Disp: 30 tablet, Rfl: 5    cyclobenzaprine (FLEXERIL) 10 mg tablet, Take 10 mg by mouth 3 (three) times a day as needed, Disp: , Rfl: 0    cyclobenzaprine (FLEXERIL) 5 mg tablet, , Disp: , Rfl:     dicyclomine (BENTYL) 10 mg capsule, TAKE 1 CAPSULE (10 MG TOTAL) BY MOUTH 3 (THREE) TIMES A DAY BEFORE MEALS, Disp: 270 capsule, Rfl: 1    Disposable Gloves MISC, Use 7 times a day, 4 boxes of gloves XL Dx type 1 DM, paraplegia, Disp: 4 each, Rfl: 11    doxazosin (CARDURA) 2 mg tablet, TAKE 1 TABLET (2 MG TOTAL) BY MOUTH EVERY EVENING, Disp: 90 tablet, Rfl: 1    epoetin kaushik (EPOGEN,PROCRIT) 20,000 units/mL, Inject 10,000 Units under the skin, Disp: , Rfl:     epoetin kaushik (EPOGEN,PROCRIT) 20,000 units/mL, Inject 5,000 Units under the skin, Disp: , Rfl:     famotidine (PEPCID) 20 mg tablet, TAKE 1 TABLET BY MOUTH DAILY AT BEDTIME, Disp: 90 tablet, Rfl: 1    ferrous sulfate 325 (65 Fe) mg tablet, TAKE 1 TABLET BY MOUTH 3 TIMES A DAY, Disp: 90 tablet, Rfl: 3    gabapentin (NEURONTIN) 300 mg capsule, , Disp: , Rfl:     glucose 4-6 GM-MG, Chew 2 tablets daily as needed for low blood sugar, Disp: 10 tablet, Rfl: 1    glucose blood (SUSAN CONTOUR TEST) test strip, Use as instructed to test blood sugar 4 times a day Dx e10 29, Disp: 200 each, Rfl: 3    Heparin Sodium, Porcine, (heparin, porcine,) 1,000 units/mL, Heparin Sodium (Porcine) 1,000 Units/mL Catheter Lock Venous, Disp: , Rfl:     HYDROmorphone (DILAUDID) 4 mg tablet, TAKE 1 TABLET BY MOUTH EVERY 4 HOURS AS NEEDED FOR MODERATE PAIN (PAIN SCORE 4 6)  MAX  24 MG/DAY, Disp: , Rfl:     Incontinence Supply Disposable (INCONTINENCE BRIEF LARGE) MISC, by Does not apply route 6 (six) times a day Size xl, Disp: 180 each, Rfl: 11    Incontinence Supply Disposable (UNDERGARMENT) MISC, Use 6 a day, 4 boxes, xl Dx R51, paraplegia, Disp: 4 each, Rfl: 11    Incontinence Supply Disposable (UNDERPADS) MISC, Use 2 a day, Disp: 60 each, Rfl: 11    insulin detemir (LEVEMIR) 100 units/mL subcutaneous injection, Inject 6 5 Units under the skin 2 (two) times a day , Disp: , Rfl:     ketoconazole (NIZORAL) 2 % cream, APPLY SMALL AMOUNT TO EDGES OF MOUTH TWICE DAILY   DO NOT SWALLOW, Disp: 15 g, Rfl: 1    Ketostix strip, USE WITH HYPERGLYCEMIA E10 65 **NOT COVERED**, Disp: , Rfl:     LEVEMIR FLEXTOUCH 100 units/mL injection pen, Inject 14 Units under the skin 2 (two) times a day, Disp: 15 pen, Rfl: 3    levETIRAcetam (KEPPRA) 250 mg tablet, TAKE 1 TABLET (250 MG TOTAL) BY MOUTH 3 (THREE) TIMES A WEEK (MWF) AT 1200 , Disp: , Rfl:     lidocaine (XYLOCAINE) 5 % ointment, Apply topically as needed for mild pain, Disp: 35 44 g, Rfl: 0    Lidocaine HCl 4 % LIQD, Apply 1 application topically 3 (three) times a day as needed (pain), Disp: 73 mL, Rfl: 5    Lokelma 10 g PACK, , Disp: , Rfl:     losartan (COZAAR) 100 MG tablet, Take 1 tablet (100 mg total) by mouth daily, Disp: 90 tablet, Rfl: 1    Melatonin ER 3 MG TBCR, Take 6 mg by mouth, Disp: , Rfl:     Methoxy PEG-Epoetin Beta (MIRCERA IJ), 75 mcg Once every 2 weeks, Disp: , Rfl:     Misc   Devices [facility]) MISC, by Does not apply route daily Wheelchair ramp, dx g82 20, Disp: 1 each, Rfl: 0    Multiple Vitamin (Daily-Enriqueta) TABS, TAKE 1 TABLET BY MOUTH EVERY DAY, Disp: 90 tablet, Rfl: 2    Narcan 4 MG/0 1ML nasal spray, , Disp: , Rfl:     NOVOLOG 100 UNIT/ML injection, Inject 5 Units under the skin 3 (three) times a day with meals , Disp: , Rfl:     nystatin (MYCOSTATIN) 500,000 units/5 mL suspension, Apply 5 mL (500,000 Units total) to the mouth or throat 4 (four) times a day, Disp: 473 mL, Rfl: 1    ondansetron (ZOFRAN-ODT) 4 mg disintegrating tablet, DISSOLVE 1 TABLET IN MOUTH EVERY 8 HOURS AS NEEDED FOR NAUSEA AND VOMITING, Disp: 30 tablet, Rfl: 1    oxybutynin (DITROPAN) 5 mg tablet, TAKE 3 TABLETS BY MOUTH EVERY DAY, Disp: 270 tablet, Rfl: 1    pantoprazole (PROTONIX) 40 mg tablet, TAKE 1 TABLET BY MOUTH EVERY DAY, Disp: 90 tablet, Rfl: 0    phenytoin extended (DILANTIN) 200 MG ER capsule, Take 1 capsule (200 mg total) by mouth 2 (two) times a day, Disp: 60 capsule, Rfl: 2    risperiDONE (RisperDAL) 0 5 mg tablet, TAKE 1 TABLET (0 5 MG TOTAL) BY MOUTH 2 (TWO) TIMES A DAY, Disp: 180 tablet, Rfl: 1    senna (SENOKOT) 8 6 mg, TAKE 1 TABLET (8 6 MG TOTAL) BY MOUTH 2 (TWO) TIMES A DAY , Disp: , Rfl:     sodium hypochlorite (DAKIN'S HALF-STRENGTH) external solution, USE AS DIRECTED TWICE DAILY, Disp: 473 mL, Rfl: 2    sodium hypochlorite 0 5 percent, USE AS DIRECTED **NOT COVERED**, Disp: , Rfl:     Sodium Zirconium Cyclosilicate 10 g PACK, Take 10 g by mouth daily, Disp: , Rfl:     SUPER B COMPLEX & C TABS, TAKE 1 CAPSULE BY MOUTH ONCE FOR 1 DOSE AS DIRECTED, Disp: 90 tablet, Rfl: 2    Zinc Sulfate 220 (50 Zn) MG TABS, Take 1 tablet by mouth daily, Disp: 90 each, Rfl: 1  No current facility-administered medications for this visit  Review of Systems   Constitutional: Negative for activity change, appetite change (Improving), chills, fatigue, fever and unexpected weight change  HENT: Negative for congestion, hearing loss and postnasal drip  Eyes: Negative for visual disturbance  Respiratory: Negative for cough and shortness of breath  Cardiovascular: Negative for chest pain and leg swelling  Gastrointestinal: Negative for abdominal pain, constipation (Slight), diarrhea, nausea and vomiting  Genitourinary: Negative for dysuria and urgency  Indwelling Ortega catheter   Musculoskeletal: Positive for gait problem (Nonambulatory, paraplegic)  Skin: Positive for wound (Sacral left ischial and left hip)  Negative for rash  Cyst on right  cheek   Neurological: Positive for weakness  Hematological: Does not bruise/bleed easily  Psychiatric/Behavioral: Positive for dysphoric mood  Objective:  /60   Pulse 78   Temp 97 8 °F (36 6 °C)   Resp 18   Pain Score:   2     Physical Exam  Vitals signs and nursing note reviewed  Constitutional:       Appearance: Normal appearance  He is underweight  HENT:      Head: Normocephalic and atraumatic  Abdominal:      General: Abdomen is flat  Comments: The abdomen around the ostomy is slightly full and tender  Skin:     Findings: Wound (Both buttocks and sacrum) present  Comments: Sacral ulcerslightly less undermining  Left ischial ulcer also with undermining  Fibrin in both ulcers  The right buttock ulcer remains closed  The left hip ulcer is open with significant tunneling and undermining  Bone is not exposed  Some necrotic tissue in the base  Malodor is noted  Neurological:      Mental Status: He is alert and oriented to person, place, and time  Psychiatric:         Mood and Affect: Affect normal          Cognition and Memory: Cognition normal                          Wound Ischium Left (Active)   Wound Image Images linked 02/25/21 1401   Wound Description Granulation tissue;Slough 02/25/21 1414   Irene-wound Assessment Intact;Pink;Scar Tissue 02/25/21 1414   Wound Length (cm) 6 cm 02/25/21 1414   Wound Width (cm) 4 cm 02/25/21 1414   Wound Depth (cm) 1 2 cm 02/25/21 1414   Wound Surface Area (cm^2) 24 cm^2 02/25/21 1414   Wound Volume (cm^3) 28 8 cm^3 02/25/21 1414   Calculated Wound Volume (cm^3) 28 8 cm^3 02/25/21 1414   Change in Wound Size % -52 38 02/25/21 1414   Undermining 4 02/25/21 1414   Undermining is depth extending from 10-5 02/25/21 1414   Drainage Amount Large 02/25/21 1414   Drainage Description Serosanguineous 02/25/21 1414   Non-staged Wound Description Full thickness 02/25/21 1414   Treatments Cleansed 02/25/21 1414       Wound Perineum (Active)   Wound Image Images linked 02/25/21 1401   Wound Description Granulation tissue;Pink;Slough 02/25/21 1413   Irene-wound Assessment Scar Tissue 02/25/21 1413   Wound Length (cm) 4 cm 02/25/21 1413   Wound Width (cm) 3 cm 02/25/21 1413   Wound Depth (cm) 0 7 cm 02/25/21 1413   Wound Surface Area (cm^2) 12 cm^2 02/25/21 1413   Wound Volume (cm^3) 8 4 cm^3 02/25/21 1413   Calculated Wound Volume (cm^3) 8 4 cm^3 02/25/21 1413   Change in Wound Size % 20 02/25/21 1413   Undermining 1 02/25/21 1413   Undermining is depth extending from 6-9 02/25/21 1413   Drainage Amount Large 02/25/21 1413   Drainage Description Serosanguineous 02/25/21 1413   Non-staged Wound Description Full thickness 02/25/21 1413   Treatments Cleansed 02/25/21 1413       Wound Sacrum (Active)   Wound Image Images linked 02/25/21 1401   Wound Description Granulation tissue;Slough; Epithelialization 02/25/21 1412   Irene-wound Assessment Scar Tissue 02/25/21 1412   Wound Length (cm) 6 5 cm 02/25/21 1412   Wound Width (cm) 11 cm 02/25/21 1412   Wound Depth (cm) 1 6 cm 02/25/21 1412   Wound Surface Area (cm^2) 71 5 cm^2 02/25/21 1412   Wound Volume (cm^3) 114 4 cm^3 02/25/21 1412   Calculated Wound Volume (cm^3) 114 4 cm^3 02/25/21 1412   Change in Wound Size % -52 53 02/25/21 1412   Undermining 3 5 02/25/21 1412   Undermining is depth extending from 3-8 02/25/21 1412   Drainage Amount Large 02/25/21 1412   Drainage Description Serosanguineous 02/25/21 1412   Non-staged Wound Description Full thickness 02/25/21 1412   Treatments Cleansed 02/25/21 1412       Wound 01/28/21 Pressure Injury Hip Left;Lateral (Active)   Wound Image Images linked 02/25/21 1402   Wound Description Granulation tissue;Slough 02/25/21 1415   Pressure Injury Stage Stage 4 02/25/21 1415   Irene-wound Assessment Dry; Intact 02/25/21 1415   Wound Length (cm) 3 cm 02/25/21 1415   Wound Width (cm) 3 1 cm 02/25/21 1415   Wound Depth (cm) 1 8 cm 02/25/21 1415   Wound Surface Area (cm^2) 9 3 cm^2 02/25/21 1415   Wound Volume (cm^3) 16 74 cm^3 02/25/21 1415   Calculated Wound Volume (cm^3) 16 74 cm^3 02/25/21 1415   Tunneling in depth located at 8 7cm @ 11 o clock track 02/25/21 1415   Undermining 5 7 02/25/21 1415   Undermining is depth extending from 12-12 02/25/21 1415   Drainage Amount Large 02/25/21 1415   Drainage Description Serosanguineous; Mendieta 02/25/21 1415   Non-staged Wound Description Full thickness 02/25/21 1415   Treatments Cleansed 02/25/21 1415   Wound packed? Yes 02/25/21 1415   Packing- # removed 1 02/25/21 1415   Dressing Changed Changed 02/25/21 1415   Patient Tolerance Tolerated well 02/25/21 1415                    Wound Instructions:  Orders Placed This Encounter   Procedures    Wound cleansing and dressings     Wound cleansing and dressings       Wound cleansing and dressings          Sacral and perineal wounds:  Wash your hands with soap and water  Remove old dressing, discard into plastic bag and place in trash    Cleanse the wound with saline or mild soap and water prior to applying a clean dressing  Do not use tissue or cotton balls  Do not scrub the wound  Pat dry using gauze  Shower no; do not get dressing wet  Apply dakins moistened packing to left ischium, perineum, and sacrum  Cover with 4x4 and ABD  Secure with Medfix tape  Change dressing daily and PRN for breakthrough drainage (visiting nurses to do twice per week and family in between)     Left hip dressing  Cleanse with normal saline  Gently pack with dakins moistened gauze  Cover with ABD, secure with medfix tape  Change dressing daily      Continue clinitron bed at home and turn at least every 1-2 hours  To try and limit the amount of time spent sitting in the wheelchair  Keep pressure off the wounds as much as humanly possible     Continue to try and increase your protein to at least 3 servings or more if possible                                  Continue to try and keep blood sugars as low as possible  Stop Smoking     Continue visiting nurses twice per week for dressing changes     Follow up 4 weeks     Today's wound treatment note:  Wounds cleansed with NSS  All wounds redressed as ordered above     Standing Status:   Future     Standing Expiration Date:   2/25/2022       Total time spent today:  20 minutes  This includes reviewing the patient's chart, pertinent physician records and laboratory data  Records from [facility] admission were reviewed including the discharge summary and laboratory data  Jonathon Prado MD, CHT, CWS       Portions of the record may have been created with voice recognition software  Occasional wrong word or "sound alike" substitutions may have occurred due to the inherent limitations of voice recognition software  Read the chart carefully and recognize, using context, where substitutions have occurred

## 2021-02-25 NOTE — PATIENT INSTRUCTIONS
Orders Placed This Encounter   Procedures    Wound cleansing and dressings     Wound cleansing and dressings       Wound cleansing and dressings          Sacral and perineal wounds:  Wash your hands with soap and water  Remove old dressing, discard into plastic bag and place in trash  Cleanse the wound with saline or mild soap and water prior to applying a clean dressing  Do not use tissue or cotton balls  Do not scrub the wound  Pat dry using gauze  Shower no; do not get dressing wet  Apply dakins moistened packing to left ischium, perineum, and sacrum      Cover with 4x4 and ABD  Secure with Medfix tape  Change dressing daily and PRN for breakthrough drainage (visiting nurses to do twice per week and family in between)     Left hip dressing  Cleanse with normal saline  Gently pack with dakins moistened gauze  Cover with ABD, secure with medfix tape  Change dressing daily      Continue clinitron bed at home and turn at least every 1-2 hours  To try and limit the amount of time spent sitting in the wheelchair  Keep pressure off the wounds as much as humanly possible     Continue to try and increase your protein to at least 3 servings or more if possible                                  Continue to try and keep blood sugars as low as possible  Stop Smoking     Continue visiting nurses twice per week for dressing changes     Follow up 4 weeks     Today's wound treatment note:  Wounds cleansed with NSS  All wounds redressed as ordered above     Standing Status:   Future     Standing Expiration Date:   2/25/2022

## 2021-03-01 DIAGNOSIS — E78.5 HYPERLIPIDEMIA, UNSPECIFIED HYPERLIPIDEMIA TYPE: ICD-10-CM

## 2021-03-01 LAB
BACTERIA WND AEROBE CULT: ABNORMAL
BACTERIA WND AEROBE CULT: ABNORMAL
GRAM STN SPEC: ABNORMAL
GRAM STN SPEC: ABNORMAL

## 2021-03-01 RX ORDER — ATORVASTATIN CALCIUM 20 MG/1
TABLET, FILM COATED ORAL
Qty: 90 TABLET | Refills: 1 | Status: SHIPPED | OUTPATIENT
Start: 2021-03-01 | End: 2021-07-15 | Stop reason: SDUPTHER

## 2021-03-02 ENCOUNTER — OFFICE VISIT (OUTPATIENT)
Dept: FAMILY MEDICINE CLINIC | Facility: CLINIC | Age: 41
End: 2021-03-02

## 2021-03-02 DIAGNOSIS — K21.9 GASTROESOPHAGEAL REFLUX DISEASE WITHOUT ESOPHAGITIS: Primary | ICD-10-CM

## 2021-03-02 DIAGNOSIS — E10.22 TYPE 1 DIABETES MELLITUS WITH CHRONIC KIDNEY DISEASE ON CHRONIC DIALYSIS (HCC): ICD-10-CM

## 2021-03-02 DIAGNOSIS — N18.6 TYPE 1 DIABETES MELLITUS WITH CHRONIC KIDNEY DISEASE ON CHRONIC DIALYSIS (HCC): ICD-10-CM

## 2021-03-02 DIAGNOSIS — L89.154 PRESSURE ULCER OF SACRAL REGION, STAGE 4 (HCC): ICD-10-CM

## 2021-03-02 DIAGNOSIS — Z99.2 TYPE 1 DIABETES MELLITUS WITH CHRONIC KIDNEY DISEASE ON CHRONIC DIALYSIS (HCC): ICD-10-CM

## 2021-03-02 DIAGNOSIS — A04.71 RECURRENT CLOSTRIDIOIDES DIFFICILE DIARRHEA: ICD-10-CM

## 2021-03-02 PROBLEM — R45.0 NERVOUS: Status: ACTIVE | Noted: 2021-03-02

## 2021-03-02 PROCEDURE — 99495 TRANSJ CARE MGMT MOD F2F 14D: CPT | Performed by: PHYSICIAN ASSISTANT

## 2021-03-02 NOTE — ASSESSMENT & PLAN NOTE
Continue pantoprazole  He did recently have a blood taste in his mouth about 2 weeks ago but is now gone    Recommend seeing GI for follow-up and has been about 3 years

## 2021-03-02 NOTE — ASSESSMENT & PLAN NOTE
It is unclear if the feeling he has been having is related to stopping to lot it or just anxiety in general   Recommend starting the gabapentin as it may help with both

## 2021-03-02 NOTE — PROGRESS NOTES
Assessment/Plan:        Problem List Items Addressed This Visit        Digestive    GERD (gastroesophageal reflux disease) - Primary       Continue pantoprazole  He did recently have a blood taste in his mouth about 2 weeks ago but is now gone  Recommend seeing GI for follow-up and has been about 3 years         Relevant Orders    Ambulatory referral to Gastroenterology    Recurrent Clostridioides difficile diarrhea       The symptoms of C diff currently  He has been having larger amounts of stool we discussed this could be due to was eating  He will continue to monitor            Endocrine    Type 1 diabetes mellitus with chronic kidney disease on chronic dialysis [facility])       Lab Results   Component Value Date    HGBA1C 8 0 (H) 01/10/2021     Blood sugars have been improving  He is scheduled Endocrinology next week            Musculoskeletal and Integument    Pressure ulcer of sacral region, stage 4 ([address] )      Recently debrided  Continue follow-up with wound care  Reason for visit is RONI for fever  Encounter provider Jennifer Champion PA-C       Provider located at [address] [address] [phone]      Recent Visits  No visits were found meeting these conditions  Showing recent visits within past 7 days and meeting all other requirements     Today's Visits  Date Type Provider Dept   03/02/21 Office Visit [facility]   Showing today's visits and meeting all other requirements     Future Appointments  No visits were found meeting these conditions  Showing future appointments within next 150 days and meeting all other requirements        After connecting through [employer], the patient was identified by name and date of birth   Eveline Dominguez  was informed that this is a telemedicine visit and that the visit is being conducted through [facility] and patient was informed that this is a secure, HIPAA-compliant platform  He agrees to proceed     My office door was closed  No one else was in the room  He acknowledged consent and understanding of privacy and security of the video platform  The patient has agreed to participate and understands they can discontinue the visit at any time  Patient is aware this is a billable service  Subjective:     Patient ID: Finn Hernandez  is a 36 y o  male  HPI  36year old M on phone for follow up from [facility] on 2/20-2/23 for fever, sacral wound  He presented to   [facility]  with fever, back pain, increased and foul-smelling drainage from the sacral wound  He has been following with [facility] neurosurgeon  On admission in January he was found to have a large fluid collection at the L3 area which was either a burst fracture osteomyelitis  Aspiration was obtained however there was no bone   That was obtained saw osteomyelitis could not completely be ruled out  It was recommended that he have another bone biopsy in the future  Due to fever, tachycardia, elevated white blood cell count he was recommended to start on antibiotics  A left trochanteric wound was debrided  He did have cultures obtained which were negative antibiotics were then stop  He was recommended to continue Dakins solution  He is scheduled to continue to see moved care  He did take pictures which were sent to her asthma heart records today to be future since he is not able to show may now on video due to needing assistance    He stopped taking dilaudid because he was feeling more nervous  He stopped it about 2 weeks ago  He was sent gabapentin from pain management but did not start it yet/     He is taking oxycodone right now and it does calm the nervous feeling down  He had been tasting blood when he burped about 2 weeks ago  Review of Systems   Constitutional: Negative for chills and fever  Respiratory: Negative for cough and shortness of breath      Cardiovascular: Negative for chest pain  Gastrointestinal: Positive for blood in stool (may have about 2 weeks ago)  Negative for abdominal pain, diarrhea, nausea and vomiting  Genitourinary: Negative for difficulty urinating  Musculoskeletal: Positive for back pain, myalgias and neck pain  Skin: Positive for wound  Psychiatric/Behavioral: The patient is nervous/anxious  Objective: There were no vitals filed for this visit  Physical Exam  Constitutional:       Appearance: He is well-developed  HENT:      Head: Normocephalic and atraumatic  Eyes:      Conjunctiva/sclera: Conjunctivae normal    Neck:      Comments: No visible masses  Pulmonary:      Effort: Pulmonary effort is normal    Skin:     Findings: Lesion (mid forehead, looks cystic, about 4 cm) present  Neurological:      Mental Status: He is alert and oriented to person, place, and time  Psychiatric:         Mood and Affect: Mood normal          Behavior: Behavior normal              Transitional Care Management Review:  Alex Bucio  is a 36 y o  male here for TCM follow up  During the TCM phone call patient stated:    TCM Call (since 1/30/2021)     Date and time call was made  2/24/2021  9:30 AM    Hospital care reviewed  Records reviewed    Patient was hospitialized at  [facility]        Date of Admission  02/20/21    Date of discharge  02/23/21    Diagnosis  Sacral wound  Fever    Disposition  Home    Were the patients medications reviewed and updated  No    Current Symptoms  None      TCM Call (since 1/30/2021)     Post hospital issues  None    Should patient be enrolled in anticoag monitoring? No    Scheduled for follow up?   Yes    Did you obtain your prescribed medications  Yes    Do you need help managing your prescriptions or medications  No    Is transportation to your appointment needed  No    I have advised the patient to call PCP with any new or worsening symptoms  Owen Paniagua/ MA Counseling  Patient          I spent 16   minutes with the patient during this visit      Christina Tate PA-C

## 2021-03-02 NOTE — ASSESSMENT & PLAN NOTE
The symptoms of C diff currently  He has been having larger amounts of stool we discussed this could be due to was eating    He will continue to monitor

## 2021-03-02 NOTE — ASSESSMENT & PLAN NOTE
Lab Results   Component Value Date    HGBA1C 8 0 (H) 01/10/2021     Blood sugars have been improving    He is scheduled Endocrinology next week

## 2021-03-03 ENCOUNTER — TELEPHONE (OUTPATIENT)
Dept: FAMILY MEDICINE CLINIC | Facility: CLINIC | Age: 41
End: 2021-03-03

## 2021-03-03 NOTE — TELEPHONE ENCOUNTER
SIGNATURES NEEDED FOR [facility] HOME CARE  FORM RECEIVED VIA FAX  WILL BE PLACED IN YOUR BIN AT ASSIGNED DELIVERY TIMES        ORDER #[id number]

## 2021-03-05 RX ORDER — SODIUM HYPOCHLORITE 2.5 MG/ML
SOLUTION TOPICAL
Qty: 473 ML | Refills: 2 | Status: SHIPPED | OUTPATIENT
Start: 2021-03-05 | End: 2021-10-01 | Stop reason: SDUPTHER

## 2021-03-08 ENCOUNTER — ANESTHESIA (OUTPATIENT)
Dept: PERIOP | Facility: HOSPITAL | Age: 41
End: 2021-03-08
Payer: MEDICARE

## 2021-03-08 ENCOUNTER — ANESTHESIA EVENT (OUTPATIENT)
Dept: PERIOP | Facility: HOSPITAL | Age: 41
End: 2021-03-08
Payer: MEDICARE

## 2021-03-08 ENCOUNTER — HOSPITAL ENCOUNTER (OUTPATIENT)
Facility: HOSPITAL | Age: 41
Setting detail: OUTPATIENT SURGERY
Discharge: HOME | End: 2021-03-08
Attending: DENTIST | Admitting: DENTIST
Payer: MEDICARE

## 2021-03-08 VITALS
TEMPERATURE: 97.5 F | HEART RATE: 93 BPM | DIASTOLIC BLOOD PRESSURE: 90 MMHG | BODY MASS INDEX: 18.75 KG/M2 | OXYGEN SATURATION: 99 % | WEIGHT: 154 LBS | HEIGHT: 76 IN | RESPIRATION RATE: 16 BRPM | SYSTOLIC BLOOD PRESSURE: 149 MMHG

## 2021-03-08 DIAGNOSIS — K02.9 DENTAL CARIES: Primary | ICD-10-CM

## 2021-03-08 DIAGNOSIS — K58.9 IRRITABLE BOWEL SYNDROME, UNSPECIFIED TYPE: ICD-10-CM

## 2021-03-08 LAB
GLUCOSE SERPL-MCNC: 128 MG/DL (ref 65–140)
GLUCOSE SERPL-MCNC: 175 MG/DL (ref 65–140)

## 2021-03-08 PROCEDURE — 82948 REAGENT STRIP/BLOOD GLUCOSE: CPT

## 2021-03-08 RX ORDER — PROPOFOL 10 MG/ML
INJECTION, EMULSION INTRAVENOUS AS NEEDED
Status: DISCONTINUED | OUTPATIENT
Start: 2021-03-08 | End: 2021-03-08

## 2021-03-08 RX ORDER — FENTANYL CITRATE/PF 50 MCG/ML
25 SYRINGE (ML) INJECTION
Status: DISCONTINUED | OUTPATIENT
Start: 2021-03-08 | End: 2021-03-08 | Stop reason: HOSPADM

## 2021-03-08 RX ORDER — HYDROCODONE BITARTRATE AND ACETAMINOPHEN 5; 325 MG/1; MG/1
1 TABLET ORAL EVERY 6 HOURS PRN
Status: DISCONTINUED | OUTPATIENT
Start: 2021-03-08 | End: 2021-03-08 | Stop reason: HOSPADM

## 2021-03-08 RX ORDER — MIDAZOLAM HYDROCHLORIDE 2 MG/2ML
INJECTION, SOLUTION INTRAMUSCULAR; INTRAVENOUS AS NEEDED
Status: DISCONTINUED | OUTPATIENT
Start: 2021-03-08 | End: 2021-03-08

## 2021-03-08 RX ORDER — SODIUM CHLORIDE, SODIUM LACTATE, POTASSIUM CHLORIDE, CALCIUM CHLORIDE 600; 310; 30; 20 MG/100ML; MG/100ML; MG/100ML; MG/100ML
INJECTION, SOLUTION INTRAVENOUS CONTINUOUS PRN
Status: DISCONTINUED | OUTPATIENT
Start: 2021-03-08 | End: 2021-03-08

## 2021-03-08 RX ORDER — ONDANSETRON 2 MG/ML
4 INJECTION INTRAMUSCULAR; INTRAVENOUS ONCE AS NEEDED
Status: DISCONTINUED | OUTPATIENT
Start: 2021-03-08 | End: 2021-03-08 | Stop reason: HOSPADM

## 2021-03-08 RX ORDER — ONDANSETRON 2 MG/ML
INJECTION INTRAMUSCULAR; INTRAVENOUS AS NEEDED
Status: DISCONTINUED | OUTPATIENT
Start: 2021-03-08 | End: 2021-03-08

## 2021-03-08 RX ORDER — FENTANYL CITRATE/PF 50 MCG/ML
50 SYRINGE (ML) INJECTION
Status: DISCONTINUED | OUTPATIENT
Start: 2021-03-08 | End: 2021-03-08 | Stop reason: HOSPADM

## 2021-03-08 RX ORDER — HYDROCODONE BITARTRATE AND ACETAMINOPHEN 5; 325 MG/1; MG/1
1 TABLET ORAL EVERY 6 HOURS PRN
Qty: 15 TABLET | Refills: 0 | Status: SHIPPED | OUTPATIENT
Start: 2021-03-08 | End: 2021-03-18

## 2021-03-08 RX ORDER — CHLORHEXIDINE GLUCONATE 0.12 MG/ML
RINSE ORAL AS NEEDED
Status: DISCONTINUED | OUTPATIENT
Start: 2021-03-08 | End: 2021-03-08 | Stop reason: HOSPADM

## 2021-03-08 RX ORDER — DICYCLOMINE HYDROCHLORIDE 10 MG/1
10 CAPSULE ORAL
Qty: 270 CAPSULE | Refills: 1 | Status: SHIPPED | OUTPATIENT
Start: 2021-03-08 | End: 2021-09-07

## 2021-03-08 RX ORDER — LIDOCAINE HYDROCHLORIDE AND EPINEPHRINE 10; 10 MG/ML; UG/ML
INJECTION, SOLUTION INFILTRATION; PERINEURAL AS NEEDED
Status: DISCONTINUED | OUTPATIENT
Start: 2021-03-08 | End: 2021-03-08 | Stop reason: HOSPADM

## 2021-03-08 RX ORDER — HYDROCODONE BITARTRATE AND ACETAMINOPHEN 5; 325 MG/1; MG/1
1 TABLET ORAL EVERY 6 HOURS PRN
Status: CANCELLED | OUTPATIENT
Start: 2021-03-08

## 2021-03-08 RX ORDER — DIPHENHYDRAMINE HYDROCHLORIDE 50 MG/ML
12.5 INJECTION INTRAMUSCULAR; INTRAVENOUS ONCE AS NEEDED
Status: DISCONTINUED | OUTPATIENT
Start: 2021-03-08 | End: 2021-03-08 | Stop reason: HOSPADM

## 2021-03-08 RX ORDER — BUPIVACAINE HYDROCHLORIDE AND EPINEPHRINE 5; 5 MG/ML; UG/ML
INJECTION, SOLUTION PERINEURAL AS NEEDED
Status: DISCONTINUED | OUTPATIENT
Start: 2021-03-08 | End: 2021-03-08 | Stop reason: HOSPADM

## 2021-03-08 RX ORDER — SODIUM CHLORIDE, SODIUM LACTATE, POTASSIUM CHLORIDE, CALCIUM CHLORIDE 600; 310; 30; 20 MG/100ML; MG/100ML; MG/100ML; MG/100ML
50 INJECTION, SOLUTION INTRAVENOUS CONTINUOUS
Status: DISCONTINUED | OUTPATIENT
Start: 2021-03-08 | End: 2021-03-08 | Stop reason: HOSPADM

## 2021-03-08 RX ORDER — FENTANYL CITRATE 50 UG/ML
INJECTION, SOLUTION INTRAMUSCULAR; INTRAVENOUS AS NEEDED
Status: DISCONTINUED | OUTPATIENT
Start: 2021-03-08 | End: 2021-03-08

## 2021-03-08 RX ORDER — SODIUM CHLORIDE, SODIUM LACTATE, POTASSIUM CHLORIDE, CALCIUM CHLORIDE 600; 310; 30; 20 MG/100ML; MG/100ML; MG/100ML; MG/100ML
125 INJECTION, SOLUTION INTRAVENOUS CONTINUOUS
Status: DISCONTINUED | OUTPATIENT
Start: 2021-03-08 | End: 2021-03-08 | Stop reason: HOSPADM

## 2021-03-08 RX ORDER — HYDROMORPHONE HCL/PF 1 MG/ML
0.5 SYRINGE (ML) INJECTION
Status: DISCONTINUED | OUTPATIENT
Start: 2021-03-08 | End: 2021-03-08 | Stop reason: HOSPADM

## 2021-03-08 RX ORDER — CEFAZOLIN SODIUM 2 G/50ML
SOLUTION INTRAVENOUS AS NEEDED
Status: DISCONTINUED | OUTPATIENT
Start: 2021-03-08 | End: 2021-03-08

## 2021-03-08 RX ADMIN — MIDAZOLAM 2 MG: 1 INJECTION INTRAMUSCULAR; INTRAVENOUS at 10:05

## 2021-03-08 RX ADMIN — SODIUM CHLORIDE, SODIUM LACTATE, POTASSIUM CHLORIDE, AND CALCIUM CHLORIDE: .6; .31; .03; .02 INJECTION, SOLUTION INTRAVENOUS at 10:04

## 2021-03-08 RX ADMIN — FENTANYL CITRATE 50 MCG: 50 INJECTION INTRAMUSCULAR; INTRAVENOUS at 10:13

## 2021-03-08 RX ADMIN — PROPOFOL 200 MG: 10 INJECTION, EMULSION INTRAVENOUS at 10:13

## 2021-03-08 RX ADMIN — PROPOFOL 100 MG: 10 INJECTION, EMULSION INTRAVENOUS at 10:14

## 2021-03-08 RX ADMIN — CEFAZOLIN SODIUM 2000 MG: 2 SOLUTION INTRAVENOUS at 10:07

## 2021-03-08 RX ADMIN — HYDROCODONE BITARTRATE AND ACETAMINOPHEN 1 TABLET: 5; 325 TABLET ORAL at 16:13

## 2021-03-08 RX ADMIN — ONDANSETRON 4 MG: 2 INJECTION INTRAMUSCULAR; INTRAVENOUS at 10:20

## 2021-03-08 NOTE — DISCHARGE INSTRUCTIONS
Advance to Regular Diet as tolerated  Sinus Precautions- no nose blowing, no closed mouth sneezing    No spitting, rinsing, straws for 48 hours postop  Apply gauze pressure for 30 minutes for any intraoral bleeding  Minimize Bending Lifiting and Standing  Ice to face 20 minutes on/off for first 24hours  Elevate HOB 30 degrees

## 2021-03-08 NOTE — OP NOTE
OPERATIVE REPORT  PATIENT NAME: Mariama Regan  :  1980  MRN: [id number]  Pt Location:  OR ROOM 05    SURGERY DATE: 3/8/2021    Surgeon(s) and Role:     Carlos Eduardo Hays, DMD - Primary    Preop Diagnosis:  Dental caries, unspecified [K02 9]    Post-Op Diagnosis Codes:     * Dental caries, unspecified [K02 9]      Procedures  EXTRACTION TEETH # 2,3,6,10,12,13,14,18,19,20,21,22,23,24,25,26,27,28,29,30   Alveoplasty maxillary right and left quadrants  Alveoplasty mandibular right and left quadrants    Specimen(s):  No specimen    Estimated Blood Loss:   Minimal    Drains:  Colostomy Descending/sigmoid LLQ (Active)   Number of days: 8742       Suprapubic Catheter 16 Fr  (Active)   Number of days: 1185       Anesthesia Type:   General    Operative Indications:  Dental caries, unspecified [K02 9]      Operative Findings:  Terminal Dentition    Complications:   None    Procedure and Technique:  The patient was greeted in the preoperative area  All the risks and benefits of the procedure were once again explained and the risks of sinus communication as well as lower chin and lip numbness were explained in detail all questions were answered  Consent had already been signed  Care was then handed back to the anesthesia team     The patient was brought into the operating room by the anesthesia team and the patient was placed in a supine position where the patient remained for the rest of the case  Anesthesia was able to establish an Nasal intubation without any complications  Care was then handed back to the OMFS team     Patient was draped in sterile manner timeout was performed in which the patient was correctly identified by name medical record number as well as a site of the procedure be performed  Patient had received preoperative IV 2 grams Ancef Antibiotics  Once a timeout was completed oral cavity was thoroughly suctioned with the Yankauer suction the moist vaginal packing was used it as a throat pack  Patient was given local anesthesia at the sites of the extractions with 1% lidocaine with 1-100,000 epinephrine as local anesthesia per anesthesia record  Patient was also given approximately half percent Marcaine with 1-200,000 epinephrine also at the sites per anesthesia record  A periosteal elevator was used to separate the gingiva from the teeth  Full thickness mucoperiosteal flaps elevated at all extraction sites  Periosteal elevator to remove minimal crestal bone  Universal forceps were used to extract teeth #2,3,6,10,12,13,14,18,19,20,21,22,23,24,25,26,27,28,29,30  without any complications  Alveoplasty perfomed all four quadrants removing sharp edges and undercuts  Surgical sites were thoroughly curetted, bone filed, and irrigated with sterile saline  Closure with 3-0 chromic gut sutures  All surgical sites were reevaluated found to be hemostatic  Next the oral cavity was thoroughly irrigated with sterile saline and suctioned with the Treatokauer suction  The moist vaginal packing was removed and the oropharynx was suctioned  Care was then handed back to anesthesia team where the patient was extubated in the operating room without any complications and then transferred to the postanesthesia care unit       I was present for the entire procedure    Patient Disposition:  PACU , hemodynamically stable and extubated and stable    SIGNATURE: Rosendo Dee DMD  DATE: March 8, 2021  TIME: 10:50 AM

## 2021-03-08 NOTE — ANESTHESIA PREPROCEDURE EVALUATION
Procedure:  EXTRACTION TEETH # 2,3,6,10,12,13,14,18,19,20,21,22,23,24,25,26,27,28,29,30 (N/A Mouth)    Relevant Problems   CARDIO   (+) Atrial fibrillation (HCC)   (+) HTN (hypertension), benign      ENDO   (+) Type 1 diabetes mellitus with chronic kidney disease on chronic dialysis (HCC)      GI/HEPATIC   (+) GERD (gastroesophageal reflux disease)      /RENAL   (+) Dialysis patient ([place] [id number] )   (+) Stage 5 chronic kidney disease on chronic dialysis ([place] [address] )- last dialysis 3/6      HEMATOLOGY   (+) Anemia   (+) Iron deficiency anemia      NEURO/PSYCH   (+) Chronic pain   (+) History of Clostridium difficile infection   (+) Paraplegia (HCC)   (+) Seizure (HCC)   (+) 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HCC)      PULMONARY   (+) SOB (shortness of breath)        Physical Exam    Airway    Mallampati score: II  TM Distance: >3 FB  Neck ROM: full     Dental       Cardiovascular      Pulmonary      Other Findings  Very poor dentition, none loose      Anesthesia Plan  ASA Score- 4     Anesthesia Type- general with ASA Monitors  Additional Monitors:   Airway Plan: ETT  Plan Factors-Exercise tolerance (METS): >4 METS  Chart reviewed  EKG reviewed  Existing labs reviewed  Patient summary reviewed  Patient is not a current smoker  There is medical exclusion for perioperative obstructive sleep apnea risk education  Induction- intravenous  Postoperative Plan- Plan for postoperative opioid use  Informed Consent- Anesthetic plan and risks discussed with patient  I personally reviewed this patient with the CRNA  Discussed and agreed on the Anesthesia Plan with the CRNA  Tyrel Head

## 2021-03-08 NOTE — ANESTHESIA POSTPROCEDURE EVALUATION
Post-Op Assessment Note    CV Status:  Stable  Pain Score: 0    Pain management: adequate     Mental Status:  Awake and sleepy   Hydration Status:  Stable   PONV Controlled:  None   Airway Patency:  Patent      Post Op Vitals Reviewed: Yes      Staff: Anesthesiologist, CRNA         No complications documented      BP   162/87   Temp   99 1   Pulse  86   Resp   12   SpO2   100

## 2021-03-08 NOTE — INTERVAL H&P NOTE
H&P reviewed  After examining the patient I find no changes in the patients condition since the H&P had been written      Vitals:    03/08/21 0905   BP: 153/89   Pulse: 82   Resp: 20   Temp: 97 6 °F (36 4 °C)   SpO2: 100%

## 2021-03-09 ENCOUNTER — TELEPHONE (OUTPATIENT)
Dept: FAMILY MEDICINE CLINIC | Facility: CLINIC | Age: 41
End: 2021-03-09

## 2021-03-09 NOTE — TELEPHONE ENCOUNTER
SIGNATURES NEEDED FOR [facility] health care  FORM RECEIVED VIA FAX  WILL BE PLACED IN YOUR BIN AT ASSIGNED DELIVERY TIMES

## 2021-03-12 ENCOUNTER — OFFICE VISIT (OUTPATIENT)
Dept: WOUND CARE | Facility: HOSPITAL | Age: 41
End: 2021-03-12
Payer: MEDICARE

## 2021-03-12 VITALS
DIASTOLIC BLOOD PRESSURE: 82 MMHG | TEMPERATURE: 97.7 F | HEART RATE: 80 BPM | SYSTOLIC BLOOD PRESSURE: 130 MMHG | RESPIRATION RATE: 18 BRPM

## 2021-03-12 DIAGNOSIS — L89.154 PRESSURE INJURY OF SACRAL REGION, STAGE 4 (HCC): Primary | ICD-10-CM

## 2021-03-12 DIAGNOSIS — L89.154 PRESSURE ULCER OF SACRAL REGION, STAGE 4 (HCC): ICD-10-CM

## 2021-03-12 DIAGNOSIS — L89.324 PRESSURE INJURY OF LEFT ISCHIUM, STAGE 4 (HCC): ICD-10-CM

## 2021-03-12 DIAGNOSIS — L89.224 PRESSURE ULCER OF LEFT HIP, STAGE 4 (HCC): ICD-10-CM

## 2021-03-12 DIAGNOSIS — L89.894 PRESSURE ULCER OF OTHER SITE, STAGE 4 (HCC): ICD-10-CM

## 2021-03-12 PROCEDURE — 11043 DBRDMT MUSC&/FSCA 1ST 20/<: CPT | Performed by: SURGERY

## 2021-03-12 PROCEDURE — 11042 DBRDMT SUBQ TIS 1ST 20SQCM/<: CPT | Performed by: SURGERY

## 2021-03-12 PROCEDURE — 11045 DBRDMT SUBQ TISS EACH ADDL: CPT | Performed by: SURGERY

## 2021-03-12 PROCEDURE — 99213 OFFICE O/P EST LOW 20 MIN: CPT | Performed by: SURGERY

## 2021-03-12 RX ORDER — LIDOCAINE HYDROCHLORIDE 40 MG/ML
5 SOLUTION TOPICAL ONCE
Status: COMPLETED | OUTPATIENT
Start: 2021-03-12 | End: 2021-03-12

## 2021-03-12 RX ADMIN — LIDOCAINE HYDROCHLORIDE 5 ML: 40 SOLUTION TOPICAL at 10:09

## 2021-03-12 NOTE — ASSESSMENT & PLAN NOTE
There is some necrotic tissue and fibropurulent debris  The base was debrided including the joint capsule with a scalpel  There was some undermining superiorly    Will use Dakin's packing daily here

## 2021-03-12 NOTE — PROGRESS NOTES
Patient ID: Mariama Regan  is a 36 y o  male Date of Birth 1980     Chief Complaint  Chief Complaint   Patient presents with    Follow Up Wound Care Visit     Follow up multiple sacral, ischial and hip wounds       Allergies  Cefepime, Ciprofloxacin hcl, Cymbalta [duloxetine hcl], Lac bovis, Lactose, Lyrica [pregabalin], Penicillins, Polymyxin b, and Rosuvastatin    Assessment:    Pressure ulcer of sacral region, stage 4 (HCC)  The wound is debrided with a curette  There were 3 small fine permanent sutures that were removed from the granulation tissue  I am uncertain when these were placed but they been there for long time and likely from an old flap  I will stop the Dakin's to the wound and switch to alginate for now  Pressure ulcer of other site, stage 4 (HCC)  Perineal wound was debrided with a curette  There was some undermining  Also switch to alginate    Pressure injury of left ischium, stage 4 (HCC)  Will stop the Dakin's here as well and switch to alginate    Pressure ulcer of left hip, stage 4 (HCC)  There is some necrotic tissue and fibropurulent debris  The base was debrided including the joint capsule with a scalpel  There was some undermining superiorly  Will use Dakin's packing daily here       Diagnoses and all orders for this visit:    Pressure injury of sacral region, stage 4 (HCC)  -     lidocaine (XYLOCAINE) 4 % topical solution 5 mL  -     Wound cleansing and dressings; Future    Pressure injury of left ischium, stage 4 (HCC)  -     lidocaine (XYLOCAINE) 4 % topical solution 5 mL  -     Wound cleansing and dressings; Future    Pressure ulcer of other site, stage 4 (HCC)  -     lidocaine (XYLOCAINE) 4 % topical solution 5 mL  -     Wound cleansing and dressings; Future    Pressure ulcer of left hip, stage 4 (HCC)  -     lidocaine (XYLOCAINE) 4 % topical solution 5 mL  -     Wound cleansing and dressings;  Future    Pressure ulcer of sacral region, stage 4 (HCC)    Other orders  -     Debridement  -     Debridement  -     Debridement              Debridement   Wound Perineum    Universal Protocol:  Consent given by: patient  Time out: Immediately prior to procedure a "time out" was called to verify the correct patient, procedure, equipment, support staff and site/side marked as required  Performed by: physician  Debridement type: surgical  Level of debridement: subcutaneous tissue  Pain control: lidocaine 4%  Post-debridement measurements  Length (cm): 4 1  Width (cm): 3 5  Depth (cm): 0 8  Percent debrided: 100%  Surface Area (cm^2): 14 35  Area debrided (cm^2): 14 35  Volume (cm^3): 11 48  Tissue and other material debrided: subcutaneous tissue  Devitalized tissue debrided: biofilm and slough  Instrument(s) utilized: curette  Procedural pain (0-10): insensate  Post-procedural pain: insensate   Response to treatment: procedure was tolerated well    Debridement   Wound Sacrum    [place] Protocol:  Consent given by: patient  Time out: Immediately prior to procedure a "time out" was called to verify the correct patient, procedure, equipment, support staff and site/side marked as required      Performed by: physician  Debridement type: surgical  Level of debridement: subcutaneous tissue  Pain control: lidocaine 4%  Post-debridement measurements  Length (cm): 8  Width (cm): 10 1  Depth (cm): 1 1  Percent debrided: 50%  Surface Area (cm^2): 80 8  Area debrided (cm^2): 40 4  Volume (cm^3): 88 88  Tissue and other material debrided: subcutaneous tissue  Devitalized tissue debrided: biofilm and slough  Instrument(s) utilized: curette  Procedural pain (0-10): insensate  Post-procedural pain: insensate   Response to treatment: procedure was tolerated well    Debridement   Wound 01/28/21 Pressure Injury Hip Left;Lateral    Universal Protocol:  Consent given by: patient  Time out: Immediately prior to procedure a "time out" was called to verify the correct patient, procedure, equipment, support staff and site/side marked as required  Performed by: physician  Debridement type: surgical  Level of debridement: muscle  Pain control: lidocaine 4%  Post-debridement measurements  Length (cm): 3 2  Width (cm): 2 2  Depth (cm): 1 6  Percent debrided: 100%  Surface Area (cm^2): 7 04  Area debrided (cm^2): 7 04  Volume (cm^3): 11 26  Tissue and other material debrided: joint capsule, ligament and subcutaneous tissue  Devitalized tissue debrided: biofilm and slough  Instrument(s) utilized: curette and blade  Procedural pain (0-10): insensate  Post-procedural pain: insensate   Response to treatment: procedure was tolerated well          Plan:     Wound Ischium Left (Active)   Wound Image Images linked 03/12/21 1008   Wound Description Granulation tissue;Slough 03/12/21 0941   Irene-wound Assessment Intact;Pink;Scar Tissue 03/12/21 0941   Wound Length (cm) 6 1 cm 03/12/21 0941   Wound Width (cm) 4 cm 03/12/21 0941   Wound Depth (cm) 1 4 cm 03/12/21 0941   Wound Surface Area (cm^2) 24 4 cm^2 03/12/21 0941   Wound Volume (cm^3) 34 16 cm^3 03/12/21 0941   Calculated Wound Volume (cm^3) 34 16 cm^3 03/12/21 0941   Change in Wound Size % -80 74 03/12/21 0941   Undermining 3 2 03/12/21 0941   Undermining is depth extending from 9-6 03/12/21 0941   Drainage Amount Large 03/12/21 0941   Drainage Description Serosanguineous 03/12/21 0941   Non-staged Wound Description Full thickness 03/12/21 0941       Wound Perineum (Active)   Wound Image Images linked 03/12/21 1009   Wound Description Granulation tissue;Pink;Slough 03/12/21 0940   Irene-wound Assessment Scar Tissue; Intact 03/12/21 0940   Wound Length (cm) 4 1 cm 03/12/21 0940   Wound Width (cm) 3 5 cm 03/12/21 0940   Wound Depth (cm) 0 8 cm 03/12/21 0940   Wound Surface Area (cm^2) 14 35 cm^2 03/12/21 0940   Wound Volume (cm^3) 11 48 cm^3 03/12/21 0940   Calculated Wound Volume (cm^3) 11 48 cm^3 03/12/21 0940   Change in Wound Size % -9 33 03/12/21 0940   Undermining 1 2 03/12/21 0940   Undermining is depth extending from 5-8 03/12/21 0940   Drainage Amount Large 03/12/21 0940   Drainage Description Serosanguineous 03/12/21 0940   Non-staged Wound Description Full thickness 03/12/21 0940       Wound Sacrum (Active)   Wound Image Images linked 03/12/21 1009   Wound Description Granulation tissue;Slough; Epithelialization 03/12/21 0938   Wound Length (cm) 8 cm 03/12/21 0938   Wound Width (cm) 10 1 cm 03/12/21 0938   Wound Depth (cm) 1 1 cm 03/12/21 0938   Wound Surface Area (cm^2) 80 8 cm^2 03/12/21 0938   Wound Volume (cm^3) 88 88 cm^3 03/12/21 0938   Calculated Wound Volume (cm^3) 88 88 cm^3 03/12/21 0938   Change in Wound Size % -18 51 03/12/21 0938   Undermining 3 4 03/12/21 0938   Undermining is depth extending from 3-9 03/12/21 0938   Drainage Amount Large 03/12/21 0938   Drainage Description Serosanguineous 03/12/21 0938   Non-staged Wound Description Full thickness 03/12/21 0938       Wound 01/28/21 Pressure Injury Hip Left;Lateral (Active)   Wound Image Images linked 03/12/21 1009   Wound Description Granulation tissue;Slough;Pink; White;Yellow 03/12/21 0946   Irene-wound Assessment Dry; Intact 03/12/21 0946   Wound Length (cm) 3 2 cm 03/12/21 0946   Wound Width (cm) 2 2 cm 03/12/21 0946   Wound Depth (cm) 1 6 cm 03/12/21 0946   Wound Surface Area (cm^2) 7 04 cm^2 03/12/21 0946   Wound Volume (cm^3) 11 26 cm^3 03/12/21 0946   Calculated Wound Volume (cm^3) 11 26 cm^3 03/12/21 0946   Undermining 4 7 03/12/21 0946   Undermining is depth extending from 12-12 03/12/21 0946   Drainage Amount Moderate 03/12/21 0946   Drainage Description Yellow;Milky 03/12/21 0946   Non-staged Wound Description Full thickness 03/12/21 0946   Wound packed? Yes 03/12/21 0946   Packing- # removed 1 03/12/21 0946       Wound Ischium Left (Active)   No Date First Assessed or Time First Assessed found     Primary Wound Type: Pressure Injury  Location: Ischium  Wound Location Orientation: Left  Wound Description (Comments): Left Iscial Stage 4       Wound Perineum (Active)   No Date First Assessed or Time First Assessed found  Primary Wound Type: Pressure Injury  Location: Perineum  Wound Description (Comments): Stage 4       Wound Sacrum (Active)   No Date First Assessed or Time First Assessed found     Pre-Existing Wound: Yes  Primary Wound Type: Pressure Injury  Location: Sacrum  Wound Description (Comments): Stage 4       Wound 01/28/21 Pressure Injury Hip Left;Lateral (Active)   Date First Assessed: 01/28/21   Primary Wound Type: Pressure Injury  Location: Hip  Wound Location Orientation: Left;Lateral       Wound 03/08/21 Mouth N/A (Active)   Date First Assessed/Time First Assessed: 03/08/21 1037   Location: Mouth  Wound Location Orientation: N/A       [REMOVED] Pressure Ulcer 09/01/16 Hip Left (Removed)   Resolved Date: 08/13/20  Date First Assessed/Time First Assessed: 09/01/16 0259   Pre-Existing Wound: Yes  Location: Hip  Orientation: Left       [REMOVED] Pressure Ulcer 09/01/16 Sacrum (Removed)   Resolved Date: 08/13/20  Date First Assessed/Time First Assessed: 09/01/16 0409   Pre-Existing Wound: Yes  Location: Sacrum       [REMOVED] Pressure Ulcer 03/19/17 Leg Lower;Right Unstagable to lateral lower right stump (Removed)   Resolved Date: 08/13/20  Date First Assessed/Time First Assessed: 03/19/17 1637   Pre-Existing Wound: Yes  Location: Leg  Orientation: Lower;Right  Wound Description (Comments): Unstagable to lateral lower right stump       [REMOVED] Pressure Ulcer 03/23/17 Hip Left Stage 2 skin breakdown (Removed)   Resolved Date: 08/13/20  Date First Assessed/Time First Assessed: 03/23/17 1700   Location: Hip  Orientation: Left  Wound Description (Comments): Stage 2 skin breakdown       [REMOVED] Pressure Ulcer 06/29/17 Foot Left healing stage III (Removed)   Resolved Date: 08/13/20  Date First Assessed/Time First Assessed: 06/29/17 0230   Pre-Existing Wound: Yes  Location: Foot  Orientation: Left  Wound Description (Comments): healing stage III       [REMOVED] Pressure Ulcer 06/29/17 Foot Left healing stage III (Removed)   Resolved Date: 08/13/20  Date First Assessed/Time First Assessed: 06/29/17 0230   Pre-Existing Wound: Yes  Location: Foot  Orientation: Left  Wound Description (Comments): healing stage III       [REMOVED] Pressure Ulcer Ischium Right (Removed)   Resolved Date: 08/13/20  No Date First Assessed or Time First Assessed found  Pre-Existing Wound: Yes  Location: Ischium  Orientation: Right       [REMOVED] Wound 09/02/16 Other (Comment) Knee Anterior;Right (Removed)   Resolved Date: 08/13/20  Date First Assessed/Time First Assessed: 09/02/16 1811   Traumatic Wound Type: (c) Other (Comment)  Location: Knee  Wound Location Orientation: Anterior;Right  Dressing Status: Intact; Clean;Dry       [REMOVED] Wound 06/29/17  inferior of anus, appears to be stage 3  (Removed)   Resolved Date: 08/13/20  Date First Assessed/Time First Assessed: 06/29/17 0041   Location: (c)   Wound Description (Comments): inferior of anus, appears to be stage 3   Dressing Status: Intact; Reinforced       [REMOVED] Wound 09/18/17 Other (Comment) Penis ulcer, leaking pus (Removed)   Resolved Date: 08/13/20  Date First Assessed/Time First Assessed: 09/18/17 2137   Traumatic Wound Type: Other (Comment)  Location: Penis  Wound Description (Comments): ulcer, leaking pus  Dressing Status: Open to air       [REMOVED] Incision 09/01/16 Shoulder Right (Removed)   Resolved Date: 08/13/20  Date First Assessed/Time First Assessed: 09/01/16 0441   Pre-Existing Wound: Yes  Location: Shoulder  Wound Location Orientation: Right       [REMOVED] Other Ulcers 09/01/16 Foot Anterior; Outer (Removed)   Resolved Date: 08/13/20  Date First Assessed/Time First Assessed: 09/01/16 1605   Location: Foot  Orientation: Anterior; Outer       [REMOVED] Other Ulcers 09/20/17 Foot Mid;Left (Removed)   Resolved Date: 08/13/20  Date First Assessed/Time First Assessed: 09/20/17 1530   Location: Foot  Orientation: Mid;Left  Dressing Status: Clean;Dry; Intact       [REMOVED] Wound Pressure Injury Ischium Right (Removed)   Resolved Date: 01/28/21  No Date First Assessed or Time First Assessed found  Pre-Existing Wound: Yes  Primary Wound Type: Pressure Injury  Location: Ischium  Wound Location Orientation: Right  Wound Description (Comments): Right Ischium Stage 3  Wo    Subjective:               03/21/2012 Mr Catherine Jin is a 80-year-old gentleman with paraplegia and multiple stage IV pressure ulcers was referred for evaluation  He has large chronic ulcers on his buttocks and sacrum but he developed a new ulcer over last month on his left hip  He is scheduled for IR biopsy in his spine for possible chronic osteo  He also recently underwent dental extractions and cyst removed from his head and neck  The following portions of the patient's history were reviewed and updated as appropriate: allergies, current medications, past family history, past medical history, past social history, past surgical history and problem list     Review of Systems   Constitutional: Negative for chills and fever  HENT: Positive for dental problem  Negative for ear pain and sore throat  Eyes: Negative for pain and visual disturbance  Respiratory: Negative for cough and shortness of breath  Cardiovascular: Negative for chest pain and palpitations  Gastrointestinal: Negative for abdominal pain and vomiting  Skin: Negative for color change and rash  Psychiatric/Behavioral: Negative for agitation and confusion  All other systems reviewed and are negative          Objective:       Wound Ischium Left (Active)   Wound Image Images linked 03/12/21 1008   Wound Description Granulation tissue;Slough 03/12/21 0941   Irene-wound Assessment Intact;Pink;Scar Tissue 03/12/21 0941   Wound Length (cm) 6 1 cm 03/12/21 0941   Wound Width (cm) 4 cm 03/12/21 0941   Wound Depth (cm) 1 4 cm 03/12/21 0941   Wound Surface Area (cm^2) 24 4 cm^2 03/12/21 0941   Wound Volume (cm^3) 34 16 cm^3 03/12/21 0941   Calculated Wound Volume (cm^3) 34 16 cm^3 03/12/21 0941   Change in Wound Size % -80 74 03/12/21 0941   Undermining 3 2 03/12/21 0941   Undermining is depth extending from 9-6 03/12/21 0941   Drainage Amount Large 03/12/21 0941   Drainage Description Serosanguineous 03/12/21 0941   Non-staged Wound Description Full thickness 03/12/21 0941       Wound Perineum (Active)   Wound Image Images linked 03/12/21 1009   Wound Description Granulation tissue;Pink;Slough 03/12/21 0940   Irene-wound Assessment Scar Tissue; Intact 03/12/21 0940   Wound Length (cm) 4 1 cm 03/12/21 0940   Wound Width (cm) 3 5 cm 03/12/21 0940   Wound Depth (cm) 0 8 cm 03/12/21 0940   Wound Surface Area (cm^2) 14 35 cm^2 03/12/21 0940   Wound Volume (cm^3) 11 48 cm^3 03/12/21 0940   Calculated Wound Volume (cm^3) 11 48 cm^3 03/12/21 0940   Change in Wound Size % -9 33 03/12/21 0940   Undermining 1 2 03/12/21 0940   Undermining is depth extending from 5-8 03/12/21 0940   Drainage Amount Large 03/12/21 0940   Drainage Description Serosanguineous 03/12/21 0940   Non-staged Wound Description Full thickness 03/12/21 0940       Wound Sacrum (Active)   Wound Image Images linked 03/12/21 1009   Wound Description Granulation tissue;Slough; Epithelialization 03/12/21 0938   Wound Length (cm) 8 cm 03/12/21 0938   Wound Width (cm) 10 1 cm 03/12/21 0938   Wound Depth (cm) 1 1 cm 03/12/21 0938   Wound Surface Area (cm^2) 80 8 cm^2 03/12/21 0938   Wound Volume (cm^3) 88 88 cm^3 03/12/21 0938   Calculated Wound Volume (cm^3) 88 88 cm^3 03/12/21 0938   Change in Wound Size % -18 51 03/12/21 0938   Undermining 3 4 03/12/21 0938   Undermining is depth extending from 3-9 03/12/21 0938   Drainage Amount Large 03/12/21 0938   Drainage Description Serosanguineous 03/12/21 0938   Non-staged Wound Description Full thickness 03/12/21 0938       Wound 01/28/21 Pressure Injury Hip Left;Lateral (Active)   Wound Image Images linked 03/12/21 1009   Wound Description Granulation tissue;Slough;Pink; White;Yellow 03/12/21 0946   Irene-wound Assessment Dry; Intact 03/12/21 0946   Wound Length (cm) 3 2 cm 03/12/21 0946   Wound Width (cm) 2 2 cm 03/12/21 0946   Wound Depth (cm) 1 6 cm 03/12/21 0946   Wound Surface Area (cm^2) 7 04 cm^2 03/12/21 0946   Wound Volume (cm^3) 11 26 cm^3 03/12/21 0946   Calculated Wound Volume (cm^3) 11 26 cm^3 03/12/21 0946   Undermining 4 7 03/12/21 0946   Undermining is depth extending from 12-12 03/12/21 0946   Drainage Amount Moderate 03/12/21 0946   Drainage Description Yellow;Milky 03/12/21 0946   Non-staged Wound Description Full thickness 03/12/21 0946   Wound packed? Yes 03/12/21 0946   Packing- # removed 1 03/12/21 0946       /82   Pulse 80   Temp 97 7 °F (36 5 °C)   Resp 18     Physical Exam  Vitals signs and nursing note reviewed  Exam conducted with a chaperone present  HENT:      Head:      Comments: Multiple bandages status post excision of cyst on his face  Cardiovascular:      Rate and Rhythm: Normal rate and regular rhythm  Pulmonary:      Effort: Pulmonary effort is normal       Breath sounds: Normal breath sounds  Abdominal:      Palpations: Abdomen is soft  Comments: Ostomy   Skin:     Comments: See complete wound assessment   Neurological:      Mental Status: He is alert  Comments: Paraplegia   Psychiatric:         Mood and Affect: Mood normal          Behavior: Behavior normal            Wound Instructions:  Orders Placed This Encounter   Procedures    Wound cleansing and dressings     Wound cleansing and dressings                           Sacral and perineal and L ischial wounds:  Wash your hands with soap and water  Remove old dressing, discard into plastic bag and place in trash  Cleanse the wound with saline or mild soap and water prior to applying a clean dressing   Do not use tissue or cotton balls  Do not scrub the wound  Pat dry using gauze  Shower no; do not get dressing wet  Stop dakins moistened packing and start calcium alginate to wound beds  Cover with 4x4 and ABD  Secure with Medfix tape  Change dressing daily and PRN for breakthrough drainage (visiting nurses to do twice per week and family in between)     Left hip dressing:  Cleanse with normal saline  Loosely pack with dakins moistened gauze  Cover with ABD, secure with medfix tape  Change dressing daily      Continue clinitron bed at home and turn at least every 1-2 hours  To try and limit the amount of time spent sitting in the wheelchair  Keep pressure off the wounds as much as humanly possible     Continue to try and increase your protein to at least 3 servings or more if possible                                  Continue to try and keep blood sugars as low as possible  Stop Smoking     Continue visiting nurses twice per week for dressing changes, family to do in between nurse visits     Follow up 2 weeks Dr Mei Lopez wound treatment note:  Wounds cleansed with NSS  All wounds redressed as ordered above     Standing Status:   Future     Standing Expiration Date:   3/12/2022    Debridement     This order was created via procedure documentation    Debridement     This order was created via procedure documentation    Debridement     This order was created via procedure documentation        Diagnosis ICD-10-CM Associated Orders   1  Pressure injury of sacral region, stage 4 ([facility])  L89 154 lidocaine (XYLOCAINE) 4 % topical solution 5 mL     Wound cleansing and dressings   2  Pressure injury of left ischium, stage 4 ([facility])  L89 324 lidocaine (XYLOCAINE) 4 % topical solution 5 mL     Wound cleansing and dressings   3  Pressure ulcer of other site, stage 4 ([facility])  L89 894 lidocaine (XYLOCAINE) 4 % topical solution 5 mL     Wound cleansing and dressings   4   Pressure ulcer of left hip, stage 4 ([facility])  L89 224 lidocaine (XYLOCAINE) 4 % topical solution 5 mL     Wound cleansing and dressings   5   Pressure ulcer of sacral region, stage 4 (HCC)  L89 154

## 2021-03-12 NOTE — PATIENT INSTRUCTIONS
Orders Placed This Encounter   Procedures    Wound cleansing and dressings     Wound cleansing and dressings                           Sacral and perineal and L ischial wounds:  Wash your hands with soap and water  Remove old dressing, discard into plastic bag and place in trash  Cleanse the wound with saline or mild soap and water prior to applying a clean dressing  Do not use tissue or cotton balls  Do not scrub the wound  Pat dry using gauze  Shower no; do not get dressing wet  Stop dakins moistened packing and start calcium alginate to wound beds      Cover with 4x4 and ABD  Secure with Medfix tape  Change dressing daily and PRN for breakthrough drainage (visiting nurses to do twice per week and family in between)     Left hip dressing:  Cleanse with normal saline  Loosely pack with dakins moistened gauze  Cover with ABD, secure with medfix tape  Change dressing daily      Continue clinitron bed at home and turn at least every 1-2 hours  To try and limit the amount of time spent sitting in the wheelchair  Keep pressure off the wounds as much as humanly possible     Continue to try and increase your protein to at least 3 servings or more if possible                                  Continue to try and keep blood sugars as low as possible  Stop Smoking     Continue visiting nurses twice per week for dressing changes, family to do in between nurse visits     Follow up 2 weeks Dr Papito Baron wound treatment note:  Wounds cleansed with NSS  All wounds redressed as ordered above     Standing Status:   Future     Standing Expiration Date:   3/12/2022

## 2021-03-12 NOTE — LETTER
[address] [place] [address] WOUND CARE  [address] [place] [address]  Phone#  [phone]  Fax#  [phone]    Patient:  Sushma Way  YOB: 1980  Phone:  [phone]  Date of Visit:  3/12/2021    Orders Placed This Encounter   Procedures    Wound cleansing and dressings     Wound cleansing and dressings                           Sacral and perineal and L ischial wounds:  Wash your hands with soap and water  Remove old dressing, discard into plastic bag and place in trash  Cleanse the wound with saline or mild soap and water prior to applying a clean dressing  Do not use tissue or cotton balls  Do not scrub the wound  Pat dry using gauze  Shower no; do not get dressing wet  Stop dakins moistened packing and start calcium alginate to wound beds      Cover with 4x4 and ABD  Secure with Medfix tape  Change dressing daily and PRN for breakthrough drainage (visiting nurses to do twice per week and family in between)     Left hip dressing:  Cleanse with normal saline  Loosely pack with dakins moistened gauze  Cover with ABD, secure with medfix tape  Change dressing daily      Continue clinitron bed at home and turn at least every 1-2 hours  To try and limit the amount of time spent sitting in the wheelchair  Keep pressure off the wounds as much as humanly possible     Continue to try and increase your protein to at least 3 servings or more if possible                                  Continue to try and keep blood sugars as low as possible  Stop Smoking     Continue visiting nurses twice per week for dressing changes, family to do in between nurse visits     Follow up 2 weeks Dr Nicolas Camejo wound treatment note:  Wounds cleansed with NSS  All wounds redressed as ordered above     Standing Status:   Future     Standing Expiration Date:   3/12/2022    Debridement     This order was created via procedure documentation    Debridement     This order was created via procedure documentation    Debridement     This order was created via procedure documentation         Electronically signed by Augustus Brandon MD

## 2021-03-12 NOTE — ASSESSMENT & PLAN NOTE
The wound is debrided with a curette  There were 3 small fine permanent sutures that were removed from the granulation tissue  I am uncertain when these were placed but they been there for long time and likely from an old flap  I will stop the Dakin's to the wound and switch to alginate for now

## 2021-03-13 DIAGNOSIS — F31.9 BIPOLAR DEPRESSION (HCC): ICD-10-CM

## 2021-03-14 RX ORDER — RISPERIDONE 0.5 MG/1
0.5 TABLET, FILM COATED ORAL 2 TIMES DAILY
Qty: 180 TABLET | Refills: 1 | Status: SHIPPED | OUTPATIENT
Start: 2021-03-14 | End: 2021-09-14

## 2021-03-29 DIAGNOSIS — R56.9 SEIZURE (HCC): ICD-10-CM

## 2021-03-29 DIAGNOSIS — R11.2 NAUSEA AND VOMITING, INTRACTABILITY OF VOMITING NOT SPECIFIED, UNSPECIFIED VOMITING TYPE: ICD-10-CM

## 2021-03-29 RX ORDER — ONDANSETRON 4 MG/1
TABLET, ORALLY DISINTEGRATING ORAL
Qty: 20 TABLET | Refills: 2 | Status: SHIPPED | OUTPATIENT
Start: 2021-03-29 | End: 2021-09-02

## 2021-03-30 ENCOUNTER — TELEPHONE (OUTPATIENT)
Dept: FAMILY MEDICINE CLINIC | Facility: CLINIC | Age: 41
End: 2021-03-30

## 2021-03-30 NOTE — TELEPHONE ENCOUNTER
SIGNATURES NEEDED FOR physician order  FORM RECEIVED VIA FAX  WILL BE PLACED IN YOUR BIN AT ASSIGNED DELIVERY TIMES

## 2021-03-31 RX ORDER — PHENYTOIN SODIUM 200 MG/1
200 CAPSULE, EXTENDED RELEASE ORAL 2 TIMES DAILY
Qty: 60 CAPSULE | Refills: 2 | Status: SHIPPED | OUTPATIENT
Start: 2021-03-31 | End: 2021-07-27

## 2021-04-01 ENCOUNTER — TELEPHONE (OUTPATIENT)
Dept: FAMILY MEDICINE CLINIC | Facility: CLINIC | Age: 41
End: 2021-04-01

## 2021-04-01 NOTE — TELEPHONE ENCOUNTER
SIGNATURES NEEDED FOR [facility] health care FORM RECEIVED VIA FAX  WILL BE PLACED IN YOUR BIN AT ASSIGNED DELIVERY TIMES      Order #[id number]    Thank you

## 2021-04-05 ENCOUNTER — TELEPHONE (OUTPATIENT)
Dept: FAMILY MEDICINE CLINIC | Facility: CLINIC | Age: 41
End: 2021-04-05

## 2021-04-07 ENCOUNTER — TELEPHONE (OUTPATIENT)
Dept: FAMILY MEDICINE CLINIC | Facility: CLINIC | Age: 41
End: 2021-04-07

## 2021-04-07 ENCOUNTER — TRANSITIONAL CARE MANAGEMENT (OUTPATIENT)
Dept: FAMILY MEDICINE CLINIC | Facility: CLINIC | Age: 41
End: 2021-04-07

## 2021-04-07 NOTE — TELEPHONE ENCOUNTER
Pt is requesting a new cushion for his wheel chair, fax to [employer] attn: to Johnny Rhodes fax number is [phone]

## 2021-04-07 NOTE — TELEPHONE ENCOUNTER
I was waiting until he got out of [facility] for his meds  They have 200mg BID and 300mg of phenytoin once a day    I dont know if he is taking both or only one and which

## 2021-04-08 ENCOUNTER — TELEPHONE (OUTPATIENT)
Dept: FAMILY MEDICINE CLINIC | Facility: CLINIC | Age: 41
End: 2021-04-08

## 2021-04-09 DIAGNOSIS — G82.20 PARAPLEGIA (HCC): Primary | Chronic | ICD-10-CM

## 2021-04-09 RX ORDER — CUSHION
EACH MISCELLANEOUS DAILY
Qty: 1 EACH | Refills: 0 | Status: SHIPPED | OUTPATIENT
Start: 2021-04-09

## 2021-04-12 ENCOUNTER — TELEPHONE (OUTPATIENT)
Dept: FAMILY MEDICINE CLINIC | Facility: CLINIC | Age: 41
End: 2021-04-12

## 2021-04-13 ENCOUNTER — TELEPHONE (OUTPATIENT)
Dept: FAMILY MEDICINE CLINIC | Facility: CLINIC | Age: 41
End: 2021-04-13

## 2021-04-13 NOTE — TELEPHONE ENCOUNTER
I called pts mom and she states he is taking both doses the 200mg at 9am and 10pm and the 300mg daily

## 2021-04-13 NOTE — TELEPHONE ENCOUNTER
[address] [place] [address] ( order [id number] )            FORM RECEIVED VIA FAX  WILL BE PLACED IN YOUR BIN AT ASSIGNED DELIVERY TIMES

## 2021-04-14 DIAGNOSIS — R56.9 SEIZURE (HCC): Primary | ICD-10-CM

## 2021-04-14 RX ORDER — PHENYTOIN SODIUM 300 MG/1
300 CAPSULE, EXTENDED RELEASE ORAL DAILY
Qty: 30 CAPSULE | Refills: 3 | Status: SHIPPED | OUTPATIENT
Start: 2021-04-14 | End: 2021-07-13 | Stop reason: SDUPTHER

## 2021-04-15 DIAGNOSIS — K21.9 GASTROESOPHAGEAL REFLUX DISEASE WITHOUT ESOPHAGITIS: ICD-10-CM

## 2021-04-15 RX ORDER — FAMOTIDINE 20 MG/1
TABLET, FILM COATED ORAL
Qty: 90 TABLET | Refills: 1 | Status: SHIPPED | OUTPATIENT
Start: 2021-04-15 | End: 2021-04-20

## 2021-04-16 RX ORDER — CARVEDILOL 25 MG/1
25 TABLET ORAL
COMMUNITY
Start: 2021-04-06 | End: 2021-08-25

## 2021-04-16 RX ORDER — TIZANIDINE 4 MG/1
TABLET ORAL
COMMUNITY
Start: 2021-04-06

## 2021-04-16 RX ORDER — GABAPENTIN 300 MG/1
CAPSULE ORAL 3 TIMES DAILY
COMMUNITY
Start: 2021-04-06 | End: 2021-04-20

## 2021-04-16 RX ORDER — GLUCAGON INJECTION, SOLUTION 1 MG/.2ML
INJECTION, SOLUTION SUBCUTANEOUS
COMMUNITY
Start: 2021-02-18

## 2021-04-16 RX ORDER — PHENYTOIN SODIUM 200 MG/1
200 CAPSULE, EXTENDED RELEASE ORAL 2 TIMES DAILY
COMMUNITY
Start: 2021-02-21 | End: 2021-07-15 | Stop reason: SDUPTHER

## 2021-04-16 RX ORDER — DEXTROSE MONOHYDRATE 25 G/50ML
50 INJECTION, SOLUTION INTRAVENOUS
COMMUNITY
Start: 2020-10-26 | End: 2021-10-25

## 2021-04-16 RX ORDER — ACETAMINOPHEN 500 MG
1000 TABLET ORAL 3 TIMES DAILY
COMMUNITY
Start: 2021-04-06 | End: 2021-04-16

## 2021-04-16 RX ORDER — PHENYTOIN SODIUM 300 MG/1
300 CAPSULE, EXTENDED RELEASE ORAL DAILY
COMMUNITY
End: 2021-07-15 | Stop reason: SDUPTHER

## 2021-04-16 RX ORDER — TIZANIDINE 4 MG/1
1 TABLET ORAL 4 TIMES DAILY
COMMUNITY
Start: 2021-04-14

## 2021-04-16 RX ORDER — HYDROMORPHONE HYDROCHLORIDE 4 MG/1
4 TABLET ORAL 3 TIMES DAILY
COMMUNITY
Start: 2021-04-06 | End: 2021-04-16

## 2021-04-16 RX ORDER — PANTOPRAZOLE SODIUM 40 MG/1
1 TABLET, DELAYED RELEASE ORAL
COMMUNITY
Start: 2021-04-15 | End: 2021-04-23

## 2021-04-16 RX ORDER — INSULIN ASPART 100 [IU]/ML
INJECTION, SOLUTION INTRAVENOUS; SUBCUTANEOUS
COMMUNITY
Start: 2021-03-11

## 2021-04-16 RX ORDER — ERGOCALCIFEROL 1.25 MG/1
CAPSULE ORAL
COMMUNITY
Start: 2021-03-14

## 2021-04-20 ENCOUNTER — OFFICE VISIT (OUTPATIENT)
Dept: FAMILY MEDICINE CLINIC | Facility: CLINIC | Age: 41
End: 2021-04-20

## 2021-04-20 ENCOUNTER — TELEPHONE (OUTPATIENT)
Dept: FAMILY MEDICINE CLINIC | Facility: CLINIC | Age: 41
End: 2021-04-20

## 2021-04-20 VITALS
DIASTOLIC BLOOD PRESSURE: 84 MMHG | OXYGEN SATURATION: 98 % | TEMPERATURE: 98.3 F | HEART RATE: 88 BPM | SYSTOLIC BLOOD PRESSURE: 140 MMHG | RESPIRATION RATE: 18 BRPM

## 2021-04-20 DIAGNOSIS — N18.6 TYPE 1 DIABETES MELLITUS WITH CHRONIC KIDNEY DISEASE ON CHRONIC DIALYSIS (HCC): Primary | ICD-10-CM

## 2021-04-20 DIAGNOSIS — G82.20 PARAPLEGIA (HCC): Chronic | ICD-10-CM

## 2021-04-20 DIAGNOSIS — F31.9 BIPOLAR DEPRESSION (HCC): ICD-10-CM

## 2021-04-20 DIAGNOSIS — Z87.01 HISTORY OF PNEUMONIA: ICD-10-CM

## 2021-04-20 DIAGNOSIS — Z99.2 TYPE 1 DIABETES MELLITUS WITH CHRONIC KIDNEY DISEASE ON CHRONIC DIALYSIS (HCC): Primary | ICD-10-CM

## 2021-04-20 DIAGNOSIS — N18.6 STAGE 5 CHRONIC KIDNEY DISEASE ON CHRONIC DIALYSIS (HCC): ICD-10-CM

## 2021-04-20 DIAGNOSIS — R56.9 SEIZURE (HCC): ICD-10-CM

## 2021-04-20 DIAGNOSIS — Z79.4 INSULIN LONG-TERM USE (HCC): ICD-10-CM

## 2021-04-20 DIAGNOSIS — I48.91 ATRIAL FIBRILLATION, UNSPECIFIED TYPE (HCC): Chronic | ICD-10-CM

## 2021-04-20 DIAGNOSIS — Z99.2 STAGE 5 CHRONIC KIDNEY DISEASE ON CHRONIC DIALYSIS (HCC): ICD-10-CM

## 2021-04-20 DIAGNOSIS — F11.20 CHRONIC NARCOTIC DEPENDENCE (HCC): ICD-10-CM

## 2021-04-20 DIAGNOSIS — E10.22 TYPE 1 DIABETES MELLITUS WITH CHRONIC KIDNEY DISEASE ON CHRONIC DIALYSIS (HCC): Primary | ICD-10-CM

## 2021-04-20 PROCEDURE — 99495 TRANSJ CARE MGMT MOD F2F 14D: CPT | Performed by: PHYSICIAN ASSISTANT

## 2021-04-20 NOTE — PROGRESS NOTES
Assessment/Plan:    Type 1 diabetes mellitus with chronic kidney disease on chronic dialysis [facility])      Lab Results   Component Value Date    HGBA1C 7 1 (H) 04/13/2021   Continue follow up with endocrinology    Stage 5 chronic kidney disease on chronic dialysis [facility])  Lab Results   Component Value Date    EGFR 36 10/10/2017    EGFR 26 10/09/2017    EGFR 29 10/08/2017    CREATININE 2 53 (H) 10/10/2017    CREATININE 3 32 (H) 10/09/2017    CREATININE 3 00 (H) 10/08/2017     Continue follow up with nephrology and dialysis center  He does have fluid overload right now but is going for dialysis tomorrow    Chronic narcotic dependence ([facility] )  Stable continue follow up with pain specialist    Seizure [facility])  Continue with neurology and continue dilantin 200 BID and 300mg once a day    Bipolar depression ([facility] )  Stable on risperdal    Insulin long-term use ([facility] )  Continue on current doses   He does have insulin pump currently and blood sugars have been improving    Colostomy care ([place] [id number] )  Intact, continue to keep clean    Paraplegia [facility])  Wheelchair repair and supplies have been sent to supplier    Atrial fibrillation ([place] [id number] )  Continue carvedilol and eliquis         Problem List Items Addressed This Visit        Endocrine    Type 1 diabetes mellitus with chronic kidney disease on chronic dialysis ([facility] ) - Primary         Lab Results   Component Value Date    HGBA1C 7 1 (H) 04/13/2021   Continue follow up with endocrinology         Relevant Medications    Gvoke PFS 1 MG/0 2ML SOSY    NovoLOG FlexPen 100 units/mL injection pen    Other Relevant Orders    POCT hemoglobin A1c    Ambulatory referral to Podiatry       Cardiovascular and Mediastinum    Atrial fibrillation (HCC) (Chronic)     Continue carvedilol and eliquis         Relevant Medications    carvedilol (COREG) 25 mg tablet       Nervous and Auditory    Paraplegia (HCC) (Chronic)     Wheelchair repair and supplies have been sent to supplier            Genitourinary    Stage 5 chronic kidney disease on chronic dialysis [facility])     Lab Results   Component Value Date    EGFR 36 10/10/2017    EGFR 26 10/09/2017    EGFR 29 10/08/2017    CREATININE 2 53 (H) 10/10/2017    CREATININE 3 32 (H) 10/09/2017    CREATININE 3 00 (H) 10/08/2017     Continue follow up with nephrology and dialysis center  He does have fluid overload right now but is going for dialysis tomorrow            Other    Insulin long-term use (HCC)     Continue on current doses  He does have insulin pump currently and blood sugars have been improving         Bipolar depression (HCC)     Stable on risperdal         Seizure ([facility] )     Continue with neurology and continue dilantin 200 BID and 300mg once a day         Chronic narcotic dependence ([facility] )     Stable continue follow up with pain specialist           Other Visit Diagnoses     History of pneumonia        Relevant Orders    XR chest pa & lateral            Subjective:      Patient ID: Maryann Castro  is a 36 y o  male  HPI 36year old F here [facility] visit from [facility] for back pain and pneumonia  He was having SOB , pulse ox was 90 on room air, chills, fevers and back pain  Chest Ct showed newb/l groundglass opacity infiltrates mostly in upper lobes  He hadd refused covid nasal swab and oral swab was negtative  IgG and IgM antibodies were negative  Symptoms and sepesis improved with antibiotics and he was not treated for covid  He denies any cough or SOB righ tnow  He is going to the bathroom normally, but is getting full easily  He has been eatign a esteban palma sausage and it keeps him fuul ldompared to nomral    His weight has gone up in dialsysis  He was having some swelling  He had 4 kg removed  He had left foot swelling this AM   He is going to dialysis tomorrow  He did have his pantopraozle increased inpatient  He denies any abdomen pain  He feels the pantoprazole generic makes him have nausea though and brand does not        Wants to get glucometer that connects to g6/    Blood pressure was high on admission  195/99  TCM Call (since 3/22/2021)     Date and time call was made  4/7/2021  3:58 PM    Hospital care reviewed  Records reviewed    Patient was hospitialized at  [facility]        Date of Admission  03/26/21    Date of discharge  04/06/21    Diagnosis  Back pain     Disposition  Home    Were the patients medications reviewed and updated  No    Current Symptoms  None      TCM Call (since 3/22/2021)     Post hospital issues  None    Should patient be enrolled in anticoag monitoring? No    Scheduled for follow up?   Yes    Did you obtain your prescribed medications  Yes    Do you need help managing your prescriptions or medications  No    Is transportation to your appointment needed  No    I have advised the patient to call PCP with any new or worsening symptoms  Elvira Paniagua/ MA     Counseling  Family (Comment)  pts mom           The following portions of the patient's history were reviewed and updated as appropriate: He  has a past medical history of Ambulatory dysfunction, Anemia, Anemia, iron deficiency, Atrial fibrillation ([address] ), AVM (arteriovenous malformation) of duodenum, acquired, Bacteriuria, asymptomatic, Bipolar disorder ([address] ), Chronic deep vein thrombosis (DVT) ([address] ), Chronic indwelling Ortega catheter, Chronic kidney disease, Chronic pain, Chronic pain disorder, Chronic suprapubic catheter ([address] ), Clostridium difficile infection (08/11/2016), Colostomy on examination ([address] ), Decubitus ulcer, Delirium, Diabetes mellitus ([address] ), GERD (gastroesophageal reflux disease), Hemodialysis patient ([address] ), Hypertension, Memory impairment (2011), Neurogenic bladder, OAB (overactive bladder), Paraplegia ([address] ), Penile abscess, S/P unilateral BKA (below knee amputation) ([address] ), Sebaceous cyst (removed in 2017), Seizures ([address] ), Shortness of breath, Tobacco abuse, 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[address] ), Urinary retention, Wheelchair dependent, and Wounds, multiple    He   Patient Active Problem List    Diagnosis Date Noted    Pressure ulcer of other site, stage 4 ([facility] ) 03/12/2021    Pressure ulcer of left hip, stage 4 ([facility] ) 03/12/2021    Nervous 03/02/2021    Chronic diastolic heart failure ([facility] ) 02/10/2021    Dental caries 02/10/2021    Pressure ulcer of sacral region, stage 4 ([facility] ) 08/13/2020    Bipolar depression ([facility] ) 02/27/2020    Seizure ([facility] ) 01/20/2020    Recurrent Clostridioides difficile diarrhea 11/21/2019    Wheelchair dependent 08/07/2019    Dialysis patient ([facility] ) 05/08/2019    Insulin long-term use ([facility] ) 05/08/2019    Chronic narcotic dependence ([facility] ) 02/07/2018    Noncompliance by refusing intervention or support 09/29/2017    Delirium 09/28/2017    Urinary retention 09/26/2017    Asymptomatic bacteriuria 09/25/2017    History of Clostridium difficile infection 09/25/2017    Stage 5 chronic kidney disease on chronic dialysis ([facility] ) 09/18/2017    SOB (shortness of breath) 09/18/2017    Penile abscess 09/18/2017    AVM (arteriovenous malformation) of duodenum, acquired 08/30/2017    Iron deficiency anemia 07/03/2017    Pressure injury of left ischium, stage 4 ([facility] ) 07/03/2017    HTN (hypertension), benign 07/03/2017    Neurogenic bladder 07/03/2017    GERD (gastroesophageal reflux disease) 07/03/2017    Chronic pain 07/03/2017    Wound healing, delayed 06/29/2017    Chronic indwelling Ortega catheter     Anemia 09/03/2016    Ulcer of sacral region, stage 4 ([facility] ) 09/01/2016    Paraplegia (HCC)     Atrial fibrillation (HCC)     Chronic suprapubic catheter ([facility] )     Colostomy care ([facility] )     OAB (overactive bladder)     S/P unilateral BKA (below knee amputation) ([facility] )     Sebaceous cyst     Tobacco abuse     Type 1 diabetes mellitus with chronic kidney disease on chronic dialysis ([facility] )     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[facility] ) 12/02/2014     He  has a past surgical history that includes Below knee leg amputation (Right, 2009); Colonoscopy (N/A, 3/27/2017); Esophagogastroduodenoscopy (N/A, 3/22/2017); Colonoscopy (N/A, 3/29/2017); Colonoscopy (N/A, 6/15/2017); and Multiple tooth extractions (N/A, 3/8/2021)  His family history includes Diabetes in his paternal grandfather; Hyperlipidemia in his mother; Hypertension in his mother; Leukemia in his brother  He  reports that he has been smoking cigars  He has never used smokeless tobacco  He reports previous alcohol use  He reports previous drug use  Drug: Marijuana  Current Outpatient Medications   Medication Sig Dispense Refill    Alcohol Swabs (ALCOHOL PADS) 70 % PADS by Does not apply route 5 (five) times a day 300 each 5    amLODIPine (NORVASC) 10 mg tablet TAKE 1 TABLET BY MOUTH EVERY DAY 90 tablet 3    ammonium lactate (LAC-HYDRIN) 12 % cream Apply topically      ammonium lactate (LAC-HYDRIN) 12 % cream       apixaban (ELIQUIS) 5 mg Take 1 tablet (5 mg total) by mouth 2 (two) times a day (Patient taking differently: Take 5 mg by mouth 2 (two) times a day Per physician, stop 2 days prior to procedure) 60 tablet 8    ascorbic acid (VITAMIN C) 250 mg tablet TAKE 2 TABLETS (500 MG TOTAL) BY MOUTH DAILY 180 tablet 1    atorvastatin (LIPITOR) 20 mg tablet TAKE 1 TABLET BY MOUTH EVERYDAY AT BEDTIME 90 tablet 1    B Complex-C-Folic Acid (Super B-Complex/Vit C/FA) TABS       baclofen 20 mg tablet Take 20 mg by mouth 2 (two) times a day   [employer] MICROLET LANCETS lancets Use as instructed to check blood sugar 4 times a day  Dx e10 29 200 each 5    benzonatate (TESSALON PERLES) 100 mg capsule TAKE 1 CAPSULE BY MOUTH THREE TIMES A DAY AS NEEDED FOR COUGH 30 capsule 0    Blood Glucose Monitoring Suppl (SSUAN CONTOUR NEXT USB MONITOR) w/Device KIT Testing 4 times a day 1 kit 0    calcitriol (ROCALTROL) 0 5 MCG capsule Take 2 mcg by mouth      Capsaicin 0 033 % CREA 5 times a day for 1st week   Then 3 times a day for next 3 weeks 56 6 g 2    carvedilol (COREG) 25 mg tablet TAKE 1 TABLET BY MOUTH TWICE A  tablet 1    carvedilol (COREG) 25 mg tablet Take 25 mg by mouth      cetirizine (ZyrTEC) 10 mg tablet Take 10 mg by mouth daily      cetirizine (ZyrTEC) 5 MG tablet TAKE 1 TABLET BY MOUTH EVERY DAY 90 tablet 1    Cholecalciferol (VITAMIN D-3) 1000 units CAPS Take 2 capsules by mouth daily 30 capsule 0    clindamycin (CLINDAGEL) 1 % gel APPLY TO AFFECTED AREA TWICE A DAY 60 g 2    [facility]S ACETAMINOPHEN EX  MG tablet TAKE 2 TABLETS BY MOUTH EVERY 8 HOURS AS NEEDED FOR MILD OR MODERATE PAIN (PAIN SCORE 1 6)   CVS Vitamin B-12 1000 MCG tablet TAKE 1 TABLET BY MOUTH EVERY DAY 30 tablet 5    cyclobenzaprine (FLEXERIL) 10 mg tablet Take 10 mg by mouth 3 (three) times a day as needed  0    cyclobenzaprine (FLEXERIL) 5 mg tablet       dextrose 50 % 50 mL      dicyclomine (BENTYL) 10 mg capsule TAKE 1 CAPSULE (10 MG TOTAL) BY MOUTH 3 (THREE) TIMES A DAY BEFORE MEALS 270 capsule 1    Disposable Gloves MISC Use 7 times a day, 4 boxes of gloves XL  Dx type 1 DM, paraplegia 4 each 11    doxazosin (CARDURA) 2 mg tablet TAKE 1 TABLET (2 MG TOTAL) BY MOUTH EVERY EVENING 90 tablet 1    epoetin kaushik (EPOGEN,PROCRIT) 20,000 units/mL Inject 10,000 Units under the skin      epoetin kaushik (EPOGEN,PROCRIT) 20,000 units/mL Inject 5,000 Units under the skin      ergocalciferol (VITAMIN D2) 50,000 units TAKE 1 CAPSULE BY MOUTH ONE TIME PER WEEK      ferrous sulfate 325 (65 Fe) mg tablet TAKE 1 TABLET BY MOUTH 3 TIMES A DAY 90 tablet 3    glucose 4-6 GM-MG Chew 2 tablets daily as needed for low blood sugar 10 tablet 1    glucose blood (SUSAN CONTOUR TEST) test strip Use as instructed to test blood sugar 4 times a day  Dx e10 29 200 each 3    Gvoke PFS 1 MG/0 2ML SOSY INJECT 1 ML (1 MG TOTAL) INTO A MUSCLE ONCE FOR 1 DOSE      HYDROmorphone (DILAUDID) 4 mg tablet TAKE 1 TABLET BY MOUTH EVERY 4 HOURS AS NEEDED FOR MODERATE PAIN (PAIN SCORE 4 6)   MAX  24 MG/DAY      Incontinence Supply Disposable (INCONTINENCE BRIEF LARGE) MISC by Does not apply route 6 (six) times a day Size xl 180 each 11    Incontinence Supply Disposable (UNDERGARMENT) MISC Use 6 a day, 4 boxes, xl  Dx R51, paraplegia 4 each 11    Incontinence Supply Disposable (UNDERPADS) MISC Use 2 a day 60 each 11    insulin detemir (LEVEMIR) 100 units/mL subcutaneous injection Inject 6 5 Units under the skin 2 (two) times a day       Ketostix strip USE WITH HYPERGLYCEMIA E10 65 **NOT COVERED**      LEVEMIR FLEXTOUCH 100 units/mL injection pen Inject 14 Units under the skin 2 (two) times a day 15 pen 3    levETIRAcetam (KEPPRA) 250 mg tablet TAKE 1 TABLET (250 MG TOTAL) BY MOUTH 3 (THREE) TIMES A WEEK (MWF) AT 1200   lidocaine (XYLOCAINE) 5 % ointment Apply topically as needed for mild pain 35 44 g 0    Lidocaine HCl 4 % LIQD Apply 1 application topically 3 (three) times a day as needed (pain) 73 mL 5    Lokelma 10 g PACK       losartan (COZAAR) 100 MG tablet Take 1 tablet (100 mg total) by mouth daily 90 tablet 1    Melatonin ER 3 MG TBCR Take 6 mg by mouth      Methoxy PEG-Epoetin Beta (MIRCERA IJ) 75 mcg Once every 2 weeks      Methoxy PEG-Epoetin Beta (MIRCERA IJ) 100 mcg Once every 2 weeks      Methoxy PEG-Epoetin Beta (MIRCERA IJ) 150 mcg Once every 2 weeks      Misc  Devices (Wheelchair Cushion) MISC Use daily 1 each 0    Misc  Devices [facility]) MISC by Does not apply route daily Wheelchair ramp, dx g82 20 1 each 0    Narcan 4 MG/0 1ML nasal spray       NOVOLOG 100 UNIT/ML injection Inject 5 Units under the skin 3 (three) times a day with meals       NovoLOG FlexPen 100 units/mL injection pen INJECT 3 UNITS UNDER THE SKIN 3 (THREE) TIMES A DAY BEFORE MEALS        nystatin (MYCOSTATIN) 500,000 units/5 mL suspension Apply 5 mL (500,000 Units total) to the mouth or throat 4 (four) times a day 473 mL 1    ondansetron (ZOFRAN-ODT) 4 mg disintegrating tablet TAKE 1 TABLET BY MOUTH EVERY 8 HOURS AS NEEDED FOR NAUSEA AND VOMITING 20 tablet 2    oxybutynin (DITROPAN) 5 mg tablet TAKE 3 TABLETS BY MOUTH EVERY  tablet 1    pantoprazole (PROTONIX) 40 mg tablet TAKE 1 TABLET BY MOUTH EVERY DAY 90 tablet 0    pantoprazole (PROTONIX) 40 mg tablet Take 1 tablet by mouth      phenytoin extended (DILANTIN) 200 MG ER capsule Take 1 capsule (200 mg total) by mouth 2 (two) times a day 60 capsule 2    phenytoin extended (DILANTIN) 200 MG ER capsule Take 200 mg by mouth 2 (two) times a day      phenytoin extended (DILANTIN) 300 MG ER capsule Take 1 capsule (300 mg total) by mouth daily 30 capsule 3    phenytoin extended (DILANTIN) 300 MG ER capsule Take 300 mg by mouth daily      risperiDONE (RisperDAL) 0 5 mg tablet TAKE 1 TABLET (0 5 MG TOTAL) BY MOUTH 2 (TWO) TIMES A  tablet 1    sodium hypochlorite (DAKIN'S HALF-STRENGTH) external solution USE AS DIRECTED ONCE  DAILY 473 mL 2    Sodium Zirconium Cyclosilicate 10 g PACK Take 10 g by mouth daily      SUPER B COMPLEX & C TABS TAKE 1 CAPSULE BY MOUTH ONCE FOR 1 DOSE AS DIRECTED 90 tablet 2    tiZANidine (ZANAFLEX) 4 mg tablet Take 1 tablet by mouth 4 (four) times a day      tiZANidine (ZANAFLEX) 4 mg tablet       Zinc Sulfate 220 (50 Zn) MG TABS Take 1 tablet by mouth daily 90 each 1    carvedilol (COREG) 6 25 mg tablet Take 6 25 mg by mouth      ketoconazole (NIZORAL) 2 % cream APPLY SMALL AMOUNT TO EDGES OF MOUTH TWICE DAILY  DO NOT SWALLOW 15 g 1    Multiple Vitamin (Daily-Enriqueta) TABS TAKE 1 TABLET BY MOUTH EVERY DAY (Patient not taking: Reported on 4/20/2021) 90 tablet 2    senna (SENOKOT) 8 6 mg TAKE 1 TABLET (8 6 MG TOTAL) BY MOUTH 2 (TWO) TIMES A DAY  No current facility-administered medications for this visit        Current Outpatient Medications on File Prior to Visit   Medication Sig    Alcohol Swabs (ALCOHOL PADS) 70 % PADS by Does not apply route 5 (five) times a day    amLODIPine (NORVASC) 10 mg tablet TAKE 1 TABLET BY MOUTH EVERY DAY  ammonium lactate (LAC-HYDRIN) 12 % cream Apply topically    ammonium lactate (LAC-HYDRIN) 12 % cream     apixaban (ELIQUIS) 5 mg Take 1 tablet (5 mg total) by mouth 2 (two) times a day (Patient taking differently: Take 5 mg by mouth 2 (two) times a day Per physician, stop 2 days prior to procedure)    ascorbic acid (VITAMIN C) 250 mg tablet TAKE 2 TABLETS (500 MG TOTAL) BY MOUTH DAILY    atorvastatin (LIPITOR) 20 mg tablet TAKE 1 TABLET BY MOUTH EVERYDAY AT BEDTIME    B Complex-C-Folic Acid (Super B-Complex/Vit C/FA) TABS     baclofen 20 mg tablet Take 20 mg by mouth 2 (two) times a day   [employer] MICROLET LANCETS lancets Use as instructed to check blood sugar 4 times a day  Dx e10 29    benzonatate (TESSALON PERLES) 100 mg capsule TAKE 1 CAPSULE BY MOUTH THREE TIMES A DAY AS NEEDED FOR COUGH    Blood Glucose Monitoring Suppl ([employer] CONTOUR NEXT USB MONITOR) w/Device KIT Testing 4 times a day    calcitriol (ROCALTROL) 0 5 MCG capsule Take 2 mcg by mouth    Capsaicin 0 033 % CREA 5 times a day for 1st week  Then 3 times a day for next 3 weeks    carvedilol (COREG) 25 mg tablet TAKE 1 TABLET BY MOUTH TWICE A DAY    carvedilol (COREG) 25 mg tablet Take 25 mg by mouth    cetirizine (ZyrTEC) 10 mg tablet Take 10 mg by mouth daily    cetirizine (ZyrTEC) 5 MG tablet TAKE 1 TABLET BY MOUTH EVERY DAY    Cholecalciferol (VITAMIN D-3) 1000 units CAPS Take 2 capsules by mouth daily    clindamycin (CLINDAGEL) 1 % gel APPLY TO AFFECTED AREA TWICE A DAY    [facility]S ACETAMINOPHEN EX  MG tablet TAKE 2 TABLETS BY MOUTH EVERY 8 HOURS AS NEEDED FOR MILD OR MODERATE PAIN (PAIN SCORE 1 6)      [facility]S Vitamin B-12 1000 MCG tablet TAKE 1 TABLET BY MOUTH EVERY DAY    cyclobenzaprine (FLEXERIL) 10 mg tablet Take 10 mg by mouth 3 (three) times a day as needed    cyclobenzaprine (FLEXERIL) 5 mg tablet     dextrose 50 % 50 mL    dicyclomine (BENTYL) 10 mg capsule TAKE 1 CAPSULE (10 MG TOTAL) BY MOUTH 3 (THREE) TIMES A DAY BEFORE MEALS    Disposable Gloves MISC Use 7 times a day, 4 boxes of gloves XL  Dx type 1 DM, paraplegia    doxazosin (CARDURA) 2 mg tablet TAKE 1 TABLET (2 MG TOTAL) BY MOUTH EVERY EVENING    epoetin kaushik (EPOGEN,PROCRIT) 20,000 units/mL Inject 10,000 Units under the skin    epoetin kaushik (EPOGEN,PROCRIT) 20,000 units/mL Inject 5,000 Units under the skin    ergocalciferol (VITAMIN D2) 50,000 units TAKE 1 CAPSULE BY MOUTH ONE TIME PER WEEK    ferrous sulfate 325 (65 Fe) mg tablet TAKE 1 TABLET BY MOUTH 3 TIMES A DAY    glucose 4-6 GM-MG Chew 2 tablets daily as needed for low blood sugar    glucose blood (SUSAN CONTOUR TEST) test strip Use as instructed to test blood sugar 4 times a day  Dx e10 29    Gvoke PFS 1 MG/0 2ML SOSY INJECT 1 ML (1 MG TOTAL) INTO A MUSCLE ONCE FOR 1 DOSE    HYDROmorphone (DILAUDID) 4 mg tablet TAKE 1 TABLET BY MOUTH EVERY 4 HOURS AS NEEDED FOR MODERATE PAIN (PAIN SCORE 4 6)  MAX  24 MG/DAY    Incontinence Supply Disposable (INCONTINENCE BRIEF LARGE) MISC by Does not apply route 6 (six) times a day Size xl    Incontinence Supply Disposable (UNDERGARMENT) MISC Use 6 a day, 4 boxes, xl  Dx R51, paraplegia    Incontinence Supply Disposable (UNDERPADS) MISC Use 2 a day    insulin detemir (LEVEMIR) 100 units/mL subcutaneous injection Inject 6 5 Units under the skin 2 (two) times a day     Ketostix strip USE WITH HYPERGLYCEMIA E10 65 **NOT COVERED**    LEVEMIR FLEXTOUCH 100 units/mL injection pen Inject 14 Units under the skin 2 (two) times a day    levETIRAcetam (KEPPRA) 250 mg tablet TAKE 1 TABLET (250 MG TOTAL) BY MOUTH 3 (THREE) TIMES A WEEK (MWF) AT 1200      lidocaine (XYLOCAINE) 5 % ointment Apply topically as needed for mild pain    Lidocaine HCl 4 % LIQD Apply 1 application topically 3 (three) times a day as needed (pain)    Lokelma 10 g PACK     losartan (COZAAR) 100 MG tablet Take 1 tablet (100 mg total) by mouth daily    Melatonin ER 3 MG TBCR Take 6 mg by mouth    Methoxy PEG-Epoetin Beta (MIRCERA IJ) 75 mcg Once every 2 weeks    Methoxy PEG-Epoetin Beta (MIRCERA IJ) 100 mcg Once every 2 weeks    Methoxy PEG-Epoetin Beta (MIRCERA IJ) 150 mcg Once every 2 weeks    Misc  Devices (Wheelchair Cushion) MISC Use daily    Misc  Devices [facility]) MISC by Does not apply route daily Wheelchair ramp, dx g82 20    Narcan 4 MG/0 1ML nasal spray     NOVOLOG 100 UNIT/ML injection Inject 5 Units under the skin 3 (three) times a day with meals     NovoLOG FlexPen 100 units/mL injection pen INJECT 3 UNITS UNDER THE SKIN 3 (THREE) TIMES A DAY BEFORE MEALS      nystatin (MYCOSTATIN) 500,000 units/5 mL suspension Apply 5 mL (500,000 Units total) to the mouth or throat 4 (four) times a day    ondansetron (ZOFRAN-ODT) 4 mg disintegrating tablet TAKE 1 TABLET BY MOUTH EVERY 8 HOURS AS NEEDED FOR NAUSEA AND VOMITING    oxybutynin (DITROPAN) 5 mg tablet TAKE 3 TABLETS BY MOUTH EVERY DAY    pantoprazole (PROTONIX) 40 mg tablet TAKE 1 TABLET BY MOUTH EVERY DAY    pantoprazole (PROTONIX) 40 mg tablet Take 1 tablet by mouth    phenytoin extended (DILANTIN) 200 MG ER capsule Take 1 capsule (200 mg total) by mouth 2 (two) times a day    phenytoin extended (DILANTIN) 200 MG ER capsule Take 200 mg by mouth 2 (two) times a day    phenytoin extended (DILANTIN) 300 MG ER capsule Take 1 capsule (300 mg total) by mouth daily    phenytoin extended (DILANTIN) 300 MG ER capsule Take 300 mg by mouth daily    risperiDONE (RisperDAL) 0 5 mg tablet TAKE 1 TABLET (0 5 MG TOTAL) BY MOUTH 2 (TWO) TIMES A DAY    sodium hypochlorite (DAKIN'S HALF-STRENGTH) external solution USE AS DIRECTED ONCE  DAILY    Sodium Zirconium Cyclosilicate 10 g PACK Take 10 g by mouth daily    SUPER B COMPLEX & C TABS TAKE 1 CAPSULE BY MOUTH ONCE FOR 1 DOSE AS DIRECTED    tiZANidine (ZANAFLEX) 4 mg tablet Take 1 tablet by mouth 4 (four) times a day    tiZANidine (ZANAFLEX) 4 mg tablet     Zinc Sulfate 220 (50 Zn) MG TABS Take 1 tablet by mouth daily    carvedilol (COREG) 6 25 mg tablet Take 6 25 mg by mouth    ketoconazole (NIZORAL) 2 % cream APPLY SMALL AMOUNT TO EDGES OF MOUTH TWICE DAILY  DO NOT SWALLOW    Multiple Vitamin (Daily-Enriqueta) TABS TAKE 1 TABLET BY MOUTH EVERY DAY (Patient not taking: Reported on 4/20/2021)    senna (SENOKOT) 8 6 mg TAKE 1 TABLET (8 6 MG TOTAL) BY MOUTH 2 (TWO) TIMES A DAY   [DISCONTINUED] [facility]S Vitamin B-12 1000 MCG tablet TAKE 1 TABLET BY MOUTH EVERY DAY    [DISCONTINUED] [facility]S Vitamin C 250 MG tablet TAKE 2 TABLETS (500 MG TOTAL) BY MOUTH DAILY    [DISCONTINUED] dicyclomine (BENTYL) 10 mg capsule TAKE 1 CAPSULE (10 MG TOTAL) BY MOUTH 3 (THREE) TIMES A DAY BEFORE MEALS    [DISCONTINUED] fluconazole (DIFLUCAN) 150 mg tablet TAKE 1 TABLET BY MOUTH AS ONE DOSE     No current facility-administered medications on file prior to visit  He is allergic to cefepime; ciprofloxacin hcl; cymbalta [duloxetine hcl]; gabapentin; lac bovis; lactose - food allergy; lyrica [pregabalin]; penicillins; polymyxin b; and rosuvastatin       Review of Systems   Constitutional: Negative for activity change, appetite change, fatigue, fever and unexpected weight change  HENT: Negative for ear pain, hearing loss and sore throat  Eyes: Negative for visual disturbance  Respiratory: Negative for cough and wheezing  Gastrointestinal: Positive for abdominal distention  Negative for abdominal pain, constipation, diarrhea and vomiting  Genitourinary: Negative for difficulty urinating and dysuria  Musculoskeletal: Positive for back pain  Negative for arthralgias and myalgias  Skin: Positive for wound (stable)  Negative for rash  Neurological: Negative for dizziness and headaches  Psychiatric/Behavioral: Negative for behavioral problems and sleep disturbance  The patient is not nervous/anxious            Objective:      /84 (BP Location: Left arm, Patient Position: Sitting, Cuff Size: Standard)   Pulse 88 Temp 98 3 °F (36 8 °C) (Temporal)   Resp 18   SpO2 98%          Physical Exam  Vitals signs and nursing note reviewed  Constitutional:       General: He is not in acute distress  Appearance: He is well-developed  HENT:      Head: Normocephalic and atraumatic  Right Ear: External ear normal       Left Ear: External ear normal    Eyes:      Conjunctiva/sclera: Conjunctivae normal    Neck:      Musculoskeletal: Normal range of motion and neck supple  Thyroid: No thyromegaly  Cardiovascular:      Rate and Rhythm: Normal rate and regular rhythm  Heart sounds: Normal heart sounds  No murmur  Comments: 1+ pitting edema LLE  Pulmonary:      Effort: Pulmonary effort is normal  No respiratory distress  Breath sounds: Normal breath sounds  No wheezing  Abdominal:      General: Bowel sounds are normal  There is distension  Palpations: There is no mass  Tenderness: There is no abdominal tenderness  There is no guarding or rebound  Hernia: No hernia is present  Lymphadenopathy:      Cervical: No cervical adenopathy  Skin:     Findings: Lesion (over left eyebrow about 1/2 cm cyst) present  Neurological:      Mental Status: He is alert and oriented to person, place, and time     Psychiatric:         Mood and Affect: Mood normal          Behavior: Behavior normal

## 2021-04-21 ENCOUNTER — TELEPHONE (OUTPATIENT)
Dept: FAMILY MEDICINE CLINIC | Facility: CLINIC | Age: 41
End: 2021-04-21

## 2021-04-21 NOTE — TELEPHONE ENCOUNTER
SIGNATURES NEEDED FOR extended family care (recertification summary)   FORM RECEIVED VIA FAX  WILL BE PLACED IN YOUR BIN AT ASSIGNED DELIVERY TIMES

## 2021-04-22 ENCOUNTER — OFFICE VISIT (OUTPATIENT)
Dept: WOUND CARE | Facility: HOSPITAL | Age: 41
End: 2021-04-22
Payer: MEDICARE

## 2021-04-22 VITALS
SYSTOLIC BLOOD PRESSURE: 132 MMHG | RESPIRATION RATE: 18 BRPM | TEMPERATURE: 98.1 F | HEART RATE: 72 BPM | DIASTOLIC BLOOD PRESSURE: 72 MMHG

## 2021-04-22 DIAGNOSIS — L89.154 PRESSURE INJURY OF SACRAL REGION, STAGE 4 (HCC): Primary | ICD-10-CM

## 2021-04-22 DIAGNOSIS — L89.224 PRESSURE ULCER OF LEFT HIP, STAGE 4 (HCC): ICD-10-CM

## 2021-04-22 DIAGNOSIS — L89.324 PRESSURE INJURY OF LEFT ISCHIUM, STAGE 4 (HCC): ICD-10-CM

## 2021-04-22 DIAGNOSIS — L89.894 PRESSURE ULCER OF OTHER SITE, STAGE 4 (HCC): ICD-10-CM

## 2021-04-22 PROCEDURE — 99214 OFFICE O/P EST MOD 30 MIN: CPT | Performed by: FAMILY MEDICINE

## 2021-04-22 PROCEDURE — 99213 OFFICE O/P EST LOW 20 MIN: CPT | Performed by: FAMILY MEDICINE

## 2021-04-22 RX ORDER — LIDOCAINE HYDROCHLORIDE 40 MG/ML
5 SOLUTION TOPICAL ONCE
Status: CANCELLED | OUTPATIENT
Start: 2021-04-22 | End: 2021-04-22

## 2021-04-22 NOTE — ASSESSMENT & PLAN NOTE
Lab Results   Component Value Date    HGBA1C 7 1 (H) 04/13/2021   Continue follow up with endocrinology

## 2021-04-22 NOTE — PROGRESS NOTES
Patient ID: Salina Bryan  is a 36 y o  male Date of Birth 1980       Chief Complaint   Patient presents with    Follow Up Wound Care Visit     multiple pressure injuries       Allergies:  Cefepime, Ciprofloxacin hcl, Cymbalta [duloxetine hcl], Gabapentin, Lac bovis, Lactose - food allergy, Lyrica [pregabalin], Penicillins, Polymyxin b, and Rosuvastatin    Diagnosis:   Diagnosis ICD-10-CM Associated Orders   1  Pressure injury of sacral region, stage 4 ([facility])  L89 154 Wound cleansing and dressings   2  Pressure injury of left ischium, stage 4 ([facility])  L89 324 Wound cleansing and dressings   3  Pressure ulcer of other site, stage 4 ([facility])  L89 894 Wound cleansing and dressings   4  Pressure ulcer of left hip, stage 4 ([facility])  L89 224 Wound cleansing and dressings        Assessment :  Multiple pressure ulcers all stable  The left hip has less undermining and tunneling  No signs of infection and no odor  Plan:  Continue with alginate packing to all ulcers  No Dakin's  However, Dakin's can be used should there be either increased drainage or odor  Subjective:   10/22/20:  Followup multiple pressure injuries to the buttocks and sacrum  He was hospitalized for back problems recently  Continues on hemodialysis  The patient states that his albumin has improved so that he may be able to get flap surgery  However when checking his most recent albumin was only 1 8  His total protein is elevated  11/19/20:  Followup multiple stage IV pressure or injuries to the buttocks and sacrum  Continues on hemodialysis  He is scheduled for plastic surgery for cyst on his forehead  Plastic surgery still will not do any flaps to his sacrum or buttocks because his albumin remains low  He has no other complaints  No fever, chills or cough  12/31/20: Followup multiple stage IV pressure injuries of the buttocks and sacrum  She notes some increased drainage and slight more odor    Never had his plastic surgery appointment for the cyst on his forehead  Denies any fever or chills  1/28/21:  Followup multiple stage IV pressure injuries of the buttocks, sacrum and perineum  Unfortunately, the patient was hospitalized for sepsis and back pain  Was found to have a fracture L3  He was given a back brace but only wears at home  He states that really does not give him very much relief  Unfortunately also while in the hospital, he developed a new pressure injury of his left hip  2/25/21:  Followup multiple stage IV pressure ulcers of the buttocks sacrum and perineum  Patient was hospitalized earlier this month for possible sepsis  He was found to have a burst fracture or osteo of L3  IR obtained specimen and was culture negative  However, no bone was obtained  He is scheduled to have another IR intervention March 16th for bone biopsy  Left hip ulcer broke down with large cavity  Otherwise, cultures were negative  He was discharged on no medications  He has not had any recent fever or chills  He did have fever when he was admitted to the hospital     03/21/2021  Mr Aris Warner is a 59-year-old gentleman with paraplegia and multiple stage IV pressure ulcers was referred for evaluation  He has large chronic ulcers on his buttocks and sacrum but he developed a new ulcer over last month on his left hip  He is scheduled for IR biopsy in his spine for possible chronic osteo  He also recently underwent dental extractions and cyst removed from his head and neck  4/22/21:  Followup multiple stage IV pressure injuries of the buttocks, sacrum, perineum and left hip  He was last seen by Dr Jil Melgoza in March  Since then, unfortunately he was admitted to the hospital with pneumonia  He is now doing better  He is changing his dressings daily because of drainage  There has been no increased drainage, no odor and he denies any fever or chills  He has no cough            The following portions of the patient's history were reviewed and updated as appropriate:   Patient Active Problem List   Diagnosis    Paraplegia ([place] [address] )    Atrial fibrillation ([place] [address] )    Chronic suprapubic catheter ([place] [address] )    Colostomy care ([place] [address] )    OAB (overactive bladder)    S/P unilateral BKA (below knee amputation) ([place] [address] )    Sebaceous cyst    Tobacco abuse    Type 1 diabetes mellitus with chronic kidney disease on chronic dialysis ([place] [address] )    Ulcer of sacral region, stage 4 ([place] [address] )    Anemia    Chronic indwelling Ortega catheter    Wound healing, delayed    Iron deficiency anemia    Pressure injury of left ischium, stage 4 ([facility])    HTN (hypertension), benign    Neurogenic bladder    GERD (gastroesophageal reflux disease)    Chronic pain    Stage 5 chronic kidney disease on chronic dialysis ([facility])    SOB (shortness of breath)    Penile abscess    Asymptomatic bacteriuria    History of Clostridium difficile infection    Urinary retention    Delirium    Noncompliance by refusing intervention or support    Dialysis patient ([place] [address] )    Insulin long-term use ([place] [address] )    Wheelchair dependent    Recurrent Clostridioides difficile diarrhea    Bipolar depression ([place] [address] )    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[place] [address] )    Seizure ([place] [address] )    Pressure ulcer of sacral region, stage 4 ([place] [address] )    AVM (arteriovenous malformation) of duodenum, acquired    Chronic narcotic dependence ([facility])    Chronic diastolic heart failure ([facility])    Dental caries    Nervous    Pressure ulcer of other site, stage 4 ([facility])    Pressure ulcer of left hip, stage 4 ([facility])     Past Medical History:   Diagnosis Date    Ambulatory dysfunction     Anemia     Anemia, iron deficiency     transfusion requiring    Atrial fibrillation ([place] [address] )     AVM (arteriovenous malformation) of duodenum, 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    Bacteriuria, asymptomatic     Bipolar disorder ([facility])     Chronic deep vein thrombosis (DVT) ([facility])     Chronic indwelling Ortega catheter     Chronic kidney disease     Chronic pain     Chronic pain disorder     Chronic suprapubic catheter ([facility] )     Clostridium difficile infection 08/11/2016    also positive 9/2016, 5/29/2017, 8/15/2017  S/P fecal transplant    Colostomy on examination ([facility] )     Decubitus ulcer     Delirium     Diabetes mellitus ([facility] )     GERD (gastroesophageal reflux disease)     Hemodialysis patient ([facility] )     Hypertension     Memory impairment 2011    s/p diabetic coma    Neurogenic bladder     OAB (overactive bladder)     Paraplegia (HCC)     T3 transverse myelitis vs spinal stoke (AVM); 2012 extensive epidural abscess C7=> conus 2nd extension from deep parspinal abscess L4-S2/sacral decubitus; cord atrophy/myelomalcia T3=>conus     Penile abscess     S/P unilateral BKA (below knee amputation) ([facility] )     Right    Sebaceous cyst removed in 2017    Seizures (HCC)     Shortness of breath     Tobacco abuse     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HCC)     Urinary retention     Wheelchair dependent     Wounds, multiple     pressure ulcers with delayed healing     Past Surgical History:   Procedure Laterality Date    BELOW KNEE LEG AMPUTATION Right 2009    COLONOSCOPY N/A 3/27/2017    Procedure: COLONOSCOPY;  Surgeon: Betsy Reed MD;  Location: [facility]; Service:     COLONOSCOPY N/A 3/29/2017    Procedure: COLONOSCOPY;  Surgeon: Betsy Reed MD;  [facility]; Service:     COLONOSCOPY N/A 6/15/2017    Procedure: COLONOSCOPY with FMT;  Surgeon: Lei Caceres MD;  [facility]: [facility]; Service: Gastroenterology    ESOPHAGOGASTRODUODENOSCOPY N/A 3/22/2017    Procedure: ESOPHAGOGASTRODUODENOSCOPY (EGD); Surgeon: William Corral DO;  Location: [facility];   Service:     MULTIPLE TOOTH EXTRACTIONS N/A 3/8/2021    Procedure: EXTRACTION TEETH # 2,3,6,10,12,13,14,18,19,20,21,22,23,24,25,26,27,28,29,30;  Surgeon: Bert Justice DMD;  [facility];  Service: Maxillofacial     Family History   Problem Relation Age of Onset    Hyperlipidemia Mother    [facility] Hypertension Mother     Leukemia Brother     Diabetes Paternal Grandfather      Social History     Socioeconomic History    Marital status: Single     Spouse name: None    Number of children: None    Years of education: None    Highest education level: None   Occupational History    None   Social Needs    Financial resource strain: None    Food insecurity     Worry: None     Inability: None    Transportation needs     Medical: None     Non-medical: None   Tobacco Use    Smoking status: Current Every Day Smoker     Types: Cigars    Smokeless tobacco: Never Used    Tobacco comment: about 2 cigars/day   Substance and Sexual Activity    Alcohol use: Not Currently     Frequency: Monthly or less     Drinks per session: 1 or 2     Binge frequency: Never     Comment: 1 cup of wine every 3-4 months    Drug use: Not Currently     Types: Marijuana     Comment: rarely; once every 1-2 years    Sexual activity: None   Lifestyle    Physical activity     Days per week: None     Minutes per session: None    Stress: None   Relationships    Social connections     Talks on phone: None     Gets together: None     Attends [employer] service: None     Active member of club or organization: None     Attends meetings of clubs or organizations: None     Relationship status: None    Intimate partner violence     Fear of current or ex partner: None     Emotionally abused: None     Physically abused: None     Forced sexual activity: None   Other Topics Concern    None   Social History Narrative    None       Current Outpatient Medications:     Alcohol Swabs (ALCOHOL PADS) 70 % PADS, by Does not apply route 5 (five) times a day, Disp: 300 each, Rfl: 5    amLODIPine (NORVASC) 10 mg tablet, TAKE 1 TABLET BY MOUTH EVERY DAY, Disp: 90 tablet, Rfl: 3    ammonium lactate (LAC-HYDRIN) 12 % cream, Apply topically, Disp: , Rfl:     ammonium lactate (LAC-HYDRIN) 12 % cream, , Disp: , Rfl:     apixaban (ELIQUIS) 5 mg, Take 1 tablet (5 mg total) by mouth 2 (two) times a day (Patient taking differently: Take 5 mg by mouth 2 (two) times a day Per physician, stop 2 days prior to procedure), Disp: 60 tablet, Rfl: 8    ascorbic acid (VITAMIN C) 250 mg tablet, TAKE 2 TABLETS (500 MG TOTAL) BY MOUTH DAILY, Disp: 180 tablet, Rfl: 1    atorvastatin (LIPITOR) 20 mg tablet, TAKE 1 TABLET BY MOUTH EVERYDAY AT BEDTIME, Disp: 90 tablet, Rfl: 1    B Complex-C-Folic Acid (Super B-Complex/Vit C/FA) TABS, , Disp: , Rfl:     baclofen 20 mg tablet, Take 20 mg by mouth 2 (two) times a day , Disp: , Rfl:     [employer] MICROLET LANCETS lancets, Use as instructed to check blood sugar 4 times a day Dx e10 29, Disp: 200 each, Rfl: 5    benzonatate (TESSALON PERLES) 100 mg capsule, TAKE 1 CAPSULE BY MOUTH THREE TIMES A DAY AS NEEDED FOR COUGH, Disp: 30 capsule, Rfl: 0    Blood Glucose Monitoring Suppl ([employer] CONTOUR NEXT USB MONITOR) w/Device KIT, Testing 4 times a day, Disp: 1 kit, Rfl: 0    calcitriol (ROCALTROL) 0 5 MCG capsule, Take 2 mcg by mouth, Disp: , Rfl:     Capsaicin 0 033 % CREA, 5 times a day for 1st week  Then 3 times a day for next 3 weeks, Disp: 56 6 g, Rfl: 2    carvedilol (COREG) 25 mg tablet, TAKE 1 TABLET BY MOUTH TWICE A DAY, Disp: 180 tablet, Rfl: 1    carvedilol (COREG) 25 mg tablet, Take 25 mg by mouth, Disp: , Rfl:     carvedilol (COREG) 6 25 mg tablet, Take 6 25 mg by mouth, Disp: , Rfl:     cetirizine (ZyrTEC) 10 mg tablet, Take 10 mg by mouth daily, Disp: , Rfl:     cetirizine (ZyrTEC) 5 MG tablet, TAKE 1 TABLET BY MOUTH EVERY DAY, Disp: 90 tablet, Rfl: 1    Cholecalciferol (VITAMIN D-3) 1000 units CAPS, Take 2 capsules by mouth daily, Disp: 30 capsule, Rfl: 0    clindamycin (CLINDAGEL) 1 % gel, APPLY TO AFFECTED AREA TWICE A DAY, Disp: 60 g, Rfl: 2    [employer] ACETAMINOPHEN EX  MG tablet, TAKE 2 TABLETS BY MOUTH EVERY 8 HOURS AS NEEDED FOR MILD OR MODERATE PAIN (PAIN SCORE 1 6)  , Disp: , Rfl:     CVS Vitamin B-12 1000 MCG tablet, TAKE 1 TABLET BY MOUTH EVERY DAY, Disp: 30 tablet, Rfl: 5    cyclobenzaprine (FLEXERIL) 10 mg tablet, Take 10 mg by mouth 3 (three) times a day as needed, Disp: , Rfl: 0    cyclobenzaprine (FLEXERIL) 5 mg tablet, , Disp: , Rfl:     dextrose 50 %, 50 mL, Disp: , Rfl:     dicyclomine (BENTYL) 10 mg capsule, TAKE 1 CAPSULE (10 MG TOTAL) BY MOUTH 3 (THREE) TIMES A DAY BEFORE MEALS, Disp: 270 capsule, Rfl: 1    Disposable Gloves MISC, Use 7 times a day, 4 boxes of gloves XL Dx type 1 DM, paraplegia, Disp: 4 each, Rfl: 11    doxazosin (CARDURA) 2 mg tablet, TAKE 1 TABLET (2 MG TOTAL) BY MOUTH EVERY EVENING, Disp: 90 tablet, Rfl: 1    epoetin kaushik (EPOGEN,PROCRIT) 20,000 units/mL, Inject 10,000 Units under the skin, Disp: , Rfl:     epoetin kaushik (EPOGEN,PROCRIT) 20,000 units/mL, Inject 5,000 Units under the skin, Disp: , Rfl:     ergocalciferol (VITAMIN D2) 50,000 units, TAKE 1 CAPSULE BY MOUTH ONE TIME PER WEEK, Disp: , Rfl:     ferrous sulfate 325 (65 Fe) mg tablet, TAKE 1 TABLET BY MOUTH 3 TIMES A DAY, Disp: 90 tablet, Rfl: 3    glucose 4-6 GM-MG, Chew 2 tablets daily as needed for low blood sugar, Disp: 10 tablet, Rfl: 1    glucose blood (SUSAN CONTOUR TEST) test strip, Use as instructed to test blood sugar 4 times a day Dx e10 29, Disp: 200 each, Rfl: 3    Gvoke PFS 1 MG/0 2ML SOSY, INJECT 1 ML (1 MG TOTAL) INTO A MUSCLE ONCE FOR 1 DOSE, Disp: , Rfl:     HYDROmorphone (DILAUDID) 4 mg tablet, TAKE 1 TABLET BY MOUTH EVERY 4 HOURS AS NEEDED FOR MODERATE PAIN (PAIN SCORE 4 6)   MAX  24 MG/DAY, Disp: , Rfl:     Incontinence Supply Disposable (INCONTINENCE BRIEF LARGE) MISC, by Does not apply route 6 (six) times a day Size xl, Disp: 180 each, Rfl: 11    Incontinence Supply Disposable (UNDERGARMENT) MISC, Use 6 a day, 4 boxes, xl Dx R51, paraplegia, Disp: 4 each, Rfl: 11    Incontinence Supply Disposable (UNDERPADS) MISC, Use 2 a day, Disp: 60 each, Rfl: 11    insulin detemir (LEVEMIR) 100 units/mL subcutaneous injection, Inject 6 5 Units under the skin 2 (two) times a day , Disp: , Rfl:     ketoconazole (NIZORAL) 2 % cream, APPLY SMALL AMOUNT TO EDGES OF MOUTH TWICE DAILY  DO NOT SWALLOW, Disp: 15 g, Rfl: 1    Ketostix strip, USE WITH HYPERGLYCEMIA E10 65 **NOT COVERED**, Disp: , Rfl:     LEVEMIR FLEXTOUCH 100 units/mL injection pen, Inject 14 Units under the skin 2 (two) times a day, Disp: 15 pen, Rfl: 3    levETIRAcetam (KEPPRA) 250 mg tablet, TAKE 1 TABLET (250 MG TOTAL) BY MOUTH 3 (THREE) TIMES A WEEK ([facility]) AT 1200 , Disp: , Rfl:     lidocaine (XYLOCAINE) 5 % ointment, Apply topically as needed for mild pain, Disp: 35 44 g, Rfl: 0    Lidocaine HCl 4 % LIQD, Apply 1 application topically 3 (three) times a day as needed (pain), Disp: 73 mL, Rfl: 5    Lokelma 10 g PACK, , Disp: , Rfl:     losartan (COZAAR) 100 MG tablet, Take 1 tablet (100 mg total) by mouth daily, Disp: 90 tablet, Rfl: 1    Melatonin ER 3 MG TBCR, Take 6 mg by mouth, Disp: , Rfl:     Methoxy PEG-Epoetin Beta (MIRCERA IJ), 75 mcg Once every 2 weeks, Disp: , Rfl:     Methoxy PEG-Epoetin Beta (MIRCERA IJ), 100 mcg Once every 2 weeks, Disp: , Rfl:     Methoxy PEG-Epoetin Beta (MIRCERA IJ), 150 mcg Once every 2 weeks, Disp: , Rfl:     Misc  Devices (Wheelchair Cushion) MISC, Use daily, Disp: 1 each, Rfl: 0    Misc   Devices [facility]) MISC, by Does not apply route daily Wheelchair ramp, dx g82 20, Disp: 1 each, Rfl: 0    Multiple Vitamin (Daily-Enriqueta) TABS, TAKE 1 TABLET BY MOUTH EVERY DAY (Patient not taking: Reported on 4/20/2021), Disp: 90 tablet, Rfl: 2    Narcan 4 MG/0 1ML nasal spray, , Disp: , Rfl:     NOVOLOG 100 UNIT/ML injection, Inject 5 Units under the skin 3 (three) times a day with meals , Disp: , Rfl:     NovoLOG FlexPen 100 units/mL injection pen, INJECT 3 UNITS UNDER THE SKIN 3 (THREE) TIMES A DAY BEFORE MEALS , Disp: , Rfl:     nystatin (MYCOSTATIN) 500,000 units/5 mL suspension, Apply 5 mL (500,000 Units total) to the mouth or throat 4 (four) times a day, Disp: 473 mL, Rfl: 1    ondansetron (ZOFRAN-ODT) 4 mg disintegrating tablet, TAKE 1 TABLET BY MOUTH EVERY 8 HOURS AS NEEDED FOR NAUSEA AND VOMITING, Disp: 20 tablet, Rfl: 2    oxybutynin (DITROPAN) 5 mg tablet, TAKE 3 TABLETS BY MOUTH EVERY DAY, Disp: 270 tablet, Rfl: 1    pantoprazole (PROTONIX) 40 mg tablet, TAKE 1 TABLET BY MOUTH EVERY DAY, Disp: 90 tablet, Rfl: 0    pantoprazole (PROTONIX) 40 mg tablet, Take 1 tablet by mouth, Disp: , Rfl:     phenytoin extended (DILANTIN) 200 MG ER capsule, Take 1 capsule (200 mg total) by mouth 2 (two) times a day, Disp: 60 capsule, Rfl: 2    phenytoin extended (DILANTIN) 200 MG ER capsule, Take 200 mg by mouth 2 (two) times a day, Disp: , Rfl:     phenytoin extended (DILANTIN) 300 MG ER capsule, Take 1 capsule (300 mg total) by mouth daily, Disp: 30 capsule, Rfl: 3    phenytoin extended (DILANTIN) 300 MG ER capsule, Take 300 mg by mouth daily, Disp: , Rfl:     risperiDONE (RisperDAL) 0 5 mg tablet, TAKE 1 TABLET (0 5 MG TOTAL) BY MOUTH 2 (TWO) TIMES A DAY, Disp: 180 tablet, Rfl: 1    senna (SENOKOT) 8 6 mg, TAKE 1 TABLET (8 6 MG TOTAL) BY MOUTH 2 (TWO) TIMES A DAY , Disp: , Rfl:     sodium hypochlorite (DAKIN'S HALF-STRENGTH) external solution, USE AS DIRECTED ONCE  DAILY, Disp: 473 mL, Rfl: 2    Sodium Zirconium Cyclosilicate 10 g PACK, Take 10 g by mouth daily, Disp: , Rfl:     SUPER B COMPLEX & C TABS, TAKE 1 CAPSULE BY MOUTH ONCE FOR 1 DOSE AS DIRECTED, Disp: 90 tablet, Rfl: 2    tiZANidine (ZANAFLEX) 4 mg tablet, Take 1 tablet by mouth 4 (four) times a day, Disp: , Rfl:     tiZANidine (ZANAFLEX) 4 mg tablet, , Disp: , Rfl:     Zinc Sulfate 220 (50 Zn) MG TABS, Take 1 tablet by mouth daily, Disp: 90 each, Rfl: 1    Review of Systems   Constitutional: Negative for activity change, appetite change (Improving), chills, fatigue, fever and unexpected weight change     HENT: Negative for congestion, hearing loss and postnasal drip  Eyes: Negative for visual disturbance  Respiratory: Negative for cough and shortness of breath  Cardiovascular: Negative for chest pain and leg swelling  Gastrointestinal: Negative for abdominal pain, constipation (Slight), diarrhea, nausea and vomiting  Genitourinary: Negative for dysuria and urgency  Indwelling Ortega catheter   Musculoskeletal: Positive for gait problem (Nonambulatory, paraplegic)  Skin: Positive for wound (Sacral left ischial and left hip)  Negative for rash  Cyst on right  cheek   Neurological: Positive for weakness  Hematological: Does not bruise/bleed easily  Psychiatric/Behavioral: Positive for dysphoric mood  Objective:  /72   Pulse 72   Temp 98 1 °F (36 7 °C)   Resp 18   Pain Score:   6     Physical Exam  Vitals signs and nursing note reviewed  Constitutional:       Appearance: Normal appearance  He is underweight  HENT:      Head: Normocephalic and atraumatic  Abdominal:      General: Abdomen is flat  Comments: The abdomen around the ostomy is slightly full and tender  Skin:     Findings: Wound (Both buttocks and sacrum) present  Comments: Sacral ulcer slightly less undermining  Left ischial ulcer also with undermining  The right buttock ulcer remains closed  The left hip ulcer has significantly less undermining and tunneling  Bone is not probed  Overall there is less under undermining  Perineal ulcer is unchanged  Neurological:      Mental Status: He is alert and oriented to person, place, and time  Psychiatric:         Mood and Affect: Affect normal          Cognition and Memory: Cognition normal                          Wound Ischium Left (Active)   Wound Image Images linked 04/22/21 1441   Wound Description Granulation tissue;Slough; Other (Comment) (rolled edges) 04/22/21 1444   Pressure Injury Stage Stage 4 04/22/21 1444   Irene-wound Assessment Intact;Pink;Scar Tissue 04/22/21 1444   Wound Length (cm) 5 5 cm 04/22/21 1444   Wound Width (cm) 4 7 cm 04/22/21 1444   Wound Depth (cm) 1 5 cm 04/22/21 1444   Wound Surface Area (cm^2) 25 85 cm^2 04/22/21 1444   Wound Volume (cm^3) 38 78 cm^3 04/22/21 1444   Calculated Wound Volume (cm^3) 38 78 cm^3 04/22/21 1444   Change in Wound Size % -105 19 04/22/21 1444   Undermining 3 5 04/22/21 1444   Undermining is depth extending from 4-10, deepest at 10 04/22/21 1444   Drainage Amount Large 04/22/21 1444   Drainage Description Serosanguineous 04/22/21 1444   Non-staged Wound Description Full thickness 04/22/21 1444   Dressing Changed Changed 04/22/21 1444   Patient Tolerance Tolerated well 04/22/21 1444   Dressing Status Removed 04/22/21 1444       Wound Perineum (Active)   Wound Image Images linked 04/22/21 1441   Wound Description Granulation tissue;Pink;Slough; Other (Comment) (rolled edges) 04/22/21 1443   Pressure Injury Stage Stage 4 04/22/21 1443   Irene-wound Assessment Scar Tissue; Intact 04/22/21 1443   Wound Length (cm) 5 5 cm 04/22/21 1443   Wound Width (cm) 4 5 cm 04/22/21 1443   Wound Depth (cm) 0 5 cm 04/22/21 1443   Wound Surface Area (cm^2) 24 75 cm^2 04/22/21 1443   Wound Volume (cm^3) 12 38 cm^3 04/22/21 1443   Calculated Wound Volume (cm^3) 12 38 cm^3 04/22/21 1443   Change in Wound Size % -17 9 04/22/21 1443   Undermining 1 2 04/22/21 1443   Undermining is depth extending from 6-10 04/22/21 1443   Drainage Amount Large 04/22/21 1443   Drainage Description Serosanguineous 04/22/21 1443   Non-staged Wound Description Full thickness 04/22/21 1443   Treatments Cleansed 04/22/21 1443   Dressing Changed Changed 04/22/21 1443   Patient Tolerance Tolerated well 04/22/21 1443   Dressing Status Removed 04/22/21 1443       Wound Sacrum (Active)   Wound Image Images linked 04/22/21 1440   Wound Description Granulation tissue;Slough; Epithelialization 04/22/21 1442   Irene-wound Assessment Scar Tissue 04/22/21 1442   Wound Length (cm) 7 5 cm 04/22/21 1442   Wound Width (cm) 10 6 cm 04/22/21 1442   Wound Depth (cm) 1 2 cm 04/22/21 1442   Wound Surface Area (cm^2) 79 5 cm^2 04/22/21 1442   Wound Volume (cm^3) 95 4 cm^3 04/22/21 1442   Calculated Wound Volume (cm^3) 95 4 cm^3 04/22/21 1442   Change in Wound Size % -27 2 04/22/21 1442   Undermining 3 2 04/22/21 1442   Undermining is depth extending from 3-8, deepest at 4 04/22/21 1442   Drainage Amount Large 04/22/21 1442   Drainage Description Serosanguineous 04/22/21 1442   Non-staged Wound Description Full thickness 04/22/21 1442   Treatments Cleansed 04/22/21 1442   Dressing Changed Changed 04/22/21 1442   Patient Tolerance Tolerated well 04/22/21 1442   Dressing Status Removed 04/22/21 1442       Wound 01/28/21 Pressure Injury Hip Left;Lateral (Active)   Wound Image Images linked 04/22/21 1441   Wound Description Granulation tissue;Slough;Pink; White;Yellow 04/22/21 1445   Pressure Injury Stage Stage 4 04/22/21 1445   Irene-wound Assessment Dry; Intact 04/22/21 1445   Wound Length (cm) 2 2 cm 04/22/21 1445   Wound Width (cm) 2 2 cm 04/22/21 1445   Wound Depth (cm) 1 cm 04/22/21 1445   Wound Surface Area (cm^2) 4 84 cm^2 04/22/21 1445   Wound Volume (cm^3) 4 84 cm^3 04/22/21 1445   Calculated Wound Volume (cm^3) 4 84 cm^3 04/22/21 1445   Undermining 5 04/22/21 1445   Undermining is depth extending from 12-12 04/22/21 1445   Drainage Amount Large 04/22/21 1445   Drainage Description Yellow;Milky 04/22/21 1445   Non-staged Wound Description Full thickness 04/22/21 1445   Treatments Cleansed 04/22/21 1445   Dressing Changed Changed 04/22/21 1445   Patient Tolerance Tolerated well 04/22/21 1445   Dressing Status Removed 04/22/21 1445           Results from last 6 Months   Lab Units 02/25/21  1531   WOUND CULTURE  2+ Growth of Proteus mirabilis*  2+ Growth of Pseudomonas aeruginosa MDR*         Wound Instructions:  Orders Placed This Encounter   Procedures    Wound cleansing and dressings Wound cleansing and dressings       Wound cleansing and dressings                            All wounds:  Wash your hands with soap and water  Remove old dressing, discard into plastic bag and place in trash  Cleanse the wound with saline or mild soap and water prior to applying a clean dressing  Do not use tissue or cotton balls  Do not scrub the wound  Pat dry using gauze  Shower no; do not get dressing wet  Apply calcium alginate to wound beds  Cover with 4x4 and ABD  Secure with Medfix tape  Change dressing daily and PRN for breakthrough drainage (visiting nurses to do twice per week and family in between)     May use dakins moistened gauze PRN for odor and excess drainage     Continue clinitron bed at home and turn at least every 1-2 hours  To try and limit the amount of time spent sitting in the wheelchair  Keep pressure off the wounds as much as humanly possible     Continue to try and increase your protein to at least 3 servings or more if possible                                  Continue to try and keep blood sugars as low as possible  Stop Smoking     Continue visiting nurses twice per week for dressing changes, family to do in between nurse visits     Follow up in 6 weeks      Today's wound treatment note:  Wounds cleansed with NSS  All wounds redressed as ordered above     Standing Status:   Future     Standing Expiration Date:   4/22/2022       Narcisa Altamirano MD, CHT, CWS       Portions of the record may have been created with voice recognition software  Occasional wrong word or "sound alike" substitutions may have occurred due to the inherent limitations of voice recognition software  Read the chart carefully and recognize, using context, where substitutions have occurred

## 2021-04-22 NOTE — PATIENT INSTRUCTIONS
Orders Placed This Encounter   Procedures    Wound cleansing and dressings     Wound cleansing and dressings       Wound cleansing and dressings                            All wounds:  Wash your hands with soap and water  Remove old dressing, discard into plastic bag and place in trash  Cleanse the wound with saline or mild soap and water prior to applying a clean dressing  Do not use tissue or cotton balls  Do not scrub the wound  Pat dry using gauze  Shower no; do not get dressing wet  Apply calcium alginate to wound beds      Cover with 4x4 and ABD  Secure with Medfix tape  Change dressing daily and PRN for breakthrough drainage (visiting nurses to do twice per week and family in between)     May use dakins moistened gauze PRN for odor and excess drainage     Continue clinitron bed at home and turn at least every 1-2 hours  To try and limit the amount of time spent sitting in the wheelchair  Keep pressure off the wounds as much as humanly possible     Continue to try and increase your protein to at least 3 servings or more if possible                                  Continue to try and keep blood sugars as low as possible  Stop Smoking     Continue visiting nurses twice per week for dressing changes, family to do in between nurse visits     Follow up in 6 weeks      Today's wound treatment note:  Wounds cleansed with NSS  All wounds redressed as ordered above     Standing Status:   Future     Standing Expiration Date:   4/22/2022

## 2021-04-22 NOTE — ASSESSMENT & PLAN NOTE
Continue on current doses   He does have insulin pump currently and blood sugars have been improving

## 2021-04-22 NOTE — ASSESSMENT & PLAN NOTE
Lab Results   Component Value Date    EGFR 36 10/10/2017    EGFR 26 10/09/2017    EGFR 29 10/08/2017    CREATININE 2 53 (H) 10/10/2017    CREATININE 3 32 (H) 10/09/2017    CREATININE 3 00 (H) 10/08/2017     Continue follow up with nephrology and dialysis center    He does have fluid overload right now but is going for dialysis tomorrow

## 2021-04-23 DIAGNOSIS — L70.0 ACNE VULGARIS: ICD-10-CM

## 2021-04-23 DIAGNOSIS — K21.9 GASTROESOPHAGEAL REFLUX DISEASE WITHOUT ESOPHAGITIS: Primary | ICD-10-CM

## 2021-04-23 RX ORDER — PANTOPRAZOLE SODIUM 40 MG
40 TABLET, DELAYED RELEASE (ENTERIC COATED) ORAL DAILY
Qty: 90 TABLET | Refills: 1 | Status: SHIPPED | OUTPATIENT
Start: 2021-04-23 | End: 2021-07-15 | Stop reason: SDUPTHER

## 2021-04-23 RX ORDER — CLINDAMYCIN PHOSPHATE 10 MG/G
GEL TOPICAL
Qty: 60 G | Refills: 2 | Status: SHIPPED | OUTPATIENT
Start: 2021-04-23 | End: 2021-10-26 | Stop reason: SDUPTHER

## 2021-04-30 ENCOUNTER — TELEPHONE (OUTPATIENT)
Dept: FAMILY MEDICINE CLINIC | Facility: CLINIC | Age: 41
End: 2021-04-30

## 2021-04-30 NOTE — TELEPHONE ENCOUNTER
[facility]IGNAT[facility] NEED[facility]  FORM RECEIVED VIA FAX  WILL BE PLACED IN FORM BIN FOR MA PICKUP      ORDER # [id number]

## 2021-05-03 ENCOUNTER — TELEPHONE (OUTPATIENT)
Dept: FAMILY MEDICINE CLINIC | Facility: CLINIC | Age: 41
End: 2021-05-03

## 2021-05-03 DIAGNOSIS — B37.0 THRUSH: ICD-10-CM

## 2021-05-03 NOTE — TELEPHONE ENCOUNTER
SIGNATURES NEEDED FOR [facility] HEALTH  FORM RECEIVED VIA FAX  WILL BE PLACED IN FORM BIN FOR BUNNY VENCES      [id number]

## 2021-05-04 NOTE — TELEPHONE ENCOUNTER
Form completed by provider on 05/04/21, patient notified/left message that form is ready for   Confirmation received    Completed form copied for chart/placed in scan bin

## 2021-05-20 ENCOUNTER — TELEPHONE (OUTPATIENT)
Dept: FAMILY MEDICINE CLINIC | Facility: CLINIC | Age: 41
End: 2021-05-20

## 2021-05-20 NOTE — TELEPHONE ENCOUNTER
SIGNATURES NEEDED FOR extended family care  FORM RECEIVED VIA FAX  WILL BE PLACED IN FORM BIN FOR MA PICKUP      Physician Orders

## 2021-05-25 ENCOUNTER — TELEPHONE (OUTPATIENT)
Dept: FAMILY MEDICINE CLINIC | Facility: CLINIC | Age: 41
End: 2021-05-25

## 2021-05-25 NOTE — TELEPHONE ENCOUNTER
I called left msg for pts mom Cesar Hernández regarding pts recent hospital d/c  As we are now passed the  TCM window, Appt will be scheduled as an OVL visit   Thank you

## 2021-05-26 ENCOUNTER — TELEPHONE (OUTPATIENT)
Dept: FAMILY MEDICINE CLINIC | Facility: CLINIC | Age: 41
End: 2021-05-26

## 2021-05-26 NOTE — TELEPHONE ENCOUNTER
Pt mother called in about these scripts stating that she would like the amount of gloves she receives to be the same and asked If the quantity of underpads and briefs she receives could be changed to a quantity of 5

## 2021-05-26 NOTE — TELEPHONE ENCOUNTER
Form picked up by clinical 05/26/21  Patient will be notified within 5 business days of completion of forms/if we are unable to complete

## 2021-05-26 NOTE — TELEPHONE ENCOUNTER
[id number] [facility]  FORM RECEIVED VIA FAX  WILL BE PLACED IN FORM BIN FOR MA PICKUP        Order # [id number]

## 2021-05-27 ENCOUNTER — OFFICE VISIT (OUTPATIENT)
Dept: WOUND CARE | Facility: HOSPITAL | Age: 41
End: 2021-05-27
Payer: MEDICARE

## 2021-05-27 VITALS
HEART RATE: 96 BPM | RESPIRATION RATE: 16 BRPM | TEMPERATURE: 97.1 F | SYSTOLIC BLOOD PRESSURE: 120 MMHG | DIASTOLIC BLOOD PRESSURE: 76 MMHG

## 2021-05-27 DIAGNOSIS — L89.154 PRESSURE INJURY OF SACRAL REGION, STAGE 4 (HCC): Primary | ICD-10-CM

## 2021-05-27 DIAGNOSIS — L89.224 PRESSURE ULCER OF LEFT HIP, STAGE 4 (HCC): ICD-10-CM

## 2021-05-27 DIAGNOSIS — L89.324 PRESSURE INJURY OF LEFT ISCHIUM, STAGE 4 (HCC): ICD-10-CM

## 2021-05-27 DIAGNOSIS — L89.894 PRESSURE ULCER OF OTHER SITE, STAGE 4 (HCC): ICD-10-CM

## 2021-05-27 PROCEDURE — 99213 OFFICE O/P EST LOW 20 MIN: CPT | Performed by: FAMILY MEDICINE

## 2021-05-27 PROCEDURE — 99214 OFFICE O/P EST MOD 30 MIN: CPT | Performed by: FAMILY MEDICINE

## 2021-05-27 RX ORDER — LIDOCAINE HYDROCHLORIDE 40 MG/ML
5 SOLUTION TOPICAL ONCE
Status: COMPLETED | OUTPATIENT
Start: 2021-05-27 | End: 2021-05-27

## 2021-05-27 RX ADMIN — LIDOCAINE HYDROCHLORIDE 10 ML: 40 SOLUTION TOPICAL at 13:42

## 2021-05-27 NOTE — PROGRESS NOTES
Patient ID: Arlette Suárez  is a 36 y o  male Date of Birth 1980       Chief Complaint   Patient presents with    Follow Up Wound Care Visit     sacral wound       Allergies:  Cefepime, Ciprofloxacin hcl, Cymbalta [duloxetine hcl], Gabapentin, Lac bovis, Lactose - food allergy, Lyrica [pregabalin], Penicillins, Polymyxin b, and Rosuvastatin    Diagnosis:   Diagnosis ICD-10-CM Associated Orders   1  Pressure injury of sacral region, stage 4 ([facility])  L89 154 lidocaine (XYLOCAINE) 4 % topical solution 5 mL     Wound cleansing and dressings     Wound off loading     Wound miscellaneous orders   2  Pressure injury of left ischium, stage 4 ([facility])  L89 324 lidocaine (XYLOCAINE) 4 % topical solution 5 mL     Wound cleansing and dressings     Wound off loading     Wound miscellaneous orders   3  Pressure ulcer of other site, stage 4 ([facility])  L89 894 lidocaine (XYLOCAINE) 4 % topical solution 5 mL     Wound cleansing and dressings     Wound off loading     Wound miscellaneous orders   4  Pressure ulcer of left hip, stage 4 ([facility])  L89 224 lidocaine (XYLOCAINE) 4 % topical solution 5 mL     Wound cleansing and dressings     Wound off loading     Wound miscellaneous orders        Assessment :   Multiple stage IV pressure injuries as noted  They remain clean without any signs of infection  Recent hospitalization for FUO  History of chronic osteo of the pelvis  Also chronic lumbar osteomyelitis  Plan:   Switch over to [employer]'s packing  Patient feels that this was more helpful especially with the odor  He still is not a candidate for plastic surgery supposedly because of low albumin  He continues to have good appetite an each proteins  Last albumin was 1 8  I suggested that he ask is dialysis nurses for recommendation to talk to nutrition in or dietician about improving his protein  Subjective:   10/22/20:  Followup multiple pressure injuries to the buttocks and sacrum    He was hospitalized for back problems recently  Continues on hemodialysis  The patient states that his albumin has improved so that he may be able to get flap surgery  However when checking his most recent albumin was only 1 8  His total protein is elevated  11/19/20:  Followup multiple stage IV pressure or injuries to the buttocks and sacrum  Continues on hemodialysis  He is scheduled for plastic surgery for cyst on his forehead  Plastic surgery still will not do any flaps to his sacrum or buttocks because his albumin remains low  He has no other complaints  No fever, chills or cough  12/31/20: Followup multiple stage IV pressure injuries of the buttocks and sacrum  She notes some increased drainage and slight more odor  Never had his plastic surgery appointment for the cyst on his forehead  Denies any fever or chills  1/28/21:  Followup multiple stage IV pressure injuries of the buttocks, sacrum and perineum  Unfortunately, the patient was hospitalized for sepsis and back pain  Was found to have a fracture L3  He was given a back brace but only wears at home  He states that really does not give him very much relief  Unfortunately also while in the hospital, he developed a new pressure injury of his left hip  2/25/21:  Followup multiple stage IV pressure ulcers of the buttocks sacrum and perineum  Patient was hospitalized earlier this month for possible sepsis  He was found to have a burst fracture or osteo of L3  IR obtained specimen and was culture negative  However, no bone was obtained  He is scheduled to have another IR intervention March 16th for bone biopsy  Left hip ulcer broke down with large cavity  Otherwise, cultures were negative  He was discharged on no medications  He has not had any recent fever or chills    He did have fever when he was admitted to the hospital     03/21/2021  Mr Rickey Hope is a 80-year-old gentleman with paraplegia and multiple stage IV pressure ulcers was referred for evaluation  He has large chronic ulcers on his buttocks and sacrum but he developed a new ulcer over last month on his left hip  He is scheduled for IR biopsy in his spine for possible chronic osteo  He also recently underwent dental extractions and cyst removed from his head and neck  4/22/21:  Followup multiple stage IV pressure injuries of the buttocks, sacrum, perineum and left hip  He was last seen by Dr Felicity Baker in March  Since then, unfortunately he was admitted to the hospital with pneumonia  He is now doing better  He is changing his dressings daily because of drainage  There has been no increased drainage, no odor and he denies any fever or chills  He has no cough  5/27/21:  Patient returns regarding his his multiple stage IV pressure injuries of the buttocks, sacrum, perineum and left hip  Unfortunately, he was hospitalized for a few days in April regarding FUO with negative cultures  He was "treated empirically with vancomycin and cefepime - improved with no positive cultures and CT scan finding consistent with known decubitus ulcerations and chronic pelvic osteomyelitis "  He is back to baseline  Patient states that he does notice more odor            The following portions of the patient's history were reviewed and updated as appropriate:   Patient Active Problem List   Diagnosis    Paraplegia ([facility]    Atrial fibrillation ([facility] )    Chronic suprapubic catheter ([facility]    Colostomy care ([facility]    OAB (overactive bladder)    S/P unilateral BKA (below knee amputation) ([facility] )    Sebaceous cyst    Tobacco abuse    Type 1 diabetes mellitus with chronic kidney disease on chronic dialysis ([address] )    Ulcer of sacral region, stage 4 ([address]    Anemia    Chronic indwelling Ortega catheter    Wound healing, delayed    Iron deficiency anemia    Pressure injury of left ischium, stage 4 ([address] )    HTN (hypertension), benign    Neurogenic bladder    GERD (gastroesophageal reflux disease)    Chronic pain    Stage 5 chronic kidney disease on chronic dialysis ([facility])    SOB (shortness of breath)    Penile abscess    Asymptomatic bacteriuria    History of Clostridium difficile infection    Urinary retention    Delirium    Noncompliance by refusing intervention or support    Dialysis patient ([place] [address] )    Insulin long-term use ([place] [address] )    Wheelchair dependent    Recurrent Clostridioides difficile diarrhea    Bipolar depression ([place] [address] )    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[place] [address] )    Seizure ([place] [address] )    Pressure ulcer of sacral region, stage 4 ([facility])    AVM (arteriovenous malformation) of duodenum, acquired    Chronic narcotic dependence ([facility])    Chronic diastolic heart failure ([facility])    Dental caries    Nervous    Pressure ulcer of other site, stage 4 (H[facility])    Pressure ulcer of left hip, stage 4 ([facility])     Past Medical History:   Diagnosis Date    Ambulatory dysfunction     Anemia     Anemia, iron deficiency     transfusion requiring    Atrial fibrillation ([place] [address] )     AVM (arteriovenous malformation) of duodenum, 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    Bacteriuria, asymptomatic     Bipolar disorder ([facility])     Chronic deep vein thrombosis (DVT) ([facility])     Chronic indwelling Ortega catheter     Chronic kidney disease     Chronic pain     Chronic pain disorder     Chronic suprapubic catheter ([place] [address] )     Clostridium difficile infection 08/11/2016    also positive 9/2016, 5/29/2017, 8/15/2017   S/P fecal transplant    Colostomy on examination ([place] [address] )     Decubitus ulcer     Delirium     Diabetes mellitus ([place] [address] )     GERD (gastroesophageal reflux disease)     Hemodialysis patient ([place] [address] )     Hypertension     Memory impairment 2011    s/p diabetic coma    Neurogenic bladder     OAB (overactive bladder)     Paraplegia ([facility])     T3 transverse myelitis vs spinal stoke (AVM); 2012 extensive epidural abscess C7=> conus 2nd extension from deep parspinal abscess L4-S2/sacral decubitus; cord atrophy/myelomalcia T3=>conus     Penile abscess     S/P unilateral BKA (below knee amputation) ([facility] )     Right    Sebaceous cyst removed in 2017    Seizures (HCC)     Shortness of breath     Tobacco abuse     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[facility]     Urinary retention     Wheelchair dependent     Wounds, multiple     pressure ulcers with delayed healing     Past Surgical History:   Procedure Laterality Date    BELOW KNEE LEG AMPUTATION Right 2009    COLONOSCOPY N/A 3/27/2017    Procedure: COLONOSCOPY;  Surgeon: Murphy Canales MD;  [facility]: [facility]; Service:     COLONOSCOPY N/A 3/29/2017    Procedure: COLONOSCOPY;  Surgeon: Murphy Canales MD;  [facility]; Service:     COLONOSCOPY N/A 6/15/2017    Procedure: COLONOSCOPY with FMT;  Surgeon: Oscar Denson MD;  Location: [facility]; Service: Gastroenterology    ESOPHAGOGASTRODUODENOSCOPY N/A 3/22/2017    Procedure: ESOPHAGOGASTRODUODENOSCOPY (EGD); Surgeon: Bailey Bradley DO;  Location: [facility];   Service:     MULTIPLE TOOTH EXTRACTIONS N/A 3/8/2021    Procedure: EXTRACTION TEETH # 2,3,6,10,12,13,14,18,19,20,21,22,23,24,25,26,27,28,29,30;  Surgeon: Husam Manzano DMD;  [facility];  Service: Maxillofacial     Family History   Problem Relation Age of Onset    Hyperlipidemia Mother     Hypertension Mother     Leukemia Brother     Diabetes Paternal Grandfather      Social History     Socioeconomic History    Marital status: Single     Spouse name: Not on file    Number of children: Not on file    Years of education: Not on file    Highest education level: Not on file   Occupational History    Not on file   Social Needs    Financial resource strain: Not on file    Food insecurity     Worry: Not on file     Inability: Not on file    Transportation needs     Medical: Not on file     Non-medical: Not on file   Tobacco Use    Smoking status: Current Every Day Smoker     Types: Cigars    Smokeless tobacco: Never Used    Tobacco comment: about 2 cigars/day   Substance and Sexual Activity    Alcohol use: Not Currently     Frequency: Monthly or less     Drinks per session: 1 or 2     Binge frequency: Never     Comment: 1 cup of wine every 3-4 months    Drug use: Not Currently     Types: Marijuana     Comment: rarely; once every 1-2 years    Sexual activity: Not on file   Lifestyle    Physical activity     Days per week: Not on file     Minutes per session: Not on file    Stress: Not on file   Relationships    Social connections     Talks on phone: Not on file     Gets together: Not on file     Attends [employer] service: Not on file     Active member of club or organization: Not on file     Attends meetings of clubs or organizations: Not on file     Relationship status: Not on file    Intimate partner violence     Fear of current or ex partner: Not on file     Emotionally abused: Not on file     Physically abused: Not on file     Forced sexual activity: Not on file   Other Topics Concern    Not on file   Social History Narrative    Not on file       Current Outpatient Medications:     Alcohol Swabs (ALCOHOL PADS) 70 % PADS, by Does not apply route 5 (five) times a day, Disp: 300 each, Rfl: 5    amLODIPine (NORVASC) 10 mg tablet, TAKE 1 TABLET BY MOUTH EVERY DAY, Disp: 90 tablet, Rfl: 3    ammonium lactate (LAC-HYDRIN) 12 % cream, Apply topically, Disp: , Rfl:     ammonium lactate (LAC-HYDRIN) 12 % cream, , Disp: , Rfl:     apixaban (ELIQUIS) 5 mg, Take 1 tablet (5 mg total) by mouth 2 (two) times a day (Patient taking differently: Take 5 mg by mouth 2 (two) times a day Per physician, stop 2 days prior to procedure), Disp: 60 tablet, Rfl: 8    ascorbic acid (VITAMIN C) 250 mg tablet, TAKE 2 TABLETS (500 MG TOTAL) BY MOUTH DAILY, Disp: 180 tablet, Rfl: 1    atorvastatin (LIPITOR) 20 mg tablet, TAKE 1 TABLET BY MOUTH EVERYDAY AT BEDTIME, Disp: 90 tablet, Rfl: 1    B Complex-C-Folic Acid (Super B-Complex/Vit C/FA) TABS, , Disp: , Rfl:     baclofen 20 mg tablet, Take 20 mg by mouth 2 (two) times a day , Disp: , Rfl:     SUSAN MICROLET LANCETS lancets, Use as instructed to check blood sugar 4 times a day Dx e10 29, Disp: 200 each, Rfl: 5    benzonatate (TESSALON PERLES) 100 mg capsule, TAKE 1 CAPSULE BY MOUTH THREE TIMES A DAY AS NEEDED FOR COUGH, Disp: 30 capsule, Rfl: 0    Blood Glucose Monitoring Suppl ([employer] CONTOUR NEXT USB MONITOR) w/Device KIT, Testing 4 times a day, Disp: 1 kit, Rfl: 0    calcitriol (ROCALTROL) 0 5 MCG capsule, Take 2 mcg by mouth, Disp: , Rfl:     Capsaicin 0 033 % CREA, 5 times a day for 1st week  Then 3 times a day for next 3 weeks, Disp: 56 6 g, Rfl: 2    carvedilol (COREG) 25 mg tablet, TAKE 1 TABLET BY MOUTH TWICE A DAY, Disp: 180 tablet, Rfl: 1    carvedilol (COREG) 25 mg tablet, Take 25 mg by mouth, Disp: , Rfl:     carvedilol (COREG) 6 25 mg tablet, Take 6 25 mg by mouth, Disp: , Rfl:     cetirizine (ZyrTEC) 10 mg tablet, Take 10 mg by mouth daily, Disp: , Rfl:     cetirizine (ZyrTEC) 5 MG tablet, TAKE 1 TABLET BY MOUTH EVERY DAY, Disp: 90 tablet, Rfl: 1    Cholecalciferol (VITAMIN D-3) 1000 units CAPS, Take 2 capsules by mouth daily, Disp: 30 capsule, Rfl: 0    clindamycin (CLINDAGEL) 1 % gel, APPLY TO AFFECTED AREA TWICE A DAY, Disp: 60 g, Rfl: 2    [employer] ACETAMINOPHEN EX  MG tablet, TAKE 2 TABLETS BY MOUTH EVERY 8 HOURS AS NEEDED FOR MILD OR MODERATE PAIN (PAIN SCORE 1 6)  , Disp: , Rfl:     CVS Vitamin B-12 1000 MCG tablet, TAKE 1 TABLET BY MOUTH EVERY DAY, Disp: 30 tablet, Rfl: 5    cyclobenzaprine (FLEXERIL) 10 mg tablet, Take 10 mg by mouth 3 (three) times a day as needed, Disp: , Rfl: 0    cyclobenzaprine (FLEXERIL) 5 mg tablet, , Disp: , Rfl:     dextrose 50 %, 50 mL, Disp: , Rfl:     dicyclomine (BENTYL) 10 mg capsule, TAKE 1 CAPSULE (10 MG TOTAL) BY MOUTH 3 (THREE) TIMES A DAY BEFORE MEALS, Disp: 270 capsule, Rfl: 1    Disposable Gloves MISC, Use 7 times a day, 4 boxes of gloves XL Dx type 1 DM, paraplegia, Disp: 4 each, Rfl: 11    doxazosin (CARDURA) 2 mg tablet, TAKE 1 TABLET (2 MG TOTAL) BY MOUTH EVERY EVENING, Disp: 90 tablet, Rfl: 1    epoetin kaushik (EPOGEN,PROCRIT) 20,000 units/mL, Inject 10,000 Units under the skin, Disp: , Rfl:     epoetin kaushik (EPOGEN,PROCRIT) 20,000 units/mL, Inject 5,000 Units under the skin, Disp: , Rfl:     ergocalciferol (VITAMIN D2) 50,000 units, TAKE 1 CAPSULE BY MOUTH ONE TIME PER WEEK, Disp: , Rfl:     ferrous sulfate 325 (65 Fe) mg tablet, TAKE 1 TABLET BY MOUTH 3 TIMES A DAY, Disp: 90 tablet, Rfl: 3    glucose 4-6 GM-MG, Chew 2 tablets daily as needed for low blood sugar, Disp: 10 tablet, Rfl: 1    glucose blood (SUSAN CONTOUR TEST) test strip, Use as instructed to test blood sugar 4 times a day Dx e10 29, Disp: 200 each, Rfl: 3    Gvoke PFS 1 MG/0 2ML SOSY, INJECT 1 ML (1 MG TOTAL) INTO A MUSCLE ONCE FOR 1 DOSE, Disp: , Rfl:     HYDROmorphone (DILAUDID) 4 mg tablet, TAKE 1 TABLET BY MOUTH EVERY 4 HOURS AS NEEDED FOR MODERATE PAIN (PAIN SCORE 4 6)  MAX  24 MG/DAY, Disp: , Rfl:     Incontinence Supply Disposable (INCONTINENCE BRIEF LARGE) MISC, by Does not apply route 6 (six) times a day Size xl, Disp: 180 each, Rfl: 11    Incontinence Supply Disposable (UNDERGARMENT) MISC, Use 6 a day, 4 boxes, xl Dx R51, paraplegia, Disp: 4 each, Rfl: 11    Incontinence Supply Disposable (UNDERPADS) MISC, Use 2 a day, Disp: 60 each, Rfl: 11    insulin detemir (LEVEMIR) 100 units/mL subcutaneous injection, Inject 6 5 Units under the skin 2 (two) times a day , Disp: , Rfl:     ketoconazole (NIZORAL) 2 % cream, APPLY SMALL AMOUNT TO EDGES OF MOUTH TWICE DAILY   DO NOT SWALLOW, Disp: 15 g, Rfl: 1    Ketostix strip, USE WITH HYPERGLYCEMIA E10 65 **NOT COVERED**, Disp: , Rfl:     LEVEMIR FLEXTOUCH 100 units/mL injection pen, Inject 14 Units under the skin 2 (two) times a day, Disp: 15 pen, Rfl: 3    levETIRAcetam (KEPPRA) 250 mg tablet, TAKE 1 TABLET (250 MG TOTAL) BY MOUTH 3 (THREE) TIMES A WEEK ([facility]) AT 1200 , Disp: , Rfl:     lidocaine (XYLOCAINE) 5 % ointment, Apply topically as needed for mild pain, Disp: 35 44 g, Rfl: 0    Lidocaine HCl 4 % LIQD, Apply 1 application topically 3 (three) times a day as needed (pain), Disp: 73 mL, Rfl: 5    Lokelma 10 g PACK, , Disp: , Rfl:     losartan (COZAAR) 100 MG tablet, Take 1 tablet (100 mg total) by mouth daily, Disp: 90 tablet, Rfl: 1    Melatonin ER 3 MG TBCR, Take 6 mg by mouth, Disp: , Rfl:     Methoxy PEG-Epoetin Beta (MIRCERA IJ), 75 mcg Once every 2 weeks, Disp: , Rfl:     Methoxy PEG-Epoetin Beta (MIRCERA IJ), 100 mcg Once every 2 weeks, Disp: , Rfl:     Methoxy PEG-Epoetin Beta (MIRCERA IJ), 150 mcg Once every 2 weeks, Disp: , Rfl:     Misc  Devices (Wheelchair Cushion) MISC, Use daily, Disp: 1 each, Rfl: 0    Misc   Devices [facility]) MISC, by Does not apply route daily Wheelchair ramp, dx g82 20, Disp: 1 each, Rfl: 0    Multiple Vitamin (Daily-Enriqueta) TABS, TAKE 1 TABLET BY MOUTH EVERY DAY (Patient not taking: Reported on 4/20/2021), Disp: 90 tablet, Rfl: 2    Narcan 4 MG/0 1ML nasal spray, , Disp: , Rfl:     NOVOLOG 100 UNIT/ML injection, Inject 5 Units under the skin 3 (three) times a day with meals , Disp: , Rfl:     NovoLOG FlexPen 100 units/mL injection pen, INJECT 3 UNITS UNDER THE SKIN 3 (THREE) TIMES A DAY BEFORE MEALS , Disp: , Rfl:     nystatin (MYCOSTATIN) 500,000 units/5 mL suspension, APPLY 5 ML (500,000 UNITS TOTAL) TO THE MOUTH OR THROAT 4 (FOUR) TIMES A DAY, Disp: 473 mL, Rfl: 1    ondansetron (ZOFRAN-ODT) 4 mg disintegrating tablet, TAKE 1 TABLET BY MOUTH EVERY 8 HOURS AS NEEDED FOR NAUSEA AND VOMITING, Disp: 20 tablet, Rfl: 2    oxybutynin (DITROPAN) 5 mg tablet, TAKE 3 TABLETS BY MOUTH EVERY DAY, Disp: 270 tablet, Rfl: 1    phenytoin extended (DILANTIN) 200 MG ER capsule, Take 1 capsule (200 mg total) by mouth 2 (two) times a day, Disp: 60 capsule, Rfl: 2    phenytoin extended (DILANTIN) 200 MG ER capsule, Take 200 mg by mouth 2 (two) times a day, Disp: , Rfl:     phenytoin extended (DILANTIN) 300 MG ER capsule, Take 1 capsule (300 mg total) by mouth daily, Disp: 30 capsule, Rfl: 3    phenytoin extended (DILANTIN) 300 MG ER capsule, Take 300 mg by mouth daily, Disp: , Rfl:     Protonix 40 MG tablet, Take 1 tablet (40 mg total) by mouth daily Brand necessary, Disp: 90 tablet, Rfl: 1    risperiDONE (RisperDAL) 0 5 mg tablet, TAKE 1 TABLET (0 5 MG TOTAL) BY MOUTH 2 (TWO) TIMES A DAY, Disp: 180 tablet, Rfl: 1    senna (SENOKOT) 8 6 mg, TAKE 1 TABLET (8 6 MG TOTAL) BY MOUTH 2 (TWO) TIMES A DAY , Disp: , Rfl:     sodium hypochlorite (DAKIN'S HALF-STRENGTH) external solution, USE AS DIRECTED ONCE  DAILY, Disp: 473 mL, Rfl: 2    Sodium Zirconium Cyclosilicate 10 g PACK, Take 10 g by mouth daily, Disp: , Rfl:     SUPER B COMPLEX & C TABS, TAKE 1 CAPSULE BY MOUTH ONCE FOR 1 DOSE AS DIRECTED, Disp: 90 tablet, Rfl: 2    tiZANidine (ZANAFLEX) 4 mg tablet, Take 1 tablet by mouth 4 (four) times a day, Disp: , Rfl:     tiZANidine (ZANAFLEX) 4 mg tablet, , Disp: , Rfl:     Zinc Sulfate 220 (50 Zn) MG TABS, Take 1 tablet by mouth daily, Disp: 90 each, Rfl: 1  No current facility-administered medications for this visit  Review of Systems   Constitutional: Negative for activity change, appetite change (Improving), chills, fatigue, fever and unexpected weight change  HENT: Negative for congestion, hearing loss and postnasal drip  Eyes: Negative for visual disturbance  Respiratory: Negative for cough and shortness of breath  Cardiovascular: Negative for chest pain and leg swelling  Gastrointestinal: Negative for abdominal pain, constipation (Slight), diarrhea, nausea and vomiting  Genitourinary: Negative for dysuria and urgency  Indwelling Ortega catheter   Musculoskeletal: Positive for gait problem (Nonambulatory, paraplegic)  Skin: Positive for wound (Sacral, left ischial, left hip and perineum)   Negative for rash         Cyst on right  cheek   Neurological: Positive for weakness  Hematological: Does not bruise/bleed easily  Psychiatric/Behavioral: Positive for dysphoric mood  Objective:  /76   Pulse 96   Temp (!) 97 1 °F (36 2 °C)   Resp 16   Pain Score:   8     Physical Exam  Vitals signs and nursing note reviewed  Constitutional:       Appearance: Normal appearance  He is underweight  HENT:      Head: Normocephalic and atraumatic  Abdominal:      General: Abdomen is flat  Comments: The abdomen around the ostomy is slightly full and tender  Skin:     Findings: Wound (Both buttocks and sacrum) present  No erythema  Comments: Sacral ulcer slightly less undermining  Left ischial ulcer also with undermining  The right buttock ulcer remains closed  The left hip ulcer has undermining and tunnel again at 11:00   Bone is not probed  Perineal ulcer is unchanged  All ulcers are clean, without any signs of infection  No slough and no fibrin  Neurological:      Mental Status: He is alert and oriented to person, place, and time  Psychiatric:         Mood and Affect: Affect normal          Cognition and Memory: Cognition normal      Sacrum    Left ischium    perineum    Left hip          Wound Ischium Left (Active)   Wound Image   05/27/21 1334   Wound Description Granulation tissue;Slough; Beefy red 05/27/21 1334   Pressure Injury Stage 4 04/22/21 1444   Irene-wound Assessment Intact;Pink;Scar Tissue 05/27/21 1334   Wound Length (cm) 6 cm 05/27/21 1334   Wound Width (cm) 4 2 cm 05/27/21 1334   Wound Depth (cm) 1 cm 05/27/21 1334   Wound Surface Area (cm^2) 25 2 cm^2 05/27/21 1334   Wound Volume (cm^3) 25 2 cm^3 05/27/21 1334   Calculated Wound Volume (cm^3) 25 2 cm^3 05/27/21 1334   Change in Wound Size % -33 33 05/27/21 1334   Undermining 2 7 05/27/21 1334   Undermining is depth extending from 3-12 oclock, deepest at 12 05/27/21 1334   Drainage Amount Large 05/27/21 1334 Drainage Description Serosanguineous 05/27/21 1334   Non-staged Wound Description Full thickness 05/27/21 1334   Treatments Irrigation with NSS 05/27/21 1334   Dressing Changed Changed 05/27/21 1334   Patient Tolerance Tolerated well 05/27/21 1334   Dressing Status Removed 05/27/21 1334       Wound Perineum (Active)   Wound Image   05/27/21 1323   Wound Description Granulation tissue;Pink;Slough 05/27/21 1326   Pressure Injury Stage 4 04/22/21 1443   Irene-wound Assessment Scar Tissue; Intact; Pink 05/27/21 1326   Wound Length (cm) 5 5 cm 05/27/21 1326   Wound Width (cm) 5 cm 05/27/21 1326   Wound Depth (cm) 0 5 cm 05/27/21 1326   Wound Surface Area (cm^2) 27 5 cm^2 05/27/21 1326   Wound Volume (cm^3) 13 75 cm^3 05/27/21 1326   Calculated Wound Volume (cm^3) 13 75 cm^3 05/27/21 1326   Change in Wound Size % -30 95 05/27/21 1326   Undermining 1 3 05/27/21 1326   Undermining is depth extending from 5-10 oclock, deepest at 6 oclock 05/27/21 1326   Drainage Amount Large 05/27/21 1326   Drainage Description Serosanguineous 05/27/21 1326   Non-staged Wound Description Full thickness 05/27/21 1326   Treatments Irrigation with NSS 05/27/21 1326   Dressing Changed Changed 05/27/21 1326   Patient Tolerance Tolerated well 05/27/21 1326   Dressing Status Removed 05/27/21 1326       Wound Sacrum (Active)   Wound Image   05/27/21 1322   Wound Description Granulation tissue;Slough; Epithelialization; Other (Comment) 05/27/21 1323   Pressure Injury Stage 4 01/28/21 1354   Irene-wound Assessment Scar Tissue;[place] 05/27/21 1323   Wound Length (cm) 5 5 cm 05/27/21 1323   Wound Width (cm) 12 cm 05/27/21 1323   Wound Depth (cm) 1 cm 05/27/21 1323   Wound Surface Area (cm^2) 66 cm^2 05/27/21 1323   Wound Volume (cm^3) 66 cm^3 05/27/21 1323   Calculated Wound Volume (cm^3) 66 cm^3 05/27/21 1323   Change in Wound Size % 12 05/27/21 1323   Undermining 3 2 05/27/21 1323   Undermining is depth extending from 3-8 oclock, deepest at 4 05/27/21 1323 Drainage Amount Large 05/27/21 1323   Drainage Description Serosanguineous 05/27/21 1323   Non-staged Wound Description Full thickness 04/22/21 1442   Treatments Irrigation with NSS 05/27/21 1323   Dressing Changed Changed 05/27/21 1323   Patient Tolerance Tolerated well 05/27/21 1323   Dressing Status Removed 04/22/21 1442       Wound 01/28/21 Pressure Injury Hip Left;Lateral (Active)   Wound Image   05/27/21 1330   Wound Description Granulation tissue;Slough;Pink; White;Yellow 05/27/21 1329   Pressure Injury Stage 4 05/27/21 1329   Irene-wound Assessment Dry; Intact 05/27/21 1329   Wound Length (cm) 2 6 cm 05/27/21 1329   Wound Width (cm) 2 6 cm 05/27/21 1329   Wound Depth (cm) 1 4 cm 05/27/21 1329   Wound Surface Area (cm^2) 6 76 cm^2 05/27/21 1329   Wound Volume (cm^3) 9 46 cm^3 05/27/21 1329   Calculated Wound Volume (cm^3) 9 46 cm^3 05/27/21 1329   Tunneling 4 7 cm 05/27/21 1329   Tunneling in depth located at 11 oclock 05/27/21 1329   Undermining 3 7 05/27/21 1329   Undermining is depth extending from 12-12 deepest at [facility] 05/27/21 1329   Drainage Amount Moderate 05/27/21 1329   Drainage Description Serosanguineous 05/27/21 1329   Non-staged Wound Description Full thickness 05/27/21 1329   Treatments Cleansed 04/22/21 1445   Wound packed? Yes 03/12/21 0946   Packing- # removed 1 03/12/21 0946   Dressing Changed Changed 05/27/21 1329   Patient Tolerance Tolerated well 05/27/21 1329   Dressing Status Removed 05/27/21 1329              Results from last 6 Months   Lab Units 02/25/21  1531   WOUND CULTURE  2+ Growth of Proteus mirabilis*  2+ Growth of Pseudomonas aeruginosa MDR*         Wound Instructions:  Orders Placed This Encounter   Procedures    Wound cleansing and dressings     Wound cleansing and dressings                    All wounds:  Wash your hands with soap and water  Remove old dressing, discard into plastic bag and place in trash    Cleanse the wound with saline or mild soap and water prior to applying a clean dressing  Do not use tissue or cotton balls  Do not scrub the wound  Pat dry using gauze  Shower no; do not get dressing wet  Apply dakins soaked gauze packing to wounds (moist with Dakins solution, and squeezed out so it is damp)  Cover with and ABD  Secure with Medfix tape  Change dressing daily and PRN for breakthrough drainage (visiting nurses to do twice per week and family in between)  This was done at today's dressing change in the wound center  Continue visiting nurses twice per week for dressing changes, family to do in between nurse visits     Follow up in 6 weeks         Standing Status:   Future     Standing Expiration Date:   5/27/2022    Wound off loading     Continue clinitron bed at home and turn at least every 1-2 hours  To try and limit the amount of time spent sitting in the wheelchair  Keep pressure off the wounds as much as humanly possible     Standing Status:   Future     Standing Expiration Date:   5/27/2022    Wound miscellaneous orders     Continue to try and increase your protein to at least 3 servings or more if possible                                  Continue to try and keep blood sugars as low as possible  Stop Smoking    Ask for nutritionist or dietition consult during your next dialysis apt  Standing Status:   Future     Standing Expiration Date:   5/27/2022       Christopher Martin MD, CHT, CWS       Portions of the record may have been created with voice recognition software  Occasional wrong word or "sound alike" substitutions may have occurred due to the inherent limitations of voice recognition software  Read the chart carefully and recognize, using context, where substitutions have occurred

## 2021-05-27 NOTE — LETTER
[address] [place] [address] WOUND CARE  [address] [place] [address]  Phone#  [phone]  Fax#  [phone]    Patient:  Nia Malcolm  YOB: 1980  Phone:  [phone]  Date of Visit:  5/27/2021    Orders Placed This Encounter   Procedures    Wound cleansing and dressings     Wound cleansing and dressings                    All wounds:  Wash your hands with soap and water  Remove old dressing, discard into plastic bag and place in trash  Cleanse the wound with saline or mild soap and water prior to applying a clean dressing  Do not use tissue or cotton balls  Do not scrub the wound  Pat dry using gauze  Shower no; do not get dressing wet  Apply dakins soaked gauze packing to wounds (moist with Dakins solution, and squeezed out so it is damp)  Cover with and ABD  Secure with Medfix tape  Change dressing daily and PRN for breakthrough drainage (visiting nurses to do twice per week and family in between)  This was done at today's dressing change in the wound center  Continue visiting nurses twice per week for dressing changes, family to do in between nurse visits     Follow up in 6 weeks         Standing Status:   Future     Standing Expiration Date:   5/27/2022    Wound off loading     Continue clinitron bed at home and turn at least every 1-2 hours  To try and limit the amount of time spent sitting in the wheelchair  Keep pressure off the wounds as much as humanly possible     Standing Status:   Future     Standing Expiration Date:   5/27/2022    Wound miscellaneous orders     Continue to try and increase your protein to at least 3 servings or more if possible                                  Continue to try and keep blood sugars as low as possible  Stop Smoking    Ask for nutritionist or dietition consult during your next dialysis apt       Standing Status:   Future     Standing Expiration Date:   5/27/2022         Electronically signed by Yesenia Redd MD

## 2021-05-27 NOTE — TELEPHONE ENCOUNTER
Can you advise on the pts mom request for DME supplies  The forms I received are for home care  Form completed by provider on 05/27/21, patient notified/left message that form is ready for   Faxed   Confirmation received    Completed form copied for chart/placed in scan bin

## 2021-05-28 ENCOUNTER — TELEPHONE (OUTPATIENT)
Dept: FAMILY MEDICINE CLINIC | Facility: CLINIC | Age: 41
End: 2021-05-28

## 2021-05-28 NOTE — TELEPHONE ENCOUNTER
SIGNATURES NEEDED FOR extended family care pf [place] FORM RECEIVED VIA FAX  WILL BE PLACED IN FORM BIN FOR MA PICKUP

## 2021-05-30 DIAGNOSIS — B37.0 THRUSH: ICD-10-CM

## 2021-06-01 ENCOUNTER — TELEPHONE (OUTPATIENT)
Dept: FAMILY MEDICINE CLINIC | Facility: CLINIC | Age: 41
End: 2021-06-01

## 2021-06-01 DIAGNOSIS — G82.20 PARAPLEGIA (HCC): Chronic | ICD-10-CM

## 2021-06-01 DIAGNOSIS — N32.81 OAB (OVERACTIVE BLADDER): ICD-10-CM

## 2021-06-01 DIAGNOSIS — Z99.2 STAGE 5 CHRONIC KIDNEY DISEASE ON CHRONIC DIALYSIS (HCC): ICD-10-CM

## 2021-06-01 DIAGNOSIS — N31.9 NEUROGENIC BLADDER: ICD-10-CM

## 2021-06-01 DIAGNOSIS — N18.6 STAGE 5 CHRONIC KIDNEY DISEASE ON CHRONIC DIALYSIS (HCC): ICD-10-CM

## 2021-06-01 DIAGNOSIS — R33.9 URINARY RETENTION: ICD-10-CM

## 2021-06-01 DIAGNOSIS — Z93.59 CHRONIC SUPRAPUBIC CATHETER (HCC): Chronic | ICD-10-CM

## 2021-06-01 DIAGNOSIS — E10.22 TYPE 1 DIABETES MELLITUS WITH CHRONIC KIDNEY DISEASE ON CHRONIC DIALYSIS (HCC): ICD-10-CM

## 2021-06-01 DIAGNOSIS — L98.429 ULCER OF SACRAL REGION, STAGE 4 (HCC): ICD-10-CM

## 2021-06-01 DIAGNOSIS — N18.6 TYPE 1 DIABETES MELLITUS WITH CHRONIC KIDNEY DISEASE ON CHRONIC DIALYSIS (HCC): ICD-10-CM

## 2021-06-01 DIAGNOSIS — Z99.2 TYPE 1 DIABETES MELLITUS WITH CHRONIC KIDNEY DISEASE ON CHRONIC DIALYSIS (HCC): ICD-10-CM

## 2021-06-01 RX ORDER — UNDERPADS 23" X 36"
EACH MISCELLANEOUS
Qty: 180 EACH | Refills: 11 | Status: SHIPPED | OUTPATIENT
Start: 2021-06-01

## 2021-06-01 RX ORDER — DIAPER,BRIEF,ADULT, DISPOSABLE
EACH MISCELLANEOUS
Qty: 4 EACH | Refills: 11 | Status: SHIPPED | OUTPATIENT
Start: 2021-06-01 | End: 2021-06-01 | Stop reason: SDUPTHER

## 2021-06-01 RX ORDER — DIAPER,BRIEF,ADULT, DISPOSABLE
EACH MISCELLANEOUS
Qty: 5 EACH | Refills: 11 | Status: SHIPPED | OUTPATIENT
Start: 2021-06-01

## 2021-06-01 RX ORDER — UNDERPADS 23" X 36"
EACH MISCELLANEOUS
Qty: 5 EACH | Refills: 11 | Status: SHIPPED | OUTPATIENT
Start: 2021-06-01

## 2021-06-01 RX ORDER — UNDERPADS 23" X 36"
EACH MISCELLANEOUS
Qty: 60 EACH | Refills: 11 | Status: SHIPPED | OUTPATIENT
Start: 2021-06-01 | End: 2021-06-01 | Stop reason: SDUPTHER

## 2021-06-01 NOTE — TELEPHONE ENCOUNTER
Form picked up by clinical 06/01/21  Patient will be notified within 5 business days of completion of forms/if we are unable to complete

## 2021-06-01 NOTE — TELEPHONE ENCOUNTER
Rosanne Moore called to inform that patient recently move and no longer has a shower chair  Pt will need a new order for a shower chair to be placed

## 2021-06-03 NOTE — TELEPHONE ENCOUNTER
Is it a shower chair with a back that stands alone in the shower or a shower transfer bench with chair attached   I want to make sure we get correct one

## 2021-06-04 ENCOUNTER — TELEPHONE (OUTPATIENT)
Dept: FAMILY MEDICINE CLINIC | Facility: CLINIC | Age: 41
End: 2021-06-04

## 2021-06-04 DIAGNOSIS — E10.22 TYPE 1 DIABETES MELLITUS WITH CHRONIC KIDNEY DISEASE ON CHRONIC DIALYSIS (HCC): ICD-10-CM

## 2021-06-04 DIAGNOSIS — Z99.2 TYPE 1 DIABETES MELLITUS WITH CHRONIC KIDNEY DISEASE ON CHRONIC DIALYSIS (HCC): ICD-10-CM

## 2021-06-04 DIAGNOSIS — L98.429 ULCER OF SACRAL REGION, STAGE 4 (HCC): ICD-10-CM

## 2021-06-04 DIAGNOSIS — N18.6 TYPE 1 DIABETES MELLITUS WITH CHRONIC KIDNEY DISEASE ON CHRONIC DIALYSIS (HCC): ICD-10-CM

## 2021-06-04 RX ORDER — UREA 10 %
LOTION (ML) TOPICAL
Qty: 90 TABLET | Refills: 1 | Status: SHIPPED | OUTPATIENT
Start: 2021-06-04 | End: 2021-07-15 | Stop reason: SDUPTHER

## 2021-06-04 NOTE — TELEPHONE ENCOUNTER
I called and spoke with pts mom who states to disregard this order as wound care got back to the pt and informed him to not shower

## 2021-06-04 NOTE — TELEPHONE ENCOUNTER
SIGNATURES NEEDED FOR [facility] health FORM RECEIVED VIA FAX  WILL BE PLACED IN FORM BIN FOR MA PICKUP        BQVVT#[id number]

## 2021-06-04 NOTE — TELEPHONE ENCOUNTER
Via nurse line:    Yadira Rosas from [address] [place] [address] is calling stating he is seeing pt for wound care twice a week  Pt Bp 154/96 was evaluated on 06/02/21 pt was asymptomatic and he did not take his meds after madhu left he took his BP med  They took his BP today with his meds he was at 150/90 still asymptomatic  They will continue to monitor BP  Also pt has been complaining of fullness  He does have a colostomy bag but no leakage or problems  They will also be monitoring it, they wanted to inform PCP

## 2021-06-15 ENCOUNTER — TELEPHONE (OUTPATIENT)
Dept: FAMILY MEDICINE CLINIC | Facility: CLINIC | Age: 41
End: 2021-06-15

## 2021-06-15 NOTE — TELEPHONE ENCOUNTER
Form dropped off by patient 06/15/21   Placed in the "forms" [facility]    [employer]  ACCOUNT # [id number]

## 2021-06-17 ENCOUNTER — TELEPHONE (OUTPATIENT)
Dept: FAMILY MEDICINE CLINIC | Facility: CLINIC | Age: 41
End: 2021-06-17

## 2021-06-17 NOTE — TELEPHONE ENCOUNTER
SIGNATURES NEEDED FOR [facility] (PHYSICIAN ORDER) FORM RECEIVED VIA FAX  WILL BE PLACED IN FORM BIN FOR MA PICKUP

## 2021-06-21 NOTE — TELEPHONE ENCOUNTER
Informed by Mother that form was missing License # for the patient form is being faxed back and [place] for that to be placed on the form

## 2021-06-22 ENCOUNTER — TRANSITIONAL CARE MANAGEMENT (OUTPATIENT)
Dept: FAMILY MEDICINE CLINIC | Facility: CLINIC | Age: 41
End: 2021-06-22

## 2021-06-22 ENCOUNTER — TELEPHONE (OUTPATIENT)
Dept: FAMILY MEDICINE CLINIC | Facility: CLINIC | Age: 41
End: 2021-06-22

## 2021-06-22 NOTE — TELEPHONE ENCOUNTER
SIGNATURES NEEDED FOR [facility] OF [place]  FORM RECEIVED VIA FAX  WILL BE PLACED IN FORM BIN FOR MA PICKUP      PLAN OF CARE   CERT PERIOD: 25/14-48/08

## 2021-06-23 ENCOUNTER — PATIENT MESSAGE (OUTPATIENT)
Dept: DERMATOLOGY | Facility: CLINIC | Age: 41
End: 2021-06-23

## 2021-06-23 DIAGNOSIS — L30.8 OTHER ECZEMA: Primary | ICD-10-CM

## 2021-06-23 NOTE — TELEPHONE ENCOUNTER
Form picked up by clinical 06/23/21  Patient will be notified within 5 business days of completion of forms/if we are unable to complete

## 2021-06-24 NOTE — TELEPHONE ENCOUNTER
From: Toney Barone  To: uRth Roman MD  Sent: 6/23/2021 12:43 PM EDT  Subject: Non-Urgent Medical Question    Hi I'm contacting you because I having a problem  I'm itching uncontrollably  All over my trunk area and head  On my chest right side on my breast area  I also have a patch of skin 1in 1/2 wide and 1in 1/2 long  Skin is hard with small bumps throughout that area  My back itches also the same way  Thank you , and stay safe

## 2021-06-24 NOTE — TELEPHONE ENCOUNTER
Message to Sebastien Esqueda:    Is anyone else living with you itchy? If not, you can try triamcinolone ointment 2x/day on affected areas of body (NOT FACE, NOT GROIN) as needed for rash/itch  Overuse of this ointment can thin your skin  If rash does not improve in 2 weeks or so, please see us again  Thank you  I sent medication to [facility] pharmacy in [facility] on [address]

## 2021-06-28 ENCOUNTER — TELEPHONE (OUTPATIENT)
Dept: FAMILY MEDICINE CLINIC | Facility: CLINIC | Age: 41
End: 2021-06-28

## 2021-06-28 DIAGNOSIS — Z00.00 HEALTHCARE MAINTENANCE: ICD-10-CM

## 2021-06-28 NOTE — TELEPHONE ENCOUNTER
Олег Holland a nurse from [facility] called to inform you he is seeing the pt for his wound care on his bottom  States pt is doing well  States pt mentioned he cleaned his stoma this past weekend or this past week with warm water but his brother brought his radha hot water  States he think he may have burned him self  He states looking at it you can see it is slightly swollen and has the yellow sloth on it as if there was a wound or trauma to it  States pt is asymptomatic, no fever no pain

## 2021-06-29 RX ORDER — MULTIVITAMIN WITH FOLIC ACID 400 MCG
TABLET ORAL
Qty: 90 TABLET | Refills: 2 | Status: SHIPPED | OUTPATIENT
Start: 2021-06-29 | End: 2022-01-26

## 2021-07-01 ENCOUNTER — TELEPHONE (OUTPATIENT)
Dept: FAMILY MEDICINE CLINIC | Facility: CLINIC | Age: 41
End: 2021-07-01

## 2021-07-01 NOTE — TELEPHONE ENCOUNTER
Reyna Lujan from extended [facility] called requesting status of a form that was faxed  There is an encounter on 06/25/2021 after that there is nothing else encountered     She would like a call back at [phone] ext [phone]    Please advise

## 2021-07-02 ENCOUNTER — TELEPHONE (OUTPATIENT)
Dept: FAMILY MEDICINE CLINIC | Facility: CLINIC | Age: 41
End: 2021-07-02

## 2021-07-02 NOTE — TELEPHONE ENCOUNTER
SIGNATURES NEEDED FOR HOME HEALTH CERTIFICATION  AND PLAN OF CARE  FORM RECEIVED VIA FAX  WILL BE PLACED IN FORM BIN FOR MA PICKUP      ACCOUNT # [de-identified]

## 2021-07-02 NOTE — TELEPHONE ENCOUNTER
[id number] [facility]  FORM RECEIVED VIA FAX  WILL BE PLACED IN FORM BIN FOR BUNNY GRANT#[id number]

## 2021-07-08 ENCOUNTER — OFFICE VISIT (OUTPATIENT)
Dept: WOUND CARE | Facility: HOSPITAL | Age: 41
End: 2021-07-08
Payer: MEDICARE

## 2021-07-08 VITALS
HEART RATE: 76 BPM | SYSTOLIC BLOOD PRESSURE: 142 MMHG | RESPIRATION RATE: 18 BRPM | DIASTOLIC BLOOD PRESSURE: 92 MMHG | TEMPERATURE: 98.9 F

## 2021-07-08 DIAGNOSIS — E88.09 HYPOALBUMINEMIA: ICD-10-CM

## 2021-07-08 DIAGNOSIS — L89.894 PRESSURE ULCER OF OTHER SITE, STAGE 4 (HCC): ICD-10-CM

## 2021-07-08 DIAGNOSIS — L89.224 PRESSURE ULCER OF LEFT HIP, STAGE 4 (HCC): ICD-10-CM

## 2021-07-08 DIAGNOSIS — Z99.2 TYPE 1 DIABETES MELLITUS WITH CHRONIC KIDNEY DISEASE ON CHRONIC DIALYSIS (HCC): ICD-10-CM

## 2021-07-08 DIAGNOSIS — G82.20 PARAPLEGIA (HCC): ICD-10-CM

## 2021-07-08 DIAGNOSIS — L89.324 PRESSURE INJURY OF LEFT ISCHIUM, STAGE 4 (HCC): ICD-10-CM

## 2021-07-08 DIAGNOSIS — N18.6 TYPE 1 DIABETES MELLITUS WITH CHRONIC KIDNEY DISEASE ON CHRONIC DIALYSIS (HCC): ICD-10-CM

## 2021-07-08 DIAGNOSIS — L89.154 PRESSURE INJURY OF SACRAL REGION, STAGE 4 (HCC): Primary | ICD-10-CM

## 2021-07-08 DIAGNOSIS — E10.22 TYPE 1 DIABETES MELLITUS WITH CHRONIC KIDNEY DISEASE ON CHRONIC DIALYSIS (HCC): ICD-10-CM

## 2021-07-08 PROCEDURE — 99213 OFFICE O/P EST LOW 20 MIN: CPT | Performed by: FAMILY MEDICINE

## 2021-07-08 PROCEDURE — 99214 OFFICE O/P EST MOD 30 MIN: CPT | Performed by: FAMILY MEDICINE

## 2021-07-08 RX ORDER — LIDOCAINE HYDROCHLORIDE 40 MG/ML
5 SOLUTION TOPICAL ONCE
Status: COMPLETED | OUTPATIENT
Start: 2021-07-08 | End: 2021-07-08

## 2021-07-08 RX ADMIN — LIDOCAINE HYDROCHLORIDE 5 ML: 40 SOLUTION TOPICAL at 14:15

## 2021-07-08 NOTE — LETTER
[facility] WOUND CARE  [address] [place] [address]  Phone#  [phone]  Fax#  [phone]    Patient:  Li Brock  YOB: 1980  Phone:  [phone]  Date of Visit:  7/8/2021    Orders Placed This Encounter   Procedures    Wound cleansing and dressings     Wound cleansing and dressings                     All wounds:  Wash your hands with soap and water  Remove old dressing, discard into plastic bag and place in trash  Cleanse the wound with saline or mild soap and water prior to applying a clean dressing  Do not use tissue or cotton balls  Do not scrub the wound  Pat dry using gauze  Shower no; do not get dressing wet  Apply dakins soaked gauze packing to wounds (moist with Dakins solution, and squeezed out so it is damp)  Cover with and ABD  Secure with Medfix tape  Change dressing daily and PRN for breakthrough drainage (visiting nurses to do twice per week and family in between)  This was done at today's dressing change in the wound center       Continue visiting nurses twice per week for dressing changes, family to do in between nurse visits     Follow up in 6 weeks            Wound off loading  Continue clinitron bed at home and turn at least every 1-2 hours  To try and limit the amount of time spent sitting in the wheelchair  Keep pressure off the wounds as much as humanly possible         Continue to try and increase your protein to at least 3 servings or more if possible                                  Continue to try and keep blood sugars as low as possible  Stop Smoking    Contact [employer] and ask about shower chair with cushion  If [facility] [employer] can recommend an appropriate product, email Dr Moises Chaudhary the information and he will order for you  (we will give you Dr Catherine melendez with email info)      Contact Primary Care Provider about getting a nutrition consult to increase albumin levels     Standing Status:   Future     Standing Expiration Date:   7/8/2022         Electronically signed by Herman Mcnally MD

## 2021-07-08 NOTE — PATIENT INSTRUCTIONS
Orders Placed This Encounter   Procedures    Wound cleansing and dressings     Wound cleansing and dressings                     All wounds:  Wash your hands with soap and water  Remove old dressing, discard into plastic bag and place in trash  Cleanse the wound with saline or mild soap and water prior to applying a clean dressing  Do not use tissue or cotton balls  Do not scrub the wound  Pat dry using gauze  Shower no; do not get dressing wet  Apply dakins soaked gauze packing to wounds (moist with Dakins solution, and squeezed out so it is damp)  Cover with and ABD  Secure with Medfix tape  Change dressing daily and PRN for breakthrough drainage (visiting nurses to do twice per week and family in between)  This was done at today's dressing change in the wound center       Continue visiting nurses twice per week for dressing changes, family to do in between nurse visits     Follow up in 6 weeks            Wound off loading  Continue clinitron bed at home and turn at least every 1-2 hours  To try and limit the amount of time spent sitting in the wheelchair  Keep pressure off the wounds as much as humanly possible         Continue to try and increase your protein to at least 3 servings or more if possible                                  Continue to try and keep blood sugars as low as possible  Stop Smoking    Contact [facility] [employer] and ask about shower chair with cushion  If [facility] [employer] can recommend an appropriate product, email Dr Julio Chaudhari the information and he will order for you  (we will give you Dr Kevin Gamble card with email info)      Contact Primary Care Provider about getting a nutrition consult to increase albumin levels     Standing Status:   Future     Standing Expiration Date:   7/8/2022

## 2021-07-08 NOTE — PROGRESS NOTES
Patient ID: Ervin Yoder  is a 36 y o  male Date of Birth 1980       Chief Complaint   Patient presents with    Follow Up Wound Care Visit     multiple pressure wounds        Allergies:  Cefepime, Ciprofloxacin hcl, Cymbalta [duloxetine hcl], Gabapentin, Lac bovis, Lactose - food allergy, Lyrica [pregabalin], Penicillins, Polymyxin b, and Rosuvastatin    Diagnosis:   Diagnosis ICD-10-CM Associated Orders   1  Pressure injury of sacral region, stage 4 ([facility])  L89 154 Wound cleansing and dressings     lidocaine (XYLOCAINE) 4 % topical solution 5 mL   2  Pressure injury of left ischium, stage 4 ([facility])  L89 324 Wound cleansing and dressings     lidocaine (XYLOCAINE) 4 % topical solution 5 mL   3  Pressure ulcer of other site, stage 4 ([facility])  L89 894 Wound cleansing and dressings     lidocaine (XYLOCAINE) 4 % topical solution 5 mL   4  Pressure ulcer of left hip, stage 4 ([facility])  L89 224 Wound cleansing and dressings     lidocaine (XYLOCAINE) 4 % topical solution 5 mL   5  Paraplegia ([facility])  G82 20    6  Type 1 diabetes mellitus with chronic kidney disease on chronic dialysis ([facility])  E10 22     N18 6     Z99 2    7  Hypoalbuminemia  E88 09         Assessment :   Multiple stage IV pressure injuries are unchanged  No signs of infection  Plastic surgery still one not operate because of his very low albumin  He still has not yet had a consultation with dietician/nutritionist but states that he will contact his primary care physician to obtain information today  Last albumin one month ago was 1 9  Plan:   Nutrition is consultation to increase his albumin  Continue with Dakin's packing  Continued offload  He does have a Clinitron bed  He requests some sort of a shower chair with cushion  He will contact Anthony Molina to get more information  I will then order if they cannot  Subjective:   10/22/20:  Followup multiple pressure injuries to the buttocks and sacrum    He was hospitalized for back problems recently  Continues on hemodialysis  The patient states that his albumin has improved so that he may be able to get flap surgery  However when checking his most recent albumin was only 1 8  His total protein is elevated  11/19/20:  Followup multiple stage IV pressure or injuries to the buttocks and sacrum  Continues on hemodialysis  He is scheduled for plastic surgery for cyst on his forehead  Plastic surgery still will not do any flaps to his sacrum or buttocks because his albumin remains low  He has no other complaints  No fever, chills or cough  12/31/20: Followup multiple stage IV pressure injuries of the buttocks and sacrum  She notes some increased drainage and slight more odor  Never had his plastic surgery appointment for the cyst on his forehead  Denies any fever or chills  1/28/21:  Followup multiple stage IV pressure injuries of the buttocks, sacrum and perineum  Unfortunately, the patient was hospitalized for sepsis and back pain  Was found to have a fracture L3  He was given a back brace but only wears at home  He states that really does not give him very much relief  Unfortunately also while in the hospital, he developed a new pressure injury of his left hip  2/25/21:  Followup multiple stage IV pressure ulcers of the buttocks sacrum and perineum  Patient was hospitalized earlier this month for possible sepsis  He was found to have a burst fracture or osteo of L3  IR obtained specimen and was culture negative  However, no bone was obtained  He is scheduled to have another IR intervention March 16th for bone biopsy  Left hip ulcer broke down with large cavity  Otherwise, cultures were negative  He was discharged on no medications  He has not had any recent fever or chills    He did have fever when he was admitted to the hospital     03/21/2021  Mr Kaitlin Gross is a 80-year-old gentleman with paraplegia and multiple stage IV pressure ulcers was referred for evaluation  He has large chronic ulcers on his buttocks and sacrum but he developed a new ulcer over last month on his left hip  He is scheduled for IR biopsy in his spine for possible chronic osteo  He also recently underwent dental extractions and cyst removed from his head and neck  4/22/21:  Followup multiple stage IV pressure injuries of the buttocks, sacrum, perineum and left hip  He was last seen by Dr Alex Mejia in March  Since then, unfortunately he was admitted to the hospital with pneumonia  He is now doing better  He is changing his dressings daily because of drainage  There has been no increased drainage, no odor and he denies any fever or chills  He has no cough  5/27/21:  Patient returns regarding his his multiple stage IV pressure injuries of the buttocks, sacrum, perineum and left hip  Unfortunately, he was hospitalized for a few days in April regarding FUO with negative cultures  He was "treated empirically with vancomycin and cefepime - improved with no positive cultures and CT scan finding consistent with known decubitus ulcerations and chronic pelvic osteomyelitis "  He is back to baseline  Patient states that he does notice more odor  7/8/21: Followup multiple stage IV pressure injuries of the buttocks, sacrum, perineum and left hip  He continues with Dakin's packing  He had multiple excision is on his face of folic you are cysts by plastic surgery  He still cannot have surgery of his pressure injuries due to chronically low albumin  He has not yet contacted his PCP for nutritionist consultation            The following portions of the patient's history were reviewed and updated as appropriate:   Patient Active Problem List   Diagnosis    [facility]aplegia [facility]    Atrial fibrillation ([facility]    Chronic suprapubic catheter ([facility]    Colostomy care ([facility]    OAB (overactive bladder)    S/P unilateral BKA (below knee amputation) ([facility] )    Sebaceous cyst    Tobacco abuse    Type 1 diabetes mellitus with chronic kidney disease on chronic dialysis ([facility])    Ulcer of sacral region, stage 4 ([facility])    Anemia    Chronic indwelling Ortega catheter    Wound healing, delayed    Iron deficiency anemia    Pressure injury of left ischium, stage 4 ([facility])    HTN (hypertension), benign    Neurogenic bladder    GERD (gastroesophageal reflux disease)    Chronic pain    Stage 5 chronic kidney disease on chronic dialysis ([facility])    SOB (shortness of breath)    Penile abscess    Asymptomatic bacteriuria    History of Clostridium difficile infection    Urinary retention    Delirium    Noncompliance by refusing intervention or support    Dialysis patient ([place] [address] )    Insulin long-term use ([place] [address] )    Wheelchair dependent    Recurrent Clostridioides difficile diarrhea    Bipolar depression ([place] [address] )    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  )    Seizure ([place] [address] )    Pressure ulcer of sacral region, stage 4 ([place] [address] )    AVM (arteriovenous malformation) of duodenum, acquired    Chronic narcotic dependence ([facility])    Chronic diastolic heart failure ([facility])    Dental caries    Nervous    Pressure ulcer of other site, stage 4 ([facility])    Pressure ulcer of left hip, stage 4 ([facility])     Past Medical History:   Diagnosis Date    Ambulatory dysfunction     Anemia     Anemia, iron deficiency     transfusion requiring    Atrial fibrillation ([place] [address] )     AVM (arteriovenous malformation) of duodenum, 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    Bacteriuria, asymptomatic     Bipolar disorder ([facility])     Chronic deep vein thrombosis (DVT) ([facility])     Chronic indwelling Ortega catheter     Chronic kidney disease     Chronic pain     Chronic pain disorder     Chronic suprapubic catheter ([place] [address] )     Clostridium difficile infection 08/11/2016    also positive 9/2016, 5/29/2017, 8/15/2017   S/P fecal transplant    Colostomy on examination ([place] [address] )     Decubitus ulcer     Delirium     Diabetes mellitus ([place] [address] )     GERD (gastroesophageal reflux disease)     Hemodialysis patient ([facility] )     Hypertension     Memory impairment 2011    s/p diabetic coma    Neurogenic bladder     OAB (overactive bladder)     Paraplegia (HCC)     T3 transverse myelitis vs spinal stoke (AVM); 2012 extensive epidural abscess C7=> conus 2nd extension from deep parspinal abscess L4-S2/sacral decubitus; cord atrophy/myelomalcia T3=>conus     Penile abscess     S/P unilateral BKA (below knee amputation) ([facility] )     Right    Sebaceous cyst removed in 2017    Seizures (HCC)     Shortness of breath     Tobacco abuse     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[facility] )     Urinary retention     Wheelchair dependent     Wounds, multiple     pressure ulcers with delayed healing     Past Surgical History:   Procedure Laterality Date    BELOW KNEE LEG AMPUTATION Right 2009    COLONOSCOPY N/A 3/27/2017    Procedure: COLONOSCOPY;  Surgeon: Ken Dela Cruz MD;  [facility]: [facility]; Service:     COLONOSCOPY N/A 3/29/2017    Procedure: COLONOSCOPY;  Surgeon: Ken Dela Cruz MD;  [facility]; Service:     COLONOSCOPY N/A 6/15/2017    Procedure: COLONOSCOPY with FMT;  Surgeon: Slim Fernandez MD;  [facility]: [facility]; Service: Gastroenterology    ESOPHAGOGASTRODUODENOSCOPY N/A 3/22/2017    Procedure: ESOPHAGOGASTRODUODENOSCOPY (EGD); Surgeon: Escobar Rand DO;  Location: [facility];   Service:     MULTIPLE TOOTH EXTRACTIONS N/A 3/8/2021    Procedure: EXTRACTION TEETH # 2,3,6,10,12,13,14,18,19,20,21,22,23,24,25,26,27,28,29,30;  Surgeon: Michael Britton DMD;  [facility];  Service: Maxillofacial     Family History   Problem Relation Age of Onset    Hyperlipidemia Mother     Hypertension Mother     Leukemia Brother     Diabetes Paternal Grandfather      Social History     Socioeconomic History    Marital status: Single     Spouse name: Not on file    Number of children: Not on file    Years of education: Not on file    Highest education level: Not on file   Occupational History    Not on file Tobacco Use    Smoking status: Current Every Day Smoker     Types: Cigars    Smokeless tobacco: Never Used    Tobacco comment: about 2 cigars/day   Substance and Sexual Activity    Alcohol use: Not Currently     Comment: 1 cup of wine every 3-4 months    Drug use: Not Currently     Types: Marijuana     Comment: rarely; once every 1-2 years    Sexual activity: Not on file   Other Topics Concern    Not on file   Social History Narrative    Not on file     Social Determinants of Health     Financial Resource Strain:     Difficulty of Paying Living Expenses:    Food Insecurity:     Worried About Running Out of Food in the Last Year:     Ran Out of Food in the Last Year:    Transportation Needs:     Lack of Transportation (Medical):      Lack of Transportation (Non-Medical):    Physical Activity:     Days of Exercise per Week:     Minutes of Exercise per Session:    Stress:     Feeling of Stress :    Social Connections:     Frequency of Communication with Friends and Family:     Frequency of Social Gatherings with Friends and Family:     Attends Jehov[employer]'s witness Services:     Active Member of Clubs or Organizations:     Attends Club or Organization Meetings:     Marital Status:    Intimate Partner Violence:     Fear of Current or Ex-Partner:     Emotionally Abused:     Physically Abused:     Sexually Abused:        Current Outpatient Medications:     Alcohol Swabs (ALCOHOL PADS) 70 % PADS, by Does not apply route 5 (five) times a day, Disp: 300 each, Rfl: 5    amLODIPine (NORVASC) 10 mg tablet, TAKE 1 TABLET BY MOUTH EVERY DAY, Disp: 90 tablet, Rfl: 3    ammonium lactate (LAC-HYDRIN) 12 % cream, Apply topically, Disp: , Rfl:     ammonium lactate (LAC-HYDRIN) 12 % cream, , Disp: , Rfl:     apixaban (ELIQUIS) 5 mg, Take 1 tablet (5 mg total) by mouth 2 (two) times a day (Patient taking differently: Take 5 mg by mouth 2 (two) times a day Per physician, stop 2 days prior to procedure), Disp: 60 tablet, Rfl: 8    ascorbic acid (VITAMIN C) 250 mg tablet, TAKE 2 TABLETS (500 MG TOTAL) BY MOUTH DAILY, Disp: 180 tablet, Rfl: 1    atorvastatin (LIPITOR) 20 mg tablet, TAKE 1 TABLET BY MOUTH EVERYDAY AT BEDTIME, Disp: 90 tablet, Rfl: 1    B Complex-C-Folic Acid (Super B-Complex/Vit C/FA) TABS, , Disp: , Rfl:     baclofen 20 mg tablet, Take 20 mg by mouth 2 (two) times a day , Disp: , Rfl:     SUSAN MICROLET LANCETS lancets, Use as instructed to check blood sugar 4 times a day Dx e10 29, Disp: 200 each, Rfl: 5    benzonatate (TESSALON PERLES) 100 mg capsule, TAKE 1 CAPSULE BY MOUTH THREE TIMES A DAY AS NEEDED FOR COUGH, Disp: 30 capsule, Rfl: 0    Blood Glucose Monitoring Suppl (Buzzoo CONTOUR NEXT USB MONITOR) w/Device KIT, Testing 4 times a day, Disp: 1 kit, Rfl: 0    calcitriol (ROCALTROL) 0 5 MCG capsule, Take 2 mcg by mouth, Disp: , Rfl:     Capsaicin 0 033 % CREA, 5 times a day for 1st week  Then 3 times a day for next 3 weeks, Disp: 56 6 g, Rfl: 2    carvedilol (COREG) 25 mg tablet, TAKE 1 TABLET BY MOUTH TWICE A DAY, Disp: 180 tablet, Rfl: 1    carvedilol (COREG) 25 mg tablet, Take 25 mg by mouth, Disp: , Rfl:     carvedilol (COREG) 6 25 mg tablet, Take 6 25 mg by mouth, Disp: , Rfl:     cetirizine (ZyrTEC) 10 mg tablet, Take 10 mg by mouth daily, Disp: , Rfl:     cetirizine (ZyrTEC) 5 MG tablet, TAKE 1 TABLET BY MOUTH EVERY DAY, Disp: 90 tablet, Rfl: 1    Cholecalciferol (VITAMIN D-3) 1000 units CAPS, Take 2 capsules by mouth daily, Disp: 30 capsule, Rfl: 0    clindamycin (CLINDAGEL) 1 % gel, APPLY TO AFFECTED AREA TWICE A DAY, Disp: 60 g, Rfl: 2    CVS ACETAMINOPHEN EX  MG tablet, TAKE 2 TABLETS BY MOUTH EVERY 8 HOURS AS NEEDED FOR MILD OR MODERATE PAIN (PAIN SCORE 1 6)  , Disp: , Rfl:     CVS Vitamin B-12 1000 MCG tablet, TAKE 1 TABLET BY MOUTH EVERY DAY, Disp: 30 tablet, Rfl: 5    cyclobenzaprine (FLEXERIL) 10 mg tablet, Take 10 mg by mouth 3 (three) times a day as needed, Disp: , Rfl: 0    cyclobenzaprine (FLEXERIL) 5 mg tablet, , Disp: , Rfl:     dextrose 50 %, 50 mL, Disp: , Rfl:     dicyclomine (BENTYL) 10 mg capsule, TAKE 1 CAPSULE (10 MG TOTAL) BY MOUTH 3 (THREE) TIMES A DAY BEFORE MEALS, Disp: 270 capsule, Rfl: 1    Disposable Gloves MISC, Use 7 times a day, 4 boxes of gloves XL Dx type 1 DM, paraplegia, Disp: 4 each, Rfl: 11    doxazosin (CARDURA) 2 mg tablet, TAKE 1 TABLET (2 MG TOTAL) BY MOUTH EVERY EVENING, Disp: 90 tablet, Rfl: 1    epoetin kaushik (EPOGEN,PROCRIT) 20,000 units/mL, Inject 10,000 Units under the skin, Disp: , Rfl:     epoetin kaushik (EPOGEN,PROCRIT) 20,000 units/mL, Inject 5,000 Units under the skin, Disp: , Rfl:     ergocalciferol (VITAMIN D2) 50,000 units, TAKE 1 CAPSULE BY MOUTH ONE TIME PER WEEK, Disp: , Rfl:     ferrous sulfate 325 (65 Fe) mg tablet, TAKE 1 TABLET BY MOUTH 3 TIMES A DAY, Disp: 90 tablet, Rfl: 3    glucose 4-6 GM-MG, Chew 2 tablets daily as needed for low blood sugar, Disp: 10 tablet, Rfl: 1    glucose blood (SUSAN CONTOUR TEST) test strip, Use as instructed to test blood sugar 4 times a day Dx e10 29, Disp: 200 each, Rfl: 3    Gvoke PFS 1 MG/0 2ML SOSY, INJECT 1 ML (1 MG TOTAL) INTO A MUSCLE ONCE FOR 1 DOSE, Disp: , Rfl:     HYDROmorphone (DILAUDID) 4 mg tablet, TAKE 1 TABLET BY MOUTH EVERY 4 HOURS AS NEEDED FOR MODERATE PAIN (PAIN SCORE 4 6)  MAX  24 MG/DAY, Disp: , Rfl:     Incontinence Supply Disposable (Incontinence Brief Large) MISC, Use 6 (six) times a day Size xl, Disp: 180 each, Rfl: 11    Incontinence Supply Disposable (Undergarment) MISC, Use 6 a day, 5 boxes, xl Dx R51, paraplegia, Disp: 5 each, Rfl: 11    Incontinence Supply Disposable (Underpads) MISC, Use 2 a day, Disp: 5 each, Rfl: 11    insulin detemir (LEVEMIR) 100 units/mL subcutaneous injection, Inject 6 5 Units under the skin 2 (two) times a day , Disp: , Rfl:     ketoconazole (NIZORAL) 2 % cream, APPLY SMALL AMOUNT TO EDGES OF MOUTH TWICE DAILY   DO NOT SWALLOW, Disp: 15 g, Rfl: 1    Ketostix strip, USE WITH HYPERGLYCEMIA E10 65 **NOT COVERED**, Disp: , Rfl:     LEVEMIR FLEXTOUCH 100 units/mL injection pen, Inject 14 Units under the skin 2 (two) times a day, Disp: 15 pen, Rfl: 3    levETIRAcetam (KEPPRA) 250 mg tablet, TAKE 1 TABLET (250 MG TOTAL) BY MOUTH 3 (THREE) TIMES A WEEK ([facility]) AT 1200 , Disp: , Rfl:     lidocaine (XYLOCAINE) 5 % ointment, Apply topically as needed for mild pain, Disp: 35 44 g, Rfl: 0    Lidocaine HCl 4 % LIQD, Apply 1 application topically 3 (three) times a day as needed (pain), Disp: 73 mL, Rfl: 5    Lokelma 10 g PACK, , Disp: , Rfl:     losartan (COZAAR) 100 MG tablet, Take 1 tablet (100 mg total) by mouth daily, Disp: 90 tablet, Rfl: 1    Melatonin ER 3 MG TBCR, Take 6 mg by mouth, Disp: , Rfl:     Methoxy PEG-Epoetin Beta (MIRCERA IJ), 75 mcg Once every 2 weeks, Disp: , Rfl:     Methoxy PEG-Epoetin Beta (MIRCERA IJ), 100 mcg Once every 2 weeks, Disp: , Rfl:     Methoxy PEG-Epoetin Beta (MIRCERA IJ), 150 mcg Once every 2 weeks, Disp: , Rfl:     Misc  Devices (Wheelchair Cushion) MISC, Use daily, Disp: 1 each, Rfl: 0    Misc   Devices [facility]) MISC, by Does not apply route daily Wheelchair ramp, dx g82 20, Disp: 1 each, Rfl: 0    Multiple Vitamin (Daily-Enriqueta Multivitamin) TABS, TAKE 1 TABLET BY MOUTH EVERY DAY, Disp: 90 tablet, Rfl: 2    Narcan 4 MG/0 1ML nasal spray, , Disp: , Rfl:     NOVOLOG 100 UNIT/ML injection, Inject 5 Units under the skin 3 (three) times a day with meals , Disp: , Rfl:     NovoLOG FlexPen 100 units/mL injection pen, INJECT 3 UNITS UNDER THE SKIN 3 (THREE) TIMES A DAY BEFORE MEALS , Disp: , Rfl:     nystatin (MYCOSTATIN) 500,000 units/5 mL suspension, APPLY 5 ML (500,000 UNITS TOTAL) TO THE MOUTH OR THROAT 4 (FOUR) TIMES A DAY, Disp: 473 mL, Rfl: 1    ondansetron (ZOFRAN-ODT) 4 mg disintegrating tablet, TAKE 1 TABLET BY MOUTH EVERY 8 HOURS AS NEEDED FOR NAUSEA AND VOMITING, Disp: 20 tablet, Rfl: 2    oxybutynin (DITROPAN) 5 mg tablet, TAKE 3 TABLETS BY MOUTH EVERY DAY, Disp: 270 tablet, Rfl: 1    phenytoin extended (DILANTIN) 200 MG ER capsule, Take 1 capsule (200 mg total) by mouth 2 (two) times a day, Disp: 60 capsule, Rfl: 2    phenytoin extended (DILANTIN) 200 MG ER capsule, Take 200 mg by mouth 2 (two) times a day, Disp: , Rfl:     phenytoin extended (DILANTIN) 300 MG ER capsule, Take 1 capsule (300 mg total) by mouth daily, Disp: 30 capsule, Rfl: 3    phenytoin extended (DILANTIN) 300 MG ER capsule, Take 300 mg by mouth daily, Disp: , Rfl:     Protonix 40 MG tablet, Take 1 tablet (40 mg total) by mouth daily Brand necessary, Disp: 90 tablet, Rfl: 1    risperiDONE (RisperDAL) 0 5 mg tablet, TAKE 1 TABLET (0 5 MG TOTAL) BY MOUTH 2 (TWO) TIMES A DAY, Disp: 180 tablet, Rfl: 1    senna (SENOKOT) 8 6 mg, TAKE 1 TABLET (8 6 MG TOTAL) BY MOUTH 2 (TWO) TIMES A DAY , Disp: , Rfl:     sodium hypochlorite (DAKIN'S HALF-STRENGTH) external solution, USE AS DIRECTED ONCE  DAILY, Disp: 473 mL, Rfl: 2    Sodium Zirconium Cyclosilicate 10 g PACK, Take 10 g by mouth daily, Disp: , Rfl:     SUPER B COMPLEX & C TABS, TAKE 1 CAPSULE BY MOUTH ONCE FOR 1 DOSE AS DIRECTED, Disp: 90 tablet, Rfl: 2    tiZANidine (ZANAFLEX) 4 mg tablet, Take 1 tablet by mouth 4 (four) times a day, Disp: , Rfl:     tiZANidine (ZANAFLEX) 4 mg tablet, , Disp: , Rfl:     triamcinolone (KENALOG) 0 1 % ointment, Apply topically 2 (two) times a day Appy 2x/day to itch areas on body (not face, groin)  Do not use for long period of time as can cause thinning of skin , Disp: 454 g, Rfl: 0    Zinc Sulfate 220 (50 Zn) MG TABS, TAKE 1 TABLET BY MOUTH EVERY DAY, Disp: 90 tablet, Rfl: 1  No current facility-administered medications for this visit  Review of Systems   Constitutional: Negative for activity change, appetite change (Improving), chills, fatigue, fever and unexpected weight change     HENT: Negative for congestion, hearing loss and postnasal drip  Eyes: Negative for visual disturbance  Respiratory: Negative for cough and shortness of breath  Cardiovascular: Negative for chest pain and leg swelling  Gastrointestinal: Negative for abdominal pain, constipation (Slight), diarrhea, nausea and vomiting  Genitourinary: Negative for dysuria and urgency  Indwelling Ortega catheter   Musculoskeletal: Positive for gait problem (Nonambulatory, paraplegic)  Skin: Positive for wound (Sacral, left ischial, left hip and perineum)  Negative for rash  Cyst on right  cheek   Neurological: Positive for weakness  Hematological: Does not bruise/bleed easily  Psychiatric/Behavioral: Positive for dysphoric mood  Objective:  /92   Pulse 76   Temp 98 9 °F (37 2 °C)   Resp 18   Pain Score: 0-No pain     Physical Exam  Vitals and nursing note reviewed  Constitutional:       Appearance: Normal appearance  He is underweight  HENT:      Head: Normocephalic and atraumatic  Abdominal:      General: Abdomen is flat  Comments: The abdomen around the ostomy is slightly full and tender  Skin:     Findings: Wound (Both buttocks and sacrum) present  No erythema  Comments: Sacral ulcer slightly less undermining  Left ischial ulcer also with undermining  The right buttock ulcer remains closed  The left hip ulcer has undermining and tunnel again at 11:00   Bone is not probed  Perineal ulcer is unchanged  All ulcers are clean, without any signs of infection  No slough and no fibrin  Overall, unchanged compared to previous  Neurological:      Mental Status: He is alert and oriented to person, place, and time  Psychiatric:         Mood and Affect: Affect normal          Cognition and Memory: Cognition normal                          Wound Ischium Left (Active)   Wound Image Images linked 07/08/21 1349   Wound Description Granulation tissue;Slough; Beefy red;Hypergranulation 07/08/21 9241 Irene-wound Assessment Intact;Pink;Scar Tissue 07/08/21 1347   Wound Length (cm) 5 2 cm 07/08/21 1347   Wound Width (cm) 4 6 cm 07/08/21 1347   Wound Depth (cm) 1 2 cm 07/08/21 1347   Wound Surface Area (cm^2) 23 92 cm^2 07/08/21 1347   Wound Volume (cm^3) 28 704 cm^3 07/08/21 1347   Calculated Wound Volume (cm^3) 28 7 cm^3 07/08/21 1347   Change in Wound Size % -51 85 07/08/21 1347   Undermining is depth extending from 8-10, 2 6 07/08/21 1347   Drainage Amount Large 07/08/21 1347   Drainage Description Serosanguineous 07/08/21 1347   Non-staged Wound Description Full thickness 07/08/21 1347   Treatments Cleansed 07/08/21 1347   Dressing Changed Changed 07/08/21 1347   Patient Tolerance Tolerated well 07/08/21 1347   Dressing Status Removed 07/08/21 1347       Wound Perineum (Active)   Wound Image Images linked 07/08/21 1345   Wound Description Granulation tissue;Pink;Slough; Epithelialization 07/08/21 1348   Pressure Injury Stage Stage 4 07/08/21 1348   Irene-wound Assessment Scar Tissue; Intact; Pink 07/08/21 1348   Wound Length (cm) 5 5 cm 07/08/21 1348   Wound Width (cm) 4 5 cm 07/08/21 1348   Wound Depth (cm) 1 1 cm 07/08/21 1348   Wound Surface Area (cm^2) 24 75 cm^2 07/08/21 1348   Wound Volume (cm^3) 27 225 cm^3 07/08/21 1348   Calculated Wound Volume (cm^3) 27 23 cm^3 07/08/21 1348   Change in Wound Size % -159 33 07/08/21 1348   Undermining 1 7 07/08/21 1348   Undermining is depth extending from 5-9 07/08/21 1348   Drainage Amount Large 07/08/21 1348   Drainage Description Serosanguineous 07/08/21 1348   Non-staged Wound Description Full thickness 07/08/21 1348   Dressing Changed Changed 07/08/21 1348   Patient Tolerance Tolerated well 07/08/21 1348   Dressing Status Removed 07/08/21 1348       Wound Sacrum (Active)   Wound Image Images linked 07/08/21 1345   Wound Description Granulation tissue;Slough; Epithelialization; Other (Comment) (rolled edges) 07/08/21 1350   Pressure Injury Stage Stage 4 07/08/21 1356 Irene-wound Assessment Scar Tissue;Stepney 07/08/21 1350   Wound Length (cm) 5 cm 07/08/21 1350   Wound Width (cm) 11 3 cm 07/08/21 1350   Wound Depth (cm) 1 9 cm 07/08/21 1350   Wound Surface Area (cm^2) 56 5 cm^2 07/08/21 1350   Wound Volume (cm^3) 107 35 cm^3 07/08/21 1350   Calculated Wound Volume (cm^3) 107 35 cm^3 07/08/21 1350   Change in Wound Size % -43 13 07/08/21 1350   Undermining 2 07/08/21 1350   Undermining is depth extending from 3-8 07/08/21 1350   Drainage Amount Large 07/08/21 1350   Drainage Description Serosanguineous 07/08/21 1350   Non-staged Wound Description Full thickness 07/08/21 1350   Treatments Cleansed 07/08/21 1350   Dressing Changed Changed 07/08/21 1350   Patient Tolerance Tolerated well 07/08/21 1350   Dressing Status Removed 07/08/21 1350       Wound 01/28/21 Pressure Injury Hip Left;Lateral (Active)   Wound Image Images linked 07/08/21 1344   Wound Description Granulation tissue;Slough;Pink; White;Yellow 07/08/21 1345   Pressure Injury Stage Stage 4 07/08/21 1345   Irene-wound Assessment Dry; Intact 07/08/21 1345   Wound Length (cm) 2 4 cm 07/08/21 1345   Wound Width (cm) 2 1 cm 07/08/21 1345   Wound Depth (cm) 1 7 cm 07/08/21 1345   Wound Surface Area (cm^2) 5 04 cm^2 07/08/21 1345   Wound Volume (cm^3) 8 568 cm^3 07/08/21 1345   Calculated Wound Volume (cm^3) 8 57 cm^3 07/08/21 1345   Undermining 5 07/08/21 1345   Undermining is depth extending from 12-12, 10 o clock 07/08/21 1345   Drainage Amount Moderate 07/08/21 1345   Drainage Description Serosanguineous 07/08/21 1345   Non-staged Wound Description Full thickness 07/08/21 1345   Treatments Cleansed 07/08/21 1345   Wound packed?  Yes 07/08/21 1345   Packing- # removed 1 07/08/21 1345   Dressing Changed Changed 07/08/21 1345   Dressing Status Removed 07/08/21 1345           Results from last 6 Months   Lab Units 02/25/21  1531   WOUND CULTURE  2+ Growth of Proteus mirabilis*  2+ Growth of Pseudomonas aeruginosa MDR*         Wound Instructions:  Orders Placed This Encounter   Procedures    Wound cleansing and dressings     Wound cleansing and dressings                     All wounds:  Wash your hands with soap and water  Remove old dressing, discard into plastic bag and place in trash  Cleanse the wound with saline or mild soap and water prior to applying a clean dressing  Do not use tissue or cotton balls  Do not scrub the wound  Pat dry using gauze  Shower no; do not get dressing wet  Apply dakins soaked gauze packing to wounds (moist with Dakins solution, and squeezed out so it is damp)  Cover with and ABD  Secure with Medfix tape  Change dressing daily and PRN for breakthrough drainage (visiting nurses to do twice per week and family in between)  This was done at today's dressing change in the wound center       Continue visiting nurses twice per week for dressing changes, family to do in between nurse visits     Follow up in 6 weeks            Wound off loading  Continue clinitron bed at home and turn at least every 1-2 hours  To try and limit the amount of time spent sitting in the wheelchair  Keep pressure off the wounds as much as humanly possible         Continue to try and increase your protein to at least 3 servings or more if possible                                  Continue to try and keep blood sugars as low as possible  Stop Smoking    Contact [facility] and ask about shower chair with cushion  If [facility] can recommend an appropriate product, email Dr Naz Davila the information and he will order for you  (we will give you Dr Lili Beck card with email info)  Contact Primary Care Provider about getting a nutrition consult to increase albumin levels     Standing Status:   Future     Standing Expiration Date:   7/8/2022       Je Baca MD, CHT, CWS       Portions of the record may have been created with voice recognition software   Occasional wrong word or "sound alike" substitutions may have occurred due to the inherent limitations of voice recognition software  Read the chart carefully and recognize, using context, where substitutions have occurred

## 2021-07-10 DIAGNOSIS — L30.8 OTHER ECZEMA: ICD-10-CM

## 2021-07-10 DIAGNOSIS — B37.0 THRUSH: ICD-10-CM

## 2021-07-13 DIAGNOSIS — R56.9 SEIZURE (HCC): ICD-10-CM

## 2021-07-13 RX ORDER — PHENYTOIN SODIUM 300 MG/1
CAPSULE, EXTENDED RELEASE ORAL
Qty: 90 CAPSULE | Refills: 1 | Status: SHIPPED | OUTPATIENT
Start: 2021-07-13

## 2021-07-13 RX ORDER — FLUCONAZOLE 150 MG/1
TABLET ORAL
Qty: 1 TABLET | Refills: 0 | OUTPATIENT
Start: 2021-07-13 | End: 2021-07-14

## 2021-07-13 NOTE — TELEPHONE ENCOUNTER
He said derm was changing around his meds  Can you see if he still needs this    I would recommend he continue with their medications

## 2021-07-15 DIAGNOSIS — K59.03 DRUG-INDUCED CONSTIPATION: ICD-10-CM

## 2021-07-15 DIAGNOSIS — E78.5 HYPERLIPIDEMIA, UNSPECIFIED HYPERLIPIDEMIA TYPE: ICD-10-CM

## 2021-07-15 DIAGNOSIS — I10 HTN (HYPERTENSION), BENIGN: ICD-10-CM

## 2021-07-15 DIAGNOSIS — N32.81 OAB (OVERACTIVE BLADDER): ICD-10-CM

## 2021-07-15 DIAGNOSIS — L98.429 ULCER OF SACRAL REGION, STAGE 4 (HCC): ICD-10-CM

## 2021-07-15 DIAGNOSIS — G40.909 SEIZURE DISORDER (HCC): Primary | ICD-10-CM

## 2021-07-15 DIAGNOSIS — E10.22 TYPE 1 DIABETES MELLITUS WITH CHRONIC KIDNEY DISEASE ON CHRONIC DIALYSIS (HCC): ICD-10-CM

## 2021-07-15 DIAGNOSIS — N18.6 TYPE 1 DIABETES MELLITUS WITH CHRONIC KIDNEY DISEASE ON CHRONIC DIALYSIS (HCC): ICD-10-CM

## 2021-07-15 DIAGNOSIS — D63.8 ANEMIA OF CHRONIC DISEASE: ICD-10-CM

## 2021-07-15 DIAGNOSIS — Z99.2 TYPE 1 DIABETES MELLITUS WITH CHRONIC KIDNEY DISEASE ON CHRONIC DIALYSIS (HCC): ICD-10-CM

## 2021-07-15 DIAGNOSIS — J30.9 ALLERGIC RHINITIS, UNSPECIFIED SEASONALITY, UNSPECIFIED TRIGGER: ICD-10-CM

## 2021-07-15 DIAGNOSIS — I48.91 ATRIAL FIBRILLATION, UNSPECIFIED TYPE (HCC): Chronic | ICD-10-CM

## 2021-07-15 DIAGNOSIS — E53.9 VITAMIN B DEFICIENCY: ICD-10-CM

## 2021-07-15 DIAGNOSIS — K21.9 GASTROESOPHAGEAL REFLUX DISEASE WITHOUT ESOPHAGITIS: ICD-10-CM

## 2021-07-16 ENCOUNTER — TELEPHONE (OUTPATIENT)
Dept: FAMILY MEDICINE CLINIC | Facility: CLINIC | Age: 41
End: 2021-07-16

## 2021-07-16 RX ORDER — PHENYTOIN SODIUM 200 MG/1
200 CAPSULE, EXTENDED RELEASE ORAL 2 TIMES DAILY
Qty: 180 CAPSULE | Refills: 1 | Status: SHIPPED | OUTPATIENT
Start: 2021-07-16

## 2021-07-16 RX ORDER — ATORVASTATIN CALCIUM 20 MG/1
20 TABLET, FILM COATED ORAL
Qty: 90 TABLET | Refills: 1 | Status: SHIPPED | OUTPATIENT
Start: 2021-07-16 | End: 2022-03-14 | Stop reason: SDUPTHER

## 2021-07-16 RX ORDER — SODIUM ZIRCONIUM CYCLOSILICATE 10 G/10G
POWDER, FOR SUSPENSION ORAL
OUTPATIENT
Start: 2021-07-16

## 2021-07-16 RX ORDER — DOXAZOSIN 2 MG/1
2 TABLET ORAL EVERY EVENING
Qty: 90 TABLET | Refills: 1 | Status: SHIPPED | OUTPATIENT
Start: 2021-07-16 | End: 2021-10-22 | Stop reason: SDUPTHER

## 2021-07-16 RX ORDER — FERROUS SULFATE 325(65) MG
1 TABLET ORAL 3 TIMES DAILY
Qty: 90 TABLET | Refills: 3 | Status: SHIPPED | OUTPATIENT
Start: 2021-07-16

## 2021-07-16 RX ORDER — PHENYTOIN SODIUM 300 MG/1
300 CAPSULE, EXTENDED RELEASE ORAL DAILY
Qty: 90 CAPSULE | Refills: 1 | Status: SHIPPED | OUTPATIENT
Start: 2021-07-16

## 2021-07-16 RX ORDER — CHOLECALCIFEROL (VITAMIN D3) 25 MCG
1 TABLET,CHEWABLE ORAL DAILY
Qty: 90 TABLET | Refills: 1 | Status: SHIPPED | OUTPATIENT
Start: 2021-07-16 | End: 2021-08-05

## 2021-07-16 RX ORDER — AMLODIPINE BESYLATE 10 MG/1
10 TABLET ORAL DAILY
Qty: 90 TABLET | Refills: 1 | Status: SHIPPED | OUTPATIENT
Start: 2021-07-16 | End: 2021-09-20 | Stop reason: SDUPTHER

## 2021-07-16 RX ORDER — CARVEDILOL 25 MG/1
25 TABLET ORAL 2 TIMES DAILY
Qty: 180 TABLET | Refills: 1 | Status: SHIPPED | OUTPATIENT
Start: 2021-07-16 | End: 2022-03-14 | Stop reason: SDUPTHER

## 2021-07-16 RX ORDER — SENNOSIDES 8.6 MG
8.6 TABLET ORAL 2 TIMES DAILY
Qty: 180 TABLET | Refills: 1 | Status: SHIPPED | OUTPATIENT
Start: 2021-07-16

## 2021-07-16 RX ORDER — PANTOPRAZOLE SODIUM 40 MG
40 TABLET, DELAYED RELEASE (ENTERIC COATED) ORAL DAILY
Qty: 90 TABLET | Refills: 1 | Status: SHIPPED | OUTPATIENT
Start: 2021-07-16 | End: 2021-07-29

## 2021-07-16 RX ORDER — UREA 10 %
1 LOTION (ML) TOPICAL DAILY
Qty: 90 TABLET | Refills: 1 | Status: SHIPPED | OUTPATIENT
Start: 2021-07-16

## 2021-07-16 RX ORDER — CETIRIZINE HYDROCHLORIDE 10 MG/1
10 TABLET ORAL DAILY
Qty: 90 TABLET | Refills: 1 | Status: SHIPPED | OUTPATIENT
Start: 2021-07-16 | End: 2022-04-21

## 2021-07-16 RX ORDER — LOSARTAN POTASSIUM 100 MG/1
100 TABLET ORAL DAILY
Qty: 90 TABLET | Refills: 1 | Status: SHIPPED | OUTPATIENT
Start: 2021-07-16 | End: 2021-10-30 | Stop reason: SDUPTHER

## 2021-07-16 RX ORDER — BACLOFEN 20 MG/1
20 TABLET ORAL 2 TIMES DAILY
OUTPATIENT
Start: 2021-07-16

## 2021-07-16 RX ORDER — OXYBUTYNIN CHLORIDE 5 MG/1
15 TABLET ORAL DAILY
Qty: 270 TABLET | Refills: 1 | Status: SHIPPED | OUTPATIENT
Start: 2021-07-16

## 2021-07-19 ENCOUNTER — TELEPHONE (OUTPATIENT)
Dept: FAMILY MEDICINE CLINIC | Facility: CLINIC | Age: 41
End: 2021-07-19

## 2021-07-19 NOTE — TELEPHONE ENCOUNTER
SIGNATURES NEEDED FOR jarret ([id number])  FORM RECEIVED VIA FAX  WILL BE PLACED IN FORM BIN FOR MA PICKUP

## 2021-07-19 NOTE — TELEPHONE ENCOUNTER
69 [address] stating they are missing Sertraline 25mg    Sertraline is not an active medication on his current list  Chart review shows medication was discontiued at discharge fro [facility] 7/16/21    Pharmacy notified

## 2021-07-21 ENCOUNTER — TELEPHONE (OUTPATIENT)
Dept: FAMILY MEDICINE CLINIC | Facility: CLINIC | Age: 41
End: 2021-07-21

## 2021-07-21 NOTE — TELEPHONE ENCOUNTER
SIGNATURES NEEDED FOR extended family care   FORM RECEIVED VIA FAX  WILL BE PLACED IN FORM BIN FOR MA PICKUP

## 2021-07-22 ENCOUNTER — TELEPHONE (OUTPATIENT)
Dept: FAMILY MEDICINE CLINIC | Facility: CLINIC | Age: 41
End: 2021-07-22

## 2021-07-22 NOTE — TELEPHONE ENCOUNTER
SIGNATURES NEEDED FOR [facility]  FORM RECEIVED VIA FAX  WILL BE PLACED IN FORM BIN FOR MA PICKUP      ORDER NUMBER [id number]

## 2021-07-26 ENCOUNTER — TELEPHONE (OUTPATIENT)
Dept: FAMILY MEDICINE CLINIC | Facility: CLINIC | Age: 41
End: 2021-07-26

## 2021-07-27 ENCOUNTER — TELEPHONE (OUTPATIENT)
Dept: FAMILY MEDICINE CLINIC | Facility: CLINIC | Age: 41
End: 2021-07-27

## 2021-07-27 ENCOUNTER — OFFICE VISIT (OUTPATIENT)
Dept: FAMILY MEDICINE CLINIC | Facility: CLINIC | Age: 41
End: 2021-07-27

## 2021-07-27 VITALS
BODY MASS INDEX: 18.39 KG/M2 | HEIGHT: 76 IN | RESPIRATION RATE: 18 BRPM | OXYGEN SATURATION: 98 % | HEART RATE: 90 BPM | WEIGHT: 151 LBS | DIASTOLIC BLOOD PRESSURE: 86 MMHG | SYSTOLIC BLOOD PRESSURE: 144 MMHG | TEMPERATURE: 96.4 F

## 2021-07-27 DIAGNOSIS — N18.6 STAGE 5 CHRONIC KIDNEY DISEASE ON CHRONIC DIALYSIS (HCC): ICD-10-CM

## 2021-07-27 DIAGNOSIS — F31.9 BIPOLAR DEPRESSION (HCC): ICD-10-CM

## 2021-07-27 DIAGNOSIS — Z93.59 CHRONIC SUPRAPUBIC CATHETER (HCC): Chronic | ICD-10-CM

## 2021-07-27 DIAGNOSIS — N18.6 TYPE 1 DIABETES MELLITUS WITH CHRONIC KIDNEY DISEASE ON CHRONIC DIALYSIS (HCC): ICD-10-CM

## 2021-07-27 DIAGNOSIS — R22.42 LOCALIZED SWELLING OF LEFT FOOT: Primary | ICD-10-CM

## 2021-07-27 DIAGNOSIS — F11.20 CHRONIC NARCOTIC DEPENDENCE (HCC): ICD-10-CM

## 2021-07-27 DIAGNOSIS — E10.22 TYPE 1 DIABETES MELLITUS WITH CHRONIC KIDNEY DISEASE ON CHRONIC DIALYSIS (HCC): ICD-10-CM

## 2021-07-27 DIAGNOSIS — G37.3 TRANSVERSE MYELITIS (HCC): ICD-10-CM

## 2021-07-27 DIAGNOSIS — I50.32 CHRONIC DIASTOLIC HEART FAILURE (HCC): ICD-10-CM

## 2021-07-27 DIAGNOSIS — Z99.2 STAGE 5 CHRONIC KIDNEY DISEASE ON CHRONIC DIALYSIS (HCC): ICD-10-CM

## 2021-07-27 DIAGNOSIS — Z89.519 S/P UNILATERAL BKA (BELOW KNEE AMPUTATION) (HCC): ICD-10-CM

## 2021-07-27 DIAGNOSIS — Z99.2 TYPE 1 DIABETES MELLITUS WITH CHRONIC KIDNEY DISEASE ON CHRONIC DIALYSIS (HCC): ICD-10-CM

## 2021-07-27 DIAGNOSIS — R56.9 SEIZURE (HCC): ICD-10-CM

## 2021-07-27 LAB — SL AMB POCT GLUCOSE BLD: 92

## 2021-07-27 PROCEDURE — 99215 OFFICE O/P EST HI 40 MIN: CPT | Performed by: PHYSICIAN ASSISTANT

## 2021-07-27 PROCEDURE — 82948 REAGENT STRIP/BLOOD GLUCOSE: CPT | Performed by: PHYSICIAN ASSISTANT

## 2021-07-27 RX ORDER — PHENYTOIN SODIUM 200 MG/1
CAPSULE, EXTENDED RELEASE ORAL
Qty: 180 CAPSULE | Refills: 0 | Status: SHIPPED | OUTPATIENT
Start: 2021-07-27 | End: 2021-10-26 | Stop reason: SDUPTHER

## 2021-07-27 NOTE — TELEPHONE ENCOUNTER
Form picked up by clinical 07/27/21  Patient will be notified within 5 business days of completion of forms/if we are unable to complete

## 2021-07-27 NOTE — PROGRESS NOTES
Assessment/Plan:    Chronic diastolic heart failure (HCC)  Wt Readings from Last 3 Encounters:   07/27/21 68 5 kg (151 lb)   03/08/21 69 9 kg (154 lb)   08/07/19 72 6 kg (160 lb)       BNP ordered to evaluated for acute HF      Localized swelling of left foot  Foot xray, BNP, d-dimer ordered  Chronic narcotic dependence ([facility] [address] )  Continue with PM    Bipolar depression ([facility] [address] )  Continue risperadal      Dialysis patient ([facility] [address] )  Continue m/w/f dialysis    S/P unilateral BKA (below knee amputation) ([facility] [address] )  No new wounds, stable    Chronic suprapubic catheter [facility])  Continue urology f/u    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[facility] [address] )  No wheelchair updates needed  Continue follow up with Pain specialist         Problem List Items Addressed This Visit        Endocrine    Type 1 diabetes mellitus with chronic kidney disease on chronic dialysis ([facility] [address] )    Relevant Orders    POCT blood glucose (Completed)       Cardiovascular and Mediastinum    Chronic diastolic heart failure (HCC)     Wt Readings from Last 3 Encounters:   07/27/21 68 5 kg (151 lb)   03/08/21 69 9 kg (154 lb)   08/07/19 72 6 kg (160 lb)       BNP ordered to evaluated for acute HF              Nervous and Auditory    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[facility] [address] )     No wheelchair updates needed  Continue follow up with Pain specialist            Genitourinary    Stage 5 chronic kidney disease on chronic dialysis ([facility] [address] )       Other    Chronic suprapubic catheter (HCC) (Chronic)     Continue urology f/u         S/P unilateral BKA (below knee amputation) ([facility] [address] )     No new wounds, stable         Bipolar depression ([facility] [address] )     Continue risperadal           Chronic narcotic dependence ([facility] [address] )     Continue with PM         Localized swelling of left foot - Primary     Foot xray, BNP, d-dimer ordered  Relevant Orders    CBC and differential    Comprehensive metabolic panel    NT-BNP PRO    D-dimer, quantitative    XR foot 3+ vw left        4 glucose tablets given   Patient took 2 now with about 10 cc orange juice and kept 2 for [place] bus home for emergecny  Hypoglycemia symtpoms improved  I have spent 40 minutes with Patient  today in which greater than 50% of this time was spent in counseling/coordination of care regarding Diagnostic results and Intructions for management  Subjective:      Patient ID: Kai Kawasaki  is a 36 y o  male  HPI  36year old M with DM type 1, CKD on dialysis,paraplegia from transverse myelitis,  seizure d/o here for follow up from hospitaliztion for possible sepsis  He has had left leg swelling x 2 days  He has had SOB  No fever or chills since hospitalized  He does not recall any injury and has not seen any redness and family has not seen any foot wounds  Genaro Mow is better controlled now  He is avoid red meats and porkHe is getting full easily  Stool is formed but coming out  More than normal  He did notice black stool a few months ago and he had bad stomach acid  He had protoxin increase  He is no longer using the tums now and it is improved but he is getting reflux and full easily  BS have been fluctuating still  Insulin pump is not hooked up  He is checking 4-5 times a day  He will be seeing endo again  They would like to try to get his albumin up  He is eating more meat hinojosa before  He has been eating saltine crackers a lot  He stopped for last week though    He has not eaten today and is starting to feel hypoglycemic    The following portions of the patient's history were reviewed and updated as appropriate:   He  has a past medical history of Ambulatory dysfunction, Anemia, Anemia, iron deficiency, Atrial fibrillation ([address] ), AVM (arteriovenous malformation) of duodenum, acquired, Bacteriuria, asymptomatic, Bipolar disorder ([address] ), Chronic deep vein thrombosis (DVT) ([address] ), Chronic indwelling Ortega catheter, Chronic kidney disease, Chronic pain, Chronic pain disorder, Chronic suprapubic catheter ([address] ), Clostridium difficile infection (08/11/2016), Colostomy on examination ([facility] ), Decubitus ulcer, Delirium, Diabetes mellitus ([facility] ), GERD (gastroesophageal reflux disease), Hemodialysis patient ([facility] ), Hypertension, Memory impairment (2011), Neurogenic bladder, OAB (overactive bladder), Paraplegia ([facility] ), Penile abscess, S/P unilateral BKA (below knee amputation) ([facility] ), Sebaceous cyst (removed in 2017), Seizures ([facility] ), Shortness of breath, Tobacco abuse, 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[facility] ), Urinary retention, Wheelchair dependent, and Wounds, multiple    He   Patient Active Problem List    Diagnosis Date Noted    Localized swelling of left foot 07/28/2021    Pressure ulcer of other site, stage 4 ([facility] ) 03/12/2021    Pressure ulcer of left hip, stage 4 ([facility] ) 03/12/2021    Nervous 03/02/2021    Chronic diastolic heart failure ([facility] ) 02/10/2021    Dental caries 02/10/2021    [facility], [facility] ) 08/13/2020    Bipolar depression ([facility] ) 02/27/2020    Seizure ([facility] ) 01/20/2020    Recurrent Clostridioides difficile diarrhea 11/21/2019    Wheelchair dependent 08/07/2019    Dialysis patient ([facility] ) 05/08/2019    Insulin long-term use ([facility] ) 05/08/2019    Chronic narcotic dependence ([facility] ) 02/07/2018    Delirium 09/28/2017    Urinary retention 09/26/2017    Asymptomatic bacteriuria 09/25/2017    History of Clostridium difficile infection 09/25/2017    Stage 5 chronic kidney disease on chronic dialysis ([facility] ) 09/18/2017    SOB (shortness of breath) 09/18/2017    Penile abscess 09/18/2017    AVM (arteriovenous malformation) of duodenum, acquired 08/30/2017    Iron deficiency anemia 07/03/2017    Pressure injury of left ischium, stage 4 ([address] ) 07/03/2017    HTN (hypertension), benign 07/03/2017    Neurogenic bladder 07/03/2017    GERD (gastroesophageal reflux disease) 07/03/2017    Chronic pain 07/03/2017    Wound healing, delayed 06/29/2017    Chronic indwelling Ortega catheter     Anemia 09/03/2016    Ulcer of sacral region, stage 4 ([facility] ) 09/01/2016    Paraplegia (HCC)     Atrial fibrillation (HCC)     Chronic suprapubic catheter ([facility] )     Colostomy care ([place] [address] )     OAB (overactive bladder)     S/P unilateral BKA (below knee amputation) ([place] [address] )     Sebaceous cyst     Tobacco abuse     Type 1 diabetes mellitus with chronic kidney disease on chronic dialysis ([facility] )     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[place] [address] ) 12/02/2014     He  has a past surgical history that includes Below knee leg amputation (Right, 2009); Colonoscopy (N/A, 3/27/2017); Esophagogastroduodenoscopy (N/A, 3/22/2017); Colonoscopy (N/A, 3/29/2017); Colonoscopy (N/A, 6/15/2017); and Multiple tooth extractions (N/A, 3/8/2021)  His family history includes Diabetes in his paternal grandfather; Hyperlipidemia in his mother; Hypertension in his mother; Leukemia in his brother  He  reports that he has been smoking cigars  He has never used smokeless tobacco  He reports previous alcohol use  He reports previous drug use  Drug: Marijuana  Current Outpatient Medications   Medication Sig Dispense Refill    Alcohol Swabs (ALCOHOL PADS) 70 % PADS by Does not apply route 5 (five) times a day 300 each 5    amLODIPine (NORVASC) 10 mg tablet Take 1 tablet (10 mg total) by mouth daily 90 tablet 1    ammonium lactate (LAC-HYDRIN) 12 % cream Apply topically      ammonium lactate (LAC-HYDRIN) 12 % cream       apixaban (ELIQUIS) 5 mg Take 1 tablet (5 mg total) by mouth 2 (two) times a day 270 tablet 1    ascorbic acid (VITAMIN C) 250 mg tablet TAKE 2 TABLETS (500 MG TOTAL) BY MOUTH DAILY 180 tablet 1    atorvastatin (LIPITOR) 20 mg tablet Take 1 tablet (20 mg total) by mouth daily at bedtime 90 tablet 1    B Complex-C-Folic Acid (Super B-Complex/Vit C/FA) TABS Take 1 tablet by mouth daily 90 tablet 1    baclofen 20 mg tablet Take 20 mg by mouth 2 (two) times a day        SUSAN MICROLET LANCETS lancets Use as instructed to check blood sugar 4 times a day  Dx e10 29 200 each 5    benzonatate (TESSALON PERLES) 100 mg capsule TAKE 1 CAPSULE BY MOUTH THREE TIMES A DAY AS NEEDED FOR COUGH 30 capsule 0    Blood Glucose Monitoring Suppl (SUSAN CONTOUR NEXT USB MONITOR) w/Device KIT Testing 4 times a day 1 kit 0    calcitriol (ROCALTROL) 0 5 MCG capsule Take 2 mcg by mouth      Capsaicin 0 033 % CREA 5 times a day for 1st week  Then 3 times a day for next 3 weeks 56 6 g 2    carvedilol (COREG) 25 mg tablet Take 25 mg by mouth      carvedilol (COREG) 25 mg tablet Take 1 tablet (25 mg total) by mouth 2 (two) times a day 180 tablet 1    cetirizine (ZyrTEC) 10 mg tablet Take 1 tablet (10 mg total) by mouth daily 90 tablet 1    cetirizine (ZyrTEC) 5 MG tablet TAKE 1 TABLET BY MOUTH EVERY DAY 90 tablet 1    Cholecalciferol (VITAMIN D-3) 1000 units CAPS Take 2 capsules by mouth daily 30 capsule 0    clindamycin (CLINDAGEL) 1 % gel APPLY TO AFFECTED AREA TWICE A DAY 60 g 2    CVS ACETAMINOPHEN EX  MG tablet TAKE 2 TABLETS BY MOUTH EVERY 8 HOURS AS NEEDED FOR MILD OR MODERATE PAIN (PAIN SCORE 1 6)        cyanocobalamin (CVS Vitamin B-12) 1000 MCG tablet Take 1 tablet (1,000 mcg total) by mouth daily 30 tablet 5    cyclobenzaprine (FLEXERIL) 10 mg tablet Take 10 mg by mouth 3 (three) times a day as needed  0    cyclobenzaprine (FLEXERIL) 5 mg tablet       dextrose 50 % 50 mL      dicyclomine (BENTYL) 10 mg capsule TAKE 1 CAPSULE (10 MG TOTAL) BY MOUTH 3 (THREE) TIMES A DAY BEFORE MEALS 270 capsule 1    Disposable Gloves MISC Use 7 times a day, 4 boxes of gloves XL  Dx type 1 DM, paraplegia 4 each 11    doxazosin (CARDURA) 2 mg tablet Take 1 tablet (2 mg total) by mouth every evening 90 tablet 1    epoetin kaushik (EPOGEN,PROCRIT) 20,000 units/mL Inject 10,000 Units under the skin      epoetin kaushik (EPOGEN,PROCRIT) 20,000 units/mL Inject 5,000 Units under the skin      ergocalciferol (VITAMIN D2) 50,000 units TAKE 1 CAPSULE BY MOUTH ONE TIME PER WEEK      ferrous sulfate 325 (65 Fe) mg tablet Take 1 tablet (325 mg total) by mouth 3 (three) times a day 90 tablet 3    glucose 4-6 GM-MG Chew 2 tablets daily as needed for low blood sugar 10 tablet 1    glucose blood (SUSAN CONTOUR TEST) test strip Use as instructed to test blood sugar 4 times a day  Dx e10 29 200 each 3    Gvoke PFS 1 MG/0 2ML SOSY INJECT 1 ML (1 MG TOTAL) INTO A MUSCLE ONCE FOR 1 DOSE      HYDROmorphone (DILAUDID) 4 mg tablet TAKE 1 TABLET BY MOUTH EVERY 4 HOURS AS NEEDED FOR MODERATE PAIN (PAIN SCORE 4 6)  MAX  24 MG/DAY      Incontinence Supply Disposable (Incontinence Brief Large) MISC Use 6 (six) times a day Size xl 180 each 11    Incontinence Supply Disposable (Undergarment) MISC Use 6 a day, 5 boxes, xl  Dx R51, paraplegia 5 each 11    Incontinence Supply Disposable (Underpads) MISC Use 2 a day 5 each 11    insulin detemir (LEVEMIR) 100 units/mL subcutaneous injection Inject 6 5 Units under the skin 2 (two) times a day       ketoconazole (NIZORAL) 2 % cream APPLY SMALL AMOUNT TO EDGES OF MOUTH TWICE DAILY  DO NOT SWALLOW 15 g 1    Ketostix strip USE WITH HYPERGLYCEMIA E10 65 **NOT COVERED**      LEVEMIR FLEXTOUCH 100 units/mL injection pen Inject 14 Units under the skin 2 (two) times a day 15 pen 3    levETIRAcetam (KEPPRA) 250 mg tablet TAKE 1 TABLET (250 MG TOTAL) BY MOUTH 3 (THREE) TIMES A WEEK (MWF) AT 1200   lidocaine (XYLOCAINE) 5 % ointment Apply topically as needed for mild pain 35 44 g 0    Lidocaine HCl 4 % LIQD Apply 1 application topically 3 (three) times a day as needed (pain) 73 mL 5    Lokelma 10 g PACK       losartan (COZAAR) 100 MG tablet Take 1 tablet (100 mg total) by mouth daily 90 tablet 1    Melatonin ER 3 MG TBCR Take 6 mg by mouth      Methoxy PEG-Epoetin Beta (MIRCERA IJ) 75 mcg Once every 2 weeks      Methoxy PEG-Epoetin Beta (MIRCERA IJ) 100 mcg Once every 2 weeks      Methoxy PEG-Epoetin Beta (MIRCERA IJ) 150 mcg Once every 2 weeks      Misc  Devices (Wheelchair Cushion) MISC Use daily 1 each 0    Misc  Devices [facility]) MISC by Does not apply route daily Wheelchair ramp, dx g82 20 1 each 0    Multiple Vitamin (Daily-Enriqueta Multivitamin) TABS TAKE 1 TABLET BY MOUTH EVERY DAY 90 tablet 2    Narcan 4 MG/0 1ML nasal spray       NOVOLOG 100 UNIT/ML injection Inject 5 Units under the skin 3 (three) times a day with meals       NovoLOG FlexPen 100 units/mL injection pen INJECT 3 UNITS UNDER THE SKIN 3 (THREE) TIMES A DAY BEFORE MEALS        nystatin (MYCOSTATIN) 500,000 units/5 mL suspension APPLY 5 ML (500,000 UNITS TOTAL) TO THE MOUTH OR THROAT 4 (FOUR) TIMES A  mL 1    ondansetron (ZOFRAN-ODT) 4 mg disintegrating tablet TAKE 1 TABLET BY MOUTH EVERY 8 HOURS AS NEEDED FOR NAUSEA AND VOMITING 20 tablet 2    oxybutynin (DITROPAN) 5 mg tablet Take 3 tablets (15 mg total) by mouth daily 270 tablet 1    phenytoin extended (DILANTIN) 200 MG ER capsule Take 1 capsule (200 mg total) by mouth 2 (two) times a day 180 capsule 1    phenytoin extended (DILANTIN) 300 MG ER capsule TAKE 1 CAPSULE BY MOUTH EVERY DAY 90 capsule 1    phenytoin extended (DILANTIN) 300 MG ER capsule Take 1 capsule (300 mg total) by mouth daily 90 capsule 1    Protonix 40 MG tablet Take 1 tablet (40 mg total) by mouth daily Brand necessary 90 tablet 1    risperiDONE (RisperDAL) 0 5 mg tablet TAKE 1 TABLET (0 5 MG TOTAL) BY MOUTH 2 (TWO) TIMES A  tablet 1    senna (SENOKOT) 8 6 mg Take 1 tablet (8 6 mg total) by mouth 2 (two) times a day 180 tablet 1    sodium hypochlorite (DAKIN'S HALF-STRENGTH) external solution USE AS DIRECTED ONCE  DAILY 473 mL 2    Sodium Zirconium Cyclosilicate 10 g PACK Take 10 g by mouth daily      SUPER B COMPLEX & C TABS TAKE 1 CAPSULE BY MOUTH ONCE FOR 1 DOSE AS DIRECTED 90 tablet 2    tiZANidine (ZANAFLEX) 4 mg tablet Take 1 tablet by mouth 4 (four) times a day      tiZANidine (ZANAFLEX) 4 mg tablet       triamcinolone (KENALOG) 0 1 % ointment PLEASE SEE ATTACHED FOR DETAILED DIRECTIONS 454 g 0  Zinc Sulfate 220 (50 Zn) MG TABS Take 1 tablet by mouth daily 90 tablet 1    carvedilol (COREG) 6 25 mg tablet Take 6 25 mg by mouth      phenytoin extended (DILANTIN) 200 MG ER capsule TAKE 1 CAPSULE BY MOUTH TWICE A  capsule 0     No current facility-administered medications for this visit  Current Outpatient Medications on File Prior to Visit   Medication Sig    Alcohol Swabs (ALCOHOL PADS) 70 % PADS by Does not apply route 5 (five) times a day    amLODIPine (NORVASC) 10 mg tablet Take 1 tablet (10 mg total) by mouth daily    ammonium lactate (LAC-HYDRIN) 12 % cream Apply topically    ammonium lactate (LAC-HYDRIN) 12 % cream     apixaban (ELIQUIS) 5 mg Take 1 tablet (5 mg total) by mouth 2 (two) times a day    ascorbic acid (VITAMIN C) 250 mg tablet TAKE 2 TABLETS (500 MG TOTAL) BY MOUTH DAILY    atorvastatin (LIPITOR) 20 mg tablet Take 1 tablet (20 mg total) by mouth daily at bedtime    B Complex-C-Folic Acid (Super B-Complex/Vit C/FA) TABS Take 1 tablet by mouth daily    baclofen 20 mg tablet Take 20 mg by mouth 2 (two) times a day   Friend.ly MICROLET LANCETS lancets Use as instructed to check blood sugar 4 times a day  Dx e10 29    benzonatate (TESSALON PERLES) 100 mg capsule TAKE 1 CAPSULE BY MOUTH THREE TIMES A DAY AS NEEDED FOR COUGH    Blood Glucose Monitoring Suppl (Friend.ly CONTOUR NEXT USB MONITOR) w/Device KIT Testing 4 times a day    calcitriol (ROCALTROL) 0 5 MCG capsule Take 2 mcg by mouth    Capsaicin 0 033 % CREA 5 times a day for 1st week   Then 3 times a day for next 3 weeks    carvedilol (COREG) 25 mg tablet Take 25 mg by mouth    carvedilol (COREG) 25 mg tablet Take 1 tablet (25 mg total) by mouth 2 (two) times a day    cetirizine (ZyrTEC) 10 mg tablet Take 1 tablet (10 mg total) by mouth daily    cetirizine (ZyrTEC) 5 MG tablet TAKE 1 TABLET BY MOUTH EVERY DAY    Cholecalciferol (VITAMIN D-3) 1000 units CAPS Take 2 capsules by mouth daily    clindamycin (CLINDAGEL) 1 % gel APPLY TO AFFECTED AREA TWICE A DAY    CVS ACETAMINOPHEN EX  MG tablet TAKE 2 TABLETS BY MOUTH EVERY 8 HOURS AS NEEDED FOR MILD OR MODERATE PAIN (PAIN SCORE 1 6)   cyanocobalamin (CVS Vitamin B-12) 1000 MCG tablet Take 1 tablet (1,000 mcg total) by mouth daily    cyclobenzaprine (FLEXERIL) 10 mg tablet Take 10 mg by mouth 3 (three) times a day as needed    cyclobenzaprine (FLEXERIL) 5 mg tablet     dextrose 50 % 50 mL    dicyclomine (BENTYL) 10 mg capsule TAKE 1 CAPSULE (10 MG TOTAL) BY MOUTH 3 (THREE) TIMES A DAY BEFORE MEALS    Disposable Gloves MISC Use 7 times a day, 4 boxes of gloves XL  Dx type 1 DM, paraplegia    doxazosin (CARDURA) 2 mg tablet Take 1 tablet (2 mg total) by mouth every evening    epoetin kaushik (EPOGEN,PROCRIT) 20,000 units/mL Inject 10,000 Units under the skin    epoetin kaushik (EPOGEN,PROCRIT) 20,000 units/mL Inject 5,000 Units under the skin    ergocalciferol (VITAMIN D2) 50,000 units TAKE 1 CAPSULE BY MOUTH ONE TIME PER WEEK    ferrous sulfate 325 (65 Fe) mg tablet Take 1 tablet (325 mg total) by mouth 3 (three) times a day    glucose 4-6 GM-MG Chew 2 tablets daily as needed for low blood sugar    glucose blood (SUSAN CONTOUR TEST) test strip Use as instructed to test blood sugar 4 times a day  Dx e10 29    Gvoke PFS 1 MG/0 2ML SOSY INJECT 1 ML (1 MG TOTAL) INTO A MUSCLE ONCE FOR 1 DOSE    HYDROmorphone (DILAUDID) 4 mg tablet TAKE 1 TABLET BY MOUTH EVERY 4 HOURS AS NEEDED FOR MODERATE PAIN (PAIN SCORE 4 6)   MAX  24 MG/DAY    Incontinence Supply Disposable (Incontinence Brief Large) MISC Use 6 (six) times a day Size xl    Incontinence Supply Disposable (Undergarment) MISC Use 6 a day, 5 boxes, xl  Dx R51, paraplegia    Incontinence Supply Disposable (Underpads) MISC Use 2 a day    insulin detemir (LEVEMIR) 100 units/mL subcutaneous injection Inject 6 5 Units under the skin 2 (two) times a day     ketoconazole (NIZORAL) 2 % cream APPLY SMALL AMOUNT TO EDGES OF MOUTH TWICE DAILY  DO NOT SWALLOW    Ketostix strip USE WITH HYPERGLYCEMIA E10 65 **NOT COVERED**    LEVEMIR FLEXTOUCH 100 units/mL injection pen Inject 14 Units under the skin 2 (two) times a day    levETIRAcetam (KEPPRA) 250 mg tablet TAKE 1 TABLET (250 MG TOTAL) BY MOUTH 3 (THREE) TIMES A WEEK (MWF) AT 1200   lidocaine (XYLOCAINE) 5 % ointment Apply topically as needed for mild pain    Lidocaine HCl 4 % LIQD Apply 1 application topically 3 (three) times a day as needed (pain)    Lokelma 10 g PACK     losartan (COZAAR) 100 MG tablet Take 1 tablet (100 mg total) by mouth daily    Melatonin ER 3 MG TBCR Take 6 mg by mouth    Methoxy PEG-Epoetin Beta (MIRCERA IJ) 75 mcg Once every 2 weeks    Methoxy PEG-Epoetin Beta (MIRCERA IJ) 100 mcg Once every 2 weeks    Methoxy PEG-Epoetin Beta (MIRCERA IJ) 150 mcg Once every 2 weeks    Misc  Devices (Wheelchair Cushion) MISC Use daily    Misc  Devices [facility]) MISC by Does not apply route daily Wheelchair ramp, dx g82 20    Multiple Vitamin (Daily-Enriqueta Multivitamin) TABS TAKE 1 TABLET BY MOUTH EVERY DAY    Narcan 4 MG/0 1ML nasal spray     NOVOLOG 100 UNIT/ML injection Inject 5 Units under the skin 3 (three) times a day with meals     NovoLOG FlexPen 100 units/mL injection pen INJECT 3 UNITS UNDER THE SKIN 3 (THREE) TIMES A DAY BEFORE MEALS      nystatin (MYCOSTATIN) 500,000 units/5 mL suspension APPLY 5 ML (500,000 UNITS TOTAL) TO THE MOUTH OR THROAT 4 (FOUR) TIMES A DAY    ondansetron (ZOFRAN-ODT) 4 mg disintegrating tablet TAKE 1 TABLET BY MOUTH EVERY 8 HOURS AS NEEDED FOR NAUSEA AND VOMITING    oxybutynin (DITROPAN) 5 mg tablet Take 3 tablets (15 mg total) by mouth daily    phenytoin extended (DILANTIN) 200 MG ER capsule Take 1 capsule (200 mg total) by mouth 2 (two) times a day    phenytoin extended (DILANTIN) 300 MG ER capsule TAKE 1 CAPSULE BY MOUTH EVERY DAY    phenytoin extended (DILANTIN) 300 MG ER capsule Take 1 capsule (300 mg total) by mouth daily    Protonix 40 MG tablet Take 1 tablet (40 mg total) by mouth daily Brand necessary    risperiDONE (RisperDAL) 0 5 mg tablet TAKE 1 TABLET (0 5 MG TOTAL) BY MOUTH 2 (TWO) TIMES A DAY    senna (SENOKOT) 8 6 mg Take 1 tablet (8 6 mg total) by mouth 2 (two) times a day    sodium hypochlorite (DAKIN'S HALF-STRENGTH) external solution USE AS DIRECTED ONCE  DAILY    Sodium Zirconium Cyclosilicate 10 g PACK Take 10 g by mouth daily    SUPER B COMPLEX & C TABS TAKE 1 CAPSULE BY MOUTH ONCE FOR 1 DOSE AS DIRECTED    tiZANidine (ZANAFLEX) 4 mg tablet Take 1 tablet by mouth 4 (four) times a day    tiZANidine (ZANAFLEX) 4 mg tablet     triamcinolone (KENALOG) 0 1 % ointment PLEASE SEE ATTACHED FOR DETAILED DIRECTIONS    Zinc Sulfate 220 (50 Zn) MG TABS Take 1 tablet by mouth daily    carvedilol (COREG) 6 25 mg tablet Take 6 25 mg by mouth    [DISCONTINUED] CVS Vitamin B-12 1000 MCG tablet TAKE 1 TABLET BY MOUTH EVERY DAY    [DISCONTINUED] CVS Vitamin C 250 MG tablet TAKE 2 TABLETS (500 MG TOTAL) BY MOUTH DAILY    [DISCONTINUED] dicyclomine (BENTYL) 10 mg capsule TAKE 1 CAPSULE (10 MG TOTAL) BY MOUTH 3 (THREE) TIMES A DAY BEFORE MEALS    [DISCONTINUED] fluconazole (DIFLUCAN) 150 mg tablet TAKE 1 TABLET BY MOUTH AS ONE DOSE    [DISCONTINUED] phenytoin extended (DILANTIN) 300 MG ER capsule Take 1 capsule (300 mg total) by mouth daily     No current facility-administered medications on file prior to visit  He is allergic to cefepime, ciprofloxacin hcl, cymbalta [duloxetine hcl], gabapentin, lac bovis, lactose - food allergy, lyrica [pregabalin], penicillins, polymyxin b, and rosuvastatin       Review of Systems   Constitutional: Negative for chills and fever  HENT: Negative for ear pain and sore throat  Eyes: Negative for pain and visual disturbance  Respiratory: Positive for shortness of breath  Negative for cough  Cardiovascular: Positive for leg swelling   Negative for chest pain and palpitations  Gastrointestinal: Negative for abdominal pain and vomiting  Genitourinary: Negative for dysuria and hematuria  Musculoskeletal: Positive for back pain  Negative for arthralgias  Skin: Positive for rash  Negative for color change  Neurological: Negative for seizures and syncope  All other systems reviewed and are negative  Objective:      /86 (BP Location: Left arm, Patient Position: Sitting, Cuff Size: Standard)   Pulse 90   Temp (!) 96 4 °F (35 8 °C) (Temporal)   Resp 18   Ht 6' 4" (1 93 m)   Wt 68 5 kg (151 lb)   SpO2 98%   BMI 18 38 kg/m²          Physical Exam  Vitals and nursing note reviewed  Constitutional:       General: He is not in acute distress  Appearance: He is well-developed  HENT:      Head: Normocephalic and atraumatic  Right Ear: External ear normal       Left Ear: External ear normal    Eyes:      Conjunctiva/sclera: Conjunctivae normal    Neck:      Thyroid: No thyromegaly  Cardiovascular:      Rate and Rhythm: Normal rate and regular rhythm  Heart sounds: Normal heart sounds  No murmur heard  Comments: +2 swelling left foot, no calf swellign, no redness  Pulmonary:      Effort: Pulmonary effort is normal  No respiratory distress  Breath sounds: Normal breath sounds  No wheezing  Musculoskeletal:      Cervical back: Normal range of motion and neck supple  Lymphadenopathy:      Cervical: No cervical adenopathy  Neurological:      Mental Status: He is alert and oriented to person, place, and time     Psychiatric:         Mood and Affect: Mood normal          Behavior: Behavior normal

## 2021-07-28 ENCOUNTER — TELEPHONE (OUTPATIENT)
Dept: FAMILY MEDICINE CLINIC | Facility: CLINIC | Age: 41
End: 2021-07-28

## 2021-07-28 PROBLEM — B35.1 ONYCHOMYCOSIS: Status: ACTIVE | Noted: 2020-01-10

## 2021-07-28 PROBLEM — N25.81 SECONDARY HYPERPARATHYROIDISM OF RENAL ORIGIN (HCC): Status: ACTIVE | Noted: 2018-07-13

## 2021-07-28 PROBLEM — Z91.19 NONCOMPLIANCE BY REFUSING INTERVENTION OR SUPPORT: Status: RESOLVED | Noted: 2017-09-29 | Resolved: 2021-07-28

## 2021-07-28 PROBLEM — L01.02 PUSTULAR FOLLICULITIS: Status: ACTIVE | Noted: 2017-01-19

## 2021-07-28 PROBLEM — Z91.199 NONCOMPLIANCE BY REFUSING INTERVENTION OR SUPPORT: Status: RESOLVED | Noted: 2017-09-29 | Resolved: 2021-07-28

## 2021-07-28 PROBLEM — R94.31 PROLONGED Q-T INTERVAL ON ECG: Status: ACTIVE | Noted: 2020-04-14

## 2021-07-28 PROBLEM — K58.9 IRRITABLE BOWEL SYNDROME: Status: ACTIVE | Noted: 2021-07-28

## 2021-07-28 PROBLEM — R22.42 LOCALIZED SWELLING OF LEFT FOOT: Status: ACTIVE | Noted: 2021-07-28

## 2021-07-28 NOTE — ASSESSMENT & PLAN NOTE
Wt Readings from Last 3 Encounters:   07/27/21 68 5 kg (151 lb)   03/08/21 69 9 kg (154 lb)   08/07/19 72 6 kg (160 lb)       BNP ordered to evaluated for acute HF

## 2021-07-28 NOTE — TELEPHONE ENCOUNTER
He is ok with generic   He also said he [facility]

## 2021-07-29 ENCOUNTER — TELEPHONE (OUTPATIENT)
Dept: FAMILY MEDICINE CLINIC | Facility: CLINIC | Age: 41
End: 2021-07-29

## 2021-07-29 NOTE — TELEPHONE ENCOUNTER
I dont know which pharmcy he is even going to anymore  [facility] said [facility] in the office but Renay Poe wanted rx there and now [address]    Can we please clarify

## 2021-07-30 ENCOUNTER — TELEPHONE (OUTPATIENT)
Dept: FAMILY MEDICINE CLINIC | Facility: CLINIC | Age: 41
End: 2021-07-30

## 2021-07-31 ENCOUNTER — APPOINTMENT (OUTPATIENT)
Dept: RADIOLOGY | Facility: MEDICAL CENTER | Age: 41
End: 2021-07-31
Payer: MEDICARE

## 2021-07-31 DIAGNOSIS — R22.42 LOCALIZED SWELLING OF LEFT FOOT: ICD-10-CM

## 2021-07-31 PROCEDURE — 73630 X-RAY EXAM OF FOOT: CPT

## 2021-08-02 NOTE — RESULT ENCOUNTER NOTE
So his foot shows that he had fractures in his foot and a deformity of the foot as well   I know I had prior put in referral for podiatry but I dont know if you had seen the

## 2021-08-05 ENCOUNTER — TELEPHONE (OUTPATIENT)
Dept: FAMILY MEDICINE CLINIC | Facility: CLINIC | Age: 41
End: 2021-08-05

## 2021-08-05 DIAGNOSIS — I48.91 ATRIAL FIBRILLATION, UNSPECIFIED TYPE (HCC): Chronic | ICD-10-CM

## 2021-08-05 RX ORDER — CHOLECALCIFEROL (VITAMIN D3) 25 MCG
TABLET,CHEWABLE ORAL
Qty: 30 TABLET | Refills: 8 | Status: SHIPPED | OUTPATIENT
Start: 2021-08-05 | End: 2022-07-13

## 2021-08-05 NOTE — TELEPHONE ENCOUNTER
[facility]  FORM RECEIVED VIA FAX  WILL BE PLACED IN FORM BIN FOR MA PICKUP        Order Number: [id number]

## 2021-08-05 NOTE — TELEPHONE ENCOUNTER
Form picked up by clinical 08/05/21  Patient will be notified within 5 business days of completion of forms/if we are unable to complete

## 2021-08-05 NOTE — TELEPHONE ENCOUNTER
Form dropped off by patient 08/05/21   Placed in the "forms" bin    Received fax    [facility]

## 2021-08-05 NOTE — TELEPHONE ENCOUNTER
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 Pt denies complaints beyond chest pressure and symptoms of reflux. No fevers. Exam as above, not hypoxic. BP stable. Will anticoagulate for PE, and plan for admission after basic labs, trop, ecg, will reassess.

## 2021-08-05 NOTE — TELEPHONE ENCOUNTER
SIGNATURES NEEDED FOR  Cover My Med ([facility])  FORM RECEIVED VIA FAX  WILL BE PLACED IN FORM BIN FOR MA PICKUP

## 2021-08-09 ENCOUNTER — TELEPHONE (OUTPATIENT)
Dept: FAMILY MEDICINE CLINIC | Facility: CLINIC | Age: 41
End: 2021-08-09

## 2021-08-10 ENCOUNTER — TELEPHONE (OUTPATIENT)
Dept: FAMILY MEDICINE CLINIC | Facility: CLINIC | Age: 41
End: 2021-08-10

## 2021-08-10 NOTE — TELEPHONE ENCOUNTER
SIGNATURES NEEDED FOR [facility] FORM RECEIVED VIA FAX  WILL BE PLACED IN FORM BIN FOR MA PICKUP

## 2021-08-11 ENCOUNTER — TELEPHONE (OUTPATIENT)
Dept: FAMILY MEDICINE CLINIC | Facility: CLINIC | Age: 41
End: 2021-08-11

## 2021-08-12 ENCOUNTER — TRANSITIONAL CARE MANAGEMENT (OUTPATIENT)
Dept: FAMILY MEDICINE CLINIC | Facility: CLINIC | Age: 41
End: 2021-08-12

## 2021-08-13 ENCOUNTER — TELEPHONE (OUTPATIENT)
Dept: FAMILY MEDICINE CLINIC | Facility: CLINIC | Age: 41
End: 2021-08-13

## 2021-08-13 NOTE — TELEPHONE ENCOUNTER
SIGNATURES NEEDED FOR [facility] HEALTH FORM RECEIVED VIA FAX  WILL BE PLACED IN FORM BIN FOR MA PICKUP      ORDER NUMBER [id number]

## 2021-08-17 ENCOUNTER — TELEPHONE (OUTPATIENT)
Dept: FAMILY MEDICINE CLINIC | Facility: CLINIC | Age: 41
End: 2021-08-17

## 2021-08-19 ENCOUNTER — OFFICE VISIT (OUTPATIENT)
Dept: WOUND CARE | Facility: HOSPITAL | Age: 41
End: 2021-08-19
Payer: MEDICARE

## 2021-08-19 VITALS
TEMPERATURE: 98.1 F | SYSTOLIC BLOOD PRESSURE: 130 MMHG | RESPIRATION RATE: 20 BRPM | DIASTOLIC BLOOD PRESSURE: 82 MMHG | HEART RATE: 88 BPM

## 2021-08-19 DIAGNOSIS — L89.224 PRESSURE ULCER OF LEFT HIP, STAGE 4 (HCC): ICD-10-CM

## 2021-08-19 DIAGNOSIS — N18.6 TYPE 1 DIABETES MELLITUS WITH CHRONIC KIDNEY DISEASE ON CHRONIC DIALYSIS (HCC): ICD-10-CM

## 2021-08-19 DIAGNOSIS — L89.894 PRESSURE ULCER OF OTHER SITE, STAGE 4 (HCC): ICD-10-CM

## 2021-08-19 DIAGNOSIS — G82.20 PARAPLEGIA (HCC): ICD-10-CM

## 2021-08-19 DIAGNOSIS — L89.324 PRESSURE INJURY OF LEFT ISCHIUM, STAGE 4 (HCC): ICD-10-CM

## 2021-08-19 DIAGNOSIS — E10.22 TYPE 1 DIABETES MELLITUS WITH CHRONIC KIDNEY DISEASE ON CHRONIC DIALYSIS (HCC): ICD-10-CM

## 2021-08-19 DIAGNOSIS — L89.154 PRESSURE INJURY OF SACRAL REGION, STAGE 4 (HCC): Primary | ICD-10-CM

## 2021-08-19 DIAGNOSIS — Z99.2 TYPE 1 DIABETES MELLITUS WITH CHRONIC KIDNEY DISEASE ON CHRONIC DIALYSIS (HCC): ICD-10-CM

## 2021-08-19 PROCEDURE — 97598 DBRDMT OPN WND ADDL 20CM/<: CPT | Performed by: FAMILY MEDICINE

## 2021-08-19 PROCEDURE — 99213 OFFICE O/P EST LOW 20 MIN: CPT | Performed by: FAMILY MEDICINE

## 2021-08-19 PROCEDURE — 97597 DBRDMT OPN WND 1ST 20 CM/<: CPT | Performed by: FAMILY MEDICINE

## 2021-08-19 RX ORDER — LIDOCAINE HYDROCHLORIDE 40 MG/ML
5 SOLUTION TOPICAL ONCE
Status: COMPLETED | OUTPATIENT
Start: 2021-08-19 | End: 2021-08-19

## 2021-08-19 RX ADMIN — LIDOCAINE HYDROCHLORIDE 5 ML: 40 SOLUTION TOPICAL at 13:58

## 2021-08-19 NOTE — PATIENT INSTRUCTIONS
Orders Placed This Encounter   Procedures    Wound cleansing and dressings     All wounds:  Wash your hands with soap and water  Remove old dressing, discard into plastic bag and place in trash  Cleanse the wound with Dakin's solution (rinse) prior to applying a clean dressing  Do not use tissue or cotton balls  Do not scrub the wound  Pat dry using gauze  Shower no; do not get dressing wet    Apply normal saline soaked gauze packing to wounds (moist with normal saline, and squeezed out so it is damp)  Cover with an ABD pad  Secure with Medfix tape  Change dressing daily and PRN for breakthrough drainage (visiting nurses to do twice per week and family in between)  This was done at today's dressing change in the wound center       Continue visiting nurses twice per week for dressing changes, family to do in between nurse visits     Follow up in 6 weeks                                Wound off loading  Continue clinitron bed at home and turn at least every 1-2 hours  To try and limit the amount of time spent sitting in the wheelchair  Keep pressure off the wounds as much as humanly possible                            Continue to try and increase your protein to at least 3 servings or more if possible                                  Continue to try and keep blood sugars as low as possible  Stop Smoking     Script given for commode     Contact Primary Care Provider about getting a nutrition consult to increase albumin levels     Standing Status:   Future     Standing Expiration Date:   8/19/2022

## 2021-08-24 ENCOUNTER — TELEPHONE (OUTPATIENT)
Dept: FAMILY MEDICINE CLINIC | Facility: CLINIC | Age: 41
End: 2021-08-24

## 2021-08-24 NOTE — TELEPHONE ENCOUNTER
SIGNATURES NEEDED FOR [facility] HEALTH  FORM RECEIVED VIA FAX  WILL BE PLACED IN FORM BIN FOR MA PICKUP

## 2021-08-24 NOTE — TELEPHONE ENCOUNTER
Started processing requested DME from wound care, Dr Albino Meyer ordered a carrex padded tub transfer bench with commode    Awaiting signature from Dr Dalila Gan upon her return next week  She is currently unavailable and is the only provider that can sign for this, as she was the co-signer for the most recent face to face visit here  Will update as order is processed

## 2021-08-26 NOTE — TELEPHONE ENCOUNTER
Received call about these orders returned call and informed them that we are awaiting Dr Mccray Signature

## 2021-08-30 NOTE — TELEPHONE ENCOUNTER
Is this an order through [facility] because I did not see any order for him  If it is a paper order will sign tomorrow as soon as I get to the office

## 2021-08-30 NOTE — TELEPHONE ENCOUNTER
You should have it now  If I had done it last week, they would have hassled me for signature so I was just waiting for today to push it through

## 2021-09-01 ENCOUNTER — TELEPHONE (OUTPATIENT)
Dept: FAMILY MEDICINE CLINIC | Facility: CLINIC | Age: 41
End: 2021-09-01

## 2021-09-01 NOTE — TELEPHONE ENCOUNTER
SIGNATURES NEEDED FOR  [employer]   FORM RECEIVED VIA FAX  WILL BE PLACED IN FORM BIN FOR MA PICKUP

## 2021-09-02 ENCOUNTER — TELEMEDICINE (OUTPATIENT)
Dept: FAMILY MEDICINE CLINIC | Facility: CLINIC | Age: 41
End: 2021-09-02

## 2021-09-02 DIAGNOSIS — N18.6 STAGE 5 CHRONIC KIDNEY DISEASE ON CHRONIC DIALYSIS (HCC): Primary | ICD-10-CM

## 2021-09-02 DIAGNOSIS — Z99.2 STAGE 5 CHRONIC KIDNEY DISEASE ON CHRONIC DIALYSIS (HCC): Primary | ICD-10-CM

## 2021-09-02 DIAGNOSIS — K21.9 GASTROESOPHAGEAL REFLUX DISEASE WITHOUT ESOPHAGITIS: ICD-10-CM

## 2021-09-02 DIAGNOSIS — R10.9 ABDOMINAL PAIN, UNSPECIFIED ABDOMINAL LOCATION: ICD-10-CM

## 2021-09-02 DIAGNOSIS — I48.91 ATRIAL FIBRILLATION, UNSPECIFIED TYPE (HCC): Chronic | ICD-10-CM

## 2021-09-02 DIAGNOSIS — K29.70 GASTRITIS WITHOUT BLEEDING, UNSPECIFIED CHRONICITY, UNSPECIFIED GASTRITIS TYPE: ICD-10-CM

## 2021-09-02 PROCEDURE — 99214 OFFICE O/P EST MOD 30 MIN: CPT | Performed by: FAMILY MEDICINE

## 2021-09-02 RX ORDER — ONDANSETRON 4 MG/1
8 TABLET, ORALLY DISINTEGRATING ORAL EVERY 8 HOURS PRN
Qty: 30 TABLET | Refills: 1 | Status: SHIPPED | OUTPATIENT
Start: 2021-09-02 | End: 2022-02-17 | Stop reason: SDUPTHER

## 2021-09-02 RX ORDER — PANTOPRAZOLE SODIUM 40 MG/1
40 TABLET, DELAYED RELEASE ORAL DAILY
Qty: 30 TABLET | Refills: 1 | Status: SHIPPED | OUTPATIENT
Start: 2021-09-02 | End: 2022-02-11 | Stop reason: ALTCHOICE

## 2021-09-02 RX ORDER — SUCRALFATE ORAL 1 G/10ML
1 SUSPENSION ORAL
Qty: 420 ML | Refills: 0 | Status: SHIPPED | OUTPATIENT
Start: 2021-09-02 | End: 2022-02-17 | Stop reason: SDUPTHER

## 2021-09-02 NOTE — ASSESSMENT & PLAN NOTE
-Upper abdominal pain with associated early satiety, nausea and vomiting  -Recent CT scan of abdomen was negative for any abdominal pathology  -DDx includes peptic ulcer Gastroparesis and gastritis   -Recommend small frequent meals as tolerated  -Continue Protonix 40 mg daily and zofran prn for nausea  -Will add Carafate ACHS  -Follow up with GI

## 2021-09-02 NOTE — ASSESSMENT & PLAN NOTE
Lab Results   Component Value Date    EGFR 36 10/10/2017    EGFR 26 10/09/2017    EGFR 29 10/08/2017    CREATININE 2 53 (H) 10/10/2017    CREATININE 3 32 (H) 10/09/2017    CREATININE 3 00 (H) 10/08/2017   -Dialysis MWF  -Referral for nephrology placed per patient request

## 2021-09-02 NOTE — PROGRESS NOTES
Virtual Brief Visit    Verification of patient location:    Patient is located in the following state in which I hold an active license [place]      Assessment/Plan:    Problem List Items Addressed This Visit        Digestive    GERD (gastroesophageal reflux disease)     Continue 40mg of pantropazole         Relevant Medications    ondansetron (ZOFRAN-ODT) 4 mg disintegrating tablet    pantoprazole (PROTONIX) 40 mg tablet    sucralfate (CARAFATE) 1 g/10 mL suspension       Cardiovascular and Mediastinum    Atrial fibrillation (HCC) (Chronic)     Stable   -Continue Coreg and Eliquis 5mg            Genitourinary    Stage 5 chronic kidney disease on chronic dialysis [facility]) - Primary     Lab Results   Component Value Date    EGFR 36 10/10/2017    EGFR 26 10/09/2017    EGFR 29 10/08/2017    CREATININE 2 53 (H) 10/10/2017    CREATININE 3 32 (H) 10/09/2017    CREATININE 3 00 (H) 10/08/2017   -Dialysis MWF  -Referral for nephrology placed per patient request         Relevant Orders    Ambulatory referral to Nephrology       Other    Abdominal pain     -Upper abdominal pain with associated early satiety, nausea and vomiting  -Recent CT scan of abdomen was negative for any abdominal pathology  -DDx includes peptic ulcer Gastroparesis and gastritis   -Recommend small frequent meals as tolerated  -Continue Protonix 40 mg daily and zofran prn for nausea  -Will add Carafate ACHS  -Follow up with GI           Relevant Medications    sucralfate (CARAFATE) 1 g/10 mL suspension      Other Visit Diagnoses     Gastritis without bleeding, unspecified chronicity, unspecified gastritis type        Relevant Medications    pantoprazole (PROTONIX) 40 mg tablet    sucralfate (CARAFATE) 1 g/10 mL suspension                Reason for visit is   Chief Complaint   Patient presents with    Virtual Brief Visit        Encounter provider Tayler Romo MD    Provider located at [place] [address] [address]  [phone]    Recent Visits  Date Type Provider Dept   09/01/21 Telephone [facility]   Showing recent visits within past 7 days and meeting all other requirements  Today's Visits  Date Type Provider Dept   09/02/21 MD Gabriela Murillo [facility]   Showing today's visits and meeting all other requirements  Future Appointments  No visits were found meeting these conditions  Showing future appointments within next 150 days and meeting all other requirements       After connecting through [employer] and patient was informed that this is a secure, HIPAA-compliant platform  He agrees to proceed  , the patient was identified by name and date of birth  Kati Villagran  was informed that this is a telemedicine visit and that the visit is being conducted through telephone  My office door was closed  No one else was in the room  He acknowledged consent and understanding of privacy and security of the platform  The patient has agreed to participate and understands he can discontinue the visit at any time  Patient is aware this is a billable service  Subjective  40 y o  male with a PMHx significant for recurrent hospitalizations for fever and hypoglycemia, DM1, paralplegia from transverse myelitis, A fib (Eliquis), HTN, NICM, HFpEF (55-60%), Seizure Disorder, ESRD on HD M,W,F, chronic sacral ulcer, 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, neurogenic bladder s/p SPC who presented on 8/8 with fever (Tmax 101 3), abdominal pain and nausea that began the day prior to arrival  Abdominal pain was described as bloating  Pt also has had associated 1X episode of nonbloody, nonbilious,nausea and vomitted prior to admission  He was initially placed on broad spectrum antibiotics for suspected sepsis on admission  He had a negative infectious workup and antibiotics was discontinued  He was sent home without antibiotics   He was deemed medically safe and clear for discharge with follow up with his PCP and GI  He was instructed to continue     Today he states he is doing better  He continues to experience abdominal pain is in the middle of his upper abdomen and is ongoing for the past year  He describes the pain as pressure like  Symptoms are worsen after he eats  He states that he has to eat in small portions because if he eats large meal his stomach will start to feels bloated  He also does endorse early satiety  He has not had any episode of nausea and vomiting since discharge  He has GI appointment in 9/19/21  He has regular BM thru colostomy  He had Had Ct scan of abdomen on 6/15/2021 which did not show any obstruction                                           HPI     Past Medical History:   Diagnosis Date    Ambulatory dysfunction     Anemia     Anemia, iron deficiency     transfusion requiring    [facility] fibrillation ([facility]     AVM (arteriovenous malformation) of duodenum, 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    Bacteriuria, asymptomatic     Bipolar disorder ([facility])     Chronic deep vein thrombosis (DVT) ([facility])     Chronic indwelling Ortega catheter     Chronic kidney disease     Chronic pain     Chronic pain disorder     Chronic suprapubic catheter ([facility] [address] )     Clostridium difficile infection 08/11/2016    also positive 9/2016, 5/29/2017, 8/15/2017   S/P fecal transplant    Colostomy on examination ([address] )     Decubitus ulcer     Delirium     Diabetes mellitus ([address] )     GERD (gastroesophageal reflux disease)     Hemodialysis patient ([address] )     Hypertension     Memory impairment 2011    s/p diabetic coma    Neurogenic bladder     OAB (overactive bladder)     Paraplegia (HCC)     T3 transverse myelitis vs spinal stoke (AVM); 2012 extensive epidural abscess C7=> conus 2nd extension from deep parspinal abscess L4-S2/sacral decubitus; cord atrophy/myelomalcia T3=>conus     Penile abscess     S/P unilateral BKA (below knee amputation) ([address] )     Right    Sebaceous cyst removed in 2017    Seizures (HCC)     Shortness of breath     Tobacco abuse     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HCC)     Urinary retention     Wheelchair dependent     Wounds, multiple     pressure ulcers with delayed healing       Past Surgical History:   Procedure Laterality Date    BELOW KNEE LEG AMPUTATION Right 2009    COLONOSCOPY N/A 3/27/2017    Procedure: COLONOSCOPY;  Surgeon: Liyah Angelo MD;  Location: [facility]; Service:     COLONOSCOPY N/A 3/29/2017    Procedure: COLONOSCOPY;  Surgeon: Liyah Angelo MD;  [facility]; Service:     COLONOSCOPY N/A 6/15/2017    Procedure: COLONOSCOPY with FMT;  Surgeon: Leigh Bentley MD;  Location: [facility]; Service: Gastroenterology    ESOPHAGOGASTRODUODENOSCOPY N/A 3/22/2017    Procedure: ESOPHAGOGASTRODUODENOSCOPY (EGD); Surgeon: Bhavin Reese DO;  Location: [facility]; Service:     MULTIPLE TOOTH EXTRACTIONS N/A 3/8/2021    Procedure: EXTRACTION TEETH # 2,3,6,10,12,13,14,18,19,20,21,22,23,24,25,26,27,28,29,30;  Surgeon: Liyah Ayala DMD;  Location: [facility];  Service: Maxillofacial       Current Outpatient Medications   Medication Sig Dispense Refill    Alcohol Swabs (ALCOHOL PADS) 70 % PADS by Does not apply route 5 (five) times a day 300 each 5    amLODIPine (NORVASC) 10 mg tablet Take 1 tablet (10 mg total) by mouth daily 90 tablet 1    ammonium lactate (LAC-HYDRIN) 12 % cream Apply topically      ammonium lactate (LAC-HYDRIN) 12 % cream       apixaban (ELIQUIS) 5 mg Take 1 tablet (5 mg total) by mouth 2 (two) times a day 270 tablet 1    ascorbic acid (VITAMIN C) 250 mg tablet TAKE 2 TABLETS (500 MG TOTAL) BY MOUTH DAILY 180 tablet 1    atorvastatin (LIPITOR) 20 mg tablet Take 1 tablet (20 mg total) by mouth daily at bedtime 90 tablet 1    B Complex-C-Folic Acid (Super B-Complex/Vit C/FA) TABS TAKE 1 TABLET BY MOUTH EVERY DAY AS DIRECTED 30 tablet 8    baclofen 20 mg tablet Take 20 mg by mouth 2 (two) times a day        SUSAN MICROLET LANCETS lancets Use as instructed to check blood sugar 4 times a day  Dx e10 29 200 each 5    benzonatate (TESSALON PERLES) 100 mg capsule TAKE 1 CAPSULE BY MOUTH THREE TIMES A DAY AS NEEDED FOR COUGH 30 capsule 0    Blood Glucose Monitoring Suppl (Bare Snacks CONTOUR NEXT USB MONITOR) w/Device KIT Testing 4 times a day 1 kit 0    calcitriol (ROCALTROL) 0 5 MCG capsule Take 2 mcg by mouth      Capsaicin 0 033 % CREA 5 times a day for 1st week  Then 3 times a day for next 3 weeks 56 6 g 2    carvedilol (COREG) 25 mg tablet Take 1 tablet (25 mg total) by mouth 2 (two) times a day 180 tablet 1    carvedilol (COREG) 6 25 mg tablet Take 6 25 mg by mouth      cetirizine (ZyrTEC) 10 mg tablet Take 1 tablet (10 mg total) by mouth daily 90 tablet 1    cetirizine (ZyrTEC) 5 MG tablet TAKE 1 TABLET BY MOUTH EVERY DAY 90 tablet 1    Cholecalciferol (VITAMIN D-3) 1000 units CAPS Take 2 capsules by mouth daily 30 capsule 0    clindamycin (CLINDAGEL) 1 % gel APPLY TO AFFECTED AREA TWICE A DAY 60 g 2    [facility] ACETAMINOPHEN EX  MG tablet TAKE 2 TABLETS BY MOUTH EVERY 8 HOURS AS NEEDED FOR MILD OR MODERATE PAIN (PAIN SCORE 1 6)        cyanocobalamin (CVS Vitamin B-12) 1000 MCG tablet Take 1 tablet (1,000 mcg total) by mouth daily 30 tablet 5    cyclobenzaprine (FLEXERIL) 10 mg tablet Take 10 mg by mouth 3 (three) times a day as needed  0    cyclobenzaprine (FLEXERIL) 5 mg tablet       dextrose 50 % 50 mL      dicyclomine (BENTYL) 10 mg capsule TAKE 1 CAPSULE (10 MG TOTAL) BY MOUTH 3 (THREE) TIMES A DAY BEFORE MEALS 270 capsule 1    Disposable Gloves MISC Use 7 times a day, 4 boxes of gloves XL  Dx type 1 DM, paraplegia 4 each 11    doxazosin (CARDURA) 2 mg tablet Take 1 tablet (2 mg total) by mouth every evening 90 tablet 1    epoetin kaushik (EPOGEN,PROCRIT) 20,000 units/mL Inject 10,000 Units under the skin      epoetin kaushik (EPOGEN,PROCRIT) 20,000 units/mL Inject 5,000 Units under the skin      ergocalciferol (VITAMIN D2) 50,000 units TAKE 1 CAPSULE BY MOUTH ONE TIME PER WEEK      ferrous sulfate 325 (65 Fe) mg tablet Take 1 tablet (325 mg total) by mouth 3 (three) times a day 90 tablet 3    glucose 4-6 GM-MG Chew 2 tablets daily as needed for low blood sugar 10 tablet 1    glucose blood (SUSAN CONTOUR TEST) test strip Use as instructed to test blood sugar 4 times a day  Dx e10 29 200 each 3    Gvoke PFS 1 MG/0 2ML SOSY INJECT 1 ML (1 MG TOTAL) INTO A MUSCLE ONCE FOR 1 DOSE      HYDROmorphone (DILAUDID) 4 mg tablet TAKE 1 TABLET BY MOUTH EVERY 4 HOURS AS NEEDED FOR MODERATE PAIN (PAIN SCORE 4 6)  MAX  24 MG/DAY      Incontinence Supply Disposable (Incontinence Brief Large) MISC Use 6 (six) times a day Size xl 180 each 11    Incontinence Supply Disposable (Undergarment) MISC Use 6 a day, 5 boxes, xl  Dx R51, paraplegia 5 each 11    Incontinence Supply Disposable (Underpads) MISC Use 2 a day 5 each 11    insulin detemir (LEVEMIR) 100 units/mL subcutaneous injection Inject 6 5 Units under the skin 2 (two) times a day       ketoconazole (NIZORAL) 2 % cream APPLY SMALL AMOUNT TO EDGES OF MOUTH TWICE DAILY  DO NOT SWALLOW 15 g 1    Ketostix strip USE WITH HYPERGLYCEMIA E10 65 **NOT COVERED**      LEVEMIR FLEXTOUCH 100 units/mL injection pen Inject 14 Units under the skin 2 (two) times a day 15 pen 3    levETIRAcetam (KEPPRA) 250 mg tablet TAKE 1 TABLET (250 MG TOTAL) BY MOUTH 3 (THREE) TIMES A WEEK (MWF) AT 1200        lidocaine (XYLOCAINE) 5 % ointment Apply topically as needed for mild pain 35 44 g 0    Lidocaine HCl 4 % LIQD Apply 1 application topically 3 (three) times a day as needed (pain) 73 mL 5    Lokelma 10 g PACK       losartan (COZAAR) 100 MG tablet Take 1 tablet (100 mg total) by mouth daily 90 tablet 1    Melatonin ER 3 MG TBCR Take 6 mg by mouth      Methoxy PEG-Epoetin Beta (MIRCERA IJ) 75 mcg Once every 2 weeks      Methoxy PEG-Epoetin Beta (MIRCERA IJ) 100 mcg Once every 2 weeks      Methoxy PEG-Epoetin Beta (MIRCERA IJ) 150 mcg Once every 2 weeks      Misc  Devices (Wheelchair Cushion) MISC Use daily 1 each 0    Misc  Devices [facility]) MISC by Does not apply route daily Wheelchair ramp, dx g82 20 1 each 0    Multiple Vitamin (Daily-Enriqueta Multivitamin) TABS TAKE 1 TABLET BY MOUTH EVERY DAY 90 tablet 2    Narcan 4 MG/0 1ML nasal spray       NOVOLOG 100 UNIT/ML injection Inject 5 Units under the skin 3 (three) times a day with meals       NovoLOG FlexPen 100 units/mL injection pen INJECT 3 UNITS UNDER THE SKIN 3 (THREE) TIMES A DAY BEFORE MEALS        nystatin (MYCOSTATIN) 500,000 units/5 mL suspension APPLY 5 ML (500,000 UNITS TOTAL) TO THE MOUTH OR THROAT 4 (FOUR) TIMES A  mL 1    ondansetron (ZOFRAN-ODT) 4 mg disintegrating tablet Take 2 tablets (8 mg total) by mouth every 8 (eight) hours as needed for nausea or vomiting 30 tablet 1    oxybutynin (DITROPAN) 5 mg tablet Take 3 tablets (15 mg total) by mouth daily 270 tablet 1    pantoprazole (PROTONIX) 40 mg tablet Take 1 tablet (40 mg total) by mouth daily 30 tablet 1    phenytoin extended (DILANTIN) 200 MG ER capsule Take 1 capsule (200 mg total) by mouth 2 (two) times a day 180 capsule 1    phenytoin extended (DILANTIN) 200 MG ER capsule TAKE 1 CAPSULE BY MOUTH TWICE A  capsule 0    phenytoin extended (DILANTIN) 300 MG ER capsule TAKE 1 CAPSULE BY MOUTH EVERY DAY 90 capsule 1    phenytoin extended (DILANTIN) 300 MG ER capsule Take 1 capsule (300 mg total) by mouth daily 90 capsule 1    risperiDONE (RisperDAL) 0 5 mg tablet TAKE 1 TABLET (0 5 MG TOTAL) BY MOUTH 2 (TWO) TIMES A  tablet 1    senna (SENOKOT) 8 6 mg Take 1 tablet (8 6 mg total) by mouth 2 (two) times a day 180 tablet 1    sertraline (ZOLOFT) 25 mg tablet Take 0 5 tablets (12 5 mg total) by mouth daily 30 tablet 0    sodium hypochlorite (DAKIN'S HALF-STRENGTH) external solution USE AS DIRECTED ONCE  DAILY 473 mL 2    Sodium Zirconium Cyclosilicate 10 g PACK Take 10 g by mouth daily      sucralfate (CARAFATE) 1 g/10 mL suspension Take 10 mL (1 g total) by mouth 4 (four) times a day (with meals and at bedtime) 420 mL 0    SUPER B COMPLEX & C TABS TAKE 1 CAPSULE BY MOUTH ONCE FOR 1 DOSE AS DIRECTED 90 tablet 2    tiZANidine (ZANAFLEX) 4 mg tablet Take 1 tablet by mouth 4 (four) times a day      tiZANidine (ZANAFLEX) 4 mg tablet       triamcinolone (KENALOG) 0 1 % ointment PLEASE SEE ATTACHED FOR DETAILED DIRECTIONS 454 g 0    Zinc Sulfate 220 (50 Zn) MG TABS Take 1 tablet by mouth daily 90 tablet 1     No current facility-administered medications for this visit  Allergies   Allergen Reactions    Cefepime      Other reaction(s): Other (See Comments)  encephalopathy; tolerates cefazolin 6/14, miguel Ceftriaxone    Ciprofloxacin Hcl     Cymbalta [Duloxetine Hcl]     Gabapentin Tremor    Lac Bovis GI Intolerance    Lactose - Food Allergy GI Intolerance     Other reaction(s): Unknown    Lyrica [Pregabalin]     Penicillins Other (See Comments)     miguel Zosyn 08/28/17  Tolerates Ancef  Miguel Augmentin    Polymyxin B     Rosuvastatin Other (See Comments) and Palpitations     Weakness       Review of Systems   Constitutional: Negative for chills, fatigue and fever  Eyes: Negative for visual disturbance  Respiratory: Negative for shortness of breath and wheezing  Cardiovascular: Negative for chest pain  Gastrointestinal: Positive for abdominal pain  Negative for abdominal distention, blood in stool, constipation, diarrhea, nausea and vomiting  Genitourinary: Negative for dysuria and frequency  Neurological: Negative for seizures, syncope and light-headedness  Physical Exam  NAD  Speaking in full sentences    There were no vitals filed for this visit        I spent 20 minutes directly with the patient during this visit    [facility] Wesly Montiel  verbally agrees to participate in Virtual Care Services  Pt is aware that [employer] could be limited without vital signs or the ability to perform a full hands-on physical exam  Ovidio A Onnie Bosworth  understands he or the provider may request at any time to terminate the video visit and request the patient to seek care or treatment in person

## 2021-09-02 NOTE — PATIENT INSTRUCTIONS
Gastritis   WHAT YOU NEED TO KNOW:   Gastritis is inflammation or irritation of the lining of your stomach  DISCHARGE INSTRUCTIONS:   Call 911 for any of the following:   · You develop chest pain or shortness of breath  Return to the emergency department if:   · You vomit blood  · You have black or bloody bowel movements  · You have severe stomach or back pain  Contact your healthcare provider if:   · You have a fever  · You have new or worsening symptoms, even after treatment  · You have questions or concerns about your condition or care  Medicines:   · Medicines  may be given to help treat a bacterial infection or decrease stomach acid  · Take your medicine as directed  Contact your healthcare provider if you think your medicine is not helping or if you have side effects  Tell him or her if you are allergic to any medicine  Keep a list of the medicines, vitamins, and herbs you take  Include the amounts, and when and why you take them  Bring the list or the pill bottles to follow-up visits  Carry your medicine list with you in case of an emergency  Manage or prevent gastritis:   · Do not smoke  Nicotine and other chemicals in cigarettes and cigars can make your symptoms worse and cause lung damage  Ask your healthcare provider for information if you currently smoke and need help to quit  E-cigarettes or smokeless tobacco still contain nicotine  Talk to your healthcare provider before you use these products  · Do not drink alcohol  Alcohol can prevent healing and make your gastritis worse  Talk to your healthcare provider if you need help to stop drinking  · Do not take NSAIDs or aspirin unless directed  These and similar medicines can cause irritation  If your healthcare provider says it is okay to take NSAIDs, take them with food  · Do not eat foods that cause irritation  Foods such as oranges and salsa can cause burning or pain  Eat a variety of healthy foods  Examples include fruits (not citrus), vegetables, low-fat dairy products, beans, whole-grain breads, and lean meats and fish  Try to eat small meals, and drink water with your meals  Do not eat for at least 3 hours before you go to bed  · Find ways to relax and decrease stress  Stress can increase stomach acid and make gastritis worse  Activities such as yoga, meditation, or listening to music can help you relax  Spend time with friends, or do things you enjoy  Follow up with your healthcare provider as directed: You may need ongoing tests or treatment, or referral to a gastroenterologist  Write down your questions so you remember to ask them during your visits  © Copyright [address] 2021 Information is for End User's use only and may not be sold, redistributed or otherwise used for commercial purposes  All illustrations and images included in CareNotes® are the copyrighted property of [employer]  or [facility] Mark Bergman   The above information is an  only  It is not intended as medical advice for individual conditions or treatments  Talk to your doctor, nurse or pharmacist before following any medical regimen to see if it is safe and effective for you

## 2021-09-03 DIAGNOSIS — K58.9 IRRITABLE BOWEL SYNDROME, UNSPECIFIED TYPE: ICD-10-CM

## 2021-09-07 ENCOUNTER — TELEPHONE (OUTPATIENT)
Dept: FAMILY MEDICINE CLINIC | Facility: CLINIC | Age: 41
End: 2021-09-07

## 2021-09-07 RX ORDER — DICYCLOMINE HYDROCHLORIDE 10 MG/1
10 CAPSULE ORAL
Qty: 270 CAPSULE | Refills: 1 | Status: SHIPPED | OUTPATIENT
Start: 2021-09-07 | End: 2022-02-28

## 2021-09-07 NOTE — TELEPHONE ENCOUNTER
[facility]  FORM RECEIVED VIA FAX  WILL BE PLACED IN FORM BIN FOR MA PICKUP        Order#: [id number]

## 2021-09-07 NOTE — TELEPHONE ENCOUNTER
Sent [employer]  [facility] does not have the item requested so it will take me time to find a place that does

## 2021-09-08 NOTE — TELEPHONE ENCOUNTER
Received VM on nurse line stating they are still waiting on the POC for this pt confirmed with them fax number and refaxed to number [phone] confirmation received

## 2021-09-08 NOTE — TELEPHONE ENCOUNTER
Form completed by provider on 09/08/21    Faxed to [facility]  Confirmation received yes    Completed form placed in pod   folder

## 2021-09-09 NOTE — TELEPHONE ENCOUNTER
Form completed by provider on 09/09/21    Faxed to [employer]   Confirmation received yes    Completed form placed in pod   folder

## 2021-09-12 DIAGNOSIS — F31.9 BIPOLAR DEPRESSION (HCC): ICD-10-CM

## 2021-09-14 RX ORDER — RISPERIDONE 0.5 MG/1
0.5 TABLET, FILM COATED ORAL 2 TIMES DAILY
Qty: 180 TABLET | Refills: 1 | Status: SHIPPED | OUTPATIENT
Start: 2021-09-14 | End: 2022-02-17 | Stop reason: SDUPTHER

## 2021-09-15 ENCOUNTER — TELEPHONE (OUTPATIENT)
Dept: FAMILY MEDICINE CLINIC | Facility: CLINIC | Age: 41
End: 2021-09-15

## 2021-09-17 ENCOUNTER — TELEPHONE (OUTPATIENT)
Dept: FAMILY MEDICINE CLINIC | Facility: CLINIC | Age: 41
End: 2021-09-17

## 2021-09-17 DIAGNOSIS — I10 HTN (HYPERTENSION), BENIGN: ICD-10-CM

## 2021-09-20 ENCOUNTER — TELEPHONE (OUTPATIENT)
Dept: FAMILY MEDICINE CLINIC | Facility: CLINIC | Age: 41
End: 2021-09-20

## 2021-09-20 RX ORDER — AMLODIPINE BESYLATE 10 MG/1
10 TABLET ORAL DAILY
Qty: 90 TABLET | Refills: 1 | Status: SHIPPED | OUTPATIENT
Start: 2021-09-20 | End: 2022-02-17 | Stop reason: SDUPTHER

## 2021-09-20 NOTE — TELEPHONE ENCOUNTER
alexandru from [facility] called requesting to speak to a coordinator as she has been waiting for forms for several weeks now with no resolution, she states she faxed over several times and has received it back yet, I forwarded messaged to  and yovany with a contact number for alexandru [phone]

## 2021-09-21 NOTE — TELEPHONE ENCOUNTER
I was able to confirm with alexandru the pt is receiving services from both them [facility] so I have asked MA to prioritize getting this signed today since it's POC from August

## 2021-09-21 NOTE — TELEPHONE ENCOUNTER
Form completed by provider on 09/21/21    Faxed to [facility]    Confirmation received yes    Completed form placed in pod   folder

## 2021-09-22 DIAGNOSIS — F31.9 BIPOLAR DEPRESSION (HCC): ICD-10-CM

## 2021-09-22 RX ORDER — SERTRALINE HYDROCHLORIDE 25 MG/1
12.5 TABLET, FILM COATED ORAL DAILY
Qty: 45 TABLET | Refills: 0 | Status: SHIPPED | OUTPATIENT
Start: 2021-09-22 | End: 2021-11-15

## 2021-09-24 ENCOUNTER — PATIENT MESSAGE (OUTPATIENT)
Dept: DERMATOLOGY | Facility: CLINIC | Age: 41
End: 2021-09-24

## 2021-09-27 DIAGNOSIS — I48.91 ATRIAL FIBRILLATION, UNSPECIFIED TYPE (HCC): Chronic | ICD-10-CM

## 2021-09-27 NOTE — TELEPHONE ENCOUNTER
Pt is requesting medication refill for     apixaban (ELIQUIS) 5 mg [de-identified]      Please send to pharmacy on file

## 2021-09-28 NOTE — TELEPHONE ENCOUNTER
SIGNATURES NEEDED FOR [facility] FORM RECEIVED VIA FAX  WILL BE PLACED IN FORM BIN FOR [facility]  Full Thickness Lip Wedge Repair (Flap) Text: Given the location of the defect and the proximity to free margins a full thickness wedge repair was deemed most appropriate.  Using a sterile surgical marker, the appropriate repair was drawn incorporating the defect and placing the expected incisions perpendicular to the vermillion border.  The vermillion border was also meticulously outlined to ensure appropriate reapproximation during the repair.  The area thus outlined was incised through and through with a #15 scalpel blade.  The muscularis and dermis were reaproximated with deep sutures following hemostasis. Care was taken to realign the vermillion border before proceeding with the superficial closure.  Once the vermillion was realigned the superfical and mucosal closure was finished.

## 2021-09-30 ENCOUNTER — OFFICE VISIT (OUTPATIENT)
Dept: WOUND CARE | Facility: HOSPITAL | Age: 41
End: 2021-09-30
Payer: MEDICARE

## 2021-09-30 VITALS
SYSTOLIC BLOOD PRESSURE: 140 MMHG | DIASTOLIC BLOOD PRESSURE: 92 MMHG | HEART RATE: 80 BPM | RESPIRATION RATE: 18 BRPM | TEMPERATURE: 98.9 F

## 2021-09-30 DIAGNOSIS — Z99.2 TYPE 1 DIABETES MELLITUS WITH CHRONIC KIDNEY DISEASE ON CHRONIC DIALYSIS (HCC): ICD-10-CM

## 2021-09-30 DIAGNOSIS — G82.20 PARAPLEGIA (HCC): Chronic | ICD-10-CM

## 2021-09-30 DIAGNOSIS — N32.81 OAB (OVERACTIVE BLADDER): ICD-10-CM

## 2021-09-30 DIAGNOSIS — L89.894 PRESSURE ULCER OF OTHER SITE, STAGE 4 (HCC): ICD-10-CM

## 2021-09-30 DIAGNOSIS — L89.224 PRESSURE ULCER OF LEFT HIP, STAGE 4 (HCC): ICD-10-CM

## 2021-09-30 DIAGNOSIS — L89.154 PRESSURE INJURY OF SACRAL REGION, STAGE 4 (HCC): Primary | ICD-10-CM

## 2021-09-30 DIAGNOSIS — L98.429 ULCER OF SACRAL REGION, STAGE 4 (HCC): ICD-10-CM

## 2021-09-30 DIAGNOSIS — E10.22 TYPE 1 DIABETES MELLITUS WITH CHRONIC KIDNEY DISEASE ON CHRONIC DIALYSIS (HCC): ICD-10-CM

## 2021-09-30 DIAGNOSIS — N18.6 TYPE 1 DIABETES MELLITUS WITH CHRONIC KIDNEY DISEASE ON CHRONIC DIALYSIS (HCC): ICD-10-CM

## 2021-09-30 DIAGNOSIS — L89.324 PRESSURE INJURY OF LEFT ISCHIUM, STAGE 4 (HCC): ICD-10-CM

## 2021-09-30 PROCEDURE — 99213 OFFICE O/P EST LOW 20 MIN: CPT | Performed by: FAMILY MEDICINE

## 2021-09-30 PROCEDURE — 99214 OFFICE O/P EST MOD 30 MIN: CPT | Performed by: FAMILY MEDICINE

## 2021-09-30 RX ORDER — LIDOCAINE HYDROCHLORIDE 40 MG/ML
5 SOLUTION TOPICAL ONCE
Status: COMPLETED | OUTPATIENT
Start: 2021-09-30 | End: 2021-09-30

## 2021-09-30 RX ADMIN — LIDOCAINE HYDROCHLORIDE 5 ML: 40 SOLUTION TOPICAL at 14:12

## 2021-09-30 NOTE — PATIENT INSTRUCTIONS
Orders Placed This Encounter   Procedures    Wound cleansing and dressings     Wound cleansing and dressings    All wounds:  Wash your hands with soap and water  Remove old dressing, discard into plastic bag and place in trash  Cleanse the wound with Dakin's solution (rinse) prior to applying a clean dressing  Do not use tissue or cotton balls  Do not scrub the wound  Pat dry using gauze  Shower no; do not get dressing wet     Apply normal saline soaked gauze packing to wounds (moist with normal saline, and squeezed out so it is damp)  Cover with an ABD pad  Secure with Medfix tape  Change dressing daily and PRN for breakthrough drainage (visiting nurses to do twice per week and family in between)  This was done at today's dressing change in the wound center       Continue visiting nurses twice per week for dressing changes, family to do in between nurse visits     Follow up in 6 weeks                                Wound off loading  Continue clinitron bed at home and turn at least every 1-2 hours  To try and limit the amount of time spent sitting in the wheelchair  Keep pressure off the wounds as much as humanly possible                            Continue to try and increase your protein to at least 3 servings or more if possible                                  Continue to try and keep blood sugars as low as possible  Stop Smoking     Script given for commode     Contact Primary Care Provider about getting a nutrition consult to increase albumin levels     Standing Status:   Future     Standing Expiration Date:   9/30/2022

## 2021-09-30 NOTE — PROGRESS NOTES
Patient ID: Kai Kawasaki  is a 39 y o  male Date of Birth 1980       Chief Complaint   Patient presents with    Follow Up Wound Care Visit     sacral wounds       Allergies:  Cefepime, Ciprofloxacin hcl, Cymbalta [duloxetine hcl], Gabapentin, Lac bovis, Lactose - food allergy, Lyrica [pregabalin], Penicillins, Polymyxin b, and Rosuvastatin    Diagnosis:   Diagnosis ICD-10-CM Associated Orders   1  Pressure injury of sacral region, stage 4 ([facility])  L89 154 lidocaine (XYLOCAINE) 4 % topical solution 5 mL     Wound cleansing and dressings   2  Pressure injury of left ischium, stage 4 ([facility])  L89 324 lidocaine (XYLOCAINE) 4 % topical solution 5 mL     Wound cleansing and dressings   3  Pressure ulcer of other site, stage 4 ([facility])  L89 894 lidocaine (XYLOCAINE) 4 % topical solution 5 mL     Wound cleansing and dressings   4  Pressure ulcer of left hip, stage 4 ([facility])  L89 224 lidocaine (XYLOCAINE) 4 % topical solution 5 mL     Wound cleansing and dressings        Assessment  & Plan:   Stable multiple pressure injuries as noted above  No signs of infection  Sacrum is slightly improved   See orders  Continue with Dakin's cleansing followed by wet-to-dry saline packing   Palliative care  Recheck six weeks   Patient has not had a nutritionist consult  We have been asking for this and have asked that he increase his protein  Subjective:   10/22/20:  Followup multiple pressure injuries to the buttocks and sacrum  He was hospitalized for back problems recently  Continues on hemodialysis  The patient states that his albumin has improved so that he may be able to get flap surgery  However when checking his most recent albumin was only 1 8  His total protein is elevated  11/19/20:  Followup multiple stage IV pressure or injuries to the buttocks and sacrum  Continues on hemodialysis  He is scheduled for plastic surgery for cyst on his forehead    Plastic surgery still will not do any flaps to his sacrum or buttocks because his albumin remains low  He has no other complaints  No fever, chills or cough  12/31/20: Followup multiple stage IV pressure injuries of the buttocks and sacrum  She notes some increased drainage and slight more odor  Never had his plastic surgery appointment for the cyst on his forehead  Denies any fever or chills  1/28/21:  Followup multiple stage IV pressure injuries of the buttocks, sacrum and perineum  Unfortunately, the patient was hospitalized for sepsis and back pain  Was found to have a fracture L3  He was given a back brace but only wears at home  He states that really does not give him very much relief  Unfortunately also while in the hospital, he developed a new pressure injury of his left hip  2/25/21:  Followup multiple stage IV pressure ulcers of the buttocks sacrum and perineum  Patient was hospitalized earlier this month for possible sepsis  He was found to have a burst fracture or osteo of L3  IR obtained specimen and was culture negative  However, no bone was obtained  He is scheduled to have another IR intervention March 16th for bone biopsy  Left hip ulcer broke down with large cavity  Otherwise, cultures were negative  He was discharged on no medications  He has not had any recent fever or chills  He did have fever when he was admitted to the hospital     03/21/2021  Mr Shira Matias is a 42-year-old gentleman with paraplegia and multiple stage IV pressure ulcers was referred for evaluation  He has large chronic ulcers on his buttocks and sacrum but he developed a new ulcer over last month on his left hip  He is scheduled for IR biopsy in his spine for possible chronic osteo  He also recently underwent dental extractions and cyst removed from his head and neck  4/22/21:  Followup multiple stage IV pressure injuries of the buttocks, sacrum, perineum and left hip  He was last seen by Dr Lizette Fitch in March    Since then, unfortunately he was admitted to the hospital with pneumonia  He is now doing better  He is changing his dressings daily because of drainage  There has been no increased drainage, no odor and he denies any fever or chills  He has no cough  5/27/21:  Patient returns regarding his his multiple stage IV pressure injuries of the buttocks, sacrum, perineum and left hip  Unfortunately, he was hospitalized for a few days in April regarding FUO with negative cultures  He was "treated empirically with vancomycin and cefepime - improved with no positive cultures and CT scan finding consistent with known decubitus ulcerations and chronic pelvic osteomyelitis "  He is back to baseline  Patient states that he does notice more odor  7/8/21: Followup multiple stage IV pressure injuries of the buttocks, sacrum, perineum and left hip  He continues with Dakin's packing  He had multiple excisions on his face of follicular cysts by plastic surgery  He still cannot have surgery of his pressure injuries due to chronically low albumin  He has not yet contacted his PCP for nutritionist consultation  8/19/21: Followup multiple stage IV pressure injuries of the buttocks, sacrum, perineum and left  He was packing with Dakin's  Nothing else new  He still has not had a nutrition is consultation  9/30/21: Followup multiple stage IV pressure injuries of the buttocks, sacrum, perineum and left hip  He continues with cleansing Dakin's soaks followed by wet-to-dry saline packing  Nothing is new  No pain  No fever or chills          The following portions of the patient's history were reviewed and updated as appropriate:   Patient Active Problem List   Diagnosis    [facility]aplegia [facility]    Atrial fibrillation ([facility]    Chronic suprapubic catheter ([facility]    Colostomy care ([facility]    OAB (overactive bladder)    S/P unilateral BKA (below knee amputation) ([facility] )    Sebaceous cyst    Tobacco abuse    Type 1 diabetes mellitus with chronic kidney disease on chronic dialysis [facility]    Ulcer of sacral region, stage 4 ([facility]    Anemia    Chronic indwelling Ortega catheter    Wound healing, delayed    Iron deficiency anemia    Pressure injury of left ischium, stage 4 ([facility]    HTN (hypertension), benign    Neurogenic bladder    GERD (gastroesophageal reflux disease)    Chronic pain    Stage 5 chronic kidney disease on chronic dialysis ([facility])    SOB (shortness of breath)    Penile abscess    Asymptomatic bacteriuria    History of Clostridium difficile infection    Urinary retention    Delirium    Dialysis patient ([place] [address] )    Insulin long-term use ([place] [address] )    Wheelchair dependent    Recurrent Clostridioides difficile diarrhea    Bipolar depression ([place] [address] )    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[place] [address] )    Seizure ([place] [address] )    Pressure ulcer of sacral region, stage 4 ([facility]    AVM (arteriovenous malformation) of duodenum, acquired    Chronic narcotic dependence ([facility]    Chronic diastolic heart failure ([facility])    Dental caries    Nervous    Pressure ulcer of other site, stage 4 (HCC)    Pressure ulcer of left hip, stage 4 ([facility]    Localized swelling of left foot    ED (erectile dysfunction) of organic origin    Irritable bowel syndrome    Onychomycosis    Pustular folliculitis    Prolonged Q-T interval on ECG    Secondary hyperparathyroidism of renal origin ([place] [address] )    Abdominal pain     Past Medical History:   Diagnosis Date    Ambulatory dysfunction     Anemia     Anemia, iron deficiency     transfusion requiring    Atrial fibrillation ([place] [address] )     AVM (arteriovenous malformation) of duodenum, 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    Bacteriuria, asymptomatic     Bipolar disorder ([facility]     Chronic deep vein thrombosis (DVT) ([facility])     Chronic indwelling Ortega catheter     Chronic kidney disease     Chronic pain     Chronic pain disorder     Chronic suprapubic catheter ([place] [address] )     Clostridium difficile infection 08/11/2016    also positive 9/2016, 5/29/2017, 8/15/2017  S/P fecal transplant    [facility]ostomy on examination ([facility] )     Decubitus ulcer     Delirium     Diabetes mellitus ([facility] )     GERD (gastroesophageal reflux disease)     Hemodialysis patient ([facility] )     Hypertension     Memory impairment 2011    s/p diabetic coma    Neurogenic bladder     OAB (overactive bladder)     Paraplegia (HCC)     T3 transverse myelitis vs spinal stoke ([facility]); 2012 extensive epidural abscess C7=> conus 2nd extension from deep parspinal abscess L4-S2/sacral decubitus; cord atrophy/myelomalcia T3=>conus     Penile abscess     S/P unilateral BKA (below knee amputation) ([facility] )     Right    Sebaceous cyst removed in 2017    Seizures (HCC)     Shortness of breath     Tobacco abuse     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HCC)     Urinary retention     Wheelchair dependent     Wounds, multiple     pressure ulcers with delayed healing     Past Surgical History:   Procedure Laterality Date    BELOW KNEE LEG AMPUTATION Right 2009    COLONOSCOPY N/A 3/27/2017    Procedure: COLONOSCOPY;  Surgeon: Porfirio Caceres MD;  Location: [facility]; Service:     COLONOSCOPY N/A 3/29/2017    Procedure: COLONOSCOPY;  Surgeon: Porfirio Caceres MD;  [facility]: [facility]; Service:     COLONOSCOPY N/A 6/15/2017    Procedure: COLONOSCOPY with FMT;  Surgeon: Marco A Wallace MD;  Location: [facility]; Service: Gastroenterology    ESOPHAGOGASTRODUODENOSCOPY N/A 3/22/2017    Procedure: ESOPHAGOGASTRODUODENOSCOPY (EGD); Surgeon: Jacquelyn Craft DO;  Location: [facility];   Service:     MULTIPLE TOOTH EXTRACTIONS N/A 3/8/2021    Procedure: EXTRACTION TEETH # 2,3,6,10,12,13,14,18,19,20,21,22,23,24,25,26,27,28,29,30;  Surgeon: Alessandra Banegas DMD;  Location: [facility];  Service: Maxillofacial     Family History   Problem Relation Age of Onset    Hyperlipidemia Mother     Hypertension Mother     Leukemia Brother     Diabetes Paternal Grandfather      Social History     Socioeconomic History    Marital status: Single     Spouse name: Not on file    Number of children: Not on file    Years of education: Not on file    Highest education level: Not on file   Occupational History    Not on file   Tobacco Use    Smoking status: Current Every Day Smoker     Types: Cigars    Smokeless tobacco: Never Used    Tobacco comment: about 2 cigars/day   Substance and Sexual Activity    Alcohol use: Not Currently     Comment: 1 cup of wine every 3-4 months    Drug use: Not Currently     Types: Marijuana     Comment: rarely; once every 1-2 years    Sexual activity: Not on file   Other Topics Concern    Not on file   Social History Narrative    Not on file     Social Determinants of Health     Financial Resource Strain:     Difficulty of Paying Living Expenses:    Food Insecurity:     Worried About Running Out of Food in the Last Year:     Ran Out of Food in the Last Year:    Transportation Needs:     Lack of Transportation (Medical):      Lack of Transportation (Non-Medical):    Physical Activity:     Days of Exercise per Week:     Minutes of Exercise per Session:    Stress:     Feeling of Stress :    Social Connections:     Frequency of Communication with Friends and Family:     Frequency of Social Gatherings with Friends and Family:     Attends Congregational Services:     Active Member of Clubs or Organizations:     Attends Club or Organization Meetings:     Marital Status:    Intimate Partner Violence:     Fear of Current or Ex-Partner:     Emotionally Abused:     Physically Abused:     Sexually Abused:        Current Outpatient Medications:     Alcohol Swabs (ALCOHOL PADS) 70 % PADS, by Does not apply route 5 (five) times a day, Disp: 300 each, Rfl: 5    amLODIPine (NORVASC) 10 mg tablet, Take 1 tablet (10 mg total) by mouth daily, Disp: 90 tablet, Rfl: 1    ammonium lactate (LAC-HYDRIN) 12 % cream, Apply topically, Disp: , Rfl:     ammonium lactate (LAC-HYDRIN) 12 % cream, , Disp: , Rfl:     apixaban (ELIQUIS) 5 mg, Take 1 tablet (5 mg total) by mouth 2 (two) times a day, Disp: 270 tablet, Rfl: 1    ascorbic acid (VITAMIN C) 250 mg tablet, TAKE 2 TABLETS (500 MG TOTAL) BY MOUTH DAILY, Disp: 180 tablet, Rfl: 1    atorvastatin (LIPITOR) 20 mg tablet, Take 1 tablet (20 mg total) by mouth daily at bedtime, Disp: 90 tablet, Rfl: 1    B Complex-C-Folic Acid (Super B-Complex/Vit C/FA) TABS, TAKE 1 TABLET BY MOUTH EVERY DAY AS DIRECTED, Disp: 30 tablet, Rfl: 8    baclofen 20 mg tablet, Take 20 mg by mouth 2 (two) times a day , Disp: , Rfl:     [employer] MICROLET LANCETS lancets, Use as instructed to check blood sugar 4 times a day Dx e10 29, Disp: 200 each, Rfl: 5    benzonatate (TESSALON PERLES) 100 mg capsule, TAKE 1 CAPSULE BY MOUTH THREE TIMES A DAY AS NEEDED FOR COUGH, Disp: 30 capsule, Rfl: 0    Blood Glucose Monitoring Suppl ([employer] CONTOUR NEXT USB MONITOR) w/Device KIT, Testing 4 times a day, Disp: 1 kit, Rfl: 0    calcitriol (ROCALTROL) 0 5 MCG capsule, Take 2 mcg by mouth, Disp: , Rfl:     Capsaicin 0 033 % CREA, 5 times a day for 1st week  Then 3 times a day for next 3 weeks, Disp: 56 6 g, Rfl: 2    carvedilol (COREG) 25 mg tablet, Take 1 tablet (25 mg total) by mouth 2 (two) times a day, Disp: 180 tablet, Rfl: 1    carvedilol (COREG) 6 25 mg tablet, Take 6 25 mg by mouth, Disp: , Rfl:     cetirizine (ZyrTEC) 10 mg tablet, Take 1 tablet (10 mg total) by mouth daily, Disp: 90 tablet, Rfl: 1    cetirizine (ZyrTEC) 5 MG tablet, TAKE 1 TABLET BY MOUTH EVERY DAY, Disp: 90 tablet, Rfl: 1    Cholecalciferol (VITAMIN D-3) 1000 units CAPS, Take 2 capsules by mouth daily, Disp: 30 capsule, Rfl: 0    clindamycin (CLINDAGEL) 1 % gel, APPLY TO AFFECTED AREA TWICE A DAY, Disp: 60 g, Rfl: 2    [employer] ACETAMINOPHEN EX  MG tablet, TAKE 2 TABLETS BY MOUTH EVERY 8 HOURS AS NEEDED FOR MILD OR MODERATE PAIN (PAIN SCORE 1 6)  , Disp: , Rfl:     cyanocobalamin ([employer] Vitamin B-12) 1000 MCG tablet, Take 1 tablet (1,000 mcg total) by mouth daily, Disp: 30 tablet, Rfl: 5    cyclobenzaprine (FLEXERIL) 10 mg tablet, Take 10 mg by mouth 3 (three) times a day as needed, Disp: , Rfl: 0    cyclobenzaprine (FLEXERIL) 5 mg tablet, , Disp: , Rfl:     dextrose 50 %, 50 mL, Disp: , Rfl:     dicyclomine (BENTYL) 10 mg capsule, TAKE 1 CAPSULE (10 MG TOTAL) BY MOUTH 3 (THREE) TIMES A DAY BEFORE MEALS, Disp: 270 capsule, Rfl: 1    Disposable Gloves MISC, Use 7 times a day, 4 boxes of gloves XL Dx type 1 DM, paraplegia, Disp: 4 each, Rfl: 11    doxazosin (CARDURA) 2 mg tablet, Take 1 tablet (2 mg total) by mouth every evening, Disp: 90 tablet, Rfl: 1    epoetin kaushik (EPOGEN,PROCRIT) 20,000 units/mL, Inject 10,000 Units under the skin, Disp: , Rfl:     epoetin kaushik (EPOGEN,PROCRIT) 20,000 units/mL, Inject 5,000 Units under the skin, Disp: , Rfl:     ergocalciferol (VITAMIN D2) 50,000 units, TAKE 1 CAPSULE BY MOUTH ONE TIME PER WEEK, Disp: , Rfl:     ferrous sulfate 325 (65 Fe) mg tablet, Take 1 tablet (325 mg total) by mouth 3 (three) times a day, Disp: 90 tablet, Rfl: 3    glucose 4-6 GM-MG, Chew 2 tablets daily as needed for low blood sugar, Disp: 10 tablet, Rfl: 1    glucose blood (SUSAN CONTOUR TEST) test strip, Use as instructed to test blood sugar 4 times a day Dx e10 29, Disp: 200 each, Rfl: 3    Gvoke PFS 1 MG/0 2ML SOSY, INJECT 1 ML (1 MG TOTAL) INTO A MUSCLE ONCE FOR 1 DOSE, Disp: , Rfl:     HYDROmorphone (DILAUDID) 4 mg tablet, TAKE 1 TABLET BY MOUTH EVERY 4 HOURS AS NEEDED FOR MODERATE PAIN (PAIN SCORE 4 6)   MAX  24 MG/DAY, Disp: , Rfl:     Incontinence Supply Disposable (Incontinence Brief Large) MISC, Use 6 (six) times a day Size xl, Disp: 180 each, Rfl: 11    Incontinence Supply Disposable (Undergarment) MISC, Use 6 a day, 5 boxes, xl Dx R51, paraplegia, Disp: 5 each, Rfl: 11    Incontinence Supply Disposable (Underpads) MISC, Use 2 a day, Disp: 5 each, Rfl: 11    insulin detemir (LEVEMIR) 100 units/mL subcutaneous injection, Inject 6 5 Units under the skin 2 (two) times a day , Disp: , Rfl:     ketoconazole (NIZORAL) 2 % cream, APPLY SMALL AMOUNT TO EDGES OF MOUTH TWICE DAILY  DO NOT SWALLOW, Disp: 15 g, Rfl: 1    Ketostix strip, USE WITH HYPERGLYCEMIA E10 65 **NOT COVERED**, Disp: , Rfl:     LEVEMIR FLEXTOUCH 100 units/mL injection pen, Inject 14 Units under the skin 2 (two) times a day, Disp: 15 pen, Rfl: 3    levETIRAcetam (KEPPRA) 250 mg tablet, TAKE 1 TABLET (250 MG TOTAL) BY MOUTH 3 (THREE) TIMES A WEEK ([facility]) AT 1200 , Disp: , Rfl:     lidocaine (XYLOCAINE) 5 % ointment, Apply topically as needed for mild pain, Disp: 35 44 g, Rfl: 0    Lidocaine HCl 4 % LIQD, Apply 1 application topically 3 (three) times a day as needed (pain), Disp: 73 mL, Rfl: 5    Lokelma 10 g PACK, , Disp: , Rfl:     losartan (COZAAR) 100 MG tablet, Take 1 tablet (100 mg total) by mouth daily, Disp: 90 tablet, Rfl: 1    Melatonin ER 3 MG TBCR, Take 6 mg by mouth, Disp: , Rfl:     Methoxy PEG-Epoetin Beta (MIRCERA IJ), 75 mcg Once every 2 weeks, Disp: , Rfl:     Methoxy PEG-Epoetin Beta (MIRCERA IJ), 100 mcg Once every 2 weeks, Disp: , Rfl:     Methoxy PEG-Epoetin Beta (MIRCERA IJ), 150 mcg Once every 2 weeks, Disp: , Rfl:     Misc  Devices (Wheelchair Cushion) MISC, Use daily, Disp: 1 each, Rfl: 0    Misc   Devices [facility]) MISC, by Does not apply route daily Wheelchair ramp, dx g82 20, Disp: 1 each, Rfl: 0    Multiple Vitamin (Daily-Enriqueta Multivitamin) TABS, TAKE 1 TABLET BY MOUTH EVERY DAY, Disp: 90 tablet, Rfl: 2    Narcan 4 MG/0 1ML nasal spray, , Disp: , Rfl:     NOVOLOG 100 UNIT/ML injection, Inject 5 Units under the skin 3 (three) times a day with meals , Disp: , Rfl:     NovoLOG FlexPen 100 units/mL injection pen, INJECT 3 UNITS UNDER THE SKIN 3 (THREE) TIMES A DAY BEFORE MEALS , Disp: , Rfl:     nystatin (MYCOSTATIN) 500,000 units/5 mL suspension, APPLY 5 ML (500,000 UNITS TOTAL) TO THE MOUTH OR THROAT 4 (FOUR) TIMES A DAY, Disp: 473 mL, Rfl: 1    ondansetron (ZOFRAN-ODT) 4 mg disintegrating tablet, Take 2 tablets (8 mg total) by mouth every 8 (eight) hours as needed for nausea or vomiting, Disp: 30 tablet, Rfl: 1    oxybutynin (DITROPAN) 5 mg tablet, Take 3 tablets (15 mg total) by mouth daily, Disp: 270 tablet, Rfl: 1    pantoprazole (PROTONIX) 40 mg tablet, Take 1 tablet (40 mg total) by mouth daily, Disp: 30 tablet, Rfl: 1    phenytoin extended (DILANTIN) 200 MG ER capsule, Take 1 capsule (200 mg total) by mouth 2 (two) times a day, Disp: 180 capsule, Rfl: 1    phenytoin extended (DILANTIN) 200 MG ER capsule, TAKE 1 CAPSULE BY MOUTH TWICE A DAY, Disp: 180 capsule, Rfl: 0    phenytoin extended (DILANTIN) 300 MG ER capsule, TAKE 1 CAPSULE BY MOUTH EVERY DAY, Disp: 90 capsule, Rfl: 1    phenytoin extended (DILANTIN) 300 MG ER capsule, Take 1 capsule (300 mg total) by mouth daily, Disp: 90 capsule, Rfl: 1    risperiDONE (RisperDAL) 0 5 mg tablet, TAKE 1 TABLET (0 5 MG TOTAL) BY MOUTH 2 (TWO) TIMES A DAY, Disp: 180 tablet, Rfl: 1    senna (SENOKOT) 8 6 mg, Take 1 tablet (8 6 mg total) by mouth 2 (two) times a day, Disp: 180 tablet, Rfl: 1    sertraline (ZOLOFT) 25 mg tablet, Take 0 5 tablets (12 5 mg total) by mouth daily, Disp: 45 tablet, Rfl: 0    sodium hypochlorite (DAKIN'S HALF-STRENGTH) external solution, USE AS DIRECTED ONCE  DAILY, Disp: 473 mL, Rfl: 2    Sodium Zirconium Cyclosilicate 10 g PACK, Take 10 g by mouth daily, Disp: , Rfl:     sucralfate (CARAFATE) 1 g/10 mL suspension, Take 10 mL (1 g total) by mouth 4 (four) times a day (with meals and at bedtime), Disp: 420 mL, Rfl: 0    SUPER B COMPLEX & C TABS, TAKE 1 CAPSULE BY MOUTH ONCE FOR 1 DOSE AS DIRECTED, Disp: 90 tablet, Rfl: 2    tiZANidine (ZANAFLEX) 4 mg tablet, Take 1 tablet by mouth 4 (four) times a day, Disp: , Rfl:     tiZANidine (ZANAFLEX) 4 mg tablet, , Disp: , Rfl:     triamcinolone (KENALOG) 0 1 % ointment, PLEASE SEE ATTACHED FOR DETAILED DIRECTIONS, Disp: 454 g, Rfl: 0    Zinc Sulfate 220 (50 Zn) MG TABS, Take 1 tablet by mouth daily, Disp: 90 tablet, Rfl: 1  No current facility-administered medications for this visit  Review of Systems   Constitutional: Negative for activity change, appetite change (Improving), chills, fatigue, fever and unexpected weight change  HENT: Negative for congestion, hearing loss and postnasal drip  Eyes: Negative for visual disturbance  Respiratory: Negative for cough and shortness of breath  Cardiovascular: Negative for chest pain and leg swelling  Gastrointestinal: Negative for abdominal pain, constipation (Slight), diarrhea, nausea and vomiting  Genitourinary: Negative for dysuria and urgency  Indwelling Ortega catheter   Musculoskeletal: Positive for gait problem (Nonambulatory, paraplegic)  Skin: Positive for wound (Sacral, left ischial, left hip and perineum)  Negative for rash  Cyst on right  cheek   Neurological: Positive for weakness  Hematological: Does not bruise/bleed easily  Psychiatric/Behavioral: Positive for dysphoric mood  Objective:  /92   Pulse 80   Temp 98 9 °F (37 2 °C)   Resp 18   Pain Score: 0-No pain     Physical Exam  Vitals and nursing note reviewed  Constitutional:       Appearance: Normal appearance  He is underweight  HENT:      Head: Normocephalic and atraumatic  Abdominal:      General: Abdomen is flat  Comments: The abdomen around the ostomy is slightly full and tender  Skin:     Findings: Wound (Both buttocks and sacrum) present  No erythema  Comments: Sacral ulcer with less undermining  Fibrotic tissue  Left ischial ulcer also with undermining  The left hip ulcer has undermining and tunnel again at 11:00   Approximately 3 4 cm  Bone is not probed  Perineal ulcer is unchanged  All ulcers are clean, without any signs of infection  No slough and no fibrin  Overall, unchanged compared to previous  Neurological:      Mental Status: He is alert and oriented to person, place, and time  Psychiatric:         Mood and Affect: Affect normal          Cognition and Memory: Cognition normal          Wound Perineum (Active)   Wound Description Pink;Slough 09/30/21 1405   Irene-wound Assessment Scar Tissue 09/30/21 1405   Wound Length (cm) 4 2 cm 09/30/21 1405   Wound Width (cm) 4 9 cm 09/30/21 1405   Wound Depth (cm) 1 3 cm 09/30/21 1405   Wound Surface Area (cm^2) 20 58 cm^2 09/30/21 1405   Wound Volume (cm^3) 26 754 cm^3 09/30/21 1405   Calculated Wound Volume (cm^3) 26 75 cm^3 09/30/21 1405   Change in Wound Size % -154 76 09/30/21 1405   Undermining 2 1 09/30/21 1405   Undermining is depth extending from 6-10 09/30/21 1405   Drainage Amount Moderate 09/30/21 1405   Drainage Description Serosanguineous 09/30/21 1405   Non-staged Wound Description Full thickness 09/30/21 1405   Dressing Changed Changed 09/30/21 1405   Patient Tolerance Tolerated well 09/30/21 1405   Dressing Status Removed 09/30/21 1405       Wound Sacrum (Active)   Wound Description Epithelialization;Pink;Pale; Other (Comment) (rolled edges) 09/30/21 1408   Irene-wound Assessment Scar Tissue;Pink;Scaly 09/30/21 1408   Wound Length (cm) 4 2 cm 09/30/21 1408   Wound Width (cm) 9 8 cm 09/30/21 1408   Wound Depth (cm) 1 1 cm 09/30/21 1408   Wound Surface Area (cm^2) 41 16 cm^2 09/30/21 1408   Wound Volume (cm^3) 45 276 cm^3 09/30/21 1408   Calculated Wound Volume (cm^3) 45 28 cm^3 09/30/21 1408   Change in Wound Size % 39 63 09/30/21 1408   Undermining 1 9 09/30/21 1408   Undermining is depth extending from 3-5 oclock, deepest at 4 09/30/21 1408   Drainage Amount Moderate 09/30/21 1408   Drainage Description Serosanguineous 09/30/21 1408   Non-staged Wound Description Full thickness 09/30/21 1408   Dressing Changed Changed 09/30/21 1408   Patient Tolerance Tolerated well 09/30/21 1408                   Wound Instructions:  Orders Placed This Encounter Procedures    Wound cleansing and dressings     Wound cleansing and dressings    All wounds:  Wash your hands with soap and water  Remove old dressing, discard into plastic bag and place in trash  Cleanse the wound with Dakin's solution (rinse) prior to applying a clean dressing  Do not use tissue or cotton balls  Do not scrub the wound  Pat dry using gauze  Shower no; do not get dressing wet     Apply normal saline soaked gauze packing to wounds (moist with normal saline, and squeezed out so it is damp)  Cover with an ABD pad  Secure with Medfix tape  Change dressing daily and PRN for breakthrough drainage (visiting nurses to do twice per week and family in between)  This was done at today's dressing change in the wound center       Continue visiting nurses twice per week for dressing changes, family to do in between nurse visits     Follow up in 6 weeks                                Wound off loading  Continue clinitron bed at home and turn at least every 1-2 hours  To try and limit the amount of time spent sitting in the wheelchair  Keep pressure off the wounds as much as humanly possible                            Continue to try and increase your protein to at least 3 servings or more if possible                                  Continue to try and keep blood sugars as low as possible  Stop Smoking     Script given for commode  Contact Primary Care Provider about getting a nutrition consult to increase albumin levels     Standing Status:   Future     Standing Expiration Date:   9/30/2022       Melisa Valles MD, CHT, CWS       Portions of the record may have been created with voice recognition software  Occasional wrong word or "sound alike" substitutions may have occurred due to the inherent limitations of voice recognition software  Read the chart carefully and recognize, using context, where substitutions have occurred

## 2021-10-01 ENCOUNTER — TELEPHONE (OUTPATIENT)
Dept: FAMILY MEDICINE CLINIC | Facility: CLINIC | Age: 41
End: 2021-10-01

## 2021-10-01 RX ORDER — SODIUM HYPOCHLORITE 2.5 MG/ML
SOLUTION TOPICAL
Qty: 473 ML | Refills: 2 | Status: SHIPPED | OUTPATIENT
Start: 2021-10-01

## 2021-10-05 ENCOUNTER — TELEPHONE (OUTPATIENT)
Dept: FAMILY MEDICINE CLINIC | Facility: CLINIC | Age: 41
End: 2021-10-05

## 2021-10-06 ENCOUNTER — TELEPHONE (OUTPATIENT)
Dept: DERMATOLOGY | Facility: CLINIC | Age: 41
End: 2021-10-06

## 2021-10-06 ENCOUNTER — TELEPHONE (OUTPATIENT)
Dept: FAMILY MEDICINE CLINIC | Facility: CLINIC | Age: 41
End: 2021-10-06

## 2021-10-06 NOTE — TELEPHONE ENCOUNTER
----- Message from [id number] sent at 10/5/2021 12:42 PM EDT -----  Regarding: FW: FW: Non-Urgent Medical Question  Contact: [phone]  Call his pharmacy please  It is second time I sent it    ----- Message -----  From: Louis Rodriguez  Sent: 10/5/2021  11:52 AM EDT  To: Vilma Cooks Pica, PA-C  Subject: FW: FW: Non-Urgent Medical Question                ----- Message -----  From: Alex Bucio  Sent: 9/30/2021   5:56 PM EDT  To: [facility]  Subject: RE: FW: Non-Urgent Medical Question              I called the [facility] they said there waiting for it they haven't received it yet     ----- Message -----  From: [id number] ALY Hollis  Sent: 9/28/21 9:25 AM  To: Alex Bucio  Subject: RE: FW: Non-Urgent Medical Question    I think it was probably the clotrimazole lozenge  I sent It into the pharmacy  I think it would be good for you to get the covid vaccine  I would recommend moderna right now  It has been shown to last a little longer than pfiezer  I would not recommend the 1 shot ricardo and ricardo because it looks like they will be making it 2 shots soon and pfiezer/moderna work better      ----- Message -----       From:Ovidio Myers  Sent:9/25/2021  2:47 PM EDT         To:Christina Tate PA-C    Subject:Non-Urgent Medical Question    Hey I'm wanted to ask you  My mouth is starting to act up again it has a lump growing on the side again and the white stuff is forming at the corners  I was taking a medication that melted in my mouth   When is was recently in the hospital and when I came home for 10 days  It was different from the one you prescribed    I wanted to also find out or take the vacation for covid 19 the 1 with the booster or 2 shot  vacation

## 2021-10-06 NOTE — TELEPHONE ENCOUNTER
I called the [facility] I spoke with Ana Merrill the pharmacist and he states the pt  the  Medication  Clotrimazole  On 10/02/21

## 2021-10-11 ENCOUNTER — TELEPHONE (OUTPATIENT)
Dept: FAMILY MEDICINE CLINIC | Facility: CLINIC | Age: 41
End: 2021-10-11

## 2021-10-14 ENCOUNTER — TELEPHONE (OUTPATIENT)
Dept: FAMILY MEDICINE CLINIC | Facility: CLINIC | Age: 41
End: 2021-10-14

## 2021-10-18 ENCOUNTER — TELEPHONE (OUTPATIENT)
Dept: FAMILY MEDICINE CLINIC | Facility: CLINIC | Age: 41
End: 2021-10-18

## 2021-10-21 ENCOUNTER — TELEPHONE (OUTPATIENT)
Dept: FAMILY MEDICINE CLINIC | Facility: CLINIC | Age: 41
End: 2021-10-21

## 2021-10-22 DIAGNOSIS — I10 HTN (HYPERTENSION), BENIGN: ICD-10-CM

## 2021-10-23 DIAGNOSIS — R56.9 SEIZURE (HCC): ICD-10-CM

## 2021-10-23 DIAGNOSIS — B37.0 THRUSH: ICD-10-CM

## 2021-10-23 DIAGNOSIS — L70.0 ACNE VULGARIS: ICD-10-CM

## 2021-10-26 ENCOUNTER — TELEPHONE (OUTPATIENT)
Dept: FAMILY MEDICINE CLINIC | Facility: CLINIC | Age: 41
End: 2021-10-26

## 2021-10-26 RX ORDER — CLINDAMYCIN PHOSPHATE 10 MG/G
GEL TOPICAL
Qty: 60 G | Refills: 2 | Status: SHIPPED | OUTPATIENT
Start: 2021-10-26 | End: 2022-02-08

## 2021-10-26 RX ORDER — PHENYTOIN SODIUM 200 MG/1
CAPSULE, EXTENDED RELEASE ORAL
Qty: 180 CAPSULE | Refills: 0 | Status: SHIPPED | OUTPATIENT
Start: 2021-10-26

## 2021-10-26 RX ORDER — DOXAZOSIN 2 MG/1
2 TABLET ORAL EVERY EVENING
Qty: 90 TABLET | Refills: 0 | Status: SHIPPED | OUTPATIENT
Start: 2021-10-26 | End: 2022-01-17

## 2021-10-29 ENCOUNTER — TELEPHONE (OUTPATIENT)
Dept: FAMILY MEDICINE CLINIC | Facility: CLINIC | Age: 41
End: 2021-10-29

## 2021-10-30 DIAGNOSIS — I10 HTN (HYPERTENSION), BENIGN: ICD-10-CM

## 2021-11-01 RX ORDER — LOSARTAN POTASSIUM 100 MG/1
100 TABLET ORAL DAILY
Qty: 90 TABLET | Refills: 1 | Status: SHIPPED | OUTPATIENT
Start: 2021-11-01 | End: 2022-05-02

## 2021-11-04 ENCOUNTER — TELEPHONE (OUTPATIENT)
Dept: FAMILY MEDICINE CLINIC | Facility: CLINIC | Age: 41
End: 2021-11-04

## 2021-11-05 ENCOUNTER — TELEPHONE (OUTPATIENT)
Dept: FAMILY MEDICINE CLINIC | Facility: CLINIC | Age: 41
End: 2021-11-05

## 2021-11-08 ENCOUNTER — TRANSITIONAL CARE MANAGEMENT (OUTPATIENT)
Dept: FAMILY MEDICINE CLINIC | Facility: CLINIC | Age: 41
End: 2021-11-08

## 2021-11-09 ENCOUNTER — TELEPHONE (OUTPATIENT)
Dept: FAMILY MEDICINE CLINIC | Facility: CLINIC | Age: 41
End: 2021-11-09

## 2021-11-11 ENCOUNTER — OFFICE VISIT (OUTPATIENT)
Dept: WOUND CARE | Facility: HOSPITAL | Age: 41
End: 2021-11-11
Payer: MEDICARE

## 2021-11-11 ENCOUNTER — TELEPHONE (OUTPATIENT)
Dept: FAMILY MEDICINE CLINIC | Facility: CLINIC | Age: 41
End: 2021-11-11

## 2021-11-11 VITALS
RESPIRATION RATE: 16 BRPM | TEMPERATURE: 99.4 F | HEART RATE: 88 BPM | SYSTOLIC BLOOD PRESSURE: 142 MMHG | DIASTOLIC BLOOD PRESSURE: 80 MMHG

## 2021-11-11 DIAGNOSIS — L89.324 PRESSURE INJURY OF LEFT ISCHIUM, STAGE 4 (HCC): ICD-10-CM

## 2021-11-11 DIAGNOSIS — L89.224 PRESSURE ULCER OF LEFT HIP, STAGE 4 (HCC): ICD-10-CM

## 2021-11-11 DIAGNOSIS — L89.154 PRESSURE INJURY OF SACRAL REGION, STAGE 4 (HCC): Primary | ICD-10-CM

## 2021-11-11 DIAGNOSIS — L89.894 PRESSURE ULCER OF OTHER SITE, STAGE 4 (HCC): ICD-10-CM

## 2021-11-11 PROCEDURE — 99213 OFFICE O/P EST LOW 20 MIN: CPT | Performed by: FAMILY MEDICINE

## 2021-11-11 PROCEDURE — 99214 OFFICE O/P EST MOD 30 MIN: CPT | Performed by: FAMILY MEDICINE

## 2021-11-11 RX ORDER — LIDOCAINE HYDROCHLORIDE 40 MG/ML
5 SOLUTION TOPICAL ONCE
Status: COMPLETED | OUTPATIENT
Start: 2021-11-11 | End: 2021-11-11

## 2021-11-11 RX ADMIN — LIDOCAINE HYDROCHLORIDE 5 ML: 40 SOLUTION TOPICAL at 14:07

## 2021-11-12 DIAGNOSIS — F31.9 BIPOLAR DEPRESSION (HCC): ICD-10-CM

## 2021-11-15 RX ORDER — SERTRALINE HYDROCHLORIDE 25 MG/1
TABLET, FILM COATED ORAL
Qty: 45 TABLET | Refills: 0 | Status: SHIPPED | OUTPATIENT
Start: 2021-11-15 | End: 2022-02-08

## 2021-11-19 ENCOUNTER — TELEPHONE (OUTPATIENT)
Dept: FAMILY MEDICINE CLINIC | Facility: CLINIC | Age: 41
End: 2021-11-19

## 2021-11-19 ENCOUNTER — PATIENT MESSAGE (OUTPATIENT)
Dept: DERMATOLOGY | Facility: CLINIC | Age: 41
End: 2021-11-19

## 2021-11-21 ENCOUNTER — PATIENT MESSAGE (OUTPATIENT)
Dept: DERMATOLOGY | Facility: CLINIC | Age: 41
End: 2021-11-21

## 2021-12-01 ENCOUNTER — TELEPHONE (OUTPATIENT)
Dept: DERMATOLOGY | Facility: CLINIC | Age: 41
End: 2021-12-01

## 2021-12-16 ENCOUNTER — OFFICE VISIT (OUTPATIENT)
Dept: FAMILY MEDICINE CLINIC | Facility: CLINIC | Age: 41
End: 2021-12-16

## 2021-12-16 VITALS
HEART RATE: 100 BPM | SYSTOLIC BLOOD PRESSURE: 130 MMHG | OXYGEN SATURATION: 92 % | TEMPERATURE: 99.8 F | DIASTOLIC BLOOD PRESSURE: 78 MMHG

## 2021-12-16 DIAGNOSIS — Z93.59 CHRONIC SUPRAPUBIC CATHETER (HCC): Chronic | ICD-10-CM

## 2021-12-16 DIAGNOSIS — J18.9 PNEUMONIA DUE TO INFECTIOUS ORGANISM, UNSPECIFIED LATERALITY, UNSPECIFIED PART OF LUNG: Primary | ICD-10-CM

## 2021-12-16 DIAGNOSIS — N18.6 STAGE 5 CHRONIC KIDNEY DISEASE ON CHRONIC DIALYSIS (HCC): ICD-10-CM

## 2021-12-16 DIAGNOSIS — G82.20 PARAPLEGIA (HCC): Chronic | ICD-10-CM

## 2021-12-16 DIAGNOSIS — I10 HTN (HYPERTENSION), BENIGN: ICD-10-CM

## 2021-12-16 DIAGNOSIS — I50.32 CHRONIC DIASTOLIC HEART FAILURE (HCC): ICD-10-CM

## 2021-12-16 DIAGNOSIS — K21.9 GASTROESOPHAGEAL REFLUX DISEASE WITHOUT ESOPHAGITIS: ICD-10-CM

## 2021-12-16 DIAGNOSIS — Z99.2 STAGE 5 CHRONIC KIDNEY DISEASE ON CHRONIC DIALYSIS (HCC): ICD-10-CM

## 2021-12-16 DIAGNOSIS — Z99.2 TYPE 1 DIABETES MELLITUS WITH CHRONIC KIDNEY DISEASE ON CHRONIC DIALYSIS (HCC): ICD-10-CM

## 2021-12-16 DIAGNOSIS — N18.5 ANEMIA DUE TO STAGE 5 CHRONIC KIDNEY DISEASE, NOT ON CHRONIC DIALYSIS (HCC): ICD-10-CM

## 2021-12-16 DIAGNOSIS — E10.22 TYPE 1 DIABETES MELLITUS WITH CHRONIC KIDNEY DISEASE ON CHRONIC DIALYSIS (HCC): ICD-10-CM

## 2021-12-16 DIAGNOSIS — N18.6 TYPE 1 DIABETES MELLITUS WITH CHRONIC KIDNEY DISEASE ON CHRONIC DIALYSIS (HCC): ICD-10-CM

## 2021-12-16 DIAGNOSIS — T07.XXXA WOUNDS, MULTIPLE: ICD-10-CM

## 2021-12-16 DIAGNOSIS — I48.91 ATRIAL FIBRILLATION, UNSPECIFIED TYPE (HCC): Chronic | ICD-10-CM

## 2021-12-16 DIAGNOSIS — D63.1 ANEMIA DUE TO STAGE 5 CHRONIC KIDNEY DISEASE, NOT ON CHRONIC DIALYSIS (HCC): ICD-10-CM

## 2021-12-16 PROCEDURE — 99213 OFFICE O/P EST LOW 20 MIN: CPT | Performed by: FAMILY MEDICINE

## 2021-12-21 ENCOUNTER — TELEPHONE (OUTPATIENT)
Dept: FAMILY MEDICINE CLINIC | Facility: CLINIC | Age: 41
End: 2021-12-21

## 2021-12-21 NOTE — TELEPHONE ENCOUNTER
PCP SIGNATURE NEEDED FOR [facility] FORM RECEIVED VIA FAX AND PLACED IN PCP FOLDER TO BE DELIVERED AT ASSIGNED TIMES        Plan of care 12/21/21-02/18/22

## 2021-12-22 ENCOUNTER — TELEPHONE (OUTPATIENT)
Dept: FAMILY MEDICINE CLINIC | Facility: CLINIC | Age: 41
End: 2021-12-22

## 2021-12-22 NOTE — TELEPHONE ENCOUNTER
PCP SIGNATURE NEEDED FOR [facility] FORM RECEIVED VIA FAX AND PLACED IN PCP FOLDER TO BE DELIVERED AT ASSIGNED TIMES      Physicians order

## 2021-12-23 ENCOUNTER — OFFICE VISIT (OUTPATIENT)
Dept: GASTROENTEROLOGY | Facility: MEDICAL CENTER | Age: 41
End: 2021-12-23
Payer: MEDICARE

## 2021-12-23 ENCOUNTER — TELEPHONE (OUTPATIENT)
Dept: GASTROENTEROLOGY | Facility: MEDICAL CENTER | Age: 41
End: 2021-12-23

## 2021-12-23 VITALS — TEMPERATURE: 96.2 F | SYSTOLIC BLOOD PRESSURE: 124 MMHG | HEART RATE: 79 BPM | DIASTOLIC BLOOD PRESSURE: 82 MMHG

## 2021-12-23 DIAGNOSIS — D64.9 ANEMIA, UNSPECIFIED TYPE: Primary | ICD-10-CM

## 2021-12-23 DIAGNOSIS — E61.1 IRON DEFICIENCY: ICD-10-CM

## 2021-12-23 DIAGNOSIS — K21.9 GASTROESOPHAGEAL REFLUX DISEASE, UNSPECIFIED WHETHER ESOPHAGITIS PRESENT: ICD-10-CM

## 2021-12-23 DIAGNOSIS — R68.81 EARLY SATIETY: ICD-10-CM

## 2021-12-23 PROCEDURE — 99203 OFFICE O/P NEW LOW 30 MIN: CPT | Performed by: INTERNAL MEDICINE

## 2021-12-29 ENCOUNTER — TELEPHONE (OUTPATIENT)
Dept: FAMILY MEDICINE CLINIC | Facility: CLINIC | Age: 41
End: 2021-12-29

## 2021-12-29 NOTE — TELEPHONE ENCOUNTER
PCP [address] [place] [address] [place] FORM RECEIVED VIA FAX AND PLACED IN PCP FOLDER TO BE DELIVERED AT ASSIGNED TIMES      Order # [id number]

## 2021-12-29 NOTE — TELEPHONE ENCOUNTER
No acute or worrisome findings to explain symptoms. If symptoms do not improve, please let our office know. Could consider CT Scan. Prior Yoselin Blnak [facility]#[phone] and received

## 2021-12-30 ENCOUNTER — OFFICE VISIT (OUTPATIENT)
Dept: WOUND CARE | Facility: CLINIC | Age: 41
End: 2021-12-30
Payer: MEDICARE

## 2021-12-30 VITALS
SYSTOLIC BLOOD PRESSURE: 116 MMHG | TEMPERATURE: 100.9 F | RESPIRATION RATE: 18 BRPM | HEART RATE: 72 BPM | DIASTOLIC BLOOD PRESSURE: 80 MMHG

## 2021-12-30 DIAGNOSIS — L89.224 PRESSURE ULCER OF LEFT HIP, STAGE 4 (HCC): ICD-10-CM

## 2021-12-30 DIAGNOSIS — L89.894 PRESSURE ULCER OF OTHER SITE, STAGE 4 (HCC): ICD-10-CM

## 2021-12-30 DIAGNOSIS — L89.324 PRESSURE INJURY OF LEFT ISCHIUM, STAGE 4 (HCC): ICD-10-CM

## 2021-12-30 DIAGNOSIS — L89.154 PRESSURE INJURY OF SACRAL REGION, STAGE 4 (HCC): Primary | ICD-10-CM

## 2021-12-30 PROCEDURE — 99213 OFFICE O/P EST LOW 20 MIN: CPT | Performed by: FAMILY MEDICINE

## 2021-12-30 PROCEDURE — 11042 DBRDMT SUBQ TIS 1ST 20SQCM/<: CPT | Performed by: FAMILY MEDICINE

## 2022-01-07 ENCOUNTER — TELEPHONE (OUTPATIENT)
Dept: FAMILY MEDICINE CLINIC | Facility: CLINIC | Age: 42
End: 2022-01-07

## 2022-01-07 NOTE — TELEPHONE ENCOUNTER
[facility]  form received by fax on 01/07/2022  to be completed by PCP  Copy made and placed in PCP folder  Forms to be delivered to PCP mailbox at assigned time      Order#: [id number]

## 2022-01-07 NOTE — TELEPHONE ENCOUNTER
[facility] form received by fax on 01/07/2022  to be completed by PCP  Copy made and placed in PCP folder  Forms to be delivered to PCP mailbox at assigned time      ANI: [phone]

## 2022-01-13 ENCOUNTER — OFFICE VISIT (OUTPATIENT)
Dept: FAMILY MEDICINE CLINIC | Facility: CLINIC | Age: 42
End: 2022-01-13

## 2022-01-13 ENCOUNTER — TELEPHONE (OUTPATIENT)
Dept: FAMILY MEDICINE CLINIC | Facility: CLINIC | Age: 42
End: 2022-01-13

## 2022-01-13 VITALS
SYSTOLIC BLOOD PRESSURE: 116 MMHG | RESPIRATION RATE: 16 BRPM | HEART RATE: 94 BPM | OXYGEN SATURATION: 98 % | DIASTOLIC BLOOD PRESSURE: 72 MMHG | TEMPERATURE: 98 F

## 2022-01-13 DIAGNOSIS — Z93.59 CHRONIC SUPRAPUBIC CATHETER (HCC): ICD-10-CM

## 2022-01-13 DIAGNOSIS — N25.81 SECONDARY HYPERPARATHYROIDISM OF RENAL ORIGIN (HCC): ICD-10-CM

## 2022-01-13 DIAGNOSIS — E10.22 TYPE 1 DIABETES MELLITUS WITH CHRONIC KIDNEY DISEASE ON CHRONIC DIALYSIS (HCC): ICD-10-CM

## 2022-01-13 DIAGNOSIS — F11.20 CHRONIC NARCOTIC DEPENDENCE (HCC): ICD-10-CM

## 2022-01-13 DIAGNOSIS — Z99.2 TYPE 1 DIABETES MELLITUS WITH CHRONIC KIDNEY DISEASE ON CHRONIC DIALYSIS (HCC): ICD-10-CM

## 2022-01-13 DIAGNOSIS — N18.6 TYPE 1 DIABETES MELLITUS WITH CHRONIC KIDNEY DISEASE ON CHRONIC DIALYSIS (HCC): ICD-10-CM

## 2022-01-13 DIAGNOSIS — R56.9 SEIZURE (HCC): ICD-10-CM

## 2022-01-13 DIAGNOSIS — N18.6 STAGE 5 CHRONIC KIDNEY DISEASE ON CHRONIC DIALYSIS (HCC): ICD-10-CM

## 2022-01-13 DIAGNOSIS — G82.20 PARAPLEGIA (HCC): ICD-10-CM

## 2022-01-13 DIAGNOSIS — L98.429 ULCER OF SACRAL REGION, STAGE 4 (HCC): ICD-10-CM

## 2022-01-13 DIAGNOSIS — B34.9 VIRAL INFECTION, UNSPECIFIED: ICD-10-CM

## 2022-01-13 DIAGNOSIS — L89.224 PRESSURE ULCER OF LEFT HIP, STAGE 4 (HCC): ICD-10-CM

## 2022-01-13 DIAGNOSIS — I50.32 CHRONIC DIASTOLIC HEART FAILURE (HCC): Primary | ICD-10-CM

## 2022-01-13 DIAGNOSIS — Z99.2 STAGE 5 CHRONIC KIDNEY DISEASE ON CHRONIC DIALYSIS (HCC): ICD-10-CM

## 2022-01-13 DIAGNOSIS — Z99.2 DIALYSIS PATIENT (HCC): ICD-10-CM

## 2022-01-13 DIAGNOSIS — I48.0 PAROXYSMAL ATRIAL FIBRILLATION (HCC): ICD-10-CM

## 2022-01-13 DIAGNOSIS — G37.3 TRANSVERSE MYELITIS (HCC): ICD-10-CM

## 2022-01-13 DIAGNOSIS — G40.909 SEIZURE DISORDER (HCC): ICD-10-CM

## 2022-01-13 PROBLEM — J81.0 ACUTE PULMONARY EDEMA (HCC): Status: ACTIVE | Noted: 2022-01-13

## 2022-01-13 PROBLEM — E43 SEVERE PROTEIN-CALORIE MALNUTRITION (HCC): Status: ACTIVE | Noted: 2022-01-13

## 2022-01-13 PROBLEM — M86.60 CHRONIC OSTEOMYELITIS (HCC): Status: ACTIVE | Noted: 2022-01-13

## 2022-01-13 PROBLEM — D84.9 IMMUNOCOMPROMISED STATE (HCC): Status: ACTIVE | Noted: 2022-01-13

## 2022-01-13 PROCEDURE — U0005 INFEC AGEN DETEC AMPLI PROBE: HCPCS | Performed by: PHYSICIAN ASSISTANT

## 2022-01-13 PROCEDURE — 99214 OFFICE O/P EST MOD 30 MIN: CPT | Performed by: PHYSICIAN ASSISTANT

## 2022-01-13 PROCEDURE — U0003 INFECTIOUS AGENT DETECTION BY NUCLEIC ACID (DNA OR RNA); SEVERE ACUTE RESPIRATORY SYNDROME CORONAVIRUS 2 (SARS-COV-2) (CORONAVIRUS DISEASE [COVID-19]), AMPLIFIED PROBE TECHNIQUE, MAKING USE OF HIGH THROUGHPUT TECHNOLOGIES AS DESCRIBED BY CMS-2020-01-R: HCPCS | Performed by: PHYSICIAN ASSISTANT

## 2022-01-13 NOTE — PROGRESS NOTES
Assessment/Plan:       Problem List Items Addressed This Visit        Endocrine    Type 1 diabetes mellitus with chronic kidney disease on chronic dialysis [facility])       Lab Results   Component Value Date    HGBA1C 7 4 (H) 10/19/2021   continue with endocrinology  Continue to monitor blood sugars         Secondary hyperparathyroidism of renal origin [facility])       Cardiovascular and Mediastinum    Atrial fibrillation (HCC) (Chronic)    Chronic diastolic heart failure ([facility] ) - Primary    Relevant Orders    Ambulatory Referral to Palliative Care       Nervous and Auditory    Paraplegia (HCC) (Chronic)    Relevant Orders    Ambulatory Referral to Palliative Care    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[facility] )    Relevant Orders    Ambulatory Referral to Palliative Care       Musculoskeletal and Integument    Ulcer of sacral region, stage 4 ([facility] )    Relevant Orders    Ambulatory Referral to Palliative Care    Pressure ulcer of left hip, stage 4 (HCC)       Genitourinary    Stage 5 chronic kidney disease on chronic dialysis ([facility] )    Relevant Orders    Ambulatory Referral to Palliative Care       Other    Chronic suprapubic catheter (HCC) (Chronic)     Changed montly         Dialysis patient ([facility] )     Continue m/w/f  dialysis         Seizure ([facility] )     Stable, lv neuro changing meds         Chronic narcotic dependence ([facility] )      Other Visit Diagnoses     Seizure disorder ([facility] )        Viral infection, unspecified        Relevant Orders    COVID Only - Office Collect            Subjective:      Patient ID: Felicity Myers  is a 39 y o  male with an extensive PMH who presents today with a chief complaint of nasal congestion x1 5 weeks  Patient states associated loss of taste and smell approximately 2 days ago  Both senses returned today  Denies N/V  He did have loose stools after being discharged from the hospital but states that issue has since resolved and he is now having solid stools again  Denies fevers, chills, or sore throat   He reports more frequent headaches compared to his baseline  His parents both had a cold recently  They were not tested for COVID  He had his first COVID vaccine 12/22/21  Of note he reports more frequent headaches compared to his baseline  He has gotten 2 headaches over the past 2 weeks  One headache was associated with dimming of vision in his R eye  The pain is located in the R front of his head  The last headache lasted about an hour and a half  The following portions of the patient's history were reviewed and updated as appropriate:   He  has a past medical history of Ambulatory dysfunction, Anemia, Anemia, iron deficiency, Atrial fibrillation ([address] ), AVM (arteriovenous malformation) of duodenum, acquired, Bacteriuria, asymptomatic, Bipolar disorder ([facility] ), Chronic deep vein thrombosis (DVT) ([facility] ), Chronic indwelling Ortega catheter, Chronic kidney disease, Chronic pain, Chronic pain disorder, Chronic suprapubic catheter ([facility] ), Clostridium difficile infection (08/11/2016), Colostomy on examination ([facility] [address] ), Decubitus ulcer, Delirium, Diabetes mellitus ([address] ), GERD (gastroesophageal reflux disease), Hemodialysis patient ([address] ), Hypertension, Memory impairment (2011), Neurogenic bladder, OAB (overactive bladder), Paraplegia ([facility] ), Penile abscess, S/P unilateral BKA (below knee amputation) ([facility] ), Sebaceous cyst (removed in 2017), Seizures ([facility] ), Shortness of breath, Tobacco abuse, 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[facility] ), Urinary retention, Wheelchair dependent, and Wounds, multiple    He   Patient Active Problem List    Diagnosis Date Noted    Immunocompromised state ([address] ) 01/13/2022    Severe protein-calorie malnutrition ([address] ) 01/13/2022    Chronic osteomyelitis ([facility] ) 01/13/2022    Acute pulmonary edema ([address] ) 01/13/2022    Pneumonia 12/16/2021    Wounds, multiple     Abdominal pain 09/02/2021    Localized swelling of left foot 07/28/2021    Irritable bowel syndrome 07/28/2021    Pressure ulcer of other site, stage 4 ([address] ) 03/12/2021    Pressure ulcer of left hip, stage 4 ([facility] ) 03/12/2021    Nervous 03/02/2021    Chronic diastolic heart failure ([facility] ) 02/10/2021    Dental caries 02/10/2021    Pressure ulcer of sacral region, stage 4 ([facility] ) 08/13/2020    Prolonged Q-T interval on ECG 04/14/2020    Bipolar depression ([facility] ) 02/27/2020    [facility] 01/20/2020    Onychomycosis 01/10/2020    Recurrent Clostridioides difficile diarrhea 11/21/2019    Wheelchair dependent 08/07/2019    [facility] 05/08/2019    Insulin long-term use ([facility] ) 05/08/2019    Secondary hyperparathyroidism of renal origin ([facility] ) 07/13/2018    Chronic narcotic dependence ([facility] ) 02/07/2018    Delirium 09/28/2017    Urinary retention 09/26/2017    Asymptomatic bacteriuria 09/25/2017    History of Clostridium difficile infection 09/25/2017    Stage 5 chronic kidney disease on chronic dialysis ([facility] ) 09/18/2017    SOB (shortness of breath) 09/18/2017    Penile abscess 09/18/2017    AVM (arteriovenous malformation) of duodenum, acquired 08/30/2017    Iron deficiency anemia 07/03/2017    Pressure injury of left ischium, stage 4 ([address] ) 07/03/2017    HTN (hypertension), benign 07/03/2017    Neurogenic bladder 07/03/2017    GERD (gastroesophageal reflux disease) 07/03/2017    Chronic pain 07/03/2017    Wound healing, delayed 06/29/2017    Chronic indwelling Ortega catheter     Pustular folliculitis 34/53/7577    Anemia 09/03/2016    Ulcer of sacral region, stage 4 ([facility] ) 09/01/2016    Paraplegia (HCC)     Atrial fibrillation (HCC)     Chronic suprapubic catheter ([facility] )     Colostomy care ([facility] )     OAB (overactive bladder)     S/P unilateral BKA (below knee amputation) ([facility] )     Sebaceous cyst     Tobacco abuse     Type 1 diabetes mellitus with chronic kidney disease on chronic dialysis ([facility] )     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[facility] ) 12/02/2014    ED (erectile dysfunction) of organic origin 05/06/2014     He  has a past surgical history that includes Below knee leg amputation (Right, 2009); Colonoscopy (N/A, 3/27/2017); Esophagogastroduodenoscopy (N/A, 3/22/2017); Colonoscopy (N/A, 3/29/2017); Colonoscopy (N/A, 6/15/2017); and Multiple tooth extractions (N/A, 3/8/2021)  His family history includes Diabetes in his paternal grandfather; Hyperlipidemia in his mother; Hypertension in his mother; Leukemia in his brother  He  reports that he has been smoking cigars  He has never used smokeless tobacco  He reports previous alcohol use  He reports previous drug use  Drug: Marijuana  Current Outpatient Medications   Medication Sig Dispense Refill    Alcohol Swabs (ALCOHOL PADS) 70 % PADS by Does not apply route 5 (five) times a day 300 each 5    amLODIPine (NORVASC) 10 mg tablet Take 1 tablet (10 mg total) by mouth daily 90 tablet 1    ammonium lactate (LAC-HYDRIN) 12 % cream       apixaban (ELIQUIS) 5 mg Take 1 tablet (5 mg total) by mouth 2 (two) times a day 270 tablet 1    ascorbic acid (VITAMIN C) 250 mg tablet TAKE 2 TABLETS (500 MG TOTAL) BY MOUTH DAILY 180 tablet 1    atorvastatin (LIPITOR) 20 mg tablet Take 1 tablet (20 mg total) by mouth daily at bedtime 90 tablet 1    B Complex-C-Folic Acid (Super B-Complex/Vit C/FA) TABS TAKE 1 TABLET BY MOUTH EVERY DAY AS DIRECTED 30 tablet 8    baclofen 20 mg tablet Take 20 mg by mouth 2 (two) times a day   [employer] MICROLET LANCETS lancets Use as instructed to check blood sugar 4 times a day  Dx e10 29 200 each 5    benzonatate (TESSALON PERLES) 100 mg capsule TAKE 1 CAPSULE BY MOUTH THREE TIMES A DAY AS NEEDED FOR COUGH 30 capsule 0    Blood Glucose Monitoring Suppl (SUSAN CONTOUR NEXT USB MONITOR) w/Device KIT Testing 4 times a day 1 kit 0    calcitriol (ROCALTROL) 0 5 MCG capsule Take 2 mcg by mouth      Capsaicin 0 033 % CREA 5 times a day for 1st week   Then 3 times a day for next 3 weeks 56 6 g 2    carvedilol (COREG) 25 mg tablet Take 1 tablet (25 mg total) by mouth 2 (two) times a day 180 tablet 1    carvedilol (COREG) 6 25 mg tablet Take 6 25 mg by mouth      cetirizine (ZyrTEC) 10 mg tablet Take 1 tablet (10 mg total) by mouth daily 90 tablet 1    cetirizine (ZyrTEC) 5 MG tablet TAKE 1 TABLET BY MOUTH EVERY DAY 90 tablet 0    Cholecalciferol (VITAMIN D-3) 1000 units CAPS Take 2 capsules by mouth daily 30 capsule 0    clindamycin (CLINDAGEL) 1 % gel APPLY TO AFFECTED AREA TWICE A DAY 60 g 2    [facility] ACETAMINOPHEN EX  MG tablet TAKE 2 TABLETS BY MOUTH EVERY 8 HOURS AS NEEDED FOR MILD OR MODERATE PAIN (PAIN SCORE 1 6)   cyanocobalamin ([employer]S Vitamin B-12) 1000 MCG tablet Take 1 tablet (1,000 mcg total) by mouth daily 30 tablet 5    cyclobenzaprine (FLEXERIL) 10 mg tablet Take 10 mg by mouth 3 (three) times a day as needed  0    cyclobenzaprine (FLEXERIL) 5 mg tablet       dicyclomine (BENTYL) 10 mg capsule TAKE 1 CAPSULE (10 MG TOTAL) BY MOUTH 3 (THREE) TIMES A DAY BEFORE MEALS 270 capsule 1    Disposable Gloves MISC Use 7 times a day, 4 boxes of gloves XL  Dx type 1 DM, paraplegia 4 each 11    doxazosin (CARDURA) 2 mg tablet Take 1 tablet (2 mg total) by mouth every evening 90 tablet 0    epoetin kaushik (EPOGEN,PROCRIT) 20,000 units/mL Inject 10,000 Units under the skin      epoetin kaushik (EPOGEN,PROCRIT) 20,000 units/mL Inject 5,000 Units under the skin      ergocalciferol (VITAMIN D2) 50,000 units TAKE 1 CAPSULE BY MOUTH ONE TIME PER WEEK      ferrous sulfate 325 (65 Fe) mg tablet Take 1 tablet (325 mg total) by mouth 3 (three) times a day 90 tablet 3    glucose 4-6 GM-MG Chew 2 tablets daily as needed for low blood sugar 10 tablet 1    glucose blood (SUSAN CONTOUR TEST) test strip Use as instructed to test blood sugar 4 times a day  Dx e10 29 200 each 3    Gvoke PFS 1 MG/0 2ML SOSY INJECT 1 ML (1 MG TOTAL) INTO A MUSCLE ONCE FOR 1 DOSE      HYDROmorphone (DILAUDID) 4 mg tablet TAKE 1 TABLET BY MOUTH EVERY 4 HOURS AS NEEDED FOR MODERATE PAIN (PAIN SCORE 4 6)   MAX  24 MG/DAY      Incontinence Supply Disposable (Incontinence Brief Large) MISC Use 6 (six) times a day Size xl 180 each 11    Incontinence Supply Disposable (Undergarment) MISC Use 6 a day, 5 boxes, xl  Dx R51, paraplegia 5 each 11    Incontinence Supply Disposable (Underpads) MISC Use 2 a day 5 each 11    insulin detemir (LEVEMIR) 100 units/mL subcutaneous injection Inject 6 5 Units under the skin 2 (two) times a day       ketoconazole (NIZORAL) 2 % cream APPLY SMALL AMOUNT TO EDGES OF MOUTH TWICE DAILY  DO NOT SWALLOW 15 g 1    Ketostix strip USE WITH HYPERGLYCEMIA E10 65 **NOT COVERED**      LEVEMIR FLEXTOUCH 100 units/mL injection pen Inject 14 Units under the skin 2 (two) times a day 15 pen 3    levETIRAcetam (KEPPRA) 250 mg tablet TAKE 1 TABLET (250 MG TOTAL) BY MOUTH 3 (THREE) TIMES A WEEK (MWF) AT 1200   lidocaine (XYLOCAINE) 5 % ointment Apply topically as needed for mild pain 35 44 g 0    Lidocaine HCl 4 % LIQD Apply 1 application topically 3 (three) times a day as needed (pain) 73 mL 5    Lokelma 10 g PACK       losartan (COZAAR) 100 MG tablet Take 1 tablet (100 mg total) by mouth daily 90 tablet 1    Melatonin ER 3 MG TBCR Take 6 mg by mouth      Methoxy PEG-Epoetin Beta (MIRCERA IJ) 100 mcg Once every 2 weeks      Methoxy PEG-Epoetin Beta (MIRCERA IJ) 150 mcg Once every 2 weeks      Misc  Devices (Wheelchair Cushion) MISC Use daily 1 each 0    Misc  Devices [facility]) MISC by Does not apply route daily Wheelchair ramp, dx g82 20 1 each 0    Multiple Vitamin (Daily-Enriqueta Multivitamin) TABS TAKE 1 TABLET BY MOUTH EVERY DAY 90 tablet 2    Narcan 4 MG/0 1ML nasal spray       NOVOLOG 100 UNIT/ML injection Inject 5 Units under the skin 3 (three) times a day with meals       NovoLOG FlexPen 100 units/mL injection pen INJECT 3 UNITS UNDER THE SKIN 3 (THREE) TIMES A DAY BEFORE MEALS        nystatin (MYCOSTATIN) 500,000 units/5 mL suspension APPLY 5 ML (500,000 UNITS TOTAL) TO THE MOUTH OR THROAT 4 (FOUR) TIMES A  mL 1    ondansetron (ZOFRAN-ODT) 4 mg disintegrating tablet Take 2 tablets (8 mg total) by mouth every 8 (eight) hours as needed for nausea or vomiting 30 tablet 1    oxybutynin (DITROPAN) 5 mg tablet Take 3 tablets (15 mg total) by mouth daily 270 tablet 1    pantoprazole (PROTONIX) 40 mg tablet Take 1 tablet (40 mg total) by mouth daily 30 tablet 1    phenytoin extended (DILANTIN) 200 MG ER capsule Take 1 capsule (200 mg total) by mouth 2 (two) times a day 180 capsule 1    phenytoin extended (DILANTIN) 200 MG ER capsule TAKE 1 CAPSULE BY MOUTH TWICE A  capsule 0    phenytoin extended (DILANTIN) 300 MG ER capsule TAKE 1 CAPSULE BY MOUTH EVERY DAY 90 capsule 1    phenytoin extended (DILANTIN) 300 MG ER capsule Take 1 capsule (300 mg total) by mouth daily 90 capsule 1    risperiDONE (RisperDAL) 0 5 mg tablet TAKE 1 TABLET (0 5 MG TOTAL) BY MOUTH 2 (TWO) TIMES A  tablet 1    senna (SENOKOT) 8 6 mg Take 1 tablet (8 6 mg total) by mouth 2 (two) times a day 180 tablet 1    sertraline (ZOLOFT) 25 mg tablet TAKE 1/2 TABLET BY MOUTH EVERY DAY 45 tablet 0    sodium hypochlorite (DAKIN'S HALF-STRENGTH) external solution USE AS DIRECTED ONCE  DAILY 473 mL 2    Sodium Zirconium Cyclosilicate 10 g PACK Take 10 g by mouth daily      sucralfate (CARAFATE) 1 g/10 mL suspension Take 10 mL (1 g total) by mouth 4 (four) times a day (with meals and at bedtime) 420 mL 0    SUPER B COMPLEX & C TABS TAKE 1 CAPSULE BY MOUTH ONCE FOR 1 DOSE AS DIRECTED 90 tablet 2    tiZANidine (ZANAFLEX) 4 mg tablet Take 1 tablet by mouth 4 (four) times a day      tiZANidine (ZANAFLEX) 4 mg tablet       triamcinolone (KENALOG) 0 1 % ointment PLEASE SEE ATTACHED FOR DETAILED DIRECTIONS 454 g 0    Zinc Sulfate 220 (50 Zn) MG TABS Take 1 tablet by mouth daily 90 tablet 1     No current facility-administered medications for this visit       Current Outpatient Medications on File Prior to Visit Medication Sig    Alcohol Swabs (ALCOHOL PADS) 70 % PADS by Does not apply route 5 (five) times a day    amLODIPine (NORVASC) 10 mg tablet Take 1 tablet (10 mg total) by mouth daily    ammonium lactate (LAC-HYDRIN) 12 % cream     apixaban (ELIQUIS) 5 mg Take 1 tablet (5 mg total) by mouth 2 (two) times a day    ascorbic acid (VITAMIN C) 250 mg tablet TAKE 2 TABLETS (500 MG TOTAL) BY MOUTH DAILY    atorvastatin (LIPITOR) 20 mg tablet Take 1 tablet (20 mg total) by mouth daily at bedtime    B Complex-C-Folic Acid (Super B-Complex/Vit C/FA) TABS TAKE 1 TABLET BY MOUTH EVERY DAY AS DIRECTED    baclofen 20 mg tablet Take 20 mg by mouth 2 (two) times a day   [employer] MICROLET LANCETS lancets Use as instructed to check blood sugar 4 times a day  Dx e10 29    benzonatate (TESSALON PERLES) 100 mg capsule TAKE 1 CAPSULE BY MOUTH THREE TIMES A DAY AS NEEDED FOR COUGH    Blood Glucose Monitoring Suppl ([employer] CONTOUR NEXT USB MONITOR) w/Device KIT Testing 4 times a day    calcitriol (ROCALTROL) 0 5 MCG capsule Take 2 mcg by mouth    Capsaicin 0 033 % CREA 5 times a day for 1st week  Then 3 times a day for next 3 weeks    carvedilol (COREG) 25 mg tablet Take 1 tablet (25 mg total) by mouth 2 (two) times a day    carvedilol (COREG) 6 25 mg tablet Take 6 25 mg by mouth    cetirizine (ZyrTEC) 10 mg tablet Take 1 tablet (10 mg total) by mouth daily    cetirizine (ZyrTEC) 5 MG tablet TAKE 1 TABLET BY MOUTH EVERY DAY    Cholecalciferol (VITAMIN D-3) 1000 units CAPS Take 2 capsules by mouth daily    clindamycin (CLINDAGEL) 1 % gel APPLY TO AFFECTED AREA TWICE A DAY    [facility] ACETAMINOPHEN EX  MG tablet TAKE 2 TABLETS BY MOUTH EVERY 8 HOURS AS NEEDED FOR MILD OR MODERATE PAIN (PAIN SCORE 1 6)      cyanocobalamin ([facility] Vitamin B-12) 1000 MCG tablet Take 1 tablet (1,000 mcg total) by mouth daily    cyclobenzaprine (FLEXERIL) 10 mg tablet Take 10 mg by mouth 3 (three) times a day as needed    cyclobenzaprine (FLEXERIL) 5 mg tablet     dicyclomine (BENTYL) 10 mg capsule TAKE 1 CAPSULE (10 MG TOTAL) BY MOUTH 3 (THREE) TIMES A DAY BEFORE MEALS    Disposable Gloves MISC Use 7 times a day, 4 boxes of gloves XL  Dx type 1 DM, paraplegia    doxazosin (CARDURA) 2 mg tablet Take 1 tablet (2 mg total) by mouth every evening    epoetin kaushik (EPOGEN,PROCRIT) 20,000 units/mL Inject 10,000 Units under the skin    epoetin kaushik (EPOGEN,PROCRIT) 20,000 units/mL Inject 5,000 Units under the skin    ergocalciferol (VITAMIN D2) 50,000 units TAKE 1 CAPSULE BY MOUTH ONE TIME PER WEEK    ferrous sulfate 325 (65 Fe) mg tablet Take 1 tablet (325 mg total) by mouth 3 (three) times a day    glucose 4-6 GM-MG Chew 2 tablets daily as needed for low blood sugar    glucose blood (SUSAN CONTOUR TEST) test strip Use as instructed to test blood sugar 4 times a day  Dx e10 29    Gvoke PFS 1 MG/0 2ML SOSY INJECT 1 ML (1 MG TOTAL) INTO A MUSCLE ONCE FOR 1 DOSE    HYDROmorphone (DILAUDID) 4 mg tablet TAKE 1 TABLET BY MOUTH EVERY 4 HOURS AS NEEDED FOR MODERATE PAIN (PAIN SCORE 4 6)  MAX  24 MG/DAY    Incontinence Supply Disposable (Incontinence Brief Large) MISC Use 6 (six) times a day Size xl    Incontinence Supply Disposable (Undergarment) MISC Use 6 a day, 5 boxes, xl  Dx R51, paraplegia    Incontinence Supply Disposable (Underpads) MISC Use 2 a day    insulin detemir (LEVEMIR) 100 units/mL subcutaneous injection Inject 6 5 Units under the skin 2 (two) times a day     ketoconazole (NIZORAL) 2 % cream APPLY SMALL AMOUNT TO EDGES OF MOUTH TWICE DAILY  DO NOT SWALLOW    Ketostix strip USE WITH HYPERGLYCEMIA E10 65 **NOT COVERED**    LEVEMIR FLEXTOUCH 100 units/mL injection pen Inject 14 Units under the skin 2 (two) times a day    levETIRAcetam (KEPPRA) 250 mg tablet TAKE 1 TABLET (250 MG TOTAL) BY MOUTH 3 (THREE) TIMES A WEEK (MWF) AT 1200      lidocaine (XYLOCAINE) 5 % ointment Apply topically as needed for mild pain    Lidocaine HCl 4 % LIQD Apply 1 application topically 3 (three) times a day as needed (pain)    Lokelma 10 g PACK     losartan (COZAAR) 100 MG tablet Take 1 tablet (100 mg total) by mouth daily    Melatonin ER 3 MG TBCR Take 6 mg by mouth    Methoxy PEG-Epoetin Beta (MIRCERA IJ) 100 mcg Once every 2 weeks    Methoxy PEG-Epoetin Beta (MIRCERA IJ) 150 mcg Once every 2 weeks    Misc  Devices (Wheelchair Cushion) MISC Use daily    Misc  Devices [facility]) MISC by Does not apply route daily Wheelchair ramp, dx g82 20    Multiple Vitamin (Daily-Enriqueta Multivitamin) TABS TAKE 1 TABLET BY MOUTH EVERY DAY    Narcan 4 MG/0 1ML nasal spray     NOVOLOG 100 UNIT/ML injection Inject 5 Units under the skin 3 (three) times a day with meals     NovoLOG FlexPen 100 units/mL injection pen INJECT 3 UNITS UNDER THE SKIN 3 (THREE) TIMES A DAY BEFORE MEALS      nystatin (MYCOSTATIN) 500,000 units/5 mL suspension APPLY 5 ML (500,000 UNITS TOTAL) TO THE MOUTH OR THROAT 4 (FOUR) TIMES A DAY    ondansetron (ZOFRAN-ODT) 4 mg disintegrating tablet Take 2 tablets (8 mg total) by mouth every 8 (eight) hours as needed for nausea or vomiting    oxybutynin (DITROPAN) 5 mg tablet Take 3 tablets (15 mg total) by mouth daily    pantoprazole (PROTONIX) 40 mg tablet Take 1 tablet (40 mg total) by mouth daily    phenytoin extended (DILANTIN) 200 MG ER capsule Take 1 capsule (200 mg total) by mouth 2 (two) times a day    phenytoin extended (DILANTIN) 200 MG ER capsule TAKE 1 CAPSULE BY MOUTH TWICE A DAY    phenytoin extended (DILANTIN) 300 MG ER capsule TAKE 1 CAPSULE BY MOUTH EVERY DAY    phenytoin extended (DILANTIN) 300 MG ER capsule Take 1 capsule (300 mg total) by mouth daily    risperiDONE (RisperDAL) 0 5 mg tablet TAKE 1 TABLET (0 5 MG TOTAL) BY MOUTH 2 (TWO) TIMES A DAY    senna (SENOKOT) 8 6 mg Take 1 tablet (8 6 mg total) by mouth 2 (two) times a day    sertraline (ZOLOFT) 25 mg tablet TAKE 1/2 TABLET BY MOUTH EVERY DAY    sodium hypochlorite (DAKIN'S HALF-STRENGTH) external solution USE AS DIRECTED ONCE  DAILY    Sodium Zirconium Cyclosilicate 10 g PACK Take 10 g by mouth daily    sucralfate (CARAFATE) 1 g/10 mL suspension Take 10 mL (1 g total) by mouth 4 (four) times a day (with meals and at bedtime)    SUPER B COMPLEX & C TABS TAKE 1 CAPSULE BY MOUTH ONCE FOR 1 DOSE AS DIRECTED    tiZANidine (ZANAFLEX) 4 mg tablet Take 1 tablet by mouth 4 (four) times a day    tiZANidine (ZANAFLEX) 4 mg tablet     triamcinolone (KENALOG) 0 1 % ointment PLEASE SEE ATTACHED FOR DETAILED DIRECTIONS    Zinc Sulfate 220 (50 Zn) MG TABS Take 1 tablet by mouth daily    [DISCONTINUED] clindamycin (CLINDAGEL) 1 % gel APPLY TO AFFECTED AREA TWICE A DAY    [DISCONTINUED] nystatin (MYCOSTATIN) 500,000 units/5 mL suspension APPLY 5 ML (500,000 UNITS TOTAL) TO THE MOUTH OR THROAT 4 (FOUR) TIMES A DAY    [DISCONTINUED] phenytoin extended (DILANTIN) 200 MG ER capsule TAKE 1 CAPSULE BY MOUTH TWICE A DAY     No current facility-administered medications on file prior to visit  He is allergic to cefepime, ciprofloxacin hcl, cymbalta [duloxetine hcl], gabapentin, lac bovis, lactose - food allergy, lyrica [pregabalin], penicillins, polymyxin b, and rosuvastatin       Review of Systems   Constitutional: Negative for chills and fever  HENT: Negative for ear pain and sore throat  Eyes: Negative for pain and visual disturbance  Respiratory: Negative for cough and shortness of breath  Cardiovascular: Negative for chest pain and palpitations  Gastrointestinal: Negative for abdominal pain and vomiting  Genitourinary: Negative for dysuria and hematuria  Musculoskeletal: Positive for back pain  Negative for arthralgias  Skin: Positive for wound  Negative for color change and rash  Neurological: Negative for seizures and syncope  All other systems reviewed and are negative          Objective:      /72 (BP Location: Right arm, Patient Position: Sitting, Cuff Size: Large)   Pulse 94   Temp 98 °F (36 7 °C) (Temporal)   Resp 16   SpO2 98%          Physical Exam  Vitals and nursing note reviewed  Constitutional:       General: He is not in acute distress  Appearance: He is well-developed  HENT:      Head: Normocephalic and atraumatic  Right Ear: External ear normal       Left Ear: External ear normal    Eyes:      Conjunctiva/sclera: Conjunctivae normal    Neck:      Thyroid: No thyromegaly  Cardiovascular:      Rate and Rhythm: Normal rate and regular rhythm  Heart sounds: Normal heart sounds  No murmur heard  Pulmonary:      Effort: Pulmonary effort is normal  No respiratory distress  Breath sounds: Normal breath sounds  No wheezing  Musculoskeletal:      Cervical back: Normal range of motion and neck supple  Lymphadenopathy:      Cervical: No cervical adenopathy  Neurological:      Mental Status: He is alert and oriented to person, place, and time     Psychiatric:         Mood and Affect: Mood normal          Behavior: Behavior normal

## 2022-01-13 NOTE — ASSESSMENT & PLAN NOTE
Lab Results   Component Value Date    HGBA1C 7 4 (H) 10/19/2021   continue with endocrinology   Continue to monitor blood sugars

## 2022-01-14 LAB — SARS-COV-2 RNA RESP QL NAA+PROBE: NEGATIVE

## 2022-01-15 DIAGNOSIS — I10 HTN (HYPERTENSION), BENIGN: ICD-10-CM

## 2022-01-17 RX ORDER — DOXAZOSIN 2 MG/1
TABLET ORAL
Qty: 90 TABLET | Refills: 0 | Status: SHIPPED | OUTPATIENT
Start: 2022-01-17 | End: 2022-05-31

## 2022-01-19 ENCOUNTER — TELEPHONE (OUTPATIENT)
Dept: FAMILY MEDICINE CLINIC | Facility: CLINIC | Age: 42
End: 2022-01-19

## 2022-01-19 NOTE — TELEPHONE ENCOUNTER
[facility]  form received by fax on 01/19/2022  to be completed by PCP  Copy made and placed in PCP folder  Forms to be delivered to PCP mailbox at assigned time

## 2022-01-24 ENCOUNTER — TELEPHONE (OUTPATIENT)
Dept: FAMILY MEDICINE CLINIC | Facility: CLINIC | Age: 42
End: 2022-01-24

## 2022-01-24 NOTE — TELEPHONE ENCOUNTER
[facility] form received by fax on 1/24/2022  to be completed by PCP  Copy made and placed in PCP folder  Forms to be delivered to PCP mailbox at assigned time      (This could be a duplicate, there is no order number on form)

## 2022-01-25 ENCOUNTER — TRANSITIONAL CARE MANAGEMENT (OUTPATIENT)
Dept: FAMILY MEDICINE CLINIC | Facility: CLINIC | Age: 42
End: 2022-01-25

## 2022-01-25 DIAGNOSIS — Z00.00 HEALTHCARE MAINTENANCE: ICD-10-CM

## 2022-01-25 NOTE — TELEPHONE ENCOUNTER
I called and spoke with pts mom Jigna Donisling whom states pt is currently still admitted and is doing much better

## 2022-01-26 RX ORDER — MULTIVITAMIN WITH FOLIC ACID 400 MCG
TABLET ORAL
Qty: 90 TABLET | Refills: 2 | Status: SHIPPED | OUTPATIENT
Start: 2022-01-26

## 2022-01-27 ENCOUNTER — TELEPHONE (OUTPATIENT)
Dept: FAMILY MEDICINE CLINIC | Facility: CLINIC | Age: 42
End: 2022-01-27

## 2022-01-27 NOTE — TELEPHONE ENCOUNTER
PCP [facility] [employer] [facility]  FORM RECEIVED VIA FAX AND PLACED IN PCP FOLDER TO BE DELIVERED AT ASSIGNED TIMES        ORDER NUMBER [id number]

## 2022-01-29 DIAGNOSIS — L70.0 ACNE VULGARIS: ICD-10-CM

## 2022-01-29 DIAGNOSIS — I10 HTN (HYPERTENSION), BENIGN: ICD-10-CM

## 2022-01-29 DIAGNOSIS — F31.9 BIPOLAR DEPRESSION (HCC): ICD-10-CM

## 2022-02-01 ENCOUNTER — TELEPHONE (OUTPATIENT)
Dept: FAMILY MEDICINE CLINIC | Facility: CLINIC | Age: 42
End: 2022-02-01

## 2022-02-01 NOTE — TELEPHONE ENCOUNTER
PCP SIGNATURE NEEDED FOR [facility] FORM RECEIVED VIA FAX AND PLACED IN PCP FOLDER TO BE DELIVERED AT ASSIGNED TIMES

## 2022-02-02 DIAGNOSIS — L29.9 ITCHING: ICD-10-CM

## 2022-02-02 RX ORDER — CETIRIZINE HYDROCHLORIDE 5 MG/1
TABLET ORAL
Qty: 90 TABLET | Refills: 0 | Status: SHIPPED | OUTPATIENT
Start: 2022-02-02 | End: 2022-04-21

## 2022-02-03 DIAGNOSIS — Z71.89 COMPLEX CARE COORDINATION: Primary | ICD-10-CM

## 2022-02-04 ENCOUNTER — PATIENT OUTREACH (OUTPATIENT)
Dept: FAMILY MEDICINE CLINIC | Facility: CLINIC | Age: 42
End: 2022-02-04

## 2022-02-04 ENCOUNTER — TELEPHONE (OUTPATIENT)
Dept: FAMILY MEDICINE CLINIC | Facility: CLINIC | Age: 42
End: 2022-02-04

## 2022-02-04 NOTE — TELEPHONE ENCOUNTER
[facility]/RECERTIFICATION SUMMARY form received by FAX on 2-4-22 to be completed by PCP  Copy made and placed in PCP folder  Forms to be delivered to PCP mailbox at assigned time  PHYSICIAN ORDERS form received by FAX on 2-4-22  to be completed by PCP  Copy made and placed in PCP folder  Forms to be delivered to PCP mailbox at assigned time

## 2022-02-04 NOTE — PROGRESS NOTES
Referral from [facility]    I called the patient but his mother, Margaux Garcia, answered the phone  She stated the patient was currently at dialysis  I explained my role and at this time she declines  She states the only thing that is needed is a follow up appointment; will send a note to the clerical team   Margaux Garcia has my contact information if assistance is needed in the future  Chart reviewed

## 2022-02-08 RX ORDER — SERTRALINE HYDROCHLORIDE 25 MG/1
TABLET, FILM COATED ORAL
Qty: 45 TABLET | Refills: 0 | Status: SHIPPED | OUTPATIENT
Start: 2022-02-08 | End: 2022-03-02

## 2022-02-08 RX ORDER — CLINDAMYCIN PHOSPHATE 10 MG/G
GEL TOPICAL
Qty: 60 G | Refills: 2 | Status: SHIPPED | OUTPATIENT
Start: 2022-02-08 | End: 2022-05-04

## 2022-02-08 RX ORDER — DOXAZOSIN 2 MG/1
TABLET ORAL
Qty: 90 TABLET | Refills: 0 | OUTPATIENT
Start: 2022-02-08

## 2022-02-09 NOTE — TELEPHONE ENCOUNTER
Form completed 02/08/2022, patient notified ready for  faxed to [phone], confirmation received, Form scanned into patient chart and placed in accordion

## 2022-02-10 ENCOUNTER — OFFICE VISIT (OUTPATIENT)
Dept: WOUND CARE | Facility: CLINIC | Age: 42
End: 2022-02-10
Payer: MEDICARE

## 2022-02-10 ENCOUNTER — TELEPHONE (OUTPATIENT)
Dept: FAMILY MEDICINE CLINIC | Facility: CLINIC | Age: 42
End: 2022-02-10

## 2022-02-10 VITALS
TEMPERATURE: 99.2 F | DIASTOLIC BLOOD PRESSURE: 86 MMHG | HEART RATE: 90 BPM | RESPIRATION RATE: 18 BRPM | SYSTOLIC BLOOD PRESSURE: 132 MMHG

## 2022-02-10 DIAGNOSIS — Z99.2 TYPE 1 DIABETES MELLITUS WITH CHRONIC KIDNEY DISEASE ON CHRONIC DIALYSIS (HCC): ICD-10-CM

## 2022-02-10 DIAGNOSIS — L89.894 PRESSURE ULCER OF OTHER SITE, STAGE 4 (HCC): ICD-10-CM

## 2022-02-10 DIAGNOSIS — L89.324 PRESSURE INJURY OF LEFT ISCHIUM, STAGE 4 (HCC): ICD-10-CM

## 2022-02-10 DIAGNOSIS — G82.20 PARAPLEGIA (HCC): ICD-10-CM

## 2022-02-10 DIAGNOSIS — N18.6 TYPE 1 DIABETES MELLITUS WITH CHRONIC KIDNEY DISEASE ON CHRONIC DIALYSIS (HCC): ICD-10-CM

## 2022-02-10 DIAGNOSIS — E10.22 TYPE 1 DIABETES MELLITUS WITH CHRONIC KIDNEY DISEASE ON CHRONIC DIALYSIS (HCC): ICD-10-CM

## 2022-02-10 DIAGNOSIS — L89.154 PRESSURE INJURY OF SACRAL REGION, STAGE 4 (HCC): Primary | ICD-10-CM

## 2022-02-10 DIAGNOSIS — L89.224 PRESSURE ULCER OF LEFT HIP, STAGE 4 (HCC): ICD-10-CM

## 2022-02-10 PROCEDURE — 99213 OFFICE O/P EST LOW 20 MIN: CPT | Performed by: FAMILY MEDICINE

## 2022-02-10 PROCEDURE — 11045 DBRDMT SUBQ TISS EACH ADDL: CPT | Performed by: FAMILY MEDICINE

## 2022-02-10 PROCEDURE — 11042 DBRDMT SUBQ TIS 1ST 20SQCM/<: CPT | Performed by: FAMILY MEDICINE

## 2022-02-10 NOTE — TELEPHONE ENCOUNTER
Elizabeth left  on nurse line stating she will start OT evaluation on the week on 2/20 since pt has appts and dialysis 3 times a week  Once his schedule clears up a bit she will see pt

## 2022-02-10 NOTE — PATIENT INSTRUCTIONS
Orders Placed This Encounter   Procedures    Wound cleansing and dressings     Wound cleansing and dressings       Wound cleansing and dressings                                       All wounds:  Wash your hands with soap and water  Remove old dressing, discard into plastic bag and place in trash  Cleanse the wound with Dakin's solution (rinse) prior to applying a clean dressing  Do not use tissue or cotton balls  Do not scrub the wound  Pat dry using gauze  Shower no; do not get dressing wet     Apply saline moistened gauze  Cover with an ABD pad  Secure with Medfix tape    Change dressing daily and PRN for breakthrough drainage (visiting nurses to do twice per week and family in between)     Continue visiting nurses twice per week for dressing changes, family to do in between nurse visits     Follow up in 6 weeks                                Wound off loading  Continue clinitron bed at home and turn at least every 1-2 hours  To try and limit the amount of time spent sitting in the wheelchair  Keep pressure off the wounds as much as possible                            Continue to try and increase your protein to at least 3 servings or more if possible                                  Continue to try and keep blood sugars as low as possible  Stop Smoking        Treatment in the wound center today:  Cleansed with normal saline and dressed with dakins moistened gauze     Standing Status:   Future     Standing Expiration Date:   2/10/2023

## 2022-02-10 NOTE — PROGRESS NOTES
Patient ID: Arlette Suárez  is a 39 y o  male Date of Birth 1980       Chief Complaint   Patient presents with    Follow Up Wound Care Visit     multiple pressure injuries       Allergies:  Cefepime, Ciprofloxacin hcl, Cymbalta [duloxetine hcl], Gabapentin, Lac bovis, Lactose - food allergy, Lyrica [pregabalin], Penicillins, Polymyxin b, and Rosuvastatin    Diagnosis:   Diagnosis ICD-10-CM Associated Orders   1  Pressure injury of sacral region, stage 4 ([facility])  L89 154 Wound cleansing and dressings     Debridement   2  Pressure injury of left ischium, stage 4 ([facility])  L89 324 Wound cleansing and dressings     Debridement   3  Pressure ulcer of left hip, stage 4 ([facility])  L89 224 Wound cleansing and dressings   4  Pressure ulcer of other site, stage 4 ([facility])  L89 894 Wound cleansing and dressings     Debridement   5  Paraplegia ([facility])  G82 20    6  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 )  E10 22     N18 6     Z99 2         Assessment  & Plan:     Multiple stage IV pressure injuries as noted above  Some with increased undermining  Sacral ulcer with increased necrotic tissue which was surgically debrided  All ulcers were surgically debrided with the exception of the left hip   Dakin's solution packing for the next 24 hours  And he will go back to saline packing   Continue with Clinitron bed  He states he spends most of his time in it   Malnutrition is also impeding wound healing  Subjective:   10/22/20:  Followup multiple pressure injuries to the buttocks and sacrum  He was hospitalized for back problems recently  Continues on hemodialysis  The patient states that his albumin has improved so that he may be able to get flap surgery  However when checking his most recent albumin was only 1 8  His total protein is elevated  11/19/20:  Followup multiple stage IV pressure or injuries to the buttocks and sacrum  Continues on hemodialysis    He is scheduled for plastic surgery for cyst on his forehead  Plastic surgery still will not do any flaps to his sacrum or buttocks because his albumin remains low  He has no other complaints  No fever, chills or cough  12/31/20: Followup multiple stage IV pressure injuries of the buttocks and sacrum  She notes some increased drainage and slight more odor  Never had his plastic surgery appointment for the cyst on his forehead  Denies any fever or chills  1/28/21:  Followup multiple stage IV pressure injuries of the buttocks, sacrum and perineum  Unfortunately, the patient was hospitalized for sepsis and back pain  Was found to have a fracture L3  He was given a back brace but only wears at home  He states that really does not give him very much relief  Unfortunately also while in the hospital, he developed a new pressure injury of his left hip  2/25/21:  Followup multiple stage IV pressure ulcers of the buttocks sacrum and perineum  Patient was hospitalized earlier this month for possible sepsis  He was found to have a burst fracture or osteo of L3  IR obtained specimen and was culture negative  However, no bone was obtained  He is scheduled to have another IR intervention March 16th for bone biopsy  Left hip ulcer broke down with large cavity  Otherwise, cultures were negative  He was discharged on no medications  He has not had any recent fever or chills  He did have fever when he was admitted to the hospital     03/21/2021  Mr Brittanie Grady is a 49-year-old gentleman with paraplegia and multiple stage IV pressure ulcers was referred for evaluation  He has large chronic ulcers on his buttocks and sacrum but he developed a new ulcer over last month on his left hip  He is scheduled for IR biopsy in his spine for possible chronic osteo  He also recently underwent dental extractions and cyst removed from his head and neck      4/22/21:  Followup multiple stage IV pressure injuries of the buttocks, sacrum, perineum and left hip  He was last seen by Dr Marya Jha in March  Since then, unfortunately he was admitted to the hospital with pneumonia  He is now doing better  He is changing his dressings daily because of drainage  There has been no increased drainage, no odor and he denies any fever or chills  He has no cough  5/27/21:  Patient returns regarding his his multiple stage IV pressure injuries of the buttocks, sacrum, perineum and left hip  Unfortunately, he was hospitalized for a few days in April regarding FUO with negative cultures  He was "treated empirically with vancomycin and cefepime - improved with no positive cultures and CT scan finding consistent with known decubitus ulcerations and chronic pelvic osteomyelitis "  He is back to baseline  Patient states that he does notice more odor  7/8/21: Followup multiple stage IV pressure injuries of the buttocks, sacrum, perineum and left hip  He continues with Dakin's packing  He had multiple excisions on his face of follicular cysts by plastic surgery  He still cannot have surgery of his pressure injuries due to chronically low albumin  He has not yet contacted his PCP for nutritionist consultation  8/19/21: Followup multiple stage IV pressure injuries of the buttocks, sacrum, perineum and left  He was packing with Dakin's  Nothing else new  He still has not had a nutrition is consultation  9/30/21: Followup multiple stage IV pressure injuries of the buttocks, sacrum, perineum and left hip  He continues with cleansing Dakin's soaks followed by wet-to-dry saline packing  Nothing is new  No pain  No fever or chills  11/11/21: Followup multiple stage IV pressure injuries of the buttock, sacrum, left hip perineum  Recent hospitalization for sepsis  Possible pneumonia  His ulcers have not changed  He is using Dakin's packing again  No other new complaints    States that he is spending is nights in his Clinitron and only spending up to maximum 3 hours out in his chair and then going back to the Clinitron during the day  12/30/21: Followup multiple stage IV pressure injuries of the buttocks, sacrum, left hip and perineum  He was hospitalized again for three days any states that his ulcers deteriorated slightly because he claims that the dressings were not changed  No fever or chills currently  Still using his Clinitron bed     2/10/22:  Patient returns regarding his multiple stage IV pressure injuries of the buttocks, sacrum, left hip and perineum  Unfortunately, he was hospitalized twice since his last visit  The 1st time he was septic with unknown source  The 2nd hospitalization was due to bleeding from newly placed dialysis catheter  No specific complaints at this time  They have been using saline packing  Still in his Clinitron bed          The following portions of the patient's history were reviewed and updated as appropriate:   Patient Active Problem List   Diagnosis    Paraplegia ([place] [address] )    Atrial fibrillation ([place] [address] )    Chronic suprapubic catheter ([place] [address] )    Colostomy care ([place] [address] )    OAB (overactive bladder)    S/P unilateral BKA (below knee amputation) ([place] [address] )    Sebaceous cyst    Tobacco abuse    Type 1 diabetes mellitus with chronic kidney disease on chronic dialysis ([place] [address] )    [facility], [facility]    Anemia    Chronic indwelling Ortega catheter    Wound healing, delayed    Iron deficiency anemia    Pressure injury of left ischium, stage 4 ([facility]    HTN (hypertension), benign    Neurogenic bladder    GERD (gastroesophageal reflux disease)    Chronic pain    Stage 5 chronic kidney disease on chronic dialysis (HCC)    SOB (shortness of breath)    Penile abscess    Asymptomatic bacteriuria    History of Clostridium difficile infection    Urinary retention    Delirium    Dialysis patient ([place] [address] )    Insulin long-term use ([place] [address] )    Wheelchair dependent    Recurrent Clostridioides difficile diarrhea    Bipolar depression [facility]    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[facility] )    Seizure ([facility] )    Pressure ulcer of sacral region, stage 4 (HCC)    AVM (arteriovenous malformation) of duodenum, acquired    Chronic narcotic dependence (HCC)    Chronic diastolic heart failure (HCC)    Dental caries    Nervous    Pressure ulcer of other site, stage 4 (HCC)    Pressure ulcer of left hip, stage 4 (HCC)    Localized swelling of left foot    ED (erectile dysfunction) of organic origin    Irritable bowel syndrome    Onychomycosis    Pustular folliculitis    Prolonged Q-T interval on ECG    Secondary hyperparathyroidism of renal origin ([facility] )    Abdominal pain    Pneumonia    Wounds, multiple    Immunocompromised state ([facility] )    Severe protein-calorie malnutrition ([facility] )    Chronic osteomyelitis ([facility] )    Acute pulmonary edema ([facility])     Past Medical History:   Diagnosis Date    Ambulatory dysfunction     Anemia     Anemia, iron deficiency     transfusion requiring    Atrial fibrillation ([facility] )     AVM (arteriovenous malformation) of duodenum, 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    Bacteriuria, asymptomatic     Bipolar disorder ([facility])     Chronic deep vein thrombosis (DVT) ([facility])     Chronic indwelling Ortega catheter     Chronic kidney disease     Chronic pain     Chronic pain disorder     Chronic suprapubic catheter ([facility] )     Clostridium difficile infection 08/11/2016    also positive 9/2016, 5/29/2017, 8/15/2017   S/P fecal transplant    Colostomy on examination ([facility] )     Decubitus ulcer     Delirium     Diabetes mellitus ([facility] )     GERD (gastroesophageal reflux disease)     Hemodialysis patient ([facility] )     Hypertension     Memory impairment 2011    s/p diabetic coma    Neurogenic bladder     OAB (overactive bladder)     Paraplegia (HCC)     T3 transverse myelitis vs spinal stoke (AVM); 2012 extensive epidural abscess C7=> conus 2nd extension from deep parspinal abscess L4-S2/sacral decubitus; cord atrophy/myelomalcia T3=>conus     Penile abscess     S/P unilateral BKA (below knee amputation) (HCC)     Right    Sebaceous cyst removed in 2017    Seizures ([facility])     Shortness of breath     Tobacco abuse     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[facility] )     Urinary retention     Wheelchair dependent     Wounds, multiple     pressure ulcers with delayed healing     Past Surgical History:   Procedure Laterality Date    BELOW KNEE LEG AMPUTATION Right 2009    COLONOSCOPY N/A 3/27/2017    Procedure: COLONOSCOPY;  Surgeon: Krissy Deleon MD;  Location: [facility]; Service:     COLONOSCOPY N/A 3/29/2017    Procedure: COLONOSCOPY;  Surgeon: Krissy Deleon MD;  [facility]: [facility]; Service:     COLONOSCOPY N/A 6/15/2017    Procedure: COLONOSCOPY with FMT;  Surgeon: Anuja Goins MD;  Location: [facility]; Service: Gastroenterology    ESOPHAGOGASTRODUODENOSCOPY N/A 3/22/2017    Procedure: ESOPHAGOGASTRODUODENOSCOPY (EGD); Surgeon: Estella Castano DO;  Location: [facility];   Service:     MULTIPLE TOOTH EXTRACTIONS N/A 3/8/2021    Procedure: EXTRACTION TEETH # 2,3,6,10,12,13,14,18,19,20,21,22,23,24,25,26,27,28,29,30;  Surgeon: Marylen Barth, DMD;  Location: [facility];  Service: Maxillofacial     Family History   Problem Relation Age of Onset    Hyperlipidemia Mother     Hypertension Mother     Leukemia Brother     Diabetes Paternal Grandfather      Social History     Socioeconomic History    Marital status: Single     Spouse name: Not on file    Number of children: Not on file    Years of education: Not on file    Highest education level: Not on file   Occupational History    Not on file   Tobacco Use    Smoking status: Current Every Day Smoker     Types: Cigars    Smokeless tobacco: Never Used    Tobacco comment: about 2 cigars/day   Substance and Sexual Activity    Alcohol use: Not Currently     Comment: 1 cup of wine every 3-4 months    Drug use: Not Currently     Types: Marijuana     Comment: rarely; once every 1-2 years    Sexual activity: Not on file   Other Topics Concern    Not on file   Social History Narrative    Not on file     Social Determinants of Health     Financial Resource Strain: Low Risk     Difficulty of Paying Living Expenses: Not hard at all   Food Insecurity: No Food Insecurity    Worried About Running Out of Food in the Last Year: Never true    [address] in the Last Year: Never true   Transportation Needs: No Transportation Needs    Lack of Transportation (Medical): No    Lack of Transportation (Non-Medical):  No   Physical Activity: Not on file   Stress: Not on file   Social Connections: Not on file   Intimate Partner Violence: Not on file   Housing Stability: Not on file       Current Outpatient Medications:     Alcohol Swabs (ALCOHOL PADS) 70 % PADS, by Does not apply route 5 (five) times a day, Disp: 300 each, Rfl: 5    amLODIPine (NORVASC) 10 mg tablet, Take 1 tablet (10 mg total) by mouth daily, Disp: 90 tablet, Rfl: 1    ammonium lactate (LAC-HYDRIN) 12 % cream, , Disp: , Rfl:     apixaban (ELIQUIS) 5 mg, Take 1 tablet (5 mg total) by mouth 2 (two) times a day, Disp: 270 tablet, Rfl: 1    ascorbic acid (VITAMIN C) 250 mg tablet, TAKE 2 TABLETS (500 MG TOTAL) BY MOUTH DAILY, Disp: 180 tablet, Rfl: 1    atorvastatin (LIPITOR) 20 mg tablet, Take 1 tablet (20 mg total) by mouth daily at bedtime, Disp: 90 tablet, Rfl: 1    B Complex-C-Folic Acid (Super B-Complex/Vit C/FA) TABS, TAKE 1 TABLET BY MOUTH EVERY DAY AS DIRECTED, Disp: 30 tablet, Rfl: 8    baclofen 20 mg tablet, Take 20 mg by mouth 2 (two) times a day , Disp: , Rfl:     SUSAN MICROLET LANCETS lancets, Use as instructed to check blood sugar 4 times a day Dx e10 29, Disp: 200 each, Rfl: 5    benzonatate (TESSALON PERLES) 100 mg capsule, TAKE 1 CAPSULE BY MOUTH THREE TIMES A DAY AS NEEDED FOR COUGH, Disp: 30 capsule, Rfl: 0    Blood Glucose Monitoring Suppl ([employer] CONTOUR NEXT USB MONITOR) w/Device KIT, Testing 4 times a day, Disp: 1 kit, Rfl: 0    calcitriol (ROCALTROL) 0 5 MCG capsule, Take 2 mcg by mouth, Disp: , Rfl:     Capsaicin 0 033 % CREA, 5 times a day for 1st week  Then 3 times a day for next 3 weeks, Disp: 56 6 g, Rfl: 2    carvedilol (COREG) 25 mg tablet, Take 1 tablet (25 mg total) by mouth 2 (two) times a day, Disp: 180 tablet, Rfl: 1    carvedilol (COREG) 6 25 mg tablet, Take 6 25 mg by mouth, Disp: , Rfl:     cetirizine (ZyrTEC) 10 mg tablet, Take 1 tablet (10 mg total) by mouth daily, Disp: 90 tablet, Rfl: 1    cetirizine (ZyrTEC) 5 MG tablet, TAKE 1 TABLET BY MOUTH EVERY DAY, Disp: 90 tablet, Rfl: 0    Cholecalciferol (VITAMIN D-3) 1000 units CAPS, Take 2 capsules by mouth daily, Disp: 30 capsule, Rfl: 0    clindamycin (CLINDAGEL) 1 % gel, APPLY TO AFFECTED AREA TWICE A DAY, Disp: 60 g, Rfl: 2    [employer] ACETAMINOPHEN EX  MG tablet, TAKE 2 TABLETS BY MOUTH EVERY 8 HOURS AS NEEDED FOR MILD OR MODERATE PAIN (PAIN SCORE 1 6)  , Disp: , Rfl:     cyanocobalamin ([employer] Vitamin B-12) 1000 MCG tablet, Take 1 tablet (1,000 mcg total) by mouth daily, Disp: 30 tablet, Rfl: 5    cyclobenzaprine (FLEXERIL) 10 mg tablet, Take 10 mg by mouth 3 (three) times a day as needed, Disp: , Rfl: 0    cyclobenzaprine (FLEXERIL) 5 mg tablet, , Disp: , Rfl:     dicyclomine (BENTYL) 10 mg capsule, TAKE 1 CAPSULE (10 MG TOTAL) BY MOUTH 3 (THREE) TIMES A DAY BEFORE MEALS, Disp: 270 capsule, Rfl: 1    Disposable Gloves MISC, Use 7 times a day, 4 boxes of gloves XL Dx type 1 DM, paraplegia, Disp: 4 each, Rfl: 11    doxazosin (CARDURA) 2 mg tablet, TAKE 1 TABLET BY MOUTH EVERY EVENING , Disp: 90 tablet, Rfl: 0    epoetin kaushik (EPOGEN,PROCRIT) 20,000 units/mL, Inject 10,000 Units under the skin, Disp: , Rfl:     epoetin kaushik (EPOGEN,PROCRIT) 20,000 units/mL, Inject 5,000 Units under the skin, Disp: , Rfl:     ergocalciferol (VITAMIN D2) 50,000 units, TAKE 1 CAPSULE BY MOUTH ONE TIME PER WEEK, Disp: , Rfl:     ferrous sulfate 325 (65 Fe) mg tablet, Take 1 tablet (325 mg total) by mouth 3 (three) times a day, Disp: 90 tablet, Rfl: 3    glucose 4-6 GM-MG, Chew 2 tablets daily as needed for low blood sugar, Disp: 10 tablet, Rfl: 1    glucose blood (SUSAN CONTOUR TEST) test strip, Use as instructed to test blood sugar 4 times a day Dx e10 29, Disp: 200 each, Rfl: 3    Gvoke PFS 1 MG/0 2ML SOSY, INJECT 1 ML (1 MG TOTAL) INTO A MUSCLE ONCE FOR 1 DOSE, Disp: , Rfl:     HYDROmorphone (DILAUDID) 4 mg tablet, TAKE 1 TABLET BY MOUTH EVERY 4 HOURS AS NEEDED FOR MODERATE PAIN (PAIN SCORE 4 6)  MAX  24 MG/DAY, Disp: , Rfl:     Incontinence Supply Disposable (Incontinence Brief Large) MISC, Use 6 (six) times a day Size xl, Disp: 180 each, Rfl: 11    Incontinence Supply Disposable (Undergarment) MISC, Use 6 a day, 5 boxes, xl Dx R51, paraplegia, Disp: 5 each, Rfl: 11    Incontinence Supply Disposable (Underpads) MISC, Use 2 a day, Disp: 5 each, Rfl: 11    insulin detemir (LEVEMIR) 100 units/mL subcutaneous injection, Inject 6 5 Units under the skin 2 (two) times a day , Disp: , Rfl:     ketoconazole (NIZORAL) 2 % cream, APPLY SMALL AMOUNT TO EDGES OF MOUTH TWICE DAILY   DO NOT SWALLOW, Disp: 15 g, Rfl: 1    Ketostix strip, USE WITH HYPERGLYCEMIA E10 65 **NOT COVERED**, Disp: , Rfl:     LEVEMIR FLEXTOUCH 100 units/mL injection pen, Inject 14 Units under the skin 2 (two) times a day, Disp: 15 pen, Rfl: 3    levETIRAcetam (KEPPRA) 250 mg tablet, TAKE 1 TABLET (250 MG TOTAL) BY MOUTH 3 (THREE) TIMES A WEEK (MWF) AT 1200 , Disp: , Rfl:     lidocaine (XYLOCAINE) 5 % ointment, Apply topically as needed for mild pain, Disp: 35 44 g, Rfl: 0    Lidocaine HCl 4 % LIQD, Apply 1 application topically 3 (three) times a day as needed (pain), Disp: 73 mL, Rfl: 5    Lokelma 10 g PACK, , Disp: , Rfl:     losartan (COZAAR) 100 MG tablet, Take 1 tablet (100 mg total) by mouth daily, Disp: 90 tablet, Rfl: 1    Melatonin ER 3 MG TBCR, Take 6 mg by mouth, Disp: , Rfl:     Methoxy PEG-Epoetin Beta (MIRCERA IJ), 150 mcg Once every 2 weeks, Disp: , Rfl:     Misc  Devices (Wheelchair Cushion) MISC, Use daily, Disp: 1 each, Rfl: 0    Misc   Devices [facility]) MISC, by Does not apply route daily Wheelchair ramp, dx g82 20, Disp: 1 each, Rfl: 0    Multiple Vitamin (Daily-Enriqueta Multivitamin) TABS, TAKE 1 TABLET BY MOUTH EVERY DAY, Disp: 90 tablet, Rfl: 2    Narcan 4 MG/0 1ML nasal spray, , Disp: , Rfl:     NOVOLOG 100 UNIT/ML injection, Inject 5 Units under the skin 3 (three) times a day with meals , Disp: , Rfl:     NovoLOG FlexPen 100 units/mL injection pen, INJECT 3 UNITS UNDER THE SKIN 3 (THREE) TIMES A DAY BEFORE MEALS , Disp: , Rfl:     nystatin (MYCOSTATIN) 500,000 units/5 mL suspension, APPLY 5 ML (500,000 UNITS TOTAL) TO THE MOUTH OR THROAT 4 (FOUR) TIMES A DAY, Disp: 473 mL, Rfl: 1    ondansetron (ZOFRAN-ODT) 4 mg disintegrating tablet, Take 2 tablets (8 mg total) by mouth every 8 (eight) hours as needed for nausea or vomiting, Disp: 30 tablet, Rfl: 1    oxybutynin (DITROPAN) 5 mg tablet, Take 3 tablets (15 mg total) by mouth daily, Disp: 270 tablet, Rfl: 1    pantoprazole (PROTONIX) 40 mg tablet, Take 1 tablet (40 mg total) by mouth daily, Disp: 30 tablet, Rfl: 1    phenytoin extended (DILANTIN) 200 MG ER capsule, Take 1 capsule (200 mg total) by mouth 2 (two) times a day, Disp: 180 capsule, Rfl: 1    phenytoin extended (DILANTIN) 200 MG ER capsule, TAKE 1 CAPSULE BY MOUTH TWICE A DAY, Disp: 180 capsule, Rfl: 0    phenytoin extended (DILANTIN) 300 MG ER capsule, TAKE 1 CAPSULE BY MOUTH EVERY DAY, Disp: 90 capsule, Rfl: 1    phenytoin extended (DILANTIN) 300 MG ER capsule, Take 1 capsule (300 mg total) by mouth daily, Disp: 90 capsule, Rfl: 1    risperiDONE (RisperDAL) 0 5 mg tablet, TAKE 1 TABLET (0 5 MG TOTAL) BY MOUTH 2 (TWO) TIMES A DAY, Disp: 180 tablet, Rfl: 1    senna (SENOKOT) 8 6 mg, Take 1 tablet (8 6 mg total) by mouth 2 (two) times a day, Disp: 180 tablet, Rfl: 1    sertraline (ZOLOFT) 25 mg tablet, TAKE 1/2 TABLET BY MOUTH EVERY DAY, Disp: 45 tablet, Rfl: 0    sodium hypochlorite (DAKIN'S HALF-STRENGTH) external solution, USE AS DIRECTED ONCE  DAILY, Disp: 473 mL, Rfl: 2    Sodium Zirconium Cyclosilicate 10 g PACK, Take 10 g by mouth daily, Disp: , Rfl:     sucralfate (CARAFATE) 1 g/10 mL suspension, Take 10 mL (1 g total) by mouth 4 (four) times a day (with meals and at bedtime), Disp: 420 mL, Rfl: 0    SUPER B COMPLEX & C TABS, TAKE 1 CAPSULE BY MOUTH ONCE FOR 1 DOSE AS DIRECTED, Disp: 90 tablet, Rfl: 2    tiZANidine (ZANAFLEX) 4 mg tablet, Take 1 tablet by mouth 4 (four) times a day, Disp: , Rfl:     tiZANidine (ZANAFLEX) 4 mg tablet, , Disp: , Rfl:     triamcinolone (KENALOG) 0 1 % ointment, PLEASE SEE ATTACHED FOR DETAILED DIRECTIONS, Disp: 454 g, Rfl: 0    Zinc Sulfate 220 (50 Zn) MG TABS, Take 1 tablet by mouth daily, Disp: 90 tablet, Rfl: 1    Review of Systems   Constitutional: Negative for activity change, appetite change (Improving), chills, fatigue, fever and unexpected weight change  HENT: Negative for congestion, hearing loss and postnasal drip  Eyes: Negative for visual disturbance  Respiratory: Negative for cough and shortness of breath  Cardiovascular: Negative for chest pain and leg swelling  Gastrointestinal: Negative for abdominal pain, constipation (Slight), diarrhea, nausea and vomiting  Genitourinary: Negative for dysuria and urgency  Indwelling Ortega catheter   Musculoskeletal: Positive for gait problem (Nonambulatory, paraplegic)  Skin: Positive for wound (Sacral, left ischial, left hip and perineum)  Negative for rash  Cyst on right  cheek   Neurological: Positive for weakness  Hematological: Does not bruise/bleed easily  Psychiatric/Behavioral: Positive for dysphoric mood           Objective:  /86   Pulse 90   Temp 99 2 °F (37 3 °C)   Resp 18   Pain Score:   2 Physical Exam  Vitals and nursing note reviewed  Constitutional:       Appearance: Normal appearance  He is underweight  HENT:      Head: Normocephalic and atraumatic  Abdominal:      General: Abdomen is flat  Comments: The abdomen around the ostomy is slightly full and tender  Skin:     Findings: Wound (Both buttocks and sacrum) present  No erythema  Comments: Sacral ulcer with areas of necrotic tissue  Slightly less undermining     Left ischial ulcer also with more undermining  The left hip ulcer has undermining and tunnel again at 11:00   Approximately 5 3 cm, improved  Bone is not probed  Perineal ulcer with small areas of necrotic tissue at the edge and centrally  Left ischial ulcer unchanged  More fibrin in all of the ulcers than in the past   Slight malodor but no signs of infection  Neurological:      Mental Status: He is alert and oriented to person, place, and time  Psychiatric:         Mood and Affect: Affect normal          Cognition and Memory: Cognition normal          Wound Ischium Left (Active)   Wound Image       02/10/22 1418   Wound Description Granulation tissue; Hypergranulation 02/10/22 1343   Pressure Injury Stage 4 02/10/22 1343   Irene-wound Assessment Scar Tissue 02/10/22 1343   Wound Length (cm) 5 5 cm 02/10/22 1343   Wound Width (cm) 4 cm 02/10/22 1343   Wound Depth (cm) 1 4 cm 02/10/22 1343   Wound Surface Area (cm^2) 22 cm^2 02/10/22 1343   Wound Volume (cm^3) 30 8 cm^3 02/10/22 1343   Calculated Wound Volume (cm^3) 30 8 cm^3 02/10/22 1343   Change in Wound Size % -62 96 02/10/22 1343   Undermining 2 8 02/10/22 1343   Undermining is depth extending from 4-9 02/10/22 1343   Drainage Amount Large 02/10/22 1343   Drainage Description Serosanguineous 02/10/22 1343   Non-staged Wound Description Full thickness 12/30/21 1447   Treatments Cleansed 12/30/21 1447   Dressing Changed Changed 12/30/21 1447   Patient Tolerance Tolerated well 12/30/21 1447 Dressing Status Removed 12/30/21 1447       Wound Perineum (Active)   Wound Image       02/10/22 1417   Wound Description Granulation tissue;Pink 12/30/21 1448   Pressure Injury Stage 4 07/08/21 1348   Irene-wound Assessment Pink;Scar Tissue 12/30/21 1448   Wound Length (cm) 4 5 cm 02/10/22 1345   Wound Width (cm) 6 cm 02/10/22 1345   Wound Depth (cm) 1 cm 02/10/22 1345   Wound Surface Area (cm^2) 27 cm^2 02/10/22 1345   Wound Volume (cm^3) 27 cm^3 02/10/22 1345   Calculated Wound Volume (cm^3) 27 cm^3 02/10/22 1345   Change in Wound Size % -157 14 02/10/22 1345   Undermining 5 2 02/10/22 1345   Undermining is depth extending from 1-8 02/10/22 1345   Drainage Amount Large 02/10/22 1345   Drainage Description Serosanguineous 02/10/22 1345   Non-staged Wound Description Full thickness 12/30/21 1448   Treatments Cleansed 12/30/21 1448   Dressing Changed Changed 12/30/21 1448   Patient Tolerance Tolerated well 12/30/21 1448   Dressing Status Removed 12/30/21 1448       Wound Sacrum (Active)   Wound Image       02/10/22 1413   Wound Description Beefy red;Slough 02/10/22 1347   Pressure Injury Stage 4 02/10/22 1347   Irene-wound Assessment Scar Tissue; Other (Comment) 02/10/22 1347   Wound Length (cm) 6 cm 02/10/22 1347   Wound Width (cm) 10 cm 02/10/22 1347   Wound Depth (cm) 1 cm 02/10/22 1347   Wound Surface Area (cm^2) 60 cm^2 02/10/22 1347   Wound Volume (cm^3) 60 cm^3 02/10/22 1347   Calculated Wound Volume (cm^3) 60 cm^3 02/10/22 1347   Change in Wound Size % 20 02/10/22 1347   Undermining 1 5 02/10/22 1347   Undermining is depth extending from 3-8 12/30/21 1451   Drainage Amount Moderate 12/30/21 1451   Drainage Description Serosanguineous 12/30/21 1451   Non-staged Wound Description Full thickness 12/30/21 1451   Treatments Cleansed 12/30/21 1451   Dressing Changed Changed 12/30/21 1451   Patient Tolerance Tolerated well 12/30/21 1451   Dressing Status Removed 12/30/21 1451       Wound 01/28/21 Pressure Injury Hip Left;Lateral (Active)   Wound Image   02/10/22 1340   Wound Description Granulation tissue 02/10/22 1341   Pressure Injury Stage 4 02/10/22 1341   Irene-wound Assessment Scar Tissue; Other (Comment) 02/10/22 1341   Wound Length (cm) 2 cm 02/10/22 1341   Wound Width (cm) 2 cm 02/10/22 1341   Wound Depth (cm) 1 2 cm 02/10/22 1341   Wound Surface Area (cm^2) 4 cm^2 02/10/22 1341   Wound Volume (cm^3) 4 8 cm^3 02/10/22 1341   Calculated Wound Volume (cm^3) 4 8 cm^3 02/10/22 1341   Tunneling 4 7 cm 02/10/22 1341   Tunneling in depth located at 10 02/10/22 1341   Undermining 2 8 02/10/22 1341   Undermining is depth extending from 7-1 02/10/22 1341   Drainage Amount Moderate 02/10/22 1341   Drainage Description Serosanguineous 02/10/22 1341   Non-staged Wound Description Full thickness 12/30/21 1452   Treatments Cleansed 12/30/21 1452   Wound packed? Yes 12/30/21 1452   Packing- # removed 1 12/30/21 1452   Dressing Changed Changed 12/30/21 1452   Patient Tolerance Tolerated well 12/30/21 1452   Dressing Status Removed 12/30/21 1452                          Debridement   Wound Ischium Left    Universal Protocol:  Consent: Verbal consent obtained  Written consent obtained  Consent given by: patient  Time out: Immediately prior to procedure a "time out" was called to verify the correct patient, procedure, equipment, support staff and site/side marked as required    Patient understanding: patient states understanding of the procedure being performed  Patient identity confirmed: verbally with patient      Performed by: physician  Debridement type: surgical  Level of debridement: subcutaneous tissue  Pain control: lidocaine 4%  Post-debridement measurements  Length (cm): 5 5  Width (cm): 4  Depth (cm): 1 5  Percent debrided: 100%  Surface Area (cm^2): 22  Area debrided (cm^2): 22  Volume (cm^3): 33  Tissue and other material debrided: subcutaneous tissue  Devitalized tissue debrided: fibrin and necrotic debris  Instrument(s) utilized: curette  Bleeding: medium  Hemostasis obtained with: pressure  Procedural pain (0-10): insensate  Post-procedural pain: insensate   Response to treatment: procedure was tolerated well    Debridement   Wound Perineum    Universal Protocol:  Consent: Verbal consent obtained  Written consent obtained  Consent given by: patient  Time out: Immediately prior to procedure a "time out" was called to verify the correct patient, procedure, equipment, support staff and site/side marked as required  Patient understanding: patient states understanding of the procedure being performed  Patient identity confirmed: verbally with patient      Performed by: physician  Debridement type: surgical  Level of debridement: subcutaneous tissue  Pain control: lidocaine 4%  Post-debridement measurements  Length (cm): 4 5  Width (cm): 6  Depth (cm): 1 1  Percent debrided: 100%  Surface Area (cm^2): 27  Area debrided (cm^2): 27  Volume (cm^3): 29 7  Tissue and other material debrided: subcutaneous tissue  Devitalized tissue debrided: fibrin and necrotic debris  Instrument(s) utilized: curette  Bleeding: small  Hemostasis obtained with: pressure  Procedural pain (0-10): insensate  Post-procedural pain: insensate   Response to treatment: procedure was tolerated well    Debridement   Wound Sacrum    [place] Protocol:  Consent: Verbal consent obtained  Written consent obtained  Consent given by: patient  Time out: Immediately prior to procedure a "time out" was called to verify the correct patient, procedure, equipment, support staff and site/side marked as required    Patient understanding: patient states understanding of the procedure being performed  Patient identity confirmed: verbally with patient      Performed by: physician  Debridement type: surgical  Level of debridement: subcutaneous tissue  Pain control: lidocaine 4%  Post-debridement measurements  Length (cm): 6  Width (cm): 10  Depth (cm): 1 1  Percent debrided: 70%  Surface Area (cm^2): 60  Area debrided (cm^2): 42  Volume (cm^3): 66  Tissue and other material debrided: dermis and subcutaneous tissue  Devitalized tissue debrided: fibrin and necrotic debris  Instrument(s) utilized: curette  Bleeding: small  Hemostasis obtained with: pressure  Procedural pain (0-10): insensate  Post-procedural pain: insensate   Response to treatment: procedure was tolerated well  Debridement Comments: After debridement of all ulcers, normal saline solution was used to flush all areas  This was followed by Dakin's soaked gauze packing  All undermined areas of the ulcers were also debrided  Wound Instructions:  Orders Placed This Encounter   Procedures    Wound cleansing and dressings     Wound cleansing and dressings       Wound cleansing and dressings                                       All wounds:  Wash your hands with soap and water  Remove old dressing, discard into plastic bag and place in trash  Cleanse the wound with Dakin's solution (rinse) prior to applying a clean dressing  Do not use tissue or cotton balls  Do not scrub the wound  Pat dry using gauze  Shower no; do not get dressing wet     Apply saline moistened gauze  Cover with an ABD pad  Secure with Medfix tape    Change dressing daily and PRN for breakthrough drainage (visiting nurses to do twice per week and family in between)     Continue visiting nurses twice per week for dressing changes, family to do in between nurse visits     Follow up in 6 weeks                                Wound off loading  Continue clinitron bed at home and turn at least every 1-2 hours  To try and limit the amount of time spent sitting in the wheelchair  Keep pressure off the wounds as much as possible                            Continue to try and increase your protein to at least 3 servings or more if possible                                  Continue to try and keep blood sugars as low as possible  Stop Smoking        Treatment in the wound center today:  Cleansed with normal saline and dressed with dakins moistened gauze     Standing Status:   Future     Standing Expiration Date:   2/10/2023    Debridement     This order was created via procedure documentation    Debridement     This order was created via procedure documentation    Debridement     This order was created via procedure documentation       Monalisa Elise MD, CHT, CWS       Portions of the record may have been created with voice recognition software  Occasional wrong word or "sound alike" substitutions may have occurred due to the inherent limitations of voice recognition software  Read the chart carefully and recognize, using context, where substitutions have occurred

## 2022-02-10 NOTE — TELEPHONE ENCOUNTER
pts mom called in states that she has called in and left messages regaring sons medication refills for dexilant or dexlansoprazole how ever I dont see this medication on his list, mom is requesting a  call back as soon as possible

## 2022-02-16 ENCOUNTER — TELEPHONE (OUTPATIENT)
Dept: FAMILY MEDICINE CLINIC | Facility: CLINIC | Age: 42
End: 2022-02-16

## 2022-02-16 NOTE — TELEPHONE ENCOUNTER
[facility] form received by fax on 02/16/2022  to be completed by PCP  Copy made and placed in PCP folder  Forms to be delivered to PCP mailbox at assigned time      (MR Number: [id number])

## 2022-02-17 ENCOUNTER — OFFICE VISIT (OUTPATIENT)
Dept: FAMILY MEDICINE CLINIC | Facility: CLINIC | Age: 42
End: 2022-02-17

## 2022-02-17 VITALS
HEART RATE: 90 BPM | HEIGHT: 76 IN | SYSTOLIC BLOOD PRESSURE: 138 MMHG | WEIGHT: 153 LBS | TEMPERATURE: 98 F | DIASTOLIC BLOOD PRESSURE: 84 MMHG | RESPIRATION RATE: 22 BRPM | OXYGEN SATURATION: 99 % | BODY MASS INDEX: 18.63 KG/M2

## 2022-02-17 DIAGNOSIS — R77.0 ABNORMAL ALBUMIN: ICD-10-CM

## 2022-02-17 DIAGNOSIS — N18.5 ANEMIA DUE TO STAGE 5 CHRONIC KIDNEY DISEASE, NOT ON CHRONIC DIALYSIS (HCC): Primary | ICD-10-CM

## 2022-02-17 DIAGNOSIS — R10.9 ABDOMINAL PAIN, UNSPECIFIED ABDOMINAL LOCATION: ICD-10-CM

## 2022-02-17 DIAGNOSIS — I82.621 ACUTE DEEP VEIN THROMBOSIS (DVT) OF RIGHT UPPER EXTREMITY, UNSPECIFIED VEIN (HCC): ICD-10-CM

## 2022-02-17 DIAGNOSIS — K21.9 GASTROESOPHAGEAL REFLUX DISEASE WITHOUT ESOPHAGITIS: ICD-10-CM

## 2022-02-17 DIAGNOSIS — F31.9 BIPOLAR DEPRESSION (HCC): ICD-10-CM

## 2022-02-17 DIAGNOSIS — D63.1 ANEMIA DUE TO STAGE 5 CHRONIC KIDNEY DISEASE, NOT ON CHRONIC DIALYSIS (HCC): Primary | ICD-10-CM

## 2022-02-17 DIAGNOSIS — Z97.8 CHRONIC INDWELLING FOLEY CATHETER: ICD-10-CM

## 2022-02-17 DIAGNOSIS — I10 HTN (HYPERTENSION), BENIGN: ICD-10-CM

## 2022-02-17 PROCEDURE — 99495 TRANSJ CARE MGMT MOD F2F 14D: CPT | Performed by: NURSE PRACTITIONER

## 2022-02-17 RX ORDER — AMLODIPINE BESYLATE 10 MG/1
10 TABLET ORAL DAILY
Qty: 90 TABLET | Refills: 1 | Status: SHIPPED | OUTPATIENT
Start: 2022-02-17

## 2022-02-17 RX ORDER — ONDANSETRON 4 MG/1
8 TABLET, ORALLY DISINTEGRATING ORAL EVERY 8 HOURS PRN
Qty: 30 TABLET | Refills: 1 | Status: SHIPPED | OUTPATIENT
Start: 2022-02-17 | End: 2022-07-21 | Stop reason: SDUPTHER

## 2022-02-17 RX ORDER — RISPERIDONE 0.5 MG/1
0.5 TABLET, FILM COATED ORAL 2 TIMES DAILY
Qty: 180 TABLET | Refills: 1 | Status: SHIPPED | OUTPATIENT
Start: 2022-02-17

## 2022-02-17 RX ORDER — SUCRALFATE ORAL 1 G/10ML
1 SUSPENSION ORAL
Qty: 420 ML | Refills: 0 | Status: SHIPPED | OUTPATIENT
Start: 2022-02-17 | End: 2022-07-21 | Stop reason: SDUPTHER

## 2022-02-17 NOTE — LETTER
At infusion, ask if you are receiving Epogen injection for anemia  Referral to Hematology has been made for anemia and low albumin

## 2022-02-17 NOTE — PROGRESS NOTES
Transition of Care  Follow-up After Hospitalization    Maryann Castro  39 y o  male   Date:  2/17/2022      Hospital records were reviewed  Medications upon discharge reviewed/updated  Discharge Disposition:  home  Follow up visits with other specialists: [facility] (dialysis)      HPI:  Maryann Castro  "Gael Gillis" is a very pleasant 39 y o  male with very complex past medical history significant for Transverse myelitis and paraplegia, end-stage renal disease on dialysis, atrial fibrillation on Eliquis, neurogenic bladder with chronic suprapubic catheter, colostomy, type 2 diabetes, multiple decubitus ulcers and recent MSSA bacteremia with tricuspid valve endocarditis  He is here at his PCP office for Transition of Care appointment  Summary of Visit: "He presented to [facility] with complaint of bleeding around his newly inserted right anterior chest wall dialysis catheter   Tunneled line was placed by interventional radiology on 1/17 after being admitted for MSSA bacteremia and endocarditis  Haleigh Luther was discharged on January 29 with plan to continue IV cefazolin 2 g into his catheter on Monday and Wednesday for 20 days and 3 g on Friday for an additional 18 days  IR was consulted who placed sutures around the catheter and at exit site, bleeding resolved  Hemoglobin dropped to 5 9  Patient refused blood transfusion, repeat Hgb was stable without ongoing bleed  Bloodless medicine consulted, epo and iron levels were ordered  Eliquis was discontinued as risks > benefit at current time  He is in sinus rhythm and without current VTE  RUE venous doppler ordered to evaluate for clot, DVT and SVT were present  IR recommended replacing HD line to the left; however, given current line is functional and Hgb is 5 6 - procedure will be deferred  Risk > benefit  Specialists including nephro is in agreement, epo administered during HD on 2/2  Eliquis can be resumed by PCP as Hgb improves   Discussed plan with nephro, heme-onc and IR  Pain management was contacted regarding patient's ongoing complaint of pain, review of narcotic requirements and care plan  No changes to his out patient regimen were ordered at discharge  Patient will need to have labs repeated during HD and epo can be administered if appropriate  He will be accommodated by pain management office for an expedited appointment "    Today, pt denies any drainage from chest port  He would like info on Albumin infusions - states he's trying to get Hgb back up so he can surgery on stage IV ulcers but does not accept whole blood for Sikhism reasons  He does follow with wound care  He requests med refills today  ROS: Review of Systems   Constitutional: Negative for chills and fever  HENT: Negative for ear pain and sore throat  Eyes: Negative for pain and visual disturbance  Respiratory: Negative for cough and shortness of breath  Cardiovascular: Negative for chest pain and palpitations  Gastrointestinal: Negative for abdominal pain and vomiting  Genitourinary: Negative for dysuria and hematuria  Musculoskeletal: Positive for back pain  Negative for arthralgias  Skin: Positive for wound  Negative for color change and rash  Neurological: Negative for seizures and syncope  All other systems reviewed and are negative  Past Medical History:   Diagnosis Date    Ambulatory dysfunction     Anemia     Anemia, iron deficiency     transfusion requiring    [facility] fibrillation [facility]     AVM (arteriovenous malformation) of duodenum, 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    Bacteriuria, asymptomatic     Bipolar disorder (HCC)     [facility]     Chronic indwelling Ortega catheter     Chronic kidney disease     Chronic pain     Chronic pain disorder     Chronic suprapubic catheter ([facility]     Clostridium difficile infection 08/11/2016    also positive 9/2016, 5/29/2017, 8/15/2017   S/P fecal transplant  Colostomy on examination ([facility] )     Decubitus ulcer     Delirium     Diabetes mellitus ([facility] )     GERD (gastroesophageal reflux disease)     Hemodialysis patient ([facility] )     Hypertension     Memory impairment 2011    s/p diabetic coma    Neurogenic bladder     OAB (overactive bladder)     Paraplegia (HCC)     T3 transverse myelitis vs spinal stoke (AVM); 2012 extensive epidural abscess C7=> conus 2nd extension from deep parspinal abscess L4-S2/sacral decubitus; cord atrophy/myelomalcia T3=>conus     Penile abscess     S/P unilateral BKA (below knee amputation) ([facility] )     Right    Sebaceous cyst removed in 2017    Seizures (HCC)     Shortness of breath     Tobacco abuse     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HCC)     Urinary retention     Wheelchair dependent     Wounds, multiple     pressure ulcers with delayed healing       Past Surgical History:   Procedure Laterality Date    BELOW KNEE LEG AMPUTATION Right 2009    COLONOSCOPY N/A 3/27/2017    Procedure: COLONOSCOPY;  Surgeon: Dale Linton MD;  Location: [facility]; Service:     COLONOSCOPY N/A 3/29/2017    Procedure: COLONOSCOPY;  Surgeon: Dale Linton MD;  [facility]; Service:     COLONOSCOPY N/A 6/15/2017    Procedure: COLONOSCOPY with FMT;  Surgeon: Cooper Diez MD;  [facility]: [facility]; Service: Gastroenterology    ESOPHAGOGASTRODUODENOSCOPY N/A 3/22/2017    Procedure: ESOPHAGOGASTRODUODENOSCOPY (EGD); Surgeon: Leatha Mendez DO;  Location: [facility];   Service:     MULTIPLE TOOTH EXTRACTIONS N/A 3/8/2021    Procedure: EXTRACTION TEETH # 2,3,6,10,12,13,14,18,19,20,21,22,23,24,25,26,27,28,29,30;  Surgeon: Tommy Bourne DMD;  Location[facility];  Service: Maxillofacial       Social History     Socioeconomic History    Marital status: Single     Spouse name: None    Number of children: None    Years of education: None    Highest education level: None   Occupational History    None   Tobacco Use    Smoking status: Current Every Day Smoker     Types: Cigars    Smokeless tobacco: Never Used    Tobacco comment: about 2 cigars/day   Substance and Sexual Activity    Alcohol use: Not Currently     Comment: 1 cup of wine every 3-4 months    Drug use: Not Currently     Types: Marijuana     Comment: rarely; once every 1-2 years    Sexual activity: None   Other Topics Concern    None   Social History Narrative    None     Social Determinants of Health     Financial Resource Strain: Low Risk     Difficulty of Paying Living Expenses: Not hard at all   Food Insecurity: No Food Insecurity    Worried About Running Out of Food in the Last Year: Never true    Jose of Food in the Last Year: Never true   Transportation Needs: No Transportation Needs    Lack of Transportation (Medical): No    Lack of Transportation (Non-Medical): No   Physical Activity: Not on file   Stress: Not on file   Social Connections: Not on file   Intimate Partner Violence: Not on file   Housing Stability: Not on file       Family History   Problem Relation Age of Onset    Hyperlipidemia Mother     Hypertension Mother     Leukemia Brother     Diabetes Paternal Grandfather        Allergies   Allergen Reactions    Cefepime      Other reaction(s):  Other (See Comments)  encephalopathy; tolerates cefazolin 6/14, miguel Ceftriaxone    Ciprofloxacin Hcl     Cymbalta [Duloxetine Hcl]     Gabapentin Tremor    Lac Bovis GI Intolerance    Lactose - Food Allergy GI Intolerance     Other reaction(s): Unknown    Lyrica [Pregabalin]     Penicillins Other (See Comments)     miguel Zosyn 08/28/17  Tolerates Ancef  Miguel Augmentin    Polymyxin B     Rosuvastatin Other (See Comments) and Palpitations     Weakness         Current Outpatient Medications:     Alcohol Swabs (ALCOHOL PADS) 70 % PADS, by Does not apply route 5 (five) times a day, Disp: 300 each, Rfl: 5    amLODIPine (NORVASC) 10 mg tablet, Take 1 tablet (10 mg total) by mouth daily, Disp: 90 tablet, Rfl: 1    ammonium lactate (LAC-HYDRIN) 12 % cream, , Disp: , Rfl:     apixaban (ELIQUIS) 5 mg, Take 1 tablet (5 mg total) by mouth 2 (two) times a day, Disp: 270 tablet, Rfl: 1    ascorbic acid (VITAMIN C) 250 mg tablet, TAKE 2 TABLETS (500 MG TOTAL) BY MOUTH DAILY, Disp: 180 tablet, Rfl: 1    atorvastatin (LIPITOR) 20 mg tablet, Take 1 tablet (20 mg total) by mouth daily at bedtime, Disp: 90 tablet, Rfl: 1    B Complex-C-Folic Acid (Super B-Complex/Vit C/FA) TABS, TAKE 1 TABLET BY MOUTH EVERY DAY AS DIRECTED, Disp: 30 tablet, Rfl: 8    baclofen 20 mg tablet, Take 20 mg by mouth 2 (two) times a day , Disp: , Rfl:     SUSAN MICROLET LANCETS lancets, Use as instructed to check blood sugar 4 times a day Dx e10 29, Disp: 200 each, Rfl: 5    benzonatate (TESSALON PERLES) 100 mg capsule, TAKE 1 CAPSULE BY MOUTH THREE TIMES A DAY AS NEEDED FOR COUGH, Disp: 30 capsule, Rfl: 0    Blood Glucose Monitoring Suppl (CENX CONTOUR NEXT USB MONITOR) w/Device KIT, Testing 4 times a day, Disp: 1 kit, Rfl: 0    calcitriol (ROCALTROL) 0 5 MCG capsule, Take 2 mcg by mouth, Disp: , Rfl:     Capsaicin 0 033 % CREA, 5 times a day for 1st week  Then 3 times a day for next 3 weeks, Disp: 56 6 g, Rfl: 2    carvedilol (COREG) 25 mg tablet, Take 1 tablet (25 mg total) by mouth 2 (two) times a day, Disp: 180 tablet, Rfl: 1    carvedilol (COREG) 6 25 mg tablet, Take 6 25 mg by mouth, Disp: , Rfl:     cetirizine (ZyrTEC) 10 mg tablet, Take 1 tablet (10 mg total) by mouth daily, Disp: 90 tablet, Rfl: 1    cetirizine (ZyrTEC) 5 MG tablet, TAKE 1 TABLET BY MOUTH EVERY DAY, Disp: 90 tablet, Rfl: 0    Cholecalciferol (VITAMIN D-3) 1000 units CAPS, Take 2 capsules by mouth daily, Disp: 30 capsule, Rfl: 0    clindamycin (CLINDAGEL) 1 % gel, APPLY TO AFFECTED AREA TWICE A DAY, Disp: 60 g, Rfl: 2    CVS ACETAMINOPHEN EX  MG tablet, TAKE 2 TABLETS BY MOUTH EVERY 8 HOURS AS NEEDED FOR MILD OR MODERATE PAIN (PAIN SCORE 1 6)  , Disp: , Rfl:    cyanocobalamin (CVS Vitamin B-12) 1000 MCG tablet, Take 1 tablet (1,000 mcg total) by mouth daily, Disp: 30 tablet, Rfl: 5    cyclobenzaprine (FLEXERIL) 10 mg tablet, Take 10 mg by mouth 3 (three) times a day as needed, Disp: , Rfl: 0    cyclobenzaprine (FLEXERIL) 5 mg tablet, , Disp: , Rfl:     dexlansoprazole (DEXILANT) 30 MG capsule, Take 1 capsule (30 mg total) by mouth daily, Disp: 60 capsule, Rfl: 0    dicyclomine (BENTYL) 10 mg capsule, TAKE 1 CAPSULE (10 MG TOTAL) BY MOUTH 3 (THREE) TIMES A DAY BEFORE MEALS, Disp: 270 capsule, Rfl: 1    Disposable Gloves MISC, Use 7 times a day, 4 boxes of gloves XL Dx type 1 DM, paraplegia, Disp: 4 each, Rfl: 11    doxazosin (CARDURA) 2 mg tablet, TAKE 1 TABLET BY MOUTH EVERY EVENING , Disp: 90 tablet, Rfl: 0    epoetin kaushik (EPOGEN,PROCRIT) 20,000 units/mL, Inject 10,000 Units under the skin, Disp: , Rfl:     epoetin kaushik (EPOGEN,PROCRIT) 20,000 units/mL, Inject 5,000 Units under the skin, Disp: , Rfl:     ergocalciferol (VITAMIN D2) 50,000 units, TAKE 1 CAPSULE BY MOUTH ONE TIME PER WEEK, Disp: , Rfl:     ferrous sulfate 325 (65 Fe) mg tablet, Take 1 tablet (325 mg total) by mouth 3 (three) times a day, Disp: 90 tablet, Rfl: 3    glucose 4-6 GM-MG, Chew 2 tablets daily as needed for low blood sugar, Disp: 10 tablet, Rfl: 1    glucose blood (SUSAN CONTOUR TEST) test strip, Use as instructed to test blood sugar 4 times a day Dx e10 29, Disp: 200 each, Rfl: 3    Gvoke PFS 1 MG/0 2ML SOSY, INJECT 1 ML (1 MG TOTAL) INTO A MUSCLE ONCE FOR 1 DOSE, Disp: , Rfl:     HYDROmorphone (DILAUDID) 4 mg tablet, TAKE 1 TABLET BY MOUTH EVERY 4 HOURS AS NEEDED FOR MODERATE PAIN (PAIN SCORE 4 6)   MAX  24 MG/DAY, Disp: , Rfl:     Incontinence Supply Disposable (Incontinence Brief Large) MISC, Use 6 (six) times a day Size xl, Disp: 180 each, Rfl: 11    Incontinence Supply Disposable (Undergarment) MISC, Use 6 a day, 5 boxes, xl Dx R51, paraplegia, Disp: 5 each, Rfl: 11   Incontinence Supply Disposable (Underpads) MISC, Use 2 a day, Disp: 5 each, Rfl: 11    insulin detemir (LEVEMIR) 100 units/mL subcutaneous injection, Inject 6 5 Units under the skin 2 (two) times a day , Disp: , Rfl:     ketoconazole (NIZORAL) 2 % cream, APPLY SMALL AMOUNT TO EDGES OF MOUTH TWICE DAILY  DO NOT SWALLOW, Disp: 15 g, Rfl: 1    Ketostix strip, USE WITH HYPERGLYCEMIA E10 65 **NOT COVERED**, Disp: , Rfl:     LEVEMIR FLEXTOUCH 100 units/mL injection pen, Inject 14 Units under the skin 2 (two) times a day, Disp: 15 pen, Rfl: 3    levETIRAcetam (KEPPRA) 250 mg tablet, TAKE 1 TABLET (250 MG TOTAL) BY MOUTH 3 (THREE) TIMES A WEEK ([facility]) AT 1200 , Disp: , Rfl:     lidocaine (XYLOCAINE) 5 % ointment, Apply topically as needed for mild pain, Disp: 35 44 g, Rfl: 0    Lidocaine HCl 4 % LIQD, Apply 1 application topically 3 (three) times a day as needed (pain), Disp: 73 mL, Rfl: 5    Lokelma 10 g PACK, , Disp: , Rfl:     losartan (COZAAR) 100 MG tablet, Take 1 tablet (100 mg total) by mouth daily, Disp: 90 tablet, Rfl: 1    Melatonin ER 3 MG TBCR, Take 6 mg by mouth, Disp: , Rfl:     Methoxy PEG-Epoetin Beta (MIRCERA IJ), 150 mcg Once every 2 weeks, Disp: , Rfl:     Misc  Devices (Wheelchair Cushion) MISC, Use daily, Disp: 1 each, Rfl: 0    Misc   Devices [facility]) MISC, by Does not apply route daily Wheelchair ramp, dx g82 20, Disp: 1 each, Rfl: 0    Multiple Vitamin (Daily-Enriqueta Multivitamin) TABS, TAKE 1 TABLET BY MOUTH EVERY DAY, Disp: 90 tablet, Rfl: 2    Narcan 4 MG/0 1ML nasal spray, , Disp: , Rfl:     NOVOLOG 100 UNIT/ML injection, Inject 5 Units under the skin 3 (three) times a day with meals , Disp: , Rfl:     NovoLOG FlexPen 100 units/mL injection pen, INJECT 3 UNITS UNDER THE SKIN 3 (THREE) TIMES A DAY BEFORE MEALS , Disp: , Rfl:     nystatin (MYCOSTATIN) 500,000 units/5 mL suspension, APPLY 5 ML (500,000 UNITS TOTAL) TO THE MOUTH OR THROAT 4 (FOUR) TIMES A DAY, Disp: 473 mL, Rfl: 1   ondansetron (ZOFRAN-ODT) 4 mg disintegrating tablet, Take 2 tablets (8 mg total) by mouth every 8 (eight) hours as needed for nausea or vomiting, Disp: 30 tablet, Rfl: 1    oxybutynin (DITROPAN) 5 mg tablet, Take 3 tablets (15 mg total) by mouth daily, Disp: 270 tablet, Rfl: 1    phenytoin extended (DILANTIN) 200 MG ER capsule, Take 1 capsule (200 mg total) by mouth 2 (two) times a day, Disp: 180 capsule, Rfl: 1    phenytoin extended (DILANTIN) 200 MG ER capsule, TAKE 1 CAPSULE BY MOUTH TWICE A DAY, Disp: 180 capsule, Rfl: 0    phenytoin extended (DILANTIN) 300 MG ER capsule, TAKE 1 CAPSULE BY MOUTH EVERY DAY, Disp: 90 capsule, Rfl: 1    phenytoin extended (DILANTIN) 300 MG ER capsule, Take 1 capsule (300 mg total) by mouth daily, Disp: 90 capsule, Rfl: 1    risperiDONE (RisperDAL) 0 5 mg tablet, Take 1 tablet (0 5 mg total) by mouth 2 (two) times a day, Disp: 180 tablet, Rfl: 1    senna (SENOKOT) 8 6 mg, Take 1 tablet (8 6 mg total) by mouth 2 (two) times a day, Disp: 180 tablet, Rfl: 1    sertraline (ZOLOFT) 25 mg tablet, TAKE 1/2 TABLET BY MOUTH EVERY DAY, Disp: 45 tablet, Rfl: 0    sodium hypochlorite (DAKIN'S HALF-STRENGTH) external solution, USE AS DIRECTED ONCE  DAILY, Disp: 473 mL, Rfl: 2    Sodium Zirconium Cyclosilicate 10 g PACK, Take 10 g by mouth daily, Disp: , Rfl:     sucralfate (CARAFATE) 1 g/10 mL suspension, Take 10 mL (1 g total) by mouth 4 (four) times a day (with meals and at bedtime), Disp: 420 mL, Rfl: 0    SUPER B COMPLEX & C TABS, TAKE 1 CAPSULE BY MOUTH ONCE FOR 1 DOSE AS DIRECTED, Disp: 90 tablet, Rfl: 2    tiZANidine (ZANAFLEX) 4 mg tablet, Take 1 tablet by mouth 4 (four) times a day, Disp: , Rfl:     tiZANidine (ZANAFLEX) 4 mg tablet, , Disp: , Rfl:     triamcinolone (KENALOG) 0 1 % ointment, PLEASE SEE ATTACHED FOR DETAILED DIRECTIONS, Disp: 454 g, Rfl: 0    Zinc Sulfate 220 (50 Zn) MG TABS, Take 1 tablet by mouth daily, Disp: 90 tablet, Rfl: 1      Physical Exam:  BP 138/84 (BP Location: Left arm, Patient Position: Sitting, Cuff Size: Standard)   Pulse 90   Temp 98 °F (36 7 °C) (Temporal)   Resp 22   Ht 6' 4" (1 93 m)   Wt 69 4 kg (153 lb)   SpO2 99%   BMI 18 62 kg/m²     Physical Exam  Vitals and nursing note reviewed  Constitutional:       General: He is not in acute distress  Appearance: He is well-developed  HENT:      Head: Normocephalic and atraumatic  Right Ear: External ear normal       Left Ear: External ear normal    Eyes:      Conjunctiva/sclera: Conjunctivae normal    Neck:      Thyroid: No thyromegaly  Cardiovascular:      Rate and Rhythm: Normal rate and regular rhythm  Heart sounds: Normal heart sounds  No murmur heard  Pulmonary:      Effort: Pulmonary effort is normal  No respiratory distress  Breath sounds: Normal breath sounds  No wheezing  Chest:          Comments: Dry clean and intact dressing over chest wall port  Musculoskeletal:      Cervical back: Normal range of motion and neck supple  Right lower leg: No edema  Left lower leg: No edema  Comments: Wheelchair dependent   Lymphadenopathy:      Cervical: No cervical adenopathy  Neurological:      Mental Status: He is alert and oriented to person, place, and time  Psychiatric:         Mood and Affect: Mood normal          Behavior: Behavior normal              Labs:  Lab Results   Component Value Date    WBC 6 68 10/10/2017    HGB 8 7 (L) 10/10/2017    HCT 28 1 (L) 10/10/2017    MCV 85 10/10/2017     10/10/2017     Lab Results   Component Value Date     12/28/2014    K 4 8 10/10/2017     (H) 10/10/2017    CO2 22 10/10/2017    ANIONGAP 5 12/28/2014    BUN 47 (H) 10/10/2017    CREATININE 2 53 (H) 10/10/2017    GLUCOSE 34 (LL) 04/21/2017    CALCIUM 8 3 10/10/2017    AST 8 10/02/2017    ALT 10 (L) 10/02/2017    ALKPHOS 75 10/02/2017    EGFR 36 10/10/2017       Assessment and Plan:    Araseli Has was seen today for follow-up      Diagnoses and all orders for this visit:    Anemia due to stage 5 chronic kidney disease, not on chronic dialysis [facility])  -     Ambulatory Referral to Hematology / Oncology; Future    Gastroesophageal reflux disease without esophagitis  -     ondansetron (ZOFRAN-ODT) 4 mg disintegrating tablet; Take 2 tablets (8 mg total) by mouth every 8 (eight) hours as needed for nausea or vomiting    Abdominal pain, unspecified abdominal location  -     sucralfate (CARAFATE) 1 g/10 mL suspension; Take 10 mL (1 g total) by mouth 4 (four) times a day (with meals and at bedtime)    Bipolar depression (HCC)  -     risperiDONE (RisperDAL) 0 5 mg tablet; Take 1 tablet (0 5 mg total) by mouth 2 (two) times a day    HTN (hypertension), benign  -     amLODIPine (NORVASC) 10 mg tablet; Take 1 tablet (10 mg total) by mouth daily    Acute deep vein thrombosis (DVT) of right upper extremity, unspecified vein (HCC)    Abnormal albumin  -     Ambulatory Referral to Hematology / Oncology; Future      Problem List Items Addressed This Visit        Digestive    GERD (gastroesophageal reflux disease)     Stable, continue PPI and sucralfate          Relevant Medications    ondansetron (ZOFRAN-ODT) 4 mg disintegrating tablet    sucralfate (CARAFATE) 1 g/10 mL suspension       Cardiovascular and Mediastinum    HTN (hypertension), benign     Stable today, reviewed current meds and refills where appropriate  Relevant Medications    amLODIPine (NORVASC) 10 mg tablet    Acute deep vein thrombosis (DVT) of right upper extremity ([facility] )     Admission notes reveal after discussion between Heme-Onc, Nephro, and IR, Eliquis was d/c as risk > benefits  They suggest cont epo injections after dialysis and PCP can resume Eliquis when Hgb improves  Other    Anemia - Primary     Discharged with Hgb of 5 6  Repeat CBC ordered  Referred to hematology, may be candidate for albumin            Relevant Orders    Ambulatory Referral to Hematology / Oncology    CBC and differential    Comprehensive metabolic panel    Chronic indwelling Ortega catheter     No issues today, follows with [facility] u[facility], SPT is flushed once weekly to prevent blockages  Takes oxybutynin 15 mg daily for spasms  Bipolar depression ([facility] )     Stable, continue f/u with psych provider           Relevant Medications    risperiDONE (RisperDAL) 0 5 mg tablet    Abdominal pain     -continue mealtime sucralfate          Relevant Medications    sucralfate (CARAFATE) 1 g/10 mL suspension      Other Visit Diagnoses     Abnormal albumin        Relevant Orders    Ambulatory Referral to Hematology / Oncology

## 2022-02-18 PROBLEM — I82.621 ACUTE DEEP VEIN THROMBOSIS (DVT) OF RIGHT UPPER EXTREMITY (HCC): Status: ACTIVE | Noted: 2022-02-18

## 2022-02-18 NOTE — ASSESSMENT & PLAN NOTE
Discharged with Hgb of 5 6  Repeat CBC ordered  Referred to hematology, may be candidate for albumin

## 2022-02-18 NOTE — ASSESSMENT & PLAN NOTE
Admission notes reveal after discussion between Heme-Onc, Nephro, and IR, Eliquis was d/c as risk > benefits  They suggest cont epo injections after dialysis and PCP can resume Eliquis when Hgb improves

## 2022-02-21 ENCOUNTER — TELEPHONE (OUTPATIENT)
Dept: HEMATOLOGY ONCOLOGY | Facility: CLINIC | Age: 42
End: 2022-02-21

## 2022-02-21 ENCOUNTER — TELEPHONE (OUTPATIENT)
Dept: OTHER | Facility: OTHER | Age: 42
End: 2022-02-21

## 2022-02-21 NOTE — TELEPHONE ENCOUNTER
No one informed the patient whether he should be continue taking the Eliquis after hospital discharge  Please call back to discuss

## 2022-02-22 ENCOUNTER — TELEPHONE (OUTPATIENT)
Dept: FAMILY MEDICINE CLINIC | Facility: CLINIC | Age: 42
End: 2022-02-22

## 2022-02-22 ENCOUNTER — TELEPHONE (OUTPATIENT)
Dept: PREADMISSION TESTING | Facility: HOSPITAL | Age: 42
End: 2022-02-22

## 2022-02-22 NOTE — TELEPHONE ENCOUNTER
Thanks  Relayed info to Owen Barbosa  Faxed lab orders to [facility] so they can draw the labs you ordered  She did say his Hgb went up to 7 5 at Dialysis so I will be contact [facility] to make sure they're faxing us those results as I didn't see any on file  I told her that's probably still too low to start the Eliquis but we'll see what his labs are when we receive the results      She is aware of his appt 3/17

## 2022-02-22 NOTE — TELEPHONE ENCOUNTER
PCP SIGNATURE NEEDED FOR eEXTENDED FAMILY   (RE CERTIFICATION AID) FORM RECEIVED VIA FAX AND PLACED IN PCP FOLDER TO BE DELIVERED AT ASSIGNED TIMES

## 2022-02-22 NOTE — TELEPHONE ENCOUNTER
It's still active on our med list but on his [facility] chart it shows:    Discontinued Medications  - documented as of this encounter    Medication Sig Discontinue Reason Start Date End Date   oxyCODONE (ROXICODONE) 15 MG immediate release tablet   Take 15 mg by mouth every 6 (six) hours as needed for moderate pain (pain score 4-6)  Dose adjustment   02/01/2022   apixaban (ELIQUIS) 5 mg tab   Take 1 tablet (5 mg total) by mouth 2 (two) times a day   Stop Taking at Discharge 10/14/2021 02/03/2022

## 2022-02-22 NOTE — TELEPHONE ENCOUNTER
PCP SIGNATURE NEEDED FOR EXTENDED FAMILY CARE FORM RECEIVED VIA FAX AND PLACED IN PCP FOLDER TO BE DELIVERED AT ASSIGNED TIMES

## 2022-02-22 NOTE — TELEPHONE ENCOUNTER
Pt mother Julieth Klein) called office today 02/22/22 requesting a call back from PCP or Clinical Staff with answer about PT Eliquis  Mother wants to know if Pt can still take the Eliquis and if a Nurse will be sent to the PT Home to get his BW Done  Pt haves 2 order for BW, but he is Bed Bound and cant get his BW done out-side from home  Please contact PT Mother if further information is needed

## 2022-02-22 NOTE — TELEPHONE ENCOUNTER
Hue Mcadams and Juan,    I discussed at length with the patient about his Eliquis at the visit on Friday  He is not to restart his Eliquis until his Hemoglobin improves which was also the plan of the discharging physician team at the hospital   I ordered bloodwork and also gave him a letter for nephrology asking about Epogen injections to help with this  He's also interested in albumin infusions in which case I made a referral to hematology  So Mom can be informed that he not to restart his Eliquis until I see improved hemoglobin - I will be sure to let her know when he can restart based on bloodwork results

## 2022-02-23 ENCOUNTER — TELEPHONE (OUTPATIENT)
Dept: FAMILY MEDICINE CLINIC | Facility: CLINIC | Age: 42
End: 2022-02-23

## 2022-02-23 NOTE — TELEPHONE ENCOUNTER
Fidencio from [facility] called to confirm bloodwork which I left VM confirming orders for blood work

## 2022-02-23 NOTE — TELEPHONE ENCOUNTER
[employer] form received by FAX on 02/23/2022  to be completed by PCP  Copy made and placed in PCP folder  Forms to be delivered to PCP mailbox at assigned time        ORDER #[id number]

## 2022-02-23 NOTE — TELEPHONE ENCOUNTER
MIKAYLA, per [facility] kidney [facility] his most recent Hgb readings so you have them when you receive the repeat orders you placed that I faxed yesterday:    2/9 7 0  2/14 7 8

## 2022-02-24 NOTE — TELEPHONE ENCOUNTER
Just got Ovidio's results from [facility]  His Hgb is back up to 8 7  That makes the trend 5  6(discharg)>7 0>7 8>8 7  Please call Mom and tell her that Donna Barbour can restart the Eliquis, 5mg BID  Monitor his port closely for signs of bleeding and report to ED if bleeding recurs

## 2022-02-28 ENCOUNTER — TELEPHONE (OUTPATIENT)
Dept: FAMILY MEDICINE CLINIC | Facility: CLINIC | Age: 42
End: 2022-02-28

## 2022-02-28 DIAGNOSIS — K58.9 IRRITABLE BOWEL SYNDROME, UNSPECIFIED TYPE: ICD-10-CM

## 2022-02-28 RX ORDER — DICYCLOMINE HYDROCHLORIDE 10 MG/1
10 CAPSULE ORAL
Qty: 270 CAPSULE | Refills: 1 | Status: SHIPPED | OUTPATIENT
Start: 2022-02-28

## 2022-02-28 NOTE — TELEPHONE ENCOUNTER
PCP [address] [place] [address] Scott FORM RECEIVED VIA FAX AND PLACED IN PCP FOLDER TO BE DELIVERED AT ASSIGNED TIMES

## 2022-03-01 ENCOUNTER — TELEPHONE (OUTPATIENT)
Dept: FAMILY MEDICINE CLINIC | Facility: CLINIC | Age: 42
End: 2022-03-01

## 2022-03-01 DIAGNOSIS — F31.9 BIPOLAR DEPRESSION (HCC): ICD-10-CM

## 2022-03-01 NOTE — TELEPHONE ENCOUNTER
Plan of care for [facility] was faxed and confirmed and so was assessment/certification summary on 2-28-22

## 2022-03-02 RX ORDER — SERTRALINE HYDROCHLORIDE 25 MG/1
TABLET, FILM COATED ORAL
Qty: 45 TABLET | Refills: 0 | Status: SHIPPED | OUTPATIENT
Start: 2022-03-02 | End: 2022-06-15 | Stop reason: SDUPTHER

## 2022-03-03 ENCOUNTER — TELEPHONE (OUTPATIENT)
Dept: HEMATOLOGY ONCOLOGY | Facility: CLINIC | Age: 42
End: 2022-03-03

## 2022-03-04 ENCOUNTER — TELEPHONE (OUTPATIENT)
Dept: HEMATOLOGY ONCOLOGY | Facility: CLINIC | Age: 42
End: 2022-03-04

## 2022-03-07 ENCOUNTER — TELEPHONE (OUTPATIENT)
Dept: GASTROENTEROLOGY | Facility: MEDICAL CENTER | Age: 42
End: 2022-03-07

## 2022-03-07 ENCOUNTER — TELEPHONE (OUTPATIENT)
Dept: FAMILY MEDICINE CLINIC | Facility: CLINIC | Age: 42
End: 2022-03-07

## 2022-03-07 NOTE — TELEPHONE ENCOUNTER
Mother called and rescheduled procedure for her son  Rescheduled for 05/03/2022 with Dr Charisse Meade @ [facility]  Dearl Marques,   There was a note in the appt notes regarding a Bill Baljit being scheduled? Is that something we take care of? Thank you

## 2022-03-07 NOTE — TELEPHONE ENCOUNTER
[employer] form received by fax on 03/07/22  to be completed by PCP  Copy made and placed in PCP folder  Forms to be delivered to PCP mailbox at assigned time

## 2022-03-09 ENCOUNTER — RA CDI HCC (OUTPATIENT)
Dept: OTHER | Facility: HOSPITAL | Age: 42
End: 2022-03-09

## 2022-03-09 NOTE — PROGRESS NOTES
[facility]  coding opportunities       Chart reviewed, no opportunity found: CHART REVIEWED, NO OPPORTUNITY FOUND                        Patients insurance company: [employer]

## 2022-03-15 ENCOUNTER — TELEPHONE (OUTPATIENT)
Dept: FAMILY MEDICINE CLINIC | Facility: CLINIC | Age: 42
End: 2022-03-15

## 2022-03-15 NOTE — TELEPHONE ENCOUNTER
[facility] ( order [id number])  form received by fax on 03/15/2022  to be completed by PCP  Copy made and placed in PCP folder  Forms to be delivered to PCP mailbox at assigned time

## 2022-03-17 ENCOUNTER — TELEPHONE (OUTPATIENT)
Dept: FAMILY MEDICINE CLINIC | Facility: CLINIC | Age: 42
End: 2022-03-17

## 2022-03-17 NOTE — TELEPHONE ENCOUNTER
[facility] (order # [id number]) form received by fax on 3/17/22  to be completed by PCP  Copy made and placed in PCP folder  Forms to be delivered to PCP mailbox at assigned time

## 2022-03-18 ENCOUNTER — TELEPHONE (OUTPATIENT)
Dept: FAMILY MEDICINE CLINIC | Facility: CLINIC | Age: 42
End: 2022-03-18

## 2022-03-18 NOTE — TELEPHONE ENCOUNTER
[facility] form received on 03/18/2022  to be completed by PCP  Copy made and placed in PCP folder  Forms to be delivered to PCP mailbox at assigned time      (MR Number: [id number])   Medication Changes

## 2022-03-24 ENCOUNTER — OFFICE VISIT (OUTPATIENT)
Dept: WOUND CARE | Facility: CLINIC | Age: 42
End: 2022-03-24
Payer: MEDICARE

## 2022-03-24 VITALS
RESPIRATION RATE: 18 BRPM | HEART RATE: 68 BPM | DIASTOLIC BLOOD PRESSURE: 80 MMHG | SYSTOLIC BLOOD PRESSURE: 130 MMHG | TEMPERATURE: 97.3 F

## 2022-03-24 DIAGNOSIS — L89.154 PRESSURE INJURY OF SACRAL REGION, STAGE 4 (HCC): Primary | ICD-10-CM

## 2022-03-24 DIAGNOSIS — G82.20 PARAPLEGIA (HCC): ICD-10-CM

## 2022-03-24 DIAGNOSIS — L89.224 PRESSURE ULCER OF LEFT HIP, STAGE 4 (HCC): ICD-10-CM

## 2022-03-24 DIAGNOSIS — L89.894 PRESSURE ULCER OF OTHER SITE, STAGE 4 (HCC): ICD-10-CM

## 2022-03-24 DIAGNOSIS — L89.324 PRESSURE INJURY OF LEFT ISCHIUM, STAGE 4 (HCC): ICD-10-CM

## 2022-03-24 PROCEDURE — 11045 DBRDMT SUBQ TISS EACH ADDL: CPT | Performed by: FAMILY MEDICINE

## 2022-03-24 PROCEDURE — 11042 DBRDMT SUBQ TIS 1ST 20SQCM/<: CPT | Performed by: FAMILY MEDICINE

## 2022-03-24 RX ORDER — LIDOCAINE HYDROCHLORIDE 40 MG/ML
5 SOLUTION TOPICAL ONCE
Status: COMPLETED | OUTPATIENT
Start: 2022-03-24 | End: 2022-03-24

## 2022-03-24 RX ADMIN — LIDOCAINE HYDROCHLORIDE 5 ML: 40 SOLUTION TOPICAL at 14:51

## 2022-03-24 NOTE — PROGRESS NOTES
Patient ID: Oral Almanzar  is a 39 y o  male Date of Birth 1980       Chief Complaint   Patient presents with    Follow Up Wound Care Visit     L hip, L ischial, perineal and sacral wounds  Patient last seen on 2/10/21  He reportsd he has gained approx 2-3 lbs and is eating "lots of proteins" in the form of meats to increase his albumin  Allergies:  Cefepime, Ciprofloxacin hcl, Cymbalta [duloxetine hcl], Gabapentin, Lac bovis, Lactose - food allergy, Lyrica [pregabalin], Penicillins, Polymyxin b, and Rosuvastatin    Diagnosis:   Diagnosis ICD-10-CM Associated Orders   1  Pressure injury of sacral region, stage 4 ([facility])  L89 154 lidocaine (XYLOCAINE) 4 % topical solution 5 mL     Wound cleansing and dressings     Debridement   2  Pressure injury of left ischium, stage 4 ([facility])  L89 324 lidocaine (XYLOCAINE) 4 % topical solution 5 mL     Wound cleansing and dressings     Debridement   3  Pressure ulcer of left hip, stage 4 ([facility])  L89 224 lidocaine (XYLOCAINE) 4 % topical solution 5 mL     Wound cleansing and dressings     Debridement   4  Pressure ulcer of other site, stage 4 ([facility])  L89 894 lidocaine (XYLOCAINE) 4 % topical solution 5 mL     Wound cleansing and dressings     Debridement   5  Paraplegia ([facility])  G82 20 lidocaine (XYLOCAINE) 4 % topical solution 5 mL     Wound cleansing and dressings        Assessment  & Plan:     Multiple stage IV pressure injuries as noted above  All surgically debrided due to buildup of biofilm and fibrin  Some of the undermining is deeper  No signs of infection   Today, Dakin's packing  He will restart saline packing tomorrow  Malnutrition  Patient states that he is eating healthier type foods but the last albumin from 3/15/22 was 2 1  Total protein was 8 5  Blood sugar high at  267  Subjective:   10/22/20:  Followup multiple pressure injuries to the buttocks and sacrum  He was hospitalized for back problems recently  Continues on hemodialysis    The patient states that his albumin has improved so that he may be able to get flap surgery  However when checking his most recent albumin was only 1 8  His total protein is elevated  11/19/20:  Followup multiple stage IV pressure or injuries to the buttocks and sacrum  Continues on hemodialysis  He is scheduled for plastic surgery for cyst on his forehead  Plastic surgery still will not do any flaps to his sacrum or buttocks because his albumin remains low  He has no other complaints  No fever, chills or cough  12/31/20: Followup multiple stage IV pressure injuries of the buttocks and sacrum  She notes some increased drainage and slight more odor  Never had his plastic surgery appointment for the cyst on his forehead  Denies any fever or chills  1/28/21:  Followup multiple stage IV pressure injuries of the buttocks, sacrum and perineum  Unfortunately, the patient was hospitalized for sepsis and back pain  Was found to have a fracture L3  He was given a back brace but only wears at home  He states that really does not give him very much relief  Unfortunately also while in the hospital, he developed a new pressure injury of his left hip  2/25/21:  Followup multiple stage IV pressure ulcers of the buttocks sacrum and perineum  Patient was hospitalized earlier this month for possible sepsis  He was found to have a burst fracture or osteo of L3  IR obtained specimen and was culture negative  However, no bone was obtained  He is scheduled to have another IR intervention March 16th for bone biopsy  Left hip ulcer broke down with large cavity  Otherwise, cultures were negative  He was discharged on no medications  He has not had any recent fever or chills  He did have fever when he was admitted to the hospital     03/21/2021  Mr Bipin Farrar is a 44-year-old gentleman with paraplegia and multiple stage IV pressure ulcers was referred for evaluation    He has large chronic ulcers on his buttocks and sacrum but he developed a new ulcer over last month on his left hip  He is scheduled for IR biopsy in his spine for possible chronic osteo  He also recently underwent dental extractions and cyst removed from his head and neck  4/22/21:  Followup multiple stage IV pressure injuries of the buttocks, sacrum, perineum and left hip  He was last seen by Dr Trudy Teixeira in March  Since then, unfortunately he was admitted to the hospital with pneumonia  He is now doing better  He is changing his dressings daily because of drainage  There has been no increased drainage, no odor and he denies any fever or chills  He has no cough  5/27/21:  Patient returns regarding his his multiple stage IV pressure injuries of the buttocks, sacrum, perineum and left hip  Unfortunately, he was hospitalized for a few days in April regarding FUO with negative cultures  He was "treated empirically with vancomycin and cefepime - improved with no positive cultures and CT scan finding consistent with known decubitus ulcerations and chronic pelvic osteomyelitis "  He is back to baseline  Patient states that he does notice more odor  7/8/21: Followup multiple stage IV pressure injuries of the buttocks, sacrum, perineum and left hip  He continues with Dakin's packing  He had multiple excisions on his face of follicular cysts by plastic surgery  He still cannot have surgery of his pressure injuries due to chronically low albumin  He has not yet contacted his PCP for nutritionist consultation  8/19/21: Followup multiple stage IV pressure injuries of the buttocks, sacrum, perineum and left  He was packing with Dakin's  Nothing else new  He still has not had a nutrition is consultation  9/30/21: Followup multiple stage IV pressure injuries of the buttocks, sacrum, perineum and left hip  He continues with cleansing Dakin's soaks followed by wet-to-dry saline packing  Nothing is new  No pain    No fever or chills  11/11/21: Followup multiple stage IV pressure injuries of the buttock, sacrum, left hip perineum  Recent hospitalization for sepsis  Possible pneumonia  His ulcers have not changed  He is using Dakin's packing again  No other new complaints  States that he is spending is nights in his [place] and only spending up to maximum 3 hours out in his chair and then going back to the [place] during the day  12/30/21: Followup multiple stage IV pressure injuries of the buttocks, sacrum, left hip and perineum  He was hospitalized again for three days any states that his ulcers deteriorated slightly because he claims that the dressings were not changed  No fever or chills currently  Still using his [place] bed     2/10/22:  Patient returns regarding his multiple stage IV pressure injuries of the buttocks, sacrum, left hip and perineum  Unfortunately, he was hospitalized twice since his last visit  The 1st time he was septic with unknown source  The 2nd hospitalization was due to bleeding from newly placed dialysis catheter  No specific complaints at this time  They have been using saline packing  Still in his [place] bed     3/24/22: Followup multiple stage IV pressure injuries of the buttocks, sacrum, left hip in perineum  No new events since last visit  He is using saline packing  Clinitron bed  No fever or chills          The following portions of the patient's history were reviewed and updated as appropriate:   Patient Active Problem List   Diagnosis    [facility]    Atrial fibrillation ([facility]    Chronic suprapubic catheter ([facility]    Colostomy care ([facility]    OAB (overactive bladder)    S/P unilateral BKA (below knee amputation) ([address] )    Sebaceous cyst    Tobacco abuse    Type 1 diabetes mellitus with chronic kidney disease on chronic dialysis ([address] )    Ulcer of sacral region, stage 4 ([address] )    Anemia    Chronic indwelling Ortega catheter    Wound healing, delayed    Iron deficiency anemia    Pressure injury of left ischium, stage 4 ([facility]    HTN (hypertension), benign    Neurogenic bladder    GERD (gastroesophageal reflux disease)    Chronic pain    Stage 5 chronic kidney disease on chronic dialysis (HCC)    SOB (shortness of breath)    Penile abscess    Asymptomatic bacteriuria    History of Clostridium difficile infection    Urinary retention    Delirium    Dialysis patient ([place] [address] )    Insulin long-term use ([place] [address] )    Wheelchair dependent    Recurrent Clostridioides difficile diarrhea    Bipolar depression ([facility]    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HCC)    Seizure ([place] [address] )    Pressure ulcer of sacral region, stage 4 (HCC)    AVM (arteriovenous malformation) of duodenum, acquired    Chronic narcotic dependence ([facility]    Chronic diastolic heart failure (HCC)    Dental caries    Nervous    Pressure ulcer of other site, stage 4 (HCC)    Pressure ulcer of left hip, stage 4 (HCC)    Localized swelling of left foot    ED (erectile dysfunction) of organic origin    Irritable bowel syndrome    Onychomycosis    Pustular folliculitis    Prolonged Q-T interval on ECG    Secondary hyperparathyroidism of renal origin ([place] [address] )    Abdominal pain    Pneumonia    Wounds, multiple    Immunocompromised state ([place] [address] )    Severe protein-calorie malnutrition (HCC)    Chronic osteomyelitis (HCC)    Acute pulmonary edema (HCC)    Acute deep vein thrombosis (DVT) of right upper extremity ([facility])     Past Medical History:   Diagnosis Date    Ambulatory dysfunction     Anemia     Anemia, iron deficiency     transfusion requiring    Atrial fibrillation (HCC)     AVM (arteriovenous malformation) of duodenum, 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    Bacteriuria, asymptomatic     Bipolar disorder (HCC)     Chronic deep vein thrombosis (DVT) ([facility]     Chronic indwelling Ortega catheter     Chronic kidney disease     Chronic pain     Chronic pain disorder     Chronic suprapubic catheter ([place] [address] )  Clostridium difficile infection 08/11/2016    also positive 9/2016, 5/29/2017, 8/15/2017  S/P fecal transplant    Colostomy on examination ([facility] )     Decubitus ulcer     Delirium     Diabetes mellitus [facility]     GERD (gastroesophageal reflux disease)     Hemodialysis patient ([facility] )     Hypertension     Memory impairment 2011    s/p diabetic coma    Neurogenic bladder     OAB (overactive bladder)     Paraplegia (HCC)     T3 transverse myelitis vs spinal stoke (AVM); 2012 extensive epidural abscess C7=> conus 2nd extension from deep parspinal abscess L4-S2/sacral decubitus; cord atrophy/myelomalcia T3=>conus     Penile abscess     S/P unilateral BKA (below knee amputation) ([facility] )     Right    Sebaceous cyst removed in 2017    Seizures (HCC)     Shortness of breath     Tobacco abuse     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HCC)     Urinary retention     Wheelchair dependent     Wounds, multiple     pressure ulcers with delayed healing     Past Surgical History:   Procedure Laterality Date    BELOW KNEE LEG AMPUTATION Right 2009    COLONOSCOPY N/A 3/27/2017    Procedure: COLONOSCOPY;  Surgeon: Vanita Tyson MD;  Location: [facility]; Service:     COLONOSCOPY N/A 3/29/2017    Procedure: COLONOSCOPY;  Surgeon: Vanita Tyson MD;  [facility]; Service:     COLONOSCOPY N/A 6/15/2017    Procedure: COLONOSCOPY with FMT;  Surgeon: Gini Osorio MD;  [facility]: [facility]; Service: Gastroenterology    ESOPHAGOGASTRODUODENOSCOPY N/A 3/22/2017    Procedure: ESOPHAGOGASTRODUODENOSCOPY (EGD); Surgeon: Joslyn Finney DO;  Location: [facility];   Service:     MULTIPLE TOOTH EXTRACTIONS N/A 3/8/2021    Procedure: EXTRACTION TEETH # 2,3,6,10,12,13,14,18,19,20,21,22,23,24,25,26,27,28,29,30;  Surgeon: Pawan Ellis DMD;  [facility];  Service: Maxillofacial     Family History   Problem Relation Age of Onset    Hyperlipidemia Mother     Hypertension Mother     Leukemia Brother     Diabetes Paternal Grandfather      Social History     Socioeconomic History    Marital status: Single     Spouse name: None    Number of children: None    Years of education: None    Highest education level: None   Occupational History    None   Tobacco Use    Smoking status: Current Every Day Smoker     Types: Cigars    Smokeless tobacco: Never Used    Tobacco comment: about 3 cigars/day   Substance and Sexual Activity    Alcohol use: Not Currently     Comment: 1 cup of wine every 3-4 months    Drug use: Not Currently     Types: Marijuana     Comment: rarely; once every 1-2 years    Sexual activity: None   Other Topics Concern    None   Social History Narrative    None     Social Determinants of Health     Financial Resource Strain: Low Risk     Difficulty of Paying Living Expenses: Not hard at all   Food Insecurity: No Food Insecurity    Worried About Running Out of Food in the Last Year: Never true    Jose of Food in the Last Year: Never true   Transportation Needs: No Transportation Needs    Lack of Transportation (Medical): No    Lack of Transportation (Non-Medical):  No   Physical Activity: Not on file   Stress: Not on file   Social Connections: Not on file   Intimate Partner Violence: Not on file   Housing Stability: Not on file       Current Outpatient Medications:     Alcohol Swabs (ALCOHOL PADS) 70 % PADS, by Does not apply route 5 (five) times a day, Disp: 300 each, Rfl: 5    amLODIPine (NORVASC) 10 mg tablet, Take 1 tablet (10 mg total) by mouth daily, Disp: 90 tablet, Rfl: 1    ammonium lactate (LAC-HYDRIN) 12 % cream, , Disp: , Rfl:     apixaban (ELIQUIS) 5 mg, Take 1 tablet (5 mg total) by mouth 2 (two) times a day, Disp: 270 tablet, Rfl: 1    ascorbic acid (VITAMIN C) 250 mg tablet, TAKE 2 TABLETS (500 MG TOTAL) BY MOUTH DAILY, Disp: 180 tablet, Rfl: 1    atorvastatin (LIPITOR) 20 mg tablet, Take 1 tablet (20 mg total) by mouth daily at bedtime, Disp: 90 tablet, Rfl: 1    B Complex-C-Folic Acid (Super B-Complex/Vit C/FA) TABS, TAKE 1 TABLET BY MOUTH EVERY DAY AS DIRECTED, Disp: 30 tablet, Rfl: 8    baclofen 20 mg tablet, Take 20 mg by mouth 2 (two) times a day , Disp: , Rfl:     SUSAN MICROLET LANCETS lancets, Use as instructed to check blood sugar 4 times a day Dx e10 29, Disp: 200 each, Rfl: 5    Blood Glucose Monitoring Suppl (Pulsant CONTOUR NEXT USB MONITOR) w/Device KIT, Testing 4 times a day, Disp: 1 kit, Rfl: 0    calcitriol (ROCALTROL) 0 5 MCG capsule, Take 2 mcg by mouth, Disp: , Rfl:     Capsaicin 0 033 % CREA, 5 times a day for 1st week  Then 3 times a day for next 3 weeks, Disp: 56 6 g, Rfl: 2    carvedilol (COREG) 25 mg tablet, Take 1 tablet (25 mg total) by mouth 2 (two) times a day, Disp: 180 tablet, Rfl: 1    cetirizine (ZyrTEC) 10 mg tablet, Take 1 tablet (10 mg total) by mouth daily, Disp: 90 tablet, Rfl: 1    cetirizine (ZyrTEC) 5 MG tablet, TAKE 1 TABLET BY MOUTH EVERY DAY, Disp: 90 tablet, Rfl: 0    Cholecalciferol (VITAMIN D-3) 1000 units CAPS, Take 2 capsules by mouth daily, Disp: 30 capsule, Rfl: 0    clindamycin (CLINDAGEL) 1 % gel, APPLY TO AFFECTED AREA TWICE A DAY, Disp: 60 g, Rfl: 2    [employer]S ACETAMINOPHEN EX  MG tablet, TAKE 2 TABLETS BY MOUTH EVERY 8 HOURS AS NEEDED FOR MILD OR MODERATE PAIN (PAIN SCORE 1 6)  , Disp: , Rfl:     cyanocobalamin ([employer]S Vitamin B-12) 1000 MCG tablet, Take 1 tablet (1,000 mcg total) by mouth daily, Disp: 30 tablet, Rfl: 5    cyclobenzaprine (FLEXERIL) 10 mg tablet, Take 10 mg by mouth 3 (three) times a day as needed, Disp: , Rfl: 0    cyclobenzaprine (FLEXERIL) 5 mg tablet, , Disp: , Rfl:     dexlansoprazole (DEXILANT) 30 MG capsule, Take 1 capsule (30 mg total) by mouth daily, Disp: 60 capsule, Rfl: 0    dicyclomine (BENTYL) 10 mg capsule, TAKE 1 CAPSULE (10 MG TOTAL) BY MOUTH 3 (THREE) TIMES A DAY BEFORE MEALS, Disp: 270 capsule, Rfl: 1    Disposable Gloves MISC, Use 7 times a day, 4 boxes of gloves XL Dx type 1 DM, paraplegia, Disp: 4 each, Rfl: 11    doxazosin (CARDURA) 2 mg tablet, TAKE 1 TABLET BY MOUTH EVERY EVENING , Disp: 90 tablet, Rfl: 0    epoetin kaushik (EPOGEN,PROCRIT) 20,000 units/mL, Inject 10,000 Units under the skin, Disp: , Rfl:     epoetin kaushik (EPOGEN,PROCRIT) 20,000 units/mL, Inject 5,000 Units under the skin, Disp: , Rfl:     ergocalciferol (VITAMIN D2) 50,000 units, TAKE 1 CAPSULE BY MOUTH ONE TIME PER WEEK, Disp: , Rfl:     ferrous sulfate 325 (65 Fe) mg tablet, Take 1 tablet (325 mg total) by mouth 3 (three) times a day, Disp: 90 tablet, Rfl: 3    glucose 4-6 GM-MG, Chew 2 tablets daily as needed for low blood sugar, Disp: 10 tablet, Rfl: 1    glucose blood (SUSAN CONTOUR TEST) test strip, Use as instructed to test blood sugar 4 times a day Dx e10 29, Disp: 200 each, Rfl: 3    Gvoke PFS 1 MG/0 2ML SOSY, INJECT 1 ML (1 MG TOTAL) INTO A MUSCLE ONCE FOR 1 DOSE, Disp: , Rfl:     HYDROmorphone (DILAUDID) 4 mg tablet, TAKE 1 TABLET BY MOUTH EVERY 4 HOURS AS NEEDED FOR MODERATE PAIN (PAIN SCORE 4 6)   MAX  24 MG/DAY, Disp: , Rfl:     Incontinence Supply Disposable (Incontinence Brief Large) MISC, Use 6 (six) times a day Size xl, Disp: 180 each, Rfl: 11    Incontinence Supply Disposable (Undergarment) MISC, Use 6 a day, 5 boxes, xl Dx R51, paraplegia, Disp: 5 each, Rfl: 11    Incontinence Supply Disposable (Underpads) MISC, Use 2 a day, Disp: 5 each, Rfl: 11    insulin detemir (LEVEMIR) 100 units/mL subcutaneous injection, Inject 6 5 Units under the skin 2 (two) times a day , Disp: , Rfl:     ketoconazole (NIZORAL) 2 % cream, Apply to rash , Disp: 15 g, Rfl: 1    Ketostix strip, USE WITH HYPERGLYCEMIA E10 65 **NOT COVERED**, Disp: , Rfl:     LEVEMIR FLEXTOUCH 100 units/mL injection pen, Inject 14 Units under the skin 2 (two) times a day, Disp: 15 pen, Rfl: 3    levETIRAcetam (KEPPRA) 250 mg tablet, TAKE 1 TABLET (250 MG TOTAL) BY MOUTH 3 (THREE) TIMES A WEEK (MWF) AT 1200 , Disp: , Rfl:    lidocaine (XYLOCAINE) 5 % ointment, Apply topically as needed for mild pain, Disp: 35 44 g, Rfl: 0    Lidocaine HCl 4 % LIQD, Apply 1 application topically 3 (three) times a day as needed (pain), Disp: 73 mL, Rfl: 5    Lokelma 10 g PACK, , Disp: , Rfl:     losartan (COZAAR) 100 MG tablet, Take 1 tablet (100 mg total) by mouth daily, Disp: 90 tablet, Rfl: 1    Melatonin ER 3 MG TBCR, Take 6 mg by mouth, Disp: , Rfl:     Misc  Devices (Wheelchair Cushion) MISC, Use daily, Disp: 1 each, Rfl: 0    Misc   Devices [facility]) MISC, by Does not apply route daily Wheelchair ramp, dx g82 20, Disp: 1 each, Rfl: 0    Multiple Vitamin (Daily-Enriqueta Multivitamin) TABS, TAKE 1 TABLET BY MOUTH EVERY DAY, Disp: 90 tablet, Rfl: 2    Narcan 4 MG/0 1ML nasal spray, , Disp: , Rfl:     NOVOLOG 100 UNIT/ML injection, Inject 5 Units under the skin 3 (three) times a day with meals , Disp: , Rfl:     NovoLOG FlexPen 100 units/mL injection pen, INJECT 3 UNITS UNDER THE SKIN 3 (THREE) TIMES A DAY BEFORE MEALS , Disp: , Rfl:     nystatin (MYCOSTATIN) 500,000 units/5 mL suspension, APPLY 5 ML (500,000 UNITS TOTAL) TO THE MOUTH OR THROAT 4 (FOUR) TIMES A DAY, Disp: 473 mL, Rfl: 1    ondansetron (ZOFRAN-ODT) 4 mg disintegrating tablet, Take 2 tablets (8 mg total) by mouth every 8 (eight) hours as needed for nausea or vomiting, Disp: 30 tablet, Rfl: 1    oxybutynin (DITROPAN) 5 mg tablet, Take 3 tablets (15 mg total) by mouth daily, Disp: 270 tablet, Rfl: 1    phenytoin extended (DILANTIN) 200 MG ER capsule, Take 1 capsule (200 mg total) by mouth 2 (two) times a day, Disp: 180 capsule, Rfl: 1    phenytoin extended (DILANTIN) 200 MG ER capsule, TAKE 1 CAPSULE BY MOUTH TWICE A DAY, Disp: 180 capsule, Rfl: 0    phenytoin extended (DILANTIN) 300 MG ER capsule, TAKE 1 CAPSULE BY MOUTH EVERY DAY, Disp: 90 capsule, Rfl: 1    phenytoin extended (DILANTIN) 300 MG ER capsule, Take 1 capsule (300 mg total) by mouth daily, Disp: 90 capsule, Rfl: 1    risperiDONE (RisperDAL) 0 5 mg tablet, Take 1 tablet (0 5 mg total) by mouth 2 (two) times a day, Disp: 180 tablet, Rfl: 1    senna (SENOKOT) 8 6 mg, Take 1 tablet (8 6 mg total) by mouth 2 (two) times a day, Disp: 180 tablet, Rfl: 1    sertraline (ZOLOFT) 25 mg tablet, TAKE 1/2 TABLET BY MOUTH EVERY DAY, Disp: 45 tablet, Rfl: 0    sodium hypochlorite (DAKIN'S HALF-STRENGTH) external solution, USE AS DIRECTED ONCE  DAILY, Disp: 473 mL, Rfl: 2    Sodium Zirconium Cyclosilicate 10 g PACK, Take 10 g by mouth daily, Disp: , Rfl:     sucralfate (CARAFATE) 1 g/10 mL suspension, Take 10 mL (1 g total) by mouth 4 (four) times a day (with meals and at bedtime), Disp: 420 mL, Rfl: 0    SUPER B COMPLEX & C TABS, TAKE 1 CAPSULE BY MOUTH ONCE FOR 1 DOSE AS DIRECTED, Disp: 90 tablet, Rfl: 2    tiZANidine (ZANAFLEX) 4 mg tablet, Take 1 tablet by mouth 4 (four) times a day, Disp: , Rfl:     tiZANidine (ZANAFLEX) 4 mg tablet, , Disp: , Rfl:     triamcinolone (KENALOG) 0 1 % ointment, Apply topically 2 (two) times a day, Disp: 454 g, Rfl: 0    Zinc Sulfate 220 (50 Zn) MG TABS, Take 1 tablet by mouth daily, Disp: 90 tablet, Rfl: 1  No current facility-administered medications for this visit  Review of Systems   Constitutional: Negative for activity change, appetite change (Improving), chills, fatigue, fever and unexpected weight change  HENT: Negative for congestion, hearing loss and postnasal drip  Eyes: Negative for visual disturbance  Respiratory: Negative for cough and shortness of breath  Cardiovascular: Negative for chest pain and leg swelling  Gastrointestinal: Negative for abdominal pain, constipation (Slight), diarrhea, nausea and vomiting  Genitourinary: Negative for dysuria and urgency  Indwelling Ortega catheter   Musculoskeletal: Positive for gait problem (Nonambulatory, paraplegic)  Skin: Positive for wound (Sacral, left ischial, left hip and perineum)  Negative for rash  Cyst on right  cheek   Neurological: Positive for weakness  Hematological: Does not bruise/bleed easily  Psychiatric/Behavioral: Positive for dysphoric mood  Objective:  /80   Pulse 68   Temp (!) 97 3 °F (36 3 °C)   Resp 18   Pain Score: 0-No pain     Physical Exam  Vitals and nursing note reviewed  Constitutional:       Appearance: Normal appearance  He is underweight  HENT:      Head: Normocephalic and atraumatic  Abdominal:      General: Abdomen is flat  Comments: The abdomen around the ostomy is slightly full and tender  Skin:     Findings: Wound (Both buttocks and sacrum) present  No erythema  Comments: Sacral ulcer with fibrotic tissue and biofilm  Slightly less undermining     Left ischial ulcer also with more undermining  Fibrotic tissue in biofilm  The left hip ulcer has undermining and tunnel again at 11:00   Approximately 4 2 cm, improved  Bone is not probed  Perineal ulcer with fibrotic tissue and slough as well as biofilm  edge and centrally  Left ischial ulcer unchanged  More fibrin in all of the ulcers than in the past   No signs of infection  No malodor  Neurological:      Mental Status: He is alert and oriented to person, place, and time     Psychiatric:         Mood and Affect: Affect normal          Cognition and Memory: Cognition normal            Wound Pressure Injury Ischium Left (Active)   Wound Image       03/24/22 1500   Wound Description Slough;Pink 03/24/22 1421   Pressure Injury Stage 4 03/24/22 1421   Irene-wound Assessment Scar Tissue;Scaly 03/24/22 1421   Wound Length (cm) 6 1 cm 03/24/22 1421   Wound Width (cm) 4 5 cm 03/24/22 1421   Wound Depth (cm) 2 3 cm 03/24/22 1421   Wound Surface Area (cm^2) 27 45 cm^2 03/24/22 1421   Wound Volume (cm^3) 63 135 cm^3 03/24/22 1421   Calculated Wound Volume (cm^3) 63 14 cm^3 03/24/22 1421   Change in Wound Size % -234 07 03/24/22 1421   Undermining 2 03/24/22 1421   Undermining is depth extending from 3-11 03/24/22 1421   Drainage Amount Large 03/24/22 1421   Drainage Description Serosanguineous 03/24/22 1421   Non-staged Wound Description Full thickness 03/24/22 1421   Treatments Cleansed 12/30/21 1447   Dressing Changed Changed 12/30/21 1447   Patient Tolerance Tolerated well 12/30/21 1447   Dressing Status Removed 12/30/21 1447       Wound Pressure Injury Perineum (Active)   Wound Image       03/24/22 1501   Wound Description Pink;Slough 03/24/22 1422   Pressure Injury Stage 4 03/24/22 1422   Irene-wound Assessment Scaly;Scar Tissue 03/24/22 1422   Wound Length (cm) 5 5 cm 03/24/22 1422   Wound Width (cm) 5 3 cm 03/24/22 1422   Wound Depth (cm) 1 5 cm 03/24/22 1422   Wound Surface Area (cm^2) 29 15 cm^2 03/24/22 1422   Wound Volume (cm^3) 43 725 cm^3 03/24/22 1422   Calculated Wound Volume (cm^3) 43 73 cm^3 03/24/22 1422   Change in Wound Size % -316 48 03/24/22 1422   Undermining 4 3 03/24/22 1422   Undermining is depth extending from 12-11 03/24/22 1422   Drainage Amount Large 03/24/22 1422   Drainage Description Serosanguineous 03/24/22 1422   Non-staged Wound Description Full thickness 03/24/22 1422   Treatments Cleansed 12/30/21 1448   Dressing Changed Changed 12/30/21 1448   Patient Tolerance Tolerated well 12/30/21 1448   Dressing Status Removed 12/30/21 1448       Wound Pressure Injury Sacrum (Active)   Wound Image       03/24/22 1501   Wound Description Pink;Slough 03/24/22 1424   Pressure Injury Stage 4 03/24/22 1424   Irene-wound Assessment Scar Tissue;Scaly 03/24/22 1424   Wound Length (cm) 5 2 cm 03/24/22 1424   Wound Width (cm) 9 5 cm 03/24/22 1424   Wound Depth (cm) 1 7 cm 03/24/22 1424   Wound Surface Area (cm^2) 49 4 cm^2 03/24/22 1424   Wound Volume (cm^3) 83 98 cm^3 03/24/22 1424   Calculated Wound Volume (cm^3) 83 98 cm^3 03/24/22 1424   Change in Wound Size % -11 97 03/24/22 1424   Undermining 1 5 03/24/22 1424   Undermining is depth extending from 3-7 03/24/22 1424   Drainage Amount Moderate 03/24/22 1424   Drainage Description Serosanguineous 03/24/22 1424   Non-staged Wound Description Full thickness 03/24/22 1424   Treatments Cleansed 12/30/21 1451   Dressing Changed Changed 12/30/21 1451   Patient Tolerance Tolerated well 12/30/21 1451   Dressing Status Removed 12/30/21 1451       Wound 01/28/21 Pressure Injury Hip Left;Lateral (Active)   Wound Image       03/24/22 1500   Wound Description Pink 03/24/22 1425   Pressure Injury Stage 4 03/24/22 1425   Irene-wound Assessment Scar Tissue; Intact;Dry 03/24/22 1425   Wound Length (cm) 2 2 cm 03/24/22 1425   Wound Width (cm) 1 9 cm 03/24/22 1425   Wound Depth (cm) 1 7 cm 03/24/22 1425   Wound Surface Area (cm^2) 4 18 cm^2 03/24/22 1425   Wound Volume (cm^3) 7  106 cm^3 03/24/22 1425   Calculated Wound Volume (cm^3) 7 11 cm^3 03/24/22 1425   Tunneling 4 7 cm 02/10/22 1341   Tunneling in depth located at 10 02/10/22 1341   Undermining 4 2 03/24/22 1425   Undermining is depth extending from 8-12 03/24/22 1425   Drainage Amount Large 03/24/22 1425   Drainage Description Serosanguineous 03/24/22 1425   Non-staged Wound Description Full thickness 03/24/22 1425   Treatments Cleansed 12/30/21 1452   Wound packed? Yes 12/30/21 1452   Packing- # removed 1 12/30/21 1452   Dressing Changed Changed 12/30/21 1452   Patient Tolerance Tolerated well 12/30/21 1452   Dressing Status Removed 12/30/21 1452                     Debridement   Wound Pressure Injury Ischium Left    Universal Protocol:  Consent: Verbal consent obtained  Written consent obtained  Consent given by: patient  Time out: Immediately prior to procedure a "time out" was called to verify the correct patient, procedure, equipment, support staff and site/side marked as required    Patient understanding: patient states understanding of the procedure being performed  Patient identity confirmed: verbally with patient      Performed by: physician  Debridement type: surgical  Level of debridement: subcutaneous tissue  Pain control: lidocaine 4%  Post-debridement measurements  Length (cm): 6 1  Width (cm): 4 5  Depth (cm): 2 4  Percent debrided: 100%  Surface Area (cm^2): 27 45  Area debrided (cm^2): 27 45  Volume (cm^3): 65 88  Tissue and other material debrided: subcutaneous tissue  Devitalized tissue debrided: biofilm and fibrin  Comment regarding debrided tissue: Fibrotic tissue  Instrument(s) utilized: curette and blade  Bleeding: medium  Hemostasis obtained with: pressure  Procedural pain (0-10): insensate  Post-procedural pain: insensate   Response to treatment: procedure was tolerated well    Debridement   Wound Pressure Injury Perineum    Universal Protocol:  Consent: Verbal consent obtained  Written consent obtained  Consent given by: patient  Time out: Immediately prior to procedure a "time out" was called to verify the correct patient, procedure, equipment, support staff and site/side marked as required  Patient understanding: patient states understanding of the procedure being performed  Patient identity confirmed: verbally with patient      Performed by: physician  Debridement type: surgical  Level of debridement: subcutaneous tissue  Pain control: lidocaine 4%  Post-debridement measurements  Length (cm): 5 5  Width (cm): 5 3  Depth (cm): 1 7  Percent debrided: 100%  Surface Area (cm^2): 29 15  Area debrided (cm^2): 29 15  Volume (cm^3): 49 56  Tissue and other material debrided: subcutaneous tissue  Devitalized tissue debrided: biofilm and fibrin  Instrument(s) utilized: curette and blade  Bleeding: medium  Hemostasis obtained with: pressure  Procedural pain (0-10): insensate  Post-procedural pain: insensate   Response to treatment: procedure was tolerated well    Debridement   Wound Pressure Injury Sacrum    Universal Protocol:  Consent: Verbal consent obtained  Written consent obtained    Consent given by: patient  Time out: Immediately prior to procedure a "time out" was called to verify the correct patient, procedure, equipment, support staff and site/side marked as required  Patient understanding: patient states understanding of the procedure being performed  Patient identity confirmed: verbally with patient      Performed by: physician  Debridement type: surgical  Level of debridement: subcutaneous tissue  Pain control: lidocaine 4%  Post-debridement measurements  Length (cm): 5 2  Width (cm): 9 5  Depth (cm): 1 8  Percent debrided: 100%  Surface Area (cm^2): 49 4  Area debrided (cm^2): 49 4  Volume (cm^3): 88 92  Tissue and other material debrided: dermis and subcutaneous tissue  Devitalized tissue debrided: biofilm and fibrin  Comment regarding debrided tissue: Fibrotic tissue  Instrument(s) utilized: curette and blade  Bleeding: medium  Hemostasis obtained with: pressure  Procedural pain (0-10): insensate  Post-procedural pain: insensate   Response to treatment: procedure was tolerated well    Debridement   Wound 01/28/21 Pressure Injury Hip Left;Lateral    Universal Protocol:  Consent: Verbal consent obtained  Written consent obtained  Consent given by: patient  Time out: Immediately prior to procedure a "time out" was called to verify the correct patient, procedure, equipment, support staff and site/side marked as required    Patient understanding: patient states understanding of the procedure being performed  Patient identity confirmed: verbally with patient      Performed by: physician  Debridement type: surgical  Level of debridement: subcutaneous tissue  Pain control: lidocaine 4%  Post-debridement measurements  Length (cm): 2 2  Width (cm): 1 9  Depth (cm): 1 8  Percent debrided: 100%  Surface Area (cm^2): 4 18  Area debrided (cm^2): 4 18  Volume (cm^3): 7 52  Tissue and other material debrided: subcutaneous tissue  Devitalized tissue debrided: biofilm and fibrin  Instrument(s) utilized: curette  Bleeding: medium  Hemostasis obtained with: pressure  Procedural pain (0-10): insensate  Post-procedural pain: insensate   Response to treatment: procedure was tolerated well  Debridement Comments: All debridements for all pressure injuries were irrigated with saline followed by Dakin's packing  Wound Instructions:  Orders Placed This Encounter   Procedures    Wound cleansing and dressings     Wound cleansing and dressings                                                              All wounds:  Wash your hands with soap and water  Remove old dressing, discard into plastic bag and place in trash  Cleanse the wound with sterile saline solution (rinse) prior to applying a clean dressing  Do not use tissue or cotton balls  Do not scrub the wound  Pat dry using gauze  Shower no; do not get dressing wet     Apply saline moistened gauze  Cover with an ABD pad  Secure with Medfix tape    Change dressing daily and PRN for breakthrough drainage (visiting nurses to do twice per week and family in between)     Continue visiting nurses twice per week for dressing changes, family to do in between nurse visits     Follow up in 6 weeks                              Wound off loading  Continue clinitron bed at home and turn at least every 1-2 hours  To try and limit the amount of time spent sitting in the wheelchair  Keep pressure off the wounds as much as possible                            Continue to try and increase your protein to at least 3 servings or more if possible                                  Continue to try and keep blood sugars as low as possible  Stop Smoking        Treatment in the wound center today:  Cleansed with normal saline and dressed with Dakin's moistened gauze     Standing Status:   Future     Standing Expiration Date:   3/24/2023    Debridement     This order was created via procedure documentation    Debridement     This order was created via procedure documentation    Debridement     This order was created via procedure documentation  Debridement     This order was created via procedure documentation       Maurilio Lynn MD, CHT, CWS       Portions of the record may have been created with voice recognition software  Occasional wrong word or "sound alike" substitutions may have occurred due to the inherent limitations of voice recognition software  Read the chart carefully and recognize, using context, where substitutions have occurred

## 2022-03-28 ENCOUNTER — TELEPHONE (OUTPATIENT)
Dept: HEMATOLOGY ONCOLOGY | Facility: CLINIC | Age: 42
End: 2022-03-28

## 2022-03-29 ENCOUNTER — TELEPHONE (OUTPATIENT)
Dept: HEMATOLOGY ONCOLOGY | Facility: CLINIC | Age: 42
End: 2022-03-29

## 2022-03-29 NOTE — TELEPHONE ENCOUNTER
Appointment Confirmation (to confirm pre existing appointments - ONLY)     Appointment with  Matt Bell   Appointment date & time 3/29 @ 2PM   Location [place]   Patient verbilized Understanding  Yes

## 2022-03-29 NOTE — TELEPHONE ENCOUNTER
Appointment Cancellation Or Reschedule     Person calling in Patient    Provider Maryann Visit Date and Time  3/29 2PM   Office Visit Location [place]   Did patient want to reschedule their office appointment? If so, when was it scheduled to? No, will call back   Is this patient calling to reschedule an infusion appointment? No   When is their next infusion appointment? No   Is this patient a Chemo patient? No   Reason for Cancellation or Reschedule Schedule conflict     If the patient is a treatment patient, please route this to the office nurse  If the patient is not on treatment, please route to the office MA

## 2022-03-30 ENCOUNTER — TELEPHONE (OUTPATIENT)
Dept: HEMATOLOGY ONCOLOGY | Facility: CLINIC | Age: 42
End: 2022-03-30

## 2022-04-04 ENCOUNTER — TELEPHONE (OUTPATIENT)
Dept: FAMILY MEDICINE CLINIC | Facility: CLINIC | Age: 42
End: 2022-04-04

## 2022-04-04 ENCOUNTER — TRANSITIONAL CARE MANAGEMENT (OUTPATIENT)
Dept: FAMILY MEDICINE CLINIC | Facility: CLINIC | Age: 42
End: 2022-04-04

## 2022-04-04 NOTE — TELEPHONE ENCOUNTER
[address] [place] [address] form received on 4/4/22  to be completed by PCP  Copy made and placed in PCP folder  Forms to be delivered to PCP mailbox at assigned time        ORDER NUMBER [id number]

## 2022-04-05 ENCOUNTER — TELEPHONE (OUTPATIENT)
Dept: FAMILY MEDICINE CLINIC | Facility: CLINIC | Age: 42
End: 2022-04-05

## 2022-04-05 NOTE — TELEPHONE ENCOUNTER
PCP SIGNATURE NEEDED FOR [facility] FORM RECEIVED VIA FAX AND PLACED IN PCP FOLDER TO BE DELIVERED AT ASSIGNED TIMES      Summary of Visit Date 4/3/22

## 2022-04-06 ENCOUNTER — TELEPHONE (OUTPATIENT)
Dept: FAMILY MEDICINE CLINIC | Facility: CLINIC | Age: 42
End: 2022-04-06

## 2022-04-06 NOTE — TELEPHONE ENCOUNTER
PCP SIGNATURE NEEDED FOR [facility] FORM RECEIVED VIA FAX AND PLACED IN PCP FOLDER TO BE DELIVERED AT ASSIGNED TIMES        Physician order Resume all [facility] services 4/3/22

## 2022-04-07 ENCOUNTER — TELEPHONE (OUTPATIENT)
Dept: FAMILY MEDICINE CLINIC | Facility: CLINIC | Age: 42
End: 2022-04-07

## 2022-04-07 NOTE — TELEPHONE ENCOUNTER
[email or web address]  form received on 04/07/2022  to be completed by PCP  Copy made and placed in PCP folder  Forms to be delivered to PCP mailbox at assigned time      ORDER NUMBER [id number]

## 2022-04-08 ENCOUNTER — TELEPHONE (OUTPATIENT)
Dept: FAMILY MEDICINE CLINIC | Facility: CLINIC | Age: 42
End: 2022-04-08

## 2022-04-08 DIAGNOSIS — G82.20 PARAPLEGIA (HCC): Primary | ICD-10-CM

## 2022-04-08 NOTE — TELEPHONE ENCOUNTER
Patient called and asked for a new referral to pain management to be placed  He missed too many appointments with [facility] due to hospitalizations and they wont schedule him anymore

## 2022-04-08 NOTE — TELEPHONE ENCOUNTER
PCP SIGNATURE NEEDED FOR [facility] FORM RECEIVED VIA FAX AND PLACED IN PCP FOLDER TO BE DELIVERED AT ASSIGNED TIMES        Hold all A Services

## 2022-04-11 ENCOUNTER — TELEPHONE (OUTPATIENT)
Dept: FAMILY MEDICINE CLINIC | Facility: CLINIC | Age: 42
End: 2022-04-11

## 2022-04-11 NOTE — TELEPHONE ENCOUNTER
Please call her back and clarify  I was notified that he was dismissed from [facility] pain management  Does she mean a referral to palliative care?

## 2022-04-12 NOTE — TELEPHONE ENCOUNTER
Mom called again asking to please fax over a ambulatory referral to [facility] pain management, she spoke to them already and all her son needs is a script faxed over    Fax number is : [phone]

## 2022-04-13 ENCOUNTER — TELEPHONE (OUTPATIENT)
Dept: HEMATOLOGY ONCOLOGY | Facility: CLINIC | Age: 42
End: 2022-04-13

## 2022-04-13 NOTE — TELEPHONE ENCOUNTER
Contacted pt to r/s missed appt, [facility] 5[facility]   Pt voiced understanding

## 2022-04-15 ENCOUNTER — TELEPHONE (OUTPATIENT)
Dept: FAMILY MEDICINE CLINIC | Facility: CLINIC | Age: 42
End: 2022-04-15

## 2022-04-15 NOTE — TELEPHONE ENCOUNTER
PCP [address] FORM RECEIVED VIA FAX AND PLACED IN PCP FOLDER TO BE DELIVERED AT ASSIGNED TIMES         Order # [id number]

## 2022-04-20 ENCOUNTER — TELEPHONE (OUTPATIENT)
Dept: FAMILY MEDICINE CLINIC | Facility: CLINIC | Age: 42
End: 2022-04-20

## 2022-04-21 ENCOUNTER — TELEPHONE (OUTPATIENT)
Dept: FAMILY MEDICINE CLINIC | Facility: CLINIC | Age: 42
End: 2022-04-21

## 2022-04-21 ENCOUNTER — OFFICE VISIT (OUTPATIENT)
Dept: FAMILY MEDICINE CLINIC | Facility: CLINIC | Age: 42
End: 2022-04-21

## 2022-04-21 VITALS
RESPIRATION RATE: 18 BRPM | SYSTOLIC BLOOD PRESSURE: 126 MMHG | HEART RATE: 84 BPM | DIASTOLIC BLOOD PRESSURE: 74 MMHG | OXYGEN SATURATION: 97 % | TEMPERATURE: 98.9 F

## 2022-04-21 DIAGNOSIS — G89.29 CHRONIC BILATERAL LOW BACK PAIN WITHOUT SCIATICA: Primary | ICD-10-CM

## 2022-04-21 DIAGNOSIS — I48.91 ATRIAL FIBRILLATION, UNSPECIFIED TYPE (HCC): Chronic | ICD-10-CM

## 2022-04-21 DIAGNOSIS — J30.9 ALLERGIC RHINITIS, UNSPECIFIED SEASONALITY, UNSPECIFIED TRIGGER: ICD-10-CM

## 2022-04-21 DIAGNOSIS — M54.50 CHRONIC BILATERAL LOW BACK PAIN WITHOUT SCIATICA: Primary | ICD-10-CM

## 2022-04-21 DIAGNOSIS — Z99.2 TYPE 1 DIABETES MELLITUS WITH CHRONIC KIDNEY DISEASE ON CHRONIC DIALYSIS (HCC): ICD-10-CM

## 2022-04-21 DIAGNOSIS — E10.22 TYPE 1 DIABETES MELLITUS WITH CHRONIC KIDNEY DISEASE ON CHRONIC DIALYSIS (HCC): ICD-10-CM

## 2022-04-21 DIAGNOSIS — F41.0 ANXIETY ATTACK: ICD-10-CM

## 2022-04-21 DIAGNOSIS — R09.89 CHEST CONGESTION: ICD-10-CM

## 2022-04-21 DIAGNOSIS — L29.9 PRURITUS: ICD-10-CM

## 2022-04-21 DIAGNOSIS — E43 SEVERE PROTEIN-CALORIE MALNUTRITION (HCC): ICD-10-CM

## 2022-04-21 DIAGNOSIS — Z79.4 INSULIN LONG-TERM USE (HCC): ICD-10-CM

## 2022-04-21 DIAGNOSIS — I10 HTN (HYPERTENSION), BENIGN: ICD-10-CM

## 2022-04-21 DIAGNOSIS — E78.5 HYPERLIPIDEMIA, UNSPECIFIED HYPERLIPIDEMIA TYPE: ICD-10-CM

## 2022-04-21 DIAGNOSIS — L98.429 ULCER OF SACRAL REGION, STAGE 4 (HCC): ICD-10-CM

## 2022-04-21 DIAGNOSIS — N18.6 TYPE 1 DIABETES MELLITUS WITH CHRONIC KIDNEY DISEASE ON CHRONIC DIALYSIS (HCC): ICD-10-CM

## 2022-04-21 PROCEDURE — 99214 OFFICE O/P EST MOD 30 MIN: CPT | Performed by: NURSE PRACTITIONER

## 2022-04-21 RX ORDER — BACLOFEN 10 MG/1
10 TABLET ORAL DAILY PRN
Qty: 30 TABLET | Refills: 2 | Status: SHIPPED | OUTPATIENT
Start: 2022-04-21 | End: 2022-04-21

## 2022-04-21 RX ORDER — ATORVASTATIN CALCIUM 20 MG/1
20 TABLET, FILM COATED ORAL
Qty: 90 TABLET | Refills: 1 | Status: SHIPPED | OUTPATIENT
Start: 2022-04-21

## 2022-04-21 RX ORDER — CARVEDILOL 25 MG/1
25 TABLET ORAL 2 TIMES DAILY
Qty: 180 TABLET | Refills: 1 | Status: SHIPPED | OUTPATIENT
Start: 2022-04-21

## 2022-04-21 RX ORDER — GUAIFENESIN 600 MG
1200 TABLET, EXTENDED RELEASE 12 HR ORAL EVERY 12 HOURS SCHEDULED
Qty: 60 TABLET | Refills: 2 | Status: SHIPPED | OUTPATIENT
Start: 2022-04-21

## 2022-04-21 RX ORDER — HYDROXYZINE 50 MG/1
50 TABLET, FILM COATED ORAL 2 TIMES DAILY PRN
Qty: 30 TABLET | Refills: 1 | Status: SHIPPED | OUTPATIENT
Start: 2022-04-21 | End: 2022-06-23 | Stop reason: SDUPTHER

## 2022-04-21 RX ORDER — CETIRIZINE HYDROCHLORIDE 10 MG/1
10 TABLET ORAL DAILY
Qty: 90 TABLET | Refills: 1 | Status: SHIPPED | OUTPATIENT
Start: 2022-04-21

## 2022-04-21 NOTE — PROGRESS NOTES
Transition of Care  Follow-up After Hospitalization  Admitted on: 3/30/2022-   Discharged on: 4/3/2022          Chuy Gong  39 y o  male   Date:  4/22/2022    Hospital records were reviewed  Medications upon discharge reviewed/updated  Discharge Disposition:  Home, home nurses  Follow up visits with other specialists: endo, wound care, pain med      HPI:  Asa Rosales is a 40 y/o M with a PMHx significant for Transverse myelitis and paraplegia, end-stage renal disease on dialysis, right BKA, atrial fibrillation on Eliquis, neurogenic bladder with chronic suprapubic catheter, colostomy, type 2 diabetes, (last A1c 7 3), multiple decubitus ulcers and recent MSSA bacteremia with tricuspid valve endocarditis 1/2022 who presents for TCM with PCP  Discharge summary: "He presented to [facility] with a c/c of cough congestion  Infectious workup significant for positive coronavirus OC43 on RVP panel  Patient otherwise has need adjustment of insulin regimen due to labile glucose levels  From a respiratory standpoint, patient has remained stable throughout majority of hospitalization but will need follow-up as outpatient for further glucose monitoring and regimen adjustments  Today patient states he feels overall well, he has been gaining weight to try to improve his hemoglobin and albumin which have both improved on recent blood work, this is with the goal of getting surgery for his stage IV sacral wound, he is following with plastics for this  He also regularly follows with wound care  He has home nursing to help him with his suprapubic catheter and wound dressings  He was dismissed from his previous [facility] because he missed too many appointments but this was due to multiple hospitalizations  He does inform me he was able to make an appointment with a new pain clinic  He does require some medication refills which are noted below  He has no new health concerns        ROS: Review of Systems Constitutional: Negative for chills and fever  HENT: Negative for ear pain and sore throat  Eyes: Negative for pain and visual disturbance  Respiratory: Negative for cough and shortness of breath  Cardiovascular: Negative for chest pain and palpitations  Gastrointestinal: Negative for abdominal pain and vomiting  Genitourinary: Negative for dysuria and hematuria  Musculoskeletal: Positive for back pain  Negative for arthralgias  Skin: Positive for wound  Negative for color change and rash  Neurological: Negative for seizures and syncope  All other systems reviewed and are negative  Past Medical History:   Diagnosis Date    Ambulatory dysfunction     Anemia     Anemia, iron deficiency     transfusion requiring    [facility] fibrillation [facility]     AVM (arteriovenous malformation) of duodenum, 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    Bacteriuria, asymptomatic     Bipolar disorder ([facility])     Chronic deep vein thrombosis (DVT) ([facility])     Chronic indwelling Ortega catheter     Chronic kidney disease     Chronic pain     Chronic pain disorder     Chronic suprapubic catheter ([facility]     Clostridium difficile infection 08/11/2016    also positive 9/2016, 5/29/2017, 8/15/2017   S/P fecal transplant    Colostomy on examination ([facility] )     Decubitus ulcer     Delirium     Diabetes mellitus ([facility]     GERD (gastroesophageal reflux disease)     Hemodialysis patient ([facility] )     Hypertension     Memory impairment 2011    s/p diabetic coma    Neurogenic bladder     OAB (overactive bladder)     Paraplegia ([facility])     T3 transverse myelitis vs spinal stoke (AVM); 2012 extensive epidural abscess C7=> conus 2nd extension from deep parspinal abscess L4-S2/sacral decubitus; cord atrophy/myelomalcia T3=>conus     Penile abscess     S/P unilateral BKA (below knee amputation) ([facility])     Right    Sebaceous cyst removed in 2017    Seizures ([facility])     Shortness of breath     Tobacco abuse     Transverse myelitis [facility])     Urinary retention     Wheelchair dependent     Wounds, multiple     pressure ulcers with delayed healing       Past Surgical History:   Procedure Laterality Date    BELOW KNEE LEG AMPUTATION Right 2009    COLONOSCOPY N/A 3/27/2017    Procedure: COLONOSCOPY;  Surgeon: Kike Cruz MD;  Location: [facility]; Service:     COLONOSCOPY N/A 3/29/2017    Procedure: COLONOSCOPY;  Surgeon: Kike Cruz MD;  [facility]: [facility]; Service:     COLONOSCOPY N/A 6/15/2017    Procedure: COLONOSCOPY with FMT;  Surgeon: Kay Lennox, MD;  Location: [facility]; Service: Gastroenterology    ESOPHAGOGASTRODUODENOSCOPY N/A 3/22/2017    Procedure: ESOPHAGOGASTRODUODENOSCOPY (EGD); Surgeon: Nyla Key DO;  Location: [facility];   Service:     MULTIPLE TOOTH EXTRACTIONS N/A 3/8/2021    Procedure: EXTRACTION TEETH # 2,3,6,10,12,13,14,18,19,20,21,22,23,24,25,26,27,28,29,30;  Surgeon: Aline Fitch DMD;  Location: [facility];  Service: Maxillofacial       Social History     Socioeconomic History    Marital status: Single     Spouse name: None    Number of children: None    Years of education: None    Highest education level: None   Occupational History    None   Tobacco Use    Smoking status: Current Every Day Smoker     Types: Cigars    Smokeless tobacco: Never Used    Tobacco comment: about 3 cigars/day   Substance and Sexual Activity    Alcohol use: Not Currently     Comment: 1 cup of wine every 3-4 months    Drug use: Not Currently     Types: Marijuana     Comment: rarely; once every 1-2 years    Sexual activity: None   Other Topics Concern    None   Social History Narrative    None     Social Determinants of Health     Financial Resource Strain: Low Risk     Difficulty of Paying Living Expenses: Not hard at all   Food Insecurity: No Food Insecurity    Worried About Running Out of Food in the Last Year: Never true    Jose of Food in the Last Year: Never true   Transportation Needs: No Transportation Needs    Lack of Transportation (Medical): No    Lack of Transportation (Non-Medical): No   Physical Activity: Not on file   Stress: Not on file   Social Connections: Not on file   Intimate Partner Violence: Not on file   Housing Stability: Not on file       Family History   Problem Relation Age of Onset    Hyperlipidemia Mother     Hypertension Mother     Leukemia Brother     Diabetes Paternal Grandfather        Allergies   Allergen Reactions    Cefepime      Other reaction(s):  Other (See Comments)  encephalopathy; tolerates cefazolin 6/14, miguel Ceftriaxone    Ciprofloxacin Hcl     Cymbalta [Duloxetine Hcl]     Gabapentin Tremor    Lac Bovis GI Intolerance    Lactose - Food Allergy GI Intolerance     Other reaction(s): Unknown    Lyrica [Pregabalin]     Penicillins Other (See Comments)     miguel Zosyn 08/28/17  Tolerates Ancef  Miguel Augmentin    Polymyxin B     Rosuvastatin Other (See Comments) and Palpitations     Weakness         Current Outpatient Medications:     Alcohol Swabs (ALCOHOL PADS) 70 % PADS, by Does not apply route 5 (five) times a day, Disp: 300 each, Rfl: 5    amLODIPine (NORVASC) 10 mg tablet, Take 1 tablet (10 mg total) by mouth daily, Disp: 90 tablet, Rfl: 1    ammonium lactate (LAC-HYDRIN) 12 % cream, , Disp: , Rfl:     apixaban (ELIQUIS) 5 mg, Take 1 tablet (5 mg total) by mouth 2 (two) times a day, Disp: 270 tablet, Rfl: 1    ascorbic acid (VITAMIN C) 250 mg tablet, TAKE 2 TABLETS (500 MG TOTAL) BY MOUTH DAILY, Disp: 180 tablet, Rfl: 1    atorvastatin (LIPITOR) 20 mg tablet, Take 1 tablet (20 mg total) by mouth daily at bedtime, Disp: 90 tablet, Rfl: 1    B Complex-C-Folic Acid (Super B-Complex/Vit C/FA) TABS, TAKE 1 TABLET BY MOUTH EVERY DAY AS DIRECTED, Disp: 30 tablet, Rfl: 8    SUSAN MICROLET LANCETS lancets, Use as instructed to check blood sugar 4 times a day Dx e10 29, Disp: 200 each, Rfl: 5    Blood Glucose Monitoring Suppl (SUSAN CONTOUR NEXT USB MONITOR) w/Device KIT, Testing 4 times a day, Disp: 1 kit, Rfl: 0    calcitriol (ROCALTROL) 0 5 MCG capsule, Take 2 mcg by mouth, Disp: , Rfl:     Capsaicin 0 033 % CREA, 5 times a day for 1st week  Then 3 times a day for next 3 weeks, Disp: 56 6 g, Rfl: 2    carvedilol (COREG) 25 mg tablet, Take 1 tablet (25 mg total) by mouth 2 (two) times a day, Disp: 180 tablet, Rfl: 1    cetirizine (ZyrTEC) 10 mg tablet, Take 1 tablet (10 mg total) by mouth daily, Disp: 90 tablet, Rfl: 1    Cholecalciferol (VITAMIN D-3) 1000 units CAPS, Take 2 capsules by mouth daily, Disp: 30 capsule, Rfl: 0    clindamycin (CLINDAGEL) 1 % gel, APPLY TO AFFECTED AREA TWICE A DAY, Disp: 60 g, Rfl: 2    CVS ACETAMINOPHEN EX  MG tablet, TAKE 2 TABLETS BY MOUTH EVERY 8 HOURS AS NEEDED FOR MILD OR MODERATE PAIN (PAIN SCORE 1 6)  , Disp: , Rfl:     cyanocobalamin (CVS Vitamin B-12) 1000 MCG tablet, Take 1 tablet (1,000 mcg total) by mouth daily, Disp: 30 tablet, Rfl: 5    cyclobenzaprine (FLEXERIL) 10 mg tablet, Take 10 mg by mouth 3 (three) times a day as needed, Disp: , Rfl: 0    cyclobenzaprine (FLEXERIL) 5 mg tablet, , Disp: , Rfl:     dexlansoprazole (DEXILANT) 30 MG capsule, Take 1 capsule (30 mg total) by mouth daily, Disp: 60 capsule, Rfl: 0    dicyclomine (BENTYL) 10 mg capsule, TAKE 1 CAPSULE (10 MG TOTAL) BY MOUTH 3 (THREE) TIMES A DAY BEFORE MEALS, Disp: 270 capsule, Rfl: 1    Disposable Gloves MISC, Use 7 times a day, 4 boxes of gloves XL Dx type 1 DM, paraplegia, Disp: 4 each, Rfl: 11    doxazosin (CARDURA) 2 mg tablet, TAKE 1 TABLET BY MOUTH EVERY EVENING , Disp: 90 tablet, Rfl: 0    epoetin kaushik (EPOGEN,PROCRIT) 20,000 units/mL, Inject 10,000 Units under the skin, Disp: , Rfl:     epoetin kaushik (EPOGEN,PROCRIT) 20,000 units/mL, Inject 5,000 Units under the skin, Disp: , Rfl:     ergocalciferol (VITAMIN D2) 50,000 units, TAKE 1 CAPSULE BY MOUTH ONE TIME PER WEEK, Disp: , Rfl:     ferrous sulfate 325 (65 Fe) mg tablet, Take 1 tablet (325 mg total) by mouth 3 (three) times a day, Disp: 90 tablet, Rfl: 3    glucose 4-6 GM-MG, Chew 2 tablets daily as needed for low blood sugar, Disp: 10 tablet, Rfl: 1    glucose blood (SUSAN CONTOUR TEST) test strip, Use as instructed to test blood sugar 4 times a day Dx e10 29, Disp: 200 each, Rfl: 3    guaiFENesin (MUCINEX) 600 mg 12 hr tablet, Take 2 tablets (1,200 mg total) by mouth every 12 (twelve) hours, Disp: 60 tablet, Rfl: 2    Gvoke PFS 1 MG/0 2ML SOSY, INJECT 1 ML (1 MG TOTAL) INTO A MUSCLE ONCE FOR 1 DOSE, Disp: , Rfl:     HYDROmorphone (DILAUDID) 4 mg tablet, TAKE 1 TABLET BY MOUTH EVERY 4 HOURS AS NEEDED FOR MODERATE PAIN (PAIN SCORE 4 6)   MAX  24 MG/DAY, Disp: , Rfl:     hydrOXYzine HCL (ATARAX) 50 mg tablet, Take 1 tablet (50 mg total) by mouth 2 (two) times a day as needed for itching or anxiety, Disp: 30 tablet, Rfl: 1    Incontinence Supply Disposable (Incontinence Brief Large) MISC, Use 6 (six) times a day Size xl, Disp: 180 each, Rfl: 11    Incontinence Supply Disposable (Undergarment) MISC, Use 6 a day, 5 boxes, xl Dx R51, paraplegia, Disp: 5 each, Rfl: 11    Incontinence Supply Disposable (Underpads) MISC, Use 2 a day, Disp: 5 each, Rfl: 11    insulin detemir (LEVEMIR) 100 units/mL subcutaneous injection, Inject 6 5 Units under the skin 2 (two) times a day , Disp: , Rfl:     ketoconazole (NIZORAL) 2 % cream, Apply to rash , Disp: 15 g, Rfl: 1    Ketostix strip, USE WITH HYPERGLYCEMIA E10 65 **NOT COVERED**, Disp: , Rfl:     LEVEMIR FLEXTOUCH 100 units/mL injection pen, Inject 14 Units under the skin 2 (two) times a day, Disp: 15 pen, Rfl: 3    levETIRAcetam (KEPPRA) 250 mg tablet, TAKE 1 TABLET (250 MG TOTAL) BY MOUTH 3 (THREE) TIMES A WEEK (MWF) AT 1200 , Disp: , Rfl:     lidocaine (XYLOCAINE) 5 % ointment, Apply topically as needed for mild pain, Disp: 35 44 g, Rfl: 0    Lidocaine HCl 4 % LIQD, Apply 1 application topically 3 (three) times a day as needed (pain), Disp: 73 mL, Rfl: 5    Lokelma 10 g PACK, , Disp: , Rfl:     losartan (COZAAR) 100 MG tablet, Take 1 tablet (100 mg total) by mouth daily, Disp: 90 tablet, Rfl: 1    Melatonin ER 3 MG TBCR, Take 6 mg by mouth, Disp: , Rfl:     Misc  Devices (Wheelchair Cushion) MISC, Use daily, Disp: 1 each, Rfl: 0    Misc   Devices [facility]) MISC, by Does not apply route daily Wheelchair ramp, dx g82 20, Disp: 1 each, Rfl: 0    Multiple Vitamin (Daily-Enriqueta Multivitamin) TABS, TAKE 1 TABLET BY MOUTH EVERY DAY, Disp: 90 tablet, Rfl: 2    Narcan 4 MG/0 1ML nasal spray, , Disp: , Rfl:     NOVOLOG 100 UNIT/ML injection, Inject 5 Units under the skin 3 (three) times a day with meals , Disp: , Rfl:     NovoLOG FlexPen 100 units/mL injection pen, INJECT 3 UNITS UNDER THE SKIN 3 (THREE) TIMES A DAY BEFORE MEALS , Disp: , Rfl:     nystatin (MYCOSTATIN) 500,000 units/5 mL suspension, APPLY 5 ML (500,000 UNITS TOTAL) TO THE MOUTH OR THROAT 4 (FOUR) TIMES A DAY, Disp: 473 mL, Rfl: 1    ondansetron (ZOFRAN-ODT) 4 mg disintegrating tablet, Take 2 tablets (8 mg total) by mouth every 8 (eight) hours as needed for nausea or vomiting, Disp: 30 tablet, Rfl: 1    oxybutynin (DITROPAN) 5 mg tablet, Take 3 tablets (15 mg total) by mouth daily, Disp: 270 tablet, Rfl: 1    phenytoin extended (DILANTIN) 200 MG ER capsule, Take 1 capsule (200 mg total) by mouth 2 (two) times a day, Disp: 180 capsule, Rfl: 1    phenytoin extended (DILANTIN) 200 MG ER capsule, TAKE 1 CAPSULE BY MOUTH TWICE A DAY, Disp: 180 capsule, Rfl: 0    phenytoin extended (DILANTIN) 300 MG ER capsule, TAKE 1 CAPSULE BY MOUTH EVERY DAY, Disp: 90 capsule, Rfl: 1    phenytoin extended (DILANTIN) 300 MG ER capsule, Take 1 capsule (300 mg total) by mouth daily, Disp: 90 capsule, Rfl: 1    risperiDONE (RisperDAL) 0 5 mg tablet, Take 1 tablet (0 5 mg total) by mouth 2 (two) times a day, Disp: 180 tablet, Rfl: 1    senna (SENOKOT) 8 6 mg, Take 1 tablet (8 6 mg total) by mouth 2 (two) times a day, Disp: 180 tablet, Rfl: 1    sertraline (ZOLOFT) 25 mg tablet, TAKE 1/2 TABLET BY MOUTH EVERY DAY, Disp: 45 tablet, Rfl: 0    sodium hypochlorite (DAKIN'S HALF-STRENGTH) external solution, USE AS DIRECTED ONCE  DAILY, Disp: 473 mL, Rfl: 2    Sodium Zirconium Cyclosilicate 10 g PACK, Take 10 g by mouth daily, Disp: , Rfl:     sucralfate (CARAFATE) 1 g/10 mL suspension, Take 10 mL (1 g total) by mouth 4 (four) times a day (with meals and at bedtime), Disp: 420 mL, Rfl: 0    SUPER B COMPLEX & C TABS, TAKE 1 CAPSULE BY MOUTH ONCE FOR 1 DOSE AS DIRECTED, Disp: 90 tablet, Rfl: 2    tiZANidine (ZANAFLEX) 4 mg tablet, Take 1 tablet by mouth 4 (four) times a day, Disp: , Rfl:     tiZANidine (ZANAFLEX) 4 mg tablet, , Disp: , Rfl:     triamcinolone (KENALOG) 0 1 % ointment, Apply topically 2 (two) times a day, Disp: 454 g, Rfl: 0    Zinc Sulfate 220 (50 Zn) MG TABS, Take 1 tablet by mouth daily, Disp: 90 tablet, Rfl: 1      Physical Exam:  /74 (BP Location: Left arm, Patient Position: Sitting, Cuff Size: Standard)   Pulse 84   Temp 98 9 °F (37 2 °C) (Temporal)   Resp 18   SpO2 97%     Physical Exam  Vitals and nursing note reviewed  Constitutional:       General: He is not in acute distress  Appearance: He is well-developed  HENT:      Head: Normocephalic and atraumatic  Right Ear: External ear normal       Left Ear: External ear normal    Eyes:      Conjunctiva/sclera: Conjunctivae normal    Neck:      Thyroid: No thyromegaly  Cardiovascular:      Rate and Rhythm: Normal rate and regular rhythm  Heart sounds: Normal heart sounds  No murmur heard  Pulmonary:      Effort: Pulmonary effort is normal  No respiratory distress  Breath sounds: Normal breath sounds  No wheezing  Chest:          Comments: Dry clean and intact dressing over chest wall port  Musculoskeletal:      Cervical back: Normal range of motion and neck supple        Right lower leg: No edema  Left lower leg: No edema  Comments: Wheelchair dependent   Lymphadenopathy:      Cervical: No cervical adenopathy  Skin:     Findings: Wound present  Comments: Wound exam deferred, patient unable to transfer from wheelchair today  Neurological:      Mental Status: He is alert and oriented to person, place, and time  Psychiatric:         Mood and Affect: Mood normal          Behavior: Behavior normal              Labs:  Lab Results   Component Value Date    WBC 6 68 10/10/2017    HGB 8 7 (L) 10/10/2017    HCT 28 1 (L) 10/10/2017    MCV 85 10/10/2017     10/10/2017     Lab Results   Component Value Date     12/28/2014    K 4 8 10/10/2017     (H) 10/10/2017    CO2 22 10/10/2017    ANIONGAP 5 12/28/2014    BUN 47 (H) 10/10/2017    CREATININE 2 53 (H) 10/10/2017    GLUCOSE 34 (LL) 04/21/2017    CALCIUM 8 3 10/10/2017    AST 8 10/02/2017    ALT 10 (L) 10/02/2017    ALKPHOS 75 10/02/2017    EGFR 36 10/10/2017       Assessment and Plan:    Yu Brower was seen today for follow-up  Diagnoses and all orders for this visit:    Chronic bilateral low back pain without sciatica  -     Discontinue: baclofen 10 mg tablet; Take 1 tablet (10 mg total) by mouth daily as needed for muscle spasms    Hyperlipidemia, unspecified hyperlipidemia type  -     atorvastatin (LIPITOR) 20 mg tablet; Take 1 tablet (20 mg total) by mouth daily at bedtime    HTN (hypertension), benign  -     carvedilol (COREG) 25 mg tablet; Take 1 tablet (25 mg total) by mouth 2 (two) times a day    Allergic rhinitis, unspecified seasonality, unspecified trigger  -     cetirizine (ZyrTEC) 10 mg tablet; Take 1 tablet (10 mg total) by mouth daily    Pruritus  -     hydrOXYzine HCL (ATARAX) 50 mg tablet; Take 1 tablet (50 mg total) by mouth 2 (two) times a day as needed for itching or anxiety    Anxiety attack  -     hydrOXYzine HCL (ATARAX) 50 mg tablet;  Take 1 tablet (50 mg total) by mouth 2 (two) times a day as needed for itching or anxiety    Severe protein-calorie malnutrition (HCC)    Chest congestion  -     guaiFENesin (MUCINEX) 600 mg 12 hr tablet; Take 2 tablets (1,200 mg total) by mouth every 12 (twelve) hours    Ulcer of sacral region, stage 4 (HCC)    Atrial fibrillation, unspecified type (HCC)    Insulin long-term use (HCC)    Type 1 diabetes mellitus with chronic kidney disease on chronic dialysis ([facility] )        Problem List Items Addressed This Visit        Endocrine    Type 1 diabetes mellitus with chronic kidney disease on chronic dialysis [facility])       Lab Results   Component Value Date    HGBA1C 7 3 (H) 01/20/2022    HGBA1C 7 4 (H) 10/19/2021    HGBA1C 7 3 (H) 07/16/2021   Levemir was increased to 6 units BID in hospital but discharged back to home regimen  Cont home medications include: Levemir 4 units BID and Novolog 4 units with meals  Continue regular follow-up with your endocrinologist             Cardiovascular and Mediastinum    Atrial fibrillation ([facility] ) (Chronic)       Continue Eliquis  Relevant Medications    carvedilol (COREG) 25 mg tablet    HTN (hypertension), benign     Stable  Continue current medication regimen  Will reassess at next scheduled follow-up  Relevant Medications    carvedilol (COREG) 25 mg tablet       Musculoskeletal and Integument    Ulcer of sacral region, stage 4 ([facility] )       Following regularly with wound care, he is working on improving his hemoglobin and albumin to be approved for surgery from plastics  Other    Insulin long-term use (HCC)    Severe protein-calorie malnutrition ([facility] )      His albumin and hemoglobin have both been improving, he has also been increasing his protein intake  This is for the ultimate goal to have surgery for his multi stage wounds  BMI Findings: There is no height or weight on file to calculate BMI                Other Visit Diagnoses     Chronic bilateral low back pain without sciatica    -  Primary    Hyperlipidemia, unspecified hyperlipidemia type        Relevant Medications    atorvastatin (LIPITOR) 20 mg tablet    Allergic rhinitis, unspecified seasonality, unspecified trigger        Relevant Medications    cetirizine (ZyrTEC) 10 mg tablet    Pruritus        Relevant Medications    hydrOXYzine HCL (ATARAX) 50 mg tablet    Anxiety attack        Relevant Medications    hydrOXYzine HCL (ATARAX) 50 mg tablet    Chest congestion        Relevant Medications    cetirizine (ZyrTEC) 10 mg tablet    guaiFENesin (MUCINEX) 600 mg 12 hr tablet

## 2022-04-21 NOTE — TELEPHONE ENCOUNTER
Pharmacy called regarding pt baclofen 10 mg, I spoke with Carlee Duran and the medication will be discontinued and he will f/u with pain management

## 2022-04-22 NOTE — ASSESSMENT & PLAN NOTE
Following regularly with wound care, he is working on improving his hemoglobin and albumin to be approved for surgery from plastics

## 2022-04-22 NOTE — ASSESSMENT & PLAN NOTE
Lab Results   Component Value Date    HGBA1C 7 3 (H) 01/20/2022    HGBA1C 7 4 (H) 10/19/2021    HGBA1C 7 3 (H) 07/16/2021   Levemir was increased to 6 units BID in hospital but discharged back to home regimen  Cont home medications include: Levemir 4 units BID and Novolog 4 units with meals      Continue regular follow-up with your endocrinologist

## 2022-04-22 NOTE — ASSESSMENT & PLAN NOTE
His albumin and hemoglobin have both been improving, he has also been increasing his protein intake  This is for the ultimate goal to have surgery for his multi stage wounds

## 2022-04-25 ENCOUNTER — TELEPHONE (OUTPATIENT)
Dept: FAMILY MEDICINE CLINIC | Facility: CLINIC | Age: 42
End: 2022-04-25

## 2022-04-25 NOTE — TELEPHONE ENCOUNTER
Form Order # [id number]      Form was faxed and confirmed that it was received on 04/25/22  Fax # [phone]      NOTE: form has been scanned into Pt Chart

## 2022-04-25 NOTE — TELEPHONE ENCOUNTER
Form faxed and confirmed that it was received on 04/25/22  Fax # [phone]      NOTE: form has been scanned into PT Chart

## 2022-05-01 DIAGNOSIS — I10 HTN (HYPERTENSION), BENIGN: ICD-10-CM

## 2022-05-02 RX ORDER — LOSARTAN POTASSIUM 100 MG/1
TABLET ORAL
Qty: 90 TABLET | Refills: 1 | Status: SHIPPED | OUTPATIENT
Start: 2022-05-02

## 2022-05-04 DIAGNOSIS — L70.0 ACNE VULGARIS: ICD-10-CM

## 2022-05-04 RX ORDER — CLINDAMYCIN PHOSPHATE 10 MG/G
GEL TOPICAL
Qty: 60 G | Refills: 2 | Status: SHIPPED | OUTPATIENT
Start: 2022-05-04

## 2022-05-05 ENCOUNTER — OFFICE VISIT (OUTPATIENT)
Dept: WOUND CARE | Facility: CLINIC | Age: 42
End: 2022-05-05
Payer: MEDICARE

## 2022-05-05 VITALS
HEART RATE: 80 BPM | SYSTOLIC BLOOD PRESSURE: 150 MMHG | TEMPERATURE: 97.1 F | DIASTOLIC BLOOD PRESSURE: 88 MMHG | RESPIRATION RATE: 16 BRPM

## 2022-05-05 DIAGNOSIS — L89.42: ICD-10-CM

## 2022-05-05 DIAGNOSIS — G82.20 PARAPLEGIA (HCC): ICD-10-CM

## 2022-05-05 DIAGNOSIS — L89.154 PRESSURE INJURY OF SACRAL REGION, STAGE 4 (HCC): Primary | ICD-10-CM

## 2022-05-05 DIAGNOSIS — L89.894 PRESSURE ULCER OF OTHER SITE, STAGE 4 (HCC): ICD-10-CM

## 2022-05-05 DIAGNOSIS — L89.324 PRESSURE INJURY OF LEFT ISCHIUM, STAGE 4 (HCC): ICD-10-CM

## 2022-05-05 DIAGNOSIS — E88.09 HYPOALBUMINEMIA: ICD-10-CM

## 2022-05-05 DIAGNOSIS — L89.224 PRESSURE ULCER OF LEFT HIP, STAGE 4 (HCC): ICD-10-CM

## 2022-05-05 PROCEDURE — 11045 DBRDMT SUBQ TISS EACH ADDL: CPT | Performed by: FAMILY MEDICINE

## 2022-05-05 PROCEDURE — 11042 DBRDMT SUBQ TIS 1ST 20SQCM/<: CPT | Performed by: FAMILY MEDICINE

## 2022-05-05 PROCEDURE — 99213 OFFICE O/P EST LOW 20 MIN: CPT | Performed by: FAMILY MEDICINE

## 2022-05-05 NOTE — PATIENT INSTRUCTIONS
Orders Placed This Encounter   Procedures    Wound cleansing and dressings     Wound cleansing and dressings       Wound cleansing and dressings                                                              All wounds except right lateral hip:  Wash your hands with soap and water  Remove old dressing, discard into plastic bag and place in trash  Cleanse the wound with sterile saline solution (rinse) prior to applying a clean dressing  Do not use tissue or cotton balls  Do not scrub the wound  Pat dry using gauze  Shower no; do not get dressing wet     Apply saline moistened gauze  Cover with an ABD pad  Secure with Medfix tape    Change dressing daily and PRN for breakthrough drainage (visiting nurses to do twice per week and family in between)                           Right lateral hip:  Apply dermagran to wound bed   Cover with an ABD pad  Secure with medifix tape   Change dressing daily and PRN for breakthrough drainage (Visiting nurses to do twice per week and family in between)     Continue visiting nurses twice per week for dressing changes, family to do in between nurse visits     Follow up in 6 weeks                              Wound off loading  Continue clinitron bed at home and turn at least every 1-2 hours  To try and limit the amount of time spent sitting in the wheelchair  Keep pressure off the wounds as much as possible                            Continue to try and increase your protein to at least 3 servings or more if possible                                  Continue to try and keep blood sugars as low as possible  Stop Smoking        Treatment in the wound center today:  Cleansed with normal saline and dressed with Dakin's moistened gauze     Standing Status:   Future     Standing Expiration Date:   5/5/2023

## 2022-05-05 NOTE — PROGRESS NOTES
Patient ID: Sushma Way  is a 39 y o  male Date of Birth 1980       Chief Complaint   Patient presents with    Follow Up Wound Care Visit     follow up for sacral region       Allergies:  Cefepime, Ciprofloxacin hcl, Cymbalta [duloxetine hcl], Gabapentin, Lac bovis, Lactose - food allergy, Lyrica [pregabalin], Penicillins, Polymyxin b, and Rosuvastatin    Diagnosis:      Diagnosis ICD-10-CM Associated Orders   1  Pressure injury of sacral region, stage 4 ([facility])  L89 154 Wound cleansing and dressings     Debridement   2  Pressure injury of left ischium, stage 4 ([facility])  L89 324 Debridement   3  Pressure ulcer of left hip, stage 4 ([facility])  L89 224 Debridement   4  Pressure ulcer of other site, stage 4 ([facility])  L89 894 Debridement   5  Pressure injury of contiguous region involving right buttock and hip, stage 2 ([facility])  L89 42    6  Paraplegia ([facility])  G82 20    7  Hypoalbuminemia  E88 09            Assessment & Plan:   Multiple stage IV pressure injuries of the sacrum, hip, perineum and ischium  No significant improvement for any of them   Surgical debridement of all undermined areas in the ulcerations   Hypoalbuminemia has not improved with most recent 1 8   Dakin's soak packing today and he will use saline packing at home  See orders   Dermagran to the right hip ulcer   Discussed about better dietary habits but he believes that he is good the way he eats now   Continues to smoke   New right hip pressure injury stage II         Subjective:   10/22/20:  Followup multiple pressure injuries to the buttocks and sacrum  He was hospitalized for back problems recently  Continues on hemodialysis  The patient states that his albumin has improved so that he may be able to get flap surgery  However when checking his most recent albumin was only 1 8  His total protein is elevated  11/19/20:  Followup multiple stage IV pressure or injuries to the buttocks and sacrum  Continues on hemodialysis    He is scheduled for plastic surgery for cyst on his forehead  Plastic surgery still will not do any flaps to his sacrum or buttocks because his albumin remains low  He has no other complaints  No fever, chills or cough  12/31/20: Followup multiple stage IV pressure injuries of the buttocks and sacrum  She notes some increased drainage and slight more odor  Never had his plastic surgery appointment for the cyst on his forehead  Denies any fever or chills  1/28/21:  Followup multiple stage IV pressure injuries of the buttocks, sacrum and perineum  Unfortunately, the patient was hospitalized for sepsis and back pain  Was found to have a fracture L3  He was given a back brace but only wears at home  He states that really does not give him very much relief  Unfortunately also while in the hospital, he developed a new pressure injury of his left hip  2/25/21:  Followup multiple stage IV pressure ulcers of the buttocks sacrum and perineum  Patient was hospitalized earlier this month for possible sepsis  He was found to have a burst fracture or osteo of L3  IR obtained specimen and was culture negative  However, no bone was obtained  He is scheduled to have another IR intervention March 16th for bone biopsy  Left hip ulcer broke down with large cavity  Otherwise, cultures were negative  He was discharged on no medications  He has not had any recent fever or chills  He did have fever when he was admitted to the hospital     03/21/2021  Mr Wendy Pearson is a 49-year-old gentleman with paraplegia and multiple stage IV pressure ulcers was referred for evaluation  He has large chronic ulcers on his buttocks and sacrum but he developed a new ulcer over last month on his left hip  He is scheduled for IR biopsy in his spine for possible chronic osteo  He also recently underwent dental extractions and cyst removed from his head and neck      4/22/21:  Followup multiple stage IV pressure injuries of the buttocks, sacrum, perineum and left hip  He was last seen by Dr Felicity Maharaj in March  Since then, unfortunately he was admitted to the hospital with pneumonia  He is now doing better  He is changing his dressings daily because of drainage  There has been no increased drainage, no odor and he denies any fever or chills  He has no cough  5/27/21:  Patient returns regarding his his multiple stage IV pressure injuries of the buttocks, sacrum, perineum and left hip  Unfortunately, he was hospitalized for a few days in April regarding FUO with negative cultures  He was "treated empirically with vancomycin and cefepime - improved with no positive cultures and CT scan finding consistent with known decubitus ulcerations and chronic pelvic osteomyelitis "  He is back to baseline  Patient states that he does notice more odor  7/8/21: Followup multiple stage IV pressure injuries of the buttocks, sacrum, perineum and left hip  He continues with Dakin's packing  He had multiple excisions on his face of follicular cysts by plastic surgery  He still cannot have surgery of his pressure injuries due to chronically low albumin  He has not yet contacted his PCP for nutritionist consultation  8/19/21: Followup multiple stage IV pressure injuries of the buttocks, sacrum, perineum and left  He was packing with Dakin's  Nothing else new  He still has not had a nutrition is consultation  9/30/21: Followup multiple stage IV pressure injuries of the buttocks, sacrum, perineum and left hip  He continues with cleansing Dakin's soaks followed by wet-to-dry saline packing  Nothing is new  No pain  No fever or chills  11/11/21: Followup multiple stage IV pressure injuries of the buttock, sacrum, left hip perineum  Recent hospitalization for sepsis  Possible pneumonia  His ulcers have not changed  He is using Dakin's packing again  No other new complaints    States that he is spending is nights in his [place] and only spending up to maximum 3 hours out in his chair and then going back to the [place] during the day  12/30/21: Followup multiple stage IV pressure injuries of the buttocks, sacrum, left hip and perineum  He was hospitalized again for three days any states that his ulcers deteriorated slightly because he claims that the dressings were not changed  No fever or chills currently  Still using his [place] bed     2/10/22:  Patient returns regarding his multiple stage IV pressure injuries of the buttocks, sacrum, left hip and perineum  Unfortunately, he was hospitalized twice since his last visit  The 1st time he was septic with unknown source  The 2nd hospitalization was due to bleeding from newly placed dialysis catheter  No specific complaints at this time  They have been using saline packing  Still in his [place] bed     3/24/22: Followup multiple stage IV pressure injuries of the buttocks, sacrum, left hip in perineum  No new events since last visit  He is using saline packing  [place] bed  No fever or chills  5/5/22: Followup multiple stage IV pressure injuries  Patient unfortunately was hospitalized with viral pneumonia at the end of March  He feels better  No coughing  He states that he is eating better  However, his albumin was still 1 8  Total protein was normal   Using saline packing        The following portions of the patient's history were reviewed and updated as appropriate:   Patient Active Problem List   Diagnosis    [facility]    Atrial fibrillation ([address] )    Chronic suprapubic catheter ([facility]    Colostomy care ([facility]    OAB (overactive bladder)    S/P unilateral BKA (below knee amputation) ([address] )    Sebaceous cyst    Tobacco abuse    Type 1 diabetes mellitus with chronic kidney disease on chronic dialysis ([address] )    Ulcer of sacral region, stage 4 ([address] )    Anemia    Chronic indwelling Ortega catheter    Wound healing, delayed    Iron deficiency anemia  Pressure [facility] of left ischium, stage 4 ([facility])    HTN (hypertension), benign    Neurogenic bladder    GERD (gastroesophageal reflux disease)    Chronic pain    Stage 5 chronic kidney disease on chronic dialysis ([facility])    SOB (shortness of breath)    Penile abscess    Asymptomatic bacteriuria    History of Clostridium difficile infection    Urinary retention    Delirium    Dialysis patient ([place] [address] )    Insulin long-term use ([place] [address] )    Wheelchair dependent    Recurrent Clostridioides difficile diarrhea    Bipolar depression ([place] [address] )    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[place] [address] )    Seizure ([place] [address] )    Pressure ulcer of sacral region, stage 4 ([facility])    AVM (arteriovenous malformation) of duodenum, acquired    Chronic narcotic dependence ([facility])    Chronic diastolic heart failure ([facility])    Dental caries    Nervous    Pressure ulcer of other site, stage 4 ([facility])    Pressure ulcer of left hip, stage 4 ([facility])    Localized swelling of left foot    ED (erectile dysfunction) of organic origin    Irritable bowel syndrome    Onychomycosis    Pustular folliculitis    Prolonged Q-T interval on ECG    Secondary hyperparathyroidism of renal origin ([place] [address] )    Abdominal pain    Pneumonia    Wounds, multiple    Immunocompromised state ([place] [address] )    Severe protein-calorie malnutrition ([facility])    Chronic osteomyelitis ([facility])    Acute pulmonary edema ([facility])    Acute deep vein thrombosis (DVT) of right upper extremity ([facility])     Past Medical History:   Diagnosis Date    Ambulatory dysfunction     Anemia     Anemia, iron deficiency     transfusion requiring    Atrial fibrillation ([facility])     AVM (arteriovenous malformation) of duodenum, 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    Bacteriuria, asymptomatic     Bipolar disorder ([facility])     Chronic deep vein thrombosis (DVT) ([facility])     Chronic indwelling Ortega catheter     Chronic kidney disease     Chronic pain     Chronic pain disorder     Chronic suprapubic catheter ([facility])     Clostridium difficile infection 08/11/2016    also positive 9/2016, 5/29/2017, 8/15/2017  S/P fecal transplant    Colostomy on examination ([facility] )     Decubitus ulcer     Delirium     [facility]     GERD (gastroesophageal reflux disease)     [facility]od[facility] patient [facility]     Hypertension     Memory impairment 2011    s/p diabetic coma    Neurogenic bladder     OAB (overactive bladder)     Paraplegia (HCC)     T3 transverse myelitis vs spinal stoke (AVM); 2012 extensive epidural abscess C7=> conus 2nd extension from deep parspinal abscess L4-S2/sacral decubitus; cord atrophy/myelomalcia T3=>conus     Penile abscess     S/P unilateral BKA (below knee amputation) ([facility] )     Right    Sebaceous cyst removed in 2017    Seizures ([facility])     Shortness of breath     Tobacco abuse     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HCC)     Urinary retention     Wheelchair dependent     Wounds, multiple     pressure ulcers with delayed healing     Past Surgical History:   Procedure Laterality Date    BELOW KNEE LEG AMPUTATION Right 2009    COLONOSCOPY N/A 3/27/2017    Procedure: COLONOSCOPY;  Surgeon: Marko Mott MD;  Location: [facility]; Service:     COLONOSCOPY N/A 3/29/2017    Procedure: COLONOSCOPY;  Surgeon: Marko Mott MD;  [facility]; Service:     COLONOSCOPY N/A 6/15/2017    Procedure: COLONOSCOPY with FMT;  Surgeon: Court Reza MD;  Location: [facility]; Service: Gastroenterology    ESOPHAGOGASTRODUODENOSCOPY N/A 3/22/2017    Procedure: ESOPHAGOGASTRODUODENOSCOPY (EGD); Surgeon: Rafita Craft DO;  Location: [facility];   Service:     MULTIPLE TOOTH EXTRACTIONS N/A 3/8/2021    Procedure: EXTRACTION TEETH # 2,3,6,10,12,13,14,18,19,20,21,22,23,24,25,26,27,28,29,30;  Surgeon: Keven Valenzuela DMD;  [facility];  Service: Maxillofacial     Family History   Problem Relation Age of Onset    Hyperlipidemia Mother     Hypertension Mother     Leukemia Brother     Diabetes Paternal Grandfather       Social History     Socioeconomic History    Marital status: Single     Spouse name: None    Number of children: None    Years of education: None    Highest education level: None   Occupational History    None   Tobacco Use    Smoking status: Current Every Day Smoker     Types: Cigars    Smokeless tobacco: Never Used    Tobacco comment: about 3 cigars/day   Substance and Sexual Activity    Alcohol use: Not Currently     Comment: 1 cup of wine every 3-4 months    Drug use: Not Currently     Types: Marijuana     Comment: rarely; once every 1-2 years    Sexual activity: None   Other Topics Concern    None   Social History Narrative    None     Social Determinants of Health     Financial Resource Strain: Low Risk     Difficulty of Paying Living Expenses: Not hard at all   Food Insecurity: No Food Insecurity    Worried About Running Out of Food in the Last Year: Never true    Jose of Food in the Last Year: Never true   Transportation Needs: No Transportation Needs    Lack of Transportation (Medical): No    Lack of Transportation (Non-Medical):  No   Physical Activity: Not on file   Stress: Not on file   Social Connections: Not on file   Intimate Partner Violence: Not on file   Housing Stability: Not on file        Current Outpatient Medications:     Alcohol Swabs (ALCOHOL PADS) 70 % PADS, by Does not apply route 5 (five) times a day, Disp: 300 each, Rfl: 5    amLODIPine (NORVASC) 10 mg tablet, Take 1 tablet (10 mg total) by mouth daily, Disp: 90 tablet, Rfl: 1    ammonium lactate (LAC-HYDRIN) 12 % cream, , Disp: , Rfl:     apixaban (ELIQUIS) 5 mg, Take 1 tablet (5 mg total) by mouth 2 (two) times a day, Disp: 270 tablet, Rfl: 1    ascorbic acid (VITAMIN C) 250 mg tablet, TAKE 2 TABLETS (500 MG TOTAL) BY MOUTH DAILY, Disp: 180 tablet, Rfl: 1    atorvastatin (LIPITOR) 20 mg tablet, Take 1 tablet (20 mg total) by mouth daily at bedtime, Disp: 90 tablet, Rfl: 1    B Complex-C-Folic Acid (Super B-Complex/Vit C/FA) TABS, TAKE 1 TABLET BY MOUTH EVERY DAY AS DIRECTED, Disp: 30 tablet, Rfl: 8    SUSAN MICROLET LANCETS lancets, Use as instructed to check blood sugar 4 times a day Dx e10 29, Disp: 200 each, Rfl: 5    Blood Glucose Monitoring Suppl (Decisyon CONTOUR NEXT USB MONITOR) w/Device KIT, Testing 4 times a day, Disp: 1 kit, Rfl: 0    calcitriol (ROCALTROL) 0 5 MCG capsule, Take 2 mcg by mouth, Disp: , Rfl:     Capsaicin 0 033 % CREA, 5 times a day for 1st week  Then 3 times a day for next 3 weeks, Disp: 56 6 g, Rfl: 2    carvedilol (COREG) 25 mg tablet, Take 1 tablet (25 mg total) by mouth 2 (two) times a day, Disp: 180 tablet, Rfl: 1    cetirizine (ZyrTEC) 10 mg tablet, Take 1 tablet (10 mg total) by mouth daily, Disp: 90 tablet, Rfl: 1    Cholecalciferol (VITAMIN D-3) 1000 units CAPS, Take 2 capsules by mouth daily, Disp: 30 capsule, Rfl: 0    clindamycin (CLINDAGEL) 1 % gel, APPLY TO AFFECTED AREA TWICE A DAY, Disp: 60 g, Rfl: 2    [employer]S ACETAMINOPHEN EX  MG tablet, TAKE 2 TABLETS BY MOUTH EVERY 8 HOURS AS NEEDED FOR MILD OR MODERATE PAIN (PAIN SCORE 1 6)  , Disp: , Rfl:     cyanocobalamin (CVS Vitamin B-12) 1000 MCG tablet, Take 1 tablet (1,000 mcg total) by mouth daily, Disp: 30 tablet, Rfl: 5    cyclobenzaprine (FLEXERIL) 10 mg tablet, Take 10 mg by mouth 3 (three) times a day as needed, Disp: , Rfl: 0    cyclobenzaprine (FLEXERIL) 5 mg tablet, , Disp: , Rfl:     dexlansoprazole (DEXILANT) 30 MG capsule, Take 1 capsule (30 mg total) by mouth daily, Disp: 60 capsule, Rfl: 0    dicyclomine (BENTYL) 10 mg capsule, TAKE 1 CAPSULE (10 MG TOTAL) BY MOUTH 3 (THREE) TIMES A DAY BEFORE MEALS, Disp: 270 capsule, Rfl: 1    Disposable Gloves MISC, Use 7 times a day, 4 boxes of gloves XL Dx type 1 DM, paraplegia, Disp: 4 each, Rfl: 11    doxazosin (CARDURA) 2 mg tablet, TAKE 1 TABLET BY MOUTH EVERY EVENING , Disp: 90 tablet, Rfl: 0    epoetin kaushik (EPOGEN,PROCRIT) 20,000 units/mL, Inject 10,000 Units under the skin, Disp: , Rfl:     epoetin kaushik (EPOGEN,PROCRIT) 20,000 units/mL, Inject 5,000 Units under the skin, Disp: , Rfl:     ergocalciferol (VITAMIN D2) 50,000 units, TAKE 1 CAPSULE BY MOUTH ONE TIME PER WEEK, Disp: , Rfl:     ferrous sulfate 325 (65 Fe) mg tablet, Take 1 tablet (325 mg total) by mouth 3 (three) times a day, Disp: 90 tablet, Rfl: 3    glucose 4-6 GM-MG, Chew 2 tablets daily as needed for low blood sugar, Disp: 10 tablet, Rfl: 1    glucose blood (SUSAN CONTOUR TEST) test strip, Use as instructed to test blood sugar 4 times a day Dx e10 29, Disp: 200 each, Rfl: 3    guaiFENesin (MUCINEX) 600 mg 12 hr tablet, Take 2 tablets (1,200 mg total) by mouth every 12 (twelve) hours, Disp: 60 tablet, Rfl: 2    Gvoke PFS 1 MG/0 2ML SOSY, INJECT 1 ML (1 MG TOTAL) INTO A MUSCLE ONCE FOR 1 DOSE, Disp: , Rfl:     HYDROmorphone (DILAUDID) 4 mg tablet, TAKE 1 TABLET BY MOUTH EVERY 4 HOURS AS NEEDED FOR MODERATE PAIN (PAIN SCORE 4 6)   MAX  24 MG/DAY, Disp: , Rfl:     hydrOXYzine HCL (ATARAX) 50 mg tablet, Take 1 tablet (50 mg total) by mouth 2 (two) times a day as needed for itching or anxiety, Disp: 30 tablet, Rfl: 1    Incontinence Supply Disposable (Incontinence Brief Large) MISC, Use 6 (six) times a day Size xl, Disp: 180 each, Rfl: 11    Incontinence Supply Disposable (Undergarment) MISC, Use 6 a day, 5 boxes, xl Dx R51, paraplegia, Disp: 5 each, Rfl: 11    Incontinence Supply Disposable (Underpads) MISC, Use 2 a day, Disp: 5 each, Rfl: 11    insulin detemir (LEVEMIR) 100 units/mL subcutaneous injection, Inject 6 5 Units under the skin 2 (two) times a day , Disp: , Rfl:     ketoconazole (NIZORAL) 2 % cream, Apply to rash , Disp: 15 g, Rfl: 1    Ketostix strip, USE WITH HYPERGLYCEMIA E10 65 **NOT COVERED**, Disp: , Rfl:     LEVEMIR FLEXTOUCH 100 units/mL injection pen, Inject 14 Units under the skin 2 (two) times a day, Disp: 15 pen, Rfl: 3    levETIRAcetam (KEPPRA) 250 mg tablet, TAKE 1 TABLET (250 MG TOTAL) BY MOUTH 3 (THREE) TIMES A WEEK ([facility]) AT 1200 , Disp: , Rfl:     lidocaine (XYLOCAINE) 5 % ointment, Apply topically as needed for mild pain, Disp: 35 44 g, Rfl: 0    Lidocaine HCl 4 % LIQD, Apply 1 application topically 3 (three) times a day as needed (pain), Disp: 73 mL, Rfl: 5    Lokelma 10 g PACK, , Disp: , Rfl:     losartan (COZAAR) 100 MG tablet, TAKE 1 TABLET BY MOUTH EVERY DAY, Disp: 90 tablet, Rfl: 1    Melatonin ER 3 MG TBCR, Take 6 mg by mouth, Disp: , Rfl:     Misc  Devices (Wheelchair Cushion) MISC, Use daily, Disp: 1 each, Rfl: 0    Misc   Devices [facility]) MISC, by Does not apply route daily Wheelchair ramp, dx g82 20, Disp: 1 each, Rfl: 0    Multiple Vitamin (Daily-Enriqueta Multivitamin) TABS, TAKE 1 TABLET BY MOUTH EVERY DAY, Disp: 90 tablet, Rfl: 2    Narcan 4 MG/0 1ML nasal spray, , Disp: , Rfl:     NOVOLOG 100 UNIT/ML injection, Inject 5 Units under the skin 3 (three) times a day with meals , Disp: , Rfl:     NovoLOG FlexPen 100 units/mL injection pen, INJECT 3 UNITS UNDER THE SKIN 3 (THREE) TIMES A DAY BEFORE MEALS , Disp: , Rfl:     nystatin (MYCOSTATIN) 500,000 units/5 mL suspension, APPLY 5 ML (500,000 UNITS TOTAL) TO THE MOUTH OR THROAT 4 (FOUR) TIMES A DAY, Disp: 473 mL, Rfl: 1    ondansetron (ZOFRAN-ODT) 4 mg disintegrating tablet, Take 2 tablets (8 mg total) by mouth every 8 (eight) hours as needed for nausea or vomiting, Disp: 30 tablet, Rfl: 1    oxybutynin (DITROPAN) 5 mg tablet, Take 3 tablets (15 mg total) by mouth daily, Disp: 270 tablet, Rfl: 1    phenytoin extended (DILANTIN) 200 MG ER capsule, Take 1 capsule (200 mg total) by mouth 2 (two) times a day, Disp: 180 capsule, Rfl: 1    phenytoin extended (DILANTIN) 200 MG ER capsule, TAKE 1 CAPSULE BY MOUTH TWICE A DAY, Disp: 180 capsule, Rfl: 0    phenytoin extended (DILANTIN) 300 MG ER capsule, TAKE 1 CAPSULE BY MOUTH EVERY DAY, Disp: 90 capsule, Rfl: 1    phenytoin extended (DILANTIN) 300 MG ER capsule, Take 1 capsule (300 mg total) by mouth daily, Disp: 90 capsule, Rfl: 1    risperiDONE (RisperDAL) 0 5 mg tablet, Take 1 tablet (0 5 mg total) by mouth 2 (two) times a day, Disp: 180 tablet, Rfl: 1    senna (SENOKOT) 8 6 mg, Take 1 tablet (8 6 mg total) by mouth 2 (two) times a day, Disp: 180 tablet, Rfl: 1    sertraline (ZOLOFT) 25 mg tablet, TAKE 1/2 TABLET BY MOUTH EVERY DAY, Disp: 45 tablet, Rfl: 0    sodium hypochlorite (DAKIN'S HALF-STRENGTH) external solution, USE AS DIRECTED ONCE  DAILY, Disp: 473 mL, Rfl: 2    Sodium Zirconium Cyclosilicate 10 g PACK, Take 10 g by mouth daily, Disp: , Rfl:     sucralfate (CARAFATE) 1 g/10 mL suspension, Take 10 mL (1 g total) by mouth 4 (four) times a day (with meals and at bedtime), Disp: 420 mL, Rfl: 0    SUPER B COMPLEX & C TABS, TAKE 1 CAPSULE BY MOUTH ONCE FOR 1 DOSE AS DIRECTED, Disp: 90 tablet, Rfl: 2    tiZANidine (ZANAFLEX) 4 mg tablet, Take 1 tablet by mouth 4 (four) times a day, Disp: , Rfl:     tiZANidine (ZANAFLEX) 4 mg tablet, , Disp: , Rfl:     triamcinolone (KENALOG) 0 1 % ointment, Apply topically 2 (two) times a day, Disp: 454 g, Rfl: 0    Zinc Sulfate 220 (50 Zn) MG TABS, Take 1 tablet by mouth daily, Disp: 90 tablet, Rfl: 1    Review of Systems   Constitutional: Negative for activity change, appetite change (Improving), chills, fatigue, fever and unexpected weight change  HENT: Negative for congestion, hearing loss and postnasal drip  Eyes: Negative for visual disturbance  Respiratory: Negative for cough and shortness of breath  Cardiovascular: Negative for chest pain and leg swelling  Gastrointestinal: Negative for abdominal pain, constipation (Slight), diarrhea, nausea and vomiting  Genitourinary: Negative for dysuria and urgency  Indwelling Ortega catheter   Musculoskeletal: Positive for gait problem (Nonambulatory, paraplegic)  Skin: Positive for wound (Sacral, left ischial, left hip and perineum)   Negative for rash  Cyst on right  cheek   Neurological: Positive for weakness  Hematological: Does not bruise/bleed easily  Psychiatric/Behavioral: Positive for dysphoric mood  Objective:  /88   Pulse 80   Temp (!) 97 1 °F (36 2 °C)   Resp 16   Pain Score:   5     Physical Exam  Vitals and nursing note reviewed  Constitutional:       Appearance: Normal appearance  He is underweight  HENT:      Head: Normocephalic and atraumatic  Abdominal:      General: Abdomen is flat  Comments: The abdomen around the ostomy is slightly full and tender  Skin:     Findings: Wound (Both buttocks and sacrum) present  No erythema  Comments: Sacral ulcer with fibrotic tissue and fibrin  Slightly more undermining     Left ischial ulcer also with more undermining  Fibrotic tissue and fibrin  The left hip ulcer has undermining    Bone is not probed  Perineal ulcer with fibrotic tissue and slough as well as fibrin  Left ischial ulcer unchanged  More fibrin in all of the ulcers than in the past   No signs of infection  No malodor  All the ulcers have undermining  See complete wound description  Neurological:      Mental Status: He is alert and oriented to person, place, and time     Psychiatric:         Mood and Affect: Affect normal          Cognition and Memory: Cognition normal            Wound Pressure Injury Ischium Left (Active)   Wound Image       05/05/22 1559   Wound Description Beefy red 05/05/22 1543   Pressure Injury Stage 4 03/24/22 1421   Irene-wound Assessment Scar Tissue;Scaly 05/05/22 1543   Wound Length (cm) 0 2 cm 05/05/22 1543   Wound Width (cm) 0 2 cm 05/05/22 1543   Wound Depth (cm) 0 4 cm 05/05/22 1543   Wound Surface Area (cm^2) 0 04 cm^2 05/05/22 1543   Wound Volume (cm^3) 0 016 cm^3 05/05/22 1543   Calculated Wound Volume (cm^3) 0 02 cm^3 05/05/22 1543   Change in Wound Size % 99 89 05/05/22 1543   Undermining 2 03/24/22 1421   Undermining is depth extending from 3-11 03/24/22 1421   Drainage Amount Large 03/24/22 1421   Drainage Description Mendieta;Yellow 05/05/22 1543   Non-staged Wound Description Full thickness 05/05/22 1543   Treatments Cleansed 12/30/21 1447   Dressing Changed Changed 12/30/21 1447   Patient Tolerance Tolerated well 12/30/21 1447   Dressing Status Removed 12/30/21 1447       Wound Pressure Injury Perineum (Active)   Wound Image       05/05/22 1605   Wound Description Pink;Slough 05/05/22 1545   Pressure Injury Stage 4 03/24/22 1422   Irene-wound Assessment Scaly;Scar Tissue 05/05/22 1545   Wound Length (cm) 6 cm 05/05/22 1545   Wound Width (cm) 6 cm 05/05/22 1545   Wound Depth (cm) 1 8 cm 05/05/22 1545   Wound Surface Area (cm^2) 36 cm^2 05/05/22 1545   Wound Volume (cm^3) 64 8 cm^3 05/05/22 1545   Calculated Wound Volume (cm^3) 64 8 cm^3 05/05/22 1545   Change in Wound Size % -517 14 05/05/22 1545   Undermining 4 3 05/05/22 1545   Undermining is depth extending from 12-10 05/05/22 1545   Drainage Amount Large 05/05/22 1545   Drainage Description Mendieta;Yellow 05/05/22 1545   Non-staged Wound Description Full thickness 03/24/22 1422   Treatments Cleansed 12/30/21 1448   Dressing Changed Changed 12/30/21 1448   Patient Tolerance Tolerated well 12/30/21 1448   Dressing Status Removed 12/30/21 1448       Wound Pressure Injury Sacrum (Active)   Wound Image       05/05/22 1605   Wound Description Pink 05/05/22 1532   Pressure Injury Stage 4 03/24/22 1424   Irene-wound Assessment Scar Tissue;Dry 05/05/22 1532   Wound Length (cm) 5 2 cm 05/05/22 1532   Wound Width (cm) 8 9 cm 05/05/22 1532   Wound Depth (cm) 1 2 cm 05/05/22 1532   Wound Surface Area (cm^2) 46 28 cm^2 05/05/22 1532   Wound Volume (cm^3) 55 536 cm^3 05/05/22 1532   Calculated Wound Volume (cm^3) 55 54 cm^3 05/05/22 1532   Change in Wound Size % 25 95 05/05/22 1532   Undermining 1 2 05/05/22 1532   Undermining is depth extending from 3-7 05/05/22 1532   Drainage Amount Large 05/05/22 1532   Drainage Description Mendieta;Yellow 05/05/22 1532   Non-staged Wound Description Full thickness 03/24/22 1424   Treatments Irrigation with NSS 05/05/22 1532   Dressing Changed Changed 12/30/21 1451   Patient Tolerance Tolerated well 12/30/21 1451   Dressing Status Removed 12/30/21 1451       Wound 01/28/21 Pressure Injury Hip Left;Lateral (Active)   Wound Image       05/05/22 1606   Wound Description Beefy red 05/05/22 1536   Pressure Injury Stage 4 05/05/22 1536   Irene-wound Assessment Scar Tissue; Intact;Dry 05/05/22 1536   Wound Length (cm) 2 9 cm 05/05/22 1536   Wound Width (cm) 2 2 cm 05/05/22 1536   Wound Depth (cm) 1 6 cm 05/05/22 1536   Wound Surface Area (cm^2) 6 38 cm^2 05/05/22 1536   Wound Volume (cm^3) 10 208 cm^3 05/05/22 1536   Calculated Wound Volume (cm^3) 10 21 cm^3 05/05/22 1536   Tunneling 4 7 cm 02/10/22 1341   Tunneling in depth located at 10 02/10/22 1341   Undermining 3 1 05/05/22 1536   Undermining is depth extending from 12-12 05/05/22 1536   Drainage Amount Large 05/05/22 1536   Drainage Description Mendieta;Yellow 05/05/22 1536   Non-staged Wound Description Full thickness 05/05/22 1536   Treatments Cleansed 12/30/21 1452   Wound packed? Yes 12/30/21 1452   Packing- # removed 1 05/05/22 1536   Dressing Changed Changed 12/30/21 1452   Patient Tolerance Tolerated well 12/30/21 1452   Dressing Status Removed 12/30/21 1452       Wound 05/05/22 Pressure Injury Ischium Right (Active)   Wound Image       05/05/22 1612   Wound Description Bleeding; Beefy red 05/05/22 1610   Irene-wound Assessment Scar Tissue 05/05/22 1610   Wound Length (cm) 0 2 cm 05/05/22 1610   Wound Width (cm) 0 2 cm 05/05/22 1610   Wound Depth (cm) 0 4 cm 05/05/22 1610   Wound Surface Area (cm^2) 0 04 cm^2 05/05/22 1610   Wound Volume (cm^3) 0 016 cm^3 05/05/22 1610   Calculated Wound Volume (cm^3) 0 02 cm^3 05/05/22 1610   Drainage Amount Moderate 05/05/22 1610   Drainage Description Bloody 05/05/22 1610   Patient Tolerance Tolerated well 05/05/22 1610   Dressing Status Removed 05/05/22 1610                     Debridement   Wound Pressure Injury Perineum    Universal Protocol:  Consent: Verbal consent obtained  Written consent obtained  Consent given by: patient  Time out: Immediately prior to procedure a "time out" was called to verify the correct patient, procedure, equipment, support staff and site/side marked as required  Patient understanding: patient states understanding of the procedure being performed  Patient identity confirmed: verbally with patient      Performed by: physician  Debridement type: surgical  Level of debridement: subcutaneous tissue  Pain control: lidocaine 4%  Post-debridement measurements  Length (cm): 6  Width (cm): 6  Depth (cm): 1 8  Percent debrided: 20%  Surface Area (cm^2): 36  Area debrided (cm^2): 7 2  Volume (cm^3): 64 8  Tissue and other material debrided: dermis and subcutaneous tissue  Devitalized tissue debrided: fibrin and necrotic debris  Instrument(s) utilized: curette  Bleeding: large  Hemostasis obtained with: pressure  Procedural pain (0-10): 0  Post-procedural pain: 0   Response to treatment: procedure was tolerated well  Debridement Comments: Only undermined area was debrided with a curette  Debridement   Wound Pressure Injury Sacrum    Universal Protocol:  Consent: Verbal consent obtained  Written consent obtained  Consent given by: patient  Time out: Immediately prior to procedure a "time out" was called to verify the correct patient, procedure, equipment, support staff and site/side marked as required    Patient understanding: patient states understanding of the procedure being performed  Patient identity confirmed: verbally with patient      Performed by: physician  Debridement type: surgical  Level of debridement: subcutaneous tissue  Pain control: lidocaine 4%  Post-debridement measurements  Length (cm): 5 2  Width (cm): 8 9  Depth (cm): 1 2  Percent debrided: 20%  Surface Area (cm^2): 46 28  Area debrided (cm^2): 9 26  Volume (cm^3): 55 54  Tissue and other material debrided: dermis and subcutaneous tissue  Devitalized tissue debrided: fibrin and necrotic debris  Instrument(s) utilized: curette  Bleeding: large  Hemostasis obtained with: pressure  Procedural pain (0-10): 0  Post-procedural pain: 0   Response to treatment: procedure was tolerated well  Debridement Comments: All undermined area was debrided with a curette  Debridement   Wound 01/28/21 Pressure Injury Hip Left;Lateral    Universal Protocol:  Consent: Verbal consent obtained  Written consent obtained  Consent given by: patient  Time out: Immediately prior to procedure a "time out" was called to verify the correct patient, procedure, equipment, support staff and site/side marked as required  Patient understanding: patient states understanding of the procedure being performed  Patient identity confirmed: verbally with patient      Performed by: physician  Debridement type: surgical  Level of debridement: subcutaneous tissue  Pain control: lidocaine 4%  Post-debridement measurements  Length (cm): 2 9  Width (cm): 2 2  Depth (cm): 1 6  Percent debrided: 25%  Surface Area (cm^2): 6 38  Area debrided (cm^2): 1 6  Volume (cm^3): 10 21  Tissue and other material debrided: dermis and subcutaneous tissue  Devitalized tissue debrided: fibrin and necrotic debris  Instrument(s) utilized: curette  Bleeding: large  Hemostasis obtained with: pressure  Procedural pain (0-10): 0  Post-procedural pain: 0   Response to treatment: procedure was tolerated well  Debridement Comments: All undermined area was debrided with curette  Debridement   Wound Pressure Injury Ischium Left    Universal Protocol:  Consent: Verbal consent obtained  Written consent obtained    Consent given by: patient  Time out: Immediately prior to procedure a "time out" was called to verify the correct patient, procedure, equipment, support staff and site/side marked as required  Patient understanding: patient states understanding of the procedure being performed  Patient identity confirmed: verbally with patient      Performed by: physician  Debridement type: surgical  Level of debridement: subcutaneous tissue  Pain control: lidocaine 4%  Post-debridement measurements  Length (cm): 5 5  Width (cm): 4  Depth (cm): 1 4  Percent debrided: 25%  Surface Area (cm^2): 22  Area debrided (cm^2): 5 5  Volume (cm^3): 30 8  Tissue and other material debrided: dermis and subcutaneous tissue  Devitalized tissue debrided: fibrin and necrotic debris  Instrument(s) utilized: curette  Bleeding: large  Hemostasis obtained with: pressure  Procedural pain (0-10): 0  Post-procedural pain: 0   Response to treatment: procedure was tolerated well  Debridement Comments: All undermined area was debrided with curette  Wound Instructions:  Orders Placed This Encounter   Procedures    Wound cleansing and dressings     Wound cleansing and dressings       Wound cleansing and dressings                                                              All wounds except right lateral hip:  Wash your hands with soap and water  Remove old dressing, discard into plastic bag and place in trash  Cleanse the wound with sterile saline solution (rinse) prior to applying a clean dressing  Do not use tissue or cotton balls  Do not scrub the wound  Pat dry using gauze  Shower no; do not get dressing wet     Apply saline moistened gauze  Cover with an ABD pad  Secure with Medfix tape    Change dressing daily and PRN for breakthrough drainage (visiting nurses to do twice per week and family in between)                           Right lateral hip:  Apply dermagran to wound bed   Cover with an ABD pad  Secure with medifix tape   Change dressing daily and PRN for breakthrough drainage (Visiting nurses to do twice per week and family in between)     Continue visiting nurses twice per week for dressing changes, family to do in between nurse visits     Follow up in 6 weeks                              Wound off loading  Continue clinitron bed at home and turn at least every 1-2 hours  To try and limit the amount of time spent sitting in the wheelchair  Keep pressure off the wounds as much as possible                            Continue to try and increase your protein to at least 3 servings or more if possible                                  Continue to try and keep blood sugars as low as possible  Stop Smoking        Treatment in the wound center today:  Cleansed with normal saline and dressed with Dakin's moistened gauze     Standing Status:   Future     Standing Expiration Date:   5/5/2023    Debridement     This order was created via procedure documentation    Debridement     This order was created via procedure documentation    Debridement     This order was created via procedure documentation    Debridement     This order was created via procedure documentation     Deedee Huff MD, CHT, CWS    Portions of the record may have been created with voice recognition software  Occasional wrong word or "sound alike" substitutions may have occurred due to the inherent limitations of voice recognition software  Read the chart carefully and recognize, using context, where substitutions have occurred

## 2022-05-06 DIAGNOSIS — K21.9 GASTROESOPHAGEAL REFLUX DISEASE WITHOUT ESOPHAGITIS: ICD-10-CM

## 2022-05-06 RX ORDER — DEXLANSOPRAZOLE 30 MG/1
30 CAPSULE, DELAYED RELEASE ORAL DAILY
Qty: 60 CAPSULE | Refills: 0 | Status: SHIPPED | OUTPATIENT
Start: 2022-05-06 | End: 2022-07-20 | Stop reason: SDUPTHER

## 2022-05-10 ENCOUNTER — TELEPHONE (OUTPATIENT)
Dept: HEMATOLOGY ONCOLOGY | Facility: CLINIC | Age: 42
End: 2022-05-10

## 2022-05-10 NOTE — TELEPHONE ENCOUNTER
I could not get through to patients phone number to make him aware of his missed visit with Mellissa Kolb today

## 2022-05-13 ENCOUNTER — TELEPHONE (OUTPATIENT)
Dept: FAMILY MEDICINE CLINIC | Facility: CLINIC | Age: 42
End: 2022-05-13

## 2022-05-13 NOTE — TELEPHONE ENCOUNTER
[facility] [place] [facility]  form received on *05/13/22  to be completed by PCP  Copy made and placed in PCP folder  Forms to be delivered to PCP mailbox at assigned time        ORDER Date: 05/12/22      NOTE:   A blank copy has been made and placed at  copy bin, under the letter S

## 2022-05-16 ENCOUNTER — TELEPHONE (OUTPATIENT)
Dept: FAMILY MEDICINE CLINIC | Facility: CLINIC | Age: 42
End: 2022-05-16

## 2022-05-16 NOTE — TELEPHONE ENCOUNTER
PCP [facility] [employer] (ORDER # [id number]) FORM RECEIVED VIA FAX AND PLACED IN PCP FOLDER TO BE DELIVERED AT ASSIGNED TIMES

## 2022-05-17 ENCOUNTER — TELEPHONE (OUTPATIENT)
Dept: FAMILY MEDICINE CLINIC | Facility: CLINIC | Age: 42
End: 2022-05-17

## 2022-05-17 NOTE — TELEPHONE ENCOUNTER
[facility]  form received on 5/17/22  to be completed by PCP  Copy made and placed in PCP folder  Forms to be delivered to PCP mailbox at assigned time       2 forms both from extended care

## 2022-05-26 ENCOUNTER — TELEPHONE (OUTPATIENT)
Dept: HEMATOLOGY ONCOLOGY | Facility: CLINIC | Age: 42
End: 2022-05-26

## 2022-05-26 ENCOUNTER — TELEPHONE (OUTPATIENT)
Dept: FAMILY MEDICINE CLINIC | Facility: CLINIC | Age: 42
End: 2022-05-26

## 2022-05-26 NOTE — TELEPHONE ENCOUNTER
[address]  form received on 05/26/22  to be completed by PCP  Copy made and placed in PCP folder  Order # [id number]      Forms to be delivered to PCP mailbox at assigned time        NOTE: a copy of blank form has been made and placed at  copy bin, under the letter S

## 2022-05-26 NOTE — TELEPHONE ENCOUNTER
Spoke with patient about scheduling appointment to see a provider   Patient is schedule to see Ty Flavio on 6/21 at 9am

## 2022-05-27 DIAGNOSIS — I10 HTN (HYPERTENSION), BENIGN: ICD-10-CM

## 2022-05-27 DIAGNOSIS — I48.91 ATRIAL FIBRILLATION, UNSPECIFIED TYPE (HCC): Chronic | ICD-10-CM

## 2022-05-31 RX ORDER — DOXAZOSIN 2 MG/1
TABLET ORAL
Qty: 90 TABLET | Refills: 0 | Status: SHIPPED | OUTPATIENT
Start: 2022-05-31

## 2022-06-01 NOTE — TELEPHONE ENCOUNTER
FAXED ON 06/01/22 TO [address] at [phone]  FAX CONFIRMATION RECEIVED  Order # [id number]      NOTE: form with Confirmation Fax has been scanned into Pt Chart

## 2022-06-10 ENCOUNTER — TELEPHONE (OUTPATIENT)
Dept: GASTROENTEROLOGY | Facility: CLINIC | Age: 42
End: 2022-06-10

## 2022-06-10 NOTE — TELEPHONE ENCOUNTER
Patients GI provider:  Dr Magaly Peguero    Number to return call: [phone]    Reason for call: Pt calling to reschedule his colonoscopy & EGD  Pt can be reached at above number  He states you may also call his mother to schedule his procedures at [phone]  Pt is looking to be scheduled as soon as possible, states he is experiencing liquid stool with blood      Scheduled procedure/appointment date if applicable: N/A

## 2022-06-14 ENCOUNTER — TELEPHONE (OUTPATIENT)
Dept: FAMILY MEDICINE CLINIC | Facility: CLINIC | Age: 42
End: 2022-06-14

## 2022-06-14 NOTE — TELEPHONE ENCOUNTER
Patient's mom calling stating she missed a call from a surgery coordinator  She would like a call back

## 2022-06-14 NOTE — TELEPHONE ENCOUNTER
Pt is requesting medication refill for     sertraline (ZOLOFT) 25 mg tablet       Please send to pharmacy on file

## 2022-06-15 DIAGNOSIS — F31.9 BIPOLAR DEPRESSION (HCC): ICD-10-CM

## 2022-06-15 RX ORDER — SERTRALINE HYDROCHLORIDE 25 MG/1
12.5 TABLET, FILM COATED ORAL DAILY
Qty: 45 TABLET | Refills: 0 | Status: SHIPPED | OUTPATIENT
Start: 2022-06-15

## 2022-06-16 ENCOUNTER — TELEPHONE (OUTPATIENT)
Dept: FAMILY MEDICINE CLINIC | Facility: CLINIC | Age: 42
End: 2022-06-16

## 2022-06-16 NOTE — TELEPHONE ENCOUNTER
PCP SIGNATURE NEEDED FOR [facility]  FORM RECEIVED VIA FAX AND PLACED IN PCP FOLDER TO BE DELIVERED AT ASSIGNED TIMES     2 SEPARATE FORMS

## 2022-06-17 ENCOUNTER — TELEPHONE (OUTPATIENT)
Dept: HEMATOLOGY ONCOLOGY | Facility: CLINIC | Age: 42
End: 2022-06-17

## 2022-06-20 ENCOUNTER — TELEPHONE (OUTPATIENT)
Dept: FAMILY MEDICINE CLINIC | Facility: CLINIC | Age: 42
End: 2022-06-20

## 2022-06-20 NOTE — TELEPHONE ENCOUNTER
PCP [facility] [employer]  FORM RECEIVED VIA FAX AND PLACED IN PCP FOLDER TO BE DELIVERED AT ASSIGNED TIMES        ORDER NUMBER [id number]

## 2022-06-21 ENCOUNTER — TELEPHONE (OUTPATIENT)
Dept: HEMATOLOGY ONCOLOGY | Facility: CLINIC | Age: 42
End: 2022-06-21

## 2022-06-21 ENCOUNTER — TELEPHONE (OUTPATIENT)
Dept: FAMILY MEDICINE CLINIC | Facility: CLINIC | Age: 42
End: 2022-06-21

## 2022-06-21 NOTE — TELEPHONE ENCOUNTER
PCP SIGNATURE NEEDED FOR [facility] [place]  FORM RECEIVED VIA FAX AND PLACED IN PCP FOLDER TO BE DELIVERED AT ASSIGNED TIMES        ASSESMENT/RECERTIFICATION SUMMARY AND PHYSICIAN ORDER

## 2022-06-21 NOTE — TELEPHONE ENCOUNTER
I spoke with patients mother and she stated she forgot to call to r/s his visit today with Rylie Childs due to that it was too early of an appointment

## 2022-06-23 ENCOUNTER — OFFICE VISIT (OUTPATIENT)
Dept: FAMILY MEDICINE CLINIC | Facility: CLINIC | Age: 42
End: 2022-06-23

## 2022-06-23 VITALS
HEART RATE: 81 BPM | SYSTOLIC BLOOD PRESSURE: 110 MMHG | TEMPERATURE: 97.2 F | OXYGEN SATURATION: 98 % | DIASTOLIC BLOOD PRESSURE: 68 MMHG | RESPIRATION RATE: 16 BRPM

## 2022-06-23 DIAGNOSIS — G82.20 PARAPLEGIA (HCC): Chronic | ICD-10-CM

## 2022-06-23 DIAGNOSIS — L29.9 PRURITUS: ICD-10-CM

## 2022-06-23 DIAGNOSIS — F41.0 ANXIETY ATTACK: ICD-10-CM

## 2022-06-23 DIAGNOSIS — I48.91 ATRIAL FIBRILLATION, UNSPECIFIED TYPE (HCC): Chronic | ICD-10-CM

## 2022-06-23 DIAGNOSIS — A04.72 C. DIFFICILE COLITIS: Primary | ICD-10-CM

## 2022-06-23 DIAGNOSIS — I10 HTN (HYPERTENSION), BENIGN: ICD-10-CM

## 2022-06-23 DIAGNOSIS — A04.72 C. DIFFICILE COLITIS: ICD-10-CM

## 2022-06-23 PROCEDURE — 99214 OFFICE O/P EST MOD 30 MIN: CPT | Performed by: PHYSICIAN ASSISTANT

## 2022-06-23 RX ORDER — HYDROXYZINE 50 MG/1
50 TABLET, FILM COATED ORAL 2 TIMES DAILY PRN
Qty: 30 TABLET | Refills: 1 | Status: SHIPPED | OUTPATIENT
Start: 2022-06-23 | End: 2022-07-06

## 2022-06-23 NOTE — TELEPHONE ENCOUNTER
FAXED ON 06/23/22 TO [facility] at [phone]  FAX CONFIRMATION RECEIVED

## 2022-06-23 NOTE — PROGRESS NOTES
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  HTN (hypertension), benign  - Continue amlodipine 10 mg daily, carvedilol 25 mg twice daily, doxazosin 2 mg daily, losartan 100 mg daily  - Currently blood pressure is controlled at this time  Reviewed BP target goal with patient  - Continue to maintain healthy balanced diet with focus on low salt intake  Limit alcohol intake  - Advised to exercise at least 30 minutes a day for at least 5 days out of the week  C  difficile colitis  - Patient continues with diarrhea and some abdominal pain, but symptoms have been improving  Patient was unable to receive vancomycin at pharmacy  - Will send updated prescription today  Advised patient to take entire course of antibiotics even if feeling better beforehand   - Advised to contact the office or report ED if symptoms worsen or new symptoms arise  Diagnoses and all orders for this visit:    C  difficile colitis  -     Vancomycin HCl 50 MG/ML SOLR; Take 2 5 mL by mouth 4 (four) times a day for 36 doses    Pruritus  -     hydrOXYzine HCL (ATARAX) 50 mg tablet; Take 1 tablet (50 mg total) by mouth 2 (two) times a day as needed for itching or anxiety    Anxiety attack  -     hydrOXYzine HCL (ATARAX) 50 mg tablet; Take 1 tablet (50 mg total) by mouth 2 (two) times a day as needed for itching or anxiety    Atrial fibrillation, unspecified type ([address] )    Paraplegia (HCC)    HTN (hypertension), benign          All of patients questions were answered  Patient understands and agrees with the above plan  Return in about 3 months (around 9/23/2022) for Next scheduled follow up HTN      Kaitlin Fulton PA-C  06/23/22  [address] [facility] [place]          Subjective:     Patient ID: Marla Neville   is a 39 y o  male with known PHM of spinal paraplegia, neurogenic bladder (chronic suprapubic catheter in place), ESRD (on dialysis M/W/F), anemia of CKD, secondary hyperparathyroidism,  type I diabetes, diverting colostomy bag in place, recent history of MSSA bacteremia with tricuspid valve endocarditis, chronic osteomyelitis, chronic decubitus ulcers, atrial fibrillation and hx of DVT (on Eliquis), CAD, hx of Candidal infection, history of C  Diff  Colitis, hx of R BKA, and HTN who presents today in office for hypertension follow-up  - Patient is a 39 y o  male who presents today for hypertension follow-up  Patient was hospitalized at [facility] from 6/17/22- 6/19/22  Patient presented with 3 days of fatigue, generalized abdominal discomfort/bloating with chills, nausea  Patient had missed his Wednesday dialysis secondary to feeling ill  CT chest, abdomen, pelvis showed apparent wall thickening of the ascending, transverse, and descending colon consistent with infectious/inflammatory colitis  Stool test was positive for C diff toxin and patient was treated with PO vancomycin  Blood cultures were negative  Patient was discharged home to continue his course of p o  Vancomycin for total 10 days  - Today, patient notes he is still experiencing some abdominal discomfort and diarrhea, but it has much improved  Patient notes his stool is now somewhat formed  Patient denies any fevers, chills, fatigue, nausea, vomiting  Patient notes he was unable to receive the vancomycin at the pharmacy  Patient notes he has had C diff about 10 times now with in his life time  Per patient, the liquid vancomycin usually works better to cure the C diff instead of the tablet form  The following portions of the patient's history were reviewed and updated as appropriate: allergies, current medications, past family history, past medical history, past social history, past surgical history and problem list         Review of Systems   Constitutional: Negative for chills and fever  HENT: Negative for ear pain and sore throat      Eyes: Negative for pain and visual disturbance  Respiratory: Negative for cough and shortness of breath  Cardiovascular: Negative for chest pain and palpitations  Gastrointestinal: Positive for abdominal pain and diarrhea  Negative for constipation, nausea and vomiting  Genitourinary: Negative for dysuria and hematuria  Musculoskeletal: Positive for arthralgias and back pain  Skin: Negative for color change and rash  Neurological: Negative for seizures and syncope  All other systems reviewed and are negative  Objective:   Vitals:    06/23/22 1306   BP: 110/68   BP Location: Right arm   Patient Position: Sitting   Cuff Size: Standard   Pulse: 81   Resp: 16   Temp: (!) 97 2 °F (36 2 °C)   TempSrc: Temporal   SpO2: 98%         Physical Exam  Vitals and nursing note reviewed  Constitutional:       General: He is not in acute distress  Appearance: He is well-developed  Comments: Ambulates with wheelchair   HENT:      Head: Normocephalic and atraumatic  Right Ear: External ear normal       Left Ear: External ear normal       Nose: Nose normal    Eyes:      Conjunctiva/sclera: Conjunctivae normal    Cardiovascular:      Rate and Rhythm: Normal rate and regular rhythm  Pulses: Normal pulses  Heart sounds: Normal heart sounds  Pulmonary:      Effort: Pulmonary effort is normal  No respiratory distress  Breath sounds: Normal breath sounds  No wheezing  Abdominal:      General: Bowel sounds are normal       Palpations: Abdomen is soft  Tenderness: There is no abdominal tenderness  Musculoskeletal:         General: Normal range of motion  Cervical back: Normal range of motion and neck supple  Comments: OLGA GALLAGHER   Skin:     General: Skin is warm and dry  Findings: No rash  Neurological:      Mental Status: He is alert and oriented to person, place, and time     Psychiatric:         Behavior: Behavior normal

## 2022-06-23 NOTE — TELEPHONE ENCOUNTER
FAXED ON 06/23/22 TO [address] at [phone]  FAX CONFIRMATION RECEIVED  normal... Well appearing, awake, alert, oriented to person, place, time/situation and in no apparent distress.

## 2022-06-24 ENCOUNTER — TELEPHONE (OUTPATIENT)
Dept: FAMILY MEDICINE CLINIC | Facility: CLINIC | Age: 42
End: 2022-06-24

## 2022-06-24 RX ORDER — VANCOMYCIN HYDROCHLORIDE 250 MG/5ML
POWDER, FOR SOLUTION ORAL
Qty: 150 ML | Refills: 0 | Status: SHIPPED | OUTPATIENT
Start: 2022-06-24 | End: 2022-06-29 | Stop reason: ALTCHOICE

## 2022-06-24 NOTE — TELEPHONE ENCOUNTER
[facility] [place] form received on 06/24/2022  to be completed by PCP  Copy made and placed in PCP folder  Forms to be delivered to PCP mailbox at assigned time  (Hold all HHA services, as client was admitted to [facility] 06/17/2022 for abdominal pain)

## 2022-06-24 NOTE — ASSESSMENT & PLAN NOTE
- Patient continues with diarrhea and some abdominal pain, but symptoms have been improving  Patient was unable to receive vancomycin at pharmacy  - Will send updated prescription today  Advised patient to take entire course of antibiotics even if feeling better beforehand   - Advised to contact the office or report ED if symptoms worsen or new symptoms arise

## 2022-06-24 NOTE — ASSESSMENT & PLAN NOTE
- Continue amlodipine 10 mg daily, carvedilol 25 mg twice daily, doxazosin 2 mg daily, losartan 100 mg daily  - Currently blood pressure is controlled at this time  Reviewed BP target goal with patient  - Continue to maintain healthy balanced diet with focus on low salt intake  Limit alcohol intake  - Advised to exercise at least 30 minutes a day for at least 5 days out of the week

## 2022-06-28 ENCOUNTER — TELEPHONE (OUTPATIENT)
Dept: HEMATOLOGY ONCOLOGY | Facility: CLINIC | Age: 42
End: 2022-06-28

## 2022-06-28 ENCOUNTER — TELEPHONE (OUTPATIENT)
Dept: FAMILY MEDICINE CLINIC | Facility: CLINIC | Age: 42
End: 2022-06-28

## 2022-06-28 NOTE — TELEPHONE ENCOUNTER
Scheduling Appointment     Who Is Calling to Schedule Patient's mom [facility]   Doctor [address]   Location [place]    Date and Time 7/26 @ 2pm   Reason for scheduling appointment consult   Patient verbalized understanding   Yes

## 2022-06-28 NOTE — TELEPHONE ENCOUNTER
PCP SIGNATURE NEEDED FOR Chick Patient [id number] FORM RECEIVED VIA FAX AND PLACED IN PCP FOLDER TO BE DELIVERED AT ASSIGNED TIMES

## 2022-06-29 DIAGNOSIS — A04.72 C. DIFFICILE COLITIS: Primary | ICD-10-CM

## 2022-06-29 RX ORDER — VANCOMYCIN HYDROCHLORIDE 125 MG/1
125 CAPSULE ORAL 4 TIMES DAILY
Qty: 36 CAPSULE | Refills: 0 | Status: SHIPPED | OUTPATIENT
Start: 2022-06-29 | End: 2022-07-08

## 2022-06-30 ENCOUNTER — OFFICE VISIT (OUTPATIENT)
Dept: WOUND CARE | Facility: CLINIC | Age: 42
End: 2022-06-30
Payer: MEDICARE

## 2022-06-30 VITALS
HEART RATE: 76 BPM | TEMPERATURE: 96.3 F | SYSTOLIC BLOOD PRESSURE: 114 MMHG | RESPIRATION RATE: 18 BRPM | DIASTOLIC BLOOD PRESSURE: 70 MMHG

## 2022-06-30 DIAGNOSIS — L89.154 PRESSURE INJURY OF SACRAL REGION, STAGE 4 (HCC): Primary | ICD-10-CM

## 2022-06-30 DIAGNOSIS — L89.894 PRESSURE ULCER OF OTHER SITE, STAGE 4 (HCC): ICD-10-CM

## 2022-06-30 DIAGNOSIS — L89.324 PRESSURE INJURY OF LEFT ISCHIUM, STAGE 4 (HCC): ICD-10-CM

## 2022-06-30 DIAGNOSIS — L89.42: ICD-10-CM

## 2022-06-30 DIAGNOSIS — E88.09 HYPOALBUMINEMIA: ICD-10-CM

## 2022-06-30 DIAGNOSIS — L89.224 PRESSURE ULCER OF LEFT HIP, STAGE 4 (HCC): ICD-10-CM

## 2022-06-30 PROCEDURE — 11042 DBRDMT SUBQ TIS 1ST 20SQCM/<: CPT | Performed by: STUDENT IN AN ORGANIZED HEALTH CARE EDUCATION/TRAINING PROGRAM

## 2022-06-30 PROCEDURE — 99213 OFFICE O/P EST LOW 20 MIN: CPT | Performed by: FAMILY MEDICINE

## 2022-06-30 PROCEDURE — 11042 DBRDMT SUBQ TIS 1ST 20SQCM/<: CPT | Performed by: FAMILY MEDICINE

## 2022-06-30 NOTE — PROGRESS NOTES
Patient ID: Lora Sanon  is a 39 y o  male Date of Birth 1980       Chief Complaint   Patient presents with    Follow Up Wound Care Visit     Pressure injury of sacral region, stage 4 (HCC)       Allergies:  Chlorcyclizine, Chlorhexidine, Ciprofloxacin hcl, Cymbalta [duloxetine hcl], Dm-doxylamine-acetaminophen, Gabapentin, Hydrocortisone, Lac bovis, Lactose - food allergy, Lyrica [pregabalin], Penicillins, Polymyxin b, Promethazine-phenyleph-codeine, Quinidine, Cefepime, and Rosuvastatin    Diagnosis:   Diagnosis ICD-10-CM Associated Orders   1  Pressure injury of sacral region, stage 4 ([facility])  L89 [id number] Wound cleansing and dressings     Wound off loading     Wound home care     Wound miscellaneous orders   2  Pressure injury of left ischium, stage 4 ([facility])  L89 324 Wound cleansing and dressings     Wound off loading     Wound home care     Wound miscellaneous orders     Debridement   3  Pressure ulcer of left hip, stage 4 (HCC)  L89 224 Wound cleansing and dressings     Wound off loading     Wound home care     Wound miscellaneous orders   4  Pressure injury of contiguous region involving right buttock and hip, stage 2 (HCC)  L89 42 Wound cleansing and dressings     Wound off loading     Wound home care     Wound miscellaneous orders   5  Pressure ulcer of other site, stage 4 ([facility])  L89 894 Wound cleansing and dressings     Wound off loading     Wound home care     Wound miscellaneous orders   6  Hypoalbuminemia  E88 09         Assessment  & Plan:     F/u multiple stage 4 pressure injuries of sacrum, L hip, perineum, L ischium  Injuries largely unchanged with the exception of L ischium and perineum both of which have new hyperpigmentation in the center of wounds that appear to be from new deep tissue injuries inside the chronic wounds  o Pt is currently palliative in terms of wound care d/t multiple serious comorbidities   Plastic surgery was considered in the past however his albumin remains the same and is low at 1 8    o Pt states he has been adding protein supplementation to diet  Continue protein supplementation  o Continue with wet to dry dressings  o Monitor for signs of infection and call if any concerns   o F/u in 6 weeks  Subjective:   10/22/20:  Followup multiple pressure injuries to the buttocks and sacrum  He was hospitalized for back problems recently  Continues on hemodialysis  The patient states that his albumin has improved so that he may be able to get flap surgery  However when checking his most recent albumin was only 1 8  His total protein is elevated  11/19/20:  Followup multiple stage IV pressure or injuries to the buttocks and sacrum  Continues on hemodialysis  He is scheduled for plastic surgery for cyst on his forehead  Plastic surgery still will not do any flaps to his sacrum or buttocks because his albumin remains low  He has no other complaints  No fever, chills or cough  12/31/20: Followup multiple stage IV pressure injuries of the buttocks and sacrum  She notes some increased drainage and slight more odor  Never had his plastic surgery appointment for the cyst on his forehead  Denies any fever or chills  1/28/21:  Followup multiple stage IV pressure injuries of the buttocks, sacrum and perineum  Unfortunately, the patient was hospitalized for sepsis and back pain  Was found to have a fracture L3  He was given a back brace but only wears at home  He states that really does not give him very much relief  Unfortunately also while in the hospital, he developed a new pressure injury of his left hip  2/25/21:  Followup multiple stage IV pressure ulcers of the buttocks sacrum and perineum  Patient was hospitalized earlier this month for possible sepsis  He was found to have a burst fracture or osteo of L3  IR obtained specimen and was culture negative  However, no bone was obtained    He is scheduled to have another IR intervention March 16th for bone biopsy  Left hip ulcer broke down with large cavity  Otherwise, cultures were negative  He was discharged on no medications  He has not had any recent fever or chills  He did have fever when he was admitted to the hospital     03/21/2021  Mr Ya Stanton is a 24-year-old gentleman with paraplegia and multiple stage IV pressure ulcers was referred for evaluation  He has large chronic ulcers on his buttocks and sacrum but he developed a new ulcer over last month on his left hip  He is scheduled for IR biopsy in his spine for possible chronic osteo  He also recently underwent dental extractions and cyst removed from his head and neck  4/22/21:  Followup multiple stage IV pressure injuries of the buttocks, sacrum, perineum and left hip  He was last seen by Dr Ruchi Sanchez in March  Since then, unfortunately he was admitted to the hospital with pneumonia  He is now doing better  He is changing his dressings daily because of drainage  There has been no increased drainage, no odor and he denies any fever or chills  He has no cough  5/27/21:  Patient returns regarding his his multiple stage IV pressure injuries of the buttocks, sacrum, perineum and left hip  Unfortunately, he was hospitalized for a few days in April regarding FUO with negative cultures  He was "treated empirically with vancomycin and cefepime - improved with no positive cultures and CT scan finding consistent with known decubitus ulcerations and chronic pelvic osteomyelitis "  He is back to baseline  Patient states that he does notice more odor  7/8/21: Followup multiple stage IV pressure injuries of the buttocks, sacrum, perineum and left hip  He continues with Dakin's packing  He had multiple excisions on his face of follicular cysts by plastic surgery  He still cannot have surgery of his pressure injuries due to chronically low albumin  He has not yet contacted his PCP for nutritionist consultation      8/19/21: Followup multiple stage IV pressure injuries of the buttocks, sacrum, perineum and left  He was packing with Dakin's  Nothing else new  He still has not had a nutrition is consultation  9/30/21: Followup multiple stage IV pressure injuries of the buttocks, sacrum, perineum and left hip  He continues with cleansing Dakin's soaks followed by wet-to-dry saline packing  Nothing is new  No pain  No fever or chills  11/11/21: Followup multiple stage IV pressure injuries of the buttock, sacrum, left hip perineum  Recent hospitalization for sepsis  Possible pneumonia  His ulcers have not changed  He is using Dakin's packing again  No other new complaints  States that he is spending is nights in his [place]ron and only spending up to maximum 3 hours out in his chair and then going back to the [place]ron during the day  12/30/21: Followup multiple stage IV pressure injuries of the buttocks, sacrum, left hip and perineum  He was hospitalized again for three days any states that his ulcers deteriorated slightly because he claims that the dressings were not changed  No fever or chills currently  Still using his Cl[place]ron bed     2/10/22:  Patient returns regarding his multiple stage IV pressure injuries of the buttocks, sacrum, left hip and perineum  Unfortunately, he was hospitalized twice since his last visit  The 1st time he was septic with unknown source  The 2nd hospitalization was due to bleeding from newly placed dialysis catheter  No specific complaints at this time  They have been using saline packing  Still in his Clinitron bed     3/24/22: Followup multiple stage IV pressure injuries of the buttocks, sacrum, left hip in perineum  No new events since last visit  He is using saline packing  Clinitron bed  No fever or chills  5/5/22: Followup multiple stage IV pressure injuries  Patient unfortunately was hospitalized with viral pneumonia at the end of March  He feels better    No coughing  He states that he is eating better  However, his albumin was still 1 8  Total protein was normal   Using saline packing  06/30/22: 40 y/o M with PMHx of spinal paraplegia, neurogenic bladder with chronic suprapubic catheter in place, ESRD on dialysis M/W/F, anemia of CKD, secondary hyperparathyroidism, type I DM, colostomy bag in place,tricupsid valve endocarditis, chronic osteomyelitis, chronic pressure injuries, afib, and hx of DVT on Eliquis presenting for f/u of multiple stage 4 pressure injuries of the sacrum, left hip, L ischium, and perineum  Pt was recently hospitalized on 06/17/22 for C  Difficile  Has been using wet to dry dressings  States he has been attempting to increase his protein intake and has gained some weight but his albumin remains low and is confused why that is                   The following portions of the patient's history were reviewed and updated as appropriate:   Patient Active Problem List   Diagnosis    Paraplegia ([place] [address] )    Atrial fibrillation ([place] [address] )    Chronic suprapubic catheter ([place] [address] )    Colostomy care ([place] [address] )    OAB (overactive bladder)    S/P unilateral BKA (below knee amputation) ([facility] )    Sebaceous cyst    Tobacco abuse    Type 1 diabetes mellitus with chronic kidney disease on chronic dialysis ([facility] )    [facility], [facility]    Anemia    Chronic indwelling Ortega catheter    Wound healing, delayed    Iron deficiency anemia    Pressure injury of left ischium, stage 4 ([facility]    HTN (hypertension), benign    Neurogenic bladder    GERD (gastroesophageal reflux disease)    Chronic pain    Stage 5 chronic kidney disease on chronic dialysis (HCC)    SOB (shortness of breath)    Penile abscess    Asymptomatic bacteriuria    History of Clostridium difficile infection    Urinary retention    Delirium    D[facility]    Insulin long-term use ([facility] )    Wheelchair dependent    Recurrent Clostridioides difficile diarrhea    Bipolar depression [facility]    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[facility]    Seizure ([facility]    Pressure ulcer of sacral region, stage 4 ([facility]    AVM (arteriovenous malformation) of duodenum, acquired    Chronic narcotic dependence ([facility]    Chronic diastolic heart failure (HCC)    Dental caries    Nervous    Pressure ulcer of other site, stage 4 (HCC)    Pressure ulcer of left hip, stage 4 (HCC)    Localized swelling of left foot    ED (erectile dysfunction) of organic origin    Irritable bowel syndrome    Onychomycosis    Pustular folliculitis    Prolonged Q-T interval on ECG    Secondary hyperparathyroidism of renal origin ([facility]    Abdominal pain    Pneumonia    Wounds, multiple    Immunocompromised state ([facility]    Severe protein-calorie malnutrition ([facility] )    Chronic osteomyelitis ([facility]    Acute pulmonary edema (HCC)    Acute deep vein thrombosis [facility]DVT) of right upper extremity ([facility]    C  difficile colitis     Past Medical History:   Diagnosis Date    Ambulatory dysfunction     Anemia     Anemia, iron deficiency     transfusion requiring    Atrial fibrillation [facility]     AVM (arteriovenous malformation) of duodenum, 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    Bacteriuria, asymptomatic     Bipolar disorder (HCC)     Chronic deep vein thrombosis (DVT) ([facility]     Chronic indwelling Ortega catheter     Chronic kidney disease     Chronic pain     Chronic pain disorder     Chronic suprapubic catheter ([facility]     Clostridium difficile infection 08/11/2016    also positive 9/2016, 5/29/2017, 8/15/2017   S/P fecal transplant    Colostomy on examination ([facility] )     Decubitus ulcer     Delirium     Diabetes mellitus ([facility]     GERD (gastroesophageal reflux disease)     Hemodialysis patient ([facility]     Hypertension     Memory impairment 2011    s/p diabetic coma    Neurogenic bladder     OAB (overactive bladder)     Paraplegia (HCC)     T3 transverse myelitis vs spinal stoke ([facility]); 2012 extensive epidural abscess C7=> conus 2nd extension from deep parspinal abscess L4-S2/sacral decubitus; cord atrophy/myelomalcia T3=>conus     Penile abscess     S/P unilateral BKA (below knee amputation) ([facility])     Right    Sebaceous cyst removed in 2017    Seizures ([facility])     Shortness of breath     Tobacco abuse     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[facility]     Urinary retention     Wheelchair dependent     Wounds, multiple     pressure ulcers with delayed healing     Past Surgical History:   Procedure Laterality Date    BELOW KNEE LEG AMPUTATION Right 2009    COLONOSCOPY N/A 3/27/2017    Procedure: COLONOSCOPY;  Surgeon: Shae Goyal MD;  Location: [facility]; Service:     COLONOSCOPY N/A 3/29/2017    Procedure: COLONOSCOPY;  Surgeon: Shea Goyal MD;  [facility]; Service:     COLONOSCOPY N/A 6/15/2017    Procedure: COLONOSCOPY with FMT;  Surgeon: Nadeem Serrano MD;  [facility]: [facility]; Service: Gastroenterology    ESOPHAGOGASTRODUODENOSCOPY N/A 3/22/2017    Procedure: ESOPHAGOGASTRODUODENOSCOPY (EGD); Surgeon: Jessica Carrizales DO;  Location: [facility];   Service:     MULTIPLE TOOTH EXTRACTIONS N/A 3/8/2021    Procedure: EXTRACTION TEETH # 2,3,6,10,12,13,14,18,19,20,21,22,23,24,25,26,27,28,29,30;  Surgeon: Bernarda Juarez DMD;  [facility];  Service: Maxillofacial     Family History   Problem Relation Age of Onset    Hyperlipidemia Mother     Hypertension Mother     Leukemia Brother     Diabetes Paternal Grandfather      Social History     Socioeconomic History    Marital status: Single     Spouse name: Not on file    Number of children: Not on file    Years of education: Not on file    Highest education level: Not on file   Occupational History    Not on file   Tobacco Use    Smoking status: Current Every Day Smoker     Types: Cigars    Smokeless tobacco: Never Used    Tobacco comment: about 3 cigars/day   Substance and Sexual Activity    Alcohol use: Not Currently     Comment: 1 cup of wine every 3-4 months    Drug use: Not Currently     Types: Marijuana     Comment: rarely; once every 1-2 years    Sexual activity: Not on file   Other Topics Concern    Not on file   Social History Narrative    Not on file     Social Determinants of Health     Financial Resource Strain: Low Risk     Difficulty of Paying Living Expenses: Not hard at all   Food Insecurity: No Food Insecurity    Worried About [address] in the Last Year: Never true    [address] in the Last Year: Never true   Transportation Needs: No Transportation Needs    Lack of Transportation (Medical): No    Lack of Transportation (Non-Medical):  No   Physical Activity: Not on file   Stress: Not on file   Social Connections: Not on file   Intimate Partner Violence: Not on file   Housing Stability: Not on file       Current Outpatient Medications:     Alcohol Swabs (ALCOHOL PADS) 70 % PADS, by Does not apply route 5 (five) times a day, Disp: 300 each, Rfl: 5    amLODIPine (NORVASC) 10 mg tablet, Take 1 tablet (10 mg total) by mouth daily, Disp: 90 tablet, Rfl: 1    ammonium lactate (LAC-HYDRIN) 12 % cream, , Disp: , Rfl:     apixaban (ELIQUIS) 5 mg, Take 1 tablet (5 mg total) by mouth 2 (two) times a day, Disp: 270 tablet, Rfl: 1    ascorbic acid (VITAMIN C) 250 mg tablet, TAKE 2 TABLETS (500 MG TOTAL) BY MOUTH DAILY, Disp: 180 tablet, Rfl: 1    atorvastatin (LIPITOR) 20 mg tablet, Take 1 tablet (20 mg total) by mouth daily at bedtime, Disp: 90 tablet, Rfl: 1    B Complex-C-Folic Acid (Super B-Complex/Vit C/FA) TABS, TAKE 1 TABLET BY MOUTH EVERY DAY AS DIRECTED, Disp: 30 tablet, Rfl: 8    SUSAN MICROLET LANCETS lancets, Use as instructed to check blood sugar 4 times a day Dx e10 29, Disp: 200 each, Rfl: 5    Blood Glucose Monitoring Suppl (SUSAN CONTOUR NEXT USB MONITOR) w/Device KIT, Testing 4 times a day, Disp: 1 kit, Rfl: 0    calcitriol (ROCALTROL) 0 5 MCG capsule, Take 2 mcg by mouth, Disp: , Rfl:     Capsaicin 0 033 % CREA, 5 times a day for 1st week  Then 3 times a day for next 3 weeks, Disp: 56 6 g, Rfl: 2    carvedilol (COREG) 25 mg tablet, Take 1 tablet (25 mg total) by mouth 2 (two) times a day, Disp: 180 tablet, Rfl: 1    cetirizine (ZyrTEC) 10 mg tablet, Take 1 tablet (10 mg total) by mouth daily, Disp: 90 tablet, Rfl: 1    Cholecalciferol (VITAMIN D-3) 1000 units CAPS, Take 2 capsules by mouth daily, Disp: 30 capsule, Rfl: 0    clindamycin (CLINDAGEL) 1 % gel, APPLY TO AFFECTED AREA TWICE A DAY, Disp: 60 g, Rfl: 2    [employer] ACETAMINOPHEN EX  MG tablet, TAKE 2 TABLETS BY MOUTH EVERY 8 HOURS AS NEEDED FOR MILD OR MODERATE PAIN (PAIN SCORE 1 6)  , Disp: , Rfl:     cyanocobalamin ([employer] Vitamin B-12) 1000 MCG tablet, Take 1 tablet (1,000 mcg total) by mouth daily, Disp: 30 tablet, Rfl: 5    cyclobenzaprine (FLEXERIL) 10 mg tablet, Take 10 mg by mouth 3 (three) times a day as needed, Disp: , Rfl: 0    cyclobenzaprine (FLEXERIL) 5 mg tablet, , Disp: , Rfl:     dexlansoprazole (DEXILANT) 30 MG capsule, Take 1 capsule (30 mg total) by mouth daily, Disp: 60 capsule, Rfl: 0    dicyclomine (BENTYL) 10 mg capsule, TAKE 1 CAPSULE (10 MG TOTAL) BY MOUTH 3 (THREE) TIMES A DAY BEFORE MEALS, Disp: 270 capsule, Rfl: 1    Disposable Gloves MISC, Use 7 times a day, 4 boxes of gloves XL Dx type 1 DM, paraplegia, Disp: 4 each, Rfl: 11    doxazosin (CARDURA) 2 mg tablet, TAKE 1 TABLET BY MOUTH EVERY DAY IN THE EVENING, Disp: 90 tablet, Rfl: 0    epoetin kaushik (EPOGEN,PROCRIT) 20,000 units/mL, Inject 10,000 Units under the skin, Disp: , Rfl:     epoetin kaushik (EPOGEN,PROCRIT) 20,000 units/mL, Inject 5,000 Units under the skin, Disp: , Rfl:     ergocalciferol (VITAMIN D2) 50,000 units, TAKE 1 CAPSULE BY MOUTH ONE TIME PER WEEK, Disp: , Rfl:     ferrous sulfate 325 (65 Fe) mg tablet, Take 1 tablet (325 mg total) by mouth 3 (three) times a day, Disp: 90 tablet, Rfl: 3    glucose 4-6 GM-MG, Chew 2 tablets daily as needed for low blood sugar, Disp: 10 tablet, Rfl: 1    glucose blood (SUSAN CONTOUR TEST) test strip, Use as instructed to test blood sugar 4 times a day Dx e10 29, Disp: 200 each, Rfl: 3    guaiFENesin (MUCINEX) 600 mg 12 hr tablet, Take 2 tablets (1,200 mg total) by mouth every 12 (twelve) hours, Disp: 60 tablet, Rfl: 2    Gvoke PFS 1 MG/0 2ML SOSY, INJECT 1 ML (1 MG TOTAL) INTO A MUSCLE ONCE FOR 1 DOSE, Disp: , Rfl:     HYDROmorphone (DILAUDID) 4 mg tablet, TAKE 1 TABLET BY MOUTH EVERY 4 HOURS AS NEEDED FOR MODERATE PAIN (PAIN SCORE 4 6)   MAX  24 MG/DAY, Disp: , Rfl:     hydrOXYzine HCL (ATARAX) 50 mg tablet, Take 1 tablet (50 mg total) by mouth 2 (two) times a day as needed for itching or anxiety, Disp: 30 tablet, Rfl: 1    Incontinence Supply Disposable (Incontinence Brief Large) MISC, Use 6 (six) times a day Size xl, Disp: 180 each, Rfl: 11    Incontinence Supply Disposable (Undergarment) MISC, Use 6 a day, 5 boxes, xl Dx R51, paraplegia, Disp: 5 each, Rfl: 11    Incontinence Supply Disposable (Underpads) MISC, Use 2 a day, Disp: 5 each, Rfl: 11    insulin detemir (LEVEMIR) 100 units/mL subcutaneous injection, Inject 6 5 Units under the skin 2 (two) times a day , Disp: , Rfl:     ketoconazole (NIZORAL) 2 % cream, Apply to rash , Disp: 15 g, Rfl: 1    Ketostix strip, USE WITH HYPERGLYCEMIA E10 65 **NOT COVERED**, Disp: , Rfl:     LEVEMIR FLEXTOUCH 100 units/mL injection pen, Inject 14 Units under the skin 2 (two) times a day, Disp: 15 pen, Rfl: 3    levETIRAcetam (KEPPRA) 250 mg tablet, TAKE 1 TABLET (250 MG TOTAL) BY MOUTH 3 (THREE) TIMES A WEEK (MWF) AT 1200 , Disp: , Rfl:     lidocaine (XYLOCAINE) 5 % ointment, Apply topically as needed for mild pain, Disp: 35 44 g, Rfl: 0    Lidocaine HCl 4 % LIQD, Apply 1 application topically 3 (three) times a day as needed (pain), Disp: 73 mL, Rfl: 5    Lokelma 10 g PACK, , Disp: , Rfl:     losartan (COZAAR) 100 MG tablet, TAKE 1 TABLET BY MOUTH EVERY DAY, Disp: 90 tablet, Rfl: 1   Melatonin ER 3 MG TBCR, Take 6 mg by mouth, Disp: , Rfl:     Misc  Devices (Wheelchair Cushion) MISC, Use daily, Disp: 1 each, Rfl: 0    Misc   Devices [facility] MISC, by Does not apply route daily Wheelchair ramp, dx g82 [id number], Disp: 1 each, Rfl: 0    Multiple Vitamin (Daily-Enriqueta Multivitamin) TABS, TAKE 1 TABLET BY MOUTH EVERY DAY, Disp: 90 tablet, Rfl: 2    Narcan 4 MG/0 1ML nasal spray, , Disp: , Rfl:     NOVOLOG 100 UNIT/ML injection, Inject 5 Units under the skin 3 (three) times a day with meals , Disp: , Rfl:     NovoLOG FlexPen 100 units/mL injection pen, INJECT 3 UNITS UNDER THE SKIN 3 (THREE) TIMES A DAY BEFORE MEALS , Disp: , Rfl:     nystatin (MYCOSTATIN) 500,000 units/5 mL suspension, APPLY 5 ML (500,000 UNITS TOTAL) TO THE MOUTH OR THROAT 4 (FOUR) TIMES A DAY, Disp: 473 mL, Rfl: 1    ondansetron (ZOFRAN-ODT) 4 mg disintegrating tablet, Take 2 tablets (8 mg total) by mouth every 8 (eight) hours as needed for nausea or vomiting, Disp: 30 tablet, Rfl: 1    oxybutynin (DITROPAN) 5 mg tablet, Take 3 tablets (15 mg total) by mouth daily, Disp: 270 tablet, Rfl: 1    phenytoin extended (DILANTIN) 200 MG ER capsule, Take 1 capsule (200 mg total) by mouth 2 (two) times a day, Disp: 180 capsule, Rfl: 1    phenytoin extended (DILANTIN) 200 MG ER capsule, TAKE 1 CAPSULE BY MOUTH TWICE A DAY, Disp: 180 capsule, Rfl: 0    phenytoin extended (DILANTIN) 300 MG ER capsule, TAKE 1 CAPSULE BY MOUTH EVERY DAY, Disp: 90 capsule, Rfl: 1    phenytoin extended (DILANTIN) 300 MG ER capsule, Take 1 capsule (300 mg total) by mouth daily, Disp: 90 capsule, Rfl: 1    risperiDONE (RisperDAL) 0 5 mg tablet, Take 1 tablet (0 5 mg total) by mouth 2 (two) times a day, Disp: 180 tablet, Rfl: 1    senna (SENOKOT) 8 6 mg, Take 1 tablet (8 6 mg total) by mouth 2 (two) times a day, Disp: 180 tablet, Rfl: 1    sertraline (ZOLOFT) 25 mg tablet, Take 0 5 tablets (12 5 mg total) by mouth daily, Disp: 45 tablet, Rfl: 0    sodium hypochlorite (DAKIN'S HALF-STRENGTH) external solution, USE AS DIRECTED ONCE  DAILY, Disp: 473 mL, Rfl: 2    Sodium Zirconium Cyclosilicate 10 g PACK, Take 10 g by mouth daily, Disp: , Rfl:     sucralfate (CARAFATE) 1 g/10 mL suspension, Take 10 mL (1 g total) by mouth 4 (four) times a day (with meals and at bedtime), Disp: 420 mL, Rfl: 0    SUPER B COMPLEX & C TABS, TAKE 1 CAPSULE BY MOUTH ONCE FOR 1 DOSE AS DIRECTED, Disp: 90 tablet, Rfl: 2    tiZANidine (ZANAFLEX) 4 mg tablet, Take 1 tablet by mouth 4 (four) times a day, Disp: , Rfl:     tiZANidine (ZANAFLEX) 4 mg tablet, , Disp: , Rfl:     triamcinolone (KENALOG) 0 1 % ointment, Apply topically 2 (two) times a day, Disp: 454 g, Rfl: 0    vancomycin (VANCOCIN) 125 MG capsule, Take 1 capsule (125 mg total) by mouth 4 (four) times a day for 9 days, Disp: 36 capsule, Rfl: 0    Zinc Sulfate 220 (50 Zn) MG TABS, Take 1 tablet by mouth daily, Disp: 90 tablet, Rfl: 1    Review of Systems   Constitutional: Negative for activity change, appetite change (Improving), chills, fatigue, fever and unexpected weight change  HENT: Negative for congestion, hearing loss and postnasal drip  Eyes: Negative for visual disturbance  Respiratory: Negative for cough and shortness of breath  Cardiovascular: Negative for chest pain and leg swelling  Gastrointestinal: Positive for diarrhea (recent C  diff infection)  Negative for constipation (Slight) and nausea  Genitourinary: Negative for dysuria and urgency  Indwelling Ortega catheter   Musculoskeletal: Positive for gait problem (Nonambulatory, paraplegic)  Skin: Positive for wound (Sacral, left ischium, left hip and perineum)  Negative for rash  Cyst on right  cheek   Neurological: Positive for weakness  Hematological: Does not bruise/bleed easily  Psychiatric/Behavioral: Positive for dysphoric mood           Objective:  /70   Pulse 76   Temp (!) 96 3 °F (35 7 °C)   Resp 18   Pain Score: 4     Physical Exam  Vitals and nursing note reviewed  Constitutional:       General: He is not in acute distress  Appearance: He is underweight  He is not toxic-appearing  HENT:      Head: Normocephalic and atraumatic  Cardiovascular:      Rate and Rhythm: Normal rate  Pulmonary:      Effort: Pulmonary effort is normal  No respiratory distress  Breath sounds: No stridor  Abdominal:      Comments: The abdomen around the ostomy is slightly full and tender  Skin:     Findings: Wound (Both buttocks and sacrum) present  No erythema  Comments:  Sacral pressure injury with slight reduction in size  No purulent drainage or malodor present  Base is granular with mix of slough and fibrin  Perineum pressure injury appears the same size  There is a darker area of tissue in center of wound consistent with deep tissue injury superimposed on previous pressure injury  No malodor or purulent drainage  L hip pressure injury is the same size as previous visit and largely unchanged  L ischial wound remains same size  There is a new area of deeper discoloration in center of wound with devitalized tissue in center consistent with deep tissue injury superimposed on previous pressure injury  See full wound assessment  Neurological:      Mental Status: He is alert and oriented to person, place, and time  Gait: Gait abnormal (paraplegia)     Psychiatric:         Mood and Affect: Affect normal          Cognition and Memory: Cognition normal             Wound Pressure Injury Ischium Left (Active)   Wound Image       06/30/22 1201   Wound Description Beefy red;Necrotic 06/30/22 1155   Pressure Injury Stage 4 06/30/22 1155   Irene-wound Assessment Scar Tissue;Scaly 06/30/22 1155   Wound Length (cm) 5 7 cm 06/30/22 1155   Wound Width (cm) 5 2 cm 06/30/22 1155   Wound Depth (cm) 1 1 cm 06/30/22 1155   Wound Surface Area (cm^2) 29 64 cm^2 06/30/22 1155   Wound Volume (cm^3) 32 604 cm^3 06/30/22 1155   Calculated Wound Volume (cm^3) 32 6 cm^3 06/30/22 1155   Change in Wound Size % -72 49 06/30/22 1155   Undermining 0 2 06/30/22 1155   Undermining is depth extending from 10-12 06/30/22 1155   Drainage Amount Large 06/30/22 1155   Drainage Description Tan;Yellow;Bloody 06/30/22 1155   Non-staged Wound Description Full thickness 06/30/22 1155   Treatments Cleansed 12/30/21 1447   Dressing Changed Changed 12/30/21 1447   Patient Tolerance Tolerated well 12/30/21 1447   Dressing Status Removed 06/30/22 1155       Wound Pressure Injury Perineum (Active)   Wound Image   06/30/22 1147   Wound Description Pink;Slough; Beefy red; Other (Comment) 06/30/22 1154   Pressure Injury Stage 4 06/30/22 1154   Irene-wound Assessment Scaly;Scar Tissue 06/30/22 1154   Wound Length (cm) 6 cm 06/30/22 1154   Wound Width (cm) 6 5 cm 06/30/22 1154   Wound Depth (cm) 0 9 cm 06/30/22 1154   Wound Surface Area (cm^2) 39 cm^2 06/30/22 1154   Wound Volume (cm^3) 35 1 cm^3 06/30/22 1154   Calculated Wound Volume (cm^3) 35 1 cm^3 06/30/22 1154   Change in Wound Size % -234 29 06/30/22 1154   Undermining 3 06/30/22 1154   Undermining is depth extending from 12-11 06/30/22 1154   Drainage Amount Large 06/30/22 1154   Drainage Description Tan;Yellow;Bloody 06/30/22 1154   Non-staged Wound Description Full thickness 06/30/22 1154   Treatments Cleansed 12/30/21 1448   Dressing Changed Changed 12/30/21 1448   Patient Tolerance Tolerated well 12/30/21 1448   Dressing Status Removed 06/30/22 1154       Wound Pressure Injury Sacrum (Active)   Wound Image   06/30/22 1143   Wound Description Beefy red;Epithelialization 06/30/22 1153   Pressure Injury Stage 4 06/30/22 1153   Irene-wound Assessment Scar Tissue;Dry 06/30/22 1153   Wound Length (cm) 5 7 cm 06/30/22 1153   Wound Width (cm) 5 2 cm 06/30/22 1153   Wound Depth (cm) 1 1 cm 06/30/22 1153   Wound Surface Area (cm^2) 29 64 cm^2 06/30/22 1153   Wound Volume (cm^3) 32 604 cm^3 06/30/22 1153 Calculated Wound Volume (cm^3) 32 6 cm^3 06/30/22 1153   Change in Wound Size % 56 53 06/30/22 1153   Undermining 0 2 06/30/22 1153   Undermining is depth extending from 10-12 06/30/22 1153   Drainage Amount Large 06/30/22 1153   Drainage Description Tan;Yellow;Bloody 06/30/22 1153   Non-staged Wound Description Full thickness 06/30/22 1153   Treatments Irrigation with NSS 05/05/22 1532   Dressing Changed Changed 12/30/21 1451   Patient Tolerance Tolerated well 12/30/21 1451   Dressing Status Removed 06/30/22 1153       Wound 01/28/21 Pressure Injury Hip Left;Lateral (Active)   Wound Image   06/30/22 1136   Wound Description Beefy red 06/30/22 1147   Pressure Injury Stage 4 05/05/22 1536   Irene-wound Assessment Scar Tissue; Intact;Dry 06/30/22 1147   Wound Length (cm) 2 3 cm 06/30/22 1147   Wound Width (cm) 2 1 cm 06/30/22 1147   Wound Depth (cm) 1 2 cm 06/30/22 1147   Wound Surface Area (cm^2) 4 83 cm^2 06/30/22 1147   Wound Volume (cm^3) 5 796 cm^3 06/30/22 1147   Calculated Wound Volume (cm^3) 5 8 cm^3 06/30/22 1147   Tunneling 4 7 cm 02/10/22 1341   Tunneling in depth located at 10 02/10/22 1341   Undermining 3 8 06/30/22 1147   Undermining is depth extending from 7-1 06/30/22 1147   Drainage Amount Large 06/30/22 1147   Drainage Description Tan;Yellow;Bloody 06/30/22 1147   Non-staged Wound Description Full thickness 06/30/22 1147   Treatments Cleansed 12/30/21 1452   Wound packed?  Yes 12/30/21 1452   Packing- # removed 1 05/05/22 1536   Dressing Changed Changed 12/30/21 1452   Patient Tolerance Tolerated well 12/30/21 1452   Dressing Status Removed 06/30/22 1147       Wound 05/05/22 Pressure Injury Ischium Right (Active)   Wound Image   06/30/22 1150   Wound Description Intact 06/30/22 1157   Irene-wound Assessment Scar Tissue 06/30/22 1157   Wound Length (cm) 0 cm 06/30/22 1157   Wound Width (cm) 0 cm 06/30/22 1157   Wound Depth (cm) 0 cm 06/30/22 1157   Wound Surface Area (cm^2) 0 cm^2 06/30/22 1157   Wound Volume (cm^3) 0 cm^3 06/30/22 1157   Calculated Wound Volume (cm^3) 0 cm^3 06/30/22 1157   Change in Wound Size % 100 06/30/22 1157   Drainage Amount Moderate 05/05/22 1610   Drainage Description Bloody 05/05/22 1610   Patient Tolerance Tolerated well 05/05/22 1610   Dressing Status Removed 05/05/22 1610                          Debridement   Wound Pressure Injury Ischium Left    Universal Protocol:  Procedure performed by: (Assisting provider Gabriel Montoya PA-C)  Consent: Verbal consent obtained  Consent given by: patient  Patient understanding: patient states understanding of the procedure being performed  Patient identity confirmed: verbally with patient      Performed by: PA  Debridement type: surgical  Level of debridement: subcutaneous tissue  Pain control: lidocaine 4%  Post-debridement measurements  Length (cm): 5 7  Width (cm): 5 2  Depth (cm): 1 2  Percent debrided: 15%  Surface Area (cm^2): 29 64  Area debrided (cm^2): 4 45  Volume (cm^3): 35 57  Tissue and other material debrided: dermis and subcutaneous tissue  Devitalized tissue debrided: necrotic debris  Instrument(s) utilized: forceps and blade  Bleeding: none  Hemostasis obtained with: pressure  Procedural pain (0-10): 0  Post-procedural pain: 0   Response to treatment: procedure was tolerated well  Debridement Comments: Central portion of necrotic tissue present in center of wound consistent with deep tissue injury  Removed with forceps and blade  Wound Instructions:  Orders Placed This Encounter   Procedures    Wound cleansing and dressings                                                         All wounds except right lateral hip:  Wash your hands with soap and water  Remove old dressing, discard into plastic bag and place in trash  Cleanse the wound with sterile saline solution (rinse) prior to applying a clean dressing  Do not use tissue or cotton balls  Do not scrub the wound  Pat dry using gauze    Shower no; do not get dressing wet     Apply saline moistened gauze  Cover with an ABD pad  Secure with Medfix tape  Change dressing daily and PRN for breakthrough drainage (visiting nurses to do twice per week and family in between)              Follow up in 6 weeks                                  Treatment in the wound center today:  Above treatment done at [facility]           Standing Status:   Future     Standing Expiration Date:   6/30/2023    Wound off loading     Wound off loading  Continue clinitron bed at home and turn at least every 1-2 hours  To try and limit the amount of time spent sitting in the wheelchair  Keep pressure off the wounds as much as possible        Standing Status:   Future     Standing Expiration Date:   6/30/2023    Wound home care     Continue visiting nurses twice per week for dressing changes, family to do in between nurse visits     Standing Status:   Future     Standing Expiration Date:   6/30/2023    Wound miscellaneous orders                   Continue to try and increase your protein to at least 3 servings or more if possible                                  Continue to try and keep blood sugars as low as possible  Stop Smoking        Standing Status:   Future     Standing Expiration Date:   6/30/2023    Debridement     This order was created via procedure documentation       Rosanne Akhtar MD      - I, Denny Woods PA-C, have acted as a scribe with the above documentation    -Theodore Goyla MD, CWS have seen and evaluated the above patient and have reviewed and agree with the above documentation  Portions of the record may have been created with voice recognition software  Occasional wrong word or "sound alike" substitutions may have occurred due to the inherent limitations of voice recognition software  Read the chart carefully and recognize, using context, where substitutions have occurred

## 2022-06-30 NOTE — LETTER
[facility] WOUND [facility]  [facility] [address]  Phone#  [phone]  Fax#  [phone]    Patient:  Lizeth Landaverde  YOB: 1980  Phone:  [phone]  Date of Visit:  6/30/2022    Orders Placed This Encounter   Procedures    Wound cleansing and dressings                                                         All wounds except right lateral hip:  Wash your hands with soap and water  Remove old dressing, discard into plastic bag and place in trash  Cleanse the wound with sterile saline solution (rinse) prior to applying a clean dressing  Do not use tissue or cotton balls  Do not scrub the wound  Pat dry using gauze  Shower no; do not get dressing wet     Apply saline moistened gauze  Cover with an ABD pad  Secure with Medfix tape    Change dressing daily and PRN for breakthrough drainage (visiting nurses to do twice per week and family in between)              Follow up in 6 weeks                                  Treatment in the wound center today:  Above treatment done at [facility]           Standing Status:   Future     Standing Expiration Date:   6/30/2023    Wound off loading     Wound off loading  Continue clinitron bed at home and turn at least every 1-2 hours  To try and limit the amount of time spent sitting in the wheelchair  Keep pressure off the wounds as much as possible        Standing Status:   Future     Standing Expiration Date:   6/30/2023    Wound home care     Continue visiting nurses twice per week for dressing changes, family to do in between nurse visits     Standing Status:   Future     Standing Expiration Date:   6/30/2023    Wound miscellaneous orders                   Continue to try and increase your protein to at least 3 servings or more if possible                                  Continue to try and keep blood sugars as low as possible  Stop Smoking        Standing Status:   Future     Standing Expiration Date: 6/30/2023         Electronically signed by Clarisse Fairchild MD

## 2022-06-30 NOTE — PATIENT INSTRUCTIONS
Orders Placed This Encounter   Procedures    Wound cleansing and dressings                                                         All wounds except right lateral hip:  Wash your hands with soap and water  Remove old dressing, discard into plastic bag and place in trash  Cleanse the wound with sterile saline solution (rinse) prior to applying a clean dressing  Do not use tissue or cotton balls  Do not scrub the wound  Pat dry using gauze  Shower no; do not get dressing wet     Apply saline moistened gauze  Cover with an ABD pad  Secure with Medfix tape    Change dressing daily and PRN for breakthrough drainage (visiting nurses to do twice per week and family in between)              Follow up in 6 weeks                                  Treatment in the wound center today:  Above treatment done at [facility]           Standing Status:   Future     Standing Expiration Date:   6/30/2023    Wound off loading     Wound off loading  Continue clinitron bed at home and turn at least every 1-2 hours  To try and limit the amount of time spent sitting in the wheelchair  Keep pressure off the wounds as much as possible        Standing Status:   Future     Standing Expiration Date:   6/30/2023    Wound home care     Continue visiting nurses twice per week for dressing changes, family to do in between nurse visits     Standing Status:   Future     Standing Expiration Date:   6/30/2023    Wound miscellaneous orders                   Continue to try and increase your protein to at least 3 servings or more if possible                                  Continue to try and keep blood sugars as low as possible  Stop Smoking        Standing Status:   Future     Standing Expiration Date:   6/30/2023

## 2022-07-04 DIAGNOSIS — L29.9 PRURITUS: ICD-10-CM

## 2022-07-04 DIAGNOSIS — F41.0 ANXIETY ATTACK: ICD-10-CM

## 2022-07-06 RX ORDER — HYDROXYZINE 50 MG/1
50 TABLET, FILM COATED ORAL 2 TIMES DAILY PRN
Qty: 180 TABLET | Refills: 1 | Status: SHIPPED | OUTPATIENT
Start: 2022-07-06

## 2022-07-11 ENCOUNTER — TELEPHONE (OUTPATIENT)
Dept: GASTROENTEROLOGY | Facility: CLINIC | Age: 42
End: 2022-07-11

## 2022-07-11 NOTE — TELEPHONE ENCOUNTER
Our mutual patient is scheduled for procedure: On: _09_/_  20_/_2022    _      With: Dr Carmona________    He/She is taking the following blood thinner:   Eliquis     Can this be stopped __2___ days prior to the procedure?       Physician Approving clearance: ________________________

## 2022-07-11 NOTE — TELEPHONE ENCOUNTER
Scheduled date of colonoscopy/EGD (as of today): 09/20/2022  Physician performing colonoscopy: Dr Con Morales  Location of colonoscopy: [address] [place] [address]  Bowel prep reviewed with patient: 2 day miralax/duclolax  Instructions reviewed with patient by: mailed to pt with Diabetes info and yellow form  Clearances:  Eliquis

## 2022-07-12 ENCOUNTER — TELEPHONE (OUTPATIENT)
Dept: FAMILY MEDICINE CLINIC | Facility: CLINIC | Age: 42
End: 2022-07-12

## 2022-07-12 DIAGNOSIS — I48.91 ATRIAL FIBRILLATION, UNSPECIFIED TYPE (HCC): Chronic | ICD-10-CM

## 2022-07-13 RX ORDER — CHOLECALCIFEROL (VITAMIN D3) 25 MCG
TABLET,CHEWABLE ORAL
Qty: 90 TABLET | Refills: 3 | Status: SHIPPED | OUTPATIENT
Start: 2022-07-13

## 2022-07-18 ENCOUNTER — TELEPHONE (OUTPATIENT)
Dept: GASTROENTEROLOGY | Facility: MEDICAL CENTER | Age: 42
End: 2022-07-18

## 2022-07-18 DIAGNOSIS — E61.1 IRON DEFICIENCY: ICD-10-CM

## 2022-07-18 DIAGNOSIS — D64.9 ANEMIA, UNSPECIFIED TYPE: Primary | ICD-10-CM

## 2022-07-18 DIAGNOSIS — R14.0 BLOATING: ICD-10-CM

## 2022-07-18 NOTE — TELEPHONE ENCOUNTER
Hi,    Can you set him up for a follow-up with someone this week? It can be with anyone at any office    Thank you!

## 2022-07-19 NOTE — TELEPHONE ENCOUNTER
Pt is only available on Tuesdays and Thursdays   He is scheduled on Thurs 7/21 @ 9:30am in [facility]

## 2022-07-20 DIAGNOSIS — K21.9 GASTROESOPHAGEAL REFLUX DISEASE WITHOUT ESOPHAGITIS: ICD-10-CM

## 2022-07-20 RX ORDER — DEXLANSOPRAZOLE 30 MG/1
30 CAPSULE, DELAYED RELEASE ORAL DAILY
Qty: 60 CAPSULE | Refills: 0 | Status: SHIPPED | OUTPATIENT
Start: 2022-07-20 | End: 2022-07-21 | Stop reason: SDUPTHER

## 2022-07-20 NOTE — TELEPHONE ENCOUNTER
MOTHER OF PT CONTACTED OFFICE REQUESTING MED REFILLS ON;      dexlansoprazole (DEXILANT) 30 MG capsule

## 2022-07-21 ENCOUNTER — OFFICE VISIT (OUTPATIENT)
Dept: GASTROENTEROLOGY | Facility: CLINIC | Age: 42
End: 2022-07-21
Payer: MEDICARE

## 2022-07-21 VITALS — SYSTOLIC BLOOD PRESSURE: 114 MMHG | TEMPERATURE: 97.6 F | DIASTOLIC BLOOD PRESSURE: 80 MMHG

## 2022-07-21 DIAGNOSIS — A04.72 C. DIFFICILE COLITIS: ICD-10-CM

## 2022-07-21 DIAGNOSIS — K31.84 GASTROPARESIS: ICD-10-CM

## 2022-07-21 DIAGNOSIS — K21.9 GASTROESOPHAGEAL REFLUX DISEASE WITHOUT ESOPHAGITIS: ICD-10-CM

## 2022-07-21 DIAGNOSIS — D50.8 OTHER IRON DEFICIENCY ANEMIA: ICD-10-CM

## 2022-07-21 DIAGNOSIS — R10.9 ABDOMINAL PAIN, UNSPECIFIED ABDOMINAL LOCATION: ICD-10-CM

## 2022-07-21 DIAGNOSIS — R68.81 EARLY SATIETY: Primary | ICD-10-CM

## 2022-07-21 DIAGNOSIS — E73.9 LACTOSE INTOLERANCE: ICD-10-CM

## 2022-07-21 DIAGNOSIS — E61.1 IRON DEFICIENCY: ICD-10-CM

## 2022-07-21 PROCEDURE — 99214 OFFICE O/P EST MOD 30 MIN: CPT | Performed by: PHYSICIAN ASSISTANT

## 2022-07-21 RX ORDER — SUCRALFATE ORAL 1 G/10ML
1 SUSPENSION ORAL
Qty: 420 ML | Refills: 1 | Status: SHIPPED | OUTPATIENT
Start: 2022-07-21

## 2022-07-21 RX ORDER — POLYETHYLENE GLYCOL 3350 17 G/17G
POWDER, FOR SOLUTION ORAL
Qty: 238 G | Refills: 0 | Status: SHIPPED | OUTPATIENT
Start: 2022-07-21

## 2022-07-21 RX ORDER — ONDANSETRON 4 MG/1
8 TABLET, ORALLY DISINTEGRATING ORAL EVERY 8 HOURS PRN
Qty: 30 TABLET | Refills: 1 | Status: SHIPPED | OUTPATIENT
Start: 2022-07-21 | End: 2022-09-08 | Stop reason: ALTCHOICE

## 2022-07-21 RX ORDER — DEXLANSOPRAZOLE 30 MG/1
30 CAPSULE, DELAYED RELEASE ORAL DAILY
Qty: 60 CAPSULE | Refills: 2 | Status: SHIPPED | OUTPATIENT
Start: 2022-07-21 | End: 2022-09-22

## 2022-07-21 NOTE — PATIENT INSTRUCTIONS
Lactose Intolerance   AMBULATORY CARE:   Lactose intolerance  is when your body does not produce enough enzymes to properly digest lactose  Lactose is a sugar found in milk and other dairy foods  Lactose intolerance can lead to low levels of calcium if you do not eat or drink enough dairy foods  Common signs and symptoms include the following:   Abdominal bloating     Gas    Abdominal pain    Diarrhea    Nausea    Contact your healthcare provider for the following:   Continued symptoms, even after you make suggested changes    Questions or concerns about your condition or care    Manage lactose intolerance:   Limit or avoid dairy foods  Your healthcare provider may recommend that you avoid dairy foods at first  Then, you can slowly introduce them back into your diet  You may find that you can eat small amounts of dairy foods at one time  Eat and drink lactose-free or low-lactose foods  Try lactose-free, almond, rice, or soy milk  Infants with lactose intolerance may need to drink a lactose-free formula  Low-lactose foods include aged cheese (Swiss, cheddar, or parmesan), cream cheese, cottage cheese, or ricotta cheese  Read labels on all foods carefully because lactose is found in many foods  Ask your dietitian for more information about how to avoid or limit foods that contain lactose  Avoid eating a dairy food by itself  You may be able to tolerate dairy foods better if you have them with other non-dairy foods  For example, have milk with cereal or cheese with crackers  Ask your healthcare provider about enzyme supplements  You may be able to tolerate some dairy foods if you take an enzyme supplement (lactase tablet) right before you eat the dairy food  Get enough calcium  If you eat very little or no dairy foods, you need to get calcium and vitamin D from other sources  Other foods that contain calcium include sardines, canned salmon, tofu, shellfish, dried beans, and almonds   Kale, spinach, broccoli, turnip greens, almonds, and calcium-fortified orange juice also contain calcium  Ask your healthcare provider if you need to take calcium supplements  Follow up with your doctor as directed:  Write down your questions so you remember to ask them during your visits  © Copyright [employer] 2022 Information is for End User's use only and may not be sold, redistributed or otherwise used for commercial purposes  All illustrations and images included in CareNotes® are the copyrighted property of [employer]  or Matheus Bergman   The above information is an  only  It is not intended as medical advice for individual conditions or treatments  Talk to your doctor, nurse or pharmacist before following any medical regimen to see if it is safe and effective for you  GERD (Gastroesophageal Reflux Disease)   AMBULATORY CARE:   Gastroesophageal reflux disease (GERD)  is reflux that happens more than 2 times a week for a few weeks  Reflux means acid and food in your stomach back up into your esophagus  GERD can cause other health problems over time if it is not treated  Common causes of GERD:  GERD often happens because the lower muscle (sphincter) of the esophagus does not close properly  The sphincter normally opens to let food into the stomach  It then closes to keep food and stomach acid in the stomach  If the sphincter does not close properly, stomach acid and food back up (reflux) into the esophagus   The following may increase your risk for GERD:  Certain foods such as spicy foods, chocolate, foods that contain caffeine, peppermint, and fried foods    Hiatal hernia    Certain medicines such as calcium channel blockers (used to treat high blood pressure), allergy medicines, sedatives, or antidepressants    Pregnancy, obesity, or scleroderma    Lying down after a meal    Drinking alcohol or smoking cigarettes    Signs and symptoms:   Heartburn (burning pain in your chest)    Pain after meals that spreads to your neck, jaw, or shoulder    Pain that gets better when you change positions    Bitter or acid taste in your mouth    A dry cough    Trouble swallowing or pain with swallowing    Hoarseness or a sore throat    Burping or hiccups    Feeling full soon after you start eating    Call your local emergency number (911 in the [address]) if:   You have severe chest pain and sudden trouble breathing  Seek care immediately if:   You have trouble breathing after you vomit  You have trouble swallowing, or pain with swallowing  Your bowel movements are black, bloody, or tarry-looking  Your vomit looks like coffee grounds or has blood in it  Call your doctor or gastroenterologist if:   You feel full and cannot burp or vomit  You vomit large amounts, or you vomit often  You are losing weight without trying  Your symptoms get worse or do not improve with treatment  You have questions or concerns about your condition or care  Treatment for GERD:   Medicines  are used to decrease stomach acid  Medicine may also be used to help your lower esophageal sphincter and stomach contract (tighten) more  Surgery  is done to wrap the upper part of the stomach around the esophageal sphincter  This will strengthen the sphincter and prevent reflux  Manage GERD:       Do not have foods or drinks that may increase heartburn  These include chocolate, peppermint, fried or fatty foods, drinks that contain caffeine, or carbonated drinks (soda)  Other foods include spicy foods, onions, tomatoes, and tomato-based foods  Do not have foods or drinks that can irritate your esophagus, such as citrus fruits, juices, and alcohol  Do not eat large meals  When you eat a lot of food at one time, your stomach needs more acid to digest it  Eat 6 small meals each day instead of 3 large meals, and eat slowly  Do not eat meals 2 to 3 hours before bedtime  Elevate the head of your bed    Place 6-inch blocks under the head of your bed frame  You may also use more than one pillow under your head and shoulders while you sleep  Maintain a healthy weight  If you are overweight, weight loss may help relieve symptoms of GERD  Do not smoke  Smoking weakens the lower esophageal sphincter and increases the risk of GERD  Ask your healthcare provider for information if you currently smoke and need help to quit  E-cigarettes or smokeless tobacco still contain nicotine  Talk to your healthcare provider before you use these products  Do not put pressure on your abdomen  Pressure pushes acid up into your esophagus  Do not wear clothing that is tight around your waist  Do not bend over  Bend at the knees if you need to pick something up  Follow up with your doctor or gastroenterologist as directed:  Write down your questions so you remember to ask them during your visits  © [employer] 2022 Information is for End User's use only and may not be sold, redistributed or otherwise used for commercial purposes  All illustrations and images included in CareNotes® are the copyrighted property of A D A M [employer]  or [facility] [employer]   The above information is an  only  It is not intended as medical advice for individual conditions or treatments  Talk to your doctor, nurse or pharmacist before following any medical regimen to see if it is safe and effective for you  Gastroparesis   AMBULATORY CARE:   Gastroparesis  is a condition that causes food to move more slowly than normal from the stomach to the intestines  Gastroparesis is not caused by blockage  Often, the cause may not be known  It may be caused by damage to a nerve that controls muscles used to move food to your small intestines     Common signs and symptoms include:   Nausea and vomiting    Feeling full sooner than normal or after eating less than usual    Abdominal bloating and pain    Weight loss or poor nutrition     Frequent changes in blood sugar    You or someone else should call 911 if:   Your heart is beating faster and you are breathing faster than usual     You cannot be woken up  Seek care immediately if:   You are confused or have trouble thinking clearly  You are dizzy or very drowsy  Contact your healthcare provider if:   You are urinating less than usual     Your symptoms return or become worse  The color of your urine is dark yellow  You have questions or concerns about your condition or care  Treatment for gastroparesis  may include medicine to control your nausea and vomiting or to help food move through your stomach  You may need nutritional support  Gastric electrical stimulation (GES) may be done when symptoms cannot be controlled by other treatments  Surgery may be needed to place a feeding tube into your small intestines if other treatments do not work  Manage gastroparesis:  Your healthcare provider may suggest any of the following:  Change your eating habits  Take small bites of food to make it easier for your body to digest  You may need to eat several small meals low in fiber and fat throughout the day  Ask your dietitian for help with planning your meals  Do not eat raw fruits, vegetables, or whole grains  These can cause you to have undigested food in your stomach  The undigested food can form a blockage that can become life-threatening  Drink liquids as directed  Liquids will prevent dehydration caused by vomiting  Slowly drink small amounts of liquids at a time  Ask your healthcare provider how much liquid to drink each day, and which liquids are best for you  You may also need to drink an oral rehydration solution (ORS)  An ORS has the right amounts of sugar, salt, and minerals in water to replace body fluids  Do not lie down for 2 hours after your meals  Walking and sitting after meals help with digestion  Control your blood sugar levels if you have diabetes    High blood sugar levels may make your symptoms worse  Ask your healthcare provider how to control your blood sugar levels  Follow up with your healthcare provider as directed: You may need tests to check if treatment is working  You may need to see a dietitian for help with a nutrition plan  Write down your questions so you remember to ask them during your visits  © Copyright [email or web address] 2022 Information is for End User's use only and may not be sold, redistributed or otherwise used for commercial purposes  All illustrations and images included in CareNotes® are the copyrighted property of [employer]  or [facility] Mark Bergman   The above information is an  only  It is not intended as medical advice for individual conditions or treatments  Talk to your doctor, nurse or pharmacist before following any medical regimen to see if it is safe and effective for you

## 2022-07-21 NOTE — PROGRESS NOTES
[facility] - Outpatient Follow-up Note  Mary Kay Wright  39 y o  male MRN: [id number]  Encounter: [id number]      Assessment and Plan    1  Abdominal bloating  2  Early satiety  3  Nausea and vomiting  4  Melena  5  History of PUD  6  Iron def anemia  The patient admits to abdominal bloating, early satiety, nausea, vomiting after eating  This has been ongoing for months now  He states that in addition to this he recently had 1 bout of melena  He states that he takes iron which can make his stool black but this appeared to be even darker  Per his report he has a history of peptic ulcer disease so he was started on PPI therapy and Carafate  He does have chronic iron deficiency anemia with a hemoglobin of 9 9 and at baseline  The patient does have EGD and colonoscopy pending  This has been delayed secondary to hospitalizations  - continue PPI  - continue Carafate  - continue Zofran as needed  - obtain gastric emptying scan  - dietary referral  - proceed with EGD and colonoscopy as planned, given his possible episode of melena and reported history of peptic ulcer disease it is possible that his anemia is secondary to a bleed in the upper GI tract however will complete colonoscopy as there was also some mention of blood per his stoma, could also be anemia of chronic disease    Follow up after EGD/colonoscopy     ______________________________________________________________________    History of Present Illness  Mary Kay Wright  is a 39 y o  male with multiple chronic medical issues including DM2, a fib and DVT on apixiban, ESRD, iron deficiency, seizure disorder, paraplegia, chronic opiate use, colostomy, and prior recurrent C  diff infectionhere s/p FMT in 2017 here for follow up evaluation of abdominal bloating  The patient states that he has significant abdominal bloating after he eats  He is no longer able to eat as much as he previously was able to    He states despite this he gets significant bloating as well as nausea and vomiting  He also mentions within the past few months having a bout of melena  He was placed on PPI and Carafate which he continues to take  He has chronic iron deficiency anemia with a current hemoglobin of 9 9 which is at his baseline  The patient does report a history of peptic ulcer disease  At times he does also see some bright red blood coming from his stoma  His last colonoscopy you through his stoma was in 2017 for FMT with poor prep  He reports a recent bout of diarrhea for which he is on vanco for  Review of Systems   Constitutional: Negative for activity change, appetite change, chills, fatigue, fever and unexpected weight change  HENT: Negative for trouble swallowing  Gastrointestinal: Positive for blood in stool and diarrhea  Musculoskeletal: Positive for gait problem  Past Medical History  Past Medical History:   Diagnosis Date    Ambulatory dysfunction     Anemia     Anemia, iron deficiency     transfusion requiring    Atrial fibrillation ([facility]     AVM (arteriovenous malformation) of duodenum, 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    Bacteriuria, asymptomatic     [facility]     Chronic deep vein thrombosis (DVT) ([facility]     Chronic indwelling Ortega catheter     Chronic kidney disease     Chronic pain     Chronic pain disorder     Chronic suprapubic catheter ([facility]     Clostridium difficile infection 08/11/2016    also positive 9/2016, 5/29/2017, 8/15/2017   S/P fecal transplant    [facility]     Decubitus ulcer     Delirium     Diabetes mellitus ([facility]     GERD (gastroesophageal reflux disease)     Hemodialysis patient ([facility]     Hypertension     Memory impairment 2011    s/p diabetic coma    Neurogenic bladder     OAB (overactive bladder)     Paraplegia (HCC)     T3 transverse myelitis vs spinal stoke (AVM); 2012 extensive epidural abscess C7=> conus 2nd extension from deep parspinal abscess L4-S2/sacral decubitus; cord atrophy/myelomalcia T3=>conus     Penile abscess     S/P unilateral BKA (below knee amputation) (HCC)     Right    Sebaceous cyst removed in 2017    Seizures (HCC)     Shortness of breath     Tobacco abuse     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     Urinary retention     Wheelchair dependent     Wounds, multiple     pressure ulcers with delayed healing       Past Social history  Past Surgical History:   Procedure Laterality Date    BELOW KNEE LEG AMPUTATION Right 2009    COLONOSCOPY N/A 3/27/2017    Procedure: COLONOSCOPY;  Surgeon: Gisella Riley MD;  Location: [facility]; Service:     COLONOSCOPY N/A 3/29/2017    Procedure: COLONOSCOPY;  Surgeon: Gisella Riley MD;  [facility]; Service:     COLONOSCOPY N/A 6/15/2017    Procedure: COLONOSCOPY with FMT;  Surgeon: Sarah Holm MD;  Location: [facility]; Service: Gastroenterology    ESOPHAGOGASTRODUODENOSCOPY N/A 3/22/2017    Procedure: ESOPHAGOGASTRODUODENOSCOPY (EGD); Surgeon: Tadeo Bullard DO;  Location: [facility];   Service:     MULTIPLE TOOTH EXTRACTIONS N/A 3/8/2021    Procedure: EXTRACTION TEETH # 2,3,6,10,12,13,14,18,19,20,21,22,23,24,25,26,27,28,29,30;  Surgeon: Maria C Kuhn DMD;  [facility];  Service: Maxillofacial     Social History     Socioeconomic History    Marital status: Single     Spouse name: Not on file    Number of children: Not on file    Years of education: Not on file    Highest education level: Not on file   Occupational History    Not on file   Tobacco Use    Smoking status: Current Every Day Smoker     Types: Cigars    Smokeless tobacco: Never Used    Tobacco comment: about 3 cigars/day   Substance and Sexual Activity    Alcohol use: Not Currently     Comment: 1 cup of wine every 3-4 months    Drug use: Not Currently     Types: Marijuana     Comment: rarely; once every 1-2 years    Sexual activity: Not on file   Other Topics Concern    Not on file Social History Narrative    Not on file     Social Determinants of Health     Financial Resource Strain: Low Risk     Difficulty of Paying Living Expenses: Not hard at all   Food Insecurity: No Food Insecurity    Worried About Running Out of Food in the Last Year: Never true    [address] in the Last Year: Never true   Transportation Needs: No Transportation Needs    Lack of Transportation (Medical): No    Lack of Transportation (Non-Medical):  No   Physical Activity: Not on file   Stress: Not on file   Social Connections: Not on file   Intimate Partner Violence: Not on file   Housing Stability: Not on file     Social History     Substance and Sexual Activity   Alcohol Use Not Currently    Comment: 1 cup of wine every 3-4 months     Social History     Substance and Sexual Activity   Drug Use Not Currently    Types: Marijuana    Comment: rarely; once every 1-2 years     Social History     Tobacco Use   Smoking Status Current Every Day Smoker    Types: Cigars   Smokeless Tobacco Never Used   Tobacco Comment    about 3 cigars/day       Past Family History  Family History   Problem Relation Age of Onset    Hyperlipidemia Mother     Hypertension Mother     Leukemia Brother     Diabetes Paternal Grandfather        Current Medications  Current Outpatient Medications   Medication Sig Dispense Refill    Alcohol Swabs (ALCOHOL PADS) 70 % PADS by Does not apply route 5 (five) times a day 300 each 5    amLODIPine (NORVASC) 10 mg tablet Take 1 tablet (10 mg total) by mouth daily 90 tablet 1    ammonium lactate (LAC-HYDRIN) 12 % cream       apixaban (ELIQUIS) 5 mg Take 1 tablet (5 mg total) by mouth 2 (two) times a day 270 tablet 1    ascorbic acid (VITAMIN C) 250 mg tablet TAKE 2 TABLETS (500 MG TOTAL) BY MOUTH DAILY 180 tablet 1    atorvastatin (LIPITOR) 20 mg tablet Take 1 tablet (20 mg total) by mouth daily at bedtime 90 tablet 1    B Complex-C-Folic Acid (Super B-Complex/Vit C/FA) TABS TAKE 1 TABLET BY MOUTH EVERY DAY AS DIRECTED 90 tablet 3    SUSAN MICROLET LANCETS lancets Use as instructed to check blood sugar 4 times a day  Dx e10 29 200 each 5    Blood Glucose Monitoring Suppl (SUSAN CONTOUR NEXT USB MONITOR) w/Device KIT Testing 4 times a day 1 kit 0    calcitriol (ROCALTROL) 0 5 MCG capsule Take 2 mcg by mouth      Capsaicin 0 033 % CREA 5 times a day for 1st week  Then 3 times a day for next 3 weeks 56 6 g 2    carvedilol (COREG) 25 mg tablet Take 1 tablet (25 mg total) by mouth 2 (two) times a day 180 tablet 1    cetirizine (ZyrTEC) 10 mg tablet Take 1 tablet (10 mg total) by mouth daily 90 tablet 1    Cholecalciferol (VITAMIN D-3) 1000 units CAPS Take 2 capsules by mouth daily 30 capsule 0    clindamycin (CLINDAGEL) 1 % gel APPLY TO AFFECTED AREA TWICE A DAY 60 g 2    [employer] ACETAMINOPHEN EX  MG tablet TAKE 2 TABLETS BY MOUTH EVERY 8 HOURS AS NEEDED FOR MILD OR MODERATE PAIN (PAIN SCORE 1 6)        cyanocobalamin ([employer] Vitamin B-12) 1000 MCG tablet Take 1 tablet (1,000 mcg total) by mouth daily 30 tablet 5    cyclobenzaprine (FLEXERIL) 10 mg tablet Take 10 mg by mouth 3 (three) times a day as needed  0    cyclobenzaprine (FLEXERIL) 5 mg tablet       dexlansoprazole (DEXILANT) 30 MG capsule Take 1 capsule (30 mg total) by mouth daily 60 capsule 0    dicyclomine (BENTYL) 10 mg capsule TAKE 1 CAPSULE (10 MG TOTAL) BY MOUTH 3 (THREE) TIMES A DAY BEFORE MEALS 270 capsule 1    Disposable Gloves MISC Use 7 times a day, 4 boxes of gloves XL  Dx type 1 DM, paraplegia 4 each 11    doxazosin (CARDURA) 2 mg tablet TAKE 1 TABLET BY MOUTH EVERY DAY IN THE EVENING 90 tablet 0    epoetin kaushik (EPOGEN,PROCRIT) 20,000 units/mL Inject 10,000 Units under the skin      epoetin kaushik (EPOGEN,PROCRIT) 20,000 units/mL Inject 5,000 Units under the skin      ergocalciferol (VITAMIN D2) 50,000 units TAKE 1 CAPSULE BY MOUTH ONE TIME PER WEEK      ferrous sulfate 325 (65 Fe) mg tablet Take 1 tablet (325 mg total) by mouth 3 (three) times a day 90 tablet 3    glucose 4-6 GM-MG Chew 2 tablets daily as needed for low blood sugar 10 tablet 1    glucose blood (SUSAN CONTOUR TEST) test strip Use as instructed to test blood sugar 4 times a day  Dx e10 29 200 each 3    guaiFENesin (MUCINEX) 600 mg 12 hr tablet Take 2 tablets (1,200 mg total) by mouth every 12 (twelve) hours 60 tablet 2    Gvoke PFS 1 MG/0 2ML SOSY INJECT 1 ML (1 MG TOTAL) INTO A MUSCLE ONCE FOR 1 DOSE      HYDROmorphone (DILAUDID) 4 mg tablet TAKE 1 TABLET BY MOUTH EVERY 4 HOURS AS NEEDED FOR MODERATE PAIN (PAIN SCORE 4 6)  MAX  24 MG/DAY      hydrOXYzine HCL (ATARAX) 50 mg tablet TAKE 1 TABLET (50 MG TOTAL) BY MOUTH 2 (TWO) TIMES A DAY AS NEEDED FOR ITCHING OR ANXIETY 180 tablet 1    Incontinence Supply Disposable (Incontinence Brief Large) MISC Use 6 (six) times a day Size xl 180 each 11    Incontinence Supply Disposable (Undergarment) MISC Use 6 a day, 5 boxes, xl  Dx R51, paraplegia 5 each 11    Incontinence Supply Disposable (Underpads) MISC Use 2 a day 5 each 11    insulin detemir (LEVEMIR) 100 units/mL subcutaneous injection Inject 6 5 Units under the skin 2 (two) times a day       ketoconazole (NIZORAL) 2 % cream Apply to rash  15 g 1    Ketostix strip USE WITH HYPERGLYCEMIA E10 65 **NOT COVERED**      LEVEMIR FLEXTOUCH 100 units/mL injection pen Inject 14 Units under the skin 2 (two) times a day 15 pen 3    levETIRAcetam (KEPPRA) 250 mg tablet TAKE 1 TABLET (250 MG TOTAL) BY MOUTH 3 (THREE) TIMES A WEEK (MWF) AT 1200   lidocaine (XYLOCAINE) 5 % ointment Apply topically as needed for mild pain 35 44 g 0    Lidocaine HCl 4 % LIQD Apply 1 application topically 3 (three) times a day as needed (pain) 73 mL 5    Lokelma 10 g PACK       losartan (COZAAR) 100 MG tablet TAKE 1 TABLET BY MOUTH EVERY DAY 90 tablet 1    Melatonin ER 3 MG TBCR Take 6 mg by mouth      Misc  Devices (Wheelchair Cushion) MISC Use daily 1 each 0    Misc  Devices [facility]) MISC by Does not apply route daily Wheelchair ramp, dx g82 20 1 each 0    Multiple Vitamin (Daily-Enriqueta Multivitamin) TABS TAKE 1 TABLET BY MOUTH EVERY DAY 90 tablet 2    Narcan 4 MG/0 1ML nasal spray       NOVOLOG 100 UNIT/ML injection Inject 5 Units under the skin 3 (three) times a day with meals       NovoLOG FlexPen 100 units/mL injection pen INJECT 3 UNITS UNDER THE SKIN 3 (THREE) TIMES A DAY BEFORE MEALS        ondansetron (ZOFRAN-ODT) 4 mg disintegrating tablet Take 2 tablets (8 mg total) by mouth every 8 (eight) hours as needed for nausea or vomiting 30 tablet 1    oxybutynin (DITROPAN) 5 mg tablet Take 3 tablets (15 mg total) by mouth daily 270 tablet 1    phenytoin extended (DILANTIN) 200 MG ER capsule Take 1 capsule (200 mg total) by mouth 2 (two) times a day 180 capsule 1    phenytoin extended (DILANTIN) 200 MG ER capsule TAKE 1 CAPSULE BY MOUTH TWICE A  capsule 0    phenytoin extended (DILANTIN) 300 MG ER capsule TAKE 1 CAPSULE BY MOUTH EVERY DAY 90 capsule 1    phenytoin extended (DILANTIN) 300 MG ER capsule Take 1 capsule (300 mg total) by mouth daily 90 capsule 1    risperiDONE (RisperDAL) 0 5 mg tablet Take 1 tablet (0 5 mg total) by mouth 2 (two) times a day 180 tablet 1    senna (SENOKOT) 8 6 mg Take 1 tablet (8 6 mg total) by mouth 2 (two) times a day 180 tablet 1    sertraline (ZOLOFT) 25 mg tablet Take 0 5 tablets (12 5 mg total) by mouth daily 45 tablet 0    sodium hypochlorite (DAKIN'S HALF-STRENGTH) external solution USE AS DIRECTED ONCE  DAILY 473 mL 2    Sodium Zirconium Cyclosilicate 10 g PACK Take 10 g by mouth daily      sucralfate (CARAFATE) 1 g/10 mL suspension Take 10 mL (1 g total) by mouth 4 (four) times a day (with meals and at bedtime) 420 mL 0    SUPER B COMPLEX & C TABS TAKE 1 CAPSULE BY MOUTH ONCE FOR 1 DOSE AS DIRECTED 90 tablet 2    tiZANidine (ZANAFLEX) 4 mg tablet Take 1 tablet by mouth 4 (four) times a day      tiZANidine (ZANAFLEX) 4 mg tablet       triamcinolone (KENALOG) 0 1 % ointment Apply topically 2 (two) times a day 454 g 0    Zinc Sulfate 220 (50 Zn) MG TABS Take 1 tablet by mouth daily 90 tablet 1     No current facility-administered medications for this visit  Allergies  Allergies   Allergen Reactions    Chlorcyclizine Swelling    Chlorhexidine Other (See Comments)    Ciprofloxacin Hcl     Cymbalta [Duloxetine Hcl]     Dm-Doxylamine-Acetaminophen Other (See Comments)    Gabapentin Tremor    Hydrocortisone Other (See Comments)    Lac Bovis GI Intolerance    Lactose - Food Allergy GI Intolerance     Other reaction(s): Unknown    Lyrica [Pregabalin]     Penicillins Other (See Comments)     miguel Zosyn 08/28/17  Tolerates Ancef  Miguel Augmentin    Polymyxin B     Promethazine-Phenyleph-Codeine Other (See Comments)    Quinidine Other (See Comments)    Cefepime Other (See Comments)     Other reaction(s): Other (See Comments)  encephalopathy; tolerates cefazolin 6/14, miguel Ceftriaxone  encephalopathy; tolerates cefazolin 6/14, miguel Ceftriaxone  Pt has had cefepime March 2022 and June 2022  Also, cephalexin 3/15/22   Rosuvastatin Other (See Comments) and Palpitations     Weakness         The following portions of the patient's history were reviewed and updated as appropriate: allergies, current medications, past medical history, past social history, past surgical history and problem list       Vitals  There were no vitals filed for this visit  Physical Exam  Constitutional   General appearance: Patient is seated and in no acute distress, well appearing and well nourished  Head and Face   Head and face: Normal     Eyes   Conjunctiva and lids: No erythema, swelling or discharge  Anicteric  Ears, Nose, Mouth, and Throat   Hearing: Normal     Neck: Supple, trachea midline  Pulmonary   Respiratory effort: No increased work of breathing or signs of respiratory distress      Lungs: Clear to ascultation, no wheezes, rhonchi, or rales  Cardiovascular   Heart: Regular rate and rhythm, no murmurs gallops or rubs   Examination of extremities for edema and/or varicosities: Normal     Abdomen   Abdomen: Stoma in place  Musculoskeletal   Gait and station: wheel chair, right BKA   Skin   Skin and subcutaneous tissue: Warm, dry, and intact  No visible jaundice, lesions or rashes  Psychiatric   Judgment and insight: Normal  Recent and remote memory:  Normal  Mood and affect: Normal    Results  No visits with results within 1 Day(s) from this visit  Latest known visit with results is:   Office Visit on 01/13/2022   Component Date Value    SARS-CoV-2 01/13/2022 Negative        Radiology Results  No results found  Orders  No orders of the defined types were placed in this encounter

## 2022-07-25 NOTE — TELEPHONE ENCOUNTER
Forms were re faxed to us, needs one more signature  Re printed the forms that were completed and will place in your PCP folder

## 2022-07-26 ENCOUNTER — CONSULT (OUTPATIENT)
Dept: HEMATOLOGY ONCOLOGY | Facility: CLINIC | Age: 42
End: 2022-07-26
Payer: MEDICARE

## 2022-07-26 VITALS
SYSTOLIC BLOOD PRESSURE: 126 MMHG | RESPIRATION RATE: 18 BRPM | TEMPERATURE: 98.4 F | OXYGEN SATURATION: 99 % | DIASTOLIC BLOOD PRESSURE: 70 MMHG | HEART RATE: 85 BPM

## 2022-07-26 DIAGNOSIS — Z79.4 DIABETES MELLITUS DUE TO UNDERLYING CONDITION WITH CHRONIC KIDNEY DISEASE ON CHRONIC DIALYSIS, WITH LONG-TERM CURRENT USE OF INSULIN (HCC): ICD-10-CM

## 2022-07-26 DIAGNOSIS — Z99.2 ANEMIA DUE TO CHRONIC KIDNEY DISEASE, ON CHRONIC DIALYSIS (HCC): Primary | ICD-10-CM

## 2022-07-26 DIAGNOSIS — E08.22 DIABETES MELLITUS DUE TO UNDERLYING CONDITION WITH CHRONIC KIDNEY DISEASE ON CHRONIC DIALYSIS, WITH LONG-TERM CURRENT USE OF INSULIN (HCC): ICD-10-CM

## 2022-07-26 DIAGNOSIS — Z99.2 DIABETES MELLITUS DUE TO UNDERLYING CONDITION WITH CHRONIC KIDNEY DISEASE ON CHRONIC DIALYSIS, WITH LONG-TERM CURRENT USE OF INSULIN (HCC): ICD-10-CM

## 2022-07-26 DIAGNOSIS — D63.1 ANEMIA DUE TO CHRONIC KIDNEY DISEASE, ON CHRONIC DIALYSIS (HCC): Primary | ICD-10-CM

## 2022-07-26 DIAGNOSIS — N18.6 ANEMIA DUE TO CHRONIC KIDNEY DISEASE, ON CHRONIC DIALYSIS (HCC): Primary | ICD-10-CM

## 2022-07-26 DIAGNOSIS — N18.6 DIABETES MELLITUS DUE TO UNDERLYING CONDITION WITH CHRONIC KIDNEY DISEASE ON CHRONIC DIALYSIS, WITH LONG-TERM CURRENT USE OF INSULIN (HCC): ICD-10-CM

## 2022-07-26 PROCEDURE — 99204 OFFICE O/P NEW MOD 45 MIN: CPT | Performed by: PHYSICIAN ASSISTANT

## 2022-07-26 NOTE — PROGRESS NOTES
Hematology/Oncology Outpatient Follow-up  Arlette Suárez  39 y o  male 1980 [id number]    Date:  7/26/2022      Assessment and Plan:  1  Anemia due to chronic kidney disease, on chronic dialysis [facility])  26-year-old male presents for consultation regarding anemia  Most likely patient has anemia secondary to chronic renal disease  I am unable to obtain any renal notes as they are not in the chart or CareEverywhere  I do not know if he is receiving any IV iron or Epogen therapy at dialysis center  His most recent hemoglobin in the chart is from 07/20/2022 hemoglobin is 10 5  He states months a ago he required blood transfusion  Recommend complete anemia work up  Virtual visit in 1 month to review  - Iron Panel (Includes Ferritin, Iron Sat%, Iron, and TIBC); Future  - Folate; Future  - Reticulocytes; Future  - TSH, 3rd generation with Free T4 reflex; Future  - Vitamin B12; Future  - Protein electrophoresis, serum; Future  - CBC and differential; Future  - TSH, 3rd generation with Free T4 reflex; Future      HPI:  26-year-old male presents for consultation regarding anemia  Past medical history significant for spinal paraplegia, neurogenic bladder with chronic suprapubic catheter, end-stage renal failure on dialysis Monday Wednesday Friday, secondary hyperparathyroidism, type 1 diabetes, diverting colostomy bag, history of MSSA bacteremia with tricuspid valve endocarditis, chronic osteomyelitis, chronic decubitus ulcers, AFib, history of DVT managed on Eliquis, CAD, history of Candida, history of C diff, history of right BKA and hypertension  Patient was referred due to his anemia  He has been having bleeding from stoma, saw GI, having scopes  He had melena 2 months ago  Now on PPI and carafate  He takes iron by mouth  Feb 2022   erythropoietin  30  Ferritin 322, iron 29, iron sat 22%, TIBC 132     ROS: Review of Systems   Constitutional: Positive for fatigue (comes and goes )  Negative for appetite change  Respiratory: Negative for cough and shortness of breath  Cardiovascular: Negative for chest pain, palpitations and leg swelling  Gastrointestinal: Negative for abdominal pain, constipation, diarrhea, nausea and vomiting  Has colostomy with stoma    Genitourinary:        Has catheter, on dialysis    Musculoskeletal: Negative  Right index finger x 4 days   Skin: Negative  Neurological: Positive for headaches (1 bad one a month, chronic)  Negative for dizziness  Hematological: Negative  Psychiatric/Behavioral: Negative  Past Medical History:   Diagnosis Date    Ambulatory dysfunction     Anemia     Anemia, iron deficiency     transfusion requiring    Atrial fibrillation [facility]     AVM (arteriovenous malformation) of duodenum, 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    Bacteriuria, asymptomatic     Bipolar disorder ([facility])     Chronic deep vein thrombosis (DVT) ([facility])     Chronic indwelling Ortega catheter     Chronic kidney disease     Chronic pain     Chronic pain disorder     Chronic suprapubic catheter ([facility]     Clostridium difficile infection 08/11/2016    also positive 9/2016, 5/29/2017, 8/15/2017   S/P fecal transplant    [facility]     Decubitus ulcer     Delirium     [facility]     GERD (gastroesophageal reflux disease)     Hemodialysis patient ([facility]     Hypertension     Memory impairment 2011    s/p diabetic coma    Neurogenic bladder     OAB (overactive bladder)     Paraplegia ([facility])     T3 transverse myelitis vs spinal stoke (AVM); 2012 extensive epidural abscess C7=> conus 2nd extension from deep parspinal abscess L4-S2/sacral decubitus; cord atrophy/myelomalcia T3=>conus     Penile abscess     S/P unilateral BKA (below knee amputation) ([facility])     Right    Sebaceous cyst removed in 2017    Seizures ([facility])     Shortness of breath     Tobacco abuse     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[facility])     Urinary retention     Wheelchair dependent     Wounds, multiple     pressure ulcers with delayed healing       Past Surgical History:   Procedure Laterality Date    BELOW KNEE LEG AMPUTATION Right 2009    COLONOSCOPY N/A 3/27/2017    Procedure: COLONOSCOPY;  Surgeon: Desmond Thao MD;  Location: [facility]; Service:     COLONOSCOPY N/A 3/29/2017    Procedure: COLONOSCOPY;  Surgeon: Desmond Thao MD;  [facility]; Service:     COLONOSCOPY N/A 6/15/2017    Procedure: COLONOSCOPY with FMT;  Surgeon: Kurt Pearce MD;  Location: [facility]; Service: Gastroenterology    ESOPHAGOGASTRODUODENOSCOPY N/A 3/22/2017    Procedure: ESOPHAGOGASTRODUODENOSCOPY (EGD); Surgeon: María Joshi DO;  Location: [facility];   Service:     MULTIPLE TOOTH EXTRACTIONS N/A 3/8/2021    Procedure: EXTRACTION TEETH # 2,3,6,10,12,13,14,18,19,20,21,22,23,24,25,26,27,28,29,30;  Surgeon: Jimenez Machado DMD;  Location: [facility];  Service: Maxillofacial       Social History     Socioeconomic History    Marital status: Single     Spouse name: None    Number of children: None    Years of education: None    Highest education level: None   Occupational History    None   Tobacco Use    Smoking status: Current Every Day Smoker     Years: 28 00     Types: Cigars    Smokeless tobacco: Never Used    Tobacco comment: about 3 cigars/day   Substance and Sexual Activity    Alcohol use: Not Currently     Comment: 1 cup of wine every 3-4 months    Drug use: Not Currently     Types: Marijuana     Comment: rarely; once every 1-2 years    Sexual activity: None   Other Topics Concern    None   Social History Narrative    None     Social Determinants of Health     Financial Resource Strain: Low Risk     Difficulty of Paying Living Expenses: Not hard at all   Food Insecurity: No Food Insecurity    Worried About Running Out of Food in the Last Year: Never true    Jose of Food in the Last Year: Never true   Transportation Needs: No Transportation Needs    Lack of Transportation (Medical): No    Lack of Transportation (Non-Medical): No   Physical Activity: Not on file   Stress: Not on file   Social Connections: Not on file   Intimate Partner Violence: Not on file   Housing Stability: Not on file       Family History   Problem Relation Age of Onset    Hyperlipidemia Mother     Hypertension Mother     Leukemia Brother     Diabetes Paternal Grandfather        Allergies   Allergen Reactions    Chlorcyclizine Swelling    Chlorhexidine Other (See Comments)    Ciprofloxacin Hcl     Cymbalta [Duloxetine Hcl]     Dm-Doxylamine-Acetaminophen Other (See Comments)    Gabapentin Tremor    Hydrocortisone Other (See Comments)    Lac Bovis GI Intolerance    Lactose - Food Allergy GI Intolerance     Other reaction(s): Unknown    Lyrica [Pregabalin]     Penicillins Other (See Comments)     miguel Zosyn 08/28/17  Tolerates Ancef  Miguel Augmentin    Polymyxin B     Promethazine-Phenyleph-Codeine Other (See Comments)    Quinidine Other (See Comments)    Cefepime Other (See Comments)     Other reaction(s): Other (See Comments)  encephalopathy; tolerates cefazolin 6/14, miguel Ceftriaxone  encephalopathy; tolerates cefazolin 6/14, miguel Ceftriaxone  Pt has had cefepime March 2022 and June 2022  Also, cephalexin 3/15/22      Rosuvastatin Other (See Comments) and Palpitations     Weakness         Current Outpatient Medications:     Alcohol Swabs (ALCOHOL PADS) 70 % PADS, by Does not apply route 5 (five) times a day, Disp: 300 each, Rfl: 5    amLODIPine (NORVASC) 10 mg tablet, Take 1 tablet (10 mg total) by mouth daily, Disp: 90 tablet, Rfl: 1    ammonium lactate (LAC-HYDRIN) 12 % cream, , Disp: , Rfl:     apixaban (ELIQUIS) 5 mg, Take 1 tablet (5 mg total) by mouth 2 (two) times a day, Disp: 270 tablet, Rfl: 1    ascorbic acid (VITAMIN C) 250 mg tablet, TAKE 2 TABLETS (500 MG TOTAL) BY MOUTH DAILY, Disp: 180 tablet, Rfl: 1    atorvastatin (LIPITOR) 20 mg tablet, Take 1 tablet (20 mg total) by mouth daily at bedtime, Disp: 90 tablet, Rfl: 1    B Complex-C-Folic Acid (Super B-Complex/Vit C/FA) TABS, TAKE 1 TABLET BY MOUTH EVERY DAY AS DIRECTED, Disp: 90 tablet, Rfl: 3    SUSAN MICROLET LANCETS lancets, Use as instructed to check blood sugar 4 times a day Dx e10 29, Disp: 200 each, Rfl: 5    Blood Glucose Monitoring Suppl ([employer] CONTOUR NEXT USB MONITOR) w/Device KIT, Testing 4 times a day, Disp: 1 kit, Rfl: 0    calcitriol (ROCALTROL) 0 5 MCG capsule, Take 2 mcg by mouth, Disp: , Rfl:     Capsaicin 0 033 % CREA, 5 times a day for 1st week  Then 3 times a day for next 3 weeks, Disp: 56 6 g, Rfl: 2    carvedilol (COREG) 25 mg tablet, Take 1 tablet (25 mg total) by mouth 2 (two) times a day, Disp: 180 tablet, Rfl: 1    cetirizine (ZyrTEC) 10 mg tablet, Take 1 tablet (10 mg total) by mouth daily, Disp: 90 tablet, Rfl: 1    Cholecalciferol (VITAMIN D-3) 1000 units CAPS, Take 2 capsules by mouth daily, Disp: 30 capsule, Rfl: 0    clindamycin (CLINDAGEL) 1 % gel, APPLY TO AFFECTED AREA TWICE A DAY, Disp: 60 g, Rfl: 2    [employer] ACETAMINOPHEN EX  MG tablet, TAKE 2 TABLETS BY MOUTH EVERY 8 HOURS AS NEEDED FOR MILD OR MODERATE PAIN (PAIN SCORE 1 6)  , Disp: , Rfl:     cyanocobalamin ([employer] Vitamin B-12) 1000 MCG tablet, Take 1 tablet (1,000 mcg total) by mouth daily, Disp: 30 tablet, Rfl: 5    cyclobenzaprine (FLEXERIL) 10 mg tablet, Take 10 mg by mouth 3 (three) times a day as needed, Disp: , Rfl: 0    cyclobenzaprine (FLEXERIL) 5 mg tablet, , Disp: , Rfl:     dexlansoprazole (DEXILANT) 30 MG capsule, Take 1 capsule (30 mg total) by mouth daily, Disp: 60 capsule, Rfl: 2    dicyclomine (BENTYL) 10 mg capsule, TAKE 1 CAPSULE (10 MG TOTAL) BY MOUTH 3 (THREE) TIMES A DAY BEFORE MEALS, Disp: 270 capsule, Rfl: 1    Disposable Gloves MISC, Use 7 times a day, 4 boxes of gloves XL Dx type 1 DM, paraplegia, Disp: 4 each, Rfl: 11    doxazosin (CARDURA) 2 mg tablet, TAKE 1 TABLET BY MOUTH EVERY DAY IN THE EVENING, Disp: 90 tablet, Rfl: 0    epoetin kaushik (EPOGEN,PROCRIT) 20,000 units/mL, Inject 10,000 Units under the skin, Disp: , Rfl:     epoetin kaushik (EPOGEN,PROCRIT) 20,000 units/mL, Inject 5,000 Units under the skin, Disp: , Rfl:     ergocalciferol (VITAMIN D2) 50,000 units, TAKE 1 CAPSULE BY MOUTH ONE TIME PER WEEK, Disp: , Rfl:     ferrous sulfate 325 (65 Fe) mg tablet, Take 1 tablet (325 mg total) by mouth 3 (three) times a day, Disp: 90 tablet, Rfl: 3    glucose 4-6 GM-MG, Chew 2 tablets daily as needed for low blood sugar, Disp: 10 tablet, Rfl: 1    glucose blood (SUSAN CONTOUR TEST) test strip, Use as instructed to test blood sugar 4 times a day Dx e10 29, Disp: 200 each, Rfl: 3    guaiFENesin (MUCINEX) 600 mg 12 hr tablet, Take 2 tablets (1,200 mg total) by mouth every 12 (twelve) hours, Disp: 60 tablet, Rfl: 2    Gvoke PFS 1 MG/0 2ML SOSY, INJECT 1 ML (1 MG TOTAL) INTO A MUSCLE ONCE FOR 1 DOSE, Disp: , Rfl:     HYDROmorphone (DILAUDID) 4 mg tablet, TAKE 1 TABLET BY MOUTH EVERY 4 HOURS AS NEEDED FOR MODERATE PAIN (PAIN SCORE 4 6)   MAX  24 MG/DAY, Disp: , Rfl:     hydrOXYzine HCL (ATARAX) 50 mg tablet, TAKE 1 TABLET (50 MG TOTAL) BY MOUTH 2 (TWO) TIMES A DAY AS NEEDED FOR ITCHING OR ANXIETY, Disp: 180 tablet, Rfl: 1    Incontinence Supply Disposable (Incontinence Brief Large) MISC, Use 6 (six) times a day Size xl, Disp: 180 each, Rfl: 11    Incontinence Supply Disposable (Undergarment) MISC, Use 6 a day, 5 boxes, xl Dx R51, paraplegia, Disp: 5 each, Rfl: 11    Incontinence Supply Disposable (Underpads) MISC, Use 2 a day, Disp: 5 each, Rfl: 11    insulin detemir (LEVEMIR) 100 units/mL subcutaneous injection, Inject 6 5 Units under the skin 2 (two) times a day , Disp: , Rfl:     ketoconazole (NIZORAL) 2 % cream, Apply to rash , Disp: 15 g, Rfl: 1    Ketostix strip, USE WITH HYPERGLYCEMIA E10 65 **NOT COVERED**, Disp: , Rfl:     LEVEMIR FLEXTOUCH 100 units/mL injection pen, Inject 14 Units under the skin 2 (two) times a day, Disp: 15 pen, Rfl: 3    levETIRAcetam (KEPPRA) 250 mg tablet, TAKE 1 TABLET (250 MG TOTAL) BY MOUTH 3 (THREE) TIMES A WEEK ([facility]) AT 1200 , Disp: , Rfl:     lidocaine (XYLOCAINE) 5 % ointment, Apply topically as needed for mild pain, Disp: 35 44 g, Rfl: 0    Lidocaine HCl 4 % LIQD, Apply 1 application topically 3 (three) times a day as needed (pain), Disp: 73 mL, Rfl: 5    Lokelma 10 g PACK, , Disp: , Rfl:     losartan (COZAAR) 100 MG tablet, TAKE 1 TABLET BY MOUTH EVERY DAY, Disp: 90 tablet, Rfl: 1    Melatonin ER 3 MG TBCR, Take 6 mg by mouth, Disp: , Rfl:     Misc  Devices (Wheelchair Cushion) MISC, Use daily, Disp: 1 each, Rfl: 0    Misc   Devices [facility]) MISC, by Does not apply route daily Wheelchair ramp, dx g82 20, Disp: 1 each, Rfl: 0    Multiple Vitamin (Daily-Enriqueta Multivitamin) TABS, TAKE 1 TABLET BY MOUTH EVERY DAY, Disp: 90 tablet, Rfl: 2    Narcan 4 MG/0 1ML nasal spray, , Disp: , Rfl:     NOVOLOG 100 UNIT/ML injection, Inject 5 Units under the skin 3 (three) times a day with meals , Disp: , Rfl:     NovoLOG FlexPen 100 units/mL injection pen, INJECT 3 UNITS UNDER THE SKIN 3 (THREE) TIMES A DAY BEFORE MEALS , Disp: , Rfl:     ondansetron (ZOFRAN-ODT) 4 mg disintegrating tablet, Take 2 tablets (8 mg total) by mouth every 8 (eight) hours as needed for nausea or vomiting, Disp: 30 tablet, Rfl: 1    oxybutynin (DITROPAN) 5 mg tablet, Take 3 tablets (15 mg total) by mouth daily, Disp: 270 tablet, Rfl: 1    phenytoin extended (DILANTIN) 200 MG ER capsule, Take 1 capsule (200 mg total) by mouth 2 (two) times a day, Disp: 180 capsule, Rfl: 1    phenytoin extended (DILANTIN) 200 MG ER capsule, TAKE 1 CAPSULE BY MOUTH TWICE A DAY, Disp: 180 capsule, Rfl: 0    phenytoin extended (DILANTIN) 300 MG ER capsule, TAKE 1 CAPSULE BY MOUTH EVERY DAY, Disp: 90 capsule, Rfl: 1    phenytoin extended (DILANTIN) 300 MG ER capsule, Take 1 capsule (300 mg total) by mouth daily, Disp: 90 capsule, Rfl: 1    polyethylene glycol (GLYCOLAX) 17 GM/SCOOP powder, Take as directed as per written office instructions, Disp: 238 g, Rfl: 0    risperiDONE (RisperDAL) 0 5 mg tablet, Take 1 tablet (0 5 mg total) by mouth 2 (two) times a day, Disp: 180 tablet, Rfl: 1    senna (SENOKOT) 8 6 mg, Take 1 tablet (8 6 mg total) by mouth 2 (two) times a day, Disp: 180 tablet, Rfl: 1    sertraline (ZOLOFT) 25 mg tablet, Take 0 5 tablets (12 5 mg total) by mouth daily, Disp: 45 tablet, Rfl: 0    sodium hypochlorite (DAKIN'S HALF-STRENGTH) external solution, USE AS DIRECTED ONCE  DAILY, Disp: 473 mL, Rfl: 2    Sodium Zirconium Cyclosilicate 10 g PACK, Take 10 g by mouth daily, Disp: , Rfl:     sucralfate (CARAFATE) 1 g/10 mL suspension, Take 10 mL (1 g total) by mouth 4 (four) times a day (with meals and at bedtime), Disp: 420 mL, Rfl: 1    SUPER B COMPLEX & C TABS, TAKE 1 CAPSULE BY MOUTH ONCE FOR 1 DOSE AS DIRECTED, Disp: 90 tablet, Rfl: 2    tiZANidine (ZANAFLEX) 4 mg tablet, Take 1 tablet by mouth 4 (four) times a day, Disp: , Rfl:     tiZANidine (ZANAFLEX) 4 mg tablet, , Disp: , Rfl:     triamcinolone (KENALOG) 0 1 % ointment, Apply topically 2 (two) times a day, Disp: 454 g, Rfl: 0    Zinc Sulfate 220 (50 Zn) MG TABS, Take 1 tablet by mouth daily, Disp: 90 tablet, Rfl: 1    bisacodyl (DULCOLAX) 5 mg EC tablet, Take 1 tablet (5 mg total) by mouth once for 1 dose, Disp: 2 tablet, Rfl: 0    polyethylene glycol (GOLYTELY) 4000 mL solution, Take 4,000 mL by mouth once for 1 dose, Disp: 4000 mL, Rfl: 0      Physical Exam:  /70 (BP Location: Left arm, Patient Position: Sitting, Cuff Size: Adult)   Pulse 85   Temp 98 4 °F (36 9 °C) (Temporal)   Resp 18   SpO2 99%     Physical Exam  Vitals reviewed  Constitutional:       General: He is not in acute distress  Appearance: He is well-developed  HENT:      Head: Normocephalic and atraumatic  Eyes:      General: No scleral icterus  Conjunctiva/sclera: Conjunctivae normal    Cardiovascular:      Rate and Rhythm: Normal rate and regular rhythm  Heart sounds: Normal heart sounds  No murmur heard  Pulmonary:      Effort: Pulmonary effort is normal  No respiratory distress  Breath sounds: Normal breath sounds  Abdominal:      Palpations: Abdomen is soft  Tenderness: There is no abdominal tenderness  Musculoskeletal:         General: No tenderness  Cervical back: Normal range of motion and neck supple  Comments: Right BKA  In motor wheelchair    Lymphadenopathy:      Cervical: No cervical adenopathy  Skin:     General: Skin is warm and dry  Neurological:      Mental Status: He is alert and oriented to person, place, and time  Cranial Nerves: No cranial nerve deficit  Psychiatric:         Mood and Affect: Mood normal          Behavior: Behavior normal          Labs:            Patient voiced understanding and agreement in the above discussion  Aware to contact our office with questions/symptoms in the interim  This note has been generated by voice recognition software system  Therefore, there may be spelling, grammar, and or syntax errors  Please contact if questions arise

## 2022-07-27 NOTE — TELEPHONE ENCOUNTER
FAXED ON 07/27/22 TO  [facility] at [phone]  FAX CONFIRMATION RECEIVED  Order # [id number]    NOTE: form with Confirmation Fax has been scanned into Pt Chart

## 2022-08-15 DIAGNOSIS — F31.9 BIPOLAR DEPRESSION (HCC): ICD-10-CM

## 2022-08-16 RX ORDER — SERTRALINE HYDROCHLORIDE 25 MG/1
TABLET, FILM COATED ORAL
Qty: 45 TABLET | Refills: 0 | Status: SHIPPED | OUTPATIENT
Start: 2022-08-16

## 2022-08-18 ENCOUNTER — OFFICE VISIT (OUTPATIENT)
Dept: DERMATOLOGY | Facility: CLINIC | Age: 42
End: 2022-08-18
Payer: MEDICARE

## 2022-08-18 VITALS — TEMPERATURE: 98 F | BODY MASS INDEX: 18.63 KG/M2 | WEIGHT: 153 LBS | HEIGHT: 76 IN

## 2022-08-18 DIAGNOSIS — L70.0 ACNE VULGARIS: Primary | ICD-10-CM

## 2022-08-18 DIAGNOSIS — L73.0 ACNE SCARRING: ICD-10-CM

## 2022-08-18 PROCEDURE — 99204 OFFICE O/P NEW MOD 45 MIN: CPT | Performed by: DERMATOLOGY

## 2022-08-18 PROCEDURE — 99214 OFFICE O/P EST MOD 30 MIN: CPT | Performed by: DERMATOLOGY

## 2022-08-18 RX ORDER — DOXYCYCLINE 100 MG/1
100 CAPSULE ORAL 2 TIMES DAILY
Qty: 60 CAPSULE | Refills: 2 | Status: SHIPPED | OUTPATIENT
Start: 2022-08-18 | End: 2022-11-16

## 2022-08-18 RX ORDER — METRONIDAZOLE 7.5 MG/G
GEL TOPICAL 2 TIMES DAILY
Qty: 45 G | Refills: 0 | Status: SHIPPED | OUTPATIENT
Start: 2022-08-18

## 2022-08-18 NOTE — PROGRESS NOTES
[facility] Note     Patient Name: David Pena  Encounter Date: 08/18/22     Have you been cared for by a [facility] Dermatologist in the last 3 years and, if so, which one? No    · Have you traveled outside of the [address] [place] [address] in the past 3 months or outside of the [facility] area in the last 2 weeks? No     May we call your Preferred Phone number to discuss your specific medical information? Yes     May we leave a detailed message that includes your specific medical information? Yes      Today's Chief Concerns:   Concern #1:  Small bumps on face and scalp      Past Medical History:  Have you personally ever had or currently have any of the following? · Skin cancer (such as Melanoma, Basal Cell Carcinoma, Squamous Cell Carcinoma? (If Yes, please provide more detail)- No  · Eczema: No  · Psoriasis: No  · HIV/AIDS: No  · Hepatitis B or C: No  · Tuberculosis: No  · Systemic Immunosuppression such as Diabetes, Biologic or Immunotherapy, Chemotherapy, Organ Transplantation, Bone Marrow Transplantation (If YES, please provide more detail): YES, diabetes  · Radiation Treatment (If YES, please provide more detail): No  · Any other major medical conditions/concerns? (If Yes, which types)- YES, on dialysis for renal disease, A fib, Anemic, osteomyelitis    Social History:     What is/was your primary occupation?  What are your hobbies/past-times? Family History:  Have any of your "first degree relatives" (parent, brother, sister, or child) had any of the following       · Skin cancer such as Melanoma or Merkel Cell Carcinoma or Pancreatic Cancer? No  · Eczema, Asthma, Hay Fever or Seasonal Allergies: No  · Psoriasis or Psoriatic Arthritis: No  · Do any other medical conditions seem to run in your family? If Yes, what condition and which relatives?   YES, mother has hx of hypertension and hyperlipdemia    Current Medications:         Current Outpatient Medications:     Alcohol Swabs (ALCOHOL PADS) 70 % PADS, by Does not apply route 5 (five) times a day, Disp: 300 each, Rfl: 5    amLODIPine (NORVASC) 10 mg tablet, Take 1 tablet (10 mg total) by mouth daily, Disp: 90 tablet, Rfl: 1    ammonium lactate (LAC-HYDRIN) 12 % cream, , Disp: , Rfl:     apixaban (ELIQUIS) 5 mg, Take 1 tablet (5 mg total) by mouth 2 (two) times a day, Disp: 270 tablet, Rfl: 1    ascorbic acid (VITAMIN C) 250 mg tablet, TAKE 2 TABLETS (500 MG TOTAL) BY MOUTH DAILY, Disp: 180 tablet, Rfl: 1    atorvastatin (LIPITOR) 20 mg tablet, Take 1 tablet (20 mg total) by mouth daily at bedtime, Disp: 90 tablet, Rfl: 1    B Complex-C-Folic Acid (Super B-Complex/Vit C/FA) TABS, TAKE 1 TABLET BY MOUTH EVERY DAY AS DIRECTED, Disp: 90 tablet, Rfl: 3    SUSAN MICROLET LANCETS lancets, Use as instructed to check blood sugar 4 times a day Dx e10 29, Disp: 200 each, Rfl: 5    Blood Glucose Monitoring Suppl ([employer] CONTOUR NEXT USB MONITOR) w/Device KIT, Testing 4 times a day, Disp: 1 kit, Rfl: 0    calcitriol (ROCALTROL) 0 5 MCG capsule, Take 2 mcg by mouth, Disp: , Rfl:     Capsaicin 0 033 % CREA, 5 times a day for 1st week  Then 3 times a day for next 3 weeks, Disp: 56 6 g, Rfl: 2    carvedilol (COREG) 25 mg tablet, Take 1 tablet (25 mg total) by mouth 2 (two) times a day, Disp: 180 tablet, Rfl: 1    cetirizine (ZyrTEC) 10 mg tablet, Take 1 tablet (10 mg total) by mouth daily, Disp: 90 tablet, Rfl: 1    Cholecalciferol (VITAMIN D-3) 1000 units CAPS, Take 2 capsules by mouth daily, Disp: 30 capsule, Rfl: 0    clindamycin (CLINDAGEL) 1 % gel, APPLY TO AFFECTED AREA TWICE A DAY, Disp: 60 g, Rfl: 2    [employer] ACETAMINOPHEN EX  MG tablet, TAKE 2 TABLETS BY MOUTH EVERY 8 HOURS AS NEEDED FOR MILD OR MODERATE PAIN (PAIN SCORE 1 6)  , Disp: , Rfl:     cyanocobalamin ([employer] Vitamin B-12) 1000 MCG tablet, Take 1 tablet (1,000 mcg total) by mouth daily, Disp: 30 tablet, Rfl: 5    cyclobenzaprine (FLEXERIL) 10 mg tablet, Take 10 mg by mouth 3 (three) times a day as needed, Disp: , Rfl: 0    cyclobenzaprine (FLEXERIL) 5 mg tablet, , Disp: , Rfl:     dexlansoprazole (DEXILANT) 30 MG capsule, Take 1 capsule (30 mg total) by mouth daily, Disp: 60 capsule, Rfl: 2    dicyclomine (BENTYL) 10 mg capsule, TAKE 1 CAPSULE (10 MG TOTAL) BY MOUTH 3 (THREE) TIMES A DAY BEFORE MEALS, Disp: 270 capsule, Rfl: 1    Disposable Gloves MISC, Use 7 times a day, 4 boxes of gloves XL Dx type 1 DM, paraplegia, Disp: 4 each, Rfl: 11    doxazosin (CARDURA) 2 mg tablet, TAKE 1 TABLET BY MOUTH EVERY DAY IN THE EVENING, Disp: 90 tablet, Rfl: 0    epoetin kaushik (EPOGEN,PROCRIT) 20,000 units/mL, Inject 10,000 Units under the skin, Disp: , Rfl:     epoetin kaushik (EPOGEN,PROCRIT) 20,000 units/mL, Inject 5,000 Units under the skin, Disp: , Rfl:     ergocalciferol (VITAMIN D2) 50,000 units, TAKE 1 CAPSULE BY MOUTH ONE TIME PER WEEK, Disp: , Rfl:     ferrous sulfate 325 (65 Fe) mg tablet, Take 1 tablet (325 mg total) by mouth 3 (three) times a day, Disp: 90 tablet, Rfl: 3    glucose 4-6 GM-MG, Chew 2 tablets daily as needed for low blood sugar, Disp: 10 tablet, Rfl: 1    glucose blood (SUSAN CONTOUR TEST) test strip, Use as instructed to test blood sugar 4 times a day Dx e10 29, Disp: 200 each, Rfl: 3    guaiFENesin (MUCINEX) 600 mg 12 hr tablet, Take 2 tablets (1,200 mg total) by mouth every 12 (twelve) hours, Disp: 60 tablet, Rfl: 2    Gvoke PFS 1 MG/0 2ML SOSY, INJECT 1 ML (1 MG TOTAL) INTO A MUSCLE ONCE FOR 1 DOSE, Disp: , Rfl:     HYDROmorphone (DILAUDID) 4 mg tablet, TAKE 1 TABLET BY MOUTH EVERY 4 HOURS AS NEEDED FOR MODERATE PAIN (PAIN SCORE 4 6)   MAX  24 MG/DAY, Disp: , Rfl:     hydrOXYzine HCL (ATARAX) 50 mg tablet, TAKE 1 TABLET (50 MG TOTAL) BY MOUTH 2 (TWO) TIMES A DAY AS NEEDED FOR ITCHING OR ANXIETY, Disp: 180 tablet, Rfl: 1    Incontinence Supply Disposable (Incontinence Brief Large) MISC, Use 6 (six) times a day Size xl, Disp: 180 each, Rfl: 11    Incontinence Supply Disposable (Undergarment) MISC, Use 6 a day, 5 boxes, xl Dx R51, paraplegia, Disp: 5 each, Rfl: 11    Incontinence Supply Disposable (Underpads) MISC, Use 2 a day, Disp: 5 each, Rfl: 11    insulin detemir (LEVEMIR) 100 units/mL subcutaneous injection, Inject 6 5 Units under the skin 2 (two) times a day , Disp: , Rfl:     ketoconazole (NIZORAL) 2 % cream, Apply to rash , Disp: 15 g, Rfl: 1    Ketostix strip, USE WITH HYPERGLYCEMIA E10 65 **NOT COVERED**, Disp: , Rfl:     LEVEMIR FLEXTOUCH 100 units/mL injection pen, Inject 14 Units under the skin 2 (two) times a day, Disp: 15 pen, Rfl: 3    levETIRAcetam (KEPPRA) 250 mg tablet, TAKE 1 TABLET (250 MG TOTAL) BY MOUTH 3 (THREE) TIMES A WEEK (MW) AT 1200 , Disp: , Rfl:     lidocaine (XYLOCAINE) 5 % ointment, Apply topically as needed for mild pain, Disp: 35 44 g, Rfl: 0    Lidocaine HCl 4 % LIQD, Apply 1 application topically 3 (three) times a day as needed (pain), Disp: 73 mL, Rfl: 5    Lokelma 10 g PACK, , Disp: , Rfl:     losartan (COZAAR) 100 MG tablet, TAKE 1 TABLET BY MOUTH EVERY DAY, Disp: 90 tablet, Rfl: 1    Melatonin ER 3 MG TBCR, Take 6 mg by mouth, Disp: , Rfl:     Misc  Devices (Wheelchair Cushion) MISC, Use daily, Disp: 1 each, Rfl: 0    Misc   Devices [facility]) MISC, by Does not apply route daily Wheelchair ramp, dx g82 20, Disp: 1 each, Rfl: 0    Multiple Vitamin (Daily-Enriqueta Multivitamin) TABS, TAKE 1 TABLET BY MOUTH EVERY DAY, Disp: 90 tablet, Rfl: 2    Narcan 4 MG/0 1ML nasal spray, , Disp: , Rfl:     NOVOLOG 100 UNIT/ML injection, Inject 5 Units under the skin 3 (three) times a day with meals , Disp: , Rfl:     NovoLOG FlexPen 100 units/mL injection pen, INJECT 3 UNITS UNDER THE SKIN 3 (THREE) TIMES A DAY BEFORE MEALS , Disp: , Rfl:     ondansetron (ZOFRAN-ODT) 4 mg disintegrating tablet, Take 2 tablets (8 mg total) by mouth every 8 (eight) hours as needed for nausea or vomiting, Disp: 30 tablet, Rfl: 1    oxybutynin (DITROPAN) 5 mg tablet, Take 3 tablets (15 mg total) by mouth daily, Disp: 270 tablet, Rfl: 1    phenytoin extended (DILANTIN) 200 MG ER capsule, Take 1 capsule (200 mg total) by mouth 2 (two) times a day, Disp: 180 capsule, Rfl: 1    phenytoin extended (DILANTIN) 200 MG ER capsule, TAKE 1 CAPSULE BY MOUTH TWICE A DAY, Disp: 180 capsule, Rfl: 0    phenytoin extended (DILANTIN) 300 MG ER capsule, TAKE 1 CAPSULE BY MOUTH EVERY DAY, Disp: 90 capsule, Rfl: 1    phenytoin extended (DILANTIN) 300 MG ER capsule, Take 1 capsule (300 mg total) by mouth daily, Disp: 90 capsule, Rfl: 1    polyethylene glycol (GLYCOLAX) 17 GM/SCOOP powder, Take as directed as per written office instructions, Disp: 238 g, Rfl: 0    risperiDONE (RisperDAL) 0 5 mg tablet, Take 1 tablet (0 5 mg total) by mouth 2 (two) times a day, Disp: 180 tablet, Rfl: 1    senna (SENOKOT) 8 6 mg, Take 1 tablet (8 6 mg total) by mouth 2 (two) times a day, Disp: 180 tablet, Rfl: 1    sertraline (ZOLOFT) 25 mg tablet, TAKE 1/2 TABLET BY MOUTH EVERY DAY, Disp: 45 tablet, Rfl: 0    sodium hypochlorite (DAKIN'S HALF-STRENGTH) external solution, USE AS DIRECTED ONCE  DAILY, Disp: 473 mL, Rfl: 2    Sodium Zirconium Cyclosilicate 10 g PACK, Take 10 g by mouth daily, Disp: , Rfl:     sucralfate (CARAFATE) 1 g/10 mL suspension, Take 10 mL (1 g total) by mouth 4 (four) times a day (with meals and at bedtime), Disp: 420 mL, Rfl: 1    SUPER B COMPLEX & C TABS, TAKE 1 CAPSULE BY MOUTH ONCE FOR 1 DOSE AS DIRECTED, Disp: 90 tablet, Rfl: 2    tiZANidine (ZANAFLEX) 4 mg tablet, Take 1 tablet by mouth 4 (four) times a day, Disp: , Rfl:     tiZANidine (ZANAFLEX) 4 mg tablet, , Disp: , Rfl:     triamcinolone (KENALOG) 0 1 % ointment, Apply topically 2 (two) times a day, Disp: 454 g, Rfl: 0    Zinc Sulfate 220 (50 Zn) MG TABS, Take 1 tablet by mouth daily, Disp: 90 tablet, Rfl: 1    bisacodyl (DULCOLAX) 5 mg EC tablet, Take 1 tablet (5 mg total) by mouth once for 1 dose, Disp: 2 tablet, Rfl: 0    polyethylene glycol (GOLYTELY) 4000 mL solution, Take 4,000 mL by mouth once for 1 dose, Disp: 4000 mL, Rfl: 0      Review of Systems:  Have you recently had or currently have any of the following? If YES, what are you doing for the problem? · Fever, chills or unintended weight loss: No  · Sudden loss or change in your vision: YES, not sure why  · Nausea, vomiting or blood in your stool: YES, thinks maybe blood in stool  · Painful or swollen joints: YES, right pointer finger since younger  · Wheezing or cough: No  · Changing mole or non-healing wound: YES, where port is  · Nosebleeds: YES, sometimes  · Excessive sweating: No  · Easy or prolonged bleeding? No  · Over the last 2 weeks, how often have you been bothered by the following problems? · Taking little interest or pleasure in doing things: 1 - Not at All  · Feeling down, depressed, or hopeless: 1 - Not at All  · Rapid heartbeat with epinephrine:  No    · Any known allergies? Allergies   Allergen Reactions    Chlorcyclizine Swelling    Chlorhexidine Other (See Comments)    Ciprofloxacin Hcl     Cymbalta [Duloxetine Hcl]     Dm-Doxylamine-Acetaminophen Other (See Comments)    Gabapentin Tremor    Hydrocortisone Other (See Comments)    Lac Bovis GI Intolerance    Lactose - Food Allergy GI Intolerance     Other reaction(s): Unknown    Lyrica [Pregabalin]     Penicillins Other (See Comments)     miguel Zosyn 08/28/17  Tolerates Ancef  Miguel Augmentin    Polymyxin B     Promethazine-Phenyleph-Codeine Other (See Comments)    Quinidine Other (See Comments)    Cefepime Other (See Comments)     Other reaction(s): Other (See Comments)  encephalopathy; tolerates cefazolin 6/14, miguel Ceftriaxone  encephalopathy; tolerates cefazolin 6/14, miguel Ceftriaxone  Pt has had cefepime March 2022 and June 2022  Also, cephalexin 3/15/22      Rosuvastatin Other (See Comments) and Palpitations     Weakness   ·       Physical Exam:     Was a chaperone (Derm Clinical Assistant) present throughout the entire Physical Exam? Yes     Did the Dermatology Team specifically  the patient on the importance of a Full Skin Exam to be sure that nothing is missed clinically?  Yes}  o Did the patient ultimately request or accept a Full Skin Exam?  NO  o Did the patient specifically refuse to have the areas "under-the-bra" examined by the Dermatologist? No  o Did the patient specifically refuse to have the areas "under-the-underwear" examined by the Dermatologist? No    CONSTITUTIONAL:   Vitals:    08/18/22 0850   Temp: 98 °F (36 7 °C)   TempSrc: Temporal   Weight: 69 4 kg (153 lb)   Height: 6' 4" (1 93 m)       PSYCH: Normal mood and affect  EYES: Normal conjunctiva  ENT: Normal lips and oral mucosa  CARDIOVASCULAR: No edema  RESPIRATORY: Normal respirations  HEME/LYMPH/IMMUNO:  No regional lymphadenopathy except as noted below in "ASSESSMENT AND PLAN BY DIAGNOSIS"    SKIN:  FULL ORGAN SYSTEM EXAM   Hair, Scalp, Ears, Face Normal except as noted below in Assessment   Neck, Cervical Chain Nodes Normal except as noted below in Assessment   Right Arm/Hand/Fingers Normal except as noted below in Assessment   Left Arm/Hand/Fingers Normal except as noted below in Assessment   Chest/Breasts/Axillae Viewed areas Normal except as noted below in Assessment   Back/Spine Normal except as noted below in Assessment        Assessment and Plan by Diagnosis:    History of Present Condition:     Duration:  How long has this been an issue for you?    o  about 2 years   Location Affected:  Where on the body is this affecting you?    o  face and scalp   Quality:  Is there any bleeding, pain, itch, burning/irritation, or redness associated with the skin lesion?    o  gets painful and itches   Severity:  Describe any bleeding, pain, itch, burning/irritation, or redness on a scale of 1 to 10 (with 10 being the worst)  o  8   Timing:  Does this condition seem to be there pretty constantly or do you notice it more at specific times throughout the day? o  constant   Context:  Have you ever noticed that this condition seems to be associated with specific activities you do?    o  denies   Modifying Factors:    o Anything that seems to make the condition worse?    -  not sure  o What have you tried to do to make the condition better?    -  yesi butter, Aquaphor, lotion, oral antibiotic, dial soap   Associated Signs and Symptoms:  Does this skin lesion seem to be associated with any of the following:  o  SL AMB DERM SIGNS AND SYMPTOMS: Itching and Scratching     ACNE VULGARIS ("COMMON ACNE")    Physical Exam:   Psychiatric/Mood:   Anatomic Location Affected:  face   Morphological Description: erythematous papules and nodules   Pertinent Positives:   Pertinent Negatives: Additional History of Present Condition:  Present for about 2 years; used yesi butter, Aquaphor, lotion, oral antibiotic, dial soap    Assessment and Plan:   We reviewed the causes of acne, the kinds of acne, and the expected clinical course   We discussed treatment options ranging from over-the-counter products, topical retinoids, antibiotics, BP, hormonal therapies (OCPs/spironolactone), and isotretinoin (Accutane)   We reviewed specific over-the-counter interventions and medications  Recommended typical hygiene measures including water-based facial products, washing regularly with mild cleanser, and refraining from picking and popping any pimples   Recommended non-comedogenic sunscreen use daily   Expectations of therapy discussed  Side effects, risks and benefits of medications discussed   A comprehensive handout on Acne was provided   The phone number to call in case of questions or concerns (and instructions to stop medications in such a scenario) was provided     After lengthy discussion of etiology and treatment options, we decided to implement the following personalized treatment plan:    Based on a thorough discussion of this condition and the management approach to it (including a comprehensive discussion of the known risks, side effects and potential benefits of treatment), the patient (family) agrees to implement the following specific plan:    --------------------------------------------------------------------------------------  YOUR PERSONALIZED ACNE ACTION PLAN    MORNING ROUTINE    1) ANTIBIOTICS:     Doxycycline Take 1 tablet with a full glass of water and food 100 mg BID    EVENING ROUTINE      1) ANTIBIOTICS:     Doxycycline 100 mg BID Take 1 tablet with a full glass of water and food for 3 months  2) TOPICAL RETINOID:  At 1 hour before bedtime (after washing your face and allowing the skin to completely dry), spread only a single pea-sized amount of this medication evenly over your entire face (avoiding your eyes or mouth):  Apply Metronidazole 0 75% gel twice daily to face  REMEMBER:  Always take your acne pills with lots of water! A pill stuck in your throat can cause significant burning and irritation  Drink a full glass of water to ensure the pill gets into your stomach  Avoid popping a pill right before bed, and stay upright for at least 1 hour after taking a pill  ACNE:  WHAT ZIT ALL ABOUT? WHY DO I HAVE ACNE/PIMPLES? Your skin is made of layers  To keep the skin from becoming dry and cracked, the skin needs oil  The oil is made in little wells in the deeper layers in the skin  People with acne have glands that make more oil and are more easily plugged, causing the glands to swell  Hormones, bacteria and your inherited tendency to have acne all play a role  The medical term for pimples is acne or acne vulgaris (vulgaris means common)  Most people get some acne  Acne does not come from being dirty    Instead, it is an expected consequence of changes that occur during normal growth and development  Hormones, bacteria, and your family's tendency to have acne may all play a role  Whiteheads or blackheads are openings of the glands (glands are the oil factories) onto the surface of the skin  Blackheads are not caused by dirt blocking the pores; instead, they result from the oxidation reaction of oil and skin in the pores with the air (like a rust reaction)  WHAT ABOUT STRESS? Stress does not cause acne but it can make it worse  Make sure you get enough sleep and daily exercise! WHAT ABOUT FOODS/DIET? Try to eat a balanced, healthy diet  Some people feel that certain foods worsen their acne  While there aren't many studies available on this question, severe dietary changes are unlikely to help your acne and may be harmful to the health of your skin  If you find that a certain food seems to aggravate your acne, you may consider avoiding that food  Discuss this with your physician! WHAT CAUSES MY ACNE? There are four contributors to acne--the body's natural oil (sebum), clogged pores, bacteria (with the scientific name Propionibacterium acnes, or P  acnes, for short), and the body's reaction to the bacteria living in the clogged pores (which causes inflammation)  Here's what happens:     Sebum is produced in the normal oil-making glands in the deeper layers of the skin and reaches the surface through the skin's pores  An increase in certain hormones occurs around the time of puberty, and these hormones trigger the oil glands to produce increased amounts of sebum   Pores with excess oil tend to become clogged more easily   At the same time, P  acnes--one of the many types of bacteria that normally live on everyone's skin--thrives in the excess oil and causes a skin reaction (inflammation)   If a pore is clogged close to the surface, there is little inflammation   However, this results in the formation of whiteheads (closed comedones) or blackheads (open comedones) at the surface of the skin   A plug that extends to, or forms a little deeper in the pore, or one that enlarges or ruptures may cause more inflammation  The result is red bumps (papules) and pus-filled pimples (pustules)   If plugging happens in the deepest skin layer, the inflammation may be even more severe, resulting in the formation of nodules or cysts  When these types of acne heal, they may leave behind discolored areas or true scars  SKIN HYGIENE:  HOW SHOULD I [facility] MY SKIN? Acne does not come from being dirty, however, washing your face is part of taking good care of your skin and will help keep your face clear  Good skin hygiene is, therefore, critical to support any acne treatment plan  Here are several specific suggestions for practicing good skin hygiene and keeping your skin looking its best:     You should wash acne-prone skin TWICE A DAY: Once in the morning and once in the evening  This does include any showers you take that day, so do not overdo it!  Do not scrub the skin with a washcloth or loofah as these can irritate and inflame your acne  Acne does not come from dirt, so it is not necessary to scrub the skin clean  In fact, scrubbing may lead to dryness and irritation that makes the acne even worse and harder for patients to tolerate acne medications   Use a gentle facial moisturizing cleanser (Cetaphil Moisturizing Cleanser or Dove Fragrance-Free bar)  Avoid using soaps like Justin Temple, Jesus Herrera [address], [address] [facility], or soft/liquid soaps as these products will dry your skin   Do not use any over-the-counter acne washes without your doctor's specific instruction to do so  These products often contain salicylic acid or benzoyl peroxide  These ingredients can be helpful in clearing oil from the skin and reducing bacteria, but they may also be drying and can add to irritation      Do not use exfoliating products with microbeads or brushes as these can cause irritation to the skin   Facials and other treatments to remove, squeeze, or clean out pores are not recommended  Manipulating the skin in this way can make acne worse and can lead to severe infections and/or scarring  It also increases the likelihood that the skin will not be able to tolerate acne medications   Try not to pop pimples or pick at your acne as this can delay healing and may result in scarring or skin color changes (dark spots) that are often more noticeable than the acne itself  Picking/popping acne can also cause a serious skin infection   Wash or change your pillow case once to twice a week, especially if you use products in your hair   Wash the skin as soon as possible after playing sports or other activities that cause a lot of sweating  Also, pay attention to how your sports equipment (shoulder pads, helmet strap, etc ) might be making your acne worse   When you use makeup, moisturizer, or sunscreen make sure that these products are labeled non-comedogenic, or won't clog pores, or won't cause acne         SHOULD I TREAT MY ACNE? There are a number of other skin conditions that can look like acne  If there is any question about the diagnosis, then the person should be evaluated by a board certified pediatric and adolescent dermatologist   A physician should examine any child with acne who is between the ages of 3and 9years of age, as acne in this mid-childhood age group is not normal and may signal an underlying problem  If a preadolescent (9to 6years of age) or adolescent (15to 25years of age) has mild acne and the condition is not bothersome to the individual, proper and regular skin care (what your doctor may call skin hygiene) may be all that is needed at this point  Many people do, however, need specific acne medications to help their skin look and feel its best  Your doctor will tell you if you are one of these people   If so, you may be advised to use an over-the-counter or prescription medication that is applied to the skin (a topical medication) or if the addition of an oral medication (a medication taken by [employer]) is needed  The good news is that the medications work well when used properly! Some specific factors that may influence the choice of acne therapy include:     Severity  The number and type of skin lesions (papules or comedones) and the degree of inflammation (mild, moderate or severe)   Scarring  Scarring is most common when acne is severe, but it can happen even in children with mild acne   Impact  If a child is experiencing emotional complications because of the acne or is experiencing negative comments from other children   Cost of the acne medications  An acne expert can help to keep out of pocket costs to a minimum by utilizing the correct medications and the least expensive options   The patient's skin type (oily versus dry or combination skin, for example)   Potential side effects of the medication   The ease or overall complexity of the treatment plan or medication  WHAT ACNE TREATMENTS ARE AVAILABLE? Medications for acne try to stop the formation of new pimples by reducing or removing the oil, bacteria, and other things (like dead skin cells) that clog the pores  They can also decrease the inflammation or irritation response of the skin to bacteria  It may take from 6 to 8 weeks (about 2 months!) before you see any improvement and know if the medication is effective  It takes the layers of skin this long to regenerate  Remember, these medications do not cure the condition--the acne improves because of the medication  Therefore, treatment must be continued in order to prevent the return of acne lesions  There are many types of acne treatments  Some are applied to the skin (topical medications) and some are taken by mouth (oral medications)   In most cases of mild acne, the doctor will start with a topical medication  There are many different topical medications that are helpful for acne  If acne is more severe and it does not respond adequately to a topical medication, or if it covers large body surface areas such as the back and/or chest, oral antibiotics such as Doxycycline or Minocycline and/or oral hormone therapy such as Oral Contraceptive Pills or Spironolactone may be prescribed  In the most severe cases, isotretinoin (Accutane) may be used  In general, it is usually best to start with acne medications that are least likely to cause side effects but are at the same time capable of addressing the specific causes for the acne  Some patients have a good result with just one medication, but many will need to use a combination of treatments: two or more different topical agents or an oral medication plus a topical medication  Another treatment used for acne may include corticosteroid injections, which are used to help relieve pain, decrease the size, and encourage the healing of large, inflamed acne nodules  Also, dermatologists sometimes perform acne surgery, using a fine needle, a pointed blade, or an instrument known as a comedone extractor to mechanically clean out clogged pores  One must always weigh the risk for inducing a scar with the potential benefits of any procedure  Prior treatment with topical retinoids can loosen whiteheads and blackheads and make it easier to physically remove such lesions  Heat-based devices, and light and laser therapy are being studied to see whether there is any role for such treatments in mild to moderate acne  At this time, there is not enough evidence to make general recommendations about their use  TOPICAL ACNE MEDICATIONS    WHAT KIND OF TOPICALS ARE THERE?    Benzoyl peroxide (BP) helps to fight inflammation and is anti-microbial (kills bacteria, viruses, and other microorganisms) and is believed to help prevent resistance of bacteria to topical antibiotics  A benzoyl peroxide wash may be recommended for use on large areas such as the chest and/or back  Mild irritation and dryness are common when first using benzoyl peroxide-containing products  Be careful because benzoyl peroxide can bleach towels and clothing!  Retinoids (such as adapalene, tretinoin, or tazarotene) unplug the oil glands by helping peel away the layers of skin and other things plugging the opening of the glands  Mild irritation and dryness are common when first using these products  Facial waxing and other skin procedures can lead to excessive irritation and should be avoided during retinoid therapy   Antibiotics fight bacteria and help decrease inflammation  Topical antibiotics commonly used in acne include clindamycin, erythromycin, and combination agents (such as clindamycin/benzoyl peroxide or erythromycin/benzoyl peroxide)  Mild irritation and dryness are common when first using these products  Typically, topical antibiotics should not be used alone as treatment for acne   Other topical agents include salicylic acid, azelaic acid, dapsone, and sulfacetamide  Mild irritation and dryness can also occur when first using these products  USING YOUR TOPICAL TREATMENTS LIKE A PRO   Apply topical medications only to clean, dry skin  Topical medications may lead to significant dryness of the affected areas  To minimize this, wait 15-20 minutes after washing before applying your topical medication   These medications work deep in the skin to prevent new breakouts  Spot treatment of individual pimples does not do much  When applying topical medications to the face, use the 5-dot method  Start by placing a small pea-sized amount of the medication on your finger  Then, place dots in each of five locations of your face: Mid-forehead, each cheek, nose, and chin  Next, rub the medication into the entire area of skin - not just on individual pimples!  Try to avoid the delicate skin around your eyes and corners of your mouth   The medications are not magic! They take weeks if not months to work  Be patient and use your medicine on a daily basis or as directed for six weeks before asking if your skin looks better  Try not to miss more than one or two days each week when using your medications   If you are starting a new medication, then try using it every other night or even every third night   Gradually work up to [employer] a day    This will give your skin time to adjust    The same medications often come in various forms or formulations: Creams, ointments, lotions, gels, microspheres, or foams  Use the formulation that has been recommended and don't switch to other forms unless instructed  Some forms (such as alcohol based gels) may be more drying and less tolerable for certain skin types   Sometimes individual medications are not as effective as a combination of two or more agents  The doctor may need to try several medications or combinations before finding the one that is best for that patient   Moisturizer, sunscreen, and make-up may be used in conjunction with topical acne medications  In general, acne medications are applied first so they may directly contact the skin  Ask your physician to review specific application instructions!  It is especially important to always use sunscreen when using a topical retinoid or oral antibiotic  These drugs can make your skin more sensitive to the sun  In general, sunscreen gets applied AFTER any acne medications   Don't stop using your acne medications just because your acne got better  Remember, the acne is better because of the medication, and prevention is the valencia to treatment        ORAL ACNE MEDICATIONS    ORAL ANTIBIOTICS  Antibiotics include tetracycline-class medicines (which include the most commonly used oral antibiotics for acne, minocycline, and doxycycline), erythromycin, trimethoprim-sulfamethoxazole, and occasionally cephalexin or azithromycin  These drugs may decrease bacteria and inflammation, and they are most effective for moderate-to-severe inflammatory acne  A product containing benzoyl peroxide should be used along with these antibiotics to help decrease the possibility of microbial resistance  Always take your acne pills with lots of water! A pill stuck in your throat can cause significant burning and irritation  Drink a full glass of water to ensure the pill gets into your stomach  Avoid popping a pill right before bed, and stay upright for at least 1 hour after taking a pill  DOXYCYCLINE   This medication is usually taken ONCE or TWICE per day, as instructed by your physician  NOTE: Always take this medication with lots of water! A pill stuck in the throat can cause significant burning and irritation  Avoid popping a pill right before bed & stay upright for at least one hour after taking a pill  WARNING: Doxycycline increases your sensitivity to the sun, so practice excellent sun protection! If you notice any of the following, stop using the medication and notify your health care provider: headaches; blurred vision; dizziness; sun sensitivity; heartburn-stomach pain; irritation of the esophagus; darkening of scars, gums, or teeth (more often with minocycline); nail changes; yellowing of the eyes or skin (indicating possible liver disease); joint pains-and flu-like symptoms  Taking oral antibiotics with food may help with symptoms of upset stomach  COMMON SIDE EFFECTS: Headaches; dizziness; sun sensitivity; irritation of the throat; discoloration of scars, gums, or teeth; nail changes  HAVING PROBLEMS WITH ANY OF YOUR TREATMENTS? You should not be able to see any of the medicines on your face   If you can see a white film on your skin after you apply the medication, there is too much medicine in that area and you need to apply a thinner coat and make sure it is spread evenly on your face  If your skin gets too dry, you can apply a light (non-comedogenic) moisturizer on top of your medicine or you may switch to using the medicine every other day instead of every day  If your skin is still too irritated, you may need to switch to a milder medication  If your skin is red and very itchy, you may be allergic to the medication and you should stop using it  COMMON POSSIBLE SIDE EFFECTS OF MEDICATIONS     Retinoids - dryness, redness, increased sun sensitivity   Benzoyl peroxide - drying, redness, bleaching of clothes, towels and sheets, allergy   Doxycycline - headaches; dizziness; irritation of the throat; nail changes; discoloration of teeth   Sun sensitivity - even if you have dark skin, this medicine can make you burn more easily  Make sure you protect yourself from the sun, either by avoiding being outside between 11 AM and 3 PM, wearing and reapplying sunscreen/sunblock, or wearing sun protective clothing   Nausea/vomiting - if you experience nausea with this medication, take it with food  WHEN AND WHERE TO CALL WITH CONCERNS  We are here to help! If you experience any unusual symptoms, then stop taking or using the medication and call our office at [phone]  It is better to be safe than to be sorry!                 Scribe Attestation    I,:  Allegra Harris am acting as a scribe while in the presence of the attending physician :       I,:  Kristyn Brooks MD personally performed the services described in this documentation    as scribed in my presence :           Doug Pearce  PGY2 Dermatology Resident

## 2022-08-18 NOTE — PATIENT INSTRUCTIONS
ACNE VULGARIS ("COMMON ACNE")    Assessment and Plan: We reviewed the causes of acne, the kinds of acne, and the expected clinical course  We discussed treatment options ranging from over-the-counter products, topical retinoids, antibiotics, BP, hormonal therapies (OCPs/spironolactone), and isotretinoin (Accutane)  We reviewed specific over-the-counter interventions and medications  Recommended typical hygiene measures including water-based facial products, washing regularly with mild cleanser, and refraining from picking and popping any pimples  Recommended non-comedogenic sunscreen use daily  Expectations of therapy discussed  Side effects, risks and benefits of medications discussed  A comprehensive handout on Acne was provided  The phone number to call in case of questions or concerns (and instructions to stop medications in such a scenario) was provided  After lengthy discussion of etiology and treatment options, we decided to implement the following personalized treatment plan:    Based on a thorough discussion of this condition and the management approach to it (including a comprehensive discussion of the known risks, side effects and potential benefits of treatment), the patient (family) agrees to implement the following specific plan:    --------------------------------------------------------------------------------------  YOUR PERSONALIZED ACNE ACTION PLAN    MORNING ROUTINE    ANTIBIOTICS:    Doxycycline Take 1 tablet with a full glass of water and food     EVENING ROUTINE      ANTIBIOTICS:    Doxycycline Take 1 tablet with a full glass of water and food     TOPICAL RETINOID:  At 1 hour before bedtime (after washing your face and allowing the skin to completely dry), spread only a single pea-sized amount of this medication evenly over your entire face (avoiding your eyes or mouth): Apply Metronidazole 0 75% gel     REMEMBER:  Always take your acne pills with lots of water!   A pill stuck in your throat can cause significant burning and irritation  Drink a full glass of water to ensure the pill gets into your stomach  Avoid popping a pill right before bed, and stay upright for at least 1 hour after taking a pill  ACNE:  WHAT ZIT ALL ABOUT? WHY DO I HAVE ACNE/PIMPLES? Your skin is made of layers  To keep the skin from becoming dry and cracked, the skin needs oil  The oil is made in little wells in the deeper layers in the skin  People with acne have glands that make more oil and are more easily plugged, causing the glands to swell  Hormones, bacteria and your inherited tendency to have acne all play a role  The medical term for pimples is acne or acne vulgaris (vulgaris means common)  Most people get some acne  Acne does not come from being dirty  Instead, it is an expected consequence of changes that occur during normal growth and development  Hormones, bacteria, and your family's tendency to have acne may all play a role  Whiteheads or blackheads are openings of the glands (glands are the oil factories) onto the surface of the skin  Blackheads are not caused by dirt blocking the pores; instead, they result from the oxidation reaction of oil and skin in the pores with the air (like a rust reaction)  WHAT ABOUT STRESS? Stress does not cause acne but it can make it worse  Make sure you get enough sleep and daily exercise! WHAT ABOUT FOODS/DIET? Try to eat a balanced, healthy diet  Some people feel that certain foods worsen their acne  While there aren't many studies available on this question, severe dietary changes are unlikely to help your acne and may be harmful to the health of your skin  If you find that a certain food seems to aggravate your acne, you may consider avoiding that food  Discuss this with your physician! WHAT CAUSES MY ACNE?   There are four contributors to acne--the body's natural oil (sebum), clogged pores, bacteria (with the scientific name Propionibacterium acnes, or P  acnes, for short), and the body's reaction to the bacteria living in the clogged pores (which causes inflammation)  Here's what happens:    Sebum is produced in the normal oil-making glands in the deeper layers of the skin and reaches the surface through the skin's pores  An increase in certain hormones occurs around the time of puberty, and these hormones trigger the oil glands to produce increased amounts of sebum  Pores with excess oil tend to become clogged more easily  At the same time, P  acnes--one of the many types of bacteria that normally live on everyone's skin--thrives in the excess oil and causes a skin reaction (inflammation)  If a pore is clogged close to the surface, there is little inflammation  However, this results in the formation of whiteheads (closed comedones) or blackheads (open comedones) at the surface of the skin  A plug that extends to, or forms a little deeper in the pore, or one that enlarges or ruptures may cause more inflammation  The result is red bumps (papules) and pus-filled pimples (pustules)  If plugging happens in the deepest skin layer, the inflammation may be even more severe, resulting in the formation of nodules or cysts  When these types of acne heal, they may leave behind discolored areas or true scars  SKIN HYGIENE:  HOW SHOULD I [address] MY SKIN? Acne does not come from being dirty, however, washing your face is part of taking good care of your skin and will help keep your face clear  Good skin hygiene is, therefore, critical to support any acne treatment plan  Here are several specific suggestions for practicing good skin hygiene and keeping your skin looking its best:    You should wash acne-prone skin TWICE A DAY: Once in the morning and once in the evening  This does include any showers you take that day, so do not overdo it!   Do not scrub the skin with a washcloth or loofah as these can irritate and inflame your acne  Acne does not come from dirt, so it is not necessary to scrub the skin clean  In fact, scrubbing may lead to dryness and irritation that makes the acne even worse and harder for patients to tolerate acne medications  Use a gentle facial moisturizing cleanser (Cetaphil Moisturizing Cleanser or Dove Fragrance-Free bar)  Avoid using soaps like Justin Temple, Jesus [address], [address], or soft/liquid soaps as these products will dry your skin  Do not use any over-the-counter acne washes without your doctor's specific instruction to do so  These products often contain salicylic acid or benzoyl peroxide  These ingredients can be helpful in clearing oil from the skin and reducing bacteria, but they may also be drying and can add to irritation  Do not use exfoliating products with microbeads or brushes as these can cause irritation to the skin  Facials and other treatments to remove, squeeze, or clean out pores are not recommended  Manipulating the skin in this way can make acne worse and can lead to severe infections and/or scarring  It also increases the likelihood that the skin will not be able to tolerate acne medications  Try not to pop pimples or pick at your acne as this can delay healing and may result in scarring or skin color changes (dark spots) that are often more noticeable than the acne itself  Picking/popping acne can also cause a serious skin infection  Wash or change your pillow case once to twice a week, especially if you use products in your hair  Wash the skin as soon as possible after playing sports or other activities that cause a lot of sweating  Also, pay attention to how your sports equipment (shoulder pads, helmet strap, etc ) might be making your acne worse  When you use makeup, moisturizer, or sunscreen make sure that these products are labeled non-comedogenic, or won't clog pores, or won't cause acne         SHOULD I TREAT MY ACNE?     There are a number of other skin conditions that can look like acne  If there is any question about the diagnosis, then the person should be evaluated by a board certified pediatric and adolescent dermatologist   A physician should examine any child with acne who is between the ages of 3and 9years of age, as acne in this mid-childhood age group is not normal and may signal an underlying problem  If a preadolescent (9to 6years of age) or adolescent (15to 25years of age) has mild acne and the condition is not bothersome to the individual, proper and regular skin care (what your doctor may call skin hygiene) may be all that is needed at this point  Many people do, however, need specific acne medications to help their skin look and feel its best  Your doctor will tell you if you are one of these people  If so, you may be advised to use an over-the-counter or prescription medication that is applied to the skin (a topical medication) or if the addition of an oral medication (a medication taken by [employer]) is needed  The good news is that the medications work well when used properly! Some specific factors that may influence the choice of acne therapy include:    Severity  The number and type of skin lesions (papules or comedones) and the degree of inflammation (mild, moderate or severe)  Scarring  Scarring is most common when acne is severe, but it can happen even in children with mild acne  Impact  If a child is experiencing emotional complications because of the acne or is experiencing negative comments from other children  Cost of the acne medications  An acne expert can help to keep out of pocket costs to a minimum by utilizing the correct medications and the least expensive options  The patient's skin type (oily versus dry or combination skin, for example)  Potential side effects of the medication  The ease or overall complexity of the treatment plan or medication      WHAT ACNE TREATMENTS ARE AVAILABLE? Medications for acne try to stop the formation of new pimples by reducing or removing the oil, bacteria, and other things (like dead skin cells) that clog the pores  They can also decrease the inflammation or irritation response of the skin to bacteria  It may take from 6 to 8 weeks (about 2 months!) before you see any improvement and know if the medication is effective  It takes the layers of skin this long to regenerate  Remember, these medications do not cure the condition--the acne improves because of the medication  Therefore, treatment must be continued in order to prevent the return of acne lesions  There are many types of acne treatments  Some are applied to the skin (topical medications) and some are taken by mouth (oral medications)  In most cases of mild acne, the doctor will start with a topical medication  There are many different topical medications that are helpful for acne  If acne is more severe and it does not respond adequately to a topical medication, or if it covers large body surface areas such as the back and/or chest, oral antibiotics such as Doxycycline or Minocycline and/or oral hormone therapy such as Oral Contraceptive Pills or Spironolactone may be prescribed  In the most severe cases, isotretinoin (Accutane) may be used  In general, it is usually best to start with acne medications that are least likely to cause side effects but are at the same time capable of addressing the specific causes for the acne  Some patients have a good result with just one medication, but many will need to use a combination of treatments: two or more different topical agents or an oral medication plus a topical medication  Another treatment used for acne may include corticosteroid injections, which are used to help relieve pain, decrease the size, and encourage the healing of large, inflamed acne nodules   Also, dermatologists sometimes perform acne surgery, using a fine needle, a pointed blade, or an instrument known as a comedone extractor to mechanically clean out clogged pores  One must always weigh the risk for inducing a scar with the potential benefits of any procedure  Prior treatment with topical retinoids can loosen whiteheads and blackheads and make it easier to physically remove such lesions  Heat-based devices, and light and laser therapy are being studied to see whether there is any role for such treatments in mild to moderate acne  At this time, there is not enough evidence to make general recommendations about their use  TOPICAL ACNE MEDICATIONS    WHAT KIND OF TOPICALS ARE THERE? Benzoyl peroxide (BP) helps to fight inflammation and is anti-microbial (kills bacteria, viruses, and other microorganisms) and is believed to help prevent resistance of bacteria to topical antibiotics  A benzoyl peroxide wash may be recommended for use on large areas such as the chest and/or back  Mild irritation and dryness are common when first using benzoyl peroxide-containing products  Be careful because benzoyl peroxide can bleach towels and clothing! Retinoids (such as adapalene, tretinoin, or tazarotene) unplug the oil glands by helping peel away the layers of skin and other things plugging the opening of the glands  Mild irritation and dryness are common when first using these products  Facial waxing and other skin procedures can lead to excessive irritation and should be avoided during retinoid therapy  Antibiotics fight bacteria and help decrease inflammation  Topical antibiotics commonly used in acne include clindamycin, erythromycin, and combination agents (such as clindamycin/benzoyl peroxide or erythromycin/benzoyl peroxide)  Mild irritation and dryness are common when first using these products  Typically, topical antibiotics should not be used alone as treatment for acne  Other topical agents include salicylic acid, azelaic acid, dapsone, and sulfacetamide  Mild irritation and dryness can also occur when first using these products  USING YOUR TOPICAL TREATMENTS LIKE A PRO  Apply topical medications only to clean, dry skin  Topical medications may lead to significant dryness of the affected areas  To minimize this, wait 15-20 minutes after washing before applying your topical medication  These medications work deep in the skin to prevent new breakouts  Spot treatment of individual pimples does not do much  When applying topical medications to the face, use the 5-dot method  Start by placing a small pea-sized amount of the medication on your finger  Then, place dots in each of five locations of your face: Mid-forehead, each cheek, nose, and chin  Next, rub the medication into the entire area of skin - not just on individual pimples! Try to avoid the delicate skin around your eyes and corners of your mouth  The medications are not magic! They take weeks if not months to work  Be patient and use your medicine on a daily basis or as directed for six weeks before asking if your skin looks better  Try not to miss more than one or two days each week when using your medications  If you are starting a new medication, then try using it every other night or even every third night   Gradually work up to [employer] a day    This will give your skin time to adjust   The same medications often come in various forms or formulations: Creams, ointments, lotions, gels, microspheres, or foams  Use the formulation that has been recommended and don't switch to other forms unless instructed  Some forms (such as alcohol based gels) may be more drying and less tolerable for certain skin types  Sometimes individual medications are not as effective as a combination of two or more agents  The doctor may need to try several medications or combinations before finding the one that is best for that patient    Moisturizer, sunscreen, and make-up may be used in conjunction with topical acne medications  In general, acne medications are applied first so they may directly contact the skin  Ask your physician to review specific application instructions! It is especially important to always use sunscreen when using a topical retinoid or oral antibiotic  These drugs can make your skin more sensitive to the sun  In general, sunscreen gets applied AFTER any acne medications  Don't stop using your acne medications just because your acne got better  Remember, the acne is better because of the medication, and prevention is the valencia to treatment  ORAL ACNE MEDICATIONS    ORAL ANTIBIOTICS  Antibiotics include tetracycline-class medicines (which include the most commonly used oral antibiotics for acne, minocycline, and doxycycline), erythromycin, trimethoprim-sulfamethoxazole, and occasionally cephalexin or azithromycin  These drugs may decrease bacteria and inflammation, and they are most effective for moderate-to-severe inflammatory acne  A product containing benzoyl peroxide should be used along with these antibiotics to help decrease the possibility of microbial resistance  Always take your acne pills with lots of water! A pill stuck in your throat can cause significant burning and irritation  Drink a full glass of water to ensure the pill gets into your stomach  Avoid popping a pill right before bed, and stay upright for at least 1 hour after taking a pill  DOXYCYCLINE   This medication is usually taken ONCE or TWICE per day, as instructed by your physician  NOTE: Always take this medication with lots of water! A pill stuck in the throat can cause significant burning and irritation  Avoid popping a pill right before bed & stay upright for at least one hour after taking a pill  WARNING: Doxycycline increases your sensitivity to the sun, so practice excellent sun protection!  If you notice any of the following, stop using the medication and notify your health care provider: headaches; blurred vision; dizziness; sun sensitivity; heartburn-stomach pain; irritation of the esophagus; darkening of scars, gums, or teeth (more often with minocycline); nail changes; yellowing of the eyes or skin (indicating possible liver disease); joint pains-and flu-like symptoms  Taking oral antibiotics with food may help with symptoms of upset stomach  COMMON SIDE EFFECTS: Headaches; dizziness; sun sensitivity; irritation of the throat; discoloration of scars, gums, or teeth; nail changes  HAVING PROBLEMS WITH ANY OF YOUR TREATMENTS? You should not be able to see any of the medicines on your face  If you can see a white film on your skin after you apply the medication, there is too much medicine in that area and you need to apply a thinner coat and make sure it is spread evenly on your face  If your skin gets too dry, you can apply a light (non-comedogenic) moisturizer on top of your medicine or you may switch to using the medicine every other day instead of every day  If your skin is still too irritated, you may need to switch to a milder medication  If your skin is red and very itchy, you may be allergic to the medication and you should stop using it  COMMON POSSIBLE SIDE EFFECTS OF MEDICATIONS    Retinoids - dryness, redness, increased sun sensitivity  Benzoyl peroxide - drying, redness, bleaching of clothes, towels and sheets, allergy  Doxycycline - headaches; dizziness; irritation of the throat; nail changes; discoloration of teeth  Sun sensitivity - even if you have dark skin, this medicine can make you burn more easily  Make sure you protect yourself from the sun, either by avoiding being outside between 11 AM and 3 PM, wearing and reapplying sunscreen/sunblock, or wearing sun protective clothing  Nausea/vomiting - if you experience nausea with this medication, take it with food  WHEN AND WHERE TO CALL WITH CONCERNS  We are here to help!   If you experience any unusual symptoms, then stop taking or using the medication and call our office at [phone])  It is better to be safe than to be sorry!

## 2022-08-19 ENCOUNTER — TELEPHONE (OUTPATIENT)
Dept: FAMILY MEDICINE CLINIC | Facility: CLINIC | Age: 42
End: 2022-08-19

## 2022-08-19 NOTE — TELEPHONE ENCOUNTER
PCP SIGNATURE NEEDED FOR [facility] RECEIVED VIA FAX AND PLACED IN PCP FOLDER TO BE DELIVERED AT ASSIGNED TIMES

## 2022-08-27 DIAGNOSIS — I10 HTN (HYPERTENSION), BENIGN: ICD-10-CM

## 2022-08-29 RX ORDER — DOXAZOSIN 2 MG/1
TABLET ORAL
Qty: 90 TABLET | Refills: 0 | Status: SHIPPED | OUTPATIENT
Start: 2022-08-29

## 2022-08-30 ENCOUNTER — TELEPHONE (OUTPATIENT)
Dept: FAMILY MEDICINE CLINIC | Facility: CLINIC | Age: 42
End: 2022-08-30

## 2022-08-31 DIAGNOSIS — K58.9 IRRITABLE BOWEL SYNDROME, UNSPECIFIED TYPE: ICD-10-CM

## 2022-09-01 ENCOUNTER — TRANSITIONAL CARE MANAGEMENT (OUTPATIENT)
Dept: FAMILY MEDICINE CLINIC | Facility: CLINIC | Age: 42
End: 2022-09-01

## 2022-09-01 ENCOUNTER — OFFICE VISIT (OUTPATIENT)
Dept: WOUND CARE | Facility: CLINIC | Age: 42
End: 2022-09-01
Payer: MEDICARE

## 2022-09-01 ENCOUNTER — TELEPHONE (OUTPATIENT)
Dept: FAMILY MEDICINE CLINIC | Facility: CLINIC | Age: 42
End: 2022-09-01

## 2022-09-01 VITALS
HEART RATE: 84 BPM | TEMPERATURE: 96.5 F | SYSTOLIC BLOOD PRESSURE: 92 MMHG | DIASTOLIC BLOOD PRESSURE: 60 MMHG | RESPIRATION RATE: 18 BRPM

## 2022-09-01 DIAGNOSIS — L89.324 PRESSURE INJURY OF LEFT ISCHIUM, STAGE 4 (HCC): ICD-10-CM

## 2022-09-01 DIAGNOSIS — L89.42: ICD-10-CM

## 2022-09-01 DIAGNOSIS — L89.224 PRESSURE ULCER OF LEFT HIP, STAGE 4 (HCC): ICD-10-CM

## 2022-09-01 DIAGNOSIS — E88.09 HYPOALBUMINEMIA: ICD-10-CM

## 2022-09-01 DIAGNOSIS — G82.20 PARAPLEGIA (HCC): ICD-10-CM

## 2022-09-01 DIAGNOSIS — L89.894 PRESSURE ULCER OF OTHER SITE, STAGE 4 (HCC): ICD-10-CM

## 2022-09-01 DIAGNOSIS — L89.154 PRESSURE INJURY OF SACRAL REGION, STAGE 4 (HCC): Primary | ICD-10-CM

## 2022-09-01 DIAGNOSIS — L89.893 PRESSURE INJURY OF OTHER SITE, STAGE 3 (HCC): ICD-10-CM

## 2022-09-01 PROCEDURE — 97597 DBRDMT OPN WND 1ST 20 CM/<: CPT | Performed by: FAMILY MEDICINE

## 2022-09-01 PROCEDURE — 99215 OFFICE O/P EST HI 40 MIN: CPT | Performed by: FAMILY MEDICINE

## 2022-09-01 PROCEDURE — 99213 OFFICE O/P EST LOW 20 MIN: CPT | Performed by: FAMILY MEDICINE

## 2022-09-01 PROCEDURE — 97598 DBRDMT OPN WND ADDL 20CM/<: CPT | Performed by: FAMILY MEDICINE

## 2022-09-01 RX ORDER — DICYCLOMINE HYDROCHLORIDE 10 MG/1
CAPSULE ORAL
Qty: 270 CAPSULE | Refills: 1 | Status: SHIPPED | OUTPATIENT
Start: 2022-09-01

## 2022-09-01 NOTE — PROGRESS NOTES
Patient ID: Rsoaliostephon Portillo  is a 43 y o  male Date of Birth 1980       Chief Complaint   Patient presents with    Follow Up Wound Care Visit     Pressure injury of sacral region, stage 4 ([facility])       Allergies:  Chlorcyclizine, Chlorhexidine, Ciprofloxacin hcl, Cymbalta [duloxetine hcl], Dm-doxylamine-acetaminophen, Gabapentin, Hydrocortisone, Lac bovis, Lactose - food allergy, Lyrica [pregabalin], Penicillins, Polymyxin b, Promethazine-phenyleph-codeine, Quinidine, Cefepime, and Rosuvastatin    Diagnosis:      Diagnosis ICD-10-CM Associated Orders   1  Pressure injury of sacral region, stage 4 ([facility])  L89 154 Wound cleansing and dressings   2  Pressure injury of left ischium, stage 4 ([facility])  L89 324 Wound cleansing and dressings   3  Pressure ulcer of left hip, stage 4 ([facility])  L89 224 Wound cleansing and dressings   4  Pressure injury of contiguous region involving right buttock and hip, stage 2 ([facility])  L89 42 Wound cleansing and dressings   5  Pressure ulcer of other site, stage 4 ([facility])  L89 894 Wound cleansing and dressings   6  Pressure injury of other site, stage 3 ([facility])  L89 893 Wound cleansing and dressings   7  Hypoalbuminemia  E88 09    8  Paraplegia ([facility])  G82 20            Assessment & Plan:   New stage III pressure injuries of the posterior scrotum  o Selectively debrided  o Mupirocin ointment daily   Multiple pressure injuries of the sacrum, bilateral ischium, left hip and buttocks  The right buttock ulcer is closed  o All of the open ulcers have been selectively debrided of fibrin   o Packing today with Prophase soaked gauze  o Continue with saline wet to dry   His Clinitron bed was taken away for unknown reason at this time  He is now on an alternating air mattress  Subjective:   10/22/20:  Followup multiple pressure injuries to the buttocks and sacrum  He was hospitalized for back problems recently  Continues on hemodialysis    The patient states that his albumin has improved so that he may be able to get flap surgery  However when checking his most recent albumin was only 1 8  His total protein is elevated  11/19/20:  Followup multiple stage IV pressure or injuries to the buttocks and sacrum  Continues on hemodialysis  He is scheduled for plastic surgery for cyst on his forehead  Plastic surgery still will not do any flaps to his sacrum or buttocks because his albumin remains low  He has no other complaints  No fever, chills or cough  12/31/20: Followup multiple stage IV pressure injuries of the buttocks and sacrum  She notes some increased drainage and slight more odor  Never had his plastic surgery appointment for the cyst on his forehead  Denies any fever or chills  1/28/21:  Followup multiple stage IV pressure injuries of the buttocks, sacrum and perineum  Unfortunately, the patient was hospitalized for sepsis and back pain  Was found to have a fracture L3  He was given a back brace but only wears at home  He states that really does not give him very much relief  Unfortunately also while in the hospital, he developed a new pressure injury of his left hip  2/25/21:  Followup multiple stage IV pressure ulcers of the buttocks sacrum and perineum  Patient was hospitalized earlier this month for possible sepsis  He was found to have a burst fracture or osteo of L3  IR obtained specimen and was culture negative  However, no bone was obtained  He is scheduled to have another IR intervention March 16th for bone biopsy  Left hip ulcer broke down with large cavity  Otherwise, cultures were negative  He was discharged on no medications  He has not had any recent fever or chills  He did have fever when he was admitted to the hospital     03/21/2021  Mr Celia Payan is a 45-year-old gentleman with paraplegia and multiple stage IV pressure ulcers was referred for evaluation    He has large chronic ulcers on his buttocks and sacrum but he developed a new ulcer over last month on his left hip  He is scheduled for IR biopsy in his spine for possible chronic osteo  He also recently underwent dental extractions and cyst removed from his head and neck  4/22/21:  Followup multiple stage IV pressure injuries of the buttocks, sacrum, perineum and left hip  He was last seen by Dr Geeta Herzog in March  Since then, unfortunately he was admitted to the hospital with pneumonia  He is now doing better  He is changing his dressings daily because of drainage  There has been no increased drainage, no odor and he denies any fever or chills  He has no cough  5/27/21:  Patient returns regarding his his multiple stage IV pressure injuries of the buttocks, sacrum, perineum and left hip  Unfortunately, he was hospitalized for a few days in April regarding FUO with negative cultures  He was "treated empirically with vancomycin and cefepime - improved with no positive cultures and CT scan finding consistent with known decubitus ulcerations and chronic pelvic osteomyelitis "  He is back to baseline  Patient states that he does notice more odor  7/8/21: Followup multiple stage IV pressure injuries of the buttocks, sacrum, perineum and left hip  He continues with Dakin's packing  He had multiple excisions on his face of follicular cysts by plastic surgery  He still cannot have surgery of his pressure injuries due to chronically low albumin  He has not yet contacted his PCP for nutritionist consultation  8/19/21: Followup multiple stage IV pressure injuries of the buttocks, sacrum, perineum and left  He was packing with Dakin's  Nothing else new  He still has not had a nutrition is consultation  9/30/21: Followup multiple stage IV pressure injuries of the buttocks, sacrum, perineum and left hip  He continues with cleansing Dakin's soaks followed by wet-to-dry saline packing  Nothing is new  No pain  No fever or chills  11/11/21:   Followup multiple stage IV pressure injuries of the buttock, sacrum, left hip perineum  Recent hospitalization for sepsis  Possible pneumonia  His ulcers have not changed  He is using Dakin's packing again  No other new complaints  States that he is spending is nights in his Clinitron and only spending up to maximum 3 hours out in his chair and then going back to the Clinitron during the day  12/30/21: Followup multiple stage IV pressure injuries of the buttocks, sacrum, left hip and perineum  He was hospitalized again for three days any states that his ulcers deteriorated slightly because he claims that the dressings were not changed  No fever or chills currently  Still using his Clinitron bed     2/10/22:  Patient returns regarding his multiple stage IV pressure injuries of the buttocks, sacrum, left hip and perineum  Unfortunately, he was hospitalized twice since his last visit  The 1st time he was septic with unknown source  The 2nd hospitalization was due to bleeding from newly placed dialysis catheter  No specific complaints at this time  They have been using saline packing  Still in his Clinitron bed     3/24/22: Followup multiple stage IV pressure injuries of the buttocks, sacrum, left hip in perineum  No new events since last visit  He is using saline packing  Clinitron bed  No fever or chills  5/5/22: Followup multiple stage IV pressure injuries  Patient unfortunately was hospitalized with viral pneumonia at the end of March  He feels better  No coughing  He states that he is eating better  However, his albumin was still 1 8  Total protein was normal   Using saline packing      06/30/22: 38 y/o M with PMHx of spinal paraplegia, neurogenic bladder with chronic suprapubic catheter in place, ESRD on dialysis M/W/F, anemia of CKD, secondary hyperparathyroidism, type I DM, colostomy bag in place,tricupsid valve endocarditis, chronic osteomyelitis, chronic pressure injuries, afib, and hx of DVT on Eliquis presenting for f/u of multiple stage 4 pressure injuries of the sacrum, left hip, L ischium, and perineum  Pt was recently hospitalized on 06/17/22 for C  Difficile  Has been using wet to dry dressings  States he has been attempting to increase his protein intake and has gained some weight but his albumin remains low and is confused why that is  9/1/22: Followup multiple stage IV pressure injuries of the sacrum and ischial tuberosities  Patient was hospitalized recently for sepsis and was told it was due to his pressure injuries  However, they do not look any worse than they ever have been  He was discharged on oral medications  He continues using wet to dry dressings  Prior to hospitalization he developed two new pressure injuries of his posterior scrotum  He continues to supposedly eat well with increased protein                The following portions of the patient's history were reviewed and updated as appropriate:   Patient Active Problem List   Diagnosis    Paraplegia ([facility] )    [facility]    [facility]rapubic catheter [facility]    [facility]    OAB (overactive bladder)    S/P unilateral BKA (below knee amputation) ([facility] )    Sebaceous cyst    Tobacco abuse    Type 1 diabetes mellitus with chronic kidney disease on chronic dialysis ([facility] )    Ulcer of sacral region, stage 4 ([facility] )    Anemia    Chronic indwelling Ortega catheter    Wound healing, delayed    Iron deficiency anemia    Pressure injury of left ischium, stage 4 ([facility]    HTN (hypertension), benign    Neurogenic bladder    GERD (gastroesophageal reflux disease)    Chronic pain    Stage 5 chronic kidney disease on chronic dialysis (HCC)    SOB (shortness of breath)    Penile abscess    Asymptomatic bacteriuria    History of Clostridium difficile infection    Urinary retention    Delirium    Dialysis patient ([facility]    Insulin long-term use ([facility]    Wheelchair dependent    Recurrent Clostridioides difficile diarrhea    Bipolar depression [facility]    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HCC)    Seizure ([facility] )    Pressure ulcer of sacral region, stage 4 (HCC)    AVM (arteriovenous malformation) of duodenum, acquired    Chronic narcotic dependence (HCC)    Chronic diastolic heart failure (HCC)    Dental caries    Nervous    Pressure ulcer of other site, stage 4 (HCC)    Pressure ulcer of left hip, stage 4 (HCC)    Localized swelling of left foot    ED (erectile dysfunction) of organic origin    Irritable bowel syndrome    Onychomycosis    Pustular folliculitis    Prolonged Q-T interval on ECG    Secondary hyperparathyroidism of renal origin ([facility] )    Abdominal pain    Pneumonia    Wounds, multiple    Immunocompromised state ([facility] )    Severe protein-calorie malnutrition (HCC)    Chronic osteomyelitis ([facility]    Acute pulmonary edema (HCC)    Acute deep vein thrombosis (DVT) of right upper extremity ([facility]    C  difficile colitis     Past Medical History:   Diagnosis Date    Ambulatory dysfunction     Anemia     Anemia, iron deficiency     transfusion requiring    Atrial fibrillation (HCC)     AVM (arteriovenous malformation) of duodenum, 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    Bacteriuria, asymptomatic     Bipolar disorder (HCC)     Chronic deep vein thrombosis (DVT) ([facility]     Chronic indwelling Ortega catheter     Chronic kidney disease     Chronic pain     Chronic pain disorder     Chronic suprapubic catheter ([facility]     Clostridium difficile infection 08/11/2016    also positive 9/2016, 5/29/2017, 8/15/2017   S/P fecal transplant    [facility]     Decubitus ulcer     Delirium     Diabetes mellitus ([facility]     GERD (gastroesophageal reflux disease)     Hemodialysis patient ([facility] )     Hypertension     Memory impairment 2011    s/p diabetic coma    Neurogenic bladder     OAB (overactive bladder)     Paraplegia (HCC)     T3 transverse myelitis vs spinal stoke (AVM); 2012 extensive epidural abscess C7=> conus 2nd extension from deep parspinal abscess L4-S2/sacral decubitus; cord atrophy/myelomalcia T3=>conus     Penile abscess     S/P unilateral BKA (below knee amputation) ([facility])     Right    Sebaceous cyst removed in 2017    Seizures ([facility])     Shortness of breath     Tobacco abuse     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     Urinary retention     Wheelchair dependent     Wounds, multiple     pressure ulcers with delayed healing     Past Surgical History:   Procedure Laterality Date    BELOW KNEE LEG AMPUTATION Right 2009    COLONOSCOPY N/A 03/27/2017    Procedure: COLONOSCOPY;  Surgeon: Desmond Thao MD;  [facility]: [facility]; Service:     COLONOSCOPY N/A 03/29/2017    Procedure: COLONOSCOPY;  Surgeon: Desmond Thao MD;  [facility]; Service:     COLONOSCOPY N/A 06/15/2017    Procedure: COLONOSCOPY with FMT;  Surgeon: Kurt Pearce MD;  [facility]: [facility]; Service: Gastroenterology    ESOPHAGOGASTRODUODENOSCOPY N/A 03/22/2017    Procedure: ESOPHAGOGASTRODUODENOSCOPY (EGD); Surgeon: María Joshi DO;  Location: [facility];   Service:     MULTIPLE TOOTH EXTRACTIONS N/A 03/08/2021    Procedure: EXTRACTION TEETH # 2,3,6,10,12,13,14,18,19,20,21,22,23,24,25,26,27,28,29,30;  Surgeon: Jimenez Machado DMD;  [facility]: [facility];  Service: Maxillofacial    SKIN BIOPSY       Family History   Problem Relation Age of Onset    Hyperlipidemia Mother     Hypertension Mother     Leukemia Brother     Diabetes Paternal Grandfather       Social History     Socioeconomic History    Marital status: Single     Spouse name: Not on file    Number of children: Not on file    Years of education: Not on file    Highest education level: Not on file   Occupational History    Not on file   Tobacco Use    Smoking status: Current Every Day Smoker     Years: 28 00     Types: Cigars    Smokeless tobacco: Never Used    Tobacco comment: about 3 cigars/day   Vaping Use    Vaping Use: Never used   Substance and Sexual Activity    Alcohol use: Not Currently     Comment: 1 cup of wine every 3-4 months    Drug use: Not Currently     Types: Marijuana     Comment: rarely; once every 1-2 years    Sexual activity: Not on file   Other Topics Concern    Not on file   Social History Narrative    Not on file     Social Determinants of Health     Financial Resource Strain: Low Risk     Difficulty of Paying Living Expenses: Not hard at all   Food Insecurity: No Food Insecurity    Worried About Running Out of Food in the Last Year: Never true    Jose of Food in the Last Year: Never true   Transportation Needs: No Transportation Needs    Lack of Transportation (Medical): No    Lack of Transportation (Non-Medical):  No   Physical Activity: Not on file   Stress: Not on file   Social Connections: Not on file   Intimate Partner Violence: Not on file   Housing Stability: Not on file        Current Outpatient Medications:     Alcohol Swabs (ALCOHOL PADS) 70 % PADS, by Does not apply route 5 (five) times a day, Disp: 300 each, Rfl: 5    amLODIPine (NORVASC) 10 mg tablet, Take 1 tablet (10 mg total) by mouth daily, Disp: 90 tablet, Rfl: 1    ammonium lactate (LAC-HYDRIN) 12 % cream, , Disp: , Rfl:     apixaban (ELIQUIS) 5 mg, Take 1 tablet (5 mg total) by mouth 2 (two) times a day, Disp: 270 tablet, Rfl: 1    ascorbic acid (VITAMIN C) 250 mg tablet, TAKE 2 TABLETS (500 MG TOTAL) BY MOUTH DAILY, Disp: 180 tablet, Rfl: 1    atorvastatin (LIPITOR) 20 mg tablet, Take 1 tablet (20 mg total) by mouth daily at bedtime, Disp: 90 tablet, Rfl: 1    B Complex-C-Folic Acid (Super B-Complex/Vit C/FA) TABS, TAKE 1 TABLET BY MOUTH EVERY DAY AS DIRECTED, Disp: 90 tablet, Rfl: 3    SUSAN MICROLET LANCETS lancets, Use as instructed to check blood sugar 4 times a day Dx e10 29, Disp: 200 each, Rfl: 5    bisacodyl (DULCOLAX) 5 mg EC tablet, Take 1 tablet (5 mg total) by mouth once for 1 dose, Disp: 2 tablet, Rfl: 0   Blood Glucose Monitoring Suppl (Planandoo CONTOUR NEXT USB MONITOR) w/Device KIT, Testing 4 times a day, Disp: 1 kit, Rfl: 0    calcitriol (ROCALTROL) 0 5 MCG capsule, Take 2 mcg by mouth, Disp: , Rfl:     Capsaicin 0 033 % CREA, 5 times a day for 1st week  Then 3 times a day for next 3 weeks, Disp: 56 6 g, Rfl: 2    carvedilol (COREG) 25 mg tablet, Take 1 tablet (25 mg total) by mouth 2 (two) times a day, Disp: 180 tablet, Rfl: 1    cetirizine (ZyrTEC) 10 mg tablet, Take 1 tablet (10 mg total) by mouth daily, Disp: 90 tablet, Rfl: 1    Cholecalciferol (VITAMIN D-3) 1000 units CAPS, Take 2 capsules by mouth daily, Disp: 30 capsule, Rfl: 0    clindamycin (CLINDAGEL) 1 % gel, APPLY TO AFFECTED AREA TWICE A DAY, Disp: 60 g, Rfl: 2    [employer] ACETAMINOPHEN EX  MG tablet, TAKE 2 TABLETS BY MOUTH EVERY 8 HOURS AS NEEDED FOR MILD OR MODERATE PAIN (PAIN SCORE 1 6)  , Disp: , Rfl:     cyanocobalamin ([employer] Vitamin B-12) 1000 MCG tablet, Take 1 tablet (1,000 mcg total) by mouth daily, Disp: 30 tablet, Rfl: 5    cyclobenzaprine (FLEXERIL) 10 mg tablet, Take 10 mg by mouth 3 (three) times a day as needed, Disp: , Rfl: 0    cyclobenzaprine (FLEXERIL) 5 mg tablet, , Disp: , Rfl:     dexlansoprazole (DEXILANT) 30 MG capsule, Take 1 capsule (30 mg total) by mouth daily, Disp: 60 capsule, Rfl: 2    dicyclomine (BENTYL) 10 mg capsule, TAKE 1 CAPSULE BY MOUTH 3 TIMES A DAY BEFORE MEALS, Disp: 270 capsule, Rfl: 1    Disposable Gloves MISC, Use 7 times a day, 4 boxes of gloves XL Dx type 1 DM, paraplegia, Disp: 4 each, Rfl: 11    doxazosin (CARDURA) 2 mg tablet, TAKE 1 TABLET BY MOUTH EVERY DAY IN THE EVENING, Disp: 90 tablet, Rfl: 0    doxycycline monohydrate (MONODOX) 100 mg capsule, Take 1 capsule (100 mg total) by mouth 2 (two) times a day Take with full glass of water and food, Disp: 60 capsule, Rfl: 2    epoetin kaushik (EPOGEN,PROCRIT) 20,000 units/mL, Inject 10,000 Units under the skin, Disp: , Rfl:     epoetin kaushik (EPOGEN,PROCRIT) 20,000 units/mL, Inject 5,000 Units under the skin, Disp: , Rfl:     ergocalciferol (VITAMIN D2) 50,000 units, TAKE 1 CAPSULE BY MOUTH ONE TIME PER WEEK, Disp: , Rfl:     ferrous sulfate 325 (65 Fe) mg tablet, Take 1 tablet (325 mg total) by mouth 3 (three) times a day, Disp: 90 tablet, Rfl: 3    glucose 4-6 GM-MG, Chew 2 tablets daily as needed for low blood sugar, Disp: 10 tablet, Rfl: 1    glucose blood (SUSAN CONTOUR TEST) test strip, Use as instructed to test blood sugar 4 times a day Dx e10 29, Disp: 200 each, Rfl: 3    guaiFENesin (MUCINEX) 600 mg 12 hr tablet, Take 2 tablets (1,200 mg total) by mouth every 12 (twelve) hours, Disp: 60 tablet, Rfl: 2    Gvoke PFS 1 MG/0 2ML SOSY, INJECT 1 ML (1 MG TOTAL) INTO A MUSCLE ONCE FOR 1 DOSE, Disp: , Rfl:     HYDROmorphone (DILAUDID) 4 mg tablet, TAKE 1 TABLET BY MOUTH EVERY 4 HOURS AS NEEDED FOR MODERATE PAIN (PAIN SCORE 4 6)   MAX  24 MG/DAY, Disp: , Rfl:     hydrOXYzine HCL (ATARAX) 50 mg tablet, TAKE 1 TABLET (50 MG TOTAL) BY MOUTH 2 (TWO) TIMES A DAY AS NEEDED FOR ITCHING OR ANXIETY, Disp: 180 tablet, Rfl: 1    Incontinence Supply Disposable (Incontinence Brief Large) MISC, Use 6 (six) times a day Size xl, Disp: 180 each, Rfl: 11    Incontinence Supply Disposable (Undergarment) MISC, Use 6 a day, 5 boxes, xl Dx R51, paraplegia, Disp: 5 each, Rfl: 11    Incontinence Supply Disposable (Underpads) MISC, Use 2 a day, Disp: 5 each, Rfl: 11    insulin detemir (LEVEMIR) 100 units/mL subcutaneous injection, Inject 6 5 Units under the skin 2 (two) times a day , Disp: , Rfl:     ketoconazole (NIZORAL) 2 % cream, Apply to rash , Disp: 15 g, Rfl: 1    Ketostix strip, USE WITH HYPERGLYCEMIA E10 65 **NOT COVERED**, Disp: , Rfl:     LEVEMIR FLEXTOUCH 100 units/mL injection pen, Inject 14 Units under the skin 2 (two) times a day, Disp: 15 pen, Rfl: 3    levETIRAcetam (KEPPRA) 250 mg tablet, TAKE 1 TABLET (250 MG TOTAL) BY MOUTH 3 (THREE) TIMES A WEEK ([facility]) AT 1200 , Disp: , Rfl:     lidocaine (XYLOCAINE) 5 % ointment, Apply topically as needed for mild pain, Disp: 35 44 g, Rfl: 0    Lidocaine HCl 4 % LIQD, Apply 1 application topically 3 (three) times a day as needed (pain), Disp: 73 mL, Rfl: 5    Lokelma 10 g PACK, , Disp: , Rfl:     losartan (COZAAR) 100 MG tablet, TAKE 1 TABLET BY MOUTH EVERY DAY, Disp: 90 tablet, Rfl: 1    Melatonin ER 3 MG TBCR, Take 6 mg by mouth, Disp: , Rfl:     metroNIDAZOLE (METROGEL) 0 75 % gel, Apply topically 2 (two) times a day, Disp: 45 g, Rfl: 0    Misc  Devices (Wheelchair Cushion) MISC, Use daily, Disp: 1 each, Rfl: 0    Misc   Devices [facility]) MISC, by Does not apply route daily Wheelchair ramp, dx g82 20, Disp: 1 each, Rfl: 0    Multiple Vitamin (Daily-Enriqueta Multivitamin) TABS, TAKE 1 TABLET BY MOUTH EVERY DAY, Disp: 90 tablet, Rfl: 2    Narcan 4 MG/0 1ML nasal spray, , Disp: , Rfl:     NOVOLOG 100 UNIT/ML injection, Inject 5 Units under the skin 3 (three) times a day with meals , Disp: , Rfl:     NovoLOG FlexPen 100 units/mL injection pen, INJECT 3 UNITS UNDER THE SKIN 3 (THREE) TIMES A DAY BEFORE MEALS , Disp: , Rfl:     ondansetron (ZOFRAN-ODT) 4 mg disintegrating tablet, Take 2 tablets (8 mg total) by mouth every 8 (eight) hours as needed for nausea or vomiting, Disp: 30 tablet, Rfl: 1    oxybutynin (DITROPAN) 5 mg tablet, Take 3 tablets (15 mg total) by mouth daily, Disp: 270 tablet, Rfl: 1    phenytoin extended (DILANTIN) 200 MG ER capsule, Take 1 capsule (200 mg total) by mouth 2 (two) times a day, Disp: 180 capsule, Rfl: 1    phenytoin extended (DILANTIN) 200 MG ER capsule, TAKE 1 CAPSULE BY MOUTH TWICE A DAY, Disp: 180 capsule, Rfl: 0    phenytoin extended (DILANTIN) 300 MG ER capsule, TAKE 1 CAPSULE BY MOUTH EVERY DAY, Disp: 90 capsule, Rfl: 1    phenytoin extended (DILANTIN) 300 MG ER capsule, Take 1 capsule (300 mg total) by mouth daily, Disp: 90 capsule, Rfl: 1    polyethylene glycol (GLYCOLAX) 17 GM/SCOOP powder, Take as directed as per written office instructions, Disp: 238 g, Rfl: 0    polyethylene glycol (GOLYTELY) 4000 mL solution, Take 4,000 mL by mouth once for 1 dose, Disp: 4000 mL, Rfl: 0    risperiDONE (RisperDAL) 0 5 mg tablet, Take 1 tablet (0 5 mg total) by mouth 2 (two) times a day, Disp: 180 tablet, Rfl: 1    senna (SENOKOT) 8 6 mg, Take 1 tablet (8 6 mg total) by mouth 2 (two) times a day, Disp: 180 tablet, Rfl: 1    sertraline (ZOLOFT) 25 mg tablet, TAKE 1/2 TABLET BY MOUTH EVERY DAY, Disp: 45 tablet, Rfl: 0    sodium hypochlorite (DAKIN'S HALF-STRENGTH) external solution, USE AS DIRECTED ONCE  DAILY, Disp: 473 mL, Rfl: 2    Sodium Zirconium Cyclosilicate 10 g PACK, Take 10 g by mouth daily, Disp: , Rfl:     sucralfate (CARAFATE) 1 g/10 mL suspension, Take 10 mL (1 g total) by mouth 4 (four) times a day (with meals and at bedtime), Disp: 420 mL, Rfl: 1    SUPER B COMPLEX & C TABS, TAKE 1 CAPSULE BY MOUTH ONCE FOR 1 DOSE AS DIRECTED, Disp: 90 tablet, Rfl: 2    tiZANidine (ZANAFLEX) 4 mg tablet, Take 1 tablet by mouth 4 (four) times a day, Disp: , Rfl:     tiZANidine (ZANAFLEX) 4 mg tablet, , Disp: , Rfl:     triamcinolone (KENALOG) 0 1 % ointment, Apply topically 2 (two) times a day, Disp: 454 g, Rfl: 0    Zinc Sulfate 220 (50 Zn) MG TABS, Take 1 tablet by mouth daily, Disp: 90 tablet, Rfl: 1    Review of Systems   Constitutional: Negative for activity change, appetite change (Improving), chills, fatigue, fever and unexpected weight change  HENT: Negative for congestion, hearing loss and postnasal drip  Eyes: Negative for visual disturbance  Respiratory: Negative for cough and shortness of breath  Cardiovascular: Negative for chest pain and leg swelling  Gastrointestinal: Negative for constipation, diarrhea and nausea  Genitourinary: Negative for dysuria and urgency          Indwelling Ortega catheter   Musculoskeletal: Positive for gait problem (Nonambulatory, paraplegic)  Skin: Positive for wound (Sacral, left ischium, left hip and new scrotal)  Negative for rash  Neurological: Positive for weakness  Hematological: Does not bruise/bleed easily  Psychiatric/Behavioral: Positive for dysphoric mood  Objective:  BP 92/60   Pulse 84   Temp (!) 96 5 °F (35 8 °C)   Resp 18   Pain Score: 0-No pain   Physical Exam  Vitals and nursing note reviewed  Constitutional:       General: He is not in acute distress  Appearance: He is underweight  He is not toxic-appearing  HENT:      Head: Normocephalic and atraumatic  Cardiovascular:      Rate and Rhythm: Normal rate  Pulmonary:      Effort: Pulmonary effort is normal  No respiratory distress  Breath sounds: No stridor  Abdominal:      Comments: The abdomen around the ostomy is slightly full and tender  Skin:     Findings: Wound (Both buttocks and sacrum) present  No erythema  Comments: See full wound assessment  Sacral pressure injury with fibrin and some areas of edge epithelialization  No signs of infection and no malodor  No exposed bone  Left hip pressure injury with fibrin and some granulation tissue  No significant changes  Bilateral ischial pressure injuries with fibrin and some areas of epithelialization  No significant changes  No signs of infection and no malodor  Posterior scrotal pressure injuries x2  Some thin slough and fibrin in both  No signs of infection  Neurological:      Mental Status: He is alert and oriented to person, place, and time  Gait: Gait abnormal (paraplegia)     Psychiatric:         Mood and Affect: Affect normal          Cognition and Memory: Cognition normal              Wound Pressure Injury Ischium Left (Active)   Wound Image   09/01/22 1534   Wound Description Pink;Necrotic;Slough;Bleeding 09/01/22 1538   Pressure Injury Stage 4 06/30/22 1155   Irene-wound Assessment Maceration;Scar Tissue 09/01/22 1538   Wound Length (cm) 6 1 cm 09/01/22 1538   Wound Width (cm) 6 3 cm 09/01/22 1538   Wound Depth (cm) 1 2 cm 09/01/22 1538   Wound Surface Area (cm^2) 38 43 cm^2 09/01/22 1538   Wound Volume (cm^3) 46 116 cm^3 09/01/22 1538   Calculated Wound Volume (cm^3) 46 12 cm^3 09/01/22 1538   Change in Wound Size % -144 02 09/01/22 1538   Undermining 0 2 06/30/22 1155   Undermining is depth extending from 10-12 06/30/22 1155   Drainage Amount Large 09/01/22 1538   Drainage Description Yellow;Bloody 09/01/22 1538   Non-staged Wound Description Full thickness 06/30/22 1155   Treatments Cleansed 12/30/21 1447   Dressing Changed Changed 12/30/21 1447   Patient Tolerance Tolerated well 09/01/22 1538   Dressing Status Removed 09/01/22 1538       Wound Pressure Injury Perineum (Active)   Wound Image   09/01/22 1535   Wound Description Pink; Hypergranulation;Slough; White 09/01/22 1541   Pressure Injury Stage 4 06/30/22 1154   Irene-wound Assessment Scar Tissue; Maceration 09/01/22 1541   Wound Length (cm) 6 4 cm 09/01/22 1541   Wound Width (cm) 5 9 cm 09/01/22 1541   Wound Depth (cm) 1 7 cm 09/01/22 1541   Wound Surface Area (cm^2) 37 76 cm^2 09/01/22 1541   Wound Volume (cm^3) 64 192 cm^3 09/01/22 1541   Calculated Wound Volume (cm^3) 64 19 cm^3 09/01/22 1541   Change in Wound Size % -511 33 09/01/22 1541   Undermining 3 06/30/22 1154   Undermining is depth extending from 0 3cm from 9-10, 2 8cm from 1-5 09/01/22 1541   Drainage Amount Large 09/01/22 1541   Drainage Description Yellow;Serosanguineous 09/01/22 1541   Non-staged Wound Description Full thickness 06/30/22 1154   Treatments Cleansed 12/30/21 1448   Dressing Changed Changed 12/30/21 1448   Patient Tolerance Tolerated well 09/01/22 1541   Dressing Status Removed 09/01/22 1541       Wound Pressure Injury Sacrum (Active)   Wound Image   09/01/22 1535   Wound Description Epithelialization;Pink;Slough 09/01/22 1546   Pressure Injury Stage 4 06/30/22 1153   Irene-wound Assessment Maceration;Scar Tissue;Scaly 09/01/22 1546   Wound Length (cm) 5 4 cm 09/01/22 1546   Wound Width (cm) 9 7 cm 09/01/22 1546   Wound Depth (cm) 1 2 cm 09/01/22 1546   Wound Surface Area (cm^2) 52 38 cm^2 09/01/22 1546   Wound Volume (cm^3) 62 856 cm^3 09/01/22 1546   Calculated Wound Volume (cm^3) 62 86 cm^3 09/01/22 1546   Change in Wound Size % 16 19 09/01/22 1546   Undermining 0 5 09/01/22 1546   Undermining is depth extending from 4 8 09/01/22 1546   Drainage Amount Large 09/01/22 1546   Drainage Description Serosanguineous; Yellow 09/01/22 1546   Non-staged Wound Description Full thickness 06/30/22 1153   Treatments Irrigation with NSS 05/05/22 1532   Dressing Changed Changed 12/30/21 1451   Patient Tolerance Tolerated well 09/01/22 1546   Dressing Status Removed 09/01/22 1546       Wound 01/28/21 Pressure Injury Hip Left;Lateral (Active)   Wound Image   09/01/22 1533   Wound Description Slough;Pink 09/01/22 1548   Pressure Injury Stage 4 05/05/22 1536   Irene-wound Assessment Scar Tissue 09/01/22 1548   Wound Length (cm) 2 9 cm 09/01/22 1548   Wound Width (cm) 1 6 cm 09/01/22 1548   Wound Depth (cm) 0 8 cm 09/01/22 1548   Wound Surface Area (cm^2) 4 64 cm^2 09/01/22 1548   Wound Volume (cm^3) 3 712 cm^3 09/01/22 1548   Calculated Wound Volume (cm^3) 3 71 cm^3 09/01/22 1548   Tunneling 4 7 cm 02/10/22 1341   Tunneling in depth located at 10 02/10/22 1341   Undermining 2 7 09/01/22 1548   Undermining is depth extending from 7-12 09/01/22 1548   Drainage Amount Large 09/01/22 1548   Drainage Description Serosanguineous; Yellow 09/01/22 1548   Non-staged Wound Description Full thickness 06/30/22 1147   Treatments Cleansed 12/30/21 1452   Wound packed? Yes 12/30/21 1452   Packing- # removed 1 05/05/22 1536   Dressing Changed Changed 12/30/21 1452   Patient Tolerance Tolerated well 09/01/22 1548   Dressing Status Removed 09/01/22 1548       Wound 09/01/22 Pressure Injury Scrotum Posterior; Left (Active)   Wound Image   09/01/22 1535 Wound Description Bleeding;Pink 09/01/22 1552   Irene-wound Assessment Scar Tissue 09/01/22 1552   Wound Length (cm) 0 6 cm 09/01/22 1552   Wound Width (cm) 0 5 cm 09/01/22 1552   Wound Depth (cm) 0 2 cm 09/01/22 1552   Wound Surface Area (cm^2) 0 3 cm^2 09/01/22 1552   Wound Volume (cm^3) 0 06 cm^3 09/01/22 1552   Calculated Wound Volume (cm^3) 0 06 cm^3 09/01/22 1552   Drainage Amount Small 09/01/22 1552   Drainage Description Serosanguineous 09/01/22 1552   Patient Tolerance Tolerated well 09/01/22 1552   Dressing Status Removed 09/01/22 1552       Wound 09/01/22 Pressure Injury Scrotum Posterior;Right (Active)   Wound Image   09/01/22 1609   Wound Description Bleeding 09/01/22 1610   Irene-wound Assessment Dry;Scar Tissue;Scaly 09/01/22 1610   Wound Length (cm) 0 5 cm 09/01/22 1610   Wound Width (cm) 1 4 cm 09/01/22 1610   Wound Depth (cm) 0 2 cm 09/01/22 1610   Wound Surface Area (cm^2) 0 7 cm^2 09/01/22 1610   Wound Volume (cm^3) 0 14 cm^3 09/01/22 1610   Calculated Wound Volume (cm^3) 0 14 cm^3 09/01/22 1610   Drainage Amount Small 09/01/22 1610   Drainage Description Bloody 09/01/22 1610   Patient Tolerance Tolerated well 09/01/22 1610   Dressing Status Removed 09/01/22 1610       Debridement   Wound Pressure Injury Ischium Left    Universal Protocol:  Consent: Verbal consent obtained  Written consent obtained  Consent given by: patient  Time out: Immediately prior to procedure a "time out" was called to verify the correct patient, procedure, equipment, support staff and site/side marked as required    Patient understanding: patient states understanding of the procedure being performed  Patient identity confirmed: verbally with patient      Performed by: physician  Debridement type: selective  Pain control: lidocaine 4%  Post-debridement measurements  Length (cm): 6 1  Width (cm): 6 3  Depth (cm): 1 2  Percent debrided: 100%  Surface Area (cm^2): 38 43  Area debrided (cm^2): 38 43  Volume (cm^3): 46 12  Devitalized tissue debrided: fibrin  Instrument(s) utilized: blade  Bleeding: small  Hemostasis obtained with: pressure  Procedural pain (0-10): 0  Post-procedural pain: 0   Response to treatment: procedure was tolerated well    Debridement   Wound Pressure Injury Perineum    Universal Protocol:  Consent: Verbal consent obtained  Written consent obtained  Consent given by: patient  Time out: Immediately prior to procedure a "time out" was called to verify the correct patient, procedure, equipment, support staff and site/side marked as required  Patient understanding: patient states understanding of the procedure being performed  Patient identity confirmed: verbally with patient      Performed by: physician  Debridement type: selective  Pain control: lidocaine 4%  Post-debridement measurements  Length (cm): 6 4  Width (cm): 5 9  Depth (cm): 1 7  Percent debrided: 100%  Surface Area (cm^2): 37 76  Area debrided (cm^2): 37 76  Volume (cm^3): 64 19  Devitalized tissue debrided: fibrin  Instrument(s) utilized: blade  Bleeding: medium  Hemostasis obtained with: pressure  Procedural pain (0-10): 0  Post-procedural pain: 0   Response to treatment: procedure was tolerated well    Debridement   Wound Pressure Injury Sacrum    Universal Protocol:  Consent: Verbal consent obtained  Written consent obtained  Consent given by: patient  Time out: Immediately prior to procedure a "time out" was called to verify the correct patient, procedure, equipment, support staff and site/side marked as required    Patient understanding: patient states understanding of the procedure being performed  Patient identity confirmed: verbally with patient      Performed by: physician  Debridement type: selective  Pain control: lidocaine 4%  Post-debridement measurements  Length (cm): 5 4  Width (cm): 9 7  Depth (cm): 1 2  Percent debrided: 100%  Surface Area (cm^2): 52 38  Area debrided (cm^2): 52 38  Volume (cm^3): 62 86  Devitalized tissue debrided: fibrin  Instrument(s) utilized: blade  Bleeding: small  Hemostasis obtained with: pressure  Procedural pain (0-10): 0  Post-procedural pain: 0   Response to treatment: procedure was tolerated well    Debridement   Wound 01/28/21 Pressure Injury Hip Left;Lateral    Universal Protocol:  Consent: Verbal consent obtained  Written consent obtained  Consent given by: patient  Time out: Immediately prior to procedure a "time out" was called to verify the correct patient, procedure, equipment, support staff and site/side marked as required  Patient understanding: patient states understanding of the procedure being performed  Patient identity confirmed: verbally with patient      Performed by: physician  Debridement type: selective  Pain control: lidocaine 4%  Post-debridement measurements  Length (cm): 2 9  Width (cm): 1 6  Depth (cm): 0 8  Percent debrided: 100%  Surface Area (cm^2): 4 64  Area debrided (cm^2): 4 64  Volume (cm^3): 3 71  Devitalized tissue debrided: fibrin and slough  Instrument(s) utilized: blade  Bleeding: medium  Hemostasis obtained with: pressure  Procedural pain (0-10): 0  Post-procedural pain: 0   Response to treatment: procedure was tolerated well    Debridement   Wound 09/01/22 Pressure Injury Scrotum Posterior; Left    Universal Protocol:  Consent: Verbal consent obtained  Written consent obtained  Consent given by: patient  Time out: Immediately prior to procedure a "time out" was called to verify the correct patient, procedure, equipment, support staff and site/side marked as required    Patient understanding: patient states understanding of the procedure being performed  Patient identity confirmed: verbally with patient      Performed by: physician  Debridement type: selective  Pain control: lidocaine 4%  Post-debridement measurements  Length (cm): 0 6  Width (cm): 0 5  Depth (cm): 0 2  Percent debrided: 100%  Surface Area (cm^2): 0 3  Area debrided (cm^2): 0 3  Volume (cm^3): 0 06  Devitalized tissue debrided: fibrin and slough  Instrument(s) utilized: curette  Bleeding: small  Hemostasis obtained with: pressure  Procedural pain (0-10): 0  Post-procedural pain: 0   Response to treatment: procedure was tolerated well    Debridement   Wound 09/01/22 Pressure Injury Scrotum Posterior;Right    Universal Protocol:  Consent: Verbal consent obtained  Written consent obtained  Consent given by: patient  Time out: Immediately prior to procedure a "time out" was called to verify the correct patient, procedure, equipment, support staff and site/side marked as required  Patient understanding: patient states understanding of the procedure being performed  Patient identity confirmed: verbally with patient      Performed by: physician  Debridement type: selective  Pain control: lidocaine 4%  Post-debridement measurements  Length (cm): 0 5  Width (cm): 1 4  Depth (cm): 0 2  Percent debrided: 100%  Surface Area (cm^2): 0 7  Area debrided (cm^2): 0 7  Volume (cm^3): 0 14  Devitalized tissue debrided: fibrin and slough  Instrument(s) utilized: curette  Bleeding: small  Hemostasis obtained with: pressure  Procedural pain (0-10): 0  Post-procedural pain: 0   Response to treatment: procedure was tolerated well                Wound Instructions:  Orders Placed This Encounter   Procedures    Wound cleansing and dressings     All wounds except right lateral hip:  Wash your hands with soap and water  Remove old dressing, discard into plastic bag and place in trash  Cleanse the wound with sterile saline solution (rinse) prior to applying a clean dressing  Do not use tissue or cotton balls  Do not scrub the wound  Pat dry using gauze  Shower no; do not get dressing wet     Apply saline moistened gauze  Cover with an ABD pad  Secure with Medfix tape    Change dressing daily and PRN for breakthrough drainage (visiting nurses to do twice per week and family in between)    Scrotum wounds   Apply mupirocin  Cover with gauze  Secure with tape  Change dressing daily and PRN for breakthrough drainage (visiting nurses to do twice per week and family in between)     Follow up in 6 weeks   Continue visiting nurse skilled for wound care dressing changes                             Treatment in the wound center today:  Scrotum wound dressed as ordered above  Prohase moistened gauze applied to remaining wounds and dressed as ordered above     Standing Status:   Future     Standing Expiration Date:   9/1/2023    Debridement     This order was created via procedure documentation    Debridement     This order was created via procedure documentation    Debridement     This order was created via procedure documentation    Debridement     This order was created via procedure documentation    Debridement     This order was created via procedure documentation    Debridement     This order was created via procedure documentation             Charlene Myers MD, CHT, CWS    Portions of the record may have been created with voice recognition software  Occasional wrong word or "sound alike" substitutions may have occurred due to the inherent limitations of voice recognition software  Read the chart carefully and recognize, using context, where substitutions have occurred

## 2022-09-01 NOTE — LETTER
[facility]  [facility] [address]  Phone#  [phone]  Fax#  [phone]    Patient:  Ross Holstein  YOB: 1980  Phone:  [phone]  Date of Visit:  9/1/2022    Orders Placed This Encounter   Procedures    Wound cleansing and dressings     All wounds except right lateral hip:  Wash your hands with soap and water  Remove old dressing, discard into plastic bag and place in trash  Cleanse the wound with sterile saline solution (rinse) prior to applying a clean dressing  Do not use tissue or cotton balls  Do not scrub the wound  Pat dry using gauze  Shower no; do not get dressing wet     Apply saline moistened gauze  Cover with an ABD pad  Secure with Medfix tape    Change dressing daily and PRN for breakthrough drainage (visiting nurses to do twice per week and family in between)    Scrotum wounds   Apply mupirocin  Cover with gauze  Secure with tape  Change dressing daily and PRN for breakthrough drainage (visiting nurses to do twice per week and family in between)     Follow up in 6 weeks   Continue visiting nurse skilled for wound care dressing changes                             Treatment in the wound center today:  Scrotum wound dressed as ordered above  Prohase moistened gauze applied to remaining wounds and dressed as ordered above     Standing Status:   Future     Standing Expiration Date:   9/1/2023         Electronically signed by Ramírez Long MD

## 2022-09-01 NOTE — PATIENT INSTRUCTIONS
Orders Placed This Encounter   Procedures    Wound cleansing and dressings     All wounds except right lateral hip:  Wash your hands with soap and water  Remove old dressing, discard into plastic bag and place in trash  Cleanse the wound with sterile saline solution (rinse) prior to applying a clean dressing  Do not use tissue or cotton balls  Do not scrub the wound  Pat dry using gauze  Shower no; do not get dressing wet     Apply saline moistened gauze  Cover with an ABD pad  Secure with Medfix tape    Change dressing daily and PRN for breakthrough drainage (visiting nurses to do twice per week and family in between)    Scrotum wounds   Apply mupirocin  Cover with gauze  Secure with tape  Change dressing daily and PRN for breakthrough drainage (visiting nurses to do twice per week and family in between)     Follow up in 6 weeks   Continue visiting nurse skilled for wound care dressing changes                             Treatment in the wound center today:  Scrotum wound dressed as ordered above  Prohase moistened gauze applied to remaining wounds and dressed as ordered above     Standing Status:   Future     Standing Expiration Date:   9/1/2023

## 2022-09-07 NOTE — PATIENT INSTRUCTIONS
Orders Placed This Encounter   Procedures    Wound cleansing and dressings     Wound cleansing and dressings                    All wounds:  Wash your hands with soap and water  Remove old dressing, discard into plastic bag and place in trash  Cleanse the wound with saline or mild soap and water prior to applying a clean dressing  Do not use tissue or cotton balls  Do not scrub the wound  Pat dry using gauze  Shower no; do not get dressing wet  Apply dakins soaked gauze packing to wounds (moist with Dakins solution, and squeezed out so it is damp)  Cover with and ABD  Secure with Medfix tape  Change dressing daily and PRN for breakthrough drainage (visiting nurses to do twice per week and family in between)  This was done at today's dressing change in the wound center  Continue visiting nurses twice per week for dressing changes, family to do in between nurse visits     Follow up in 6 weeks         Standing Status:   Future     Standing Expiration Date:   5/27/2022    Wound off loading     Continue clinitron bed at home and turn at least every 1-2 hours  To try and limit the amount of time spent sitting in the wheelchair  Keep pressure off the wounds as much as humanly possible     Standing Status:   Future     Standing Expiration Date:   5/27/2022    Wound miscellaneous orders     Continue to try and increase your protein to at least 3 servings or more if possible                                  Continue to try and keep blood sugars as low as possible  Stop Smoking    Ask for nutritionist or dietition consult during your next dialysis apt       Standing Status:   Future     Standing Expiration Date:   5/27/2022 The patient is a 57y Female complaining of shoulder pain/injury.

## 2022-09-08 ENCOUNTER — OFFICE VISIT (OUTPATIENT)
Dept: FAMILY MEDICINE CLINIC | Facility: CLINIC | Age: 42
End: 2022-09-08

## 2022-09-08 VITALS
OXYGEN SATURATION: 99 % | RESPIRATION RATE: 18 BRPM | TEMPERATURE: 98.7 F | HEART RATE: 92 BPM | SYSTOLIC BLOOD PRESSURE: 104 MMHG | DIASTOLIC BLOOD PRESSURE: 66 MMHG

## 2022-09-08 DIAGNOSIS — M54.6 CHRONIC BILATERAL THORACIC BACK PAIN: ICD-10-CM

## 2022-09-08 DIAGNOSIS — R09.89 CHEST CONGESTION: ICD-10-CM

## 2022-09-08 DIAGNOSIS — M54.6 CHRONIC BILATERAL THORACIC BACK PAIN: Primary | ICD-10-CM

## 2022-09-08 DIAGNOSIS — G89.29 CHRONIC BILATERAL THORACIC BACK PAIN: Primary | ICD-10-CM

## 2022-09-08 DIAGNOSIS — G89.29 CHRONIC BILATERAL THORACIC BACK PAIN: ICD-10-CM

## 2022-09-08 DIAGNOSIS — L98.429 ULCER OF SACRAL REGION, STAGE 4 (HCC): Primary | ICD-10-CM

## 2022-09-08 PROBLEM — K02.9 DENTAL CARIES: Status: RESOLVED | Noted: 2021-02-10 | Resolved: 2022-09-08

## 2022-09-08 PROBLEM — R10.9 ABDOMINAL PAIN: Status: RESOLVED | Noted: 2021-09-02 | Resolved: 2022-09-08

## 2022-09-08 PROBLEM — I42.8 NICM (NONISCHEMIC CARDIOMYOPATHY) (HCC): Status: ACTIVE | Noted: 2020-04-30

## 2022-09-08 PROBLEM — R06.02 SOB (SHORTNESS OF BREATH): Status: RESOLVED | Noted: 2017-09-18 | Resolved: 2022-09-08

## 2022-09-08 PROBLEM — N18.6 END STAGE RENAL DISEASE (HCC): Status: ACTIVE | Noted: 2018-02-07

## 2022-09-08 PROBLEM — J81.0 ACUTE PULMONARY EDEMA (HCC): Status: RESOLVED | Noted: 2022-01-13 | Resolved: 2022-09-08

## 2022-09-08 PROBLEM — R45.0 NERVOUS: Status: RESOLVED | Noted: 2021-03-02 | Resolved: 2022-09-08

## 2022-09-08 PROBLEM — R82.71 ASYMPTOMATIC BACTERIURIA: Status: RESOLVED | Noted: 2017-09-25 | Resolved: 2022-09-08

## 2022-09-08 PROBLEM — E78.5 HYPERLIPIDEMIA: Status: ACTIVE | Noted: 2018-09-14

## 2022-09-08 PROBLEM — R22.42 LOCALIZED SWELLING OF LEFT FOOT: Status: RESOLVED | Noted: 2021-07-28 | Resolved: 2022-09-08

## 2022-09-08 PROBLEM — J18.9 PNEUMONIA: Status: RESOLVED | Noted: 2021-12-16 | Resolved: 2022-09-08

## 2022-09-08 PROBLEM — I51.7 LVH (LEFT VENTRICULAR HYPERTROPHY): Status: ACTIVE | Noted: 2021-12-20

## 2022-09-08 PROBLEM — M14.60 CHARCOT'S ARTHROPATHY: Status: ACTIVE | Noted: 2022-03-03

## 2022-09-08 PROCEDURE — 99495 TRANSJ CARE MGMT MOD F2F 14D: CPT | Performed by: PHYSICIAN ASSISTANT

## 2022-09-08 RX ORDER — GUAIFENESIN 600 MG/1
1200 TABLET, EXTENDED RELEASE ORAL EVERY 12 HOURS SCHEDULED
Qty: 60 TABLET | Refills: 2 | Status: SHIPPED | OUTPATIENT
Start: 2022-09-08

## 2022-09-08 RX ORDER — OXYBUTYNIN CHLORIDE 15 MG/1
TABLET, EXTENDED RELEASE ORAL
COMMUNITY
Start: 2022-09-07

## 2022-09-08 RX ORDER — CALCIUM ACETATE 667 MG/1
CAPSULE ORAL
COMMUNITY
Start: 2022-08-19

## 2022-09-08 RX ORDER — IBUPROFEN 600 MG/1
1 TABLET ORAL AS NEEDED
Qty: 1 KIT | Refills: 0 | Status: CANCELLED | OUTPATIENT
Start: 2022-09-08

## 2022-09-08 NOTE — PROGRESS NOTES
Assessment/Plan:     Ulcer of sacral region, stage 4 (HCC)  Unable to reassess today, deferred exam  Patient appears to be doing well  Afebrile  Finished cefpodoxime several days ago  Follow up with wound care scheduled 10/13/2022  ER precautions given  Follow up next scheduled follow up: 9/17/22 for chronic condition management  Chronic pain  Referral to comprehensive spine program         Diagnoses and all orders for this visit:    Ulcer of sacral region, stage 4 (HCC)    Chronic bilateral thoracic back pain  -     Ambulatory Referral to Comprehensive Spine Program; Future    Chest congestion  -     guaiFENesin (MUCINEX) 600 mg 12 hr tablet; Take 2 tablets (1,200 mg total) by mouth every 12 (twelve) hours    Other orders  -     Acetaminophen 325 MG CAPS; Take 650 mg by mouth  -     calcium acetate (PHOSLO) capsule; TAKE 2 CAPSULES BY MOUTH THREE TIMES DAILY WITH MEALS  -     oxybutynin (DITROPAN XL) 15 MG 24 hr tablet          Subjective:      Patient ID: Arlette Suárez  is a 43 y o  male  Patient is a 43year old male with a PMH of paraplegia, ESRD, HTN, CHF, HLD, and T1DM presenting for hospital follow up  States he is doing much better  He entered the hospital on 8/26/22 due to a fever  He was septic due to his pressure ulcers  He was discharged on an antibiotic  States he finished it  He does have an appointment for wound care  He would like a refill on his Mucinex, states he takes this when he gets sic  He would also like a referral to a "back doctor"  He has had chronic back pain for many years  He is currently seeing pain management, a private clinic, he does not remember the name      TCM Call     Date and time call was made  9/1/2022  9:30 AM    Hospital care reviewed  Records reviewed    Patient was hospitialized at  [facility]    Date of Admission  08/26/22    Date of discharge  08/30/22    Diagnosis  skin ulcer    Disposition  Home    Were the patients medications reviewed and updated  No    Current Symptoms  None      TCM Call     Post hospital issues  None    Should patient be enrolled in anticoag monitoring? No    Scheduled for follow up? Yes    Not clinically warranted  pt is currently admitted to the hospital as of   today    Patient refusal reason  wants to keep july appt     Did you obtain your prescribed medications  Yes    Do you need help managing your prescriptions or medications  No    Why type of assitance do you need  mother    Is transportation to your appointment needed  No    I have advised the patient to call PCP with any new or worsening symptoms    Cira Paniagua/MA    Living Arrangements  Parents    Support System  Parent    The type of support provided  Emotional; Financial; Physical    Do you have social support  Yes, as much as I need    Are you recieving any outpatient services  No    Are you recieving home care services  Yes    Are you using any community resources  No    Current waiver services  No    Have you fallen in the last 12 months  No    Interperter language line needed  No    Counseling  Patient    Comments  mother Adelaida Caceres  wanted to schedule in 11/03/2020 due to patient   having to many appts  OVL appt           The following portions of the patient's history were reviewed and updated as appropriate: allergies, current medications, past family history, past medical history, past social history, past surgical history and problem list     Review of Systems   Constitutional: Negative for appetite change, chills, fatigue and fever  HENT: Negative for congestion, dental problem, postnasal drip, rhinorrhea and sore throat  Respiratory: Negative for cough, chest tightness, shortness of breath and wheezing  Cardiovascular: Negative for chest pain, palpitations and leg swelling  Gastrointestinal: Negative for abdominal pain, diarrhea, nausea and vomiting  Musculoskeletal: Positive for arthralgias, back pain, gait problem and myalgias     Skin: Positive for wound  Negative for rash  Neurological: Negative for dizziness, light-headedness and headaches  Hematological: Negative for adenopathy  Psychiatric/Behavioral: Negative for dysphoric mood  The patient is not nervous/anxious  Objective:      /66 (BP Location: Left arm, Patient Position: Sitting, Cuff Size: Adult)   Pulse 92   Temp 98 7 °F (37 1 °C) (Temporal)   Resp 18   SpO2 99%          Physical Exam  Vitals and nursing note reviewed  Constitutional:       General: He is awake  He is not in acute distress  Appearance: Normal appearance  He is well-developed and well-groomed  He is not ill-appearing  HENT:      Head: Normocephalic and atraumatic  Eyes:      General: Lids are normal  No scleral icterus  Conjunctiva/sclera: Conjunctivae normal    Cardiovascular:      Rate and Rhythm: Normal rate and regular rhythm  Pulses: Normal pulses  Heart sounds: Normal heart sounds  No murmur heard  Pulmonary:      Effort: Pulmonary effort is normal  No respiratory distress  Breath sounds: Normal breath sounds  No decreased air movement  No decreased breath sounds, wheezing, rhonchi or rales  Musculoskeletal:      Cervical back: Full passive range of motion without pain, normal range of motion and neck supple  Lymphadenopathy:      Cervical: No cervical adenopathy  Skin:     Comments: No visible rashes  Unable to visual wounds as patient declined exam    Neurological:      General: No focal deficit present  Mental Status: He is alert and oriented to person, place, and time  Mental status is at baseline  Comments: Seated in powerchair   Psychiatric:         Attention and Perception: Attention and perception normal          Mood and Affect: Mood and affect normal          Speech: Speech normal          Behavior: Behavior normal  Behavior is cooperative  Thought Content:  Thought content normal          Cognition and Memory: Cognition and memory normal          Judgment: Judgment normal

## 2022-09-08 NOTE — ASSESSMENT & PLAN NOTE
Unable to reassess today, deferred exam  Patient appears to be doing well  Afebrile  Finished cefpodoxime several days ago  Follow up with wound care scheduled 10/13/2022  ER precautions given  Follow up next scheduled follow up: 9/17/22 for chronic condition management

## 2022-09-12 ENCOUNTER — TELEPHONE (OUTPATIENT)
Dept: DERMATOLOGY | Facility: CLINIC | Age: 42
End: 2022-09-12

## 2022-09-12 NOTE — TELEPHONE ENCOUNTER
Patient left a detailed message in regards to the following: patient was seen in dermatology clinic on 8/18/22 and was prescribed Doxycycline 100 mg BID and Metronidazole 0 75% Gel for acne  Patient was admitted to the hospital on 8/26/22 and at that time was discontinued on Doxycyline  Patient was admitted for skin ulcer and was on IV antibiotics stating he was septic  Patient is now home and finishing oral antibiotics that were given for skin ulcer  Patient asking if he should start taking the Doxycycline again stating he is resistant to many antibiotics and has a lot of "bugs" from antibiotics  Patient is continuing topical metronidazole gel and states his "bumps" seem to be better  Patient asking for a response ASAP and not within 24 hours

## 2022-09-13 NOTE — TELEPHONE ENCOUNTER
Phone call to patient and advised that he should continue using the Metronidazole Gel but do not restart the Doxycycline  Patient verbalized understanding  Patient was scheduled for a follow up in December

## 2022-09-14 ENCOUNTER — TELEMEDICINE (OUTPATIENT)
Dept: HEMATOLOGY ONCOLOGY | Facility: CLINIC | Age: 42
End: 2022-09-14
Payer: MEDICARE

## 2022-09-14 ENCOUNTER — TELEPHONE (OUTPATIENT)
Dept: HEMATOLOGY ONCOLOGY | Facility: CLINIC | Age: 42
End: 2022-09-14

## 2022-09-14 DIAGNOSIS — N18.6 ANEMIA DUE TO CHRONIC KIDNEY DISEASE, ON CHRONIC DIALYSIS (HCC): Primary | ICD-10-CM

## 2022-09-14 DIAGNOSIS — D63.1 ANEMIA DUE TO CHRONIC KIDNEY DISEASE, ON CHRONIC DIALYSIS (HCC): Primary | ICD-10-CM

## 2022-09-14 DIAGNOSIS — Z99.2 ANEMIA DUE TO CHRONIC KIDNEY DISEASE, ON CHRONIC DIALYSIS (HCC): Primary | ICD-10-CM

## 2022-09-14 PROCEDURE — 99213 OFFICE O/P EST LOW 20 MIN: CPT | Performed by: PHYSICIAN ASSISTANT

## 2022-09-14 NOTE — TELEPHONE ENCOUNTER
Spoke with patient's mother about scheduling 6 month follow up in our office  Let the patient's mother know that Varsha Menon will be on leave from Mid-February to May  Patient's mother requested Varsha Menon sees the patient before she goes on leave  Provided a good date and time for the patient and the patient's mother acknowledged

## 2022-09-16 NOTE — PROGRESS NOTES
Virtual Regular Visit    Verification of patient location:    Patient is located in the following state in which I hold an active license [place]      Assessment/Plan:    Problem List Items Addressed This Visit    None     Visit Diagnoses     Anemia due to chronic kidney disease, on chronic dialysis ([facility] )    -  Primary    Relevant Orders    CBC and differential    Iron    Iron Saturation %    Ferritin    TIBC               Reason for visit is No chief complaint on file  Encounter provider Liza Larose PA-C    Provider located at [address] [place] [id number]      Recent Visits  Date Type Provider Dept   09/14/22 Telephone Eliana Grover Pg Hem Onc [facility]   09/14/22 100[address] [facility],Sixth Floor, [address] recent visits within past 7 days and meeting all other requirements  Future Appointments  No visits were found meeting these conditions  Showing future appointments within next 150 days and meeting all other requirements       The patient was identified by name and date of birth  David Pena  was informed that this is a telemedicine visit and that the visit is being conducted through Telephone  My office door was closed  No one else was in the room  He acknowledged consent and understanding of privacy and security of the video platform  The patient has agreed to participate and understands they can discontinue the visit at any time  Patient is aware this is a billable service  Subjective  David Pena  is a 43 y o  male presents for follow up for anemia      Past Medical History:   Diagnosis Date    Acute pulmonary edema ([facility] ) 1/13/2022    Ambulatory dysfunction     Anemia     Anemia, iron deficiency     transfusion requiring    Asymptomatic bacteriuria 9/25/2017    Atrial fibrillation (HCC)     AVM (arteriovenous malformation) of duodenum, 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    Bacteriuria, asymptomatic     Bipolar disorder (HCC)     [facility]DVT) [facility]     Chronic indwelling Ortega catheter     Chronic kidney disease     Chronic pain     Chronic pain disorder     Chronic suprapubic catheter ([facility] )     Clostridium difficile infection 08/11/2016    also positive 9/2016, 5/29/2017, 8/15/2017  S/P fecal transplant    Colostomy on examination ([facility] )     Decubitus ulcer     Delirium     [facility]     GERD (gastroesophageal reflux disease)     Hemodialysis patient ([facility] )     Hypertension     Memory impairment 2011    s/p diabetic coma    Neurogenic bladder     OAB (overactive bladder)     Paraplegia ([facility])     T3 transverse myelitis vs spinal stoke (AVM); 2012 extensive epidural abscess C7=> conus 2nd extension from deep parspinal abscess L4-S2/sacral decubitus; cord atrophy/myelomalcia T3=>conus     Penile abscess     S/P unilateral BKA (below knee amputation) ([facility] )     Right    Sebaceous cyst removed in 2017    Seizures ([facility])     Shortness of breath     Tobacco abuse     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[facility])     Urinary retention     Wheelchair dependent     Wounds, multiple     pressure ulcers with delayed healing       Past Surgical History:   Procedure Laterality Date    BELOW KNEE LEG AMPUTATION Right 2009    COLONOSCOPY N/A 03/27/2017    Procedure: COLONOSCOPY;  Surgeon: Rosalia Jones MD;  Location: [facility]; Service:     COLONOSCOPY N/A 03/29/2017    Procedure: COLONOSCOPY;  Surgeon: Rosalia Jones MD;  [facility]: [facility]; Service:     COLONOSCOPY N/A 06/15/2017    Procedure: COLONOSCOPY with FMT;  Surgeon: Alexandra John MD;  Location: [facility]; Service: Gastroenterology    ESOPHAGOGASTRODUODENOSCOPY N/A 03/22/2017    Procedure: ESOPHAGOGASTRODUODENOSCOPY (EGD); Surgeon: Gagandeep Waldrop DO;  Location: [facility];   Service:     MULTIPLE TOOTH EXTRACTIONS N/A 03/08/2021 Procedure: EXTRACTION TEETH # 2,3,6,10,12,13,14,18,19,20,21,22,23,24,25,26,27,28,29,30;  Surgeon: Johan Eubanks DMD;  Location: [facility];  Service: Maxillofacial    SKIN BIOPSY         Current Outpatient Medications   Medication Sig Dispense Refill    Acetaminophen 325 MG CAPS Take 650 mg by mouth      Alcohol Swabs (ALCOHOL PADS) 70 % PADS by Does not apply route 5 (five) times a day 300 each 5    amLODIPine (NORVASC) 10 mg tablet Take 1 tablet (10 mg total) by mouth daily 90 tablet 1    ammonium lactate (LAC-HYDRIN) 12 % cream       apixaban (ELIQUIS) 5 mg Take 1 tablet (5 mg total) by mouth 2 (two) times a day 270 tablet 1    ascorbic acid (VITAMIN C) 250 mg tablet TAKE 2 TABLETS (500 MG TOTAL) BY MOUTH DAILY 180 tablet 1    atorvastatin (LIPITOR) 20 mg tablet Take 1 tablet (20 mg total) by mouth daily at bedtime 90 tablet 1    B Complex-C-Folic Acid (Super B-Complex/Vit C/FA) TABS TAKE 1 TABLET BY MOUTH EVERY DAY AS DIRECTED 90 tablet 3    [employer] MICROLET LANCETS lancets Use as instructed to check blood sugar 4 times a day  Dx e10 29 200 each 5    bisacodyl (DULCOLAX) 5 mg EC tablet Take 1 tablet (5 mg total) by mouth once for 1 dose 2 tablet 0    Blood Glucose Monitoring Suppl ([employer] CONTOUR NEXT USB MONITOR) w/Device KIT Testing 4 times a day 1 kit 0    calcitriol (ROCALTROL) 0 5 MCG capsule Take 2 mcg by mouth      calcium acetate (PHOSLO) capsule TAKE 2 CAPSULES BY MOUTH THREE TIMES DAILY WITH MEALS      carvedilol (COREG) 25 mg tablet Take 1 tablet (25 mg total) by mouth 2 (two) times a day 180 tablet 1    cetirizine (ZyrTEC) 10 mg tablet Take 1 tablet (10 mg total) by mouth daily 90 tablet 1    Cholecalciferol (VITAMIN D-3) 1000 units CAPS Take 2 capsules by mouth daily 30 capsule 0    clindamycin (CLINDAGEL) 1 % gel APPLY TO AFFECTED AREA TWICE A DAY 60 g 2    cyanocobalamin ([employer] Vitamin B-12) 1000 MCG tablet Take 1 tablet (1,000 mcg total) by mouth daily 30 tablet 5    cyclobenzaprine (FLEXERIL) 5 mg tablet       dexlansoprazole (DEXILANT) 30 MG capsule Take 1 capsule (30 mg total) by mouth daily 60 capsule 2    dicyclomine (BENTYL) 10 mg capsule TAKE 1 CAPSULE BY MOUTH 3 TIMES A DAY BEFORE MEALS 270 capsule 1    Disposable Gloves MISC Use 7 times a day, 4 boxes of gloves XL  Dx type 1 DM, paraplegia 4 each 11    doxazosin (CARDURA) 2 mg tablet TAKE 1 TABLET BY MOUTH EVERY DAY IN THE EVENING 90 tablet 0    doxycycline monohydrate (MONODOX) 100 mg capsule Take 1 capsule (100 mg total) by mouth 2 (two) times a day Take with full glass of water and food 60 capsule 2    epoetin kaushik (EPOGEN,PROCRIT) 20,000 units/mL Inject 10,000 Units under the skin      epoetin kaushik (EPOGEN,PROCRIT) 20,000 units/mL Inject 5,000 Units under the skin      ferrous sulfate 325 (65 Fe) mg tablet Take 1 tablet (325 mg total) by mouth 3 (three) times a day 90 tablet 3    guaiFENesin (MUCINEX) 600 mg 12 hr tablet Take 2 tablets (1,200 mg total) by mouth every 12 (twelve) hours 60 tablet 2    Gvoke PFS 1 MG/0 2ML SOSY INJECT 1 ML (1 MG TOTAL) INTO A MUSCLE ONCE FOR 1 DOSE      hydrOXYzine HCL (ATARAX) 50 mg tablet TAKE 1 TABLET (50 MG TOTAL) BY MOUTH 2 (TWO) TIMES A DAY AS NEEDED FOR ITCHING OR ANXIETY 180 tablet 1    Incontinence Supply Disposable (Incontinence Brief Large) MISC Use 6 (six) times a day Size xl 180 each 11    Incontinence Supply Disposable (Undergarment) MISC Use 6 a day, 5 boxes, xl  Dx R51, paraplegia 5 each 11    Incontinence Supply Disposable (Underpads) MISC Use 2 a day 5 each 11    ketoconazole (NIZORAL) 2 % cream Apply to rash   15 g 1    LEVEMIR FLEXTOUCH 100 units/mL injection pen Inject 14 Units under the skin 2 (two) times a day 15 pen 3    Lokelma 10 g PACK       losartan (COZAAR) 100 MG tablet TAKE 1 TABLET BY MOUTH EVERY DAY 90 tablet 1    Melatonin ER 3 MG TBCR Take 6 mg by mouth      metroNIDAZOLE (METROGEL) 0 75 % gel Apply topically 2 (two) times a day 45 g 0    Misc  Devices (Wheelchair Cushion) MISC Use daily 1 each 0    Misc  Devices [facility]") MISC by Does not apply route daily Wheelchair ramp, dx g82 20 1 each 0    Multiple Vitamin (Daily-Enriqueta Multivitamin) TABS TAKE 1 TABLET BY MOUTH EVERY DAY 90 tablet 2    NovoLOG FlexPen 100 units/mL injection pen INJECT 3 UNITS UNDER THE SKIN 3 (THREE) TIMES A DAY BEFORE MEALS   oxybutynin (DITROPAN XL) 15 MG 24 hr tablet       oxybutynin (DITROPAN) 5 mg tablet Take 3 tablets (15 mg total) by mouth daily 270 tablet 1    polyethylene glycol (GLYCOLAX) 17 GM/SCOOP powder Take as directed as per written office instructions 238 g 0    polyethylene glycol (GOLYTELY) 4000 mL solution Take 4,000 mL by mouth once for 1 dose 4000 mL 0    risperiDONE (RisperDAL) 0 5 mg tablet Take 1 tablet (0 5 mg total) by mouth 2 (two) times a day 180 tablet 1    sertraline (ZOLOFT) 25 mg tablet TAKE 1/2 TABLET BY MOUTH EVERY DAY 45 tablet 0    Sodium Zirconium Cyclosilicate 10 g PACK Take 10 g by mouth daily      sucralfate (CARAFATE) 1 g/10 mL suspension Take 10 mL (1 g total) by mouth 4 (four) times a day (with meals and at bedtime) 420 mL 1    SUPER B COMPLEX & C TABS TAKE 1 CAPSULE BY MOUTH ONCE FOR 1 DOSE AS DIRECTED 90 tablet 2    Zinc Sulfate 220 (50 Zn) MG TABS Take 1 tablet by mouth daily 90 tablet 1     No current facility-administered medications for this visit          Allergies   Allergen Reactions    Chlorcyclizine Swelling    Chlorhexidine Other (See Comments)    Ciprofloxacin Hcl     Cymbalta [Duloxetine Hcl]     Dm-Doxylamine-Acetaminophen Other (See Comments)    Gabapentin Tremor    Hydrocortisone Other (See Comments)    Lac Bovis GI Intolerance    Lactose - Food Allergy GI Intolerance     Other reaction(s): Unknown    Lyrica [Pregabalin]     Penicillins Other (See Comments)     miguel Zosyn 08/28/17  Tolerates Ancef  Miguel Augmentin    Polymyxin B     Promethazine-Phenyleph-Codeine Other (See Comments)  Quinidine Other (See Comments)    Cefepime Other (See Comments)     Other reaction(s): Other (See Comments)  encephalopathy; tolerates cefazolin 6/14, miguel Ceftriaxone  encephalopathy; tolerates cefazolin 6/14, miguel Ceftriaxone  Pt has had cefepime March 2022 and June 2022  Also, cephalexin 3/15/22   Rosuvastatin Other (See Comments) and Palpitations     Weakness     HPI  19-year-old male presents for follow up regarding anemia  Past medical history significant for spinal paraplegia, neurogenic bladder with chronic suprapubic catheter, end-stage renal failure on dialysis Monday Wednesday Friday, secondary hyperparathyroidism, type 1 diabetes, diverting colostomy bag, history of MSSA bacteremia with tricuspid valve endocarditis, chronic osteomyelitis, chronic decubitus ulcers, AFib, history of DVT managed on Eliquis, CAD, history of Candida, history of C diff, history of right BKA and hypertension      He has been having bleeding from stoma, saw GI, having scopes  He had melena 2 months ago  Now on PPI and carafate        He takes iron by mouth       Feb 2022   erythropoietin  30  Ferritin 322, iron 29, iron sat 22%, TIBC 132     Interval history: no new complaints     ROS: Review of Systems   Constitutional: Positive for fatigue  Respiratory: Negative for cough and shortness of breath  Cardiovascular: Negative for chest pain, palpitations and leg swelling  Gastrointestinal: Negative for abdominal pain  Has stoma, bowel movements normal    Genitourinary: Negative for difficulty urinating  Has chronic catheter    Musculoskeletal: Negative  Skin: Negative  Neurological: Negative for dizziness, light-headedness and headaches  Hematological: Negative  Psychiatric/Behavioral: Negative  1  Anemia due to chronic kidney disease, on chronic dialysis [facility])  43year old male presents for follow up for anemia       8/4/22 labs:  B12 greater than 1450  Folate greater than 17 5   TSH 0 35 Iron 34, iron sat 30%, TIBC 113  SPEP showed polyclonal gammopathy, 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

## 2022-09-19 ENCOUNTER — TELEPHONE (OUTPATIENT)
Dept: GASTROENTEROLOGY | Facility: AMBULARY SURGERY CENTER | Age: 42
End: 2022-09-19

## 2022-09-19 ENCOUNTER — TELEPHONE (OUTPATIENT)
Dept: FAMILY MEDICINE CLINIC | Facility: CLINIC | Age: 42
End: 2022-09-19

## 2022-09-19 DIAGNOSIS — G82.20 PARAPLEGIA (HCC): Primary | ICD-10-CM

## 2022-09-19 NOTE — TELEPHONE ENCOUNTER
09/19/22    Pt mom Miss Hai Jacob called office today and stated that Radha Munoz is requesting a Script for Modification Selective Wheelchair to be placed and fax  Fax # [phone]    Contact # [phone]      Any questions, Please call Pt Mom   Miss Hai Jacob

## 2022-09-19 NOTE — TELEPHONE ENCOUNTER
Patients GI provider:  Dr Christiano Lazo     Number to return call: [phone]     Reason for call: Pt's mom named Sue Catalan calling to reschedule son's procedure       Scheduled procedure/appointment date if applicable: Apt/procedure n/a

## 2022-09-19 NOTE — TELEPHONE ENCOUNTER
09/19/22    PCP [address] [facility] / [address] [place] [address] 280 VIA FAX AND PLACED IN PCP FOLDER TO BE DELIVERED AT ASSIGNED TIMES        Order # [id number]

## 2022-09-20 ENCOUNTER — TELEPHONE (OUTPATIENT)
Dept: GASTROENTEROLOGY | Facility: MEDICAL CENTER | Age: 42
End: 2022-09-20

## 2022-09-20 ENCOUNTER — TELEPHONE (OUTPATIENT)
Dept: FAMILY MEDICINE CLINIC | Facility: CLINIC | Age: 42
End: 2022-09-20

## 2022-09-20 NOTE — TELEPHONE ENCOUNTER
PCP SIGNATURE NEEDED FOR [employer] FORM RECEIVED VIA FAX AND PLACED IN PCP FOLDER TO BE DELIVERED AT ASSIGNED TIMES  Form is asking for Dr Ulisses Chaudhary  Will be placed in her bin

## 2022-09-20 NOTE — TELEPHONE ENCOUNTER
LVM with pt's mother to reschedule pt's procedure  Looking to schedule in [facility]

## 2022-09-22 ENCOUNTER — HOSPITAL ENCOUNTER (OUTPATIENT)
Dept: NUCLEAR MEDICINE | Facility: HOSPITAL | Age: 42
Discharge: HOME | End: 2022-09-22
Payer: MEDICARE

## 2022-09-22 DIAGNOSIS — R68.81 EARLY SATIETY: ICD-10-CM

## 2022-09-22 DIAGNOSIS — K21.9 GASTROESOPHAGEAL REFLUX DISEASE WITHOUT ESOPHAGITIS: ICD-10-CM

## 2022-09-22 PROCEDURE — A9541 TC99M SULFUR COLLOID: HCPCS

## 2022-09-22 PROCEDURE — G1004 CDSM NDSC: HCPCS

## 2022-09-22 PROCEDURE — 78264 GASTRIC EMPTYING IMG STUDY: CPT

## 2022-09-22 RX ORDER — DEXLANSOPRAZOLE 30 MG/1
CAPSULE, DELAYED RELEASE ORAL
Qty: 180 CAPSULE | Refills: 2 | Status: SHIPPED | OUTPATIENT
Start: 2022-09-22

## 2022-09-23 ENCOUNTER — TELEPHONE (OUTPATIENT)
Dept: FAMILY MEDICINE CLINIC | Facility: CLINIC | Age: 42
End: 2022-09-23

## 2022-09-23 NOTE — TELEPHONE ENCOUNTER
09/23/22    PCP Via [employer]  CARE FORM RECEIVED VIA FAX AND PLACED IN PCP FOLDER TO BE DELIVERED AT ASSIGNED TIMES         Order # [id number]

## 2022-09-29 ENCOUNTER — TELEPHONE (OUTPATIENT)
Dept: GASTROENTEROLOGY | Facility: MEDICAL CENTER | Age: 42
End: 2022-09-29

## 2022-09-29 NOTE — TELEPHONE ENCOUNTER
Our mutual patient is scheduled for procedure: Colonoscopy     On: __12__/_13    _/_ 25   _      With: Dr Stockton____     He/She is taking the following blood thinner:          Eliquis                      Can this be stopped __2__ days prior to the procedure?        Physician Approving clearance: ________________________

## 2022-09-29 NOTE — TELEPHONE ENCOUNTER
Spoke with pt and rescheduled procedure    Scheduled date of 12/13/22 (as of today) 9/29/22  Physician performing Dr Nitin Khalil:  Location of procedure  [facility]:   Bowel prep reviewed with patient: miralax/dulcolax  Instructions reviewed with patient by: Me  Clearances: Eliquis hold req, AM diabetic

## 2022-10-11 ENCOUNTER — TELEPHONE (OUTPATIENT)
Dept: FAMILY MEDICINE CLINIC | Facility: CLINIC | Age: 42
End: 2022-10-11

## 2022-10-11 NOTE — TELEPHONE ENCOUNTER
PCP SIGNATURE NEEDED FOR [employer] [facility] [place] FORM RECEIVED VIA FAX AND PLACED IN PCP FOLDER TO BE DELIVERED AT ASSIGNED TIMES        ASSESSMENT/RECERTIFICATION SUMMARY

## 2022-10-13 ENCOUNTER — OFFICE VISIT (OUTPATIENT)
Dept: WOUND CARE | Facility: CLINIC | Age: 42
End: 2022-10-13
Payer: MEDICARE

## 2022-10-13 VITALS
DIASTOLIC BLOOD PRESSURE: 70 MMHG | SYSTOLIC BLOOD PRESSURE: 110 MMHG | HEART RATE: 72 BPM | TEMPERATURE: 97.5 F | RESPIRATION RATE: 18 BRPM

## 2022-10-13 DIAGNOSIS — E10.22 TYPE 1 DIABETES MELLITUS WITH CHRONIC KIDNEY DISEASE ON CHRONIC DIALYSIS (HCC): ICD-10-CM

## 2022-10-13 DIAGNOSIS — L89.324 PRESSURE INJURY OF LEFT ISCHIUM, STAGE 4 (HCC): ICD-10-CM

## 2022-10-13 DIAGNOSIS — L89.224 PRESSURE ULCER OF LEFT HIP, STAGE 4 (HCC): ICD-10-CM

## 2022-10-13 DIAGNOSIS — G82.20 PARAPLEGIA (HCC): ICD-10-CM

## 2022-10-13 DIAGNOSIS — L89.894 PRESSURE ULCER OF OTHER SITE, STAGE 4 (HCC): ICD-10-CM

## 2022-10-13 DIAGNOSIS — L89.42: ICD-10-CM

## 2022-10-13 DIAGNOSIS — L89.154 PRESSURE INJURY OF SACRAL REGION, STAGE 4 (HCC): Primary | ICD-10-CM

## 2022-10-13 DIAGNOSIS — L89.893 PRESSURE INJURY OF OTHER SITE, STAGE 3 (HCC): ICD-10-CM

## 2022-10-13 DIAGNOSIS — N18.6 TYPE 1 DIABETES MELLITUS WITH CHRONIC KIDNEY DISEASE ON CHRONIC DIALYSIS (HCC): ICD-10-CM

## 2022-10-13 DIAGNOSIS — E88.09 HYPOALBUMINEMIA: ICD-10-CM

## 2022-10-13 DIAGNOSIS — Z99.2 TYPE 1 DIABETES MELLITUS WITH CHRONIC KIDNEY DISEASE ON CHRONIC DIALYSIS (HCC): ICD-10-CM

## 2022-10-13 DIAGNOSIS — M86.60 CHRONIC OSTEOMYELITIS (HCC): ICD-10-CM

## 2022-10-13 PROCEDURE — 99213 OFFICE O/P EST LOW 20 MIN: CPT | Performed by: FAMILY MEDICINE

## 2022-10-13 PROCEDURE — 11045 DBRDMT SUBQ TISS EACH ADDL: CPT | Performed by: FAMILY MEDICINE

## 2022-10-13 PROCEDURE — 11042 DBRDMT SUBQ TIS 1ST 20SQCM/<: CPT | Performed by: FAMILY MEDICINE

## 2022-10-13 RX ORDER — LIDOCAINE HYDROCHLORIDE 40 MG/ML
5 SOLUTION TOPICAL ONCE
Status: COMPLETED | OUTPATIENT
Start: 2022-10-13 | End: 2022-10-13

## 2022-10-13 RX ADMIN — LIDOCAINE HYDROCHLORIDE 5 ML: 40 SOLUTION TOPICAL at 11:38

## 2022-10-13 NOTE — PATIENT INSTRUCTIONS
Orders Placed This Encounter   Procedures    Wound cleansing and dressings     All wounds except right lateral hip:  Wash your hands with soap and water  Remove old dressing, discard into plastic bag and place in trash  Cleanse the wound with sterile saline solution (rinse) prior to applying a clean dressing  Do not use tissue or cotton balls  Do not scrub the wound  Pat dry using gauze  Shower no; do not get dressing wet     Apply saline moistened gauze  Cover with an ABD pad  Secure with Medfix tape    Change dressing daily and PRN for breakthrough drainage (visiting nurses to do twice per week and family in between)     Scrotum wounds are healed today     Follow up in 6 weeks   Continue visiting nurse skilled for wound care dressing changes                             Treatment in the wound center today:  Prohase moistened gauze applied to remaining wounds and dressed as ordered above     Standing Status:   Future     Standing Expiration Date:   10/13/2023

## 2022-10-13 NOTE — PROGRESS NOTES
Patient ID: Alex Munoz  is a 43 y o  male Date of Birth 1980       Chief Complaint   Patient presents with   • Follow Up Wound Care Visit     Pressure injury of sacral region, stage 4 ([facility])       Allergies:  Chlorcyclizine, Chlorhexidine, Ciprofloxacin hcl, Cymbalta [duloxetine hcl], Dm-doxylamine-acetaminophen, Gabapentin, Hydrocortisone, Lac bovis, Lactose - food allergy, Lyrica [pregabalin], Penicillins, Polymyxin b, Promethazine-phenyleph-codeine, Quinidine, Cefepime, and Rosuvastatin    Diagnosis:      Diagnosis ICD-10-CM Associated Orders   1  Pressure injury of sacral region, stage 4 ([facility])  L89 154 lidocaine (XYLOCAINE) 4 % topical solution 5 mL     Wound cleansing and dressings     Debridement   2  Pressure injury of left ischium, stage 4 ([facility])  L89 324 lidocaine (XYLOCAINE) 4 % topical solution 5 mL     Wound cleansing and dressings     Debridement   3  Pressure ulcer of left hip, stage 4 ([facility])  L89 224 lidocaine (XYLOCAINE) 4 % topical solution 5 mL     Wound cleansing and dressings     Debridement   4  Pressure ulcer of other site, stage 4 ([facility])  L89 894 lidocaine (XYLOCAINE) 4 % topical solution 5 mL     Wound cleansing and dressings     Debridement   5  Pressure injury of contiguous region involving right buttock and hip, stage 2 ([facility])  L89 42    6  Pressure injury of other site, stage 3 ([facility] [address] )  L89 893    7  Hypoalbuminemia  E88 09    8  Paraplegia ([facility])  G82 20    9  Type 1 diabetes mellitus with chronic kidney disease on chronic dialysis ([facility])  E10 22     N18 6     Z99 2    10  Chronic osteomyelitis ([facility] [address] )  M86 60            Assessment & Plan:  • Multiple stage IV pressure injuries as noted above  They measure slightly larger  There is still undermining  Most likely patient has chronic underlying osteomyelitis for past few years  This as per CT scans in workups at [facility]   • All the ulcers were surgically debrided in the undermined areas    • The scrotal pressure injuries have closed  The right hip has closed  • Wound care will consist of wet to dry dressings as previously  No Dakin's  Today he was packed with Prophase soaked Kerlix  • Hypoalbuminemia:  Latest albumin was increased to 3 0  Total protein is still elevated 8 8  • Type 1 diabetes:  Last A1c of 10/5/22 was 8 1  A1C results reviewed with the patient today  • Iron deficiency anemia  Has been seen by hematologic recently  Workup in progress  · All of his chronic illnesses impact his wound healing  This in addition to his ongoing use of his wheelchair (has Roho) and alternating air mattress  The patient would be better off with a Clinitron bed again rather than the current air mattress mostly due to continued deterioration of his pressure injuries     Both the patient and family have been instructed the proper use of the Clinitron  · He currently is on a group 2 pressure relieving support surface (air) from more than 30 days and the wounds continue to deteriorate  Due to the patient's bed bound status, the patient must be placed on air fluidized bed to begin healing and prevent new wounds from developing  In the absence of an air fluidized bed, the patient may require hospitalization or institutionalization for healing  · CT scan from 8/26/22 remarkable for evidence of sacral pressure injury extending caudally with bony destruction of the right worse than left ischium  These findings have been progressive over many scans over the last year  Subjective:   10/22/20:  Followup multiple pressure injuries to the buttocks and sacrum  He was hospitalized for back problems recently  Continues on hemodialysis  The patient states that his albumin has improved so that he may be able to get flap surgery  However when checking his most recent albumin was only 1 8  His total protein is elevated  11/19/20:  Followup multiple stage IV pressure or injuries to the buttocks and sacrum    Continues on hemodialysis  He is scheduled for plastic surgery for cyst on his forehead  Plastic surgery still will not do any flaps to his sacrum or buttocks because his albumin remains low  He has no other complaints  No fever, chills or cough  12/31/20: Followup multiple stage IV pressure injuries of the buttocks and sacrum  She notes some increased drainage and slight more odor  Never had his plastic surgery appointment for the cyst on his forehead  Denies any fever or chills  1/28/21:  Followup multiple stage IV pressure injuries of the buttocks, sacrum and perineum  Unfortunately, the patient was hospitalized for sepsis and back pain  Was found to have a fracture L3  He was given a back brace but only wears at home  He states that really does not give him very much relief  Unfortunately also while in the hospital, he developed a new pressure injury of his left hip  2/25/21:  Followup multiple stage IV pressure ulcers of the buttocks sacrum and perineum  Patient was hospitalized earlier this month for possible sepsis  He was found to have a burst fracture or osteo of L3  IR obtained specimen and was culture negative  However, no bone was obtained  He is scheduled to have another IR intervention March 16th for bone biopsy  Left hip ulcer broke down with large cavity  Otherwise, cultures were negative  He was discharged on no medications  He has not had any recent fever or chills  He did have fever when he was admitted to the hospital     03/21/2021  Mr Maxime Kinsey is a 72-year-old gentleman with paraplegia and multiple stage IV pressure ulcers was referred for evaluation  He has large chronic ulcers on his buttocks and sacrum but he developed a new ulcer over last month on his left hip  He is scheduled for IR biopsy in his spine for possible chronic osteo  He also recently underwent dental extractions and cyst removed from his head and neck      4/22/21:  Followup multiple stage IV pressure injuries of the buttocks, sacrum, perineum and left hip  He was last seen by Dr Geeta Herzog in March  Since then, unfortunately he was admitted to the hospital with pneumonia  He is now doing better  He is changing his dressings daily because of drainage  There has been no increased drainage, no odor and he denies any fever or chills  He has no cough  5/27/21:  Patient returns regarding his his multiple stage IV pressure injuries of the buttocks, sacrum, perineum and left hip  Unfortunately, he was hospitalized for a few days in April regarding FUO with negative cultures  He was "treated empirically with vancomycin and cefepime - improved with no positive cultures and CT scan finding consistent with known decubitus ulcerations and chronic pelvic osteomyelitis "  He is back to baseline  Patient states that he does notice more odor  7/8/21: Followup multiple stage IV pressure injuries of the buttocks, sacrum, perineum and left hip  He continues with Dakin's packing  He had multiple excisions on his face of follicular cysts by plastic surgery  He still cannot have surgery of his pressure injuries due to chronically low albumin  He has not yet contacted his PCP for nutritionist consultation  8/19/21: Followup multiple stage IV pressure injuries of the buttocks, sacrum, perineum and left  He was packing with Dakin's  Nothing else new  He still has not had a nutrition is consultation  9/30/21: Followup multiple stage IV pressure injuries of the buttocks, sacrum, perineum and left hip  He continues with cleansing Dakin's soaks followed by wet-to-dry saline packing  Nothing is new  No pain  No fever or chills  11/11/21: Followup multiple stage IV pressure injuries of the buttock, sacrum, left hip perineum  Recent hospitalization for sepsis  Possible pneumonia  His ulcers have not changed  He is using Dakin's packing again  No other new complaints    States that he is spending is nights in his Clinitron and only spending up to maximum 3 hours out in his chair and then going back to the Clinitron during the day  12/30/21: Followup multiple stage IV pressure injuries of the buttocks, sacrum, left hip and perineum  He was hospitalized again for three days any states that his ulcers deteriorated slightly because he claims that the dressings were not changed  No fever or chills currently  Still using his Clinitron bed     2/10/22:  Patient returns regarding his multiple stage IV pressure injuries of the buttocks, sacrum, left hip and perineum  Unfortunately, he was hospitalized twice since his last visit  The 1st time he was septic with unknown source  The 2nd hospitalization was due to bleeding from newly placed dialysis catheter  No specific complaints at this time  They have been using saline packing  Still in his Clinitron bed     3/24/22: Followup multiple stage IV pressure injuries of the buttocks, sacrum, left hip in perineum  No new events since last visit  He is using saline packing  Clinitron bed  No fever or chills  5/5/22: Followup multiple stage IV pressure injuries  Patient unfortunately was hospitalized with viral pneumonia at the end of March  He feels better  No coughing  He states that he is eating better  However, his albumin was still 1 8  Total protein was normal   Using saline packing  06/30/22: 38 y/o M with PMHx of spinal paraplegia, neurogenic bladder with chronic suprapubic catheter in place, ESRD on dialysis M/W/F, anemia of CKD, secondary hyperparathyroidism, type I DM, colostomy bag in place,tricupsid valve endocarditis, chronic osteomyelitis, chronic pressure injuries, afib, and hx of DVT on Eliquis presenting for f/u of multiple stage 4 pressure injuries of the sacrum, left hip, L ischium, and perineum  Pt was recently hospitalized on 06/17/22 for C  Difficile  Has been using wet to dry dressings   States he has been attempting to increase his protein intake and has gained some weight but his albumin remains low and is confused why that is  9/1/22: Followup multiple stage IV pressure injuries of the sacrum and ischial tuberosities  Patient was hospitalized recently for sepsis and was told it was due to his pressure injuries  However, they do not look any worse than they ever have been  He was discharged on oral medications  He continues using wet to dry dressings  Prior to hospitalization he developed two new pressure injuries of his posterior scrotum  He continues to supposedly eat well with increased protein  10/13/22: Followup multiple stage IV pressure injuries of the sacrum, perineum and ischial tuberosities and left hip     At last visit, he had scrotal pressure injuries as well  He is using wet to dry dressings  He no longer has his Clinitron and is using an alternating low air loss mattress  He does not like the air mattress because is more difficult to get in and out of  He has not had any fevers or chills                    The following portions of the patient's history were reviewed and updated as appropriate:   Patient Active Problem List   Diagnosis   • Paraplegia ([place] [address] )   • Atrial fibrillation ([facility])   • Chronic suprapubic catheter ([facility])   • Colostomy care ([place] [address] )   • OAB (overactive bladder)   • S/P unilateral BKA (below knee amputation) ([facility] )   • Tobacco abuse   • Type 1 diabetes mellitus with chronic kidney disease on chronic dialysis ([facility] )   • Ulcer of sacral region, stage 4 ([facility] )   • Chronic indwelling Ortega catheter   • Wound healing, delayed   • Iron deficiency anemia   • Pressure injury of left ischium, stage 4 ([facility])   • HTN (hypertension), benign   • Neurogenic bladder   • GERD (gastroesophageal reflux disease)   • Chronic pain   • Stage 5 chronic kidney disease on chronic dialysis [facility])   • Penile abscess   • History of Clostridium difficile infection   • Urinary retention   • Delirium   • Dialysis patient [facility])   • Insulin long-term use ([facility])   • Wheelchair dependent   • Recurrent Clostridioides difficile diarrhea   • Bipolar depression ([facility])   • 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[facility])   • Seizure ([place] [address] )   • Pressure ulcer of sacral region, stage 4 ([facility])   • AVM (arteriovenous malformation) of duodenum, acquired   • Chronic narcotic dependence ([facility])   • Chronic diastolic heart failure ([facility])   • Pressure ulcer of other site, stage 4 ([facility])   • Pressure ulcer of left hip, stage 4 ([facility])   • ED (erectile dysfunction) of organic origin   • Irritable bowel syndrome   • Onychomycosis   • Pustular folliculitis   • Prolonged Q-T interval on ECG   • Secondary hyperparathyroidism of renal origin ([place] [address] )   • Wounds, multiple   • Immunocompromised state ([place] [address] )   • Severe protein-calorie malnutrition ([facility])   • Chronic osteomyelitis ([place] [address] )   • Acute deep vein thrombosis (DVT) of right upper extremity ([facility])   • C  difficile colitis   • Charcot's arthropathy   • Diabetic gastroparesis associated with type 1 diabetes mellitus ([place] [address] )   • End stage renal disease ([place] [address] )   • Hyperlipidemia   • LVH (left ventricular hypertrophy)   • NICM (nonischemic cardiomyopathy) ([place] [address] )   • Vitamin B deficiency   • Vitamin C deficiency   • Vitamin D deficiency   • Chest congestion     Past Medical History:   Diagnosis Date   • Acute pulmonary edema ([place] [address] ) 1/13/2022   • Ambulatory dysfunction    • Anemia    • Anemia, iron deficiency     transfusion requiring   • Asymptomatic bacteriuria 9/25/2017   • Atrial fibrillation ([place] [address] )    • AVM (arteriovenous malformation) of duodenum, 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   • Bacteriuria, asymptomatic    • Bipolar disorder ([place] [address] )    • Chronic deep vein thrombosis (DVT) ([facility])    • Chronic indwelling Ortega catheter    • Chronic kidney disease    • Chronic pain    • Chronic pain disorder    • Chronic suprapubic catheter ([place] [address] )    • Clostridium difficile infection 08/11/2016    also positive 9/2016, 5/29/2017, 8/15/2017   S/P fecal transplant   • Colostomy on examination [facility])    • Decubitus ulcer    • Delirium    • Diabetes mellitus ([facility] )    • GERD (gastroesophageal reflux disease)    • Hemodialysis patient [facility])    • Hypertension    • Memory impairment 2011    s/p diabetic coma   • Neurogenic bladder    • OAB (overactive bladder)    • Paraplegia ([facility])     T3 transverse myelitis vs spinal stoke ([facility]M); 2012 extensive epidural abscess C7=> conus 2nd extension from deep parspinal abscess L4-S2/sacral decubitus; cord atrophy/myelomalcia T3=>conus    • Penile abscess    • S/P unilateral BKA (below knee amputation) ([facility] )     Right   • Sebaceous cyst removed in 2017   • Seizures ([facility])    • Shortness of breath    • Tobacco abuse    • 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[facility])    • Urinary retention    • Wheelchair dependent    • Wounds, multiple     pressure ulcers with delayed healing     Past Surgical History:   Procedure Laterality Date   • BELOW KNEE LEG AMPUTATION Right 2009   • COLONOSCOPY N/A 03/27/2017    Procedure: COLONOSCOPY;  Surgeon: Katheryn Herrera MD;  Location: [facility]; Service:    • COLONOSCOPY N/A 03/29/2017    Procedure: COLONOSCOPY;  Surgeon: Katheryn Herrera MD;  Location: [facility]; Service:    • COLONOSCOPY N/A 06/15/2017    Procedure: COLONOSCOPY with FMT;  Surgeon: Alejandro Stanton MD;  Location: [facility]; Service: Gastroenterology   • ESOPHAGOGASTRODUODENOSCOPY N/A 03/22/2017    Procedure: ESOPHAGOGASTRODUODENOSCOPY (EGD); Surgeon: Carolin Jack DO;  Location: [facility];   Service:    • MULTIPLE TOOTH EXTRACTIONS N/A 03/08/2021    Procedure: EXTRACTION TEETH # 2,3,6,10,12,13,14,18,19,20,21,22,23,24,25,26,27,28,29,30;  Surgeon: Reshma Hensley DMD;  Location: [facility];  Service: Maxillofacial   • SKIN BIOPSY       Family History   Problem Relation Age of Onset   • Hyperlipidemia Mother    • Hypertension Mother    • Leukemia Brother    • Diabetes Paternal Grandfather       Social History     Socioeconomic History   • Marital status: Single     Spouse name: Not on file • Number of children: Not on file   • Years of education: Not on file   • Highest education level: Not on file   Occupational History   • Not on file   Tobacco Use   • Smoking status: Current Every Day Smoker     Years: 28 00     Types: Cigars   • Smokeless tobacco: Never Used   • Tobacco comment: about 3 cigars/day   Vaping Use   • Vaping Use: Never used   Substance and Sexual Activity   • Alcohol use: Not Currently     Comment: 1 cup of wine every 3-4 months   • Drug use: Not Currently     Types: Marijuana     Comment: rarely; once every 1-2 years   • Sexual activity: Not on file   Other Topics Concern   • Not on file   Social History Narrative   • Not on file     Social Determinants of Health     Financial Resource Strain: Low Risk    • Difficulty of Paying Living Expenses: Not hard at all   Food Insecurity: No Food Insecurity   • Worried About Running Out of Food in the Last Year: Never true   • Ran Out of Food in the Last Year: Never true   Transportation Needs: No Transportation Needs   • Lack of Transportation (Medical): No   • Lack of Transportation (Non-Medical):  No   Physical Activity: Not on file   Stress: Not on file   Social Connections: Not on file   Intimate Partner Violence: Not on file   Housing Stability: Not on file        Current Outpatient Medications:   •  Acetaminophen 325 MG CAPS, Take 650 mg by mouth, Disp: , Rfl:   •  Alcohol Swabs (ALCOHOL PADS) 70 % PADS, by Does not apply route 5 (five) times a day, Disp: 300 each, Rfl: 5  •  amLODIPine (NORVASC) 10 mg tablet, Take 1 tablet (10 mg total) by mouth daily, Disp: 90 tablet, Rfl: 1  •  ammonium lactate (LAC-HYDRIN) 12 % cream, , Disp: , Rfl:   •  apixaban (ELIQUIS) 5 mg, Take 1 tablet (5 mg total) by mouth 2 (two) times a day, Disp: 270 tablet, Rfl: 1  •  ascorbic acid (VITAMIN C) 250 mg tablet, TAKE 2 TABLETS (500 MG TOTAL) BY MOUTH DAILY, Disp: 180 tablet, Rfl: 1  •  atorvastatin (LIPITOR) 20 mg tablet, Take 1 tablet (20 mg total) by mouth daily at bedtime, Disp: 90 tablet, Rfl: 1  •  B Complex-C-Folic Acid (Super B-Complex/Vit C/FA) TABS, TAKE 1 TABLET BY MOUTH EVERY DAY AS DIRECTED, Disp: 90 tablet, Rfl: 3  •  SUSAN MICROLET LANCETS lancets, Use as instructed to check blood sugar 4 times a day Dx e10 29, Disp: 200 each, Rfl: 5  •  bisacodyl (DULCOLAX) 5 mg EC tablet, Take 1 tablet (5 mg total) by mouth once for 1 dose, Disp: 2 tablet, Rfl: 0  •  Blood Glucose Monitoring Suppl ([employer] CONTOUR NEXT USB MONITOR) w/Device KIT, Testing 4 times a day, Disp: 1 kit, Rfl: 0  •  calcitriol (ROCALTROL) 0 5 MCG capsule, Take 2 mcg by mouth, Disp: , Rfl:   •  calcium acetate (PHOSLO) capsule, TAKE 2 CAPSULES BY MOUTH THREE TIMES DAILY WITH MEALS, Disp: , Rfl:   •  carvedilol (COREG) 25 mg tablet, Take 1 tablet (25 mg total) by mouth 2 (two) times a day, Disp: 180 tablet, Rfl: 1  •  cetirizine (ZyrTEC) 10 mg tablet, Take 1 tablet (10 mg total) by mouth daily, Disp: 90 tablet, Rfl: 1  •  Cholecalciferol (VITAMIN D-3) 1000 units CAPS, Take 2 capsules by mouth daily, Disp: 30 capsule, Rfl: 0  •  clindamycin (CLINDAGEL) 1 % gel, APPLY TO AFFECTED AREA TWICE A DAY, Disp: 60 g, Rfl: 2  •  cyanocobalamin ([employer] Vitamin B-12) 1000 MCG tablet, Take 1 tablet (1,000 mcg total) by mouth daily, Disp: 30 tablet, Rfl: 5  •  cyclobenzaprine (FLEXERIL) 5 mg tablet, , Disp: , Rfl:   •  dexlansoprazole (DEXILANT) 30 MG capsule, TAKE 1 CAPSULE BY MOUTH EVERY DAY, Disp: 180 capsule, Rfl: 2  •  dicyclomine (BENTYL) 10 mg capsule, TAKE 1 CAPSULE BY MOUTH 3 TIMES A DAY BEFORE MEALS, Disp: 270 capsule, Rfl: 1  •  Disposable Gloves MISC, Use 7 times a day, 4 boxes of gloves XL Dx type 1 DM, paraplegia, Disp: 4 each, Rfl: 11  •  doxazosin (CARDURA) 2 mg tablet, TAKE 1 TABLET BY MOUTH EVERY DAY IN THE EVENING, Disp: 90 tablet, Rfl: 0  •  doxycycline monohydrate (MONODOX) 100 mg capsule, Take 1 capsule (100 mg total) by mouth 2 (two) times a day Take with full glass of water and food, Disp: 60 capsule, Rfl: 2  •  epoetin kaushik (EPOGEN,PROCRIT) 20,000 units/mL, Inject 10,000 Units under the skin, Disp: , Rfl:   •  epoetin kaushik (EPOGEN,PROCRIT) 20,000 units/mL, Inject 5,000 Units under the skin, Disp: , Rfl:   •  ferrous sulfate 325 (65 Fe) mg tablet, Take 1 tablet (325 mg total) by mouth 3 (three) times a day, Disp: 90 tablet, Rfl: 3  •  guaiFENesin (MUCINEX) 600 mg 12 hr tablet, Take 2 tablets (1,200 mg total) by mouth every 12 (twelve) hours, Disp: 60 tablet, Rfl: 2  •  Gvoke PFS 1 MG/0 2ML SOSY, INJECT 1 ML (1 MG TOTAL) INTO A MUSCLE ONCE FOR 1 DOSE, Disp: , Rfl:   •  hydrOXYzine HCL (ATARAX) 50 mg tablet, TAKE 1 TABLET (50 MG TOTAL) BY MOUTH 2 (TWO) TIMES A DAY AS NEEDED FOR ITCHING OR ANXIETY, Disp: 180 tablet, Rfl: 1  •  Incontinence Supply Disposable (Incontinence Brief Large) MISC, Use 6 (six) times a day Size xl, Disp: 180 each, Rfl: 11  •  Incontinence Supply Disposable (Undergarment) MISC, Use 6 a day, 5 boxes, xl Dx R51, paraplegia, Disp: 5 each, Rfl: 11  •  Incontinence Supply Disposable (Underpads) MISC, Use 2 a day, Disp: 5 each, Rfl: 11  •  ketoconazole (NIZORAL) 2 % cream, Apply to rash , Disp: 15 g, Rfl: 1  •  LEVEMIR FLEXTOUCH 100 units/mL injection pen, Inject 14 Units under the skin 2 (two) times a day, Disp: 15 pen, Rfl: 3  •  Lokelma 10 g PACK, , Disp: , Rfl:   •  losartan (COZAAR) 100 MG tablet, TAKE 1 TABLET BY MOUTH EVERY DAY, Disp: 90 tablet, Rfl: 1  •  Melatonin ER 3 MG TBCR, Take 6 mg by mouth, Disp: , Rfl:   •  metroNIDAZOLE (METROGEL) 0 75 % gel, Apply topically 2 (two) times a day, Disp: 45 g, Rfl: 0  •  Misc  Devices (Wheelchair Cushion) MISC, Use daily, Disp: 1 each, Rfl: 0  •  Misc   Devices [facility] [place]) MISC, by Does not apply route daily Wheelchair ramp, dx g82 20, Disp: 1 each, Rfl: 0  •  Multiple Vitamin (Daily-Enriqueta Multivitamin) TABS, TAKE 1 TABLET BY MOUTH EVERY DAY, Disp: 90 tablet, Rfl: 2  •  NovoLOG FlexPen 100 units/mL injection pen, INJECT 3 UNITS UNDER THE SKIN 3 (THREE) TIMES A DAY BEFORE MEALS , Disp: , Rfl:   •  oxybutynin (DITROPAN XL) 15 MG 24 hr tablet, , Disp: , Rfl:   •  oxybutynin (DITROPAN) 5 mg tablet, Take 3 tablets (15 mg total) by mouth daily, Disp: 270 tablet, Rfl: 1  •  polyethylene glycol (GLYCOLAX) 17 GM/SCOOP powder, Take as directed as per written office instructions, Disp: 238 g, Rfl: 0  •  polyethylene glycol (GOLYTELY) 4000 mL solution, Take 4,000 mL by mouth once for 1 dose, Disp: 4000 mL, Rfl: 0  •  risperiDONE (RisperDAL) 0 5 mg tablet, Take 1 tablet (0 5 mg total) by mouth 2 (two) times a day, Disp: 180 tablet, Rfl: 1  •  sertraline (ZOLOFT) 25 mg tablet, TAKE 1/2 TABLET BY MOUTH EVERY DAY, Disp: 45 tablet, Rfl: 0  •  Sodium Zirconium Cyclosilicate 10 g PACK, Take 10 g by mouth daily, Disp: , Rfl:   •  sucralfate (CARAFATE) 1 g/10 mL suspension, Take 10 mL (1 g total) by mouth 4 (four) times a day (with meals and at bedtime), Disp: 420 mL, Rfl: 1  •  SUPER B COMPLEX & C TABS, TAKE 1 CAPSULE BY MOUTH ONCE FOR 1 DOSE AS DIRECTED, Disp: 90 tablet, Rfl: 2  •  Zinc Sulfate 220 (50 Zn) MG TABS, Take 1 tablet by mouth daily, Disp: 90 tablet, Rfl: 1  No current facility-administered medications for this visit  Review of Systems   Constitutional: Negative for activity change, appetite change (Improving), chills, fatigue, fever and unexpected weight change  HENT: Negative for congestion, hearing loss and postnasal drip  Eyes: Negative for visual disturbance  Respiratory: Negative for cough and shortness of breath  Cardiovascular: Negative for chest pain and leg swelling  Gastrointestinal: Negative for constipation, diarrhea and nausea  Genitourinary: Negative for dysuria and urgency  Indwelling Ortega catheter   Musculoskeletal: Positive for gait problem (Nonambulatory, paraplegic)  Skin: Positive for wound (Sacral, left ischium, left hip and scrotal)  Negative for rash  Neurological: Positive for weakness  Hematological: Does not bruise/bleed easily  Psychiatric/Behavioral: Positive for dysphoric mood  Objective:  /70   Pulse 72   Temp 97 5 °F (36 4 °C)   Resp 18   Pain Score: 0-No pain     Physical Exam  Vitals and nursing note reviewed  Constitutional:       General: He is not in acute distress  Appearance: He is underweight  He is not toxic-appearing  HENT:      Head: Normocephalic and atraumatic  Cardiovascular:      Rate and Rhythm: Normal rate  Pulmonary:      Effort: Pulmonary effort is normal  No respiratory distress  Breath sounds: No stridor  Abdominal:      Comments: The abdomen around the ostomy is slightly full and tender  Skin:     Findings: Wound (Both buttocks and sacrum) present  No erythema  Comments: See full wound assessment  Sacral pressure injury generally clean but with slightly less undermining  No signs of infection and no malodor  No exposed bone  Left hip pressure injury with fibrin but deeper  The opening is smaller     No significant changes  Bilateral ischial pressure injuries with fibrin and some areas of epithelialization  No significant changes except for increased undermining     No signs of infection and no malodor  Posterior scrotal pressure injuries x2  Both her closed at this time  Neurological:      Mental Status: He is alert and oriented to person, place, and time  Gait: Gait abnormal (paraplegia)     Psychiatric:         Mood and Affect: Affect normal          Cognition and Memory: Cognition normal                                   Wound Pressure Injury Ischium Left (Active)   Wound Image Images linked 10/13/22 1139       Wound Pressure Injury Perineum (Active)   Wound Image Images linked 10/13/22 1139       Wound Pressure Injury Sacrum (Active)   Wound Image Images linked 10/13/22 1139       Wound 01/28/21 Pressure Injury Hip Left;Lateral (Active)   Wound Image Images linked 10/13/22 1140       Debridement Wound Pressure Injury Ischium Left    Universal Protocol:  Consent: Verbal consent obtained  Written consent obtained  Consent given by: patient  Time out: Immediately prior to procedure a "time out" was called to verify the correct patient, procedure, equipment, support staff and site/side marked as required  Patient understanding: patient states understanding of the procedure being performed  Patient identity confirmed: verbally with patient      Performed by: physician  Debridement type: surgical  Level of debridement: subcutaneous tissue  Pain control: lidocaine 4%  Post-debridement measurements  Length (cm): 6  Width (cm): 6 5  Depth (cm): 1 4  Percent debrided: 20%  Surface Area (cm^2): 39  Area debrided (cm^2): 7 8  Volume (cm^3): 54 6  Tissue and other material debrided: dermis and subcutaneous tissue  Devitalized tissue debrided: fibrin and necrotic debris  Instrument(s) utilized: curette  Bleeding: medium  Hemostasis obtained with: pressure  Procedural pain (0-10): 0  Post-procedural pain: 0   Response to treatment: procedure was tolerated well  Debridement Comments: All  undermining was debrided  Debridement   Wound Pressure Injury Perineum    Universal Protocol:  Consent: Verbal consent obtained  Written consent obtained  Consent given by: patient  Time out: Immediately prior to procedure a "time out" was called to verify the correct patient, procedure, equipment, support staff and site/side marked as required    Patient understanding: patient states understanding of the procedure being performed  Patient identity confirmed: verbally with patient      Performed by: physician  Debridement type: surgical  Level of debridement: subcutaneous tissue  Pain control: lidocaine 4%  Post-debridement measurements  Length (cm): 6 7  Width (cm): 6 3  Depth (cm): 1 9  Percent debrided: 20%  Surface Area (cm^2): 42 21  Area debrided (cm^2): 8 44  Volume (cm^3): 80 2  Tissue and other material debrided: dermis and subcutaneous tissue  Devitalized tissue debrided: fibrin and necrotic debris  Instrument(s) utilized: curette  Bleeding: medium  Hemostasis obtained with: pressure  Procedural pain (0-10): 0  Post-procedural pain: 0   Response to treatment: procedure was tolerated well  Debridement Comments: All undermining was debrided  Debridement   Wound Pressure Injury Sacrum    Universal Protocol:  Consent: Verbal consent obtained  Written consent obtained  Consent given by: patient  Time out: Immediately prior to procedure a "time out" was called to verify the correct patient, procedure, equipment, support staff and site/side marked as required  Patient understanding: patient states understanding of the procedure being performed  Patient identity confirmed: verbally with patient      Performed by: physician  Debridement type: surgical  Level of debridement: subcutaneous tissue  Pain control: lidocaine 4%  Post-debridement measurements  Length (cm): 4 5  Width (cm): 8 4  Depth (cm): 1 5  Percent debrided: 20%  Surface Area (cm^2): 37 8  Area debrided (cm^2): 7 56  Volume (cm^3): 56 7  Tissue and other material debrided: dermis and subcutaneous tissue  Devitalized tissue debrided: fibrin and necrotic debris  Instrument(s) utilized: curette  Bleeding: medium  Hemostasis obtained with: pressure  Procedural pain (0-10): 0  Post-procedural pain: 0   Response to treatment: procedure was tolerated well  Debridement Comments: All undermining was debrided  Debridement   Wound 01/28/21 Pressure Injury Hip Left;Lateral    Universal Protocol:  Consent: Verbal consent obtained  Written consent obtained  Consent given by: patient  Time out: Immediately prior to procedure a "time out" was called to verify the correct patient, procedure, equipment, support staff and site/side marked as required    Patient understanding: patient states understanding of the procedure being performed  Patient identity confirmed: verbally with patient      Performed by: physician  Debridement type: surgical  Level of debridement: subcutaneous tissue  Pain control: lidocaine 4%  Post-debridement measurements  Length (cm): 2 2  Width (cm): 1 5  Depth (cm): 1 4  Percent debrided: 100%  Surface Area (cm^2): 3 3  Area debrided (cm^2): 3 3  Volume (cm^3): 4 62  Tissue and other material debrided: dermis and subcutaneous tissue  Devitalized tissue debrided: fibrin  Instrument(s) utilized: curette  Bleeding: large  Hemostasis obtained with: pressure  Procedural pain (0-10): 0  Post-procedural pain: 0   Response to treatment: procedure was tolerated well  Debridement Comments: Entire pressure injury was debrided  Wound Instructions:  Orders Placed This Encounter   Procedures   • Wound cleansing and dressings     All wounds except right lateral hip:  Wash your hands with soap and water  Remove old dressing, discard into plastic bag and place in trash  Cleanse the wound with sterile saline solution (rinse) prior to applying a clean dressing  Do not use tissue or cotton balls  Do not scrub the wound  Pat dry using gauze  Shower no; do not get dressing wet     Apply saline moistened gauze  Cover with an ABD pad  Secure with Medfix tape    Change dressing daily and PRN for breakthrough drainage (visiting nurses to do twice per week and family in between)     Scrotum wounds are healed today     Follow up in 6 weeks   Continue visiting nurse skilled for wound care dressing changes                             Treatment in the wound center today:  Prohase moistened gauze applied to remaining wounds and dressed as ordered above     Standing Status:   Future     Standing Expiration Date:   10/13/2023   • Debridement     This order was created via procedure documentation   • Debridement     This order was created via procedure documentation   • Debridement     This order was created via procedure documentation   • Debridement     This order was created via procedure documentation           Total time spent today:  35 minutes  This includes reviewing the patient's chart, pertinent physician records from recent [facility] admission and recent blood work from October 5th  Laboratory data reviewed includes CMP, A1c, CBC, iron/anemia profile, all from 10/5/22  CT scan from 8/26/22      Hanna Darden MD, CHT, CWS    Portions of the record may have been created with voice recognition software  Occasional wrong word or "sound alike" substitutions may have occurred due to the inherent limitations of voice recognition software  Read the chart carefully and recognize, using context, where substitutions have occurred

## 2022-10-13 NOTE — LETTER
[facility]  [address] [facility] [address]  Phone#  [phone]  Fax#  [phone]    Patient:  Mariely Starkey  YOB: 1980  Phone:  [phone]  Date of Visit:  10/13/2022    Orders Placed This Encounter   Procedures   • Wound cleansing and dressings     All wounds except right lateral hip:  Wash your hands with soap and water  Remove old dressing, discard into plastic bag and place in trash  Cleanse the wound with sterile saline solution (rinse) prior to applying a clean dressing  Do not use tissue or cotton balls  Do not scrub the wound  Pat dry using gauze  Shower no; do not get dressing wet     Apply saline moistened gauze  Cover with an ABD pad  Secure with Medfix tape    Change dressing daily and PRN for breakthrough drainage (visiting nurses to do twice per week and family in between)     Scrotum wounds are healed today     Follow up in 6 weeks   Continue visiting nurse skilled for wound care dressing changes                             Treatment in the wound center today:  Prohase moistened gauze applied to remaining wounds and dressed as ordered above     Standing Status:   Future     Standing Expiration Date:   10/13/2023         Electronically signed by Brenda Ellis MD

## 2022-10-25 ENCOUNTER — TELEPHONE (OUTPATIENT)
Dept: FAMILY MEDICINE CLINIC | Facility: CLINIC | Age: 42
End: 2022-10-25

## 2022-10-25 NOTE — TELEPHONE ENCOUNTER
PCP SIGNATURE NEEDED FOR [facility]  FORM RECEIVED VIA FAX AND PLACED IN PCP FOLDER TO BE DELIVERED AT ASSIGNED TIMES        [facility] \[facility] [place] [facility]\"" Recertification/Plan of care

## 2022-11-01 ENCOUNTER — OFFICE VISIT (OUTPATIENT)
Dept: FAMILY MEDICINE CLINIC | Facility: CLINIC | Age: 42
End: 2022-11-01

## 2022-11-01 VITALS
RESPIRATION RATE: 14 BRPM | OXYGEN SATURATION: 98 % | DIASTOLIC BLOOD PRESSURE: 76 MMHG | HEART RATE: 94 BPM | TEMPERATURE: 98.2 F | SYSTOLIC BLOOD PRESSURE: 122 MMHG

## 2022-11-01 DIAGNOSIS — N18.6 TYPE 1 DIABETES MELLITUS WITH CHRONIC KIDNEY DISEASE ON CHRONIC DIALYSIS (HCC): ICD-10-CM

## 2022-11-01 DIAGNOSIS — Z72.0 TOBACCO ABUSE: Chronic | ICD-10-CM

## 2022-11-01 DIAGNOSIS — L98.429 ULCER OF SACRAL REGION, STAGE 4 (HCC): ICD-10-CM

## 2022-11-01 DIAGNOSIS — E10.22 TYPE 1 DIABETES MELLITUS WITH CHRONIC KIDNEY DISEASE ON CHRONIC DIALYSIS (HCC): ICD-10-CM

## 2022-11-01 DIAGNOSIS — I10 HTN (HYPERTENSION), BENIGN: Primary | Chronic | ICD-10-CM

## 2022-11-01 DIAGNOSIS — Z99.2 TYPE 1 DIABETES MELLITUS WITH CHRONIC KIDNEY DISEASE ON CHRONIC DIALYSIS (HCC): ICD-10-CM

## 2022-11-01 DIAGNOSIS — G82.20 PARAPLEGIA (HCC): Chronic | ICD-10-CM

## 2022-11-01 DIAGNOSIS — I48.91 ATRIAL FIBRILLATION, UNSPECIFIED TYPE (HCC): Chronic | ICD-10-CM
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
## 2022-11-03 DIAGNOSIS — Z00.00 HEALTHCARE MAINTENANCE: ICD-10-CM

## 2022-11-03 RX ORDER — MULTIVITAMIN WITH FOLIC ACID 400 MCG
TABLET ORAL
Qty: 30 TABLET | Refills: 8 | Status: SHIPPED | OUTPATIENT
Start: 2022-11-03

## 2022-11-06 PROBLEM — I10 HTN (HYPERTENSION), BENIGN: Chronic | Status: ACTIVE | Noted: 2017-07-03

## 2022-11-07 NOTE — ASSESSMENT & PLAN NOTE
- Blood pressure stable today  Continue amlodipine 5 mg daily, carvedilol 25 mg twice daily as prescribed by Nephrology  - Reviewed BP target goal with patient  - Continue checking blood pressure several times a day and keeping blood pressure log to bring into nephrologist every week

## 2022-11-07 NOTE — ASSESSMENT & PLAN NOTE
- Unable to reassess today due to patient declining exam   - Continue close follow-up with Wound Care as scheduled

## 2022-11-07 NOTE — ASSESSMENT & PLAN NOTE
- Counseled patient on smoking cessation, however patient is not ready to quit at this time and denies nicotine replacement therapy

## 2022-11-07 NOTE — ASSESSMENT & PLAN NOTE
- Continue medications as prescribed through endocrinology  - Continue close follow-up with endocrinology as scheduled      Lab Results   Component Value Date    HGBA1C 8 2 (H) 05/10/2022    HGBA1C 7 3 (H) 01/20/2022    HGBA1C 7 4 (H) 10/19/2021

## 2022-11-08 NOTE — TELEPHONE ENCOUNTER
FAXED ON 11/07/22 TO [facility] at [phone]  FAX CONFIRMATION RECEIVED  SCANNED INTO CHART

## 2022-11-09 ENCOUNTER — TELEPHONE (OUTPATIENT)
Dept: FAMILY MEDICINE CLINIC | Facility: CLINIC | Age: 42
End: 2022-11-09

## 2022-11-09 DIAGNOSIS — I10 HTN (HYPERTENSION), BENIGN: ICD-10-CM

## 2022-11-09 DIAGNOSIS — F31.9 BIPOLAR DEPRESSION (HCC): ICD-10-CM

## 2022-11-09 NOTE — TELEPHONE ENCOUNTER
PCP [facility] FORM RECEIVED VIA FAX AND PLACED IN PCP FOLDER TO BE DELIVERED AT ASSIGNED TIMES      ORDER # [id number]

## 2022-11-10 RX ORDER — DOXAZOSIN 2 MG/1
TABLET ORAL
Qty: 90 TABLET | Refills: 0 | Status: SHIPPED | OUTPATIENT
Start: 2022-11-10

## 2022-11-10 RX ORDER — SERTRALINE HYDROCHLORIDE 25 MG/1
TABLET, FILM COATED ORAL
Qty: 45 TABLET | Refills: 0 | Status: SHIPPED | OUTPATIENT
Start: 2022-11-10

## 2022-11-15 ENCOUNTER — TELEPHONE (OUTPATIENT)
Dept: FAMILY MEDICINE CLINIC | Facility: CLINIC | Age: 42
End: 2022-11-15

## 2022-11-15 NOTE — TELEPHONE ENCOUNTER
[address] left voicemail stating they faxed forms to be fill out and fax back, they will fax again for the PCP

## 2022-11-29 LAB — HBA1C MFR BLD HPLC: 7.9 %

## 2022-12-03 DIAGNOSIS — I48.91 ATRIAL FIBRILLATION, UNSPECIFIED TYPE (HCC): Chronic | ICD-10-CM

## 2022-12-05 RX ORDER — CHOLECALCIFEROL (VITAMIN D3) 25 MCG
TABLET,CHEWABLE ORAL
Qty: 90 TABLET | Refills: 4 | Status: SHIPPED | OUTPATIENT
Start: 2022-12-05

## 2022-12-06 ENCOUNTER — DOCUMENTATION (OUTPATIENT)
Dept: WOUND CARE | Facility: CLINIC | Age: 42
End: 2022-12-06

## 2022-12-06 ENCOUNTER — OFFICE VISIT (OUTPATIENT)
Dept: WOUND CARE | Facility: CLINIC | Age: 42
End: 2022-12-06

## 2022-12-06 VITALS
DIASTOLIC BLOOD PRESSURE: 86 MMHG | SYSTOLIC BLOOD PRESSURE: 146 MMHG | RESPIRATION RATE: 18 BRPM | TEMPERATURE: 96 F | HEART RATE: 96 BPM

## 2022-12-06 DIAGNOSIS — E10.22 TYPE 1 DIABETES MELLITUS WITH CHRONIC KIDNEY DISEASE ON CHRONIC DIALYSIS (HCC): ICD-10-CM

## 2022-12-06 DIAGNOSIS — L89.324 PRESSURE INJURY OF LEFT ISCHIUM, STAGE 4 (HCC): ICD-10-CM

## 2022-12-06 DIAGNOSIS — L89.224 PRESSURE ULCER OF LEFT HIP, STAGE 4 (HCC): ICD-10-CM

## 2022-12-06 DIAGNOSIS — Z99.2 TYPE 1 DIABETES MELLITUS WITH CHRONIC KIDNEY DISEASE ON CHRONIC DIALYSIS (HCC): ICD-10-CM

## 2022-12-06 DIAGNOSIS — L89.894 PRESSURE ULCER OF OTHER SITE, STAGE 4 (HCC): ICD-10-CM

## 2022-12-06 DIAGNOSIS — L89.154 PRESSURE INJURY OF SACRAL REGION, STAGE 4 (HCC): Primary | ICD-10-CM

## 2022-12-06 DIAGNOSIS — G82.20 PARAPLEGIA (HCC): ICD-10-CM

## 2022-12-06 DIAGNOSIS — N18.6 TYPE 1 DIABETES MELLITUS WITH CHRONIC KIDNEY DISEASE ON CHRONIC DIALYSIS (HCC): ICD-10-CM

## 2022-12-06 RX ORDER — LIDOCAINE HYDROCHLORIDE 40 MG/ML
5 SOLUTION TOPICAL ONCE
Status: COMPLETED | OUTPATIENT
Start: 2022-12-06 | End: 2022-12-06

## 2022-12-06 RX ADMIN — LIDOCAINE HYDROCHLORIDE 5 ML: 40 SOLUTION TOPICAL at 14:59

## 2022-12-06 NOTE — PATIENT INSTRUCTIONS
Orders Placed This Encounter   Procedures    Wound cleansing and dressings     All wounds except right lateral hip:  Wash your hands with soap and water  Remove old dressing, discard into plastic bag and place in trash  Cleanse the wound with sterile saline solution (rinse) prior to applying a clean dressing  Do not use tissue or cotton balls  Do not scrub the wound  Pat dry using gauze  Shower no; do not get dressing wet     Apply saline moistened gauze  Cover with an ABD pad  Secure with Medfix tape    Change dressing daily and PRN for breakthrough drainage (visiting nurses to do twice per week and family in between)     Follow up in 6 weeks   Continue visiting nurse skilled for wound care dressing changes                             Treatment in the wound center today:  Flushed and cleansed with Prophase and Prophase moistened gauze applied to all wounds and dressed as ordered above     Standing Status:   Future     Standing Expiration Date:   12/6/2023

## 2022-12-06 NOTE — PROGRESS NOTES
Patient ID: David Pena  is a 43 y o  male Date of Birth 1980       Chief Complaint   Patient presents with   • Follow Up Wound Care Visit     Pressure injury of sacral region, stage 4 (Formerly [facility])       Allergies:  Chlorcyclizine, Chlorhexidine, Ciprofloxacin hcl, Cymbalta [duloxetine hcl], Dm-doxylamine-acetaminophen, Gabapentin, Hydrocortisone, Lac bovis, Lactose - food allergy, Lyrica [pregabalin], Penicillins, Polymyxin b, Promethazine-phenyleph-codeine, Quinidine, Cefepime, and Rosuvastatin    Diagnosis:      Diagnosis ICD-10-CM Associated Orders   1  Pressure injury of sacral region, stage 4 (Formerly [facility])  L89 154 lidocaine (XYLOCAINE) 4 % topical solution 5 mL     Wound cleansing and dressings     Debridement      2  Pressure injury of left ischium, stage 4 (Formerly [facility])  L89 324 lidocaine (XYLOCAINE) 4 % topical solution 5 mL     Wound cleansing and dressings     Debridement      3  Pressure ulcer of left hip, stage 4 (Formerly [facility])  L89 224 lidocaine (XYLOCAINE) 4 % topical solution 5 mL     Wound cleansing and dressings     Debridement      4  Pressure ulcer of other site, stage 4 (Formerly [facility])  L89 894 lidocaine (XYLOCAINE) 4 % topical solution 5 mL     Wound cleansing and dressings     Debridement      5  Type 1 diabetes mellitus with chronic kidney disease on chronic dialysis (Formerly [facility])  E10 22     N18 6     Z99 2       6  Paraplegia (Formerly [facility])  G82 20               Assessment & Plan:  Overall slight improvement in measurements of all of the pressure injuries  Minimal   Surgical debridement of all the undermined areas and edges  Continue to offload  Has Clinitron  Type 1 diabetes on dialysis  Last A1c done last week was 7 9  A1C results reviewed with the patient today  Subjective:   10/22/20:  Followup multiple pressure injuries to the buttocks and sacrum  He was hospitalized for back problems recently  Continues on hemodialysis    The patient states that his albumin has improved so that he may be able to get flap Patient vomiting black liquid.  Dr. Tejada updated.  New orders received and implemented.     surgery  However when checking his most recent albumin was only 1 8  His total protein is elevated  11/19/20:  Followup multiple stage IV pressure or injuries to the buttocks and sacrum  Continues on hemodialysis  He is scheduled for plastic surgery for cyst on his forehead  Plastic surgery still will not do any flaps to his sacrum or buttocks because his albumin remains low  He has no other complaints  No fever, chills or cough  12/31/20: Followup multiple stage IV pressure injuries of the buttocks and sacrum  She notes some increased drainage and slight more odor  Never had his plastic surgery appointment for the cyst on his forehead  Denies any fever or chills  1/28/21:  Followup multiple stage IV pressure injuries of the buttocks, sacrum and perineum  Unfortunately, the patient was hospitalized for sepsis and back pain  Was found to have a fracture L3  He was given a back brace but only wears at home  He states that really does not give him very much relief  Unfortunately also while in the hospital, he developed a new pressure injury of his left hip  2/25/21:  Followup multiple stage IV pressure ulcers of the buttocks sacrum and perineum  Patient was hospitalized earlier this month for possible sepsis  He was found to have a burst fracture or osteo of L3  IR obtained specimen and was culture negative  However, no bone was obtained  He is scheduled to have another IR intervention March 16th for bone biopsy  Left hip ulcer broke down with large cavity  Otherwise, cultures were negative  He was discharged on no medications  He has not had any recent fever or chills  He did have fever when he was admitted to the hospital     03/21/2021  Mr Robert Soriano is a 55-year-old gentleman with paraplegia and multiple stage IV pressure ulcers was referred for evaluation  He has large chronic ulcers on his buttocks and sacrum but he developed a new ulcer over last month on his left hip    He is scheduled for IR biopsy in his spine for possible chronic osteo  He also recently underwent dental extractions and cyst removed from his head and neck  4/22/21:  Followup multiple stage IV pressure injuries of the buttocks, sacrum, perineum and left hip  He was last seen by Dr Jeffrey Odonnell in March  Since then, unfortunately he was admitted to the hospital with pneumonia  He is now doing better  He is changing his dressings daily because of drainage  There has been no increased drainage, no odor and he denies any fever or chills  He has no cough  5/27/21:  Patient returns regarding his his multiple stage IV pressure injuries of the buttocks, sacrum, perineum and left hip  Unfortunately, he was hospitalized for a few days in April regarding FUO with negative cultures  He was "treated empirically with vancomycin and cefepime - improved with no positive cultures and CT scan finding consistent with known decubitus ulcerations and chronic pelvic osteomyelitis "  He is back to baseline  Patient states that he does notice more odor  7/8/21: Followup multiple stage IV pressure injuries of the buttocks, sacrum, perineum and left hip  He continues with Dakin's packing  He had multiple excisions on his face of follicular cysts by plastic surgery  He still cannot have surgery of his pressure injuries due to chronically low albumin  He has not yet contacted his PCP for nutritionist consultation  8/19/21: Followup multiple stage IV pressure injuries of the buttocks, sacrum, perineum and left  He was packing with Dakin's  Nothing else new  He still has not had a nutrition is consultation  9/30/21: Followup multiple stage IV pressure injuries of the buttocks, sacrum, perineum and left hip  He continues with cleansing Dakin's soaks followed by wet-to-dry saline packing  Nothing is new  No pain  No fever or chills  11/11/21:   Followup multiple stage IV pressure injuries of the buttock, sacrum, left hip perineum  Recent hospitalization for sepsis  Possible pneumonia  His ulcers have not changed  He is using Dakin's packing again  No other new complaints  States that he is spending is nights in his Clinitron and only spending up to maximum 3 hours out in his chair and then going back to the Clinitron during the day  12/30/21: Followup multiple stage IV pressure injuries of the buttocks, sacrum, left hip and perineum  He was hospitalized again for three days any states that his ulcers deteriorated slightly because he claims that the dressings were not changed  No fever or chills currently  Still using his Clinitron bed     2/10/22:  Patient returns regarding his multiple stage IV pressure injuries of the buttocks, sacrum, left hip and perineum  Unfortunately, he was hospitalized twice since his last visit  The 1st time he was septic with unknown source  The 2nd hospitalization was due to bleeding from newly placed dialysis catheter  No specific complaints at this time  They have been using saline packing  Still in his Clinitron bed     3/24/22: Followup multiple stage IV pressure injuries of the buttocks, sacrum, left hip in perineum  No new events since last visit  He is using saline packing  Clinitron bed  No fever or chills  5/5/22: Followup multiple stage IV pressure injuries  Patient unfortunately was hospitalized with viral pneumonia at the end of March  He feels better  No coughing  He states that he is eating better  However, his albumin was still 1 8  Total protein was normal   Using saline packing      06/30/22: 38 y/o M with PMHx of spinal paraplegia, neurogenic bladder with chronic suprapubic catheter in place, ESRD on dialysis M/W/F, anemia of CKD, secondary hyperparathyroidism, type I DM, colostomy bag in place,tricupsid valve endocarditis, chronic osteomyelitis, chronic pressure injuries, afib, and hx of DVT on Eliquis presenting for f/u of multiple stage 4 pressure injuries of the sacrum, left hip, L ischium, and perineum  Pt was recently hospitalized on 06/17/22 for C  Difficile  Has been using wet to dry dressings  States he has been attempting to increase his protein intake and has gained some weight but his albumin remains low and is confused why that is  9/1/22: Followup multiple stage IV pressure injuries of the sacrum and ischial tuberosities  Patient was hospitalized recently for sepsis and was told it was due to his pressure injuries  However, they do not look any worse than they ever have been  He was discharged on oral medications  He continues using wet to dry dressings  Prior to hospitalization he developed two new pressure injuries of his posterior scrotum  He continues to supposedly eat well with increased protein  10/13/22: Followup multiple stage IV pressure injuries of the sacrum, perineum and ischial tuberosities and left hip     At last visit, he had scrotal pressure injuries as well  He is using wet to dry dressings  He no longer has his Clinitron and is using an alternating low air loss mattress  He does not like the air mattress because is more difficult to get in and out of  He has not had any fevers or chills  12/6/2022: Follow-up multiple stage IV pressure injuries of the sacrum, perineum, ischial tuberosities and left hip  Patient states that he saw endocrinology and A1c was 7 9  Slightly better than previous  Using saline packing  Occasionally Dakin's when they noticed an odor                    The following portions of the patient's history were reviewed and updated as appropriate:   Patient Active Problem List   Diagnosis   • [facility] )   • Atrial fibrillation (HCC)   • Chronic suprapubic catheter (HCC)   • Colostomy care ([facility] )   • OAB (overactive bladder)   • S/P unilateral BKA (below knee amputation) ([facility] )   • Tobacco abuse   • Type 1 diabetes mellitus with chronic kidney disease on chronic dialysis ([facility] [address] ) • Ulcer of sacral region, stage 4 ([facility])   • Chronic indwelling Ortega catheter   • Wound healing, delayed   • Iron deficiency anemia   • Pressure injury of left ischium, stage 4 ([facility])   • HTN (hypertension), benign   • Neurogenic bladder   • GERD (gastroesophageal reflux disease)   • Chronic pain   • Stage 5 chronic kidney disease on chronic dialysis ([place] [address] )   • Penile abscess   • History of Clostridium difficile infection   • Urinary retention   • Delirium   • Dialysis patient ([place] [address] )   • Insulin long-term use ([facility])   • Wheelchair dependent   • Recurrent Clostridioides difficile diarrhea   • Bipolar depression ([facility])   • 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[facility])   • Seizure ([place] [address] )   • Pressure ulcer of sacral region, stage 4 ([facility])   • AVM (arteriovenous malformation) of duodenum, acquired   • Chronic narcotic dependence ([facility])   • Chronic diastolic heart failure ([facility])   • Pressure ulcer of other site, stage 4 ([facility])   • Pressure ulcer of left hip, stage 4 ([facility])   • ED (erectile dysfunction) of organic origin   • Irritable bowel syndrome   • Onychomycosis   • Pustular folliculitis   • Prolonged Q-T interval on ECG   • Secondary hyperparathyroidism of renal origin ([place] [address] )   • Wounds, multiple   • Immunocompromised state ([place] [address] )   • Severe protein-calorie malnutrition ([facility])   • Chronic osteomyelitis ([facility])   • Acute deep vein thrombosis (DVT) of right upper extremity ([facility])   • C  difficile colitis   • Charcot's arthropathy   • Diabetic gastroparesis associated with type 1 diabetes mellitus ([place] [address] )   • End stage renal disease ([facility])   • Hyperlipidemia   • LVH (left ventricular hypertrophy)   • NICM (nonischemic cardiomyopathy) ([facility])   • Vitamin B deficiency   • Vitamin C deficiency   • Vitamin D deficiency   • Chest congestion     Past Medical History:   Diagnosis Date   • Acute pulmonary edema ([place] [address] ) 1/13/2022   • Ambulatory dysfunction    • Anemia    • Anemia, iron deficiency     transfusion requiring   • Asymptomatic bacteriuria 9/25/2017   • Atrial fibrillation ([place] [address] )    • AVM (arteriovenous malformation) of duodenum, 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   • Bacteriuria, asymptomatic    • Bipolar disorder ([place] [address] )    • Chronic deep vein thrombosis (DVT) ([facility])    • Chronic indwelling Ortega catheter    • Chronic kidney disease    • Chronic pain    • Chronic pain disorder    • Chronic suprapubic catheter ([place] [address] )    • Clostridium difficile infection 08/11/2016    also positive 9/2016, 5/29/2017, 8/15/2017  S/P fecal transplant   • Colostomy on examination [facility])    • Decubitus ulcer    • Delirium    • Diabetes mellitus ([place] [address] )    • GERD (gastroesophageal reflux disease)    • Hemodialysis patient ([place] [address] )    • Hypertension    • Memory impairment 2011    s/p diabetic coma   • Neurogenic bladder    • OAB (overactive bladder)    • Paraplegia (HCC)     T3 transverse myelitis vs spinal stoke (AVM); 2012 extensive epidural abscess C7=> conus 2nd extension from deep parspinal abscess L4-S2/sacral decubitus; cord atrophy/myelomalcia T3=>conus    • Penile abscess    • S/P unilateral BKA (below knee amputation) ([place] [address] )     Right   • Sebaceous cyst removed in 2017   • Seizures (HCC)    • Shortness of breath    • Tobacco abuse    • 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HCC)    • Urinary retention    • Wheelchair dependent    • Wounds, multiple     pressure ulcers with delayed healing     Past Surgical History:   Procedure Laterality Date   • BELOW KNEE LEG AMPUTATION Right 2009   • COLONOSCOPY N/A 03/27/2017    Procedure: COLONOSCOPY;  Surgeon: Chanell Kendrick MD;  Location: [facility]; Service:    • COLONOSCOPY N/A 03/29/2017    Procedure: COLONOSCOPY;  Surgeon: Chanell Kendrick MD;  [facility]: [facility]; Service:    • COLONOSCOPY N/A 06/15/2017    Procedure: COLONOSCOPY with FMT;  Surgeon: Brenda Siddiqi MD;  Location: [facility]; Service: Gastroenterology   • ESOPHAGOGASTRODUODENOSCOPY N/A 03/22/2017    Procedure: ESOPHAGOGASTRODUODENOSCOPY (EGD); Surgeon: Gisella Ríos DO;  Location: [facility];   Service: • MULTIPLE TOOTH EXTRACTIONS N/A 03/08/2021    Procedure: EXTRACTION TEETH # 2,3,6,10,12,13,14,18,19,20,21,22,23,24,25,26,27,28,29,30;  Surgeon: Manuel Madrigal DMD;  Location: [facility];  Service: Maxillofacial   • SKIN BIOPSY       Family History   Problem Relation Age of Onset   • Hyperlipidemia Mother    • Hypertension Mother    • Leukemia Brother    • Diabetes Paternal Grandfather       Social History     Socioeconomic History   • Marital status: Single     Spouse name: Not on file   • Number of children: Not on file   • Years of education: Not on file   • Highest education level: Not on file   Occupational History   • Not on file   Tobacco Use   • Smoking status: Every Day     Types: Cigars   • Smokeless tobacco: Never   • Tobacco comments:     about 3 cigars/day   Vaping Use   • Vaping Use: Never used   Substance and Sexual Activity   • Alcohol use: Not Currently     Comment: 1 cup of wine every 3-4 months   • Drug use: Not Currently     Types: Marijuana     Comment: rarely; once every 1-2 years   • Sexual activity: Not on file   Other Topics Concern   • Not on file   Social History Narrative   • Not on file     Social Determinants of Health     Financial Resource Strain: Low Risk    • Difficulty of Paying Living Expenses: Not hard at all   Food Insecurity: No Food Insecurity   • Worried About Running Out of Food in the Last Year: Never true   • Ran Out of Food in the Last Year: Never true   Transportation Needs: No Transportation Needs   • Lack of Transportation (Medical): No   • Lack of Transportation (Non-Medical):  No   Physical Activity: Not on file   Stress: Not on file   Social Connections: Not on file   Intimate Partner Violence: Not on file   Housing Stability: Not on file        Current Outpatient Medications:   •  Acetaminophen 325 MG CAPS, Take 650 mg by mouth, Disp: , Rfl:   •  Alcohol Swabs (ALCOHOL PADS) 70 % PADS, by Does not apply route 5 (five) times a day, Disp: 300 each, Rfl: 5  • amLODIPine (NORVASC) 10 mg tablet, Take 1 tablet (10 mg total) by mouth daily, Disp: 90 tablet, Rfl: 1  •  ammonium lactate (LAC-HYDRIN) 12 % cream, , Disp: , Rfl:   •  apixaban (ELIQUIS) 5 mg, Take 1 tablet (5 mg total) by mouth 2 (two) times a day, Disp: 270 tablet, Rfl: 1  •  ascorbic acid (VITAMIN C) 250 mg tablet, TAKE 2 TABLETS (500 MG TOTAL) BY MOUTH DAILY, Disp: 180 tablet, Rfl: 1  •  atorvastatin (LIPITOR) 20 mg tablet, Take 1 tablet (20 mg total) by mouth daily at bedtime, Disp: 90 tablet, Rfl: 1  •  B Complex-C-Folic Acid (Super B-Complex/Vit C/FA) TABS, TAKE 1 TABLET BY MOUTH EVERY DAY AS DIRECTED, Disp: 90 tablet, Rfl: 4  •  SUSAN MICROLET LANCETS lancets, Use as instructed to check blood sugar 4 times a day Dx e10 29, Disp: 200 each, Rfl: 5  •  bisacodyl (DULCOLAX) 5 mg EC tablet, Take 1 tablet (5 mg total) by mouth once for 1 dose, Disp: 2 tablet, Rfl: 0  •  Blood Glucose Monitoring Suppl ([employer] CONTOUR NEXT USB MONITOR) w/Device KIT, Testing 4 times a day, Disp: 1 kit, Rfl: 0  •  calcitriol (ROCALTROL) 0 5 MCG capsule, Take 2 mcg by mouth, Disp: , Rfl:   •  calcium acetate (PHOSLO) capsule, TAKE 2 CAPSULES BY MOUTH THREE TIMES DAILY WITH MEALS, Disp: , Rfl:   •  carvedilol (COREG) 25 mg tablet, Take 1 tablet (25 mg total) by mouth 2 (two) times a day, Disp: 180 tablet, Rfl: 1  •  cetirizine (ZyrTEC) 10 mg tablet, Take 1 tablet (10 mg total) by mouth daily, Disp: 90 tablet, Rfl: 1  •  Cholecalciferol (VITAMIN D-3) 1000 units CAPS, Take 2 capsules by mouth daily, Disp: 30 capsule, Rfl: 0  •  clindamycin (CLINDAGEL) 1 % gel, APPLY TO AFFECTED AREA TWICE A DAY, Disp: 60 g, Rfl: 2  •  cyanocobalamin ([employer] Vitamin B-12) 1000 MCG tablet, Take 1 tablet (1,000 mcg total) by mouth daily, Disp: 30 tablet, Rfl: 5  •  cyclobenzaprine (FLEXERIL) 5 mg tablet, , Disp: , Rfl:   •  dexlansoprazole (DEXILANT) 30 MG capsule, TAKE 1 CAPSULE BY MOUTH EVERY DAY, Disp: 180 capsule, Rfl: 2  •  dicyclomine (BENTYL) 10 mg capsule, TAKE 1 CAPSULE BY MOUTH 3 TIMES A DAY BEFORE MEALS, Disp: 270 capsule, Rfl: 1  •  Disposable Gloves MISC, Use 7 times a day, 4 boxes of gloves XL Dx type 1 DM, paraplegia, Disp: 4 each, Rfl: 11  •  doxazosin (CARDURA) 2 mg tablet, TAKE 1 TABLET BY MOUTH EVERY DAY IN THE EVENING, Disp: 90 tablet, Rfl: 0  •  epoetin kaushik (EPOGEN,PROCRIT) 20,000 units/mL, Inject 10,000 Units under the skin, Disp: , Rfl:   •  epoetin kaushik (EPOGEN,PROCRIT) 20,000 units/mL, Inject 5,000 Units under the skin, Disp: , Rfl:   •  ferrous sulfate 325 (65 Fe) mg tablet, Take 1 tablet (325 mg total) by mouth 3 (three) times a day, Disp: 90 tablet, Rfl: 3  •  guaiFENesin (MUCINEX) 600 mg 12 hr tablet, Take 2 tablets (1,200 mg total) by mouth every 12 (twelve) hours, Disp: 60 tablet, Rfl: 2  •  Gvoke PFS 1 MG/0 2ML SOSY, INJECT 1 ML (1 MG TOTAL) INTO A MUSCLE ONCE FOR 1 DOSE, Disp: , Rfl:   •  hydrOXYzine HCL (ATARAX) 50 mg tablet, TAKE 1 TABLET (50 MG TOTAL) BY MOUTH 2 (TWO) TIMES A DAY AS NEEDED FOR ITCHING OR ANXIETY, Disp: 180 tablet, Rfl: 1  •  Incontinence Supply Disposable (Incontinence Brief Large) MISC, Use 6 (six) times a day Size xl, Disp: 180 each, Rfl: 11  •  Incontinence Supply Disposable (Undergarment) MISC, Use 6 a day, 5 boxes, xl Dx R51, paraplegia, Disp: 5 each, Rfl: 11  •  Incontinence Supply Disposable (Underpads) MISC, Use 2 a day, Disp: 5 each, Rfl: 11  •  ketoconazole (NIZORAL) 2 % cream, Apply to rash , Disp: 15 g, Rfl: 1  •  LEVEMIR FLEXTOUCH 100 units/mL injection pen, Inject 14 Units under the skin 2 (two) times a day, Disp: 15 pen, Rfl: 3  •  Lokelma 10 g PACK, , Disp: , Rfl:   •  losartan (COZAAR) 100 MG tablet, TAKE 1 TABLET BY MOUTH EVERY DAY, Disp: 90 tablet, Rfl: 1  •  Melatonin ER 3 MG TBCR, Take 6 mg by mouth, Disp: , Rfl:   •  metroNIDAZOLE (METROGEL) 0 75 % gel, Apply topically 2 (two) times a day, Disp: 45 g, Rfl: 0  •  Misc   Devices (Wheelchair Cushion) MISC, Use daily, Disp: 1 each, Rfl: 0  • Misc  Devices [facility]) MISC, by Does not apply route daily Wheelchair ramp, dx g82 20, Disp: 1 each, Rfl: 0  •  Multiple Vitamin (Daily-Enriqueta Multivitamin) TABS, TAKE 1 TABLET BY MOUTH EVERY DAY, Disp: 30 tablet, Rfl: 8  •  NovoLOG FlexPen 100 units/mL injection pen, INJECT 3 UNITS UNDER THE SKIN 3 (THREE) TIMES A DAY BEFORE MEALS , Disp: , Rfl:   •  oxybutynin (DITROPAN XL) 15 MG 24 hr tablet, , Disp: , Rfl:   •  oxybutynin (DITROPAN) 5 mg tablet, Take 3 tablets (15 mg total) by mouth daily, Disp: 270 tablet, Rfl: 1  •  polyethylene glycol (GLYCOLAX) 17 GM/SCOOP powder, Take as directed as per written office instructions, Disp: 238 g, Rfl: 0  •  polyethylene glycol (GOLYTELY) 4000 mL solution, Take 4,000 mL by mouth once for 1 dose, Disp: 4000 mL, Rfl: 0  •  risperiDONE (RisperDAL) 0 5 mg tablet, Take 1 tablet (0 5 mg total) by mouth 2 (two) times a day, Disp: 180 tablet, Rfl: 1  •  sertraline (ZOLOFT) 25 mg tablet, TAKE 1/2 TABLET BY MOUTH EVERY DAY, Disp: 45 tablet, Rfl: 0  •  Sodium Zirconium Cyclosilicate 10 g PACK, Take 10 g by mouth daily, Disp: , Rfl:   •  sucralfate (CARAFATE) 1 g/10 mL suspension, Take 10 mL (1 g total) by mouth 4 (four) times a day (with meals and at bedtime), Disp: 420 mL, Rfl: 1  •  SUPER B COMPLEX & C TABS, TAKE 1 CAPSULE BY MOUTH ONCE FOR 1 DOSE AS DIRECTED, Disp: 90 tablet, Rfl: 2  •  Zinc Sulfate 220 (50 Zn) MG TABS, Take 1 tablet by mouth daily, Disp: 90 tablet, Rfl: 1  No current facility-administered medications for this visit  Review of Systems   Constitutional: Negative for activity change, appetite change (Improving), chills, fatigue, fever and unexpected weight change  HENT: Negative for congestion, hearing loss and postnasal drip  Eyes: Negative for visual disturbance  Respiratory: Negative for cough and shortness of breath  Cardiovascular: Negative for chest pain and leg swelling  Gastrointestinal: Negative for constipation, diarrhea and nausea  Genitourinary: Negative for dysuria and urgency  Indwelling Ortega catheter   Musculoskeletal: Positive for gait problem (Nonambulatory, paraplegic)  Skin: Positive for wound (Sacral, left ischium, left hip and scrotal)  Negative for rash  Neurological: Positive for weakness  Hematological: Does not bruise/bleed easily  Psychiatric/Behavioral: Positive for dysphoric mood  Objective:  /86   Pulse 96   Temp (!) 96 °F (35 6 °C)   Resp 18   Pain Score: 0-No pain     Physical Exam  Vitals and nursing note reviewed  Constitutional:       General: He is not in acute distress  Appearance: He is underweight  He is not toxic-appearing  HENT:      Head: Normocephalic and atraumatic  Cardiovascular:      Rate and Rhythm: Normal rate  Pulmonary:      Effort: Pulmonary effort is normal  No respiratory distress  Breath sounds: No stridor  Abdominal:      Comments: The abdomen around the ostomy is slightly full and tender  Skin:     Findings: Wound (Both buttocks and sacrum) present  No erythema  Comments: See full wound assessment  Sacral pressure injury generally clean but with undermining  Some slough in the undermined areas  No signs of infection and no malodor  No exposed bone  Left hip pressure injury with fibrin  The opening is smaller     No significant changes  Bilateral ischial pressure injuries with fibrin and some areas of epithelialization  No significant changes except for increased undermining     No signs of infection and no malodor  Left hip about the same with fibrin  Neurological:      Mental Status: He is alert and oriented to person, place, and time  Gait: Gait abnormal (paraplegia)     Psychiatric:         Mood and Affect: Affect normal          Cognition and Memory: Cognition normal                Wound Pressure Injury Ischium Left (Active)   Wound Image   12/06/22 4930   Wound Description Pink;Granulation tissue;Bleeding 12/06/22 1428   Pressure Injury Stage 4 06/30/22 1155   Irene-wound Assessment Scar Tissue; Maceration 12/06/22 1428   Wound Length (cm) 4 9 cm 12/06/22 1428   Wound Width (cm) 5 5 cm 12/06/22 1428   Wound Depth (cm) 1 1 cm 12/06/22 1428   Wound Surface Area (cm^2) 26 95 cm^2 12/06/22 1428   Wound Volume (cm^3) 29 645 cm^3 12/06/22 1428   Calculated Wound Volume (cm^3) 29 65 cm^3 12/06/22 1428   Change in Wound Size % -56 88 12/06/22 1428   Undermining 0 9 12/06/22 1428   Undermining is depth extending from 3-5 oclock 12/06/22 1428   Drainage Amount Large 12/06/22 1428   Drainage Description Tan;Foul smelling;Yellow 12/06/22 1428   Non-staged Wound Description Full thickness 06/30/22 1155   Treatments Cleansed 12/30/21 1447   Dressing Changed Changed 12/30/21 1447   Patient Tolerance Tolerated well 12/06/22 1428   Dressing Status Removed 12/06/22 1428       Wound Pressure Injury Perineum (Active)   Wound Image   12/06/22 1458   Wound Description White;Beefy red;Pink;Bleeding 12/06/22 1431   Pressure Injury Stage 4 06/30/22 1154   Irene-wound Assessment Scar Tissue 12/06/22 1431   Wound Length (cm) 5 5 cm 12/06/22 1431   Wound Width (cm) 6 cm 12/06/22 1431   Wound Depth (cm) 1 3 cm 12/06/22 1431   Wound Surface Area (cm^2) 33 cm^2 12/06/22 1431   Wound Volume (cm^3) 42 9 cm^3 12/06/22 1431   Calculated Wound Volume (cm^3) 42 9 cm^3 12/06/22 1431   Change in Wound Size % -308 57 12/06/22 1431   Undermining 2 4 12/06/22 1431   Undermining is depth extending from 11-6 oclock 12/06/22 1431   Drainage Amount Large 12/06/22 1431   Drainage Description Tan;Yellow; Foul smelling 12/06/22 1431   Non-staged Wound Description Full thickness 06/30/22 1154   Treatments Cleansed 12/30/21 1448   Dressing Changed Changed 12/30/21 1448   Patient Tolerance Tolerated well 12/06/22 1431   Dressing Status Removed 12/06/22 1431       Wound Pressure Injury Sacrum (Active)   Wound Image   12/06/22 1458   Wound Description Epithelialization;Pink;Hypergranulation;Yellow 12/06/22 1433   Pressure Injury Stage 4 06/30/22 1153   Irene-wound Assessment Scar Tissue;Dry 12/06/22 1433   Wound Length (cm) 3 8 cm 12/06/22 1433   Wound Width (cm) 8 1 cm 12/06/22 1433   Wound Depth (cm) 1 4 cm 12/06/22 1433   Wound Surface Area (cm^2) 30 78 cm^2 12/06/22 1433   Wound Volume (cm^3) 43 092 cm^3 12/06/22 1433   Calculated Wound Volume (cm^3) 43 09 cm^3 12/06/22 1433   Change in Wound Size % 42 55 12/06/22 1433   Undermining 0 5 09/01/22 1546   Undermining is depth extending from 4 8 09/01/22 1546   Drainage Amount Large 12/06/22 1433   Drainage Description Yellow;Tan;Foul smelling 12/06/22 1433   Non-staged Wound Description Full thickness 06/30/22 1153   Treatments Irrigation with NSS 05/05/22 1532   Dressing Changed Changed 12/30/21 1451   Patient Tolerance Tolerated well 12/06/22 1433   Dressing Status Removed 12/06/22 1433       Wound 01/28/21 Pressure Injury Hip Left;Lateral (Active)   Wound Image   12/06/22 1457   Wound Description Pink 12/06/22 1436   Pressure Injury Stage 4 05/05/22 1536   Irene-wound Assessment Scar Tissue; Induration 12/06/22 1436   Wound Length (cm) 3 cm 12/06/22 1436   Wound Width (cm) 1 8 cm 12/06/22 1436   Wound Depth (cm) 1 1 cm 12/06/22 1436   Wound Surface Area (cm^2) 5 4 cm^2 12/06/22 1436   Wound Volume (cm^3) 5 94 cm^3 12/06/22 1436   Calculated Wound Volume (cm^3) 5 94 cm^3 12/06/22 1436   Tunneling 4 7 cm 02/10/22 1341   Tunneling in depth located at 10 02/10/22 1341   Undermining 4 5 12/06/22 1436   Undermining is depth extending from 7-11 oclock 12/06/22 1436   Drainage Amount Large 12/06/22 1436   Drainage Description Tan;Yellow; Foul smelling 12/06/22 1436   Non-staged Wound Description Full thickness 06/30/22 1147   Treatments Cleansed 12/30/21 1452   Wound packed?  Yes 12/30/21 1452   Packing- # removed 1 05/05/22 1536   Dressing Changed Changed 12/30/21 1452   Patient Tolerance Tolerated well 12/06/22 1433 Dressing Status Removed 12/06/22 1436                       Debridement   Wound Pressure Injury Ischium Left    Universal Protocol:  Consent: Verbal consent obtained  Written consent obtained  Consent given by: patient  Time out: Immediately prior to procedure a "time out" was called to verify the correct patient, procedure, equipment, support staff and site/side marked as required  Patient understanding: patient states understanding of the procedure being performed  Patient identity confirmed: verbally with patient      Performed by: physician  Debridement type: surgical  Level of debridement: subcutaneous tissue  Pain control: lidocaine 4%  Post-debridement measurements  Length (cm): 4 9  Width (cm): 5 5  Depth (cm): 1 1  Percent debrided: 10%  Surface Area (cm^2): 26 95  Area debrided (cm^2): 2 7  Volume (cm^3): 29 65  Tissue and other material debrided: dermis, subcutaneous tissue and Fibrotic tissue  Devitalized tissue debrided: fibrin and Fibrotic tissue  Instrument(s) utilized: curette  Bleeding: medium  Hemostasis obtained with: pressure  Procedural pain (0-10): insensate  Post-procedural pain: insensate   Response to treatment: procedure was tolerated well    Debridement   Wound Pressure Injury Perineum    Universal Protocol:  Consent: Verbal consent obtained  Written consent obtained  Consent given by: patient  Time out: Immediately prior to procedure a "time out" was called to verify the correct patient, procedure, equipment, support staff and site/side marked as required    Patient understanding: patient states understanding of the procedure being performed  Patient identity confirmed: verbally with patient      Performed by: physician  Debridement type: surgical  Level of debridement: subcutaneous tissue  Pain control: lidocaine 4%  Post-debridement measurements  Length (cm): 5 5  Width (cm): 6  Depth (cm): 1 3  Percent debrided: 10%  Surface Area (cm^2): 33  Area debrided (cm^2): 3 3  Volume (cm^3): 42 9  Tissue and other material debrided: dermis, subcutaneous tissue and Fibrotic tissue  Devitalized tissue debrided: fibrin and Fibrotic tissue  Instrument(s) utilized: curette  Bleeding: medium  Hemostasis obtained with: pressure  Procedural pain (0-10): insensate  Post-procedural pain: insensate   Response to treatment: procedure was tolerated well    Debridement   Wound Pressure Injury Sacrum    Universal Protocol:  Consent: Verbal consent obtained  Written consent obtained  Consent given by: patient  Time out: Immediately prior to procedure a "time out" was called to verify the correct patient, procedure, equipment, support staff and site/side marked as required  Patient understanding: patient states understanding of the procedure being performed  Patient identity confirmed: verbally with patient      Performed by: physician  Debridement type: surgical  Level of debridement: subcutaneous tissue  Pain control: lidocaine 4%  Post-debridement measurements  Length (cm): 3 8  Width (cm): 8 1  Depth (cm): 1 3  Percent debrided: 20%  Surface Area (cm^2): 30 78  Area debrided (cm^2): 6 16  Volume (cm^3): 40 01  Tissue and other material debrided: dermis, subcutaneous tissue and Fibrotic tissue  Devitalized tissue debrided: fibrin and Fibrotic tissue  Instrument(s) utilized: curette  Bleeding: medium  Hemostasis obtained with: pressure  Procedural pain (0-10): insensate  Post-procedural pain: insensate   Response to treatment: procedure was tolerated well    Debridement   Wound 01/28/21 Pressure Injury Hip Left;Lateral    Universal Protocol:  Consent: Verbal consent obtained  Written consent obtained  Consent given by: patient  Time out: Immediately prior to procedure a "time out" was called to verify the correct patient, procedure, equipment, support staff and site/side marked as required    Patient understanding: patient states understanding of the procedure being performed  Patient identity confirmed: verbally with patient      Performed by: physician  Debridement type: surgical  Level of debridement: subcutaneous tissue  Pain control: lidocaine 4%  Post-debridement measurements  Length (cm): 3  Width (cm): 1 8  Depth (cm): 1 2  Percent debrided: 100%  Surface Area (cm^2): 5 4  Area debrided (cm^2): 5 4  Volume (cm^3): 6 48  Tissue and other material debrided: dermis, subcutaneous tissue and Fibrotic tissue  Devitalized tissue debrided: fibrin and Fibrotic tissue  Instrument(s) utilized: curette  Bleeding: medium  Hemostasis obtained with: pressure  Procedural pain (0-10): insensate  Post-procedural pain: insensate   Response to treatment: procedure was tolerated well                                 Wound Instructions:  Orders Placed This Encounter   Procedures   • Wound cleansing and dressings     All wounds except right lateral hip:  Wash your hands with soap and water  Remove old dressing, discard into plastic bag and place in trash  Cleanse the wound with sterile saline solution (rinse) prior to applying a clean dressing  Do not use tissue or cotton balls  Do not scrub the wound  Pat dry using gauze  Shower no; do not get dressing wet     Apply saline moistened gauze  Cover with an ABD pad  Secure with Medfix tape    Change dressing daily and PRN for breakthrough drainage (visiting nurses to do twice per week and family in between)     Follow up in 6 weeks   Continue visiting nurse skilled for wound care dressing changes                             Treatment in the wound center today:  Flushed and cleansed with Prophase and Prophase moistened gauze applied to all wounds and dressed as ordered above     Standing Status:   Future     Standing Expiration Date:   12/6/2023   • Debridement     This order was created via procedure documentation   • Debridement     This order was created via procedure documentation   • Debridement     This order was created via procedure documentation   • Debridement     This order was created via procedure documentation           Patel Holland MD, MD, CHT, CWS    Portions of the record may have been created with voice recognition software  Occasional wrong word or "sound alike" substitutions may have occurred due to the inherent limitations of voice recognition software  Read the chart carefully and recognize, using context, where substitutions have occurred

## 2022-12-08 NOTE — PROGRESS NOTES
Patient reported he has not yet received his replacement [place] bed and is asking the status of the order  I spoke with Mannie from [address] [place] [address]  who reports that there was an issue with insurance approval and that Devan just received approval for the bed "yesterday"  She stated that Arlene Garcia would be receiving a call to aet up delivery within the next day or so  This was reported to Arlene Garcia who will look forward to the call  Discussed with Arlene Garcia that the bed cannot be turned off when he is not in it as it takes a full 24 hours for the bed to be in operation before for the optimum pressure redistribution to be achieved   If the bed is turned off intermittently, he will not be receiving the appropriate therapy  Arlene Garcia voiced understanding of this and stated he will make sure his family is aware of this as well

## 2022-12-21 ENCOUNTER — TELEPHONE (OUTPATIENT)
Dept: DERMATOLOGY | Facility: CLINIC | Age: 42
End: 2022-12-21

## 2022-12-21 NOTE — TELEPHONE ENCOUNTER
Called pt to schedule in [facility] clinic for follow up appt week of 12/26 per Dr Mary Medellin, did not answer, left VM to cb to schedule at earliest convenience

## 2022-12-23 ENCOUNTER — TELEPHONE (OUTPATIENT)
Dept: FAMILY MEDICINE CLINIC | Facility: CLINIC | Age: 42
End: 2022-12-23

## 2022-12-23 NOTE — TELEPHONE ENCOUNTER
PCP SIGNATURE NEEDED FOR Extended family care FORM RECEIVED VIA FAX AND PLACED IN PCP FOLDER TO BE DELIVERED AT ASSIGNED TIMES       SOC/recertification

## 2022-12-27 ENCOUNTER — TELEPHONE (OUTPATIENT)
Dept: FAMILY MEDICINE CLINIC | Facility: CLINIC | Age: 42
End: 2022-12-27

## 2022-12-27 NOTE — TELEPHONE ENCOUNTER
12/27/22    PCP SIGNATURE NEEDED FOR Extended Family [facility] PA / PLAN OF CARE  FORM RECEIVED VIA FAX AND PLACED IN PCP FOLDER TO BE DELIVERED AT ASSIGNED TIMES        Certification Period:  12/16/22 - - 02/13/23

## 2022-12-27 NOTE — TELEPHONE ENCOUNTER
I called and spoke with the pts mom whom states the pt stated it was referral for his hand for arthritis  After checking the pts chart nothing was found  pts mom stated it was okay and that it can wait til his appt with us in February

## 2022-12-28 ENCOUNTER — TELEPHONE (OUTPATIENT)
Dept: WOUND CARE | Facility: CLINIC | Age: 42
End: 2022-12-28

## 2022-12-28 DIAGNOSIS — F41.0 ANXIETY ATTACK: ICD-10-CM

## 2022-12-28 DIAGNOSIS — L29.9 PRURITUS: ICD-10-CM

## 2022-12-28 NOTE — TELEPHONE ENCOUNTER
Received call from patient asking to shower instead of sponge bath  Dr Sabrina Mendoza made aware of request and ok with it as long as patient rinses wounds as the last step of showering  Patient made aware and agreeable

## 2022-12-29 RX ORDER — HYDROXYZINE 50 MG/1
50 TABLET, FILM COATED ORAL 2 TIMES DAILY PRN
Qty: 180 TABLET | Refills: 1 | Status: SHIPPED | OUTPATIENT
Start: 2022-12-29

## 2023-01-03 ENCOUNTER — TELEPHONE (OUTPATIENT)
Dept: FAMILY MEDICINE CLINIC | Facility: CLINIC | Age: 43
End: 2023-01-03

## 2023-01-03 NOTE — TELEPHONE ENCOUNTER
PCP [address] [place] [address] CARE  FORM RECEIVED VIA FAX AND PLACED IN PCP FOLDER TO BE DELIVERED AT ASSIGNED TIMES      [facility] NUMBER [id number]

## 2023-01-04 ENCOUNTER — TELEPHONE (OUTPATIENT)
Dept: FAMILY MEDICINE CLINIC | Facility: CLINIC | Age: 43
End: 2023-01-04

## 2023-01-04 NOTE — TELEPHONE ENCOUNTER
PCP SIGNATURE NEEDED FOR NATIONAL SEATING & MOBILITY FORM RECEIVED VIA FAX AND PLACED IN PCP FOLDER TO BE DELIVERED AT ASSIGNED TIMES        PT/OT/ST FORMS

## 2023-01-05 ENCOUNTER — OFFICE VISIT (OUTPATIENT)
Dept: WOUND CARE | Facility: CLINIC | Age: 43
End: 2023-01-05

## 2023-01-05 VITALS
SYSTOLIC BLOOD PRESSURE: 144 MMHG | DIASTOLIC BLOOD PRESSURE: 80 MMHG | RESPIRATION RATE: 14 BRPM | TEMPERATURE: 97.9 F | HEART RATE: 80 BPM

## 2023-01-05 DIAGNOSIS — N18.6 TYPE 1 DIABETES MELLITUS WITH CHRONIC KIDNEY DISEASE ON CHRONIC DIALYSIS (HCC): ICD-10-CM

## 2023-01-05 DIAGNOSIS — E10.65 HYPERGLYCEMIA DUE TO TYPE 1 DIABETES MELLITUS (HCC): ICD-10-CM

## 2023-01-05 DIAGNOSIS — L89.894 PRESSURE ULCER OF OTHER SITE, STAGE 4 (HCC): ICD-10-CM

## 2023-01-05 DIAGNOSIS — E10.22 TYPE 1 DIABETES MELLITUS WITH CHRONIC KIDNEY DISEASE ON CHRONIC DIALYSIS (HCC): ICD-10-CM

## 2023-01-05 DIAGNOSIS — G82.20 PARAPLEGIA (HCC): ICD-10-CM

## 2023-01-05 DIAGNOSIS — L89.154 PRESSURE INJURY OF SACRAL REGION, STAGE 4 (HCC): Primary | ICD-10-CM

## 2023-01-05 DIAGNOSIS — L89.324 PRESSURE INJURY OF LEFT ISCHIUM, STAGE 4 (HCC): ICD-10-CM

## 2023-01-05 DIAGNOSIS — Z99.2 TYPE 1 DIABETES MELLITUS WITH CHRONIC KIDNEY DISEASE ON CHRONIC DIALYSIS (HCC): ICD-10-CM

## 2023-01-05 DIAGNOSIS — L89.224 PRESSURE ULCER OF LEFT HIP, STAGE 4 (HCC): ICD-10-CM

## 2023-01-05 LAB
GLUCOSE SERPL-MCNC: 349 MG/DL (ref 65–140)
SL AMB POCT GLUCOSE BLD: 349

## 2023-01-05 RX ORDER — LIDOCAINE 40 MG/G
CREAM TOPICAL ONCE
Status: COMPLETED | OUTPATIENT
Start: 2023-01-05 | End: 2023-01-05

## 2023-01-05 RX ADMIN — LIDOCAINE: 40 CREAM TOPICAL at 15:09

## 2023-01-05 NOTE — PROGRESS NOTES
Patient ID: Margarita Suarez  is a 43 y o  male Date of Birth 1980       Chief Complaint   Patient presents with   • Follow Up Wound Care Visit     Sacral, hip and perineal wounds  Patient has still not received clinitron bed at his home  He is unsure as to why  There is a new area of DTI in the center of perineal wound today       Allergies:  Chlorcyclizine, Chlorhexidine, Ciprofloxacin hcl, Cymbalta [duloxetine hcl], Dm-doxylamine-acetaminophen, Gabapentin, Hydrocortisone, Lac bovis, Lactose - food allergy, Lyrica [pregabalin], Penicillins, Polymyxin b, Promethazine-phenyleph-codeine, Quinidine, Cefepime, and Rosuvastatin    Diagnosis:      Diagnosis ICD-10-CM Associated Orders   1  Pressure injury of sacral region, stage 4 ([facility])  L89 154 lidocaine (LMX) 4 % cream     Wound cleansing and dressings     Debridement      2  Pressure injury of left ischium, stage 4 ([facility])  L89 324 lidocaine (LMX) 4 % cream     Wound cleansing and dressings     Debridement      3  Pressure ulcer of left hip, stage 4 ([facility])  L89 224 lidocaine (LMX) 4 % cream     Wound cleansing and dressings     Debridement      4  Pressure ulcer of other site, stage 4 ([facility])  L89 894 lidocaine (LMX) 4 % cream     Wound cleansing and dressings     Debridement      5  Type 1 diabetes mellitus with chronic kidney disease on chronic dialysis ([facility])  E10 22 lidocaine (LMX) 4 % cream    N18 6 Wound cleansing and dressings    Z99 2 POCT blood glucose     POCT blood glucose      6  Paraplegia ([facility])  G82 20 lidocaine (LMX) 4 % cream     Wound cleansing and dressings      7  Hyperglycemia due to type 1 diabetes mellitus ([facility] [address] )  E10 65               Assessment & Plan:  Multiple pressure injuries as noted above  Most have some evidence of increasing epithelization at the edges  Selective debridement  Continue same wound care  Offload  Type 1 diabetes  Patient's blood sugar alarm was sounding and fingerstick blood sugar was done  Reading was 349    Last A1c in 2022 was 8 2  A1C results reviewed with the patient today  Subjective:     06/30/22: 38 y/o M with PMHx of spinal paraplegia, neurogenic bladder with chronic suprapubic catheter in place, ESRD on dialysis M/W/F, anemia of CKD, secondary hyperparathyroidism, type I DM, colostomy bag in place,tricupsid valve endocarditis, chronic osteomyelitis, chronic pressure injuries, afib, and hx of DVT on Eliquis presenting for f/u of multiple stage 4 pressure injuries of the sacrum, left hip, L ischium, and perineum  Pt was recently hospitalized on 06/17/22 for C  Difficile  Has been using wet to dry dressings  States he has been attempting to increase his protein intake and has gained some weight but his albumin remains low and is confused why that is  9/1/22: Followup multiple stage IV pressure injuries of the sacrum and ischial tuberosities  Patient was hospitalized recently for sepsis and was told it was due to his pressure injuries  However, they do not look any worse than they ever have been  He was discharged on oral medications  He continues using wet to dry dressings  Prior to hospitalization he developed two new pressure injuries of his posterior scrotum  He continues to supposedly eat well with increased protein  10/13/22: Followup multiple stage IV pressure injuries of the sacrum, perineum and ischial tuberosities and left hip     At last visit, he had scrotal pressure injuries as well  He is using wet to dry dressings  He no longer has his Clinitron and is using an alternating low air loss mattress  He does not like the air mattress because is more difficult to get in and out of  He has not had any fevers or chills  12/6/2022: Follow-up multiple stage IV pressure injuries of the sacrum, perineum, ischial tuberosities and left hip  Patient states that he saw endocrinology and A1c was 7 9  Slightly better than previous  Using saline packing    Occasionally [employer] when they noticed an odor                        The following portions of the patient's history were reviewed and updated as appropriate:   Patient Active Problem List   Diagnosis   • Paraplegia ([place] [address] )   • Atrial fibrillation ([facility])   • Chronic suprapubic catheter ([facility])   • Colostomy care ([place] [address] )   • OAB (overactive bladder)   • S/P unilateral BKA (below knee amputation) ([place] [address] )   • Tobacco abuse   • Type 1 diabetes mellitus with chronic kidney disease on chronic dialysis ([place] [address] )   • Ulcer of sacral region, stage 4 ([place] [address] )   • Chronic indwelling Ortega catheter   • Wound healing, delayed   • Iron deficiency anemia   • Pressure injury of left ischium, stage 4 ([facility])   • HTN (hypertension), benign   • Neurogenic bladder   • GERD (gastroesophageal reflux disease)   • Chronic pain   • Stage 5 chronic kidney disease on chronic dialysis ([place] [address] )   • Penile abscess   • History of Clostridium difficile infection   • Urinary retention   • Delirium   • Dialysis patient ([place] [address] )   • Insulin long-term use ([place] [address] )   • Wheelchair dependent   • Recurrent Clostridioides difficile diarrhea   • Bipolar depression ([place] [address] )   • 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[facility])   • Seizure ([place] [address] )   • Pressure ulcer of sacral region, stage 4 ([facility])   • AVM (arteriovenous malformation) of duodenum, acquired   • Chronic narcotic dependence ([facility])   • Chronic diastolic heart failure ([facility])   • Pressure ulcer of other site, stage 4 ([facility])   • Pressure ulcer of left hip, stage 4 ([facility])   • ED (erectile dysfunction) of organic origin   • Irritable bowel syndrome   • Onychomycosis   • Pustular folliculitis   • Prolonged Q-T interval on ECG   • Secondary hyperparathyroidism of renal origin ([place] [address] )   • Wounds, multiple   • Immunocompromised state ([place] [address] )   • Severe protein-calorie malnutrition ([facility])   • Chronic osteomyelitis ([facility])   • Acute deep vein thrombosis (DVT) of right upper extremity ([facility])   • C  difficile colitis   • Charcot's arthropathy   • Diabetic gastroparesis associated with type 1 diabetes mellitus ([place] [address] ) • End stage renal disease ([place] [address] )   • Hyperlipidemia   • LVH (left ventricular hypertrophy)   • NICM (nonischemic cardiomyopathy) ([facility])   • Vitamin B deficiency   • Vitamin C deficiency   • Vitamin D deficiency   • Chest congestion     Past Medical History:   Diagnosis Date   • Acute pulmonary edema ([place] [address] ) 1/13/2022   • Ambulatory dysfunction    • Anemia    • Anemia, iron deficiency     transfusion requiring   • Asymptomatic bacteriuria 9/25/2017   • Atrial fibrillation ([facility])    • AVM (arteriovenous malformation) of duodenum, 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   • Bacteriuria, asymptomatic    • Bipolar disorder ([place] [address] )    • Chronic deep vein thrombosis (DVT) ([facility])    • Chronic indwelling Ortega catheter    • Chronic kidney disease    • Chronic pain    • Chronic pain disorder    • Chronic suprapubic catheter ([place] [address] )    • Clostridium difficile infection 08/11/2016    also positive 9/2016, 5/29/2017, 8/15/2017   S/P fecal transplant   • Colostomy on examination [facility])    • Decubitus ulcer    • Delirium    • Diabetes mellitus ([place] [address] )    • GERD (gastroesophageal reflux disease)    • Hemodialysis patient ([place] [address] )    • Hypertension    • Memory impairment 2011    s/p diabetic coma   • Neurogenic bladder    • OAB (overactive bladder)    • Paraplegia ([facility])     T3 transverse myelitis vs spinal stoke (AVM); 2012 extensive epidural abscess C7=> conus 2nd extension from deep parspinal abscess L4-S2/sacral decubitus; cord atrophy/myelomalcia T3=>conus    • Penile abscess    • S/P unilateral BKA (below knee amputation) ([place] [address] )     Right   • Sebaceous cyst removed in 2017   • Seizures ([facility])    • Shortness of breath    • Tobacco abuse    • 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[place] [address] )    • Urinary retention    • Wheelchair dependent    • Wounds, multiple     pressure ulcers with delayed healing     Past Surgical History:   Procedure Laterality Date   • BELOW KNEE LEG AMPUTATION Right 2009   • COLONOSCOPY N/A 03/27/2017    Procedure: COLONOSCOPY;  Surgeon: Yuri Dowell MD; Location: [facility]; Service:    • COLONOSCOPY N/A 03/29/2017    Procedure: COLONOSCOPY;  Surgeon: Christine Lopez MD;  Location: [facility]; Service:    • COLONOSCOPY N/A 06/15/2017    Procedure: COLONOSCOPY with FMT;  Surgeon: Lisa Gant MD;  Location: [facility]; Service: Gastroenterology   • ESOPHAGOGASTRODUODENOSCOPY N/A 03/22/2017    Procedure: ESOPHAGOGASTRODUODENOSCOPY (EGD); Surgeon: Lieutenant Inna DO;  Location: [facility];   Service:    • MULTIPLE TOOTH EXTRACTIONS N/A 03/08/2021    Procedure: EXTRACTION TEETH # 2,3,6,10,12,13,14,18,19,20,21,22,23,24,25,26,27,28,29,30;  Surgeon: Clara Rahman DMD;  Location: [facility];  Service: Maxillofacial   • SKIN BIOPSY       Family History   Problem Relation Age of Onset   • Hyperlipidemia Mother    • Hypertension Mother    • Leukemia Brother    • Diabetes Paternal Grandfather       Social History     Socioeconomic History   • Marital status: Single     Spouse name: None   • Number of children: None   • Years of education: None   • Highest education level: None   Occupational History   • None   Tobacco Use   • Smoking status: Every Day     Types: Cigars   • Smokeless tobacco: Never   • Tobacco comments:     about 3 cigars/day   Vaping Use   • Vaping Use: Never used   Substance and Sexual Activity   • Alcohol use: Not Currently     Comment: 1 cup of wine every 3-4 months   • Drug use: Not Currently     Types: Marijuana     Comment: rarely; once every 1-2 years   • Sexual activity: None   Other Topics Concern   • None   Social History Narrative   • None     Social Determinants of Health     Financial Resource Strain: Not on file   Food Insecurity: Not on file   Transportation Needs: Not on file   Physical Activity: Not on file   Stress: Not on file   Social Connections: Not on file   Intimate Partner Violence: Not on file   Housing Stability: Not on file        Current Outpatient Medications:   •  Acetaminophen 325 MG CAPS, Take 650 mg by mouth, Disp: , Rfl:   • Alcohol Swabs (ALCOHOL PADS) 70 % PADS, by Does not apply route 5 (five) times a day, Disp: 300 each, Rfl: 5  •  amLODIPine (NORVASC) 10 mg tablet, Take 1 tablet (10 mg total) by mouth daily, Disp: 90 tablet, Rfl: 1  •  ammonium lactate (LAC-HYDRIN) 12 % cream, , Disp: , Rfl:   •  apixaban (ELIQUIS) 5 mg, Take 1 tablet (5 mg total) by mouth 2 (two) times a day, Disp: 270 tablet, Rfl: 1  •  ascorbic acid (VITAMIN C) 250 mg tablet, TAKE 2 TABLETS (500 MG TOTAL) BY MOUTH DAILY, Disp: 180 tablet, Rfl: 1  •  atorvastatin (LIPITOR) 20 mg tablet, Take 1 tablet (20 mg total) by mouth daily at bedtime, Disp: 90 tablet, Rfl: 1  •  B Complex-C-Folic Acid (Super B-Complex/Vit C/FA) TABS, TAKE 1 TABLET BY MOUTH EVERY DAY AS DIRECTED, Disp: 90 tablet, Rfl: 4  •  SUSAN MICROLET LANCETS lancets, Use as instructed to check blood sugar 4 times a day Dx e10 29, Disp: 200 each, Rfl: 5  •  bisacodyl (DULCOLAX) 5 mg EC tablet, Take 1 tablet (5 mg total) by mouth once for 1 dose, Disp: 2 tablet, Rfl: 0  •  Blood Glucose Monitoring Suppl ([employer]formerly [employer]) CONTOUR NEXT USB MONITOR) w/Device KIT, Testing 4 times a day, Disp: 1 kit, Rfl: 0  •  calcitriol (ROCALTROL) 0 5 MCG capsule, Take 2 mcg by mouth, Disp: , Rfl:   •  calcium acetate (PHOSLO) capsule, TAKE 2 CAPSULES BY MOUTH THREE TIMES DAILY WITH MEALS, Disp: , Rfl:   •  carvedilol (COREG) 25 mg tablet, Take 1 tablet (25 mg total) by mouth 2 (two) times a day, Disp: 180 tablet, Rfl: 1  •  cetirizine (ZyrTEC) 10 mg tablet, Take 1 tablet (10 mg total) by mouth daily, Disp: 90 tablet, Rfl: 1  •  Cholecalciferol (VITAMIN D-3) 1000 units CAPS, Take 2 capsules by mouth daily, Disp: 30 capsule, Rfl: 0  •  clindamycin (CLINDAGEL) 1 % gel, APPLY TO AFFECTED AREA TWICE A DAY, Disp: 60 g, Rfl: 2  •  cyanocobalamin ([employer] Vitamin B-12) 1000 MCG tablet, Take 1 tablet (1,000 mcg total) by mouth daily, Disp: 30 tablet, Rfl: 5  •  cyclobenzaprine (FLEXERIL) 5 mg tablet, , Disp: , Rfl:   •  dexlansoprazole (DEXILANT) 30 MG capsule, TAKE 1 CAPSULE BY MOUTH EVERY DAY, Disp: 180 capsule, Rfl: 2  •  dicyclomine (BENTYL) 10 mg capsule, TAKE 1 CAPSULE BY MOUTH 3 TIMES A DAY BEFORE MEALS, Disp: 270 capsule, Rfl: 1  •  Disposable Gloves MISC, Use 7 times a day, 4 boxes of gloves XL Dx type 1 DM, paraplegia, Disp: 4 each, Rfl: 11  •  doxazosin (CARDURA) 2 mg tablet, TAKE 1 TABLET BY MOUTH EVERY DAY IN THE EVENING, Disp: 90 tablet, Rfl: 0  •  epoetin kaushik (EPOGEN,PROCRIT) 20,000 units/mL, Inject 10,000 Units under the skin, Disp: , Rfl:   •  epoetin kaushik (EPOGEN,PROCRIT) 20,000 units/mL, Inject 5,000 Units under the skin, Disp: , Rfl:   •  ferrous sulfate 325 (65 Fe) mg tablet, Take 1 tablet (325 mg total) by mouth 3 (three) times a day, Disp: 90 tablet, Rfl: 3  •  guaiFENesin (MUCINEX) 600 mg 12 hr tablet, Take 2 tablets (1,200 mg total) by mouth every 12 (twelve) hours, Disp: 60 tablet, Rfl: 2  •  Gvoke PFS 1 MG/0 2ML SOSY, INJECT 1 ML (1 MG TOTAL) INTO A MUSCLE ONCE FOR 1 DOSE, Disp: , Rfl:   •  hydrOXYzine HCL (ATARAX) 50 mg tablet, TAKE 1 TABLET (50 MG TOTAL) BY MOUTH 2 (TWO) TIMES A DAY AS NEEDED FOR ITCHING OR ANXIETY, Disp: 180 tablet, Rfl: 1  •  Incontinence Supply Disposable (Incontinence Brief Large) MISC, Use 6 (six) times a day Size xl, Disp: 180 each, Rfl: 11  •  Incontinence Supply Disposable (Undergarment) MISC, Use 6 a day, 5 boxes, xl Dx R51, paraplegia, Disp: 5 each, Rfl: 11  •  Incontinence Supply Disposable (Underpads) MISC, Use 2 a day, Disp: 5 each, Rfl: 11  •  ketoconazole (NIZORAL) 2 % cream, Apply to rash , Disp: 15 g, Rfl: 1  •  LEVEMIR FLEXTOUCH 100 units/mL injection pen, Inject 14 Units under the skin 2 (two) times a day, Disp: 15 pen, Rfl: 3  •  Lokelma 10 g PACK, , Disp: , Rfl:   •  losartan (COZAAR) 100 MG tablet, TAKE 1 TABLET BY MOUTH EVERY DAY, Disp: 90 tablet, Rfl: 1  •  Melatonin ER 3 MG TBCR, Take 6 mg by mouth, Disp: , Rfl:   •  metroNIDAZOLE (METROGEL) 0 75 % gel, Apply topically 2 (two) times a day, Disp: 45 g, Rfl: 0  •  Misc  Devices (Wheelchair Cushion) MISC, Use daily, Disp: 1 each, Rfl: 0  •  Misc  Devices [facility]) MISC, by Does not apply route daily Wheelchair ramp, dx g82 20, Disp: 1 each, Rfl: 0  •  Multiple Vitamin (Daily-Enriqueta Multivitamin) TABS, TAKE 1 TABLET BY MOUTH EVERY DAY, Disp: 30 tablet, Rfl: 8  •  NovoLOG FlexPen 100 units/mL injection pen, INJECT 3 UNITS UNDER THE SKIN 3 (THREE) TIMES A DAY BEFORE MEALS , Disp: , Rfl:   •  oxybutynin (DITROPAN XL) 15 MG 24 hr tablet, , Disp: , Rfl:   •  oxybutynin (DITROPAN) 5 mg tablet, Take 3 tablets (15 mg total) by mouth daily, Disp: 270 tablet, Rfl: 1  •  polyethylene glycol (GLYCOLAX) 17 GM/SCOOP powder, Take as directed as per written office instructions, Disp: 238 g, Rfl: 0  •  polyethylene glycol (GOLYTELY) 4000 mL solution, Take 4,000 mL by mouth once for 1 dose, Disp: 4000 mL, Rfl: 0  •  risperiDONE (RisperDAL) 0 5 mg tablet, Take 1 tablet (0 5 mg total) by mouth 2 (two) times a day, Disp: 180 tablet, Rfl: 1  •  sertraline (ZOLOFT) 25 mg tablet, TAKE 1/2 TABLET BY MOUTH EVERY DAY, Disp: 45 tablet, Rfl: 0  •  Sodium Zirconium Cyclosilicate 10 g PACK, Take 10 g by mouth daily, Disp: , Rfl:   •  sucralfate (CARAFATE) 1 g/10 mL suspension, Take 10 mL (1 g total) by mouth 4 (four) times a day (with meals and at bedtime), Disp: 420 mL, Rfl: 1  •  SUPER B COMPLEX & C TABS, TAKE 1 CAPSULE BY MOUTH ONCE FOR 1 DOSE AS DIRECTED, Disp: 90 tablet, Rfl: 2  •  Zinc Sulfate 220 (50 Zn) MG TABS, Take 1 tablet by mouth daily, Disp: 90 tablet, Rfl: 1  No current facility-administered medications for this visit  Review of Systems    Objective:  /80   Pulse 80   Temp 97 9 °F (36 6 °C)   Resp 14   Pain Score: 0-No pain     Physical Exam           Wound Pressure Injury Ischium Left (Active)   Wound Image   01/05/23 1420   Wound Description Granulation tissue; Beefy red;Epithelialization 01/05/23 1435   Pressure Injury Stage 4 01/05/23 1434 Irene-wound Assessment Scar Tissue 01/05/23 1435   Wound Length (cm) 5 5 cm 01/05/23 1435   Wound Width (cm) 4 5 cm 01/05/23 1435   Wound Depth (cm) 1 1 cm 01/05/23 1435   Wound Surface Area (cm^2) 24 75 cm^2 01/05/23 1435   Wound Volume (cm^3) 27 225 cm^3 01/05/23 1435   Calculated Wound Volume (cm^3) 27 23 cm^3 01/05/23 1435   Change in Wound Size % -44 07 01/05/23 1435   Tunneling in depth located at 2 9 at 10; 3 7 at 7 01/05/23 1435   Undermining 0 9 12/06/22 1428   Undermining is depth extending from 3-5 oclock 12/06/22 1428   Drainage Amount Large 01/05/23 1435   Drainage Description Mendieta;Yellow 01/05/23 1435   Non-staged Wound Description Full thickness 06/30/22 1155   Treatments Cleansed 12/30/21 1447   Dressing Changed Changed 12/30/21 1447   Patient Tolerance Tolerated well 12/06/22 1428   Dressing Status Removed 12/06/22 1428       Wound Pressure Injury Perineum (Active)   Wound Image   01/05/23 1420   Wound Description Pink;Slough;Tan;Other (Comment) 01/05/23 1437   Pressure Injury Stage 4 01/05/23 1437   Irene-wound Assessment Scar Tissue 01/05/23 1437   Wound Length (cm) 7 cm 01/05/23 1437   Wound Width (cm) 5 4 cm 01/05/23 1437   Wound Depth (cm) 1 5 cm 01/05/23 1437   Wound Surface Area (cm^2) 37 8 cm^2 01/05/23 1437   Wound Volume (cm^3) 56 7 cm^3 01/05/23 1437   Calculated Wound Volume (cm^3) 56 7 cm^3 01/05/23 1437   Change in Wound Size % -440 01/05/23 1437   Undermining 1 8 01/05/23 1437   Undermining is depth extending from 12-7 01/05/23 1437   Drainage Amount Large 01/05/23 1437   Drainage Description Mendieta;Yellow 01/05/23 1437   Non-staged Wound Description Full thickness 06/30/22 1154   Treatments Cleansed 12/30/21 1448   Dressing Changed Changed 12/30/21 1448   Patient Tolerance Tolerated well 12/06/22 1431   Dressing Status Removed 12/06/22 1431       Wound Pressure Injury Sacrum (Active)   Wound Image   01/05/23 1419   Wound Description Epithelialization;Pink;Yellow 01/05/23 1439   Pressure Injury Stage 4 01/05/23 1439   Irene-wound Assessment Scar Tissue;Dry 01/05/23 1439   Wound Length (cm) 4 cm 01/05/23 1439   Wound Width (cm) 9 cm 01/05/23 1439   Wound Depth (cm) 1 cm 01/05/23 1439   Wound Surface Area (cm^2) 36 cm^2 01/05/23 1439   Wound Volume (cm^3) 36 cm^3 01/05/23 1439   Calculated Wound Volume (cm^3) 36 cm^3 01/05/23 1439   Change in Wound Size % 52 01/05/23 1439   Undermining 0 6 01/05/23 1439   Undermining is depth extending from 4-7 01/05/23 1439   Drainage Amount Large 01/05/23 1439   Drainage Description Yellow; Tan 01/05/23 1439   Non-staged Wound Description Full thickness 06/30/22 1153   Treatments Irrigation with NSS 05/05/22 1532   Dressing Changed Changed 12/30/21 1451   Patient Tolerance Tolerated well 12/06/22 1433   Dressing Status Removed 12/06/22 1433       Wound 01/28/21 Pressure Injury Hip Left;Lateral (Active)   Wound Image   01/05/23 1419   Wound Description Beefy red;Epithelialization 01/05/23 1440   Pressure Injury Stage 4 01/05/23 1440   Irene-wound Assessment Scar Tissue 01/05/23 1440   Wound Length (cm) 2 5 cm 01/05/23 1440   Wound Width (cm) 1 5 cm 01/05/23 1440   Wound Depth (cm) 1 8 cm 01/05/23 1440   Wound Surface Area (cm^2) 3 75 cm^2 01/05/23 1440   Wound Volume (cm^3) 6 75 cm^3 01/05/23 1440   Calculated Wound Volume (cm^3) 6 75 cm^3 01/05/23 1440   Tunneling 4 7 cm 02/10/22 1341   Tunneling in depth located at 10 02/10/22 1341   Undermining 4 5 12/06/22 1436   Undermining is depth extending from 7-11 oclock 12/06/22 1436   Drainage Amount Large 01/05/23 1440   Drainage Description Mendieta;Yellow 01/05/23 1440   Non-staged Wound Description Full thickness 06/30/22 1147   Treatments Cleansed 12/30/21 1452   Wound packed?  Yes 12/30/21 1452   Packing- # removed 1 05/05/22 1536   Dressing Changed Changed 12/30/21 1452   Patient Tolerance Tolerated well 12/06/22 1436   Dressing Status Removed 12/06/22 1436                     Debridement   Wound 01/28/21 Pressure Injury Hip Left;Lateral    Universal Protocol:  Consent: Verbal consent obtained  Written consent obtained  Consent given by: patient  Time out: Immediately prior to procedure a "time out" was called to verify the correct patient, procedure, equipment, support staff and site/side marked as required  Patient understanding: patient states understanding of the procedure being performed  Patient identity confirmed: verbally with patient      Performed by: physician  Debridement type: selective  Pain control: lidocaine 4%  Post-debridement measurements  Length (cm): 2 5  Width (cm): 1 5  Depth (cm): 1 8  Percent debrided: 100%  Surface Area (cm^2): 3 75  Area debrided (cm^2): 3 75  Volume (cm^3): 6 75  Devitalized tissue debrided: exudate and fibrin  Instrument(s) utilized: blade  Bleeding: small  Hemostasis obtained with: pressure  Procedural pain (0-10): 0  Post-procedural pain: 0   Response to treatment: procedure was tolerated well    Debridement   Wound Pressure Injury Sacrum    Universal Protocol:  Consent: Verbal consent obtained  Written consent obtained  Consent given by: patient  Time out: Immediately prior to procedure a "time out" was called to verify the correct patient, procedure, equipment, support staff and site/side marked as required    Patient understanding: patient states understanding of the procedure being performed  Patient identity confirmed: verbally with patient      Performed by: physician  Debridement type: selective  Pain control: lidocaine 4%  Post-debridement measurements  Length (cm): 4  Width (cm): 9  Depth (cm): 1  Percent debrided: 100%  Surface Area (cm^2): 36  Area debrided (cm^2): 36  Volume (cm^3): 36  Devitalized tissue debrided: exudate and fibrin  Instrument(s) utilized: blade  Bleeding: small  Hemostasis obtained with: pressure  Procedural pain (0-10): 0  Post-procedural pain: 0   Response to treatment: procedure was tolerated well    Debridement   Wound Pressure Injury Perineum Universal Protocol:  Consent: Verbal consent obtained  Written consent obtained  Consent given by: patient  Time out: Immediately prior to procedure a "time out" was called to verify the correct patient, procedure, equipment, support staff and site/side marked as required  Patient understanding: patient states understanding of the procedure being performed  Patient identity confirmed: verbally with patient      Performed by: physician  Debridement type: selective  Pain control: lidocaine 4%  Post-debridement measurements  Length (cm): 7  Width (cm): 5 4  Depth (cm): 1 5  Percent debrided: 100%  Surface Area (cm^2): 37 8  Area debrided (cm^2): 37 8  Volume (cm^3): 56 7  Devitalized tissue debrided: exudate and fibrin  Instrument(s) utilized: blade  Bleeding: medium  Hemostasis obtained with: pressure  Procedural pain (0-10): 0  Post-procedural pain: 0   Response to treatment: procedure was tolerated well    Debridement   Wound Pressure Injury Ischium Left    Universal Protocol:  Consent: Verbal consent obtained  Written consent obtained  Consent given by: patient  Time out: Immediately prior to procedure a "time out" was called to verify the correct patient, procedure, equipment, support staff and site/side marked as required    Patient understanding: patient states understanding of the procedure being performed  Patient identity confirmed: verbally with patient      Performed by: physician  Debridement type: selective  Pain control: lidocaine 4%  Post-debridement measurements  Length (cm): 5 5  Width (cm): 4 5  Depth (cm): 1 1  Percent debrided: 100%  Surface Area (cm^2): 24 75  Area debrided (cm^2): 24 75  Volume (cm^3): 27 23  Devitalized tissue debrided: exudate and fibrin  Instrument(s) utilized: blade  Bleeding: medium  Hemostasis obtained with: pressure  Procedural pain (0-10): 0  Post-procedural pain: 0   Response to treatment: procedure was tolerated well                 Lab Results   Component Value Date    HGBA1C 8 2 (H) 05/10/2022       Wound Instructions:  Orders Placed This Encounter   Procedures   • Wound cleansing and dressings     Wash your hands with soap and water  Remove old dressing, discard into plastic bag and place in trash  Cleanse the wound with sterile saline solution (rinse) prior to applying a clean dressing  Do not use tissue or cotton balls  Do not scrub the wound  Pat dry using gauze  Shower no; do not get dressing wet    ALL WOUNDS:  Cleanse with normal saline as above  Apply thin layer vaseline to periwound skin  Apply saline moistened gauze; may switch 1/2 -1/4 strength Dakin's moistened gauze dressings when odor detected in drainage and return to saline when odor is gone  Cover with an ABD pad  Secure with Medfix tape    Change dressing daily and PRN for breakthrough drainage (visiting nurses to do twice per week and family in between)    OK to test bloos sugar today in wound center     Continue NO PRESSURE TO ALL WOUNDS, ESPECIALLY PERINEAL WOUND; LIMIT SITTING UPRIGHT IN WHEELCHAIR ON THIS WOUND    Follow up in 5-6 weeks   Continue visiting nurse skilled 2 x per week for wound care dressing changes                             Treatment in the wound center today:  Flushed and cleansed with normal saline and saline moistened gauze applied to all wounds and dressed as ordered above     Standing Status:   Future     Standing Expiration Date:   1/5/2024   • Debridement     This order was created via procedure documentation   • Debridement     This order was created via procedure documentation   • Debridement     This order was created via procedure documentation   • Debridement     This order was created via procedure documentation   • POCT blood glucose     Standing Status:   Standing     Number of Occurrences:   1     Standing Expiration Date:   7/5/2023             Monalisa Elise MD, CHT, CWS    Portions of the record may have been created with voice recognition software  Occasional wrong word or "sound alike" substitutions may have occurred due to the inherent limitations of voice recognition software  Read the chart carefully and recognize, using context, where substitutions have occurred

## 2023-01-05 NOTE — LETTER
[facility]  [address] [place] [id number]  Phone#  [phone]  Fax#  [phone]    Patient:  Daisy Brock  YOB: 1980  Phone:  [phone]  Date of Visit:  1/5/2023    Orders Placed This Encounter   Procedures   • Wound cleansing and dressings     Wash your hands with soap and water  Remove old dressing, discard into plastic bag and place in trash  Cleanse the wound with sterile saline solution (rinse) prior to applying a clean dressing  Do not use tissue or cotton balls  Do not scrub the wound  Pat dry using gauze  Shower no; do not get dressing wet    ALL WOUNDS:  Cleanse with normal saline as above  Apply thin layer vaseline to periwound skin  Apply saline moistened gauze; may switch 1/2 -1/4 strength Dakin's moistened gauze dressings when odor detected in drainage and return to saline when odor is gone  Cover with an ABD pad  Secure with Medfix tape    Change dressing daily and PRN for breakthrough drainage (visiting nurses to do twice per week and family in between)     Continue NO PRESSURE TO ALL WOUNDS, ESPECIALLY PERINEAL WOUND; LIMIT SITTING UPRIGHT IN WHEELCHAIR ON THIS WOUND    Follow up in 5-6 weeks   Continue visiting nurse skilled 2 x per week for wound care dressing changes                             Treatment in the wound center today:  Flushed and cleansed with normal saline and saline moistened gauze applied to all wounds and dressed as ordered above     Standing Status:   Future     Standing Expiration Date:   1/5/2024         Electronically signed by Jade Goyal MD

## 2023-01-05 NOTE — PATIENT INSTRUCTIONS
Orders Placed This Encounter   Procedures    Wound cleansing and dressings     Wash your hands with soap and water  Remove old dressing, discard into plastic bag and place in trash  Cleanse the wound with sterile saline solution (rinse) prior to applying a clean dressing  Do not use tissue or cotton balls  Do not scrub the wound  Pat dry using gauze  Shower no; do not get dressing wet    ALL WOUNDS:  Cleanse with normal saline as above  Apply thin layer vaseline to periwound skin  Apply saline moistened gauze; may switch 1/2 -1/4 strength Dakin's moistened gauze dressings when odor detected in drainage and return to saline when odor is gone  Cover with an ABD pad  Secure with Medfix tape    Change dressing daily and PRN for breakthrough drainage (visiting nurses to do twice per week and family in between)     Continue NO PRESSURE TO ALL WOUNDS, ESPECIALLY PERINEAL WOUND; LIMIT SITTING UPRIGHT IN WHEELCHAIR ON THIS WOUND    Follow up in 5-6 weeks   Continue visiting nurse skilled 2 x per week for wound care dressing changes                             Treatment in the wound center today:  Flushed and cleansed with normal saline and saline moistened gauze applied to all wounds and dressed as ordered above     Standing Status:   Future     Standing Expiration Date:   1/5/2024

## 2023-01-09 ENCOUNTER — TELEPHONE (OUTPATIENT)
Dept: FAMILY MEDICINE CLINIC | Facility: CLINIC | Age: 43
End: 2023-01-09

## 2023-01-09 NOTE — TELEPHONE ENCOUNTER
Faxed to [phone] on 1/11/21 Refill requests for  1) Pepcid 1 tab am and pm #60  2) Zestorectic 1 daily #30   3) Lopressor 25mg bid #60  4) Protonix 40mg daily   5) Crestor 20mg daily     These were all ordered 11/29/22 for a month supply at last office visit no refills. Next appt 5/1/23. Did you want patient on Pepcid and Protonix. If so ok for all times year?

## 2023-01-09 NOTE — TELEPHONE ENCOUNTER
PCP SIGNATURE NEEDED FOR EXTENDED FAMILY CARE  FORM RECEIVED VIA FAX AND PLACED IN PCP FOLDER TO BE DELIVERED AT ASSIGNED TIMES      PHYSICIAN ORDER

## 2023-01-11 DIAGNOSIS — J30.9 ALLERGIC RHINITIS, UNSPECIFIED SEASONALITY, UNSPECIFIED TRIGGER: ICD-10-CM

## 2023-01-11 NOTE — TELEPHONE ENCOUNTER
Good morning  This is Kaylah  I'm calling from [facility]  This message is for Doctor Marcus Leggett in regards to patient Staci Parkinson's date of birth, [phone]  And I'm just calling to verify if the plan of care for this patient was received  If you have any question I can be reached at [phone]  Thank you and hope you enjoy the rest of the day

## 2023-01-12 ENCOUNTER — OFFICE VISIT (OUTPATIENT)
Dept: DERMATOLOGY | Facility: CLINIC | Age: 43
End: 2023-01-12

## 2023-01-12 VITALS — HEIGHT: 76 IN | TEMPERATURE: 99.1 F | BODY MASS INDEX: 18.62 KG/M2

## 2023-01-12 DIAGNOSIS — Z79.899 HIGH RISK MEDICATION USE: ICD-10-CM

## 2023-01-12 DIAGNOSIS — L70.0 ACNE VULGARIS: Primary | ICD-10-CM

## 2023-01-12 RX ORDER — SULFACETAMIDE SODIUM AND PREDNISOLONE SODIUM PHOSPHATE 100; 2.3 MG/ML; MG/ML
SOLUTION/ DROPS OPHTHALMIC
COMMUNITY
Start: 2022-12-29

## 2023-01-12 RX ORDER — OXYCODONE HYDROCHLORIDE 10 MG/1
TABLET ORAL
COMMUNITY
Start: 2022-12-12

## 2023-01-12 RX ORDER — ISOTRETINOIN 10 MG/1
10 CAPSULE ORAL EVERY OTHER DAY
Qty: 30 CAPSULE | Refills: 0 | Status: SHIPPED | OUTPATIENT
Start: 2023-01-12

## 2023-01-12 RX ORDER — AMLODIPINE BESYLATE 5 MG/1
5 TABLET ORAL DAILY
COMMUNITY
Start: 2022-10-17

## 2023-01-12 RX ORDER — PERPHENAZINE 16 MG/1
TABLET, FILM COATED ORAL
COMMUNITY
Start: 2022-12-28

## 2023-01-12 RX ORDER — CETIRIZINE HYDROCHLORIDE 10 MG/1
10 TABLET ORAL DAILY
Qty: 90 TABLET | Refills: 1 | Status: SHIPPED | OUTPATIENT
Start: 2023-01-12

## 2023-01-12 NOTE — PATIENT INSTRUCTIONS
ACNE VULGARIS ("COMMON ACNE")    Assessment and Plan: We reviewed the causes of acne, the “kinds” of acne, and the expected clinical course  We discussed treatment options ranging from over-the-counter products, topical retinoids, antibiotics, BP, hormonal therapies (OCPs/spironolactone), and isotretinoin (Accutane)  We reviewed specific over-the-counter interventions and medications  Recommended typical hygiene measures including water-based facial products, washing regularly with mild cleanser, and refraining from picking and popping any pimples  Recommended non-comedogenic sunscreen use daily  Expectations of therapy discussed  Side effects, risks and benefits of medications discussed  A comprehensive handout on Acne was provided  The phone number to call in case of questions or concerns (and instructions to stop medications in such a scenario) was provided  After lengthy discussion of etiology and treatment options, we decided to implement the following personalized treatment plan:  Discussed starting Isotretinoin -risks and benefits of medication reviewed, lab work and monthly appointments  To start medication will need to complete form and sign and lab work   Continue metrogel until Accutane is started     Based on a thorough discussion of this condition and the management approach to it (including a comprehensive discussion of the known risks, side effects and potential benefits of treatment), the patient (family) agrees to implement the following specific plan:    --------------------------------------------------------------------------------------  [facility]:  In the shower, wash your face, chest and back gently with Cetaphil moisturizing cleanser or Dove Fragrance-free bar  Do not use a luffa or washcloth as these tend to be too irritating to acne-prone skin        ANTIBIOTICS:    Metrogel 0 75%     “EVENING ROUTINE”    SKIN HYGIENE: In the shower, wash your face, chest and back gently with Cetaphil moisturizing cleanser or Dove Fragrance-free bar  Do not use a lufa or washcloth as these tend to be too irritating to acne-prone skin  ANTIBIOTICS:    Metrogel 0 75%       ORAL ISOTRETINOIN:    Isotretinoin 10 mg daily every other day   Labs reviewed   Labs must be checked prior to initiating isotretinoin   Patient confirmed in [employer]:    [address] [facility] #[id number] [id number]      REMEMBER:  Always take your acne pills with lots of water! A pill stuck in your throat can cause significant burning and irritation  Drink a full glass of water to ensure the pill gets into your stomach  Avoid “popping” a pill right before bed, and stay upright for at least 1 hour after taking a pill  ACNE:  WHAT ZIT ALL ABOUT? WHY DO I HAVE ACNE/PIMPLES? Your skin is made of layers  To keep the skin from becoming dry and cracked, the skin needs oil  The oil is made in little wells in the deeper layers in the skin  People with acne have glands that make more oil and are more easily plugged, causing the glands to swell  Hormones, bacteria and your inherited tendency to have acne all play a role  The medical term for “pimples” is acne or acne vulgaris (vulgaris means “common”)  Most people get some acne  Acne does not come from being dirty  Instead, it is an expected consequence of changes that occur during normal growth and development  Hormones, bacteria, and your family's tendency to have acne may all play a role  “Whiteheads” or “blackheads” are openings of the glands (glands are the oil factories) onto the surface of the skin  “Blackheads” are not caused by dirt blocking the pores; instead, they result from the oxidation reaction of oil and skin in the pores with the air (like a “rust” reaction)  WHAT ABOUT STRESS? Stress does not “cause” acne but it can make it worse  Make sure you get enough sleep and daily exercise!      WHAT ABOUT FOODS/DIET? Try to eat a balanced, healthy diet  Some people feel that certain foods worsen their acne  While there aren't many studies available on this question, severe dietary changes are unlikely to help your acne and may be harmful to the health of your skin  If you find that a certain food seems to aggravate your acne, you may consider avoiding that food  Discuss this with your physician! WHAT CAUSES MY ACNE? There are four contributors to acne--the body's natural oil (sebum), clogged pores, bacteria (with the scientific name Propionibacterium acnes, or P  acnes, for short), and the body's reaction to the bacteria living in the clogged pores (which causes inflammation)  Here's what happens:    Sebum is produced in the normal oil-making glands in the deeper layers of the skin and reaches the surface through the skin's pores  An increase in certain hormones occurs around the time of puberty, and these hormones trigger the oil glands to produce increased amounts of sebum  Pores with excess oil tend to become clogged more easily  At the same time, P  acnes--one of the many types of bacteria that normally live on everyone's skin--thrives in the excess oil and causes a skin reaction (inflammation)  If a pore is clogged close to the surface, there is little inflammation  However, this results in the formation of “whiteheads” (closed comedones) or “blackheads” (open comedones) at the surface of the skin  A plug that extends to, or forms a little deeper in the pore, or one that enlarges or ruptures may cause more inflammation  The result is red bumps (papules) and pus-filled pimples (pustules)  If plugging happens in the deepest skin layer, the inflammation may be even more severe, resulting in the formation of nodules or cysts  When these types of acne heal, they may leave behind discolored areas or true scars  SKIN HYGIENE:  HOW SHOULD I [facility] MY SKIN?   Acne does not come from being dirty, however, washing your face is part of taking good care of your skin and will help keep your face clear  Good skin hygiene is, therefore, critical to support any acne treatment plan  Here are several specific suggestions for practicing good skin hygiene and keeping your skin looking its best:    You should wash acne-prone skin TWICE A DAY: Once in the morning and once in the evening  This does include any showers you take that day, so do not overdo it! Do not scrub the skin with a washcloth or loofah as these can irritate and inflame your acne  Acne does not come from “dirt”, so it is not necessary to scrub the skin clean  In fact, scrubbing may lead to dryness and irritation that makes the acne even worse and harder for patients to tolerate acne medications  Use a gentle facial moisturizing cleanser (Cetaphil Moisturizing Cleanser or Dove Fragrance-Free bar)  Avoid using soaps like Justin Sullivanalam, [address] [facility], or soft/liquid soaps as these products will dry your skin  Do not use any over-the-counter “acne washes” without your doctor's specific instruction to do so  These products often contain salicylic acid or benzoyl peroxide  These ingredients can be helpful in clearing oil from the skin and reducing bacteria, but they may also be drying and can add to irritation  Do not use exfoliating products with microbeads or brushes as these can cause irritation to the skin  Facials and other treatments to remove, squeeze, or “clean out” pores are not recommended  Manipulating the skin in this way can make acne worse and can lead to severe infections and/or scarring  It also increases the likelihood that the skin will not be able to tolerate acne medications  Try not to “pop pimples” or pick at your acne as this can delay healing and may result in scarring or skin color changes (“dark spots”) that are often more noticeable than the acne itself  Picking/popping acne can also cause a serious skin infection    Wash or change your pillow case once to twice a week, especially if you use products in your hair  Wash the skin as soon as possible after playing sports or other activities that cause a lot of sweating  Also, pay attention to how your sports equipment (shoulder pads, helmet strap, etc ) might be making your acne worse  When you use makeup, moisturizer, or sunscreen make sure that these products are labeled “non-comedogenic,” or “won't clog pores,” or “won't cause acne ”       SHOULD I TREAT MY ACNE? There are a number of other skin conditions that can look like acne  If there is any question about the diagnosis, then the person should be evaluated by a board certified pediatric and adolescent dermatologist   A physician should examine any child with acne who is between the ages of 3and 9years of age, as acne in this “mid-childhood” age group is not normal and may signal an underlying problem  If a “preadolescent” (9to 6years of age) or “adolescent” (15to 25years of age) has mild acne and the condition is not bothersome to the individual, proper and regular skin care (what your doctor may call “skin hygiene”) may be all that is needed at this point  Many people do, however, need specific acne medications to help their skin look and feel its best  Your doctor will tell you if you are one of these people  If so, you may be advised to use an over-the-counter or prescription medication that is applied to the skin (a “topical medication”) or if the addition of an oral medication (a medication “taken by Revuze) is needed  The good news is that the medications work well when used properly! Some specific factors that may influence the choice of acne therapy include:    Severity  The number and type of skin lesions (papules or comedones) and the degree of inflammation (mild, moderate or severe)  Scarring  Scarring is most common when acne is severe, but it can happen even in children with mild acne  Impact   If a child is experiencing emotional complications because of the acne or is experiencing negative comments from other children  Cost of the acne medications  An acne expert can help to keep “out of pocket” costs to a minimum by utilizing the correct medications and the least expensive options  The patient's skin type (“oily” versus “dry” or “combination skin,” for example)  Potential side effects of the medication  The ease or overall complexity of the treatment plan or medication  WHAT ACNE TREATMENTS ARE AVAILABLE? Medications for acne try to stop the formation of new pimples by reducing or removing the oil, bacteria, and other things (like dead skin cells) that clog the pores  They can also decrease the inflammation or irritation response of the skin to bacteria  It may take from 6 to 8 weeks (about 2 months!) before you see any improvement and know if the medication is effective  It takes the layers of skin this long to regenerate  Remember, these medications do not “cure” the condition--the acne improves because of the medication  Therefore, treatment must be continued in order to prevent the return of acne lesions  There are many types of acne treatments  Some are applied to the skin (“topical” medications) and some are taken by mouth (“oral” medications)  In most cases of mild acne, the doctor will start with a topical medication  There are many different topical medications that are helpful for acne  If acne is more severe and it does not respond adequately to a topical medication, or if it covers large body surface areas such as the back and/or chest, oral antibiotics such as Doxycycline or Minocycline and/or oral hormone therapy such as Oral Contraceptive Pills or Spironolactone may be prescribed  In the most severe cases, isotretinoin (Accutane) may be used       In general, it is usually best to start with acne medications that are least likely to cause side effects but are at the same time capable of addressing the specific causes for the acne  Some patients have a good result with just one medication, but many will need to use a combination of treatments: two or more different topical agents or an oral medication plus a topical medication  Another treatment used for acne may include corticosteroid injections, which are used to help relieve pain, decrease the size, and encourage the healing of large, inflamed acne nodules  Also, dermatologists sometimes perform “acne surgery,” using a fine needle, a pointed blade, or an instrument known as a comedone extractor to mechanically clean out clogged pores  One must always weigh the risk for inducing a scar with the potential benefits of any procedure  Prior treatment with topical retinoids can “loosen” whiteheads and blackheads and make it easier to physically remove such lesions  Heat-based devices, and light and laser therapy are being studied to see whether there is any role for such treatments in mild to moderate acne  At this time, there is not enough evidence to make general recommendations about their use  TOPICAL ACNE MEDICATIONS    WHAT KIND OF TOPICALS ARE THERE? Benzoyl peroxide (BP) helps to fight inflammation and is anti-microbial (kills bacteria, viruses, and other microorganisms) and is believed to help prevent resistance of bacteria to topical antibiotics  A benzoyl peroxide “wash” may be recommended for use on large areas such as the chest and/or back  Mild irritation and dryness are common when first using benzoyl peroxide-containing products  Be careful because benzoyl peroxide can bleach towels and clothing! Retinoids (such as adapalene, tretinoin, or tazarotene) unplug the oil glands by helping peel away the layers of skin and other things plugging the opening of the glands  Mild irritation and dryness are common when first using these products   Facial waxing and other skin procedures can lead to excessive irritation and should be avoided during retinoid therapy  Antibiotics fight bacteria and help decrease inflammation  Topical antibiotics commonly used in acne include clindamycin, erythromycin, and combination agents (such as clindamycin/benzoyl peroxide or erythromycin/benzoyl peroxide)  Mild irritation and dryness are common when first using these products  Typically, topical antibiotics should not be used alone as treatment for acne  Other topical agents include salicylic acid, azelaic acid, dapsone, and sulfacetamide  Mild irritation and dryness can also occur when first using these products  USING YOUR TOPICAL TREATMENTS LIKE A PRO  Apply topical medications only to clean, dry skin  Topical medications may lead to significant dryness of the affected areas  To minimize this, wait 15-20 minutes after washing before applying your topical medication  These medications work deep in the skin to prevent new breakouts  “Spot treatment” of individual pimples does not do much  When applying topical medications to the face, use the “5-dot” method  Start by placing a small pea-sized amount of the medication on your finger  Then, place “dots” in each of five locations of your face: Mid-forehead, each cheek, nose, and chin  Next, rub the medication into the entire area of skin - not just on individual pimples! Try to avoid the delicate skin around your eyes and corners of your mouth  The medications are not magic! They take weeks if not months to work  Be patient and use your medicine on a daily basis or as directed for six weeks before asking if your skin looks better  Try not to miss more than one or two days each week when using your medications    If you are starting a new medication, then try using it “every other night” or even “every third night ” Gradually work up to [employer] a day ”  This will give your skin time to adjust   The same medications often come in various forms or formulations: Creams, ointments, lotions, gels, microspheres, or foams  Use the formulation that has been recommended and don't switch to other forms unless instructed  Some forms (such as alcohol based gels) may be more drying and less tolerable for certain skin types  Sometimes individual medications are not as effective as a combination of two or more agents  The doctor may need to try several medications or combinations before finding the one that is best for that patient  Moisturizer, sunscreen, and make-up may be used in conjunction with topical acne medications  In general, acne medications are applied first so they may directly contact the skin  Ask your physician to review specific application instructions! It is especially important to always use sunscreen when using a topical retinoid or oral antibiotic  These drugs can make your skin more sensitive to the sun  In general, sunscreen gets applied AFTER any acne medications  Don't stop using your acne medications just because your acne got better  Remember, the acne is better because of the medication, and prevention is the valencia to treatment  ORAL ISOTRETINOIN (used to be called the brand name “ACCUTANE”)  Isotretinoin, a derivative of vitamin A, is a powerful drug with several significant potential side effects  It is reserved for acne which is severe or when other medications have not worked well enough  It used to be sold under the brand name “Accutane” but now several versions exist       HAVING PROBLEMS WITH ANY OF YOUR TREATMENTS? You should not be able to see any of the medicines on your face  If you can see a white film on your skin after you apply the medication, there is too much medicine in that area and you need to apply a thinner coat and make sure it is spread evenly on your face  If your skin gets too dry, you can apply a light (“non-comedogenic”) moisturizer on top of your medicine or you may switch to using the medicine every other day instead of every day    If your skin is still too irritated, you may need to switch to a milder medication  If your skin is red and very itchy, you may be allergic to the medication and you should stop using it  COMMON POSSIBLE SIDE EFFECTS OF MEDICATIONS    Retinoids - dryness, redness, increased sun sensitivity  WHEN AND WHERE TO CALL WITH CONCERNS  We are here to help! If you experience any unusual symptoms, then stop taking or using the medication and call our office at [phone] (SKIN)  It is better to be safe than to be sorry!

## 2023-01-12 NOTE — PROGRESS NOTES
[facility]     Patient Name: Harpreet Gan  Encounter Date: 1/12/2023     Have you been cared for by a [place] [id number] Dermatologist in the last 3 years and, if so, which description applies to you? Yes  I have been here within the last 3 years, and my medical history has NOT changed since that time  I am MALE/not capable of bearing children  REVIEW OF SYSTEMS:  Have you recently had or currently have any of the following? · No changes in my recent health  PAST MEDICAL HISTORY:  Have you personally ever had or currently have any of the following? If "YES," then please provide more detail  · No changes in my medical history  FAMILY HISTORY:  Any "first degree relatives" (parent, brother, sister, or child) with the following? • No changes in my family's known health  PATIENT EXPERIENCE:    • Do you want the Dermatologist to perform a COMPLETE skin exam today including a clinical examination under the "bra and underwear" areas? NO  • If necessary, do we have your permission to call and leave a detailed message on your Preferred Phone number that includes your specific medical information? Yes      Allergies   Allergen Reactions   • Chlorcyclizine Swelling   • Chlorhexidine Other (See Comments)   • Ciprofloxacin Hcl    • Cymbalta [Duloxetine Hcl]    • Dm-Doxylamine-Acetaminophen Other (See Comments)   • Gabapentin Tremor   • Hydrocortisone Other (See Comments)   • Lac Bovis GI Intolerance   • Lactose - Food Allergy GI Intolerance     Other reaction(s): Unknown   • Lyrica [Pregabalin]    • Penicillins Other (See Comments)     miguel Zosyn 08/28/17  Tolerates Ancef  Miguel Augmentin   • Polymyxin B    • Promethazine-Phenyleph-Codeine Other (See Comments)   • Quinidine Other (See Comments)   • Cefepime Other (See Comments)     Other reaction(s):  Other (See Comments)  encephalopathy; tolerates cefazolin 6/14, miguel Ceftriaxone  encephalopathy; tolerates cefazolin 6/14, miguel Ceftriaxone  Pt has had cefepime March 2022 and June 2022  Also, cephalexin 3/15/22     • Rosuvastatin Other (See Comments) and Palpitations     Weakness      Current Outpatient Medications:   •  Acetaminophen 325 MG CAPS, Take 650 mg by mouth, Disp: , Rfl:   •  Alcohol Swabs (ALCOHOL PADS) 70 % PADS, by Does not apply route 5 (five) times a day, Disp: 300 each, Rfl: 5  •  amLODIPine (NORVASC) 10 mg tablet, Take 1 tablet (10 mg total) by mouth daily, Disp: 90 tablet, Rfl: 1  •  ammonium lactate (LAC-HYDRIN) 12 % cream, , Disp: , Rfl:   •  apixaban (ELIQUIS) 5 mg, Take 1 tablet (5 mg total) by mouth 2 (two) times a day, Disp: 270 tablet, Rfl: 1  •  ascorbic acid (VITAMIN C) 250 mg tablet, TAKE 2 TABLETS (500 MG TOTAL) BY MOUTH DAILY, Disp: 180 tablet, Rfl: 1  •  atorvastatin (LIPITOR) 20 mg tablet, Take 1 tablet (20 mg total) by mouth daily at bedtime, Disp: 90 tablet, Rfl: 1  •  B Complex-C-Folic Acid (Super B-Complex/Vit C/FA) TABS, TAKE 1 TABLET BY MOUTH EVERY DAY AS DIRECTED, Disp: 90 tablet, Rfl: 4  •  SUSAN MICROLET LANCETS lancets, Use as instructed to check blood sugar 4 times a day Dx e10 [id number], Disp: 200 each, Rfl: 5  •  bisacodyl (DULCOLAX) 5 mg EC tablet, Take 1 tablet (5 mg total) by mouth once for 1 dose, Disp: 2 tablet, Rfl: 0  •  Blood Glucose Monitoring Suppl (SUSAN CONTOUR NEXT USB MONITOR) w/Device KIT, Testing 4 times a day, Disp: 1 kit, Rfl: 0  •  calcitriol (ROCALTROL) 0 5 MCG capsule, Take 2 mcg by mouth, Disp: , Rfl:   •  calcium acetate (PHOSLO) capsule, TAKE 2 CAPSULES BY MOUTH THREE TIMES DAILY WITH MEALS, Disp: , Rfl:   •  carvedilol (COREG) 25 mg tablet, Take 1 tablet (25 mg total) by mouth 2 (two) times a day, Disp: 180 tablet, Rfl: 1  •  cetirizine (ZyrTEC) 10 mg tablet, Take 1 tablet (10 mg total) by mouth daily, Disp: 90 tablet, Rfl: 1  •  Cholecalciferol (VITAMIN D-3) 1000 units CAPS, Take 2 capsules by mouth daily, Disp: 30 capsule, Rfl: 0  •  clindamycin (CLINDAGEL) 1 % gel, APPLY TO AFFECTED AREA TWICE A DAY, Disp: 60 g, Rfl: 2  •  cyanocobalamin ([employer] Vitamin B-12) 1000 MCG tablet, Take 1 tablet (1,000 mcg total) by mouth daily, Disp: 30 tablet, Rfl: 5  •  cyclobenzaprine (FLEXERIL) 5 mg tablet, , Disp: , Rfl:   •  dexlansoprazole (DEXILANT) 30 MG capsule, TAKE 1 CAPSULE BY MOUTH EVERY DAY, Disp: 180 capsule, Rfl: 2  •  dicyclomine (BENTYL) 10 mg capsule, TAKE 1 CAPSULE BY MOUTH 3 TIMES A DAY BEFORE MEALS, Disp: 270 capsule, Rfl: 1  •  Disposable Gloves MISC, Use 7 times a day, 4 boxes of gloves XL Dx type 1 DM, paraplegia, Disp: 4 each, Rfl: 11  •  doxazosin (CARDURA) 2 mg tablet, TAKE 1 TABLET BY MOUTH EVERY DAY IN THE EVENING, Disp: 90 tablet, Rfl: 0  •  epoetin kaushik (EPOGEN,PROCRIT) 20,000 units/mL, Inject 10,000 Units under the skin, Disp: , Rfl:   •  epoetin kaushik (EPOGEN,PROCRIT) 20,000 units/mL, Inject 5,000 Units under the skin, Disp: , Rfl:   •  ferrous sulfate 325 (65 Fe) mg tablet, Take 1 tablet (325 mg total) by mouth 3 (three) times a day, Disp: 90 tablet, Rfl: 3  •  guaiFENesin (MUCINEX) 600 mg 12 hr tablet, Take 2 tablets (1,200 mg total) by mouth every 12 (twelve) hours, Disp: 60 tablet, Rfl: 2  •  Gvoke PFS 1 MG/0 2ML SOSY, INJECT 1 ML (1 MG TOTAL) INTO A MUSCLE ONCE FOR 1 DOSE, Disp: , Rfl:   •  hydrOXYzine HCL (ATARAX) 50 mg tablet, TAKE 1 TABLET (50 MG TOTAL) BY MOUTH 2 (TWO) TIMES A DAY AS NEEDED FOR ITCHING OR ANXIETY, Disp: 180 tablet, Rfl: 1  •  Incontinence Supply Disposable (Incontinence Brief Large) MISC, Use 6 (six) times a day Size xl, Disp: 180 each, Rfl: 11  •  Incontinence Supply Disposable (Undergarment) MISC, Use 6 a day, 5 boxes, xl Dx R51, paraplegia, Disp: 5 each, Rfl: 11  •  Incontinence Supply Disposable (Underpads) MISC, Use 2 a day, Disp: 5 each, Rfl: 11  •  ketoconazole (NIZORAL) 2 % cream, Apply to rash , Disp: 15 g, Rfl: 1  •  LEVEMIR FLEXTOUCH 100 units/mL injection pen, Inject 14 Units under the skin 2 (two) times a day, Disp: 15 pen, Rfl: 3  • Lokelma 10 g PACK, , Disp: , Rfl:   •  losartan (COZAAR) 100 MG tablet, TAKE 1 TABLET BY MOUTH EVERY DAY, Disp: 90 tablet, Rfl: 1  •  Melatonin ER 3 MG TBCR, Take 6 mg by mouth, Disp: , Rfl:   •  metroNIDAZOLE (METROGEL) 0 75 % gel, Apply topically 2 (two) times a day, Disp: 45 g, Rfl: 0  •  Misc  Devices (Wheelchair Cushion) MISC, Use daily, Disp: 1 each, Rfl: 0  •  Misc   Devices [facility]) MISC, by Does not apply route daily Wheelchair ramp, dx [id number] 20, Disp: 1 each, Rfl: 0  •  Multiple Vitamin (Daily-Enriqueta Multivitamin) TABS, TAKE 1 TABLET BY MOUTH EVERY DAY, Disp: 30 tablet, Rfl: 8  •  NovoLOG FlexPen 100 units/mL injection pen, INJECT 3 UNITS UNDER THE SKIN 3 (THREE) TIMES A DAY BEFORE MEALS , Disp: , Rfl:   •  oxybutynin (DITROPAN XL) 15 MG 24 hr tablet, , Disp: , Rfl:   •  oxybutynin (DITROPAN) 5 mg tablet, Take 3 tablets (15 mg total) by mouth daily, Disp: 270 tablet, Rfl: 1  •  polyethylene glycol (GLYCOLAX) 17 GM/SCOOP powder, Take as directed as per written office instructions, Disp: 238 g, Rfl: 0  •  polyethylene glycol (GOLYTELY) 4000 mL solution, Take 4,000 mL by mouth once for 1 dose, Disp: 4000 mL, Rfl: 0  •  risperiDONE (RisperDAL) 0 5 mg tablet, Take 1 tablet (0 5 mg total) by mouth 2 (two) times a day, Disp: 180 tablet, Rfl: 1  •  sertraline (ZOLOFT) 25 mg tablet, TAKE 1/2 TABLET BY MOUTH EVERY DAY, Disp: 45 tablet, Rfl: 0  •  Sodium Zirconium Cyclosilicate 10 g PACK, Take 10 g by mouth daily, Disp: , Rfl:   •  sucralfate (CARAFATE) 1 g/10 mL suspension, Take 10 mL (1 g total) by mouth 4 (four) times a day (with meals and at bedtime), Disp: 420 mL, Rfl: 1  •  SUPER B COMPLEX & C TABS, TAKE 1 CAPSULE BY MOUTH ONCE FOR 1 DOSE AS DIRECTED, Disp: 90 tablet, Rfl: 2  •  Zinc Sulfate 220 (50 Zn) MG TABS, Take 1 tablet by mouth daily, Disp: 90 tablet, Rfl: 1          • Whom besides the patient is providing clinical information about today's encounter?   o NO ADDITIONAL HISTORIAN (patient alone provided history)    Physical Exam and Assessment/Plan by Diagnosis:    ACNE VULGARIS ("COMMON ACNE")    Physical Exam:  • Psychiatric/Mood: good, no mood issues  • Anatomic Location Affected:  face  • Morphological Description: erythematous papules and nodules   Additional History of Present Condition:  Patient presents with follow up to acne not able to take doxycyline due to problem with stomach and using metronidazole  Patient states using Hibiclens to wash face  ISOTRETINOIN "ACCUTANE": MALE    • Age:42 y o   • Weight: 69 4 kg   • Ipledge number: [id number]   • Last 4 digits SS: [id number]    • "Goal Dose" in mg (125 mg x weight in kg): 8,675 mg    Interim month  • "Daily Dose" of Isotretinoin the patient has actually been taking since last visit (this is usually what was prescribed): 0  • "Total # of Days" patient took Isotretinoin since last visit (this is usually 30):  0 days  • "Total Monthly Dose" in mg since last visit (this equals "Daily Dose" x "Total # of Days"): 0 mg    Cumulative dosage  • "Total cumulative Dose" in mg (add the above "Total Monthly Dose" with "Total Cumulative Dose" from last visit: 0 mg    Physical Exam:  • Psychiatric/Mood: good, no mood issues   • Anatomic Location Affected: Face Only  • Morphologic Description:  o Open and Closed Comedones: Moderate  o Inflammatory Papules and Pustules: Severe  o Nodules/Cysts: Severe  o Scarring:  Moderate  o Erythema: Almost Clear  o Xerosis (Dryness): Clear  o Excoriations: Almost Clear  o Dyspigmentation: Moderate  • Pertinent Positives:  • Pertinent Negatives:    Labs:  • Date of last labs: 1/11/2023 for lipid panel, August 2022 for CMP   • Completed: YES  • Labs reviewed: within normal limits    DIAGNOSES:  • Acne Vulgaris  • High Risk Medication  • Medication Monitoring    Assessment and Plan:  • Target Total Dose per KILOgram:  125 mg/KILOgram (this may change this on a patient-by-patient basis)  • Planned daily dose for next 30 days: 150 mg  • Please remember to add patient's "Ipledge number" to actual prescription):    • Return to clinic in about 1 month, please review ipledge guidelines in terms of timing (see below for patient education)  • Get labs 1-2 days before next appointment: YES  • Patient confirmed in iPledge: YES  • Patients counseled:  o Cannot donate blood while taking isotretinoin and for 1 month after completing therapy  YES  o Do not share medication with anyone  YES  o Possible side effects discussed, including sun sensitivity, dry lips/eyes/skin, headaches, blurry vision (pseudotumor cerebri), muscle/joint aches, GI upset, bloody stools (“IBD” including Crohn’s/Ulcerative Colitis), jaundice, liver dysfunction, lipid abnormalities, bone marrow dysfunction, mood effects, thoughts of hurting oneself or others, and flaring of acne (acne fulminans/SAPPHO)  YES  o Report any side effects of mood swings or depression and stop medication upon occurrence  YES        Assessment and Plan:    Fulminant flare and other risks discussed with patient in detail  • We reviewed the causes of acne, the “kinds” of acne, and the expected clinical course  • We discussed treatment options ranging from over-the-counter products, topical retinoids, antibiotics, BP, hormonal therapies (OCPs/spironolactone), and isotretinoin (Accutane)  • We reviewed specific over-the-counter interventions and medications  Recommended typical hygiene measures including water-based facial products, washing regularly with mild cleanser, and refraining from picking and popping any pimples  • Recommended non-comedogenic sunscreen use daily  • Expectations of therapy discussed  Side effects, risks and benefits of medications discussed  • A comprehensive handout on Acne was provided  • The phone number to call in case of questions or concerns (and instructions to stop medications in such a scenario) was provided    • After lengthy discussion of etiology and treatment options, we decided to implement the following personalized treatment plan:  • Discussed starting Isotretinoin -risks and benefits of medication reviewed, lab work and monthly appointments  • To start medication will need to complete form and sign and lab work   •     Based on a thorough discussion of this condition and the management approach to it (including a comprehensive discussion of the known risks, side effects and potential benefits of treatment), the patient (family) agrees to implement the following specific plan:    --------------------------------------------------------------------------------------  YOUR PERSONALIZED ACNE ACTION PLAN    “MORNING ROUTINE”    1) SKIN HYGIENE:  In the shower, wash your face, chest and back gently with Cetaphil moisturizing cleanser or Dove Fragrance-free bar  Do not use a luffa or washcloth as these tend to be too irritating to acne-prone skin  2) ANTIBIOTICS:    • Metrogel 0 75%     “EVENING ROUTINE”    1) SKIN HYGIENE:  In the shower, wash your face, chest and back gently with Cetaphil moisturizing cleanser or Dove Fragrance-free bar  Do not use a lufa or washcloth as these tend to be too irritating to acne-prone skin      2) ANTIBIOTICS:    • Metrogel 0 75%       3) ORAL ISOTRETINOIN:    • Isotretinoin 10 mg daily every other day   • Labs reviewed   o Labs must be checked prior to initiating isotretinoin   • Patient confirmed in [facility] TODAY #[id number] [address]      I,:  Abraham Licona am acting as a scribe while in the presence of the attending physician :       I,:  Francisco Javier Culp MD personally performed the services described in this documentation    as scribed in my presence :           Aleksander Miller MD  Dermatology, PGY-2

## 2023-01-16 ENCOUNTER — TELEPHONE (OUTPATIENT)
Dept: FAMILY MEDICINE CLINIC | Facility: CLINIC | Age: 43
End: 2023-01-16

## 2023-01-18 ENCOUNTER — TELEPHONE (OUTPATIENT)
Dept: FAMILY MEDICINE CLINIC | Facility: CLINIC | Age: 43
End: 2023-01-18

## 2023-01-18 NOTE — TELEPHONE ENCOUNTER
PCP SIGNATURE NEEDED FOR [employer] FORM RECEIVED VIA FAX AND PLACED IN PCP FOLDER TO BE DELIVERED AT ASSIGNED TIMES        Repair to Pao

## 2023-01-25 ENCOUNTER — TELEPHONE (OUTPATIENT)
Dept: DERMATOLOGY | Facility: CLINIC | Age: 43
End: 2023-01-25

## 2023-01-25 NOTE — TELEPHONE ENCOUNTER
Oliver Hernández! Would you feel comfortable talking with this patient about the low-dose isotretinoin?

## 2023-01-25 NOTE — TELEPHONE ENCOUNTER
Pt is requesting a return call RE: medication  Pt is currently on dialysis and has reviewed medication which is not good for the liver  Pt sent [employer] 1/13/23 asking if another medication would work for him  And a picture of a cyst that popped but that still have a dump deeper inside  Pt is calling to discuss other options for the cysts he has on his face which grow with a foul odor  Last ovs 1/12/23    Please review and return call to pt to discuss  Thank you!

## 2023-01-26 DIAGNOSIS — L70.0 ACNE VULGARIS: Primary | ICD-10-CM

## 2023-01-26 RX ORDER — DOXYCYCLINE 100 MG/1
100 CAPSULE ORAL 2 TIMES DAILY
Qty: 60 CAPSULE | Refills: 2 | Status: SHIPPED | OUTPATIENT
Start: 2023-01-26 | End: 2023-04-26

## 2023-01-26 NOTE — PROGRESS NOTES
Patient concerned about potential liver adverse effects of isotretinoin and is not interested in starting this medication at this time  Asking about other options  Patient has trialed numerous other topical and oral medications in the past  Patient would like to re-trial doxycycline therapy at this time (discontinued in the past due to GI upset)  Prescribed doxycycline monohydrate 100mg BID  Patient already has follow up appointment scheduled in March      Lucita Camilo MD  Dermatology, PGY-2

## 2023-01-30 DIAGNOSIS — I10 HTN (HYPERTENSION), BENIGN: ICD-10-CM

## 2023-01-30 DIAGNOSIS — F31.9 BIPOLAR DEPRESSION (HCC): ICD-10-CM

## 2023-01-31 RX ORDER — CARVEDILOL 25 MG/1
25 TABLET ORAL 2 TIMES DAILY
Qty: 180 TABLET | Refills: 1 | Status: SHIPPED | OUTPATIENT
Start: 2023-01-31

## 2023-01-31 RX ORDER — SERTRALINE HYDROCHLORIDE 25 MG/1
TABLET, FILM COATED ORAL
Qty: 45 TABLET | Refills: 0 | Status: SHIPPED | OUTPATIENT
Start: 2023-01-31 | End: 2023-02-02 | Stop reason: SDUPTHER

## 2023-02-02 ENCOUNTER — OFFICE VISIT (OUTPATIENT)
Dept: FAMILY MEDICINE CLINIC | Facility: CLINIC | Age: 43
End: 2023-02-02

## 2023-02-02 VITALS
TEMPERATURE: 98.6 F | OXYGEN SATURATION: 99 % | RESPIRATION RATE: 18 BRPM | SYSTOLIC BLOOD PRESSURE: 128 MMHG | HEART RATE: 94 BPM | DIASTOLIC BLOOD PRESSURE: 82 MMHG

## 2023-02-02 DIAGNOSIS — Z99.2 TYPE 1 DIABETES MELLITUS WITH CHRONIC KIDNEY DISEASE ON CHRONIC DIALYSIS (HCC): ICD-10-CM

## 2023-02-02 DIAGNOSIS — N18.6 STAGE 5 CHRONIC KIDNEY DISEASE ON CHRONIC DIALYSIS (HCC): ICD-10-CM

## 2023-02-02 DIAGNOSIS — I48.91 ATRIAL FIBRILLATION, UNSPECIFIED TYPE (HCC): Chronic | ICD-10-CM

## 2023-02-02 DIAGNOSIS — Z99.2 STAGE 5 CHRONIC KIDNEY DISEASE ON CHRONIC DIALYSIS (HCC): ICD-10-CM

## 2023-02-02 DIAGNOSIS — N18.6 TYPE 1 DIABETES MELLITUS WITH CHRONIC KIDNEY DISEASE ON CHRONIC DIALYSIS (HCC): ICD-10-CM

## 2023-02-02 DIAGNOSIS — E10.22 TYPE 1 DIABETES MELLITUS WITH CHRONIC KIDNEY DISEASE ON CHRONIC DIALYSIS (HCC): ICD-10-CM

## 2023-02-02 DIAGNOSIS — G82.20 PARAPLEGIA (HCC): Chronic | ICD-10-CM

## 2023-02-02 DIAGNOSIS — Z00.00 ENCOUNTER FOR ANNUAL WELLNESS VISIT (AWV) IN MEDICARE PATIENT: Primary | ICD-10-CM

## 2023-02-02 DIAGNOSIS — F31.9 BIPOLAR DEPRESSION (HCC): ICD-10-CM

## 2023-02-02 DIAGNOSIS — I10 HTN (HYPERTENSION), BENIGN: ICD-10-CM

## 2023-02-02 DIAGNOSIS — E78.5 HYPERLIPIDEMIA, UNSPECIFIED HYPERLIPIDEMIA TYPE: ICD-10-CM

## 2023-02-02 RX ORDER — DOXAZOSIN 2 MG/1
2 TABLET ORAL EVERY EVENING
Qty: 90 TABLET | Refills: 1 | Status: SHIPPED | OUTPATIENT
Start: 2023-02-02

## 2023-02-02 RX ORDER — ATORVASTATIN CALCIUM 20 MG/1
20 TABLET, FILM COATED ORAL
Qty: 90 TABLET | Refills: 1 | Status: SHIPPED | OUTPATIENT
Start: 2023-02-02

## 2023-02-02 RX ORDER — SERTRALINE HYDROCHLORIDE 25 MG/1
25 TABLET, FILM COATED ORAL DAILY
Qty: 90 TABLET | Refills: 1 | Status: SHIPPED | OUTPATIENT
Start: 2023-02-02

## 2023-02-02 RX ORDER — LOSARTAN POTASSIUM 100 MG/1
100 TABLET ORAL DAILY
Qty: 90 TABLET | Refills: 1 | Status: SHIPPED | OUTPATIENT
Start: 2023-02-02

## 2023-02-02 RX ORDER — AMLODIPINE BESYLATE 10 MG/1
10 TABLET ORAL DAILY
Qty: 90 TABLET | Refills: 1 | Status: SHIPPED | OUTPATIENT
Start: 2023-02-02

## 2023-02-02 NOTE — ASSESSMENT & PLAN NOTE
- Last A1c 7 9 from November 2022    - Continue medications as prescribed through endocrinology  - Continue close follow-up with endocrinology as scheduled  - Continue follow up with ophthalmology, [facility] for sight, as scheduled  Per patient, his next visit is scheduled for next week      Lab Results   Component Value Date    HGBA1C 8 2 (H) 05/10/2022    HGBA1C 7 3 (H) 01/20/2022    HGBA1C 7 4 (H) 10/19/2021

## 2023-02-02 NOTE — ASSESSMENT & PLAN NOTE
- Blood pressure stable today  Continue amlodipine 10  mg daily, carvedilol 25 mg twice daily, doxazosin 2 mg daily, and losartan 100 mg daily    - Reviewed BP target goal with patient  - Continue checking blood pressure at home and create blood pressure log to bring to next appointment

## 2023-02-02 NOTE — PROGRESS NOTES
Assessment and Plan:     Problem List Items Addressed This Visit        Endocrine    Type 1 diabetes mellitus with chronic kidney disease on chronic dialysis ([facility] ) (Chronic)     - Last A1c 7 9 from November 2022    - Continue medications as prescribed through endocrinology  - Continue close follow-up with endocrinology as scheduled  - Continue follow up with ophthalmology, [facility] for sight, as scheduled  Per patient, his next visit is scheduled for next week  Lab Results   Component Value Date    HGBA1C 8 2 (H) 05/10/2022    HGBA1C 7 3 (H) 01/20/2022    HGBA1C 7 4 (H) 10/19/2021               Cardiovascular and Mediastinum    Atrial fibrillation (HCC) (Chronic)     - Continue Eliquis  Relevant Medications    apixaban (ELIQUIS) 5 mg    amLODIPine (NORVASC) 10 mg tablet    HTN (hypertension), benign (Chronic)     - Blood pressure stable today  Continue amlodipine 10  mg daily, carvedilol 25 mg twice daily, doxazosin 2 mg daily, and losartan 100 mg daily    - Reviewed BP target goal with patient  - Continue checking blood pressure at home and create blood pressure log to bring to next appointment  Relevant Medications    amLODIPine (NORVASC) 10 mg tablet    doxazosin (CARDURA) 2 mg tablet    losartan (COZAAR) 100 MG tablet       Nervous and Auditory    Paraplegia (HCC) (Chronic)     - 2/2 Transverse Myelitis, ambulating in wheelchair  Genitourinary    Stage 5 chronic kidney disease on chronic dialysis (HCC) (Chronic)     - On hemodialysis on MWF   - Continue follow up with nephrology as scheduled  Other    Bipolar depression ([facility] )     - Stable  Continue sertraline 25 mg daily  - Continue coping methods, relaxation techniques           Relevant Medications    sertraline (ZOLOFT) 25 mg tablet    Hyperlipidemia    Relevant Medications    atorvastatin (LIPITOR) 20 mg tablet   Other Visit Diagnoses     Encounter for annual wellness visit (AWV) in Medicare patient    -  Primary           Preventive health issues were discussed with patient, and age appropriate screening tests were ordered as noted in patient's After Visit Summary  Personalized health advice and appropriate referrals for health education or preventive services given if needed, as noted in patient's After Visit Summary  History of Present Illness:     Patient presents for a Medicare Wellness Visit    -Patient notes overall he has been doing well  Patient notes his insulin pump has not been working well  Patient notes he has been monitoring his blood pressure at home and it has been within normal range  Patient notes occasionally he does experience elevated heart rate at dialysis, but patient denies any palpitations, chest tightness, chest pain, shortness of breath  Patient notes he is in need of new glasses and he does have an appointment scheduled with his eye doctor, [facility] for sight, next week  Patient Care Team:  Cuco Fonseca PA-C as PCP - General (Family Medicine)  Brenna Champion MD as PCP - Wound (Wound Care)  MD Niels Spann PA-C Orrie Fuse, MD Lonny Chu, MD as Endoscopist     Review of Systems:     Review of Systems   Constitutional: Negative for appetite change, chills, fatigue and fever  HENT: Negative for congestion, dental problem, postnasal drip, rhinorrhea and sore throat  Respiratory: Negative for cough, chest tightness, shortness of breath and wheezing  Cardiovascular: Negative for chest pain, palpitations and leg swelling  Gastrointestinal: Negative for abdominal pain, diarrhea, nausea and vomiting  Musculoskeletal: Positive for arthralgias, back pain, gait problem and myalgias  Skin: Positive for wound  Negative for rash  Neurological: Negative for dizziness, light-headedness and headaches  Hematological: Negative for adenopathy  Psychiatric/Behavioral: Negative for dysphoric mood   The patient is not nervous/anxious           Problem List:     Patient Active Problem List   Diagnosis   • Paraplegia [facility])   • Atrial fibrillation (HCC)   • Chronic suprapubic catheter ([facility])   • Colostomy care ([place] [address] )   • OAB (overactive bladder)   • S/P unilateral BKA (below knee amputation) ([place] [address] )   • Tobacco abuse   • Type 1 diabetes mellitus with chronic kidney disease on chronic dialysis ([place] [address] )   • Ulcer of sacral region, stage 4 ([place] [address] )   • Chronic indwelling Ortega catheter   • Wound healing, delayed   • Iron deficiency anemia   • Pressure injury of left ischium, stage 4 ([place] [address] )   • HTN (hypertension), benign   • Neurogenic bladder   • GERD (gastroesophageal reflux disease)   • Chronic pain   • Stage 5 chronic kidney disease on chronic dialysis ([place] [address] )   • Penile abscess   • History of Clostridium difficile infection   • Urinary retention   • Delirium   • Dialysis patient ([place] [address] )   • Insulin long-term use ([place] [address] )   • Wheelchair dependent   • Recurrent Clostridioides difficile diarrhea   • Bipolar depression ([place] [address] )   • 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[place] [address] )   • Seizure ([place] [address] )   • Pressure ulcer of sacral region, stage 4 ([place] [address] )   • AVM (arteriovenous malformation) of duodenum, acquired   • Chronic narcotic dependence ([facility])   • Chronic diastolic heart failure ([facility])   • Pressure ulcer of other site, stage 4 ([facility])   • Pressure ulcer of left hip, stage 4 ([place] [address] )   • ED (erectile dysfunction) of organic origin   • Irritable bowel syndrome   • Onychomycosis   • Pustular folliculitis   • Prolonged Q-T interval on ECG   • Secondary hyperparathyroidism of renal origin ([place] [address] )   • Wounds, multiple   • Immunocompromised state ([place] [address] )   • Severe protein-calorie malnutrition (HCC)   • Chronic osteomyelitis ([facility])   • Acute deep vein thrombosis (DVT) of right upper extremity ([facility])   • C  difficile colitis   • Charcot's arthropathy   • Diabetic gastroparesis associated with type 1 diabetes mellitus ([place] [address] )   • End stage renal disease ([place] [address] )   • Hyperlipidemia   • LVH (left ventricular hypertrophy)   • NICM (nonischemic cardiomyopathy) ([place] [address] )   • Vitamin B deficiency   • Vitamin C deficiency   • Vitamin D deficiency   • Chest congestion      Past Medical and Surgical History:     Past Medical History:   Diagnosis Date   • Acute pulmonary edema ([place] [address] ) 1/13/2022   • Ambulatory dysfunction    • Anemia    • Anemia, iron deficiency     transfusion requiring   • Asymptomatic bacteriuria 9/25/2017   • Atrial fibrillation (HCC)    • AVM (arteriovenous malformation) of duodenum, 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   • Bacteriuria, asymptomatic    • Bipolar disorder ([facility])    • Chronic deep vein thrombosis (DVT) ([facility])    • Chronic indwelling Ortega catheter    • Chronic kidney disease    • Chronic pain    • Chronic pain disorder    • Chronic suprapubic catheter ([place] [address] )    • Clostridium difficile infection 08/11/2016    also positive 9/2016, 5/29/2017, 8/15/2017  S/P fecal transplant   • Colostomy on examination [facility])    • Decubitus ulcer    • Delirium    • Diabetes mellitus ([place] [address] )    • GERD (gastroesophageal reflux disease)    • Hemodialysis patient ([place] [address] )    • Hypertension    • Memory impairment 2011    s/p diabetic coma   • Neurogenic bladder    • OAB (overactive bladder)    • Paraplegia ([facility])     T3 transverse myelitis vs spinal stoke (AVM); 2012 extensive epidural abscess C7=> conus 2nd extension from deep parspinal abscess L4-S2/sacral decubitus; cord atrophy/myelomalcia T3=>conus    • Penile abscess    • S/P unilateral BKA (below knee amputation) ([place] [address] )     Right   • Sebaceous cyst removed in 2017   • Seizures ([facility])    • Shortness of breath    • Tobacco abuse    • 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[facility])    • Urinary retention    • Wheelchair dependent    • Wounds, multiple     pressure ulcers with delayed healing     Past Surgical History:   Procedure Laterality Date   • BELOW KNEE LEG AMPUTATION Right 2009   • COLONOSCOPY N/A 03/27/2017    Procedure: COLONOSCOPY;  Surgeon: Lety Castano MD;  Location: [facility];   Service:    • COLONOSCOPY N/A 03/29/2017    Procedure: COLONOSCOPY;  Surgeon: Marcel Kussmaul, MD;  Location: [facility]; Service:    • COLONOSCOPY N/A 06/15/2017    Procedure: COLONOSCOPY with FMT;  Surgeon: Martha Starkey MD;  Location:  GI LAB; Service: Gastroenterology   • ESOPHAGOGASTRODUODENOSCOPY N/A 03/22/2017    Procedure: ESOPHAGOGASTRODUODENOSCOPY (EGD); Surgeon: Marci Philip DO;  Location: [facility]; Service:    • MULTIPLE TOOTH EXTRACTIONS N/A 03/08/2021    Procedure: EXTRACTION TEETH # 2,3,6,10,12,13,14,18,19,20,21,22,23,24,25,26,27,28,29,30;  Surgeon: James Daniel DMD;  Location:  MAIN OR;  Service: Maxillofacial   • SKIN BIOPSY        Family History:     Family History   Problem Relation Age of Onset   • Hyperlipidemia Mother    • Hypertension Mother    • Leukemia Brother    • Diabetes Paternal Grandfather       Social History:     Social History     Socioeconomic History   • Marital status: Single     Spouse name: None   • Number of children: None   • Years of education: None   • Highest education level: None   Occupational History   • None   Tobacco Use   • Smoking status: Every Day     Types: Cigars   • Smokeless tobacco: Never   • Tobacco comments:     about 3 cigars/day   Vaping Use   • Vaping Use: Never used   Substance and Sexual Activity   • Alcohol use: Not Currently     Comment: 1 cup of wine every 3-4 months   • Drug use: Not Currently     Types: Marijuana     Comment: rarely; once every 1-2 years   • Sexual activity: None   Other Topics Concern   • None   Social History Narrative   • None     Social Determinants of Health     Financial Resource Strain: Low Risk    • Difficulty of Paying Living Expenses: Not hard at all   Food Insecurity: No Food Insecurity   • Worried About Running Out of Food in the Last Year: Never true   • Ran Out of Food in the Last Year: Never true   Transportation Needs: No Transportation Needs   • Lack of Transportation (Medical):  No   • Lack of Transportation (Non-Medical):  No   Physical Activity: Not on file   Stress: Not on file   Social Connections: Not on file   Intimate Partner Violence: Not on file   Housing Stability: Not on file      Medications and Allergies:     Current Outpatient Medications   Medication Sig Dispense Refill   • amLODIPine (NORVASC) 10 mg tablet Take 1 tablet (10 mg total) by mouth daily 90 tablet 1   • apixaban (ELIQUIS) 5 mg Take 1 tablet (5 mg total) by mouth 2 (two) times a day 270 tablet 1   • atorvastatin (LIPITOR) 20 mg tablet Take 1 tablet (20 mg total) by mouth daily at bedtime 90 tablet 1   • doxazosin (CARDURA) 2 mg tablet Take 1 tablet (2 mg total) by mouth every evening 90 tablet 1   • losartan (COZAAR) 100 MG tablet Take 1 tablet (100 mg total) by mouth daily 90 tablet 1   • sertraline (ZOLOFT) 25 mg tablet Take 1 tablet (25 mg total) by mouth daily 90 tablet 1   • Acetaminophen 325 MG CAPS Take 650 mg by mouth     • Alcohol Swabs (ALCOHOL PADS) 70 % PADS by Does not apply route 5 (five) times a day 300 each 5   • amLODIPine (NORVASC) 5 mg tablet Take 5 mg by mouth daily     • ammonium lactate (LAC-HYDRIN) 12 % cream      • ascorbic acid (VITAMIN C) 250 mg tablet TAKE 2 TABLETS (500 MG TOTAL) BY MOUTH DAILY 180 tablet 1   • B Complex-C-Folic Acid (Super B-Complex/Vit C/FA) TABS TAKE 1 TABLET BY MOUTH EVERY DAY AS DIRECTED 90 tablet 4   • SUSAN MICROLET LANCETS lancets Use as instructed to check blood sugar 4 times a day  Dx e10 29 200 each 5   • bisacodyl (DULCOLAX) 5 mg EC tablet Take 1 tablet (5 mg total) by mouth once for 1 dose 2 tablet 0   • Blood Glucose Monitoring Suppl (SUSAN CONTOUR NEXT USB MONITOR) w/Device KIT Testing 4 times a day 1 kit 0   • calcitriol (ROCALTROL) 0 5 MCG capsule Take 2 mcg by mouth     • calcium acetate (PHOSLO) capsule TAKE 2 CAPSULES BY MOUTH THREE TIMES DAILY WITH MEALS     • carvedilol (COREG) 25 mg tablet Take 1 tablet (25 mg total) by mouth 2 (two) times a day 180 tablet 1   • cetirizine (ZyrTEC) 10 mg tablet Take 1 tablet (10 mg total) by mouth daily 90 tablet 1   • Cholecalciferol (VITAMIN D-3) 1000 units CAPS Take 2 capsules by mouth daily 30 capsule 0   • clindamycin (CLINDAGEL) 1 % gel APPLY TO AFFECTED AREA TWICE A DAY 60 g 2   • Contour Next Test test strip USE TO TEST BLOOD SUGAR 3 TIMES DAILY   CONTOUR NEXT STRIPS  E10 29     • cyanocobalamin ([employer] Vitamin B-12) 1000 MCG tablet Take 1 tablet (1,000 mcg total) by mouth daily 30 tablet 5   • cyclobenzaprine (FLEXERIL) 5 mg tablet      • dexlansoprazole (DEXILANT) 30 MG capsule TAKE 1 CAPSULE BY MOUTH EVERY  capsule 2   • dicyclomine (BENTYL) 10 mg capsule TAKE 1 CAPSULE BY MOUTH 3 TIMES A DAY BEFORE MEALS 270 capsule 1   • Disposable Gloves MISC Use 7 times a day, 4 boxes of gloves XL  Dx type 1 DM, paraplegia 4 each 11   • doxycycline monohydrate (MONODOX) 100 mg capsule Take 1 capsule (100 mg total) by mouth 2 (two) times a day 60 capsule 2   • epoetin kaushik (EPOGEN,PROCRIT) 20,000 units/mL Inject 10,000 Units under the skin     • epoetin kaushik (EPOGEN,PROCRIT) 20,000 units/mL Inject 5,000 Units under the skin     • ferrous sulfate 325 (65 Fe) mg tablet Take 1 tablet (325 mg total) by mouth 3 (three) times a day 90 tablet 3   • guaiFENesin (MUCINEX) 600 mg 12 hr tablet Take 2 tablets (1,200 mg total) by mouth every 12 (twelve) hours (Patient not taking: Reported on 1/12/2023) 60 tablet 2   • Gvoke PFS 1 MG/0 2ML SOSY INJECT 1 ML (1 MG TOTAL) INTO A MUSCLE ONCE FOR 1 DOSE (Patient not taking: Reported on 1/12/2023)     • hydrOXYzine HCL (ATARAX) 50 mg tablet TAKE 1 TABLET (50 MG TOTAL) BY MOUTH 2 (TWO) TIMES A DAY AS NEEDED FOR ITCHING OR ANXIETY 180 tablet 1   • Incontinence Supply Disposable (Incontinence Brief Large) MISC Use 6 (six) times a day Size xl 180 each 11   • Incontinence Supply Disposable (Undergarment) MISC Use 6 a day, 5 boxes, xl  Dx R51, paraplegia 5 each 11   • Incontinence Supply Disposable (Underpads) MISC Use 2 a day 5 each 11   • ketoconazole (NIZORAL) 2 % cream Apply to rash  15 g 1   • LEVEMIR FLEXTOUCH 100 units/mL injection pen Inject 14 Units under the skin 2 (two) times a day 15 pen 3   • Lokelma 10 g PACK      • Melatonin ER 3 MG TBCR Take 6 mg by mouth (Patient not taking: Reported on 1/12/2023)     • metroNIDAZOLE (METROGEL) 0 75 % gel Apply topically 2 (two) times a day (Patient not taking: Reported on 1/12/2023) 45 g 0   • Misc  Devices (Wheelchair Cushion) MISC Use daily 1 each 0   • Misc  Devices [facility]) MISC by Does not apply route daily Wheelchair ramp, dx g82 20 1 each 0   • Multiple Vitamin (Daily-Enriqueta Multivitamin) TABS TAKE 1 TABLET BY MOUTH EVERY DAY 30 tablet 8   • NovoLOG FlexPen 100 units/mL injection pen INJECT 3 UNITS UNDER THE SKIN 3 (THREE) TIMES A DAY BEFORE MEALS  • oxybutynin (DITROPAN XL) 15 MG 24 hr tablet      • oxybutynin (DITROPAN) 5 mg tablet Take 3 tablets (15 mg total) by mouth daily 270 tablet 1   • oxyCODONE (ROXICODONE) 10 MG TABS TAKE ONE TABLET BY MOUTH EVERY 12 HOURS AS NEEDED FOR PAIN  ONGOING THERAPY       • polyethylene glycol (GLYCOLAX) 17 GM/SCOOP powder Take as directed as per written office instructions 238 g 0   • polyethylene glycol (GOLYTELY) 4000 mL solution Take 4,000 mL by mouth once for 1 dose 4000 mL 0   • risperiDONE (RisperDAL) 0 5 mg tablet Take 1 tablet (0 5 mg total) by mouth 2 (two) times a day 180 tablet 1   • Sodium Zirconium Cyclosilicate 10 g PACK Take 10 g by mouth daily (Patient not taking: Reported on 1/12/2023)     • sucralfate (CARAFATE) 1 g/10 mL suspension Take 10 mL (1 g total) by mouth 4 (four) times a day (with meals and at bedtime) 420 mL 1   • sulfacetamide-prednisoLONE 10-0 23 % ophthalmic solution 3 DROPS TO EACH EAR TWICE DAILY FOR 5 DAYS     • SUPER B COMPLEX & C TABS TAKE 1 CAPSULE BY MOUTH ONCE FOR 1 DOSE AS DIRECTED 90 tablet 2   • Zinc Sulfate 220 (50 Zn) MG TABS Take 1 tablet by mouth daily 90 tablet 1     No current facility-administered medications for this visit  Allergies   Allergen Reactions   • Chlorcyclizine Swelling   • Chlorhexidine Other (See Comments)   • Ciprofloxacin Hcl    • Cymbalta [Duloxetine Hcl]    • Dm-Doxylamine-Acetaminophen Other (See Comments)   • Gabapentin Tremor   • Hydrocortisone Other (See Comments)   • Lac Bovis GI Intolerance   • Lactose - Food Allergy GI Intolerance     Other reaction(s): Unknown   • Lyrica [Pregabalin]    • Penicillins Other (See Comments)     miguel Zosyn 08/28/17  Tolerates Ancef  Miguel Augmentin   • Polymyxin B    • Promethazine-Phenyleph-Codeine Other (See Comments)   • Quinidine Other (See Comments)   • Cefepime Other (See Comments)     Other reaction(s): Other (See Comments)  encephalopathy; tolerates cefazolin 6/14, miguel Ceftriaxone  encephalopathy; tolerates cefazolin 6/14, miguel Ceftriaxone  Pt has had cefepime March 2022 and June 2022  Also, cephalexin 3/15/22  • Rosuvastatin Other (See Comments) and Palpitations     Weakness      Immunizations:     Immunization History   Administered Date(s) Administered   • COVID-19 PFIZER VACCINE 0 3 ML IM 12/22/2021, 01/26/2022   • Hep B, adult 04/11/2018, 05/09/2018, 06/13/2018, 10/12/2018, 02/12/2020, 11/11/2020, 11/11/2020   • Hepatitis B Vaccine (Recombinant), Cpg Adjuvanted 08/07/2021   • INFLUENZA 12/27/2017, 12/27/2017, 10/02/2018, 10/07/2019, 10/14/2021   • Influenza, recombinant, quadrivalent,injectable, preservative free 11/03/2020   • Pneumococcal Conjugate 13-Valent 07/08/2019   • Pneumococcal Polysaccharide PPV23 12/07/2011, 06/06/2018   • Tdap 12/07/2011      Health Maintenance:         Topic Date Due   • Hepatitis C Screening  Never done   • HIV Screening  Never done         Topic Date Due   • COVID-19 Vaccine (3 - Pfizer risk series) 02/23/2022      Medicare Screening Tests and Risk Assessments:     Soila Salmeron is here for his Subsequent Wellness visit  Health Risk Assessment:   Patient rates overall health as fair  Patient feels that their physical health rating is much better  Patient is satisfied with their life  Eyesight was rated as slightly worse  Hearing was rated as same  Patient feels that their emotional and mental health rating is slightly better  Patients states they are never, rarely angry  Patient states they are often unusually tired/fatigued  Pain experienced in the last 7 days has been a lot  Patient's pain rating has been 9/10  Patient states that he has experienced no weight loss or gain in last 6 months  Depression Screening:   PHQ-2 Score: 0      Fall Risk Screening: In the past year, patient has experienced: no history of falling in past year      Home Safety:  Patient does not have trouble with stairs inside or outside of their home  Patient has working smoke alarms and has working carbon monoxide detector  Home safety hazards include: none  Nutrition:   Current diet is Diabetic  Medications:   Patient is not currently taking any over-the-counter supplements  Patient is able to manage medications  Activities of Daily Living (ADLs)/Instrumental Activities of Daily Living (IADLs):   Walk and transfer into and out of bed and chair?: Yes  Dress and groom yourself?: Yes    Bathe or shower yourself?: No    Feed yourself?  Yes  Do your laundry/housekeeping?: Yes  Manage your money, pay your bills and track your expenses?: Yes  Make your own meals?: Yes    Do your own shopping?: Yes    Advance Care Planning:   Living will: No    Durable POA for healthcare: No    Advanced directive: No    Five wishes given: Yes      Cognitive Screening:   Provider or family/friend/caregiver concerned regarding cognition?: No    PREVENTIVE SCREENINGS      Cardiovascular Screening:    General: Screening Not Indicated and History Lipid Disorder      Diabetes Screening:     General: Screening Not Indicated and History Diabetes      Colorectal Cancer Screening:     General: Risks and Benefits Discussed      Prostate Cancer Screening:    General: Screening Not Indicated      Osteoporosis Screening:    General: Risks and Benefits Discussed      Abdominal Aortic Aneurysm (AAA) Screening:    Risk factors include: tobacco use        General: Screening Not Indicated      Lung Cancer Screening:     General: Screening Not Indicated      Hepatitis C Screening:    General: Risks and Benefits Discussed    Screening, Brief Intervention, and Referral to Treatment (SBIRT)    Screening  Typical number of drinks in a day: 0  Typical number of drinks in a week: 0  Interpretation: Low risk drinking behavior  Single Item Drug Screening:  How often have you used an illegal drug (including marijuana) or a prescription medication for non-medical reasons in the past year? never    Single Item Drug Screen Score: 0  Interpretation: Negative screen for possible drug use disorder    Brief Intervention  Alcohol & drug use screenings were reviewed  No concerns regarding substance use disorder identified  Review of Current Opioid Use    Opioid Risk Tool (ORT) Interpretation: Complete Opioid Risk Tool (ORT)    Other Counseling Topics:   Car/seat belt/driving safety, skin self-exam, sunscreen and regular weightbearing exercise and calcium and vitamin D intake  No results found  Physical Exam:     /82 (BP Location: Left arm, Patient Position: Sitting, Cuff Size: Standard)   Pulse 94   Temp 98 6 °F (37 °C) (Temporal)   Resp 18   SpO2 99%     Physical Exam  Vitals and nursing note reviewed  Constitutional:       General: He is not in acute distress  Appearance: He is well-developed  Comments: Seated in powerchair  HENT:      Head: Normocephalic and atraumatic  Right Ear: Tympanic membrane and external ear normal       Left Ear: Tympanic membrane and external ear normal       Nose: Nose normal       Mouth/Throat:      Pharynx: Uvula midline     Eyes:      Conjunctiva/sclera: Conjunctivae normal       Pupils: Pupils are equal, round, and reactive to light  Cardiovascular:      Rate and Rhythm: Normal rate and regular rhythm  Heart sounds: No murmur heard  Pulmonary:      Effort: Pulmonary effort is normal       Breath sounds: Normal breath sounds  No wheezing  Abdominal:      General: Bowel sounds are normal       Palpations: Abdomen is soft  Tenderness: There is no abdominal tenderness  Musculoskeletal:      Cervical back: Normal range of motion and neck supple  Skin:     General: Skin is warm and dry  Neurological:      Mental Status: He is alert and oriented to person, place, and time     Psychiatric:         Speech: Speech normal          Behavior: Behavior normal           Kay Ronquillo PA-C

## 2023-02-02 NOTE — PATIENT INSTRUCTIONS
Medicare Preventive Visit Patient Instructions  Thank you for completing your Welcome to Medicare Visit or Medicare Annual Wellness Visit today  Your next wellness visit will be due in one year (2/3/2024)  The screening/preventive services that you may require over the next 5-10 years are detailed below  Some tests may not apply to you based off risk factors and/or age  Screening tests ordered at today's visit but not completed yet may show as past due  Also, please note that scanned in results may not display below  Preventive Screenings:  Service Recommendations Previous Testing/Comments   Colorectal Cancer Screening  · Colonoscopy    · Fecal Occult Blood Test (FOBT)/Fecal Immunochemical Test (FIT)  · Fecal DNA/Cologuard Test  · Flexible Sigmoidoscopy Age: 39-70 years old   Colonoscopy: every 10 years (May be performed more frequently if at higher risk)  OR  FOBT/FIT: every 1 year  OR  Cologuard: every 3 years  OR  Sigmoidoscopy: every 5 years  Screening may be recommended earlier than age 39 if at higher risk for colorectal cancer  Also, an individualized decision between you and your healthcare provider will decide whether screening between the ages of 74-80 would be appropriate   Colonoscopy: Not on file  FOBT/FIT: Not on file  Cologuard: Not on file  Sigmoidoscopy: Not on file          Prostate Cancer Screening Individualized decision between patient and health care provider in men between ages of 53-78   Medicare will cover every 12 months beginning on the day after your 50th birthday PSA: No results in last 5 years     Screening Not Indicated     Hepatitis C Screening Once for adults born between [facility]  More frequently in patients at high risk for Hepatitis C Hep C Antibody: Not on file        Diabetes Screening 1-2 times per year if you're at risk for diabetes or have pre-diabetes Fasting glucose: No results in last 5 years (No results in last 5 years)  A1C: 8 2 % (5/10/2022)  Screening Not Indicated  History Diabetes   Cholesterol Screening Once every 5 years if you don't have a lipid disorder  May order more often based on risk factors  Lipid panel: 01/11/2023  Screening Not Indicated  History Lipid Disorder      Other Preventive Screenings Covered by Medicare:  1  Abdominal Aortic Aneurysm (AAA) Screening: covered once if your at risk  You're considered to be at risk if you have a family history of AAA or a male between the age of 73-68 who smoking at least 100 cigarettes in your lifetime  2  Lung Cancer Screening: covers low dose CT scan once per year if you meet all of the following conditions: (1) Age 50-69; (2) No signs or symptoms of lung cancer; (3) Current smoker or have quit smoking within the last 15 years; (4) You have a tobacco smoking history of at least 20 pack years (packs per day x number of years you smoked); (5) You get a written order from a healthcare provider  3  Glaucoma Screening: covered annually if you're considered high risk: (1) You have diabetes OR (2) Family history of glaucoma OR (3)  aged 48 and older OR (3)  American aged 72 and older  3  Osteoporosis Screening: covered every 2 years if you meet one of the following conditions: (1) Have a vertebral abnormality; (2) On glucocorticoid therapy for more than 3 months; (3) Have primary hyperparathyroidism; (4) On osteoporosis medications and need to assess response to drug therapy  5  HIV Screening: covered annually if you're between the age of 12-76  Also covered annually if you are younger than 13 and older than 72 with risk factors for HIV infection  For pregnant patients, it is covered up to 3 times per pregnancy      Immunizations:  Immunization Recommendations   Influenza Vaccine Annual influenza vaccination during flu season is recommended for all persons aged >= 6 months who do not have contraindications   Pneumococcal Vaccine   * Pneumococcal conjugate vaccine = PCV13 (Prevnar 13), PCV15 (Vaxneuvance), PCV20 (Prevnar 20)  * Pneumococcal polysaccharide vaccine = PPSV23 (Pneumovax) Adults 2364 years old: 1-3 doses may be recommended based on certain risk factors  Adults 72 years old: 1-2 doses may be recommended based off what pneumonia vaccine you previously received   Hepatitis B Vaccine 3 dose series if at intermediate or high risk (ex: diabetes, end stage renal disease, liver disease)   Tetanus (Td) Vaccine - COST NOT COVERED BY MEDICARE PART B Following completion of primary series, a booster dose should be given every 10 years to maintain immunity against tetanus  Td may also be given as tetanus wound prophylaxis  Tdap Vaccine - COST NOT COVERED BY MEDICARE PART B Recommended at least once for all adults  For pregnant patients, recommended with each pregnancy  Shingles Vaccine (Shingrix) - COST NOT COVERED BY MEDICARE PART B  2 shot series recommended in those aged 48 and above     Health Maintenance Due:      Topic Date Due   • Hepatitis C Screening  Never done   • HIV Screening  Never done     Immunizations Due:      Topic Date Due   • COVID-19 Vaccine (3 - Pfizer risk series) 02/23/2022     Advance Directives   What are advance directives? Advance directives are legal documents that state your wishes and plans for medical care  These plans are made ahead of time in case you lose your ability to make decisions for yourself  Advance directives can apply to any medical decision, such as the treatments you want, and if you want to donate organs  What are the types of advance directives? There are many types of advance directives, and each state has rules about how to use them  You may choose a combination of any of the following:  · Living will: This is a written record of the treatment you want  You can also choose which treatments you do not want, which to limit, and which to stop at a certain time  This includes surgery, medicine, IV fluid, and tube feedings     · Durable power of  for [facility]): This is a written record that states who you want to make healthcare choices for you when you are unable to make them for yourself  This person, called a proxy, is usually a family member or a friend  You may choose more than 1 proxy  · Do not resuscitate (DNR) order:  A DNR order is used in case your heart stops beating or you stop breathing  It is a request not to have certain forms of treatment, such as CPR  A DNR order may be included in other types of advance directives  · Medical directive: This covers the care that you want if you are in a coma, near death, or unable to make decisions for yourself  You can list the treatments you want for each condition  Treatment may include pain medicine, surgery, blood transfusions, dialysis, IV or tube feedings, and a ventilator (breathing machine)  · Values history: This document has questions about your views, beliefs, and how you feel and think about life  This information can help others choose the care that you would choose  Why are advance directives important? An advance directive helps you control your care  Although spoken wishes may be used, it is better to have your wishes written down  Spoken wishes can be misunderstood, or not followed  Treatments may be given even if you do not want them  An advance directive may make it easier for your family to make difficult choices about your care  Cigarette Smoking and Your Health   Risks to your health if you smoke:  Nicotine and other chemicals found in tobacco damage every cell in your body  Even if you are a light smoker, you have an increased risk for cancer, heart disease, and lung disease  If you are pregnant or have diabetes, smoking increases your risk for complications  Benefits to your health if you stop smoking:   · You decrease respiratory symptoms such as coughing, wheezing, and shortness of breath     · You reduce your risk for cancers of the lung, mouth, throat, kidney, bladder, pancreas, stomach, and cervix  If you already have cancer, you increase the benefits of chemotherapy  You also reduce your risk for cancer returning or a second cancer from developing  · You reduce your risk for heart disease, blood clots, heart attack, and stroke  · You reduce your risk for lung infections, and diseases such as pneumonia, asthma, chronic bronchitis, and emphysema  · Your circulation improves  More oxygen can be delivered to your body  If you have diabetes, you lower your risk for complications, such as kidney, artery, and eye diseases  You also lower your risk for nerve damage  Nerve damage can lead to amputations, poor vision, and blindness  · You improve your body's ability to heal and to fight infections  For more information and support to stop smoking:   · "[employer]"  Phone: 6- 573 - 936-8291  Web Address: [employer]  Narcotic (Opioid) Safety    Use narcotics safely:  · Take prescribed narcotics exactly as directed  · Do not give narcotics to others or take narcotics that belong to someone else  · Do not mix narcotics without medicines or alcohol  · Do not drive or operate heavy machinery after you take the narcotic  · Monitor for side effects and notify your healthcare provider if you experienced side effects such as nausea, sleepiness, itching, or trouble thinking clearly  Manage constipation:    Constipation is the most common side effect of narcotic medicine  Constipation is when you have hard, dry bowel movements, or you go longer than usual between bowel movements  Tell your healthcare provider about all changes in your bowel movements while you are taking narcotics  He or she may recommend laxative medicine to help you have a bowel movement  He or she may also change the kind of narcotic you are taking, or change when you take it  The following are more ways you can prevent or relieve constipation:    · Drink liquids as directed    You may need to drink extra liquids to help soften and move your bowels  Ask how much liquid to drink each day and which liquids are best for you  · Eat high-fiber foods  This may help decrease constipation by adding bulk to your bowel movements  High-fiber foods include fruits, vegetables, whole-grain breads and cereals, and beans  Your healthcare provider or dietitian can help you create a high-fiber meal plan  Your provider may also recommend a fiber supplement if you cannot get enough fiber from food  · Exercise regularly  Regular physical activity can help stimulate your intestines  Walking is a good exercise to prevent or relieve constipation  Ask which exercises are best for you  · Schedule a time each day to have a bowel movement  This may help train your body to have regular bowel movements  Bend forward while you are on the toilet to help move the bowel movement out  Sit on the toilet for at least 10 minutes, even if you do not have a bowel movement  Store narcotics safely:   · Store narcotics where others cannot easily get them  Keep them in a locked cabinet or secure area  Do not  keep them in a purse or other bag you carry with you  A person may be looking for something else and find the narcotics  · Make sure narcotics are stored out of the reach of children  A child can easily overdose on narcotics  Narcotics may look like candy to a small child  The best way to dispose of narcotics: The laws vary by country and area  In the [place], the best way is to return the narcotics through a take-back program  This program is offered by the [employer]  The following are options for using the program:  · Take the narcotics to a IZZY collection site  The site is often a law enforcement center  Call your local law enforcement center for scheduled take-back days in your area   You will be given information on where to go if the collection site is in a different location  · Take the narcotics to an approved pharmacy or hospital   A pharmacy or hospital may be set up as a collection site  You will need to ask if it is a IZZY collection site if you were not directed there  A pharmacy or doctor's office may not be able to take back narcotics unless it is a IZZY site  · Use a mail-back system  This means you are given containers to put the narcotics into  You will then mail them in the containers  · Use a take-back drop box  This is a place to leave the narcotics at any time  People and animals will not be able to get into the box  Your local law enforcement agency can tell you where to find a drop box in your area  Other ways to manage pain:   · Ask your healthcare provider about non-narcotic medicines to control pain  Nonprescription medicines include NSAIDs (such as ibuprofen) and acetaminophen  Prescription medicines include muscle relaxers, antidepressants, and steroids  · Pain may be managed without any medicines  Some ways to relieve pain include massage, aromatherapy, or meditation  Physical or occupational therapy may also help  For more information:   · Drug Enforcement Administration  [address] [place] [address] [address]  Phone: 4- 422 - 346-2657  Web Address: [facility] gov/drug_disposal/    · [address]  and Drug Administration  [facility] , [address] [place] [address]  Phone: 0- 914 - 713-1642  Web Address: http://[employer]/     © Copyright [employer] 2018 Information is for End User's use only and may not be sold, redistributed or otherwise used for commercial purposes   All illustrations and images included in CareNotes® are the copyrighted property of A D A M [employer] Inc  or [address] [place] [address] [employer]

## 2023-02-03 ENCOUNTER — TELEPHONE (OUTPATIENT)
Dept: ADMINISTRATIVE | Facility: OTHER | Age: 43
End: 2023-02-03

## 2023-02-03 NOTE — TELEPHONE ENCOUNTER
----- Message from Corbin Villanueva PA-C sent at 2/2/2023  2:51 PM EST -----  Regarding: HgbA1c  02/02/23 2:52 PM    Hello, our patient Cuate Tanner  has had Hemoglobin A1c completed/performed  Please assist in updating the patient chart by pulling the Care Everywhere (CE) document  The date of service is 11/29/2022       Thank you,  ALY Rae

## 2023-02-03 NOTE — TELEPHONE ENCOUNTER
----- Message from Rosemarie Salazar PA-C sent at 2/2/2023  2:50 PM EST -----  Regarding: HIV Screening  02/02/23 2:50 PM    Hello, our patient Noé eDlgado  has had HIV completed/performed  Please assist in updating the patient chart by pulling the Care Everywhere (CE) document  The date of service is 12/15/2017       Thank you,  ALY Rae

## 2023-02-03 NOTE — TELEPHONE ENCOUNTER
----- Message from Simon Horn PA-C sent at 2/2/2023  2:50 PM EST -----  Regarding: HIV Screening  02/02/23 2:50 PM    Denise, our patient Asif Serve  has had HIV completed/performed  Please assist in updating the patient chart by pulling the Care Everywhere (CE) document  The date of service is 12/15/2017       Thank you,  ALY Rae

## 2023-02-03 NOTE — TELEPHONE ENCOUNTER
FAXED ON 01/23/2023 TO [facility] SEATING $ MOBILITY  at [phone]  FAX CONFIRMATION RECEIVED  SCANNED IN CHART

## 2023-02-03 NOTE — TELEPHONE ENCOUNTER
----- Message from Sukhjinder Johnson PA-C sent at 2/2/2023  2:51 PM EST -----  Regarding: Hep C Screening  02/02/23 2:51 PM    Hello, our patient Marty Morales  has had Hepatitis C completed/performed  Please assist in updating the patient chart by pulling the Care Everywhere (CE) document  The date of service is 12/14/2017       Thank you,  ALY Rae

## 2023-02-03 NOTE — TELEPHONE ENCOUNTER
----- Message from Helena Estevez PA-C sent at 2/2/2023  2:51 PM EST -----  Regarding: Hep C Screening  02/02/23 2:51 PM    Hello, our patient Ely Coronado  has had Hepatitis C completed/performed  Please assist in updating the patient chart by pulling the Care Everywhere (CE) document  The date of service is 12/14/2017       Thank you,  ALY Rae

## 2023-02-03 NOTE — TELEPHONE ENCOUNTER
Upon review of the In Basket request we were able to locate, review, and update the patient chart as requested for Hemoglobin A1c, Hepatitis C  and HIV  Any additional questions or concerns should be emailed to the Practice Liaisons via the appropriate education email address, please do not reply via In Basket      Thank you  Brenna Luciano

## 2023-02-13 ENCOUNTER — TELEPHONE (OUTPATIENT)
Dept: FAMILY MEDICINE CLINIC | Facility: CLINIC | Age: 43
End: 2023-02-13

## 2023-02-13 ENCOUNTER — TELEPHONE (OUTPATIENT)
Dept: HEMATOLOGY ONCOLOGY | Facility: CLINIC | Age: 43
End: 2023-02-13

## 2023-02-13 NOTE — TELEPHONE ENCOUNTER
02/13/23    PCP SIGNATURE NEEDED FOR Extended Family / PLAN OF CARE FORM RECEIVED VIA FAX AND PLACED IN PCP FOLDER TO BE DELIVERED AT ASSIGNED TIMES        Certification Period:  02/14/23 - 04/14/23

## 2023-02-13 NOTE — TELEPHONE ENCOUNTER
Spoke with patient's emergency contact in regards to having labs drawn, patient currently inpatient at [facility] currently, iron studies were not drawn, Cornelius Helm comes out to patient's house for labs, informed emergency contact that at check out, to request physical lab script to have to give to home health that way labs are drawn, emergency contact verbalized understanding

## 2023-02-13 NOTE — TELEPHONE ENCOUNTER
02/13/23    PCP SIGNATURE NEEDED FOR Extended Family / RECERTIFICATION CARE SUMMARY FORM RECEIVED VIA FAX AND PLACED IN PCP FOLDER TO BE DELIVERED AT ASSIGNED TIMES      Date of Last Home Visit: 09/09/23

## 2023-02-14 ENCOUNTER — OFFICE VISIT (OUTPATIENT)
Dept: HEMATOLOGY ONCOLOGY | Facility: CLINIC | Age: 43
End: 2023-02-14

## 2023-02-14 VITALS
OXYGEN SATURATION: 100 % | TEMPERATURE: 98.4 F | HEART RATE: 84 BPM | DIASTOLIC BLOOD PRESSURE: 68 MMHG | SYSTOLIC BLOOD PRESSURE: 120 MMHG | RESPIRATION RATE: 16 BRPM

## 2023-02-14 DIAGNOSIS — Z99.2 ANEMIA IN CHRONIC KIDNEY DISEASE, ON CHRONIC DIALYSIS (HCC): Primary | ICD-10-CM

## 2023-02-14 DIAGNOSIS — N18.6 ANEMIA IN CHRONIC KIDNEY DISEASE, ON CHRONIC DIALYSIS (HCC): Primary | ICD-10-CM

## 2023-02-14 DIAGNOSIS — D63.1 ANEMIA IN CHRONIC KIDNEY DISEASE, ON CHRONIC DIALYSIS (HCC): Primary | ICD-10-CM

## 2023-02-14 NOTE — PROGRESS NOTES
Hematology/Oncology Outpatient Follow-up  [facility]  43 y o  male 1980 [id number]    Date:  2/14/2023      Assessment and Plan:  1  Anemia in chronic kidney disease, on chronic dialysis [facility])  40-year-old male presents for follow-up regarding history of anemia related to CKD  He is on dialysis  Hemoglobin remained stable from 9-11  Reviewed that indications for erythropoietin agent is if hemoglobin is persistently less than 10  She is not in this category  He states he thinks he receives IV iron from dialysis center  Due to stability of hemoglobin there is no need for continued follow-up here  He will continue to follow-up with nephrology  We will see him as needed  HPI:  40-year-old male presents for follow up regarding anemia   Past medical history significant for spinal paraplegia, neurogenic bladder with chronic suprapubic catheter, end-stage renal failure on dialysis Monday Wednesday Friday, secondary hyperparathyroidism, type 1 diabetes, diverting colostomy bag, history of MSSA bacteremia with tricuspid valve endocarditis, chronic osteomyelitis, chronic decubitus ulcers, AFib, history of DVT managed on Eliquis, CAD, history of Candida, history of C diff, history of right BKA and hypertension      Feb 2022   erythropoietin  30  Ferritin 322, iron 29, iron sat 22%, TIBC 132     8/4/22 labs:  B12 greater than 1450  Folate greater than 17 5   TSH 0 35   Iron 34, iron sat 30%, TIBC 113  SPEP showed polyclonal gammopathy, not monoclonal gammopathy    Interval history: d/c from [facility] yesterday  Was admitted due to UTI sxs and hyperglycemia, 400     ROS: Review of Systems   Constitutional: Negative for appetite change, chills, fatigue, fever and unexpected weight change  Respiratory: Negative for cough and shortness of breath  Cardiovascular: Negative for chest pain and palpitations  Gastrointestinal: Negative for abdominal pain, diarrhea, nausea and vomiting     Genitourinary: Negative for difficulty urinating, dysuria and hematuria  Musculoskeletal: Negative for arthralgias  Skin: Negative  Neurological: Positive for numbness (chronic left lower extremity, BKA of the right leg )  Negative for dizziness, weakness, light-headedness and headaches  Hematological: Negative  Psychiatric/Behavioral: Negative  Past Medical History:   Diagnosis Date   • Acute pulmonary edema ([place] [id number] ) 1/13/2022   • Ambulatory dysfunction    • Anemia    • Anemia, iron deficiency     transfusion requiring   • Asymptomatic bacteriuria 9/25/2017   • Atrial fibrillation ([facility])    • AVM (arteriovenous malformation) of duodenum, 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   • Bacteriuria, asymptomatic    • Bipolar disorder ([facility])    • Chronic deep vein thrombosis (DVT) ([facility])    • Chronic indwelling Ortega catheter    • Chronic kidney disease    • Chronic pain    • Chronic pain disorder    • Chronic suprapubic catheter ([place] [address] )    • Clostridium difficile infection 08/11/2016    also positive 9/2016, 5/29/2017, 8/15/2017   S/P fecal transplant   • Colostomy on examination [facility])    • Decubitus ulcer    • Delirium    • Diabetes mellitus ([place] [address] )    • GERD (gastroesophageal reflux disease)    • Hemodialysis patient ([place] [address] )    • Hypertension    • Memory impairment 2011    s/p diabetic coma   • Neurogenic bladder    • OAB (overactive bladder)    • Paraplegia ([facility])     T3 transverse myelitis vs spinal stoke (AVM); 2012 extensive epidural abscess C7=> conus 2nd extension from deep parspinal abscess L4-S2/sacral decubitus; cord atrophy/myelomalcia T3=>conus    • Penile abscess    • S/P unilateral BKA (below knee amputation) ([place] [address] )     Right   • Sebaceous cyst removed in 2017   • Seizures ([facility])    • Shortness of breath    • Tobacco abuse    • 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[facility])    • Urinary retention    • Wheelchair dependent    • Wounds, multiple     pressure ulcers with delayed healing       Past Surgical History:   Procedure Laterality Date   • BELOW KNEE LEG AMPUTATION Right 2009   • COLONOSCOPY N/A 03/27/2017    Procedure: COLONOSCOPY;  Surgeon: Lety Castano MD;  Location: [facility]; Service:    • COLONOSCOPY N/A 03/29/2017    Procedure: COLONOSCOPY;  Surgeon: Lety Castano MD;  Location: [facility]; Service:    • COLONOSCOPY N/A 06/15/2017    Procedure: COLONOSCOPY with FMT;  Surgeon: Breanna Horner MD;  Location: [facility]; Service: Gastroenterology   • ESOPHAGOGASTRODUODENOSCOPY N/A 03/22/2017    Procedure: ESOPHAGOGASTRODUODENOSCOPY (EGD); Surgeon: Chase Brink DO;  Location: [facility]; Service:    • MULTIPLE TOOTH EXTRACTIONS N/A 03/08/2021    Procedure: EXTRACTION TEETH # 2,3,6,10,12,13,14,18,19,20,21,22,23,24,25,26,27,28,29,30;  Surgeon: Celia Wright DMD;  Location: [facility];  Service: Maxillofacial   • SKIN BIOPSY         Social History     Socioeconomic History   • Marital status: Single     Spouse name: None   • Number of children: None   • Years of education: None   • Highest education level: None   Occupational History   • None   Tobacco Use   • Smoking status: Every Day     Types: Cigars     Passive exposure: Current   • Smokeless tobacco: Never   • Tobacco comments:     2 cigars/day - 2/14/2023   Vaping Use   • Vaping Use: Never used   Substance and Sexual Activity   • Alcohol use: Not Currently     Comment: 1 cup of wine every 3-4 months   • Drug use: Not Currently     Types: Marijuana     Comment: rarely; once every 1-2 years   • Sexual activity: None   Other Topics Concern   • None   Social History Narrative   • None     Social Determinants of Health     Financial Resource Strain: Low Risk    • Difficulty of Paying Living Expenses: Not hard at all   Food Insecurity: No Food Insecurity   • Worried About Running Out of Food in the Last Year: Never true   • Ran Out of Food in the Last Year: Never true   Transportation Needs: No Transportation Needs   • Lack of Transportation (Medical): No   • Lack of Transportation (Non-Medical):  No Physical Activity: Not on file   Stress: Not on file   Social Connections: Not on file   Intimate Partner Violence: Not on file   Housing Stability: Not on file       Family History   Problem Relation Age of Onset   • Hyperlipidemia Mother    • Hypertension Mother    • Leukemia Brother    • Diabetes Paternal Grandfather        Allergies   Allergen Reactions   • Chlorcyclizine Swelling   • Chlorhexidine Other (See Comments)   • Ciprofloxacin Hcl    • Cymbalta [Duloxetine Hcl]    • Dm-Doxylamine-Acetaminophen Other (See Comments)   • Gabapentin Tremor   • Hydrocortisone Other (See Comments)   • Lac Bovis GI Intolerance   • Lactose - Food Allergy GI Intolerance     Other reaction(s): Unknown   • Lyrica [Pregabalin]    • Penicillins Other (See Comments)     miguel Zosyn 08/28/17  Tolerates Ancef  Miguel Augmentin   • Polymyxin B    • Promethazine-Phenyleph-Codeine Other (See Comments)   • Quinidine Other (See Comments)   • Cefepime Other (See Comments)     Other reaction(s): Other (See Comments)  encephalopathy; tolerates cefazolin 6/14, miguel Ceftriaxone  encephalopathy; tolerates cefazolin 6/14, miguel Ceftriaxone  Pt has had cefepime March 2022 and June 2022  Also, cephalexin 3/15/22     • Rosuvastatin Other (See Comments) and Palpitations     Weakness         Current Outpatient Medications:   •  Acetaminophen 325 MG CAPS, Take 650 mg by mouth, Disp: , Rfl:   •  Alcohol Swabs (ALCOHOL PADS) 70 % PADS, by Does not apply route 5 (five) times a day, Disp: 300 each, Rfl: 5  •  amLODIPine (NORVASC) 10 mg tablet, Take 1 tablet (10 mg total) by mouth daily, Disp: 90 tablet, Rfl: 1  •  amLODIPine (NORVASC) 5 mg tablet, Take 5 mg by mouth daily, Disp: , Rfl:   •  ammonium lactate (LAC-HYDRIN) 12 % cream, , Disp: , Rfl:   •  apixaban (ELIQUIS) 5 mg, Take 1 tablet (5 mg total) by mouth 2 (two) times a day, Disp: 270 tablet, Rfl: 1  •  ascorbic acid (VITAMIN C) 250 mg tablet, TAKE 2 TABLETS (500 MG TOTAL) BY MOUTH DAILY, Disp: 180 tablet, Rfl: 1  •  atorvastatin (LIPITOR) 20 mg tablet, Take 1 tablet (20 mg total) by mouth daily at bedtime, Disp: 90 tablet, Rfl: 1  •  B Complex-C-Folic Acid (Super B-Complex/Vit C/FA) TABS, TAKE 1 TABLET BY MOUTH EVERY DAY AS DIRECTED, Disp: 90 tablet, Rfl: 4  •  SUSAN MICROLET LANCETS lancets, Use as instructed to check blood sugar 4 times a day Dx e10 29, Disp: 200 each, Rfl: 5  •  Blood Glucose Monitoring Suppl (SUSAN CONTOUR NEXT USB MONITOR) w/Device KIT, Testing 4 times a day, Disp: 1 kit, Rfl: 0  •  calcitriol (ROCALTROL) 0 5 MCG capsule, Take 2 mcg by mouth, Disp: , Rfl:   •  calcium acetate (PHOSLO) capsule, TAKE 2 CAPSULES BY MOUTH THREE TIMES DAILY WITH MEALS, Disp: , Rfl:   •  carvedilol (COREG) 25 mg tablet, Take 1 tablet (25 mg total) by mouth 2 (two) times a day, Disp: 180 tablet, Rfl: 1  •  cetirizine (ZyrTEC) 10 mg tablet, Take 1 tablet (10 mg total) by mouth daily, Disp: 90 tablet, Rfl: 1  •  Cholecalciferol (VITAMIN D-3) 1000 units CAPS, Take 2 capsules by mouth daily, Disp: 30 capsule, Rfl: 0  •  clindamycin (CLINDAGEL) 1 % gel, APPLY TO AFFECTED AREA TWICE A DAY, Disp: 60 g, Rfl: 2  •  Contour Next Test test strip, USE TO TEST BLOOD SUGAR 3 TIMES DAILY   CONTOUR NEXT STRIPS  E10 29, Disp: , Rfl:   •  cyanocobalamin ([employer] Vitamin B-12) 1000 MCG tablet, Take 1 tablet (1,000 mcg total) by mouth daily, Disp: 30 tablet, Rfl: 5  •  cyclobenzaprine (FLEXERIL) 5 mg tablet, , Disp: , Rfl:   •  dexlansoprazole (DEXILANT) 30 MG capsule, TAKE 1 CAPSULE BY MOUTH EVERY DAY, Disp: 180 capsule, Rfl: 2  •  dicyclomine (BENTYL) 10 mg capsule, TAKE 1 CAPSULE BY MOUTH 3 TIMES A DAY BEFORE MEALS, Disp: 270 capsule, Rfl: 1  •  Disposable Gloves MISC, Use 7 times a day, 4 boxes of gloves XL Dx type 1 DM, paraplegia, Disp: 4 each, Rfl: 11  •  doxazosin (CARDURA) 2 mg tablet, Take 1 tablet (2 mg total) by mouth every evening, Disp: 90 tablet, Rfl: 1  •  doxycycline monohydrate (MONODOX) 100 mg capsule, Take 1 capsule (100 mg total) by mouth 2 (two) times a day, Disp: 60 capsule, Rfl: 2  •  epoetin kaushik (EPOGEN,PROCRIT) 20,000 units/mL, Inject 10,000 Units under the skin, Disp: , Rfl:   •  epoetin kaushik (EPOGEN,PROCRIT) 20,000 units/mL, Inject 5,000 Units under the skin, Disp: , Rfl:   •  ferrous sulfate 325 (65 Fe) mg tablet, Take 1 tablet (325 mg total) by mouth 3 (three) times a day, Disp: 90 tablet, Rfl: 3  •  guaiFENesin (MUCINEX) 600 mg 12 hr tablet, Take 2 tablets (1,200 mg total) by mouth every 12 (twelve) hours, Disp: 60 tablet, Rfl: 2  •  Gvoke PFS 1 MG/0 2ML SOSY, , Disp: , Rfl:   •  hydrOXYzine HCL (ATARAX) 50 mg tablet, TAKE 1 TABLET (50 MG TOTAL) BY MOUTH 2 (TWO) TIMES A DAY AS NEEDED FOR ITCHING OR ANXIETY, Disp: 180 tablet, Rfl: 1  •  Incontinence Supply Disposable (Incontinence Brief Large) MISC, Use 6 (six) times a day Size xl, Disp: 180 each, Rfl: 11  •  Incontinence Supply Disposable (Undergarment) MISC, Use 6 a day, 5 boxes, xl Dx R51, paraplegia, Disp: 5 each, Rfl: 11  •  Incontinence Supply Disposable (Underpads) MISC, Use 2 a day, Disp: 5 each, Rfl: 11  •  ketoconazole (NIZORAL) 2 % cream, Apply to rash , Disp: 15 g, Rfl: 1  •  LEVEMIR FLEXTOUCH 100 units/mL injection pen, Inject 14 Units under the skin 2 (two) times a day, Disp: 15 pen, Rfl: 3  •  Lokelma 10 g PACK, , Disp: , Rfl:   •  losartan (COZAAR) 100 MG tablet, Take 1 tablet (100 mg total) by mouth daily, Disp: 90 tablet, Rfl: 1  •  Melatonin ER 3 MG TBCR, Take 6 mg by mouth, Disp: , Rfl:   •  metroNIDAZOLE (METROGEL) 0 75 % gel, Apply topically 2 (two) times a day, Disp: 45 g, Rfl: 0  •  Misc  Devices (Wheelchair Cushion) MISC, Use daily, Disp: 1 each, Rfl: 0  •  Misc   Devices [facility] [place]) MISC, by Does not apply route daily Wheelchair ramp, dx g82 20, Disp: 1 each, Rfl: 0  •  Multiple Vitamin (Daily-Enriqueta Multivitamin) TABS, TAKE 1 TABLET BY MOUTH EVERY DAY, Disp: 30 tablet, Rfl: 8  •  NovoLOG FlexPen 100 units/mL injection pen, INJECT 3 UNITS UNDER THE SKIN 3 (THREE) TIMES A DAY BEFORE MEALS , Disp: , Rfl:   •  oxybutynin (DITROPAN XL) 15 MG 24 hr tablet, , Disp: , Rfl:   •  oxybutynin (DITROPAN) 5 mg tablet, Take 3 tablets (15 mg total) by mouth daily, Disp: 270 tablet, Rfl: 1  •  oxyCODONE (ROXICODONE) 10 MG TABS, TAKE ONE TABLET BY MOUTH EVERY 12 HOURS AS NEEDED FOR PAIN  ONGOING THERAPY , Disp: , Rfl:   •  polyethylene glycol (GLYCOLAX) 17 GM/SCOOP powder, Take as directed as per written office instructions, Disp: 238 g, Rfl: 0  •  risperiDONE (RisperDAL) 0 5 mg tablet, Take 1 tablet (0 5 mg total) by mouth 2 (two) times a day, Disp: 180 tablet, Rfl: 1  •  sertraline (ZOLOFT) 25 mg tablet, Take 1 tablet (25 mg total) by mouth daily, Disp: 90 tablet, Rfl: 1  •  Sodium Zirconium Cyclosilicate 10 g PACK, Take 10 g by mouth daily, Disp: , Rfl:   •  sucralfate (CARAFATE) 1 g/10 mL suspension, Take 10 mL (1 g total) by mouth 4 (four) times a day (with meals and at bedtime), Disp: 420 mL, Rfl: 1  •  sulfacetamide-prednisoLONE 10-0 23 % ophthalmic solution, 3 DROPS TO EACH EAR TWICE DAILY FOR 5 DAYS, Disp: , Rfl:   •  SUPER B COMPLEX & C TABS, TAKE 1 CAPSULE BY MOUTH ONCE FOR 1 DOSE AS DIRECTED, Disp: 90 tablet, Rfl: 2  •  Zinc Sulfate 220 (50 Zn) MG TABS, Take 1 tablet by mouth daily, Disp: 90 tablet, Rfl: 1  •  bisacodyl (DULCOLAX) 5 mg EC tablet, Take 1 tablet (5 mg total) by mouth once for 1 dose, Disp: 2 tablet, Rfl: 0  •  polyethylene glycol (GOLYTELY) 4000 mL solution, Take 4,000 mL by mouth once for 1 dose, Disp: 4000 mL, Rfl: 0      Physical Exam:  /68 (BP Location: Left arm, Patient Position: Sitting, Cuff Size: Adult)   Pulse 84   Temp 98 4 °F (36 9 °C) (Temporal)   Resp 16   SpO2 100%     Physical Exam  Constitutional:       General: He is not in acute distress  Appearance: He is well-developed  He is not ill-appearing  HENT:      Head: Normocephalic and atraumatic  Eyes:      General: No scleral icterus       Conjunctiva/sclera: Conjunctivae normal    Cardiovascular:      Rate and Rhythm: Normal rate and regular rhythm  Heart sounds: Normal heart sounds  No murmur heard  Pulmonary:      Effort: Pulmonary effort is normal  No respiratory distress  Breath sounds: Normal breath sounds  Abdominal:      Palpations: Abdomen is soft  Tenderness: There is no abdominal tenderness  Musculoskeletal:      Cervical back: Normal range of motion and neck supple  Comments: In wheelchair   BKA of the RLE   Lymphadenopathy:      Cervical: No cervical adenopathy  Skin:     General: Skin is warm and dry  Neurological:      Mental Status: He is alert and oriented to person, place, and time  Cranial Nerves: No cranial nerve deficit  Psychiatric:         Mood and Affect: Mood normal          Behavior: Behavior normal        Labs:  Lab Results   Component Value Date    WBC 6 68 10/10/2017    HGB 8 7 (L) 10/10/2017    HCT 28 1 (L) 10/10/2017    MCV 85 10/10/2017     10/10/2017       I have spent 20 minutes with Patient  today in which greater than 50% of this time was spent in counseling/coordination of care regarding Diagnostic results, Instructions for management, Impressions and Documenting in the medical record  Patient voiced understanding and agreement in the above discussion  Aware to contact our office with questions/symptoms in the interim  This note has been generated by voice recognition software system  Therefore, there may be spelling, grammar, and or syntax errors  Please contact if questions arise

## 2023-02-15 ENCOUNTER — TELEPHONE (OUTPATIENT)
Dept: FAMILY MEDICINE CLINIC | Facility: CLINIC | Age: 43
End: 2023-02-15

## 2023-02-15 NOTE — TELEPHONE ENCOUNTER
02/15/23    PCP SIGNATURE NEEDED FOR [facility] / PHYSICIAN ORDER FORM RECEIVED VIA FAX AND PLACED IN PCP FOLDER TO BE DELIVERED AT ASSIGNED TIMES        DATE ORDER RECEIVED: 02/10/23

## 2023-02-16 ENCOUNTER — TELEPHONE (OUTPATIENT)
Dept: FAMILY MEDICINE CLINIC | Facility: CLINIC | Age: 43
End: 2023-02-16

## 2023-02-16 ENCOUNTER — OFFICE VISIT (OUTPATIENT)
Dept: WOUND CARE | Facility: CLINIC | Age: 43
End: 2023-02-16

## 2023-02-16 VITALS
RESPIRATION RATE: 14 BRPM | SYSTOLIC BLOOD PRESSURE: 142 MMHG | DIASTOLIC BLOOD PRESSURE: 86 MMHG | TEMPERATURE: 96.4 F | HEART RATE: 102 BPM

## 2023-02-16 DIAGNOSIS — E10.22 TYPE 1 DIABETES MELLITUS WITH CHRONIC KIDNEY DISEASE ON CHRONIC DIALYSIS (HCC): ICD-10-CM

## 2023-02-16 DIAGNOSIS — L89.154 PRESSURE INJURY OF SACRAL REGION, STAGE 4 (HCC): Primary | ICD-10-CM

## 2023-02-16 DIAGNOSIS — L89.224 PRESSURE ULCER OF LEFT HIP, STAGE 4 (HCC): ICD-10-CM

## 2023-02-16 DIAGNOSIS — Z99.2 TYPE 1 DIABETES MELLITUS WITH CHRONIC KIDNEY DISEASE ON CHRONIC DIALYSIS (HCC): ICD-10-CM

## 2023-02-16 DIAGNOSIS — N18.6 TYPE 1 DIABETES MELLITUS WITH CHRONIC KIDNEY DISEASE ON CHRONIC DIALYSIS (HCC): ICD-10-CM

## 2023-02-16 DIAGNOSIS — L89.324 PRESSURE INJURY OF LEFT ISCHIUM, STAGE 4 (HCC): ICD-10-CM

## 2023-02-16 DIAGNOSIS — L89.894 PRESSURE ULCER OF OTHER SITE, STAGE 4 (HCC): ICD-10-CM

## 2023-02-16 DIAGNOSIS — G82.20 PARAPLEGIA (HCC): ICD-10-CM

## 2023-02-16 RX ORDER — LIDOCAINE HYDROCHLORIDE 40 MG/ML
5 SOLUTION TOPICAL ONCE
Status: COMPLETED | OUTPATIENT
Start: 2023-02-16 | End: 2023-02-16

## 2023-02-16 RX ADMIN — LIDOCAINE HYDROCHLORIDE 5 ML: 40 SOLUTION TOPICAL at 10:57

## 2023-02-16 NOTE — TELEPHONE ENCOUNTER
PCP SIGNATURE NEEDED FOR [facility]  FORM RECEIVED VIA FAX AND PLACED IN PCP FOLDER TO BE DELIVERED AT ASSIGNED TIMES       2/10/23

## 2023-02-16 NOTE — PROGRESS NOTES
Patient ID: Dilcia Del Rosario  is a 43 y o  male Date of Birth 1980       Chief Complaint   Patient presents with   • Follow Up Wound Care Visit     Sacral, hip and perineal wounds       Allergies:  Chlorcyclizine, Chlorhexidine, Ciprofloxacin hcl, Cymbalta [duloxetine hcl], Dm-doxylamine-acetaminophen, Gabapentin, Hydrocortisone, Lac bovis, Lactose - food allergy, Lyrica [pregabalin], Penicillins, Polymyxin b, Promethazine-phenyleph-codeine, Quinidine, Cefepime, and Rosuvastatin    Diagnosis:      Diagnosis ICD-10-CM Associated Orders   1  Pressure injury of sacral region, stage 4 ([facility])  L89 154 lidocaine (XYLOCAINE) 4 % topical solution 5 mL     Wound cleansing and dressings     Debridement      2  Pressure injury of left ischium, stage 4 ([facility])  L89 324 lidocaine (XYLOCAINE) 4 % topical solution 5 mL     Wound cleansing and dressings     Debridement      3  Pressure ulcer of left hip, stage 4 ([facility])  L89 224 lidocaine (XYLOCAINE) 4 % topical solution 5 mL     Wound cleansing and dressings     Debridement      4  Pressure ulcer of other site, stage 4 ([facility])  L89 894 lidocaine (XYLOCAINE) 4 % topical solution 5 mL     Wound cleansing and dressings     Debridement      5  Type 1 diabetes mellitus with chronic kidney disease on chronic dialysis ([facility])  E10 22 Wound cleansing and dressings    N18 6     Z99 2       6  Paraplegia ([facility])  G82 20 Wound cleansing and dressings              Assessment & Plan:    Stage 4 pressure injury to Sacrum, L ischium, perineum and L hip  Stable in appearance  Surgical debridement of wounds including the undermining   Pressure relief  Clinitron bed  Continue with current plan of care with saline moistened packing, may pack with Dakin's if odor or increased d/c occurs  Type 1 DM  Patient follows with endocrinology, new insulin pump in place  A1C results reviewed with the patient today                   Subjective:     06/30/22: 40 y/o M with PMHx of spinal paraplegia, neurogenic bladder with chronic suprapubic catheter in place, ESRD on dialysis M/W/F, anemia of CKD, secondary hyperparathyroidism, type I DM, colostomy bag in place,tricupsid valve endocarditis, chronic osteomyelitis, chronic pressure injuries, afib, and hx of DVT on Eliquis presenting for f/u of multiple stage 4 pressure injuries of the sacrum, left hip, L ischium, and perineum  Pt was recently hospitalized on 06/17/22 for C  Difficile  Has been using wet to dry dressings  States he has been attempting to increase his protein intake and has gained some weight but his albumin remains low and is confused why that is  9/1/22: Followup multiple stage IV pressure injuries of the sacrum and ischial tuberosities  Patient was hospitalized recently for sepsis and was told it was due to his pressure injuries  However, they do not look any worse than they ever have been  He was discharged on oral medications  He continues using wet to dry dressings  Prior to hospitalization he developed two new pressure injuries of his posterior scrotum  He continues to supposedly eat well with increased protein  10/13/22: Followup multiple stage IV pressure injuries of the sacrum, perineum and ischial tuberosities and left hip     At last visit, he had scrotal pressure injuries as well  He is using wet to dry dressings  He no longer has his Clinitron and is using an alternating low air loss mattress  He does not like the air mattress because is more difficult to get in and out of  He has not had any fevers or chills  12/6/2022: Follow-up multiple stage IV pressure injuries of the sacrum, perineum, ischial tuberosities and left hip  Patient states that he saw endocrinology and A1c was 7 9  Slightly better than previous  Using saline packing  Occasionally Dakin's when they noticed an odor  2/16/2023: Follow-up visit for multiple stage 4 pressure injuries of the sacrum, perineum, L ischium, and L hip   Patient states he has a Clinitron bed and his blood sugars have been well managed recently with his insulin pump  This morning his sugar was 80 and has been running in the 150's lately  He monitors his sugars with a Dexcom  He has been cleansing the wounds with saline, and dakin's occasionally when he notices an odor  He denies fever, pains, or pain                        The following portions of the patient's history were reviewed and updated as appropriate:   Patient Active Problem List   Diagnosis   • Paraplegia ([place] [address] )   • Atrial fibrillation ([facility])   • Chronic suprapubic catheter ([facility])   • Colostomy care ([place] [address] )   • OAB (overactive bladder)   • S/P unilateral BKA (below knee amputation) ([place] [address] )   • Tobacco abuse   • Type 1 diabetes mellitus with chronic kidney disease on chronic dialysis ([place] [address] )   • Ulcer of sacral region, stage 4 ([place] [address] )   • Chronic indwelling Ortega catheter   • Wound healing, delayed   • Iron deficiency anemia   • Pressure injury of left ischium, stage 4 ([facility])   • HTN (hypertension), benign   • Neurogenic bladder   • GERD (gastroesophageal reflux disease)   • Chronic pain   • Stage 5 chronic kidney disease on chronic dialysis ([place] [address] )   • Penile abscess   • History of Clostridium difficile infection   • Urinary retention   • Delirium   • Dialysis patient ([place] [address] )   • Insulin long-term use ([place] [address] )   • Wheelchair dependent   • Recurrent Clostridioides difficile diarrhea   • Bipolar depression ([place] [address] )   • 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[place] [address] )   • Seizure ([place] [address] )   • Pressure ulcer of sacral region, stage 4 ([place] [address] )   • AVM (arteriovenous malformation) of duodenum, acquired   • Chronic narcotic dependence ([facility])   • Chronic diastolic heart failure ([facility])   • Pressure ulcer of other site, stage 4 ([facility])   • Pressure ulcer of left hip, stage 4 ([facility])   • ED (erectile dysfunction) of organic origin   • Irritable bowel syndrome   • Onychomycosis   • Pustular folliculitis   • Prolonged Q-T interval on ECG   • Secondary hyperparathyroidism of renal origin ([place] [address] )   • Wounds, multiple   • Immunocompromised state ([place] [address] )   • Severe protein-calorie malnutrition ([place] [address] )   • Chronic osteomyelitis ([place] [address] )   • Acute deep vein thrombosis (DVT) of right upper extremity ([facility])   • C  difficile colitis   • Charcot's arthropathy   • Diabetic gastroparesis associated with type 1 diabetes mellitus ([place] [address] )   • End stage renal disease ([place] [address] )   • Hyperlipidemia   • LVH (left ventricular hypertrophy)   • NICM (nonischemic cardiomyopathy) ([facility])   • Vitamin B deficiency   • Vitamin C deficiency   • Vitamin D deficiency   • Chest congestion     Past Medical History:   Diagnosis Date   • Acute pulmonary edema ([place] [address] ) 1/13/2022   • Ambulatory dysfunction    • Anemia    • Anemia, iron deficiency     transfusion requiring   • Asymptomatic bacteriuria 9/25/2017   • Atrial fibrillation ([place] [address] )    • AVM (arteriovenous malformation) of duodenum, 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   • Bacteriuria, asymptomatic    • Bipolar disorder ([facility])    • Chronic deep vein thrombosis (DVT) ([facility])    • Chronic indwelling Ortega catheter    • Chronic kidney disease    • Chronic pain    • Chronic pain disorder    • Chronic suprapubic catheter ([place] [address] )    • Clostridium difficile infection 08/11/2016    also positive 9/2016, 5/29/2017, 8/15/2017   S/P fecal transplant   • Colostomy on examination [facility])    • Decubitus ulcer    • Delirium    • Diabetes mellitus ([place] [address] )    • GERD (gastroesophageal reflux disease)    • Hemodialysis patient ([place] [address] )    • Hypertension    • Memory impairment 2011    s/p diabetic coma   • Neurogenic bladder    • OAB (overactive bladder)    • Paraplegia ([facility])     T3 transverse myelitis vs spinal stoke (AVM); 2012 extensive epidural abscess C7=> conus 2nd extension from deep parspinal abscess L4-S2/sacral decubitus; cord atrophy/myelomalcia T3=>conus    • Penile abscess    • S/P unilateral BKA (below knee amputation) ([place] [address] )     Right   • Sebaceous cyst removed in 2017   • Seizures ([facility])    • Shortness of breath    • Tobacco abuse • 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[facility] [address] )    • Urinary retention    • Wheelchair dependent    • Wounds, multiple     pressure ulcers with delayed healing     Past Surgical History:   Procedure Laterality Date   • BELOW KNEE LEG AMPUTATION Right 2009   • COLONOSCOPY N/A 03/27/2017    Procedure: COLONOSCOPY;  Surgeon: Shasta Wooten MD;  Location: [facility]; Service:    • COLONOSCOPY N/A 03/29/2017    Procedure: COLONOSCOPY;  Surgeon: Shasta Wooten MD;  Location: [facility]; Service:    • COLONOSCOPY N/A 06/15/2017    Procedure: COLONOSCOPY with FMT;  Surgeon: Cecy Pena MD;  Location: [facility]; Service: Gastroenterology   • ESOPHAGOGASTRODUODENOSCOPY N/A 03/22/2017    Procedure: ESOPHAGOGASTRODUODENOSCOPY (EGD); Surgeon: Jr Husain DO;  Location: [facility];   Service:    • MULTIPLE TOOTH EXTRACTIONS N/A 03/08/2021    Procedure: EXTRACTION TEETH # 2,3,6,10,12,13,14,18,19,20,21,22,23,24,25,26,27,28,29,30;  Surgeon: Ana Key DMD;  Location: [facility];  Service: Maxillofacial   • SKIN BIOPSY       Family History   Problem Relation Age of Onset   • Hyperlipidemia Mother    • Hypertension Mother    • Leukemia Brother    • Diabetes Paternal Grandfather       Social History     Socioeconomic History   • Marital status: Single     Spouse name: None   • Number of children: None   • Years of education: None   • Highest education level: None   Occupational History   • None   Tobacco Use   • Smoking status: Every Day     Types: Cigars     Passive exposure: Current   • Smokeless tobacco: Never   • Tobacco comments:     2 cigars/day - 2/14/2023   Vaping Use   • Vaping Use: Never used   Substance and Sexual Activity   • Alcohol use: Not Currently     Comment: 1 cup of wine every 3-4 months   • Drug use: Not Currently     Types: Marijuana     Comment: rarely; once every 1-2 years   • Sexual activity: None   Other Topics Concern   • None   Social History Narrative   • None     Social Determinants of Health     Financial Resource Strain: Low Risk    • Difficulty of Paying Living Expenses: Not hard at all   Food Insecurity: No Food Insecurity   • Worried About Running Out of Food in the Last Year: Never true   • Ran Out of Food in the Last Year: Never true   Transportation Needs: No Transportation Needs   • Lack of Transportation (Medical): No   • Lack of Transportation (Non-Medical):  No   Physical Activity: Not on file   Stress: Not on file   Social Connections: Not on file   Intimate Partner Violence: Not on file   Housing Stability: Not on file        Current Outpatient Medications:   •  Acetaminophen 325 MG CAPS, Take 650 mg by mouth, Disp: , Rfl:   •  Alcohol Swabs (ALCOHOL PADS) 70 % PADS, by Does not apply route 5 (five) times a day, Disp: 300 each, Rfl: 5  •  amLODIPine (NORVASC) 10 mg tablet, Take 1 tablet (10 mg total) by mouth daily, Disp: 90 tablet, Rfl: 1  •  amLODIPine (NORVASC) 5 mg tablet, Take 5 mg by mouth daily, Disp: , Rfl:   •  ammonium lactate (LAC-HYDRIN) 12 % cream, , Disp: , Rfl:   •  apixaban (ELIQUIS) 5 mg, Take 1 tablet (5 mg total) by mouth 2 (two) times a day, Disp: 270 tablet, Rfl: 1  •  ascorbic acid (VITAMIN C) 250 mg tablet, TAKE 2 TABLETS (500 MG TOTAL) BY MOUTH DAILY, Disp: 180 tablet, Rfl: 1  •  atorvastatin (LIPITOR) 20 mg tablet, Take 1 tablet (20 mg total) by mouth daily at bedtime, Disp: 90 tablet, Rfl: 1  •  B Complex-C-Folic Acid (Super B-Complex/Vit C/FA) TABS, TAKE 1 TABLET BY MOUTH EVERY DAY AS DIRECTED, Disp: 90 tablet, Rfl: 4  •  SUSAN MICROLET LANCETS lancets, Use as instructed to check blood sugar 4 times a day Dx e10 29, Disp: 200 each, Rfl: 5  •  bisacodyl (DULCOLAX) 5 mg EC tablet, Take 1 tablet (5 mg total) by mouth once for 1 dose, Disp: 2 tablet, Rfl: 0  •  Blood Glucose Monitoring Suppl ([employer] CONTOUR NEXT USB MONITOR) w/Device KIT, Testing 4 times a day, Disp: 1 kit, Rfl: 0  •  calcitriol (ROCALTROL) 0 5 MCG capsule, Take 2 mcg by mouth, Disp: , Rfl:   •  calcium acetate (PHOSLO) capsule, TAKE 2 CAPSULES BY MOUTH THREE TIMES DAILY WITH MEALS, Disp: , Rfl:   •  carvedilol (COREG) 25 mg tablet, Take 1 tablet (25 mg total) by mouth 2 (two) times a day, Disp: 180 tablet, Rfl: 1  •  cetirizine (ZyrTEC) 10 mg tablet, Take 1 tablet (10 mg total) by mouth daily, Disp: 90 tablet, Rfl: 1  •  Cholecalciferol (VITAMIN D-3) 1000 units CAPS, Take 2 capsules by mouth daily, Disp: 30 capsule, Rfl: 0  •  clindamycin (CLINDAGEL) 1 % gel, APPLY TO AFFECTED AREA TWICE A DAY, Disp: 60 g, Rfl: 2  •  Contour Next Test test strip, USE TO TEST BLOOD SUGAR 3 TIMES DAILY   CONTOUR NEXT STRIPS  E10 29, Disp: , Rfl:   •  cyanocobalamin ([employer] Vitamin B-12) 1000 MCG tablet, Take 1 tablet (1,000 mcg total) by mouth daily, Disp: 30 tablet, Rfl: 5  •  cyclobenzaprine (FLEXERIL) 5 mg tablet, , Disp: , Rfl:   •  dexlansoprazole (DEXILANT) 30 MG capsule, TAKE 1 CAPSULE BY MOUTH EVERY DAY, Disp: 180 capsule, Rfl: 2  •  dicyclomine (BENTYL) 10 mg capsule, TAKE 1 CAPSULE BY MOUTH 3 TIMES A DAY BEFORE MEALS, Disp: 270 capsule, Rfl: 1  •  Disposable Gloves MISC, Use 7 times a day, 4 boxes of gloves XL Dx type 1 DM, paraplegia, Disp: 4 each, Rfl: 11  •  doxazosin (CARDURA) 2 mg tablet, Take 1 tablet (2 mg total) by mouth every evening, Disp: 90 tablet, Rfl: 1  •  doxycycline monohydrate (MONODOX) 100 mg capsule, Take 1 capsule (100 mg total) by mouth 2 (two) times a day, Disp: 60 capsule, Rfl: 2  •  epoetin kaushik (EPOGEN,PROCRIT) 20,000 units/mL, Inject 10,000 Units under the skin, Disp: , Rfl:   •  epoetin kaushik (EPOGEN,PROCRIT) 20,000 units/mL, Inject 5,000 Units under the skin, Disp: , Rfl:   •  ferrous sulfate 325 (65 Fe) mg tablet, Take 1 tablet (325 mg total) by mouth 3 (three) times a day, Disp: 90 tablet, Rfl: 3  •  guaiFENesin (MUCINEX) 600 mg 12 hr tablet, Take 2 tablets (1,200 mg total) by mouth every 12 (twelve) hours, Disp: 60 tablet, Rfl: 2  •  Gvoke PFS 1 MG/0 2ML SOSY, , Disp: , Rfl:   •  hydrOXYzine HCL (ATARAX) 50 mg tablet, TAKE 1 TABLET (50 MG TOTAL) BY MOUTH 2 (TWO) TIMES A DAY AS NEEDED FOR ITCHING OR ANXIETY, Disp: 180 tablet, Rfl: 1  •  Incontinence Supply Disposable (Incontinence Brief Large) MISC, Use 6 (six) times a day Size xl, Disp: 180 each, Rfl: 11  •  Incontinence Supply Disposable (Undergarment) MISC, Use 6 a day, 5 boxes, xl Dx R51, paraplegia, Disp: 5 each, Rfl: 11  •  Incontinence Supply Disposable (Underpads) MISC, Use 2 a day, Disp: 5 each, Rfl: 11  •  ketoconazole (NIZORAL) 2 % cream, Apply to rash , Disp: 15 g, Rfl: 1  •  LEVEMIR FLEXTOUCH 100 units/mL injection pen, Inject 14 Units under the skin 2 (two) times a day, Disp: 15 pen, Rfl: 3  •  Lokelma 10 g PACK, , Disp: , Rfl:   •  losartan (COZAAR) 100 MG tablet, Take 1 tablet (100 mg total) by mouth daily, Disp: 90 tablet, Rfl: 1  •  Melatonin ER 3 MG TBCR, Take 6 mg by mouth, Disp: , Rfl:   •  metroNIDAZOLE (METROGEL) 0 75 % gel, Apply topically 2 (two) times a day, Disp: 45 g, Rfl: 0  •  Misc  Devices (Wheelchair Cushion) MISC, Use daily, Disp: 1 each, Rfl: 0  •  Misc   Devices MERIT [facility]) MISC, by Does not apply route daily Wheelchair ramp, dx g82 20, Disp: 1 each, Rfl: 0  •  Multiple Vitamin (Daily-Enriqueta Multivitamin) TABS, TAKE 1 TABLET BY MOUTH EVERY DAY, Disp: 30 tablet, Rfl: 8  •  NovoLOG FlexPen 100 units/mL injection pen, INJECT 3 UNITS UNDER THE SKIN 3 (THREE) TIMES A DAY BEFORE MEALS , Disp: , Rfl:   •  oxybutynin (DITROPAN XL) 15 MG 24 hr tablet, , Disp: , Rfl:   •  oxybutynin (DITROPAN) 5 mg tablet, Take 3 tablets (15 mg total) by mouth daily, Disp: 270 tablet, Rfl: 1  •  oxyCODONE (ROXICODONE) 10 MG TABS, TAKE ONE TABLET BY MOUTH EVERY 12 HOURS AS NEEDED FOR PAIN  ONGOING THERAPY , Disp: , Rfl:   •  polyethylene glycol (GLYCOLAX) 17 GM/SCOOP powder, Take as directed as per written office instructions, Disp: 238 g, Rfl: 0  •  polyethylene glycol (GOLYTELY) 4000 mL solution, Take 4,000 mL by mouth once for 1 dose, Disp: 4000 mL, Rfl: 0  • risperiDONE (RisperDAL) 0 5 mg tablet, Take 1 tablet (0 5 mg total) by mouth 2 (two) times a day, Disp: 180 tablet, Rfl: 1  •  sertraline (ZOLOFT) 25 mg tablet, Take 1 tablet (25 mg total) by mouth daily, Disp: 90 tablet, Rfl: 1  •  Sodium Zirconium Cyclosilicate 10 g PACK, Take 10 g by mouth daily, Disp: , Rfl:   •  sucralfate (CARAFATE) 1 g/10 mL suspension, Take 10 mL (1 g total) by mouth 4 (four) times a day (with meals and at bedtime), Disp: 420 mL, Rfl: 1  •  sulfacetamide-prednisoLONE 10-0 23 % ophthalmic solution, 3 DROPS TO EACH EAR TWICE DAILY FOR 5 DAYS, Disp: , Rfl:   •  SUPER B COMPLEX & C TABS, TAKE 1 CAPSULE BY MOUTH ONCE FOR 1 DOSE AS DIRECTED, Disp: 90 tablet, Rfl: 2  •  Zinc Sulfate 220 (50 Zn) MG TABS, Take 1 tablet by mouth daily, Disp: 90 tablet, Rfl: 1  No current facility-administered medications for this visit  Review of Systems   Constitutional: Negative for chills, fatigue and unexpected weight change  Respiratory: Negative for cough and shortness of breath  Cardiovascular: Negative for chest pain  Musculoskeletal: Positive for gait problem  Paraplegic   Skin: Positive for wound  Multiple pressure injuries   Neurological: Positive for weakness and numbness  Hematological: Does not bruise/bleed easily  Psychiatric/Behavioral: Negative for agitation  Objective:  /86   Pulse 102   Temp (!) 96 4 °F (35 8 °C)   Resp 14   Pain Score: 0-No pain     Physical Exam  Constitutional:       General: He is not in acute distress  Appearance: He is not ill-appearing  HENT:      Head: Normocephalic  Cardiovascular:      Rate and Rhythm: Normal rate  Pulmonary:      Effort: Pulmonary effort is normal  No respiratory distress  Skin:     General: Skin is warm and dry  Findings: Wound present  Comments: Multiple stage 4 pressure injuries with fibrin and biofilm present  Some new epithelium on the sacral wound  Wounds with undermining   No malodor to the wounds  See flow-sheet for additional details  Neurological:      Mental Status: He is alert  Psychiatric:         Mood and Affect: Mood normal          Behavior: Behavior normal              Wound Pressure Injury Ischium Left (Active)   Wound Image       02/16/23 1053   Wound Description Pink;Epithelialization;Granulation tissue 02/16/23 0953   Pressure Injury Stage 4 01/05/23 1435   Irene-wound Assessment Scar Tissue; Intact 02/16/23 0953   Wound Length (cm) 4 5 cm 02/16/23 0953   Wound Width (cm) 5 2 cm 02/16/23 0953   Wound Depth (cm) 0 9 cm 02/16/23 0953   Wound Surface Area (cm^2) 23 4 cm^2 02/16/23 0953   Wound Volume (cm^3) 21 06 cm^3 02/16/23 0953   Calculated Wound Volume (cm^3) 21 06 cm^3 02/16/23 0953   Change in Wound Size % -11 43 02/16/23 0953   Tunneling in depth located at 2 9 at 10; 3 7 at 7 01/05/23 1435   Undermining 1 02/16/23 0953   Undermining is depth extending from 4-5 02/16/23 0953   Drainage Amount Large 02/16/23 0953   Drainage Description Serosanguineous 02/16/23 0953   Non-staged Wound Description Full thickness 02/16/23 0953   Treatments Irrigation with NSS 02/16/23 0953   Wound packed?  No 02/16/23 0953   Dressing Changed Changed 12/30/21 1447   Patient Tolerance Tolerated well 12/06/22 1428   Dressing Status Removed 12/06/22 1428       Wound Pressure Injury Perineum (Active)   Wound Image       02/16/23 1053   Wound Description Yellow;Pink 02/16/23 0959   Pressure Injury Stage 4 01/05/23 1437   Irene-wound Assessment Intact;Scar Tissue 02/16/23 0959   Wound Length (cm) 5 1 cm 02/16/23 0959   Wound Width (cm) 5 8 cm 02/16/23 0959   Wound Depth (cm) 1 2 cm 02/16/23 0959   Wound Surface Area (cm^2) 29 58 cm^2 02/16/23 0959   Wound Volume (cm^3) 35 496 cm^3 02/16/23 0959   Calculated Wound Volume (cm^3) 35 5 cm^3 02/16/23 0959   Change in Wound Size % -238 1 02/16/23 0959   Undermining 2 4 02/16/23 0959   Undermining is depth extending from 11-8 02/16/23 0959   Drainage Amount Large 02/16/23 0959   Drainage Description Serosanguineous 02/16/23 0959   Non-staged Wound Description Full thickness 02/16/23 0959   Treatments Irrigation with NSS 02/16/23 0959   Wound packed? No 02/16/23 0959   Dressing Changed Changed 12/30/21 1448   Patient Tolerance Tolerated well 12/06/22 1431   Dressing Status Removed 12/06/22 1431       Wound Pressure Injury Sacrum (Active)   Wound Image       02/16/23 1053   Wound Description Granulation tissue;Pink;Epithelialization 02/16/23 1001   Pressure Injury Stage 4 01/05/23 1439   Irene-wound Assessment Intact;Scar Tissue 02/16/23 1001   Wound Length (cm) 3 8 cm 02/16/23 1001   Wound Width (cm) 7 cm 02/16/23 1001   Wound Depth (cm) 1 1 cm 02/16/23 1001   Wound Surface Area (cm^2) 26 6 cm^2 02/16/23 1001   Wound Volume (cm^3) 29 26 cm^3 02/16/23 1001   Calculated Wound Volume (cm^3) 29 26 cm^3 02/16/23 1001   Change in Wound Size % 60 99 02/16/23 1001   Undermining 0 5 02/16/23 1001   Undermining is depth extending from 4-7 02/16/23 1001   Drainage Amount Large 02/16/23 1001   Drainage Description Serosanguineous 02/16/23 1001   Non-staged Wound Description Full thickness 02/16/23 1001   Treatments Irrigation with NSS 02/16/23 1001   Wound packed? No 02/16/23 1001   Dressing Changed Changed 12/30/21 1451   Patient Tolerance Tolerated well 12/06/22 1433   Dressing Status Removed 12/06/22 1433       Wound 01/28/21 Pressure Injury Hip Left;Lateral (Active)   Wound Image       02/16/23 1052   Wound Description Pink;Epithelialization 02/16/23 1004   Pressure Injury Stage 4 01/05/23 1440   Irene-wound Assessment Scar Tissue; Intact 02/16/23 1004   Wound Length (cm) 2 5 cm 02/16/23 1004   Wound Width (cm) 1 7 cm 02/16/23 1004   Wound Depth (cm) 1 cm 02/16/23 1004   Wound Surface Area (cm^2) 4 25 cm^2 02/16/23 1004   Wound Volume (cm^3) 4 25 cm^3 02/16/23 1004   Calculated Wound Volume (cm^3) 4 25 cm^3 02/16/23 1004   Tunneling 4 7 cm 02/10/22 1341   Tunneling in depth located at 10 02/10/22 1341   Undermining 4 8 02/16/23 1004   Undermining is depth extending from 5-1 02/16/23 1004   Drainage Amount Moderate 02/16/23 1004   Drainage Description Serosanguineous 02/16/23 1004   Non-staged Wound Description Full thickness 02/16/23 1004   Treatments Irrigation with NSS 02/16/23 1004   Wound packed? No 02/16/23 1004   Packing- # removed 1 05/05/22 1536   Dressing Changed Changed 12/30/21 1452   Patient Tolerance Tolerated well 12/06/22 1436   Dressing Status Removed 12/06/22 1436                     Debridement   Wound Pressure Injury Ischium Left    Universal Protocol:  Consent: Verbal consent obtained  Written consent obtained  Risks and benefits: risks, benefits and alternatives were discussed  Consent given by: patient  Time out: Immediately prior to procedure a "time out" was called to verify the correct patient, procedure, equipment, support staff and site/side marked as required  Timeout called at: 2/16/2023 10:30 AM   Patient understanding: patient states understanding of the procedure being performed  Patient identity confirmed: verbally with patient      Performed by: NP and physician  Debridement type: surgical  Level of debridement: subcutaneous tissue  Pain control: lidocaine 4%  Post-debridement measurements  Length (cm): 4 5  Width (cm): 5 2  Depth (cm): 1  Percent debrided: 100%  Surface Area (cm^2): 23 4  Area debrided (cm^2): 23 4  Volume (cm^3): 23 4  Tissue and other material debrided: subcutaneous tissue  Devitalized tissue debrided: biofilm, exudate and fibrin  Instrument(s) utilized: curette  Bleeding: small  Hemostasis obtained with: not applicable  Procedural pain (0-10): 0  Post-procedural pain: 0   Response to treatment: procedure was tolerated well    Debridement   Wound Pressure Injury Perineum    Universal Protocol:  Consent: Verbal consent obtained  Written consent obtained    Risks and benefits: risks, benefits and alternatives were discussed  Consent given by: patient  Time out: Immediately prior to procedure a "time out" was called to verify the correct patient, procedure, equipment, support staff and site/side marked as required  Timeout called at: 2/16/2023 10:30 AM   Patient understanding: patient states understanding of the procedure being performed  Patient identity confirmed: verbally with patient      Performed by: NP and physician  Debridement type: surgical  Level of debridement: subcutaneous tissue  Pain control: lidocaine 4%  Post-debridement measurements  Length (cm): 5 1  Width (cm): 5 8  Depth (cm): 1 3  Percent debrided: 100%  Surface Area (cm^2): 29 58  Area debrided (cm^2): 29 58  Volume (cm^3): 38 45  Tissue and other material debrided: subcutaneous tissue  Devitalized tissue debrided: biofilm, exudate and fibrin  Instrument(s) utilized: curette  Bleeding: small  Hemostasis obtained with: not applicable  Procedural pain (0-10): 0  Post-procedural pain: 0   Response to treatment: procedure was tolerated well    Debridement   Wound Pressure Injury Sacrum    Universal Protocol:  Consent: Verbal consent obtained  Written consent obtained  Risks and benefits: risks, benefits and alternatives were discussed  Consent given by: patient  Time out: Immediately prior to procedure a "time out" was called to verify the correct patient, procedure, equipment, support staff and site/side marked as required    Timeout called at: 2/16/2023 10:30 AM   Patient understanding: patient states understanding of the procedure being performed  Patient identity confirmed: verbally with patient      Performed by: NP and physician  Debridement type: surgical  Level of debridement: subcutaneous tissue  Pain control: lidocaine 4%  Post-debridement measurements  Length (cm): 3 8  Width (cm): 7  Depth (cm): 1 2  Percent debrided: 100%  Surface Area (cm^2): 26 6  Area debrided (cm^2): 26 6  Volume (cm^3): 31 92  Tissue and other material debrided: subcutaneous tissue  Devitalized tissue debrided: biofilm, exudate and fibrin  Instrument(s) utilized: curette  Bleeding: small  Hemostasis obtained with: not applicable  Procedural pain (0-10): 0  Post-procedural pain: 0   Response to treatment: procedure was tolerated well    Debridement   Wound 01/28/21 Pressure Injury Hip Left;Lateral    Universal Protocol:  Consent: Verbal consent obtained  Written consent obtained  Risks and benefits: risks, benefits and alternatives were discussed  Consent given by: patient  Time out: Immediately prior to procedure a "time out" was called to verify the correct patient, procedure, equipment, support staff and site/side marked as required  Timeout called at: 2/16/2023 10:30 AM   Patient understanding: patient states understanding of the procedure being performed  Patient identity confirmed: verbally with patient      Performed by: NP and physician  Debridement type: surgical  Level of debridement: subcutaneous tissue  Pain control: lidocaine 4%  Post-debridement measurements  Length (cm): 2 5  Width (cm): 1 7  Depth (cm): 1 1  Percent debrided: 100%  Surface Area (cm^2): 4 25  Area debrided (cm^2): 4 25  Volume (cm^3): 4 68  Tissue and other material debrided: subcutaneous tissue  Devitalized tissue debrided: biofilm, clots, exudate and fibrin  Instrument(s) utilized: curette  Bleeding: small  Hemostasis obtained with: not applicable  Procedural pain (0-10): 0  Post-procedural pain: 0   Response to treatment: procedure was tolerated well                                 Lab Results   Component Value Date    HGBA1C 7 9 11/29/2022       Wound Instructions:  Orders Placed This Encounter   Procedures   • Wound cleansing and dressings         Wash your hands with soap and water  Remove old dressing, discard into plastic bag and place in trash  Cleanse the wound with sterile saline solution (rinse) prior to applying a clean dressing  Do not use tissue or cotton balls   Do not scrub the wound  Pat dry using gauze  Shower no; do not get dressing wet     ALL WOUNDS:  Cleanse with normal saline as above  Apply thin layer vaseline to periwound skin  Apply saline moistened gauze; may switch 1/2 -1/4 strength Dakin's moistened gauze dressings when odor detected in drainage and return to saline when odor is gone  Cover with an ABD pad  Secure with Medfix tape  Change dressing daily and PRN for breakthrough drainage (visiting nurses to do twice per week and family in between)       Continue using Clinitron bed         Continue NO PRESSURE TO ALL WOUNDS, ESPECIALLY PERINEAL WOUND; LIMIT SITTING UPRIGHT IN WHEELCHAIR ON THIS WOUND     Follow up in 6 weeks     Continue visiting nurse skilled 2 x per week for wound care dressing changes                              Treatment in the wound center today:  Flushed and cleansed with normal saline and saline     Standing Status:   Future     Standing Expiration Date:   2/16/2024   • Debridement     This order was created via procedure documentation   • Debridement     This order was created via procedure documentation   • Debridement     This order was created via procedure documentation   • Debridement     This order was created via procedure documentation           Scribe Attestation    I,:  ERNESTO Don am acting as a scribe while in the presence of the attending physician :       I,:  Linnette Cook MD personally performed the services described in this documentation    as scribed in my presence :           Portions of the record may have been created with voice recognition software  Occasional wrong word or "sound alike" substitutions may have occurred due to the inherent limitations of voice recognition software  Read the chart carefully and recognize, using context, where substitutions have occurred

## 2023-02-16 NOTE — TELEPHONE ENCOUNTER
PCP SIGNATURE NEEDED FOR [facility]  FORM RECEIVED VIA FAX AND PLACED IN PCP FOLDER TO BE DELIVERED AT ASSIGNED TIMES      CERTIFICATE OF MEDICAL NECESSITY

## 2023-02-16 NOTE — TELEPHONE ENCOUNTER
Form requires Dr Antoinette Bergman signature because she is the provider listed on the form  Will place in her bin  Thanks!

## 2023-02-16 NOTE — PATIENT INSTRUCTIONS
Orders Placed This Encounter   Procedures    Wound cleansing and dressings         Wash your hands with soap and water  Remove old dressing, discard into plastic bag and place in trash  Cleanse the wound with sterile saline solution (rinse) prior to applying a clean dressing  Do not use tissue or cotton balls  Do not scrub the wound  Pat dry using gauze  Shower no; do not get dressing wet     ALL WOUNDS:  Cleanse with normal saline as above  Apply thin layer vaseline to periwound skin  Apply saline moistened gauze; may switch 1/2 -1/4 strength Dakin's moistened gauze dressings when odor detected in drainage and return to saline when odor is gone  Cover with an ABD pad  Secure with Medfix tape  Change dressing daily and PRN for breakthrough drainage (visiting nurses to do twice per week and family in between)       Continue using Clinitron bed  Continue NO PRESSURE TO ALL WOUNDS, ESPECIALLY PERINEAL WOUND; LIMIT SITTING UPRIGHT IN WHEELCHAIR ON THIS WOUND     Follow up in 6 weeks     Continue visiting nurse skilled 2 x per week for wound care dressing changes                               Treatment in the wound center today:  Flushed and cleansed with normal saline and saline     Standing Status:   Future     Standing Expiration Date:   2/16/2024

## 2023-02-16 NOTE — TELEPHONE ENCOUNTER
Form requires Dr Corinne Aures signature because she is the provider listed on the form  Will place in her bin  Thanks!

## 2023-02-16 NOTE — TELEPHONE ENCOUNTER
PCP [address] [place] [address] CARE  FORM RECEIVED VIA FAX AND PLACED IN PCP FOLDER TO BE DELIVERED AT ASSIGNED TIMES      ORDER NUMBER [id number]

## 2023-02-16 NOTE — TELEPHONE ENCOUNTER
PCP SIGNATURE NEEDED FOR [facility]ENDED FAMILY CARE FORM RECEIVED VIA FAX AND PLACED IN PCP FOLDER TO BE DELIVERED AT ASSIGNED TIMES      LAST HOME VISIT DATE, 2/14/23

## 2023-02-16 NOTE — TELEPHONE ENCOUNTER
Form requires Dr Mayito Goodson signature because she is the provider listed on the form  Will place in her bin  Thanks!

## 2023-02-16 NOTE — LETTER
[facility] WOUND CARE  [facility] [address]  Phone#  [phone]  Fax#  [phone]    Patient:  Nigel Hammond  YOB: 1980  Phone:  [phone]  Date of Visit:  2/16/2023    Orders Placed This Encounter   Procedures   • Wound cleansing and dressings         Wash your hands with soap and water  Remove old dressing, discard into plastic bag and place in trash  Cleanse the wound with sterile saline solution (rinse) prior to applying a clean dressing  Do not use tissue or cotton balls  Do not scrub the wound  Pat dry using gauze  Shower no; do not get dressing wet     ALL WOUNDS:  Cleanse with normal saline as above  Apply thin layer vaseline to periwound skin  Apply saline moistened gauze; may switch 1/2 -1/4 strength Dakin's moistened gauze dressings when odor detected in drainage and return to saline when odor is gone  Cover with an ABD pad  Secure with Medfix tape    Change dressing daily and PRN for breakthrough drainage (visiting nurses to do twice per week and family in between)       Continue using Clinitron bed         Continue NO PRESSURE TO ALL WOUNDS, ESPECIALLY PERINEAL WOUND; LIMIT SITTING UPRIGHT IN WHEELCHAIR ON THIS WOUND     Follow up in 6 weeks     Continue visiting nurse skilled 2 x per week for wound care dressing changes                              Treatment in the wound center today:  Flushed and cleansed with normal saline and saline     Standing Status:   Future     Standing Expiration Date:   2/16/2024         Electronically signed by Rick Germain MD

## 2023-02-22 ENCOUNTER — TELEPHONE (OUTPATIENT)
Dept: FAMILY MEDICINE CLINIC | Facility: CLINIC | Age: 43
End: 2023-02-22

## 2023-02-22 NOTE — TELEPHONE ENCOUNTER
PCP SIGNATURE NEEDED FOR [facility]  FORM RECEIVED VIA FAX AND PLACED IN PCP FOLDER TO BE DELIVERED AT ASSIGNED TIMES        Harinder Harris # [id number]

## 2023-02-23 ENCOUNTER — TELEPHONE (OUTPATIENT)
Dept: FAMILY MEDICINE CLINIC | Facility: CLINIC | Age: 43
End: 2023-02-23

## 2023-02-23 NOTE — TELEPHONE ENCOUNTER
PCP SIGNATURE NEEDED FOR EXTENDED FAMILY CARE FORM RECEIVED VIA FAX AND PLACED IN PCP FOLDER TO BE DELIVERED AT ASSIGNED TIMES        ORDER DATE 2/13/2023

## 2023-03-01 DIAGNOSIS — K58.9 IRRITABLE BOWEL SYNDROME, UNSPECIFIED TYPE: ICD-10-CM

## 2023-03-01 RX ORDER — DICYCLOMINE HYDROCHLORIDE 10 MG/1
CAPSULE ORAL
Qty: 270 CAPSULE | Refills: 1 | Status: SHIPPED | OUTPATIENT
Start: 2023-03-01

## 2023-03-02 ENCOUNTER — TELEPHONE (OUTPATIENT)
Dept: DERMATOLOGY | Facility: CLINIC | Age: 43
End: 2023-03-02

## 2023-03-02 NOTE — TELEPHONE ENCOUNTER
Patient was a no show for their dermatology appointment today  Called and spoke with patient who states that he forgot about the appt  States that he is not able to take the accutane  Will keep April appt as scheduled to discuss options

## 2023-03-07 ENCOUNTER — TELEPHONE (OUTPATIENT)
Dept: FAMILY MEDICINE CLINIC | Facility: CLINIC | Age: 43
End: 2023-03-07

## 2023-03-07 NOTE — TELEPHONE ENCOUNTER
PCP [facility] [employer] CARE  FORM RECEIVED VIA FAX AND PLACED IN PCP FOLDER TO BE DELIVERED AT ASSIGNED TIMES      ORDER NUMBER [id number]

## 2023-03-07 NOTE — TELEPHONE ENCOUNTER
PCP [facility] [employer]  CARE  FORM RECEIVED VIA FAX AND PLACED IN PCP FOLDER TO BE DELIVERED AT ASSIGNED TIMES      CERTIFICATION PERIOD 02/27/2023 TO 04/27/2023

## 2023-03-08 ENCOUNTER — TELEPHONE (OUTPATIENT)
Dept: FAMILY MEDICINE CLINIC | Facility: CLINIC | Age: 43
End: 2023-03-08

## 2023-03-08 NOTE — TELEPHONE ENCOUNTER
PCP SIGNATURE NEEDED FOR Extended family care  FORM RECEIVED VIA FAX AND PLACED IN PCP FOLDER TO BE DELIVERED AT ASSIGNED TIMES        Order date 3/7/23

## 2023-03-08 NOTE — TELEPHONE ENCOUNTER
PCP [address] FORM RECEIVED VIA FAX AND PLACED IN PCP FOLDER TO BE DELIVERED AT ASSIGNED TIMES        Order # [id number]

## 2023-03-09 ENCOUNTER — TELEPHONE (OUTPATIENT)
Dept: FAMILY MEDICINE CLINIC | Facility: CLINIC | Age: 43
End: 2023-03-09

## 2023-03-09 NOTE — TELEPHONE ENCOUNTER
PCP [facility] [employer] CARE FORM RECEIVED VIA FAX AND PLACED IN PCP FOLDER TO BE DELIVERED AT ASSIGNED TIMES        ORDER NUMBER [id number]

## 2023-03-15 ENCOUNTER — TELEPHONE (OUTPATIENT)
Dept: FAMILY MEDICINE CLINIC | Facility: CLINIC | Age: 43
End: 2023-03-15

## 2023-03-15 NOTE — TELEPHONE ENCOUNTER
03/15/23    PCP SIGNATURE NEEDED FOR [facility] FORM RECEIVED VIA FAX AND PLACED IN PCP FOLDER TO BE DELIVERED AT ASSIGNED TIMES        Date of Service: 03/03/23

## 2023-03-15 NOTE — TELEPHONE ENCOUNTER
PCP [facility] [employer] CARE FORM RECEIVED VIA FAX AND PLACED IN PCP FOLDER TO BE DELIVERED AT ASSIGNED TIMES        ORDER NUMBER [id number]

## 2023-03-20 ENCOUNTER — TELEPHONE (OUTPATIENT)
Dept: FAMILY MEDICINE CLINIC | Facility: CLINIC | Age: 43
End: 2023-03-20

## 2023-03-20 NOTE — TELEPHONE ENCOUNTER
03/20/23     PCP SIGNATURE NEEDED FOR Extended Family Care / Date: 03/06/23 FORM RECEIVED VIA FAX AND PLACED IN PCP FOLDER TO BE DELIVERED AT ASSIGNED TIMES

## 2023-03-20 NOTE — TELEPHONE ENCOUNTER
Called pt and informed him he was cleared to stop eliquis for 2 days  Last OV: 1-5-23    Next  OV: 8-15-23    Medication matches last office note

## 2023-03-20 NOTE — TELEPHONE ENCOUNTER
3/20/23    Form:[facility] / Date of Service: 03/03/23    Form faxed and confirmed that it was received on 03/17/23    Fax # [phone]      Form scanned into Pt Chart

## 2023-03-21 ENCOUNTER — TELEPHONE (OUTPATIENT)
Dept: FAMILY MEDICINE CLINIC | Facility: CLINIC | Age: 43
End: 2023-03-21

## 2023-03-21 NOTE — TELEPHONE ENCOUNTER
03/21/23    FORM: [facility] [employer] / ORDER # [id number]    FAXED # [phone], AND CONFIRMED THAT IT WAS RECEIVED ON 03/17/23      FORM SCANNED INTO PT CHART

## 2023-03-21 NOTE — TELEPHONE ENCOUNTER
PCP [facility] [employer] FORM RECEIVED VIA FAX AND PLACED IN PCP FOLDER TO BE DELIVERED AT ASSIGNED TIMES        ORDER NUMBER [id number]

## 2023-03-21 NOTE — TELEPHONE ENCOUNTER
PCP [address] [place] [address] FORM RECEIVED VIA FAX AND PLACED IN PCP FOLDER TO BE DELIVERED AT ASSIGNED TIMES        Order #[id number]

## 2023-03-22 ENCOUNTER — TELEPHONE (OUTPATIENT)
Dept: FAMILY MEDICINE CLINIC | Facility: CLINIC | Age: 43
End: 2023-03-22

## 2023-03-22 NOTE — TELEPHONE ENCOUNTER
PCP SIGNATURE NEEDED FOR extended family care  FORM RECEIVED VIA FAX AND PLACED IN PCP FOLDER TO BE DELIVERED AT ASSIGNED TIMES      Physicians orders 03/06/23, 03/07/2023

## 2023-03-27 ENCOUNTER — TELEPHONE (OUTPATIENT)
Dept: FAMILY MEDICINE CLINIC | Facility: CLINIC | Age: 43
End: 2023-03-27

## 2023-03-27 NOTE — TELEPHONE ENCOUNTER
EXTENDED FAMILY CARE form received on 3/27/23  to be completed by PCP  Copy made and placed in PCP folder  Forms to be delivered to PCP mailbox at assigned time      DATES OF 3/6/23 3/3/23

## 2023-03-27 NOTE — TELEPHONE ENCOUNTER
PCP SIGNATURE NEEDED FOR Extended Family care FORM RECEIVED VIA FAX AND PLACED IN PCP FOLDER TO BE DELIVERED AT ASSIGNED TIMES        Order # [id number] & [id number]

## 2023-04-04 ENCOUNTER — TELEPHONE (OUTPATIENT)
Dept: DERMATOLOGY | Facility: CLINIC | Age: 43
End: 2023-04-04

## 2023-04-06 ENCOUNTER — OFFICE VISIT (OUTPATIENT)
Dept: WOUND CARE | Facility: CLINIC | Age: 43
End: 2023-04-06

## 2023-04-06 VITALS
SYSTOLIC BLOOD PRESSURE: 128 MMHG | HEART RATE: 88 BPM | RESPIRATION RATE: 18 BRPM | TEMPERATURE: 97.5 F | DIASTOLIC BLOOD PRESSURE: 78 MMHG

## 2023-04-06 DIAGNOSIS — L89.154 PRESSURE INJURY OF SACRAL REGION, STAGE 4 (HCC): Primary | ICD-10-CM

## 2023-04-06 DIAGNOSIS — L89.324 PRESSURE INJURY OF LEFT ISCHIUM, STAGE 4 (HCC): ICD-10-CM

## 2023-04-06 DIAGNOSIS — Z99.2 TYPE 1 DIABETES MELLITUS WITH CHRONIC KIDNEY DISEASE ON CHRONIC DIALYSIS (HCC): ICD-10-CM

## 2023-04-06 DIAGNOSIS — N18.6 TYPE 1 DIABETES MELLITUS WITH CHRONIC KIDNEY DISEASE ON CHRONIC DIALYSIS (HCC): ICD-10-CM

## 2023-04-06 DIAGNOSIS — L89.224 PRESSURE ULCER OF LEFT HIP, STAGE 4 (HCC): ICD-10-CM

## 2023-04-06 DIAGNOSIS — L89.894 PRESSURE ULCER OF OTHER SITE, STAGE 4 (HCC): ICD-10-CM

## 2023-04-06 DIAGNOSIS — E10.22 TYPE 1 DIABETES MELLITUS WITH CHRONIC KIDNEY DISEASE ON CHRONIC DIALYSIS (HCC): ICD-10-CM

## 2023-04-06 DIAGNOSIS — G82.20 PARAPLEGIA (HCC): ICD-10-CM

## 2023-04-06 RX ORDER — LIDOCAINE 40 MG/G
CREAM TOPICAL ONCE
Status: COMPLETED | OUTPATIENT
Start: 2023-04-06 | End: 2023-04-06

## 2023-04-06 RX ADMIN — LIDOCAINE: 40 CREAM TOPICAL at 15:35

## 2023-04-06 NOTE — LETTER
[facility] WOUND CARE  [facility] [address]  Phone#  [phone]  Fax#  [phone]    Patient:  Alexx Hernández  YOB: 1980  Phone:  [phone]  Date of Visit:  4/6/2023    Orders Placed This Encounter   Procedures   • Wound cleansing and dressings     LEFT HIP WOUND WAS PACKED WITH SURIFOAM AND GAUZE AT [address] [place] [address],[address] TO ASSIST WITH BLEEDING  LEAVE INTACT UNTIL Sunday  IF BLEEDING CONTINUES THEN SEEK TREATMENT AT NEAREST ER     Wash your hands with soap and water  Remove old dressing, discard into plastic bag and place in trash  Cleanse the wound with sterile saline solution (rinse) prior to applying a clean dressing  Do not use tissue or cotton balls  Do not scrub the wound  Pat dry using gauze  Shower no; do not get dressing wet     ALL WOUNDS:  Cleanse with normal saline as above  Apply thin layer vaseline to periwound skin  Apply silver alginate to wounds, ensure to tuck into undermining areas   Cover with an ABD pad  Secure with Medfix tape  Change dressing 3 times a week and PRN for breakthrough drainage (visiting nurses to do twice per week and family in between)        Continue using Clinitron bed          Continue NO PRESSURE TO ALL WOUNDS, ESPECIALLY PERINEAL WOUND; LIMIT SITTING UPRIGHT IN WHEELCHAIR ON THIS WOUND     Follow up in 6 weeks      Continue visiting nurse skilled 2 x per week for wound care dressing changes                              Treatment in the wound center today:  Wounds cleansed with normal saline  Silver alginate applied to all wounds and tucked into undermining areas   Covered with ABDs and secured with medfix tape     Standing Status:   Future     Standing Expiration Date:   4/6/2024         Electronically signed by Carli Elena MD

## 2023-04-06 NOTE — LETTER
[facility] WOUND CARE  [address] [facility] [address]  Phone#  [phone]  Fax#  [phone]    Patient:  Tyson De Jesus  YOB: 1980  Phone:  [phone]  Date of Visit:  4/6/2023    Orders Placed This Encounter   Procedures   • Wound cleansing and dressings     Wash your hands with soap and water  Remove old dressing, discard into plastic bag and place in trash  Cleanse the wound with sterile saline solution (rinse) prior to applying a clean dressing  Do not use tissue or cotton balls  Do not scrub the wound  Pat dry using gauze  Shower no; do not get dressing wet     ALL WOUNDS:  Cleanse with normal saline as above  Apply thin layer vaseline to periwound skin  Apply silver alginate to wounds, ensure to tuck into undermining areas   Cover with an ABD pad  Secure with Medfix tape  Change dressing 3 times a week and PRN for breakthrough drainage (visiting nurses to do twice per week and family in between)        Continue using Clinitron bed          Continue NO PRESSURE TO ALL WOUNDS, ESPECIALLY PERINEAL WOUND; LIMIT SITTING UPRIGHT IN WHEELCHAIR ON THIS WOUND     Follow up in 6 weeks      Continue visiting nurse skilled 2 x per week for wound care dressing changes                              Treatment in the wound center today:  Wounds cleansed with normal saline  Silver alginate applied to all wounds and tucked into undermining areas   Covered with ABDs and secured with medfix tape     Standing Status:   Future     Standing Expiration Date:   4/6/2024         Electronically signed by Minal Aden MD

## 2023-04-06 NOTE — PATIENT INSTRUCTIONS
Orders Placed This Encounter   Procedures    Wound cleansing and dressings     LEFT HIP WOUND WAS PACKED WITH SURIFOAM AND GAUZE AT [address] [facility],[address] TO ASSIST WITH BLEEDING  LEAVE INTACT UNTIL Sunday  IF BLEEDING CONTINUES THEN SEEK TREATMENT AT NEAREST ER     Wash your hands with soap and water  Remove old dressing, discard into plastic bag and place in trash  Cleanse the wound with sterile saline solution (rinse) prior to applying a clean dressing  Do not use tissue or cotton balls  Do not scrub the wound  Pat dry using gauze  Shower no; do not get dressing wet     ALL WOUNDS:  Cleanse with normal saline as above  Apply thin layer vaseline to periwound skin  Apply silver alginate to wounds, ensure to tuck into undermining areas   Cover with an ABD pad  Secure with Medfix tape  Change dressing 3 times a week and PRN for breakthrough drainage (visiting nurses to do twice per week and family in between)        Continue using Clinitron bed  Continue NO PRESSURE TO ALL WOUNDS, ESPECIALLY PERINEAL WOUND; LIMIT SITTING UPRIGHT IN WHEELCHAIR ON THIS WOUND     Follow up in 6 weeks      Continue visiting nurse skilled 2 x per week for wound care dressing changes  Treatment in the wound center today:  Wounds cleansed with normal saline  Silver alginate applied to all wounds and tucked into undermining areas   Covered with ABDs and secured with medfix tape     Standing Status:   Future     Standing Expiration Date:   4/6/2024

## 2023-04-06 NOTE — PROGRESS NOTES
Patient ID: Porsha Boothe  is a 43 y o  male Date of Birth 1980       Chief Complaint   Patient presents with   • Follow Up Wound Care Visit     Sacral wound       Allergies:  Chlorcyclizine, Chlorhexidine, Ciprofloxacin hcl, Cymbalta [duloxetine hcl], Dm-doxylamine-acetaminophen, Gabapentin, Hydrocortisone, Lac bovis, Lactose - food allergy, Lyrica [pregabalin], Penicillins, Polymyxin b, Promethazine-phenyleph-codeine, Quinidine, Cefepime, and Rosuvastatin    Diagnosis:      Diagnosis ICD-10-CM Associated Orders   1  Pressure injury of sacral region, stage 4 ([facility])  L89 154 lidocaine (LMX) 4 % cream     Wound cleansing and dressings     Debridement      2  Pressure injury of left ischium, stage 4 ([facility])  L89 324 lidocaine (LMX) 4 % cream     Wound cleansing and dressings     Debridement      3  Pressure ulcer of left hip, stage 4 ([facility])  L89 224 lidocaine (LMX) 4 % cream     Wound cleansing and dressings     Debridement      4  Pressure ulcer of other site, stage 4 ([facility])  L89 894 lidocaine (LMX) 4 % cream     Wound cleansing and dressings     Debridement      5  Paraplegia ([facility])  G82 20 lidocaine (LMX) 4 % cream     Wound cleansing and dressings      6  Type 1 diabetes mellitus with chronic kidney disease on chronic dialysis ([facility] )  E10 22     N18 6     Z99 2               Assessment & Plan:  Multiple large pressure injuries as noted above  All were surgically debrided  We will hold current treatment and switch to silver alginate to all ulcers  Note, the left hip ulcer is not to be disturbed until 3 days due to bleeding  Should he have bleeding despite the pressure and packing, he is to go to the ER  If ulcers tend to deteriorate with silver alginate, can go back to alternating between saline packing and Dakin's packing  Silver alginate can be changed 3 times a week and as needed  Cover all with ABDs  He is to offload is much as possible  Type 1 diabetes  Last A1c is not noted   A1C results reviewed with the patient today  Paraplegia           Subjective:     06/30/22: 38 y/o M with PMHx of spinal paraplegia, neurogenic bladder with chronic suprapubic catheter in place, ESRD on dialysis M/W/F, anemia of CKD, secondary hyperparathyroidism, type I DM, colostomy bag in place,tricupsid valve endocarditis, chronic osteomyelitis, chronic pressure injuries, afib, and hx of DVT on Eliquis presenting for f/u of multiple stage 4 pressure injuries of the sacrum, left hip, L ischium, and perineum  Pt was recently hospitalized on 06/17/22 for C  Difficile  Has been using wet to dry dressings  States he has been attempting to increase his protein intake and has gained some weight but his albumin remains low and is confused why that is  9/1/22: Followup multiple stage IV pressure injuries of the sacrum and ischial tuberosities  Patient was hospitalized recently for sepsis and was told it was due to his pressure injuries  However, they do not look any worse than they ever have been  He was discharged on oral medications  He continues using wet to dry dressings  Prior to hospitalization he developed two new pressure injuries of his posterior scrotum  He continues to supposedly eat well with increased protein  10/13/22: Followup multiple stage IV pressure injuries of the sacrum, perineum and ischial tuberosities and left hip     At last visit, he had scrotal pressure injuries as well  He is using wet to dry dressings  He no longer has his Clinitron and is using an alternating low air loss mattress  He does not like the air mattress because is more difficult to get in and out of  He has not had any fevers or chills  12/6/2022: Follow-up multiple stage IV pressure injuries of the sacrum, perineum, ischial tuberosities and left hip  Patient states that he saw endocrinology and A1c was 7 9  Slightly better than previous  Using saline packing  Occasionally Dakin's when they noticed an odor      2/16/2023: Follow-up visit for multiple stage 4 pressure injuries of the sacrum, perineum, L ischium, and L hip  Patient states he has a Clinitron bed and his blood sugars have been well managed recently with his insulin pump  This morning his sugar was 80 and has been running in the 150's lately  He monitors his sugars with a Dexcom  He has been cleansing the wounds with saline, and dakin's occasionally when he notices an odor  He denies fever, pains, or pain  4/6/2023: Follow-up of multiple stage IV pressure injuries of the sacrum, ischium on the left and left hip and perineum  Blood sugars were running high today  Last A1c done last month was 7 5  No new complaints  Still using saline packing with occasional Dakin's packing if the quality of the ulcer is changed                        The following portions of the patient's history were reviewed and updated as appropriate:   Patient Active Problem List   Diagnosis   • Paraplegia ([place] [address] )   • Atrial fibrillation ([facility])   • Chronic suprapubic catheter ([facility])   • Colostomy care ([place] [address] )   • OAB (overactive bladder)   • S/P unilateral BKA (below knee amputation) ([place] [address] )   • Tobacco abuse   • Type 1 diabetes mellitus with chronic kidney disease on chronic dialysis ([place] [address] )   • Ulcer of sacral region, stage 4 ([facility] )   • Chronic indwelling Ortega catheter   • Wound healing, delayed   • Iron deficiency anemia   • Pressure injury of left ischium, stage 4 ([facility])   • HTN (hypertension), benign   • Neurogenic bladder   • GERD (gastroesophageal reflux disease)   • Chronic pain   • Stage 5 chronic kidney disease on chronic dialysis ([place] [address] )   • Penile abscess   • History of Clostridium difficile infection   • Urinary retention   • Delirium   • Dialysis patient ([place] [address] )   • Insulin long-term use ([place] [address] )   • Wheelchair dependent   • Recurrent Clostridioides difficile diarrhea   • Bipolar depression ([place] [address] )   • 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[place] [address] )   • Seizure ([place] [address] )   • Pressure ulcer of sacral region, stage 4 ([place] [address] )   • AVM (arteriovenous malformation) of duodenum, acquired   • Chronic narcotic dependence ([facility])   • Chronic diastolic heart failure (HCC)   • Pressure ulcer of other site, stage 4 (HCC)   • Pressure ulcer of left hip, stage 4 ([facility])   • ED (erectile dysfunction) of organic origin   • Irritable bowel syndrome   • Onychomycosis   • Pustular folliculitis   • Prolonged Q-T interval on ECG   • Secondary hyperparathyroidism of renal origin ([place] [address] )   • Wounds, multiple   • Immunocompromised state ([place] [address] )   • Severe protein-calorie malnutrition ([facility])   • Chronic osteomyelitis ([facility])   • Acute deep vein thrombosis (DVT) of right upper extremity ([facility])   • C  difficile colitis   • Charcot's arthropathy   • Diabetic gastroparesis associated with type 1 diabetes mellitus ([place] [address] )   • End stage renal disease ([facility])   • Hyperlipidemia   • LVH (left ventricular hypertrophy)   • NICM (nonischemic cardiomyopathy) ([place] [address] )   • Vitamin B deficiency   • Vitamin C deficiency   • Vitamin D deficiency   • Chest congestion     Past Medical History:   Diagnosis Date   • Acute pulmonary edema ([place] [address] ) 1/13/2022   • Ambulatory dysfunction    • Anemia    • Anemia, iron deficiency     transfusion requiring   • Asymptomatic bacteriuria 9/25/2017   • Atrial fibrillation ([place] [address] )    • AVM (arteriovenous malformation) of duodenum, 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   • Bacteriuria, asymptomatic    • Bipolar disorder ([place] [address] )    • Chronic deep vein thrombosis (DVT) ([facility])    • Chronic indwelling Ortega catheter    • Chronic kidney disease    • Chronic pain    • Chronic pain disorder    • Chronic suprapubic catheter ([place] [address] )    • Clostridium difficile infection 08/11/2016    also positive 9/2016, 5/29/2017, 8/15/2017   S/P fecal transplant   • Colostomy on examination [facility])    • Decubitus ulcer    • Delirium    • Diabetes mellitus ([place] [address] )    • GERD (gastroesophageal reflux disease)    • Hemodialysis patient [facility])    • Hypertension    • Memory impairment 2011    s/p diabetic coma   • Neurogenic bladder    • OAB (overactive bladder)    • Paraplegia (HCC)     T3 transverse myelitis vs spinal stoke (AVM); 2012 extensive epidural abscess C7=> conus 2nd extension from deep parspinal abscess L4-S2/sacral decubitus; cord atrophy/myelomalcia T3=>conus    • Penile abscess    • S/P unilateral BKA (below knee amputation) ([facility] )     Right   • Sebaceous cyst removed in 2017   • Seizures (HCC)    • Shortness of breath    • Tobacco abuse    • 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[facility] )    • Urinary retention    • Wheelchair dependent    • Wounds, multiple     pressure ulcers with delayed healing     Past Surgical History:   Procedure Laterality Date   • BELOW KNEE LEG AMPUTATION Right 2009   • COLONOSCOPY N/A 03/27/2017    Procedure: COLONOSCOPY;  Surgeon: Alonso Alvarado MD;  Location: [facility]; Service:    • COLONOSCOPY N/A 03/29/2017    Procedure: COLONOSCOPY;  Surgeon: Alonso Alvarado MD;  [facility]; Service:    • COLONOSCOPY N/A 06/15/2017    Procedure: COLONOSCOPY with FMT;  Surgeon: Michael English MD;  Location: [facility]; Service: Gastroenterology   • ESOPHAGOGASTRODUODENOSCOPY N/A 03/22/2017    Procedure: ESOPHAGOGASTRODUODENOSCOPY (EGD); Surgeon: Doc Welch DO;  Location: [facility];   Service:    • MULTIPLE TOOTH EXTRACTIONS N/A 03/08/2021    Procedure: EXTRACTION TEETH # 2,3,6,10,12,13,14,18,19,20,21,22,23,24,25,26,27,28,29,30;  Surgeon: Jhonny Gonsales DMD;  Location: [facility];  Service: Maxillofacial   • SKIN BIOPSY       Family History   Problem Relation Age of Onset   • Hyperlipidemia Mother    • Hypertension Mother    • Leukemia Brother    • Diabetes Paternal Grandfather       Social History     Socioeconomic History   • Marital status: Single     Spouse name: Not on file   • Number of children: Not on file   • Years of education: Not on file   • Highest education level: Not on file   Occupational History   • Not on file   Tobacco Use   • Smoking status: Every Day     Types: Cigars     Passive exposure: Current   • Smokeless tobacco: Never   • Tobacco comments:     2 cigars/day - 2/14/2023   Vaping Use   • Vaping Use: Never used   Substance and Sexual Activity   • Alcohol use: Not Currently     Comment: 1 cup of wine every 3-4 months   • Drug use: Not Currently     Types: Marijuana     Comment: rarely; once every 1-2 years   • Sexual activity: Not on file   Other Topics Concern   • Not on file   Social History Narrative   • Not on file     Social Determinants of Health     Financial Resource Strain: Low Risk    • Difficulty of Paying Living Expenses: Not hard at all   Food Insecurity: No Food Insecurity   • Worried About Running Out of Food in the Last Year: Never true   • Ran Out of Food in the Last Year: Never true   Transportation Needs: No Transportation Needs   • Lack of Transportation (Medical): No   • Lack of Transportation (Non-Medical):  No   Physical Activity: Not on file   Stress: Not on file   Social Connections: Not on file   Intimate Partner Violence: Not on file   Housing Stability: Not on file        Current Outpatient Medications:   •  Acetaminophen 325 MG CAPS, Take 650 mg by mouth, Disp: , Rfl:   •  Alcohol Swabs (ALCOHOL PADS) 70 % PADS, by Does not apply route 5 (five) times a day, Disp: 300 each, Rfl: 5  •  amLODIPine (NORVASC) 10 mg tablet, Take 1 tablet (10 mg total) by mouth daily, Disp: 90 tablet, Rfl: 1  •  amLODIPine (NORVASC) 5 mg tablet, Take 5 mg by mouth daily, Disp: , Rfl:   •  ammonium lactate (LAC-HYDRIN) 12 % cream, , Disp: , Rfl:   •  apixaban (ELIQUIS) 5 mg, Take 1 tablet (5 mg total) by mouth 2 (two) times a day, Disp: 270 tablet, Rfl: 1  •  ascorbic acid (VITAMIN C) 250 mg tablet, TAKE 2 TABLETS (500 MG TOTAL) BY MOUTH DAILY, Disp: 180 tablet, Rfl: 1  •  atorvastatin (LIPITOR) 20 mg tablet, Take 1 tablet (20 mg total) by mouth daily at bedtime, Disp: 90 tablet, Rfl: 1  •  B Complex-C-Folic Acid (Super B-Complex/Vit C/FA) TABS, TAKE 1 TABLET BY MOUTH EVERY DAY AS DIRECTED, Disp: 90 tablet, Rfl: 4  •  SUSAN MICROLET LANCETS lancets, Use as instructed to check blood sugar 4 times a day Dx e10 29, Disp: 200 each, Rfl: 5  •  bisacodyl (DULCOLAX) 5 mg EC tablet, Take 1 tablet (5 mg total) by mouth once for 1 dose, Disp: 2 tablet, Rfl: 0  •  Blood Glucose Monitoring Suppl (SUSAN CONTOUR NEXT USB MONITOR) w/Device KIT, Testing 4 times a day, Disp: 1 kit, Rfl: 0  •  calcitriol (ROCALTROL) 0 5 MCG capsule, Take 2 mcg by mouth, Disp: , Rfl:   •  calcium acetate (PHOSLO) capsule, TAKE 2 CAPSULES BY MOUTH THREE TIMES DAILY WITH MEALS, Disp: , Rfl:   •  carvedilol (COREG) 25 mg tablet, Take 1 tablet (25 mg total) by mouth 2 (two) times a day, Disp: 180 tablet, Rfl: 1  •  cetirizine (ZyrTEC) 10 mg tablet, Take 1 tablet (10 mg total) by mouth daily, Disp: 90 tablet, Rfl: 1  •  Cholecalciferol (VITAMIN D-3) 1000 units CAPS, Take 2 capsules by mouth daily, Disp: 30 capsule, Rfl: 0  •  clindamycin (CLINDAGEL) 1 % gel, APPLY TO AFFECTED AREA TWICE A DAY, Disp: 60 g, Rfl: 2  •  Contour Next Test test strip, USE TO TEST BLOOD SUGAR 3 TIMES DAILY   CONTOUR NEXT STRIPS  E10 29, Disp: , Rfl:   •  cyanocobalamin ([employer] Vitamin B-12) 1000 MCG tablet, Take 1 tablet (1,000 mcg total) by mouth daily, Disp: 30 tablet, Rfl: 5  •  cyclobenzaprine (FLEXERIL) 5 mg tablet, , Disp: , Rfl:   •  dexlansoprazole (DEXILANT) 30 MG capsule, TAKE 1 CAPSULE BY MOUTH EVERY DAY, Disp: 180 capsule, Rfl: 2  •  dicyclomine (BENTYL) 10 mg capsule, TAKE 1 CAPSULE BY MOUTH 3 TIMES A DAY BEFORE MEALS, Disp: 270 capsule, Rfl: 1  •  Disposable Gloves MISC, Use 7 times a day, 4 boxes of gloves XL Dx type 1 DM, paraplegia, Disp: 4 each, Rfl: 11  •  doxazosin (CARDURA) 2 mg tablet, Take 1 tablet (2 mg total) by mouth every evening, Disp: 90 tablet, Rfl: 1  •  doxycycline monohydrate (MONODOX) 100 mg capsule, Take 1 capsule (100 mg total) by mouth 2 (two) times a day, Disp: 60 capsule, Rfl: 2  •  epoetin kaushik (EPOGEN,PROCRIT) 20,000 units/mL, Inject 10,000 Units under the skin, Disp: , Rfl:   •  epoetin kaushik (EPOGEN,PROCRIT) 20,000 units/mL, Inject 5,000 Units under the skin, Disp: , Rfl:   •  ferrous sulfate 325 (65 Fe) mg tablet, Take 1 tablet (325 mg total) by mouth 3 (three) times a day, Disp: 90 tablet, Rfl: 3  •  guaiFENesin (MUCINEX) 600 mg 12 hr tablet, Take 2 tablets (1,200 mg total) by mouth every 12 (twelve) hours, Disp: 60 tablet, Rfl: 2  •  Gvoke PFS 1 MG/0 2ML SOSY, , Disp: , Rfl:   •  hydrOXYzine HCL (ATARAX) 50 mg tablet, TAKE 1 TABLET (50 MG TOTAL) BY MOUTH 2 (TWO) TIMES A DAY AS NEEDED FOR ITCHING OR ANXIETY, Disp: 180 tablet, Rfl: 1  •  Incontinence Supply Disposable (Incontinence Brief Large) MISC, Use 6 (six) times a day Size xl, Disp: 180 each, Rfl: 11  •  Incontinence Supply Disposable (Undergarment) MISC, Use 6 a day, 5 boxes, xl Dx R51, paraplegia, Disp: 5 each, Rfl: 11  •  Incontinence Supply Disposable (Underpads) MISC, Use 2 a day, Disp: 5 each, Rfl: 11  •  ketoconazole (NIZORAL) 2 % cream, Apply to rash , Disp: 15 g, Rfl: 1  •  LEVEMIR FLEXTOUCH 100 units/mL injection pen, Inject 14 Units under the skin 2 (two) times a day, Disp: 15 pen, Rfl: 3  •  Lokelma 10 g PACK, , Disp: , Rfl:   •  losartan (COZAAR) 100 MG tablet, Take 1 tablet (100 mg total) by mouth daily, Disp: 90 tablet, Rfl: 1  •  Melatonin ER 3 MG TBCR, Take 6 mg by mouth, Disp: , Rfl:   •  metroNIDAZOLE (METROGEL) 0 75 % gel, Apply topically 2 (two) times a day, Disp: 45 g, Rfl: 0  •  Misc  Devices (Wheelchair Cushion) MISC, Use daily, Disp: 1 each, Rfl: 0  •  Misc   Devices [facility]) MISC, by Does not apply route daily Wheelchair ramp, dx g82 20, Disp: 1 each, Rfl: 0  •  Multiple Vitamin (Daily-Enriqueta Multivitamin) TABS, TAKE 1 TABLET BY MOUTH EVERY DAY, Disp: 30 tablet, Rfl: 8  •  NovoLOG FlexPen 100 units/mL injection pen, INJECT 3 UNITS UNDER THE SKIN 3 (THREE) TIMES A DAY BEFORE MEALS , Disp: , Rfl:   •  oxybutynin (DITROPAN XL) 15 MG 24 hr tablet, , Disp: , Rfl:   •  oxybutynin (DITROPAN) 5 mg tablet, Take 3 tablets (15 mg total) by mouth daily, Disp: 270 tablet, Rfl: 1  •  oxyCODONE (ROXICODONE) 10 MG TABS, TAKE ONE TABLET BY MOUTH EVERY 12 HOURS AS NEEDED FOR PAIN  ONGOING THERAPY , Disp: , Rfl:   •  polyethylene glycol (GLYCOLAX) 17 GM/SCOOP powder, Take as directed as per written office instructions, Disp: 238 g, Rfl: 0  •  polyethylene glycol (GOLYTELY) 4000 mL solution, Take 4,000 mL by mouth once for 1 dose, Disp: 4000 mL, Rfl: 0  •  risperiDONE (RisperDAL) 0 5 mg tablet, Take 1 tablet (0 5 mg total) by mouth 2 (two) times a day, Disp: 180 tablet, Rfl: 1  •  sertraline (ZOLOFT) 25 mg tablet, Take 1 tablet (25 mg total) by mouth daily, Disp: 90 tablet, Rfl: 1  •  Sodium Zirconium Cyclosilicate 10 g PACK, Take 10 g by mouth daily, Disp: , Rfl:   •  sucralfate (CARAFATE) 1 g/10 mL suspension, Take 10 mL (1 g total) by mouth 4 (four) times a day (with meals and at bedtime), Disp: 420 mL, Rfl: 1  •  sulfacetamide-prednisoLONE 10-0 23 % ophthalmic solution, 3 DROPS TO EACH EAR TWICE DAILY FOR 5 DAYS, Disp: , Rfl:   •  SUPER B COMPLEX & C TABS, TAKE 1 CAPSULE BY MOUTH ONCE FOR 1 DOSE AS DIRECTED, Disp: 90 tablet, Rfl: 2  •  Zinc Sulfate 220 (50 Zn) MG TABS, Take 1 tablet by mouth daily, Disp: 90 tablet, Rfl: 1  No current facility-administered medications for this visit  Review of Systems   Constitutional: Negative for chills, fatigue and unexpected weight change  Respiratory: Negative for cough and shortness of breath  Cardiovascular: Negative for chest pain  Musculoskeletal: Positive for gait problem (Paraplegia)  Paraplegic   Skin: Positive for wound (Sacrum, perineum, left ischium)  Multiple pressure injuries   Neurological: Positive for weakness and numbness  Hematological: Does not bruise/bleed easily  Psychiatric/Behavioral: Negative for agitation         Objective:  /78   Pulse 88   Temp 97 5 °F (36 4 °C)   Resp 18 Pain Score:   6     Physical Exam  Constitutional:       General: He is not in acute distress  Appearance: He is not ill-appearing  HENT:      Head: Normocephalic  Cardiovascular:      Rate and Rhythm: Normal rate  Pulmonary:      Effort: Pulmonary effort is normal  No respiratory distress  Skin:     General: Skin is warm and dry  Findings: Wound present  No erythema  Comments: Multiple stage 4 pressure injuries with fibrin and slough present  Some new epithelium on the sacral wound  Wounds with significant undermining and epiboly  No malodor to the wounds  See flow-sheet for additional details  Neurological:      Mental Status: He is alert  Psychiatric:         Mood and Affect: Mood normal          Behavior: Behavior normal            Wound Pressure Injury Ischium Left (Active)   Wound Image           04/06/23 1551   Wound Description Pink;Yellow 04/06/23 1518   Pressure Injury Stage 4 01/05/23 1435   Irene-wound Assessment Scar Tissue; Intact 04/06/23 1518   Wound Length (cm) 5 3 cm 04/06/23 1518   Wound Width (cm) 4 7 cm 04/06/23 1518   Wound Depth (cm) 0 4 cm 04/06/23 1518   Wound Surface Area (cm^2) 24 91 cm^2 04/06/23 1518   Wound Volume (cm^3) 9 964 cm^3 04/06/23 1518   Calculated Wound Volume (cm^3) 9 96 cm^3 04/06/23 1518   Change in Wound Size % 47 3 04/06/23 1518   Tunneling in depth located at 2 9 at 10; 3 7 at 7 01/05/23 1435   Undermining 1 02/16/23 0953   Undermining is depth extending from 4-5 02/16/23 0953   Drainage Amount Large 04/06/23 1518   Drainage Description Serosanguineous;Tan;Green 04/06/23 1518   Non-staged Wound Description Full thickness 02/16/23 0953   Treatments Irrigation with NSS 02/16/23 0953   Wound packed?  No 02/16/23 0953   Dressing Changed Changed 12/30/21 1447   Patient Tolerance Tolerated well 12/06/22 1428   Dressing Status Removed 12/06/22 1428       Wound Pressure Injury Perineum (Active)   Wound Image       04/06/23 1552   Wound Description Yellow;Pink 04/06/23 1515   Pressure Injury Stage 4 01/05/23 1437   Irene-wound Assessment Intact;Scar Tissue 04/06/23 1515   Wound Length (cm) 5 cm 04/06/23 1515   Wound Width (cm) 5 5 cm 04/06/23 1515   Wound Depth (cm) 1 1 cm 04/06/23 1515   Wound Surface Area (cm^2) 27 5 cm^2 04/06/23 1515   Wound Volume (cm^3) 30 25 cm^3 04/06/23 1515   Calculated Wound Volume (cm^3) 30 25 cm^3 04/06/23 1515   Change in Wound Size % -188 1 04/06/23 1515   Undermining 2 3 04/06/23 1515   Undermining is depth extending from 12-8 04/06/23 1515   Drainage Amount Large 04/06/23 1515   Drainage Description Serosanguineous;Green; Tan 04/06/23 1515   Non-staged Wound Description Full thickness 02/16/23 0959   Treatments Irrigation with NSS 02/16/23 0959   Wound packed? No 02/16/23 0959   Dressing Changed Changed 12/30/21 1448   Patient Tolerance Tolerated well 12/06/22 1431   Dressing Status Removed 12/06/22 1431       Wound Pressure Injury Sacrum (Active)   Wound Image       04/06/23 1552   Wound Description Granulation tissue;Pink;Epithelialization 04/06/23 1521   Pressure Injury Stage 4 01/05/23 1439   Irene-wound Assessment Intact;Scar Tissue 04/06/23 1521   Wound Length (cm) 5 5 cm 04/06/23 1521   Wound Width (cm) 8 3 cm 04/06/23 1521   Wound Depth (cm) 0 5 cm 04/06/23 1521   Wound Surface Area (cm^2) 45 65 cm^2 04/06/23 1521   Wound Volume (cm^3) 22 825 cm^3 04/06/23 1521   Calculated Wound Volume (cm^3) 22 83 cm^3 04/06/23 1521   Change in Wound Size % 69 56 04/06/23 1521   Undermining 0 5 02/16/23 1001   Undermining is depth extending from 4-7 02/16/23 1001   Drainage Amount Large 04/06/23 1521   Drainage Description Serosanguineous;Tan;Green 04/06/23 1521   Non-staged Wound Description Full thickness 02/16/23 1001   Treatments Irrigation with NSS 02/16/23 1001   Wound packed?  No 02/16/23 1001   Dressing Changed Changed 12/30/21 1451   Patient Tolerance Tolerated well 12/06/22 1433   Dressing Status Removed 12/06/22 "1433       Wound 01/28/21 Pressure Injury Hip Left;Lateral (Active)   Wound Image       04/06/23 1554   Wound Description Pink;Yellow 04/06/23 1522   Pressure Injury Stage 4 01/05/23 1440   Irene-wound Assessment Scar Tissue; Intact 04/06/23 1522   Wound Length (cm) 2 1 cm 04/06/23 1522   Wound Width (cm) 1 3 cm 04/06/23 1522   Wound Depth (cm) 0 9 cm 04/06/23 1522   Wound Surface Area (cm^2) 2 73 cm^2 04/06/23 1522   Wound Volume (cm^3) 2  457 cm^3 04/06/23 1522   Calculated Wound Volume (cm^3) 2 46 cm^3 04/06/23 1522   Tunneling 4 7 cm 02/10/22 1341   Tunneling in depth located at 10 02/10/22 1341   Undermining 3 5 04/06/23 1522   Undermining is depth extending from 6-12 04/06/23 1522   Drainage Amount Large 04/06/23 1522   Drainage Description Serosanguineous;Tan;Green 04/06/23 1522   Non-staged Wound Description Full thickness 02/16/23 1004   Treatments Irrigation with NSS 02/16/23 1004   Wound packed? No 02/16/23 1004   Packing- # removed 1 05/05/22 1536   Dressing Changed Changed 12/30/21 1452   Patient Tolerance Tolerated well 12/06/22 1436   Dressing Status Removed 12/06/22 1436                       Debridement   Wound Pressure Injury Sacrum    Universal Protocol:  Consent: Verbal consent obtained  Written consent obtained  Consent given by: patient  Time out: Immediately prior to procedure a \"time out\" was called to verify the correct patient, procedure, equipment, support staff and site/side marked as required    Patient understanding: patient states understanding of the procedure being performed  Patient identity confirmed: verbally with patient      Performed by: physician  Debridement type: surgical  Level of debridement: subcutaneous tissue  Pain control: lidocaine 4%  Post-debridement measurements  Length (cm): 5 5  Width (cm): 8 3  Depth (cm): 5 6  Percent debrided: 100%  Surface Area (cm^2): 45 65  Area debrided (cm^2): 45 65  Volume (cm^3): 255 64  Tissue and other material debrided: dermis and " "subcutaneous tissue  Devitalized tissue debrided: fibrin and slough  Instrument(s) utilized: curette  Bleeding: large  Hemostasis obtained with: pressure  Procedural pain (0-10): 0  Post-procedural pain: 0   Response to treatment: procedure was tolerated well    Debridement   Wound Pressure Injury Ischium Left    Universal Protocol:  Consent: Verbal consent obtained  Written consent obtained  Consent given by: patient  Time out: Immediately prior to procedure a \"time out\" was called to verify the correct patient, procedure, equipment, support staff and site/side marked as required  Patient understanding: patient states understanding of the procedure being performed  Patient identity confirmed: verbally with patient      Performed by: physician  Debridement type: surgical  Level of debridement: subcutaneous tissue  Pain control: lidocaine 4%  Post-debridement measurements  Length (cm): 5 3  Width (cm): 4 7  Depth (cm): 0 4  Percent debrided: 100%  Surface Area (cm^2): 24 91  Area debrided (cm^2): 24 91  Volume (cm^3): 9 96  Tissue and other material debrided: dermis and subcutaneous tissue  Devitalized tissue debrided: fibrin and slough  Instrument(s) utilized: curette  Bleeding: large  Hemostasis obtained with: pressure  Procedural pain (0-10): 0  Post-procedural pain: 0   Response to treatment: procedure was tolerated well    Debridement   Wound 01/28/21 Pressure Injury Hip Left;Lateral    Universal Protocol:  Consent: Verbal consent obtained  Written consent obtained  Consent given by: patient  Time out: Immediately prior to procedure a \"time out\" was called to verify the correct patient, procedure, equipment, support staff and site/side marked as required    Patient understanding: patient states understanding of the procedure being performed  Patient identity confirmed: verbally with patient      Performed by: physician  Debridement type: surgical  Level of debridement: subcutaneous tissue  Pain control: " "lidocaine 4%  Post-debridement measurements  Length (cm): 2 1  Width (cm): 1 3  Depth (cm): 1  Percent debrided: 100%  Surface Area (cm^2): 2 73  Area debrided (cm^2): 2 73  Volume (cm^3): 2 73  Tissue and other material debrided: dermis and subcutaneous tissue  Devitalized tissue debrided: fibrin and slough  Instrument(s) utilized: curette  Bleeding: large  Hemostasis obtained with: pressure and silver nitrate  Procedural pain (0-10): 0  Post-procedural pain: 0   Response to treatment: procedure was tolerated well  Debridement Comments: In addition silver nitrate and pressure, Surgifoam needed to be placed into the depth of the ulcer after debridement  Following this saline gauze packing and ABD was placed  Debridement   Wound Pressure Injury Perineum    Universal Protocol:  Consent: Verbal consent obtained  Written consent obtained  Consent given by: patient  Time out: Immediately prior to procedure a \"time out\" was called to verify the correct patient, procedure, equipment, support staff and site/side marked as required  Patient understanding: patient states understanding of the procedure being performed  Patient identity confirmed: verbally with patient      Performed by: physician  Debridement type: surgical  Level of debridement: subcutaneous tissue  Pain control: lidocaine 4%  Post-debridement measurements  Length (cm): 5  Width (cm): 0 5  Depth (cm): 1 2  Percent debrided: 100%  Surface Area (cm^2): 2 5  Area debrided (cm^2): 2 5  Volume (cm^3):  3  Tissue and other material debrided: dermis and subcutaneous tissue  Devitalized tissue debrided: fibrin and slough  Instrument(s) utilized: curette  Bleeding: large  Hemostasis obtained with: pressure  Procedural pain (0-10): 0  Post-procedural pain: 0   Response to treatment: procedure was tolerated well                 Lab Results   Component Value Date    HGBA1C 7 5 (H) 03/04/2023       Wound Instructions:  Orders Placed This Encounter   Procedures   • " "Wound cleansing and dressings     LEFT HIP WOUND WAS PACKED WITH SURIFOAM AND GAUZE  [facility] TO ASSIST WITH BLEEDING  LEAVE INTACT UNTIL Sunday  IF BLEEDING CONTINUES THEN SEEK TREATMENT AT NEAREST ER     Wash your hands with soap and water  Remove old dressing, discard into plastic bag and place in trash  Cleanse the wound with sterile saline solution (rinse) prior to applying a clean dressing  Do not use tissue or cotton balls  Do not scrub the wound  Pat dry using gauze  Shower no; do not get dressing wet     ALL WOUNDS:  Cleanse with normal saline as above  Apply thin layer vaseline to periwound skin  Apply silver alginate to wounds, ensure to tuck into undermining areas   Cover with an ABD pad  Secure with Medfix tape  Change dressing 3 times a week and PRN for breakthrough drainage (visiting nurses to do twice per week and family in between)        Continue using Clinitron bed          Continue NO PRESSURE TO ALL WOUNDS, ESPECIALLY PERINEAL WOUND; LIMIT SITTING UPRIGHT IN WHEELCHAIR ON THIS WOUND     Follow up in 6 weeks      Continue visiting nurse skilled 2 x per week for wound care dressing changes                              Treatment in the wound center today:  Wounds cleansed with normal saline  Silver alginate applied to all wounds and tucked into undermining areas  Covered with ABDs and secured with medfix tape     Standing Status:   Future     Standing Expiration Date:   4/6/2024   • Debridement     This order was created via procedure documentation   • Debridement     This order was created via procedure documentation   • Debridement     This order was created via procedure documentation   • Debridement     This order was created via procedure documentation             Smitha Flynn MD, CHT, CWS    Portions of the record may have been created with voice recognition software   Occasional wrong word or \"sound alike\" substitutions may have occurred due to the inherent " limitations of voice recognition software  Read the chart carefully and recognize, using context, where substitutions have occurred

## 2023-04-18 ENCOUNTER — TELEPHONE (OUTPATIENT)
Dept: FAMILY MEDICINE CLINIC | Facility: CLINIC | Age: 43
End: 2023-04-18

## 2023-04-18 NOTE — TELEPHONE ENCOUNTER
PCP SIGNATURE NEEDED FOR [facility] [place] [facility] Orders FORM RECEIVED VIA FAX AND PLACED IN PCP FOLDER TO BE DELIVERED AT ASSIGNED TIMES

## 2023-04-25 ENCOUNTER — TELEPHONE (OUTPATIENT)
Dept: FAMILY MEDICINE CLINIC | Facility: CLINIC | Age: 43
End: 2023-04-25

## 2023-04-25 NOTE — TELEPHONE ENCOUNTER
PCP SIGNATURE NEEDED FOR [facility] FORM RECEIVED VIA FAX AND PLACED IN PCP FOLDER TO BE DELIVERED AT ASSIGNED TIMES

## 2023-04-26 NOTE — TELEPHONE ENCOUNTER
FAXED ON 04/26/23 TO [facility] at [phone]  FAX CONFIRMATION RECEIVED  SCANNED INTO CHART

## 2023-05-04 ENCOUNTER — TELEPHONE (OUTPATIENT)
Dept: FAMILY MEDICINE CLINIC | Facility: CLINIC | Age: 43
End: 2023-05-04

## 2023-05-04 NOTE — TELEPHONE ENCOUNTER
PCP [address] FORM RECEIVED VIA FAX AND PLACED IN PCP FOLDER TO BE DELIVERED AT ASSIGNED TIMES      Order [id number]

## 2023-05-05 ENCOUNTER — TELEPHONE (OUTPATIENT)
Dept: FAMILY MEDICINE CLINIC | Facility: CLINIC | Age: 43
End: 2023-05-05

## 2023-05-05 NOTE — TELEPHONE ENCOUNTER
PCP [address] FORM RECEIVED VIA FAX AND PLACED IN PCP FOLDER TO BE DELIVERED AT ASSIGNED TIMES      Order [id number]    Certification Period- 4/28/2023 to 6/26/2023

## 2023-05-10 DIAGNOSIS — R19.7 DIARRHEA, UNSPECIFIED TYPE: Primary | ICD-10-CM

## 2023-05-10 NOTE — TELEPHONE ENCOUNTER
FAXED ON 05/10/23 TO [facility] at [phone]  FAX CONFIRMATION RECEIVED  SCANNED INTO CHART

## 2023-05-16 ENCOUNTER — TELEPHONE (OUTPATIENT)
Dept: FAMILY MEDICINE CLINIC | Facility: CLINIC | Age: 43
End: 2023-05-16

## 2023-05-16 NOTE — TELEPHONE ENCOUNTER
PCP SIGNATURE NEEDED FOR [employer] FORM RECEIVED VIA FAX AND PLACED IN PCP FOLDER TO BE DELIVERED AT ASSIGNED TIMES        Work order # [id number]

## 2023-05-18 ENCOUNTER — OFFICE VISIT (OUTPATIENT)
Dept: WOUND CARE | Facility: CLINIC | Age: 43
End: 2023-05-18

## 2023-05-18 VITALS
RESPIRATION RATE: 18 BRPM | SYSTOLIC BLOOD PRESSURE: 140 MMHG | HEART RATE: 92 BPM | TEMPERATURE: 96.8 F | DIASTOLIC BLOOD PRESSURE: 88 MMHG

## 2023-05-18 DIAGNOSIS — L89.894 PRESSURE ULCER OF OTHER SITE, STAGE 4 (HCC): ICD-10-CM

## 2023-05-18 DIAGNOSIS — G82.20 PARAPLEGIA (HCC): ICD-10-CM

## 2023-05-18 DIAGNOSIS — L89.324 PRESSURE INJURY OF LEFT ISCHIUM, STAGE 4 (HCC): ICD-10-CM

## 2023-05-18 DIAGNOSIS — E10.22 TYPE 1 DIABETES MELLITUS WITH CHRONIC KIDNEY DISEASE ON CHRONIC DIALYSIS (HCC): ICD-10-CM

## 2023-05-18 DIAGNOSIS — L89.224 PRESSURE ULCER OF LEFT HIP, STAGE 4 (HCC): ICD-10-CM

## 2023-05-18 DIAGNOSIS — N18.6 TYPE 1 DIABETES MELLITUS WITH CHRONIC KIDNEY DISEASE ON CHRONIC DIALYSIS (HCC): ICD-10-CM

## 2023-05-18 DIAGNOSIS — Z99.2 TYPE 1 DIABETES MELLITUS WITH CHRONIC KIDNEY DISEASE ON CHRONIC DIALYSIS (HCC): ICD-10-CM

## 2023-05-18 DIAGNOSIS — L89.154 PRESSURE INJURY OF SACRAL REGION, STAGE 4 (HCC): Primary | ICD-10-CM

## 2023-05-18 RX ORDER — LIDOCAINE HYDROCHLORIDE 40 MG/ML
5 SOLUTION TOPICAL ONCE
Status: COMPLETED | OUTPATIENT
Start: 2023-05-18 | End: 2023-05-18

## 2023-05-18 RX ADMIN — LIDOCAINE HYDROCHLORIDE 5 ML: 40 SOLUTION TOPICAL at 14:34

## 2023-05-18 NOTE — LETTER
[facility]  [facility] [address]  Phone#  [phone]  Fax#  [phone]    Patient:  Rick Cortez  YOB: 1980  Phone:  [phone]  Date of Visit:  5/18/2023    Orders Placed This Encounter   Procedures   • Wound cleansing and dressings     Wash your hands with soap and water  Remove old dressing, discard into plastic bag and place in trash  Cleanse the wound with sterile saline solution (rinse) prior to applying a clean dressing  Do not use tissue or cotton balls  Do not scrub the wound  Pat dry using gauze  Shower no; do not get dressing wet    Left Hip Wound:  Cleanse with normal saline as above  Apply thin layer vaseline to periwound skin  Apply Aquacel AG rope (tape the tail)  Cover with an ABD pad  Secure with Medfix tape  Change dressing 3 times a week and PRN for breakthrough drainage (visiting nurses to do twice per week and family in between)       ALL OTHER WOUNDS:  Cleanse with normal saline as above  Apply thin layer vaseline to periwound skin  Apply saline moistened gauze when wounds have heavy drainage  Apply silver alginate to wounds, ensure to tuck into undermining areas when wounds are draining less  Cover with an ABD pad  Secure with Medfix tape  Change dressing 3 times a week and PRN for breakthrough drainage (visiting nurses to do twice per week and family in between)     Continue using Clinitron bed       Continue NO PRESSURE TO ALL WOUNDS, ESPECIALLY PERINEAL WOUND; LIMIT SITTING UPRIGHT IN WHEELCHAIR ON THIS WOUND     Follow up in 6 weeks      Continue visiting nurse skilled 2 x per week for wound care dressing changes                              Treatment in the wound center today:  Left Hip cleansed with Prophase and dressed as ordered above  All other wounds cleansed with Prophase  Apply Silver Alginate to wound bed   Covered with ABDs and secured with medfix tape     Standing Status:   Future Standing Expiration Date:   5/18/2024         Electronically signed by Ag Clay MD

## 2023-05-18 NOTE — PATIENT INSTRUCTIONS
Orders Placed This Encounter   Procedures    Wound cleansing and dressings     Wash your hands with soap and water  Remove old dressing, discard into plastic bag and place in trash  Cleanse the wound with sterile saline solution (rinse) prior to applying a clean dressing  Do not use tissue or cotton balls  Do not scrub the wound  Pat dry using gauze  Shower no; do not get dressing wet    Left Hip Wound:  Cleanse with normal saline as above  Apply thin layer vaseline to periwound skin  Apply Aquacel AG rope (tape the tail)  Cover with an ABD pad  Secure with Medfix tape  Change dressing 3 times a week and PRN for breakthrough drainage (visiting nurses to do twice per week and family in between)       ALL OTHER WOUNDS:  Cleanse with normal saline as above  Apply thin layer vaseline to periwound skin  Apply saline moistened gauze when wounds have heavy drainage  Apply silver alginate to wounds, ensure to tuck into undermining areas when wounds are draining less  Cover with an ABD pad  Secure with Medfix tape  Change dressing 3 times a week and PRN for breakthrough drainage (visiting nurses to do twice per week and family in between)     Continue using Clinitron bed  Continue NO PRESSURE TO ALL WOUNDS, ESPECIALLY PERINEAL WOUND; LIMIT SITTING UPRIGHT IN WHEELCHAIR ON THIS WOUND     Follow up in 6 weeks      Continue visiting nurse skilled 2 x per week for wound care dressing changes  Treatment in the wound center today:  Left Hip cleansed with Prophase and dressed as ordered above  All other wounds cleansed with Prophase  Apply Silver Alginate to wound bed   Covered with ABDs and secured with medfix tape     Standing Status:   Future     Standing Expiration Date:   5/18/2024

## 2023-05-19 NOTE — PROGRESS NOTES
Patient ID: Hannah Velasquez  is a 43 y o  male Date of Birth 1980       Chief Complaint   Patient presents with   • Follow Up Wound Care Visit     Patient is here for a follow up for pressure wounds to the sacrum and buttocks  Allergies:  Chlorcyclizine, Chlorhexidine, Ciprofloxacin hcl, Cymbalta [duloxetine hcl], Dm-doxylamine-acetaminophen, Gabapentin, Hydrocortisone, Lac bovis, Lactose - food allergy, Lyrica [pregabalin], Penicillins, Polymyxin b, Promethazine-phenyleph-codeine, Quinidine, Cefepime, and Rosuvastatin    Diagnosis:      Diagnosis ICD-10-CM Associated Orders   1  Pressure injury of sacral region, stage 4 ([facility])  L89 154 Wound cleansing and dressings     lidocaine (XYLOCAINE) 4 % topical solution 5 mL     Debridement      2  Pressure injury of left ischium, stage 4 ([facility])  L89 324 Wound cleansing and dressings     lidocaine (XYLOCAINE) 4 % topical solution 5 mL     Debridement      3  Pressure ulcer of left hip, stage 4 ([facility])  L89 224 Wound cleansing and dressings     lidocaine (XYLOCAINE) 4 % topical solution 5 mL     Debridement      4  Pressure ulcer of other site, stage 4 ([facility])  L89 894 Wound cleansing and dressings     lidocaine (XYLOCAINE) 4 % topical solution 5 mL     Debridement     Debridement      5  Paraplegia ([facility])  G82 20       6  Type 1 diabetes mellitus with chronic kidney disease on chronic dialysis ([facility])  E10 22     N18 6     Z99 2               Assessment & Plan:  Multiple stage IV pressure injuries as noted above  All surgically aided  Most are unchanged but the problem is improving  Surgical debridement  Dressings will include silver alginate to all  Change every other day  If increased drainage may use saline soaked gauze  Also if odor or signs of infection should develop, Dakin's packing  Follow-up in approximately 6 weeks  Diabetes  Patient states that he is slowly improving  A1C results reviewed with the patient today    Protein is improving as well            Subjective:     06/30/22: 38 y/o M with PMHx of spinal paraplegia, neurogenic bladder with chronic suprapubic catheter in place, ESRD on dialysis M/W/F, anemia of CKD, secondary hyperparathyroidism, type I DM, colostomy bag in place,tricupsid valve endocarditis, chronic osteomyelitis, chronic pressure injuries, afib, and hx of DVT on Eliquis presenting for f/u of multiple stage 4 pressure injuries of the sacrum, left hip, L ischium, and perineum  Pt was recently hospitalized on 06/17/22 for C  Difficile  Has been using wet to dry dressings  States he has been attempting to increase his protein intake and has gained some weight but his albumin remains low and is confused why that is  9/1/22: Followup multiple stage IV pressure injuries of the sacrum and ischial tuberosities  Patient was hospitalized recently for sepsis and was told it was due to his pressure injuries  However, they do not look any worse than they ever have been  He was discharged on oral medications  He continues using wet to dry dressings  Prior to hospitalization he developed two new pressure injuries of his posterior scrotum  He continues to supposedly eat well with increased protein  10/13/22: Followup multiple stage IV pressure injuries of the sacrum, perineum and ischial tuberosities and left hip     At last visit, he had scrotal pressure injuries as well  He is using wet to dry dressings  He no longer has his Clinitron and is using an alternating low air loss mattress  He does not like the air mattress because is more difficult to get in and out of  He has not had any fevers or chills  12/6/2022: Follow-up multiple stage IV pressure injuries of the sacrum, perineum, ischial tuberosities and left hip  Patient states that he saw endocrinology and A1c was 7 9  Slightly better than previous  Using saline packing  Occasionally Dakin's when they noticed an odor  2/16/2023:  Follow-up visit for multiple stage 4 pressure injuries of the sacrum, perineum, L ischium, and L hip  Patient states he has a Clinitron bed and his blood sugars have been well managed recently with his insulin pump  This morning his sugar was 80 and has been running in the 150's lately  He monitors his sugars with a Dexcom  He has been cleansing the wounds with saline, and dakin's occasionally when he notices an odor  He denies fever, pains, or pain  4/6/2023: Follow-up of multiple stage IV pressure injuries of the sacrum, ischium on the left and left hip and perineum  Blood sugars were running high today  Last A1c done last month was 7 5  No new complaints  Still using saline packing with occasional Dakin's packing if the quality of the ulcer is changed  5/18/2023: Follow-up multiple stage IV pressure injuries of the sacrum, ischium on the left and left hip and perineum  Patient states that his blood sugars are running about the same  He is generally using saline packing with occasional Dakin's packing should there be an odor  He was supposed to use silver alginate but he has not been using this  He was hospitalized about a month ago for C  difficile colitis                      The following portions of the patient's history were reviewed and updated as appropriate:   Patient Active Problem List   Diagnosis   • Paraplegia ([facility] )   • Atrial fibrillation ([facility])   • Chronic suprapubic catheter ([facility])   • Colostomy care ([facility] )   • OAB (overactive bladder)   • S/P unilateral BKA (below knee amputation) ([facility] )   • Tobacco abuse   • Type 1 diabetes mellitus with chronic kidney disease on chronic dialysis ([facility] )   • Ulcer of sacral region, stage 4 ([facility] )   • Chronic indwelling Ortega catheter   • Wound healing, delayed   • Iron deficiency anemia   • Pressure injury of left ischium, stage 4 ([facility])   • HTN (hypertension), benign   • Neurogenic bladder   • GERD (gastroesophageal reflux disease)   • Chronic pain   • Stage 5 chronic kidney disease on chronic dialysis [facility])   • Penile abscess   • History of Clostridium difficile infection   • Urinary retention   • Delirium   • Dialysis patient ([place] [address] )   • Insulin long-term use ([place] [address] )   • Wheelchair dependent   • Recurrent Clostridioides difficile diarrhea   • Bipolar depression ([place] [address] )   • 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[facility])   • Seizure ([place] [address] )   • Pressure ulcer of sacral region, stage 4 ([facility])   • AVM (arteriovenous malformation) of duodenum, acquired   • Chronic narcotic dependence ([facility])   • Chronic diastolic heart failure ([facility])   • Pressure ulcer of other site, stage 4 ([facility])   • Pressure ulcer of left hip, stage 4 ([facility])   • ED (erectile dysfunction) of organic origin   • Irritable bowel syndrome   • Onychomycosis   • Pustular folliculitis   • Prolonged Q-T interval on ECG   • Secondary hyperparathyroidism of renal origin ([place] [address] )   • Wounds, multiple   • Immunocompromised state ([place] [address] )   • Severe protein-calorie malnutrition ([facility])   • Chronic osteomyelitis ([facility])   • Acute deep vein thrombosis (DVT) of right upper extremity ([facility])   • C  difficile colitis   • Charcot's arthropathy   • Diabetic gastroparesis associated with type 1 diabetes mellitus ([place] [address] )   • End stage renal disease ([facility])   • Hyperlipidemia   • LVH (left ventricular hypertrophy)   • NICM (nonischemic cardiomyopathy) ([place] [address] )   • Vitamin B deficiency   • Vitamin C deficiency   • Vitamin D deficiency   • Chest congestion     Past Medical History:   Diagnosis Date   • Acute pulmonary edema ([place] [address] ) 1/13/2022   • Ambulatory dysfunction    • Anemia    • Anemia, iron deficiency     transfusion requiring   • Asymptomatic bacteriuria 9/25/2017   • Atrial fibrillation ([facility])    • AVM (arteriovenous malformation) of duodenum, 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   • Bacteriuria, asymptomatic    • Bipolar disorder ([place] [address] )    • Chronic deep vein thrombosis (DVT) ([facility])    • Chronic indwelling Ortega catheter    • Chronic kidney disease    • Chronic pain    • Chronic pain disorder    • Chronic suprapubic catheter ([facility] )    • Clostridium difficile infection 08/11/2016    also positive 9/2016, 5/29/2017, 8/15/2017  S/P fecal transplant   • Colostomy on examination [facility])    • Decubitus ulcer    • Delirium    • Diabetes mellitus ([facility] )    • GERD (gastroesophageal reflux disease)    • Hemodialysis patient ([place] [address] )    • Hypertension    • Memory impairment 2011    s/p diabetic coma   • Neurogenic bladder    • OAB (overactive bladder)    • Paraplegia (H[facility])     T3 transverse myelitis vs spinal stoke (AVM); 2012 extensive epidural abscess C7=> conus 2nd extension from deep parspinal abscess L4-S2/sacral decubitus; cord atrophy/myelomalcia T3=>conus    • Penile abscess    • S/P unilateral BKA (below knee amputation) ([place] [address] )     Right   • Sebaceous cyst removed in 2017   • Seizures (H[facility])    • Shortness of breath    • Tobacco abuse    • 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H[facility])    • Urinary retention    • Wheelchair dependent    • Wounds, multiple     pressure ulcers with delayed healing     Past Surgical History:   Procedure Laterality Date   • BELOW KNEE LEG AMPUTATION Right 2009   • COLONOSCOPY N/A 03/27/2017    Procedure: COLONOSCOPY;  Surgeon: Aida Giang MD;  Location: [facility]; Service:    • COLONOSCOPY N/A 03/29/2017    Procedure: COLONOSCOPY;  Surgeon: Aida Giang MD;  [facility]: [facility]; Service:    • COLONOSCOPY N/A 06/15/2017    Procedure: COLONOSCOPY with FMT;  Surgeon: Memo Ling MD;  Location: [facility]; Service: Gastroenterology   • ESOPHAGOGASTRODUODENOSCOPY N/A 03/22/2017    Procedure: ESOPHAGOGASTRODUODENOSCOPY (EGD); Surgeon: Sarah Velazquez DO;  Location: [facility];   Service:    • MULTIPLE TOOTH EXTRACTIONS N/A 03/08/2021    Procedure: EXTRACTION TEETH # 2,3,6,10,12,13,14,18,19,20,21,22,23,24,25,26,27,28,29,30;  Surgeon: Dmitri Mahoney DMD;  Location: [facility];  Service: Maxillofacial   • SKIN BIOPSY       Family History   Problem Relation Age of Onset   • Hyperlipidemia Mother    • Hypertension Mother • Leukemia Brother    • Diabetes Paternal Grandfather       Social History     Socioeconomic History   • Marital status: Single     Spouse name: Not on file   • Number of children: Not on file   • Years of education: Not on file   • Highest education level: Not on file   Occupational History   • Not on file   Tobacco Use   • Smoking status: Every Day     Types: Cigars     Passive exposure: Current   • Smokeless tobacco: Never   • Tobacco comments:     2 cigars/day - 2/14/2023   Vaping Use   • Vaping Use: Never used   Substance and Sexual Activity   • Alcohol use: Not Currently     Comment: 1 cup of wine every 3-4 months   • Drug use: Not Currently     Types: Marijuana     Comment: rarely; once every 1-2 years   • Sexual activity: Not on file   Other Topics Concern   • Not on file   Social History Narrative   • Not on file     Social Determinants of Health     Financial Resource Strain: Low Risk    • Difficulty of Paying Living Expenses: Not hard at all   Food Insecurity: No Food Insecurity   • Worried About Running Out of Food in the Last Year: Never true   • Ran Out of Food in the Last Year: Never true   Transportation Needs: No Transportation Needs   • Lack of Transportation (Medical): No   • Lack of Transportation (Non-Medical):  No   Physical Activity: Not on file   Stress: Not on file   Social Connections: Not on file   Intimate Partner Violence: Not on file   Housing Stability: Not on file        Current Outpatient Medications:   •  Acetaminophen 325 MG CAPS, Take 650 mg by mouth, Disp: , Rfl:   •  Alcohol Swabs (ALCOHOL PADS) 70 % PADS, by Does not apply route 5 (five) times a day, Disp: 300 each, Rfl: 5  •  amLODIPine (NORVASC) 10 mg tablet, Take 1 tablet (10 mg total) by mouth daily, Disp: 90 tablet, Rfl: 1  •  amLODIPine (NORVASC) 5 mg tablet, Take 5 mg by mouth daily, Disp: , Rfl:   •  ammonium lactate (LAC-HYDRIN) 12 % cream, , Disp: , Rfl:   •  apixaban (ELIQUIS) 5 mg, Take 1 tablet (5 mg total) by mouth 2 (two) times a day, Disp: 270 tablet, Rfl: 1  •  ascorbic acid (VITAMIN C) 250 mg tablet, TAKE 2 TABLETS (500 MG TOTAL) BY MOUTH DAILY, Disp: 180 tablet, Rfl: 1  •  atorvastatin (LIPITOR) 20 mg tablet, Take 1 tablet (20 mg total) by mouth daily at bedtime, Disp: 90 tablet, Rfl: 1  •  B Complex-C-Folic Acid (Super B-Complex/Vit C/FA) TABS, TAKE 1 TABLET BY MOUTH EVERY DAY AS DIRECTED, Disp: 90 tablet, Rfl: 4  •  SUSAN MICROLET LANCETS lancets, Use as instructed to check blood sugar 4 times a day Dx e10 29, Disp: 200 each, Rfl: 5  •  bisacodyl (DULCOLAX) 5 mg EC tablet, Take 1 tablet (5 mg total) by mouth once for 1 dose, Disp: 2 tablet, Rfl: 0  •  Blood Glucose Monitoring Suppl (SUSAN CONTOUR NEXT USB MONITOR) w/Device KIT, Testing 4 times a day, Disp: 1 kit, Rfl: 0  •  calcitriol (ROCALTROL) 0 5 MCG capsule, Take 2 mcg by mouth, Disp: , Rfl:   •  calcium acetate (PHOSLO) capsule, TAKE 2 CAPSULES BY MOUTH THREE TIMES DAILY WITH MEALS, Disp: , Rfl:   •  carvedilol (COREG) 25 mg tablet, Take 1 tablet (25 mg total) by mouth 2 (two) times a day, Disp: 180 tablet, Rfl: 1  •  cetirizine (ZyrTEC) 10 mg tablet, Take 1 tablet (10 mg total) by mouth daily, Disp: 90 tablet, Rfl: 1  •  Cholecalciferol (VITAMIN D-3) 1000 units CAPS, Take 2 capsules by mouth daily, Disp: 30 capsule, Rfl: 0  •  clindamycin (CLINDAGEL) 1 % gel, APPLY TO AFFECTED AREA TWICE A DAY, Disp: 60 g, Rfl: 2  •  Contour Next Test test strip, USE TO TEST BLOOD SUGAR 3 TIMES DAILY   CONTOUR NEXT STRIPS  E10 29, Disp: , Rfl:   •  cyanocobalamin ([employer] Vitamin B-12) 1000 MCG tablet, Take 1 tablet (1,000 mcg total) by mouth daily, Disp: 30 tablet, Rfl: 5  •  cyclobenzaprine (FLEXERIL) 5 mg tablet, , Disp: , Rfl:   •  dexlansoprazole (DEXILANT) 30 MG capsule, TAKE 1 CAPSULE BY MOUTH EVERY DAY, Disp: 180 capsule, Rfl: 2  •  dicyclomine (BENTYL) 10 mg capsule, TAKE 1 CAPSULE BY MOUTH 3 TIMES A DAY BEFORE MEALS, Disp: 270 capsule, Rfl: 1  •  Disposable Gloves MISC, Use 7 times a day, 4 boxes of gloves XL Dx type 1 DM, paraplegia, Disp: 4 each, Rfl: 11  •  doxazosin (CARDURA) 2 mg tablet, Take 1 tablet (2 mg total) by mouth every evening, Disp: 90 tablet, Rfl: 1  •  epoetin kaushik (EPOGEN,PROCRIT) 20,000 units/mL, Inject 10,000 Units under the skin, Disp: , Rfl:   •  epoetin kaushik (EPOGEN,PROCRIT) 20,000 units/mL, Inject 5,000 Units under the skin, Disp: , Rfl:   •  ferrous sulfate 325 (65 Fe) mg tablet, Take 1 tablet (325 mg total) by mouth 3 (three) times a day, Disp: 90 tablet, Rfl: 3  •  guaiFENesin (MUCINEX) 600 mg 12 hr tablet, Take 2 tablets (1,200 mg total) by mouth every 12 (twelve) hours, Disp: 60 tablet, Rfl: 2  •  Gvoke PFS 1 MG/0 2ML SOSY, , Disp: , Rfl:   •  hydrOXYzine HCL (ATARAX) 50 mg tablet, TAKE 1 TABLET (50 MG TOTAL) BY MOUTH 2 (TWO) TIMES A DAY AS NEEDED FOR ITCHING OR ANXIETY, Disp: 180 tablet, Rfl: 1  •  Incontinence Supply Disposable (Incontinence Brief Large) MISC, Use 6 (six) times a day Size xl, Disp: 180 each, Rfl: 11  •  Incontinence Supply Disposable (Undergarment) MISC, Use 6 a day, 5 boxes, xl Dx R51, paraplegia, Disp: 5 each, Rfl: 11  •  Incontinence Supply Disposable (Underpads) MISC, Use 2 a day, Disp: 5 each, Rfl: 11  •  ketoconazole (NIZORAL) 2 % cream, Apply to rash , Disp: 15 g, Rfl: 1  •  LEVEMIR FLEXTOUCH 100 units/mL injection pen, Inject 14 Units under the skin 2 (two) times a day, Disp: 15 pen, Rfl: 3  •  Lokelma 10 g PACK, , Disp: , Rfl:   •  losartan (COZAAR) 100 MG tablet, Take 1 tablet (100 mg total) by mouth daily, Disp: 90 tablet, Rfl: 1  •  Melatonin ER 3 MG TBCR, Take 6 mg by mouth, Disp: , Rfl:   •  metroNIDAZOLE (METROGEL) 0 75 % gel, Apply topically 2 (two) times a day, Disp: 45 g, Rfl: 0  •  Misc  Devices (Wheelchair Cushion) MISC, Use daily, Disp: 1 each, Rfl: 0  •  Misc   Devices [facility]) MISC, by Does not apply route daily Wheelchair ramp, dx g82 20, Disp: 1 each, Rfl: 0  •  Multiple Vitamin (Daily-Enriqueta Multivitamin) TABS, TAKE 1 TABLET BY MOUTH EVERY DAY, Disp: 30 tablet, Rfl: 8  •  NovoLOG FlexPen 100 units/mL injection pen, INJECT 3 UNITS UNDER THE SKIN 3 (THREE) TIMES A DAY BEFORE MEALS , Disp: , Rfl:   •  oxybutynin (DITROPAN XL) 15 MG 24 hr tablet, , Disp: , Rfl:   •  oxybutynin (DITROPAN) 5 mg tablet, Take 3 tablets (15 mg total) by mouth daily, Disp: 270 tablet, Rfl: 1  •  oxyCODONE (ROXICODONE) 10 MG TABS, TAKE ONE TABLET BY MOUTH EVERY 12 HOURS AS NEEDED FOR PAIN  ONGOING THERAPY , Disp: , Rfl:   •  polyethylene glycol (GLYCOLAX) 17 GM/SCOOP powder, Take as directed as per written office instructions, Disp: 238 g, Rfl: 0  •  polyethylene glycol (GOLYTELY) 4000 mL solution, Take 4,000 mL by mouth once for 1 dose, Disp: 4000 mL, Rfl: 0  •  risperiDONE (RisperDAL) 0 5 mg tablet, Take 1 tablet (0 5 mg total) by mouth 2 (two) times a day, Disp: 180 tablet, Rfl: 1  •  sertraline (ZOLOFT) 25 mg tablet, Take 1 tablet (25 mg total) by mouth daily, Disp: 90 tablet, Rfl: 1  •  Sodium Zirconium Cyclosilicate 10 g PACK, Take 10 g by mouth daily, Disp: , Rfl:   •  sucralfate (CARAFATE) 1 g/10 mL suspension, Take 10 mL (1 g total) by mouth 4 (four) times a day (with meals and at bedtime), Disp: 420 mL, Rfl: 1  •  sulfacetamide-prednisoLONE 10-0 23 % ophthalmic solution, 3 DROPS TO EACH EAR TWICE DAILY FOR 5 DAYS, Disp: , Rfl:   •  SUPER B COMPLEX & C TABS, TAKE 1 CAPSULE BY MOUTH ONCE FOR 1 DOSE AS DIRECTED, Disp: 90 tablet, Rfl: 2  •  Zinc Sulfate 220 (50 Zn) MG TABS, Take 1 tablet by mouth daily, Disp: 90 tablet, Rfl: 1  No current facility-administered medications for this visit  Review of Systems   Constitutional: Negative for chills, fatigue and unexpected weight change  Respiratory: Negative for cough and shortness of breath  Cardiovascular: Negative for chest pain  Musculoskeletal: Positive for gait problem (Paraplegia)  Paraplegic   Skin: Positive for wound (Sacrum, perineum, left ischium)  Multiple pressure injuries   Neurological: Positive for weakness and numbness  Hematological: Does not bruise/bleed easily  Psychiatric/Behavioral: Negative for agitation  Objective:  /88   Pulse 92   Temp (!) 96 8 °F (36 °C)   Resp 18   Pain Score: 0-No pain     Physical Exam  Constitutional:       General: He is not in acute distress  Appearance: He is not ill-appearing  HENT:      Head: Normocephalic  Cardiovascular:      Rate and Rhythm: Normal rate  Pulmonary:      Effort: Pulmonary effort is normal  No respiratory distress  Skin:     General: Skin is warm and dry  Findings: Wound present  No erythema  Comments: Multiple stage 4 pressure injuries with fibrin and slough present  Some new epithelium on the sacral wound  Overall, the sacral wound has decreased in size  Wounds with significant undermining and epiboly  No malodor to the wounds  See flow-sheet for additional details  Neurological:      Mental Status: He is alert  Psychiatric:         Mood and Affect: Mood normal          Behavior: Behavior normal          Wound Pressure Injury Ischium Left (Active)   Wound Image       05/18/23 1439   Wound Description Pink;Yellow; Beefy red 05/18/23 1400   Pressure Injury Stage 4 01/05/23 1435   Irene-wound Assessment Scar Tissue; Intact; Maceration 05/18/23 1400   Wound Length (cm) 5 8 cm 05/18/23 1400   Wound Width (cm) 4 7 cm 05/18/23 1400   Wound Depth (cm) 0 4 cm 05/18/23 1400   Wound Surface Area (cm^2) 27 26 cm^2 05/18/23 1400   Wound Volume (cm^3) 10 904 cm^3 05/18/23 1400   Calculated Wound Volume (cm^3) 10 9 cm^3 05/18/23 1400   Change in Wound Size % 42 33 05/18/23 1400   Tunneling in depth located at 2 9 at 10; 3 7 at 7 01/05/23 1435   Undermining 0 2 05/18/23 1400   Undermining is depth extending from 7-9 05/18/23 1400   Drainage Amount Large 05/18/23 1400   Drainage Description Serosanguineous;Tan;Green; Foul smelling 05/18/23 1400   Non-staged Wound Description Full thickness 02/16/23 0953   Treatments Irrigation with NSS 02/16/23 0953   Wound packed? No 02/16/23 0953   Dressing Changed Changed 12/30/21 1447   Patient Tolerance Tolerated well 05/18/23 1400   Dressing Status Removed 05/18/23 1400       Wound Pressure Injury Perineum (Active)   Wound Image       05/18/23 1440   Wound Description Pink;Yellow;Epithelialization 05/18/23 1406   Pressure Injury Stage 4 01/05/23 1437   Irene-wound Assessment Scar Tissue; Intact; Maceration 05/18/23 1406   Wound Length (cm) 5 cm 05/18/23 1406   Wound Width (cm) 5 3 cm 05/18/23 1406   Wound Depth (cm) 0 7 cm 05/18/23 1406   Wound Surface Area (cm^2) 26 5 cm^2 05/18/23 1406   Wound Volume (cm^3) 18 55 cm^3 05/18/23 1406   Calculated Wound Volume (cm^3) 18 55 cm^3 05/18/23 1406   Change in Wound Size % -76 67 05/18/23 1406   Undermining 2 4 05/18/23 1406   Undermining is depth extending from 12-8 05/18/23 1406   Drainage Amount Large 05/18/23 1406   Drainage Description Serosanguineous;Green;Tan;Foul smelling 05/18/23 1406   Non-staged Wound Description Full thickness 02/16/23 0959   Treatments Irrigation with NSS 02/16/23 0959   Wound packed? No 02/16/23 0959   Dressing Changed Changed 12/30/21 1448   Patient Tolerance Tolerated well 05/18/23 1406   Dressing Status Removed 05/18/23 1406       Wound Pressure Injury Sacrum (Active)   Wound Image           05/18/23 1439   Wound Description Granulation tissue;Pink;Epithelialization;Yellow 05/18/23 1408   Pressure Injury Stage 4 01/05/23 1439   Irene-wound Assessment Intact;Scar Tissue; Maceration;Dry;Scaly 05/18/23 1408   Wound Length (cm) 4 cm 05/18/23 1408   Wound Width (cm) 8 3 cm 05/18/23 1408   Wound Depth (cm) 0 5 cm 05/18/23 1408   Wound Surface Area (cm^2) 33 2 cm^2 05/18/23 1408   Wound Volume (cm^3) 16 6 cm^3 05/18/23 1408   Calculated Wound Volume (cm^3) 16 6 cm^3 05/18/23 1408   Change in Wound Size % 77 87 05/18/23 1408   Undermining 0 5 02/16/23 1001   Undermining is "depth extending from 4-7 02/16/23 1001   Drainage Amount Large 05/18/23 1408   Drainage Description Serosanguineous;Tan;Green; Foul smelling 05/18/23 1408   Non-staged Wound Description Full thickness 02/16/23 1001   Treatments Irrigation with NSS 02/16/23 1001   Wound packed? No 02/16/23 1001   Dressing Changed Changed 12/30/21 1451   Patient Tolerance Tolerated well 05/18/23 1408   Dressing Status Removed 05/18/23 1408       Wound 01/28/21 Pressure Injury Hip Left;Lateral (Active)   Wound Image       05/18/23 1438   Wound Description Pink;Yellow 05/18/23 1403   Pressure Injury Stage 4 01/05/23 1440   Irene-wound Assessment Scar Tissue; Intact 05/18/23 1403   Wound Length (cm) 2 7 cm 05/18/23 1403   Wound Width (cm) 1 2 cm 05/18/23 1403   Wound Depth (cm) 0 8 cm 05/18/23 1403   Wound Surface Area (cm^2) 3 24 cm^2 05/18/23 1403   Wound Volume (cm^3) 2 592 cm^3 05/18/23 1403   Calculated Wound Volume (cm^3) 2 59 cm^3 05/18/23 1403   Tunneling 4 7 cm 02/10/22 1341   Tunneling in depth located at 10 02/10/22 1341   Undermining 3 5 05/18/23 1403   Undermining is depth extending from 6-11 05/18/23 1403   Drainage Amount Moderate 05/18/23 1403   Drainage Description Serosanguineous; Mendieta 05/18/23 1403   Non-staged Wound Description Full thickness 02/16/23 1004   Treatments Irrigation with NSS 02/16/23 1004   Wound packed? No 02/16/23 1004   Packing- # removed 1 05/05/22 1536   Dressing Changed Changed 12/30/21 1452   Patient Tolerance Tolerated well 05/18/23 1403   Dressing Status Removed 05/18/23 1403                            Debridement   Wound Pressure Injury Ischium Left    Universal Protocol:  Consent: Verbal consent obtained  Written consent obtained  Consent given by: patient  Time out: Immediately prior to procedure a \"time out\" was called to verify the correct patient, procedure, equipment, support staff and site/side marked as required    Patient understanding: patient states understanding of the procedure being " "performed  Patient identity confirmed: verbally with patient      Performed by: physician  Debridement type: surgical  Level of debridement: subcutaneous tissue  Pain control: lidocaine 4%  Post-debridement measurements  Length (cm): 5 8  Width (cm): 4 7  Depth (cm): 0 5  Percent debrided: 100%  Surface Area (cm^2): 27 26  Area debrided (cm^2): 27 26  Volume (cm^3): 13 63  Tissue and other material debrided: subcutaneous tissue and Fibrotic tissue  Devitalized tissue debrided: fibrin, slough and Fibrotic tissue  Instrument(s) utilized: curette  Bleeding: large  Hemostasis obtained with: pressure and silver nitrate  Procedural pain (0-10): 0  Post-procedural pain: 0   Response to treatment: procedure was tolerated well  Debridement Comments: All debridements irrigated with prophase  Debridement   Wound Pressure Injury Perineum    Universal Protocol:  Consent: Verbal consent obtained  Written consent obtained  Consent given by: patient  Time out: Immediately prior to procedure a \"time out\" was called to verify the correct patient, procedure, equipment, support staff and site/side marked as required    Patient understanding: patient states understanding of the procedure being performed  Patient identity confirmed: verbally with patient      Performed by: physician  Debridement type: surgical  Level of debridement: subcutaneous tissue  Pain control: lidocaine 4%  Post-debridement measurements  Length (cm): 5  Width (cm): 5 3  Depth (cm): 0 8  Percent debrided: 100%  Surface Area (cm^2): 26 5  Area debrided (cm^2): 26 5  Volume (cm^3): 21 2  Tissue and other material debrided: subcutaneous tissue and Fibrotic tissue  Devitalized tissue debrided: fibrin and Fibrotic tissue  Instrument(s) utilized: curette  Bleeding: large  Hemostasis obtained with: pressure and silver nitrate  Procedural pain (0-10): 0  Post-procedural pain: 0   Response to treatment: procedure was tolerated well    Debridement   Wound Pressure Injury " "Sacrum    Universal Protocol:  Consent: Verbal consent obtained  Written consent obtained  Consent given by: patient  Time out: Immediately prior to procedure a \"time out\" was called to verify the correct patient, procedure, equipment, support staff and site/side marked as required  Patient understanding: patient states understanding of the procedure being performed  Patient identity confirmed: verbally with patient      Performed by: physician  Debridement type: surgical  Level of debridement: subcutaneous tissue  Pain control: lidocaine 4%  Post-debridement measurements  Length (cm): 4  Width (cm): 8 3  Depth (cm): 0 6  Percent debrided: 60%  Surface Area (cm^2): 33 2  Area debrided (cm^2): 19 92  Volume (cm^3): 19 92  Tissue and other material debrided: dermis and subcutaneous tissue  Devitalized tissue debrided: fibrin and slough  Instrument(s) utilized: curette  Bleeding: large  Hemostasis obtained with: pressure and silver nitrate  Procedural pain (0-10): 0  Post-procedural pain: 0   Response to treatment: procedure was tolerated well    Debridement   Wound 01/28/21 Pressure Injury Hip Left;Lateral    Universal Protocol:  Consent: Verbal consent obtained  Written consent obtained  Consent given by: patient  Time out: Immediately prior to procedure a \"time out\" was called to verify the correct patient, procedure, equipment, support staff and site/side marked as required    Patient understanding: patient states understanding of the procedure being performed  Patient identity confirmed: verbally with patient      Performed by: physician  Debridement type: surgical  Level of debridement: subcutaneous tissue  Pain control: lidocaine 4%  Post-debridement measurements  Length (cm): 2 7  Width (cm): 1 2  Depth (cm): 0 8  Percent debrided: 80%  Surface Area (cm^2): 3 24  Area debrided (cm^2): 2 59  Volume (cm^3): 2 59  Tissue and other material debrided: subcutaneous tissue  Devitalized tissue debrided: " fibrin  Instrument(s) utilized: curette  Bleeding: medium  Hemostasis obtained with: pressure  Procedural pain (0-10): 0  Post-procedural pain: 0   Response to treatment: procedure was tolerated well                                 Lab Results   Component Value Date    HGBA1C 7 5 (H) 03/04/2023       Wound Instructions:  Orders Placed This Encounter   Procedures   • Wound cleansing and dressings     Wash your hands with soap and water  Remove old dressing, discard into plastic bag and place in trash  Cleanse the wound with sterile saline solution (rinse) prior to applying a clean dressing  Do not use tissue or cotton balls  Do not scrub the wound  Pat dry using gauze  Shower no; do not get dressing wet    Left Hip Wound:  Cleanse with normal saline as above  Apply thin layer vaseline to periwound skin  Apply Aquacel AG rope (tape the tail)  Cover with an ABD pad  Secure with Medfix tape  Change dressing 3 times a week and PRN for breakthrough drainage (visiting nurses to do twice per week and family in between)       ALL OTHER WOUNDS:  Cleanse with normal saline as above  Apply thin layer vaseline to periwound skin  Apply saline moistened gauze when wounds have heavy drainage  Apply silver alginate to wounds, ensure to tuck into undermining areas when wounds are draining less  Cover with an ABD pad  Secure with Medfix tape  Change dressing 3 times a week and PRN for breakthrough drainage (visiting nurses to do twice per week and family in between)     Continue using Clinitron bed       Continue NO PRESSURE TO ALL WOUNDS, ESPECIALLY PERINEAL WOUND; LIMIT SITTING UPRIGHT IN WHEELCHAIR ON THIS WOUND     Follow up in 6 weeks      Continue visiting nurse skilled 2 x per week for wound care dressing changes                              Treatment in the wound center today:  Left Hip cleansed with Prophase and dressed as ordered above  All other wounds cleansed with Prophase  Apply Silver Alginate to wound bed  "Covered with ABDs and secured with medfix tape     Standing Status:   Future     Standing Expiration Date:   5/18/2024   • Debridement     This order was created via procedure documentation   • Debridement     This order was created via procedure documentation   • Debridement     This order was created via procedure documentation   • Debridement     This order was created via procedure documentation             Diane Peralta MD, CHT, CWS    Portions of the record may have been created with voice recognition software  Occasional wrong word or \"sound alike\" substitutions may have occurred due to the inherent limitations of voice recognition software  Read the chart carefully and recognize, using context, where substitutions have occurred      "

## 2023-05-25 ENCOUNTER — TELEPHONE (OUTPATIENT)
Dept: FAMILY MEDICINE CLINIC | Facility: CLINIC | Age: 43
End: 2023-05-25

## 2023-05-25 NOTE — TELEPHONE ENCOUNTER
PCP SIGNATURE NEEDED FOR [facility] FORM RECEIVED VIA FAX AND PLACED IN PCP FOLDER TO BE DELIVERED AT ASSIGNED TIMES        physician order hold all HHA as of 5/20/23

## 2023-05-26 ENCOUNTER — TELEPHONE (OUTPATIENT)
Dept: FAMILY MEDICINE CLINIC | Facility: CLINIC | Age: 43
End: 2023-05-26

## 2023-05-26 NOTE — TELEPHONE ENCOUNTER
PCP SIGNATURE NEEDED FOR [facility] FORM RECEIVED VIA FAX AND PLACED IN PCP FOLDER TO BE DELIVERED AT ASSIGNED TIMES  Resume all HHA services as of 5/24/2023, as client was discharged from [facility] to home

## 2023-05-26 NOTE — TELEPHONE ENCOUNTER
[facility]  Juan Lundy  [phone]    Hysterectomy Discharge Instructions    WHAT YOU NEED TO KNOW:   A hysterectomy is surgery to remove your uterus  Your ovaries, fallopian tubes, cervix, or part of your vagina may also need to be removed  The organs and tissue that will be removed depends on your medical condition  After a hysterectomy, you will not be able to become pregnant  If your ovaries are removed, you will go through menopause if you have not already  DISCHARGE INSTRUCTIONS:   Contact your doctor at the number above if:   You have a fever over 101o  You have nausea or are vomiting that does not improve after a light meal    Your pain is getting worse, even after you take medicine  You feel pain or burning when you urinate, or you have trouble urinating  You have pus or a foul-smelling odor coming from your vagina  Your wound is red, swollen, or draining pus  You see new or an increased amount of bright red blood coming from your vagina or your incisions  You have questions or concerns about your condition or care  Seek care immediately:   Your arm or leg feels warm, tender, and painful  It may look swollen and red  You have increasing abdominal or pelvic pain  You have heavy vaginal bleeding that fills 1 or more sanitary pads in 1 hour  Call 911 for any of the following: You feel lightheaded, short of breath, and have chest pain  You cough up blood  Medicines: You may need any of the following:  Prescription medicine may be given  You may receive a prescription for pain medication or be advised to use over the counter (OTC) pain medication such as acetaminophen (Tylenol) or ibuprofen (Advil, Motrin)  Ask your healthcare provider how to take this medicine safely  NSAIDs , such as ibuprofen, help decrease swelling, pain, and fever   NSAIDs can cause stomach bleeding or kidney problems in certain PCP SIGNATURE NEEDED FOR [facility] FORM RECEIVED VIA FAX AND PLACED IN PCP FOLDER TO BE DELIVERED AT ASSIGNED TIMES     SOC/Recertification Care Summary  Date of last home visit: 5/24/2023 people  If you take blood thinner medicine, always ask your healthcare provider if NSAIDs are safe for you  Always read the medicine label and follow directions  Stool softeners help treat or prevent constipation  Take your medicine as directed  Contact your healthcare provider if you think your medicine is not helping or if you have side effects  Tell him or her if you are allergic to any medicine  Keep a list of the medicines, vitamins, and herbs you take  Include the amounts, and when and why you take them  Bring the list or the pill bottles to follow-up visits  Carry your medicine list with you in case of an emergency  Activity:   Rest as needed  Get up and move around as directed to help prevent blood clots  Start with short walks and slowly increase the distance every day  Limit the number of times you climb stairs to 2 times each day for the first week  Plan most of your daily activities on one level of your home  Do not lift objects heavier than 10 pounds for 6 weeks  Avoid strenuous activity for 2 weeks  Do not strain during bowel movements  High-fiber foods and extra liquids can help you prevent constipation  Examples of high-fiber foods are fruit and bran  Prune juice and water are good liquids to drink  Do not have sex, use tampons, or douche for up to 8 weeks  You may shower as soon as the day after surgery  Tub baths can be taken starting 2 weeks after surgery  Do not go into pools or hot tubs until cleared by your doctor  Ask when it is safe for you to drive  It is generally safe to drive after 2 weeks and when no longer taking prescription pain medication  Ask when you may return to work and to other regular activities  Wound care: Care for your abdominal incisions as directed  Carefully wash around the wound with soap and water   If you have Hibiclens or medicated soap that you were instructed to use before surgery, you may use that to wash with for up to 2 days after surgery  If not, any mild non-scented, non-abrasive soap is safe  It is okay to let the soap and water run over your incision  Do not scrub your incision  Dry the area and put on new, clean bandages as directed  Change your bandages when they get wet or dirty  If you have strips of medical tape, let them fall off on their own  It may take 7 to 14 days for them to fall off  Check your incision every day for redness, swelling, or pus  Deep breathing: Take deep breaths and cough 10 times each hour  This will decrease your risk for a lung infection  Take a deep breath and hold it for as long as you can  Let the air out and then cough strongly  Deep breaths help open your airway  You may be given an incentive spirometer to help you take deep breaths  Put the plastic piece in your mouth and take a slow, deep breath, then let the air out and cough  Repeat these steps 10 times every hour  Get support: This surgery may be life-changing for you and your family  You will no longer be able to get pregnant  Sudden changes in the levels of your hormones may occur and cause mood swings and depression  You may feel angry, sad, or frightened, or cry frequently and unexpectedly  These feelings are normal  Talk to your healthcare provider about where you can get support  You can also ask if hormone replacement medicine is right for you  Follow up with your healthcare provider or gynecologist as directed: You may need to return to have stitches removed, and for other tests  Write down your questions so you remember to ask them during your visits  © 2017 [employer] Information is for End User's use only and may not be sold, redistributed or otherwise used for commercial purposes  All illustrations and images included in CareNotes® are the copyrighted property of A [employer] M [employer]  or Kit Camacho  The above information is an  only   It is not intended as medical advice for individual conditions or treatments  Talk to your doctor, nurse or pharmacist before following any medical regimen to see if it is safe and effective for you

## 2023-06-02 ENCOUNTER — TELEPHONE (OUTPATIENT)
Dept: FAMILY MEDICINE CLINIC | Facility: CLINIC | Age: 43
End: 2023-06-02

## 2023-06-02 NOTE — TELEPHONE ENCOUNTER
PCP SIGNATURE NEEDED FOR [facility] health FORM RECEIVED VIA FAX AND PLACED IN PCP FOLDER TO BE DELIVERED AT ASSIGNED TIMES          Order # [id number]

## 2023-06-06 ENCOUNTER — TELEPHONE (OUTPATIENT)
Dept: FAMILY MEDICINE CLINIC | Facility: CLINIC | Age: 43
End: 2023-06-06

## 2023-06-08 DIAGNOSIS — I48.91 ATRIAL FIBRILLATION, UNSPECIFIED TYPE (HCC): Chronic | ICD-10-CM

## 2023-06-09 RX ORDER — CHOLECALCIFEROL (VITAMIN D3) 25 MCG
TABLET,CHEWABLE ORAL
Qty: 90 TABLET | Refills: 4 | Status: SHIPPED | OUTPATIENT
Start: 2023-06-09

## 2023-06-09 NOTE — TELEPHONE ENCOUNTER
FAXED ON 05/17/23 TO [facility]  [address] at [phone]  FAX CONFIRMATION RECEIVED   SCANNED INTO CHART

## 2023-06-12 ENCOUNTER — TELEPHONE (OUTPATIENT)
Dept: FAMILY MEDICINE CLINIC | Facility: CLINIC | Age: 43
End: 2023-06-12

## 2023-06-12 DIAGNOSIS — G82.20 PARAPLEGIA (HCC): Primary | Chronic | ICD-10-CM

## 2023-06-12 NOTE — TELEPHONE ENCOUNTER
FAXED ON 06/12/23 TO [facility] at [phone]  FAX CONFIRMATION RECEIVED  SCANNED INTO CHART

## 2023-06-12 NOTE — TELEPHONE ENCOUNTER
"Scripts have been placed  Referral to PT/OT placed  Letter of medical necessity written  I printed out everything and will place in \"ready to fax\" bin  Please inform patient I also included a list of the locations for PT/OT so they can contact the most convenient location to schedule an appointment  Thanks!     "

## 2023-06-12 NOTE — TELEPHONE ENCOUNTER
This is a reference to Call [employer], date of birth 1980  I have sent in a paperwork for modifications for home that we just purchased for him  The state is waiting for the doctors script regarding this medical necessity for his modification and she called me again and nothing has been received  Please give me a call back at [phone]  Thanks  Patients mother Joslyn Talley called stating that they will be moving this month  The state are going to do some modifications for Dasha  They are requesting script for all modifications with PTOT evaluation  Can Can you either fax or email to [address] [place] [address]   Cell phone# [phone]  Office #[phone]  Fax#[email or web address] needs a script stating stating all modification work ramp, shower modification and stair glide or separation walls  Patients mother would like call back to let her know it is done

## 2023-06-12 NOTE — LETTER
June 12, 2023     Patient: Roxy Easley  YOB: 1980      To Whom it May Concern:    Maria Elena Jesus is under my professional care  Patient with known PMH of spinal paraplegia (wheelchair dependent), neurogenic bladder (chronic suprapubic catheter in place), ESRD (on dialysis M/W/F), anemia of CKD, secondary hyperparathyroidism,  type I diabetes, diverting colostomy bag in place, recent history of MSSA bacteremia with tricuspid valve endocarditis, chronic osteomyelitis, chronic decubitus ulcers, atrial fibrillation and hx of DVT (on Eliquis), CAD, hx of Candidal infection, history of C  Diff  Colitis, hx of R BKA, and hypertension  Due to these medical conditions, it is medically necessary for patient to have ramp, shower modifications, and stair glide  If you have any questions or concerns, please don't hesitate to call           Sincerely,          Kay Ronquillo PA-C        CC: No Recipients

## 2023-06-14 NOTE — TELEPHONE ENCOUNTER
06/13/23    Letter has been Faxed Per Pt mom Request, and confirmed that it was received on 06/14/23  Fax # [phone]      Confirmation has been scanned into Pt Chart

## 2023-06-19 ENCOUNTER — TELEPHONE (OUTPATIENT)
Dept: FAMILY MEDICINE CLINIC | Facility: CLINIC | Age: 43
End: 2023-06-19

## 2023-06-19 DIAGNOSIS — L70.0 ACNE VULGARIS: ICD-10-CM

## 2023-06-19 NOTE — TELEPHONE ENCOUNTER
PCP SIGNATURE NEEDED FOR [facility] FORM RECEIVED VIA FAX AND PLACED IN PCP FOLDER TO BE DELIVERED AT ASSIGNED TIMES      PLAN OF CARE   CERTIFICATION PERIOD 6/17/23-08/15/23

## 2023-06-19 NOTE — TELEPHONE ENCOUNTER
PCP SIGNATURE NEEDED FOR [facility]  FORM RECEIVED VIA FAX AND PLACED IN PCP FOLDER TO BE DELIVERED AT ASSIGNED TIMES       SOC/Recertification Care Summary  Date of last home visit: 06/15/2023

## 2023-06-21 NOTE — TELEPHONE ENCOUNTER
FAXED ON 06/21/23 TO [facility]   / Date of last home visit: 06/15/2023 at [phone]  FAX CONFIRMATION RECEIVED  Form scanned into Pt chart

## 2023-06-21 NOTE — TELEPHONE ENCOUNTER
FAXED ON 06/21/23 TO [facility]   / CERTIFICATION PERIOD 6/17/23-08/15/23 at [phone]  FAX CONFIRMATION RECEIVED  Form scanned into Pt chart

## 2023-06-22 DIAGNOSIS — F41.0 ANXIETY ATTACK: ICD-10-CM

## 2023-06-22 DIAGNOSIS — L29.9 PRURITUS: ICD-10-CM

## 2023-06-22 NOTE — TELEPHONE ENCOUNTER
Called patient's mom and spoke to her wanted to reschedule his appointment due to they just bought a home and they were adding internet and they weren't done  Appointment was rescheduled for Tuesday  Well, call [employer]  Date of birth 1980  He has an appointment today  He's canceling that appointment   Just give me a call back [phone]

## 2023-06-23 RX ORDER — HYDROXYZINE 50 MG/1
50 TABLET, FILM COATED ORAL 2 TIMES DAILY PRN
Qty: 180 TABLET | Refills: 1 | Status: SHIPPED | OUTPATIENT
Start: 2023-06-23

## 2023-07-05 ENCOUNTER — TELEPHONE (OUTPATIENT)
Dept: FAMILY MEDICINE CLINIC | Facility: CLINIC | Age: 43
End: 2023-07-05

## 2023-07-05 NOTE — TELEPHONE ENCOUNTER
07/05/23    PCP SIGNATURE NEEDED FOR   [facility] / [address] [place] [address] Avenue   FORM RECEIVED VIA FAX AND PLACED IN PCP FOLDER TO BE DELIVERED AT ASSIGNED TIMES.       Certification Period:  06/27/23 to 08/25/23

## 2023-07-10 ENCOUNTER — TELEPHONE (OUTPATIENT)
Dept: FAMILY MEDICINE CLINIC | Facility: CLINIC | Age: 43
End: 2023-07-10

## 2023-07-10 NOTE — TELEPHONE ENCOUNTER
PCP SIGNATURE NEEDED FOR [facility] [place] FORM RECEIVED VIA FAX AND PLACED IN PCP FOLDER TO BE DELIVERED AT ASSIGNED TIMES.       PHYSICIANS ORDER

## 2023-07-12 DIAGNOSIS — J30.9 ALLERGIC RHINITIS, UNSPECIFIED SEASONALITY, UNSPECIFIED TRIGGER: ICD-10-CM

## 2023-07-13 ENCOUNTER — OFFICE VISIT (OUTPATIENT)
Dept: WOUND CARE | Facility: CLINIC | Age: 43
End: 2023-07-13
Payer: MEDICARE

## 2023-07-13 VITALS
RESPIRATION RATE: 18 BRPM | TEMPERATURE: 98 F | SYSTOLIC BLOOD PRESSURE: 126 MMHG | DIASTOLIC BLOOD PRESSURE: 74 MMHG | HEART RATE: 88 BPM

## 2023-07-13 DIAGNOSIS — L89.154 PRESSURE INJURY OF SACRAL REGION, STAGE 4 (HCC): Primary | ICD-10-CM

## 2023-07-13 DIAGNOSIS — L89.224 PRESSURE ULCER OF LEFT HIP, STAGE 4 (HCC): ICD-10-CM

## 2023-07-13 DIAGNOSIS — L89.324 PRESSURE INJURY OF LEFT ISCHIUM, STAGE 4 (HCC): ICD-10-CM

## 2023-07-13 DIAGNOSIS — L89.894 PRESSURE ULCER OF OTHER SITE, STAGE 4 (HCC): ICD-10-CM

## 2023-07-13 PROCEDURE — 11042 DBRDMT SUBQ TIS 1ST 20SQCM/<: CPT | Performed by: FAMILY MEDICINE

## 2023-07-13 PROCEDURE — 99214 OFFICE O/P EST MOD 30 MIN: CPT | Performed by: FAMILY MEDICINE

## 2023-07-13 NOTE — LETTER
July 13, 2023     [place] [address] [address] [facility] [address]    Patient: Anuja Miller. YOB: 1980   Date of Visit: 7/13/2023       Dear Dr. EDUARDO: Thank you for referring Shane Ashton to me for evaluation. Below are my notes for this consultation. If you have questions, please do not hesitate to call me. I look forward to following your patient along with you.          Sincerely,        Elizabeth Ravi MD        CC: No Recipients

## 2023-07-13 NOTE — PROGRESS NOTES
Patient ID: Susanne Butcher is a 43 y.o. male Date of Birth 1980       Chief Complaint   Patient presents with   • Follow Up Wound Care Visit     Sacral wound       Allergies:  Chlorcyclizine, Chlorhexidine, Ciprofloxacin hcl, Cymbalta [duloxetine hcl], Dm-doxylamine-acetaminophen, Gabapentin, Hydrocortisone, Lac bovis, Lactose - food allergy, Lyrica [pregabalin], Penicillins, Polymyxin b, Promethazine-phenyleph-codeine, Quinidine, Cefepime, and Rosuvastatin    Diagnosis:      Diagnosis ICD-10-CM Associated Orders   1. Pressure injury of sacral region, stage 4 (formerly [facility])  L89.154 Wound cleansing and dressings     Debridement      2. Pressure injury of left ischium, stage 4 (formerly [facility])  L89.324 Wound cleansing and dressings      3. Pressure ulcer of left hip, stage 4 (HCC)  L89.224 Wound cleansing and dressings     Debridement      4. Pressure ulcer of other site, stage 4 (formerly [facility])  L89.894 Wound cleansing and dressings              Assessment & Plan:  Multiple stage IV pressure injuries. No significant changes. There is some new deep tissue injury on the sacral area. This was surgically debrided. Surgical debridement of the left hip pressure injury. Continue same wound care with wet-to-dry unless odor should develop and then can use Dakin's. Aquacel Ag rope to the left hip. Change 3 times a week and as needed. Continue to offload is much as possible. Palliative care. Subjective:     06/30/22: 38 y/o M with PMHx of spinal paraplegia, neurogenic bladder with chronic suprapubic catheter in place, ESRD on dialysis M/W/F, anemia of CKD, secondary hyperparathyroidism, type I DM, colostomy bag in place,tricupsid valve endocarditis, chronic osteomyelitis, chronic pressure injuries, afib, and hx of DVT on Eliquis presenting for f/u of multiple stage 4 pressure injuries of the sacrum, left hip, L ischium, and perineum. Pt was recently hospitalized on 06/17/22 for C. Difficile. Has been using wet to dry dressings. States he has been attempting to increase his protein intake and has gained some weight but his albumin remains low and is confused why that is. 9/1/22: Followup multiple stage IV pressure injuries of the sacrum and ischial tuberosities. Patient was hospitalized recently for sepsis and was told it was due to his pressure injuries. However, they do not look any worse than they ever have been. He was discharged on oral medications. He continues using wet to dry dressings. Prior to hospitalization he developed two new pressure injuries of his posterior scrotum. He continues to supposedly eat well with increased protein. 10/13/22: Followup multiple stage IV pressure injuries of the sacrum, perineum and ischial tuberosities and left hip. .  At last visit, he had scrotal pressure injuries as well. He is using wet to dry dressings. He no longer has his Clinitron and is using an alternating low air loss mattress. He does not like the air mattress because is more difficult to get in and out of. He has not had any fevers or chills. 12/6/2022: Follow-up multiple stage IV pressure injuries of the sacrum, perineum, ischial tuberosities and left hip. Patient states that he saw endocrinology and A1c was 7.9. Slightly better than previous. Using saline packing. Occasionally Dakin's when they noticed an odor. 2/16/2023: Follow-up visit for multiple stage 4 pressure injuries of the sacrum, perineum, L ischium, and L hip. Patient states he has a Clinitron bed and his blood sugars have been well managed recently with his insulin pump. This morning his sugar was 80 and has been running in the 150's lately. He monitors his sugars with a Dexcom. He has been cleansing the wounds with saline, and dakin's occasionally when he notices an odor. He denies fever, pains, or pain. 4/6/2023: Follow-up of multiple stage IV pressure injuries of the sacrum, ischium on the left and left hip and perineum.   Blood sugars were running high today. Last A1c done last month was 7.5. No new complaints. Still using saline packing with occasional Dakin's packing if the quality of the ulcer is changed. 5/18/2023: Follow-up multiple stage IV pressure injuries of the sacrum, ischium on the left and left hip and perineum. Patient states that his blood sugars are running about the same. He is generally using saline packing with occasional Dakin's packing should there be an odor. He was supposed to use silver alginate but he has not been using this. He was hospitalized about a month ago for C. difficile colitis. 7/13/2023: Follow-up multiple stage IV pressure injuries of the sacrum, ischium on the left and left hip and perineum. Patient was hospitalized for some sort of infection since his last visit. He recovered without problems. He has no new complaints. He continues alternating with saline and Dakin's depending on the odor.                     The following portions of the patient's history were reviewed and updated as appropriate:   Patient Active Problem List   Diagnosis   • Paraplegia ([address])   • Atrial fibrillation ([facility])   • Chronic suprapubic catheter ([facility])   • Colostomy care ([address])   • OAB (overactive bladder)   • S/P unilateral BKA (below knee amputation) ([address])   • Tobacco abuse   • Type 1 diabetes mellitus with chronic kidney disease on chronic dialysis ([address])   • Ulcer of sacral region, stage 4 ([address])   • Chronic indwelling Ortega catheter   • Wound healing, delayed   • Iron deficiency anemia   • Pressure injury of left ischium, stage 4 ([facility])   • HTN (hypertension), benign   • Neurogenic bladder   • GERD (gastroesophageal reflux disease)   • Chronic pain   • Stage 5 chronic kidney disease on chronic dialysis [facility])   • Penile abscess   • History of Clostridium difficile infection   • Urinary retention   • Delirium   • Dialysis patient [facility])   • Insulin long-term use ([address])   • Wheelchair dependent   • Recurrent Clostridioides difficile diarrhea   • Bipolar depression ([facility])   • 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[address])   • Seizure ([address])   • Pressure ulcer of sacral region, stage 4 ([facility])   • AVM (arteriovenous malformation) of duodenum, acquired   • Chronic narcotic dependence ([facility])   • Chronic diastolic heart failure ([facility])   • Pressure ulcer of other site, stage 4 (HCC)   • Pressure ulcer of left hip, stage 4 (HCC)   • ED (erectile dysfunction) of organic origin   • Irritable bowel syndrome   • Onychomycosis   • Pustular folliculitis   • Prolonged Q-T interval on ECG   • Secondary hyperparathyroidism of renal origin ([address])   • Wounds, multiple   • Immunocompromised state ([address])   • Severe protein-calorie malnutrition ([facility])   • Chronic osteomyelitis ([facility])   • Acute deep vein thrombosis (DVT) of right upper extremity ([facility])   • C. difficile colitis   • Charcot's arthropathy   • Diabetic gastroparesis associated with type 1 diabetes mellitus ([address])   • End stage renal disease ([facility])   • Hyperlipidemia   • LVH (left ventricular hypertrophy)   • NICM (nonischemic cardiomyopathy) ([facility])   • Vitamin B deficiency   • Vitamin C deficiency   • Vitamin D deficiency   • Chest congestion     Past Medical History:   Diagnosis Date   • Acute pulmonary edema ([address]) 1/13/2022   • Ambulatory dysfunction    • Anemia    • Anemia, iron deficiency     transfusion requiring   • Asymptomatic bacteriuria 9/25/2017   • Atrial fibrillation ([facility])    • AVM (arteriovenous malformation) of duodenum, 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   • Bacteriuria, asymptomatic    • Bipolar disorder ([address])    • Chronic deep vein thrombosis (DVT) ([facility])    • Chronic indwelling Ortega catheter    • Chronic kidney disease    • Chronic pain    • Chronic pain disorder    • Chronic suprapubic catheter ([address])    • Clostridium difficile infection 08/11/2016    also positive 9/2016, 5/29/2017, 8/15/2017.  S/P fecal transplant   • Colostomy on examination [facility])    • Decubitus ulcer    • Delirium    • Diabetes mellitus ([address])    • GERD (gastroesophageal reflux disease)    • Hemodialysis patient [facility])    • Hypertension    • Memory impairment 2011    s/p diabetic coma   • Neurogenic bladder    • OAB (overactive bladder)    • Paraplegia (HCC)     T3 transverse myelitis vs spinal stoke (AVM); 2012 extensive epidural abscess C7=> conus 2nd extension from deep parspinal abscess L4-S2/sacral decubitus; cord atrophy/myelomalcia T3=>conus    • Penile abscess    • S/P unilateral BKA (below knee amputation) ([address])     Right   • Sebaceous cyst removed in 2017   • Seizures (HCC)    • Shortness of breath    • Tobacco abuse    • 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HCC)    • Urinary retention    • Wheelchair dependent    • Wounds, multiple     pressure ulcers with delayed healing     Past Surgical History:   Procedure Laterality Date   • BELOW KNEE LEG AMPUTATION Right 2009   • COLONOSCOPY N/A 03/27/2017    Procedure: COLONOSCOPY;  Surgeon: Shayne Kessler MD;  Location: [facility]; Service:    • COLONOSCOPY N/A 03/29/2017    Procedure: COLONOSCOPY;  Surgeon: Shayne Kessler MD;  Location: [facility]; Service:    • COLONOSCOPY N/A 06/15/2017    Procedure: COLONOSCOPY with FMT;  Surgeon: Flakita Abdul MD;  Location: [facility]; Service: Gastroenterology   • ESOPHAGOGASTRODUODENOSCOPY N/A 03/22/2017    Procedure: ESOPHAGOGASTRODUODENOSCOPY (EGD); Surgeon: Mandy Vegas DO;  Location: [facility];   Service:    • MULTIPLE TOOTH EXTRACTIONS N/A 03/08/2021    Procedure: EXTRACTION TEETH # 2,3,6,10,12,13,14,18,19,20,21,22,23,24,25,26,27,28,29,30;  Surgeon: Pako Yip DMD;  Location: [facility];  Service: Maxillofacial   • SKIN BIOPSY       Family History   Problem Relation Age of Onset   • Hyperlipidemia Mother    • Hypertension Mother    • Leukemia Brother    • Diabetes Paternal Grandfather       Social History     Socioeconomic History   • Marital status: Single     Spouse name: None   • Number of children: None   • Years of education: None   • Highest education level: None   Occupational History   • None   Tobacco Use   • Smoking status: Every Day     Types: Cigars     Passive exposure: Current   • Smokeless tobacco: Never   • Tobacco comments:     2 cigars/day - 2/14/2023   Vaping Use   • Vaping Use: Never used   Substance and Sexual Activity   • Alcohol use: Not Currently     Comment: 1 cup of wine every 3-4 months   • Drug use: Not Currently     Types: Marijuana     Comment: rarely; once every 1-2 years   • Sexual activity: None   Other Topics Concern   • None   Social History Narrative   • None     Social Determinants of Health     Financial Resource Strain: Low Risk  (2/2/2023)    Overall Financial Resource Strain (CARDIA)    • Difficulty of Paying Living Expenses: Not hard at all   Food Insecurity: No Food Insecurity (2/2/2023)    Hunger Vital Sign    • Worried About Running Out of Food in the Last Year: Never true    • Ran Out of Food in the Last Year: Never true   Transportation Needs: No Transportation Needs (2/2/2023)    PRAPARE - Transportation    • Lack of Transportation (Medical): No    • Lack of Transportation (Non-Medical):  No   Physical Activity: Not on file   Stress: Not on file   Social Connections: Not on file   Intimate Partner Violence: Not on file   Housing Stability: Not on file        Current Outpatient Medications:   •  insulin lispro protamine-insulin lispro (HumaLOG 50-50) 100 units/mL, Inject under the skin 2 (two) times a day before meals, Disp: , Rfl:   •  Acetaminophen 325 MG CAPS, Take 650 mg by mouth, Disp: , Rfl:   •  Alcohol Swabs (ALCOHOL PADS) 70 % PADS, by Does not apply route 5 (five) times a day, Disp: 300 each, Rfl: 5  •  amLODIPine (NORVASC) 10 mg tablet, Take 1 tablet (10 mg total) by mouth daily, Disp: 90 tablet, Rfl: 1  •  amLODIPine (NORVASC) 5 mg tablet, Take 5 mg by mouth daily, Disp: , Rfl:   •  ammonium lactate (LAC-HYDRIN) 12 % cream, , Disp: , Rfl:   •  apixaban (ELIQUIS) 5 mg, Take 1 tablet (5 mg total) by mouth 2 (two) times a day, Disp: 270 tablet, Rfl: 1  •  ascorbic acid (VITAMIN C) 250 mg tablet, TAKE 2 TABLETS (500 MG TOTAL) BY MOUTH DAILY, Disp: 180 tablet, Rfl: 1  •  atorvastatin (LIPITOR) 20 mg tablet, Take 1 tablet (20 mg total) by mouth daily at bedtime, Disp: 90 tablet, Rfl: 1  •  azelaic acid (Azelex) 20 % cream, Apply topically 2 (two) times a day, Disp: 50 g, Rfl: 0  •  B Complex-C-Folic Acid (Super B-Complex/Vit C/FA) TABS, TAKE 1 TABLET BY MOUTH EVERY DAY AS DIRECTED, Disp: 90 tablet, Rfl: 4  •  SUSAN MICROLET LANCETS lancets, Use as instructed to check blood sugar 4 times a day Dx e10.29, Disp: 200 each, Rfl: 5  •  bisacodyl (DULCOLAX) 5 mg EC tablet, Take 1 tablet (5 mg total) by mouth once for 1 dose, Disp: 2 tablet, Rfl: 0  •  Blood Glucose Monitoring Suppl (LastRoom CONTOUR NEXT USB MONITOR) w/Device KIT, Testing 4 times a day, Disp: 1 kit, Rfl: 0  •  calcitriol (ROCALTROL) 0.5 MCG capsule, Take 2 mcg by mouth, Disp: , Rfl:   •  calcium acetate (PHOSLO) capsule, TAKE 2 CAPSULES BY MOUTH THREE TIMES DAILY WITH MEALS, Disp: , Rfl:   •  carvedilol (COREG) 25 mg tablet, Take 1 tablet (25 mg total) by mouth 2 (two) times a day, Disp: 180 tablet, Rfl: 1  •  cetirizine (ZyrTEC) 10 mg tablet, Take 1 tablet (10 mg total) by mouth daily, Disp: 90 tablet, Rfl: 1  •  Cholecalciferol (VITAMIN D-3) 1000 units CAPS, Take 2 capsules by mouth daily, Disp: 30 capsule, Rfl: 0  •  clindamycin (CLINDAGEL) 1 % gel, APPLY TO AFFECTED AREA TWICE A DAY, Disp: 60 g, Rfl: 2  •  Contour Next Test test strip, USE TO TEST BLOOD SUGAR 3 TIMES DAILY.  CONTOUR NEXT STRIPS. E10.29, Disp: , Rfl:   •  cyanocobalamin ([employer] Vitamin B-12) 1000 MCG tablet, Take 1 tablet (1,000 mcg total) by mouth daily, Disp: 30 tablet, Rfl: 5  •  cyclobenzaprine (FLEXERIL) 5 mg tablet, , Disp: , Rfl:   •  dexlansoprazole (DEXILANT) 30 MG capsule, TAKE 1 CAPSULE BY MOUTH EVERY DAY, Disp: 180 capsule, Rfl: 2  •  dicyclomine (BENTYL) 10 mg capsule, TAKE 1 CAPSULE BY MOUTH 3 TIMES A DAY BEFORE MEALS, Disp: 270 capsule, Rfl: 1  •  Disposable Gloves MISC, Use 7 times a day, 4 boxes of gloves XL Dx type 1 DM, paraplegia, Disp: 4 each, Rfl: 11  •  doxazosin (CARDURA) 2 mg tablet, Take 1 tablet (2 mg total) by mouth every evening, Disp: 90 tablet, Rfl: 1  •  epoetin kaushik (EPOGEN,PROCRIT) 20,000 units/mL, Inject 10,000 Units under the skin, Disp: , Rfl:   •  epoetin kaushik (EPOGEN,PROCRIT) 20,000 units/mL, Inject 5,000 Units under the skin, Disp: , Rfl:   •  ferrous sulfate 325 (65 Fe) mg tablet, Take 1 tablet (325 mg total) by mouth 3 (three) times a day, Disp: 90 tablet, Rfl: 3  •  guaiFENesin (MUCINEX) 600 mg 12 hr tablet, Take 2 tablets (1,200 mg total) by mouth every 12 (twelve) hours (Patient not taking: Reported on 7/13/2023), Disp: 60 tablet, Rfl: 2  •  Gvoke PFS 1 MG/0.2ML SOSY, , Disp: , Rfl:   •  hydrOXYzine HCL (ATARAX) 50 mg tablet, TAKE 1 TABLET (50 MG TOTAL) BY MOUTH 2 (TWO) TIMES A DAY AS NEEDED FOR ITCHING OR ANXIETY, Disp: 180 tablet, Rfl: 1  •  Incontinence Supply Disposable (Incontinence Brief Large) MISC, Use 6 (six) times a day Size xl, Disp: 180 each, Rfl: 11  •  Incontinence Supply Disposable (Undergarment) MISC, Use 6 a day, 5 boxes, xl Dx R51, paraplegia, Disp: 5 each, Rfl: 11  •  Incontinence Supply Disposable (Underpads) MISC, Use 2 a day, Disp: 5 each, Rfl: 11  •  ketoconazole (NIZORAL) 2 % cream, Apply to rash., Disp: 15 g, Rfl: 1  •  LEVEMIR FLEXTOUCH 100 units/mL injection pen, Inject 14 Units under the skin 2 (two) times a day, Disp: 15 pen, Rfl: 3  •  Lokelma 10 g PACK, , Disp: , Rfl:   •  losartan (COZAAR) 100 MG tablet, Take 1 tablet (100 mg total) by mouth daily, Disp: 90 tablet, Rfl: 1  •  Melatonin ER 3 MG TBCR, Take 6 mg by mouth, Disp: , Rfl:   •  metroNIDAZOLE (METROGEL) 0.75 % gel, Apply topically 2 (two) times a day, Disp: 45 g, Rfl: 0  •  Misc. Devices (Wheelchair Cushion) MISC, Use daily, Disp: 1 each, Rfl: 0  •  Misc.  Devices [facility], by Does not apply route daily Wheelchair ramp, dx g82.20, Disp: 1 each, Rfl: 0  •  Multiple Vitamin (Daily-Enriqueta Multivitamin) TABS, TAKE 1 TABLET BY MOUTH EVERY DAY, Disp: 30 tablet, Rfl: 8  •  NovoLOG FlexPen 100 units/mL injection pen, INJECT 3 UNITS UNDER THE SKIN 3 (THREE) TIMES A DAY BEFORE MEALS. (Patient not taking: Reported on 7/13/2023), Disp: , Rfl:   •  oxybutynin (DITROPAN XL) 15 MG 24 hr tablet, , Disp: , Rfl:   •  oxybutynin (DITROPAN) 5 mg tablet, Take 3 tablets (15 mg total) by mouth daily, Disp: 270 tablet, Rfl: 1  •  oxyCODONE (ROXICODONE) 10 MG TABS, TAKE ONE TABLET BY MOUTH EVERY 12 HOURS AS NEEDED FOR PAIN. ONGOING THERAPY., Disp: , Rfl:   •  polyethylene glycol (GLYCOLAX) 17 GM/SCOOP powder, Take as directed as per written office instructions, Disp: 238 g, Rfl: 0  •  polyethylene glycol (GOLYTELY) 4000 mL solution, Take 4,000 mL by mouth once for 1 dose, Disp: 4000 mL, Rfl: 0  •  risperiDONE (RisperDAL) 0.5 mg tablet, Take 1 tablet (0.5 mg total) by mouth 2 (two) times a day, Disp: 180 tablet, Rfl: 1  •  sertraline (ZOLOFT) 25 mg tablet, Take 1 tablet (25 mg total) by mouth daily, Disp: 90 tablet, Rfl: 1  •  Sodium Zirconium Cyclosilicate 10 g PACK, Take 10 g by mouth daily, Disp: , Rfl:   •  sucralfate (CARAFATE) 1 g/10 mL suspension, Take 10 mL (1 g total) by mouth 4 (four) times a day (with meals and at bedtime), Disp: 420 mL, Rfl: 1  •  sulfacetamide-prednisoLONE 10-0.23 % ophthalmic solution, 3 DROPS TO EACH EAR TWICE DAILY FOR 5 DAYS (Patient not taking: Reported on 7/13/2023), Disp: , Rfl:   •  SUPER B COMPLEX & C TABS, TAKE 1 CAPSULE BY MOUTH ONCE FOR 1 DOSE AS DIRECTED, Disp: 90 tablet, Rfl: 2  •  Zinc Sulfate 220 (50 Zn) MG TABS, Take 1 tablet by mouth daily, Disp: 90 tablet, Rfl: 1    Review of Systems   Constitutional: Negative for chills, fatigue and unexpected weight change. Respiratory: Negative for cough and shortness of breath.     Cardiovascular: Negative for chest pain. Musculoskeletal: Positive for gait problem (Paraplegia). Paraplegic   Skin: Positive for wound (Sacrum, perineum, left ischium). Multiple pressure injuries   Neurological: Positive for weakness and numbness. Hematological: Does not bruise/bleed easily. Psychiatric/Behavioral: Negative for agitation. Objective:  /74   Pulse 88   Temp 98 °F (36.7 °C)   Resp 18   Pain Score:   5     Physical Exam  Constitutional:       General: He is not in acute distress. Appearance: He is not ill-appearing. HENT:      Head: Normocephalic. Cardiovascular:      Rate and Rhythm: Normal rate. Pulmonary:      Effort: Pulmonary effort is normal. No respiratory distress. Skin:     General: Skin is warm and dry. Findings: Wound present. No erythema. Comments: Multiple stage 4 pressure injuries with fibrin and slough present. Some new epithelium on the sacral wound. However, there is a new small area of DTI. Overall, the sacral wound has decreased in size slightly. Wounds with significant undermining and epiboly. No malodor to the wounds. No obvious infection. Left hip with significant undermining. Fibrin and slough. See flow-sheet for additional details. Neurological:      Mental Status: He is alert. Psychiatric:         Mood and Affect: Mood normal.         Behavior: Behavior normal.                                Debridement   Wound Pressure Injury Sacrum    Universal Protocol:  Consent: Verbal consent obtained. Written consent obtained. Consent given by: patient  Time out: Immediately prior to procedure a "time out" was called to verify the correct patient, procedure, equipment, support staff and site/side marked as required.   Patient understanding: patient states understanding of the procedure being performed  Patient identity confirmed: verbally with patient      Performed by: physician  Debridement type: surgical  Level of debridement: subcutaneous tissue  Pain control: lidocaine 4%  Post-debridement measurements  Length (cm): 6  Width (cm): 8.4  Depth (cm): 0.5  Percent debrided: 20%  Surface Area (cm^2): 50.4  Area debrided (cm^2): 10.08  Volume (cm^3): 25.2  Tissue and other material debrided: subcutaneous tissue  Devitalized tissue debrided: necrotic debris  Instrument(s) utilized: curette  Bleeding: medium  Hemostasis obtained with: pressure  Procedural pain (0-10): 0  Post-procedural pain: 0   Response to treatment: procedure was tolerated well  Debridement Comments: Only the deep tissue injury area was debrided. Debridement   Wound 01/28/21 Pressure Injury Hip Left;Lateral    Universal Protocol:  Consent: Verbal consent obtained. Written consent obtained. Consent given by: patient  Time out: Immediately prior to procedure a "time out" was called to verify the correct patient, procedure, equipment, support staff and site/side marked as required. Patient understanding: patient states understanding of the procedure being performed  Patient identity confirmed: verbally with patient      Performed by: physician  Debridement type: surgical  Level of debridement: subcutaneous tissue  Pain control: lidocaine 4%  Post-debridement measurements  Length (cm): 2.5  Width (cm): 1.6  Depth (cm): 1.3  Percent debrided: 100%  Surface Area (cm^2): 4  Area debrided (cm^2): 4  Volume (cm^3): 5.2  Tissue and other material debrided: dermis and subcutaneous tissue  Devitalized tissue debrided: fibrin and necrotic debris  Instrument(s) utilized: curette  Bleeding: large  Hemostasis obtained with: pressure  Procedural pain (0-10): 0  Post-procedural pain: 0   Response to treatment: procedure was tolerated well  Debridement Comments: Significant undermining all debrided with curette.                         Lab Results   Component Value Date    HGBA1C 7.5 (H) 03/04/2023       Wound Instructions:  Orders Placed This Encounter   Procedures   • Wound cleansing and dressings     Wash your hands with soap and water. Remove old dressing, discard into plastic bag and place in trash. Cleanse the wound with sterile saline solution (rinse) prior to applying a clean dressing. Do not use tissue or cotton balls. Do not scrub the wound. Pat dry using gauze. Shower no; do not get dressing wet     Left Hip Wound:  Cleanse with normal saline as above  Apply thin layer vaseline to periwound skin  Apply Aquacel AG rope (tape the tail)  Cover with an ABD pad  Secure with Medfix tape. Change dressing 3 times a week and PRN for breakthrough drainage (visiting nurses to do twice per week and family in between)        ALL OTHER WOUNDS:  Cleanse with normal saline as above  Apply thin layer vaseline to periwound skin  Apply saline moistened gauze to wound bed. If odor develops use dakins moistened gauze. Cover with ABD pads  Secure with Medfix tape. Change dressing 3 times a week and PRN for breakthrough drainage (visiting nurses to do twice per week and family in between)     Continue using Clinitron bed.      Continue NO PRESSURE TO ALL WOUNDS, ESPECIALLY PERINEAL WOUND; LIMIT SITTING UPRIGHT IN WHEELCHAIR ON THIS WOUND     Follow up in 6 weeks      Continue visiting nurse skilled 2 x per week for wound care dressing changes.                             Treatment in the wound center today:  Left Hip cleansed with Prophase and dressed as ordered above  All other wounds Dakins moistened gauze to wound beds   Covered with ABDs and secured with medfix tape     Standing Status:   Future     Standing Expiration Date:   7/13/2024   • Debridement     This order was created via procedure documentation   • Debridement     This order was created via procedure documentation             Phillip Monaco MD, CHT, CWS    Portions of the record may have been created with voice recognition software.  Occasional wrong word or "sound alike" substitutions may have occurred due to the inherent limitations of voice recognition software. Read the chart carefully and recognize, using context, where substitutions have occurred.

## 2023-07-13 NOTE — PATIENT INSTRUCTIONS
Orders Placed This Encounter   Procedures    Wound cleansing and dressings     Wash your hands with soap and water. Remove old dressing, discard into plastic bag and place in trash. Cleanse the wound with sterile saline solution (rinse) prior to applying a clean dressing. Do not use tissue or cotton balls. Do not scrub the wound. Pat dry using gauze. Shower no; do not get dressing wet     Left Hip Wound:  Cleanse with normal saline as above  Apply thin layer vaseline to periwound skin  Apply Aquacel AG rope (tape the tail)  Cover with an ABD pad  Secure with Medfix tape. Change dressing 3 times a week and PRN for breakthrough drainage (visiting nurses to do twice per week and family in between)        ALL OTHER WOUNDS:  Cleanse with normal saline as above  Apply thin layer vaseline to periwound skin  Apply saline moistened gauze to wound bed. If odor develops use dakins moistened gauze. Cover with ABD pads  Secure with Medfix tape. Change dressing 3 times a week and PRN for breakthrough drainage (visiting nurses to do twice per week and family in between)     Continue using Clinitron bed. Continue NO PRESSURE TO ALL WOUNDS, ESPECIALLY PERINEAL WOUND; LIMIT SITTING UPRIGHT IN WHEELCHAIR ON THIS WOUND     Follow up in 6 weeks      Continue visiting nurse skilled 2 x per week for wound care dressing changes.                              Treatment in the wound center today:  Left Hip cleansed with Prophase and dressed as ordered above  All other wounds Dakins moistened gauze to wound beds   Covered with ABDs and secured with medfix tape     Standing Status:   Future     Standing Expiration Date:   7/13/2024

## 2023-07-14 RX ORDER — CETIRIZINE HYDROCHLORIDE 10 MG/1
TABLET ORAL
Qty: 90 TABLET | Refills: 1 | Status: SHIPPED | OUTPATIENT
Start: 2023-07-14

## 2023-07-18 ENCOUNTER — TELEPHONE (OUTPATIENT)
Dept: FAMILY MEDICINE CLINIC | Facility: CLINIC | Age: 43
End: 2023-07-18

## 2023-07-18 NOTE — TELEPHONE ENCOUNTER
07/18/23    PCP SIGNATURE NEEDED FOR   BASHIR / Kary Candelaria / ORDER # [id number]  FORM RECEIVED VIA FAX AND PLACED IN PCP FOLDER TO BE DELIVERED AT ASSIGNED TIMES.       ORDER # [id number] no

## 2023-07-19 ENCOUNTER — TELEPHONE (OUTPATIENT)
Dept: FAMILY MEDICINE CLINIC | Facility: CLINIC | Age: 43
End: 2023-07-19

## 2023-07-19 NOTE — TELEPHONE ENCOUNTER
PCP SIGNATURE NEEDED FOR [facility]  FORM RECEIVED VIA FAX AND PLACED IN PCP FOLDER TO BE DELIVERED AT ASSIGNED TIMES.       PHYSICIANS ORDER   SERVICES AS OF 06/23/23  AND 07/07/2023

## 2023-07-20 ENCOUNTER — OFFICE VISIT (OUTPATIENT)
Dept: FAMILY MEDICINE CLINIC | Facility: CLINIC | Age: 43
End: 2023-07-20

## 2023-07-20 VITALS
HEART RATE: 88 BPM | RESPIRATION RATE: 19 BRPM | OXYGEN SATURATION: 99 % | SYSTOLIC BLOOD PRESSURE: 116 MMHG | TEMPERATURE: 98.1 F | DIASTOLIC BLOOD PRESSURE: 84 MMHG

## 2023-07-20 DIAGNOSIS — Z09 HOSPITAL DISCHARGE FOLLOW-UP: Primary | ICD-10-CM

## 2023-07-20 DIAGNOSIS — I48.91 ATRIAL FIBRILLATION, UNSPECIFIED TYPE (HCC): Chronic | ICD-10-CM

## 2023-07-20 PROBLEM — N48.21 PENILE ABSCESS: Status: RESOLVED | Noted: 2017-09-18 | Resolved: 2023-07-20

## 2023-07-20 PROBLEM — R09.89 CHEST CONGESTION: Status: RESOLVED | Noted: 2022-09-08 | Resolved: 2023-07-20

## 2023-07-20 PROBLEM — R41.0 DELIRIUM: Status: RESOLVED | Noted: 2017-09-28 | Resolved: 2023-07-20

## 2023-07-20 PROCEDURE — 99213 OFFICE O/P EST LOW 20 MIN: CPT | Performed by: FAMILY MEDICINE

## 2023-07-20 NOTE — PROGRESS NOTES
Name: Sheila De La Paz. : 1980      MRN: [id number]  Encounter Provider: Oswald Ybarra MD  Encounter Date: 2023   Encounter department: [address] [facility]     1. Hospital discharge follow-up  Assessment & Plan:  Patient has home health nurse visiting 2x a week. All appointments with specialists setup. Mostly at [facility]. Patient also gets care form family. Medications reviewed with No medication refill required today. 2. Atrial fibrillation, unspecified type [facility])  Assessment & Plan:  Referral to cardiology    Orders:  -     Ambulatory Referral to Cardiology; Future         Subjective     Sheila Wynne is a 43 y.o. male with a complex medical history as listed in his past medical history section. He is presenting today to follow-up on his recent  E  stay at which point he was discharged . Patient was hospitalized for sepsis. During hospital stay, patient was also diagnosed with Rheumatoid arthritis. He is currently anuric with ESRD and goes for dialysis MW. Upon reviewing patient's chart, it was discovered that he his scheduled for surgery with [facility] plastic surgery on  (EXCISION OF CYSTIC LESIONS OF B/L EYEBROWS AND BILATERAL TEMPLE W/ COMPLEX CLOSURE OF SKIN FLAP). Patient is unaware if he needs clearance. He has clearance for surgery. Explained to patient that if clearance is needed for proposed surgery he would need to consult with  cardiology and possibly nephrology for clearance. Review of Systems   Constitutional: Negative for activity change, chills, fever and unexpected weight change. HENT: Negative for congestion, ear pain, rhinorrhea and sore throat. Eyes: Negative for pain and visual disturbance. Respiratory: Negative for cough, shortness of breath and wheezing. Cardiovascular: Negative for chest pain, palpitations and leg swelling.    Gastrointestinal: Negative for abdominal pain, constipation, diarrhea and vomiting. Musculoskeletal: Negative for arthralgias and back pain. Skin: Positive for rash. Negative for color change. Neurological: Negative for dizziness, seizures, syncope and headaches. All other systems reviewed and are negative. Past Medical History:   Diagnosis Date   • Acute pulmonary edema ([address]) 1/13/2022   • Ambulatory dysfunction    • Anemia    • Anemia, iron deficiency     transfusion requiring   • Asymptomatic bacteriuria 9/25/2017   • Atrial fibrillation ([facility])    • AVM (arteriovenous malformation) of duodenum, 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   • Bacteriuria, asymptomatic    • Bipolar disorder ([facility])    • Chronic deep vein thrombosis (DVT) ([facility])    • Chronic indwelling Ortega catheter    • Chronic kidney disease    • Chronic pain    • Chronic pain disorder    • Chronic suprapubic catheter ([address])    • Clostridium difficile infection 08/11/2016    also positive 9/2016, 5/29/2017, 8/15/2017.  S/P fecal transplant   • Colostomy on examination [facility])    • Decubitus ulcer    • Delirium    • Diabetes mellitus ([address])    • GERD (gastroesophageal reflux disease)    • Hemodialysis patient ([address])    • Hypertension    • Memory impairment 2011    s/p diabetic coma   • Neurogenic bladder    • OAB (overactive bladder)    • Paraplegia ([facility])     T3 transverse myelitis vs spinal stoke (AVM); 2012 extensive epidural abscess C7=> conus 2nd extension from deep parspinal abscess L4-S2/sacral decubitus; cord atrophy/myelomalcia T3=>conus    • Penile abscess    • S/P unilateral BKA (below knee amputation) ([address])     Right   • Sebaceous cyst removed in 2017   • Seizures ([facility])    • Shortness of breath    • Tobacco abuse    • 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[facility])    • Urinary retention    • Wheelchair dependent    • Wounds, multiple     pressure ulcers with delayed healing     Past Surgical History:   Procedure Laterality Date   • BELOW KNEE LEG AMPUTATION Right 2009   • COLONOSCOPY N/A 03/27/2017 Procedure: COLONOSCOPY;  Surgeon: Gail Horner MD;  Location: [facility]; Service:    • COLONOSCOPY N/A 03/29/2017    Procedure: COLONOSCOPY;  Surgeon: Gail Horner MD;  Location: [facility]; Service:    • COLONOSCOPY N/A 06/15/2017    Procedure: COLONOSCOPY with FMT;  Surgeon: Bethanie Momin MD;  Location: [facility]; Service: Gastroenterology   • ESOPHAGOGASTRODUODENOSCOPY N/A 03/22/2017    Procedure: ESOPHAGOGASTRODUODENOSCOPY (EGD); Surgeon: Mejia Brannon DO;  Location: [facility];   Service:    • MULTIPLE TOOTH EXTRACTIONS N/A 03/08/2021    Procedure: EXTRACTION TEETH # 2,3,6,10,12,13,14,18,19,20,21,22,23,24,25,26,27,28,29,30;  Surgeon: Jane Burton DMD;  Location: [facility];  Service: Maxillofacial   • SKIN BIOPSY       Family History   Problem Relation Age of Onset   • Hyperlipidemia Mother    • Hypertension Mother    • Leukemia Brother    • Diabetes Paternal Grandfather      Social History     Socioeconomic History   • Marital status: Single     Spouse name: None   • Number of children: None   • Years of education: None   • Highest education level: None   Occupational History   • None   Tobacco Use   • Smoking status: Every Day     Types: Cigars     Passive exposure: Current   • Smokeless tobacco: Never   • Tobacco comments:     2 cigars/day - 2/14/2023   Vaping Use   • Vaping Use: Never used   Substance and Sexual Activity   • Alcohol use: Not Currently     Comment: 1 cup of wine every 3-4 months   • Drug use: Not Currently     Types: Marijuana     Comment: rarely; once every 1-2 years   • Sexual activity: None   Other Topics Concern   • None   Social History Narrative   • None     Social Determinants of Health     Financial Resource Strain: Low Risk  (2/2/2023)    Overall Financial Resource Strain (CARDIA)    • Difficulty of Paying Living Expenses: Not hard at all   Food Insecurity: No Food Insecurity (2/2/2023)    Hunger Vital Sign    • Worried About Running Out of Food in the Last Year: Never true    • Ran Out of Food in the Last Year: Never true   Transportation Needs: No Transportation Needs (2/2/2023)    PRAPARE - Transportation    • Lack of Transportation (Medical): No    • Lack of Transportation (Non-Medical):  No   Physical Activity: Not on file   Stress: Not on file   Social Connections: Not on file   Intimate Partner Violence: Not on file   Housing Stability: Not on file     Current Outpatient Medications on File Prior to Visit   Medication Sig   • azelaic acid (Azelex) 20 % cream Apply topically 2 (two) times a day   • carvedilol (COREG) 25 mg tablet Take 1 tablet (25 mg total) by mouth 2 (two) times a day   • clindamycin (CLINDAGEL) 1 % gel APPLY TO AFFECTED AREA TWICE A DAY   • Fluocinolone Acetonide Body 0.01 % OIL Apply 2 drops topically daily   • glucose-vitamin C 4-0.006 g Chew 16 g if needed   • Acetaminophen 325 MG CAPS Take 650 mg by mouth   • Alcohol Swabs (ALCOHOL PADS) 70 % PADS by Does not apply route 5 (five) times a day   • amLODIPine (NORVASC) 10 mg tablet Take 1 tablet (10 mg total) by mouth daily   • ammonium lactate (LAC-HYDRIN) 12 % cream    • apixaban (ELIQUIS) 5 mg Take 1 tablet (5 mg total) by mouth 2 (two) times a day   • atorvastatin (LIPITOR) 20 mg tablet Take 1 tablet (20 mg total) by mouth daily at bedtime   • B Complex-C-Folic Acid (Super B-Complex/Vit C/FA) TABS TAKE 1 TABLET BY MOUTH EVERY DAY AS DIRECTED   • SUSAN MICROLET LANCETS lancets Use as instructed to check blood sugar 4 times a day  Dx e10.29   • bisacodyl (DULCOLAX) 5 mg EC tablet Take 1 tablet (5 mg total) by mouth once for 1 dose   • Blood Glucose Monitoring Suppl (SUSAN CONTOUR NEXT USB MONITOR) w/Device KIT Testing 4 times a day   • calcitriol (ROCALTROL) 0.5 MCG capsule Take 2 mcg by mouth   • calcium acetate (PHOSLO) capsule TAKE 2 CAPSULES BY MOUTH THREE TIMES DAILY WITH MEALS   • cetirizine (ZyrTEC) 10 mg tablet TAKE 1 TABLET BY MOUTH EVERY DAY   • Cholecalciferol (VITAMIN D-3) 1000 units CAPS Take 2 capsules by mouth daily   • Contour Next Test test strip USE TO TEST BLOOD SUGAR 3 TIMES DAILY. CONTOUR NEXT STRIPS. E10.29   • cyanocobalamin ([employer] Vitamin B-12) 1000 MCG tablet Take 1 tablet (1,000 mcg total) by mouth daily   • cyclobenzaprine (FLEXERIL) 5 mg tablet    • dexlansoprazole (DEXILANT) 30 MG capsule TAKE 1 CAPSULE BY MOUTH EVERY DAY   • dicyclomine (BENTYL) 10 mg capsule TAKE 1 CAPSULE BY MOUTH 3 TIMES A DAY BEFORE MEALS   • Disposable Gloves MISC Use 7 times a day, 4 boxes of gloves XL  Dx type 1 DM, paraplegia   • doxazosin (CARDURA) 2 mg tablet Take 1 tablet (2 mg total) by mouth every evening   • epoetin kaushik (EPOGEN,PROCRIT) 20,000 units/mL Inject 10,000 Units under the skin   • epoetin kaushik (EPOGEN,PROCRIT) 20,000 units/mL Inject 5,000 Units under the skin   • ferrous sulfate 325 (65 Fe) mg tablet Take 1 tablet (325 mg total) by mouth 3 (three) times a day   • folic acid (FOLVITE) 1 mg tablet Take 1,000 mcg by mouth daily   • Gvoke PFS 1 MG/0.2ML SOSY    • hydroxychloroquine (PLAQUENIL) 200 mg tablet Take 200 mg by mouth daily   • hydrOXYzine HCL (ATARAX) 50 mg tablet TAKE 1 TABLET (50 MG TOTAL) BY MOUTH 2 (TWO) TIMES A DAY AS NEEDED FOR ITCHING OR ANXIETY   • Incontinence Supply Disposable (Incontinence Brief Large) MISC Use 6 (six) times a day Size xl   • Incontinence Supply Disposable (Undergarment) MISC Use 6 a day, 5 boxes, xl  Dx R51, paraplegia   • Incontinence Supply Disposable (Underpads) MISC Use 2 a day   • insulin lispro protamine-insulin lispro (HumaLOG 50-50) 100 units/mL Inject under the skin 2 (two) times a day before meals   • ketoconazole (NIZORAL) 2 % cream Apply to rash. • LEVEMIR FLEXTOUCH 100 units/mL injection pen Inject 14 Units under the skin 2 (two) times a day   • Lokelma 10 g PACK    • losartan (COZAAR) 100 MG tablet Take 1 tablet (100 mg total) by mouth daily   • metroNIDAZOLE (METROGEL) 0.75 % gel Apply topically 2 (two) times a day   • Misc.  Devices (Wheelchair Cushion) MISC Use daily   • Misc. Devices [facility]) MISC by Does not apply route daily Wheelchair ramp, dx g82.20   • Multiple Vitamin (Daily-Enriqueta Multivitamin) TABS TAKE 1 TABLET BY MOUTH EVERY DAY   • NovoLOG FlexPen 100 units/mL injection pen INJECT 3 UNITS UNDER THE SKIN 3 (THREE) TIMES A DAY BEFORE MEALS. (Patient not taking: Reported on 7/13/2023)   • oxybutynin (DITROPAN XL) 15 MG 24 hr tablet    • oxybutynin (DITROPAN) 5 mg tablet Take 3 tablets (15 mg total) by mouth daily   • oxyCODONE (ROXICODONE) 10 MG TABS TAKE ONE TABLET BY MOUTH EVERY 12 HOURS AS NEEDED FOR PAIN. ONGOING THERAPY.    • polyethylene glycol (GLYCOLAX) 17 GM/SCOOP powder Take as directed as per written office instructions   • polyethylene glycol (GOLYTELY) 4000 mL solution Take 4,000 mL by mouth once for 1 dose   • risperiDONE (RisperDAL) 0.5 mg tablet Take 1 tablet (0.5 mg total) by mouth 2 (two) times a day   • sertraline (ZOLOFT) 25 mg tablet Take 1 tablet (25 mg total) by mouth daily   • Sodium Zirconium Cyclosilicate 10 g PACK Take 10 g by mouth daily   • sucralfate (CARAFATE) 1 g/10 mL suspension Take 10 mL (1 g total) by mouth 4 (four) times a day (with meals and at bedtime)   • SUPER B COMPLEX & C TABS TAKE 1 CAPSULE BY MOUTH ONCE FOR 1 DOSE AS DIRECTED   • Zinc Sulfate 220 (50 Zn) MG TABS Take 1 tablet by mouth daily   • [DISCONTINUED] amLODIPine (NORVASC) 5 mg tablet Take 5 mg by mouth daily   • [DISCONTINUED] ascorbic acid (VITAMIN C) 250 mg tablet TAKE 2 TABLETS (500 MG TOTAL) BY MOUTH DAILY   • [DISCONTINUED] methotrexate 2.5 MG tablet Take 2.5 mg by mouth in the morning     Allergies   Allergen Reactions   • Chlorcyclizine Swelling   • Chlorhexidine Other (See Comments)   • Ciprofloxacin Hcl    • Cymbalta [Duloxetine Hcl]    • Dm-Doxylamine-Acetaminophen Other (See Comments)   • Doxycycline Diarrhea   • Gabapentin Tremor   • Hydrocortisone Other (See Comments)   • Lac Bovis GI Intolerance   • Lactose - Food Allergy GI Intolerance     Other reaction(s): Unknown   • Lyrica [Pregabalin]    • Penicillins Other (See Comments)     miguel Zosyn 08/28/17  Tolerates Ancef  Miguel Augmentin   • Polymyxin B    • Promethazine-Phenyleph-Codeine Other (See Comments)   • Quinidine Other (See Comments)   • Cefepime Other (See Comments)     Other reaction(s): Other (See Comments)  encephalopathy; tolerates cefazolin 6/14, miguel Ceftriaxone  encephalopathy; tolerates cefazolin 6/14, miguel Ceftriaxone  Pt has had cefepime March 2022 and June 2022. Also, cephalexin 3/15/22. • Rosuvastatin Other (See Comments) and Palpitations     Weakness     Immunization History   Administered Date(s) Administered   • COVID-19 PFIZER VACCINE 0.3 ML IM 12/22/2021, 01/26/2022   • Hep B, adult 04/11/2018, 05/09/2018, 06/13/2018, 10/12/2018, 02/12/2020, 11/11/2020, 11/11/2020, 12/07/2022   • Hepatitis B Vaccine (Recombinant), Cpg Adjuvanted 08/07/2021   • INFLUENZA 12/27/2017, 12/27/2017, 10/02/2018, 10/07/2019, 10/14/2021   • Influenza Quadrivalent Preservative Free 3 years and older IM 09/21/2022   • Influenza, recombinant, quadrivalent,injectable, preservative free 11/03/2020   • Pneumococcal Conjugate 13-Valent 07/08/2019   • Pneumococcal Polysaccharide PPV23 12/07/2011, 06/06/2018, 07/12/2023   • Tdap 12/07/2011, 05/20/2023       Objective     /84 (BP Location: Left arm, Patient Position: Sitting, Cuff Size: Standard)   Pulse 88   Temp 98.1 °F (36.7 °C) (Temporal)   Resp 19   SpO2 99%     Physical Exam  Vitals reviewed. Constitutional:       General: He is not in acute distress. Appearance: He is well-developed. He is not diaphoretic. Comments: In motorized scooter   HENT:      Head: Normocephalic and atraumatic. Nose: Nose normal. No congestion. Mouth/Throat:      Mouth: Mucous membranes are moist.   Eyes:      Extraocular Movements: Extraocular movements intact.       Conjunctiva/sclera: Conjunctivae normal.   Cardiovascular: Rate and Rhythm: Normal rate and regular rhythm. Heart sounds: Normal heart sounds. No murmur heard. No friction rub. No gallop. Pulmonary:      Effort: Pulmonary effort is normal. No respiratory distress. Breath sounds: Normal breath sounds. No wheezing. Abdominal:      General: There is no distension. Palpations: Abdomen is soft. Tenderness: There is no abdominal tenderness. Musculoskeletal:      Cervical back: Normal range of motion and neck supple. Skin:     General: Skin is warm and dry. Findings: Rash (face) present. No erythema. Neurological:      Mental Status: He is alert and oriented to person, place, and time. Psychiatric:         Mood and Affect: Mood normal.         Behavior: Behavior normal.         Thought Content:  Thought content normal.         Judgment: Judgment normal.       Dariela Baker MD

## 2023-07-22 PROBLEM — Z09 HOSPITAL DISCHARGE FOLLOW-UP: Status: ACTIVE | Noted: 2023-07-22

## 2023-07-22 PROBLEM — M06.9 RHEUMATOID ARTHRITIS (HCC): Status: ACTIVE | Noted: 2023-07-22

## 2023-07-22 RX ORDER — HYDROXYCHLOROQUINE SULFATE 200 MG/1
200 TABLET, FILM COATED ORAL DAILY
COMMUNITY
Start: 2023-07-07

## 2023-07-22 RX ORDER — FOLIC ACID 1 MG/1
1000 TABLET ORAL DAILY
COMMUNITY
Start: 2023-06-13

## 2023-07-22 RX ORDER — METHOTREXATE 2.5 MG/1
2.5 TABLET ORAL DAILY
COMMUNITY
Start: 2023-06-13 | End: 2023-07-22

## 2023-07-22 RX ORDER — FLUOCINOLONE ACETONIDE 0.11 MG/ML
2 OIL TOPICAL DAILY
COMMUNITY
Start: 2023-05-08

## 2023-07-22 NOTE — ASSESSMENT & PLAN NOTE
Patient has home health nurse visiting 2x a week. All appointments with specialists setup. Mostly at [facility]. Patient also gets care form family. Medications reviewed with No medication refill required today.

## 2023-07-26 DIAGNOSIS — I10 HTN (HYPERTENSION), BENIGN: ICD-10-CM

## 2023-07-26 RX ORDER — CARVEDILOL 25 MG/1
25 TABLET ORAL 2 TIMES DAILY
Qty: 180 TABLET | Refills: 1 | Status: SHIPPED | OUTPATIENT
Start: 2023-07-26

## 2023-07-26 NOTE — TELEPHONE ENCOUNTER
07/26/23    FORM: Sadiq Hastings / [address] [facility] / ORDER # [id number] / ORDER # [id number]    FORM FAXED AND CONFIRMED THAT IT WAS RECEIVED 07/25/23.     FAX # [phone]    FORM SCANNED INTO PT CHART

## 2023-07-30 DIAGNOSIS — I10 HTN (HYPERTENSION), BENIGN: ICD-10-CM

## 2023-07-30 DIAGNOSIS — F31.9 BIPOLAR DEPRESSION (HCC): ICD-10-CM

## 2023-07-30 DIAGNOSIS — E78.5 HYPERLIPIDEMIA, UNSPECIFIED HYPERLIPIDEMIA TYPE: ICD-10-CM

## 2023-08-01 ENCOUNTER — TELEPHONE (OUTPATIENT)
Dept: FAMILY MEDICINE CLINIC | Facility: CLINIC | Age: 43
End: 2023-08-01

## 2023-08-01 RX ORDER — AMLODIPINE BESYLATE 10 MG/1
10 TABLET ORAL DAILY
Qty: 90 TABLET | Refills: 1 | Status: SHIPPED | OUTPATIENT
Start: 2023-08-01

## 2023-08-01 RX ORDER — ATORVASTATIN CALCIUM 20 MG/1
20 TABLET, FILM COATED ORAL
Qty: 90 TABLET | Refills: 1 | Status: SHIPPED | OUTPATIENT
Start: 2023-08-01

## 2023-08-01 RX ORDER — SERTRALINE HYDROCHLORIDE 25 MG/1
25 TABLET, FILM COATED ORAL DAILY
Qty: 90 TABLET | Refills: 1 | Status: SHIPPED | OUTPATIENT
Start: 2023-08-01

## 2023-08-01 RX ORDER — LOSARTAN POTASSIUM 100 MG/1
100 TABLET ORAL DAILY
Qty: 90 TABLET | Refills: 1 | Status: SHIPPED | OUTPATIENT
Start: 2023-08-01

## 2023-08-01 RX ORDER — DOXAZOSIN 2 MG/1
2 TABLET ORAL EVERY EVENING
Qty: 90 TABLET | Refills: 1 | Status: SHIPPED | OUTPATIENT
Start: 2023-08-01

## 2023-08-01 NOTE — TELEPHONE ENCOUNTER
[place] [facility] FORM RECEIVED VIA FAX AND PLACED IN PCP FOLDER TO BE DELIVERED AT ASSIGNED TIMES.     ORDER NUMBER [id number]

## 2023-08-10 NOTE — TELEPHONE ENCOUNTER
New Patient Intake Form   Patient Details:    Shwetha Salas  [birth year]  [id number]    Appointment Information   Who is calling to schedule? Hopeline   If not self, what is the caller's name? Please put name of [facility] nurse as well  Go Mauro   Referring provider ERNESTO Khan   What is the diagnosis? Anemia due to stage 5 chronic kidney disease, not on chronic dialysis     Is there a confirmed tissue diagnosis? No   Is there a biopsy ordered or pending? Please specify dates   n/a     Is patient aware of diagnosis? Yes   Have you had any imaging or labs done? If yes, where? (If imaging done outside of [facility], please remind patient to bring a disk ) Yes     02/23     If imaging done at outside facility, did you instruct patient to obtain discs and bring to visit? n/a   Have you been seen by another Oncologist/Hematologist?  If so, who and where? no   Are the records in [facility] or Care Everywhere? yes   Does the patient have records at another facility/hospital? no   If yes, Name of facility, city and state where facility is located  Did you instruct patient to have records faxed to [facility]fax and provide [facility] number? n/a   Preferred [place]   Is the patient willing to be seen by another provider?   (This is for breast patients only) n/a   Miscellaneous Information: appt made for 03/29
1 person assist

## 2023-08-13 DIAGNOSIS — Z00.00 HEALTHCARE MAINTENANCE: ICD-10-CM

## 2023-08-13 RX ORDER — MULTIVITAMIN WITH FOLIC ACID 400 MCG
TABLET ORAL
Qty: 30 TABLET | Refills: 8 | Status: SHIPPED | OUTPATIENT
Start: 2023-08-13

## 2023-08-16 ENCOUNTER — TELEPHONE (OUTPATIENT)
Dept: FAMILY MEDICINE CLINIC | Facility: CLINIC | Age: 43
End: 2023-08-16

## 2023-08-16 NOTE — TELEPHONE ENCOUNTER
PCP SIGNATURE NEEDED FOR [facility] family care FORM RECEIVED VIA FAX AND PLACED IN PCP FOLDER TO BE DELIVERED AT ASSIGNED TIMES.       Cert period 66/42/24-14/98/94

## 2023-08-18 DIAGNOSIS — K58.9 IRRITABLE BOWEL SYNDROME, UNSPECIFIED TYPE: ICD-10-CM

## 2023-08-18 RX ORDER — DICYCLOMINE HYDROCHLORIDE 10 MG/1
CAPSULE ORAL
Qty: 270 CAPSULE | Refills: 1 | Status: SHIPPED | OUTPATIENT
Start: 2023-08-18

## 2023-08-21 ENCOUNTER — TELEPHONE (OUTPATIENT)
Dept: FAMILY MEDICINE CLINIC | Facility: CLINIC | Age: 43
End: 2023-08-21

## 2023-08-21 NOTE — TELEPHONE ENCOUNTER
PCP SIGNATURE NEEDED FOR Extended Family care FORM RECEIVED VIA FAX AND PLACED IN PCP FOLDER TO BE DELIVERED AT ASSIGNED TIMES.       [facility] 08/15/23

## 2023-08-24 ENCOUNTER — OFFICE VISIT (OUTPATIENT)
Dept: WOUND CARE | Facility: CLINIC | Age: 43
End: 2023-08-24
Payer: MEDICARE

## 2023-08-24 DIAGNOSIS — R11.0 NAUSEA: Primary | ICD-10-CM

## 2023-08-24 DIAGNOSIS — L89.894 PRESSURE ULCER OF OTHER SITE, STAGE 4 (HCC): ICD-10-CM

## 2023-08-24 DIAGNOSIS — L89.154 PRESSURE INJURY OF SACRAL REGION, STAGE 4 (HCC): Primary | ICD-10-CM

## 2023-08-24 DIAGNOSIS — L89.224 PRESSURE ULCER OF LEFT HIP, STAGE 4 (HCC): ICD-10-CM

## 2023-08-24 DIAGNOSIS — L89.324 PRESSURE INJURY OF LEFT ISCHIUM, STAGE 4 (HCC): ICD-10-CM

## 2023-08-24 PROCEDURE — 11042 DBRDMT SUBQ TIS 1ST 20SQCM/<: CPT | Performed by: FAMILY MEDICINE

## 2023-08-24 PROCEDURE — 11045 DBRDMT SUBQ TISS EACH ADDL: CPT | Performed by: FAMILY MEDICINE

## 2023-08-24 RX ORDER — LIDOCAINE 40 MG/G
CREAM TOPICAL ONCE
Status: COMPLETED | OUTPATIENT
Start: 2023-08-24 | End: 2023-08-24

## 2023-08-24 RX ORDER — ONDANSETRON 4 MG/1
4 TABLET, ORALLY DISINTEGRATING ORAL EVERY 6 HOURS PRN
Qty: 30 TABLET | Refills: 2 | Status: SHIPPED | OUTPATIENT
Start: 2023-08-24

## 2023-08-24 RX ADMIN — LIDOCAINE: 40 CREAM TOPICAL at 13:48

## 2023-08-24 NOTE — LETTER
[facility]  [address] [place] [address]  Phone#  [phone]  Fax#  [phone]    Patient:  Eduardo Mclean. YOB: 1980  Phone:  [phone]  Date of Visit:  8/24/2023    Orders Placed This Encounter   Procedures   • Wound cleansing and dressings         Wash your hands with soap and water. Remove old dressing, discard into plastic bag and place in trash. Cleanse the wound with sterile saline solution (rinse) prior to applying a clean dressing. Do not use tissue or cotton balls. Do not scrub the wound. Pat dry using gauze. Shower no; do not get dressing wet     Left Hip Wound:  Cleanse with normal saline as above  Apply Aquacel AG rope (tape the tail) No substitutes. Cover with an ABD pad  Secure with Medfix tape. Change dressing 3 times a week and PRN for breakthrough drainage (visiting nurses to do twice per week and family in between)        Sacral and perineal wound:  Cleanse with normal saline as above  Apply saline moistened gauze to wound bed. If odor develops use dakins moistened gauze. Cover with ABD pads  Secure with Medfix tape. Change dressing 3 times a week and PRN for breakthrough drainage (visiting nurses to do twice per week and family in between)    Left ischial wound:  Apply dermagran to the wound bed. Cover with ABD. Secure with medfix tape. Continue using Clinitron bed. Continue NO PRESSURE TO ALL WOUNDS, ESPECIALLY PERINEAL WOUND; LIMIT SITTING UPRIGHT IN WHEELCHAIR ON THIS WOUND     Follow up at the wound center in 6 weeks. Continue visiting nurse skilled 2 x per week for wound care dressing changes. Treatment in the wound center today: Cleansed with Prophase, and dressed as above.      Standing Status:   Future     Standing Expiration Date:   8/24/2024         Electronically signed by Nayely Asif MD

## 2023-08-24 NOTE — PROGRESS NOTES
Patient ID: David Junior. is a 37 y.o. male Date of Birth 1980       Chief Complaint   Patient presents with   • Follow Up Wound Care Visit     Sacral and buttock wounds       Allergies:  Chlorcyclizine, Chlorhexidine, Ciprofloxacin hcl, Cymbalta [duloxetine hcl], Dm-doxylamine-acetaminophen, Doxycycline, Gabapentin, Hydrocortisone, Lactose - food allergy, Lyrica [pregabalin], Milk (cow), Penicillins, Polymyxin b, Promethazine-phenyleph-codeine, Quinidine, Cefepime, and Rosuvastatin    Diagnosis:      Diagnosis ICD-10-CM Associated Orders   1. Pressure injury of sacral region, stage 4 ([facility])  L89.154 lidocaine (LMX) 4 % cream     Wound cleansing and dressings     Debridement      2. Pressure injury of left ischium, stage 4 ([facility])  L89.324 lidocaine (LMX) 4 % cream     Wound cleansing and dressings      3. Pressure ulcer of left hip, stage 4 ([facility])  L89.224 lidocaine (LMX) 4 % cream     Wound cleansing and dressings      4. Pressure ulcer of other site, stage 4 ([facility])  L89.894 lidocaine (LMX) 4 % cream     Wound cleansing and dressings              Assessment & Plan:  Some improvement in the sacral pressure injury. Surgical debridement. Continue same wound care. See orders. Stage IV pressure injury of the left ischium. Seems to be drying out more and when removing the gauze, excessive bleeding. We will try Dermagran gauze. Stage IV pressure injury of the left hip. Extensive tunneling and undermining. No evidence of infection. Aquacel Ag rope. 3 times a week and as needed. Stage IV pressure injury of the perineum. Stable. No real improvement. Continue same wound care. He does have a Clinitron bed. However he spends most of his time in his chair.          Subjective:     06/30/22: 40 y/o M with PMHx of spinal paraplegia, neurogenic bladder with chronic suprapubic catheter in place, ESRD on dialysis M/W/F, anemia of CKD, secondary hyperparathyroidism, type I DM, colostomy bag in place,tricupsid valve endocarditis, chronic osteomyelitis, chronic pressure injuries, afib, and hx of DVT on Eliquis presenting for f/u of multiple stage 4 pressure injuries of the sacrum, left hip, L ischium, and perineum. Pt was recently hospitalized on 06/17/22 for C. Difficile. Has been using wet to dry dressings. States he has been attempting to increase his protein intake and has gained some weight but his albumin remains low and is confused why that is. 9/1/22: Followup multiple stage IV pressure injuries of the sacrum and ischial tuberosities. Patient was hospitalized recently for sepsis and was told it was due to his pressure injuries. However, they do not look any worse than they ever have been. He was discharged on oral medications. He continues using wet to dry dressings. Prior to hospitalization he developed two new pressure injuries of his posterior scrotum. He continues to supposedly eat well with increased protein. 10/13/22: Followup multiple stage IV pressure injuries of the sacrum, perineum and ischial tuberosities and left hip. .  At last visit, he had scrotal pressure injuries as well. He is using wet to dry dressings. He no longer has his Clinitron and is using an alternating low air loss mattress. He does not like the air mattress because is more difficult to get in and out of. He has not had any fevers or chills. 12/6/2022: Follow-up multiple stage IV pressure injuries of the sacrum, perineum, ischial tuberosities and left hip. Patient states that he saw endocrinology and A1c was 7.9. Slightly better than previous. Using saline packing. Occasionally Dakin's when they noticed an odor. 2/16/2023: Follow-up visit for multiple stage 4 pressure injuries of the sacrum, perineum, L ischium, and L hip. Patient states he has a Clinitron bed and his blood sugars have been well managed recently with his insulin pump. This morning his sugar was 80 and has been running in the 150's lately.  He monitors his sugars with a Dexcom. He has been cleansing the wounds with saline, and dakin's occasionally when he notices an odor. He denies fever, pains, or pain. 4/6/2023: Follow-up of multiple stage IV pressure injuries of the sacrum, ischium on the left and left hip and perineum. Blood sugars were running high today. Last A1c done last month was 7.5. No new complaints. Still using saline packing with occasional Dakin's packing if the quality of the ulcer is changed. 5/18/2023: Follow-up multiple stage IV pressure injuries of the sacrum, ischium on the left and left hip and perineum. Patient states that his blood sugars are running about the same. He is generally using saline packing with occasional Dakin's packing should there be an odor. He was supposed to use silver alginate but he has not been using this. He was hospitalized about a month ago for C. difficile colitis. 7/13/2023: Follow-up multiple stage IV pressure injuries of the sacrum, ischium on the left and left hip and perineum. Patient was hospitalized for some sort of infection since his last visit. He recovered without problems. He has no new complaints. He continues alternating with saline and Dakin's depending on the odor. 8/24/2023: Follow-up multiple stage IV pressure injuries of the sacrum, ischium, left hip and perineum.                     The following portions of the patient's history were reviewed and updated as appropriate:   Patient Active Problem List   Diagnosis   • Paraplegia [facility])   • Atrial fibrillation (HCC)   • Chronic suprapubic catheter (HCC)   • Colostomy care ([address])   • S/P unilateral BKA (below knee amputation) ([address])   • Tobacco abuse   • Type 1 diabetes mellitus with chronic kidney disease on chronic dialysis ([address])   • Ulcer of sacral region, stage 4 ([address])   • Wound healing, delayed   • Iron deficiency anemia   • Pressure injury of left ischium, stage 4 ([address])   • HTN (hypertension), benign   • Neurogenic bladder   • GERD (gastroesophageal reflux disease)   • Chronic pain   • Stage 5 chronic kidney disease on chronic dialysis ([address])   • History of Clostridium difficile infection   • Urinary retention   • Dialysis patient ([address])   • Insulin long-term use ([address])   • Wheelchair dependent   • Recurrent Clostridioides difficile diarrhea   • Bipolar depression ([address])   • 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[address])   • Seizure ([address])   • Pressure ulcer of sacral region, stage 4 (HCC)   • AVM (arteriovenous malformation) of duodenum, acquired   • Chronic narcotic dependence (HCC)   • Chronic diastolic heart failure (HCC)   • Pressure ulcer of other site, stage 4 (HCC)   • Pressure ulcer of left hip, stage 4 (HCC)   • ED (erectile dysfunction) of organic origin   • Irritable bowel syndrome   • Onychomycosis   • Pustular folliculitis   • Prolonged Q-T interval on ECG   • Secondary hyperparathyroidism of renal origin ([address])   • Wounds, multiple   • Immunocompromised state ([address])   • Severe protein-calorie malnutrition (HCC)   • Chronic osteomyelitis ([facility])   • Acute deep vein thrombosis (DVT) of right upper extremity ([facility])   • C. difficile colitis   • Charcot's arthropathy   • Diabetic gastroparesis associated with type 1 diabetes mellitus ([address])   • End stage renal disease (HCC)   • Hyperlipidemia   • LVH (left ventricular hypertrophy)   • NICM (nonischemic cardiomyopathy) ([address])   • Vitamin B deficiency   • Vitamin C deficiency   • Vitamin D deficiency   • Hospital discharge follow-up   • Rheumatoid arthritis [facility])     Past Medical History:   Diagnosis Date   • Acute pulmonary edema ([address]) 1/13/2022   • Ambulatory dysfunction    • Anemia    • Anemia, iron deficiency     transfusion requiring   • Asymptomatic bacteriuria 9/25/2017   • Atrial fibrillation ([address])    • AVM (arteriovenous malformation) of duodenum, 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   • Bacteriuria, asymptomatic    • Bipolar disorder ([address])    • Chronic deep vein thrombosis (DVT) ([facility])    • Chronic indwelling Ortega catheter    • Chronic kidney disease    • Chronic pain    • Chronic pain disorder    • Chronic suprapubic catheter ([address])    • Clostridium difficile infection 08/11/2016    also positive 9/2016, 5/29/2017, 8/15/2017. S/P fecal transplant   • Colostomy on examination [facility])    • Decubitus ulcer    • Delirium    • Diabetes mellitus ([address])    • GERD (gastroesophageal reflux disease)    • Hemodialysis patient ([address])    • Hypertension    • Memory impairment 2011    s/p diabetic coma   • Neurogenic bladder    • OAB (overactive bladder)    • Paraplegia (HCC)     T3 transverse myelitis vs spinal stoke (AVM); 2012 extensive epidural abscess C7=> conus 2nd extension from deep parspinal abscess L4-S2/sacral decubitus; cord atrophy/myelomalcia T3=>conus    • Penile abscess    • S/P unilateral BKA (below knee amputation) ([address])     Right   • Sebaceous cyst removed in 2017   • Seizures (HCC)    • Shortness of breath    • Tobacco abuse    • 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HCC)    • Urinary retention    • Wheelchair dependent    • Wounds, multiple     pressure ulcers with delayed healing     Past Surgical History:   Procedure Laterality Date   • BELOW KNEE LEG AMPUTATION Right 2009   • COLONOSCOPY N/A 03/27/2017    Procedure: COLONOSCOPY;  Surgeon: Essie Moraes MD;  Location: [facility]; Service:    • COLONOSCOPY N/A 03/29/2017    Procedure: COLONOSCOPY;  Surgeon: Essie Moraes MD;  [facility]: [facility]; Service:    • COLONOSCOPY N/A 06/15/2017    Procedure: COLONOSCOPY with FMT;  Surgeon: Emerson Barbour MD;  Location: [facility]; Service: Gastroenterology   • ESOPHAGOGASTRODUODENOSCOPY N/A 03/22/2017    Procedure: ESOPHAGOGASTRODUODENOSCOPY (EGD); Surgeon: Kathy Lemus DO;  Location: [facility];   Service:    • MULTIPLE TOOTH EXTRACTIONS N/A 03/08/2021    Procedure: EXTRACTION TEETH # 2,3,6,10,12,13,14,18,19,20,21,22,23,24,25,26,27,28,29,30;  Surgeon: Ray Garland DMD;  Location: [facility];  Service: Maxillofacial   • SKIN BIOPSY       Family History   Problem Relation Age of Onset   • Hyperlipidemia Mother    • Hypertension Mother    • Leukemia Brother    • Diabetes Paternal Grandfather       Social History     Socioeconomic History   • Marital status: Single     Spouse name: Not on file   • Number of children: Not on file   • Years of education: Not on file   • Highest education level: Not on file   Occupational History   • Not on file   Tobacco Use   • Smoking status: Every Day     Types: Cigars     Passive exposure: Current   • Smokeless tobacco: Never   • Tobacco comments:     2 cigars/day - 2/14/2023   Vaping Use   • Vaping Use: Never used   Substance and Sexual Activity   • Alcohol use: Not Currently     Comment: 1 cup of wine every 3-4 months   • Drug use: Not Currently     Types: Marijuana     Comment: rarely; once every 1-2 years   • Sexual activity: Not on file   Other Topics Concern   • Not on file   Social History Narrative   • Not on file     Social Determinants of Health     Financial Resource Strain: Low Risk  (2/2/2023)    Overall Financial Resource Strain (CARDIA)    • Difficulty of Paying Living Expenses: Not hard at all   Food Insecurity: No Food Insecurity (2/2/2023)    Hunger Vital Sign    • Worried About Running Out of Food in the Last Year: Never true    • Ran Out of Food in the Last Year: Never true   Transportation Needs: No Transportation Needs (2/2/2023)    PRAPARE - Transportation    • Lack of Transportation (Medical): No    • Lack of Transportation (Non-Medical):  No   Physical Activity: Not on file   Stress: Not on file   Social Connections: Not on file   Intimate Partner Violence: Not on file   Housing Stability: Not on file        Current Outpatient Medications:   •  Alcohol Swabs (ALCOHOL PADS) 70 % PADS, by Does not apply route 5 (five) times a day, Disp: 300 each, Rfl: 5  •  amLODIPine (NORVASC) 10 mg tablet, TAKE 1 TABLET BY MOUTH EVERY DAY, Disp: 90 tablet, Rfl: 1  •  ammonium lactate (LAC-HYDRIN) 12 % cream, , Disp: , Rfl:   •  apixaban (ELIQUIS) 5 mg, Take 1 tablet (5 mg total) by mouth 2 (two) times a day, Disp: 270 tablet, Rfl: 1  •  atorvastatin (LIPITOR) 20 mg tablet, TAKE 1 TABLET BY MOUTH DAILY AT BEDTIME, Disp: 90 tablet, Rfl: 1  •  azelaic acid (Azelex) 20 % cream, Apply topically 2 (two) times a day, Disp: 50 g, Rfl: 0  •  B Complex-C-Folic Acid (Super B-Complex/Vit C/FA) TABS, TAKE 1 TABLET BY MOUTH EVERY DAY AS DIRECTED, Disp: 90 tablet, Rfl: 4  •  SUSAN MICROLET LANCETS lancets, Use as instructed to check blood sugar 4 times a day Dx e10.29, Disp: 200 each, Rfl: 5  •  bisacodyl (DULCOLAX) 5 mg EC tablet, Take 1 tablet (5 mg total) by mouth once for 1 dose, Disp: 2 tablet, Rfl: 0  •  Blood Glucose Monitoring Suppl (SUSAN CONTOUR NEXT USB MONITOR) w/Device KIT, Testing 4 times a day, Disp: 1 kit, Rfl: 0  •  calcitriol (ROCALTROL) 0.5 MCG capsule, Take 2 mcg by mouth, Disp: , Rfl:   •  calcium acetate (PHOSLO) capsule, TAKE 2 CAPSULES BY MOUTH THREE TIMES DAILY WITH MEALS, Disp: , Rfl:   •  carvedilol (COREG) 25 mg tablet, TAKE 1 TABLET BY MOUTH TWICE A DAY, Disp: 180 tablet, Rfl: 1  •  cetirizine (ZyrTEC) 10 mg tablet, TAKE 1 TABLET BY MOUTH EVERY DAY, Disp: 90 tablet, Rfl: 1  •  Cholecalciferol (VITAMIN D-3) 1000 units CAPS, Take 2 capsules by mouth daily, Disp: 30 capsule, Rfl: 0  •  clindamycin (CLINDAGEL) 1 % gel, APPLY TO AFFECTED AREA TWICE A DAY, Disp: 60 g, Rfl: 2  •  Contour Next Test test strip, USE TO TEST BLOOD SUGAR 3 TIMES DAILY.  CONTOUR NEXT STRIPS. E10.29, Disp: , Rfl:   •  cyanocobalamin ([employer] Vitamin B-12) 1000 MCG tablet, Take 1 tablet (1,000 mcg total) by mouth daily, Disp: 30 tablet, Rfl: 5  •  cyclobenzaprine (FLEXERIL) 5 mg tablet, , Disp: , Rfl:   •  dexlansoprazole (DEXILANT) 30 MG capsule, TAKE 1 CAPSULE BY MOUTH EVERY DAY, Disp: 180 capsule, Rfl: 2  •  dicyclomine (BENTYL) 10 mg capsule, TAKE 1 CAPSULE BY MOUTH 3 TIMES A DAY BEFORE MEALS, Disp: 270 capsule, Rfl: 1  • Disposable Gloves MISC, Use 7 times a day, 4 boxes of gloves XL Dx type 1 DM, paraplegia, Disp: 4 each, Rfl: 11  •  doxazosin (CARDURA) 2 mg tablet, TAKE 1 TABLET BY MOUTH EVERY EVENING., Disp: 90 tablet, Rfl: 1  •  epoetin kaushik (EPOGEN,PROCRIT) 20,000 units/mL, Inject 10,000 Units under the skin, Disp: , Rfl:   •  epoetin kaushik (EPOGEN,PROCRIT) 20,000 units/mL, Inject 5,000 Units under the skin, Disp: , Rfl:   •  ferrous sulfate 325 (65 Fe) mg tablet, Take 1 tablet (325 mg total) by mouth 3 (three) times a day, Disp: 90 tablet, Rfl: 3  •  Fluocinolone Acetonide Body 0.01 % OIL, Apply 2 drops topically daily, Disp: , Rfl:   •  folic acid (FOLVITE) 1 mg tablet, Take 1,000 mcg by mouth daily, Disp: , Rfl:   •  glucose-vitamin C 4-0.006 g, Chew 16 g if needed, Disp: , Rfl:   •  Gvoke PFS 1 MG/0.2ML SOSY, , Disp: , Rfl:   •  hydroxychloroquine (PLAQUENIL) 200 mg tablet, Take 200 mg by mouth daily, Disp: , Rfl:   •  hydrOXYzine HCL (ATARAX) 50 mg tablet, TAKE 1 TABLET (50 MG TOTAL) BY MOUTH 2 (TWO) TIMES A DAY AS NEEDED FOR ITCHING OR ANXIETY, Disp: 180 tablet, Rfl: 1  •  Incontinence Supply Disposable (Incontinence Brief Large) MISC, Use 6 (six) times a day Size xl, Disp: 180 each, Rfl: 11  •  Incontinence Supply Disposable (Undergarment) MISC, Use 6 a day, 5 boxes, xl Dx R51, paraplegia, Disp: 5 each, Rfl: 11  •  Incontinence Supply Disposable (Underpads) MISC, Use 2 a day, Disp: 5 each, Rfl: 11  •  insulin lispro protamine-insulin lispro (HumaLOG 50-50) 100 units/mL, Inject under the skin 2 (two) times a day before meals, Disp: , Rfl:   •  ketoconazole (NIZORAL) 2 % cream, Apply to rash., Disp: 15 g, Rfl: 1  •  LEVEMIR FLEXTOUCH 100 units/mL injection pen, Inject 14 Units under the skin 2 (two) times a day, Disp: 15 pen, Rfl: 3  •  Lokelma 10 g PACK, , Disp: , Rfl:   •  losartan (COZAAR) 100 MG tablet, TAKE 1 TABLET BY MOUTH EVERY DAY, Disp: 90 tablet, Rfl: 1  •  metroNIDAZOLE (METROGEL) 0.75 % gel, Apply topically 2 (two) times a day, Disp: 45 g, Rfl: 0  •  Misc. Devices (Wheelchair Cushion) MISC, Use daily, Disp: 1 each, Rfl: 0  •  Misc. Devices [facility]) MISC, by Does not apply route daily Wheelchair ramp, dx g82.20, Disp: 1 each, Rfl: 0  •  Multiple Vitamin (Daily-Enriqueta Multivitamin) TABS, TAKE 1 TABLET BY MOUTH EVERY DAY, Disp: 30 tablet, Rfl: 8  •  NovoLOG FlexPen 100 units/mL injection pen, INJECT 3 UNITS UNDER THE SKIN 3 (THREE) TIMES A DAY BEFORE MEALS. (Patient not taking: Reported on 7/13/2023), Disp: , Rfl:   •  oxybutynin (DITROPAN XL) 15 MG 24 hr tablet, , Disp: , Rfl:   •  oxybutynin (DITROPAN) 5 mg tablet, Take 3 tablets (15 mg total) by mouth daily, Disp: 270 tablet, Rfl: 1  •  oxyCODONE (ROXICODONE) 10 MG TABS, TAKE ONE TABLET BY MOUTH EVERY 12 HOURS AS NEEDED FOR PAIN. ONGOING THERAPY., Disp: , Rfl:   •  polyethylene glycol (GLYCOLAX) 17 GM/SCOOP powder, Take as directed as per written office instructions, Disp: 238 g, Rfl: 0  •  polyethylene glycol (GOLYTELY) 4000 mL solution, Take 4,000 mL by mouth once for 1 dose, Disp: 4000 mL, Rfl: 0  •  risperiDONE (RisperDAL) 0.5 mg tablet, Take 1 tablet (0.5 mg total) by mouth 2 (two) times a day, Disp: 180 tablet, Rfl: 1  •  sertraline (ZOLOFT) 25 mg tablet, TAKE 1 TABLET (25 MG TOTAL) BY MOUTH DAILY. , Disp: 90 tablet, Rfl: 1  •  Sodium Zirconium Cyclosilicate 10 g PACK, Take 10 g by mouth daily, Disp: , Rfl:   •  sucralfate (CARAFATE) 1 g/10 mL suspension, Take 10 mL (1 g total) by mouth 4 (four) times a day (with meals and at bedtime), Disp: 420 mL, Rfl: 1  •  SUPER B COMPLEX & C TABS, TAKE 1 CAPSULE BY MOUTH ONCE FOR 1 DOSE AS DIRECTED, Disp: 90 tablet, Rfl: 2  •  Zinc Sulfate 220 (50 Zn) MG TABS, Take 1 tablet by mouth daily, Disp: 90 tablet, Rfl: 1  No current facility-administered medications for this visit. Review of Systems   Constitutional: Negative for chills, fatigue and unexpected weight change.    Respiratory: Negative for cough and shortness of breath. Cardiovascular: Negative for chest pain. Musculoskeletal: Positive for gait problem (Paraplegia). Paraplegic   Skin: Positive for wound (Sacrum, perineum, left ischium). Multiple pressure injuries   Neurological: Positive for weakness and numbness. Hematological: Does not bruise/bleed easily. Psychiatric/Behavioral: Negative for agitation. Objective: There were no vitals taken for this visit. Physical Exam  Constitutional:       General: He is not in acute distress. Appearance: He is not ill-appearing. HENT:      Head: Normocephalic. Cardiovascular:      Rate and Rhythm: Normal rate. Pulmonary:      Effort: Pulmonary effort is normal. No respiratory distress. Skin:     General: Skin is warm and dry. Findings: Wound present. No erythema. Comments: Multiple stage 4 pressure injuries with fibrin and less slough present. Some new epithelium on the sacral wound. There is some fibrotic tissue in the sacrum. Overall, the sacral wound has decreased in size slightly again. Wounds with significant undermining and epiboly. The left ischial ulcer has no significant undermining. No malodor to the wounds. No obvious infection. Left hip with significant undermining. Fibrin and slough. See flow-sheet for additional details. Neurological:      Mental Status: He is alert. Psychiatric:         Mood and Affect: Mood normal.         Behavior: Behavior normal.           Wound Pressure Injury Ischium Left (Active)   Wound Image   08/24/23 1311   Wound Description Pink; Beefy red 08/24/23 1311   Pressure Injury Stage 4 01/05/23 1435   Irene-wound Assessment Scar Tissue;Pink; Intact 08/24/23 1311   Wound Length (cm) 6 cm 08/24/23 1311   Wound Width (cm) 4.5 cm 08/24/23 1311   Wound Depth (cm) 0.7 cm 08/24/23 1311   Wound Surface Area (cm^2) 27 cm^2 08/24/23 1311   Wound Volume (cm^3) 18.9 cm^3 08/24/23 1311   Calculated Wound Volume (cm^3) 18.9 cm^3 08/24/23 1311   Change in Wound Size % 0 08/24/23 1311   Tunneling in depth located at 2.9 at 10; 3.7 at 7 01/05/23 1435   Undermining 0.2 05/18/23 1400   Undermining is depth extending from 7-9 05/18/23 1400   Drainage Amount Large 08/24/23 1311   Drainage Description Mendieta;Serosanguineous 08/24/23 1311   Non-staged Wound Description Full thickness 08/24/23 1311   Treatments Irrigation with NSS 08/24/23 1311   Wound packed? No 02/16/23 0953   Dressing Changed Changed 07/13/23 1558   Patient Tolerance Tolerated well 05/18/23 1400   Dressing Status Removed 07/13/23 1558       Wound Pressure Injury Perineum (Active)   Wound Image   08/24/23 1310   Wound Description Pink;Pale;Yellow 08/24/23 1314   Pressure Injury Stage 4 01/05/23 1437   Irene-wound Assessment Scar Tissue; Intact;Pink;Maceration 08/24/23 1314   Wound Length (cm) 5 cm 08/24/23 1314   Wound Width (cm) 5 cm 08/24/23 1314   Wound Depth (cm) 1.4 cm 08/24/23 1314   Wound Surface Area (cm^2) 25 cm^2 08/24/23 1314   Wound Volume (cm^3) 35 cm^3 08/24/23 1314   Calculated Wound Volume (cm^3) 35 cm^3 08/24/23 1314   Change in Wound Size % -233.33 08/24/23 1314   Tunneling in depth located at 3.2 @ 7:00 08/24/23 1314   Undermining 1.4 08/24/23 1314   Undermining is depth extending from 12-12, deepest at 2:00 08/24/23 1314   Drainage Amount Large 08/24/23 1314   Drainage Description Mendieta;Serosanguineous 08/24/23 1314   Non-staged Wound Description Full thickness 08/24/23 1314   Treatments Irrigation with NSS 08/24/23 1314   Wound packed? No 02/16/23 0959   Dressing Changed Changed 07/13/23 1555   Patient Tolerance Tolerated well 07/13/23 1555   Dressing Status Removed 07/13/23 1555       Wound Pressure Injury Sacrum (Active)   Wound Image       08/24/23 1344   Wound Description Pink;Granulation tissue; Yellow;Epithelialization 08/24/23 1318   Pressure Injury Stage 4 01/05/23 1439   Irene-wound Assessment Scar Tissue; Intact 08/24/23 1318   Wound Length (cm) 5.7 cm 08/24/23 1318   Wound Width (cm) 7.8 cm 08/24/23 1318   Wound Depth (cm) 1 cm 08/24/23 1318   Wound Surface Area (cm^2) 44.46 cm^2 08/24/23 1318   Wound Volume (cm^3) 44.46 cm^3 08/24/23 1318   Calculated Wound Volume (cm^3) 44.46 cm^3 08/24/23 1318   Change in Wound Size % 40.72 08/24/23 1318   Undermining 0.8 08/24/23 1318   Undermining is depth extending from 8-9, deepest at 8:00 08/24/23 1318   Drainage Amount Large 08/24/23 1318   Drainage Description Serosanguineous; Mendieta 08/24/23 1318   Non-staged Wound Description Full thickness 08/24/23 1318   Treatments Irrigation with NSS 08/24/23 1318   Wound packed? No 02/16/23 1001   Dressing Changed Changed 07/13/23 1553   Patient Tolerance Tolerated well 07/13/23 1553   Dressing Status Removed 07/13/23 1553       Wound 01/28/21 Pressure Injury Hip Left;Lateral (Active)   Wound Image   08/24/23 1311   Wound Description Pink; White 08/24/23 1321   Pressure Injury Stage 4 01/05/23 1440   Irene-wound Assessment Scar Tissue; Intact 08/24/23 1321   Wound Length (cm) 2.2 cm 08/24/23 1321   Wound Width (cm) 2 cm 08/24/23 1321   Wound Depth (cm) 1.7 cm 08/24/23 1321   Wound Surface Area (cm^2) 4.4 cm^2 08/24/23 1321   Wound Volume (cm^3) 7.48 cm^3 08/24/23 1321   Calculated Wound Volume (cm^3) 7.48 cm^3 08/24/23 1321   Tunneling 4.7 cm 02/10/22 1341   Tunneling in depth located at 10 02/10/22 1341   Undermining 5.4 08/24/23 1321   Undermining is depth extending from 4-12, deepest at 8:00 08/24/23 1321   Drainage Amount Large 08/24/23 1321   Drainage Description Serosanguineous; Mendieta 08/24/23 1321   Non-staged Wound Description Full thickness 08/24/23 1321   Treatments Irrigation with NSS 08/24/23 1321   Wound packed?  No 02/16/23 1004   Packing- # removed 1 05/05/22 1536   Dressing Changed Changed 07/13/23 1600   Patient Tolerance Tolerated well 07/13/23 1600   Dressing Status Removed 07/13/23 1600                          Debridement   Wound Pressure Injury Sacrum    Universal Protocol:  Consent: Verbal consent obtained. Written consent obtained. Consent given by: patient  Time out: Immediately prior to procedure a "time out" was called to verify the correct patient, procedure, equipment, support staff and site/side marked as required. Patient understanding: patient states understanding of the procedure being performed  Patient identity confirmed: verbally with patient      Performed by: physician  Debridement type: surgical  Level of debridement: subcutaneous tissue  Pain control: lidocaine 4%  Post-debridement measurements  Length (cm): 5.7  Width (cm): 7.8  Depth (cm): 1  Percent debrided: 100%  Surface Area (cm^2): 44.46  Area debrided (cm^2): 44.46  Volume (cm^3): 44.46    Tissue and other material debrided: dermis and subcutaneous tissue  Devitalized tissue debrided: fibrin, necrotic debris and slough  Comment regarding debrided tissue: Fibrotic tissue  Instrument(s) utilized: curette  Bleeding: medium  Hemostasis obtained with: pressure  Procedural pain (0-10): insensate  Post-procedural pain: insensate   Response to treatment: procedure was tolerated well                     Lab Results   Component Value Date    HGBA1C 7.5 (H) 03/04/2023       Wound Instructions:  Orders Placed This Encounter   Procedures   • Wound cleansing and dressings         Wash your hands with soap and water. Remove old dressing, discard into plastic bag and place in trash. Cleanse the wound with sterile saline solution (rinse) prior to applying a clean dressing. Do not use tissue or cotton balls. Do not scrub the wound. Pat dry using gauze. Shower no; do not get dressing wet     Left Hip Wound:  Cleanse with normal saline as above  Apply Aquacel AG rope (tape the tail) No substitutes. Cover with an ABD pad  Secure with Medfix tape.   Change dressing 3 times a week and PRN for breakthrough drainage (visiting nurses to do twice per week and family in between)        Sacral and perineal wound:  Cleanse with normal saline as above  Apply saline moistened gauze to wound bed. If odor develops use dakins moistened gauze. Cover with ABD pads  Secure with Medfix tape. Change dressing 3 times a week and PRN for breakthrough drainage (visiting nurses to do twice per week and family in between)    Left ischial wound:  Apply dermagran to the wound bed. Cover with ABD. Secure with medfix tape.      Continue using Clinitron bed.      Continue NO PRESSURE TO ALL WOUNDS, ESPECIALLY PERINEAL WOUND; LIMIT SITTING UPRIGHT IN WHEELCHAIR ON THIS WOUND     Follow up at the wound center in 6 weeks.      Continue visiting nurse skilled 2 x per week for wound care dressing changes.                             Treatment in the wound center today: Cleansed with Prophase, and dressed as above. Standing Status:   Future     Standing Expiration Date:   8/24/2024   • Debridement     This order was created via procedure documentation             Dewey Munoz MD, CHT, CWS    Portions of the record may have been created with voice recognition software. Occasional wrong word or "sound alike" substitutions may have occurred due to the inherent limitations of voice recognition software. Read the chart carefully and recognize, using context, where substitutions have occurred.

## 2023-08-24 NOTE — PATIENT INSTRUCTIONS
Orders Placed This Encounter   Procedures    Wound cleansing and dressings         Wash your hands with soap and water. Remove old dressing, discard into plastic bag and place in trash. Cleanse the wound with sterile saline solution (rinse) prior to applying a clean dressing. Do not use tissue or cotton balls. Do not scrub the wound. Pat dry using gauze. Shower no; do not get dressing wet     Left Hip Wound:  Cleanse with normal saline as above  Apply Aquacel AG rope (tape the tail) No substitutes. Cover with an ABD pad  Secure with Medfix tape. Change dressing 3 times a week and PRN for breakthrough drainage (visiting nurses to do twice per week and family in between)        Sacral and perineal wound:  Cleanse with normal saline as above  Apply saline moistened gauze to wound bed. If odor develops use dakins moistened gauze. Cover with ABD pads  Secure with Medfix tape. Change dressing 3 times a week and PRN for breakthrough drainage (visiting nurses to do twice per week and family in between)    Left ischial wound:  Apply dermagran to the wound bed. Cover with ABD. Secure with medfix tape. Continue using Clinitron bed. Continue NO PRESSURE TO ALL WOUNDS, ESPECIALLY PERINEAL WOUND; LIMIT SITTING UPRIGHT IN WHEELCHAIR ON THIS WOUND     Follow up at the wound center in 6 weeks. Continue visiting nurse skilled 2 x per week for wound care dressing changes. Treatment in the wound center today: Cleansed with Prophase, and dressed as above.      Standing Status:   Future     Standing Expiration Date:   8/24/2024

## 2023-08-25 ENCOUNTER — TELEPHONE (OUTPATIENT)
Dept: DERMATOLOGY | Facility: CLINIC | Age: 43
End: 2023-08-25

## 2023-08-25 NOTE — TELEPHONE ENCOUNTER
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 Doesn't wish to see Dr. Paulino Sheets again. Explained that Dr. Paulino Sheets is no longer with [facility] with Dr. Turner Polo. Patient was scheduled for EXCISION OF CYSTIC LESIONS OF B/L EYEBROWS AND FOREHEAD. (Had to be cancelled due to cardiac clearance needed.)    Same lesions being removed by plastics are "appearing" all over his face now. Patient is diabetic, patient is on dialysis. Patient states that he has many many medication questions that were not answered and he was told to check with Nephrology. He believes questions should be answered or at least attempted to be answered by prescribing physician. I told patient that I would schedule him with Dr. Segundo Mclean when she had an opening. Explained wait / cancellation list / MyChart notification. Patient verbalized understanding. Created wait list for patient.

## 2023-08-29 DIAGNOSIS — R19.7 DIARRHEA, UNSPECIFIED TYPE: Primary | ICD-10-CM

## 2023-09-01 ENCOUNTER — TELEPHONE (OUTPATIENT)
Dept: FAMILY MEDICINE CLINIC | Facility: CLINIC | Age: 43
End: 2023-09-01

## 2023-09-01 NOTE — TELEPHONE ENCOUNTER
PCP SIGNATURE NEEDED FOR [employer] FORM RECEIVED VIA FAX AND PLACED IN PCP FOLDER TO BE DELIVERED AT ASSIGNED TIMES.       Work order- [id number]

## 2023-09-08 ENCOUNTER — TELEPHONE (OUTPATIENT)
Dept: FAMILY MEDICINE CLINIC | Facility: CLINIC | Age: 43
End: 2023-09-08

## 2023-09-08 NOTE — TELEPHONE ENCOUNTER
PCP [address] [place] [address] CARE FORM RECEIVED VIA FAX AND PLACED IN PCP FOLDER TO BE DELIVERED AT ASSIGNED TIMES.         ORDER NUMBER: [id number]

## 2023-09-13 DIAGNOSIS — B37.0 THRUSH: Primary | ICD-10-CM

## 2023-09-13 RX ORDER — CLOTRIMAZOLE 10 MG/1
10 LOZENGE ORAL; TOPICAL
Qty: 50 TABLET | Refills: 0 | Status: SHIPPED | OUTPATIENT
Start: 2023-09-13 | End: 2023-09-23

## 2023-09-16 DIAGNOSIS — I48.91 ATRIAL FIBRILLATION, UNSPECIFIED TYPE (HCC): Chronic | ICD-10-CM

## 2023-09-18 RX ORDER — CHOLECALCIFEROL (VITAMIN D3) 25 MCG
TABLET,CHEWABLE ORAL
Qty: 90 TABLET | Refills: 5 | Status: SHIPPED | OUTPATIENT
Start: 2023-09-18

## 2023-09-19 ENCOUNTER — TELEPHONE (OUTPATIENT)
Dept: FAMILY MEDICINE CLINIC | Facility: CLINIC | Age: 43
End: 2023-09-19

## 2023-09-19 NOTE — TELEPHONE ENCOUNTER
[place] [facility]    FORM RECEIVED VIA FAX AND PLACED IN PCP FOLDER TO BE DELIVERED AT ASSIGNED TIMES.       Order Number: [id number]

## 2023-09-19 NOTE — TELEPHONE ENCOUNTER
Hey there. Good afternoon. This is Buster Lundborg with [facility] [employer] calling. In regards to one of our mutual patients who's trying to get a repair on his power chair, we were waiting on a piece of documentation from your office. If you could just give me a call when you get this message. This is in regards to my Johnny Brannon junior. Date of birth is going to be 1980 and my telephone number is [phone]. Thank you and have a good day. Bye, bye.    Number went straight to voicemail I lt a vm to call us back.

## 2023-09-20 NOTE — TELEPHONE ENCOUNTER
[facility] certification order # [id number]. Confirmation received, scanned into chart.

## 2023-09-28 DIAGNOSIS — L98.429 ULCER OF SACRAL REGION, STAGE 4 (HCC): ICD-10-CM

## 2023-09-28 DIAGNOSIS — Z99.2 TYPE 1 DIABETES MELLITUS WITH CHRONIC KIDNEY DISEASE ON CHRONIC DIALYSIS (HCC): ICD-10-CM

## 2023-09-28 DIAGNOSIS — N18.6 TYPE 1 DIABETES MELLITUS WITH CHRONIC KIDNEY DISEASE ON CHRONIC DIALYSIS (HCC): ICD-10-CM

## 2023-09-28 DIAGNOSIS — E10.22 TYPE 1 DIABETES MELLITUS WITH CHRONIC KIDNEY DISEASE ON CHRONIC DIALYSIS (HCC): ICD-10-CM

## 2023-09-28 RX ORDER — UREA 10 %
1 LOTION (ML) TOPICAL DAILY
Qty: 90 TABLET | Refills: 1 | Status: SHIPPED | OUTPATIENT
Start: 2023-09-28

## 2023-10-03 NOTE — PROGRESS NOTES
Patient ID: Alberta Grissom. is a 37 y.o. male Date of Birth 1980       Chief Complaint   Patient presents with   • Follow Up Wound Care Visit     Follow up visit for wound to sacrum. Pt denies any issues or concerns since last visit. Allergies:  Chlorcyclizine, Chlorhexidine, Ciprofloxacin hcl, Cymbalta [duloxetine hcl], Dm-doxylamine-acetaminophen, Doxycycline, Gabapentin, Hydrocortisone, Lactose - food allergy, Lyrica [pregabalin], Milk (cow), Penicillins, Polymyxin b, Promethazine-phenyleph-codeine, Quinidine, Cefepime, and Rosuvastatin    Diagnosis:      Diagnosis ICD-10-CM Associated Orders   1. Pressure injury of sacral region, stage 4 ([facility])  L89.154 Wound cleansing and dressings     Debridement      2. Pressure injury of left ischium, stage 4 (HCC)  L89.324 Wound cleansing and dressings      3. Pressure ulcer of left hip, stage 4 (HCC)  L89.224 Wound cleansing and dressings      4. Pressure ulcer of other site, stage 4 ([facility])  L89.894 Wound cleansing and dressings              Assessment & Plan:  Multiple stage IV pressure injuries in setting of chronic osteomyelitis of bilateral ischium as well as sacrum per previous CT imaging studies (most recent may '23, seems to have been first noted around oct '18): wounds are all relatively stable with exceptions listed separately below. He remains without signs of acute soft tissue infection. Discussed importance of continued offloading, avoiding sliding/shearing movements while he is sitting in his chair, and continued use of his Clinitron bed. In addition improving protein intake with supplements such as Jim as his protein levels have waxed and waned.     Stage IV Sacral pressure injury: Callus, fibrinous tissue and slough present requiring surgical debridement  Surgical debridement  Continue saline moistened gauze to wound bed, using Dakin's if odor develops; cover with superabsorbent dressing ConvaMax applied today  Stage IV pressure injury of left ischium: Evidence of shearing and tissue irritation distal area of wound; heavy drainage  D/c Dermagran and switch to silver alginate covering with superabsorbent dressing, ConvaMax applied in clinic today  Medfix tape  Stage IV pressure injury of left hip: Less undermining noted, 3.4cm today  Continue Aquacel AG rope, taping the tail  Medfix tape  Stage IV pressure injury of perineum:   Saline moistened gauze to wound bed; if odor develops use dakins moistened gauze   Again reviewed recent CT imaging from May was reviewed and findings consistent with chronic osteo and pressure injuries  Has VNA 2x per week. Family completes dressing changes in between VNA visits  Follow-up in 3 weeks or sooner if needed; continue to monitor for signs of infection including but not limited to purulent drainage, fever, chills, etc.. He was previously on 6-week follow-up schedule however wish to closely monitor the left ischial pressure injury due to change since our last visit. Kim Ospina expresses understanding, all questions answered. Subjective:     "" 06/30/22: 40 y/o M with PMHx of spinal paraplegia, neurogenic bladder with chronic suprapubic catheter in place, ESRD on dialysis M/W/F, anemia of CKD, secondary hyperparathyroidism, type I DM, colostomy bag in place,tricupsid valve endocarditis, chronic osteomyelitis, chronic pressure injuries, afib, and hx of DVT on Eliquis presenting for f/u of multiple stage 4 pressure injuries of the sacrum, left hip, L ischium, and perineum. Pt was recently hospitalized on 06/17/22 for C. Difficile. Has been using wet to dry dressings. States he has been attempting to increase his protein intake and has gained some weight but his albumin remains low and is confused why that is. 9/1/22: Followup multiple stage IV pressure injuries of the sacrum and ischial tuberosities. Patient was hospitalized recently for sepsis and was told it was due to his pressure injuries.   However, they do not look any worse than they ever have been. He was discharged on oral medications. He continues using wet to dry dressings. Prior to hospitalization he developed two new pressure injuries of his posterior scrotum. He continues to supposedly eat well with increased protein. 10/13/22: Followup multiple stage IV pressure injuries of the sacrum, perineum and ischial tuberosities and left hip. .  At last visit, he had scrotal pressure injuries as well. He is using wet to dry dressings. He no longer has his Clinitron and is using an alternating low air loss mattress. He does not like the air mattress because is more difficult to get in and out of. He has not had any fevers or chills. 12/6/2022: Follow-up multiple stage IV pressure injuries of the sacrum, perineum, ischial tuberosities and left hip. Patient states that he saw endocrinology and A1c was 7.9. Slightly better than previous. Using saline packing. Occasionally Dakin's when they noticed an odor. 2/16/2023: Follow-up visit for multiple stage 4 pressure injuries of the sacrum, perineum, L ischium, and L hip. Patient states he has a Clinitron bed and his blood sugars have been well managed recently with his insulin pump. This morning his sugar was 80 and has been running in the 150's lately. He monitors his sugars with a Dexcom. He has been cleansing the wounds with saline, and dakin's occasionally when he notices an odor. He denies fever, pains, or pain. 4/6/2023: Follow-up of multiple stage IV pressure injuries of the sacrum, ischium on the left and left hip and perineum. Blood sugars were running high today. Last A1c done last month was 7.5. No new complaints. Still using saline packing with occasional Dakin's packing if the quality of the ulcer is changed. 5/18/2023: Follow-up multiple stage IV pressure injuries of the sacrum, ischium on the left and left hip and perineum.   Patient states that his blood sugars are running about the same.  He is generally using saline packing with occasional Dakin's packing should there be an odor. He was supposed to use silver alginate but he has not been using this. He was hospitalized about a month ago for C. difficile colitis. 7/13/2023: Follow-up multiple stage IV pressure injuries of the sacrum, ischium on the left and left hip and perineum. Patient was hospitalized for some sort of infection since his last visit. He recovered without problems. He has no new complaints. He continues alternating with saline and Dakin's depending on the odor. 8/24/2023: Follow-up multiple stage IV pressure injuries of the sacrum, ischium, left hip and perineum. ""    *Above HPI hx/summary obtained via chart review of prev wound care office visits*    10/5/2023: Jose Carlos Castro presents today for follow up of multiple stage IV pressure injuries of sacrum, ischium, left hip and perineum. He reports he has no acute complaints and denies fever. States that he continues with large drainage sometimes changing twice per day. States he has been tolerating the dressings well and family has been changing in between VNA visits.   Of note he does say his protein levels have decreased again and he is working on getting increased protein intake as this has been an issue for him in the past.      The following portions of the patient's history were reviewed and updated as appropriate:   Patient Active Problem List   Diagnosis   • Paraplegia ([address])   • Atrial fibrillation ([address])   • Chronic suprapubic catheter ([address])   • Colostomy care ([address])   • S/P unilateral BKA (below knee amputation) ([address])   • Tobacco abuse   • Type 1 diabetes mellitus with chronic kidney disease on chronic dialysis ([address])   • Ulcer of sacral region, stage 4 ([address])   • Wound healing, delayed   • Iron deficiency anemia   • Pressure injury of left ischium, stage 4 ([address])   • HTN (hypertension), benign   • Neurogenic bladder   • GERD (gastroesophageal reflux disease)   • Chronic pain   • Stage 5 chronic kidney disease on chronic dialysis ([facility])   • History of Clostridium difficile infection   • Urinary retention   • Dialysis patient ([address])   • Insulin long-term use ([address])   • Wheelchair dependent   • Recurrent Clostridioides difficile diarrhea   • Bipolar depression ([address])   • 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[address])   • Seizure ([address])   • Pressure ulcer of sacral region, stage 4 ([facility])   • AVM (arteriovenous malformation) of duodenum, acquired   • Chronic narcotic dependence ([facility])   • Chronic diastolic heart failure ([facility])   • Pressure ulcer of other site, stage 4 (H[facility])   • Pressure ulcer of left hip, stage 4 ([facility])   • ED (erectile dysfunction) of organic origin   • Irritable bowel syndrome   • Onychomycosis   • Pustular folliculitis   • Prolonged Q-T interval on ECG   • Secondary hyperparathyroidism of renal origin ([address])   • Wounds, multiple   • Immunocompromised state ([address])   • Severe protein-calorie malnutrition ([facility])   • Chronic osteomyelitis ([facility])   • Acute deep vein thrombosis (DVT) of right upper extremity ([facility])   • C. difficile colitis   • Charcot's arthropathy   • Diabetic gastroparesis associated with type 1 diabetes mellitus    • End stage renal disease ([facility])   • Hyperlipidemia   • LVH (left ventricular hypertrophy)   • NICM (nonischemic cardiomyopathy) ([address])   • Vitamin B deficiency   • Vitamin C deficiency   • Vitamin D deficiency   • Hospital discharge follow-up   • Rheumatoid arthritis [facility])     Past Medical History:   Diagnosis Date   • Acute pulmonary edema ([address]) 1/13/2022   • Ambulatory dysfunction    • Anemia    • Anemia, iron deficiency     transfusion requiring   • Asymptomatic bacteriuria 9/25/2017   • Atrial fibrillation ([facility])    • AVM (arteriovenous malformation) of duodenum, 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   • Bacteriuria, asymptomatic    • Bipolar disorder ([facility])    • Chronic deep vein thrombosis (DVT) ([facility])    • Chronic indwelling Ortega catheter    • Chronic kidney disease    • Chronic pain    • Chronic pain disorder    • Chronic suprapubic catheter (HCC)    • Clostridium difficile infection 08/11/2016    also positive 9/2016, 5/29/2017, 8/15/2017. S/P fecal transplant   • Colostomy on examination [facility])    • Decubitus ulcer    • Delirium    • Diabetes mellitus ([address])    • GERD (gastroesophageal reflux disease)    • Hemodialysis patient ([address])    • Hypertension    • Memory impairment 2011    s/p diabetic coma   • Neurogenic bladder    • OAB (overactive bladder)    • Paraplegia (HCC)     T3 transverse myelitis vs spinal stoke (AVM); 2012 extensive epidural abscess C7=> conus 2nd extension from deep parspinal abscess L4-S2/sacral decubitus; cord atrophy/myelomalcia T3=>conus    • Penile abscess    • S/P unilateral BKA (below knee amputation) ([address])     Right   • Sebaceous cyst removed in 2017   • Seizures (HCC)    • Shortness of breath    • Tobacco abuse    • 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HCC)    • Urinary retention    • Wheelchair dependent    • Wounds, multiple     pressure ulcers with delayed healing     Past Surgical History:   Procedure Laterality Date   • BELOW KNEE LEG AMPUTATION Right 2009   • COLONOSCOPY N/A 03/27/2017    Procedure: COLONOSCOPY;  Surgeon: Essie Moraes MD;  Location: [facility]; Service:    • COLONOSCOPY N/A 03/29/2017    Procedure: COLONOSCOPY;  Surgeon: Essie Moraes MD;  Location: [facility]; Service:    • COLONOSCOPY N/A 06/15/2017    Procedure: COLONOSCOPY with FMT;  Surgeon: Emerson Barbour MD;  Location: [facility]; Service: Gastroenterology   • ESOPHAGOGASTRODUODENOSCOPY N/A 03/22/2017    Procedure: ESOPHAGOGASTRODUODENOSCOPY (EGD); Surgeon: Kathy Lemus DO;  Location: [facility];   Service:    • MULTIPLE TOOTH EXTRACTIONS N/A 03/08/2021    Procedure: EXTRACTION TEETH # 2,3,6,10,12,13,14,18,19,20,21,22,23,24,25,26,27,28,29,30;  Surgeon: Ray Garland DMD;  Location: [facility];  Service: Maxillofacial   • SKIN BIOPSY       Family History   Problem Relation Age of Onset • Hyperlipidemia Mother    • Hypertension Mother    • Leukemia Brother    • Diabetes Paternal Grandfather       Social History     Socioeconomic History   • Marital status: Single     Spouse name: Not on file   • Number of children: Not on file   • Years of education: Not on file   • Highest education level: Not on file   Occupational History   • Not on file   Tobacco Use   • Smoking status: Every Day     Types: Cigars     Passive exposure: Current   • Smokeless tobacco: Never   • Tobacco comments:     2 cigars/day - 2/14/2023   Vaping Use   • Vaping Use: Never used   Substance and Sexual Activity   • Alcohol use: Not Currently     Comment: 1 cup of wine every 3-4 months   • Drug use: Not Currently     Types: Marijuana     Comment: rarely; once every 1-2 years   • Sexual activity: Not on file   Other Topics Concern   • Not on file   Social History Narrative   • Not on file     Social Determinants of Health     Financial Resource Strain: Low Risk  (2/2/2023)    Overall Financial Resource Strain (CARDIA)    • Difficulty of Paying Living Expenses: Not hard at all   Food Insecurity: No Food Insecurity (2/2/2023)    Hunger Vital Sign    • Worried About Running Out of Food in the Last Year: Never true    • Ran Out of Food in the Last Year: Never true   Transportation Needs: No Transportation Needs (2/2/2023)    PRAPARE - Transportation    • Lack of Transportation (Medical): No    • Lack of Transportation (Non-Medical):  No   Physical Activity: Not on file   Stress: Not on file   Social Connections: Not on file   Intimate Partner Violence: Not on file   Housing Stability: Not on file        Current Outpatient Medications:   •  Alcohol Swabs (ALCOHOL PADS) 70 % PADS, by Does not apply route 5 (five) times a day, Disp: 300 each, Rfl: 5  •  amLODIPine (NORVASC) 10 mg tablet, TAKE 1 TABLET BY MOUTH EVERY DAY, Disp: 90 tablet, Rfl: 1  •  ammonium lactate (LAC-HYDRIN) 12 % cream, , Disp: , Rfl:   •  apixaban (ELIQUIS) 5 mg, Take 1 tablet (5 mg total) by mouth 2 (two) times a day, Disp: 270 tablet, Rfl: 1  •  atorvastatin (LIPITOR) 20 mg tablet, TAKE 1 TABLET BY MOUTH DAILY AT BEDTIME, Disp: 90 tablet, Rfl: 1  •  azelaic acid (Azelex) 20 % cream, Apply topically 2 (two) times a day, Disp: 50 g, Rfl: 0  •  B Complex-C-Folic Acid (Super B-Complex/Vit C/FA) TABS, TAKE 1 TABLET BY MOUTH EVERY DAY AS DIRECTED, Disp: 90 tablet, Rfl: 5  •  SUSAN MICROLET LANCETS lancets, Use as instructed to check blood sugar 4 times a day Dx e10.29, Disp: 200 each, Rfl: 5  •  bisacodyl (DULCOLAX) 5 mg EC tablet, Take 1 tablet (5 mg total) by mouth once for 1 dose, Disp: 2 tablet, Rfl: 0  •  Blood Glucose Monitoring Suppl (ditlo CONTOUR NEXT USB MONITOR) w/Device KIT, Testing 4 times a day, Disp: 1 kit, Rfl: 0  •  calcitriol (ROCALTROL) 0.5 MCG capsule, Take 2 mcg by mouth, Disp: , Rfl:   •  calcium acetate (PHOSLO) capsule, TAKE 2 CAPSULES BY MOUTH THREE TIMES DAILY WITH MEALS, Disp: , Rfl:   •  carvedilol (COREG) 25 mg tablet, TAKE 1 TABLET BY MOUTH TWICE A DAY, Disp: 180 tablet, Rfl: 1  •  cetirizine (ZyrTEC) 10 mg tablet, TAKE 1 TABLET BY MOUTH EVERY DAY, Disp: 90 tablet, Rfl: 1  •  Cholecalciferol (VITAMIN D-3) 1000 units CAPS, Take 2 capsules by mouth daily, Disp: 30 capsule, Rfl: 0  •  clindamycin (CLINDAGEL) 1 % gel, APPLY TO AFFECTED AREA TWICE A DAY, Disp: 60 g, Rfl: 2  •  Contour Next Test test strip, USE TO TEST BLOOD SUGAR 3 TIMES DAILY.  CONTOUR NEXT STRIPS. E10.29, Disp: , Rfl:   •  cyanocobalamin ([employer] Vitamin B-12) 1000 MCG tablet, Take 1 tablet (1,000 mcg total) by mouth daily, Disp: 30 tablet, Rfl: 5  •  cyclobenzaprine (FLEXERIL) 5 mg tablet, , Disp: , Rfl:   •  dexlansoprazole (DEXILANT) 30 MG capsule, TAKE 1 CAPSULE BY MOUTH EVERY DAY, Disp: 180 capsule, Rfl: 2  •  dicyclomine (BENTYL) 10 mg capsule, TAKE 1 CAPSULE BY MOUTH 3 TIMES A DAY BEFORE MEALS, Disp: 270 capsule, Rfl: 1  •  Disposable Gloves MISC, Use 7 times a day, 4 boxes of gloves XL Dx type 1 DM, paraplegia, Disp: 4 each, Rfl: 11  •  doxazosin (CARDURA) 2 mg tablet, TAKE 1 TABLET BY MOUTH EVERY EVENING., Disp: 90 tablet, Rfl: 1  •  epoetin kaushik (EPOGEN,PROCRIT) 20,000 units/mL, Inject 10,000 Units under the skin, Disp: , Rfl:   •  epoetin kaushik (EPOGEN,PROCRIT) 20,000 units/mL, Inject 5,000 Units under the skin, Disp: , Rfl:   •  ferrous sulfate 325 (65 Fe) mg tablet, Take 1 tablet (325 mg total) by mouth 3 (three) times a day, Disp: 90 tablet, Rfl: 3  •  Fluocinolone Acetonide Body 0.01 % OIL, Apply 2 drops topically daily, Disp: , Rfl:   •  folic acid (FOLVITE) 1 mg tablet, Take 1,000 mcg by mouth daily, Disp: , Rfl:   •  glucose-vitamin C 4-0.006 g, Chew 16 g if needed, Disp: , Rfl:   •  Gvoke PFS 1 MG/0.2ML SOSY, , Disp: , Rfl:   •  hydroxychloroquine (PLAQUENIL) 200 mg tablet, Take 200 mg by mouth daily, Disp: , Rfl:   •  hydrOXYzine HCL (ATARAX) 50 mg tablet, TAKE 1 TABLET (50 MG TOTAL) BY MOUTH 2 (TWO) TIMES A DAY AS NEEDED FOR ITCHING OR ANXIETY, Disp: 180 tablet, Rfl: 1  •  Incontinence Supply Disposable (Incontinence Brief Large) MISC, Use 6 (six) times a day Size xl, Disp: 180 each, Rfl: 11  •  Incontinence Supply Disposable (Undergarment) MISC, Use 6 a day, 5 boxes, xl Dx R51, paraplegia, Disp: 5 each, Rfl: 11  •  Incontinence Supply Disposable (Underpads) MISC, Use 2 a day, Disp: 5 each, Rfl: 11  •  insulin lispro protamine-insulin lispro (HumaLOG 50-50) 100 units/mL, Inject under the skin 2 (two) times a day before meals, Disp: , Rfl:   •  ketoconazole (NIZORAL) 2 % cream, Apply to rash., Disp: 15 g, Rfl: 1  •  LEVEMIR FLEXTOUCH 100 units/mL injection pen, Inject 14 Units under the skin 2 (two) times a day, Disp: 15 pen, Rfl: 3  •  Lokelma 10 g PACK, , Disp: , Rfl:   •  losartan (COZAAR) 100 MG tablet, TAKE 1 TABLET BY MOUTH EVERY DAY, Disp: 90 tablet, Rfl: 1  •  metroNIDAZOLE (METROGEL) 0.75 % gel, Apply topically 2 (two) times a day, Disp: 45 g, Rfl: 0  •  Misc.  Devices (Wheelchair Cushion) MISC, Use daily, Disp: 1 each, Rfl: 0  •  Misc. Devices [facility]) MISC, by Does not apply route daily Wheelchair ramp, dx g82.20, Disp: 1 each, Rfl: 0  •  Multiple Vitamin (Daily-Enriqueta Multivitamin) TABS, TAKE 1 TABLET BY MOUTH EVERY DAY, Disp: 30 tablet, Rfl: 8  •  NovoLOG FlexPen 100 units/mL injection pen, INJECT 3 UNITS UNDER THE SKIN 3 (THREE) TIMES A DAY BEFORE MEALS. (Patient not taking: Reported on 7/13/2023), Disp: , Rfl:   •  ondansetron (ZOFRAN-ODT) 4 mg disintegrating tablet, Take 1 tablet (4 mg total) by mouth every 6 (six) hours as needed for nausea or vomiting, Disp: 30 tablet, Rfl: 2  •  oxybutynin (DITROPAN XL) 15 MG 24 hr tablet, , Disp: , Rfl:   •  oxybutynin (DITROPAN) 5 mg tablet, Take 3 tablets (15 mg total) by mouth daily, Disp: 270 tablet, Rfl: 1  •  oxyCODONE (ROXICODONE) 10 MG TABS, TAKE ONE TABLET BY MOUTH EVERY 12 HOURS AS NEEDED FOR PAIN. ONGOING THERAPY., Disp: , Rfl:   •  polyethylene glycol (GLYCOLAX) 17 GM/SCOOP powder, Take as directed as per written office instructions, Disp: 238 g, Rfl: 0  •  polyethylene glycol (GOLYTELY) 4000 mL solution, Take 4,000 mL by mouth once for 1 dose, Disp: 4000 mL, Rfl: 0  •  risperiDONE (RisperDAL) 0.5 mg tablet, Take 1 tablet (0.5 mg total) by mouth 2 (two) times a day, Disp: 180 tablet, Rfl: 1  •  sertraline (ZOLOFT) 25 mg tablet, TAKE 1 TABLET (25 MG TOTAL) BY MOUTH DAILY. , Disp: 90 tablet, Rfl: 1  •  Sodium Zirconium Cyclosilicate 10 g PACK, Take 10 g by mouth daily, Disp: , Rfl:   •  sucralfate (CARAFATE) 1 g/10 mL suspension, Take 10 mL (1 g total) by mouth 4 (four) times a day (with meals and at bedtime), Disp: 420 mL, Rfl: 1  •  SUPER B COMPLEX & C TABS, TAKE 1 CAPSULE BY MOUTH ONCE FOR 1 DOSE AS DIRECTED, Disp: 90 tablet, Rfl: 2  •  Zinc Sulfate 220 (50 Zn) MG TABS, Take 1 tablet by mouth daily, Disp: 90 tablet, Rfl: 1    Review of Systems   Constitutional: Negative for chills, fatigue and unexpected weight change. Respiratory: Negative for cough and shortness of breath. Cardiovascular: Negative for chest pain. Musculoskeletal: Positive for gait problem (Paraplegia). Paraplegic   Skin: Positive for wound (Sacrum, perineum, left ischium). Multiple pressure injuries   Neurological: Positive for weakness and numbness. Hematological: Does not bruise/bleed easily. Psychiatric/Behavioral: Negative for agitation. Objective:  /66   Pulse 81   Temp 97.9 °F (36.6 °C) (Tympanic)   Resp 16   Pain Score: 0-No pain     Physical Exam  Constitutional:       General: He is not in acute distress. Appearance: He is not ill-appearing. HENT:      Head: Normocephalic. Cardiovascular:      Rate and Rhythm: Normal rate. Pulmonary:      Effort: Pulmonary effort is normal. No respiratory distress. Skin:     General: Skin is warm and dry. Findings: Wound present. No erythema. Comments: Multiple stage 4 pressure injuries overall relatively stable with the exception of left ischium which has deteriorated since last visit with evidence of shearing and skin irritation/breakdown distal wound. Sacral and hip wounds with undermining and epiboly; left hip most significant undermining though this has decreased to 3.4cm compared to last visit  Sacral wound has periwound callus and fibrin and slough  No significant fibrin or slough of perineal pressure injury or left hip  The left ischial ulcer has no significant undermining but as stated above deterioration in distal tissue    See full wound description for additional details. Neurological:      Mental Status: He is alert. Psychiatric:         Mood and Affect: Mood normal.         Behavior: Behavior normal.           Wound Pressure Injury Ischium Left (Active)   Wound Image   10/05/23 1451   Wound Description Pink;Granulation tissue; Yellow;Light purple 10/05/23 1456   Pressure Injury Stage 4 01/05/23 1435   Irene-wound Assessment Scar Tissue; Intact 10/05/23 1456   Wound Length (cm) 6.3 cm 10/05/23 1456   Wound Width (cm) 5.3 cm 10/05/23 1456   Wound Depth (cm) 0.7 cm 10/05/23 1456   Wound Surface Area (cm^2) 33.39 cm^2 10/05/23 1456   Wound Volume (cm^3) 23.373 cm^3 10/05/23 1456   Calculated Wound Volume (cm^3) 23.37 cm^3 10/05/23 1456   Change in Wound Size % -23.65 10/05/23 1456   Tunneling in depth located at 2.9 at 10; 3.7 at 7 01/05/23 1435   Undermining 0 10/05/23 1456   Undermining is depth extending from resolved 10/05/23 1456   Drainage Amount Large 10/05/23 1456   Drainage Description Serosanguineous; Mendieta 10/05/23 1456   Non-staged Wound Description Full thickness 08/24/23 1311   Treatments Irrigation with NSS 08/24/23 1311   Wound packed? No 02/16/23 0953   Dressing Changed Changed 07/13/23 1558   Patient Tolerance Tolerated well 10/05/23 1456   Dressing Status Removed 10/05/23 1456       Wound Pressure Injury Perineum (Active)   Wound Image   10/05/23 1452   Wound Description Pink;Yellow;Epithelialization;Granulation tissue;Light purple;Gray 10/05/23 1458   Pressure Injury Stage 4 01/05/23 1437   Irene-wound Assessment Scar Tissue 10/05/23 1458   Wound Length (cm) 6 cm 10/05/23 1458   Wound Width (cm) 5.3 cm 10/05/23 1458   Wound Depth (cm) 1.4 cm 10/05/23 1458   Wound Surface Area (cm^2) 31.8 cm^2 10/05/23 1458   Wound Volume (cm^3) 44.52 cm^3 10/05/23 1458   Calculated Wound Volume (cm^3) 44.52 cm^3 10/05/23 1458   Change in Wound Size % -324 10/05/23 1458   Tunneling in depth located at 0 10/05/23 1458   Undermining 3.3 10/05/23 1458   Undermining is depth extending from 3-8 o'clock, deepest at 7 o'clock 10/05/23 1458   Drainage Amount Large 10/05/23 1458   Drainage Description Serosanguineous; Mendieta 10/05/23 1458   Non-staged Wound Description Full thickness 10/05/23 1458   Treatments Irrigation with NSS 10/05/23 1458   Wound packed?  No 02/16/23 0959   Dressing Changed Changed 10/05/23 1458   Patient Tolerance Tolerated well 10/05/23 1458   Dressing Status Removed 10/05/23 1458       Wound Pressure Injury Sacrum (Active)   Wound Image   10/05/23 1452   Wound Description Pink;Granulation tissue; Yellow;Slough; Epithelialization; Hypergranulation 10/05/23 1458   Pressure Injury Stage 4 01/05/23 1439   Irene-wound Assessment Scar Tissue; Intact 10/05/23 1458   Wound Length (cm) 5.3 cm 10/05/23 1458   Wound Width (cm) 8.5 cm 10/05/23 1458   Wound Depth (cm) 0.5 cm 10/05/23 1558   Wound Surface Area (cm^2) 45.05 cm^2 10/05/23 1458   Wound Volume (cm^3) 45.05 cm^3 10/05/23 1458   Calculated Wound Volume (cm^3) 45.05 cm^3 10/05/23 1458   Change in Wound Size % 39.93 10/05/23 1458   Undermining 0 10/05/23 1458   Undermining is depth extending from resolved 10/05/23 1458   Drainage Amount Large 10/05/23 1458   Drainage Description Serosanguineous 10/05/23 1458   Non-staged Wound Description Full thickness 10/05/23 1458   Treatments Irrigation with NSS 10/05/23 1458   Wound packed? No 02/16/23 1001   Dressing Changed Changed 07/13/23 1553   Patient Tolerance Tolerated well 10/05/23 1458   Dressing Status Removed 10/05/23 1458       Wound Pressure Injury Hip Left;Lateral (Active)   Wound Image   10/05/23 1450   Wound Description Pink;Yellow 10/05/23 1453   Pressure Injury Stage 4 01/05/23 1440   Irene-wound Assessment Scar Tissue; Intact 10/05/23 1453   Wound Length (cm) 2.2 cm 10/05/23 1453   Wound Width (cm) 2 cm 10/05/23 1453   Wound Depth (cm) 1.5 cm 10/05/23 1453   Wound Surface Area (cm^2) 4.4 cm^2 10/05/23 1453   Wound Volume (cm^3) 6.6 cm^3 10/05/23 1453   Calculated Wound Volume (cm^3) 6.6 cm^3 10/05/23 1453   Tunneling 4.7 cm 02/10/22 1341   Tunneling in depth located at 10 02/10/22 1341   Undermining 3.4 10/05/23 1453   Undermining is depth extending from 4-12 o'clock, deepest at 8 o'clock 10/05/23 1453   Drainage Amount Large 10/05/23 1453   Drainage Description Serosanguineous; [place] 10/05/23 1453   Non-staged Wound Description Full thickness 10/05/23 1453   Treatments Irrigation with NSS 10/05/23 1453   Wound packed? No 02/16/23 1004   Packing- # removed 1 05/05/22 1536   Dressing Changed Changed 07/13/23 1600   Patient Tolerance Tolerated well 10/05/23 1453   Dressing Status Removed 10/05/23 1453       Debridement   Wound Pressure Injury Sacrum    Universal Protocol:  Consent: Verbal consent obtained. Written consent obtained. Risks and benefits: risks, benefits and alternatives were discussed  Consent given by: patient  Time out: Immediately prior to procedure a "time out" was called to verify the correct patient, procedure, equipment, support staff and site/side marked as required. Timeout called at: 10/5/2023 3:30 PM.  Patient understanding: patient states understanding of the procedure being performed  Patient identity confirmed: verbally with patient      Performed by: physician  Debridement type: surgical  Level of debridement: subcutaneous tissue  Pain control: lidocaine 4%  Post-debridement measurements  Length (cm): 5.3  Width (cm): 8.5  Depth (cm): 0.6  Percent debrided: 50%  Surface Area (cm^2): 45.05  Area debrided (cm^2): 22.53  Volume (cm^3): 27.03  Tissue and other material debrided: hypergranulation and subcutaneous tissue  Devitalized tissue debrided: callus and fibrin  Instrument(s) utilized: curette, blade and forceps  Bleeding: medium  Hemostasis obtained with: pressure  Procedural pain (0-10): 0  Post-procedural pain: 0   Response to treatment: procedure was tolerated well                         Lab Results   Component Value Date    HGBA1C 7.5 (H) 03/04/2023       Wound Instructions:  Orders Placed This Encounter   Procedures   • Wound cleansing and dressings     Wash your hands with soap and water. Remove old dressing, discard into plastic bag and place in trash. Cleanse the wound with sterile saline solution (rinse) prior to applying a clean dressing. Do not use tissue or cotton balls. Do not scrub the wound.  Pat dry using gauze.  Shower no; do not get dressing wet     Left Hip Wound:  Cleanse with normal saline as above  Apply Aquacel AG rope (tape the tail) No substitutes. Cover with Convamax (or equivalent super absorber). Secure with Medfix tape. Change dressing 3 times a week and PRN for breakthrough drainage (visiting nurses to do twice per week and family in between)        Sacral and perineal wound:  Cleanse with normal saline as above  Apply saline moistened gauze to wound bed. If odor develops use dakins moistened gauze. Cover with Convamax (or equivalent super absorber). Secure with Medfix tape. Change dressing 3 times a week and PRN for breakthrough drainage (visiting nurses to do twice per week and family in between)     Left ischial wound:  STOP Dermagran  Apply silver alginate to the wound bed. Cover with Convamax (or equivalent super absorber). Secure with Medfix tape.      Continue using Clinitron bed.      Continue NO PRESSURE TO ALL WOUNDS, ESPECIALLY PERINEAL WOUND; LIMIT SITTING UPRIGHT IN WHEELCHAIR ON THIS WOUND     Follow up at the wound center in 3 weeks.      Continue visiting nurse skilled 2 x per week for wound care dressing changes.                             Treatment in the wound center today: Cleansed with Prophase, and dressed as above. Standing Status:   Future     Standing Expiration Date:   10/5/2024   • Debridement     This order was created via procedure documentation       Gigi Bhatti MD    Portions of the record may have been created with voice recognition software. Occasional wrong word or "sound alike" substitutions may have occurred due to the inherent limitations of voice recognition software. Read the chart carefully and recognize, using context, where substitutions have occurred.

## 2023-10-05 ENCOUNTER — OFFICE VISIT (OUTPATIENT)
Dept: WOUND CARE | Facility: CLINIC | Age: 43
End: 2023-10-05
Payer: MEDICARE

## 2023-10-05 VITALS
TEMPERATURE: 97.9 F | SYSTOLIC BLOOD PRESSURE: 126 MMHG | RESPIRATION RATE: 16 BRPM | HEART RATE: 81 BPM | DIASTOLIC BLOOD PRESSURE: 66 MMHG

## 2023-10-05 DIAGNOSIS — L89.894 PRESSURE ULCER OF OTHER SITE, STAGE 4 (HCC): ICD-10-CM

## 2023-10-05 DIAGNOSIS — L89.154 PRESSURE INJURY OF SACRAL REGION, STAGE 4 (HCC): Primary | ICD-10-CM

## 2023-10-05 DIAGNOSIS — L89.324 PRESSURE INJURY OF LEFT ISCHIUM, STAGE 4 (HCC): ICD-10-CM

## 2023-10-05 DIAGNOSIS — L89.224 PRESSURE ULCER OF LEFT HIP, STAGE 4 (HCC): ICD-10-CM

## 2023-10-05 PROCEDURE — 11045 DBRDMT SUBQ TISS EACH ADDL: CPT | Performed by: FAMILY MEDICINE

## 2023-10-05 PROCEDURE — 99214 OFFICE O/P EST MOD 30 MIN: CPT | Performed by: FAMILY MEDICINE

## 2023-10-05 PROCEDURE — 11042 DBRDMT SUBQ TIS 1ST 20SQCM/<: CPT | Performed by: FAMILY MEDICINE

## 2023-10-05 NOTE — LETTER
[address] [facility]  [facility] [address]  Phone#  [phone]  Fax#  [phone]    Patient:  Enriqueta Mars. YOB: 1980  Phone:  [phone]  Date of Visit:  10/5/2023    Orders Placed This Encounter   Procedures   • Wound cleansing and dressings     Wash your hands with soap and water. Remove old dressing, discard into plastic bag and place in trash. Cleanse the wound with sterile saline solution (rinse) prior to applying a clean dressing. Do not use tissue or cotton balls. Do not scrub the wound. Pat dry using gauze. Shower no; do not get dressing wet     Left Hip Wound:  Cleanse with normal saline as above  Apply Aquacel AG rope (tape the tail) No substitutes. Cover with Convamax (or equivalent super absorber). Secure with Medfix tape. Change dressing 3 times a week and PRN for breakthrough drainage (visiting nurses to do twice per week and family in between)        Sacral and perineal wound:  Cleanse with normal saline as above  Apply saline moistened gauze to wound bed. If odor develops use dakins moistened gauze. Cover with Convamax (or equivalent super absorber). Secure with Medfix tape. Change dressing 3 times a week and PRN for breakthrough drainage (visiting nurses to do twice per week and family in between)     Left ischial wound:  STOP Dermagran  Apply silver alginate to the wound bed. Cover with Convamax (or equivalent super absorber). Secure with Medfix tape. Continue using Clinitron bed. Continue NO PRESSURE TO ALL WOUNDS, ESPECIALLY PERINEAL WOUND; LIMIT SITTING UPRIGHT IN WHEELCHAIR ON THIS WOUND     Follow up at the wound center in 3 weeks. Continue visiting nurse skilled 2 x per week for wound care dressing changes. Treatment in the wound center today: Cleansed with Prophase, and dressed as above.      Standing Status:   Future     Standing Expiration Date:   10/5/2024 Electronically signed by Sweta Dover MD

## 2023-10-05 NOTE — PATIENT INSTRUCTIONS
Orders Placed This Encounter   Procedures    Wound cleansing and dressings     Wash your hands with soap and water. Remove old dressing, discard into plastic bag and place in trash. Cleanse the wound with sterile saline solution (rinse) prior to applying a clean dressing. Do not use tissue or cotton balls. Do not scrub the wound. Pat dry using gauze. Shower no; do not get dressing wet     Left Hip Wound:  Cleanse with normal saline as above  Apply Aquacel AG rope (tape the tail) No substitutes. Cover with Convamax (or equivalent super absorber). Secure with Medfix tape. Change dressing 3 times a week and PRN for breakthrough drainage (visiting nurses to do twice per week and family in between)        Sacral and perineal wound:  Cleanse with normal saline as above  Apply saline moistened gauze to wound bed. If odor develops use dakins moistened gauze. Cover with Convamax (or equivalent super absorber). Secure with Medfix tape. Change dressing 3 times a week and PRN for breakthrough drainage (visiting nurses to do twice per week and family in between)     Left ischial wound:  STOP Dermagran  Apply silver alginate to the wound bed. Cover with Convamax (or equivalent super absorber). Secure with Medfix tape. Continue using Clinitron bed. Continue NO PRESSURE TO ALL WOUNDS, ESPECIALLY PERINEAL WOUND; LIMIT SITTING UPRIGHT IN WHEELCHAIR ON THIS WOUND     Follow up at the wound center in 3 weeks. Continue visiting nurse skilled 2 x per week for wound care dressing changes. Treatment in the wound center today: Cleansed with Prophase, and dressed as above.      Standing Status:   Future     Standing Expiration Date:   10/5/2024

## 2023-10-11 ENCOUNTER — TELEPHONE (OUTPATIENT)
Dept: FAMILY MEDICINE CLINIC | Facility: CLINIC | Age: 43
End: 2023-10-11

## 2023-10-11 NOTE — TELEPHONE ENCOUNTER
10/11/23    PCP SIGNATURE NEEDED FOR [employer] / Detailed Written Order FORM RECEIVED VIA FAX AND PLACED IN PCP FOLDER TO BE DELIVERED AT ASSIGNED TIMES.       Order Date: 08/28/23

## 2023-10-16 ENCOUNTER — TELEPHONE (OUTPATIENT)
Dept: FAMILY MEDICINE CLINIC | Facility: CLINIC | Age: 43
End: 2023-10-16

## 2023-10-16 NOTE — TELEPHONE ENCOUNTER
PCP SIGNATURE NEEDED FOR [facility] FORM RECEIVED VIA FAX AND PLACED IN PCP FOLDER TO BE DELIVERED AT ASSIGNED TIMES. .    Plan of care 10/15/23-12/13/23

## 2023-10-19 ENCOUNTER — TELEPHONE (OUTPATIENT)
Dept: FAMILY MEDICINE CLINIC | Facility: CLINIC | Age: 43
End: 2023-10-19

## 2023-10-19 NOTE — TELEPHONE ENCOUNTER
PCP SIGNATURE NEEDED FOR [employer]  FORM RECEIVED VIA FAX AND PLACED IN PCP FOLDER TO BE DELIVERED AT ASSIGNED TIMES.         Equipment Repairs

## 2023-10-20 NOTE — TELEPHONE ENCOUNTER
10/20/23    Form faxed and confirmed that it was received on 10/19/23    Form scanned into Pt chart     Fax # [phone]    Form: [employer] / Detailed Written Order Leydi Servant Date: 08/28/23

## 2023-11-02 NOTE — TELEPHONE ENCOUNTER
Fax to [phone], Confirmation received  Occupational Therapy    Patient not seen in therapy.     OT orders received. Pt with hx of CP, contracted and recieves total assist with dressing, bating and toileting. Pt reports that she has help with grooming and self-feeding as needed but is mostly able to do that herself. She is bedbound and only gets out of bed into a wc when leaving the home for medical needs. She states that she is functioning at her baseline with self carers and does not need formal OT evaluation at this time. Will discontinue OT at this time.      OBJECTIVE                       Documented in the chart in the following areas: Assessment/Plan.      Therapy procedure time and total treatment time can be found documented on the Time Entry flowsheet

## 2023-11-03 ENCOUNTER — TELEPHONE (OUTPATIENT)
Dept: FAMILY MEDICINE CLINIC | Facility: CLINIC | Age: 43
End: 2023-11-03

## 2023-11-03 NOTE — TELEPHONE ENCOUNTER
[place] [facility] FORM RECEIVED VIA FAX AND PLACED IN PCP FOLDER TO BE DELIVERED AT ASSIGNED TIMES.       Order Number: [id number]

## 2023-11-14 ENCOUNTER — TELEPHONE (OUTPATIENT)
Dept: FAMILY MEDICINE CLINIC | Facility: CLINIC | Age: 43
End: 2023-11-14

## 2023-11-14 NOTE — TELEPHONE ENCOUNTER
11/14/23    PCP SIGNATURE NEEDED FOR   [facility] / PHYSICIAN ORDERS - DATE 11/09/23     FORM RECEIVED VIA FAX AND PLACED IN PCP FOLDER TO BE DELIVERED AT ASSIGNED TIMES.

## 2023-11-17 ENCOUNTER — TELEPHONE (OUTPATIENT)
Dept: FAMILY MEDICINE CLINIC | Facility: CLINIC | Age: 43
End: 2023-11-17

## 2023-11-17 NOTE — TELEPHONE ENCOUNTER
11/17/23    PCP SIGNATURE NEEDED FOR   [facility] / VISIT DATE: 11/10/23     FORM RECEIVED VIA FAX AND PLACED IN PCP FOLDER TO BE DELIVERED AT ASSIGNED TIMES.

## 2023-11-24 NOTE — TELEPHONE ENCOUNTER
FAXED ON 11/22/23 TO [facility] PHYSICIAN ORDERS - DATE 11/09/23   at [phone]. FAX CONFIRMATION RECEIVED.

## 2023-11-30 ENCOUNTER — OFFICE VISIT (OUTPATIENT)
Dept: WOUND CARE | Facility: CLINIC | Age: 43
End: 2023-11-30
Payer: MEDICARE

## 2023-11-30 VITALS
SYSTOLIC BLOOD PRESSURE: 124 MMHG | TEMPERATURE: 97.2 F | HEART RATE: 97 BPM | DIASTOLIC BLOOD PRESSURE: 78 MMHG | RESPIRATION RATE: 16 BRPM

## 2023-11-30 DIAGNOSIS — L89.224 PRESSURE ULCER OF LEFT HIP, STAGE 4 (HCC): ICD-10-CM

## 2023-11-30 DIAGNOSIS — L89.154 PRESSURE INJURY OF SACRAL REGION, STAGE 4 (HCC): Primary | ICD-10-CM

## 2023-11-30 DIAGNOSIS — L89.324 PRESSURE INJURY OF LEFT ISCHIUM, STAGE 4 (HCC): ICD-10-CM

## 2023-11-30 DIAGNOSIS — L89.894 PRESSURE ULCER OF OTHER SITE, STAGE 4 (HCC): ICD-10-CM

## 2023-11-30 PROCEDURE — 11042 DBRDMT SUBQ TIS 1ST 20SQCM/<: CPT | Performed by: FAMILY MEDICINE

## 2023-11-30 PROCEDURE — 99214 OFFICE O/P EST MOD 30 MIN: CPT | Performed by: FAMILY MEDICINE

## 2023-11-30 PROCEDURE — 11045 DBRDMT SUBQ TISS EACH ADDL: CPT | Performed by: FAMILY MEDICINE

## 2023-11-30 NOTE — PATIENT INSTRUCTIONS
Orders Placed This Encounter   Procedures    Wound cleansing and dressings     Wash your hands with soap and water. Remove old dressing, discard into plastic bag and place in trash. Cleanse the wounds with normal saline (rinse) prior to applying a clean dressing. Do not use tissue or cotton balls. Do not scrub the wound. Pat dry using gauze. Shower no; do not get dressing wet      Left Hip Wound:   Cleanse with normal saline as above   Apply Aquacel AG rope (tape the tail) No substitutes. Cover with Convamax (or equivalent super absorber). Secure with Medfix tape. Change dressing 3 times a week and PRN for breakthrough drainage (visiting nurses to do twice per week and family in between)         Sacral, Left ischium, and perineal wound:   Cleanse with normal saline as above   Apply quarter strength dakins moistened gauze   Cover with Convamax (or equivalent super absorber). Secure with Medfix tape. Change dressing daily and PRN for breakthrough drainage (visiting nurses to do twice per week and family in between)          Continue using Clinitron bed. Continue NO PRESSURE TO ALL WOUNDS, ESPECIALLY PERINEAL WOUND; LIMIT SITTING UPRIGHT IN [facility] ON THIS WOUND      Continue visiting nurse skilled 2 x per week for wound care dressing changes. Treatment in the wound center today: Cleansed with normal, and dressed as above.      Standing Status:   Future     Standing Expiration Date:   11/30/2024

## 2023-11-30 NOTE — PROGRESS NOTES
Patient ID: Keisha Villagran. is a 37 y.o. male Date of Birth 1980       Chief Complaint   Patient presents with   • Follow Up Wound Care Visit     Follow up visit for wound to sacrum. Pt denies any issues or concerns since last visit. Allergies:  Chlorcyclizine, Chlorhexidine, Ciprofloxacin hcl, Cymbalta [duloxetine hcl], Dm-doxylamine-acetaminophen, Doxycycline, Gabapentin, Hydrocortisone, Lactose - food allergy, Lyrica [pregabalin], Milk (cow), Penicillins, Polymyxin b, Promethazine-phenyleph-codeine, Quinidine, Cefepime, and Rosuvastatin    Diagnosis:      Diagnosis ICD-10-CM Associated Orders   1. Pressure injury of sacral region, stage 4 ([facility])  L89.154 Wound cleansing and dressings      2. Pressure injury of left ischium, stage 4 ([facility])  L89.324 Wound cleansing and dressings     Debridement      3. Pressure ulcer of left hip, stage 4 (HCC)  L89.224 Wound cleansing and dressings      4. Pressure ulcer of other site, stage 4 ([facility])  L89.894 Wound cleansing and dressings              Assessment & Plan:  Multiple stage IV pressure injuries. The left ischial seems to have deteriorated and has some malodor. No tyrel infection. Ulcers are measuring slightly larger overall. Patient admits to shearing forces especially when he is at dialysis and has to shift his body. I suggested that he ask for help to reposition. Subcutaneous debridement of the left ischial ulcer. Dakin's packing to all ulcers except for the left hip which we will continue with Aquacel Ag rope. When odor has resolved, may switch back to saline moistened gauze. Continue with rope in the left hip. Palliative care. Follow-up in approximately 1 month.          Subjective:     "" 06/30/22: 40 y/o M with PMHx of spinal paraplegia, neurogenic bladder with chronic suprapubic catheter in place, ESRD on dialysis M/W/F, anemia of CKD, secondary hyperparathyroidism, type I DM, colostomy bag in place,tricupsid valve endocarditis, chronic osteomyelitis, chronic pressure injuries, afib, and hx of DVT on Eliquis presenting for f/u of multiple stage 4 pressure injuries of the sacrum, left hip, L ischium, and perineum. Pt was recently hospitalized on 06/17/22 for C. Difficile. Has been using wet to dry dressings. States he has been attempting to increase his protein intake and has gained some weight but his albumin remains low and is confused why that is. 9/1/22: Followup multiple stage IV pressure injuries of the sacrum and ischial tuberosities. Patient was hospitalized recently for sepsis and was told it was due to his pressure injuries. However, they do not look any worse than they ever have been. He was discharged on oral medications. He continues using wet to dry dressings. Prior to hospitalization he developed two new pressure injuries of his posterior scrotum. He continues to supposedly eat well with increased protein. 10/13/22: Followup multiple stage IV pressure injuries of the sacrum, perineum and ischial tuberosities and left hip. .  At last visit, he had scrotal pressure injuries as well. He is using wet to dry dressings. He no longer has his Clinitron and is using an alternating low air loss mattress. He does not like the air mattress because is more difficult to get in and out of. He has not had any fevers or chills. 12/6/2022: Follow-up multiple stage IV pressure injuries of the sacrum, perineum, ischial tuberosities and left hip. Patient states that he saw endocrinology and A1c was 7.9. Slightly better than previous. Using saline packing. Occasionally Dakin's when they noticed an odor. 2/16/2023: Follow-up visit for multiple stage 4 pressure injuries of the sacrum, perineum, L ischium, and L hip. Patient states he has a Clinitron bed and his blood sugars have been well managed recently with his insulin pump. This morning his sugar was 80 and has been running in the 150's lately.  He monitors his sugars with a Dexcom. He has been cleansing the wounds with saline, and dakin's occasionally when he notices an odor. He denies fever, pains, or pain. 4/6/2023: Follow-up of multiple stage IV pressure injuries of the sacrum, ischium on the left and left hip and perineum. Blood sugars were running high today. Last A1c done last month was 7.5. No new complaints. Still using saline packing with occasional Dakin's packing if the quality of the ulcer is changed. 5/18/2023: Follow-up multiple stage IV pressure injuries of the sacrum, ischium on the left and left hip and perineum. Patient states that his blood sugars are running about the same. He is generally using saline packing with occasional Dakin's packing should there be an odor. He was supposed to use silver alginate but he has not been using this. He was hospitalized about a month ago for C. difficile colitis. 7/13/2023: Follow-up multiple stage IV pressure injuries of the sacrum, ischium on the left and left hip and perineum. Patient was hospitalized for some sort of infection since his last visit. He recovered without problems. He has no new complaints. He continues alternating with saline and Dakin's depending on the odor. 8/24/2023: Follow-up multiple stage IV pressure injuries of the sacrum, ischium, left hip and perineum. ""    *Above HPI hx/summary obtained via chart review of prev wound care office visits*    10/5/2023: Arianna Figueroa presents today for follow up of multiple stage IV pressure injuries of sacrum, ischium, left hip and perineum. He reports he has no acute complaints and denies fever. States that he continues with large drainage sometimes changing twice per day. States he has been tolerating the dressings well and family has been changing in between VNA visits.   Of note he does say his protein levels have decreased again and he is working on getting increased protein intake as this has been an issue for him in the past.    11/30/2023: Follow-up multiple stage IV pressure injuries of the sacrum, ischium, left hip and perineum. States that he notices more shearing forces because of shifting in the dialysis chair. He also notes slight malodor. Has been using water or saline gauze. Used Dakin's for some time. Denies any recent fever or chills. Unfortunately, he was hospitalized the beginning of this month for DKA.                 The following portions of the patient's history were reviewed and updated as appropriate:   Patient Active Problem List   Diagnosis   • Paraplegia [facility])   • Atrial fibrillation (HCC)   • Chronic suprapubic catheter ([facility])   • Colostomy care ([address])   • S/P unilateral BKA (below knee amputation) ([address])   • Tobacco abuse   • Type 1 diabetes mellitus with chronic kidney disease on chronic dialysis ([address])   • Ulcer of sacral region, stage 4 ([address])   • Wound healing, delayed   • Iron deficiency anemia   • Pressure injury of left ischium, stage 4 ([address])   • HTN (hypertension), benign   • Neurogenic bladder   • GERD (gastroesophageal reflux disease)   • Chronic pain   • Stage 5 chronic kidney disease on chronic dialysis ([address])   • History of Clostridium difficile infection   • Urinary retention   • Dialysis patient ([address])   • Insulin long-term use ([address])   • Wheelchair dependent   • Recurrent Clostridioides difficile diarrhea   • Bipolar depression ([address])   • 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[address])   • Seizure ([address])   • Pressure ulcer of sacral region, stage 4 ([address])   • AVM (arteriovenous malformation) of duodenum, acquired   • Chronic narcotic dependence ([address])   • Chronic diastolic heart failure (HCC)   • Pressure ulcer of other site, stage 4 (HCC)   • Pressure ulcer of left hip, stage 4 ([address])   • ED (erectile dysfunction) of organic origin   • Irritable bowel syndrome   • Onychomycosis   • Pustular folliculitis   • Prolonged Q-T interval on ECG   • Secondary hyperparathyroidism of renal origin ([address])   • Wounds, multiple   • Immunocompromised state [facility])   • Severe protein-calorie malnutrition ([address])   • Chronic osteomyelitis ([address])   • Acute deep vein thrombosis (DVT) of right upper extremity (HCC)   • C. difficile colitis   • Charcot's arthropathy   • Diabetic gastroparesis associated with type 1 diabetes mellitus    • End stage renal disease (HCC)   • Hyperlipidemia   • LVH (left ventricular hypertrophy)   • NICM (nonischemic cardiomyopathy) ([address])   • Vitamin B deficiency   • Vitamin C deficiency   • Vitamin D deficiency   • Hospital discharge follow-up   • Rheumatoid arthritis [facility])     Past Medical History:   Diagnosis Date   • Acute pulmonary edema ([address]) 1/13/2022   • Ambulatory dysfunction    • Anemia    • Anemia, iron deficiency     transfusion requiring   • Asymptomatic bacteriuria 9/25/2017   • Atrial fibrillation ([address])    • AVM (arteriovenous malformation) of duodenum, 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   • Bacteriuria, asymptomatic    • Bipolar disorder ([facility])    • Chronic deep vein thrombosis (DVT) ([facility])    • Chronic indwelling Ortega catheter    • Chronic kidney disease    • Chronic pain    • Chronic pain disorder    • Chronic suprapubic catheter ([address])    • Clostridium difficile infection 08/11/2016    also positive 9/2016, 5/29/2017, 8/15/2017.  S/P fecal transplant   • Colostomy on examination [facility])    • Decubitus ulcer    • Delirium    • Diabetes mellitus ([address])    • GERD (gastroesophageal reflux disease)    • Hemodialysis patient ([address])    • Hypertension    • Memory impairment 2011    s/p diabetic coma   • Neurogenic bladder    • OAB (overactive bladder)    • Paraplegia (HCC)     T3 transverse myelitis vs spinal stoke (AVM); 2012 extensive epidural abscess C7=> conus 2nd extension from deep parspinal abscess L4-S2/sacral decubitus; cord atrophy/myelomalcia T3=>conus    • Penile abscess    • S/P unilateral BKA (below knee amputation) ([address])     Right   • Sebaceous cyst removed in 2017   • Seizures ([facility])    • Shortness of breath    • Tobacco abuse    • 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[address])    • Urinary retention    • Wheelchair dependent    • Wounds, multiple     pressure ulcers with delayed healing     Past Surgical History:   Procedure Laterality Date   • BELOW KNEE LEG AMPUTATION Right 2009   • COLONOSCOPY N/A 03/27/2017    Procedure: COLONOSCOPY;  Surgeon: Margret Muñoz MD;  Location: [facility]; Service:    • COLONOSCOPY N/A 03/29/2017    Procedure: COLONOSCOPY;  Surgeon: Margret Muñoz MD;  Location: [facility]; Service:    • COLONOSCOPY N/A 06/15/2017    Procedure: COLONOSCOPY with FMT;  Surgeon: Mae Street MD;  Location: [facility]; Service: Gastroenterology   • ESOPHAGOGASTRODUODENOSCOPY N/A 03/22/2017    Procedure: ESOPHAGOGASTRODUODENOSCOPY (EGD); Surgeon: Magalis Aguero DO;  Location: [facility];   Service:    • MULTIPLE TOOTH EXTRACTIONS N/A 03/08/2021    Procedure: EXTRACTION TEETH # 2,3,6,10,12,13,14,18,19,20,21,22,23,24,25,26,27,28,29,30;  Surgeon: Amador Bay DMD;  Location: [facility];  Service: Maxillofacial   • SKIN BIOPSY       Family History   Problem Relation Age of Onset   • Hyperlipidemia Mother    • Hypertension Mother    • Leukemia Brother    • Diabetes Paternal Grandfather       Social History     Socioeconomic History   • Marital status: Single     Spouse name: None   • Number of children: None   • Years of education: None   • Highest education level: None   Occupational History   • None   Tobacco Use   • Smoking status: Every Day     Types: Cigars     Passive exposure: Current   • Smokeless tobacco: Never   • Tobacco comments:     2 cigars/day - 2/14/2023   Vaping Use   • Vaping Use: Never used   Substance and Sexual Activity   • Alcohol use: Not Currently     Comment: 1 cup of wine every 3-4 months   • Drug use: Not Currently     Types: Marijuana     Comment: rarely; once every 1-2 years   • Sexual activity: None   Other Topics Concern   • None   Social History Narrative   • None     Social Determinants of Health     Financial Resource Strain: Low Risk  (2/2/2023)    Overall Financial Resource Strain (CARDIA)    • Difficulty of Paying Living Expenses: Not hard at all   Food Insecurity: No Food Insecurity (2/2/2023)    Hunger Vital Sign    • Worried About Running Out of Food in the Last Year: Never true    • Ran Out of Food in the Last Year: Never true   Transportation Needs: No Transportation Needs (2/2/2023)    PRAPARE - Transportation    • Lack of Transportation (Medical): No    • Lack of Transportation (Non-Medical):  No   Physical Activity: Not on file   Stress: Not on file   Social Connections: Not on file   Intimate Partner Violence: Not on file   Housing Stability: Not on file        Current Outpatient Medications:   •  Alcohol Swabs (ALCOHOL PADS) 70 % PADS, by Does not apply route 5 (five) times a day, Disp: 300 each, Rfl: 5  •  amLODIPine (NORVASC) 10 mg tablet, TAKE 1 TABLET BY MOUTH EVERY DAY, Disp: 90 tablet, Rfl: 1  •  ammonium lactate (LAC-HYDRIN) 12 % cream, , Disp: , Rfl:   •  apixaban (ELIQUIS) 5 mg, Take 1 tablet (5 mg total) by mouth 2 (two) times a day, Disp: 270 tablet, Rfl: 1  •  atorvastatin (LIPITOR) 20 mg tablet, TAKE 1 TABLET BY MOUTH DAILY AT BEDTIME, Disp: 90 tablet, Rfl: 1  •  azelaic acid (Azelex) 20 % cream, Apply topically 2 (two) times a day, Disp: 50 g, Rfl: 0  •  B Complex-C-Folic Acid (Super B-Complex/Vit C/FA) TABS, TAKE 1 TABLET BY MOUTH EVERY DAY AS DIRECTED, Disp: 90 tablet, Rfl: 5  •  Smart Mocha MICROLET LANCETS lancets, Use as instructed to check blood sugar 4 times a day Dx e10.29, Disp: 200 each, Rfl: 5  •  bisacodyl (DULCOLAX) 5 mg EC tablet, Take 1 tablet (5 mg total) by mouth once for 1 dose, Disp: 2 tablet, Rfl: 0  •  Blood Glucose Monitoring Suppl (Smart Mocha CONTOUR NEXT USB MONITOR) w/Device KIT, Testing 4 times a day, Disp: 1 kit, Rfl: 0  •  calcitriol (ROCALTROL) 0.5 MCG capsule, Take 2 mcg by mouth, Disp: , Rfl:   •  calcium acetate (PHOSLO) capsule, TAKE 2 CAPSULES BY MOUTH THREE TIMES DAILY WITH MEALS, Disp: , Rfl:   •  carvedilol (COREG) 25 mg tablet, TAKE 1 TABLET BY MOUTH TWICE A DAY, Disp: 180 tablet, Rfl: 1  •  cetirizine (ZyrTEC) 10 mg tablet, TAKE 1 TABLET BY MOUTH EVERY DAY, Disp: 90 tablet, Rfl: 1  •  Cholecalciferol (VITAMIN D-3) 1000 units CAPS, Take 2 capsules by mouth daily, Disp: 30 capsule, Rfl: 0  •  clindamycin (CLINDAGEL) 1 % gel, APPLY TO AFFECTED AREA TWICE A DAY, Disp: 60 g, Rfl: 2  •  Contour Next Test test strip, USE TO TEST BLOOD SUGAR 3 TIMES DAILY.  CONTOUR NEXT STRIPS. E10.29, Disp: , Rfl:   •  cyanocobalamin ([employer] Vitamin B-12) 1000 MCG tablet, Take 1 tablet (1,000 mcg total) by mouth daily, Disp: 30 tablet, Rfl: 5  •  cyclobenzaprine (FLEXERIL) 5 mg tablet, , Disp: , Rfl:   •  dexlansoprazole (DEXILANT) 30 MG capsule, TAKE 1 CAPSULE BY MOUTH EVERY DAY, Disp: 180 capsule, Rfl: 2  •  dicyclomine (BENTYL) 10 mg capsule, TAKE 1 CAPSULE BY MOUTH 3 TIMES A DAY BEFORE MEALS, Disp: 270 capsule, Rfl: 1  •  Disposable Gloves MISC, Use 7 times a day, 4 boxes of gloves XL Dx type 1 DM, paraplegia, Disp: 4 each, Rfl: 11  •  doxazosin (CARDURA) 2 mg tablet, TAKE 1 TABLET BY MOUTH EVERY EVENING., Disp: 90 tablet, Rfl: 1  •  epoetin kaushik (EPOGEN,PROCRIT) 20,000 units/mL, Inject 10,000 Units under the skin, Disp: , Rfl:   •  epoetin kaushik (EPOGEN,PROCRIT) 20,000 units/mL, Inject 5,000 Units under the skin, Disp: , Rfl:   •  ferrous sulfate 325 (65 Fe) mg tablet, Take 1 tablet (325 mg total) by mouth 3 (three) times a day, Disp: 90 tablet, Rfl: 3  •  Fluocinolone Acetonide Body 0.01 % OIL, Apply 2 drops topically daily, Disp: , Rfl:   •  folic acid (FOLVITE) 1 mg tablet, Take 1,000 mcg by mouth daily, Disp: , Rfl:   •  glucose-vitamin C 4-0.006 g, Chew 16 g if needed, Disp: , Rfl:   •  Gvoke PFS 1 MG/0.2ML SOSY, , Disp: , Rfl:   •  hydroxychloroquine (PLAQUENIL) 200 mg tablet, Take 200 mg by mouth daily, Disp: , Rfl:   •  hydrOXYzine HCL (ATARAX) 50 mg tablet, TAKE 1 TABLET (50 MG TOTAL) BY MOUTH 2 (TWO) TIMES A DAY AS NEEDED FOR ITCHING OR ANXIETY, Disp: 180 tablet, Rfl: 1  •  Incontinence Supply Disposable (Incontinence Brief Large) MISC, Use 6 (six) times a day Size xl, Disp: 180 each, Rfl: 11  •  Incontinence Supply Disposable (Undergarment) MISC, Use 6 a day, 5 boxes, xl Dx R51, paraplegia, Disp: 5 each, Rfl: 11  •  Incontinence Supply Disposable (Underpads) MISC, Use 2 a day, Disp: 5 each, Rfl: 11  •  insulin lispro protamine-insulin lispro (HumaLOG 50-50) 100 units/mL, Inject under the skin 2 (two) times a day before meals, Disp: , Rfl:   •  ketoconazole (NIZORAL) 2 % cream, Apply to rash., Disp: 15 g, Rfl: 1  •  LEVEMIR FLEXTOUCH 100 units/mL injection pen, Inject 14 Units under the skin 2 (two) times a day, Disp: 15 pen, Rfl: 3  •  Lokelma 10 g PACK, , Disp: , Rfl:   •  losartan (COZAAR) 100 MG tablet, TAKE 1 TABLET BY MOUTH EVERY DAY, Disp: 90 tablet, Rfl: 1  •  metroNIDAZOLE (METROGEL) 0.75 % gel, Apply topically 2 (two) times a day, Disp: 45 g, Rfl: 0  •  Misc. Devices (Wheelchair Cushion) MISC, Use daily, Disp: 1 each, Rfl: 0  •  Misc. Devices [facility]) MISC, by Does not apply route daily Wheelchair ramp, dx g82.20, Disp: 1 each, Rfl: 0  •  Multiple Vitamin (Daily-Enriqueta Multivitamin) TABS, TAKE 1 TABLET BY MOUTH EVERY DAY, Disp: 30 tablet, Rfl: 8  •  NovoLOG FlexPen 100 units/mL injection pen, INJECT 3 UNITS UNDER THE SKIN 3 (THREE) TIMES A DAY BEFORE MEALS.  (Patient not taking: Reported on 7/13/2023), Disp: , Rfl:   •  ondansetron (ZOFRAN-ODT) 4 mg disintegrating tablet, Take 1 tablet (4 mg total) by mouth every 6 (six) hours as needed for nausea or vomiting, Disp: 30 tablet, Rfl: 2  •  oxybutynin (DITROPAN XL) 15 MG 24 hr tablet, , Disp: , Rfl:   •  oxybutynin (DITROPAN) 5 mg tablet, Take 3 tablets (15 mg total) by mouth daily, Disp: 270 tablet, Rfl: 1  •  oxyCODONE (ROXICODONE) 10 MG TABS, TAKE ONE TABLET BY MOUTH EVERY 12 HOURS AS NEEDED FOR PAIN. ONGOING THERAPY., Disp: , Rfl:   •  polyethylene glycol (GLYCOLAX) 17 GM/SCOOP powder, Take as directed as per written office instructions, Disp: 238 g, Rfl: 0  •  polyethylene glycol (GOLYTELY) 4000 mL solution, Take 4,000 mL by mouth once for 1 dose, Disp: 4000 mL, Rfl: 0  •  risperiDONE (RisperDAL) 0.5 mg tablet, Take 1 tablet (0.5 mg total) by mouth 2 (two) times a day, Disp: 180 tablet, Rfl: 1  •  sertraline (ZOLOFT) 25 mg tablet, TAKE 1 TABLET (25 MG TOTAL) BY MOUTH DAILY. , Disp: 90 tablet, Rfl: 1  •  Sodium Zirconium Cyclosilicate 10 g PACK, Take 10 g by mouth daily, Disp: , Rfl:   •  sucralfate (CARAFATE) 1 g/10 mL suspension, Take 10 mL (1 g total) by mouth 4 (four) times a day (with meals and at bedtime), Disp: 420 mL, Rfl: 1  •  SUPER B COMPLEX & C TABS, TAKE 1 CAPSULE BY MOUTH ONCE FOR 1 DOSE AS DIRECTED, Disp: 90 tablet, Rfl: 2  •  Zinc Sulfate 220 (50 Zn) MG TABS, Take 1 tablet by mouth daily, Disp: 90 tablet, Rfl: 1    Review of Systems   Constitutional:  Negative for chills, fatigue and unexpected weight change. Respiratory:  Negative for cough and shortness of breath. Cardiovascular:  Negative for chest pain. Musculoskeletal:  Positive for gait problem (Paraplegia). Paraplegic   Skin:  Positive for wound (Sacrum, perineum, left ischium). Multiple pressure injuries   Neurological:  Positive for weakness and numbness. Hematological:  Does not bruise/bleed easily. Psychiatric/Behavioral:  Negative for agitation. Objective:  /78   Pulse 97   Temp (!) 97.2 °F (36.2 °C) (Tympanic)   Resp 16   Pain Score: 0-No pain     Physical Exam  Constitutional:       General: He is not in acute distress. Appearance: He is not ill-appearing. HENT:      Head: Normocephalic. Cardiovascular:      Rate and Rhythm: Normal rate. Pulmonary:      Effort: Pulmonary effort is normal. No respiratory distress. Skin:     General: Skin is warm and dry. Findings: Wound present. No erythema.              Comments: Multiple stage 4 pressure injuries overall relatively stable with the exception of left ischium which has deteriorated again since last visit with evidence of shearing and skin irritation/breakdown distal wound. Sacral and hip wounds with undermining and epiboly; left hip most significant undermining though this has increased to 4.3cm compared to last visit  Sacral wound has periwound callus and fibrin and slough  No significant fibrin or slough of perineal pressure injury or left hip  The left ischial ulcer has no significant undermining but as stated above deterioration in distal tissue    See full wound description for additional details. Neurological:      Mental Status: He is alert. Psychiatric:         Mood and Affect: Mood normal.         Behavior: Behavior normal.         Wound Pressure Injury Ischium Left (Active)   Wound Image   11/30/23 1436   Wound Description Pink;Granulation tissue; Yellow;Light purple 10/05/23 1456   Pressure Injury Stage 4 01/05/23 1435   Irene-wound Assessment Scar Tissue; Intact 10/05/23 1456   Wound Length (cm) 6.5 cm 11/30/23 1351   Wound Width (cm) 6.5 cm 11/30/23 1351   Wound Depth (cm) 0.9 cm 11/30/23 1351   Wound Surface Area (cm^2) 42.25 cm^2 11/30/23 1351   Wound Volume (cm^3) 38. 025 cm^3 11/30/23 1351   Calculated Wound Volume (cm^3) 38.03 cm^3 11/30/23 1351   Change in Wound Size % -101.22 11/30/23 1351   Tunneling in depth located at resolved 11/30/23 1351   Undermining 0 10/05/23 1456   Undermining is depth extending from resolved 10/05/23 1456   Drainage Amount Large 11/30/23 1351   Drainage Description Serosanguineous; Mendieta 11/30/23 1351   Non-staged Wound Description Full thickness 11/30/23 1351   Treatments Irrigation with NSS 11/30/23 1351   Wound packed?  No 02/16/23 0953   Dressing Changed Changed 07/13/23 1558   Patient Tolerance Tolerated well 11/30/23 1351   Dressing Status Removed 11/30/23 1351       Wound Pressure Injury Perineum (Active)   Wound Image   11/30/23 1345   Wound Description Pink;Yellow;Epithelialization;Granulation tissue;Light purple;Gray 10/05/23 1458   Pressure Injury Stage 4 01/05/23 1437   Irene-wound Assessment Scar Tissue 10/05/23 1458   Wound Length (cm) 6.7 cm 11/30/23 1353   Wound Width (cm) 5.5 cm 11/30/23 1353   Wound Depth (cm) 1.2 cm 11/30/23 1353   Wound Surface Area (cm^2) 36.85 cm^2 11/30/23 1353   Wound Volume (cm^3) 44.22 cm^3 11/30/23 1353   Calculated Wound Volume (cm^3) 44.22 cm^3 11/30/23 1353   Change in Wound Size % -321.14 11/30/23 1353   Tunneling in depth located at 0 10/05/23 1458   Undermining 3.2 11/30/23 1353   Undermining is depth extending from 12 to 9 o'clock, deepest @ 6 11/30/23 1353   Drainage Amount Large 11/30/23 1353   Drainage Description Serosanguineous; Mendieta 11/30/23 1353   Non-staged Wound Description Full thickness 10/05/23 1458   Treatments Irrigation with NSS 11/30/23 1353   Wound packed? No 02/16/23 0959   Dressing Changed Changed 10/05/23 1458   Patient Tolerance Tolerated well 11/30/23 1353   Dressing Status Removed 11/30/23 1353       Wound Pressure Injury Sacrum (Active)   Wound Image   11/30/23 1345   Wound Description Pink;Slough; Yellow;Epithelialization 11/30/23 1346   Pressure Injury Stage 4 01/05/23 1439   Irene-wound Assessment Maceration;Scar Tissue 11/30/23 1346   Wound Length (cm) 6.3 cm 11/30/23 1346   Wound Width (cm) 9.2 cm 11/30/23 1346   Wound Depth (cm) 1 cm 11/30/23 1346   Wound Surface Area (cm^2) 57.96 cm^2 11/30/23 1346   Wound Volume (cm^3) 57.96 cm^3 11/30/23 1346   Calculated Wound Volume (cm^3) 57.96 cm^3 11/30/23 1346   Change in Wound Size % 22.72 11/30/23 1346   Undermining 0 10/05/23 1458   Undermining is depth extending from resolved 10/05/23 1458   Drainage Amount Large 11/30/23 1346   Drainage Description Serosanguineous;Tan;Green 11/30/23 1346   Non-staged Wound Description Full thickness 11/30/23 1346   Treatments Irrigation with NSS 11/30/23 1346   Wound packed?  No 02/16/23 1001 Dressing Changed Changed 07/13/23 1553   Patient Tolerance Tolerated well 11/30/23 1346   Dressing Status Removed 11/30/23 1346       Wound Pressure Injury Hip Left;Lateral (Active)   Wound Image   11/30/23 1343   Wound Description Pink;Yellow 10/05/23 1453   Pressure Injury Stage 4 01/05/23 1440   Irene-wound Assessment Scar Tissue; Intact 10/05/23 1453   Wound Length (cm) 2.6 cm 11/30/23 1349   Wound Width (cm) 1.5 cm 11/30/23 1349   Wound Depth (cm) 1.2 cm 11/30/23 1349   Wound Surface Area (cm^2) 3.9 cm^2 11/30/23 1349   Wound Volume (cm^3) 4.68 cm^3 11/30/23 1349   Calculated Wound Volume (cm^3) 4.68 cm^3 11/30/23 1349   Tunneling 0 cm 10/05/23 1453   Tunneling in depth located at resolved 10/05/23 1453   Undermining 4.3 11/30/23 1349   Undermining is depth extending from 4-11 o'clock, deepest @ 10 o'clock 11/30/23 1349   Drainage Amount Large 11/30/23 1349   Drainage Description Serosanguineous; [place] 11/30/23 1349   Non-staged Wound Description Full thickness 11/30/23 1349   Treatments Irrigation with NSS 11/30/23 1349   Wound packed? No 02/16/23 1004   Packing- # removed 1 05/05/22 1536   Dressing Changed Changed 07/13/23 1600   Patient Tolerance Tolerated well 11/30/23 1349   Dressing Status Removed 11/30/23 1349                            Debridement   Wound Pressure Injury Ischium Left    Universal Protocol:  Consent: Verbal consent obtained. Written consent obtained. Consent given by: patient  Time out: Immediately prior to procedure a "time out" was called to verify the correct patient, procedure, equipment, support staff and site/side marked as required.   Patient understanding: patient states understanding of the procedure being performed  Patient identity confirmed: verbally with patient    Performed by: physician  Debridement type: surgical  Level of debridement: subcutaneous tissue  Pain control: lidocaine 4%  Post-debridement measurements  Length (cm): 6.5  Width (cm): 6.5  Depth (cm): 1  Percent debrided: 100%  Surface Area (cm^2): 42.25  Area debrided (cm^2): 42.25  Volume (cm^3): 42.25  Tissue and other material debrided: dermis and subcutaneous tissue  Devitalized tissue debrided: necrotic debris  Instrument(s) utilized: blade  Bleeding: medium  Hemostasis obtained with: pressure  Procedural pain (0-10): insensate  Post-procedural pain: insensate   Response to treatment: procedure was tolerated well               Lab Results   Component Value Date    HGBA1C 7.4 (H) 11/07/2023       Wound Instructions:  Orders Placed This Encounter   Procedures   • Wound cleansing and dressings     Wash your hands with soap and water. Remove old dressing, discard into plastic bag and place in trash. Cleanse the wounds with normal saline (rinse) prior to applying a clean dressing. Do not use tissue or cotton balls. Do not scrub the wound. Pat dry using gauze. Shower no; do not get dressing wet      Left Hip Wound:   Cleanse with normal saline as above   Apply Aquacel AG rope (tape the tail) No substitutes. Cover with Convamax (or equivalent super absorber). Secure with Medfix tape. Change dressing 3 times a week and PRN for breakthrough drainage (visiting nurses to do twice per week and family in between)         Sacral, Left ischium, and perineal wound:   Cleanse with normal saline as above   Apply quarter strength dakins moistened gauze   Cover with Convamax (or equivalent super absorber). Secure with Medfix tape. Change dressing daily and PRN for breakthrough drainage (visiting nurses to do twice per week and family in between)          Continue using Clinitron bed. Continue NO PRESSURE TO ALL WOUNDS, ESPECIALLY PERINEAL WOUND; LIMIT SITTING UPRIGHT IN [facility] ON THIS WOUND      Continue visiting nurse skilled 2 x per week for wound care dressing changes. Treatment in the wound center today: Cleansed with normal, and dressed as above.      Standing Status: Future     Standing Expiration Date:   11/30/2024   • Debridement     This order was created via procedure documentation             Rasheed Cleveland MD, CHT, CWS    Portions of the record may have been created with voice recognition software. Occasional wrong word or "sound alike" substitutions may have occurred due to the inherent limitations of voice recognition software. Read the chart carefully and recognize, using context, where substitutions have occurred.

## 2023-12-06 DIAGNOSIS — K21.9 GASTROESOPHAGEAL REFLUX DISEASE WITHOUT ESOPHAGITIS: ICD-10-CM

## 2023-12-06 RX ORDER — DEXLANSOPRAZOLE 30 MG/1
CAPSULE, DELAYED RELEASE ORAL
Qty: 90 CAPSULE | Refills: 1 | Status: SHIPPED | OUTPATIENT
Start: 2023-12-06

## 2023-12-07 ENCOUNTER — TELEPHONE (OUTPATIENT)
Dept: FAMILY MEDICINE CLINIC | Facility: CLINIC | Age: 43
End: 2023-12-07

## 2023-12-07 NOTE — TELEPHONE ENCOUNTER
Spoke with Andrew Johns In regards of form being completed and faxed on 11/24. Andrew Johns asked if forms could be faxed again and also to an alternative fax number, [phone]. Forms were faxed to both fax number [phone] and [phone], confirmation received from both numbers. Also, I provided Andrew Johns with my direct phone number incase of any other assistance.

## 2023-12-07 NOTE — TELEPHONE ENCOUNTER
Hi, this is for anyone in the clinical office or your  Merrill Dentas or whoever. I'm missing a physician order on a patient from the 10th of November. So we're almost a month out and I just need it signed back. . We are extended family care. We provide them home health aid services. We sent a physician order. The top of the order is dated 11/10/2023. Doctor Gomez Malhotra and the physician communication on the order says resume all home health aid services is 11/10 if client was discharged to the from the hospital back to home so we had to resume services. So looking for that order to be signed, we have talked to you on the 10th. We faxed it to you on the 16th, the 27th and also just this morning, we're almost a month from this order being signed and we have to close out cases for orders not being signed within 30 days. So we're hitting that calixto soon. So just get that back to us. Again the date on the top of the form is 11/10/2023. The position communication says resume all home health aid services as of 11/10. As client was discharged from [facility] to home. We just need a signature. My name is Bailey Al. I am one of the nursing supervisors that extended family care. You can call my personal cell [phone] if you have any questions. Otherwise if you can get that back to us this week, that'd be great. Thank you.  Bye.

## 2023-12-08 ENCOUNTER — TELEPHONE (OUTPATIENT)
Dept: FAMILY MEDICINE CLINIC | Facility: CLINIC | Age: 43
End: 2023-12-08

## 2023-12-08 NOTE — TELEPHONE ENCOUNTER
[facility]  form received on 12/8/2023  to be completed by PCP. Copy made and placed in PCP folder. Forms to be delivered to PCP mailbox at assigned time. Form will need to be completed and faxed today.

## 2023-12-08 NOTE — TELEPHONE ENCOUNTER
12/08/23    FORM SCANNED INTO PT CHART     FORM : [facility] / PHYSICIAN ORDER DATE 11/10/23

## 2023-12-08 NOTE — TELEPHONE ENCOUNTER
Dre  on the patient Dayo Ortiz. We just talked on the phone for the order for November 10th. I was able to get into the chart from my cell phone. It worked. I didn't know if it would. So I went ahead and researched it to attention, Galen Green, to the [phone] fax number. So you should be getting it. I know our fascism little delayed by almost 30 minutes before we get them, but hopefully you'll have it by this morning. Alright. Call me back today if you need anything else. Thank you. Afshin. [phone] if you need anything else and thank you for trying to help me. I appreciate it.  Bye, bye

## 2023-12-13 ENCOUNTER — TELEPHONE (OUTPATIENT)
Dept: FAMILY MEDICINE CLINIC | Facility: CLINIC | Age: 43
End: 2023-12-13

## 2023-12-13 NOTE — TELEPHONE ENCOUNTER
Extended family care form received on 12/13/23  to be completed by PCP. Copy made and placed in PCP folder.    Forms to be delivered to PCP mailbox at assigned time.

## 2023-12-22 ENCOUNTER — TELEPHONE (OUTPATIENT)
Dept: FAMILY MEDICINE CLINIC | Facility: CLINIC | Age: 43
End: 2023-12-22

## 2023-12-22 NOTE — TELEPHONE ENCOUNTER
PCP SIGNATURE NEEDED FOR [facility]  FORM RECEIVED VIA FAX AND PLACED IN PCP FOLDER TO BE DELIVERED AT ASSIGNED TIMES.       Visit date 12/14/23

## 2023-12-29 ENCOUNTER — TELEPHONE (OUTPATIENT)
Dept: FAMILY MEDICINE CLINIC | Facility: CLINIC | Age: 43
End: 2023-12-29

## 2023-12-29 NOTE — TELEPHONE ENCOUNTER
PCP SIGNATURE NEEDED FOR [facility] FORM RECEIVED VIA FAX AND PLACED IN PCP FOLDER TO BE DELIVERED AT ASSIGNED TIMES.      Plan of care- Visit Date 12/14/2023

## 2024-01-02 ENCOUNTER — TELEPHONE (OUTPATIENT)
Dept: FAMILY MEDICINE CLINIC | Facility: CLINIC | Age: 44
End: 2024-01-02

## 2024-01-02 NOTE — TELEPHONE ENCOUNTER
PCP SIGNATURE NEEDED FOR [facility] FORM RECEIVED VIA FAX AND PLACED IN PCP FOLDER TO BE DELIVERED AT ASSIGNED TIMES.      Order #[id number]

## 2024-01-03 ENCOUNTER — TELEPHONE (OUTPATIENT)
Dept: FAMILY MEDICINE CLINIC | Facility: CLINIC | Age: 44
End: 2024-01-03

## 2024-01-03 NOTE — PROGRESS NOTES
Patient ID: Ovidio Parkinson Jr. is a 43 y.o. male Date of Birth 1980       No chief complaint on file.      Allergies:  Chlorcyclizine, Chlorhexidine, Ciprofloxacin hcl, Cymbalta [duloxetine hcl], Dm-doxylamine-acetaminophen, Doxycycline, Gabapentin, Hydrocortisone, Lactose - food allergy, Lyrica [pregabalin], Milk (cow), Penicillins, Polymyxin b, Promethazine-phenyleph-codeine, Quinidine, Cefepime, and Rosuvastatin    Diagnosis:      Diagnosis ICD-10-CM Associated Orders   1. Pressure injury of sacral region, stage 4 ([facility])  L89.154 lidocaine (LMX) 4 % cream     Wound cleansing and dressings      2. Pressure injury of left ischium, stage 4 ([facility])  L89.324 lidocaine (LMX) 4 % cream     Wound cleansing and dressings      3. Pressure ulcer of left hip, stage 4 ([facility])  L89.224 lidocaine (LMX) 4 % cream     Wound cleansing and dressings      4. Pressure ulcer of other site, stage 4 ([facility])  L89.894 lidocaine (LMX) 4 % cream     Wound cleansing and dressings      5. Pressure ulcer of other site, stage 3 ([facility])  L89.893 lidocaine (LMX) 4 % cream     Wound cleansing and dressings      6. Chronic osteomyelitis ([facility])  M86.60               Assessment & Plan:    Follow-up of multiple pressure injuries, palliative wounds to left hip, perineum, left ischium, sacrum, new scrotal pressure injury.  Recent hospitalization from 12/5 - 12/13 where he was admitted for bacteremia secondary to his previous dialysis line which was removed and replaced when blood cultures cleared.  He has history of chronic osteomyelitis for which he is followed closely with infectious disease.     Stage IV pressure injury left hip wound: Undermined area increased to about 5 cm from roughly 6-12 o'clock.  No malodor, purulent drainage, lymphangitic streaking.  Large amount of predominantly serous with some drainage present.  No surrounding edema, induration.   Cleanse wound with Dakin solution only if there is odor, if no odor please cleanse with normal  "saline or wound wash  Mesalt packing   Stage IV pressure injuries perineal/left ischium/sacrum: Stable  Cleanse wound with Dakin solution only if there is odor, if no odor please cleanse with normal saline or wound wash  Mesalt gauze followed by convamax   NEW Stage III pressure injury scrotum: Has new pressure injury under scrotum and area that is exposed to pressure/shearing forces and patient reported recent increase in time seated in chair rather than other offloading methods.  Multiple small areas that are stage III within measured wound area.  Full-thickness, no exposed muscle tendon bone or cartilage.  No evidence of acute soft tissue infection present.  Will trial Triad paste for barrier as dressing may be difficult to keep in place considering location  Continue regular offloading, repositioning, use of Clinitron bed.  Discussed that while patient is at dialysis recommend repositioning and use of wedge.  New wound care orders/instructions were discussed with patient and his brother (via telephone)  Follow-up in 2 to 3 weeks or sooner if needed    Portions of the record may have been created with voice recognition software. Occasional wrong word or \"sound alike\" substitutions may have occurred due to the inherent limitations of voice recognition software. Read the chart carefully and recognize, using context, where substitutions have occurred.    Subjective:   06/30/22: 42 y/o M with PMHx of spinal paraplegia, neurogenic bladder with chronic suprapubic catheter in place, ESRD on dialysis M/W/F, anemia of CKD, secondary hyperparathyroidism, type I DM, colostomy bag in place,tricupsid valve endocarditis, chronic osteomyelitis, chronic pressure injuries, afib, and hx of DVT on Eliquis presenting for f/u of multiple stage 4 pressure injuries of the sacrum, left hip, L ischium, and perineum. Pt was recently hospitalized on 06/17/22 for C. Difficile. Has been using wet to dry dressings. States he has been " attempting to increase his protein intake and has gained some weight but his albumin remains low and is confused why that is.     9/1/22:  Followup multiple stage IV pressure injuries of the sacrum and ischial tuberosities.  Patient was hospitalized recently for sepsis and was told it was due to his pressure injuries.  However, they do not look any worse than they ever have been.  He was discharged on oral medications.  He continues using wet to dry dressings.  Prior to hospitalization he developed two new pressure injuries of his posterior scrotum.  He continues to supposedly eat well with increased protein.    10/13/22:  Followup multiple stage IV pressure injuries of the sacrum, perineum and ischial tuberosities and left hip..  At last visit, he had scrotal pressure injuries as well.  He is using wet to dry dressings.  He no longer has his Clinitron and is using an alternating low air loss mattress.  He does not like the air mattress because is more difficult to get in and out of.  He has not had any fevers or chills.    12/6/2022: Follow-up multiple stage IV pressure injuries of the sacrum, perineum, ischial tuberosities and left hip.  Patient states that he saw endocrinology and A1c was 7.9.  Slightly better than previous.  Using saline packing.  Occasionally Dakin's when they noticed an odor.    2/16/2023: Follow-up visit for multiple stage 4 pressure injuries of the sacrum, perineum, L ischium, and L hip. Patient states he has a Clinitron bed and his blood sugars have been well managed recently with his insulin pump. This morning his sugar was 80 and has been running in the 150's lately. He monitors his sugars with a Dexcom. He has been cleansing the wounds with saline, and dakin's occasionally when he notices an odor. He denies fever, pains, or pain.    4/6/2023: Follow-up of multiple stage IV pressure injuries of the sacrum, ischium on the left and left hip and perineum.  Blood sugars were running high  today.  Last A1c done last month was 7.5.  No new complaints.  Still using saline packing with occasional Dakin's packing if the quality of the ulcer is changed.    5/18/2023: Follow-up multiple stage IV pressure injuries of the sacrum, ischium on the left and left hip and perineum.  Patient states that his blood sugars are running about the same.  He is generally using saline packing with occasional Dakin's packing should there be an odor.  He was supposed to use silver alginate but he has not been using this.  He was hospitalized about a month ago for C. difficile colitis.    7/13/2023: Follow-up multiple stage IV pressure injuries of the sacrum, ischium on the left and left hip and perineum.  Patient was hospitalized for some sort of infection since his last visit.  He recovered without problems.  He has no new complaints.  He continues alternating with saline and Dakin's depending on the odor.    8/24/2023: Follow-up multiple stage IV pressure injuries of the sacrum, ischium, left hip and perineum.    10/5/2023: Ovidio presents today for follow up of multiple stage IV pressure injuries of sacrum, ischium, left hip and perineum.  He reports he has no acute complaints and denies fever.  States that he continues with large drainage sometimes changing twice per day.  States he has been tolerating the dressings well and family has been changing in between VNA visits.  Of note he does say his protein levels have decreased again and he is working on getting increased protein intake as this has been an issue for him in the past.    11/30/2023: Follow-up multiple stage IV pressure injuries of the sacrum, ischium, left hip and perineum.  States that he notices more shearing forces because of shifting in the dialysis chair.  He also notes slight malodor.  Has been using water or saline gauze.  Used Dakin's for some time.  Denies any recent fever or chills.  Unfortunately, he was hospitalized the beginning of this month for  "DKA.    *Above HPI hx/summary obtained via chart review of prev wound care office visits*    1/4/2023: Ovidio presents to wound center today for follow-up of multiple stage IV pressure injuries of sacrum, left hip, ischium, and perineum.  He reports he believes his lab studies regarding his protein level has improved as well as overall nutrition in general.  Has no acute complaints today and denies fever, chills.  He reports he is currently feeling well/much better from his recent hospitalization which he reports occurred at [facility] 12/5 through 12/13.    Per chart review of discharge summary from that hospitalization he was \"found to have Staph Lugudenesis bacteremia on admission blood cultures and was started on vancomycin, which was transitioned to Ancef by Infectious Disease. He will continue Ancef Source was suspected to be prior HD line; known sacral wounds were stable. Repeat blood cultures were negative on discharge. During admission, his HD line was removed (given infection) and replaced with temporary HD line (which, in turn, needed to be replaced due to inability to successfully use). New HD line functioning as of 12/9. PATRICIO performed for evaluation of possible endocarditis was negative. A tunneled HD line was placed 12/13/2023 by IR after repeat blood cultures were negative x48 hrs.\"  He also reports that he may have been spending more time in his chair than previous and continues with difficulty controlling some of the shifting/shearing forces with transferring etc... Continues to use his Clinitron bed.  Also reports that he is still awaiting the custom wheelchair/cushions to help with his pressure injuries but that it is being made.         The following portions of the patient's history were reviewed and updated as appropriate:   Patient Active Problem List   Diagnosis   • Paraplegia (HCC)   • Atrial fibrillation (HCC)   • Chronic suprapubic catheter (HCC)   • Colostomy care ([facility])   • S/P unilateral BKA " (below knee amputation) (HCC)   • Tobacco abuse   • Type 1 diabetes mellitus with chronic kidney disease on chronic dialysis (HCC)   • Ulcer of sacral region, stage 4 (HCC)   • Wound healing, delayed   • Iron deficiency anemia   • Pressure injury of left ischium, stage 4 (HCC)   • HTN (hypertension), benign   • Neurogenic bladder   • GERD (gastroesophageal reflux disease)   • Chronic pain   • Stage 5 chronic kidney disease on chronic dialysis (HCC)   • History of Clostridium difficile infection   • Urinary retention   • Dialysis patient (HCC)   • Insulin long-term use (HCC)   • Wheelchair dependent   • Recurrent Clostridioides difficile diarrhea   • Bipolar depression (HCC)   • 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HCC)   • Seizure (HCC)   • Pressure ulcer of sacral region, stage 4 (HCC)   • AVM (arteriovenous malformation) of duodenum, acquired   • Chronic narcotic dependence (HCC)   • Chronic diastolic heart failure (HCC)   • Pressure ulcer of other site, stage 4 (HCC)   • Pressure ulcer of left hip, stage 4 (HCC)   • ED (erectile dysfunction) of organic origin   • Irritable bowel syndrome   • Onychomycosis   • Pustular folliculitis   • Prolonged Q-T interval on ECG   • Secondary hyperparathyroidism of renal origin (HCC)   • Wounds, multiple   • Immunocompromised state (HCC)   • Severe protein-calorie malnutrition (HCC)   • Chronic osteomyelitis (HCC)   • Acute deep vein thrombosis (DVT) of right upper extremity (HCC)   • C. difficile colitis   • Charcot's arthropathy   • Diabetic gastroparesis associated with type 1 diabetes mellitus    • End stage renal disease (HCC)   • Hyperlipidemia   • LVH (left ventricular hypertrophy)   • NICM (nonischemic cardiomyopathy) (HCC)   • Vitamin B deficiency   • Vitamin C deficiency   • Vitamin D deficiency   • Hospital discharge follow-up   • Rheumatoid arthritis (HCC)     Past Medical History:   Diagnosis Date   • Acute pulmonary edema (HCC) 1/13/2022   • Ambulatory dysfunction    • Anemia     • Anemia, iron deficiency     transfusion requiring   • Asymptomatic bacteriuria 9/25/2017   • Atrial fibrillation ([facility])    • AVM (arteriovenous malformation) of duodenum, 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   • Bacteriuria, asymptomatic    • Bipolar disorder ([facility])    • Chronic deep vein thrombosis (DVT) ([facility])    • Chronic indwelling Ortega catheter    • Chronic kidney disease    • Chronic pain    • Chronic pain disorder    • Chronic suprapubic catheter ([facility])    • Clostridium difficile infection 08/11/2016    also positive 9/2016, 5/29/2017, 8/15/2017. S/P fecal transplant   • Colostomy on examination ([facility])    • Decubitus ulcer    • Delirium    • Diabetes mellitus ([facility])    • GERD (gastroesophageal reflux disease)    • Hemodialysis patient ([facility])    • Hypertension    • Memory impairment 2011    s/p diabetic coma   • Neurogenic bladder    • OAB (overactive bladder)    • Paraplegia ([facility])     T3 transverse myelitis vs spinal stoke (AVM); 2012 extensive epidural abscess C7=> conus 2nd extension from deep parspinal abscess L4-S2/sacral decubitus; cord atrophy/myelomalcia T3=>conus    • Penile abscess    • S/P unilateral BKA (below knee amputation) ([facility])     Right   • Sebaceous cyst removed in 2017   • Seizures ([facility])    • Shortness of breath    • Tobacco abuse    • 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[facility])    • Urinary retention    • Wheelchair dependent    • Wounds, multiple     pressure ulcers with delayed healing     Past Surgical History:   Procedure Laterality Date   • BELOW KNEE LEG AMPUTATION Right 2009   • COLONOSCOPY N/A 03/27/2017    Procedure: COLONOSCOPY;  Surgeon: Wesley Thibodeaux MD;  Location: [facility];  Service:    • COLONOSCOPY N/A 03/29/2017    Procedure: COLONOSCOPY;  Surgeon: Wesley Thibodeaux MD;  Location: [facility];  Service:    • COLONOSCOPY N/A 06/15/2017    Procedure: COLONOSCOPY with FMT;  Surgeon: Gerard Marques MD;  Location: [facility];  Service: Gastroenterology   • ESOPHAGOGASTRODUODENOSCOPY N/A 03/22/2017    Procedure:  ESOPHAGOGASTRODUODENOSCOPY (EGD);  Surgeon: Beverley Ortiz DO;  Location: [facility];  Service:    • MULTIPLE TOOTH EXTRACTIONS N/A 03/08/2021    Procedure: EXTRACTION TEETH # 2,3,6,10,12,13,14,18,19,20,21,22,23,24,25,26,27,28,29,30;  Surgeon: Brian Kohler DMD;  Location: [facility];  Service: Maxillofacial   • SKIN BIOPSY       Family History   Problem Relation Age of Onset   • Hyperlipidemia Mother    • Hypertension Mother    • Leukemia Brother    • Diabetes Paternal Grandfather       Social History     Socioeconomic History   • Marital status: Single     Spouse name: Not on file   • Number of children: Not on file   • Years of education: Not on file   • Highest education level: Not on file   Occupational History   • Not on file   Tobacco Use   • Smoking status: Every Day     Types: Cigars     Passive exposure: Current   • Smokeless tobacco: Never   • Tobacco comments:     2 cigars/day - 2/14/2023   Vaping Use   • Vaping status: Never Used   Substance and Sexual Activity   • Alcohol use: Not Currently     Comment: 1 cup of wine every 3-4 months   • Drug use: Not Currently     Types: Marijuana     Comment: rarely; once every 1-2 years   • Sexual activity: Not on file   Other Topics Concern   • Not on file   Social History Narrative   • Not on file     Social Determinants of Health     Financial Resource Strain: Low Risk  (12/5/2023)    Received from [facility]    Overall Financial Resource Strain (CARDIA)    • Difficulty of Paying Living Expenses: Not hard at all   Food Insecurity: No Food Insecurity (12/5/2023)    Received from [facility]    Hunger Vital Sign    • Worried About Running Out of Food in the Last Year: Never true    • Ran Out of Food in the Last Year: Never true   Transportation Needs: No Transportation Needs (12/5/2023)    Received from [facility]    PRAPARE - Transportation    • Lack of Transportation (Medical): No    • Lack of Transportation  (Non-Medical): No   Physical Activity: Not on file   Stress: Not on file   Social Connections: Not on file   Intimate Partner Violence: Not At Risk (12/5/2023)    Received from [facility]    Humiliation, Afraid, Rape, and Kick questionnaire    • Fear of Current or Ex-Partner: No    • Emotionally Abused: No    • Physically Abused: No    • Sexually Abused: No   Housing Stability: Low Risk  (12/5/2023)    Received from [facility]    Housing Stability Vital Sign    • Unable to Pay for Housing in the Last Year: No    • Number of Places Lived in the Last Year: 1    • Unstable Housing in the Last Year: No        Current Outpatient Medications:   •  Alcohol Swabs (ALCOHOL PADS) 70 % PADS, by Does not apply route 5 (five) times a day, Disp: 300 each, Rfl: 5  •  amLODIPine (NORVASC) 10 mg tablet, TAKE 1 TABLET BY MOUTH EVERY DAY, Disp: 90 tablet, Rfl: 1  •  ammonium lactate (LAC-HYDRIN) 12 % cream, , Disp: , Rfl:   •  apixaban (ELIQUIS) 5 mg, Take 1 tablet (5 mg total) by mouth 2 (two) times a day, Disp: 270 tablet, Rfl: 1  •  atorvastatin (LIPITOR) 20 mg tablet, TAKE 1 TABLET BY MOUTH DAILY AT BEDTIME, Disp: 90 tablet, Rfl: 1  •  azelaic acid (Azelex) 20 % cream, Apply topically 2 (two) times a day, Disp: 50 g, Rfl: 0  •  B Complex-C-Folic Acid (Super B-Complex/Vit C/FA) TABS, TAKE 1 TABLET BY MOUTH EVERY DAY AS DIRECTED, Disp: 90 tablet, Rfl: 5  •  Privlo MICROLET LANCETS lancets, Use as instructed to check blood sugar 4 times a day Dx e10.29, Disp: 200 each, Rfl: 5  •  bisacodyl (DULCOLAX) 5 mg EC tablet, Take 1 tablet (5 mg total) by mouth once for 1 dose, Disp: 2 tablet, Rfl: 0  •  Blood Glucose Monitoring Suppl (Privlo CONTOUR NEXT USB MONITOR) w/Device KIT, Testing 4 times a day, Disp: 1 kit, Rfl: 0  •  calcitriol (ROCALTROL) 0.5 MCG capsule, Take 2 mcg by mouth, Disp: , Rfl:   •  calcium acetate (PHOSLO) capsule, TAKE 2 CAPSULES BY MOUTH THREE TIMES DAILY WITH MEALS, Disp: , Rfl:   •   carvedilol (COREG) 25 mg tablet, TAKE 1 TABLET BY MOUTH TWICE A DAY, Disp: 180 tablet, Rfl: 1  •  cetirizine (ZyrTEC) 10 mg tablet, TAKE 1 TABLET BY MOUTH EVERY DAY, Disp: 90 tablet, Rfl: 1  •  Cholecalciferol (VITAMIN D-3) 1000 units CAPS, Take 2 capsules by mouth daily, Disp: 30 capsule, Rfl: 0  •  clindamycin (CLINDAGEL) 1 % gel, APPLY TO AFFECTED AREA TWICE A DAY, Disp: 60 g, Rfl: 2  •  Contour Next Test test strip, USE TO TEST BLOOD SUGAR 3 TIMES DAILY. CONTOUR NEXT STRIPS. E10.29, Disp: , Rfl:   •  cyanocobalamin ([employer] Vitamin B-12) 1000 MCG tablet, Take 1 tablet (1,000 mcg total) by mouth daily, Disp: 30 tablet, Rfl: 5  •  cyclobenzaprine (FLEXERIL) 5 mg tablet, , Disp: , Rfl:   •  dexlansoprazole (DEXILANT) 30 MG capsule, TAKE 1 CAPSULE BY MOUTH EVERY DAY, Disp: 90 capsule, Rfl: 1  •  dicyclomine (BENTYL) 10 mg capsule, TAKE 1 CAPSULE BY MOUTH 3 TIMES A DAY BEFORE MEALS, Disp: 270 capsule, Rfl: 1  •  Disposable Gloves MISC, Use 7 times a day, 4 boxes of gloves XL Dx type 1 DM, paraplegia, Disp: 4 each, Rfl: 11  •  doxazosin (CARDURA) 2 mg tablet, TAKE 1 TABLET BY MOUTH EVERY EVENING., Disp: 90 tablet, Rfl: 1  •  epoetin kaushik (EPOGEN,PROCRIT) 20,000 units/mL, Inject 10,000 Units under the skin, Disp: , Rfl:   •  epoetin kaushik (EPOGEN,PROCRIT) 20,000 units/mL, Inject 5,000 Units under the skin, Disp: , Rfl:   •  ferrous sulfate 325 (65 Fe) mg tablet, Take 1 tablet (325 mg total) by mouth 3 (three) times a day, Disp: 90 tablet, Rfl: 3  •  Fluocinolone Acetonide Body 0.01 % OIL, Apply 2 drops topically daily, Disp: , Rfl:   •  folic acid (FOLVITE) 1 mg tablet, Take 1,000 mcg by mouth daily, Disp: , Rfl:   •  glucose-vitamin C 4-0.006 g, Chew 16 g if needed, Disp: , Rfl:   •  Gvoke PFS 1 MG/0.2ML SOSY, , Disp: , Rfl:   •  hydroxychloroquine (PLAQUENIL) 200 mg tablet, Take 200 mg by mouth daily, Disp: , Rfl:   •  hydrOXYzine HCL (ATARAX) 50 mg tablet, TAKE 1 TABLET (50 MG TOTAL) BY MOUTH 2 (TWO) TIMES A DAY AS  NEEDED FOR ITCHING OR ANXIETY, Disp: 180 tablet, Rfl: 1  •  Incontinence Supply Disposable (Incontinence Brief Large) MISC, Use 6 (six) times a day Size xl, Disp: 180 each, Rfl: 11  •  Incontinence Supply Disposable (Undergarment) MISC, Use 6 a day, 5 boxes, xl Dx R51, paraplegia, Disp: 5 each, Rfl: 11  •  Incontinence Supply Disposable (Underpads) MISC, Use 2 a day, Disp: 5 each, Rfl: 11  •  insulin lispro protamine-insulin lispro (HumaLOG 50-50) 100 units/mL, Inject under the skin 2 (two) times a day before meals, Disp: , Rfl:   •  ketoconazole (NIZORAL) 2 % cream, Apply to rash., Disp: 15 g, Rfl: 1  •  LEVEMIR FLEXTOUCH 100 units/mL injection pen, Inject 14 Units under the skin 2 (two) times a day, Disp: 15 pen, Rfl: 3  •  Lokelma 10 g PACK, , Disp: , Rfl:   •  losartan (COZAAR) 100 MG tablet, TAKE 1 TABLET BY MOUTH EVERY DAY, Disp: 90 tablet, Rfl: 1  •  metroNIDAZOLE (METROGEL) 0.75 % gel, Apply topically 2 (two) times a day, Disp: 45 g, Rfl: 0  •  Misc. Devices (Wheelchair Cushion) MISC, Use daily, Disp: 1 each, Rfl: 0  •  Misc. Devices (WHEELCHAIR) MISC, by Does not apply route daily Wheelchair ramp, dx g82.20, Disp: 1 each, Rfl: 0  •  Multiple Vitamin (Daily-Enriqueta Multivitamin) TABS, TAKE 1 TABLET BY MOUTH EVERY DAY, Disp: 30 tablet, Rfl: 8  •  NovoLOG FlexPen 100 units/mL injection pen, INJECT 3 UNITS UNDER THE SKIN 3 (THREE) TIMES A DAY BEFORE MEALS. (Patient not taking: Reported on 7/13/2023), Disp: , Rfl:   •  ondansetron (ZOFRAN-ODT) 4 mg disintegrating tablet, Take 1 tablet (4 mg total) by mouth every 6 (six) hours as needed for nausea or vomiting, Disp: 30 tablet, Rfl: 2  •  oxybutynin (DITROPAN XL) 15 MG 24 hr tablet, , Disp: , Rfl:   •  oxybutynin (DITROPAN) 5 mg tablet, Take 3 tablets (15 mg total) by mouth daily, Disp: 270 tablet, Rfl: 1  •  oxyCODONE (ROXICODONE) 10 MG TABS, TAKE ONE TABLET BY MOUTH EVERY 12 HOURS AS NEEDED FOR PAIN. ONGOING THERAPY., Disp: , Rfl:   •  polyethylene glycol  (GLYCOLAX) 17 GM/SCOOP powder, Take as directed as per written office instructions, Disp: 238 g, Rfl: 0  •  polyethylene glycol (GOLYTELY) 4000 mL solution, Take 4,000 mL by mouth once for 1 dose, Disp: 4000 mL, Rfl: 0  •  risperiDONE (RisperDAL) 0.5 mg tablet, Take 1 tablet (0.5 mg total) by mouth 2 (two) times a day, Disp: 180 tablet, Rfl: 1  •  sertraline (ZOLOFT) 25 mg tablet, TAKE 1 TABLET (25 MG TOTAL) BY MOUTH DAILY., Disp: 90 tablet, Rfl: 1  •  Sodium Zirconium Cyclosilicate 10 g PACK, Take 10 g by mouth daily, Disp: , Rfl:   •  sucralfate (CARAFATE) 1 g/10 mL suspension, Take 10 mL (1 g total) by mouth 4 (four) times a day (with meals and at bedtime), Disp: 420 mL, Rfl: 1  •  SUPER B COMPLEX & C TABS, TAKE 1 CAPSULE BY MOUTH ONCE FOR 1 DOSE AS DIRECTED, Disp: 90 tablet, Rfl: 2  •  Zinc Sulfate 220 (50 Zn) MG TABS, Take 1 tablet by mouth daily, Disp: 90 tablet, Rfl: 1  No current facility-administered medications for this visit.    Review of Systems   Constitutional:  Negative for chills and fever.   Respiratory:  Negative for cough and shortness of breath.    Cardiovascular:  Negative for chest pain.   Musculoskeletal:  Positive for gait problem (Paraplegia).        Paraplegic   Skin:  Positive for wound (Multiple pressure injuries).        Multiple pressure injuries   Neurological:  Positive for weakness and numbness.       Objective:  /84   Pulse 100   Temp (!) 97.1 °F (36.2 °C)   Pain Score: 0-No pain     Physical Exam  Vitals reviewed.   Constitutional:       General: He is not in acute distress.     Appearance: He is not ill-appearing, toxic-appearing or diaphoretic.      Comments: Very pleasant, NAD   HENT:      Head: Normocephalic.   Cardiovascular:      Rate and Rhythm: Normal rate.   Pulmonary:      Effort: Pulmonary effort is normal. No respiratory distress.   Skin:     General: Skin is warm and dry.      Findings: Wound present. No erythema.             Comments: Stage IV pressure injury  left hip wound: Undermined area increased to about 5 cm from roughly 6-12 o'clock.  No malodor, purulent drainage, lymphangitic streaking.  Large amount of predominantly serous with some drainage present.  No surrounding edema, induration.     Stage IV pressure injuries perineal/left ischium/sacrum: Stable, undermining and epiboly w/ callused periwounds    Stage III pressure Multiple small areas that are stage III within measured wound area.  Full-thickness, no exposed muscle tendon bone or cartilage.  No evidence of acute soft tissue infection present.    See full wound description for additional details.   Neurological:      Mental Status: He is alert and oriented to person, place, and time.      Gait: Gait abnormal (wc bound).   Psychiatric:         Mood and Affect: Mood normal.         Behavior: Behavior normal.             Wound Pressure Injury Ischium Left (Active)   Wound Image   01/04/24 1400   Wound Description Pink;Yellow;Hypergranulation 01/04/24 1412   Pressure Injury Stage 4 01/04/24 1412   Irene-wound Assessment Scar Tissue;Intact;Maceration;Dry 01/04/24 1412   Wound Length (cm) 7 cm 01/04/24 1412   Wound Width (cm) 6.4 cm 01/04/24 1412   Wound Depth (cm) 1 cm 01/04/24 1412   Wound Surface Area (cm^2) 44.8 cm^2 01/04/24 1412   Wound Volume (cm^3) 44.8 cm^3 01/04/24 1412   Calculated Wound Volume (cm^3) 44.8 cm^3 01/04/24 1412   Change in Wound Size % -137.04 01/04/24 1412   Tunneling in depth located at resolved 11/30/23 1351   Undermining 0 10/05/23 1456   Undermining is depth extending from resolved 10/05/23 1456   Drainage Amount Large 01/04/24 1412   Drainage Description Serosanguineous 01/04/24 1412   Non-staged Wound Description Full thickness 11/30/23 1351   Treatments Irrigation with NSS 11/30/23 1351   Wound packed? No 02/16/23 0953   Dressing Changed Changed 07/13/23 1558   Patient Tolerance Tolerated well 11/30/23 1351   Dressing Status Removed 11/30/23 1351       Wound Pressure Injury  Perineum (Active)   Wound Image   01/04/24 1400   Wound Description Pink;Yellow;Beefy red;Light purple;Epithelialization 01/04/24 1409   Pressure Injury Stage 4 01/04/24 1409   Irene-wound Assessment Maceration;Scar Tissue;Fragile 01/04/24 1409   Wound Length (cm) 8.5 cm 01/04/24 1409   Wound Width (cm) 6.6 cm 01/04/24 1409   Wound Depth (cm) 1.1 cm 01/04/24 1409   Wound Surface Area (cm^2) 56.1 cm^2 01/04/24 1409   Wound Volume (cm^3) 61.71 cm^3 01/04/24 1409   Calculated Wound Volume (cm^3) 61.71 cm^3 01/04/24 1409   Change in Wound Size % -487.71 01/04/24 1409   Tunneling in depth located at 0 10/05/23 1458   Undermining 1.4 01/04/24 1409   Undermining is depth extending from 4-9 oclock; deepest at 5 01/04/24 1409   Drainage Amount Large 01/04/24 1409   Drainage Description Serosanguineous;Milky 01/04/24 1409   Non-staged Wound Description Full thickness 10/05/23 1458   Treatments Irrigation with NSS 11/30/23 1353   Wound packed? No 02/16/23 0959   Dressing Changed Changed 10/05/23 1458   Patient Tolerance Tolerated well 11/30/23 1353   Dressing Status Removed 11/30/23 1353       Wound Pressure Injury Sacrum (Active)   Wound Image   01/04/24 1359   Wound Description Pink;Yellow;Epithelialization 01/04/24 1407   Pressure Injury Stage 4 01/04/24 1407   Irene-wound Assessment Scar Tissue;Scaly;Dry 01/04/24 1407   Wound Length (cm) 6.7 cm 01/04/24 1407   Wound Width (cm) 8.2 cm 01/04/24 1407   Wound Depth (cm) 1 cm 01/04/24 1407   Wound Surface Area (cm^2) 54.94 cm^2 01/04/24 1407   Wound Volume (cm^3) 54.94 cm^3 01/04/24 1407   Calculated Wound Volume (cm^3) 54.94 cm^3 01/04/24 1407   Change in Wound Size % 26.75 01/04/24 1407   Undermining 0 10/05/23 1458   Undermining is depth extending from resolved 10/05/23 1458   Drainage Amount Large 01/04/24 1407   Drainage Description Serosanguineous 01/04/24 1407   Non-staged Wound Description Full thickness 11/30/23 1346   Treatments Irrigation with NSS 11/30/23 1346   Wound  packed? No 02/16/23 1001   Dressing Changed Changed 07/13/23 1553   Patient Tolerance Tolerated well 11/30/23 1346   Dressing Status Removed 11/30/23 1346       Wound Pressure Injury Hip Left;Lateral (Active)   Wound Image   11/30/23 1343   Wound Description Pink;Yellow;Slough 01/04/24 1404   Pressure Injury Stage 4 01/04/24 1404   Irene-wound Assessment Scar Tissue;Intact;Maceration 01/04/24 1404   Wound Length (cm) 2.5 cm 01/04/24 1404   Wound Width (cm) 1.1 cm 01/04/24 1404   Wound Depth (cm) 1.5 cm 01/04/24 1404   Wound Surface Area (cm^2) 2.75 cm^2 01/04/24 1404   Wound Volume (cm^3) 4.125 cm^3 01/04/24 1404   Calculated Wound Volume (cm^3) 4.13 cm^3 01/04/24 1404   Tunneling 0 cm 10/05/23 1453   Tunneling in depth located at resolved 10/05/23 1453   Undermining 3.6 01/04/24 1404   Undermining is depth extending from 4-11 oclock, deepest at 10 oclock 01/04/24 1404   Drainage Amount Moderate 01/04/24 1404   Drainage Description Serosanguineous;Yellow 01/04/24 1404   Non-staged Wound Description Full thickness 11/30/23 1349   Treatments Irrigation with NSS 11/30/23 1349   Wound packed? No 02/16/23 1004   Packing- # removed 1 05/05/22 1536   Dressing Changed Changed 07/13/23 1600   Patient Tolerance Tolerated well 11/30/23 1349   Dressing Status Removed 11/30/23 1349       Wound 01/04/24 Pressure Injury Scrotum Posterior (Active)   Wound Image   01/04/24 1402   Wound Description Pink;Yellow;Epithelialization 01/04/24 1403   Pressure Injury Stage 3 01/04/24 1403   Irene-wound Assessment Dry 01/04/24 1403   Wound Length (cm) 3 cm 01/04/24 1403   Wound Width (cm) 3 cm 01/04/24 1403   Wound Depth (cm) 0.1 cm 01/04/24 1403   Wound Surface Area (cm^2) 9 cm^2 01/04/24 1403   Wound Volume (cm^3) 0.9 cm^3 01/04/24 1403   Calculated Wound Volume (cm^3) 0.9 cm^3 01/04/24 1403   Drainage Amount Small 01/04/24 1403   Drainage Description Serosanguineous 01/04/24 1403               Lab Results   Component Value Date    HGBA1C 7.4  (H) 11/07/2023       Wound Instructions:  Orders Placed This Encounter   Procedures   • Wound cleansing and dressings     Wound cleansing and dressings   Wash your hands with soap and water.  Remove old dressing, discard into plastic bag and place in trash.    Cleanse the wounds with Dakin's solution if there is an odor, IF NO ODOR, cleanse with normal saline or wound wash prior to applying a clean dressing.   Do not use tissue or cotton balls. Do not scrub the wound. Pat dry using gauze.   Shower no; do not get dressing wet      Left Hip Wound:   Cleanse with Dakin's solution if there is an odor, IF NO ODOR, cleanse with normal saline or wound wash   STOP [employer] rope for now  Start Mesalt packing strip making sure you get to deepest areas from 6-12 o'clock (tape the tail) No substitutes.   Cover with Convamax (or equivalent super absorber) and ABD over top   Secure with Medfix tape.   Change dressing daily and top dressing PRN for breakthrough drainage (visiting nurses to do twice per week and family in between)         Sacral, Left ischium, and perineal wound:   Cleanse with Dakin's solution if there is an odor, IF NO ODOR, cleanse with normal saline or wound wash   STOP DAKINS MOISTENED GAUZE   Apply Mesalt gauze to wound surface  Cover with Convamax (or equivalent super absorber) with ABD over top   Secure with Medfix tape.   Change dressing daily and top dressing PRN for breakthrough drainage (visiting nurses to do twice per week and family in between)      Scrotal wound:  Cleanse with normal saline  Apply thin layer of Triad paste to just cover open areas  May cover with ABD if desired, but DO NOT TAPE  Apply 2 x per day    Continue using Clinitron bed.      Continue NO PRESSURE TO ALL WOUNDS, ESPECIALLY PERINEAL WOUND; LIMIT SITTING UPRIGHT IN WHEELCHAIR ON THIS WOUND      Continue visiting nurse skilled 2 x per week for wound care dressing changes.                               Follow up in 2-3 weeks      Treatment in the wound center today:   Cleansed with dakin's solution and dressed as above.     Standing Status:   Future     Standing Expiration Date:   1/4/2025       Marley Rose MD

## 2024-01-03 NOTE — TELEPHONE ENCOUNTER
PCP SIGNATURE NEEDED FOR [facility] FORM RECEIVED VIA FAX AND PLACED IN PCP FOLDER TO BE DELIVERED AT ASSIGNED TIMES.    Physician Orders

## 2024-01-04 ENCOUNTER — OFFICE VISIT (OUTPATIENT)
Dept: WOUND CARE | Facility: CLINIC | Age: 44
End: 2024-01-04
Payer: MEDICARE

## 2024-01-04 VITALS — HEART RATE: 100 BPM | DIASTOLIC BLOOD PRESSURE: 84 MMHG | SYSTOLIC BLOOD PRESSURE: 138 MMHG | TEMPERATURE: 97.1 F

## 2024-01-04 DIAGNOSIS — L89.893 PRESSURE ULCER OF OTHER SITE, STAGE 3 (HCC): ICD-10-CM

## 2024-01-04 DIAGNOSIS — L89.224 PRESSURE ULCER OF LEFT HIP, STAGE 4 (HCC): ICD-10-CM

## 2024-01-04 DIAGNOSIS — L89.154 PRESSURE INJURY OF SACRAL REGION, STAGE 4 (HCC): Primary | ICD-10-CM

## 2024-01-04 DIAGNOSIS — M86.60 CHRONIC OSTEOMYELITIS (HCC): ICD-10-CM

## 2024-01-04 DIAGNOSIS — L89.894 PRESSURE ULCER OF OTHER SITE, STAGE 4 (HCC): ICD-10-CM

## 2024-01-04 DIAGNOSIS — L89.324 PRESSURE INJURY OF LEFT ISCHIUM, STAGE 4 (HCC): ICD-10-CM

## 2024-01-04 PROCEDURE — 99215 OFFICE O/P EST HI 40 MIN: CPT | Performed by: FAMILY MEDICINE

## 2024-01-04 PROCEDURE — 99214 OFFICE O/P EST MOD 30 MIN: CPT | Performed by: FAMILY MEDICINE

## 2024-01-04 RX ORDER — LIDOCAINE 40 MG/G
CREAM TOPICAL ONCE
Status: COMPLETED | OUTPATIENT
Start: 2024-01-04 | End: 2024-01-04

## 2024-01-04 RX ADMIN — LIDOCAINE: 40 CREAM TOPICAL at 15:19

## 2024-01-04 NOTE — PATIENT INSTRUCTIONS
Orders Placed This Encounter   Procedures    Wound cleansing and dressings     Wound cleansing and dressings   Wash your hands with soap and water.  Remove old dressing, discard into plastic bag and place in trash.    Cleanse the wounds with Dakin's solution if there is an odor, IF NO ODOR, cleanse with normal saline or wound wash prior to applying a clean dressing.   Do not use tissue or cotton balls. Do not scrub the wound. Pat dry using gauze.   Shower no; do not get dressing wet      Left Hip Wound:   Cleanse with Dakin's solution if there is an odor, IF NO ODOR, cleanse with normal saline or wound wash   STOP Aquacel AG rope for now  Start Mesalt packing strip making sure you get to deepest areas from 6-12 o'clock (tape the tail) No substitutes.   Cover with Convamax (or equivalent super absorber) and ABD over top   Secure with Medfix tape.   Change dressing daily and top dressing PRN for breakthrough drainage (visiting nurses to do twice per week and family in between)         Sacral, Left ischium, and perineal wound:   Cleanse with Dakin's solution if there is an odor, IF NO ODOR, cleanse with normal saline or wound wash   STOP DAKINS MOISTENED GAUZE   Apply Mesalt gauze to wound surface  Cover with Convamax (or equivalent super absorber) with ABD over top   Secure with Medfix tape.   Change dressing daily and top dressing PRN for breakthrough drainage (visiting nurses to do twice per week and family in between)      Scrotal wound:  Cleanse with normal saline  Apply thin layer of Triad paste to just cover open areas  May cover with ABD if desired, but DO NOT TAPE  Apply 2 x per day    Continue using Clinitron bed.      Continue NO PRESSURE TO ALL WOUNDS, ESPECIALLY PERINEAL WOUND; LIMIT SITTING UPRIGHT IN WHEELCHAIR ON THIS WOUND      Continue visiting nurse skilled 2 x per week for wound care dressing changes.                               Follow up in 2-3 weeks     Treatment in the wound center today:    Cleansed with dakin's solution and dressed as above.     Standing Status:   Future     Standing Expiration Date:   1/4/2025

## 2024-01-04 NOTE — LETTER
[facility]  [address] [address]  Phone#  [phone]  Fax#  [phone]    Patient:  Ovidio Parkinson Jr.  YOB: 1980  Phone:  [phone]  Date of Visit:  1/4/2024    Orders Placed This Encounter   Procedures   • Wound cleansing and dressings     Wound cleansing and dressings   Wash your hands with soap and water.  Remove old dressing, discard into plastic bag and place in trash.    Cleanse the wounds with Dakin's solution if there is an odor, IF NO ODOR, cleanse with normal saline or wound wash prior to applying a clean dressing.   Do not use tissue or cotton balls. Do not scrub the wound. Pat dry using gauze.   Shower no; do not get dressing wet      Left Hip Wound:   Cleanse with Dakin's solution if there is an odor, IF NO ODOR, cleanse with normal saline or wound wash   STOP Aquacel AG rope for now  Start Mesalt packing strip making sure you get to deepest areas from 6-12 o'clock (tape the tail) No substitutes.   Cover with Convamax (or equivalent super absorber) and ABD over top   Secure with Medfix tape.   Change dressing daily and top dressing PRN for breakthrough drainage (visiting nurses to do twice per week and family in between)         Sacral, Left ischium, and perineal wound:   Cleanse with Dakin's solution if there is an odor, IF NO ODOR, cleanse with normal saline or wound wash   STOP DAKINS MOISTENED GAUZE   Apply Mesalt gauze to wound surface  Cover with Convamax (or equivalent super absorber) with ABD over top   Secure with Medfix tape.   Change dressing daily and top dressing PRN for breakthrough drainage (visiting nurses to do twice per week and family in between)      Scrotal wound:  Cleanse with normal saline  Apply thin layer of Triad paste to just cover open areas  May cover with ABD if desired, but DO NOT TAPE  Apply 2 x per day    Continue using Clinitron bed.      Continue NO PRESSURE TO ALL WOUNDS, ESPECIALLY  PERINEAL WOUND; LIMIT SITTING UPRIGHT IN WHEELCHAIR ON THIS WOUND      Continue visiting nurse skilled 2 x per week for wound care dressing changes.                               Follow up in 2-3 weeks     Treatment in the wound center today:   Cleansed with dakin's solution and dressed as above.     Standing Status:   Future     Standing Expiration Date:   1/4/2025         Electronically signed by Marley Rose MD

## 2024-01-08 ENCOUNTER — TELEPHONE (OUTPATIENT)
Dept: FAMILY MEDICINE CLINIC | Facility: CLINIC | Age: 44
End: 2024-01-08

## 2024-01-08 NOTE — TELEPHONE ENCOUNTER
PCP SIGNATURE NEEDED FOR [facility] FORM RECEIVED VIA FAX AND PLACED IN PCP FOLDER TO BE DELIVERED AT ASSIGNED TIMES.     Account# [id number]

## 2024-01-14 DIAGNOSIS — J30.9 ALLERGIC RHINITIS, UNSPECIFIED SEASONALITY, UNSPECIFIED TRIGGER: ICD-10-CM

## 2024-01-15 RX ORDER — CETIRIZINE HYDROCHLORIDE 10 MG/1
TABLET ORAL
Qty: 90 TABLET | Refills: 1 | Status: SHIPPED | OUTPATIENT
Start: 2024-01-15

## 2024-01-18 ENCOUNTER — TELEPHONE (OUTPATIENT)
Dept: FAMILY MEDICINE CLINIC | Facility: CLINIC | Age: 44
End: 2024-01-18

## 2024-01-23 NOTE — TELEPHONE ENCOUNTER
FAXED ON 01/12/24 TO [facility] at [phone]. FAX CONFIRMATION RECEIVED.

## 2024-01-25 ENCOUNTER — OFFICE VISIT (OUTPATIENT)
Dept: WOUND CARE | Facility: CLINIC | Age: 44
End: 2024-01-25
Payer: MEDICARE

## 2024-01-25 VITALS
TEMPERATURE: 99.3 F | SYSTOLIC BLOOD PRESSURE: 160 MMHG | DIASTOLIC BLOOD PRESSURE: 80 MMHG | HEART RATE: 88 BPM | RESPIRATION RATE: 18 BRPM

## 2024-01-25 DIAGNOSIS — M86.60 CHRONIC OSTEOMYELITIS (HCC): ICD-10-CM

## 2024-01-25 DIAGNOSIS — L89.894 PRESSURE ULCER OF OTHER SITE, STAGE 4 (HCC): ICD-10-CM

## 2024-01-25 DIAGNOSIS — L89.154 PRESSURE INJURY OF SACRAL REGION, STAGE 4 (HCC): Primary | ICD-10-CM

## 2024-01-25 DIAGNOSIS — G82.20 PARAPLEGIA (HCC): ICD-10-CM

## 2024-01-25 DIAGNOSIS — L89.324 PRESSURE INJURY OF LEFT ISCHIUM, STAGE 4 (HCC): ICD-10-CM

## 2024-01-25 DIAGNOSIS — L89.224 PRESSURE ULCER OF LEFT HIP, STAGE 4 (HCC): ICD-10-CM

## 2024-01-25 DIAGNOSIS — L89.893 PRESSURE ULCER OF OTHER SITE, STAGE 3 (HCC): ICD-10-CM

## 2024-01-25 PROCEDURE — 99214 OFFICE O/P EST MOD 30 MIN: CPT | Performed by: FAMILY MEDICINE

## 2024-01-25 PROCEDURE — 97597 DBRDMT OPN WND 1ST 20 CM/<: CPT | Performed by: FAMILY MEDICINE

## 2024-01-25 RX ORDER — LIDOCAINE 40 MG/G
CREAM TOPICAL ONCE
Status: COMPLETED | OUTPATIENT
Start: 2024-01-25 | End: 2024-01-25

## 2024-01-25 RX ADMIN — LIDOCAINE: 40 CREAM TOPICAL at 14:32

## 2024-01-25 NOTE — PROGRESS NOTES
Patient ID: Ovidio Parkinson Jr. is a 43 y.o. male Date of Birth 1980       Chief Complaint   Patient presents with   • Follow Up Wound Care Visit     Sacral wound.       Allergies:  Chlorcyclizine, Chlorhexidine, Ciprofloxacin hcl, Cymbalta [duloxetine hcl], Dm-doxylamine-acetaminophen, Doxycycline, Gabapentin, Hydrocortisone, Lactose - food allergy, Lyrica [pregabalin], Milk (cow), Penicillins, Polymyxin b, Promethazine-phenyleph-codeine, Quinidine, Cefepime, and Rosuvastatin    Diagnosis:      Diagnosis ICD-10-CM Associated Orders   1. Pressure injury of sacral region, stage 4 ([facility])  L89.154 lidocaine (LMX) 4 % cream     Wound cleansing and dressings      2. Paraplegia ([facility])  G82.20       3. Chronic osteomyelitis ([facility])  M86.60       4. Pressure injury of left ischium, stage 4 ([facility])  L89.324       5. Pressure ulcer of left hip, stage 4 ([facility])  L89.224 Debridement Pressure Injury Left;Lateral Hip      6. Pressure ulcer of other site, stage 3 ([facility])  L89.893       7. Pressure ulcer of other site, stage 4 ([facility])  L89.894               Assessment & Plan:    Ovidio presented for follow-up of multiple pressure injuries, palliative wounds to left hip, perineum, left ischium, sacrum and a new scrotal pressure injury that was noted at previous visit.  In general overall improvement to all pressure injuries with the exception of left hip.  See below for further details.  He has history of chronic osteomyelitis for which he is followed closely with infectious disease.     Stage IV pressure injuries of the perineum/sacrum/ischium:  Perineum and sacrum are measuring smaller!  Stage IV pressure injury of left ischium overall unchanged but wound bed appears healthy and granular.  For all pressure injuries mentioned above periwound's with scarring and callus with hyperkeratotic skin which can be easily removed with gauze.  These areas are without malodor, lymphangitic streaking, purulent drainage  Stage IV pressure injury left  "hip: Undermined area around 5.2 cm today, slightly bigger.  Drainage today with visible clots requiring selective debridement for evacuation.  After evacuation of clots drainage serosanguineous.  Large.  Vitals reviewed and are stable though his BP is slightly elevated today  Selective debridement   Patient has known history of osteo, last CT scan reviewed from May 2023 \"Redemonstrated are changes of chronic osteomyelitis of the bilateral  ischium as well as the sacrum. Soft tissue thickening surrounding the bilateral femoral neck is similar to prior exam. Stable destructive change of L2-L4\"  malalignment and low density regions within site of obstruction.  Patient reported that he has begun sleeping on this side which puts pressure on his left hip which he believes could be attributed to the lack of improvement, slight decompensation in this left hip wound.  I discussed with him that though we know he has history of chronic osteo considering the decompensation and change in his drainage possible need for additional imaging  He reports that he will keep pressure off and not sleep on this left hip; will hold off on imaging at this visit and reevaluate at next visit.  Should wound continue to decompensate will order additional imaging for further investigation  For all wounds, continue same wound care, see wound care orders for full details  Stage III pressure injury of scrotum: Significant improvement, increased epithelialization very fragile with small remaining open areas partial-thickness.  No malodor, lymphangitic streaking, purulent drainage.  Can continue use of Vaseline, if skin appears to be breaking down or too moist, can switch back to Triad paste.  Continue regular offloading, repositioning, use of Clinitron bed.    Discussed that while patient is at dialysis recommend repositioning and use of wedge  ED precautions reviewed w/ pt in detail, he expresses understanding.   Follow-up in 3 weeks or sooner if " "needed      Portions of the record may have been created with voice recognition software. Occasional wrong word or \"sound alike\" substitutions may have occurred due to the inherent limitations of voice recognition software. Read the chart carefully and recognize, using context, where substitutions have occurred.    Subjective:   06/30/22: 40 y/o M with PMHx of spinal paraplegia, neurogenic bladder with chronic suprapubic catheter in place, ESRD on dialysis M/W/F, anemia of CKD, secondary hyperparathyroidism, type I DM, colostomy bag in place,tricupsid valve endocarditis, chronic osteomyelitis, chronic pressure injuries, afib, and hx of DVT on Eliquis presenting for f/u of multiple stage 4 pressure injuries of the sacrum, left hip, L ischium, and perineum. Pt was recently hospitalized on 06/17/22 for C. Difficile. Has been using wet to dry dressings. States he has been attempting to increase his protein intake and has gained some weight but his albumin remains low and is confused why that is.     9/1/22:  Followup multiple stage IV pressure injuries of the sacrum and ischial tuberosities.  Patient was hospitalized recently for sepsis and was told it was due to his pressure injuries.  However, they do not look any worse than they ever have been.  He was discharged on oral medications.  He continues using wet to dry dressings.  Prior to hospitalization he developed two new pressure injuries of his posterior scrotum.  He continues to supposedly eat well with increased protein.    10/13/22:  Followup multiple stage IV pressure injuries of the sacrum, perineum and ischial tuberosities and left hip..  At last visit, he had scrotal pressure injuries as well.  He is using wet to dry dressings.  He no longer has his Clinitron and is using an alternating low air loss mattress.  He does not like the air mattress because is more difficult to get in and out of.  He has not had any fevers or chills.    12/6/2022: Follow-up " multiple stage IV pressure injuries of the sacrum, perineum, ischial tuberosities and left hip.  Patient states that he saw endocrinology and A1c was 7.9.  Slightly better than previous.  Using saline packing.  Occasionally Dakin's when they noticed an odor.    2/16/2023: Follow-up visit for multiple stage 4 pressure injuries of the sacrum, perineum, L ischium, and L hip. Patient states he has a Clinitron bed and his blood sugars have been well managed recently with his insulin pump. This morning his sugar was 80 and has been running in the 150's lately. He monitors his sugars with a Dexcom. He has been cleansing the wounds with saline, and dakin's occasionally when he notices an odor. He denies fever, pains, or pain.    4/6/2023: Follow-up of multiple stage IV pressure injuries of the sacrum, ischium on the left and left hip and perineum.  Blood sugars were running high today.  Last A1c done last month was 7.5.  No new complaints.  Still using saline packing with occasional Dakin's packing if the quality of the ulcer is changed.    5/18/2023: Follow-up multiple stage IV pressure injuries of the sacrum, ischium on the left and left hip and perineum.  Patient states that his blood sugars are running about the same.  He is generally using saline packing with occasional Dakin's packing should there be an odor.  He was supposed to use silver alginate but he has not been using this.  He was hospitalized about a month ago for C. difficile colitis.    7/13/2023: Follow-up multiple stage IV pressure injuries of the sacrum, ischium on the left and left hip and perineum.  Patient was hospitalized for some sort of infection since his last visit.  He recovered without problems.  He has no new complaints.  He continues alternating with saline and Dakin's depending on the odor.    8/24/2023: Follow-up multiple stage IV pressure injuries of the sacrum, ischium, left hip and perineum.    10/5/2023: Ovidio presents today for follow up  "of multiple stage IV pressure injuries of sacrum, ischium, left hip and perineum.  He reports he has no acute complaints and denies fever.  States that he continues with large drainage sometimes changing twice per day.  States he has been tolerating the dressings well and family has been changing in between VNA visits.  Of note he does say his protein levels have decreased again and he is working on getting increased protein intake as this has been an issue for him in the past.    11/30/2023: Follow-up multiple stage IV pressure injuries of the sacrum, ischium, left hip and perineum.  States that he notices more shearing forces because of shifting in the dialysis chair.  He also notes slight malodor.  Has been using water or saline gauze.  Used Dakin's for some time.  Denies any recent fever or chills.  Unfortunately, he was hospitalized the beginning of this month for DKA.    *Above HPI hx/summary obtained via chart review of prev wound care office visits*    1/4/2024: Ovidio presents to wound center today for follow-up of multiple stage IV pressure injuries of sacrum, left hip, ischium, and perineum.  He reports he believes his lab studies regarding his protein level has improved as well as overall nutrition in general.  Has no acute complaints today and denies fever, chills.  He reports he is currently feeling well/much better from his recent hospitalization which he reports occurred at [facility] 12/5 through 12/13.    Per chart review of discharge summary from that hospitalization he was \"found to have Staph Lugudenesis bacteremia on admission blood cultures and was started on vancomycin, which was transitioned to Ancef by Infectious Disease. He will continue Ancef Source was suspected to be prior HD line; known sacral wounds were stable. Repeat blood cultures were negative on discharge. During admission, his HD line was removed (given infection) and replaced with temporary HD line (which, in turn, needed to be " "replaced due to inability to successfully use). New HD line functioning as of 12/9. PATRICIO performed for evaluation of possible endocarditis was negative. A tunneled HD line was placed 12/13/2023 by IR after repeat blood cultures were negative x48 hrs.\"  He also reports that he may have been spending more time in his chair than previous and continues with difficulty controlling some of the shifting/shearing forces with transferring etc... Continues to use his Clinitron bed.  Also reports that he is still awaiting the custom wheelchair/cushions to help with his pressure injuries but that it is being made.       1/25/2024: Ovidio presents today for follow-up of multiple stage IV pressure injuries of left hip, ischium, sacrum, and perineum as well as stage III pressure injury of scrotum.  He reports that they have been doing well with the Mesalt.  In addition reports that the area under the scrotum his brother sometimes uses Vaseline to this area when it is on the drier side she reports his brother tells him it is looking better.  Sides his chronic issues, he reports he has no acute complaints today and overall is feeling well.  He denies fever, chills.  He reported he recently started sleeping on his left side as this was more comfortable for him.        The following portions of the patient's history were reviewed and updated as appropriate:   Patient Active Problem List   Diagnosis   • Paraplegia ([facility])   • Atrial fibrillation ([facility])   • Chronic suprapubic catheter ([facility])   • Colostomy care ([facility])   • S/P unilateral BKA (below knee amputation) ([facility])   • Tobacco abuse   • Type 1 diabetes mellitus with chronic kidney disease on chronic dialysis ([facility])   • Ulcer of sacral region, stage 4 ([facility])   • Wound healing, delayed   • Iron deficiency anemia   • Pressure injury of left ischium, stage 4 ([facility])   • HTN (hypertension), benign   • Neurogenic bladder   • GERD (gastroesophageal reflux disease)   • Chronic pain   • Stage 5 chronic " kidney disease on chronic dialysis ([facility])   • History of Clostridium difficile infection   • Urinary retention   • Dialysis patient ([facility])   • Insulin long-term use ([facility])   • Wheelchair dependent   • Recurrent Clostridioides difficile diarrhea   • Bipolar depression ([facility])   • 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[facility])   • Seizure ([facility])   • Pressure ulcer of sacral region, stage 4 ([facility])   • AVM (arteriovenous malformation) of duodenum, acquired   • Chronic narcotic dependence ([facility])   • Chronic diastolic heart failure ([facility])   • Pressure ulcer of other site, stage 4 ([facility])   • Pressure ulcer of left hip, stage 4 ([facility])   • ED (erectile dysfunction) of organic origin   • Irritable bowel syndrome   • Onychomycosis   • Pustular folliculitis   • Prolonged Q-T interval on ECG   • Secondary hyperparathyroidism of renal origin ([facility])   • Wounds, multiple   • Immunocompromised state ([facility])   • Severe protein-calorie malnutrition ([facility])   • Chronic osteomyelitis ([facility])   • Acute deep vein thrombosis (DVT) of right upper extremity ([facility])   • C. difficile colitis   • Charcot's arthropathy   • Diabetic gastroparesis associated with type 1 diabetes mellitus    • End stage renal disease ([facility])   • Hyperlipidemia   • LVH (left ventricular hypertrophy)   • NICM (nonischemic cardiomyopathy) ([facility])   • Vitamin B deficiency   • Vitamin C deficiency   • Vitamin D deficiency   • Hospital discharge follow-up   • Rheumatoid arthritis ([facility]CC)     Past Medical History:   Diagnosis Date   • Acute pulmonary edema ([facility]CC) 1/13/2022   • Ambulatory dysfunction    • Anemia    • Anemia, iron deficiency     transfusion requiring   • Asymptomatic bacteriuria 9/25/2017   • Atrial fibrillation ([facility])    • AVM (arteriovenous malformation) of duodenum, 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   • Bacteriuria, asymptomatic    • Bipolar disorder ([facility]CC)    • Chronic deep vein thrombosis (DVT) ([facility])    • Chronic indwelling Ortega catheter    • Chronic kidney disease    • Chronic pain    • Chronic pain  disorder    • Chronic suprapubic catheter ([facility])    • Clostridium difficile infection 08/11/2016    also positive 9/2016, 5/29/2017, 8/15/2017. S/P fecal transplant   • Colostomy on examination ([facility])    • Decubitus ulcer    • Delirium    • Diabetes mellitus ([facility])    • GERD (gastroesophageal reflux disease)    • Hemodialysis patient ([facility])    • Hypertension    • Memory impairment 2011    s/p diabetic coma   • Neurogenic bladder    • OAB (overactive bladder)    • Paraplegia ([facility])     T3 transverse myelitis vs spinal stoke (AVM); 2012 extensive epidural abscess C7=> conus 2nd extension from deep parspinal abscess L4-S2/sacral decubitus; cord atrophy/myelomalcia T3=>conus    • Penile abscess    • S/P unilateral BKA (below knee amputation) ([facility])     Right   • Sebaceous cyst removed in 2017   • Seizures ([facility])    • Shortness of breath    • Tobacco abuse    • 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[facility])    • Urinary retention    • Wheelchair dependent    • Wounds, multiple     pressure ulcers with delayed healing     Past Surgical History:   Procedure Laterality Date   • BELOW KNEE LEG AMPUTATION Right 2009   • COLONOSCOPY N/A 03/27/2017    Procedure: COLONOSCOPY;  Surgeon: Wesley Thibodeaux MD;  Location: [facility];  Service:    • COLONOSCOPY N/A 03/29/2017    Procedure: COLONOSCOPY;  Surgeon: Wesley Thibodeaux MD;  Location: [facility];  Service:    • COLONOSCOPY N/A 06/15/2017    Procedure: COLONOSCOPY with FMT;  Surgeon: Gerard Marques MD;  Location: [facility];  Service: Gastroenterology   • ESOPHAGOGASTRODUODENOSCOPY N/A 03/22/2017    Procedure: ESOPHAGOGASTRODUODENOSCOPY (EGD);  Surgeon: Beverley Ortiz DO;  Location: [facility];  Service:    • MULTIPLE TOOTH EXTRACTIONS N/A 03/08/2021    Procedure: EXTRACTION TEETH # 2,3,6,10,12,13,14,18,19,20,21,22,23,24,25,26,27,28,29,30;  Surgeon: Brian Kohler DMD;  Location: [facility];  Service: Maxillofacial   • SKIN BIOPSY       Family History   Problem Relation Age of Onset   • Hyperlipidemia Mother    •  Hypertension Mother    • Leukemia Brother    • Diabetes Paternal Grandfather       Social History     Socioeconomic History   • Marital status: Single     Spouse name: None   • Number of children: None   • Years of education: None   • Highest education level: None   Occupational History   • None   Tobacco Use   • Smoking status: Every Day     Types: Cigars     Passive exposure: Current   • Smokeless tobacco: Never   • Tobacco comments:     2 cigars/day - 2/14/2023   Vaping Use   • Vaping status: Never Used   Substance and Sexual Activity   • Alcohol use: Not Currently     Comment: 1 cup of wine every 3-4 months   • Drug use: Not Currently     Types: Marijuana     Comment: rarely; once every 1-2 years   • Sexual activity: None   Other Topics Concern   • None   Social History Narrative   • None     Social Determinants of Health     Financial Resource Strain: Low Risk  (12/5/2023)    Received from [facility]    Overall Financial Resource Strain (CARDIA)    • Difficulty of Paying Living Expenses: Not hard at all   Food Insecurity: No Food Insecurity (12/5/2023)    Received from [facility]    Hunger Vital Sign    • Worried About Running Out of Food in the Last Year: Never true    • Ran Out of Food in the Last Year: Never true   Transportation Needs: No Transportation Needs (12/5/2023)    Received from [facility]    PRAPARE - Transportation    • Lack of Transportation (Medical): No    • Lack of Transportation (Non-Medical): No   Physical Activity: Not on file   Stress: Not on file   Social Connections: Not on file   Intimate Partner Violence: Not At Risk (12/5/2023)    Received from [facility]    Humiliation, Afraid, Rape, and Kick questionnaire    • Fear of Current or Ex-Partner: No    • Emotionally Abused: No    • Physically Abused: No    • Sexually Abused: No   Housing Stability: Low Risk  (12/5/2023)    Received from [facility]     Housing Stability Vital Sign    • Unable to Pay for Housing in the Last Year: No    • Number of Places Lived in the Last Year: 1    • Unstable Housing in the Last Year: No        Current Outpatient Medications:   •  Alcohol Swabs (ALCOHOL PADS) 70 % PADS, by Does not apply route 5 (five) times a day, Disp: 300 each, Rfl: 5  •  amLODIPine (NORVASC) 10 mg tablet, TAKE 1 TABLET BY MOUTH EVERY DAY, Disp: 90 tablet, Rfl: 1  •  ammonium lactate (LAC-HYDRIN) 12 % cream, , Disp: , Rfl:   •  apixaban (ELIQUIS) 5 mg, Take 1 tablet (5 mg total) by mouth 2 (two) times a day, Disp: 270 tablet, Rfl: 1  •  atorvastatin (LIPITOR) 20 mg tablet, TAKE 1 TABLET BY MOUTH DAILY AT BEDTIME, Disp: 90 tablet, Rfl: 1  •  azelaic acid (Azelex) 20 % cream, Apply topically 2 (two) times a day, Disp: 50 g, Rfl: 0  •  B Complex-C-Folic Acid (Super B-Complex/Vit C/FA) TABS, TAKE 1 TABLET BY MOUTH EVERY DAY AS DIRECTED, Disp: 90 tablet, Rfl: 5  •  SUSAN MICROLET LANCETS lancets, Use as instructed to check blood sugar 4 times a day Dx e10.29, Disp: 200 each, Rfl: 5  •  bisacodyl (DULCOLAX) 5 mg EC tablet, Take 1 tablet (5 mg total) by mouth once for 1 dose, Disp: 2 tablet, Rfl: 0  •  Blood Glucose Monitoring Suppl (SUSAN CONTOUR NEXT USB MONITOR) w/Device KIT, Testing 4 times a day, Disp: 1 kit, Rfl: 0  •  calcitriol (ROCALTROL) 0.5 MCG capsule, Take 2 mcg by mouth, Disp: , Rfl:   •  calcium acetate (PHOSLO) capsule, TAKE 2 CAPSULES BY MOUTH THREE TIMES DAILY WITH MEALS, Disp: , Rfl:   •  carvedilol (COREG) 25 mg tablet, TAKE 1 TABLET BY MOUTH TWICE A DAY, Disp: 180 tablet, Rfl: 1  •  cetirizine (ZyrTEC) 10 mg tablet, TAKE 1 TABLET BY MOUTH EVERY DAY, Disp: 90 tablet, Rfl: 1  •  Cholecalciferol (VITAMIN D-3) 1000 units CAPS, Take 2 capsules by mouth daily, Disp: 30 capsule, Rfl: 0  •  clindamycin (CLINDAGEL) 1 % gel, APPLY TO AFFECTED AREA TWICE A DAY, Disp: 60 g, Rfl: 2  •  Contour Next Test test strip, USE TO TEST BLOOD SUGAR 3 TIMES DAILY.  CONTOUR NEXT STRIPS. E10.29, Disp: , Rfl:   •  cyanocobalamin ([employer] Vitamin B-12) 1000 MCG tablet, Take 1 tablet (1,000 mcg total) by mouth daily, Disp: 30 tablet, Rfl: 5  •  cyclobenzaprine (FLEXERIL) 5 mg tablet, , Disp: , Rfl:   •  dexlansoprazole (DEXILANT) 30 MG capsule, TAKE 1 CAPSULE BY MOUTH EVERY DAY, Disp: 90 capsule, Rfl: 1  •  dicyclomine (BENTYL) 10 mg capsule, TAKE 1 CAPSULE BY MOUTH 3 TIMES A DAY BEFORE MEALS, Disp: 270 capsule, Rfl: 1  •  Disposable Gloves MISC, Use 7 times a day, 4 boxes of gloves XL Dx type 1 DM, paraplegia, Disp: 4 each, Rfl: 11  •  doxazosin (CARDURA) 2 mg tablet, TAKE 1 TABLET BY MOUTH EVERY EVENING., Disp: 90 tablet, Rfl: 1  •  epoetin kaushik (EPOGEN,PROCRIT) 20,000 units/mL, Inject 10,000 Units under the skin, Disp: , Rfl:   •  epoetin kaushik (EPOGEN,PROCRIT) 20,000 units/mL, Inject 5,000 Units under the skin, Disp: , Rfl:   •  ferrous sulfate 325 (65 Fe) mg tablet, Take 1 tablet (325 mg total) by mouth 3 (three) times a day, Disp: 90 tablet, Rfl: 3  •  Fluocinolone Acetonide Body 0.01 % OIL, Apply 2 drops topically daily, Disp: , Rfl:   •  folic acid (FOLVITE) 1 mg tablet, Take 1,000 mcg by mouth daily, Disp: , Rfl:   •  glucose-vitamin C 4-0.006 g, Chew 16 g if needed, Disp: , Rfl:   •  Gvoke PFS 1 MG/0.2ML SOSY, , Disp: , Rfl:   •  hydroxychloroquine (PLAQUENIL) 200 mg tablet, Take 200 mg by mouth daily, Disp: , Rfl:   •  hydrOXYzine HCL (ATARAX) 50 mg tablet, TAKE 1 TABLET (50 MG TOTAL) BY MOUTH 2 (TWO) TIMES A DAY AS NEEDED FOR ITCHING OR ANXIETY, Disp: 180 tablet, Rfl: 1  •  Incontinence Supply Disposable (Incontinence Brief Large) MISC, Use 6 (six) times a day Size xl, Disp: 180 each, Rfl: 11  •  Incontinence Supply Disposable (Undergarment) MISC, Use 6 a day, 5 boxes, xl Dx R51, paraplegia, Disp: 5 each, Rfl: 11  •  Incontinence Supply Disposable (Underpads) MISC, Use 2 a day, Disp: 5 each, Rfl: 11  •  insulin lispro protamine-insulin lispro (HumaLOG 50-50) 100 units/mL,  Inject under the skin 2 (two) times a day before meals, Disp: , Rfl:   •  ketoconazole (NIZORAL) 2 % cream, Apply to rash., Disp: 15 g, Rfl: 1  •  LEVEMIR FLEXTOUCH 100 units/mL injection pen, Inject 14 Units under the skin 2 (two) times a day, Disp: 15 pen, Rfl: 3  •  Lokelma 10 g PACK, , Disp: , Rfl:   •  losartan (COZAAR) 100 MG tablet, TAKE 1 TABLET BY MOUTH EVERY DAY, Disp: 90 tablet, Rfl: 1  •  metroNIDAZOLE (METROGEL) 0.75 % gel, Apply topically 2 (two) times a day, Disp: 45 g, Rfl: 0  •  Misc. Devices (Wheelchair Cushion) MISC, Use daily, Disp: 1 each, Rfl: 0  •  Misc. Devices (WHEELCHAIR) MISC, by Does not apply route daily Wheelchair ramp, dx g82.20, Disp: 1 each, Rfl: 0  •  Multiple Vitamin (Daily-Enriqueta Multivitamin) TABS, TAKE 1 TABLET BY MOUTH EVERY DAY, Disp: 30 tablet, Rfl: 8  •  NovoLOG FlexPen 100 units/mL injection pen, INJECT 3 UNITS UNDER THE SKIN 3 (THREE) TIMES A DAY BEFORE MEALS. (Patient not taking: Reported on 7/13/2023), Disp: , Rfl:   •  ondansetron (ZOFRAN-ODT) 4 mg disintegrating tablet, Take 1 tablet (4 mg total) by mouth every 6 (six) hours as needed for nausea or vomiting, Disp: 30 tablet, Rfl: 2  •  oxybutynin (DITROPAN XL) 15 MG 24 hr tablet, , Disp: , Rfl:   •  oxybutynin (DITROPAN) 5 mg tablet, Take 3 tablets (15 mg total) by mouth daily, Disp: 270 tablet, Rfl: 1  •  oxyCODONE (ROXICODONE) 10 MG TABS, TAKE ONE TABLET BY MOUTH EVERY 12 HOURS AS NEEDED FOR PAIN. ONGOING THERAPY., Disp: , Rfl:   •  polyethylene glycol (GLYCOLAX) 17 GM/SCOOP powder, Take as directed as per written office instructions, Disp: 238 g, Rfl: 0  •  polyethylene glycol (GOLYTELY) 4000 mL solution, Take 4,000 mL by mouth once for 1 dose, Disp: 4000 mL, Rfl: 0  •  risperiDONE (RisperDAL) 0.5 mg tablet, Take 1 tablet (0.5 mg total) by mouth 2 (two) times a day, Disp: 180 tablet, Rfl: 1  •  sertraline (ZOLOFT) 25 mg tablet, TAKE 1 TABLET (25 MG TOTAL) BY MOUTH DAILY., Disp: 90 tablet, Rfl: 1  •  Sodium Zirconium  Cyclosilicate 10 g PACK, Take 10 g by mouth daily, Disp: , Rfl:   •  sucralfate (CARAFATE) 1 g/10 mL suspension, Take 10 mL (1 g total) by mouth 4 (four) times a day (with meals and at bedtime), Disp: 420 mL, Rfl: 1  •  SUPER B COMPLEX & C TABS, TAKE 1 CAPSULE BY MOUTH ONCE FOR 1 DOSE AS DIRECTED, Disp: 90 tablet, Rfl: 2  •  Zinc Sulfate 220 (50 Zn) MG TABS, Take 1 tablet by mouth daily, Disp: 90 tablet, Rfl: 1  No current facility-administered medications for this visit.    Review of Systems   Constitutional:  Negative for chills and fever.   Respiratory:  Negative for cough and shortness of breath.    Cardiovascular:  Negative for chest pain and palpitations.   Gastrointestinal:  Negative for vomiting.   Musculoskeletal:  Positive for gait problem (Paraplegia).        Paraplegic   Skin:  Positive for wound (Multiple pressure injuries).        Multiple pressure injuries   Neurological:  Positive for weakness and numbness. Negative for dizziness and headaches.   Psychiatric/Behavioral:  The patient is not nervous/anxious.        Objective:  /80   Pulse 88   Temp 99.3 °F (37.4 °C)   Resp 18   Pain Score: 0-No pain     Physical Exam  Vitals reviewed: BP noted 160/80 ; pt asymptomatic.   Constitutional:       General: He is not in acute distress.     Appearance: He is not ill-appearing, toxic-appearing or diaphoretic.      Comments: Very pleasant, NAD   HENT:      Head: Normocephalic.   Cardiovascular:      Rate and Rhythm: Normal rate.   Pulmonary:      Effort: Pulmonary effort is normal. No respiratory distress.   Genitourinary:      Musculoskeletal:      Comments: Stage III pressure injury of posterior scrotum: Significant improvement, increased epithelialization very fragile with small remaining open areas partial-thickness.  No malodor, lymphangitic streaking, purulent drainage.   Skin:     General: Skin is warm and dry.      Findings: Wound present. No erythema.             Comments: Stage IV pressure  injuries of the perineum/sacrum/ischium: Perineum and sacrum are measuring smaller  Stage IV pressure injury of left ischium overall unchanged but wound bed appears healthy and granular.   For all pressure injuries mentioned above periwound's with scarring and callus with hyperkeratotic skin which can be easily removed with gauze.  These areas are without malodor, lymphangitic streaking, purulent drainage    Stage IV pressure injury left hip: Undermined area around 5.2 cm today, slightly bigger.  Drainage today with visible clots requiring selective debridement for evacuation.  After evacuation of clots drainage serosanguineous.  Large.    See full wound description for additional details.   Neurological:      Mental Status: He is alert and oriented to person, place, and time.      Gait: Gait abnormal (wc bound).   Psychiatric:         Mood and Affect: Mood normal.         Behavior: Behavior normal.           Wound Pressure Injury Ischium Left (Active)   Wound Image   01/25/24 1345   Wound Description Pink;Yellow;Hypergranulation;Epithelialization 01/25/24 1346   Pressure Injury Stage 4 01/04/24 1412   Irene-wound Assessment Scar Tissue;Intact;Maceration;Dry 01/25/24 1346   Wound Length (cm) 7 cm 01/25/24 1348   Wound Width (cm) 6.5 cm 01/25/24 1348   Wound Depth (cm) 1 cm 01/25/24 1348   Wound Surface Area (cm^2) 45.5 cm^2 01/25/24 1348   Wound Volume (cm^3) 45.5 cm^3 01/25/24 1348   Calculated Wound Volume (cm^3) 45.5 cm^3 01/25/24 1348   Change in Wound Size % -140.74 01/25/24 1348   Tunneling 1 in depth located at resolved 11/30/23 1351   Undermining 1 0 10/05/23 1456   Undermining 1 is depth extending from resolved 10/05/23 1456   Drainage Amount Large 01/25/24 1346   Drainage Description Serosanguineous;Tan 01/25/24 1346   Non-staged Wound Description Full thickness 01/25/24 1346   Treatments Irrigation with NSS 01/25/24 1346   Wound packed? No 02/16/23 0961   Dressing Changed Changed 01/25/24 1346   Patient  Tolerance Tolerated well 01/25/24 1346   Dressing Status Removed 01/25/24 1346       Wound Pressure Injury Perineum (Active)   Wound Image   01/04/24 1400   Wound Description Yellow;Pink 01/25/24 1350   Pressure Injury Stage 4 01/04/24 1409   Irene-wound Assessment Scar Tissue 01/25/24 1350   Wound Length (cm) 6.5 cm 01/25/24 1350   Wound Width (cm) 6 cm 01/25/24 1350   Wound Depth (cm) 1 cm 01/25/24 1350   Wound Surface Area (cm^2) 39 cm^2 01/25/24 1350   Wound Volume (cm^3) 39 cm^3 01/25/24 1350   Calculated Wound Volume (cm^3) 39 cm^3 01/25/24 1350   Change in Wound Size % -271.43 01/25/24 1350   Tunneling 1 in depth located at 0 10/05/23 1458   Number of underminings 1 01/25/24 1350   Undermining 1 2.8 01/25/24 1350   Undermining 1 is depth extending from 12-9  deepest at 7 01/25/24 1350   Drainage Amount Large 01/25/24 1350   Drainage Description Serosanguineous;Tan 01/25/24 1350   Non-staged Wound Description Full thickness 01/25/24 1350   Treatments Irrigation with NSS 01/25/24 1350   Wound packed? No 02/16/23 0959   Dressing Changed Changed 01/25/24 1350   Patient Tolerance Tolerated well 01/25/24 1350   Dressing Status Removed 01/25/24 1350       Wound Pressure Injury Sacrum (Active)   Wound Image   01/25/24 1341   Wound Description Epithelialization;Pink 01/25/24 1354   Pressure Injury Stage 4 01/04/24 1407   Irene-wound Assessment Scar Tissue;Scaly;Dry 01/25/24 1354   Wound Length (cm) 2.8 cm 01/25/24 1354   Wound Width (cm) 8.2 cm 01/25/24 1354   Wound Depth (cm) 0.2 cm 01/25/24 1354   Wound Surface Area (cm^2) 22.96 cm^2 01/25/24 1354   Wound Volume (cm^3) 4.592 cm^3 01/25/24 1354   Calculated Wound Volume (cm^3) 4.59 cm^3 01/25/24 1354   Change in Wound Size % 93.88 01/25/24 1354   Undermining 1 0 10/05/23 1458   Undermining 1 is depth extending from resolved 10/05/23 1458   Drainage Amount Small 01/25/24 1354   Drainage Description Serosanguineous 01/25/24 1354   Non-staged Wound Description Full  thickness 01/25/24 1354   Treatments Irrigation with NSS 11/30/23 1346   Wound packed? No 02/16/23 1001   Dressing Changed Changed 01/25/24 1354   Patient Tolerance Tolerated well 01/25/24 1354   Dressing Status Removed 01/25/24 1354       Wound Pressure Injury Hip Left;Lateral (Active)   Wound Image   01/25/24 1343   Wound Description Pink;Yellow;Slough 01/25/24 1400   Pressure Injury Stage 4 01/04/24 1404   Irene-wound Assessment Scar Tissue;Intact 01/25/24 1400   Wound Length (cm) 2.3 cm 01/25/24 1400   Wound Width (cm) 1.9 cm 01/25/24 1400   Wound Depth (cm) 1.7 cm 01/25/24 1400   Wound Surface Area (cm^2) 4.37 cm^2 01/25/24 1400   Wound Volume (cm^3) 7.429 cm^3 01/25/24 1400   Calculated Wound Volume (cm^3) 7.43 cm^3 01/25/24 1400   Tunneling 1 0 cm 10/05/23 1453   Tunneling 1 in depth located at resolved 10/05/23 1453   Undermining 1 5.2 01/25/24 1400   Undermining 1 is depth extending from 6-12 oclock 01/25/24 1400   Drainage Amount Large 01/25/24 1400   Drainage Description Serosanguineous;Yellow;Green 01/25/24 1400   Non-staged Wound Description Full thickness 01/25/24 1400   Treatments Irrigation with NSS 01/25/24 1400   Wound packed? No 02/16/23 1004   Packing- # removed 2 01/25/24 1400   Dressing Changed Changed 01/25/24 1400   Patient Tolerance Tolerated well 11/30/23 1349   Dressing Status Removed 01/25/24 1400       Wound 01/04/24 Pressure Injury Scrotum Posterior (Active)   Wound Image   01/25/24 1341   Wound Description Pink;Yellow 01/25/24 1358   Pressure Injury Stage 3 01/04/24 1403   Irene-wound Assessment Dry 01/25/24 1358   Wound Length (cm) 1 cm 01/25/24 1358   Wound Width (cm) 1.3 cm 01/25/24 1358   Wound Depth (cm) 0.1 cm 01/25/24 1358   Wound Surface Area (cm^2) 1.3 cm^2 01/25/24 1358   Wound Volume (cm^3) 0.13 cm^3 01/25/24 1358   Calculated Wound Volume (cm^3) 0.13 cm^3 01/25/24 1358   Change in Wound Size % 85.56 01/25/24 1358   Drainage Amount Scant 01/25/24 1358   Drainage Description  "Serosanguineous 01/25/24 1358   Non-staged Wound Description Partial thickness 01/25/24 1358   Dressing Status Removed 01/25/24 1358         Debridement   Wound Pressure Injury Hip Left;Lateral    Universal Protocol:  Consent: Verbal consent obtained. Written consent obtained.  Consent given by: patient  Time out: Immediately prior to procedure a \"time out\" was called to verify the correct patient, procedure, equipment, support staff and site/side marked as required.  Patient understanding: patient states understanding of the procedure being performed  Patient identity confirmed: verbally with patient    Debridement Details  Performed by: physician  Debridement type: selective  Pain control: lidocaine 4%      Post-debridement measurements  Length (cm): 2.3  Width (cm): 1.9  Depth (cm): 1.7  Percent debrided: 100%  Surface Area (cm^2): 4.37  Area Debrided (cm^2): 4.37  Volume (cm^3): 7.43    Devitalized tissue debrided: clots and slough  Instrument(s) utilized: curette  Bleeding: small  Comment regarding bleeding: mixed with clots  Hemostasis obtained with: pressure  Procedural pain (0-10): 0  Post-procedural pain: 0   Response to treatment: procedure was tolerated well               Lab Results   Component Value Date    HGBA1C 7.4 (H) 11/07/2023       Wound Instructions:  Orders Placed This Encounter   Procedures   • Wound cleansing and dressings     Wound cleansing and dressings       Wound cleansing and dressings          Wash your hands with soap and water.  Remove old dressing, discard into plastic bag and place in trash.    Cleanse the wounds with Dakin's solution if there is an odor, IF NO ODOR, cleanse with normal saline or wound wash prior to applying a clean dressing.   Do not use tissue or cotton balls. Do not scrub the wound. Pat dry using gauze.   Shower no; do not get dressing wet      Left Hip Wound:   Cleanse with Dakin's solution if there is an odor, IF NO ODOR, cleanse with normal saline or wound " wash   STOP Aquacel AG rope for now  Start Mesalt packing strip making sure you get to deepest areas from 6-12 o'clock (tape the tail) No substitutes.   Cover with Convamax (or equivalent super absorber) and ABD over top   Secure with Medfix tape.   Change dressing daily and top dressing PRN for breakthrough drainage (visiting nurses to do twice per week and family in between)         Sacral, Left ischium, and perineal wound:   Cleanse with Dakin's solution if there is an odor, IF NO ODOR, cleanse with normal saline or wound wash   STOP DAKINS MOISTENED GAUZE   Apply Mesalt gauze to wound surface  Cover with Convamax (or equivalent super absorber) with ABD over top   Secure with Medfix tape.   Change dressing daily and top dressing PRN for breakthrough drainage (visiting nurses to do twice per week and family in between)      Scrotal wound:  Cleanse with normal saline  Apply thin layer of Triad paste to just cover open areas  May cover with ABD if desired, but DO NOT TAPE  Apply 2 x per day     Continue using Clinitron bed.      Continue NO PRESSURE TO ALL WOUNDS, ESPECIALLY PERINEAL WOUND; LIMIT SITTING UPRIGHT IN WHEELCHAIR ON THIS WOUND      Continue visiting nurse skilled 2 x per week for wound care dressing changes.                               Follow up in 3 weeks     Treatment in the wound center today:   Cleansed with normal saline and dressed as above.     Standing Status:   Future     Standing Expiration Date:   1/25/2025   • Debridement Pressure Injury Left;Lateral Hip     This order was created via procedure documentation               Marley Rose MD

## 2024-01-25 NOTE — PATIENT INSTRUCTIONS
Orders Placed This Encounter   Procedures    Wound cleansing and dressings     Wound cleansing and dressings       Wound cleansing and dressings          Wash your hands with soap and water.  Remove old dressing, discard into plastic bag and place in trash.    Cleanse the wounds with Dakin's solution if there is an odor, IF NO ODOR, cleanse with normal saline or wound wash prior to applying a clean dressing.   Do not use tissue or cotton balls. Do not scrub the wound. Pat dry using gauze.   Shower no; do not get dressing wet      Left Hip Wound:   Cleanse with Dakin's solution if there is an odor, IF NO ODOR, cleanse with normal saline or wound wash   STOP Aquacel AG rope for now  Start Mesalt packing strip making sure you get to deepest areas from 6-12 o'clock (tape the tail) No substitutes.   Cover with Convamax (or equivalent super absorber) and ABD over top   Secure with Medfix tape.   Change dressing daily and top dressing PRN for breakthrough drainage (visiting nurses to do twice per week and family in between)         Sacral, Left ischium, and perineal wound:   Cleanse with Dakin's solution if there is an odor, IF NO ODOR, cleanse with normal saline or wound wash   STOP DAKINS MOISTENED GAUZE   Apply Mesalt gauze to wound surface  Cover with Convamax (or equivalent super absorber) with ABD over top   Secure with Medfix tape.   Change dressing daily and top dressing PRN for breakthrough drainage (visiting nurses to do twice per week and family in between)      Scrotal wound:  Cleanse with normal saline  Apply thin layer of Triad paste to just cover open areas  May cover with ABD if desired, but DO NOT TAPE  Apply 2 x per day     Continue using Clinitron bed.      Continue NO PRESSURE TO ALL WOUNDS, ESPECIALLY PERINEAL WOUND; LIMIT SITTING UPRIGHT IN WHEELCHAIR ON THIS WOUND      Continue visiting nurse skilled 2 x per week for wound care dressing changes.                               Follow up in 3 weeks      Treatment in the wound center today:   Cleansed with normal saline and dressed as above.     Standing Status:   Future     Standing Expiration Date:   1/25/2025

## 2024-01-30 NOTE — TELEPHONE ENCOUNTER
Completed and signed and placed in preceptor room fax bin 
FAXED ON 01/22/24 TO Elv[facility] at [phone]. FAX CONFIRMATION RECEIVED.   
PCP SIGNATURE NEEDED FOR [facility] FORM RECEIVED VIA FAX AND PLACED IN PCP FOLDER TO BE DELIVERED AT ASSIGNED TIMES.      Home health certification and plan of care  Order#[id number]      Hi Dr. Mccray this order form is being forwarded to you due to order needing signature from provider on the order.  
intact

## 2024-02-05 DIAGNOSIS — F31.9 BIPOLAR DEPRESSION (HCC): ICD-10-CM

## 2024-02-05 DIAGNOSIS — I10 HTN (HYPERTENSION), BENIGN: ICD-10-CM

## 2024-02-05 DIAGNOSIS — E78.5 HYPERLIPIDEMIA, UNSPECIFIED HYPERLIPIDEMIA TYPE: ICD-10-CM

## 2024-02-05 RX ORDER — SERTRALINE HYDROCHLORIDE 25 MG/1
25 TABLET, FILM COATED ORAL DAILY
Qty: 90 TABLET | Refills: 1 | Status: SHIPPED | OUTPATIENT
Start: 2024-02-05

## 2024-02-05 RX ORDER — ATORVASTATIN CALCIUM 20 MG/1
20 TABLET, FILM COATED ORAL
Qty: 90 TABLET | Refills: 1 | Status: SHIPPED | OUTPATIENT
Start: 2024-02-05

## 2024-02-05 RX ORDER — LOSARTAN POTASSIUM 100 MG/1
100 TABLET ORAL DAILY
Qty: 90 TABLET | Refills: 1 | Status: SHIPPED | OUTPATIENT
Start: 2024-02-05

## 2024-02-14 ENCOUNTER — TELEPHONE (OUTPATIENT)
Dept: FAMILY MEDICINE CLINIC | Facility: CLINIC | Age: 44
End: 2024-02-14

## 2024-02-14 NOTE — TELEPHONE ENCOUNTER
PCP SIGNATURE NEEDED FOR [facility] FORM RECEIVED VIA FAX AND PLACED IN PCP FOLDER TO BE DELIVERED AT ASSIGNED TIMES.

## 2024-02-14 NOTE — PROGRESS NOTES
Patient ID: Ovidio Parkinson Jr. is a 43 y.o. male Date of Birth 1980       Chief Complaint   Patient presents with   • Follow Up Wound Care Visit     Sacral and ischium wounds.        Allergies:  Chlorcyclizine, Chlorhexidine, Ciprofloxacin hcl, Cymbalta [duloxetine hcl], Dm-doxylamine-acetaminophen, Doxycycline, Gabapentin, Hydrocortisone, Lactose - food allergy, Lyrica [pregabalin], Milk (cow), Penicillins, Polymyxin b, Promethazine-phenyleph-codeine, Quinidine, Cefepime, and Rosuvastatin    Diagnosis:      Diagnosis ICD-10-CM Associated Orders   1. Pressure injury of sacral region, stage 4 ([facility])  L89.154 lidocaine (LMX) 4 % cream     Wound cleansing and dressings     Debridement Pressure Injury Sacrum      2. Paraplegia ([facility])  G82.20       3. Chronic osteomyelitis ([facility])  M86.60       4. Pressure injury of left ischium, stage 4 ([facility])  L89.324 Debridement Pressure Injury Left Ischium      5. Pressure ulcer of left hip, stage 4 ([facility])  L89.224       6. Hyperglycemia due to type 1 diabetes mellitus ([facility])  E10.65 POCT blood glucose      7. Pressure injury of scrotum, stage 3 ([facility])  L89.893               Assessment & Plan:    Ovidio presented today for follow-up of multiple pressure injuries with history of chronic osteomyelitis- palliative wounds to left hip, perineum, left ischium, sacrum.  More recently has scrotal pressure injury.  In general, deterioration noted in all wounds.  Patient noting elevated sugars at home, this morning 475, followed by measure taken in wound center at 425.  He is symptomatic stating he has lightheadedness, dizziness, upset stomach and overall just not feeling himself.  Vitals reviewed and the blood pressure within normal limits, is lower than his baseline.  In addition to wound deterioration on physical exam, his mucous membranes are dry.  I strongly recommended for patient to proceed to emergency department.  He was in agreements with this plan and I called ER to let them know of  "patient's arrival.  However as he was being escorted by RN to ER, he decided that he would rather go home first but go to ER later in the day.  We expressed that it was against our medical advice for him to do this and he understands risks and states he will report to the ER later in the day.    Deterioration noted in all pressure injuries specifically left ischium, sacrum, scrotum.  Patient recently had difficulties with his blood glucose and has been feeling unwell.  Suspect this is component of deterioration along with possible Clinitron bed malfunction.  In addition to his regular offloading practices recommended having the Clinitron bed checked and for them to call company.  He expressed understanding.  Stage IV pressure injury of left sacrum and ischium: Increased necrotic tissue at ischium and slough and fibrin at sacrum.  Both wounds required surgical debridement today.  Stage IV pressure injury left hip: Stable, though undermined area with very small improvement in measurements around 5 cm today tunneled/undermined area.  No clots noted today.  No debridement.  No malodor, purulent drainage.  Stage III pressure injury of scrotum: This area has also deteriorated.  No purulent drainage or malodor noted.  No acute erythema.  Continue same wound care for all wounds with the exception of the scrotal pressure injury.  Will trial Schraggers paste.  If unable to obtain please continue Triad paste  Again encouraged him to call rep for Clinitron to see if the bed is malfunctioning which could possibly be leading to deteriorations  Type 1 diabetes with hyperglycemia: See above  As stated above, my recommendation was for patient to proceed to emergency department. Will schedule tentative follow-up and reschedule if needed.    Portions of the record may have been created with voice recognition software. Occasional wrong word or \"sound alike\" substitutions may have occurred due to the inherent limitations of voice " "recognition software. Read the chart carefully and recognize, using context, where substitutions have occurred.    Subjective:   Please see prev visit notes for HPI summary [id number] 1/4/2024: Ovidio presents to wound center today for follow-up of multiple stage IV pressure injuries of sacrum, left hip, ischium, and perineum.  He reports he believes his lab studies regarding his protein level has improved as well as overall nutrition in general.  Has no acute complaints today and denies fever, chills.  He reports he is currently feeling well/much better from his recent hospitalization which he reports occurred at [facility] 12/5 through 12/13.    Per chart review of discharge summary from that hospitalization he was \"found to have Staph Lugudenesis bacteremia on admission blood cultures and was started on vancomycin, which was transitioned to Ancef by Infectious Disease. He will continue Ancef Source was suspected to be prior HD line; known sacral wounds were stable. Repeat blood cultures were negative on discharge. During admission, his HD line was removed (given infection) and replaced with temporary HD line (which, in turn, needed to be replaced due to inability to successfully use). New HD line functioning as of 12/9. PATRICIO performed for evaluation of possible endocarditis was negative. A tunneled HD line was placed 12/13/2023 by IR after repeat blood cultures were negative x48 hrs.\"  He also reports that he may have been spending more time in his chair than previous and continues with difficulty controlling some of the shifting/shearing forces with transferring etc... Continues to use his Clinitron bed.  Also reports that he is still awaiting the custom wheelchair/cushions to help with his pressure injuries but that it is being made.     1/25/2024: Ovidio presents today for follow-up of multiple stage IV pressure injuries of left hip, ischium, sacrum, and perineum as well as stage III pressure injury of scrotum.  He " "reports that they have been doing well with the Mesalt.  In addition reports that the area under the scrotum his brother sometimes uses Vaseline to this area when it is on the drier side she reports his brother tells him it is looking better.  Sides his chronic issues, he reports he has no acute complaints today and overall is feeling well.  He denies fever, chills.  He reported he recently started sleeping on his left side as this was more comfortable for him.    2/14/2024: Ovidio presents today for follow-up of multiple stage IV pressure injuries of left hip, ischium, sacrum, and perineum as well as stage III pressure injury of scrotum.  He reports that he is not feeling well \"has been feeling under the weather\" since Monday.  Feels that has been having an issue with his insulin pump administering insulin.  He states when he had this issue in the past it was because it was inserted an area of scar tissue.  However he did try to move the side of this this morning and glucose was 475.  Also reports he is feeling very dehydrated and dry along with dizzy, lightheaded.  He denies fever, chills.      The following portions of the patient's history were reviewed and updated as appropriate:   Patient Active Problem List   Diagnosis   • Paraplegia ([facility])   • Atrial fibrillation ([facility])   • Chronic suprapubic catheter ([facility])   • Colostomy care ([facility])   • S/P unilateral BKA (below knee amputation) ([facility])   • Tobacco abuse   • Type 1 diabetes mellitus with chronic kidney disease on chronic dialysis ([facility])   • Ulcer of sacral region, stage 4 ([facility])   • Wound healing, delayed   • Iron deficiency anemia   • Pressure injury of left ischium, stage 4 ([facility])   • HTN (hypertension), benign   • Neurogenic bladder   • GERD (gastroesophageal reflux disease)   • Chronic pain   • Stage 5 chronic kidney disease on chronic dialysis ([facility])   • History of Clostridium difficile infection   • Urinary retention   • Dialysis patient ([facility])   • Insulin " long-term use ([facility])   • Wheelchair dependent   • Recurrent Clostridioides difficile diarrhea   • Bipolar depression ([facility]CC)   • 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HCC)   • Seizure (HCC)   • Pressure ulcer of sacral region, stage 4 ([facility])   • AVM (arteriovenous malformation) of duodenum, acquired   • Chronic narcotic dependence (HCC)   • Chronic diastolic heart failure (HCC)   • Pressure ulcer of other site, stage 4 ([facility]CC)   • Pressure ulcer of left hip, stage 4 ([facility])   • ED (erectile dysfunction) of organic origin   • Irritable bowel syndrome   • Onychomycosis   • Pustular folliculitis   • Prolonged Q-T interval on ECG   • Secondary hyperparathyroidism of renal origin (HCC)   • Wounds, multiple   • Immunocompromised state (HCC)   • Severe protein-calorie malnutrition (HCC)   • Chronic osteomyelitis (HCC)   • Acute deep vein thrombosis (DVT) of right upper extremity (HCC)   • C. difficile colitis   • Charcot's arthropathy   • Diabetic gastroparesis associated with type 1 diabetes mellitus    • End stage renal disease (HCC)   • Hyperlipidemia   • LVH (left ventricular hypertrophy)   • NICM (nonischemic cardiomyopathy) ([facility])   • Vitamin B deficiency   • Vitamin C deficiency   • Vitamin D deficiency   • Hospital discharge follow-up   • Rheumatoid arthritis (HCC)     Past Medical History:   Diagnosis Date   • Acute pulmonary edema (HCC) 1/13/2022   • Ambulatory dysfunction    • Anemia    • Anemia, iron deficiency     transfusion requiring   • Asymptomatic bacteriuria 9/25/2017   • Atrial fibrillation ([facility])    • AVM (arteriovenous malformation) of duodenum, 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   • Bacteriuria, asymptomatic    • Bipolar disorder ([facility])    • Chronic deep vein thrombosis (DVT) ([facility])    • Chronic indwelling Ortega catheter    • Chronic kidney disease    • Chronic pain    • Chronic pain disorder    • Chronic suprapubic catheter ([facility])    • Clostridium difficile infection 08/11/2016    also positive 9/2016, 5/29/2017, 8/15/2017. S/P fecal  transplant   • Colostomy on examination ([facility])    • Decubitus ulcer    • Delirium    • Diabetes mellitus ([facility])    • GERD (gastroesophageal reflux disease)    • Hemodialysis patient ([facility])    • Hypertension    • Memory impairment 2011    s/p diabetic coma   • Neurogenic bladder    • OAB (overactive bladder)    • Paraplegia ([facility])     T3 transverse myelitis vs spinal stoke (AVM); 2012 extensive epidural abscess C7=> conus 2nd extension from deep parspinal abscess L4-S2/sacral decubitus; cord atrophy/myelomalcia T3=>conus    • Penile abscess    • S/P unilateral BKA (below knee amputation) ([facility])     Right   • Sebaceous cyst removed in 2017   • Seizures ([facility])    • Shortness of breath    • Tobacco abuse    • 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[facility])    • Urinary retention    • Wheelchair dependent    • Wounds, multiple     pressure ulcers with delayed healing     Past Surgical History:   Procedure Laterality Date   • BELOW KNEE LEG AMPUTATION Right 2009   • COLONOSCOPY N/A 03/27/2017    Procedure: COLONOSCOPY;  Surgeon: Wesley Thibodeaux MD;  Location: [facility];  Service:    • COLONOSCOPY N/A 03/29/2017    Procedure: COLONOSCOPY;  Surgeon: Wesley Thibodeaux MD;  Location: [facility];  Service:    • COLONOSCOPY N/A 06/15/2017    Procedure: COLONOSCOPY with FMT;  Surgeon: Gerard Marques MD;  Location: [facility];  Service: Gastroenterology   • ESOPHAGOGASTRODUODENOSCOPY N/A 03/22/2017    Procedure: ESOPHAGOGASTRODUODENOSCOPY (EGD);  Surgeon: Beverley Ortiz DO;  Location: [facility];  Service:    • MULTIPLE TOOTH EXTRACTIONS N/A 03/08/2021    Procedure: EXTRACTION TEETH # 2,3,6,10,12,13,14,18,19,20,21,22,23,24,25,26,27,28,29,30;  Surgeon: Brian Kohler DMD;  Location: [facility];  Service: Maxillofacial   • SKIN BIOPSY       Family History   Problem Relation Age of Onset   • Hyperlipidemia Mother    • Hypertension Mother    • Leukemia Brother    • Diabetes Paternal Grandfather       Social History     Socioeconomic History   • Marital status: Single      Spouse name: None   • Number of children: None   • Years of education: None   • Highest education level: None   Occupational History   • None   Tobacco Use   • Smoking status: Every Day     Types: Cigars     Passive exposure: Current   • Smokeless tobacco: Never   • Tobacco comments:     2 cigars/day - 2/14/2023   Vaping Use   • Vaping status: Never Used   Substance and Sexual Activity   • Alcohol use: Not Currently     Comment: 1 cup of wine every 3-4 months   • Drug use: Not Currently     Types: Marijuana     Comment: rarely; once every 1-2 years   • Sexual activity: None   Other Topics Concern   • None   Social History Narrative   • None     Social Determinants of Health     Financial Resource Strain: Low Risk  (12/5/2023)    Received from [facility]    Overall Financial Resource Strain (CARDIA)    • Difficulty of Paying Living Expenses: Not hard at all   Food Insecurity: No Food Insecurity (12/5/2023)    Received from [facility]    Hunger Vital Sign    • Worried About Running Out of Food in the Last Year: Never true    • Ran Out of Food in the Last Year: Never true   Transportation Needs: No Transportation Needs (12/5/2023)    Received from [facility]    PRAPARE - Transportation    • Lack of Transportation (Medical): No    • Lack of Transportation (Non-Medical): No   Physical Activity: Not on file   Stress: Not on file   Social Connections: Not on file   Intimate Partner Violence: Not At Risk (12/5/2023)    Received from [facility]    Humiliation, Afraid, Rape, and Kick questionnaire    • Fear of Current or Ex-Partner: No    • Emotionally Abused: No    • Physically Abused: No    • Sexually Abused: No   Housing Stability: Low Risk  (12/5/2023)    Received from [facility]    Housing Stability Vital Sign    • Unable to Pay for Housing in the Last Year: No    • Number of Places Lived in the Last Year: 1    • Unstable Housing  in the Last Year: No        Current Outpatient Medications:   •  Alcohol Swabs (ALCOHOL PADS) 70 % PADS, by Does not apply route 5 (five) times a day, Disp: 300 each, Rfl: 5  •  amLODIPine (NORVASC) 10 mg tablet, TAKE 1 TABLET BY MOUTH EVERY DAY, Disp: 90 tablet, Rfl: 1  •  ammonium lactate (LAC-HYDRIN) 12 % cream, , Disp: , Rfl:   •  apixaban (ELIQUIS) 5 mg, Take 1 tablet (5 mg total) by mouth 2 (two) times a day, Disp: 270 tablet, Rfl: 1  •  atorvastatin (LIPITOR) 20 mg tablet, TAKE 1 TABLET BY MOUTH EVERYDAY AT BEDTIME, Disp: 90 tablet, Rfl: 1  •  azelaic acid (Azelex) 20 % cream, Apply topically 2 (two) times a day, Disp: 50 g, Rfl: 0  •  B Complex-C-Folic Acid (Super B-Complex/Vit C/FA) TABS, TAKE 1 TABLET BY MOUTH EVERY DAY AS DIRECTED, Disp: 90 tablet, Rfl: 5  •  Donews MICROLET LANCETS lancets, Use as instructed to check blood sugar 4 times a day Dx e10.29, Disp: 200 each, Rfl: 5  •  bisacodyl (DULCOLAX) 5 mg EC tablet, Take 1 tablet (5 mg total) by mouth once for 1 dose, Disp: 2 tablet, Rfl: 0  •  Blood Glucose Monitoring Suppl (SUSAN CONTOUR NEXT USB MONITOR) w/Device KIT, Testing 4 times a day, Disp: 1 kit, Rfl: 0  •  calcitriol (ROCALTROL) 0.5 MCG capsule, Take 2 mcg by mouth, Disp: , Rfl:   •  calcium acetate (PHOSLO) capsule, TAKE 2 CAPSULES BY MOUTH THREE TIMES DAILY WITH MEALS, Disp: , Rfl:   •  carvedilol (COREG) 25 mg tablet, TAKE 1 TABLET BY MOUTH TWICE A DAY, Disp: 180 tablet, Rfl: 1  •  cetirizine (ZyrTEC) 10 mg tablet, TAKE 1 TABLET BY MOUTH EVERY DAY, Disp: 90 tablet, Rfl: 1  •  Cholecalciferol (VITAMIN D-3) 1000 units CAPS, Take 2 capsules by mouth daily, Disp: 30 capsule, Rfl: 0  •  clindamycin (CLINDAGEL) 1 % gel, APPLY TO AFFECTED AREA TWICE A DAY, Disp: 60 g, Rfl: 2  •  Contour Next Test test strip, USE TO TEST BLOOD SUGAR 3 TIMES DAILY. CONTOUR NEXT STRIPS. E10.29, Disp: , Rfl:   •  cyanocobalamin ([employer] Vitamin B-12) 1000 MCG tablet, Take 1 tablet (1,000 mcg total) by mouth daily, Disp: 30  tablet, Rfl: 5  •  cyclobenzaprine (FLEXERIL) 5 mg tablet, , Disp: , Rfl:   •  dexlansoprazole (DEXILANT) 30 MG capsule, TAKE 1 CAPSULE BY MOUTH EVERY DAY, Disp: 90 capsule, Rfl: 1  •  dicyclomine (BENTYL) 10 mg capsule, TAKE 1 CAPSULE BY MOUTH 3 TIMES A DAY BEFORE MEALS, Disp: 270 capsule, Rfl: 1  •  Disposable Gloves MISC, Use 7 times a day, 4 boxes of gloves XL Dx type 1 DM, paraplegia, Disp: 4 each, Rfl: 11  •  doxazosin (CARDURA) 2 mg tablet, TAKE 1 TABLET BY MOUTH EVERY EVENING., Disp: 90 tablet, Rfl: 1  •  epoetin kaushik (EPOGEN,PROCRIT) 20,000 units/mL, Inject 10,000 Units under the skin, Disp: , Rfl:   •  epoetin kaushik (EPOGEN,PROCRIT) 20,000 units/mL, Inject 5,000 Units under the skin, Disp: , Rfl:   •  ferrous sulfate 325 (65 Fe) mg tablet, Take 1 tablet (325 mg total) by mouth 3 (three) times a day, Disp: 90 tablet, Rfl: 3  •  Fluocinolone Acetonide Body 0.01 % OIL, Apply 2 drops topically daily, Disp: , Rfl:   •  folic acid (FOLVITE) 1 mg tablet, Take 1,000 mcg by mouth daily, Disp: , Rfl:   •  glucose-vitamin C 4-0.006 g, Chew 16 g if needed, Disp: , Rfl:   •  Gvoke PFS 1 MG/0.2ML SOSY, , Disp: , Rfl:   •  hydroxychloroquine (PLAQUENIL) 200 mg tablet, Take 200 mg by mouth daily, Disp: , Rfl:   •  hydrOXYzine HCL (ATARAX) 50 mg tablet, TAKE 1 TABLET (50 MG TOTAL) BY MOUTH 2 (TWO) TIMES A DAY AS NEEDED FOR ITCHING OR ANXIETY, Disp: 180 tablet, Rfl: 1  •  Incontinence Supply Disposable (Incontinence Brief Large) MISC, Use 6 (six) times a day Size xl, Disp: 180 each, Rfl: 11  •  Incontinence Supply Disposable (Undergarment) MISC, Use 6 a day, 5 boxes, xl Dx R51, paraplegia, Disp: 5 each, Rfl: 11  •  Incontinence Supply Disposable (Underpads) MISC, Use 2 a day, Disp: 5 each, Rfl: 11  •  insulin lispro protamine-insulin lispro (HumaLOG 50-50) 100 units/mL, Inject under the skin 2 (two) times a day before meals, Disp: , Rfl:   •  ketoconazole (NIZORAL) 2 % cream, Apply to rash., Disp: 15 g, Rfl: 1  •  LEVEMIR  FLEXTOUCH 100 units/mL injection pen, Inject 14 Units under the skin 2 (two) times a day, Disp: 15 pen, Rfl: 3  •  Lokelma 10 g PACK, , Disp: , Rfl:   •  losartan (COZAAR) 100 MG tablet, TAKE 1 TABLET BY MOUTH EVERY DAY, Disp: 90 tablet, Rfl: 1  •  metroNIDAZOLE (METROGEL) 0.75 % gel, Apply topically 2 (two) times a day, Disp: 45 g, Rfl: 0  •  Misc. Devices (Wheelchair Cushion) MISC, Use daily, Disp: 1 each, Rfl: 0  •  Misc. Devices (WHEELCHAIR) MISC, by Does not apply route daily Wheelchair ramp, dx g82.20, Disp: 1 each, Rfl: 0  •  Multiple Vitamin (Daily-Enriqueta Multivitamin) TABS, TAKE 1 TABLET BY MOUTH EVERY DAY, Disp: 30 tablet, Rfl: 8  •  NovoLOG FlexPen 100 units/mL injection pen, INJECT 3 UNITS UNDER THE SKIN 3 (THREE) TIMES A DAY BEFORE MEALS. (Patient not taking: Reported on 7/13/2023), Disp: , Rfl:   •  ondansetron (ZOFRAN-ODT) 4 mg disintegrating tablet, Take 1 tablet (4 mg total) by mouth every 6 (six) hours as needed for nausea or vomiting, Disp: 30 tablet, Rfl: 2  •  oxybutynin (DITROPAN XL) 15 MG 24 hr tablet, , Disp: , Rfl:   •  oxybutynin (DITROPAN) 5 mg tablet, Take 3 tablets (15 mg total) by mouth daily, Disp: 270 tablet, Rfl: 1  •  oxyCODONE (ROXICODONE) 10 MG TABS, TAKE ONE TABLET BY MOUTH EVERY 12 HOURS AS NEEDED FOR PAIN. ONGOING THERAPY., Disp: , Rfl:   •  polyethylene glycol (GLYCOLAX) 17 GM/SCOOP powder, Take as directed as per written office instructions, Disp: 238 g, Rfl: 0  •  polyethylene glycol (GOLYTELY) 4000 mL solution, Take 4,000 mL by mouth once for 1 dose, Disp: 4000 mL, Rfl: 0  •  risperiDONE (RisperDAL) 0.5 mg tablet, Take 1 tablet (0.5 mg total) by mouth 2 (two) times a day, Disp: 180 tablet, Rfl: 1  •  sertraline (ZOLOFT) 25 mg tablet, TAKE 1 TABLET (25 MG TOTAL) BY MOUTH DAILY., Disp: 90 tablet, Rfl: 1  •  Sodium Zirconium Cyclosilicate 10 g PACK, Take 10 g by mouth daily, Disp: , Rfl:   •  sucralfate (CARAFATE) 1 g/10 mL suspension, Take 10 mL (1 g total) by mouth 4 (four)  times a day (with meals and at bedtime), Disp: 420 mL, Rfl: 1  •  SUPER B COMPLEX & C TABS, TAKE 1 CAPSULE BY MOUTH ONCE FOR 1 DOSE AS DIRECTED, Disp: 90 tablet, Rfl: 2  •  Zinc Sulfate 220 (50 Zn) MG TABS, Take 1 tablet by mouth daily, Disp: 90 tablet, Rfl: 1  No current facility-administered medications for this visit.    Review of Systems   Constitutional:  Negative for chills and fever.   Respiratory:  Negative for cough and shortness of breath.    Cardiovascular:  Negative for chest pain and palpitations.   Gastrointestinal:  Positive for nausea.   Musculoskeletal:  Positive for gait problem (Paraplegia).        Paraplegic   Skin:  Positive for wound (Multiple pressure injuries).        Multiple pressure injuries   Neurological:  Positive for weakness, light-headedness and numbness. Negative for dizziness and headaches.   Psychiatric/Behavioral:  The patient is not nervous/anxious.        Objective:  BP 94/58   Pulse 88   Temp (!) 96.5 °F (35.8 °C)   Resp 18   Pain Score: 0-No pain     Physical Exam  Constitutional:       General: He is not in acute distress.     Appearance: He is not ill-appearing, toxic-appearing or diaphoretic.      Comments: Appears more fatigued today though alert and answering questions appropriately   HENT:      Head: Normocephalic.   Cardiovascular:      Rate and Rhythm: Normal rate.   Pulmonary:      Effort: Pulmonary effort is normal. No respiratory distress.   Genitourinary:      Skin:     General: Skin is warm and dry.      Findings: Wound present. No erythema.             Comments: Stage IV pressure injury of left sacrum and ischium: Increased necrotic tissue at ischium and slough and fibrin at sacrum.  Both wounds required surgical debridement today.  Stage IV pressure injury left hip: Stable, though undermined area with very small improvement in measurements around 5 cm today tunneled/undermined area.  No clots noted today.  No debridement.  No malodor, purulent  drainage.  Stage III pressure injury of scrotum: This area has also deteriorated.  No purulent drainage or malodor noted.  No acute erythema.    For all pressure injuries mentioned above periwound's with scarring and callus with hyperkeratotic skin which can be easily removed with gauze.  These areas are without malodor, lymphangitic streaking, purulent drainage    See full wound description for additional details.   Neurological:      Mental Status: He is alert and oriented to person, place, and time.      Gait: Gait abnormal (wc bound).   Psychiatric:         Mood and Affect: Mood normal.         Behavior: Behavior normal.           Wound Pressure Injury Ischium Left (Active)   Wound Image   02/15/24 1058   Wound Description Black;Pink;Dry 02/15/24 1111   Pressure Injury Stage 4 02/15/24 1111   Irene-wound Assessment Scar Tissue;Intact;Dry 02/15/24 1111   Wound Length (cm) 6.9 cm 02/15/24 1111   Wound Width (cm) 7 cm 02/15/24 1111   Wound Depth (cm) 0.9 cm 02/15/24 1111   Wound Surface Area (cm^2) 48.3 cm^2 02/15/24 1111   Wound Volume (cm^3) 43.47 cm^3 02/15/24 1111   Calculated Wound Volume (cm^3) 43.47 cm^3 02/15/24 1111   Change in Wound Size % -130 02/15/24 1111   Tunneling 1 in depth located at resolved 11/30/23 1351   Undermining 1 0 10/05/23 1456   Undermining 1 is depth extending from resolved 10/05/23 1456   Drainage Amount Moderate 02/15/24 1111   Drainage Description Serosanguineous;Bloody 02/15/24 1111   Non-staged Wound Description Full thickness 01/25/24 1346   Treatments Irrigation with NSS 01/25/24 1346   Wound packed? No 02/16/23 0953   Dressing Changed Changed 02/15/24 1111   Patient Tolerance Tolerated well 02/15/24 1111   Dressing Status Removed 02/15/24 1111       Wound Pressure Injury Perineum (Active)   Wound Image   02/15/24 1059   Wound Description Yellow;Pink 02/15/24 1107   Pressure Injury Stage 4 02/15/24 1107   Irene-wound Assessment Scar Tissue;Dry;Excoriated 02/15/24 1107   Wound  Length (cm) 6.7 cm 02/15/24 1107   Wound Width (cm) 6 cm 02/15/24 1107   Wound Depth (cm) 0.9 cm 02/15/24 1107   Wound Surface Area (cm^2) 40.2 cm^2 02/15/24 1107   Wound Volume (cm^3) 36.18 cm^3 02/15/24 1107   Calculated Wound Volume (cm^3) 36.18 cm^3 02/15/24 1107   Change in Wound Size % -244.57 02/15/24 1107   Tunneling 1 in depth located at 0 10/05/23 1458   Number of underminings 1 01/25/24 1350   Undermining 1 1.9 02/15/24 1107   Undermining 1 is depth extending from 11-9 02/15/24 1107   Drainage Amount Moderate 02/15/24 1107   Drainage Description Serosanguineous;Yellow 02/15/24 1107   Non-staged Wound Description Full thickness 01/25/24 1350   Treatments Irrigation with NSS 02/15/24 1107   Wound packed? No 02/16/23 0959   Dressing Changed Changed 02/15/24 1107   Patient Tolerance Tolerated well 02/15/24 1107   Dressing Status Removed 02/15/24 1107       Wound Pressure Injury Sacrum (Active)   Wound Image   02/15/24 1058   Wound Description Yellow;Pink;Epithelialization;Black 02/15/24 1104   Pressure Injury Stage 4 02/15/24 1104   Irene-wound Assessment Scar Tissue;Scaly;Dry 02/15/24 1104   Wound Length (cm) 7 cm 02/15/24 1104   Wound Width (cm) 9.3 cm 02/15/24 1104   Wound Depth (cm) 0.6 cm 02/15/24 1104   Wound Surface Area (cm^2) 65.1 cm^2 02/15/24 1104   Wound Volume (cm^3) 39.06 cm^3 02/15/24 1104   Calculated Wound Volume (cm^3) 39.06 cm^3 02/15/24 1104   Change in Wound Size % 47.92 02/15/24 1104   Undermining 1 0 10/05/23 1458   Undermining 1 is depth extending from resolved 10/05/23 1458   Drainage Amount Moderate 02/15/24 1104   Drainage Description Serosanguineous;Yellow 02/15/24 1104   Non-staged Wound Description Full thickness 01/25/24 1354   Treatments Irrigation with NSS 02/15/24 1104   Wound packed? No 02/16/23 1001   Dressing Changed Changed 01/25/24 1354   Patient Tolerance Tolerated well 01/25/24 1354   Dressing Status Removed 01/25/24 1354       Wound Pressure Injury Hip Left;Lateral  (Active)   Wound Image   02/15/24 1057   Wound Description Pink;Yellow 02/15/24 1057   Pressure Injury Stage 4 01/04/24 1404   Irene-wound Assessment Scar Tissue;Intact 02/15/24 1057   Wound Length (cm) 2.3 cm 02/15/24 1057   Wound Width (cm) 1.7 cm 02/15/24 1057   Wound Depth (cm) 1.4 cm 02/15/24 1057   Wound Surface Area (cm^2) 3.91 cm^2 02/15/24 1057   Wound Volume (cm^3) 5.474 cm^3 02/15/24 1057   Calculated Wound Volume (cm^3) 5.47 cm^3 02/15/24 1057   Tunneling 1 0 cm 10/05/23 1453   Tunneling 1 in depth located at resolved 10/05/23 1453   Undermining 1 5 02/15/24 1057   Undermining 1 is depth extending from 5-12 02/15/24 1057   Drainage Amount Copious 02/15/24 1057   Drainage Description Yellow 02/15/24 1057   Non-staged Wound Description Full thickness 02/15/24 1057   Treatments Irrigation with NSS 02/15/24 1057   Wound packed? No 02/16/23 1004   Packing- # removed 1 02/15/24 1057   Dressing Changed Changed 02/15/24 1057   Patient Tolerance Tolerated well 02/15/24 1057   Dressing Status Removed 02/15/24 1057       Wound 01/04/24 Pressure Injury Scrotum Posterior (Active)   Wound Image   02/15/24 1059   Wound Description Pink;Yellow 02/15/24 1102   Pressure Injury Stage 3 02/15/24 1103   Irene-wound Assessment Dry 02/15/24 1102   Wound Length (cm) 2.9 cm 02/15/24 1102   Wound Width (cm) 2.5 cm 02/15/24 1102   Wound Depth (cm) 0.1 cm 02/15/24 1102   Wound Surface Area (cm^2) 7.25 cm^2 02/15/24 1102   Wound Volume (cm^3) 0.725 cm^3 02/15/24 1102   Calculated Wound Volume (cm^3) 0.73 cm^3 02/15/24 1102   Change in Wound Size % 18.89 02/15/24 1102   Drainage Amount Small 02/15/24 1102   Drainage Description Bloody 02/15/24 1102   Non-staged Wound Description Full thickness 02/15/24 1103   Dressing Status Removed 02/15/24 1102           Debridement   Wound Pressure Injury Sacrum    Universal Protocol:  Consent: Verbal consent obtained. Written consent obtained.  Risks and benefits: risks, benefits and alternatives  "were discussed  Consent given by: patient  Time out: Immediately prior to procedure a \"time out\" was called to verify the correct patient, procedure, equipment, support staff and site/side marked as required.  Patient understanding: patient states understanding of the procedure being performed  Patient identity confirmed: verbally with patient    Debridement Details  Performed by: physician  Debridement type: surgical  Level of debridement: subcutaneous tissue  Pain control: lidocaine 4%      Post-debridement measurements  Length (cm): 7  Width (cm): 9.3  Depth (cm): 0.7  Percent debrided: 35%  Surface Area (cm^2): 65.1  Area Debrided (cm^2): 22.78  Volume (cm^3): 45.57    Tissue and other material debrided: subcutaneous tissue  Devitalized tissue debrided: fibrin and slough  Bleeding: medium  Hemostasis obtained with: pressure and silver nitrate  Procedural pain (0-10): 0  Post-procedural pain: 0   Response to treatment: procedure was tolerated well    Debridement   Wound Pressure Injury Ischium Left    Universal Protocol:  Consent: Verbal consent obtained. Written consent obtained.  Risks and benefits: risks, benefits and alternatives were discussed  Consent given by: patient  Time out: Immediately prior to procedure a \"time out\" was called to verify the correct patient, procedure, equipment, support staff and site/side marked as required.  Patient understanding: patient states understanding of the procedure being performed  Patient identity confirmed: verbally with patient    Debridement Details  Performed by: physician  Debridement type: surgical  Level of debridement: subcutaneous tissue  Pain control: lidocaine 4%      Post-debridement measurements  Length (cm): 6.9  Width (cm): 7  Depth (cm): 2  Percent debrided: 40%  Surface Area (cm^2): 48.3  Area Debrided (cm^2): 19.32  Volume (cm^3): 96.6    Tissue and other material debrided: subcutaneous tissue  Devitalized tissue debrided: necrotic debris and " slough  Bleeding: medium  Hemostasis obtained with: pressure and silver nitrate  Procedural pain (0-10): 0  Post-procedural pain: 0   Response to treatment: procedure was tolerated well                           Lab Results   Component Value Date    HGBA1C 7.4 (H) 11/07/2023       Wound Instructions:  Orders Placed This Encounter   Procedures   • Wound cleansing and dressings     Wash your hands with soap and water.  Remove old dressing, discard into plastic bag and place in trash.    Cleanse the wounds with Dakin's solution if there is an odor, IF NO ODOR, cleanse with normal saline or wound wash prior to applying a clean dressing.   Do not use tissue or cotton balls. Do not scrub the wound. Pat dry using gauze.   Shower no; do not get dressing wet      Left Hip Wound:   Cleanse with Dakin's solution if there is an odor, IF NO ODOR, cleanse with normal saline or wound wash.  Start Mesalt packing strip making sure you get to deepest areas from 6-12 o'clock (tape the tail) No substitutes.   Cover with Convamax (or equivalent super absorber) and ABD over top   Secure with Medfix tape.   Change dressing daily and top dressing PRN for breakthrough drainage (visiting nurses to do twice per week and family in between)      Sacral, Left ischium, and perineal wound:   Cleanse with Dakin's solution if there is an odor, IF NO ODOR, cleanse with normal saline or wound wash   STOP DAKINS MOISTENED GAUZE   Apply Mesalt gauze to wound surface  Cover with Convamax (or equivalent super absorber) with ABD over top   Secure with Medfix tape.   Change dressing daily and top dressing PRN for breakthrough drainage (visiting nurses to do twice per week and family in between)      Scrotal wound:  Cleanse with normal saline  Apply thin layer of Triad paste to open areas. Start Schragger's paste when received from pharmacy.   May use ABD to cover area, but do not put any tape on scrotal area.    Apply 2 x per day     Continue using  Clinitron bed.      Continue NO PRESSURE TO ALL WOUNDS, ESPECIALLY PERINEAL WOUND; LIMIT SITTING UPRIGHT IN WHEELCHAIR ON THIS WOUND      Continue visiting nurse skilled 2 x per week for wound care dressing changes.                              Patient to go to the ER today for BS of 424, as per Dr. Rose.    Tena's paste ordered by by Dr. Rose. Pharmacy will contact you when ready.     Follow up at the wound center in 3 weeks.      Treatment in the wound center today:   Cleansed with normal saline, dressed as above, and applied Dakins soaked gauze to left ischium only.     Standing Status:   Future     Standing Expiration Date:   2/15/2025   • Debridement Pressure Injury Sacrum     This order was created via procedure documentation   • Debridement Pressure Injury Left Ischium     This order was created via procedure documentation   • POCT blood glucose     Standing Status:   Standing     Number of Occurrences:   1     Standing Expiration Date:   8/15/2024         Marley Rose MD

## 2024-02-15 ENCOUNTER — OFFICE VISIT (OUTPATIENT)
Dept: WOUND CARE | Facility: CLINIC | Age: 44
End: 2024-02-15
Payer: MEDICARE

## 2024-02-15 VITALS
TEMPERATURE: 96.5 F | HEART RATE: 88 BPM | RESPIRATION RATE: 18 BRPM | DIASTOLIC BLOOD PRESSURE: 58 MMHG | SYSTOLIC BLOOD PRESSURE: 94 MMHG

## 2024-02-15 DIAGNOSIS — L89.893 PRESSURE INJURY OF OTHER SITE, STAGE 3 (HCC): ICD-10-CM

## 2024-02-15 DIAGNOSIS — L89.224 PRESSURE ULCER OF LEFT HIP, STAGE 4 (HCC): ICD-10-CM

## 2024-02-15 DIAGNOSIS — L89.154 PRESSURE INJURY OF SACRAL REGION, STAGE 4 (HCC): Primary | ICD-10-CM

## 2024-02-15 DIAGNOSIS — G82.20 PARAPLEGIA (HCC): ICD-10-CM

## 2024-02-15 DIAGNOSIS — M86.60 CHRONIC OSTEOMYELITIS (HCC): ICD-10-CM

## 2024-02-15 DIAGNOSIS — L89.324 PRESSURE INJURY OF LEFT ISCHIUM, STAGE 4 (HCC): ICD-10-CM

## 2024-02-15 DIAGNOSIS — E10.65 HYPERGLYCEMIA DUE TO TYPE 1 DIABETES MELLITUS (HCC): ICD-10-CM

## 2024-02-15 LAB — GLUCOSE SERPL-MCNC: 424 MG/DL (ref 65–140)

## 2024-02-15 PROCEDURE — 11042 DBRDMT SUBQ TIS 1ST 20SQCM/<: CPT | Performed by: FAMILY MEDICINE

## 2024-02-15 PROCEDURE — 11045 DBRDMT SUBQ TISS EACH ADDL: CPT | Performed by: FAMILY MEDICINE

## 2024-02-15 PROCEDURE — 99214 OFFICE O/P EST MOD 30 MIN: CPT | Performed by: FAMILY MEDICINE

## 2024-02-15 PROCEDURE — 82948 REAGENT STRIP/BLOOD GLUCOSE: CPT | Performed by: FAMILY MEDICINE

## 2024-02-15 RX ORDER — LIDOCAINE 40 MG/G
CREAM TOPICAL ONCE
Status: COMPLETED | OUTPATIENT
Start: 2024-02-15 | End: 2024-02-15

## 2024-02-15 RX ADMIN — LIDOCAINE: 40 CREAM TOPICAL at 15:18

## 2024-02-15 NOTE — LETTER
[facility]  [address] [facility] [address]  Phone#  [phone]  Fax#  [phone]    Patient:  Ovidio Parkinson Jr.  YOB: 1980  Phone:  [phone]  Date of Visit:  2/15/2024    Orders Placed This Encounter   Procedures   • Wound cleansing and dressings     Wash your hands with soap and water.  Remove old dressing, discard into plastic bag and place in trash.    Cleanse the wounds with Dakin's solution if there is an odor, IF NO ODOR, cleanse with normal saline or wound wash prior to applying a clean dressing.   Do not use tissue or cotton balls. Do not scrub the wound. Pat dry using gauze.   Shower no; do not get dressing wet      Left Hip Wound:   Cleanse with Dakin's solution if there is an odor, IF NO ODOR, cleanse with normal saline or wound wash.  Start Mesalt packing strip making sure you get to deepest areas from 6-12 o'clock (tape the tail) No substitutes.   Cover with Convamax (or equivalent super absorber) and ABD over top   Secure with Medfix tape.   Change dressing daily and top dressing PRN for breakthrough drainage (visiting nurses to do twice per week and family in between)      Sacral, Left ischium, and perineal wound:   Cleanse with Dakin's solution if there is an odor, IF NO ODOR, cleanse with normal saline or wound wash   STOP DAKINS MOISTENED GAUZE   Apply Mesalt gauze to wound surface  Cover with Convamax (or equivalent super absorber) with ABD over top   Secure with Medfix tape.   Change dressing daily and top dressing PRN for breakthrough drainage (visiting nurses to do twice per week and family in between)      Scrotal wound:  Cleanse with normal saline  Apply thin layer of Triad paste to open areas. Start Schragger's paste when received from pharmacy.   May use ABD to cover area, but do not put any tape on scrotal area.    Apply 2 x per day     Continue using Clinitron bed.      Continue NO PRESSURE TO ALL WOUNDS, ESPECIALLY  PERINEAL WOUND; LIMIT SITTING UPRIGHT IN WHEELCHAIR ON THIS WOUND      Continue visiting nurse skilled 2 x per week for wound care dressing changes.                              Patient to go to the ER today for BS of 424, as per Dr. Rose.    Tena's paste ordered by by Dr. Rose. Pharmacy will contact you when ready.     Follow up at the wound center in 3 weeks.      Treatment in the wound center today:   Cleansed with normal saline, dressed as above, and applied Dakins soaked gauze to left ischium only.     Standing Status:   Future     Standing Expiration Date:   2/15/2025         Electronically signed by Marley Rose MD

## 2024-02-15 NOTE — PATIENT INSTRUCTIONS
Orders Placed This Encounter   Procedures    Wound cleansing and dressings     Wash your hands with soap and water.  Remove old dressing, discard into plastic bag and place in trash.    Cleanse the wounds with Dakin's solution if there is an odor, IF NO ODOR, cleanse with normal saline or wound wash prior to applying a clean dressing.   Do not use tissue or cotton balls. Do not scrub the wound. Pat dry using gauze.   Shower no; do not get dressing wet      Left Hip Wound:   Cleanse with Dakin's solution if there is an odor, IF NO ODOR, cleanse with normal saline or wound wash.  Start Mesalt packing strip making sure you get to deepest areas from 6-12 o'clock (tape the tail) No substitutes.   Cover with Convamax (or equivalent super absorber) and ABD over top   Secure with Medfix tape.   Change dressing daily and top dressing PRN for breakthrough drainage (visiting nurses to do twice per week and family in between)      Sacral, Left ischium, and perineal wound:   Cleanse with Dakin's solution if there is an odor, IF NO ODOR, cleanse with normal saline or wound wash   STOP DAKINS MOISTENED GAUZE   Apply Mesalt gauze to wound surface  Cover with Convamax (or equivalent super absorber) with ABD over top   Secure with Medfix tape.   Change dressing daily and top dressing PRN for breakthrough drainage (visiting nurses to do twice per week and family in between)      Scrotal wound:  Cleanse with normal saline  Apply thin layer of Triad paste to open areas. Start Schragger's paste when received from pharmacy.   May use ABD to cover area, but do not put any tape on scrotal area.    Apply 2 x per day     Continue using Clinitron bed.      Continue NO PRESSURE TO ALL WOUNDS, ESPECIALLY PERINEAL WOUND; LIMIT SITTING UPRIGHT IN WHEELCHAIR ON THIS WOUND      Continue visiting nurse skilled 2 x per week for wound care dressing changes.                              Patient to go to the ER today for BS of 424, as per   [place].    Ericger's paste ordered by by Dr. Rose. Pharmacy will contact you when ready.     Follow up at the wound center in 3 weeks.      Treatment in the wound center today:   Cleansed with normal saline, dressed as above, and applied Dakins soaked gauze to left ischium only.     Standing Status:   Future     Standing Expiration Date:   2/15/2025

## 2024-02-20 ENCOUNTER — TELEPHONE (OUTPATIENT)
Dept: FAMILY MEDICINE CLINIC | Facility: CLINIC | Age: 44
End: 2024-02-20

## 2024-02-20 DIAGNOSIS — I48.91 ATRIAL FIBRILLATION, UNSPECIFIED TYPE (HCC): Chronic | ICD-10-CM

## 2024-02-20 RX ORDER — APIXABAN 5 MG/1
5 TABLET, FILM COATED ORAL 2 TIMES DAILY
Qty: 60 TABLET | Refills: 8 | OUTPATIENT
Start: 2024-02-20

## 2024-02-20 NOTE — TELEPHONE ENCOUNTER
02/20/24    FORM: Extended family care / Certification Period 02/12/24 - 04/11/24     FORM FAXED AND CONFIRMED THAT IT WAS RECEIVED 02/20/024    FAX # [phone]    CONFIRMATION AND FORM SCANNED INTO PT CHART

## 2024-02-21 PROBLEM — A04.71 RECURRENT CLOSTRIDIOIDES DIFFICILE DIARRHEA: Status: RESOLVED | Noted: 2019-11-21 | Resolved: 2024-02-21

## 2024-02-21 RX ORDER — APIXABAN 5 MG/1
5 TABLET, FILM COATED ORAL 2 TIMES DAILY
Qty: 60 TABLET | Refills: 8 | OUTPATIENT
Start: 2024-02-21

## 2024-02-21 NOTE — TELEPHONE ENCOUNTER
02/21/24    Hi Miss Ronquillo,     Pt mom called office and requested if a refill for the following med can be send:  apixaban (ELIQUIS) 5 mg     Pt mom stated that he is all out of the med for todays and needs to take them before hi dialysis.    Any questions, please contact Pt mom.       Pt was raul to see you 03/05/24.

## 2024-02-28 ENCOUNTER — TELEPHONE (OUTPATIENT)
Dept: FAMILY MEDICINE CLINIC | Facility: CLINIC | Age: 44
End: 2024-02-28

## 2024-02-28 NOTE — TELEPHONE ENCOUNTER
PCP SIGNATURE NEEDED FOR [employer] FORM RECEIVED VIA FAX AND PLACED IN PCP FOLDER TO BE DELIVERED AT ASSIGNED TIMES.    Work Order: [id number]

## 2024-03-05 ENCOUNTER — OFFICE VISIT (OUTPATIENT)
Dept: FAMILY MEDICINE CLINIC | Facility: CLINIC | Age: 44
End: 2024-03-05

## 2024-03-05 VITALS
HEIGHT: 76 IN | RESPIRATION RATE: 18 BRPM | TEMPERATURE: 98.8 F | BODY MASS INDEX: 18.62 KG/M2 | SYSTOLIC BLOOD PRESSURE: 130 MMHG | DIASTOLIC BLOOD PRESSURE: 78 MMHG | HEART RATE: 93 BPM | OXYGEN SATURATION: 98 %

## 2024-03-05 DIAGNOSIS — N18.6 TYPE 1 DIABETES MELLITUS WITH CHRONIC KIDNEY DISEASE ON CHRONIC DIALYSIS (HCC): ICD-10-CM

## 2024-03-05 DIAGNOSIS — Z28.21 HEPATITIS A VACCINATION DECLINED: ICD-10-CM

## 2024-03-05 DIAGNOSIS — Z99.2 STAGE 5 CHRONIC KIDNEY DISEASE ON CHRONIC DIALYSIS (HCC): Chronic | ICD-10-CM

## 2024-03-05 DIAGNOSIS — Z99.2 TYPE 1 DIABETES MELLITUS WITH CHRONIC KIDNEY DISEASE ON CHRONIC DIALYSIS (HCC): ICD-10-CM

## 2024-03-05 DIAGNOSIS — Z72.0 TOBACCO ABUSE: Chronic | ICD-10-CM

## 2024-03-05 DIAGNOSIS — D50.8 OTHER IRON DEFICIENCY ANEMIA: ICD-10-CM

## 2024-03-05 DIAGNOSIS — I50.32 CHRONIC DIASTOLIC HEART FAILURE (HCC): ICD-10-CM

## 2024-03-05 DIAGNOSIS — Z00.00 ENCOUNTER FOR ANNUAL WELLNESS VISIT (AWV) IN MEDICARE PATIENT: Primary | ICD-10-CM

## 2024-03-05 DIAGNOSIS — I10 HTN (HYPERTENSION), BENIGN: Chronic | ICD-10-CM

## 2024-03-05 DIAGNOSIS — G82.20 PARAPLEGIA (HCC): Chronic | ICD-10-CM

## 2024-03-05 DIAGNOSIS — N18.6 STAGE 5 CHRONIC KIDNEY DISEASE ON CHRONIC DIALYSIS (HCC): Chronic | ICD-10-CM

## 2024-03-05 DIAGNOSIS — F31.9 BIPOLAR DEPRESSION (HCC): ICD-10-CM

## 2024-03-05 DIAGNOSIS — Z28.21 HERPES ZOSTER VACCINATION DECLINED: ICD-10-CM

## 2024-03-05 DIAGNOSIS — L98.429 ULCER OF SACRAL REGION, STAGE 4 (HCC): ICD-10-CM

## 2024-03-05 DIAGNOSIS — E53.9 VITAMIN B DEFICIENCY: ICD-10-CM

## 2024-03-05 DIAGNOSIS — E10.22 TYPE 1 DIABETES MELLITUS WITH CHRONIC KIDNEY DISEASE ON CHRONIC DIALYSIS (HCC): ICD-10-CM

## 2024-03-05 DIAGNOSIS — Z28.21 INFLUENZA VACCINATION DECLINED: ICD-10-CM

## 2024-03-05 DIAGNOSIS — I48.91 ATRIAL FIBRILLATION, UNSPECIFIED TYPE (HCC): Chronic | ICD-10-CM

## 2024-03-05 DIAGNOSIS — Z28.21 COVID-19 VACCINATION DECLINED: ICD-10-CM

## 2024-03-05 PROBLEM — Z00.01 ENCOUNTER FOR GENERAL ADULT MEDICAL EXAMINATION WITH ABNORMAL FINDINGS: Status: ACTIVE | Noted: 2024-03-05

## 2024-03-05 PROCEDURE — 99214 OFFICE O/P EST MOD 30 MIN: CPT | Performed by: PHYSICIAN ASSISTANT

## 2024-03-05 PROCEDURE — G0439 PPPS, SUBSEQ VISIT: HCPCS | Performed by: PHYSICIAN ASSISTANT

## 2024-03-05 RX ORDER — FERROUS SULFATE 324(65)MG
324 TABLET, DELAYED RELEASE (ENTERIC COATED) ORAL DAILY
Qty: 90 TABLET | Refills: 3 | Status: SHIPPED | OUTPATIENT
Start: 2024-03-05

## 2024-03-05 NOTE — PROGRESS NOTES
Ovidio is here for his Subsequent Wellness visit.     Health Risk Assessment:   Patient rates overall health as fair. Patient feels that their physical health rating is much better. Patient is satisfied with their life. Eyesight was rated as slightly worse. Hearing was rated as same. Patient feels that their emotional and mental health rating is much better. Patients states they are never, rarely angry. Patient states they are sometimes unusually tired/fatigued. Pain experienced in the last 7 days has been a lot. Patient's pain rating has been 8/10. Patient states that he has experienced no weight loss or gain in last 6 months.     Depression Screening:   PHQ-2 Score: 0      Fall Risk Screening:   In the past year, patient has experienced: no history of falling in past year      Home Safety:  Patient has working smoke alarms and has working carbon monoxide detector. Home safety hazards include: none.     Nutrition:   Current diet is Regular. Does not follow any restrictive diets.    Medications:   Patient is not currently taking any over-the-counter supplements. Patient is able to manage medications.     Activities of Daily Living (ADLs)/Instrumental Activities of Daily Living (IADLs):   Walk and transfer into and out of bed and chair?: No  Dress and groom yourself?: No    Bathe or shower yourself?: No    Feed yourself? No  Do your laundry/housekeeping?: No  Manage your money, pay your bills and track your expenses?: No  Make your own meals?: No    Do your own shopping?: No    Previous Hospitalizations:   Any hospitalizations or ED visits within the last 12 months?: Yes    How many hospitalizations have you had in the last year?: more than 4    Advance Care Planning:   Living will: No    Durable POA for healthcare: No    Advanced directive: No    Advanced directive counseling given: Yes    Five wishes given: No    Patient declined ACP directive: Yes      Cognitive Screening:   Provider or family/friend/caregiver  concerned regarding cognition?: No    PREVENTIVE SCREENINGS      Cardiovascular Screening:    General: Screening Not Indicated and History Lipid Disorder      Diabetes Screening:     General: Screening Not Indicated and History Diabetes      Colorectal Cancer Screening:     General: Patient Declines      Prostate Cancer Screening:    General: Screening Not Indicated      Osteoporosis Screening:    General: Patient Declines      Abdominal Aortic Aneurysm (AAA) Screening:    Risk factors include: tobacco use        General: Patient Declines      Lung Cancer Screening:     General: Screening Not Indicated      Hepatitis C Screening:    General: Screening Current    Screening, Brief Intervention, and Referral to Treatment (SBIRT)    Screening  Typical number of drinks in a day: 0  Typical number of drinks in a week: 0  Interpretation: Low risk drinking behavior.    AUDIT-C Screenin) How often did you have a drink containing alcohol in the past year? monthly or less  2) How many drinks did you have on a typical day when you were drinking in the past year? 1 to 2  3) How often did you have 6 or more drinks on one occasion in the past year? never    AUDIT-C Score: 1  Interpretation: Score 0-3 (male): Negative screen for alcohol misuse    Single Item Drug Screening:  How often have you used an illegal drug (including marijuana) or a prescription medication for non-medical reasons in the past year? never    Single Item Drug Screen Score: 0  Interpretation: Negative screen for possible drug use disorder    Brief Intervention  Healthy alcohol use/limits discussed.     Review of Current Opioid Use    Opioid Risk Tool (ORT) Interpretation: Complete Opioid Risk Tool (ORT)    Other Counseling Topics:   Calcium and vitamin D intake.

## 2024-03-05 NOTE — PATIENT INSTRUCTIONS
Medicare Preventive Visit Patient Instructions  Thank you for completing your Welcome to Medicare Visit or Medicare Annual Wellness Visit today. Your next wellness visit will be due in one year (3/6/2025).  The screening/preventive services that you may require over the next 5-10 years are detailed below. Some tests may not apply to you based off risk factors and/or age. Screening tests ordered at today's visit but not completed yet may show as past due. Also, please note that scanned in results may not display below.  Preventive Screenings:  Service Recommendations Previous Testing/Comments   Colorectal Cancer Screening  Colonoscopy    Fecal Occult Blood Test (FOBT)/Fecal Immunochemical Test (FIT)  Fecal DNA/Cologuard Test  Flexible Sigmoidoscopy Age: 45-75 years old   Colonoscopy: every 10 years (May be performed more frequently if at higher risk)  OR  FOBT/FIT: every 1 year  OR  Cologuard: every 3 years  OR  Sigmoidoscopy: every 5 years  Screening may be recommended earlier than age 45 if at higher risk for colorectal cancer. Also, an individualized decision between you and your healthcare provider will decide whether screening between the ages of 76-85 would be appropriate. Colonoscopy: Not on file  FOBT/FIT: Not on file  Cologuard: Not on file  Sigmoidoscopy: Not on file    Patient Declines     Prostate Cancer Screening Individualized decision between patient and health care provider in men between ages of 55-69   Medicare will cover every 12 months beginning on the day after your 50th birthday PSA: No results in last 5 years     Screening Not Indicated     Hepatitis C Screening Once for adults born between 1945 and 1965  More frequently in patients at high risk for Hepatitis C Hep C Antibody: 02/06/2018    Screening Current   Diabetes Screening 1-2 times per year if you're at risk for diabetes or have pre-diabetes Fasting glucose: No results in last 5 years (No results in last 5 years)  A1C: 7.4 %  (11/7/2023)  Screening Not Indicated  History Diabetes   Cholesterol Screening Once every 5 years if you don't have a lipid disorder. May order more often based on risk factors. Lipid panel: 01/11/2023  Screening Not Indicated  History Lipid Disorder      Other Preventive Screenings Covered by Medicare:  Abdominal Aortic Aneurysm (AAA) Screening: covered once if your at risk. You're considered to be at risk if you have a family history of AAA or a male between the age of 65-75 who smoking at least 100 cigarettes in your lifetime.  Lung Cancer Screening: covers low dose CT scan once per year if you meet all of the following conditions: (1) Age 55-77; (2) No signs or symptoms of lung cancer; (3) Current smoker or have quit smoking within the last 15 years; (4) You have a tobacco smoking history of at least 20 pack years (packs per day x number of years you smoked); (5) You get a written order from a healthcare provider.  Glaucoma Screening: covered annually if you're considered high risk: (1) You have diabetes OR (2) Family history of glaucoma OR (3)  aged 50 and older OR (4)  American aged 65 and older  Osteoporosis Screening: covered every 2 years if you meet one of the following conditions: (1) Have a vertebral abnormality; (2) On glucocorticoid therapy for more than 3 months; (3) Have primary hyperparathyroidism; (4) On osteoporosis medications and need to assess response to drug therapy.  HIV Screening: covered annually if you're between the age of 15-65. Also covered annually if you are younger than 15 and older than 65 with risk factors for HIV infection. For pregnant patients, it is covered up to 3 times per pregnancy.    Immunizations:  Immunization Recommendations   Influenza Vaccine Annual influenza vaccination during flu season is recommended for all persons aged >= 6 months who do not have contraindications   Pneumococcal Vaccine   * Pneumococcal conjugate vaccine = PCV13 (Prevnar  13), PCV15 (Vaxneuvance), PCV20 (Prevnar 20)  * Pneumococcal polysaccharide vaccine = PPSV23 (Pneumovax) Adults 19-65 yo with certain risk factors or if 65+ yo  If never received any pneumonia vaccine: recommend Prevnar 20 (PCV20)  Give PCV20 if previously received 1 dose of PCV13 or PPSV23   Hepatitis B Vaccine 3 dose series if at intermediate or high risk (ex: diabetes, end stage renal disease, liver disease)   Respiratory syncytial virus (RSV) Vaccine - COVERED BY MEDICARE PART D  * RSVPreF3 (Arexvy) CDC recommends that adults 60 years of age and older may receive a single dose of RSV vaccine using shared clinical decision-making (SCDM)   Tetanus (Td) Vaccine - COST NOT COVERED BY MEDICARE PART B Following completion of primary series, a booster dose should be given every 10 years to maintain immunity against tetanus. Td may also be given as tetanus wound prophylaxis.   Tdap Vaccine - COST NOT COVERED BY MEDICARE PART B Recommended at least once for all adults. For pregnant patients, recommended with each pregnancy.   Shingles Vaccine (Shingrix) - COST NOT COVERED BY MEDICARE PART B  2 shot series recommended in those 19 years and older who have or will have weakened immune systems or those 50 years and older     Health Maintenance Due:      Topic Date Due   • HIV Screening  Completed   • Hepatitis C Screening  Completed     Immunizations Due:      Topic Date Due   • Hepatitis A Vaccine (1 of 2 - Risk 2-dose series) Never done   • COVID-19 Vaccine (3 - Pfizer risk series) 02/23/2022   • Influenza Vaccine (1) 09/01/2023     Advance Directives   What are advance directives?  Advance directives are legal documents that state your wishes and plans for medical care. These plans are made ahead of time in case you lose your ability to make decisions for yourself. Advance directives can apply to any medical decision, such as the treatments you want, and if you want to donate organs.   What are the types of advance  directives?  There are many types of advance directives, and each state has rules about how to use them. You may choose a combination of any of the following:  Living will:  This is a written record of the treatment you want. You can also choose which treatments you do not want, which to limit, and which to stop at a certain time. This includes surgery, medicine, IV fluid, and tube feedings.   Durable power of  for healthcare (DPAHC):  This is a written record that states who you want to make healthcare choices for you when you are unable to make them for yourself. This person, called a proxy, is usually a family member or a friend. You may choose more than 1 proxy.  Do not resuscitate (DNR) order:  A DNR order is used in case your heart stops beating or you stop breathing. It is a request not to have certain forms of treatment, such as CPR. A DNR order may be included in other types of advance directives.  Medical directive:  This covers the care that you want if you are in a coma, near death, or unable to make decisions for yourself. You can list the treatments you want for each condition. Treatment may include pain medicine, surgery, blood transfusions, dialysis, IV or tube feedings, and a ventilator (breathing machine).  Values history:  This document has questions about your views, beliefs, and how you feel and think about life. This information can help others choose the care that you would choose.  Why are advance directives important?  An advance directive helps you control your care. Although spoken wishes may be used, it is better to have your wishes written down. Spoken wishes can be misunderstood, or not followed. Treatments may be given even if you do not want them. An advance directive may make it easier for your family to make difficult choices about your care.   Cigarette Smoking and Your Health   Risks to your health if you smoke:  Nicotine and other chemicals found in tobacco damage every  cell in your body. Even if you are a light smoker, you have an increased risk for cancer, heart disease, and lung disease. If you are pregnant or have diabetes, smoking increases your risk for complications.   Benefits to your health if you stop smoking:   You decrease respiratory symptoms such as coughing, wheezing, and shortness of breath.   You reduce your risk for cancers of the lung, mouth, throat, kidney, bladder, pancreas, stomach, and cervix. If you already have cancer, you increase the benefits of chemotherapy. You also reduce your risk for cancer returning or a second cancer from developing.   You reduce your risk for heart disease, blood clots, heart attack, and stroke.   You reduce your risk for lung infections, and diseases such as pneumonia, asthma, chronic bronchitis, and emphysema.  Your circulation improves. More oxygen can be delivered to your body. If you have diabetes, you lower your risk for complications, such as kidney, artery, and eye diseases. You also lower your risk for nerve damage. Nerve damage can lead to amputations, poor vision, and blindness.  You improve your body's ability to heal and to fight infections.  For more information and support to stop smoking:   [employer]  Phone: 9- 056 - 219-6603  Web Address: www.[employer]  Narcotic (Opioid) Safety    Use narcotics safely:  Take prescribed narcotics exactly as directed  Do not give narcotics to others or take narcotics that belong to someone else  Do not mix narcotics without medicines or alcohol  Do not drive or operate heavy machinery after you take the narcotic  Monitor for side effects and notify your healthcare provider if you experienced side effects such as nausea, sleepiness, itching, or trouble thinking clearly.    Manage constipation:    Constipation is the most common side effect of narcotic medicine. Constipation is when you have hard, dry bowel movements, or you go longer than usual between bowel movements. Tell  your healthcare provider about all changes in your bowel movements while you are taking narcotics. He or she may recommend laxative medicine to help you have a bowel movement. He or she may also change the kind of narcotic you are taking, or change when you take it. The following are more ways you can prevent or relieve constipation:    Drink liquids as directed.  You may need to drink extra liquids to help soften and move your bowels. Ask how much liquid to drink each day and which liquids are best for you.  Eat high-fiber foods.  This may help decrease constipation by adding bulk to your bowel movements. High-fiber foods include fruits, vegetables, whole-grain breads and cereals, and beans. Your healthcare provider or dietitian can help you create a high-fiber meal plan. Your provider may also recommend a fiber supplement if you cannot get enough fiber from food.  Exercise regularly.  Regular physical activity can help stimulate your intestines. Walking is a good exercise to prevent or relieve constipation. Ask which exercises are best for you.  Schedule a time each day to have a bowel movement.  This may help train your body to have regular bowel movements. Bend forward while you are on the toilet to help move the bowel movement out. Sit on the toilet for at least 10 minutes, even if you do not have a bowel movement.    Store narcotics safely:   Store narcotics where others cannot easily get them.  Keep them in a locked cabinet or secure area. Do not  keep them in a purse or other bag you carry with you. A person may be looking for something else and find the narcotics.  Make sure narcotics are stored out of the reach of children.  A child can easily overdose on narcotics. Narcotics may look like candy to a small child.    The best way to dispose of narcotics:      The laws vary by country and area. In the [place], the best way is to return the narcotics through a take-back program. This program is offered  by the [place] Drug Enforcement Agency (IZZY). The following are options for using the program:  Take the narcotics to a IZZY collection site.  The site is often a law enforcement center. Call your local law enforcement center for scheduled take-back days in your area. You will be given information on where to go if the collection site is in a different location.  Take the narcotics to an approved pharmacy or hospital.  A pharmacy or hospital may be set up as a collection site. You will need to ask if it is a IZZY collection site if you were not directed there. A pharmacy or doctor's office may not be able to take back narcotics unless it is a IZZY site.  Use a mail-back system.  This means you are given containers to put the narcotics into. You will then mail them in the containers.  Use a take-back drop box.  This is a place to leave the narcotics at any time. People and animals will not be able to get into the box. Your local law enforcement agency can tell you where to find a drop box in your area.    Other ways to manage pain:   Ask your healthcare provider about non-narcotic medicines to control pain.  Nonprescription medicines include NSAIDs (such as ibuprofen) and acetaminophen. Prescription medicines include muscle relaxers, antidepressants, and steroids.  Pain may be managed without any medicines.  Some ways to relieve pain include massage, aromatherapy, or meditation. Physical or occupational therapy may also help.    For more information:   Drug Enforcement Administration  [address] [place] [address]  Phone: 1- 753 - 993-8996  Web Address: https://www.[employer]iversion.[facility].gov/drug_disposal/    [place] Food and Drug Administration  [address] [place] [address]  Phone: 2- 004 - 952-9206  Web Address: http://www.fda.gov     © Copyright [employer] 2018 Information is for End User's use only and may not be sold, redistributed or otherwise used for commercial purposes. All  illustrations and images included in CareNotes® are the copyrighted property of [employer] or [facility]

## 2024-03-05 NOTE — ASSESSMENT & PLAN NOTE
On hemodialysis MWF  Continue follow up with nephrology.  Previously evaluated by [facility] transplant but reports he was not a candidate secondary to tobacco use.    Lab Results   Component Value Date    EGFR 18 (L) 12/14/2023    EGFR 13 (L) 12/13/2023    EGFR 19 (L) 12/12/2023    CREATININE 4.08 (H) 12/14/2023    CREATININE 5.33 (H) 12/13/2023    CREATININE 3.80 (H) 12/12/2023

## 2024-03-05 NOTE — ASSESSMENT & PLAN NOTE
- Last A1c 7.4 from November 2023.   - Continue medications as prescribed through endocrinology.  - Continue close follow-up with endocrinology as scheduled.  - Continue follow up with ophthalmology.  Per patient, he needs a new ophthalmologist. Referral provided.     Lab Results   Component Value Date    HGBA1C 7.4 (H) 11/07/2023    HGBA1C 7.5 (H) 03/04/2023    HGBA1C 7.9 11/29/2022

## 2024-03-05 NOTE — PROGRESS NOTES
Patient ID: Ovidio Parkinson Jr. is a 43 y.o. male Date of Birth 1980       Chief Complaint   Patient presents with   • Follow Up Wound Care Visit     Sacral wounds.       Allergies:  Chlorcyclizine, Chlorhexidine, Ciprofloxacin hcl, Cymbalta [duloxetine hcl], Dm-doxylamine-acetaminophen, Doxycycline, Gabapentin, Hydrocortisone, Lactose - food allergy, Lyrica [pregabalin], Milk (cow), Penicillins, Polymyxin b, Promethazine-phenyleph-codeine, Quinidine, Cefepime, and Rosuvastatin    Diagnosis:      Diagnosis ICD-10-CM Associated Orders   1. Pressure injury of sacral region, stage 4 ([facility])  L89.154 Wound cleansing and dressings     sodium hypochlorite (DAKIN'S HALF-STRENGTH) external solution      2. Chronic osteomyelitis ([facility])  M86.60       3. Paraplegia ([facility])  G82.20       4. Pressure ulcer of left hip, stage 4 ([facility])  L89.224 Wound cleansing and dressings     sodium hypochlorite (DAKIN'S HALF-STRENGTH) external solution      5. Pressure injury of left ischium, stage 4 ([facility])  L89.324 Wound cleansing and dressings     Debridement Pressure Injury Left Ischium     sodium hypochlorite (DAKIN'S HALF-STRENGTH) external solution      6. Pressure injury of scrotum, stage 3 ([facility])  L89.893 Wound cleansing and dressings     sodium hypochlorite (DAKIN'S HALF-STRENGTH) external solution      7. Pressure ulcer of perineum, stage 4 ([facility])  L89.894 Wound cleansing and dressings     sodium hypochlorite (DAKIN'S HALF-STRENGTH) external solution                Assessment & Plan:    Ovidio presented today for follow-up of multiple pressure injuries with history of chronic osteomyelitis- palliative wounds to left hip, perineum, left ischium, sacrum.  More recently has scrotal pressure injury.  In general, continued deterioration noted though not as significant as previous visit.  This is likely multifactorial in nature considering prolonged time spent in seated position while at home and during dialysis, variable glucose measurements  (frequent hyperglycemia though he reported it was 153 today), poor nutritional status with decreased protein levels.  I reviewed the palliative wound care management plan which he has been on with patient and his brother via telephone today.  They expressed understanding and stated that they continue to choose this management plan details of which are stated below:  The patient and family (brother Maulik on telephone) continue to choose palliative care plan. Based on this plan, there is no expectation of healing. The end goals of our palliative care plan are pain control, drainage management, prevention of infection, odor control, and optimization of quality of life insofar as the wound will allow. Therefore, invasive procedures related to the wound will be minimized. Dressings will be chosen to achieve the palliative care plan goals rather that to achieve healing of the wound.  Debridements may be performed periodically in order to prevent infection or control odor, etc. There was a clear discussion in the treatment room regarding the palliative (versus active) care plan and the goals. Healing was not discussed as a goal.  I was clear that a non-palliative or active care plan can be instituted at any time based on the wishes of the patient. Visits will be scheduled accordingly to minimize disruption of quality of life while allowing appropriate physician oversight of the wound and any dramatic changes that might occur.       Multiple pressure injuries in setting of chronic osteomyelitis:   Stage IV pressure injury of left sacrum and ischium: Left sacrum stable.  Left ischium continued with necrotic tissue at distal end though is less in amount when compared to previous visit.  Selective debridement left ischium/debulking of necrotic tissue  Stage IV pressure injury of left hip: Stable, undermining/tunneling area remains about 5 cm  Stage IV pressure injury of perineum: Small increase in with, some necrotic tissue at  "base mechanically debrided with gauze.  No malodor, purulent drainage.   Stage III pressure injury of scrotum: Unable to obtain Schraggers paste as this was not covered by patient's insurance.  This area is stable without signs of acute soft tissue infection.  Continue Triad paste.  Reviewed with patient that after examination of his Roho cushion we recommend he reach out to his wheelchair clinic/[facility] for cushion adjustment/evaluation.  He expressed understanding and stated he will reach out after this appointment today  As he has not been supplementing with Jim or any other protein supplementation lately, we encouraged him to do so.  Some samples were provided today  ED precautions reviewed in detail, he both patient and brother expressed understanding  Follow-up in 3 weeks or sooner if needed       Portions of the record may have been created with voice recognition software. Occasional wrong word or \"sound alike\" substitutions may have occurred due to the inherent limitations of voice recognition software. Read the chart carefully and recognize, using context, where substitutions have occurred.    Subjective:   Please see prev visit notes for HPI summary [id number] 1/4/2024: Ovidio presents to wound center today for follow-up of multiple stage IV pressure injuries of sacrum, left hip, ischium, and perineum.  He reports he believes his lab studies regarding his protein level has improved as well as overall nutrition in general.  Has no acute complaints today and denies fever, chills.  He reports he is currently feeling well/much better from his recent hospitalization which he reports occurred at [facility] 12/5 through 12/13.    Per chart review of discharge summary from that hospitalization he was \"found to have Staph Lugudenesis bacteremia on admission blood cultures and was started on vancomycin, which was transitioned to Ancef by Infectious Disease. He will continue Ancef Source was suspected to be prior " "HD line; known sacral wounds were stable. Repeat blood cultures were negative on discharge. During admission, his HD line was removed (given infection) and replaced with temporary HD line (which, in turn, needed to be replaced due to inability to successfully use). New HD line functioning as of 12/9. PATRICIO performed for evaluation of possible endocarditis was negative. A tunneled HD line was placed 12/13/2023 by IR after repeat blood cultures were negative x48 hrs.\"  He also reports that he may have been spending more time in his chair than previous and continues with difficulty controlling some of the shifting/shearing forces with transferring etc... Continues to use his Clinitron bed.  Also reports that he is still awaiting the custom wheelchair/cushions to help with his pressure injuries but that it is being made.     1/25/2024: Ovidio presents today for follow-up of multiple stage IV pressure injuries of left hip, ischium, sacrum, and perineum as well as stage III pressure injury of scrotum.  He reports that they have been doing well with the Mesalt.  In addition reports that the area under the scrotum his brother sometimes uses Vaseline to this area when it is on the drier side she reports his brother tells him it is looking better.  Sides his chronic issues, he reports he has no acute complaints today and overall is feeling well.  He denies fever, chills.  He reported he recently started sleeping on his left side as this was more comfortable for him.    2/14/2024: Ovidio presents today for follow-up of multiple stage IV pressure injuries of left hip, ischium, sacrum, and perineum as well as stage III pressure injury of scrotum.  He reports that he is not feeling well \"has been feeling under the weather\" since Monday.  Feels that has been having an issue with his insulin pump administering insulin.  He states when he had this issue in the past it was because it was inserted an area of scar tissue.  However he did try " to move the side of this this morning and glucose was 475.  Also reports he is feeling very dehydrated and dry along with dizzy, lightheaded.  He denies fever, chills.    3/7/2024: Ovidio presents today for follow-up multiple stage IV pressure injuries of left hip ischium sacrum and perineum in addition to stage III pressure injury of scrotum.  At our last visit, recommendation was for patient to go to emergency department which he agreed to however after leaving our wound center patient did not go.  He reports that his sugars have been improved compared to last visit 150s to 180s though sometimes he still has spikes. He states he has been feeling better and denies fever, chills.  He also reports that since he was last here they called the Clinitron wraps to check functioning of his bed and was assured that all seems to be functioning well.      The following portions of the patient's history were reviewed and updated as appropriate:   Patient Active Problem List   Diagnosis   • Paraplegia ([facility])   • Atrial fibrillation ([facility])   • Chronic suprapubic catheter ([facility])   • Colostomy care ([facility])   • S/P unilateral BKA (below knee amputation) ([facility])   • Tobacco abuse   • Type 1 diabetes mellitus with chronic kidney disease on chronic dialysis ([facility])   • Ulcer of sacral region, stage 4 ([facility])   • Wound healing, delayed   • Iron deficiency anemia   • Pressure injury of left ischium, stage 4 ([facility])   • HTN (hypertension), benign   • Neurogenic bladder   • GERD (gastroesophageal reflux disease)   • Chronic pain   • Stage 5 chronic kidney disease on chronic dialysis ([facility])   • History of Clostridium difficile infection   • Urinary retention   • Dialysis patient ([facility])   • Insulin long-term use ([facility])   • Wheelchair dependent   • Bipolar depression ([facility])   • 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[facility])   • Seizure ([facility])   • Pressure ulcer of sacral region, stage 4 ([facility])   • AVM (arteriovenous malformation) of duodenum, acquired   • Chronic narcotic dependence  ([facility])   • Chronic diastolic heart failure ([facility])   • Pressure ulcer of other site, stage 4 ([facility])   • Pressure ulcer of left hip, stage 4 ([facility])   • ED (erectile dysfunction) of organic origin   • Irritable bowel syndrome   • Onychomycosis   • Pustular folliculitis   • Prolonged Q-T interval on ECG   • Secondary hyperparathyroidism of renal origin ([facility])   • Wounds, multiple   • Immunocompromised state ([facility])   • Severe protein-calorie malnutrition ([facility])   • Chronic osteomyelitis ([facility])   • Acute deep vein thrombosis (DVT) of right upper extremity ([facility])   • C. difficile colitis   • Charcot's arthropathy   • Diabetic gastroparesis associated with type 1 diabetes mellitus    • End stage renal disease ([facility])   • Hyperlipidemia   • LVH (left ventricular hypertrophy)   • NICM (nonischemic cardiomyopathy) ([facility])   • Vitamin B deficiency   • Vitamin C deficiency   • Vitamin D deficiency   • Hospital discharge follow-up   • Rheumatoid arthritis ([facility])   • Encounter for general adult medical examination with abnormal findings   • Herpes zoster vaccination declined   • COVID-19 vaccination declined   • Influenza vaccination declined   • Hepatitis A vaccination declined     Past Medical History:   Diagnosis Date   • Acute pulmonary edema ([facility]CC) 1/13/2022   • Ambulatory dysfunction    • Anemia    • Anemia, iron deficiency     transfusion requiring   • Asymptomatic bacteriuria 9/25/2017   • Atrial fibrillation ([facility])    • AVM (arteriovenous malformation) of duodenum, 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   • Bacteriuria, asymptomatic    • Bipolar disorder ([facility])    • Chronic deep vein thrombosis (DVT) ([facility])    • Chronic indwelling Ortega catheter    • Chronic kidney disease    • Chronic pain    • Chronic pain disorder    • Chronic suprapubic catheter ([facility])    • Clostridium difficile infection 08/11/2016    also positive 9/2016, 5/29/2017, 8/15/2017. S/P fecal transplant   • Colostomy on examination ([facility])    • Decubitus ulcer    • Delirium    •  Diabetes mellitus ([facility]CC)    • GERD (gastroesophageal reflux disease)    • Hemodialysis patient ([facility])    • Hypertension    • Memory impairment 2011    s/p diabetic coma   • Neurogenic bladder    • OAB (overactive bladder)    • Paraplegia ([facility])     T3 transverse myelitis vs spinal stoke (AVM); 2012 extensive epidural abscess C7=> conus 2nd extension from deep parspinal abscess L4-S2/sacral decubitus; cord atrophy/myelomalcia T3=>conus    • Penile abscess    • S/P unilateral BKA (below knee amputation) ([facility])     Right   • Sebaceous cyst removed in 2017   • Seizures ([facility])    • Shortness of breath    • Tobacco abuse    • 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[facility])    • Urinary retention    • Wheelchair dependent    • Wounds, multiple     pressure ulcers with delayed healing     Past Surgical History:   Procedure Laterality Date   • BELOW KNEE LEG AMPUTATION Right 2009   • COLONOSCOPY N/A 03/27/2017    Procedure: COLONOSCOPY;  Surgeon: Wesley Thibodeaux MD;  Location: [facility];  Service:    • COLONOSCOPY N/A 03/29/2017    Procedure: COLONOSCOPY;  Surgeon: Wesley Thibodeaux MD;  Location: [facility];  Service:    • COLONOSCOPY N/A 06/15/2017    Procedure: COLONOSCOPY with FMT;  Surgeon: Gerard Marques MD;  Location: [facility];  Service: Gastroenterology   • ESOPHAGOGASTRODUODENOSCOPY N/A 03/22/2017    Procedure: ESOPHAGOGASTRODUODENOSCOPY (EGD);  Surgeon: Beverley Ortiz DO;  Location: [facility];  Service:    • MULTIPLE TOOTH EXTRACTIONS N/A 03/08/2021    Procedure: EXTRACTION TEETH # 2,3,6,10,12,13,14,18,19,20,21,22,23,24,25,26,27,28,29,30;  Surgeon: Brian Kohler DMD;  Location: [facility];  Service: Maxillofacial   • SKIN BIOPSY       Family History   Problem Relation Age of Onset   • Hyperlipidemia Mother    • Hypertension Mother    • Leukemia Brother    • Diabetes Paternal Grandfather       Social History     Socioeconomic History   • Marital status: Single     Spouse name: None   • Number of children: None   • Years of education: None   •  Highest education level: None   Occupational History   • None   Tobacco Use   • Smoking status: Every Day     Types: Cigars     Passive exposure: Current   • Smokeless tobacco: Never   • Tobacco comments:     2 cigars/day - 2/14/2023   Vaping Use   • Vaping status: Never Used   Substance and Sexual Activity   • Alcohol use: Not Currently     Comment: 1 cup of wine every 3-4 months   • Drug use: Not Currently     Types: Marijuana     Comment: rarely; once every 1-2 years   • Sexual activity: None   Other Topics Concern   • None   Social History Narrative   • None     Social Determinants of Health     Financial Resource Strain: Low Risk  (3/5/2024)    Overall Financial Resource Strain (CARDIA)    • Difficulty of Paying Living Expenses: Not hard at all   Food Insecurity: No Food Insecurity (3/5/2024)    Hunger Vital Sign    • Worried About Running Out of Food in the Last Year: Never true    • Ran Out of Food in the Last Year: Never true   Transportation Needs: No Transportation Needs (3/5/2024)    PRAPARE - Transportation    • Lack of Transportation (Medical): No    • Lack of Transportation (Non-Medical): No   Physical Activity: Not on file   Stress: Not on file   Social Connections: Not on file   Intimate Partner Violence: Not At Risk (12/5/2023)    Received from [facility]    Humiliation, Afraid, Rape, and Kick questionnaire    • Fear of Current or Ex-Partner: No    • Emotionally Abused: No    • Physically Abused: No    • Sexually Abused: No   Housing Stability: Low Risk  (12/5/2023)    Received from [facility]    Housing Stability Vital Sign    • Unable to Pay for Housing in the Last Year: No    • Number of Places Lived in the Last Year: 1    • Unstable Housing in the Last Year: No        Current Outpatient Medications:   •  sodium hypochlorite (DAKIN'S HALF-STRENGTH) external solution, Apply 1 Application topically daily Soak gauze and pack into wounds daily., Disp: 473 mL, Rfl:  0  •  Alcohol Swabs (ALCOHOL PADS) 70 % PADS, by Does not apply route 5 (five) times a day, Disp: 300 each, Rfl: 5  •  amLODIPine (NORVASC) 10 mg tablet, TAKE 1 TABLET BY MOUTH EVERY DAY, Disp: 90 tablet, Rfl: 1  •  ammonium lactate (LAC-HYDRIN) 12 % cream, , Disp: , Rfl:   •  apixaban (ELIQUIS) 5 mg, Take 1 tablet (5 mg total) by mouth 2 (two) times a day, Disp: 180 tablet, Rfl: 3  •  atorvastatin (LIPITOR) 20 mg tablet, TAKE 1 TABLET BY MOUTH EVERYDAY AT BEDTIME, Disp: 90 tablet, Rfl: 1  •  azelaic acid (Azelex) 20 % cream, Apply topically 2 (two) times a day, Disp: 50 g, Rfl: 0  •  B Complex-C-Folic Acid (Super B-Complex/Vit C/FA) TABS, TAKE 1 TABLET BY MOUTH EVERY DAY AS DIRECTED, Disp: 90 tablet, Rfl: 5  •  SUSAN MICROLET LANCETS lancets, Use as instructed to check blood sugar 4 times a day Dx e10.29, Disp: 200 each, Rfl: 5  •  bisacodyl (DULCOLAX) 5 mg EC tablet, Take 1 tablet (5 mg total) by mouth once for 1 dose, Disp: 2 tablet, Rfl: 0  •  Blood Glucose Monitoring Suppl (SUSAN CONTOUR NEXT USB MONITOR) w/Device KIT, Testing 4 times a day, Disp: 1 kit, Rfl: 0  •  calcitriol (ROCALTROL) 0.5 MCG capsule, Take 2 mcg by mouth, Disp: , Rfl:   •  calcium acetate (PHOSLO) capsule, TAKE 2 CAPSULES BY MOUTH THREE TIMES DAILY WITH MEALS, Disp: , Rfl:   •  carvedilol (COREG) 25 mg tablet, TAKE 1 TABLET BY MOUTH TWICE A DAY, Disp: 180 tablet, Rfl: 1  •  cetirizine (ZyrTEC) 10 mg tablet, TAKE 1 TABLET BY MOUTH EVERY DAY, Disp: 90 tablet, Rfl: 1  •  Cholecalciferol (VITAMIN D-3) 1000 units CAPS, Take 2 capsules by mouth daily, Disp: 30 capsule, Rfl: 0  •  clindamycin (CLINDAGEL) 1 % gel, APPLY TO AFFECTED AREA TWICE A DAY, Disp: 60 g, Rfl: 2  •  Contour Next Test test strip, USE TO TEST BLOOD SUGAR 3 TIMES DAILY. CONTOUR NEXT STRIPS. E10.29, Disp: , Rfl:   •  cyanocobalamin ([employer] Vitamin B-12) 1000 MCG tablet, Take 1 tablet (1,000 mcg total) by mouth daily, Disp: 90 tablet, Rfl: 3  •  cyclobenzaprine (FLEXERIL) 5 mg tablet,  , Disp: , Rfl:   •  dexlansoprazole (DEXILANT) 30 MG capsule, TAKE 1 CAPSULE BY MOUTH EVERY DAY, Disp: 90 capsule, Rfl: 1  •  dicyclomine (BENTYL) 10 mg capsule, TAKE 1 CAPSULE BY MOUTH 3 TIMES A DAY BEFORE MEALS, Disp: 270 capsule, Rfl: 1  •  Disposable Gloves MISC, Use 7 times a day, 4 boxes of gloves XL Dx type 1 DM, paraplegia, Disp: 4 each, Rfl: 11  •  doxazosin (CARDURA) 2 mg tablet, TAKE 1 TABLET BY MOUTH EVERY EVENING., Disp: 90 tablet, Rfl: 1  •  epoetin kaushik (EPOGEN,PROCRIT) 20,000 units/mL, Inject 10,000 Units under the skin, Disp: , Rfl:   •  epoetin kaushik (EPOGEN,PROCRIT) 20,000 units/mL, Inject 5,000 Units under the skin, Disp: , Rfl:   •  ferrous sulfate 324 (65 Fe) mg, Take 1 tablet (324 mg total) by mouth in the morning, Disp: 90 tablet, Rfl: 3  •  ferrous sulfate 325 (65 Fe) mg tablet, Take 1 tablet (325 mg total) by mouth 3 (three) times a day, Disp: 90 tablet, Rfl: 3  •  Fluocinolone Acetonide Body 0.01 % OIL, Apply 2 drops topically daily, Disp: , Rfl:   •  folic acid (FOLVITE) 1 mg tablet, Take 1,000 mcg by mouth daily, Disp: , Rfl:   •  glucose-vitamin C 4-0.006 g, Chew 16 g if needed, Disp: , Rfl:   •  Gvoke PFS 1 MG/0.2ML SOSY, , Disp: , Rfl:   •  hydroxychloroquine (PLAQUENIL) 200 mg tablet, Take 200 mg by mouth daily, Disp: , Rfl:   •  hydrOXYzine HCL (ATARAX) 50 mg tablet, TAKE 1 TABLET (50 MG TOTAL) BY MOUTH 2 (TWO) TIMES A DAY AS NEEDED FOR ITCHING OR ANXIETY, Disp: 180 tablet, Rfl: 1  •  Incontinence Supply Disposable (Incontinence Brief Large) MISC, Use 6 (six) times a day Size xl, Disp: 180 each, Rfl: 11  •  Incontinence Supply Disposable (Undergarment) MISC, Use 6 a day, 5 boxes, xl Dx R51, paraplegia, Disp: 5 each, Rfl: 11  •  Incontinence Supply Disposable (Underpads) MISC, Use 2 a day, Disp: 5 each, Rfl: 11  •  insulin lispro protamine-insulin lispro (HumaLOG 50-50) 100 units/mL, Inject under the skin 2 (two) times a day before meals, Disp: , Rfl:   •  ketoconazole (NIZORAL) 2 %  cream, Apply to rash., Disp: 15 g, Rfl: 1  •  LEVEMIR FLEXTOUCH 100 units/mL injection pen, Inject 14 Units under the skin 2 (two) times a day, Disp: 15 pen, Rfl: 3  •  Lokelma 10 g PACK, , Disp: , Rfl:   •  losartan (COZAAR) 100 MG tablet, TAKE 1 TABLET BY MOUTH EVERY DAY, Disp: 90 tablet, Rfl: 1  •  metroNIDAZOLE (METROGEL) 0.75 % gel, Apply topically 2 (two) times a day, Disp: 45 g, Rfl: 0  •  Misc. Devices (Wheelchair Cushion) MISC, Use daily, Disp: 1 each, Rfl: 0  •  Misc. Devices (WHEELCHAIR) MISC, by Does not apply route daily Wheelchair ramp, dx g82.20, Disp: 1 each, Rfl: 0  •  Multiple Vitamin (Daily-Enriqueta Multivitamin) TABS, TAKE 1 TABLET BY MOUTH EVERY DAY, Disp: 30 tablet, Rfl: 8  •  NovoLOG FlexPen 100 units/mL injection pen, INJECT 3 UNITS UNDER THE SKIN 3 (THREE) TIMES A DAY BEFORE MEALS. (Patient not taking: Reported on 7/13/2023), Disp: , Rfl:   •  ondansetron (ZOFRAN-ODT) 4 mg disintegrating tablet, Take 1 tablet (4 mg total) by mouth every 6 (six) hours as needed for nausea or vomiting, Disp: 30 tablet, Rfl: 2  •  oxybutynin (DITROPAN XL) 15 MG 24 hr tablet, , Disp: , Rfl:   •  oxybutynin (DITROPAN) 5 mg tablet, Take 3 tablets (15 mg total) by mouth daily, Disp: 270 tablet, Rfl: 1  •  oxyCODONE (ROXICODONE) 10 MG TABS, TAKE ONE TABLET BY MOUTH EVERY 12 HOURS AS NEEDED FOR PAIN. ONGOING THERAPY., Disp: , Rfl:   •  polyethylene glycol (GLYCOLAX) 17 GM/SCOOP powder, Take as directed as per written office instructions, Disp: 238 g, Rfl: 0  •  polyethylene glycol (GOLYTELY) 4000 mL solution, Take 4,000 mL by mouth once for 1 dose, Disp: 4000 mL, Rfl: 0  •  risperiDONE (RisperDAL) 0.5 mg tablet, Take 1 tablet (0.5 mg total) by mouth 2 (two) times a day, Disp: 180 tablet, Rfl: 1  •  sertraline (ZOLOFT) 25 mg tablet, TAKE 1 TABLET (25 MG TOTAL) BY MOUTH DAILY., Disp: 90 tablet, Rfl: 1  •  Sodium Zirconium Cyclosilicate 10 g PACK, Take 10 g by mouth daily, Disp: , Rfl:   •  sucralfate (CARAFATE) 1 g/10 mL  suspension, Take 10 mL (1 g total) by mouth 4 (four) times a day (with meals and at bedtime), Disp: 420 mL, Rfl: 1  •  SUPER B COMPLEX & C TABS, TAKE 1 CAPSULE BY MOUTH ONCE FOR 1 DOSE AS DIRECTED, Disp: 90 tablet, Rfl: 2  •  Zinc Sulfate 220 (50 Zn) MG TABS, Take 1 tablet by mouth daily, Disp: 90 tablet, Rfl: 1    Review of Systems   Constitutional:  Negative for chills and fever.   Respiratory:  Negative for cough and shortness of breath.    Cardiovascular:  Negative for chest pain and palpitations.   Gastrointestinal:  Positive for nausea.   Musculoskeletal:  Positive for gait problem (Paraplegia).        Paraplegic   Skin:  Positive for wound (Multiple pressure injuries).        Multiple pressure injuries   Neurological:  Positive for weakness, light-headedness and numbness. Negative for dizziness and headaches.   Psychiatric/Behavioral:  The patient is not nervous/anxious.        Objective:  BP 96/62   Pulse 68   Temp (!) 97.3 °F (36.3 °C)   Resp 18   Pain Score: 0-No pain     Physical Exam  Vitals reviewed.   Constitutional:       General: He is not in acute distress.     Appearance: He is not ill-appearing, toxic-appearing or diaphoretic.      Comments: Very pleasant, no acute distress   HENT:      Head: Normocephalic.   Cardiovascular:      Rate and Rhythm: Normal rate.   Pulmonary:      Effort: Pulmonary effort is normal. No respiratory distress.   Genitourinary:      Skin:     General: Skin is warm and dry.      Findings: Wound present. No erythema.             Comments: Stage IV pressure injury of left sacrum and ischium: Stage IV pressure injury of left sacrum and ischium: Left sacrum stable.  Left ischium continued with necrotic tissue at distal end though is less in amount when compared to previous visit.    Stage IV pressure injury left hip: Stable, undermined/tunneled area remains around 5 cm today. No malodor, purulent drainage.    Stage IV pressure injury of perineum: Small increase in with,  some necrotic tissue at base mechanically debrided with gauze.  No malodor, purulent drainage.     Stage III pressure injury of scrotum: Stable. No purulent drainage or malodor noted.  No acute erythema.    See full wound description for additional details.   Neurological:      Mental Status: He is alert and oriented to person, place, and time.      Gait: Gait abnormal (wc bound).   Psychiatric:         Mood and Affect: Mood normal.         Behavior: Behavior normal.             Wound Pressure Injury Ischium Left (Active)   Wound Image   03/07/24 1119   Wound Description Pink;Dry;Granulation tissue;Necrotic 03/07/24 1113   Pressure Injury Stage 4 02/15/24 1111   Irene-wound Assessment Scar Tissue;Intact;Dry 03/07/24 1113   Wound Length (cm) 7.7 cm 03/07/24 1113   Wound Width (cm) 6.9 cm 03/07/24 1113   Wound Depth (cm) 1.5 cm 03/07/24 1113   Wound Surface Area (cm^2) 53.13 cm^2 03/07/24 1113   Wound Volume (cm^3) 79.695 cm^3 03/07/24 1113   Calculated Wound Volume (cm^3) 79.7 cm^3 03/07/24 1113   Change in Wound Size % -321.69 03/07/24 1113   Tunneling 1 in depth located at resolved 11/30/23 1351   Number of underminings 1 03/07/24 1113   Undermining 1 3 03/07/24 1113   Undermining 1 is depth extending from 9-12 03/07/24 1113   Drainage Amount Copious 03/07/24 1113   Drainage Description Serosanguineous 03/07/24 1113   Non-staged Wound Description Full thickness 03/07/24 1113   Treatments Irrigation with NSS 03/07/24 1113   Wound packed? No 02/16/23 0953   Dressing Changed Changed 03/07/24 1113   Patient Tolerance Tolerated well 03/07/24 1113   Dressing Status Removed 03/07/24 1113       Wound Pressure Injury Perineum (Active)   Wound Image   03/07/24 1119   Wound Description Yellow;Pink;Necrotic;Other (Comment) 03/07/24 1107   Pressure Injury Stage 4 02/15/24 1107   Irene-wound Assessment Scar Tissue;Dry;Excoriated 02/15/24 1107   Wound Length (cm) 5.7 cm 03/07/24 1107   Wound Width (cm) 8.9 cm 03/07/24 1107   Wound  Depth (cm) 1.5 cm 03/07/24 1107   Wound Surface Area (cm^2) 50.73 cm^2 03/07/24 1107   Wound Volume (cm^3) 76.095 cm^3 03/07/24 1107   Calculated Wound Volume (cm^3) 76.1 cm^3 03/07/24 1107   Change in Wound Size % -624.76 03/07/24 1107   Tunneling 1 in depth located at 0 10/05/23 1458   Number of underminings 1 01/25/24 1350   Undermining 1 1.5 03/07/24 1107   Undermining 1 is depth extending from 12-9 03/07/24 1107   Drainage Amount Copious 03/07/24 1107   Drainage Description Serosanguineous 03/07/24 1107   Non-staged Wound Description Full thickness 03/07/24 1107   Treatments Irrigation with NSS 03/07/24 1107   Wound packed? No 02/16/23 0959   Dressing Changed Changed 03/07/24 1107   Patient Tolerance Tolerated well 03/07/24 1107   Dressing Status Removed 03/07/24 1107       Wound Pressure Injury Sacrum (Active)   Wound Image   03/07/24 1118   Wound Description Epithelialization;Pink;Slough;Granulation tissue 03/07/24 1105   Pressure Injury Stage 4 03/07/24 1105   Irene-wound Assessment Scar Tissue;Scaly;Dry 03/07/24 1105   Wound Length (cm) 7.5 cm 03/07/24 1105   Wound Width (cm) 9 cm 03/07/24 1105   Wound Depth (cm) 0.6 cm 03/07/24 1105   Wound Surface Area (cm^2) 67.5 cm^2 03/07/24 1105   Wound Volume (cm^3) 40.5 cm^3 03/07/24 1105   Calculated Wound Volume (cm^3) 40.5 cm^3 03/07/24 1105   Change in Wound Size % 46 03/07/24 1105   Undermining 1 0 10/05/23 1458   Undermining 1 is depth extending from resolved 10/05/23 1458   Drainage Amount Copious 03/07/24 1105   Drainage Description Serosanguineous;Yellow 03/07/24 1105   Non-staged Wound Description Full thickness 03/07/24 1105   Treatments Irrigation with NSS 03/07/24 1105   Wound packed? No 02/16/23 1001   Dressing Changed Changed 03/07/24 1105   Patient Tolerance Tolerated well 03/07/24 1105   Dressing Status Removed 03/07/24 1105       Wound Pressure Injury Hip Left;Lateral (Active)   Wound Image   03/07/24 1118   Wound Description Pink;Yellow 03/07/24  "1111   Pressure Injury Stage 4 01/04/24 1404   Irene-wound Assessment Scar Tissue;Intact 03/07/24 1111   Wound Length (cm) 2.7 cm 03/07/24 1111   Wound Width (cm) 1.5 cm 03/07/24 1111   Wound Depth (cm) 1.9 cm 03/07/24 1111   Wound Surface Area (cm^2) 4.05 cm^2 03/07/24 1111   Wound Volume (cm^3) 7.695 cm^3 03/07/24 1111   Calculated Wound Volume (cm^3) 7.7 cm^3 03/07/24 1111   Tunneling 1 0 cm 10/05/23 1453   Tunneling 1 in depth located at resolved 10/05/23 1453   Number of underminings 1 03/07/24 1111   Undermining 1 5 03/07/24 1111   Undermining 1 is depth extending from 6-1 03/07/24 1111   Drainage Amount Copious 02/15/24 1057   Drainage Description Yellow 02/15/24 1057   Non-staged Wound Description Full thickness 03/07/24 1111   Treatments Irrigation with NSS 03/07/24 1111   Wound packed? No 02/16/23 1004   Packing- # removed 1 02/15/24 1057   Dressing Changed Changed 03/07/24 1111   Patient Tolerance Tolerated well 03/07/24 1111   Dressing Status Removed 03/07/24 1111       Wound 01/04/24 Pressure Injury Scrotum Posterior (Active)   Wound Image   03/07/24 1117   Wound Description Pink;Yellow 03/07/24 1110   Pressure Injury Stage 3 02/15/24 1103   Irene-wound Assessment Dry;Maceration 03/07/24 1110   Wound Length (cm) 2.9 cm 03/07/24 1110   Wound Width (cm) 2 cm 03/07/24 1110   Wound Depth (cm) 0.1 cm 03/07/24 1110   Wound Surface Area (cm^2) 5.8 cm^2 03/07/24 1110   Wound Volume (cm^3) 0.58 cm^3 03/07/24 1110   Calculated Wound Volume (cm^3) 0.58 cm^3 03/07/24 1110   Change in Wound Size % 35.56 03/07/24 1110   Drainage Amount Copious 03/07/24 1110   Drainage Description Serosanguineous 03/07/24 1110   Non-staged Wound Description Full thickness 03/07/24 1110   Dressing Status Removed 02/15/24 1102       Debridement   Wound Pressure Injury Ischium Left    Universal Protocol:  Consent: Verbal consent obtained. Written consent obtained.  Consent given by: patient  Time out: Immediately prior to procedure a \"time " "out\" was called to verify the correct patient, procedure, equipment, support staff and site/side marked as required.  Patient understanding: patient states understanding of the procedure being performed  Patient identity confirmed: verbally with patient    Debridement Details  Performed by: physician  Debridement type: selective  Pain control: lidocaine 4%      Post-debridement measurements  Length (cm): 7.7  Width (cm): 6.9  Depth (cm): 1.5  Percent debrided: 25%  Surface Area (cm^2): 53.13  Area Debrided (cm^2): 13.28  Volume (cm^3): 79.7    Devitalized tissue debrided: fibrin, necrotic debris and slough  Instrument(s) utilized: blade and forceps  Bleeding: small  Hemostasis obtained with: pressure  Procedural pain (0-10): insensate  Post-procedural pain: insensate   Response to treatment: procedure was tolerated well               Lab Results   Component Value Date    HGBA1C 7.4 (H) 11/07/2023       Wound Instructions:  Orders Placed This Encounter   Procedures   • Wound cleansing and dressings     Wound cleansing and dressings       Wash your hands with soap and water.  Remove old dressing, discard into plastic bag and place in trash.    Cleanse the wounds with Dakin's solution if there is an odor, IF NO ODOR, cleanse with normal saline or wound wash prior to applying a clean dressing.   Do not use tissue or cotton balls. Do not scrub the wound. Pat dry using gauze.   Shower no; do not get dressing wet      Left Hip Wound:   STOP Mesalt  Apply Dakin's moistened gauze with single layer tucked into depth of wound from 6-9 oclock  Cover with gauze   Cover with Convamax (or equivalent super absorber) and ABD over top   Secure with Medfix tape.   Change dressing daily and top dressing PRN for breakthrough drainage (visiting nurses to do twice per week and family in between)      Sacral, Left ischium, and perineal wound:   STOP Mesalt  Apply Dakin's moistened gauze with single layer tucked into depth of wound from 6-9 " oclock  Cover with gauze   Cover with Convamax (or equivalent super absorber) and ABD over top   Secure with Medfix tape.   Change dressing daily and top dressing PRN for breakthrough drainage (visiting nurses to do twice per week and family in between)      Scrotal wound:  Cleanse with normal saline  Apply thin layer of Triad paste to open areas. May use ABD to cover area, but do not put any tape on scrotal area.  Apply 2 x per day     Continue using Clinitron bed  [facility] to have your ROHO cushion evaluated as soon as possible.      Continue NO PRESSURE TO ALL WOUNDS AS MUCH AS POSSIBLE, ESPECIALLY PERINEAL WOUND   LIMIT SITTING UPRIGHT IN WHEELCHAIR ON THIS WOUND      Continue visiting nurse skilled 2 x per week for wound care dressing changes.                              Please monitor blood pressure at home and if it remains low, call your PCP to evaluate your BP meds  Please make an appointment with plastic surgeon for evaluation and surgical consult for sacral, ischium, hip wounds   Please call your case [facility] for assistance in obtaining protein supplements and Jim supplements     Follow up at the wound center with Dr. Rose in 3 weeks.      Treatment in the wound center today:   Cleansed with normal saline, dressed as above     Standing Status:   Future     Standing Expiration Date:   3/7/2025   • Debridement Pressure Injury Left Ischium     This order was created via procedure documentation         Marley Rose MD

## 2024-03-05 NOTE — ASSESSMENT & PLAN NOTE
- Counseled patient on smoking cessation, however patient is not ready to quit at this time and denies nicotine replacement therapy.

## 2024-03-05 NOTE — ASSESSMENT & PLAN NOTE
Not in acute exacerbation. No lower extremity edema or rales on exam.     Wt Readings from Last 3 Encounters:   08/18/22 69.4 kg (153 lb)   02/17/22 69.4 kg (153 lb)   07/27/21 68.5 kg (151 lb)

## 2024-03-05 NOTE — ASSESSMENT & PLAN NOTE
- Blood pressure stable today.  130/78 at today's visit.   - Continue amlodipine 10  mg daily, carvedilol 25 mg twice daily, doxazosin 2 mg daily, and losartan 100 mg daily.   - Reviewed BP target goal with patient.    - Continue checking blood pressure at home and create blood pressure log to bring to next appointment.

## 2024-03-05 NOTE — ASSESSMENT & PLAN NOTE
- Unable to reassess today due to patient declining exam.  - Continue close follow-up with Wound Care as scheduled.

## 2024-03-05 NOTE — TELEPHONE ENCOUNTER
FAXED ON 03/05/24 TO [facility] at [phone]. FAX CONFIRMATION RECEIVED.

## 2024-03-05 NOTE — PROGRESS NOTES
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   - Continue medications as prescribed through endocrinology.  - Continue close follow-up with endocrinology as scheduled.  - Continue follow up with ophthalmology.  Per patient, he needs a new ophthalmologist. Referral provided.     Lab Results   Component Value Date    HGBA1C 7.4 (H) 11/07/2023    HGBA1C 7.5 (H) 03/04/2023    HGBA1C 7.9 11/29/2022          Relevant Orders    Ambulatory Referral to Ophthalmology       Cardiovascular and Mediastinum    Atrial fibrillation (HCC) (Chronic)     Continue apixaban 5 mg BID         Relevant Medications    apixaban (ELIQUIS) 5 mg    HTN (hypertension), benign (Chronic)     - Blood pressure stable today.  130/78 at today's visit.   - Continue amlodipine 10  mg daily, carvedilol 25 mg twice daily, doxazosin 2 mg daily, and losartan 100 mg daily.   - Reviewed BP target goal with patient.    - Continue checking blood pressure at home and create blood pressure log to bring to next appointment.         Chronic diastolic heart failure (HCC)     Not in acute exacerbation. No lower extremity edema or rales on exam.     Wt Readings from Last 3 Encounters:   08/18/22 69.4 kg (153 lb)   02/17/22 69.4 kg (153 lb)   07/27/21 68.5 kg (151 lb)                     Nervous and Auditory    Paraplegia (HCC) (Chronic)     - 2/2 Transverse Myelitis, ambulating in wheelchair.             Musculoskeletal and Integument    Ulcer of sacral region, stage 4 (HCC)     - Unable to reassess today due to patient declining exam.  - Continue close follow-up with Wound Care as scheduled.            Genitourinary    Stage 5 chronic kidney disease on chronic dialysis (HCC) (Chronic)     On hemodialysis MWF  Continue follow up with nephrology.  Previously evaluated by [facility] transplant but reports he  was not a candidate secondary to tobacco use.    Lab Results   Component Value Date    EGFR 18 (L) 12/14/2023    EGFR 13 (L) 12/13/2023    EGFR 19 (L) 12/12/2023    CREATININE 4.08 (H) 12/14/2023    CREATININE 5.33 (H) 12/13/2023    CREATININE 3.80 (H) 12/12/2023             Other    Tobacco abuse (Chronic)     - Counseled patient on smoking cessation, however patient is not ready to quit at this time and denies nicotine replacement therapy.         Iron deficiency anemia    Relevant Medications    cyanocobalamin ([facility] Vitamin B-12) 1000 MCG tablet    ferrous sulfate 324 (65 Fe) mg    Bipolar depression (HCC)     - Stable.  Continue sertraline 25 mg daily.  - Continue coping methods, relaxation techniques.         Vitamin B deficiency    Relevant Medications    cyanocobalamin ([employer] Vitamin B-12) 1000 MCG tablet    Herpes zoster vaccination declined    COVID-19 vaccination declined    Influenza vaccination declined    Hepatitis A vaccination declined   Other Visit Diagnoses     Encounter for annual wellness visit (AWV) in Medicare patient    -  Primary        BMI Counseling: Body mass index is 18.62 kg/m². Follow-up plan was not completed due to elderly patient (65+ years old) where weight reduction/weight gain would complicate underlying health condition such as: illness or physical disability. Patient in wheelchair    Tobacco Cessation Counseling: Tobacco cessation counseling was provided. The patient is sincerely urged to quit consumption of tobacco. He is not ready to quit tobacco. Medication options and side effects of medication discussed. Patient refused medication.           Preventive health issues were discussed with patient, and age appropriate screening tests were ordered as noted in patient's After Visit Summary.  Personalized health advice and appropriate referrals for health education or preventive services given if needed, as noted in patient's After Visit Summary.     History of Present Illness:      Patient presents for a Medicare Wellness Visit    Patient Care Team:  Kay Ronquillo PA-C as PCP - General (Family Medicine)  Chapo Stallworth MD as PCP - Wound (Wound Care)  MD Lovely Leal PA-C Lino Miele, MD Berhanu Geme, MD as Endoscopist     Review of Systems:     Review of Systems   Constitutional:  Negative for activity change, chills, fever and unexpected weight change.   HENT:  Negative for congestion, ear pain, rhinorrhea and sore throat.    Eyes:  Negative for pain and visual disturbance.   Respiratory:  Negative for cough, shortness of breath and wheezing.    Cardiovascular:  Negative for chest pain, palpitations and leg swelling.   Gastrointestinal:  Negative for abdominal pain, constipation, diarrhea and vomiting.   Musculoskeletal:  Negative for arthralgias and back pain.   Neurological:  Negative for dizziness, seizures, syncope and headaches.   All other systems reviewed and are negative.       Problem List:     Patient Active Problem List   Diagnosis   • Paraplegia ([facility])   • Atrial fibrillation ([facility])   • Chronic suprapubic catheter ([facility])   • Colostomy care ([facility])   • S/P unilateral BKA (below knee amputation) ([facility])   • Tobacco abuse   • Type 1 diabetes mellitus with chronic kidney disease on chronic dialysis ([facility])   • Ulcer of sacral region, stage 4 ([facility])   • Wound healing, delayed   • Iron deficiency anemia   • Pressure injury of left ischium, stage 4 ([facility])   • HTN (hypertension), benign   • Neurogenic bladder   • GERD (gastroesophageal reflux disease)   • Chronic pain   • Stage 5 chronic kidney disease on chronic dialysis ([facility])   • History of Clostridium difficile infection   • Urinary retention   • Dialysis patient ([facility])   • Insulin long-term use ([facility])   • Wheelchair dependent   • Bipolar depression ([facility])   • 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[facility])   • Seizure ([facility])   • Pressure ulcer of sacral region, stage 4 ([facility])   • AVM (arteriovenous malformation) of duodenum, acquired    • Chronic narcotic dependence (HCC)   • Chronic diastolic heart failure (HCC)   • Pressure ulcer of other site, stage 4 (HCC)   • Pressure ulcer of left hip, stage 4 (HCC)   • ED (erectile dysfunction) of organic origin   • Irritable bowel syndrome   • Onychomycosis   • Pustular folliculitis   • Prolonged Q-T interval on ECG   • Secondary hyperparathyroidism of renal origin (HCC)   • Wounds, multiple   • Immunocompromised state (HCC)   • Severe protein-calorie malnutrition (HCC)   • Chronic osteomyelitis (HCC)   • Acute deep vein thrombosis (DVT) of right upper extremity (HCC)   • C. difficile colitis   • Charcot's arthropathy   • Diabetic gastroparesis associated with type 1 diabetes mellitus    • End stage renal disease (HCC)   • Hyperlipidemia   • LVH (left ventricular hypertrophy)   • NICM (nonischemic cardiomyopathy) (HCC)   • Vitamin B deficiency   • Vitamin C deficiency   • Vitamin D deficiency   • Hospital discharge follow-up   • Rheumatoid arthritis (HCC)   • Encounter for general adult medical examination with abnormal findings   • Herpes zoster vaccination declined   • COVID-19 vaccination declined   • Influenza vaccination declined   • Hepatitis A vaccination declined      Past Medical and Surgical History:     Past Medical History:   Diagnosis Date   • Acute pulmonary edema (HCC) 1/13/2022   • Ambulatory dysfunction    • Anemia    • Anemia, iron deficiency     transfusion requiring   • Asymptomatic bacteriuria 9/25/2017   • Atrial fibrillation (HCC)    • AVM (arteriovenous malformation) of duodenum, 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   • Bacteriuria, asymptomatic    • Bipolar disorder (HCC)    • Chronic deep vein thrombosis (DVT) (HCC)    • Chronic indwelling Ortega catheter    • Chronic kidney disease    • Chronic pain    • Chronic pain disorder    • Chronic suprapubic catheter ([facility])    • Clostridium difficile infection 08/11/2016    also positive 9/2016, 5/29/2017, 8/15/2017. S/P fecal transplant   •  Colostomy on examination ([facility])    • Decubitus ulcer    • Delirium    • Diabetes mellitus ([facility])    • GERD (gastroesophageal reflux disease)    • Hemodialysis patient ([facility])    • Hypertension    • Memory impairment 2011    s/p diabetic coma   • Neurogenic bladder    • OAB (overactive bladder)    • Paraplegia ([facility])     T3 transverse myelitis vs spinal stoke (AVM); 2012 extensive epidural abscess C7=> conus 2nd extension from deep parspinal abscess L4-S2/sacral decubitus; cord atrophy/myelomalcia T3=>conus    • Penile abscess    • S/P unilateral BKA (below knee amputation) ([facility])     Right   • Sebaceous cyst removed in 2017   • Seizures ([facility])    • Shortness of breath    • Tobacco abuse    • 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[facility])    • Urinary retention    • Wheelchair dependent    • Wounds, multiple     pressure ulcers with delayed healing     Past Surgical History:   Procedure Laterality Date   • BELOW KNEE LEG AMPUTATION Right 2009   • COLONOSCOPY N/A 03/27/2017    Procedure: COLONOSCOPY;  Surgeon: Wesley Thibodeaux MD;  Location: [facility];  Service:    • COLONOSCOPY N/A 03/29/2017    Procedure: COLONOSCOPY;  Surgeon: Wesley Thibodeaux MD;  Location: [facility];  Service:    • COLONOSCOPY N/A 06/15/2017    Procedure: COLONOSCOPY with FMT;  Surgeon: Gerard Marques MD;  Location: [facility];  Service: Gastroenterology   • ESOPHAGOGASTRODUODENOSCOPY N/A 03/22/2017    Procedure: ESOPHAGOGASTRODUODENOSCOPY (EGD);  Surgeon: Beverley Ortiz DO;  Location: [facility];  Service:    • MULTIPLE TOOTH EXTRACTIONS N/A 03/08/2021    Procedure: EXTRACTION TEETH # 2,3,6,10,12,13,14,18,19,20,21,22,23,24,25,26,27,28,29,30;  Surgeon: Brian Kohler DMD;  Location: [facility];  Service: Maxillofacial   • SKIN BIOPSY        Family History:     Family History   Problem Relation Age of Onset   • Hyperlipidemia Mother    • Hypertension Mother    • Leukemia Brother    • Diabetes Paternal Grandfather       Social History:     Social History     Socioeconomic History   •  Marital status: Single     Spouse name: None   • Number of children: None   • Years of education: None   • Highest education level: None   Occupational History   • None   Tobacco Use   • Smoking status: Every Day     Types: Cigars     Passive exposure: Current   • Smokeless tobacco: Never   • Tobacco comments:     2 cigars/day - 2/14/2023   Vaping Use   • Vaping status: Never Used   Substance and Sexual Activity   • Alcohol use: Not Currently     Comment: 1 cup of wine every 3-4 months   • Drug use: Not Currently     Types: Marijuana     Comment: rarely; once every 1-2 years   • Sexual activity: None   Other Topics Concern   • None   Social History Narrative   • None     Social Determinants of Health     Financial Resource Strain: Low Risk  (3/5/2024)    Overall Financial Resource Strain (CARDIA)    • Difficulty of Paying Living Expenses: Not hard at all   Food Insecurity: No Food Insecurity (3/5/2024)    Hunger Vital Sign    • Worried About Running Out of Food in the Last Year: Never true    • Ran Out of Food in the Last Year: Never true   Transportation Needs: No Transportation Needs (3/5/2024)    PRAPARE - Transportation    • Lack of Transportation (Medical): No    • Lack of Transportation (Non-Medical): No   Physical Activity: Not on file   Stress: Not on file   Social Connections: Not on file   Intimate Partner Violence: Not At Risk (12/5/2023)    Received from [facility]    Humiliation, Afraid, Rape, and Kick questionnaire    • Fear of Current or Ex-Partner: No    • Emotionally Abused: No    • Physically Abused: No    • Sexually Abused: No   Housing Stability: Low Risk  (12/5/2023)    Received from [facility]    Housing Stability Vital Sign    • Unable to Pay for Housing in the Last Year: No    • Number of Places Lived in the Last Year: 1    • Unstable Housing in the Last Year: No      Medications and Allergies:     Current Outpatient Medications   Medication Sig Dispense  Refill   • apixaban (ELIQUIS) 5 mg Take 1 tablet (5 mg total) by mouth 2 (two) times a day 180 tablet 3   • cyanocobalamin ([employer] Vitamin B-12) 1000 MCG tablet Take 1 tablet (1,000 mcg total) by mouth daily 90 tablet 3   • ferrous sulfate 324 (65 Fe) mg Take 1 tablet (324 mg total) by mouth in the morning 90 tablet 3   • Alcohol Swabs (ALCOHOL PADS) 70 % PADS by Does not apply route 5 (five) times a day 300 each 5   • amLODIPine (NORVASC) 10 mg tablet TAKE 1 TABLET BY MOUTH EVERY DAY 90 tablet 1   • ammonium lactate (LAC-HYDRIN) 12 % cream      • atorvastatin (LIPITOR) 20 mg tablet TAKE 1 TABLET BY MOUTH EVERYDAY AT BEDTIME 90 tablet 1   • azelaic acid (Azelex) 20 % cream Apply topically 2 (two) times a day 50 g 0   • B Complex-C-Folic Acid (Super B-Complex/Vit C/FA) TABS TAKE 1 TABLET BY MOUTH EVERY DAY AS DIRECTED 90 tablet 5   • SUSAN MICROLET LANCETS lancets Use as instructed to check blood sugar 4 times a day  Dx e10.29 200 each 5   • bisacodyl (DULCOLAX) 5 mg EC tablet Take 1 tablet (5 mg total) by mouth once for 1 dose 2 tablet 0   • Blood Glucose Monitoring Suppl (SUSAN CONTOUR NEXT USB MONITOR) w/Device KIT Testing 4 times a day 1 kit 0   • calcitriol (ROCALTROL) 0.5 MCG capsule Take 2 mcg by mouth     • calcium acetate (PHOSLO) capsule TAKE 2 CAPSULES BY MOUTH THREE TIMES DAILY WITH MEALS     • carvedilol (COREG) 25 mg tablet TAKE 1 TABLET BY MOUTH TWICE A  tablet 1   • cetirizine (ZyrTEC) 10 mg tablet TAKE 1 TABLET BY MOUTH EVERY DAY 90 tablet 1   • Cholecalciferol (VITAMIN D-3) 1000 units CAPS Take 2 capsules by mouth daily 30 capsule 0   • clindamycin (CLINDAGEL) 1 % gel APPLY TO AFFECTED AREA TWICE A DAY 60 g 2   • Contour Next Test test strip USE TO TEST BLOOD SUGAR 3 TIMES DAILY. CONTOUR NEXT STRIPS. E10.29     • cyclobenzaprine (FLEXERIL) 5 mg tablet      • dexlansoprazole (DEXILANT) 30 MG capsule TAKE 1 CAPSULE BY MOUTH EVERY DAY 90 capsule 1   • dicyclomine (BENTYL) 10 mg capsule TAKE 1  CAPSULE BY MOUTH 3 TIMES A DAY BEFORE MEALS 270 capsule 1   • Disposable Gloves MISC Use 7 times a day, 4 boxes of gloves XL  Dx type 1 DM, paraplegia 4 each 11   • doxazosin (CARDURA) 2 mg tablet TAKE 1 TABLET BY MOUTH EVERY EVENING. 90 tablet 1   • epoetin kaushik (EPOGEN,PROCRIT) 20,000 units/mL Inject 10,000 Units under the skin     • epoetin kaushik (EPOGEN,PROCRIT) 20,000 units/mL Inject 5,000 Units under the skin     • ferrous sulfate 325 (65 Fe) mg tablet Take 1 tablet (325 mg total) by mouth 3 (three) times a day 90 tablet 3   • Fluocinolone Acetonide Body 0.01 % OIL Apply 2 drops topically daily     • folic acid (FOLVITE) 1 mg tablet Take 1,000 mcg by mouth daily     • glucose-vitamin C 4-0.006 g Chew 16 g if needed     • Gvoke PFS 1 MG/0.2ML SOSY      • hydroxychloroquine (PLAQUENIL) 200 mg tablet Take 200 mg by mouth daily     • hydrOXYzine HCL (ATARAX) 50 mg tablet TAKE 1 TABLET (50 MG TOTAL) BY MOUTH 2 (TWO) TIMES A DAY AS NEEDED FOR ITCHING OR ANXIETY 180 tablet 1   • Incontinence Supply Disposable (Incontinence Brief Large) MISC Use 6 (six) times a day Size xl 180 each 11   • Incontinence Supply Disposable (Undergarment) MISC Use 6 a day, 5 boxes, xl  Dx R51, paraplegia 5 each 11   • Incontinence Supply Disposable (Underpads) MISC Use 2 a day 5 each 11   • insulin lispro protamine-insulin lispro (HumaLOG 50-50) 100 units/mL Inject under the skin 2 (two) times a day before meals     • ketoconazole (NIZORAL) 2 % cream Apply to rash. 15 g 1   • LEVEMIR FLEXTOUCH 100 units/mL injection pen Inject 14 Units under the skin 2 (two) times a day 15 pen 3   • Lokelma 10 g PACK      • losartan (COZAAR) 100 MG tablet TAKE 1 TABLET BY MOUTH EVERY DAY 90 tablet 1   • metroNIDAZOLE (METROGEL) 0.75 % gel Apply topically 2 (two) times a day 45 g 0   • Misc. Devices (Wheelchair Cushion) MISC Use daily 1 each 0   • Misc. Devices (WHEELCHAIR) MISC by Does not apply route daily Wheelchair ramp, dx g82.20 1 each 0   • Multiple  Vitamin (Daily-Enriqueta Multivitamin) TABS TAKE 1 TABLET BY MOUTH EVERY DAY 30 tablet 8   • NovoLOG FlexPen 100 units/mL injection pen INJECT 3 UNITS UNDER THE SKIN 3 (THREE) TIMES A DAY BEFORE MEALS. (Patient not taking: Reported on 7/13/2023)     • ondansetron (ZOFRAN-ODT) 4 mg disintegrating tablet Take 1 tablet (4 mg total) by mouth every 6 (six) hours as needed for nausea or vomiting 30 tablet 2   • oxybutynin (DITROPAN XL) 15 MG 24 hr tablet      • oxybutynin (DITROPAN) 5 mg tablet Take 3 tablets (15 mg total) by mouth daily 270 tablet 1   • oxyCODONE (ROXICODONE) 10 MG TABS TAKE ONE TABLET BY MOUTH EVERY 12 HOURS AS NEEDED FOR PAIN. ONGOING THERAPY.     • polyethylene glycol (GLYCOLAX) 17 GM/SCOOP powder Take as directed as per written office instructions 238 g 0   • polyethylene glycol (GOLYTELY) 4000 mL solution Take 4,000 mL by mouth once for 1 dose 4000 mL 0   • risperiDONE (RisperDAL) 0.5 mg tablet Take 1 tablet (0.5 mg total) by mouth 2 (two) times a day 180 tablet 1   • sertraline (ZOLOFT) 25 mg tablet TAKE 1 TABLET (25 MG TOTAL) BY MOUTH DAILY. 90 tablet 1   • Sodium Zirconium Cyclosilicate 10 g PACK Take 10 g by mouth daily     • sucralfate (CARAFATE) 1 g/10 mL suspension Take 10 mL (1 g total) by mouth 4 (four) times a day (with meals and at bedtime) 420 mL 1   • SUPER B COMPLEX & C TABS TAKE 1 CAPSULE BY MOUTH ONCE FOR 1 DOSE AS DIRECTED 90 tablet 2   • Zinc Sulfate 220 (50 Zn) MG TABS Take 1 tablet by mouth daily 90 tablet 1     No current facility-administered medications for this visit.     Allergies   Allergen Reactions   • Chlorcyclizine Swelling   • Chlorhexidine Other (See Comments)   • Ciprofloxacin Hcl    • Cymbalta [Duloxetine Hcl]    • Dm-Doxylamine-Acetaminophen Other (See Comments)   • Doxycycline Diarrhea   • Gabapentin Tremor   • Hydrocortisone Other (See Comments)   • Lactose - Food Allergy GI Intolerance     Other reaction(s): Unknown   • Lyrica [Pregabalin]    • Milk (Cow) GI  Intolerance   • Penicillins Other (See Comments)     miguel Zosyn 08/28/17  Tolerates Ancef  Miguel Augmentin   • Polymyxin B    • Promethazine-Phenyleph-Codeine Other (See Comments)   • Quinidine Other (See Comments)   • Cefepime Other (See Comments)     Other reaction(s): Other (See Comments)  encephalopathy; tolerates cefazolin 6/14, miguel Ceftriaxone  encephalopathy; tolerates cefazolin 6/14, miguel Ceftriaxone  Pt has had cefepime March 2022 and June 2022. Also, cephalexin 3/15/22.   • Rosuvastatin Other (See Comments) and Palpitations     Weakness      Immunizations:     Immunization History   Administered Date(s) Administered   • COVID-19 PFIZER VACCINE 0.3 ML IM 12/22/2021, 01/26/2022   • Hep B, adult 04/11/2018, 05/09/2018, 06/13/2018, 10/12/2018, 02/12/2020, 11/11/2020, 11/11/2020, 12/07/2022   • Hepatitis B Vaccine (Recombinant), Cpg Adjuvanted 08/07/2021   • INFLUENZA 12/27/2017, 12/27/2017, 10/02/2018, 10/07/2019, 10/14/2021   • Influenza Quadrivalent Preservative Free 3 years and older IM 09/21/2022   • Influenza, recombinant, quadrivalent,injectable, preservative free 11/03/2020   • Pneumococcal Conjugate 13-Valent 07/08/2019   • Pneumococcal Polysaccharide PPV23 12/07/2011, 06/06/2018, 07/12/2023   • Tdap 12/07/2011, 05/20/2023      Health Maintenance:         Topic Date Due   • HIV Screening  Completed   • Hepatitis C Screening  Completed         Topic Date Due   • Hepatitis A Vaccine (1 of 2 - Risk 2-dose series) Never done   • COVID-19 Vaccine (3 - Pfizer risk series) 02/23/2022   • Influenza Vaccine (1) 09/01/2023      Medicare Screening Tests and Risk Assessments:     Ovidio is here for his Subsequent Wellness visit.     Health Risk Assessment:   Patient rates overall health as fair. Patient feels that their physical health rating is much better. Patient is very satisfied with their life. Eyesight was rated as slightly worse. Hearing was rated as same. Patient feels that their emotional and mental health  rating is much better. Patients states they are never, rarely angry. Patient states they are sometimes unusually tired/fatigued. Pain experienced in the last 7 days has been a lot. Patient's pain rating has been 7/10. Patient states that he has experienced no weight loss or gain in last 6 months.     Depression Screening:   PHQ-2 Score: 0      Fall Risk Screening:   In the past year, patient has experienced: no history of falling in past year      Home Safety:  Patient has working smoke alarms and has working carbon monoxide detector. Home safety hazards include: none.     Nutrition:   Current diet is Regular. Does not follow any restrictive diets. Discussed importance of low sodium diet and renal restrictions.     Medications:   Patient is not currently taking any over-the-counter supplements. Patient is able to manage medications.     Activities of Daily Living (ADLs)/Instrumental Activities of Daily Living (IADLs):   Walk and transfer into and out of bed and chair?: No  Dress and groom yourself?: No    Bathe or shower yourself?: No    Feed yourself? No  Do your laundry/housekeeping?: No  Manage your money, pay your bills and track your expenses?: No  Make your own meals?: No    Do your own shopping?: No    ADL comments: Paraplegia requiring assistance with ADLs    Previous Hospitalizations:   Any hospitalizations or ED visits within the last 12 months?: Yes    How many hospitalizations have you had in the last year?: more than 4    Advance Care Planning:   Living will: No    Durable POA for healthcare: No    Advanced directive: No    Advanced directive counseling given: Yes    ACP document given: Yes    Patient declined ACP directive: Yes      Cognitive Screening:   Provider or family/friend/caregiver concerned regarding cognition?: No    PREVENTIVE SCREENINGS      Cardiovascular Screening:    General: Screening Not Indicated and History Lipid Disorder      Diabetes Screening:     General: Screening Not Indicated  "and History Diabetes      Colorectal Cancer Screening:     General: Patient Declines      Prostate Cancer Screening:    General: Screening Not Indicated      Osteoporosis Screening:    General: Patient Declines      Abdominal Aortic Aneurysm (AAA) Screening:    Risk factors include: tobacco use        General: Patient Declines      Lung Cancer Screening:     General: Screening Not Indicated      Hepatitis C Screening:    General: Screening Current    Screening, Brief Intervention, and Referral to Treatment (SBIRT)    Screening  Typical number of drinks in a day: 0  Typical number of drinks in a week: 0  Interpretation: Low risk drinking behavior.    AUDIT-C Screenin) How often did you have a drink containing alcohol in the past year? monthly or less  2) How many drinks did you have on a typical day when you were drinking in the past year? 1 to 2  3) How often did you have 6 or more drinks on one occasion in the past year? never    AUDIT-C Score: 1  Interpretation: Score 0-3 (male): Negative screen for alcohol misuse    Brief Intervention  Alcohol & drug use screenings were reviewed. No concerns regarding substance use disorder identified.     Review of Current Opioid Use    Opioid Risk Tool (ORT) Interpretation: Complete Opioid Risk Tool (ORT)    [place]MP or [place]  reviewed. No red flags were identified    Other Counseling Topics:   Car/seat belt/driving safety, skin self-exam, sunscreen and regular weightbearing exercise and calcium and vitamin D intake.     No results found.     Physical Exam:     /78 (BP Location: Right arm, Patient Position: Sitting, Cuff Size: Large)   Pulse 93   Temp 98.8 °F (37.1 °C) (Temporal)   Resp 18   Ht 6' 4\" (1.93 m)   SpO2 98%   BMI 18.62 kg/m²     Physical Exam  Constitutional:       General: He is not in acute distress.  Cardiovascular:      Rate and Rhythm: Rhythm irregular.      Heart sounds: Normal heart sounds.   Pulmonary:      Effort: Pulmonary effort is " normal.      Breath sounds: Normal breath sounds.   Abdominal:      General: Bowel sounds are normal.   Neurological:      Mental Status: He is alert.          Kay Ronquillo PA-C

## 2024-03-07 ENCOUNTER — OFFICE VISIT (OUTPATIENT)
Dept: WOUND CARE | Facility: CLINIC | Age: 44
End: 2024-03-07
Payer: MEDICARE

## 2024-03-07 VITALS
DIASTOLIC BLOOD PRESSURE: 62 MMHG | HEART RATE: 68 BPM | RESPIRATION RATE: 18 BRPM | SYSTOLIC BLOOD PRESSURE: 96 MMHG | TEMPERATURE: 97.3 F

## 2024-03-07 DIAGNOSIS — M86.60 CHRONIC OSTEOMYELITIS (HCC): ICD-10-CM

## 2024-03-07 DIAGNOSIS — L89.154 PRESSURE INJURY OF SACRAL REGION, STAGE 4 (HCC): Primary | ICD-10-CM

## 2024-03-07 DIAGNOSIS — L89.224 PRESSURE ULCER OF LEFT HIP, STAGE 4 (HCC): ICD-10-CM

## 2024-03-07 DIAGNOSIS — L89.893 PRESSURE INJURY OF OTHER SITE, STAGE 3 (HCC): ICD-10-CM

## 2024-03-07 DIAGNOSIS — L89.324 PRESSURE INJURY OF LEFT ISCHIUM, STAGE 4 (HCC): ICD-10-CM

## 2024-03-07 DIAGNOSIS — G82.20 PARAPLEGIA (HCC): ICD-10-CM

## 2024-03-07 DIAGNOSIS — L89.894 PRESSURE ULCER OF OTHER SITE, STAGE 4 (HCC): ICD-10-CM

## 2024-03-07 PROCEDURE — 99214 OFFICE O/P EST MOD 30 MIN: CPT | Performed by: FAMILY MEDICINE

## 2024-03-07 PROCEDURE — 97597 DBRDMT OPN WND 1ST 20 CM/<: CPT | Performed by: FAMILY MEDICINE

## 2024-03-07 RX ORDER — SODIUM HYPOCHLORITE 2.5 MG/ML
1 SOLUTION TOPICAL DAILY
Qty: 473 ML | Refills: 0 | Status: SHIPPED | OUTPATIENT
Start: 2024-03-07

## 2024-03-07 NOTE — LETTER
[facility]  [address] [address]  Phone#  [phone]  Fax#  [phone]    Patient:  Ovidio Parkinson Jr.  YOB: 1980  Phone:  [phone]  Date of Visit:  3/7/2024    Orders Placed This Encounter   Procedures   • Wound cleansing and dressings     Wound cleansing and dressings       Wash your hands with soap and water.  Remove old dressing, discard into plastic bag and place in trash.    Cleanse the wounds with Dakin's solution if there is an odor, IF NO ODOR, cleanse with normal saline or wound wash prior to applying a clean dressing.   Do not use tissue or cotton balls. Do not scrub the wound. Pat dry using gauze.   Shower no; do not get dressing wet      Left Hip Wound:   STOP Mesalt  Apply Dakin's moistened gauze with single layer tucked into depth of wound from 6-9 oclock  Cover with gauze   Cover with Convamax (or equivalent super absorber) and ABD over top   Secure with Medfix tape.   Change dressing daily and top dressing PRN for breakthrough drainage (visiting nurses to do twice per week and family in between)      Sacral, Left ischium, and perineal wound:   STOP Mesalt  Apply Dakin's moistened gauze with single layer tucked into depth of wound from 6-9 oclock  Cover with gauze   Cover with Convamax (or equivalent super absorber) and ABD over top   Secure with Medfix tape.   Change dressing daily and top dressing PRN for breakthrough drainage (visiting nurses to do twice per week and family in between)      Scrotal wound:  Cleanse with normal saline  Apply thin layer of Triad paste to open areas. May use ABD to cover area, but do not put any tape on scrotal area.  Apply 2 x per day     Continue using Clinitron bed  Make appointment with [facility] wheelchair clinic to have your ROHO cushion evaluated as soon as possible.      Continue NO PRESSURE TO ALL WOUNDS AS MUCH AS POSSIBLE, ESPECIALLY PERINEAL WOUND   LIMIT SITTING UPRIGHT IN  WHEELCHAIR ON THIS WOUND      Continue visiting nurse skilled 2 x per week for wound care dressing changes.                              Please monitor blood pressure at home and if it remains low, call your PCP to evaluate your BP meds  Please make an appointment with plastic surgeon for evaluation and surgical consult for sacral, ischium, hip wounds   Please call your case mananger for assistance in obtaining protein supplements and Jim supplements     Follow up at the wound center with Dr. Rose in 3 weeks.      Treatment in the wound center today:   Cleansed with normal saline, dressed as above     Standing Status:   Future     Standing Expiration Date:   3/7/2025   • Debridement Pressure Injury Left Ischium     This order was created via procedure documentation         Electronically signed by Marley Rose MD

## 2024-03-07 NOTE — PATIENT INSTRUCTIONS
Orders Placed This Encounter   Procedures    Wound cleansing and dressings     Wound cleansing and dressings       Wash your hands with soap and water.  Remove old dressing, discard into plastic bag and place in trash.    Cleanse the wounds with Dakin's solution if there is an odor, IF NO ODOR, cleanse with normal saline or wound wash prior to applying a clean dressing.   Do not use tissue or cotton balls. Do not scrub the wound. Pat dry using gauze.   Shower no; do not get dressing wet      Left Hip Wound:   STOP Mesalt  Apply Dakin's moistened gauze with single layer tucked into depth of wound from 6-9 oclock  Cover with gauze   Cover with Convamax (or equivalent super absorber) and ABD over top   Secure with Medfix tape.   Change dressing daily and top dressing PRN for breakthrough drainage (visiting nurses to do twice per week and family in between)      Sacral, Left ischium, and perineal wound:   STOP Mesalt  Apply Dakin's moistened gauze with single layer tucked into depth of wound from 6-9 oclock  Cover with gauze   Cover with Convamax (or equivalent super absorber) and ABD over top   Secure with Medfix tape.   Change dressing daily and top dressing PRN for breakthrough drainage (visiting nurses to do twice per week and family in between)      Scrotal wound:  Cleanse with normal saline  Apply thin layer of Triad paste to open areas. May use ABD to cover area, but do not put any tape on scrotal area.  Apply 2 x per day     Continue using Clinitron bed  Make appointment with [facility] wheelchair clinic to have your ROHO cushion evaluated as soon as possible.      Continue NO PRESSURE TO ALL WOUNDS AS MUCH AS POSSIBLE, ESPECIALLY PERINEAL WOUND   LIMIT SITTING UPRIGHT IN WHEELCHAIR ON THIS WOUND      Continue visiting nurse skilled 2 x per week for wound care dressing changes.                              Please monitor blood pressure at home and if it remains low, call your PCP to evaluate your BP  meds  Please make an appointment with plastic surgeon for evaluation and surgical consult for sacral, ischium, hip wounds   Please call your case jose cruz for assistance in obtaining protein supplements and Jim supplements     Follow up at the wound center with Dr. Rose in 3 weeks.      Treatment in the wound center today:   Cleansed with normal saline, dressed as above     Standing Status:   Future     Standing Expiration Date:   3/7/2025

## 2024-03-26 NOTE — PROGRESS NOTES
"Patient ID: Ovidio Parkinson Jr. is a 43 y.o. male Date of Birth 1980       Chief Complaint   Patient presents with   • Follow Up Wound Care Visit     Multiple pressure ulcers on the sacrum, ischium, hip, perineum and scrotum. Wounds remain essentially unchanged.Ovidio reports he called Wheelchair clinic \"a week and a half ago\" and is still awaiting a call back. He had a consult with dermatology regarding treatment of persistent facial cysts.       Allergies:  Chlorcyclizine, Chlorhexidine, Ciprofloxacin hcl, Cymbalta [duloxetine hcl], Dm-doxylamine-acetaminophen, Doxycycline, Gabapentin, Hydrocortisone, Lactose - food allergy, Lyrica [pregabalin], Milk (cow), Penicillins, Polymyxin b, Promethazine-phenyleph-codeine, Quinidine, Cefepime, and Rosuvastatin    Diagnosis:      Diagnosis ICD-10-CM Associated Orders   1. Pressure injury of sacral region, stage 4 ([facility])  L89.154 lidocaine (XYLOCAINE) 4 % topical solution 5 mL     Wound cleansing and dressings      2. Chronic osteomyelitis ([facility])  M86.60 lidocaine (XYLOCAINE) 4 % topical solution 5 mL     Wound cleansing and dressings     CT abdomen pelvis wo contrast      3. Pressure injury of left ischium, stage 4 ([facility])  L89.324 lidocaine (XYLOCAINE) 4 % topical solution 5 mL     Wound cleansing and dressings      4. Pressure ulcer of left hip, stage 4 ([facility])  L89.224 lidocaine (XYLOCAINE) 4 % topical solution 5 mL     Wound cleansing and dressings      5. Pressure ulcer of perineum, stage 4 ([facility])  L89.894 lidocaine (XYLOCAINE) 4 % topical solution 5 mL     Debridement     Wound cleansing and dressings     CT abdomen pelvis wo contrast      6. Pressure injury of scrotum, stage 3 ([facility])  L89.893 lidocaine (XYLOCAINE) 4 % topical solution 5 mL     Wound cleansing and dressings              Assessment & Plan:    Ovidio presented to wound center today for follow-up of multiple pressure injuries in setting of chronic osteomyelitis on palliative wound management plan. Though most " of his pressure injuries are relatively stable when compared to last visit, perineal area continues to build up necrotic tissue particularly in area from 1-6 o'clock with tunneling.  When looking at pressure injuries over the last 60-90 days, measurements slowly increasing.  General wound deterioration likely multifactorial in nature -prolonged time spent in seated positions during dialysis and while he is at home, problem with his Roho cushion, diabetic difficulties with patient reported variable measurements along w/ intermittent issues with his insulin pump, nutritional difficulties.  On his exam today no obvious signs of acute infectious process at this time. However, considering above, as patient's last CT scan of the pelvic region in May 2023, will repeat CT scan for further investigation.     Stage IV pressure injury perineum: Necrotic tissue with slough exudate and fibrin in undermined area from 1-6 o'clock, deepest around 5:00 at 4.5 cm.  This area was surgically debrided.  No active purulent drainage noted.  Was able to debride to areas of healthy tissue in this area.  Surgical debridement  Overall this wound has continued to deteriorate with buildup of necrotic tissue, will repeat CT scan in setting of continued wound decompensation specifically at perineal wound  Dakins moistened gauze  Stage IV pressure injury left ischium: Measurements overall stable; periwound with scar tissue and hyperkeratotic edges; wound bed with areas of pale pink tissue and at anterior edge evidence of shearing at former area of scar tissue.  No purulent drainage, malodor.  No acute erythema.  No induration, fluctuance.  Dakins moistened gauze  Stage IV pressure injury sacrum: Pink wound bed with epithelialization periwound with scar tissue.  Loose piece of pale hyperkeratotic debris within wound bed mechanically debrided with gauze.  No malodor, purulent drainage, acute erythema, induration, fluctuance.  Dakins moistened  "gauze  Stage IV pressure injury left hip: Measurements overall stable though tunneled area around 6 cm today.  Drainage remains serosanguineous without purulent drainage, clots.  No malodor.  No acute erythema.  Surrounding area does not feel indurated or fluctuant. Drainage is controlled.   Continue Dakins moistened gauze   Stage III pressure injury of scrotum: Measuring smaller however increased partial-thickness skin breakdown noted within wound measurements that was not present at last visit.  However overall health of tissue is stable without signs of acute soft tissue infection at this time.   Continue Triad paste   As discussed at last visit, we recommended reaching out to [facility]chair clinic for evaluation/adjustment of his Roho cushion as when this was examined in our office there seem to be areas of concern; he reports he did reach out to the clinic but has not yet received a call back.  We offered our assistance with doing so which he declined.  Careful repositioning and offloading during his dialysis treatments within guidelines of dialysis center. Reviewed with him that while we understand it is difficult to offload and reposition during his dialysis treatments and he is seated for prolonged periods of time, discussed that when he is at home, it is very important that he is offloading these areas and avoiding prolonged seated positions as this can contribute to decline in his wounds.  See below wound care orders for full details and recommendations.   Strict ER precautions reviewed with Ovidio, he expresses understanding  Follow-up in 3 weeks or sooner if needed    Portions of the record may have been created with voice recognition software. Occasional wrong word or \"sound alike\" substitutions may have occurred due to the inherent limitations of voice recognition software. Read the chart carefully and recognize, using context, where substitutions have occurred.    Subjective:   Please see prev " "visit notes for HPI summary [id number] 1/4/2024: Ovidio presents to wound center today for follow-up of multiple stage IV pressure injuries of sacrum, left hip, ischium, and perineum.  He reports he believes his lab studies regarding his protein level has improved as well as overall nutrition in general.  Has no acute complaints today and denies fever, chills.  He reports he is currently feeling well/much better from his recent hospitalization which he reports occurred at [facility] 12/5 through 12/13.    Per chart review of discharge summary from that hospitalization he was \"found to have Staph Lugudenesis bacteremia on admission blood cultures and was started on vancomycin, which was transitioned to Ancef by Infectious Disease. He will continue Ancef Source was suspected to be prior HD line; known sacral wounds were stable. Repeat blood cultures were negative on discharge. During admission, his HD line was removed (given infection) and replaced with temporary HD line (which, in turn, needed to be replaced due to inability to successfully use). New HD line functioning as of 12/9. PATRICIO performed for evaluation of possible endocarditis was negative. A tunneled HD line was placed 12/13/2023 by IR after repeat blood cultures were negative x48 hrs.\"  He also reports that he may have been spending more time in his chair than previous and continues with difficulty controlling some of the shifting/shearing forces with transferring etc... Continues to use his Clinitron bed.  Also reports that he is still awaiting the custom wheelchair/cushions to help with his pressure injuries but that it is being made.     1/25/2024: Ovidio presents today for follow-up of multiple stage IV pressure injuries of left hip, ischium, sacrum, and perineum as well as stage III pressure injury of scrotum.  He reports that they have been doing well with the Mes[place].  In addition reports that the area under the scrotum his brother sometimes uses Vaseline to " "this area when it is on the drier side she reports his brother tells him it is looking better.  Sides his chronic issues, he reports he has no acute complaints today and overall is feeling well.  He denies fever, chills.  He reported he recently started sleeping on his left side as this was more comfortable for him.    2/14/2024: Ovidio presents today for follow-up of multiple stage IV pressure injuries of left hip, ischium, sacrum, and perineum as well as stage III pressure injury of scrotum.  He reports that he is not feeling well \"has been feeling under the weather\" since Monday.  Feels that has been having an issue with his insulin pump administering insulin.  He states when he had this issue in the past it was because it was inserted an area of scar tissue.  However he did try to move the side of this this morning and glucose was 475.  Also reports he is feeling very dehydrated and dry along with dizzy, lightheaded.  He denies fever, chills.    3/7/2024: Ovidio presents today for follow-up multiple stage IV pressure injuries of left hip ischium sacrum and perineum in addition to stage III pressure injury of scrotum.  At our last visit, recommendation was for patient to go to emergency department which he agreed to however after leaving our wound center patient did not go.  He reports that his sugars have been improved compared to last visit 150s to 180s though sometimes he still has spikes. He states he has been feeling better and denies fever, chills.  He also reports that since he was last here they called the Clinitron wraps to check functioning of his bed and was assured that all seems to be functioning well.    3/28/2024: Ovidio presents today for follow-up of multiple pressure injuries.  He reports that as discussed, he did call [facility] wheelchair clinic a few days ago and has not yet heard back.  Reports that sometimes his glucose is variable in hyper and hypoglycemic ranges but his endocrinology office " is aware.  Also states that recently it has been more controlled between 150s and 160s.  Today he reports he is feeling well and denies fever, chills, dizziness, lightheadedness, diaphoresis, shortness of breath, chest pain.  Wound care currently consist of Dakin's packing.  He continues to use his Clinitron bed.  He reports the drainage from his left hip wound is controlled.      The following portions of the patient's history were reviewed and updated as appropriate:   Patient Active Problem List   Diagnosis   • Paraplegia ([facility])   • Atrial fibrillation ([facility])   • Chronic suprapubic catheter ([facility])   • Colostomy care ([facility])   • S/P unilateral BKA (below knee amputation) ([facility])   • Tobacco abuse   • Type 1 diabetes mellitus with chronic kidney disease on chronic dialysis ([facility])   • Ulcer of sacral region, stage 4 ([facility])   • Wound healing, delayed   • Iron deficiency anemia   • Pressure injury of left ischium, stage 4 ([facility])   • HTN (hypertension), benign   • Neurogenic bladder   • GERD (gastroesophageal reflux disease)   • Chronic pain   • Stage 5 chronic kidney disease on chronic dialysis ([facility])   • History of Clostridium difficile infection   • Urinary retention   • Dialysis patient ([facility])   • Insulin long-term use ([facility])   • Wheelchair dependent   • Bipolar depression ([facility])   • 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[facility])   • Seizure ([facility])   • Pressure ulcer of sacral region, stage 4 ([facility])   • AVM (arteriovenous malformation) of duodenum, acquired   • Chronic narcotic dependence ([facility])   • Chronic diastolic heart failure ([facility])   • Pressure ulcer of other site, stage 4 ([facility])   • Pressure ulcer of left hip, stage 4 ([facility])   • ED (erectile dysfunction) of organic origin   • Irritable bowel syndrome   • Onychomycosis   • Pustular folliculitis   • Prolonged Q-T interval on ECG   • Secondary hyperparathyroidism of renal origin ([facility])   • Wounds, multiple   • Immunocompromised state ([facility])   • Severe protein-calorie malnutrition ([facility])   • Chronic  osteomyelitis ([facility])   • Acute deep vein thrombosis (DVT) of right upper extremity ([facility])   • C. difficile colitis   • Charcot's arthropathy   • Diabetic gastroparesis associated with type 1 diabetes mellitus    • End stage renal disease ([facility])   • Hyperlipidemia   • LVH (left ventricular hypertrophy)   • NICM (nonischemic cardiomyopathy) ([facility])   • Vitamin B deficiency   • Vitamin C deficiency   • Vitamin D deficiency   • Hospital discharge follow-up   • Rheumatoid arthritis ([facility])   • Encounter for general adult medical examination with abnormal findings   • Herpes zoster vaccination declined   • COVID-19 vaccination declined   • Influenza vaccination declined   • Hepatitis A vaccination declined     Past Medical History:   Diagnosis Date   • Acute pulmonary edema ([facility]) 1/13/2022   • Ambulatory dysfunction    • Anemia    • Anemia, iron deficiency     transfusion requiring   • Asymptomatic bacteriuria 9/25/2017   • Atrial fibrillation ([facility])    • AVM (arteriovenous malformation) of duodenum, 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   • Bacteriuria, asymptomatic    • Bipolar disorder ([facility])    • Chronic deep vein thrombosis (DVT) ([facility])    • Chronic indwelling Ortega catheter    • Chronic kidney disease    • Chronic pain    • Chronic pain disorder    • Chronic suprapubic catheter ([facility])    • Clostridium difficile infection 08/11/2016    also positive 9/2016, 5/29/2017, 8/15/2017. S/P fecal transplant   • Colostomy on examination ([facility])    • Decubitus ulcer    • Delirium    • Diabetes mellitus ([facility])    • GERD (gastroesophageal reflux disease)    • Hemodialysis patient ([facility])    • Hypertension    • Memory impairment 2011    s/p diabetic coma   • Neurogenic bladder    • OAB (overactive bladder)    • Paraplegia ([facility])     T3 transverse myelitis vs spinal stoke (AVM); 2012 extensive epidural abscess C7=> conus 2nd extension from deep parspinal abscess L4-S2/sacral decubitus; cord atrophy/myelomalcia T3=>conus    • Penile abscess    • S/P  unilateral BKA (below knee amputation) ([facility])     Right   • Sebaceous cyst removed in 2017   • Seizures ([facility])    • Shortness of breath    • Tobacco abuse    • 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[facility])    • Urinary retention    • Wheelchair dependent    • Wounds, multiple     pressure ulcers with delayed healing     Past Surgical History:   Procedure Laterality Date   • BELOW KNEE LEG AMPUTATION Right 2009   • COLONOSCOPY N/A 03/27/2017    Procedure: COLONOSCOPY;  Surgeon: Wesley Thibodeaux MD;  Location: [facility];  Service:    • COLONOSCOPY N/A 03/29/2017    Procedure: COLONOSCOPY;  Surgeon: Wesley Thibodeaux MD;  Location: [facility];  Service:    • COLONOSCOPY N/A 06/15/2017    Procedure: COLONOSCOPY with FMT;  Surgeon: Gerard Marques MD;  Location: [facility];  Service: Gastroenterology   • ESOPHAGOGASTRODUODENOSCOPY N/A 03/22/2017    Procedure: ESOPHAGOGASTRODUODENOSCOPY (EGD);  Surgeon: Beverley Ortiz DO;  Location: [facility];  Service:    • MULTIPLE TOOTH EXTRACTIONS N/A 03/08/2021    Procedure: EXTRACTION TEETH # 2,3,6,10,12,13,14,18,19,20,21,22,23,24,25,26,27,28,29,30;  Surgeon: Brian Kohler DMD;  Location: [facility];  Service: Maxillofacial   • SKIN BIOPSY       Family History   Problem Relation Age of Onset   • Hyperlipidemia Mother    • Hypertension Mother    • Leukemia Brother    • Diabetes Paternal Grandfather       Social History     Socioeconomic History   • Marital status: Single     Spouse name: None   • Number of children: None   • Years of education: None   • Highest education level: None   Occupational History   • None   Tobacco Use   • Smoking status: Every Day     Types: Cigars     Passive exposure: Current   • Smokeless tobacco: Never   • Tobacco comments:     2 cigars/day - 2/14/2023   Vaping Use   • Vaping status: Never Used   Substance and Sexual Activity   • Alcohol use: Not Currently     Comment: 1 cup of wine every 3-4 months   • Drug use: Not Currently     Types: Marijuana     Comment: rarely; once every 1-2  years   • Sexual activity: None   Other Topics Concern   • None   Social History Narrative   • None     Social Determinants of Health     Financial Resource Strain: Low Risk  (3/5/2024)    Overall Financial Resource Strain (CARDIA)    • Difficulty of Paying Living Expenses: Not hard at all   Food Insecurity: No Food Insecurity (3/5/2024)    Hunger Vital Sign    • Worried About Running Out of Food in the Last Year: Never true    • Ran Out of Food in the Last Year: Never true   Transportation Needs: No Transportation Needs (3/5/2024)    PRAPARE - Transportation    • Lack of Transportation (Medical): No    • Lack of Transportation (Non-Medical): No   Physical Activity: Not on file   Stress: Not on file   Social Connections: Not on file   Intimate Partner Violence: Not At Risk (12/5/2023)    Received from [facility]    Humiliation, Afraid, Rape, and Kick questionnaire    • Fear of Current or Ex-Partner: No    • Emotionally Abused: No    • Physically Abused: No    • Sexually Abused: No   Housing Stability: Low Risk  (12/5/2023)    Received from [facility]    Housing Stability Vital Sign    • Unable to Pay for Housing in the Last Year: No    • Number of Places Lived in the Last Year: 1    • Unstable Housing in the Last Year: No        Current Outpatient Medications:   •  Alcohol Swabs (ALCOHOL PADS) 70 % PADS, by Does not apply route 5 (five) times a day, Disp: 300 each, Rfl: 5  •  amLODIPine (NORVASC) 10 mg tablet, TAKE 1 TABLET BY MOUTH EVERY DAY, Disp: 90 tablet, Rfl: 1  •  ammonium lactate (LAC-HYDRIN) 12 % cream, , Disp: , Rfl:   •  apixaban (ELIQUIS) 5 mg, Take 1 tablet (5 mg total) by mouth 2 (two) times a day, Disp: 180 tablet, Rfl: 3  •  atorvastatin (LIPITOR) 20 mg tablet, TAKE 1 TABLET BY MOUTH EVERYDAY AT BEDTIME, Disp: 90 tablet, Rfl: 1  •  azelaic acid (Azelex) 20 % cream, Apply topically 2 (two) times a day, Disp: 50 g, Rfl: 0  •  B Complex-C-Folic Acid (Super  B-Complex/Vit C/FA) TABS, TAKE 1 TABLET BY MOUTH EVERY DAY AS DIRECTED, Disp: 90 tablet, Rfl: 5  •  SUSAN MICROLET LANCETS lancets, Use as instructed to check blood sugar 4 times a day Dx e10.29, Disp: 200 each, Rfl: 5  •  bisacodyl (DULCOLAX) 5 mg EC tablet, Take 1 tablet (5 mg total) by mouth once for 1 dose, Disp: 2 tablet, Rfl: 0  •  Blood Glucose Monitoring Suppl ([employer] CONTOUR NEXT USB MONITOR) w/Device KIT, Testing 4 times a day, Disp: 1 kit, Rfl: 0  •  calcitriol (ROCALTROL) 0.5 MCG capsule, Take 2 mcg by mouth, Disp: , Rfl:   •  calcium acetate (PHOSLO) capsule, TAKE 2 CAPSULES BY MOUTH THREE TIMES DAILY WITH MEALS, Disp: , Rfl:   •  carvedilol (COREG) 25 mg tablet, TAKE 1 TABLET BY MOUTH TWICE A DAY, Disp: 180 tablet, Rfl: 1  •  cetirizine (ZyrTEC) 10 mg tablet, TAKE 1 TABLET BY MOUTH EVERY DAY, Disp: 90 tablet, Rfl: 1  •  Cholecalciferol (VITAMIN D-3) 1000 units CAPS, Take 2 capsules by mouth daily, Disp: 30 capsule, Rfl: 0  •  clindamycin (CLINDAGEL) 1 % gel, APPLY TO AFFECTED AREA TWICE A DAY, Disp: 60 g, Rfl: 2  •  Contour Next Test test strip, USE TO TEST BLOOD SUGAR 3 TIMES DAILY. CONTOUR NEXT STRIPS. E10.29, Disp: , Rfl:   •  cyanocobalamin ([employer] Vitamin B-12) 1000 MCG tablet, Take 1 tablet (1,000 mcg total) by mouth daily, Disp: 90 tablet, Rfl: 3  •  cyclobenzaprine (FLEXERIL) 5 mg tablet, , Disp: , Rfl:   •  dexlansoprazole (DEXILANT) 30 MG capsule, TAKE 1 CAPSULE BY MOUTH EVERY DAY, Disp: 90 capsule, Rfl: 1  •  dicyclomine (BENTYL) 10 mg capsule, TAKE 1 CAPSULE BY MOUTH 3 TIMES A DAY BEFORE MEALS, Disp: 270 capsule, Rfl: 1  •  Disposable Gloves MISC, Use 7 times a day, 4 boxes of gloves XL Dx type 1 DM, paraplegia, Disp: 4 each, Rfl: 11  •  doxazosin (CARDURA) 2 mg tablet, TAKE 1 TABLET BY MOUTH EVERY EVENING., Disp: 90 tablet, Rfl: 1  •  epoetin kaushik (EPOGEN,PROCRIT) 20,000 units/mL, Inject 10,000 Units under the skin, Disp: , Rfl:   •  epoetin kaushik (EPOGEN,PROCRIT) 20,000 units/mL, Inject 5,000  Units under the skin, Disp: , Rfl:   •  ferrous sulfate 324 (65 Fe) mg, Take 1 tablet (324 mg total) by mouth in the morning, Disp: 90 tablet, Rfl: 3  •  ferrous sulfate 325 (65 Fe) mg tablet, Take 1 tablet (325 mg total) by mouth 3 (three) times a day, Disp: 90 tablet, Rfl: 3  •  Fluocinolone Acetonide Body 0.01 % OIL, Apply 2 drops topically daily, Disp: , Rfl:   •  folic acid (FOLVITE) 1 mg tablet, Take 1,000 mcg by mouth daily, Disp: , Rfl:   •  glucose-vitamin C 4-0.006 g, Chew 16 g if needed, Disp: , Rfl:   •  Gvoke PFS 1 MG/0.2ML SOSY, , Disp: , Rfl:   •  hydroxychloroquine (PLAQUENIL) 200 mg tablet, Take 200 mg by mouth daily, Disp: , Rfl:   •  hydrOXYzine HCL (ATARAX) 50 mg tablet, TAKE 1 TABLET (50 MG TOTAL) BY MOUTH 2 (TWO) TIMES A DAY AS NEEDED FOR ITCHING OR ANXIETY, Disp: 180 tablet, Rfl: 1  •  Incontinence Supply Disposable (Incontinence Brief Large) MISC, Use 6 (six) times a day Size xl, Disp: 180 each, Rfl: 11  •  Incontinence Supply Disposable (Undergarment) MISC, Use 6 a day, 5 boxes, xl Dx R51, paraplegia, Disp: 5 each, Rfl: 11  •  Incontinence Supply Disposable (Underpads) MISC, Use 2 a day, Disp: 5 each, Rfl: 11  •  insulin lispro protamine-insulin lispro (HumaLOG 50-50) 100 units/mL, Inject under the skin 2 (two) times a day before meals, Disp: , Rfl:   •  ketoconazole (NIZORAL) 2 % cream, Apply to rash., Disp: 15 g, Rfl: 1  •  LEVEMIR FLEXTOUCH 100 units/mL injection pen, Inject 14 Units under the skin 2 (two) times a day, Disp: 15 pen, Rfl: 3  •  Lokelma 10 g PACK, , Disp: , Rfl:   •  losartan (COZAAR) 100 MG tablet, TAKE 1 TABLET BY MOUTH EVERY DAY, Disp: 90 tablet, Rfl: 1  •  metroNIDAZOLE (METROGEL) 0.75 % gel, Apply topically 2 (two) times a day, Disp: 45 g, Rfl: 0  •  Misc. Devices (Wheelchair Cushion) MISC, Use daily, Disp: 1 each, Rfl: 0  •  Misc. Devices (WHEELCHAIR) MISC, by Does not apply route daily Wheelchair ramp, dx g82.20, Disp: 1 each, Rfl: 0  •  Multiple Vitamin (Daily-Enriqueta  Multivitamin) TABS, TAKE 1 TABLET BY MOUTH EVERY DAY, Disp: 30 tablet, Rfl: 8  •  NovoLOG FlexPen 100 units/mL injection pen, INJECT 3 UNITS UNDER THE SKIN 3 (THREE) TIMES A DAY BEFORE MEALS. (Patient not taking: Reported on 7/13/2023), Disp: , Rfl:   •  ondansetron (ZOFRAN-ODT) 4 mg disintegrating tablet, Take 1 tablet (4 mg total) by mouth every 6 (six) hours as needed for nausea or vomiting, Disp: 30 tablet, Rfl: 2  •  oxybutynin (DITROPAN XL) 15 MG 24 hr tablet, , Disp: , Rfl:   •  oxybutynin (DITROPAN) 5 mg tablet, Take 3 tablets (15 mg total) by mouth daily, Disp: 270 tablet, Rfl: 1  •  oxyCODONE (ROXICODONE) 10 MG TABS, TAKE ONE TABLET BY MOUTH EVERY 12 HOURS AS NEEDED FOR PAIN. ONGOING THERAPY., Disp: , Rfl:   •  polyethylene glycol (GLYCOLAX) 17 GM/SCOOP powder, Take as directed as per written office instructions, Disp: 238 g, Rfl: 0  •  polyethylene glycol (GOLYTELY) 4000 mL solution, Take 4,000 mL by mouth once for 1 dose, Disp: 4000 mL, Rfl: 0  •  risperiDONE (RisperDAL) 0.5 mg tablet, Take 1 tablet (0.5 mg total) by mouth 2 (two) times a day, Disp: 180 tablet, Rfl: 1  •  sertraline (ZOLOFT) 25 mg tablet, TAKE 1 TABLET (25 MG TOTAL) BY MOUTH DAILY., Disp: 90 tablet, Rfl: 1  •  sodium hypochlorite (DAKIN'S HALF-STRENGTH) external solution, Apply 1 Application topically daily Soak gauze and pack into wounds daily., Disp: 473 mL, Rfl: 0  •  Sodium Zirconium Cyclosilicate 10 g PACK, Take 10 g by mouth daily, Disp: , Rfl:   •  sucralfate (CARAFATE) 1 g/10 mL suspension, Take 10 mL (1 g total) by mouth 4 (four) times a day (with meals and at bedtime), Disp: 420 mL, Rfl: 1  •  SUPER B COMPLEX & C TABS, TAKE 1 CAPSULE BY MOUTH ONCE FOR 1 DOSE AS DIRECTED, Disp: 90 tablet, Rfl: 2  •  Zinc Sulfate 220 (50 Zn) MG TABS, Take 1 tablet by mouth daily, Disp: 90 tablet, Rfl: 1  No current facility-administered medications for this visit.    Review of Systems   Constitutional:  Negative for chills and fever.    Respiratory:  Negative for cough and shortness of breath.    Cardiovascular:  Negative for chest pain.   Musculoskeletal:  Positive for gait problem (Paraplegia).        Paraplegic   Skin:  Positive for wound (Multiple pressure injuries).        Multiple pressure injuries   Neurological:  Positive for weakness and numbness.       Objective:  /72   Pulse 76   Temp (!) 97.3 °F (36.3 °C)   Resp 18   Pain Score: 0-No pain     Physical Exam  Vitals reviewed.   Constitutional:       General: He is not in acute distress.     Appearance: He is not ill-appearing, toxic-appearing or diaphoretic.      Comments: Pleasant, NAD   HENT:      Head: Normocephalic.   Cardiovascular:      Rate and Rhythm: Normal rate.   Pulmonary:      Effort: Pulmonary effort is normal. No respiratory distress.   Genitourinary:         Comments: 1- Measuring smaller however increased partial-thickness skin breakdown noted within wound measurements that was not present at last visit.  However overall health of tissue is stable without signs of acute soft tissue infection at this time.   Skin:     General: Skin is warm and dry.      Findings: Wound present. No erythema.             Comments: 1- Stage IV pressure injury sacrum: Pink wound bed with epithelialization periwound with scar tissue.  Loose piece of pale hyperkeratotic debris within wound bed mechanically debrided with gauze.  No malodor, purulent drainage, acute erythema, induration, fluctuance.    2- Stage IV pressure injury perineum: Necrotic tissue with slough exudate and fibrin in undermined area from 1-6 o'clock, deepest around 5:00 at 4.5 cm.  This area was surgically debrided.  No active purulent drainage noted.     3- Stage IV pressure injury left ischium: Measurements overall stable; periwound with scar tissue and hyperkeratotic edges; wound bed with areas of pale pink tissue and at anterior edge evidence of shearing at former area of scar tissue.  No purulent drainage,  malodor.  No acute erythema.  No induration, fluctuance.    4- Measurements overall stable though tunneled area around 6 cm today.  Drainage remains serosanguineous without purulent drainage, clots.  No malodor.  No acute erythema.  Surrounding area does not feel indurated or fluctuant.        See full wound description for additional details.   Neurological:      Mental Status: He is alert and oriented to person, place, and time.      Gait: Gait abnormal (wc bound).   Psychiatric:         Mood and Affect: Mood normal.         Behavior: Behavior normal.         Wound Pressure Injury Ischium Left (Active)   Wound Image   03/28/24 1127   Wound Description Pink;Slough;Rolled edges;Other (Comment) 03/28/24 1144   Pressure Injury Stage 4 03/28/24 1144   Irene-wound Assessment Scar Tissue;Intact;Dry 03/28/24 1144   Wound Length (cm) 8 cm 03/28/24 1144   Wound Width (cm) 5.4 cm 03/28/24 1144   Wound Depth (cm) 1.9 cm 03/28/24 1144   Wound Surface Area (cm^2) 43.2 cm^2 03/28/24 1144   Wound Volume (cm^3) 82.08 cm^3 03/28/24 1144   Calculated Wound Volume (cm^3) 82.08 cm^3 03/28/24 1144   Change in Wound Size % -334.29 03/28/24 1144   Tunneling 1 in depth located at resolved 11/30/23 1351   Number of underminings 1 03/28/24 1144   Undermining 1 2.5 03/28/24 1144   Undermining 1 is depth extending from 2-12 oclock with deepest at 11 03/28/24 1144   Drainage Amount Large 03/28/24 1144   Drainage Description Serosanguineous 03/28/24 1144   Non-staged Wound Description Full thickness 03/07/24 1113   Treatments Irrigation with NSS 03/07/24 1113   Wound packed? No 02/16/23 0953   Dressing Changed Changed 03/07/24 1113   Patient Tolerance Tolerated well 03/07/24 1113   Dressing Status Removed 03/07/24 1113       Wound Pressure Injury Perineum (Active)   Wound Image   03/28/24 1213   Wound Description Pink;Yellow;Rolled edges;Other (Comment);Necrotic;Black 03/28/24 1147   Pressure Injury Stage 4 03/28/24 1147   Irene-wound Assessment  Scar Tissue;Dry;Excoriated 03/28/24 1147   Wound Length (cm) 6.8 cm 03/28/24 1147   Wound Width (cm) 9 cm 03/28/24 1147   Wound Depth (cm) 1.9 cm 03/28/24 1147   Wound Surface Area (cm^2) 61.2 cm^2 03/28/24 1147   Wound Volume (cm^3) 116.28 cm^3 03/28/24 1147   Calculated Wound Volume (cm^3) 116.28 cm^3 03/28/24 1147   Change in Wound Size % -1007.43 03/28/24 1147   Tunneling 1 in depth located at 0 10/05/23 1458   Number of underminings 1 03/28/24 1147   Undermining 1 6.5 03/28/24 1147   Undermining 1 is depth extending from 1-6 oclock with deepest at 5 03/28/24 1147   Drainage Amount Large 03/28/24 1147   Drainage Description Serosanguineous 03/28/24 1147   Non-staged Wound Description Full thickness 03/07/24 1107   Treatments Irrigation with NSS 03/07/24 1107   Wound packed? No 02/16/23 0959   Dressing Changed Changed 03/07/24 1107   Patient Tolerance Tolerated well 03/07/24 1107   Dressing Status Removed 03/07/24 1107       Wound Pressure Injury Sacrum (Active)   Wound Image   03/28/24 1128   Wound Description Pink;Epithelialization;Rolled edges;Other (Comment) 03/28/24 1148   Pressure Injury Stage 4 03/28/24 1148   Irene-wound Assessment Scar Tissue;Scaly;Dry 03/28/24 1148   Wound Length (cm) 7 cm 03/28/24 1148   Wound Width (cm) 9 cm 03/28/24 1148   Wound Depth (cm) 0.7 cm 03/28/24 1148   Wound Surface Area (cm^2) 63 cm^2 03/28/24 1148   Wound Volume (cm^3) 44.1 cm^3 03/28/24 1148   Calculated Wound Volume (cm^3) 44.1 cm^3 03/28/24 1148   Change in Wound Size % 41.2 03/28/24 1148   Undermining 1 0 10/05/23 1458   Undermining 1 is depth extending from resolved 10/05/23 1458   Drainage Amount Large 03/28/24 1148   Drainage Description Serosanguineous 03/28/24 1148   Non-staged Wound Description Full thickness 03/07/24 1105   Treatments Irrigation with NSS 03/07/24 1105   Wound packed? No 02/16/23 1001   Dressing Changed Changed 03/07/24 1105   Patient Tolerance Tolerated well 03/07/24 1105   Dressing Status  Removed 03/07/24 1105       Wound Pressure Injury Hip Left;Lateral (Active)   Wound Image   03/28/24 1121   Wound Description Rolled edges;Pink;Other (Comment) 03/28/24 1150   Pressure Injury Stage 4 03/28/24 1150   Irene-wound Assessment Scar Tissue;Intact 03/28/24 1150   Wound Length (cm) 2.8 cm 03/28/24 1150   Wound Width (cm) 1.5 cm 03/28/24 1150   Wound Depth (cm) 1.5 cm 03/28/24 1150   Wound Surface Area (cm^2) 4.2 cm^2 03/28/24 1150   Wound Volume (cm^3) 6.3 cm^3 03/28/24 1150   Calculated Wound Volume (cm^3) 6.3 cm^3 03/28/24 1150   Tunneling 1 0 cm 10/05/23 1453   Tunneling 1 in depth located at resolved 10/05/23 1453   Number of underminings 1 03/28/24 1150   Undermining 1 6 03/28/24 1150   Undermining 1 is depth extending from 6-12 with deepest at 6 03/28/24 1150   Drainage Amount Large 03/28/24 1150   Drainage Description Serosanguineous 03/28/24 1150   Non-staged Wound Description Full thickness 03/07/24 1111   Treatments Irrigation with NSS 03/07/24 1111   Wound packed? No 02/16/23 1004   Packing- # removed 1 02/15/24 1057   Dressing Changed Changed 03/07/24 1111   Patient Tolerance Tolerated well 03/07/24 1111   Dressing Status Removed 03/07/24 1111       Wound 01/04/24 Pressure Injury Scrotum Posterior (Active)   Wound Image   03/28/24 1120   Wound Description Pink;Epithelialization 03/28/24 1151   Pressure Injury Stage 3 03/28/24 1151   Irene-wound Assessment Dry 03/28/24 1151   Wound Length (cm) 1 cm 03/28/24 1151   Wound Width (cm) 0.7 cm 03/28/24 1151   Wound Depth (cm) 0.1 cm 03/28/24 1151   Wound Surface Area (cm^2) 0.7 cm^2 03/28/24 1151   Wound Volume (cm^3) 0.07 cm^3 03/28/24 1151   Calculated Wound Volume (cm^3) 0.07 cm^3 03/28/24 1151   Change in Wound Size % 92.22 03/28/24 1151   Drainage Amount Moderate 03/28/24 1151   Drainage Description Serosanguineous 03/28/24 1151   Non-staged Wound Description Full thickness 03/07/24 1110   Dressing Status Removed 02/15/24 1102       Debridement   "  Universal Protocol:  Consent: Verbal consent obtained. Written consent obtained.  Risks and benefits: risks, benefits and alternatives were discussed  Consent given by: patient  Time out: Immediately prior to procedure a \"time out\" was called to verify the correct patient, procedure, equipment, support staff and site/side marked as required.  Patient understanding: patient states understanding of the procedure being performed  Patient identity confirmed: verbally with patient    Debridement Details  Performed by: physician  Debridement type: surgical  Level of debridement: subcutaneous tissue  Pain control: lidocaine 4%      Post-debridement measurements  Length (cm): 6.8  Width (cm): 9  Depth (cm): 4.6  Percent debrided: 40%  Surface Area (cm^2): 61.2  Area Debrided (cm^2): 24.48  Volume (cm^3): 281.52    Tissue and other material debrided: subcutaneous tissue  Devitalized tissue debrided: exudate, fibrin, necrotic debris and slough  Instrument(s) utilized: blade and forceps  Bleeding: medium  Hemostasis obtained with: pressure  Procedural pain (0-10): 0  Post-procedural pain: 0   Response to treatment: procedure was tolerated well  Debridement Comments: Depth postdebridement represents surgical debridement and area of necrotic tissue which is the undermined area -  predebridement 4.5 cm, postdebridement 4.6 cm.                      Lab Results   Component Value Date    HGBA1C 7.4 (H) 11/07/2023       Wound Instructions:  Orders Placed This Encounter   Procedures   • Debridement     This order was created via procedure documentation   • Wound cleansing and dressings     Wound cleansing and dressings          Wash your hands with soap and water.  Remove old dressing, discard into plastic bag and place in trash.    Cleanse the wounds with Dakin's solution if there is an odor, IF NO ODOR, cleanse with normal saline or wound wash prior to applying a clean dressing.   Do not use tissue or cotton balls. Do not scrub " the wound. Pat dry using gauze.   Shower no; do not get dressing wet      Left Hip Wound:   Continue Dakin's moistened gauze with single layer tucked into depth of wound from 6-9 oclock  Cover with gauze   Cover with  ABD over top   Secure with Medfix tape.   Change dressing daily and top dressing PRN for breakthrough drainage (visiting nurses to do twice per week and family in between)      Sacral, Left ischium, and perineal wound:   Continue Dakin's moistened gauze with single layer tucked into depth of wound from 6-9 oclock  Cover with gauze   Cover with ABD over top   Secure with Medfix tape.   Change dressing daily and top dressing PRN for breakthrough drainage (visiting nurses to do twice per week and family in between)      Scrotal wound:  Cleanse with normal saline  Continue thin layer of Triad paste to open areas. May use ABD to cover area, but do not put any tape on scrotal area.  Apply 2 x per day     Continue using Clinitron bed  RE-CALL [facility] TO MAKE APPOINTMENT TO EVALUATE YOUR ROHO CUSHION AS SOON AS POSSIBLE      Continue NO PRESSURE TO ALL WOUNDS AS MUCH AS POSSIBLE, ESPECIALLY PERINEAL WOUND   LIMIT SITTING UPRIGHT IN WHEELCHAIR ON THIS WOUND      Continue visiting nurse skilled 2 x per week for wound care dressing changes.                              Please make an appointment with plastic surgeon for evaluation and surgical consult for sacral, ischium, hip wounds     PLEASE CALL YOUR  FOR ASSISTANCE IN OBTAINING Jonn AND PROTEIN SUPPLEMENTS       Follow up at the wound center with Dr. Rose in 3 weeks.      Treatment in the wound center today:   Cleansed with normal saline, dressed as above     Standing Status:   Future     Standing Expiration Date:   3/28/2025   • CT abdomen pelvis wo contrast     Standing Status:   Future     Standing Expiration Date:   3/28/2028     Scheduling Instructions:      FOR PO CONTRAST:      The patient will need to  drink barium for this test. The barium needs to be picked up in the registration area at least one day prior to the patients study. For out of network (non-[facility]) orders please bring your prescription when picking up oral contrast. Further instructions will be given at time of pickup.      FOR ALL OTHER CONTRAST SCENARIOS:      Please refer to the Patient Instructions in the Appointment Review window at scheduling.       If possible wear clothing without any metal in the abdomen area. Sweat suit, sports bra or bra without underwire may eliminate the need to change. Please bring your insurance cards, a form of photo ID and a list of your medications with you. Arrive 15 minutes prior to your appointment time in order to register. On the day of your test, please bring any prior CT or MRI studies of this area with you that were not performed at a [facility] facility.                        To schedule this appointment, please contact Central Scheduling at [phone].     Order Specific Question:   What is the patient's sedation requirement? If Medication for Claustrophobia is selected, order medication at this point.     Answer:   No Sedation     Order Specific Question:   Did the patient ever have bariatric surgery?     Answer:   No     Order Specific Question:   Contrast information:     Answer:   No contrast     Order Specific Question:   [facility]     Answer:   Immediate     Order Specific Question:   Reason for Exam     Answer:   wound decompensation         Marley Rose MD

## 2024-03-28 ENCOUNTER — OFFICE VISIT (OUTPATIENT)
Dept: WOUND CARE | Facility: CLINIC | Age: 44
End: 2024-03-28
Payer: MEDICARE

## 2024-03-28 VITALS
TEMPERATURE: 97.3 F | HEART RATE: 76 BPM | RESPIRATION RATE: 18 BRPM | SYSTOLIC BLOOD PRESSURE: 120 MMHG | DIASTOLIC BLOOD PRESSURE: 72 MMHG

## 2024-03-28 DIAGNOSIS — L89.324 PRESSURE INJURY OF LEFT ISCHIUM, STAGE 4 (HCC): ICD-10-CM

## 2024-03-28 DIAGNOSIS — L89.154 PRESSURE INJURY OF SACRAL REGION, STAGE 4 (HCC): Primary | ICD-10-CM

## 2024-03-28 DIAGNOSIS — L89.894 PRESSURE ULCER OF OTHER SITE, STAGE 4 (HCC): ICD-10-CM

## 2024-03-28 DIAGNOSIS — L89.224 PRESSURE ULCER OF LEFT HIP, STAGE 4 (HCC): ICD-10-CM

## 2024-03-28 DIAGNOSIS — L89.893 PRESSURE INJURY OF OTHER SITE, STAGE 3 (HCC): ICD-10-CM

## 2024-03-28 DIAGNOSIS — M86.60 CHRONIC OSTEOMYELITIS (HCC): ICD-10-CM

## 2024-03-28 PROCEDURE — 11042 DBRDMT SUBQ TIS 1ST 20SQCM/<: CPT | Performed by: FAMILY MEDICINE

## 2024-03-28 PROCEDURE — 11045 DBRDMT SUBQ TISS EACH ADDL: CPT | Performed by: FAMILY MEDICINE

## 2024-03-28 PROCEDURE — 99214 OFFICE O/P EST MOD 30 MIN: CPT | Performed by: FAMILY MEDICINE

## 2024-03-28 RX ORDER — LIDOCAINE HYDROCHLORIDE 40 MG/ML
5 SOLUTION TOPICAL ONCE
Status: COMPLETED | OUTPATIENT
Start: 2024-03-28 | End: 2024-03-28

## 2024-03-28 RX ADMIN — LIDOCAINE HYDROCHLORIDE 5 ML: 40 SOLUTION TOPICAL at 12:23

## 2024-03-28 NOTE — PATIENT INSTRUCTIONS
Orders Placed This Encounter   Procedures    Debridement     This order was created via procedure documentation    Wound cleansing and dressings     Wound cleansing and dressings          Wash your hands with soap and water.  Remove old dressing, discard into plastic bag and place in trash.    Cleanse the wounds with Dakin's solution if there is an odor, IF NO ODOR, cleanse with normal saline or wound wash prior to applying a clean dressing.   Do not use tissue or cotton balls. Do not scrub the wound. Pat dry using gauze.   Shower no; do not get dressing wet      Left Hip Wound:   Continue Dakin's moistened gauze with single layer tucked into depth of wound from 6-9 oclock  Cover with gauze   Cover with  ABD over top   Secure with Medfix tape.   Change dressing daily and top dressing PRN for breakthrough drainage (visiting nurses to do twice per week and family in between)      Sacral, Left ischium, and perineal wound:   Continue Dakin's moistened gauze with single layer tucked into depth of wound from 6-9 oclock  Cover with gauze   Cover with ABD over top   Secure with Medfix tape.   Change dressing daily and top dressing PRN for breakthrough drainage (visiting nurses to do twice per week and family in between)      Scrotal wound:  Cleanse with normal saline  Continue thin layer of Triad paste to open areas. May use ABD to cover area, but do not put any tape on scrotal area.  Apply 2 x per day     Continue using Clinitron bed  RE-CALL [facility] TO MAKE APPOINTMENT TO EVALUATE YOUR ROHO CUSHION AS SOON AS POSSIBLE      Continue NO PRESSURE TO ALL WOUNDS AS MUCH AS POSSIBLE, ESPECIALLY PERINEAL WOUND   LIMIT SITTING UPRIGHT IN WHEELCHAIR ON THIS WOUND      Continue visiting nurse skilled 2 x per week for wound care dressing changes.                              Please make an appointment with plastic surgeon for evaluation and surgical consult for sacral, ischium, hip wounds     PLEASE CALL YOUR   FOR ASSISTANCE IN OBTAINING Jonn AND PROTEIN SUPPLEMENTS       Follow up at the wound center with Dr. Rose in 3 weeks.      Treatment in the wound center today:   Cleansed with normal saline, dressed as above     Standing Status:   Future     Standing Expiration Date:   3/28/2025

## 2024-04-08 ENCOUNTER — TELEPHONE (OUTPATIENT)
Age: 44
End: 2024-04-08

## 2024-04-08 NOTE — TELEPHONE ENCOUNTER
Pt called stating he is bleeding from stoma. Call was transferred to [facility] nurse triage.

## 2024-04-08 NOTE — TELEPHONE ENCOUNTER
"Last OV: 2022 ERIKA Toro for Abdominal bloating, Early satiety, Nausea and vomiting, Melena, History of PUD, Iron def anemia  Gastric Emptyin22  Next OV: 24 ALEXANDRA Segura.   CBC: (Care Everywhere) 4/3/24    Pt reports colostomy and on Eliquis (managed by PCP). States he is having frequent bouts of nausea and heartburn independent of meals. Denies vomiting. Notes bright red blood daily for past 4 months, Denies stoma site infection. States blood also mixed in stools. Output can vary from green to brownish-black. Pt on Iron however unable to confirm GI medications at this time. Denies any new weakness. Has not made PCP aware of bleeding.    Believes he is on a medication that starts with \"P\"; unsure if on Dexliant. States he was hesitant to move forward with EGD/Colonoscopy due to prep as he is on dialysis.     Pt will be making PCP aware in the interim.    Pt # [phone]  "

## 2024-04-09 ENCOUNTER — TELEPHONE (OUTPATIENT)
Dept: FAMILY MEDICINE CLINIC | Facility: CLINIC | Age: 44
End: 2024-04-09

## 2024-04-09 ENCOUNTER — HOSPITAL ENCOUNTER (OUTPATIENT)
Dept: CT IMAGING | Facility: HOSPITAL | Age: 44
Discharge: HOME | End: 2024-04-09
Attending: FAMILY MEDICINE
Payer: MEDICARE

## 2024-04-09 DIAGNOSIS — L89.894 PRESSURE ULCER OF OTHER SITE, STAGE 4 (HCC): ICD-10-CM

## 2024-04-09 DIAGNOSIS — M86.60 CHRONIC OSTEOMYELITIS (HCC): ICD-10-CM

## 2024-04-09 PROCEDURE — 74176 CT ABD & PELVIS W/O CONTRAST: CPT

## 2024-04-10 ENCOUNTER — TELEPHONE (OUTPATIENT)
Dept: FAMILY MEDICINE CLINIC | Facility: CLINIC | Age: 44
End: 2024-04-10

## 2024-04-10 NOTE — TELEPHONE ENCOUNTER
PCP SIGNATURE NEEDED FOR [employer] FORM RECEIVED VIA FAX AND PLACED IN PCP FOLDER TO BE DELIVERED AT ASSIGNED TIMES.      Supply order eff date 3/17/24

## 2024-04-12 ENCOUNTER — TELEPHONE (OUTPATIENT)
Dept: FAMILY MEDICINE CLINIC | Facility: CLINIC | Age: 44
End: 2024-04-12

## 2024-04-12 NOTE — TELEPHONE ENCOUNTER
PCP SIGNATURE NEEDED FOR extended family care  FORM RECEIVED VIA FAX AND PLACED IN PCP FOLDER TO BE DELIVERED AT ASSIGNED TIMES.         assessment/Soc/recertification care summary  Plan of care     assessment/re-certification care summary    Has dr gaffney's name on forms

## 2024-04-17 ENCOUNTER — TELEPHONE (OUTPATIENT)
Dept: FAMILY MEDICINE CLINIC | Facility: CLINIC | Age: 44
End: 2024-04-17

## 2024-04-17 NOTE — TELEPHONE ENCOUNTER
PCP SIGNATURE NEEDED FOR [facility]  FORM RECEIVED VIA FAX AND PLACED IN PCP FOLDER TO BE DELIVERED AT ASSIGNED TIMES.     Oder# [id number]

## 2024-04-22 ENCOUNTER — TELEPHONE (OUTPATIENT)
Dept: FAMILY MEDICINE CLINIC | Facility: CLINIC | Age: 44
End: 2024-04-22

## 2024-04-22 NOTE — TELEPHONE ENCOUNTER
PCP SIGNATURE NEEDED FOR [facility] FORM RECEIVED VIA FAX AND PLACED IN PCP FOLDER TO BE DELIVERED AT ASSIGNED TIMES.     Order date: [id number]

## 2024-04-23 ENCOUNTER — PATIENT MESSAGE (OUTPATIENT)
Dept: FAMILY MEDICINE CLINIC | Facility: CLINIC | Age: 44
End: 2024-04-23

## 2024-04-25 ENCOUNTER — TELEPHONE (OUTPATIENT)
Dept: FAMILY MEDICINE CLINIC | Facility: CLINIC | Age: 44
End: 2024-04-25

## 2024-04-25 NOTE — TELEPHONE ENCOUNTER
FAXED ON 04/25/24 TO [facility] at [phone]. FAX CONFIRMATION RECEIVED.    Oder# [id number]

## 2024-04-25 NOTE — TELEPHONE ENCOUNTER
Faxed over [employer] Form to mom as per her request. Stated [employer] said form did not have providers NPI. I informed her NPI was listed on form and fax over to her.

## 2024-04-30 NOTE — TELEPHONE ENCOUNTER
FAXED ON 04/30/24 TO [facility] at [phone]. FAX CONFIRMATION RECEIVED.

## 2024-05-02 ENCOUNTER — TELEPHONE (OUTPATIENT)
Dept: FAMILY MEDICINE CLINIC | Facility: CLINIC | Age: 44
End: 2024-05-02

## 2024-05-02 ENCOUNTER — OFFICE VISIT (OUTPATIENT)
Dept: WOUND CARE | Facility: CLINIC | Age: 44
End: 2024-05-02
Payer: MEDICARE

## 2024-05-02 VITALS
HEART RATE: 88 BPM | SYSTOLIC BLOOD PRESSURE: 144 MMHG | TEMPERATURE: 98.7 F | RESPIRATION RATE: 14 BRPM | DIASTOLIC BLOOD PRESSURE: 84 MMHG

## 2024-05-02 DIAGNOSIS — L89.324 PRESSURE INJURY OF LEFT ISCHIUM, STAGE 4 (HCC): ICD-10-CM

## 2024-05-02 DIAGNOSIS — L89.224 PRESSURE ULCER OF LEFT HIP, STAGE 4 (HCC): ICD-10-CM

## 2024-05-02 DIAGNOSIS — L89.154 PRESSURE INJURY OF SACRAL REGION, STAGE 4 (HCC): Primary | ICD-10-CM

## 2024-05-02 DIAGNOSIS — L89.894 PRESSURE ULCER OF OTHER SITE, STAGE 4 (HCC): ICD-10-CM

## 2024-05-02 PROCEDURE — 99214 OFFICE O/P EST MOD 30 MIN: CPT | Performed by: FAMILY MEDICINE

## 2024-05-02 PROCEDURE — 11042 DBRDMT SUBQ TIS 1ST 20SQCM/<: CPT | Performed by: FAMILY MEDICINE

## 2024-05-02 RX ORDER — LIDOCAINE HYDROCHLORIDE 40 MG/ML
5 SOLUTION TOPICAL ONCE
Status: COMPLETED | OUTPATIENT
Start: 2024-05-02 | End: 2024-05-02

## 2024-05-02 RX ADMIN — LIDOCAINE HYDROCHLORIDE 5 ML: 40 SOLUTION TOPICAL at 14:19

## 2024-05-02 NOTE — PATIENT INSTRUCTIONS
Orders Placed This Encounter   Procedures    Wound cleansing and dressings     Wash your hands with soap and water.  Remove old dressing, discard into plastic bag and place in trash.    Cleanse the wounds with Dakin's solution if there is an odor, IF NO ODOR, cleanse with normal saline or wound wash prior to applying a clean dressing.   Do not use tissue or cotton balls. Do not scrub the wound. Pat dry using gauze.   Shower no; do not get dressing wet      Left Hip Wound:   Continue Dakin's moistened gauze with single layer tucked into depth of wound from 6-12 o'clock  Cover with gauze   Cover with  ABD over top   Secure with Medfix tape.   Change dressing daily and top dressing PRN for breakthrough drainage (visiting nurses to do twice per week and family in between)      Sacral, Left ischium, and perineal wound:   Continue Dakin's moistened gauze with single layer tucked into deep areas.  Cover with gauze   Cover with ABD over top   Secure with Medfix tape.   Change dressing daily and top dressing PRN for breakthrough drainage (visiting nurses to do twice per week and family in between)      Scrotal wound is healed. No dressing needed.    Continue using Clinitron bed  RE-CALL [facility]IN[facility] TO MAKE APPOINTMENT TO EVALUATE YOUR ROHO CUSHION AS SOON AS POSSIBLE      Continue NO PRESSURE TO ALL WOUNDS AS MUCH AS POSSIBLE, ESPECIALLY PERINEAL WOUND   LIMIT SITTING UPRIGHT IN WHEELCHAIR ON THIS WOUND          Continue visiting nurse skilled 2 x per week for wound care dressing changes.                                 Follow up at the wound center with Dr. Rose in 3-4 weeks.     Standing Status:   Future     Standing Expiration Date:   5/9/2024    Debridement     This order was created via procedure documentation

## 2024-05-02 NOTE — PROGRESS NOTES
"Patient ID: Ovidio Parkinson Jr. is a 43 y.o. male Date of Birth 1980       Chief Complaint   Patient presents with   • Follow Up Wound Care Visit     Sacral and ischial wounds       Allergies:  Chlorcyclizine, Chlorhexidine, Ciprofloxacin hcl, Cymbalta [duloxetine hcl], Dm-doxylamine-acetaminophen, Doxycycline, Gabapentin, Hydrocortisone, Lactose - food allergy, Lyrica [pregabalin], Milk (cow), Penicillins, Polymyxin b, Promethazine-phenyleph-codeine, Quinidine, Cefepime, and Rosuvastatin    Diagnosis:      Diagnosis ICD-10-CM Associated Orders   1. Pressure injury of sacral region, stage 4 ([facility])  L89.154 Wound cleansing and dressings     lidocaine (XYLOCAINE) 4 % topical solution 5 mL     Wound Procedure Treatment      2. Pressure injury of left ischium, stage 4 ([facility])  L89.324 Wound cleansing and dressings     lidocaine (XYLOCAINE) 4 % topical solution 5 mL     Wound Procedure Treatment      3. Pressure ulcer of left hip, stage 4 ([facility])  L89.224 Wound cleansing and dressings     lidocaine (XYLOCAINE) 4 % topical solution 5 mL     Wound Procedure Treatment      4. Pressure ulcer of other site, stage 4 ([facility])  L89.894 Wound cleansing and dressings     Debridement Pressure Injury Perineum     lidocaine (XYLOCAINE) 4 % topical solution 5 mL     Wound Procedure Treatment              Assessment & Plan:    Ovidio presents today for follow-up of multiple pressure injuries in setting of chronic osteomyelitis on palliative wound management plan.  Recent CT scan 4/9 reviewed with patient which overall revealed no acute changes.  Formal impression is as follows: \"Large soft tissue ulcers overlying the ischia with underlying osseous erosion and sclerosis in keeping with chronic osteomyelitis. Massive chronic destruction about L3 with marked anterolisthesis of the distal segments from L4 through the sacrum and coccyx.\"     Stage III pressure injury of scrotum: Healed!  Stage IV pressure injury perineum: Though still present, " "overall less necrotic tissue when compared to his last visit at the undermined area.  No malodor or purulent drainage.  Surgical debridement of adherent slough and necrotic tissue at undermined area around 5:00; in total the undermined area from 1-6 o'clock noted at last visit has slightly improved in depth now is around 4.2 cm   Stage IV pressure injuries of left hip, L ischium and sacrum are stable without sign of acute soft tissue infection at this time   When discussing various dressings that have been trialed in the past, Ovidio states that he and his brother (brother is his primary caretaker/aide with dressing changes) prefer utilizing Dakin's as it keeps the natural odors down and .  Reviewed with Ovidio long-term effects of utilizing Dakin such as possible destruction of healthy tissue.  He expresses understanding and wishes to continue with Dakin's as he states that when alternate dressings have been tried in the past, they have to return to Dakin's.  Continue Dakin's moistened gauze to all wounds  Confirmed [facility]  Please see below wound care orders for full details  ER precautions reviewed, he expresses understanding  Follow up in 3-4 weeks or sooner if needed     Portions of the record may have been created with voice recognition software. Occasional wrong word or \"sound alike\" substitutions may have occurred due to the inherent limitations of voice recognition software. Read the chart carefully and recognize, using context, where substitutions have occurred.    Subjective:   Please see prev visit notes for HPI summary [id number] 1/4/2024: Ovidio presents to wound center today for follow-up of multiple stage IV pressure injuries of sacrum, left hip, ischium, and perineum.  He reports he believes his lab studies regarding his protein level has improved as well as overall nutrition in general.  Has no acute " "complaints today and denies fever, chills.  He reports he is currently feeling well/much better from his recent hospitalization which he reports occurred at [facility] 12/5 through 12/13.    Per chart review of discharge summary from that hospitalization he was \"found to have Staph Lugudenesis bacteremia on admission blood cultures and was started on vancomycin, which was transitioned to Ancef by Infectious Disease. He will continue Ancef Source was suspected to be prior HD line; known sacral wounds were stable. Repeat blood cultures were negative on discharge. During admission, his HD line was removed (given infection) and replaced with temporary HD line (which, in turn, needed to be replaced due to inability to successfully use). New HD line functioning as of 12/9. PATRICIO performed for evaluation of possible endocarditis was negative. A tunneled HD line was placed 12/13/2023 by IR after repeat blood cultures were negative x48 hrs.\"  He also reports that he may have been spending more time in his chair than previous and continues with difficulty controlling some of the shifting/shearing forces with transferring etc... Continues to use his Clinitron bed.  Also reports that he is still awaiting the custom wheelchair/cushions to help with his pressure injuries but that it is being made.     1/25/2024: Ovidio presents today for follow-up of multiple stage IV pressure injuries of left hip, ischium, sacrum, and perineum as well as stage III pressure injury of scrotum.  He reports that they have been doing well with the Mesalt.  In addition reports that the area under the scrotum his brother sometimes uses Vaseline to this area when it is on the drier side she reports his brother tells him it is looking better.  Sides his chronic issues, he reports he has no acute complaints today and overall is feeling well.  He denies fever, chills.  He reported he recently started sleeping on his left side as this was more comfortable for " "him.    2/14/2024: Ovidio presents today for follow-up of multiple stage IV pressure injuries of left hip, ischium, sacrum, and perineum as well as stage III pressure injury of scrotum.  He reports that he is not feeling well \"has been feeling under the weather\" since Monday.  Feels that has been having an issue with his insulin pump administering insulin.  He states when he had this issue in the past it was because it was inserted an area of scar tissue.  However he did try to move the side of this this morning and glucose was 475.  Also reports he is feeling very dehydrated and dry along with dizzy, lightheaded.  He denies fever, chills.    3/7/2024: Ovidio presents today for follow-up multiple stage IV pressure injuries of left hip ischium sacrum and perineum in addition to stage III pressure injury of scrotum.  At our last visit, recommendation was for patient to go to emergency department which he agreed to however after leaving our wound center patient did not go.  He reports that his sugars have been improved compared to last visit 150s to 180s though sometimes he still has spikes. He states he has been feeling better and denies fever, chills.  He also reports that since he was last here they called the Clinitron wraps to check functioning of his bed and was assured that all seems to be functioning well.    3/28/2024: Ovidio presents today for follow-up of multiple pressure injuries.  He reports that as discussed, he did call [facility] wheelchair clinic a few days ago and has not yet heard back.  Reports that sometimes his glucose is variable in hyper and hypoglycemic ranges but his endocrinology office is aware.  Also states that recently it has been more controlled between 150s and 160s.  Today he reports he is feeling well and denies fever, chills, dizziness, lightheadedness, diaphoresis, shortness of breath, chest pain.  Wound care currently consist of Dakin's packing.  He continues to use his Clinitron " bed.  He reports the drainage from his left hip wound is controlled.    5/2/2024: Oviido presents today for f/u of multiple pressure injuries.  He reports that a few weeks ago he had an appointment with [facility] however he fell ill had to reschedule for the 31st of this month which she confirmed he will be attending.  Reports that his protein levels have improved and he does have some Jim at home.  He states he is feeling well now and feels his glucose has been under better control.  He denies new acute complaints and denies fever, chills.        The following portions of the patient's history were reviewed and updated as appropriate:   Patient Active Problem List   Diagnosis   • Paraplegia ([facility])   • Atrial fibrillation ([facility])   • Chronic suprapubic catheter ([facility])   • Colostomy care ([facility])   • S/P unilateral BKA (below knee amputation) ([facility])   • Tobacco abuse   • Type 1 diabetes mellitus with chronic kidney disease on chronic dialysis ([facility])   • Ulcer of sacral region, stage 4 ([facility])   • Wound healing, delayed   • Iron deficiency anemia   • Pressure injury of left ischium, stage 4 ([facility])   • HTN (hypertension), benign   • Neurogenic bladder   • GERD (gastroesophageal reflux disease)   • Chronic pain   • Stage 5 chronic kidney disease on chronic dialysis ([facility])   • History of Clostridium difficile infection   • Urinary retention   • Dialysis patient ([facility])   • Insulin long-term use ([facility])   • Wheelchair dependent   • Bipolar depression ([facility])   • 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[facility])   • Seizure ([facility])   • Pressure ulcer of sacral region, stage 4 ([facility])   • AVM (arteriovenous malformation) of duodenum, acquired   • Chronic narcotic dependence ([facility])   • Chronic diastolic heart failure ([facility])   • Pressure ulcer of other site, stage 4 ([facility])   • Pressure ulcer of left hip, stage 4 ([facility])   • ED (erectile dysfunction) of organic origin   • Irritable bowel syndrome   • Onychomycosis   • Pustular folliculitis   • Prolonged  Q-T interval on ECG   • Secondary hyperparathyroidism of renal origin ([facility])   • Wounds, multiple   • Immunocompromised state ([facility])   • Severe protein-calorie malnutrition ([facility]CC)   • Chronic osteomyelitis ([facility])   • Acute deep vein thrombosis (DVT) of right upper extremity (HCC)   • C. difficile colitis   • Charcot's arthropathy   • Diabetic gastroparesis associated with type 1 diabetes mellitus  (HCC)   • End stage renal disease (HCC)   • Hyperlipidemia   • LVH (left ventricular hypertrophy)   • NICM (nonischemic cardiomyopathy) ([facility])   • Vitamin B deficiency   • Vitamin C deficiency   • Vitamin D deficiency   • Hospital discharge follow-up   • Rheumatoid arthritis (HCC)   • Encounter for general adult medical examination with abnormal findings   • Herpes zoster vaccination declined   • COVID-19 vaccination declined   • Influenza vaccination declined   • Hepatitis A vaccination declined     Past Medical History:   Diagnosis Date   • Acute pulmonary edema ([facility]) 1/13/2022   • Ambulatory dysfunction    • Anemia    • Anemia, iron deficiency     transfusion requiring   • Asymptomatic bacteriuria 9/25/2017   • Atrial fibrillation ([facility])    • AVM (arteriovenous malformation) of duodenum, 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   • Bacteriuria, asymptomatic    • Bipolar disorder ([facility])    • Chronic deep vein thrombosis (DVT) ([facility])    • Chronic indwelling Ortega catheter    • Chronic kidney disease    • Chronic pain    • Chronic pain disorder    • Chronic suprapubic catheter ([facility])    • Clostridium difficile infection 08/11/2016    also positive 9/2016, 5/29/2017, 8/15/2017. S/P fecal transplant   • Colostomy on examination ([facility])    • Decubitus ulcer    • Delirium    • Diabetes mellitus ([facility])    • GERD (gastroesophageal reflux disease)    • Hemodialysis patient ([facility])    • Hypertension    • Memory impairment 2011    s/p diabetic coma   • Neurogenic bladder    • OAB (overactive bladder)    • Paraplegia ([facility])     T3 transverse myelitis vs  spinal stoke (AVM); 2012 extensive epidural abscess C7=> conus 2nd extension from deep parspinal abscess L4-S2/sacral decubitus; cord atrophy/myelomalcia T3=>conus    • Penile abscess    • S/P unilateral BKA (below knee amputation) ([facility])     Right   • Sebaceous cyst removed in 2017   • Seizures ([facility])    • Shortness of breath    • Tobacco abuse    • 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[facility])    • Urinary retention    • Wheelchair dependent    • Wounds, multiple     pressure ulcers with delayed healing     Past Surgical History:   Procedure Laterality Date   • BELOW KNEE LEG AMPUTATION Right 2009   • COLONOSCOPY N/A 03/27/2017    Procedure: COLONOSCOPY;  Surgeon: Wesley Thibodeaux MD;  Location: [facility];  Service:    • COLONOSCOPY N/A 03/29/2017    Procedure: COLONOSCOPY;  Surgeon: Wesley Thibodeaux MD;  Location: [facility];  Service:    • COLONOSCOPY N/A 06/15/2017    Procedure: COLONOSCOPY with FMT;  Surgeon: Gerard Marques MD;  Location: [facility];  Service: Gastroenterology   • ESOPHAGOGASTRODUODENOSCOPY N/A 03/22/2017    Procedure: ESOPHAGOGASTRODUODENOSCOPY (EGD);  Surgeon: Beverley Ortiz DO;  Location: [facility];  Service:    • MULTIPLE TOOTH EXTRACTIONS N/A 03/08/2021    Procedure: EXTRACTION TEETH # 2,3,6,10,12,13,14,18,19,20,21,22,23,24,25,26,27,28,29,30;  Surgeon: Brian Kohler DMD;  Location: [facility];  Service: Maxillofacial   • SKIN BIOPSY       Family History   Problem Relation Age of Onset   • Hyperlipidemia Mother    • Hypertension Mother    • Leukemia Brother    • Diabetes Paternal Grandfather       Social History     Socioeconomic History   • Marital status: Single     Spouse name: None   • Number of children: None   • Years of education: None   • Highest education level: None   Occupational History   • None   Tobacco Use   • Smoking status: Every Day     Types: Cigars     Passive exposure: Current   • Smokeless tobacco: Never   • Tobacco comments:     2 cigars/day - 2/14/2023   Vaping Use   • Vaping status: Never Used    Substance and Sexual Activity   • Alcohol use: Not Currently     Comment: 1 cup of wine every 3-4 months   • Drug use: Not Currently     Types: Marijuana     Comment: rarely; once every 1-2 years   • Sexual activity: None   Other Topics Concern   • None   Social History Narrative   • None     Social Determinants of Health     Financial Resource Strain: Low Risk  (3/5/2024)    Overall Financial Resource Strain (CARDIA)    • Difficulty of Paying Living Expenses: Not hard at all   Food Insecurity: No Food Insecurity (3/5/2024)    Hunger Vital Sign    • Worried About Running Out of Food in the Last Year: Never true    • Ran Out of Food in the Last Year: Never true   Transportation Needs: No Transportation Needs (3/5/2024)    PRAPARE - Transportation    • Lack of Transportation (Medical): No    • Lack of Transportation (Non-Medical): No   Physical Activity: Not on file   Stress: Not on file   Social Connections: Not on file   Intimate Partner Violence: Not At Risk (12/5/2023)    Received from [facility]    Humiliation, Afraid, Rape, and Kick questionnaire    • Fear of Current or Ex-Partner: No    • Emotionally Abused: No    • Physically Abused: No    • Sexually Abused: No   Housing Stability: Low Risk  (12/5/2023)    Received from [facility]    Housing Stability Vital Sign    • Unable to Pay for Housing in the Last Year: No    • Number of Places Lived in the Last Year: 1    • Unstable Housing in the Last Year: No        Current Outpatient Medications:   •  Alcohol Swabs (ALCOHOL PADS) 70 % PADS, by Does not apply route 5 (five) times a day, Disp: 300 each, Rfl: 5  •  amLODIPine (NORVASC) 10 mg tablet, TAKE 1 TABLET BY MOUTH EVERY DAY, Disp: 90 tablet, Rfl: 1  •  ammonium lactate (LAC-HYDRIN) 12 % cream, , Disp: , Rfl:   •  apixaban (ELIQUIS) 5 mg, Take 1 tablet (5 mg total) by mouth 2 (two) times a day, Disp: 180 tablet, Rfl: 3  •  atorvastatin (LIPITOR) 20 mg tablet, TAKE 1 TABLET  BY MOUTH EVERYDAY AT BEDTIME, Disp: 90 tablet, Rfl: 1  •  azelaic acid (Azelex) 20 % cream, Apply topically 2 (two) times a day, Disp: 50 g, Rfl: 0  •  B Complex-C-Folic Acid (Super B-Complex/Vit C/FA) TABS, TAKE 1 TABLET BY MOUTH EVERY DAY AS DIRECTED, Disp: 90 tablet, Rfl: 5  •  SUSAN MICROLET LANCETS lancets, Use as instructed to check blood sugar 4 times a day Dx e10.29, Disp: 200 each, Rfl: 5  •  bisacodyl (DULCOLAX) 5 mg EC tablet, Take 1 tablet (5 mg total) by mouth once for 1 dose, Disp: 2 tablet, Rfl: 0  •  Blood Glucose Monitoring Suppl ([employer] CONTOUR NEXT USB MONITOR) w/Device KIT, Testing 4 times a day, Disp: 1 kit, Rfl: 0  •  calcitriol (ROCALTROL) 0.5 MCG capsule, Take 2 mcg by mouth, Disp: , Rfl:   •  calcium acetate (PHOSLO) capsule, TAKE 2 CAPSULES BY MOUTH THREE TIMES DAILY WITH MEALS, Disp: , Rfl:   •  carvedilol (COREG) 25 mg tablet, TAKE 1 TABLET BY MOUTH TWICE A DAY, Disp: 180 tablet, Rfl: 1  •  cetirizine (ZyrTEC) 10 mg tablet, TAKE 1 TABLET BY MOUTH EVERY DAY, Disp: 90 tablet, Rfl: 1  •  Cholecalciferol (VITAMIN D-3) 1000 units CAPS, Take 2 capsules by mouth daily, Disp: 30 capsule, Rfl: 0  •  clindamycin (CLINDAGEL) 1 % gel, APPLY TO AFFECTED AREA TWICE A DAY, Disp: 60 g, Rfl: 2  •  Contour Next Test test strip, USE TO TEST BLOOD SUGAR 3 TIMES DAILY. CONTOUR NEXT STRIPS. E10.29, Disp: , Rfl:   •  cyanocobalamin ([employer] Vitamin B-12) 1000 MCG tablet, Take 1 tablet (1,000 mcg total) by mouth daily, Disp: 90 tablet, Rfl: 3  •  cyclobenzaprine (FLEXERIL) 5 mg tablet, , Disp: , Rfl:   •  dexlansoprazole (DEXILANT) 30 MG capsule, TAKE 1 CAPSULE BY MOUTH EVERY DAY, Disp: 90 capsule, Rfl: 1  •  dicyclomine (BENTYL) 10 mg capsule, TAKE 1 CAPSULE BY MOUTH 3 TIMES A DAY BEFORE MEALS, Disp: 270 capsule, Rfl: 1  •  Disposable Gloves MISC, Use 7 times a day, 4 boxes of gloves XL Dx type 1 DM, paraplegia, Disp: 4 each, Rfl: 11  •  doxazosin (CARDURA) 2 mg tablet, TAKE 1 TABLET BY MOUTH EVERY EVENING., Disp:  90 tablet, Rfl: 1  •  epoetin kaushik (EPOGEN,PROCRIT) 20,000 units/mL, Inject 10,000 Units under the skin, Disp: , Rfl:   •  epoetin kaushik (EPOGEN,PROCRIT) 20,000 units/mL, Inject 5,000 Units under the skin, Disp: , Rfl:   •  ferrous sulfate 324 (65 Fe) mg, Take 1 tablet (324 mg total) by mouth in the morning, Disp: 90 tablet, Rfl: 3  •  ferrous sulfate 325 (65 Fe) mg tablet, Take 1 tablet (325 mg total) by mouth 3 (three) times a day, Disp: 90 tablet, Rfl: 3  •  Fluocinolone Acetonide Body 0.01 % OIL, Apply 2 drops topically daily, Disp: , Rfl:   •  folic acid (FOLVITE) 1 mg tablet, Take 1,000 mcg by mouth daily, Disp: , Rfl:   •  Gvoke PFS 1 MG/0.2ML SOSY, , Disp: , Rfl:   •  hydroxychloroquine (PLAQUENIL) 200 mg tablet, Take 200 mg by mouth daily, Disp: , Rfl:   •  hydrOXYzine HCL (ATARAX) 50 mg tablet, TAKE 1 TABLET (50 MG TOTAL) BY MOUTH 2 (TWO) TIMES A DAY AS NEEDED FOR ITCHING OR ANXIETY, Disp: 180 tablet, Rfl: 1  •  Incontinence Supply Disposable (Incontinence Brief Large) MISC, Use 6 (six) times a day Size xl, Disp: 180 each, Rfl: 11  •  Incontinence Supply Disposable (Undergarment) MISC, Use 6 a day, 5 boxes, xl Dx R51, paraplegia, Disp: 5 each, Rfl: 11  •  Incontinence Supply Disposable (Underpads) MISC, Use 2 a day, Disp: 5 each, Rfl: 11  •  insulin lispro protamine-insulin lispro (HumaLOG 50-50) 100 units/mL, Inject under the skin 2 (two) times a day before meals, Disp: , Rfl:   •  ketoconazole (NIZORAL) 2 % cream, Apply to rash., Disp: 15 g, Rfl: 1  •  LEVEMIR FLEXTOUCH 100 units/mL injection pen, Inject 14 Units under the skin 2 (two) times a day, Disp: 15 pen, Rfl: 3  •  Lokelma 10 g PACK, , Disp: , Rfl:   •  losartan (COZAAR) 100 MG tablet, TAKE 1 TABLET BY MOUTH EVERY DAY, Disp: 90 tablet, Rfl: 1  •  metroNIDAZOLE (METROGEL) 0.75 % gel, Apply topically 2 (two) times a day, Disp: 45 g, Rfl: 0  •  Misc. Devices (Wheelchair Cushion) MISC, Use daily, Disp: 1 each, Rfl: 0  •  Misc. Devices (WHEELCHAIR)  MISC, by Does not apply route daily Wheelchair ramp, dx g82.20, Disp: 1 each, Rfl: 0  •  Multiple Vitamin (Daily-Enriqueta Multivitamin) TABS, TAKE 1 TABLET BY MOUTH EVERY DAY, Disp: 30 tablet, Rfl: 8  •  NovoLOG FlexPen 100 units/mL injection pen, INJECT 3 UNITS UNDER THE SKIN 3 (THREE) TIMES A DAY BEFORE MEALS. (Patient not taking: Reported on 7/13/2023), Disp: , Rfl:   •  ondansetron (ZOFRAN-ODT) 4 mg disintegrating tablet, Take 1 tablet (4 mg total) by mouth every 6 (six) hours as needed for nausea or vomiting, Disp: 30 tablet, Rfl: 2  •  oxybutynin (DITROPAN XL) 15 MG 24 hr tablet, , Disp: , Rfl:   •  oxybutynin (DITROPAN) 5 mg tablet, Take 3 tablets (15 mg total) by mouth daily, Disp: 270 tablet, Rfl: 1  •  oxyCODONE (ROXICODONE) 10 MG TABS, TAKE ONE TABLET BY MOUTH EVERY 12 HOURS AS NEEDED FOR PAIN. ONGOING THERAPY., Disp: , Rfl:   •  polyethylene glycol (GLYCOLAX) 17 GM/SCOOP powder, Take as directed as per written office instructions, Disp: 238 g, Rfl: 0  •  polyethylene glycol (GOLYTELY) 4000 mL solution, Take 4,000 mL by mouth once for 1 dose, Disp: 4000 mL, Rfl: 0  •  risperiDONE (RisperDAL) 0.5 mg tablet, Take 1 tablet (0.5 mg total) by mouth 2 (two) times a day, Disp: 180 tablet, Rfl: 1  •  sertraline (ZOLOFT) 25 mg tablet, TAKE 1 TABLET (25 MG TOTAL) BY MOUTH DAILY., Disp: 90 tablet, Rfl: 1  •  sodium hypochlorite (DAKIN'S HALF-STRENGTH) external solution, Apply 1 Application topically daily Soak gauze and pack into wounds daily., Disp: 473 mL, Rfl: 0  •  Sodium Zirconium Cyclosilicate 10 g PACK, Take 10 g by mouth daily, Disp: , Rfl:   •  sucralfate (CARAFATE) 1 g/10 mL suspension, Take 10 mL (1 g total) by mouth 4 (four) times a day (with meals and at bedtime), Disp: 420 mL, Rfl: 1  •  SUPER B COMPLEX & C TABS, TAKE 1 CAPSULE BY MOUTH ONCE FOR 1 DOSE AS DIRECTED, Disp: 90 tablet, Rfl: 2  •  Zinc Sulfate 220 (50 Zn) MG TABS, Take 1 tablet by mouth daily, Disp: 90 tablet, Rfl: 1  No current  facility-administered medications for this visit.    Review of Systems   Constitutional:  Negative for chills and fever.   Respiratory:  Negative for cough and shortness of breath.    Cardiovascular:  Negative for chest pain.   Musculoskeletal:  Positive for gait problem (Paraplegia).        Paraplegic   Skin:  Positive for wound (Multiple pressure injuries).        Multiple pressure injuries   Neurological:  Positive for weakness and numbness.       Objective:  /84   Pulse 88   Temp 98.7 °F (37.1 °C)   Resp 14   Pain Score: 0-No pain     Physical Exam  Vitals reviewed.   Constitutional:       General: He is not in acute distress.     Appearance: He is not ill-appearing, toxic-appearing or diaphoretic.      Comments: Very pleasant, NAD   HENT:      Head: Normocephalic.   Cardiovascular:      Rate and Rhythm: Normal rate.   Pulmonary:      Effort: Pulmonary effort is normal. No respiratory distress.   Genitourinary:         Comments: 1- healed   Skin:     General: Skin is warm and dry.      Findings: Wound present. No erythema.             Comments: 1/3/4- Stage IV pressure injury sacrum/L Hip/ L ischium: Overall stable. L hip with slight improvement in tunneled/undermined area; now about 5cm (prev 6 cm). To all wounds -  no malodor, purulent drainage, acute erythema, induration, fluctuance.    2- Stage IV pressure injury perineum: Though still present, overall less necrotic tissue when compared to his last visit at the undermined area.  No malodor or purulent drainage.      See full wound description for additional details.   Neurological:      Mental Status: He is alert and oriented to person, place, and time.      Gait: Gait abnormal (wc bound).   Psychiatric:         Mood and Affect: Mood normal.         Behavior: Behavior normal.           Wound Pressure Injury Ischium Left (Active)   Wound Image   05/02/24 1335   Wound Description Pink;Yellow 05/02/24 1340   Pressure Injury Stage 4 03/28/24 1144    Irene-wound Assessment Intact;Scar Tissue 05/02/24 1340   Wound Length (cm) 8 cm 05/02/24 1340   Wound Width (cm) 5.8 cm 05/02/24 1340   Wound Depth (cm) 1 cm 05/02/24 1340   Wound Surface Area (cm^2) 46.4 cm^2 05/02/24 1340   Wound Volume (cm^3) 46.4 cm^3 05/02/24 1340   Calculated Wound Volume (cm^3) 46.4 cm^3 05/02/24 1340   Change in Wound Size % -145.5 05/02/24 1340   Tunneling 1 in depth located at resolved 11/30/23 1351   Number of underminings 2 05/02/24 1340   Undermining 1 1.2 05/02/24 1340   Undermining 2 0.8 05/02/24 1340   Undermining 1 is depth extending from 9-11 05/02/24 1340   Undermining 2 is depth extending from 3-5 05/02/24 1340   Drainage Amount Large 05/02/24 1340   Drainage Description Serosanguineous 05/02/24 1340   Non-staged Wound Description Full thickness 05/02/24 1340   Treatments Irrigation with NSS 05/02/24 1340   Wound packed? No 05/02/24 1340   Dressing Changed Changed 03/07/24 1113   Patient Tolerance Tolerated well 03/07/24 1113   Dressing Status Removed 03/07/24 1113       Wound Pressure Injury Perineum (Active)   Wound Image   05/02/24 1335   Wound Description Yellow;Pink;Slough 05/02/24 1343   Pressure Injury Stage 4 05/02/24 1343   Irene-wound Assessment Maceration;Scar Tissue 05/02/24 1343   Wound Length (cm) 8.5 cm 05/02/24 1343   Wound Width (cm) 5.9 cm 05/02/24 1343   Wound Depth (cm) 1.5 cm 05/02/24 1343   Wound Surface Area (cm^2) 50.15 cm^2 05/02/24 1343   Wound Volume (cm^3) 75.225 cm^3 05/02/24 1343   Calculated Wound Volume (cm^3) 75.23 cm^3 05/02/24 1343   Change in Wound Size % -616.48 05/02/24 1343   Tunneling 1 in depth located at 0 10/05/23 1458   Number of underminings 1 03/28/24 1147   Undermining 1 4.2 05/02/24 1343   Undermining 1 is depth extending from 12-7 05/02/24 1343   Drainage Amount Copious 05/02/24 1343   Drainage Description Serosanguineous 05/02/24 1343   Non-staged Wound Description Full thickness 05/02/24 1343   Treatments Irrigation with NSS  05/02/24 1343   Wound packed? No 05/02/24 1343   Dressing Changed Changed 03/07/24 1107   Patient Tolerance Tolerated well 03/07/24 1107   Dressing Status Removed 03/07/24 1107       Wound Pressure Injury Sacrum (Active)   Wound Image   05/02/24 1334   Wound Description Epithelialization;Pink;Yellow 05/02/24 1338   Pressure Injury Stage 4 05/02/24 1338   Irene-wound Assessment Scar Tissue;Maceration 05/02/24 1338   Wound Length (cm) 6 cm 05/02/24 1338   Wound Width (cm) 10 cm 05/02/24 1338   Wound Depth (cm) 0.6 cm 05/02/24 1338   Wound Surface Area (cm^2) 60 cm^2 05/02/24 1338   Wound Volume (cm^3) 36 cm^3 05/02/24 1338   Calculated Wound Volume (cm^3) 36 cm^3 05/02/24 1338   Change in Wound Size % 52 05/02/24 1338   Undermining 1 0 10/05/23 1458   Undermining 1 is depth extending from resolved 10/05/23 1458   Drainage Amount Large 05/02/24 1338   Drainage Description Serosanguineous 05/02/24 1338   Non-staged Wound Description Full thickness 05/02/24 1338   Treatments Irrigation with NSS 05/02/24 1338   Wound packed? No 05/02/24 1338   Dressing Changed Changed 03/07/24 1105   Patient Tolerance Tolerated well 03/07/24 1105   Dressing Status Removed 03/07/24 1105       Wound Pressure Injury Hip Left;Lateral (Active)   Wound Image   05/02/24 1334   Wound Description Yellow;Pink 05/02/24 1346   Pressure Injury Stage 4 05/02/24 1346   Irene-wound Assessment Scar Tissue;Intact 05/02/24 1346   Wound Length (cm) 2.5 cm 05/02/24 1346   Wound Width (cm) 1.7 cm 05/02/24 1346   Wound Depth (cm) 1 cm 05/02/24 1346   Wound Surface Area (cm^2) 4.25 cm^2 05/02/24 1346   Wound Volume (cm^3) 4.25 cm^3 05/02/24 1346   Calculated Wound Volume (cm^3) 4.25 cm^3 05/02/24 1346   Tunneling 1 0 cm 10/05/23 1453   Tunneling 1 in depth located at resolved 10/05/23 1453   Number of underminings 1 05/02/24 1346   Undermining 1 5 05/02/24 1346   Undermining 1 is depth extending from 6-12 05/02/24 1346   Drainage Amount Moderate 05/02/24 1346  "  Drainage Description Serosanguineous 05/02/24 1346   Non-staged Wound Description Full thickness 05/02/24 1346   Treatments Irrigation with NSS 05/02/24 1346   Wound packed? No 05/02/24 1346   Packing- # removed 1 02/15/24 1057   Dressing Changed Changed 03/07/24 1111   Patient Tolerance Tolerated well 03/07/24 1111   Dressing Status Removed 03/07/24 1111       Wound 01/04/24 Pressure Injury Scrotum Posterior (Active)   Wound Image   05/02/24 1336   Wound Description Epithelialization 05/02/24 1339   Pressure Injury Stage 3 05/02/24 1339   Irene-wound Assessment Intact;Scar Tissue;Pink 05/02/24 1339   Wound Length (cm) 0 cm 05/02/24 1339   Wound Width (cm) 0 cm 05/02/24 1339   Wound Depth (cm) 0 cm 05/02/24 1339   Wound Surface Area (cm^2) 0 cm^2 05/02/24 1339   Wound Volume (cm^3) 0 cm^3 05/02/24 1339   Calculated Wound Volume (cm^3) 0 cm^3 05/02/24 1339   Change in Wound Size % 100 05/02/24 1339   Drainage Amount None 05/02/24 1339   Drainage Description Serosanguineous 03/28/24 1151   Non-staged Wound Description Full thickness 03/07/24 1110   Wound packed? No 05/02/24 1339   Dressing Status Removed 02/15/24 1102         Debridement   Wound Pressure Injury Perineum    Universal Protocol:  Consent: Verbal consent obtained. Written consent obtained.  Risks and benefits: risks, benefits and alternatives were discussed  Consent given by: patient  Time out: Immediately prior to procedure a \"time out\" was called to verify the correct patient, procedure, equipment, support staff and site/side marked as required.  Patient understanding: patient states understanding of the procedure being performed  Patient identity confirmed: verbally with patient    Debridement Details  Performed by: physician  Debridement type: surgical  Level of debridement: subcutaneous tissue  Pain control: lidocaine 4%      Post-debridement measurements  Length (cm): 8.5  Width (cm): 5.9  Depth (cm): 4.3  Percent debrided: 20%  Surface Area " (cm^2): 50.15  Area Debrided (cm^2): 10.03  Volume (cm^3): 215.65    Tissue and other material debrided: subcutaneous tissue  Devitalized tissue debrided: fibrin, necrotic debris and slough  Instrument(s) utilized: blade and forceps  Bleeding: medium  Hemostasis obtained with: pressure  Procedural pain (0-10): 0  Post-procedural pain: 0   Response to treatment: procedure was tolerated well  Debridement Comments: Depth above in debridement represents undermined area around 5:00 where slough fibrin/necrotic tissue was surgically debrided                 Lab Results   Component Value Date    HGBA1C 7.4 (H) 11/07/2023       Wound Instructions:  Orders Placed This Encounter   Procedures   • Wound cleansing and dressings     Wash your hands with soap and water.  Remove old dressing, discard into plastic bag and place in trash.    Cleanse the wounds with Dakin's solution if there is an odor, IF NO ODOR, cleanse with normal saline or wound wash prior to applying a clean dressing.   Do not use tissue or cotton balls. Do not scrub the wound. Pat dry using gauze.   Shower no; do not get dressing wet      Left Hip Wound:   Continue Dakin's moistened gauze with single layer tucked into depth of wound from 6-12 o'clock  Cover with gauze   Cover with  ABD over top   Secure with Medfix tape.   Change dressing daily and top dressing PRN for breakthrough drainage (visiting nurses to do twice per week and family in between)      Sacral, Left ischium, and perineal wound:   Continue Dakin's moistened gauze with single layer tucked into deep areas.  Cover with gauze   Cover with ABD over top   Secure with Medfix tape.   Change dressing daily and top dressing PRN for breakthrough drainage (visiting nurses to do twice per week and family in between)      Scrotal wound is healed. No dressing needed.    Continue using Clinitron bed  RE-CALL [facility] TO MAKE APPOINTMENT TO EVALUATE YOUR ROHO CUSHION AS SOON AS POSSIBLE       Continue NO PRESSURE TO ALL WOUNDS AS MUCH AS POSSIBLE, ESPECIALLY PERINEAL WOUND   LIMIT SITTING UPRIGHT IN WHEELCHAIR ON THIS WOUND          Continue visiting nurse skilled 2 x per week for wound care dressing changes.                                 Follow up at the wound center with Dr. Rose in 3-4 weeks.     Standing Status:   Future     Standing Expiration Date:   5/9/2024   • Debridement Pressure Injury Perineum     This order was created via procedure documentation   • Wound Procedure Treatment     This order was created via procedure documentation       Marley Rose MD

## 2024-05-02 NOTE — TELEPHONE ENCOUNTER
PCP SIGNATURE NEEDED FOR [facility] FORM RECEIVED VIA FAX AND PLACED IN PCP FOLDER TO BE DELIVERED AT ASSIGNED TIMES.    Received: 5/2/24    Order#: [id number]

## 2024-05-02 NOTE — LETTER
[facility]  [address] [facility] [address]  Phone#  [phone]  Fax#  [phone]    Patient:  Ovidio Parkinson Jr.  YOB: 1980  Phone:  [phone]  Date of Visit:  5/2/2024    Orders Placed This Encounter   Procedures   • Wound cleansing and dressings     Wash your hands with soap and water.  Remove old dressing, discard into plastic bag and place in trash.    Cleanse the wounds with Dakin's solution if there is an odor, IF NO ODOR, cleanse with normal saline or wound wash prior to applying a clean dressing.   Do not use tissue or cotton balls. Do not scrub the wound. Pat dry using gauze.   Shower no; do not get dressing wet      Left Hip Wound:   Continue Dakin's moistened gauze with single layer tucked into depth of wound from 6-12 o'clock  Cover with gauze   Cover with  ABD over top   Secure with Medfix tape.   Change dressing daily and top dressing PRN for breakthrough drainage (visiting nurses to do twice per week and family in between)      Sacral, Left ischium, and perineal wound:   Continue Dakin's moistened gauze with single layer tucked into deep areas.  Cover with gauze   Cover with ABD over top   Secure with Medfix tape.   Change dressing daily and top dressing PRN for breakthrough drainage (visiting nurses to do twice per week and family in between)      Scrotal wound is healed. No dressing needed.    Continue using Clinitron bed  RE-CALL [facility] TO MAKE APPOINTMENT TO EVALUATE YOUR ROHO CUSHION AS SOON AS POSSIBLE      Continue NO PRESSURE TO ALL WOUNDS AS MUCH AS POSSIBLE, ESPECIALLY PERINEAL WOUND   LIMIT SITTING UPRIGHT IN WHEELCHAIR ON THIS WOUND          Continue visiting nurse skilled 2 x per week for wound care dressing changes.                                 Follow up at the wound center with Dr. Rose in 3-4 weeks.     Standing Status:   Future     Standing Expiration Date:   5/9/2024   • Debridement      This order was created via procedure documentation         Electronically signed by Marley Rose MD

## 2024-05-02 NOTE — PROGRESS NOTES
Wound Procedure Treatment    Performed by: Sanju Abraham RN  Authorized by: Marley Rose MD  Associated wounds:   Wound Pressure Injury Ischium Left  Wound Pressure Injury Perineum  Wound Pressure Injury Sacrum  Wound Pressure Injury Hip Left;Lateral  Wound cleansed with:  Dakin's 0.125%  Applied secondary dressing:  Gauze and ABD  Wound secured with:  Tape  Home care instructions:  Dakin's soaked gauze, ABD, medfix tape

## 2024-05-14 DIAGNOSIS — E10.22 TYPE 1 DIABETES MELLITUS WITH CHRONIC KIDNEY DISEASE ON CHRONIC DIALYSIS (HCC): ICD-10-CM

## 2024-05-14 DIAGNOSIS — Z99.2 TYPE 1 DIABETES MELLITUS WITH CHRONIC KIDNEY DISEASE ON CHRONIC DIALYSIS (HCC): ICD-10-CM

## 2024-05-14 DIAGNOSIS — L98.429 ULCER OF SACRAL REGION, STAGE 4 (HCC): ICD-10-CM

## 2024-05-14 DIAGNOSIS — N18.6 TYPE 1 DIABETES MELLITUS WITH CHRONIC KIDNEY DISEASE ON CHRONIC DIALYSIS (HCC): ICD-10-CM

## 2024-05-14 RX ORDER — UREA 10 %
1 LOTION (ML) TOPICAL DAILY
Qty: 90 TABLET | Refills: 1 | Status: SHIPPED | OUTPATIENT
Start: 2024-05-14

## 2024-05-20 ENCOUNTER — DOCUMENTATION (OUTPATIENT)
Dept: WOUND CARE | Facility: CLINIC | Age: 44
End: 2024-05-20

## 2024-05-23 ENCOUNTER — OFFICE VISIT (OUTPATIENT)
Dept: WOUND CARE | Facility: CLINIC | Age: 44
End: 2024-05-23
Payer: MEDICARE

## 2024-05-23 VITALS — DIASTOLIC BLOOD PRESSURE: 72 MMHG | HEART RATE: 88 BPM | TEMPERATURE: 96.7 F | SYSTOLIC BLOOD PRESSURE: 110 MMHG

## 2024-05-23 DIAGNOSIS — L89.894 PRESSURE ULCER OF OTHER SITE, STAGE 4 (HCC): ICD-10-CM

## 2024-05-23 DIAGNOSIS — G82.20 PARAPLEGIA (HCC): ICD-10-CM

## 2024-05-23 DIAGNOSIS — L89.324 PRESSURE INJURY OF LEFT ISCHIUM, STAGE 4 (HCC): ICD-10-CM

## 2024-05-23 DIAGNOSIS — L89.154 PRESSURE INJURY OF SACRAL REGION, STAGE 4 (HCC): Primary | ICD-10-CM

## 2024-05-23 DIAGNOSIS — E10.22 TYPE 1 DIABETES MELLITUS WITH CHRONIC KIDNEY DISEASE ON CHRONIC DIALYSIS (HCC): ICD-10-CM

## 2024-05-23 DIAGNOSIS — N18.6 TYPE 1 DIABETES MELLITUS WITH CHRONIC KIDNEY DISEASE ON CHRONIC DIALYSIS (HCC): ICD-10-CM

## 2024-05-23 DIAGNOSIS — Z99.2 TYPE 1 DIABETES MELLITUS WITH CHRONIC KIDNEY DISEASE ON CHRONIC DIALYSIS (HCC): ICD-10-CM

## 2024-05-23 DIAGNOSIS — L89.224 PRESSURE ULCER OF LEFT HIP, STAGE 4 (HCC): ICD-10-CM

## 2024-05-23 PROCEDURE — 97598 DBRDMT OPN WND ADDL 20CM/<: CPT | Performed by: NURSE PRACTITIONER

## 2024-05-23 PROCEDURE — 97597 DBRDMT OPN WND 1ST 20 CM/<: CPT | Performed by: NURSE PRACTITIONER

## 2024-05-23 RX ORDER — LIDOCAINE HYDROCHLORIDE 40 MG/ML
5 SOLUTION TOPICAL ONCE
Status: COMPLETED | OUTPATIENT
Start: 2024-05-23 | End: 2024-05-23

## 2024-05-23 RX ADMIN — LIDOCAINE HYDROCHLORIDE 5 ML: 40 SOLUTION TOPICAL at 13:49

## 2024-05-23 NOTE — PROGRESS NOTES
Wound Procedure Treatment    Performed by: Rosie Butler RN  Authorized by: ERNESTO Avendano    Associated wounds:   Wound Pressure Injury Ischium Left  Wound Pressure Injury Perineum  Wound Pressure Injury Sacrum  Wound cleansed with:  Dakin's 0.25%  Applied primary dressing:  Calcium alginate and Silver  Applied secondary dressing:  ABD  Dressing secured with:  Tape

## 2024-05-23 NOTE — PROGRESS NOTES
Wound Procedure Treatment Pressure Injury Left;Lateral Hip    Performed by: Rosie Butler RN  Authorized by: ERNESTO Avendano    Associated wounds:   Wound Pressure Injury Hip Left;Lateral  Wound cleansed with:  Dakin's 0.25%  Applied primary dressing:  Silver and Calcium alginate  Applied secondary dressing:  ABD  Dressing secured with:  Tape  Comments:  Aquacel AG rope

## 2024-05-23 NOTE — PATIENT INSTRUCTIONS
Orders Placed This Encounter   Procedures    Wound cleansing and dressings     Wound cleansing and dressings   Wash your hands with soap and water.  Remove old dressing, discard into plastic bag and place in trash.    Cleanse the wounds with Dakin's solution if there is an odor, IF NO ODOR, cleanse with normal saline or wound wash prior to applying a clean dressing.   Do not use tissue or cotton balls. Do not scrub the wound. Pat dry using gauze.   Shower no; do not get dressing wet      Left Hip Wound:   Cleanse with Dakin's  Pack the wound lightly with Aquacel rope  Cover with  ABD over top   Secure with Medfix tape.   Change dressing daily and top dressing PRN for breakthrough drainage (visiting nurses to do twice per week and family in between)      Sacral, Left ischium, and perineal wound:   Cleanse with Dakin's   Apply silver alginate to the wound bed   Cover with ABD over top   Secure with Medfix tape.   Change dressing daily and top dressing PRN for breakthrough drainage (visiting nurses to do twice per week and family in between)         Continue using Clinitron bed       Continue NO PRESSURE TO ALL WOUNDS AS MUCH AS POSSIBLE, ESPECIALLY PERINEAL WOUND   LIMIT SITTING UPRIGHT IN WHEELCHAIR ON THIS WOUND            Continue visiting nurse skilled 2 x per week for wound care dressing changes.                                  Follow up at the wound center in 3-4 weeks.     Standing Status:   Future     Standing Expiration Date:   5/30/2024

## 2024-05-23 NOTE — PROGRESS NOTES
Patient ID: Ovidio Parkinson Jr. is a 43 y.o. male Date of Birth 1980     Chief Complaint  Chief Complaint   Patient presents with    Follow Up Wound Care Visit     Pt is here for a follow up of his wound to his sacral region. No concerns since his last visit.       Allergies  Chlorcyclizine, Chlorhexidine, Ciprofloxacin hcl, Cymbalta [duloxetine hcl], Dm-doxylamine-acetaminophen, Doxycycline, Gabapentin, Hydrocortisone, Lactose - food allergy, Lyrica [pregabalin], Milk (cow), Penicillins, Polymyxin b, Promethazine-phenyleph-codeine, Quinidine, Cefepime, and Rosuvastatin    Assessment:     Diagnoses and all orders for this visit:    Pressure injury of sacral region, stage 4 (HCC)  -     Wound cleansing and dressings; Future  -     lidocaine (XYLOCAINE) 4 % topical solution 5 mL  -     Wound Procedure Treatment Pressure Injury Left;Lateral Hip  -     Wound Procedure Treatment    Pressure injury of left ischium, stage 4 (HCC)  -     Wound cleansing and dressings; Future  -     lidocaine (XYLOCAINE) 4 % topical solution 5 mL  -     Wound Procedure Treatment Pressure Injury Left;Lateral Hip  -     Wound Procedure Treatment    Pressure ulcer of left hip, stage 4 (HCC)  -     Wound cleansing and dressings; Future  -     lidocaine (XYLOCAINE) 4 % topical solution 5 mL  -     Wound Procedure Treatment Pressure Injury Left;Lateral Hip    Pressure ulcer of other site, stage 4 (HCC)  -     Wound cleansing and dressings; Future  -     lidocaine (XYLOCAINE) 4 % topical solution 5 mL  -     Wound Procedure Treatment Pressure Injury Left;Lateral Hip  -     Wound Procedure Treatment    Paraplegia (HCC)    Type 1 diabetes mellitus with chronic kidney disease on chronic dialysis ([facility])    Other orders  -     Debridement Pressure Injury Left Ischium  -     Debridement Pressure Injury Perineum  -     Debridement Pressure Injury Sacrum  -     Debridement              Debridement   Wound Pressure Injury Ischium Left    Universal  "Protocol:  Consent: Written consent obtained.  Consent given by: patient  Time out: Immediately prior to procedure a \"time out\" was called to verify the correct patient, procedure, equipment, support staff and site/side marked as required.  Timeout called at: 5/23/2024 2:25 PM.  Patient identity confirmed: verbally with patient    Debridement Details  Performed by: NP  Debridement type: selective  Pain control: lidocaine 4%      Post-debridement measurements  Length (cm): 7  Width (cm): 6  Depth (cm): 0.1  Percent debrided: 100%  Surface Area (cm^2): 42  Area Debrided (cm^2): 42  Volume (cm^3): 4.2    Devitalized tissue debrided: biofilm, fibrin and slough  Instrument(s) utilized: curette  Bleeding: small  Hemostasis obtained with: pressure  Procedural pain (0-10): 0  Post-procedural pain: 0   Response to treatment: procedure was tolerated well    Debridement   Wound Pressure Injury Perineum    Universal Protocol:  Consent: Written consent obtained.  Consent given by: patient  Time out: Immediately prior to procedure a \"time out\" was called to verify the correct patient, procedure, equipment, support staff and site/side marked as required.  Timeout called at: 5/23/2024 2:26 PM.  Patient identity confirmed: verbally with patient    Debridement Details  Performed by: NP  Debridement type: selective  Pain control: lidocaine 4%      Post-debridement measurements  Length (cm): 8.5  Width (cm): 6.7  Depth (cm): 1.4  Percent debrided: 100%  Surface Area (cm^2): 56.95  Area Debrided (cm^2): 56.95  Volume (cm^3): 79.73    Devitalized tissue debrided: biofilm, fibrin and slough  Instrument(s) utilized: curette  Bleeding: small  Hemostasis obtained with: pressure  Procedural pain (0-10): 0  Post-procedural pain: 0   Response to treatment: procedure was tolerated well    Debridement   Wound Pressure Injury Sacrum    Universal Protocol:  Consent: Written consent obtained.  Consent given by: patient  Time out: Immediately prior " "to procedure a \"time out\" was called to verify the correct patient, procedure, equipment, support staff and site/side marked as required.  Timeout called at: 5/23/2024 2:27 PM.  Patient identity confirmed: verbally with patient    Debridement Details  Performed by: NP  Debridement type: selective  Pain control: lidocaine 4%      Post-debridement measurements  Length (cm): 3  Width (cm): 10  Depth (cm): 0.6  Percent debrided: 100%  Surface Area (cm^2): 30  Area Debrided (cm^2): 30  Volume (cm^3): 18    Devitalized tissue debrided: biofilm, fibrin and slough  Instrument(s) utilized: curette  Bleeding: small  Hemostasis obtained with: pressure  Procedural pain (0-10): 0  Post-procedural pain: 0   Response to treatment: procedure was tolerated well    Debridement   Wound Pressure Injury Hip Left;Lateral    Universal Protocol:  Consent: Written consent obtained.  Consent given by: patient  Time out: Immediately prior to procedure a \"time out\" was called to verify the correct patient, procedure, equipment, support staff and site/side marked as required.  Timeout called at: 5/23/2024 3:24 PM.  Patient identity confirmed: verbally with patient    Debridement Details  Performed by: NP  Debridement type: selective  Pain control: lidocaine 4%      Post-debridement measurements  Length (cm): 3  Width (cm): 1.8  Depth (cm): 1.2  Percent debrided: 100%  Surface Area (cm^2): 5.4  Area Debrided (cm^2): 5.4  Volume (cm^3): 6.48    Devitalized tissue debrided: biofilm, fibrin and slough  Instrument(s) utilized: curette  Bleeding: small  Hemostasis obtained with: pressure  Procedural pain (0-10): 0  Post-procedural pain: 0   Response to treatment: procedure was tolerated well        Plan:  1.  F/U palliative visit.  Wounds debrided.  Wound beds clean and stable with granular wound bases.  2.  Will continue to cleanse wounds with Dakin's prior to each dressing change.  Will change primary dressing to silver alginate gently tucked into " all wounds covered with ABDs and tape change daily and as needed for drainage management  3.  A1C results reviewed with the patient today.  Patient maintaining tight glycemic control  4.  Continue frequent offloading of pressure from wounds and Clinitron bed  5.  Continue maintaining a diet high in protein to enhance wound healing  6.  Patient will follow-up in the next 3 to 4 weeks     Wound Pressure Injury Ischium Left (Active)   Wound Image Images linked 05/23/24 1320   Wound Description Pink;Pale;Epithelialization 05/23/24 1320   Pressure Injury Stage 4 05/23/24 1320   Irene-wound Assessment Intact;Scar Tissue 05/23/24 1320   Wound Length (cm) 7 cm 05/23/24 1320   Wound Width (cm) 6 cm 05/23/24 1320   Wound Depth (cm) 1.1 cm 05/23/24 1320   Wound Surface Area (cm^2) 42 cm^2 05/23/24 1320   Wound Volume (cm^3) 46.2 cm^3 05/23/24 1320   Calculated Wound Volume (cm^3) 46.2 cm^3 05/23/24 1320   Change in Wound Size % -144.44 05/23/24 1320   Number of underminings 2 05/23/24 1320   Undermining 1 0.6 05/23/24 1320   Undermining 2 1.8 05/23/24 1320   Undermining 1 is depth extending from 9-11 05/23/24 1320   Undermining 2 is depth extending from 3-5 05/23/24 1320   Drainage Amount Large 05/23/24 1320   Drainage Description Serosanguineous;Mendieta 05/23/24 1320   Non-staged Wound Description Full thickness 05/23/24 1320   Treatments Irrigation with NSS 05/23/24 1320   Wound packed? No 05/23/24 1320   Dressing Status Removed 05/23/24 1320       Wound Pressure Injury Perineum (Active)   Wound Image Images linked 05/23/24 1319   Wound Description Pink;Epithelialization 05/23/24 1319   Pressure Injury Stage 4 05/23/24 1319   Irene-wound Assessment Maceration;Scar Tissue 05/23/24 1319   Wound Length (cm) 8.5 cm 05/23/24 1319   Wound Width (cm) 6.7 cm 05/23/24 1319   Wound Depth (cm) 1.4 cm 05/23/24 1319   Wound Surface Area (cm^2) 56.95 cm^2 05/23/24 1319   Wound Volume (cm^3) 79.73 cm^3 05/23/24 1319   Calculated Wound Volume  (cm^3) 79.73 cm^3 05/23/24 1319   Change in Wound Size % -659.33 05/23/24 1319   Number of underminings 1 05/23/24 1319   Undermining 1 5.3 05/23/24 1319   Undermining 1 is depth extending from 11-5, deepest at 4 05/23/24 1319   Drainage Amount Large 05/23/24 1319   Drainage Description Serosanguineous;Tan 05/23/24 1319   Non-staged Wound Description Full thickness 05/23/24 1319   Treatments Irrigation with NSS 05/23/24 1319   Wound packed? No 05/23/24 1319   Dressing Status Removed 05/23/24 1319       Wound Pressure Injury Sacrum (Active)   Wound Image Images linked 05/23/24 1319   Wound Description Epithelialization;Pink;Yellow 05/23/24 1319   Pressure Injury Stage 4 05/23/24 1319   Irene-wound Assessment Scar Tissue;Dry 05/23/24 1319   Wound Length (cm) 3 cm 05/23/24 1319   Wound Width (cm) 10 cm 05/23/24 1319   Wound Depth (cm) 0.6 cm 05/23/24 1319   Wound Surface Area (cm^2) 30 cm^2 05/23/24 1319   Wound Volume (cm^3) 18 cm^3 05/23/24 1319   Calculated Wound Volume (cm^3) 18 cm^3 05/23/24 1319   Change in Wound Size % 76 05/23/24 1319   Number of underminings 1 05/23/24 1319   Undermining 1 1.2 05/23/24 1319   Undermining 1 is depth extending from from 7-8 05/23/24 1319   Drainage Amount Large 05/23/24 1316   Drainage Description Serosanguineous;Tan 05/23/24 1316   Non-staged Wound Description Full thickness 05/23/24 1319   Treatments Irrigation with NSS 05/23/24 1319   Wound packed? No 05/23/24 1319   Dressing Status Removed 05/23/24 1319       Wound Pressure Injury Hip Left;Lateral (Active)   Wound Image Images linked 05/23/24 1320   Wound Description Pink;White 05/23/24 1320   Pressure Injury Stage 4 05/23/24 1320   Irene-wound Assessment Scar Tissue;Intact 05/23/24 1320   Wound Length (cm) 3 cm 05/23/24 1320   Wound Width (cm) 1.8 cm 05/23/24 1320   Wound Depth (cm) 1.2 cm 05/23/24 1320   Wound Surface Area (cm^2) 5.4 cm^2 05/23/24 1320   Wound Volume (cm^3) 6.48 cm^3 05/23/24 1320   Calculated Wound Volume  (cm^3) 6.48 cm^3 05/23/24 1320   Number of underminings 1 05/23/24 1320   Undermining 1 4.5 05/23/24 1320   Undermining 1 is depth extending from 6-12, deepest at 10 05/23/24 1320   Drainage Amount Moderate 05/23/24 1320   Drainage Description Tan 05/23/24 1320   Non-staged Wound Description Full thickness 05/23/24 1320   Treatments Irrigation with NSS 05/23/24 1320   Wound packed? Yes 05/23/24 1320   Packing- # removed 1 05/23/24 1320   Dressing Status Removed 05/23/24 1320       Wound Pressure Injury Ischium Left (Active)   No Date First Assessed or Time First Assessed found.   Primary Wound Type: Pressure Injury  Location: Ischium  Wound Location Orientation: Left  Wound Description (Comments): Left Iscial Stage 4  Wound Outcome: Palliative       Wound Pressure Injury Perineum (Active)   No Date First Assessed or Time First Assessed found.   Primary Wound Type: Pressure Injury  Location: Perineum  Wound Description (Comments): Stage 4  Wound Outcome: Palliative       Wound Pressure Injury Sacrum (Active)   No Date First Assessed or Time First Assessed found.   Pre-Existing Wound: Yes  Primary Wound Type: Pressure Injury  Location: Sacrum  Wound Description (Comments): Stage 4  Wound Outcome: Palliative       Wound Pressure Injury Hip Left;Lateral (Active)   No Date First Assessed or Time First Assessed found.   Primary Wound Type: Pressure Injury  Location: Hip  Wound Location Orientation: Left;Lateral  Wound Outcome: Palliative       [REMOVED] Pressure Ulcer 09/01/16 Hip Left (Removed)   Resolved Date: 08/13/20  Date First Assessed/Time First Assessed: 09/01/16 0259   Pre-Existing Wound: Yes  Location: Hip  Orientation: Left       [REMOVED] Pressure Ulcer 09/01/16 Sacrum (Removed)   Resolved Date: 08/13/20  Date First Assessed/Time First Assessed: 09/01/16 0409   Pre-Existing Wound: Yes  Location: Sacrum       [REMOVED] Pressure Ulcer 03/19/17 Leg Lower;Right Unstagable to lateral lower right stump (Removed)    Resolved Date: 08/13/20  Date First Assessed/Time First Assessed: 03/19/17 1637   Pre-Existing Wound: Yes  Location: Leg  Orientation: Lower;Right  Wound Description (Comments): Unstagable to lateral lower right stump       [REMOVED] Pressure Ulcer 03/23/17 Hip Left Stage 2 skin breakdown (Removed)   Resolved Date: 08/13/20  Date First Assessed/Time First Assessed: 03/23/17 1700   Location: Hip  Orientation: Left  Wound Description (Comments): Stage 2 skin breakdown       [REMOVED] Pressure Ulcer 06/29/17 Foot Left healing stage III (Removed)   Resolved Date: 08/13/20  Date First Assessed/Time First Assessed: 06/29/17 0230   Pre-Existing Wound: Yes  Location: Foot  Orientation: Left  Wound Description (Comments): healing stage III       [REMOVED] Pressure Ulcer 06/29/17 Foot Left healing stage III (Removed)   Resolved Date: 08/13/20  Date First Assessed/Time First Assessed: 06/29/17 0230   Pre-Existing Wound: Yes  Location: Foot  Orientation: Left  Wound Description (Comments): healing stage III       [REMOVED] Pressure Ulcer Ischium Right (Removed)   Resolved Date: 08/13/20  No Date First Assessed or Time First Assessed found.   Pre-Existing Wound: Yes  Location: Ischium  Orientation: Right       [REMOVED] Wound 09/02/16 Other (Comment) Knee Anterior;Right (Removed)   Resolved Date: 08/13/20  Date First Assessed/Time First Assessed: 09/02/16 1811   Traumatic Wound Type: (c) Other (Comment)  Location: Knee  Wound Location Orientation: Anterior;Right  Dressing Status: Intact;Clean;Dry       [REMOVED] Wound 06/29/17  inferior of anus, appears to be stage 3  (Removed)   Resolved Date: 08/13/20  Date First Assessed/Time First Assessed: 06/29/17 0041   Location: (c)   Wound Description (Comments): inferior of anus, appears to be stage 3   Dressing Status: Intact;Reinforced       [REMOVED] Wound 09/18/17 Other (Comment) Penis ulcer, leaking pus (Removed)   Resolved Date: 08/13/20  Date First Assessed/Time First  Assessed: 09/18/17 2137   Traumatic Wound Type: Other (Comment)  Location: Penis  Wound Description (Comments): ulcer, leaking pus  Dressing Status: Open to air       [REMOVED] Incision 09/01/16 Shoulder Right (Removed)   Resolved Date: 08/13/20  Date First Assessed/Time First Assessed: 09/01/16 0441   Pre-Existing Wound: Yes  Location: Shoulder  Wound Location Orientation: Right       [REMOVED] Other Ulcers 09/01/16 Foot Anterior;Outer (Removed)   Resolved Date: 08/13/20  Date First Assessed/Time First Assessed: 09/01/16 1605   Location: Foot  Orientation: Anterior;Outer       [REMOVED] Other Ulcers 09/20/17 Foot Mid;Left (Removed)   Resolved Date: 08/13/20  Date First Assessed/Time First Assessed: 09/20/17 1530   Location: Foot  Orientation: Mid;Left  Dressing Status: Clean;Dry;Intact       [REMOVED] Wound Pressure Injury Ischium Right (Removed)   Resolved Date: 01/28/21  No Date First Assessed or Time First Assessed found.   Pre-Existing Wound: Yes  Primary Wound Type: Pressure Injury  Location: Ischium  Wound Location Orientation: Right  Wound Description (Comments): Right Ischium Stage 3  Wo...       [REMOVED] Wound 03/08/21 Mouth N/A (Removed)   Resolved Date: 04/22/21  Date First Assessed/Time First Assessed: 03/08/21 1037   Location: Mouth  Wound Location Orientation: N/A       [REMOVED] Wound 05/05/22 Pressure Injury Ischium Right (Removed)   Resolved Date/Resolved Time: 09/01/22 1606  Date First Assessed/Time First Assessed: 05/05/22 1608   Primary Wound Type: Pressure Injury  Location: Ischium  Wound Location Orientation: Right  Wound Outcome: Healed       [REMOVED] Wound 09/01/22 Pressure Injury Scrotum Posterior;Left (Removed)   Resolved Date/Resolved Time: 12/06/22 1425  Date First Assessed/Time First Assessed: 09/01/22 1530   Primary Wound Type: Pressure Injury  Location: Scrotum  Wound Location Orientation: Posterior;Left  Wound Outcome: Healed       [REMOVED] Wound 09/01/22 Pressure Injury Scrotum  Posterior;Right (Removed)   Resolved Date/Resolved Time: 12/06/22 1425  Date First Assessed/Time First Assessed: 09/01/22 1607   Primary Wound Type: Pressure Injury  Location: Scrotum  Wound Location Orientation: Posterior;Right  Wound Outcome: Healed       [REMOVED] Wound 01/04/24 Pressure Injury Scrotum Posterior (Removed)   Resolved Date/Resolved Time: 05/23/24 1341  Date First Assessed: 01/04/24   Primary Wound Type: Pressure Injury  Location: Scrotum  Wound Location Orientation: Posterior  Wound Outcome: Healed       Subjective:      .    F/U palliative visit for multiple stage IV pressure ulcers.  No new complaints.  RN staff report patient's wounds are draining a moderate to large amount of drainage.  Patient's wounds have been managed with Dakin's moistened gauze covered with dry gauze and ABDs and tape change daily.  Patient reports the Dakin's solution has been doing a good job at preventing infection, keeping his wounds clean, and managing odor.  He utilizes a Clinitron bed at home to help offload pressure from his wounds.  He also has been maintaining a diet high in protein.  Patient also reports he has been maintaining tight glycemic control. He denies any pain, fevers, or chills.        The following portions of the patient's history were reviewed and updated as appropriate: He  has a past medical history of Acute pulmonary edema ([facility]) (1/13/2022), Ambulatory dysfunction, Anemia, Anemia, iron deficiency, Asymptomatic bacteriuria (9/25/2017), Atrial fibrillation ([facility]), AVM (arteriovenous malformation) of duodenum, acquired, Bacteriuria, asymptomatic, Bipolar disorder ([facility]), Chronic deep vein thrombosis (DVT) ([facility]), Chronic indwelling Ortega catheter, Chronic kidney disease, Chronic pain, Chronic pain disorder, Chronic suprapubic catheter ([facility]), Clostridium difficile infection (08/11/2016), Colostomy on examination ([facility]), Decubitus ulcer, Delirium, Diabetes mellitus ([facility]), GERD (gastroesophageal reflux  disease), Hemodialysis patient ([facility]), Hypertension, Memory impairment (2011), Neurogenic bladder, OAB (overactive bladder), Paraplegia ([facility]), Penile abscess, S/P unilateral BKA (below knee amputation) ([facility]), Sebaceous cyst (removed in 2017), Seizures ([facility]), Shortness of breath, Tobacco abuse, 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  He   Patient Active Problem List    Diagnosis Date Noted    Encounter for general adult medical examination with abnormal findings 03/05/2024    Herpes zoster vaccination declined 03/05/2024    COVID-19 vaccination declined 03/05/2024    Influenza vaccination declined 03/05/2024    Hepatitis A vaccination declined 03/05/2024    Hospital discharge follow-up 07/22/2023    Rheumatoid arthritis ([facility]) 07/22/2023    C. difficile colitis 06/23/2022    Charcot's arthropathy 03/03/2022    Acute deep vein thrombosis (DVT) of right upper extremity ([facility]) 02/18/2022    Immunocompromised state ([facility]) 01/13/2022    Severe protein-calorie malnutrition ([facility]) 01/13/2022    Chronic osteomyelitis ([facility]) 01/13/2022    LVH (left ventricular hypertrophy) 12/20/2021    Wounds, multiple     Irritable bowel syndrome 07/28/2021    Pressure ulcer of other site, stage 4 ([facility]) 03/12/2021    Pressure ulcer of left hip, stage 4 ([facility]) 03/12/2021    Chronic diastolic heart failure ([facility]) 02/10/2021    Pressure ulcer of sacral region, stage 4 ([facility]) 08/13/2020    NICM (nonischemic cardiomyopathy) ([facility]) 04/30/2020    Prolonged Q-T interval on ECG 04/14/2020    Bipolar depression ([facility]) 02/27/2020    Seizure ([facility]) 01/20/2020    Onychomycosis 01/10/2020    Wheelchair dependent 08/07/2019    Dialysis patient ([facility]) 05/08/2019    Insulin long-term use ([facility]) 05/08/2019    Hyperlipidemia 09/14/2018    Secondary hyperparathyroidism of renal origin ([facility]) 07/13/2018    Chronic narcotic dependence ([facility]) 02/07/2018    End stage renal disease ([facility]) 02/07/2018    Urinary retention 09/26/2017     History of Clostridium difficile infection 09/25/2017    Stage 5 chronic kidney disease on chronic dialysis ([facility]) 09/18/2017    AVM (arteriovenous malformation) of duodenum, acquired 08/30/2017    Iron deficiency anemia 07/03/2017    Pressure injury of left ischium, stage 4 ([facility]) 07/03/2017    HTN (hypertension), benign 07/03/2017    Neurogenic bladder 07/03/2017    GERD (gastroesophageal reflux disease) 07/03/2017    Chronic pain 07/03/2017    Wound healing, delayed 06/29/2017    Pustular folliculitis 01/19/2017    Ulcer of sacral region, stage 4 ([facility]) 09/01/2016    Paraplegia ([facility])     Atrial fibrillation ([facility])     Chronic suprapubic catheter ([facility])     Colostomy care ([facility])     S/P unilateral BKA (below knee amputation) ([facility])     Tobacco abuse     Type 1 diabetes mellitus with chronic kidney disease on chronic dialysis ([facility])     Diabetic gastroparesis associated with type 1 diabetes mellitus  ([facility]) 01/06/2016    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  His family history includes Diabetes in his paternal grandfather; Hyperlipidemia in his mother; Hypertension in his mother; Leukemia in his brother.  He  reports that he has been smoking cigars. He has been exposed to tobacco smoke. He has never used smokeless tobacco. He reports that he does not currently use alcohol. He reports that he does not currently use drugs after having used the following drugs: Marijuana.  Current Outpatient Medications   Medication Sig Dispense Refill    Alcohol Swabs (ALCOHOL PADS) 70 % PADS  by Does not apply route 5 (five) times a day 300 each 5    amLODIPine (NORVASC) 10 mg tablet TAKE 1 TABLET BY MOUTH EVERY DAY 90 tablet 1    ammonium lactate (LAC-HYDRIN) 12 % cream       apixaban (ELIQUIS) 5 mg Take 1 tablet (5 mg total) by mouth 2 (two) times a day 180 tablet 3    atorvastatin (LIPITOR) 20 mg tablet TAKE 1 TABLET BY MOUTH EVERYDAY AT BEDTIME 90 tablet 1    azelaic acid (Azelex) 20 % cream Apply topically 2 (two) times a day 50 g 0    B Complex-C-Folic Acid (Super B-Complex/Vit C/FA) TABS TAKE 1 TABLET BY MOUTH EVERY DAY AS DIRECTED 90 tablet 5    SUSAN MICROLET LANCETS lancets Use as instructed to check blood sugar 4 times a day  Dx e10.29 200 each 5    bisacodyl (DULCOLAX) 5 mg EC tablet Take 1 tablet (5 mg total) by mouth once for 1 dose 2 tablet 0    Blood Glucose Monitoring Suppl ([employer] CONTOUR NEXT USB MONITOR) w/Device KIT Testing 4 times a day 1 kit 0    calcitriol (ROCALTROL) 0.5 MCG capsule Take 2 mcg by mouth      calcium acetate (PHOSLO) capsule TAKE 2 CAPSULES BY MOUTH THREE TIMES DAILY WITH MEALS      carvedilol (COREG) 25 mg tablet TAKE 1 TABLET BY MOUTH TWICE A  tablet 1    cetirizine (ZyrTEC) 10 mg tablet TAKE 1 TABLET BY MOUTH EVERY DAY 90 tablet 1    Cholecalciferol (VITAMIN D-3) 1000 units CAPS Take 2 capsules by mouth daily 30 capsule 0    clindamycin (CLINDAGEL) 1 % gel APPLY TO AFFECTED AREA TWICE A DAY 60 g 2    Contour Next Test test strip USE TO TEST BLOOD SUGAR 3 TIMES DAILY. CONTOUR NEXT STRIPS. E10.29      cyanocobalamin ([facility] Vitamin B-12) 1000 MCG tablet Take 1 tablet (1,000 mcg total) by mouth daily 90 tablet 3    cyclobenzaprine (FLEXERIL) 5 mg tablet       dexlansoprazole (DEXILANT) 30 MG capsule TAKE 1 CAPSULE BY MOUTH EVERY DAY 90 capsule 1    dicyclomine (BENTYL) 10 mg capsule TAKE 1 CAPSULE BY MOUTH 3 TIMES A DAY BEFORE MEALS 270 capsule 1    Disposable Gloves MISC Use 7 times a day, 4 boxes of gloves XL  Dx type 1 DM, paraplegia 4 each 11    doxazosin  (CARDURA) 2 mg tablet TAKE 1 TABLET BY MOUTH EVERY EVENING. 90 tablet 1    epoetin kaushik (EPOGEN,PROCRIT) 20,000 units/mL Inject 10,000 Units under the skin      epoetin kaushik (EPOGEN,PROCRIT) 20,000 units/mL Inject 5,000 Units under the skin      ferrous sulfate 324 (65 Fe) mg Take 1 tablet (324 mg total) by mouth in the morning 90 tablet 3    ferrous sulfate 325 (65 Fe) mg tablet Take 1 tablet (325 mg total) by mouth 3 (three) times a day 90 tablet 3    Fluocinolone Acetonide Body 0.01 % OIL Apply 2 drops topically daily      folic acid (FOLVITE) 1 mg tablet Take 1,000 mcg by mouth daily      Gvoke PFS 1 MG/0.2ML SOSY       hydroxychloroquine (PLAQUENIL) 200 mg tablet Take 200 mg by mouth daily      hydrOXYzine HCL (ATARAX) 50 mg tablet TAKE 1 TABLET (50 MG TOTAL) BY MOUTH 2 (TWO) TIMES A DAY AS NEEDED FOR ITCHING OR ANXIETY 180 tablet 1    Incontinence Supply Disposable (Incontinence Brief Large) MISC Use 6 (six) times a day Size xl 180 each 11    Incontinence Supply Disposable (Undergarment) MISC Use 6 a day, 5 boxes, xl  Dx R51, paraplegia 5 each 11    Incontinence Supply Disposable (Underpads) MISC Use 2 a day 5 each 11    insulin lispro protamine-insulin lispro (HumaLOG 50-50) 100 units/mL Inject under the skin 2 (two) times a day before meals      ketoconazole (NIZORAL) 2 % cream Apply to rash. 15 g 1    LEVEMIR FLEXTOUCH 100 units/mL injection pen Inject 14 Units under the skin 2 (two) times a day 15 pen 3    Lokelma 10 g PACK       losartan (COZAAR) 100 MG tablet TAKE 1 TABLET BY MOUTH EVERY DAY 90 tablet 1    metroNIDAZOLE (METROGEL) 0.75 % gel Apply topically 2 (two) times a day 45 g 0    Misc. Devices (Wheelchair Cushion) MISC Use daily 1 each 0    Misc. Devices (WHEELCHAIR) MISC by Does not apply route daily Wheelchair ramp, dx g82.20 1 each 0    Multiple Vitamin (Daily-Enriqueta Multivitamin) TABS TAKE 1 TABLET BY MOUTH EVERY DAY 30 tablet 8    NovoLOG FlexPen 100 units/mL injection pen INJECT 3 UNITS UNDER  THE SKIN 3 (THREE) TIMES A DAY BEFORE MEALS. (Patient not taking: Reported on 7/13/2023)      ondansetron (ZOFRAN-ODT) 4 mg disintegrating tablet Take 1 tablet (4 mg total) by mouth every 6 (six) hours as needed for nausea or vomiting 30 tablet 2    oxybutynin (DITROPAN XL) 15 MG 24 hr tablet       oxybutynin (DITROPAN) 5 mg tablet Take 3 tablets (15 mg total) by mouth daily 270 tablet 1    oxyCODONE (ROXICODONE) 10 MG TABS TAKE ONE TABLET BY MOUTH EVERY 12 HOURS AS NEEDED FOR PAIN. ONGOING THERAPY.      polyethylene glycol (GLYCOLAX) 17 GM/SCOOP powder Take as directed as per written office instructions 238 g 0    polyethylene glycol (GOLYTELY) 4000 mL solution Take 4,000 mL by mouth once for 1 dose 4000 mL 0    risperiDONE (RisperDAL) 0.5 mg tablet Take 1 tablet (0.5 mg total) by mouth 2 (two) times a day 180 tablet 1    sertraline (ZOLOFT) 25 mg tablet TAKE 1 TABLET (25 MG TOTAL) BY MOUTH DAILY. 90 tablet 1    sodium hypochlorite (DAKIN'S HALF-STRENGTH) external solution Apply 1 Application topically daily Soak gauze and pack into wounds daily. 473 mL 0    Sodium Zirconium Cyclosilicate 10 g PACK Take 10 g by mouth daily      sucralfate (CARAFATE) 1 g/10 mL suspension Take 10 mL (1 g total) by mouth 4 (four) times a day (with meals and at bedtime) 420 mL 1    SUPER B COMPLEX & C TABS TAKE 1 CAPSULE BY MOUTH ONCE FOR 1 DOSE AS DIRECTED 90 tablet 2    Zinc Sulfate 220 (50 Zn) MG TABS TAKE 1 TABLET BY MOUTH EVERY DAY 90 tablet 1     No current facility-administered medications for this visit.     He is allergic to chlorcyclizine, chlorhexidine, ciprofloxacin hcl, cymbalta [duloxetine hcl], dm-doxylamine-acetaminophen, doxycycline, gabapentin, hydrocortisone, lactose - food allergy, lyrica [pregabalin], milk (cow), penicillins, polymyxin b, promethazine-phenyleph-codeine, quinidine, cefepime, and rosuvastatin..    Review of Systems   Constitutional: Negative.    HENT:  Negative for ear pain and hearing loss.    Eyes:   Negative for pain.   Respiratory:  Negative for chest tightness and shortness of breath.    Cardiovascular:  Negative for chest pain, palpitations and leg swelling.   Gastrointestinal:  Negative for diarrhea, nausea and vomiting.   Genitourinary:  Negative for dysuria.   Musculoskeletal:  Positive for gait problem.   Skin:  Positive for wound.   Neurological:  Positive for numbness. Negative for tremors and weakness.   Psychiatric/Behavioral:  Negative for behavioral problems, confusion and suicidal ideas.          Objective:       Wound Pressure Injury Ischium Left (Active)   Wound Image Images linked 05/23/24 1320   Wound Description Pink;Pale;Epithelialization 05/23/24 1320   Pressure Injury Stage 4 05/23/24 1320   Irene-wound Assessment Intact;Scar Tissue 05/23/24 1320   Wound Length (cm) 7 cm 05/23/24 1320   Wound Width (cm) 6 cm 05/23/24 1320   Wound Depth (cm) 1.1 cm 05/23/24 1320   Wound Surface Area (cm^2) 42 cm^2 05/23/24 1320   Wound Volume (cm^3) 46.2 cm^3 05/23/24 1320   Calculated Wound Volume (cm^3) 46.2 cm^3 05/23/24 1320   Change in Wound Size % -144.44 05/23/24 1320   Number of underminings 2 05/23/24 1320   Undermining 1 0.6 05/23/24 1320   Undermining 2 1.8 05/23/24 1320   Undermining 1 is depth extending from 9-11 05/23/24 1320   Undermining 2 is depth extending from 3-5 05/23/24 1320   Drainage Amount Large 05/23/24 1320   Drainage Description Serosanguineous;Mendieta 05/23/24 1320   Non-staged Wound Description Full thickness 05/23/24 1320   Treatments Irrigation with NSS 05/23/24 1320   Wound packed? No 05/23/24 1320   Dressing Status Removed 05/23/24 1320       Wound Pressure Injury Perineum (Active)   Wound Image Images linked 05/23/24 1319   Wound Description Pink;Epithelialization 05/23/24 1319   Pressure Injury Stage 4 05/23/24 1319   Irene-wound Assessment Maceration;Scar Tissue 05/23/24 1319   Wound Length (cm) 8.5 cm 05/23/24 1319   Wound Width (cm) 6.7 cm 05/23/24 1319   Wound Depth (cm)  1.4 cm 05/23/24 1319   Wound Surface Area (cm^2) 56.95 cm^2 05/23/24 1319   Wound Volume (cm^3) 79.73 cm^3 05/23/24 1319   Calculated Wound Volume (cm^3) 79.73 cm^3 05/23/24 1319   Change in Wound Size % -659.33 05/23/24 1319   Number of underminings 1 05/23/24 1319   Undermining 1 5.3 05/23/24 1319   Undermining 1 is depth extending from 11-5, deepest at 4 05/23/24 1319   Drainage Amount Large 05/23/24 1319   Drainage Description Serosanguineous;Tan 05/23/24 1319   Non-staged Wound Description Full thickness 05/23/24 1319   Treatments Irrigation with NSS 05/23/24 1319   Wound packed? No 05/23/24 1319   Dressing Status Removed 05/23/24 1319       Wound Pressure Injury Sacrum (Active)   Wound Image Images linked 05/23/24 1319   Wound Description Epithelialization;Pink;Yellow 05/23/24 1319   Pressure Injury Stage 4 05/23/24 1319   Irene-wound Assessment Scar Tissue;Dry 05/23/24 1319   Wound Length (cm) 3 cm 05/23/24 1319   Wound Width (cm) 10 cm 05/23/24 1319   Wound Depth (cm) 0.6 cm 05/23/24 1319   Wound Surface Area (cm^2) 30 cm^2 05/23/24 1319   Wound Volume (cm^3) 18 cm^3 05/23/24 1319   Calculated Wound Volume (cm^3) 18 cm^3 05/23/24 1319   Change in Wound Size % 76 05/23/24 1319   Number of underminings 1 05/23/24 1319   Undermining 1 1.2 05/23/24 1319   Undermining 1 is depth extending from from 7-8 05/23/24 1319   Drainage Amount Large 05/23/24 1316   Drainage Description Serosanguineous;Tan 05/23/24 1316   Non-staged Wound Description Full thickness 05/23/24 1319   Treatments Irrigation with NSS 05/23/24 1319   Wound packed? No 05/23/24 1319   Dressing Status Removed 05/23/24 1319       Wound Pressure Injury Hip Left;Lateral (Active)   Wound Image Images linked 05/23/24 1320   Wound Description Pink;White 05/23/24 1320   Pressure Injury Stage 4 05/23/24 1320   Irene-wound Assessment Scar Tissue;Intact 05/23/24 1320   Wound Length (cm) 3 cm 05/23/24 1320   Wound Width (cm) 1.8 cm 05/23/24 1320   Wound Depth  (cm) 1.2 cm 05/23/24 1320   Wound Surface Area (cm^2) 5.4 cm^2 05/23/24 1320   Wound Volume (cm^3) 6.48 cm^3 05/23/24 1320   Calculated Wound Volume (cm^3) 6.48 cm^3 05/23/24 1320   Number of underminings 1 05/23/24 1320   Undermining 1 4.5 05/23/24 1320   Undermining 1 is depth extending from 6-12, deepest at 10 05/23/24 1320   Drainage Amount Moderate 05/23/24 1320   Drainage Description Tan 05/23/24 1320   Non-staged Wound Description Full thickness 05/23/24 1320   Treatments Irrigation with NSS 05/23/24 1320   Wound packed? Yes 05/23/24 1320   Packing- # removed 1 05/23/24 1320   Dressing Status Removed 05/23/24 1320       /72   Pulse 88   Temp (!) 96.7 °F (35.9 °C)                       Wound Instructions:  Orders Placed This Encounter   Procedures    Wound cleansing and dressings     Wound cleansing and dressings   Wash your hands with soap and water.  Remove old dressing, discard into plastic bag and place in trash.    Cleanse the wounds with Dakin's solution if there is an odor, IF NO ODOR, cleanse with normal saline or wound wash prior to applying a clean dressing.   Do not use tissue or cotton balls. Do not scrub the wound. Pat dry using gauze.   Shower no; do not get dressing wet      Left Hip Wound:   Cleanse with Dakin's  Pack the wound lightly with Aquacel rope  Cover with  ABD over top   Secure with Medfix tape.   Change dressing daily and top dressing PRN for breakthrough drainage (visiting nurses to do twice per week and family in between)      Sacral, Left ischium, and perineal wound:   Cleanse with Dakin's   Apply silver alginate to the wound bed   Cover with ABD over top   Secure with Medfix tape.   Change dressing daily and top dressing PRN for breakthrough drainage (visiting nurses to do twice per week and family in between)         Continue using Clinitron bed       Continue NO PRESSURE TO ALL WOUNDS AS MUCH AS POSSIBLE, ESPECIALLY PERINEAL WOUND   LIMIT SITTING UPRIGHT IN WHEELCHAIR  ON THIS WOUND            Continue visiting nurse skilled 2 x per week for wound care dressing changes.                                  Follow up at the wound center in 3-4 weeks.     Standing Status:   Future     Standing Expiration Date:   5/30/2024    Wound Procedure Treatment Pressure Injury Left;Lateral Hip     This order was created via procedure documentation    Wound Procedure Treatment     This order was created via procedure documentation    Debridement Pressure Injury Left Ischium     This order was created via procedure documentation    Debridement Pressure Injury Perineum     This order was created via procedure documentation    Debridement Pressure Injury Sacrum     This order was created via procedure documentation    Debridement     This order was created via procedure documentation        Diagnosis ICD-10-CM Associated Orders   1. Pressure injury of sacral region, stage 4 ([facility])  L89.154 Wound cleansing and dressings     lidocaine (XYLOCAINE) 4 % topical solution 5 mL     Wound Procedure Treatment Pressure Injury Left;Lateral Hip     Wound Procedure Treatment      2. Pressure injury of left ischium, stage 4 ([facility])  L89.324 Wound cleansing and dressings     lidocaine (XYLOCAINE) 4 % topical solution 5 mL     Wound Procedure Treatment Pressure Injury Left;Lateral Hip     Wound Procedure Treatment      3. Pressure ulcer of left hip, stage 4 ([facility])  L89.224 Wound cleansing and dressings     lidocaine (XYLOCAINE) 4 % topical solution 5 mL     Wound Procedure Treatment Pressure Injury Left;Lateral Hip      4. Pressure ulcer of other site, stage 4 ([facility])  L89.894 Wound cleansing and dressings     lidocaine (XYLOCAINE) 4 % topical solution 5 mL     Wound Procedure Treatment Pressure Injury Left;Lateral Hip     Wound Procedure Treatment      5. Paraplegia ([facility])  G82.20       6. Type 1 diabetes mellitus with chronic kidney disease on chronic dialysis ([facility])  E10.22     N18.6     Z99.2

## 2024-05-28 ENCOUNTER — TELEPHONE (OUTPATIENT)
Dept: FAMILY MEDICINE CLINIC | Facility: CLINIC | Age: 44
End: 2024-05-28

## 2024-05-28 NOTE — TELEPHONE ENCOUNTER
PCP SIGNATURE NEEDED FOR [employer]  FORM RECEIVED VIA FAX AND PLACED IN PCP FOLDER TO BE DELIVERED AT ASSIGNED TIMES.    Received: 5/28/24

## 2024-05-29 ENCOUNTER — NURSE TRIAGE (OUTPATIENT)
Age: 44
End: 2024-05-29

## 2024-05-29 NOTE — TELEPHONE ENCOUNTER
"LOV 7/21/22 K Toro early satiety, abd pain  Called in 4/8/24 triaged for bleeding from stoma and scheduled for visit 4/23/24 which he cancelled. I spoke with his mother to schedule appointment and she states previous was cancelled due to illness. Patient rescheduled for urgent 6/6/24 since he is limited due to dialysis appointments.   Answer Assessment - Initial Assessment Questions  1. REASON FOR CALL or QUESTION: \"What is your reason for calling today?\" or \"How can I best help you?\" or \"What question do you have that I can help answer?\"      Call to reschedule urgent appointment for bleeding at stoma site.    Protocols used: Information Only Call - No Triage-ADULT-[place]    "

## 2024-05-29 NOTE — ASSESSMENT & PLAN NOTE
No issues today, follows with [facility] urology, SPT is flushed once weekly to prevent blockages  Takes oxybutynin 15 mg daily for spasms  REFUSE

## 2024-05-29 NOTE — TELEPHONE ENCOUNTER
Regarding: bleeding  ----- Message from Vida LAURENT sent at 5/29/2024  1:41 PM EDT -----  Pt has a stoma, currently has bleeding and diarrhea. He is currently at dialysis, phone call dropped as I was trying to reach a nurse. You can call his mother Pamela to schedule an appt

## 2024-06-03 ENCOUNTER — TELEPHONE (OUTPATIENT)
Dept: FAMILY MEDICINE CLINIC | Facility: CLINIC | Age: 44
End: 2024-06-03

## 2024-06-04 DIAGNOSIS — B37.0 THRUSH: Primary | ICD-10-CM

## 2024-06-04 RX ORDER — CLOTRIMAZOLE 10 MG/1
10 LOZENGE ORAL; TOPICAL
Qty: 50 TABLET | Refills: 0 | Status: SHIPPED | OUTPATIENT
Start: 2024-06-04 | End: 2024-06-14

## 2024-06-10 DIAGNOSIS — N31.9 NEUROGENIC BLADDER: Primary | ICD-10-CM

## 2024-06-10 DIAGNOSIS — K21.9 GASTROESOPHAGEAL REFLUX DISEASE WITHOUT ESOPHAGITIS: ICD-10-CM

## 2024-06-10 DIAGNOSIS — I10 HTN (HYPERTENSION), BENIGN: ICD-10-CM

## 2024-06-10 RX ORDER — DEXLANSOPRAZOLE 30 MG/1
CAPSULE, DELAYED RELEASE ORAL
Qty: 90 CAPSULE | Refills: 1 | Status: SHIPPED | OUTPATIENT
Start: 2024-06-10

## 2024-06-11 ENCOUNTER — TELEPHONE (OUTPATIENT)
Dept: FAMILY MEDICINE CLINIC | Facility: CLINIC | Age: 44
End: 2024-06-11

## 2024-06-11 NOTE — TELEPHONE ENCOUNTER
PCP SIGNATURE NEEDED FOR [facility]  FORM RECEIVED VIA FAX AND PLACED IN PCP FOLDER TO BE DELIVERED AT ASSIGNED TIMES.    [phone]

## 2024-06-12 RX ORDER — OXYBUTYNIN CHLORIDE 15 MG/1
15 TABLET, EXTENDED RELEASE ORAL
Qty: 90 TABLET | Refills: 1 | Status: SHIPPED | OUTPATIENT
Start: 2024-06-12

## 2024-06-12 RX ORDER — CARVEDILOL 25 MG/1
25 TABLET ORAL 2 TIMES DAILY
Qty: 180 TABLET | Refills: 1 | Status: SHIPPED | OUTPATIENT
Start: 2024-06-12

## 2024-06-17 ENCOUNTER — TELEPHONE (OUTPATIENT)
Dept: FAMILY MEDICINE CLINIC | Facility: CLINIC | Age: 44
End: 2024-06-17

## 2024-06-17 NOTE — TELEPHONE ENCOUNTER
FAXED ON 06/17/24 TO [facility] at [phone]. FAX CONFIRMATION RECEIVED.    Scanned into pt chart.

## 2024-06-17 NOTE — TELEPHONE ENCOUNTER
PCP SIGNATURE NEEDED FOR [employer]  FORM RECEIVED VIA FAX AND PLACED IN PCP FOLDER TO BE DELIVERED AT ASSIGNED TIMES.    Practitioner Request    Diagnosis: G82.20 & M48.00

## 2024-06-18 NOTE — PROGRESS NOTES
Hematology/Oncology Outpatient Follow-up  Harpreet Gan  43 y o  male 1980 [id number]    Date:  9/14/2022      Assessment and Plan:    8/4/22  B12 greater than 1450  Folate greater than 17 5   TSH 0 35   Iron 34, iron sat 30%, TIBC 113  SPEP showed polyclonal gammopathy, not monoclonal gammopathy      HPI:  42-year-old male presents for follow up regarding anemia  Past medical history significant for spinal paraplegia, neurogenic bladder with chronic suprapubic catheter, end-stage renal failure on dialysis Monday Wednesday Friday, secondary hyperparathyroidism, type 1 diabetes, diverting colostomy bag, history of MSSA bacteremia with tricuspid valve endocarditis, chronic osteomyelitis, chronic decubitus ulcers, AFib, history of DVT managed on Eliquis, CAD, history of Candida, history of C diff, history of right BKA and hypertension      He has been having bleeding from stoma, saw GI, having scopes  He had melena 2 months ago  Now on PPI and carafate        He takes iron by mouth       Feb 2022   erythropoietin  30  Ferritin 322, iron 29, iron sat 22%, TIBC 132     Interval history: no new complaints     ROS: Review of Systems   Constitutional: Positive for fatigue  Respiratory: Negative for cough and shortness of breath  Cardiovascular: Negative for chest pain, palpitations and leg swelling  Gastrointestinal: Negative for abdominal pain  Has stoma, bowel movements normal    Genitourinary: Negative for difficulty urinating  Has chronic catheter    Musculoskeletal: Negative  Skin: Negative  Neurological: Negative for dizziness, light-headedness and headaches  Hematological: Negative  Psychiatric/Behavioral: Negative          Past Medical History:   Diagnosis Date    Acute pulmonary edema ([address] ) 1/13/2022    Ambulatory dysfunction     Anemia     Anemia, iron deficiency     transfusion requiring    Asymptomatic bacteriuria 9/25/2017    Atrial fibrillation (HCC)     AVM (arteriovenous malformation) of duodenum, 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)     Chronic indwelling Ortega catheter     Chronic kidney disease     Chronic pain     Chronic pain disorder     [facility] catheter [facility] [address] [facility]     Clostridium difficile infection 08/11/2016    also positive 9/2016, 5/29/2017, 8/15/2017  S/P fecal transplant    Colostomy on examination ([facility] [address] [facility]     Decubitus ulcer     Delirium     Diabetes mellitus ([facility] [address] )     GERD (gastroesophageal reflux disease)     Hemodialysis patient ([facility] [address] )     Hypertension     Memory impairment 2011    s/p diabetic coma    Neurogenic bladder     OAB (overactive bladder)     Paraplegia ([facility])     T3 transverse myelitis vs spinal stoke (AVM); 2012 extensive epidural abscess C7=> conus 2nd extension from deep parspinal abscess L4-S2/sacral decubitus; cord atrophy/myelomalcia T3=>conus     Penile abscess     S/P unilateral BKA (below knee amputation) ([facility] [address] )     Right    Sebaceous cyst removed in 2017    Seizures ([facility])     Shortness of breath     Tobacco abuse     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HCC)     Urinary retention     Wheelchair dependent     Wounds, multiple     pressure ulcers with delayed healing       Past Surgical History:   Procedure Laterality Date    BELOW KNEE LEG AMPUTATION Right 2009    COLONOSCOPY N/A 03/27/2017    Procedure: COLONOSCOPY;  Surgeon: Dale Linton MD;  Location: [facility]; Service:     COLONOSCOPY N/A 03/29/2017    Procedure: COLONOSCOPY;  Surgeon: Dale Linton MD;  [facility]; Service:     COLONOSCOPY N/A 06/15/2017    Procedure: COLONOSCOPY with FMT;  Surgeon: Cooper Diez MD;  Location: [facility]; Service: Gastroenterology    ESOPHAGOGASTRODUODENOSCOPY N/A 03/22/2017    Procedure: ESOPHAGOGASTRODUODENOSCOPY (EGD); Surgeon: Leatha Mendez DO;  Location: [facility];   Service:     MULTIPLE TOOTH EXTRACTIONS [No Acute Distress] : no acute distress N/A 03/08/2021    Procedure: EXTRACTION TEETH # 2,3,6,10,12,13,14,18,19,20,21,22,23,24,25,26,27,28,29,30;  Surgeon: Jimenez Machado DMD;  [facility];  Service: Maxillofacial    SKIN BIOPSY         Social History     Socioeconomic History    Marital status: Single     Spouse name: Not on file    Number of children: Not on file    Years of education: Not on file    Highest education level: Not on file   Occupational History    Not on file   Tobacco Use    Smoking status: Current Every Day Smoker     Years: 28 00     Types: Cigars    Smokeless tobacco: Never Used    Tobacco comment: about 3 cigars/day   Vaping Use    Vaping Use: Never used   Substance and Sexual Activity    Alcohol use: Not Currently     Comment: 1 cup of wine every 3-4 months    Drug use: Not Currently     Types: Marijuana     Comment: rarely; once every 1-2 years    Sexual activity: Not on file   Other Topics Concern    Not on file   Social History Narrative    Not on file     Social Determinants of Health     Financial Resource Strain: Low Risk     Difficulty of Paying Living Expenses: Not hard at all   Food Insecurity: No Food Insecurity    Worried About Running Out of Food in the Last Year: Never true    Jose of Food in the Last Year: Never true   Transportation Needs: No Transportation Needs    Lack of Transportation (Medical): No    Lack of Transportation (Non-Medical):  No   Physical Activity: Not on file   Stress: Not on file   Social Connections: Not on file   Intimate Partner Violence: Not on file   Housing Stability: Not on file       Family History   Problem Relation Age of Onset    Hyperlipidemia Mother     Hypertension Mother     Leukemia Brother     Diabetes Paternal Grandfather        Allergies   Allergen Reactions    Chlorcyclizine Swelling    Chlorhexidine Other (See Comments)    Ciprofloxacin Hcl     Cymbalta [Duloxetine Hcl]     Dm-Doxylamine-Acetaminophen Other (See Comments)    Gabapentin [Well Nourished] : well nourished Tremor    Hydrocortisone Other (See Comments)    Lac Bovis GI Intolerance    Lactose - Food Allergy GI Intolerance     Other reaction(s): Unknown    Lyrica [Pregabalin]     Penicillins Other (See Comments)     miguel Zosyn 08/28/17  Tolerates Ancef  Miguel Augmentin    Polymyxin B     Promethazine-Phenyleph-Codeine Other (See Comments)    Quinidine Other (See Comments)    Cefepime Other (See Comments)     Other reaction(s): Other (See Comments)  encephalopathy; tolerates cefazolin 6/14, miguel Ceftriaxone  encephalopathy; tolerates cefazolin 6/14, miguel Ceftriaxone  Pt has had cefepime March 2022 and June 2022  Also, cephalexin 3/15/22      Rosuvastatin Other (See Comments) and Palpitations     Weakness         Current Outpatient Medications:     Acetaminophen 325 MG CAPS, Take 650 mg by mouth, Disp: , Rfl:     Alcohol Swabs (ALCOHOL PADS) 70 % PADS, by Does not apply route 5 (five) times a day, Disp: 300 each, Rfl: 5    amLODIPine (NORVASC) 10 mg tablet, Take 1 tablet (10 mg total) by mouth daily, Disp: 90 tablet, Rfl: 1    ammonium lactate (LAC-HYDRIN) 12 % cream, , Disp: , Rfl:     apixaban (ELIQUIS) 5 mg, Take 1 tablet (5 mg total) by mouth 2 (two) times a day, Disp: 270 tablet, Rfl: 1    ascorbic acid (VITAMIN C) 250 mg tablet, TAKE 2 TABLETS (500 MG TOTAL) BY MOUTH DAILY, Disp: 180 tablet, Rfl: 1    atorvastatin (LIPITOR) 20 mg tablet, Take 1 tablet (20 mg total) by mouth daily at bedtime, Disp: 90 tablet, Rfl: 1    B Complex-C-Folic Acid (Super B-Complex/Vit C/FA) TABS, TAKE 1 TABLET BY MOUTH EVERY DAY AS DIRECTED, Disp: 90 tablet, Rfl: 3    SUSAN MICROLET LANCETS lancets, Use as instructed to check blood sugar 4 times a day Dx e10 29, Disp: 200 each, Rfl: 5    bisacodyl (DULCOLAX) 5 mg EC tablet, Take 1 tablet (5 mg total) by mouth once for 1 dose, Disp: 2 tablet, Rfl: 0    Blood Glucose Monitoring Suppl ([employer] CONTOUR NEXT USB MONITOR) w/Device KIT, Testing 4 times a day, Disp: 1 kit, Rfl: 0   [Well Developed] : well developed [Well-Appearing] : well-appearing calcitriol (ROCALTROL) 0 5 MCG capsule, Take 2 mcg by mouth, Disp: , Rfl:     calcium acetate (PHOSLO) capsule, TAKE 2 CAPSULES BY MOUTH THREE TIMES DAILY WITH MEALS, Disp: , Rfl:     carvedilol (COREG) 25 mg tablet, Take 1 tablet (25 mg total) by mouth 2 (two) times a day, Disp: 180 tablet, Rfl: 1    cetirizine (ZyrTEC) 10 mg tablet, Take 1 tablet (10 mg total) by mouth daily, Disp: 90 tablet, Rfl: 1    Cholecalciferol (VITAMIN D-3) 1000 units CAPS, Take 2 capsules by mouth daily, Disp: 30 capsule, Rfl: 0    clindamycin (CLINDAGEL) 1 % gel, APPLY TO AFFECTED AREA TWICE A DAY, Disp: 60 g, Rfl: 2    cyanocobalamin ([employer] Vitamin B-12) 1000 MCG tablet, Take 1 tablet (1,000 mcg total) by mouth daily, Disp: 30 tablet, Rfl: 5    cyclobenzaprine (FLEXERIL) 5 mg tablet, , Disp: , Rfl:     dexlansoprazole (DEXILANT) 30 MG capsule, Take 1 capsule (30 mg total) by mouth daily, Disp: 60 capsule, Rfl: 2    dicyclomine (BENTYL) 10 mg capsule, TAKE 1 CAPSULE BY MOUTH 3 TIMES A DAY BEFORE MEALS, Disp: 270 capsule, Rfl: 1    Disposable Gloves MISC, Use 7 times a day, 4 boxes of gloves XL Dx type 1 DM, paraplegia, Disp: 4 each, Rfl: 11    doxazosin (CARDURA) 2 mg tablet, TAKE 1 TABLET BY MOUTH EVERY DAY IN THE EVENING, Disp: 90 tablet, Rfl: 0    doxycycline monohydrate (MONODOX) 100 mg capsule, Take 1 capsule (100 mg total) by mouth 2 (two) times a day Take with full glass of water and food, Disp: 60 capsule, Rfl: 2    epoetin kaushik (EPOGEN,PROCRIT) 20,000 units/mL, Inject 10,000 Units under the skin, Disp: , Rfl:     epoetin kaushik (EPOGEN,PROCRIT) 20,000 units/mL, Inject 5,000 Units under the skin, Disp: , Rfl:     ferrous sulfate 325 (65 Fe) mg tablet, Take 1 tablet (325 mg total) by mouth 3 (three) times a day, Disp: 90 tablet, Rfl: 3    guaiFENesin (MUCINEX) 600 mg 12 hr tablet, Take 2 tablets (1,200 mg total) by mouth every 12 (twelve) hours, Disp: 60 tablet, Rfl: 2    Gvoke PFS 1 MG/0 2ML SOSY, INJECT 1 ML (1 MG TOTAL) INTO A MUSCLE ONCE FOR 1 DOSE, Disp: , Rfl:     hydrOXYzine HCL (ATARAX) 50 mg tablet, TAKE 1 TABLET (50 MG TOTAL) BY MOUTH 2 (TWO) TIMES A DAY AS NEEDED FOR ITCHING OR ANXIETY, Disp: 180 tablet, Rfl: 1    Incontinence Supply Disposable (Incontinence Brief Large) MISC, Use 6 (six) times a day Size xl, Disp: 180 each, Rfl: 11    Incontinence Supply Disposable (Undergarment) MISC, Use 6 a day, 5 boxes, xl Dx R51, paraplegia, Disp: 5 each, Rfl: 11    Incontinence Supply Disposable (Underpads) MISC, Use 2 a day, Disp: 5 each, Rfl: 11    ketoconazole (NIZORAL) 2 % cream, Apply to rash , Disp: 15 g, Rfl: 1    LEVEMIR FLEXTOUCH 100 units/mL injection pen, Inject 14 Units under the skin 2 (two) times a day, Disp: 15 pen, Rfl: 3    Lokelma 10 g PACK, , Disp: , Rfl:     losartan (COZAAR) 100 MG tablet, TAKE 1 TABLET BY MOUTH EVERY DAY, Disp: 90 tablet, Rfl: 1    Melatonin ER 3 MG TBCR, Take 6 mg by mouth, Disp: , Rfl:     metroNIDAZOLE (METROGEL) 0 75 % gel, Apply topically 2 (two) times a day, Disp: 45 g, Rfl: 0    Misc  Devices (Wheelchair Cushion) MISC, Use daily, Disp: 1 each, Rfl: 0    Misc   Devices [facility]) MISC, by Does not apply route daily Wheelchair ramp, dx [id number] 20, Disp: 1 each, Rfl: 0    Multiple Vitamin (Daily-Enriqueta Multivitamin) TABS, TAKE 1 TABLET BY MOUTH EVERY DAY, Disp: 90 tablet, Rfl: 2    NovoLOG FlexPen 100 units/mL injection pen, INJECT 3 UNITS UNDER THE SKIN 3 (THREE) TIMES A DAY BEFORE MEALS , Disp: , Rfl:     oxybutynin (DITROPAN XL) 15 MG 24 hr tablet, , Disp: , Rfl:     oxybutynin (DITROPAN) 5 mg tablet, Take 3 tablets (15 mg total) by mouth daily, Disp: 270 tablet, Rfl: 1    polyethylene glycol (GLYCOLAX) 17 GM/SCOOP powder, Take as directed as per written office instructions, Disp: 238 g, Rfl: 0    polyethylene glycol (GOLYTELY) 4000 mL solution, Take 4,000 mL by mouth once for 1 dose, Disp: 4000 mL, Rfl: 0    risperiDONE (RisperDAL) 0 5 mg tablet, Take 1 tablet (0 5 [Normal Sclera/Conjunctiva] : normal sclera/conjunctiva [PERRL] : pupils equal round and reactive to light mg total) by mouth 2 (two) times a day, Disp: 180 tablet, Rfl: 1    sertraline (ZOLOFT) 25 mg tablet, TAKE 1/2 TABLET BY MOUTH EVERY DAY, Disp: 45 tablet, Rfl: 0    Sodium Zirconium Cyclosilicate 10 g PACK, Take 10 g by mouth daily, Disp: , Rfl:     sucralfate (CARAFATE) 1 g/10 mL suspension, Take 10 mL (1 g total) by mouth 4 (four) times a day (with meals and at bedtime), Disp: 420 mL, Rfl: 1    SUPER B COMPLEX & C TABS, TAKE 1 CAPSULE BY MOUTH ONCE FOR 1 DOSE AS DIRECTED, Disp: 90 tablet, Rfl: 2    Zinc Sulfate 220 (50 Zn) MG TABS, Take 1 tablet by mouth daily, Disp: 90 tablet, Rfl: 1      Physical Exam:  There were no vitals taken for this visit  Physical Exam      Labs:  Lab Results   Component Value Date    WBC 6 68 10/10/2017    HGB 8 7 (L) 10/10/2017    HCT 28 1 (L) 10/10/2017    MCV 85 10/10/2017     10/10/2017     Lab Results   Component Value Date     12/28/2014    K 4 8 10/10/2017     (H) 10/10/2017    CO2 22 10/10/2017    ANIONGAP 5 12/28/2014    BUN 47 (H) 10/10/2017    CREATININE 2 53 (H) 10/10/2017    GLUCOSE 34 (LL) 04/21/2017    CALCIUM 8 3 10/10/2017    AST 8 10/02/2017    ALT 10 (L) 10/02/2017    ALKPHOS 75 10/02/2017    EGFR 36 10/10/2017       Patient voiced understanding and agreement in the above discussion  Aware to contact our office with questions/symptoms in the interim  This note has been generated by voice recognition software system  Therefore, there may be spelling, grammar, and or syntax errors  Please contact if questions arise  [EOMI] : extraocular movements intact [Normal Outer Ear/Nose] : the outer ears and nose were normal in appearance [Normal Oropharynx] : the oropharynx was normal [No JVD] : no jugular venous distention [No Lymphadenopathy] : no lymphadenopathy [Supple] : supple [Thyroid Normal, No Nodules] : the thyroid was normal and there were no nodules present [No Respiratory Distress] : no respiratory distress  [No Accessory Muscle Use] : no accessory muscle use [Clear to Auscultation] : lungs were clear to auscultation bilaterally [Normal Rate] : normal rate  [Regular Rhythm] : with a regular rhythm [Normal S1, S2] : normal S1 and S2 [No Murmur] : no murmur heard [No Carotid Bruits] : no carotid bruits [No Abdominal Bruit] : a ~M bruit was not heard ~T in the abdomen [No Varicosities] : no varicosities [Pedal Pulses Present] : the pedal pulses are present [No Edema] : there was no peripheral edema [No Palpable Aorta] : no palpable aorta [No Extremity Clubbing/Cyanosis] : no extremity clubbing/cyanosis [Soft] : abdomen soft [Non Tender] : non-tender [Non-distended] : non-distended [No Masses] : no abdominal mass palpated [No HSM] : no HSM [Normal Bowel Sounds] : normal bowel sounds [Normal Posterior Cervical Nodes] : no posterior cervical lymphadenopathy [Normal Anterior Cervical Nodes] : no anterior cervical lymphadenopathy [No CVA Tenderness] : no CVA  tenderness [No Spinal Tenderness] : no spinal tenderness [No Joint Swelling] : no joint swelling [Grossly Normal Strength/Tone] : grossly normal strength/tone [No Rash] : no rash [Coordination Grossly Intact] : coordination grossly intact [No Focal Deficits] : no focal deficits [Normal Gait] : normal gait [Deep Tendon Reflexes (DTR)] : deep tendon reflexes were 2+ and symmetric [Normal Affect] : the affect was normal [Normal Insight/Judgement] : insight and judgment were intact [de-identified] : Right TM with mild erythema. No obvious AOM. Bilateral TMs bulging. + light reflex bilaterally. + nasal quality to voice.

## 2024-06-19 ENCOUNTER — TELEPHONE (OUTPATIENT)
Dept: FAMILY MEDICINE CLINIC | Facility: CLINIC | Age: 44
End: 2024-06-19

## 2024-06-19 NOTE — TELEPHONE ENCOUNTER
PCP SIGNATURE NEEDED FOR EXTENDED FAMILY CARE  FORM RECEIVED VIA FAX AND PLACED IN PCP FOLDER TO BE DELIVERED AT ASSIGNED TIMES.      PLAN OF CARE  START DATE-12/14/23  CERTIFICATE PERIOD-6/11/24-8/9/24

## 2024-06-19 NOTE — TELEPHONE ENCOUNTER
FAXED ON 06/19/24 TO [facility] at [phone]. FAX CONFIRMATION RECEIVED.     Scanned into pt chart.

## 2024-06-20 ENCOUNTER — OFFICE VISIT (OUTPATIENT)
Dept: WOUND CARE | Facility: CLINIC | Age: 44
End: 2024-06-20
Payer: MEDICARE

## 2024-06-20 VITALS
SYSTOLIC BLOOD PRESSURE: 124 MMHG | TEMPERATURE: 96.2 F | RESPIRATION RATE: 16 BRPM | DIASTOLIC BLOOD PRESSURE: 72 MMHG | HEART RATE: 72 BPM

## 2024-06-20 DIAGNOSIS — L89.224 PRESSURE ULCER OF LEFT HIP, STAGE 4 (HCC): ICD-10-CM

## 2024-06-20 DIAGNOSIS — L89.154 PRESSURE INJURY OF SACRAL REGION, STAGE 4 (HCC): Primary | ICD-10-CM

## 2024-06-20 DIAGNOSIS — M86.60 CHRONIC OSTEOMYELITIS (HCC): ICD-10-CM

## 2024-06-20 DIAGNOSIS — L89.324 PRESSURE INJURY OF LEFT ISCHIUM, STAGE 4 (HCC): ICD-10-CM

## 2024-06-20 DIAGNOSIS — L89.894 PRESSURE ULCER OF OTHER SITE, STAGE 4 (HCC): ICD-10-CM

## 2024-06-20 PROCEDURE — 99214 OFFICE O/P EST MOD 30 MIN: CPT | Performed by: FAMILY MEDICINE

## 2024-06-20 RX ORDER — LIDOCAINE HYDROCHLORIDE 40 MG/ML
5 SOLUTION TOPICAL ONCE
Status: COMPLETED | OUTPATIENT
Start: 2024-06-20 | End: 2024-06-20

## 2024-06-20 RX ADMIN — LIDOCAINE HYDROCHLORIDE 5 ML: 40 SOLUTION TOPICAL at 14:15

## 2024-06-20 NOTE — PROGRESS NOTES
Patient ID: Ovidio Parkinson Jr. is a 43 y.o. male Date of Birth 1980       Chief Complaint   Patient presents with   • Follow Up Wound Care Visit     Follow up visit for wounds to sacral region.  Pt denies any issues or concerns since last visit.        Allergies:  Chlorcyclizine, Chlorhexidine, Ciprofloxacin hcl, Cymbalta [duloxetine hcl], Dm-doxylamine-acetaminophen, Doxycycline, Gabapentin, Hydrocortisone, Lactose - food allergy, Lyrica [pregabalin], Milk (cow), Penicillins, Polymyxin b, Promethazine-phenyleph-codeine, Quinidine, Cefepime, and Rosuvastatin    Diagnosis:      Diagnosis ICD-10-CM Associated Orders   1. Pressure injury of sacral region, stage 4 ([facility])  L89.154 lidocaine (XYLOCAINE) 4 % topical solution 5 mL     Wound cleansing and dressings     Wound Procedure Treatment      2. Pressure injury of left ischium, stage 4 ([facility])  L89.324       3. Pressure ulcer of left hip, stage 4 ([facility])  L89.224       4. Pressure ulcer of other site, stage 4 ([facility])  L89.894       5. Chronic osteomyelitis ([facility])  M86.60               Assessment & Plan:    Ovidio presents today for follow-up of multiple pressure injuries in setting of chronic osteomyelitis on palliative wound management plan. On 6/6 Ovidio contacted wound center for purulent drainage from the perineal wound; he was advised to go to emergency department at that time. He did not seek ED eval and presented today for his follow up. He was noted to have large tunnel at perineal wound around 4 o'clock about 8.7 cm, no purulent drainage or exudate noted at this area.  However, considering large increase in tunnel size, recommend next follow-up with our general surgeon at wound center Dr. Simmons.     Stage IV pressure injury of perineum: New large tunnel noted around 4:00 about 8.7 cm with small amount of sanguinous drainage.  No exudate or purulent drainage noted.  No malodor.  Stage IV pressure injuries of the left hip, left ischium, and sacrum are stable  "without current evidence of acute infection at this time.  Will continue Dakin's moistened packing to all wounds  Again reviewed most recent CT scan 4/9 \"Large soft tissue ulcers overlying the ischia with underlying osseous erosion and sclerosis in keeping with chronic osteomyelitis. Massive chronic destruction about L3 with marked anterolisthesis of the distal segments from L4 through the sacrum and coccyx.\"    Reviewed importance of diabetic control and he reports his glucose has been better.  He also reports he has increased protein in his diet  He did have visit with [facility] wheelchair clinic and stated that he will be getting a new chair in December and they did look at his current Roho cushion for adjustment  ER precautions reviewed, he expressed understanding  Please see full wound orders for details  Follow-up in 2 to 3 weeks with Dr. Simmons or sooner if needed      Portions of the record may have been created with voice recognition software. Occasional wrong word or \"sound alike\" substitutions may have occurred due to the inherent limitations of voice recognition software. Read the chart carefully and recognize, using context, where substitutions have occurred.    Subjective:   Please see prev visit notes for HPI summary [id number] 1/4/2024: Ovidio presents to wound center today for follow-up of multiple stage IV pressure injuries of sacrum, left hip, ischium, and perineum.  He reports he believes his lab studies regarding his protein level has improved as well as overall nutrition in general.  Has no acute complaints today and denies fever, chills.  He reports he is currently feeling well/much better from his recent hospitalization which he reports occurred at [facility] 12/5 through 12/13.    Per chart review of discharge summary from that hospitalization he was \"found to have Staph Lugudenesis bacteremia on admission blood cultures and was started on vancomycin, which was transitioned to Ancef by " "Infectious Disease. He will continue Ancef Source was suspected to be prior HD line; known sacral wounds were stable. Repeat blood cultures were negative on discharge. During admission, his HD line was removed (given infection) and replaced with temporary HD line (which, in turn, needed to be replaced due to inability to successfully use). New HD line functioning as of 12/9. PATRICIO performed for evaluation of possible endocarditis was negative. A tunneled HD line was placed 12/13/2023 by IR after repeat blood cultures were negative x48 hrs.\"  He also reports that he may have been spending more time in his chair than previous and continues with difficulty controlling some of the shifting/shearing forces with transferring etc... Continues to use his Clinitron bed.  Also reports that he is still awaiting the custom wheelchair/cushions to help with his pressure injuries but that it is being made.     1/25/2024: Ovidio presents today for follow-up of multiple stage IV pressure injuries of left hip, ischium, sacrum, and perineum as well as stage III pressure injury of scrotum.  He reports that they have been doing well with the Mesalt.  In addition reports that the area under the scrotum his brother sometimes uses Vaseline to this area when it is on the drier side she reports his brother tells him it is looking better.  Sides his chronic issues, he reports he has no acute complaints today and overall is feeling well.  He denies fever, chills.  He reported he recently started sleeping on his left side as this was more comfortable for him.    2/14/2024: Ovidio presents today for follow-up of multiple stage IV pressure injuries of left hip, ischium, sacrum, and perineum as well as stage III pressure injury of scrotum.  He reports that he is not feeling well \"has been feeling under the weather\" since Monday.  Feels that has been having an issue with his insulin pump administering insulin.  He states when he had this issue in the " past it was because it was inserted an area of scar tissue.  However he did try to move the side of this this morning and glucose was 475.  Also reports he is feeling very dehydrated and dry along with dizzy, lightheaded.  He denies fever, chills.    3/7/2024: Ovidio presents today for follow-up multiple stage IV pressure injuries of left hip ischium sacrum and perineum in addition to stage III pressure injury of scrotum.  At our last visit, recommendation was for patient to go to emergency department which he agreed to however after leaving our wound center patient did not go.  He reports that his sugars have been improved compared to last visit 150s to 180s though sometimes he still has spikes. He states he has been feeling better and denies fever, chills.  He also reports that since he was last here they called the Clinitron wraps to check functioning of his bed and was assured that all seems to be functioning well.    3/28/2024: Ovidio presents today for follow-up of multiple pressure injuries.  He reports that as discussed, he did call [facility] wheelchair clinic a few days ago and has not yet heard back.  Reports that sometimes his glucose is variable in hyper and hypoglycemic ranges but his endocrinology office is aware.  Also states that recently it has been more controlled between 150s and 160s.  Today he reports he is feeling well and denies fever, chills, dizziness, lightheadedness, diaphoresis, shortness of breath, chest pain.  Wound care currently consist of Dakin's packing.  He continues to use his Clinitron bed.  He reports the drainage from his left hip wound is controlled.    5/2/2024: Ovidio presents today for f/u of multiple pressure injuries.  He reports that a few weeks ago he had an appointment with [facility] wheelchair clinic however he fell ill had to reschedule for the 31st of this month which she confirmed he will be attending.  Reports that his protein levels have improved and he does  have some Jim at home.  He states he is feeling well now and feels his glucose has been under better control.  He denies new acute complaints and denies fever, chills.    5/23/2024: Visit w/ ERNESTO Brooke    6/20/2024: Ovidio presents today for follow-up of multiple pressure injuries.  On 6/6/24, patient brother contacted wound center due to excessive drainage from one of his wounds.  He was directed to proceed to emergency department.  Per chart review and patient, he did not do this.  He reports his brother return to use of Dakin soaked packing which resolved the issue.  He reports that other than his chronic conditions, he has no new acute complaints.  He denies fever, chills.      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 on chronic dialysis ([facility])   • Ulcer of sacral region, stage 4 ([facility])   • Wound healing, delayed   • Iron deficiency anemia   • Pressure injury of left ischium, stage 4 ([facility])   • HTN (hypertension), benign   • Neurogenic bladder   • GERD (gastroesophageal reflux disease)   • Chronic pain   • Stage 5 chronic kidney disease on chronic dialysis ([facility])   • History of Clostridium difficile infection   • Urinary retention   • Dialysis patient ([facility])   • Insulin long-term use ([facility])   • Wheelchair dependent   • Bipolar depression ([facility])   • 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[facility])   • Seizure ([facility])   • Pressure ulcer of sacral region, stage 4 ([facility])   • AVM (arteriovenous malformation) of duodenum, acquired   • Chronic narcotic dependence ([facility])   • Chronic diastolic heart failure ([facility])   • Pressure ulcer of other site, stage 4 ([facility])   • Pressure ulcer of left hip, stage 4 ([facility])   • ED  (erectile dysfunction) of organic origin   • Irritable bowel syndrome   • Onychomycosis   • Pustular folliculitis   • Prolonged Q-T interval on ECG   • Secondary hyperparathyroidism of renal origin ([facility])   • Wounds, multiple   • Immunocompromised state ([facility])   • Severe protein-calorie malnutrition ([facility])   • Chronic osteomyelitis ([facility])   • Acute deep vein thrombosis (DVT) of right upper extremity ([facility])   • C. difficile colitis   • Charcot's arthropathy   • Diabetic gastroparesis associated with type 1 diabetes mellitus  ([facility])   • End stage renal disease ([facility])   • Hyperlipidemia   • LVH (left ventricular hypertrophy)   • NICM (nonischemic cardiomyopathy) ([facility])   • Vitamin B deficiency   • Vitamin C deficiency   • Vitamin D deficiency   • Hospital discharge follow-up   • Rheumatoid arthritis ([facility])   • Encounter for general adult medical examination with abnormal findings   • Herpes zoster vaccination declined   • COVID-19 vaccination declined   • Influenza vaccination declined   • Hepatitis A vaccination declined     Past Medical History:   Diagnosis Date   • Acute pulmonary edema ([facility]) 1/13/2022   • Ambulatory dysfunction    • Anemia    • Anemia, iron deficiency     transfusion requiring   • Asymptomatic bacteriuria 9/25/2017   • Atrial fibrillation ([facility])    • AVM (arteriovenous malformation) of duodenum, 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   • Bacteriuria, asymptomatic    • Bipolar disorder ([facility])    • Chronic deep vein thrombosis (DVT) ([facility])    • Chronic indwelling Ortega catheter    • Chronic kidney disease    • Chronic pain    • Chronic pain disorder    • Chronic suprapubic catheter ([facility])    • Clostridium difficile infection 08/11/2016    also positive 9/2016, 5/29/2017, 8/15/2017. S/P fecal transplant   • Colostomy on examination ([facility])    • Decubitus ulcer    • Delirium    • Diabetes mellitus ([facility])    • GERD (gastroesophageal reflux disease)    • Hemodialysis patient ([facility])    • Hypertension    • Memory impairment 2011     s/p diabetic coma   • Neurogenic bladder    • OAB (overactive bladder)    • Paraplegia ([facility])     T3 transverse myelitis vs spinal stoke (AVM); 2012 extensive epidural abscess C7=> conus 2nd extension from deep parspinal abscess L4-S2/sacral decubitus; cord atrophy/myelomalcia T3=>conus    • Penile abscess    • S/P unilateral BKA (below knee amputation) ([facility])     Right   • Sebaceous cyst removed in 2017   • Seizures ([facility]CC)    • Shortness of breath    • Tobacco abuse    • 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[facility])    • Urinary retention    • Wheelchair dependent    • Wounds, multiple     pressure ulcers with delayed healing     Past Surgical History:   Procedure Laterality Date   • BELOW KNEE LEG AMPUTATION Right 2009   • COLONOSCOPY N/A 03/27/2017    Procedure: COLONOSCOPY;  Surgeon: Wesley Thibodeaux MD;  Location: [facility];  Service:    • COLONOSCOPY N/A 03/29/2017    Procedure: COLONOSCOPY;  Surgeon: Wesley Thibodeaux MD;  Location: [facility];  Service:    • COLONOSCOPY N/A 06/15/2017    Procedure: COLONOSCOPY with FMT;  Surgeon: Gerard Marques MD;  Location: [facility];  Service: Gastroenterology   • ESOPHAGOGASTRODUODENOSCOPY N/A 03/22/2017    Procedure: ESOPHAGOGASTRODUODENOSCOPY (EGD);  Surgeon: Beverley Ortiz DO;  Location: [facility];  Service:    • MULTIPLE TOOTH EXTRACTIONS N/A 03/08/2021    Procedure: EXTRACTION TEETH # 2,3,6,10,12,13,14,18,19,20,21,22,23,24,25,26,27,28,29,30;  Surgeon: Brian Kohler DMD;  Location: [facility];  Service: Maxillofacial   • SKIN BIOPSY       Family History   Problem Relation Age of Onset   • Hyperlipidemia Mother    • Hypertension Mother    • Leukemia Brother    • Diabetes Paternal Grandfather       Social History     Socioeconomic History   • Marital status: Single     Spouse name: Not on file   • Number of children: Not on file   • Years of education: Not on file   • Highest education level: Not on file   Occupational History   • Not on file   Tobacco Use   • Smoking status: Every Day     Types:  Cigars     Passive exposure: Current   • Smokeless tobacco: Never   • Tobacco comments:     2 cigars/day - 2/14/2023   Vaping Use   • Vaping status: Never Used   Substance and Sexual Activity   • Alcohol use: Not Currently     Comment: 1 cup of wine every 3-4 months   • Drug use: Not Currently     Types: Marijuana     Comment: rarely; once every 1-2 years   • Sexual activity: Not on file   Other Topics Concern   • Not on file   Social History Narrative   • Not on file     Social Determinants of Health     Financial Resource Strain: Low Risk  (3/5/2024)    Overall Financial Resource Strain (CARDIA)    • Difficulty of Paying Living Expenses: Not hard at all   Food Insecurity: No Food Insecurity (3/5/2024)    Hunger Vital Sign    • Worried About Running Out of Food in the Last Year: Never true    • Ran Out of Food in the Last Year: Never true   Transportation Needs: No Transportation Needs (3/5/2024)    PRAPARE - Transportation    • Lack of Transportation (Medical): No    • Lack of Transportation (Non-Medical): No   Physical Activity: Not on file   Stress: Not on file   Social Connections: Not on file   Intimate Partner Violence: Not At Risk (12/5/2023)    [facility]    Humiliation, Afraid, Rape, and Kick questionnaire    • Fear of Current or Ex-Partner: No    • Emotionally Abused: No    • Physically Abused: No    • Sexually Abused: No   Housing Stability: Low Risk  (12/5/2023)    [facility]    Housing Stability Vital Sign    • Unable to Pay for Housing in the Last Year: No    • Number of Places Lived in the Last Year: 1    • Unstable Housing in the Last Year: No        Current Outpatient Medications:   •  Alcohol Swabs (ALCOHOL PADS) 70 % PADS, by Does not apply route 5 (five) times a day, Disp: 300 each, Rfl: 5  •  amLODIPine (NORVASC) 10 mg tablet, TAKE 1 TABLET BY MOUTH EVERY DAY, Disp: 90  tablet, Rfl: 1  •  ammonium lactate (LAC-HYDRIN) 12 % cream, , Disp: , Rfl:   •  apixaban (ELIQUIS) 5 mg, Take 1 tablet (5 mg total) by mouth 2 (two) times a day, Disp: 180 tablet, Rfl: 3  •  atorvastatin (LIPITOR) 20 mg tablet, TAKE 1 TABLET BY MOUTH EVERYDAY AT BEDTIME, Disp: 90 tablet, Rfl: 1  •  azelaic acid (Azelex) 20 % cream, Apply topically 2 (two) times a day, Disp: 50 g, Rfl: 0  •  B Complex-C-Folic Acid (Super B-Complex/Vit C/FA) TABS, TAKE 1 TABLET BY MOUTH EVERY DAY AS DIRECTED, Disp: 90 tablet, Rfl: 5  •  [facility] MICROLET LANCETS lancets, Use as instructed to check blood sugar 4 times a day Dx e10.29, Disp: 200 each, Rfl: 5  •  bisacodyl (DULCOLAX) 5 mg EC tablet, Take 1 tablet (5 mg total) by mouth once for 1 dose, Disp: 2 tablet, Rfl: 0  •  Blood Glucose Monitoring Suppl (SUSAN CONTOUR NEXT USB MONITOR) w/Device KIT, Testing 4 times a day, Disp: 1 kit, Rfl: 0  •  calcitriol (ROCALTROL) 0.5 MCG capsule, Take 2 mcg by mouth, Disp: , Rfl:   •  calcium acetate (PHOSLO) capsule, TAKE 2 CAPSULES BY MOUTH THREE TIMES DAILY WITH MEALS, Disp: , Rfl:   •  carvedilol (COREG) 25 mg tablet, Take 1 tablet (25 mg total) by mouth 2 (two) times a day, Disp: 180 tablet, Rfl: 1  •  cetirizine (ZyrTEC) 10 mg tablet, TAKE 1 TABLET BY MOUTH EVERY DAY, Disp: 90 tablet, Rfl: 1  •  Cholecalciferol (VITAMIN D-3) 1000 units CAPS, Take 2 capsules by mouth daily, Disp: 30 capsule, Rfl: 0  •  clindamycin (CLINDAGEL) 1 % gel, APPLY TO AFFECTED AREA TWICE A DAY, Disp: 60 g, Rfl: 2  •  Contour Next Test test strip, USE TO TEST BLOOD SUGAR 3 TIMES DAILY. CONTOUR NEXT STRIPS. E10.29, Disp: , Rfl:   •  cyanocobalamin ([employer] Vitamin B-12) 1000 MCG tablet, Take 1 tablet (1,000 mcg total) by mouth daily, Disp: 90 tablet, Rfl: 3  •  cyclobenzaprine (FLEXERIL) 5 mg tablet, , Disp: , Rfl:   •  dexlansoprazole (DEXILANT) 30 MG capsule, TAKE 1 CAPSULE BY MOUTH EVERY DAY, Disp: 90 capsule, Rfl: 1  •  dicyclomine (BENTYL) 10 mg capsule, TAKE 1  CAPSULE BY MOUTH 3 TIMES A DAY BEFORE MEALS, Disp: 270 capsule, Rfl: 1  •  Disposable Gloves MISC, Use 7 times a day, 4 boxes of gloves XL Dx type 1 DM, paraplegia, Disp: 4 each, Rfl: 11  •  doxazosin (CARDURA) 2 mg tablet, TAKE 1 TABLET BY MOUTH EVERY EVENING., Disp: 90 tablet, Rfl: 1  •  epoetin kaushik (EPOGEN,PROCRIT) 20,000 units/mL, Inject 10,000 Units under the skin, Disp: , Rfl:   •  epoetin kaushik (EPOGEN,PROCRIT) 20,000 units/mL, Inject 5,000 Units under the skin, Disp: , Rfl:   •  ferrous sulfate 324 (65 Fe) mg, Take 1 tablet (324 mg total) by mouth in the morning, Disp: 90 tablet, Rfl: 3  •  ferrous sulfate 325 (65 Fe) mg tablet, Take 1 tablet (325 mg total) by mouth 3 (three) times a day, Disp: 90 tablet, Rfl: 3  •  Fluocinolone Acetonide Body 0.01 % OIL, Apply 2 drops topically daily, Disp: , Rfl:   •  folic acid (FOLVITE) 1 mg tablet, Take 1,000 mcg by mouth daily, Disp: , Rfl:   •  Gvoke PFS 1 MG/0.2ML SOSY, , Disp: , Rfl:   •  hydroxychloroquine (PLAQUENIL) 200 mg tablet, Take 200 mg by mouth daily, Disp: , Rfl:   •  hydrOXYzine HCL (ATARAX) 50 mg tablet, TAKE 1 TABLET (50 MG TOTAL) BY MOUTH 2 (TWO) TIMES A DAY AS NEEDED FOR ITCHING OR ANXIETY, Disp: 180 tablet, Rfl: 1  •  Incontinence Supply Disposable (Incontinence Brief Large) MISC, Use 6 (six) times a day Size xl, Disp: 180 each, Rfl: 11  •  Incontinence Supply Disposable (Undergarment) MISC, Use 6 a day, 5 boxes, xl Dx R51, paraplegia, Disp: 5 each, Rfl: 11  •  Incontinence Supply Disposable (Underpads) MISC, Use 2 a day, Disp: 5 each, Rfl: 11  •  insulin lispro protamine-insulin lispro (HumaLOG 50-50) 100 units/mL, Inject under the skin 2 (two) times a day before meals, Disp: , Rfl:   •  ketoconazole (NIZORAL) 2 % cream, Apply to rash., Disp: 15 g, Rfl: 1  •  LEVEMIR FLEXTOUCH 100 units/mL injection pen, Inject 14 Units under the skin 2 (two) times a day, Disp: 15 pen, Rfl: 3  •  Lokelma 10 g PACK, , Disp: , Rfl:   •  losartan (COZAAR) 100 MG  tablet, TAKE 1 TABLET BY MOUTH EVERY DAY, Disp: 90 tablet, Rfl: 1  •  metroNIDAZOLE (METROGEL) 0.75 % gel, Apply topically 2 (two) times a day, Disp: 45 g, Rfl: 0  •  Misc. Devices (Wheelchair Cushion) MISC, Use daily, Disp: 1 each, Rfl: 0  •  Misc. Devices (WHEELCHAIR) MISC, by Does not apply route daily Wheelchair ramp, dx g82.20, Disp: 1 each, Rfl: 0  •  Multiple Vitamin (Daily-Enriqueta Multivitamin) TABS, TAKE 1 TABLET BY MOUTH EVERY DAY, Disp: 30 tablet, Rfl: 8  •  NovoLOG FlexPen 100 units/mL injection pen, INJECT 3 UNITS UNDER THE SKIN 3 (THREE) TIMES A DAY BEFORE MEALS. (Patient not taking: Reported on 7/13/2023), Disp: , Rfl:   •  ondansetron (ZOFRAN-ODT) 4 mg disintegrating tablet, Take 1 tablet (4 mg total) by mouth every 6 (six) hours as needed for nausea or vomiting, Disp: 30 tablet, Rfl: 2  •  oxybutynin (DITROPAN XL) 15 MG 24 hr tablet, Take 1 tablet (15 mg total) by mouth daily at bedtime, Disp: 90 tablet, Rfl: 1  •  oxybutynin (DITROPAN) 5 mg tablet, Take 3 tablets (15 mg total) by mouth daily, Disp: 270 tablet, Rfl: 1  •  oxyCODONE (ROXICODONE) 10 MG TABS, TAKE ONE TABLET BY MOUTH EVERY 12 HOURS AS NEEDED FOR PAIN. ONGOING THERAPY., Disp: , Rfl:   •  polyethylene glycol (GLYCOLAX) 17 GM/SCOOP powder, Take as directed as per written office instructions, Disp: 238 g, Rfl: 0  •  polyethylene glycol (GOLYTELY) 4000 mL solution, Take 4,000 mL by mouth once for 1 dose, Disp: 4000 mL, Rfl: 0  •  risperiDONE (RisperDAL) 0.5 mg tablet, Take 1 tablet (0.5 mg total) by mouth 2 (two) times a day, Disp: 180 tablet, Rfl: 1  •  sertraline (ZOLOFT) 25 mg tablet, TAKE 1 TABLET (25 MG TOTAL) BY MOUTH DAILY., Disp: 90 tablet, Rfl: 1  •  sodium hypochlorite (DAKIN'S HALF-STRENGTH) external solution, Apply 1 Application topically daily Soak gauze and pack into wounds daily., Disp: 473 mL, Rfl: 0  •  Sodium Zirconium Cyclosilicate 10 g PACK, Take 10 g by mouth daily, Disp: , Rfl:   •  sucralfate (CARAFATE) 1 g/10 mL  suspension, Take 10 mL (1 g total) by mouth 4 (four) times a day (with meals and at bedtime), Disp: 420 mL, Rfl: 1  •  SUPER B COMPLEX & C TABS, TAKE 1 CAPSULE BY MOUTH ONCE FOR 1 DOSE AS DIRECTED, Disp: 90 tablet, Rfl: 2  •  Zinc Sulfate 220 (50 Zn) MG TABS, TAKE 1 TABLET BY MOUTH EVERY DAY, Disp: 90 tablet, Rfl: 1  No current facility-administered medications for this visit.    Review of Systems   Constitutional:  Negative for chills and fever.   Respiratory:  Negative for cough and shortness of breath.    Cardiovascular:  Negative for chest pain.   Musculoskeletal:  Positive for gait problem (Paraplegia).        Paraplegic   Skin:  Positive for wound (Multiple pressure injuries).        Multiple pressure injuries   Neurological:  Positive for weakness and numbness.       Objective:  /72   Pulse 72   Temp (!) 96.2 °F (35.7 °C) (Tympanic)   Resp 16         Physical Exam  Vitals reviewed.   Constitutional:       General: He is not in acute distress.     Appearance: He is not ill-appearing, toxic-appearing or diaphoretic.      Comments: Very pleasant, NAD   HENT:      Head: Normocephalic.   Cardiovascular:      Rate and Rhythm: Normal rate.   Pulmonary:      Effort: Pulmonary effort is normal. No respiratory distress.   Skin:     General: Skin is warm and dry.      Findings: Wound present. No erythema.             Comments: 1/3/4- Stage IV pressure injury sacrum/L Hip/ L ischium: Stable. To all wounds -  no malodor, purulent drainage, acute erythema, induration, fluctuance.    2- Stage IV pressure injury perineum: Large tunnel noted around 4:00 about 8.7 cm with small amount of sanguinous drainage.  No exudate or purulent drainage noted.  No malodor.    See full wound description for additional details.   Neurological:      Mental Status: He is alert and oriented to person, place, and time.      Gait: Gait abnormal (wc bound).   Psychiatric:         Mood and Affect: Mood normal.         Behavior: Behavior  normal.           Wound Pressure Injury Ischium Left (Active)   Wound Image   06/20/24 1402   Wound Description Epithelialization;Pink;Yellow 06/20/24 1402   Pressure Injury Stage 4 05/23/24 1320   Irene-wound Assessment Scar Tissue;Pink;Dry 06/20/24 1402   Wound Length (cm) 6.5 cm 06/20/24 1402   Wound Width (cm) 5.5 cm 06/20/24 1402   Wound Depth (cm) 0.7 cm 06/20/24 1402   Wound Surface Area (cm^2) 35.75 cm^2 06/20/24 1402   Wound Volume (cm^3) 25.025 cm^3 06/20/24 1402   Calculated Wound Volume (cm^3) 25.03 cm^3 06/20/24 1402   Change in Wound Size % -32.43 06/20/24 1402   Tunneling 1 in depth located at resolved 11/30/23 1351   Number of underminings 2 05/23/24 1320   Undermining 1 0.7 06/20/24 1402   Undermining 2 2.2 06/20/24 1402   Undermining 1 is depth extending from 9-11 o'clock, deepest at 10 06/20/24 1402   Undermining 2 is depth extending from 3 to 5 o'clock, deepest at 5 06/20/24 1402   Drainage Amount Moderate 06/20/24 1402   Drainage Description Serosanguineous 06/20/24 1402   Non-staged Wound Description Full thickness 06/20/24 1402   Treatments Irrigation with NSS 06/20/24 1402   Wound packed? No 05/23/24 1320   Dressing Changed Changed 03/07/24 1113   Patient Tolerance Tolerated well 06/20/24 1402   Dressing Status Removed 06/20/24 1402       Wound Pressure Injury Perineum (Active)   Wound Image   06/20/24 1402   Wound Description Pink;Yellow;White;Rolled edges;Probes to bone 06/20/24 1402   Pressure Injury Stage 4 05/23/24 1319   Irene-wound Assessment Scar Tissue 06/20/24 1402   Wound Length (cm) 8.5 cm 06/20/24 1402   Wound Width (cm) 6.5 cm 06/20/24 1402   Wound Depth (cm) 1.3 cm 06/20/24 1402   Wound Surface Area (cm^2) 55.25 cm^2 06/20/24 1402   Wound Volume (cm^3) 71.825 cm^3 06/20/24 1402   Calculated Wound Volume (cm^3) 71.83 cm^3 06/20/24 1402   Change in Wound Size % -584.1 06/20/24 1402   Tunneling 1 in depth located at 0 10/05/23 1458   Number of underminings 1 05/23/24 5859    Undermining 1 8.7 06/20/24 1402   Undermining 1 is depth extending from 11-7 o'clock, deepest @ 4 o'clock 06/20/24 1402   Drainage Amount Moderate 06/20/24 1402   Drainage Description Serosanguineous 06/20/24 1402   Non-staged Wound Description Full thickness 05/23/24 1319   Treatments Irrigation with NSS 05/23/24 1319   Wound packed? No 05/23/24 1319   Dressing Changed Changed 03/07/24 1107   Patient Tolerance Tolerated well 06/20/24 1402   Dressing Status Removed 06/20/24 1402       Wound Pressure Injury Sacrum (Active)   Wound Image   06/20/24 1402   Wound Description Epithelialization;Pink;Yellow;Granulation tissue 06/20/24 1402   Pressure Injury Stage 4 05/23/24 1319   Irene-wound Assessment Scar Tissue;Dry 06/20/24 1402   Wound Length (cm) 6.7 cm 06/20/24 1402   Wound Width (cm) 9 cm 06/20/24 1402   Wound Depth (cm) 0.6 cm 06/20/24 1402   Wound Surface Area (cm^2) 60.3 cm^2 06/20/24 1402   Wound Volume (cm^3) 36.18 cm^3 06/20/24 1402   Calculated Wound Volume (cm^3) 36.18 cm^3 06/20/24 1402   Change in Wound Size % 51.76 06/20/24 1402   Number of underminings 1 05/23/24 1319   Undermining 1 1.2 06/20/24 1402   Undermining 1 is depth extending from 7 o'clock, to 8 o'clock 06/20/24 1402   Drainage Amount Moderate 06/20/24 1402   Drainage Description Serosanguineous 06/20/24 1402   Non-staged Wound Description Full thickness 06/20/24 1402   Treatments Irrigation with NSS 06/20/24 1402   Wound packed? No 05/23/24 1319   Dressing Changed Changed 03/07/24 1105   Patient Tolerance Tolerated well 06/20/24 1402   Dressing Status Removed 06/20/24 1402       Wound Pressure Injury Hip Left;Lateral (Active)   Wound Image   06/20/24 1401   Wound Description Pink;Granulation tissue 06/20/24 1401   Pressure Injury Stage 4 05/23/24 1320   Irene-wound Assessment Scar Tissue;Intact 06/20/24 1401   Wound Length (cm) 3 cm 06/20/24 1401   Wound Width (cm) 1.5 cm 06/20/24 1401   Wound Depth (cm) 1.4 cm 06/20/24 1401   Wound Surface  Area (cm^2) 4.5 cm^2 06/20/24 1401   Wound Volume (cm^3) 6.3 cm^3 06/20/24 1401   Calculated Wound Volume (cm^3) 6.3 cm^3 06/20/24 1401   Tunneling 1 0 cm 10/05/23 1453   Tunneling 1 in depth located at resolved 10/05/23 1453   Number of underminings 1 06/20/24 1401   Undermining 1 4.5 06/20/24 1401   Undermining 1 is depth extending from 6-12 o'clock, deepest 10 06/20/24 1401   Drainage Amount Moderate 06/20/24 1401   Drainage Description Serosanguineous 06/20/24 1401   Non-staged Wound Description Full thickness 06/20/24 1401   Treatments Irrigation with NSS 06/20/24 1401   Wound packed? Yes 05/23/24 1320   Packing- # removed 1 06/20/24 1401   Dressing Changed Changed 03/07/24 1111   Patient Tolerance Tolerated well 06/20/24 1401   Dressing Status Removed 06/20/24 1401             Lab Results   Component Value Date    HGBA1C 7.4 (H) 11/07/2023       Wound Instructions:  Orders Placed This Encounter   Procedures   • Wound cleansing and dressings     Wound cleansing and dressings               Wash your hands with soap and water.  Remove old dressing, discard into plastic bag and place in trash.    Cleanse the wounds with Dakin's solution if there is an odor, IF NO ODOR, cleanse with normal saline or wound wash prior to applying a clean dressing.   Do not use tissue or cotton balls. Do not scrub the wound. Pat dry using gauze.   Shower no; do not get dressing wet      Left Hip Wound:   Apply Dakin's Moistened Kerlix  Cover with  ABD over top   Secure with Medfix tape.   Change dressing daily and top dressing PRN for breakthrough drainage (visiting nurses to do twice per week and family in between)      Sacral, Left ischium, and perineal wound:   Apply Dakin's Moistened Kerlix   (Please ensure kerlix is tucked into depth of 8.7cm at 4 o'clock for right perineal wound)  Cover with ABD over top   Secure with Medfix tape.   Change dressing daily and top dressing PRN for breakthrough drainage (visiting nurses to do twice  per week and family in between)       Continue using Clinitron bed      Continue NO PRESSURE TO ALL WOUNDS AS MUCH AS POSSIBLE, ESPECIALLY PERINEAL WOUND   LIMIT SITTING UPRIGHT IN WHEELCHAIR ON THIS WOUND          Continue visiting nurse skilled 2 x per week for wound care dressing changes.                                  Follow up at the wound center in 3-4 weeks.     Standing Status:   Future     Standing Expiration Date:   6/27/2024   • Wound Procedure Treatment     This order was created via procedure documentation       Marley Rose MD

## 2024-06-20 NOTE — PROGRESS NOTES
Wound Procedure Treatment    Performed by: Orin Nicole RN  Authorized by: Marley Rose MD    Associated wounds:   Wound Pressure Injury Ischium Left  Wound Pressure Injury Perineum  Wound Pressure Injury Sacrum  Wound Pressure Injury Hip Left;Lateral  Wound cleansed with:  Wound aggrssively cleansed with NSS and gauze  Applied primary dressing:  Other  Applied secondary dressing:  ABD  Dressing secured with:  Tape  Comments:  Wounds packed with dakin's moistened kerlix  Patient identity confirmed:  Verbally with patient

## 2024-06-20 NOTE — PATIENT INSTRUCTIONS
Orders Placed This Encounter   Procedures    Wound cleansing and dressings     Wound cleansing and dressings               Wash your hands with soap and water.  Remove old dressing, discard into plastic bag and place in trash.    Cleanse the wounds with Dakin's solution if there is an odor, IF NO ODOR, cleanse with normal saline or wound wash prior to applying a clean dressing.   Do not use tissue or cotton balls. Do not scrub the wound. Pat dry using gauze.   Shower no; do not get dressing wet      Left Hip Wound:   Apply Dakin's Moistened Kerlix  Cover with  ABD over top   Secure with Medfix tape.   Change dressing daily and top dressing PRN for breakthrough drainage (visiting nurses to do twice per week and family in between)      Sacral, Left ischium, and perineal wound:   Apply Dakin's Moistened Kerlix   (Please ensure kerlix is tucked into depth of 8.7cm at 4 o'clock for right perineal wound)  Cover with ABD over top   Secure with Medfix tape.   Change dressing daily and top dressing PRN for breakthrough drainage (visiting nurses to do twice per week and family in between)       Continue using Clinitron bed      Continue NO PRESSURE TO ALL WOUNDS AS MUCH AS POSSIBLE, ESPECIALLY PERINEAL WOUND   LIMIT SITTING UPRIGHT IN WHEELCHAIR ON THIS WOUND          Continue visiting nurse skilled 2 x per week for wound care dressing changes.                                  Follow up at the wound center in 3-4 weeks.     Standing Status:   Future     Standing Expiration Date:   6/27/2024

## 2024-06-21 ENCOUNTER — TELEPHONE (OUTPATIENT)
Dept: FAMILY MEDICINE CLINIC | Facility: CLINIC | Age: 44
End: 2024-06-21

## 2024-06-21 NOTE — TELEPHONE ENCOUNTER
PCP SIGNATURE NEEDED FOR [facility] FORM RECEIVED VIA FAX AND PLACED IN PCP FOLDER TO BE DELIVERED AT ASSIGNED TIMES.       CERT: 04/22/24 - 06/20/24

## 2024-06-24 ENCOUNTER — TELEPHONE (OUTPATIENT)
Dept: FAMILY MEDICINE CLINIC | Facility: CLINIC | Age: 44
End: 2024-06-24

## 2024-06-24 NOTE — TELEPHONE ENCOUNTER
PCP SIGNATURE NEEDED FOR [facility]  FORM RECEIVED VIA FAX AND PLACED IN PCP FOLDER TO BE DELIVERED AT ASSIGNED TIMES.      PHYSICIAN ORDERS  6/21/24

## 2024-06-25 ENCOUNTER — TELEPHONE (OUTPATIENT)
Dept: FAMILY MEDICINE CLINIC | Facility: CLINIC | Age: 44
End: 2024-06-25

## 2024-06-25 PROBLEM — A04.72 C. DIFFICILE COLITIS: Status: RESOLVED | Noted: 2022-06-23 | Resolved: 2024-06-25

## 2024-06-25 PROBLEM — Z28.21 COVID-19 VACCINATION DECLINED: Status: RESOLVED | Noted: 2024-03-05 | Resolved: 2024-06-25

## 2024-06-25 PROBLEM — Z28.21 INFLUENZA VACCINATION DECLINED: Status: RESOLVED | Noted: 2024-03-05 | Resolved: 2024-06-25

## 2024-06-25 PROBLEM — Z00.01 ENCOUNTER FOR GENERAL ADULT MEDICAL EXAMINATION WITH ABNORMAL FINDINGS: Status: RESOLVED | Noted: 2024-03-05 | Resolved: 2024-06-25

## 2024-06-25 PROBLEM — Z28.21 HEPATITIS A VACCINATION DECLINED: Status: RESOLVED | Noted: 2024-03-05 | Resolved: 2024-06-25

## 2024-06-25 PROBLEM — Z28.21 HERPES ZOSTER VACCINATION DECLINED: Status: RESOLVED | Noted: 2024-03-05 | Resolved: 2024-06-25

## 2024-06-25 PROBLEM — I82.621 ACUTE DEEP VEIN THROMBOSIS (DVT) OF RIGHT UPPER EXTREMITY (HCC): Status: RESOLVED | Noted: 2022-02-18 | Resolved: 2024-06-25

## 2024-06-25 PROBLEM — Z09 HOSPITAL DISCHARGE FOLLOW-UP: Status: RESOLVED | Noted: 2023-07-22 | Resolved: 2024-06-25

## 2024-06-25 NOTE — TELEPHONE ENCOUNTER
PCP SIGNATURE NEEDED FOR [facility] FORM RECEIVED VIA FAX AND PLACED IN PCP FOLDER TO BE DELIVERED AT ASSIGNED TIMES.    ORDER #[id number]

## 2024-06-26 NOTE — TELEPHONE ENCOUNTER
FAXED ON 06/26/24 TO [facility] at [phone]. FAX CONFIRMATION RECEIVED.

## 2024-06-27 ENCOUNTER — TELEPHONE (OUTPATIENT)
Dept: FAMILY MEDICINE CLINIC | Facility: CLINIC | Age: 44
End: 2024-06-27

## 2024-06-27 NOTE — TELEPHONE ENCOUNTER
PCP SIGNATURE NEEDED FOR [facility]  FORM RECEIVED VIA FAX AND PLACED IN PCP FOLDER TO BE DELIVERED AT ASSIGNED TIMES.    Request of Wheel Chair Prescription , Evaluation and Treatment.

## 2024-06-28 DIAGNOSIS — L98.429 ULCER OF SACRAL REGION, STAGE 4 (HCC): ICD-10-CM

## 2024-06-28 DIAGNOSIS — G82.20 PARAPLEGIA (HCC): Primary | ICD-10-CM

## 2024-06-28 DIAGNOSIS — N31.9 NEUROGENIC BLADDER: ICD-10-CM

## 2024-06-28 DIAGNOSIS — Z99.2 STAGE 5 CHRONIC KIDNEY DISEASE ON CHRONIC DIALYSIS (HCC): ICD-10-CM

## 2024-06-28 DIAGNOSIS — N18.6 STAGE 5 CHRONIC KIDNEY DISEASE ON CHRONIC DIALYSIS (HCC): ICD-10-CM

## 2024-07-09 ENCOUNTER — TELEPHONE (OUTPATIENT)
Dept: FAMILY MEDICINE CLINIC | Facility: CLINIC | Age: 44
End: 2024-07-09

## 2024-07-09 NOTE — TELEPHONE ENCOUNTER
PCP SIGNATURE NEEDED FOR [facility] FORM RECEIVED VIA FAX AND PLACED IN PCP FOLDER TO BE DELIVERED AT ASSIGNED TIMES.

## 2024-07-10 ENCOUNTER — TELEPHONE (OUTPATIENT)
Age: 44
End: 2024-07-10

## 2024-07-10 DIAGNOSIS — J30.9 ALLERGIC RHINITIS, UNSPECIFIED SEASONALITY, UNSPECIFIED TRIGGER: ICD-10-CM

## 2024-07-10 RX ORDER — CETIRIZINE HYDROCHLORIDE 10 MG/1
TABLET ORAL
Qty: 90 TABLET | Refills: 1 | Status: SHIPPED | OUTPATIENT
Start: 2024-07-10

## 2024-07-10 NOTE — TELEPHONE ENCOUNTER
Patient was not seen in a GI office.      PA for dexlansoprazole    Submitted via    [x]CMM-KEY [id number] - PA Case ID: [id number]  [][employer]-Case ID #   []Faxed to plan   []Other website   []Phone call Case ID #      clinical questions answered. Awaiting determination    Turnaround time for your insurance to make a decision on your Prior Authorization can take 7-21 business days.

## 2024-07-16 NOTE — TELEPHONE ENCOUNTER
PA for Dexlansoprazole Approved     Date(s) approved until 12/31/2024    Case #    Patient advised by          [] MyChart Message  [x] Phone call spoke with Mother, already got notification of approval  []LMOM  []L/M to call office as no active Communication consent on file  []Unable to leave detailed message as VM not approved on Communication consent       Pharmacy advised by    [x]Fax  []Phone call    Approval letter scanned into Media Yes

## 2024-07-18 ENCOUNTER — OFFICE VISIT (OUTPATIENT)
Dept: WOUND CARE | Facility: CLINIC | Age: 44
End: 2024-07-18
Payer: COMMERCIAL

## 2024-07-18 VITALS
SYSTOLIC BLOOD PRESSURE: 106 MMHG | HEART RATE: 92 BPM | DIASTOLIC BLOOD PRESSURE: 68 MMHG | TEMPERATURE: 96.3 F | RESPIRATION RATE: 18 BRPM

## 2024-07-18 DIAGNOSIS — Z99.2 TYPE 1 DIABETES MELLITUS WITH CHRONIC KIDNEY DISEASE ON CHRONIC DIALYSIS (HCC): ICD-10-CM

## 2024-07-18 DIAGNOSIS — L89.224 PRESSURE ULCER OF LEFT HIP, STAGE 4 (HCC): ICD-10-CM

## 2024-07-18 DIAGNOSIS — N18.6 TYPE 1 DIABETES MELLITUS WITH CHRONIC KIDNEY DISEASE ON CHRONIC DIALYSIS (HCC): ICD-10-CM

## 2024-07-18 DIAGNOSIS — L89.154 PRESSURE INJURY OF SACRAL REGION, STAGE 4 (HCC): Primary | ICD-10-CM

## 2024-07-18 DIAGNOSIS — L89.324 PRESSURE INJURY OF LEFT ISCHIUM, STAGE 4 (HCC): ICD-10-CM

## 2024-07-18 DIAGNOSIS — G82.20 PARAPLEGIA (HCC): ICD-10-CM

## 2024-07-18 DIAGNOSIS — L89.894 PRESSURE ULCER OF OTHER SITE, STAGE 4 (HCC): ICD-10-CM

## 2024-07-18 DIAGNOSIS — E10.22 TYPE 1 DIABETES MELLITUS WITH CHRONIC KIDNEY DISEASE ON CHRONIC DIALYSIS (HCC): ICD-10-CM

## 2024-07-18 DIAGNOSIS — M86.60 CHRONIC OSTEOMYELITIS (HCC): ICD-10-CM

## 2024-07-18 LAB — GLUCOSE SERPL-MCNC: 224 MG/DL (ref 65–140)

## 2024-07-18 PROCEDURE — 11042 DBRDMT SUBQ TIS 1ST 20SQCM/<: CPT | Performed by: NURSE PRACTITIONER

## 2024-07-18 PROCEDURE — 11045 DBRDMT SUBQ TISS EACH ADDL: CPT | Performed by: NURSE PRACTITIONER

## 2024-07-18 PROCEDURE — 82948 REAGENT STRIP/BLOOD GLUCOSE: CPT | Performed by: NURSE PRACTITIONER

## 2024-07-18 NOTE — LETTER
[facility]  [address] [address]  Phone#  [phone]  Fax#  [phone]    Patient:  Ovidio Parkinson Jr.  YOB: 1980  Phone:  [phone]  Date of Visit:  7/18/2024    Orders Placed This Encounter   Procedures   • Wound cleansing and dressings     Wound cleansing and dressings               Wash your hands with soap and water.  Remove old dressing, discard into plastic bag and place in trash.    Cleanse the wounds with Dakin's solution if there is an odor, IF NO ODOR, cleanse with normal saline or wound wash prior to applying a clean dressing.   Do not use tissue or cotton balls. Do not scrub the wound. Pat dry using gauze.   Shower no; do not get dressing wet      Left Hip Wound:   Apply Dakin's Moistened Kerlix  Cover with  ABD over top   Secure with Medfix tape.   Change dressing daily and top dressing PRN for breakthrough drainage (visiting nurses to do twice per week and family in between)      Sacral, Left ischium, and perineal wound:   Apply Dakin's Moistened Kerlix   Please ensure kerlix is tucked into depth of tunnel 4 o'clock for right perineal wound  Cover with ABD over top   Secure with Medfix tape.   Change dressing daily and top dressing PRN for breakthrough drainage (visiting nurses to do twice per week and family in between)       Continue using Clinitron bed      Continue NO PRESSURE TO ALL WOUNDS AS MUCH AS POSSIBLE, ESPECIALLY PERINEAL WOUND   LIMIT SITTING UPRIGHT IN WHEELCHAIR ON THIS WOUND          Continue visiting nurse skilled 2 x per week for wound care dressing changes.                                  Follow up at the wound center in 3-4 weeks.     Standing Status:   Future     Standing Expiration Date:   7/25/2024   • Fingerstick Glucose (POCT)         Electronically signed by ERNESTO Avendano

## 2024-07-18 NOTE — PROGRESS NOTES
"Patient ID: Ovidio Parkinson Jr. is a 43 y.o. male Date of Birth 1980     Chief Complaint  Chief Complaint   Patient presents with    Follow Up Wound Care Visit     Sacral, ischial, perineal, hip wounds. Arrived with dressing intact to all wounds.       Allergies  Chlorcyclizine, Chlorhexidine, Ciprofloxacin hcl, Cymbalta [duloxetine hcl], Dm-doxylamine-acetaminophen, Doxycycline, Gabapentin, Hydrocortisone, Lactose - food allergy, Lyrica [pregabalin], Milk (cow), Penicillins, Polymyxin b, Promethazine-phenyleph-codeine, Quinidine, Cefepime, and Rosuvastatin    Assessment:     Diagnoses and all orders for this visit:    Pressure injury of sacral region, stage 4 (HCC)  -     Wound cleansing and dressings; Future    Pressure injury of left ischium, stage 4 (HCC)  -     Wound cleansing and dressings; Future    Pressure ulcer of left hip, stage 4 (HCC)  -     Wound cleansing and dressings; Future    Pressure ulcer of other site, stage 4 (HCC)  -     Wound cleansing and dressings; Future    Chronic osteomyelitis (HCC)  -     Wound cleansing and dressings; Future    Paraplegia (HCC)  -     Wound cleansing and dressings; Future    Type 1 diabetes mellitus with chronic kidney disease on chronic dialysis ([facility])  -     Fingerstick Glucose (POCT)              Debridement   Wound Pressure Injury Ischium Left    Universal Protocol:  Consent: Written consent obtained.  Consent given by: patient  Time out: Immediately prior to procedure a \"time out\" was called to verify the correct patient, procedure, equipment, support staff and site/side marked as required.  Timeout called at: 7/19/2024 9:30 AM.  Patient identity confirmed: verbally with patient    Debridement Details  Performed by: NP  Debridement type: surgical  Level of debridement: subcutaneous tissue  Pain control: lidocaine 4%      Post-debridement measurements  Length (cm): 7.5  Width (cm): 5.3  Depth (cm): 1.7  Percent debrided: 100%  Surface Area (cm^2): 39.75  Area " "Debrided (cm^2): 39.75  Volume (cm^3): 67.58    Tissue and other material debrided: subcutaneous tissue  Devitalized tissue debrided: biofilm, fibrin and slough  Instrument(s) utilized: curette  Bleeding: small  Hemostasis obtained with: pressure  Procedural pain (0-10): 0  Post-procedural pain: 0   Response to treatment: procedure was tolerated well    Debridement   Wound Pressure Injury Perineum    Universal Protocol:  Consent: Written consent obtained.  Consent given by: patient  Time out: Immediately prior to procedure a \"time out\" was called to verify the correct patient, procedure, equipment, support staff and site/side marked as required.  Timeout called at: 7/19/2024 9:30 AM.  Patient identity confirmed: verbally with patient    Debridement Details  Performed by: NP  Debridement type: surgical  Level of debridement: subcutaneous tissue  Pain control: lidocaine 4%      Post-debridement measurements  Length (cm): 7.7  Width (cm): 7  Depth (cm): 1.6  Percent debrided: 100%  Surface Area (cm^2): 53.9  Area Debrided (cm^2): 53.9  Volume (cm^3): 86.24    Tissue and other material debrided: subcutaneous tissue  Devitalized tissue debrided: biofilm, fibrin and slough  Instrument(s) utilized: curette  Bleeding: small  Hemostasis obtained with: pressure  Procedural pain (0-10): 0  Post-procedural pain: 0   Response to treatment: procedure was tolerated well    Debridement   Wound Pressure Injury Sacrum    Universal Protocol:  Consent: Written consent obtained.  Consent given by: patient  Time out: Immediately prior to procedure a \"time out\" was called to verify the correct patient, procedure, equipment, support staff and site/side marked as required.  Timeout called at: 7/19/2024 9:31 AM.  Patient identity confirmed: verbally with patient    Debridement Details  Performed by: NP  Debridement type: surgical  Level of debridement: subcutaneous tissue  Pain control: lidocaine 4%      Post-debridement measurements  Length " "(cm): 4.5  Width (cm): 9.5  Depth (cm): 0.9  Percent debrided: 100%  Surface Area (cm^2): 42.75  Area Debrided (cm^2): 42.75  Volume (cm^3): 38.48    Tissue and other material debrided: subcutaneous tissue  Devitalized tissue debrided: biofilm, fibrin and slough  Instrument(s) utilized: curette  Bleeding: small  Hemostasis obtained with: pressure  Procedural pain (0-10): 0  Post-procedural pain: 0   Response to treatment: procedure was tolerated well    Debridement   Wound Pressure Injury Hip Left;Lateral    Universal Protocol:  Consent: Written consent obtained.  Consent given by: patient  Time out: Immediately prior to procedure a \"time out\" was called to verify the correct patient, procedure, equipment, support staff and site/side marked as required.  Timeout called at: 7/19/2024 9:32 AM.  Patient identity confirmed: verbally with patient    Debridement Details  Performed by: NP  Debridement type: surgical  Level of debridement: subcutaneous tissue  Pain control: lidocaine 4%      Post-debridement measurements  Length (cm): 2.5  Width (cm): 1.3  Depth (cm): 1.7  Percent debrided: 100%  Surface Area (cm^2): 3.25  Area Debrided (cm^2): 3.25  Volume (cm^3): 5.53    Tissue and other material debrided: subcutaneous tissue  Devitalized tissue debrided: biofilm, fibrin and slough  Instrument(s) utilized: curette  Bleeding: small  Hemostasis obtained with: pressure  Procedural pain (0-10): 0  Post-procedural pain: 0   Response to treatment: procedure was tolerated well        Plan:  F/u palliative visit. Wounds debrided. Wounds clean with granular wound bases and stable. Continue current plan of care   A1C results reviewed with the patient today. Patient's BG checked in wound center today per patient's request: 224. Patient is to continue to follow recommendations to achieve tight glycemic control  Continue frequent offloading of pressure from wounds  Patient will follow up in 4 weeks      Wound Pressure Injury Ischium " Left (Active)   Wound Image Images linked 07/18/24 1504   Wound Description Rolled edges;Epithelialization;Pink;Yellow;Granulation tissue 07/18/24 1441   Irene-wound Assessment Scar Tissue;Pink;Maceration 07/18/24 1441   Wound Length (cm) 7.5 cm 07/18/24 1441   Wound Width (cm) 5.3 cm 07/18/24 1441   Wound Depth (cm) 1.6 cm 07/18/24 1441   Wound Surface Area (cm^2) 39.75 cm^2 07/18/24 1441   Wound Volume (cm^3) 63.6 cm^3 07/18/24 1441   Calculated Wound Volume (cm^3) 63.6 cm^3 07/18/24 1441   Change in Wound Size % -236.51 07/18/24 1441   Number of underminings 2 07/18/24 1441   Undermining 1 2.6 07/18/24 1441   Undermining 2 1 07/18/24 1441   Undermining 1 is depth extending from 4-6, deepest at 5 07/18/24 1441   Drainage Amount Moderate 07/18/24 1441   Drainage Description Serosanguineous 07/18/24 1441       Wound Pressure Injury Perineum (Active)   Wound Image Images linked 07/18/24 1505   Wound Description Epithelialization;Rolled edges;Pink;Granulation tissue;Yellow 07/18/24 1436   Pressure Injury Stage 4 07/18/24 1436   Irene-wound Assessment Scar Tissue;Pink 07/18/24 1436   Wound Length (cm) 7.7 cm 07/18/24 1436   Wound Width (cm) 7 cm 07/18/24 1436   Wound Depth (cm) 1.5 cm 07/18/24 1436   Wound Surface Area (cm^2) 53.9 cm^2 07/18/24 1436   Wound Volume (cm^3) 80.85 cm^3 07/18/24 1436   Calculated Wound Volume (cm^3) 80.85 cm^3 07/18/24 1436   Change in Wound Size % -670 07/18/24 1436   Number of underminings 1 07/18/24 1436   Undermining 1 5.7 07/18/24 1436   Undermining 1 is depth extending from 1-5, deepest at 4 07/18/24 1436   Drainage Amount Moderate 07/18/24 1436   Drainage Description Serosanguineous 07/18/24 1436       Wound Pressure Injury Sacrum (Active)   Wound Image Images linked 07/18/24 1505   Wound Description Epithelialization;Pink;Yellow;Granulation tissue 07/18/24 1434   Pressure Injury Stage 4 07/18/24 1434   Irene-wound Assessment Scar Tissue;Maceration 07/18/24 1434   Wound Length (cm) 4.5  cm 07/18/24 1434   Wound Width (cm) 9.5 cm 07/18/24 1434   Wound Depth (cm) 0.8 cm 07/18/24 1434   Wound Surface Area (cm^2) 42.75 cm^2 07/18/24 1434   Wound Volume (cm^3) 34.2 cm^3 07/18/24 1434   Calculated Wound Volume (cm^3) 34.2 cm^3 07/18/24 1434   Change in Wound Size % 54.4 07/18/24 1434   Drainage Amount Large 07/18/24 1434   Drainage Description Serosanguineous 07/18/24 1434       Wound Pressure Injury Hip Left;Lateral (Active)   Wound Image Images linked 07/18/24 1504   Wound Description Pink;Granulation tissue;Epithelialization 07/18/24 1430   Pressure Injury Stage 4 07/18/24 1430   Irene-wound Assessment Scar Tissue;Intact 07/18/24 1430   Wound Length (cm) 2.5 cm 07/18/24 1430   Wound Width (cm) 1.3 cm 07/18/24 1430   Wound Depth (cm) 1.6 cm 07/18/24 1430   Wound Surface Area (cm^2) 3.25 cm^2 07/18/24 1430   Wound Volume (cm^3) 5.2 cm^3 07/18/24 1430   Calculated Wound Volume (cm^3) 5.2 cm^3 07/18/24 1430   Number of underminings 1 07/18/24 1430   Undermining 1 4.7 07/18/24 1430   Undermining 1 is depth extending from 6-12, deepest at 9 07/18/24 1430   Drainage Amount Moderate 07/18/24 1430   Drainage Description Serosanguineous 07/18/24 1430       Wound Pressure Injury Ischium Left (Active)   No Date First Assessed or Time First Assessed found.   Primary Wound Type: Pressure Injury  Location: Ischium  Wound Location Orientation: Left  Wound Description (Comments): Left Iscial Stage 4  Wound Outcome: Palliative       Wound Pressure Injury Perineum (Active)   No Date First Assessed or Time First Assessed found.   Primary Wound Type: Pressure Injury  Location: Perineum  Wound Description (Comments): Stage 4  Wound Outcome: Palliative       Wound Pressure Injury Sacrum (Active)   No Date First Assessed or Time First Assessed found.   Pre-Existing Wound: Yes  Primary Wound Type: Pressure Injury  Location: Sacrum  Wound Description (Comments): Stage 4  Wound Outcome: Palliative       Wound Pressure Injury Hip  Left;Lateral (Active)   No Date First Assessed or Time First Assessed found.   Primary Wound Type: Pressure Injury  Location: Hip  Wound Location Orientation: Left;Lateral  Wound Outcome: Palliative       [REMOVED] Pressure Ulcer 09/01/16 Hip Left (Removed)   Resolved Date: 08/13/20  Date First Assessed/Time First Assessed: 09/01/16 0259   Pre-Existing Wound: Yes  Location: Hip  Orientation: Left       [REMOVED] Pressure Ulcer 09/01/16 Sacrum (Removed)   Resolved Date: 08/13/20  Date First Assessed/Time First Assessed: 09/01/16 0409   Pre-Existing Wound: Yes  Location: Sacrum       [REMOVED] Pressure Ulcer 03/19/17 Leg Lower;Right Unstagable to lateral lower right stump (Removed)   Resolved Date: 08/13/20  Date First Assessed/Time First Assessed: 03/19/17 1637   Pre-Existing Wound: Yes  Location: Leg  Orientation: Lower;Right  Wound Description (Comments): Unstagable to lateral lower right stump       [REMOVED] Pressure Ulcer 03/23/17 Hip Left Stage 2 skin breakdown (Removed)   Resolved Date: 08/13/20  Date First Assessed/Time First Assessed: 03/23/17 1700   Location: Hip  Orientation: Left  Wound Description (Comments): Stage 2 skin breakdown       [REMOVED] Pressure Ulcer 06/29/17 Foot Left healing stage III (Removed)   Resolved Date: 08/13/20  Date First Assessed/Time First Assessed: 06/29/17 0230   Pre-Existing Wound: Yes  Location: Foot  Orientation: Left  Wound Description (Comments): healing stage III       [REMOVED] Pressure Ulcer 06/29/17 Foot Left healing stage III (Removed)   Resolved Date: 08/13/20  Date First Assessed/Time First Assessed: 06/29/17 0230   Pre-Existing Wound: Yes  Location: Foot  Orientation: Left  Wound Description (Comments): healing stage III       [REMOVED] Pressure Ulcer Ischium Right (Removed)   Resolved Date: 08/13/20  No Date First Assessed or Time First Assessed found.   Pre-Existing Wound: Yes  Location: Ischium  Orientation: Right       [REMOVED] Wound 09/02/16 Other (Comment)  Knee Anterior;Right (Removed)   Resolved Date: 08/13/20  Date First Assessed/Time First Assessed: 09/02/16 1811   Traumatic Wound Type: (c) Other (Comment)  Location: Knee  Wound Location Orientation: Anterior;Right  Dressing Status: Intact;Clean;Dry       [REMOVED] Wound 06/29/17  inferior of anus, appears to be stage 3  (Removed)   Resolved Date: 08/13/20  Date First Assessed/Time First Assessed: 06/29/17 0041   Location: (c)   Wound Description (Comments): inferior of anus, appears to be stage 3   Dressing Status: Intact;Reinforced       [REMOVED] Wound 09/18/17 Other (Comment) Penis ulcer, leaking pus (Removed)   Resolved Date: 08/13/20  Date First Assessed/Time First Assessed: 09/18/17 2137   Traumatic Wound Type: Other (Comment)  Location: Penis  Wound Description (Comments): ulcer, leaking pus  Dressing Status: Open to air       [REMOVED] Incision 09/01/16 Shoulder Right (Removed)   Resolved Date: 08/13/20  Date First Assessed/Time First Assessed: 09/01/16 0441   Pre-Existing Wound: Yes  Location: Shoulder  Wound Location Orientation: Right       [REMOVED] Other Ulcers 09/01/16 Foot Anterior;Outer (Removed)   Resolved Date: 08/13/20  Date First Assessed/Time First Assessed: 09/01/16 1605   Location: Foot  Orientation: Anterior;Outer       [REMOVED] Other Ulcers 09/20/17 Foot Mid;Left (Removed)   Resolved Date: 08/13/20  Date First Assessed/Time First Assessed: 09/20/17 1530   Location: Foot  Orientation: Mid;Left  Dressing Status: Clean;Dry;Intact       [REMOVED] Wound Pressure Injury Ischium Right (Removed)   Resolved Date: 01/28/21  No Date First Assessed or Time First Assessed found.   Pre-Existing Wound: Yes  Primary Wound Type: Pressure Injury  Location: Ischium  Wound Location Orientation: Right  Wound Description (Comments): Right Ischium Stage 3  Wo...       [REMOVED] Wound 03/08/21 Mouth N/A (Removed)   Resolved Date: 04/22/21  Date First Assessed/Time First Assessed: 03/08/21 1037   Location: Mouth   Wound Location Orientation: N/A       [REMOVED] Wound 05/05/22 Pressure Injury Ischium Right (Removed)   Resolved Date/Resolved Time: 09/01/22 1606  Date First Assessed/Time First Assessed: 05/05/22 1608   Primary Wound Type: Pressure Injury  Location: Ischium  Wound Location Orientation: Right  Wound Outcome: Healed       [REMOVED] Wound 09/01/22 Pressure Injury Scrotum Posterior;Left (Removed)   Resolved Date/Resolved Time: 12/06/22 1425  Date First Assessed/Time First Assessed: 09/01/22 1530   Primary Wound Type: Pressure Injury  Location: Scrotum  Wound Location Orientation: Posterior;Left  Wound Outcome: Healed       [REMOVED] Wound 09/01/22 Pressure Injury Scrotum Posterior;Right (Removed)   Resolved Date/Resolved Time: 12/06/22 1425  Date First Assessed/Time First Assessed: 09/01/22 1607   Primary Wound Type: Pressure Injury  Location: Scrotum  Wound Location Orientation: Posterior;Right  Wound Outcome: Healed       [REMOVED] Wound 01/04/24 Pressure Injury Scrotum Posterior (Removed)   Resolved Date/Resolved Time: 05/23/24 1341  Date First Assessed: 01/04/24   Primary Wound Type: Pressure Injury  Location: Scrotum  Wound Location Orientation: Posterior  Wound Outcome: Healed       Subjective:      .    F/u palliative visit for multiple pressure injuries to his sacrum, left ischium, perineum, and left hip. No new complaints. Patient reports his brother has been completing his wound care at home and following recommendations. He denies any pain, fevers, or chills.         The following portions of the patient's history were reviewed and updated as appropriate: He  has a past medical history of Acute pulmonary edema (HCC) (01/13/2022), Ambulatory dysfunction, Anemia, Anemia, iron deficiency, Asymptomatic bacteriuria (09/25/2017), Atrial fibrillation ([facility]), AVM (arteriovenous malformation) of duodenum, acquired, Bacteriuria, asymptomatic, Bipolar disorder ([facility]), C. difficile colitis (06/23/2022), Chronic deep  vein thrombosis (DVT) ([facility]), Chronic indwelling Ortega catheter, Chronic kidney disease, Chronic pain, Chronic pain disorder, Chronic suprapubic catheter ([facility]), Clostridium difficile infection (08/11/2016), Colostomy on examination ([facility]), Decubitus ulcer, Delirium, Diabetes mellitus ([facility]), GERD (gastroesophageal reflux disease), Hemodialysis patient ([facility]), Hypertension, Memory impairment (2011), Neurogenic bladder, OAB (overactive bladder), Paraplegia ([facility]), Penile abscess, S/P unilateral BKA (below knee amputation) ([facility]), Sebaceous cyst (removed in 2017), Seizures ([facility]), Shortness of breath, Tobacco abuse, 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  He   Patient Active Problem List    Diagnosis Date Noted    Rheumatoid arthritis ([facility]) 07/22/2023    Charcot's arthropathy 03/03/2022    Immunocompromised state ([facility]) 01/13/2022    Severe protein-calorie malnutrition ([facility]) 01/13/2022    Chronic osteomyelitis ([facility]) 01/13/2022    LVH (left ventricular hypertrophy) 12/20/2021    Wounds, multiple     Irritable bowel syndrome 07/28/2021    Pressure ulcer of other site, stage 4 ([facility]) 03/12/2021    Pressure ulcer of left hip, stage 4 ([facility]) 03/12/2021    Chronic diastolic heart failure ([facility]) 02/10/2021    Pressure ulcer of sacral region, stage 4 ([facility]) 08/13/2020    NICM (nonischemic cardiomyopathy) ([facility]) 04/30/2020    Prolonged Q-T interval on ECG 04/14/2020    Bipolar depression ([facility]) 02/27/2020    Seizure ([facility]) 01/20/2020    Onychomycosis 01/10/2020    Wheelchair dependent 08/07/2019    Dialysis patient ([facility]) 05/08/2019    Insulin long-term use ([facility]) 05/08/2019    Hyperlipidemia 09/14/2018    Secondary hyperparathyroidism of renal origin ([facility]) 07/13/2018    Chronic narcotic dependence ([facility]) 02/07/2018    End stage renal disease ([facility]) 02/07/2018    Urinary retention 09/26/2017    History of Clostridium difficile infection 09/25/2017    Stage 5 chronic kidney disease on chronic  dialysis ([facility]) 09/18/2017    AVM (arteriovenous malformation) of duodenum, acquired 08/30/2017    Iron deficiency anemia 07/03/2017    Pressure injury of left ischium, stage 4 ([facility]) 07/03/2017    HTN (hypertension), benign 07/03/2017    Neurogenic bladder 07/03/2017    GERD (gastroesophageal reflux disease) 07/03/2017    Chronic pain 07/03/2017    Wound healing, delayed 06/29/2017    Pustular folliculitis 01/19/2017    Ulcer of sacral region, stage 4 ([facility]) 09/01/2016    Paraplegia ([facility])     Atrial fibrillation ([facility])     Chronic suprapubic catheter ([facility])     Colostomy care ([facility])     S/P unilateral BKA (below knee amputation) ([facility])     Tobacco abuse     Type 1 diabetes mellitus with chronic kidney disease on chronic dialysis ([facility])     Diabetic gastroparesis associated with type 1 diabetes mellitus  ([facility]) 01/06/2016    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  His family history includes Diabetes in his paternal grandfather; Hyperlipidemia in his mother; Hypertension in his mother; Leukemia in his brother.  He  reports that he has been smoking cigars. He has been exposed to tobacco smoke. He has never used smokeless tobacco. He reports that he does not currently use alcohol. He reports that he does not currently use drugs after having used the following drugs: Marijuana.  Current Outpatient Medications   Medication Sig Dispense Refill    Alcohol Swabs (ALCOHOL PADS) 70 % PADS by Does not apply route 5 (five) times a day 300 each 5    amLODIPine (NORVASC) 10 mg tablet TAKE 1  TABLET BY MOUTH EVERY DAY 90 tablet 1    ammonium lactate (LAC-HYDRIN) 12 % cream       apixaban (ELIQUIS) 5 mg Take 1 tablet (5 mg total) by mouth 2 (two) times a day 180 tablet 3    atorvastatin (LIPITOR) 20 mg tablet TAKE 1 TABLET BY MOUTH EVERYDAY AT BEDTIME 90 tablet 1    azelaic acid (Azelex) 20 % cream Apply topically 2 (two) times a day 50 g 0    B Complex-C-Folic Acid (Super B-Complex/Vit C/FA) TABS TAKE 1 TABLET BY MOUTH EVERY DAY AS DIRECTED 90 tablet 5    SUSAN MICROLET LANCETS lancets Use as instructed to check blood sugar 4 times a day  Dx e10.29 200 each 5    bisacodyl (DULCOLAX) 5 mg EC tablet Take 1 tablet (5 mg total) by mouth once for 1 dose 2 tablet 0    Blood Glucose Monitoring Suppl (SUSAN CONTOUR NEXT USB MONITOR) w/Device KIT Testing 4 times a day 1 kit 0    calcitriol (ROCALTROL) 0.5 MCG capsule Take 2 mcg by mouth      calcium acetate (PHOSLO) capsule TAKE 2 CAPSULES BY MOUTH THREE TIMES DAILY WITH MEALS      carvedilol (COREG) 25 mg tablet Take 1 tablet (25 mg total) by mouth 2 (two) times a day 180 tablet 1    cetirizine (ZyrTEC) 10 mg tablet TAKE 1 TABLET BY MOUTH EVERY DAY 90 tablet 1    Cholecalciferol (VITAMIN D-3) 1000 units CAPS Take 2 capsules by mouth daily 30 capsule 0    clindamycin (CLINDAGEL) 1 % gel APPLY TO AFFECTED AREA TWICE A DAY 60 g 2    Contour Next Test test strip USE TO TEST BLOOD SUGAR 3 TIMES DAILY. CONTOUR NEXT STRIPS. E10.29      cyanocobalamin ([employer] Vitamin B-12) 1000 MCG tablet Take 1 tablet (1,000 mcg total) by mouth daily 90 tablet 3    cyclobenzaprine (FLEXERIL) 5 mg tablet       dexamethasone sodium phosphate 0.1 % ophthalmic solution 2 drops in each ear as needed for itching      dexlansoprazole (DEXILANT) 30 MG capsule TAKE 1 CAPSULE BY MOUTH EVERY DAY 90 capsule 1    dicyclomine (BENTYL) 10 mg capsule TAKE 1 CAPSULE BY MOUTH 3 TIMES A DAY BEFORE MEALS 270 capsule 1    Disposable Gloves MISC Use 7 times a day, 4 boxes of gloves XL  Dx type 1 DM, paraplegia  4 each 11    doxazosin (CARDURA) 2 mg tablet TAKE 1 TABLET BY MOUTH EVERY EVENING. 90 tablet 1    epoetin kaushik (EPOGEN,PROCRIT) 20,000 units/mL Inject 10,000 Units under the skin      epoetin kaushik (EPOGEN,PROCRIT) 20,000 units/mL Inject 5,000 Units under the skin      ferrous sulfate 324 (65 Fe) mg Take 1 tablet (324 mg total) by mouth in the morning 90 tablet 3    ferrous sulfate 325 (65 Fe) mg tablet Take 1 tablet (325 mg total) by mouth 3 (three) times a day 90 tablet 3    Fluocinolone Acetonide Body 0.01 % OIL Apply 2 drops topically daily      folic acid (FOLVITE) 1 mg tablet Take 1,000 mcg by mouth daily      Gvoke PFS 1 MG/0.2ML SOSY       hydroxychloroquine (PLAQUENIL) 200 mg tablet Take 200 mg by mouth daily      hydrOXYzine HCL (ATARAX) 50 mg tablet TAKE 1 TABLET (50 MG TOTAL) BY MOUTH 2 (TWO) TIMES A DAY AS NEEDED FOR ITCHING OR ANXIETY 180 tablet 1    Incontinence Supply Disposable (Incontinence Brief Large) MISC Use 6 (six) times a day Size xl 180 each 11    Incontinence Supply Disposable (Undergarment) MISC Use 6 a day, 5 boxes, xl  Dx R51, paraplegia 5 each 11    Incontinence Supply Disposable (Underpads) MISC Use 2 a day 5 each 11    insulin lispro (HumALOG/ADMELOG) 100 units/mL injection USE UP TO 40 UNITS DAILY VIA PUMP. E10.9      insulin lispro protamine-insulin lispro (HumaLOG 50-50) 100 units/mL Inject under the skin 2 (two) times a day before meals      leflunomide (ARAVA) 10 MG tablet Take 10 mg by mouth daily      LEVEMIR FLEXTOUCH 100 units/mL injection pen Inject 14 Units under the skin 2 (two) times a day 15 pen 3    Lokelma 10 g PACK       metroNIDAZOLE (METROGEL) 0.75 % gel Apply topically 2 (two) times a day 45 g 0    Misc. Devices (Wheelchair Cushion) MISC Use daily 1 each 0    Misc. Devices (WHEELCHAIR) MISC by Does not apply route daily Wheelchair ramp, dx g82.20 1 each 0    Multiple Vitamin (Daily-Enriqueta Multivitamin) TABS TAKE 1 TABLET BY MOUTH EVERY DAY 30 tablet 8    NovoLOG  FlexPen 100 units/mL injection pen INJECT 3 UNITS UNDER THE SKIN 3 (THREE) TIMES A DAY BEFORE MEALS. (Patient not taking: Reported on 7/13/2023)      ondansetron (ZOFRAN-ODT) 4 mg disintegrating tablet Take 1 tablet (4 mg total) by mouth every 6 (six) hours as needed for nausea or vomiting 30 tablet 2    oxybutynin (DITROPAN XL) 15 MG 24 hr tablet Take 1 tablet (15 mg total) by mouth daily at bedtime 90 tablet 1    oxybutynin (DITROPAN) 5 mg tablet Take 3 tablets (15 mg total) by mouth daily 270 tablet 1    oxyCODONE (ROXICODONE) 10 MG TABS TAKE ONE TABLET BY MOUTH EVERY 12 HOURS AS NEEDED FOR PAIN. ONGOING THERAPY.      polyethylene glycol (GLYCOLAX) 17 GM/SCOOP powder Take 17 g by mouth daily 850 g 1    sertraline (ZOLOFT) 25 mg tablet TAKE 1 TABLET (25 MG TOTAL) BY MOUTH DAILY. 90 tablet 1    sodium hypochlorite (DAKIN'S HALF-STRENGTH) external solution Apply 1 Application topically daily Soak gauze and pack into wounds daily. 473 mL 0    Sodium Zirconium Cyclosilicate 10 g PACK Take 10 g by mouth daily      sucralfate (CARAFATE) 1 g/10 mL suspension Take 10 mL (1 g total) by mouth 4 (four) times a day (with meals and at bedtime) 420 mL 1    SUPER B COMPLEX & C TABS TAKE 1 CAPSULE BY MOUTH ONCE FOR 1 DOSE AS DIRECTED 90 tablet 2    valsartan (DIOVAN) 320 MG tablet       Zinc Sulfate 220 (50 Zn) MG TABS TAKE 1 TABLET BY MOUTH EVERY DAY 90 tablet 1     No current facility-administered medications for this visit.     He is allergic to chlorcyclizine, chlorhexidine, ciprofloxacin hcl, cymbalta [duloxetine hcl], dm-doxylamine-acetaminophen, doxycycline, gabapentin, hydrocortisone, lactose - food allergy, lyrica [pregabalin], milk (cow), penicillins, polymyxin b, promethazine-phenyleph-codeine, quinidine, cefepime, and rosuvastatin..    Review of Systems   Constitutional: Negative.    HENT:  Negative for ear pain and hearing loss.    Eyes:  Negative for pain.   Respiratory:  Negative for chest tightness and shortness  of breath.    Cardiovascular:  Negative for chest pain, palpitations and leg swelling.   Gastrointestinal:  Negative for diarrhea, nausea and vomiting.   Genitourinary:  Negative for dysuria.   Musculoskeletal:  Positive for gait problem.   Skin:  Positive for wound.   Neurological:  Positive for numbness. Negative for tremors and weakness.   Psychiatric/Behavioral:  Negative for behavioral problems, confusion and suicidal ideas.          Objective:       Wound Pressure Injury Ischium Left (Active)   Wound Image Images linked 07/18/24 1504   Wound Description Rolled edges;Epithelialization;Pink;Yellow;Granulation tissue 07/18/24 1441   Irene-wound Assessment Scar Tissue;Pink;Maceration 07/18/24 1441   Wound Length (cm) 7.5 cm 07/18/24 1441   Wound Width (cm) 5.3 cm 07/18/24 1441   Wound Depth (cm) 1.6 cm 07/18/24 1441   Wound Surface Area (cm^2) 39.75 cm^2 07/18/24 1441   Wound Volume (cm^3) 63.6 cm^3 07/18/24 1441   Calculated Wound Volume (cm^3) 63.6 cm^3 07/18/24 1441   Change in Wound Size % -236.51 07/18/24 1441   Number of underminings 2 07/18/24 1441   Undermining 1 2.6 07/18/24 1441   Undermining 2 1 07/18/24 1441   Undermining 1 is depth extending from 4-6, deepest at 5 07/18/24 1441   Drainage Amount Moderate 07/18/24 1441   Drainage Description Serosanguineous 07/18/24 1441       Wound Pressure Injury Perineum (Active)   Wound Image Images linked 07/18/24 1505   Wound Description Epithelialization;Rolled edges;Pink;Granulation tissue;Yellow 07/18/24 1436   Pressure Injury Stage 4 07/18/24 1436   Irene-wound Assessment Scar Tissue;Pink 07/18/24 1436   Wound Length (cm) 7.7 cm 07/18/24 1436   Wound Width (cm) 7 cm 07/18/24 1436   Wound Depth (cm) 1.5 cm 07/18/24 1436   Wound Surface Area (cm^2) 53.9 cm^2 07/18/24 1436   Wound Volume (cm^3) 80.85 cm^3 07/18/24 1436   Calculated Wound Volume (cm^3) 80.85 cm^3 07/18/24 1436   Change in Wound Size % -670 07/18/24 1436   Number of underminings 1 07/18/24 1436    Undermining 1 5.7 07/18/24 1436   Undermining 1 is depth extending from 1-5, deepest at 4 07/18/24 1436   Drainage Amount Moderate 07/18/24 1436   Drainage Description Serosanguineous 07/18/24 1436       Wound Pressure Injury Sacrum (Active)   Wound Image Images linked 07/18/24 1505   Wound Description Epithelialization;Pink;Yellow;Granulation tissue 07/18/24 1434   Pressure Injury Stage 4 07/18/24 1434   Irene-wound Assessment Scar Tissue;Maceration 07/18/24 1434   Wound Length (cm) 4.5 cm 07/18/24 1434   Wound Width (cm) 9.5 cm 07/18/24 1434   Wound Depth (cm) 0.8 cm 07/18/24 1434   Wound Surface Area (cm^2) 42.75 cm^2 07/18/24 1434   Wound Volume (cm^3) 34.2 cm^3 07/18/24 1434   Calculated Wound Volume (cm^3) 34.2 cm^3 07/18/24 1434   Change in Wound Size % 54.4 07/18/24 1434   Drainage Amount Large 07/18/24 1434   Drainage Description Serosanguineous 07/18/24 1434       Wound Pressure Injury Hip Left;Lateral (Active)   Wound Image Images linked 07/18/24 1504   Wound Description Pink;Granulation tissue;Epithelialization 07/18/24 1430   Pressure Injury Stage 4 07/18/24 1430   Irene-wound Assessment Scar Tissue;Intact 07/18/24 1430   Wound Length (cm) 2.5 cm 07/18/24 1430   Wound Width (cm) 1.3 cm 07/18/24 1430   Wound Depth (cm) 1.6 cm 07/18/24 1430   Wound Surface Area (cm^2) 3.25 cm^2 07/18/24 1430   Wound Volume (cm^3) 5.2 cm^3 07/18/24 1430   Calculated Wound Volume (cm^3) 5.2 cm^3 07/18/24 1430   Number of underminings 1 07/18/24 1430   Undermining 1 4.7 07/18/24 1430   Undermining 1 is depth extending from 6-12, deepest at 9 07/18/24 1430   Drainage Amount Moderate 07/18/24 1430   Drainage Description Serosanguineous 07/18/24 1430       /68   Pulse 92   Temp (!) 96.3 °F (35.7 °C)   Resp 18                                       Wound Instructions:  Orders Placed This Encounter   Procedures    Wound cleansing and dressings     Wound cleansing and dressings               Wash your hands with soap and  water.  Remove old dressing, discard into plastic bag and place in trash.    Cleanse the wounds with Dakin's solution if there is an odor, IF NO ODOR, cleanse with normal saline or wound wash prior to applying a clean dressing.   Do not use tissue or cotton balls. Do not scrub the wound. Pat dry using gauze.   Shower no; do not get dressing wet      Left Hip Wound:   Apply Dakin's Moistened Kerlix  Cover with  ABD over top   Secure with Medfix tape.   Change dressing daily and top dressing PRN for breakthrough drainage (visiting nurses to do twice per week and family in between)      Sacral, Left ischium, and perineal wound:   Apply Dakin's Moistened Kerlix   Please ensure kerlix is tucked into depth of tunnel 4 o'clock for right perineal wound  Cover with ABD over top   Secure with Medfix tape.   Change dressing daily and top dressing PRN for breakthrough drainage (visiting nurses to do twice per week and family in between)       Continue using Clinitron bed      Continue NO PRESSURE TO ALL WOUNDS AS MUCH AS POSSIBLE, ESPECIALLY PERINEAL WOUND   LIMIT SITTING UPRIGHT IN WHEELCHAIR ON THIS WOUND          Continue visiting nurse skilled 2 x per week for wound care dressing changes.                                  Follow up at the wound center in 3-4 weeks.     Standing Status:   Future     Standing Expiration Date:   7/25/2024    Fingerstick Glucose (POCT)        Diagnosis ICD-10-CM Associated Orders   1. Pressure injury of sacral region, stage 4 ([facility])  L89.154 Wound cleansing and dressings      2. Pressure injury of left ischium, stage 4 ([facility])  L89.324 Wound cleansing and dressings      3. Pressure ulcer of left hip, stage 4 ([facility])  L89.224 Wound cleansing and dressings      4. Pressure ulcer of other site, stage 4 ([facility])  L89.894 Wound cleansing and dressings      5. Chronic osteomyelitis ([facility])  M86.60 Wound cleansing and dressings      6. Paraplegia ([facility])  G82.20 Wound cleansing and dressings      7. 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

## 2024-07-18 NOTE — PROGRESS NOTES
Wound Procedure Treatment    Performed by: Cailin Gibbons RN  Authorized by: ERNESTO Avendano    Associated wounds:   Wound Pressure Injury Ischium Left  Wound Pressure Injury Perineum  Wound Pressure Injury Sacrum  Wound Pressure Injury Hip Left;Lateral  Wound cleansed with:  NSS  Applied to periwound:  Other  Applied secondary dressing:  Gauze and ABD  Dressing secured with:  Tape  Comments:  All wounds redressed with damkin's moistened gazue, covered with gauze and ABD and secured with medfix tape    POC BS taken at patient's request. Result 224.

## 2024-07-18 NOTE — PATIENT INSTRUCTIONS
Orders Placed This Encounter   Procedures    Wound cleansing and dressings     Wound cleansing and dressings               Wash your hands with soap and water.  Remove old dressing, discard into plastic bag and place in trash.    Cleanse the wounds with Dakin's solution if there is an odor, IF NO ODOR, cleanse with normal saline or wound wash prior to applying a clean dressing.   Do not use tissue or cotton balls. Do not scrub the wound. Pat dry using gauze.   Shower no; do not get dressing wet      Left Hip Wound:   Apply Dakin's Moistened Kerlix  Cover with  ABD over top   Secure with Medfix tape.   Change dressing daily and top dressing PRN for breakthrough drainage (visiting nurses to do twice per week and family in between)      Sacral, Left ischium, and perineal wound:   Apply Dakin's Moistened Kerlix   Please ensure kerlix is tucked into depth of tunnel 4 o'clock for right perineal wound  Cover with ABD over top   Secure with Medfix tape.   Change dressing daily and top dressing PRN for breakthrough drainage (visiting nurses to do twice per week and family in between)       Continue using Clinitron bed      Continue NO PRESSURE TO ALL WOUNDS AS MUCH AS POSSIBLE, ESPECIALLY PERINEAL WOUND   LIMIT SITTING UPRIGHT IN WHEELCHAIR ON THIS WOUND          Continue visiting nurse skilled 2 x per week for wound care dressing changes.                                  Follow up at the wound center in 3-4 weeks.     Standing Status:   Future     Standing Expiration Date:   7/25/2024

## 2024-07-19 ENCOUNTER — TELEPHONE (OUTPATIENT)
Dept: FAMILY MEDICINE CLINIC | Facility: CLINIC | Age: 44
End: 2024-07-19

## 2024-07-19 NOTE — TELEPHONE ENCOUNTER
PCP SIGNATURE NEEDED FOR [facility] FORM RECEIVED VIA FAX AND PLACED IN PCP FOLDER TO BE DELIVERED AT ASSIGNED TIMES.     Physician order for insulin pump therapy and diabetes testing

## 2024-07-24 ENCOUNTER — TELEPHONE (OUTPATIENT)
Dept: FAMILY MEDICINE CLINIC | Facility: CLINIC | Age: 44
End: 2024-07-24

## 2024-07-24 NOTE — TELEPHONE ENCOUNTER
PCP SIGNATURE NEEDED FOR [facility] FORM RECEIVED VIA FAX AND PLACED IN PCP FOLDER TO BE DELIVERED AT ASSIGNED TIMES.    Physical therapy forms   Encounter: [id number]

## 2024-07-26 NOTE — TELEPHONE ENCOUNTER
FAXED ON 07/26/24 TO [facility]  at [phone]. FAX CONFIRMATION RECEIVED.    Scanned into pt chart.

## 2024-07-30 ENCOUNTER — TELEPHONE (OUTPATIENT)
Dept: FAMILY MEDICINE CLINIC | Facility: CLINIC | Age: 44
End: 2024-07-30

## 2024-07-30 NOTE — TELEPHONE ENCOUNTER
PCP SIGNATURE NEEDED FOR [facility]  FORM RECEIVED VIA FAX AND PLACED IN PCP FOLDER TO BE DELIVERED AT ASSIGNED TIMES.      Physician Orders   Order date 7/29/24

## 2024-08-01 DIAGNOSIS — E78.5 HYPERLIPIDEMIA, UNSPECIFIED HYPERLIPIDEMIA TYPE: ICD-10-CM

## 2024-08-01 DIAGNOSIS — F31.9 BIPOLAR DEPRESSION (HCC): ICD-10-CM

## 2024-08-01 RX ORDER — ATORVASTATIN CALCIUM 20 MG/1
20 TABLET, FILM COATED ORAL
Qty: 90 TABLET | Refills: 1 | Status: SHIPPED | OUTPATIENT
Start: 2024-08-01

## 2024-08-01 RX ORDER — SERTRALINE HYDROCHLORIDE 25 MG/1
25 TABLET, FILM COATED ORAL DAILY
Qty: 90 TABLET | Refills: 1 | Status: SHIPPED | OUTPATIENT
Start: 2024-08-01

## 2024-08-03 DIAGNOSIS — K21.9 GASTROESOPHAGEAL REFLUX DISEASE WITHOUT ESOPHAGITIS: ICD-10-CM

## 2024-08-05 RX ORDER — DEXLANSOPRAZOLE 30 MG/1
CAPSULE, DELAYED RELEASE ORAL
Qty: 90 CAPSULE | Refills: 2 | Status: SHIPPED | OUTPATIENT
Start: 2024-08-05

## 2024-08-07 ENCOUNTER — TELEPHONE (OUTPATIENT)
Dept: FAMILY MEDICINE CLINIC | Facility: CLINIC | Age: 44
End: 2024-08-07

## 2024-08-07 NOTE — TELEPHONE ENCOUNTER
PCP SIGNATURE NEEDED FOR [facility] RECEIVED VIA FAX AND PLACED IN PCP FOLDER TO BE DELIVERED AT ASSIGNED TIMES.      Soc/re certification care summary

## 2024-08-13 ENCOUNTER — TELEPHONE (OUTPATIENT)
Dept: FAMILY MEDICINE CLINIC | Facility: CLINIC | Age: 44
End: 2024-08-13

## 2024-08-13 NOTE — TELEPHONE ENCOUNTER
FAXED ON 08/13/24 TO [facility]  at [phone]. FAX CONFIRMATION RECEIVED.    Scanned into pt chart.

## 2024-08-13 NOTE — TELEPHONE ENCOUNTER
PCP SIGNATURE NEEDED FOR [facility] RECEIVED VIA FAX AND PLACED IN PCP FOLDER TO BE DELIVERED AT ASSIGNED TIMES.    Physician Orders    Date Order Received: 08/09/2024

## 2024-08-14 ENCOUNTER — TELEPHONE (OUTPATIENT)
Dept: FAMILY MEDICINE CLINIC | Facility: CLINIC | Age: 44
End: 2024-08-14

## 2024-08-14 NOTE — TELEPHONE ENCOUNTER
PCP SIGNATURE [facility] RECEIVED VIA FAX AND PLACED IN PCP FOLDER TO BE DELIVERED AT ASSIGNED TIMES.        Soc/ Recertification care summary

## 2024-08-19 ENCOUNTER — TELEPHONE (OUTPATIENT)
Dept: FAMILY MEDICINE CLINIC | Facility: CLINIC | Age: 44
End: 2024-08-19

## 2024-08-19 PROBLEM — L89.324 PRESSURE INJURY OF LEFT ISCHIUM, STAGE 4 (HCC): Status: RESOLVED | Noted: 2017-07-03 | Resolved: 2024-08-19

## 2024-08-19 PROBLEM — T14.8XXD WOUND HEALING, DELAYED: Status: RESOLVED | Noted: 2017-06-29 | Resolved: 2024-08-19

## 2024-08-19 PROBLEM — L89.224 PRESSURE ULCER OF LEFT HIP, STAGE 4 (HCC): Status: RESOLVED | Noted: 2021-03-12 | Resolved: 2024-08-19

## 2024-08-19 PROBLEM — L89.894 PRESSURE ULCER OF OTHER SITE, STAGE 4 (HCC): Status: RESOLVED | Noted: 2021-03-12 | Resolved: 2024-08-19

## 2024-08-19 PROBLEM — N18.6: Status: ACTIVE | Noted: 2024-08-19

## 2024-08-19 PROBLEM — L89.154 PRESSURE ULCER OF SACRAL REGION, STAGE 4 (HCC): Status: RESOLVED | Noted: 2020-08-13 | Resolved: 2024-08-19

## 2024-08-19 PROBLEM — I13.2: Status: ACTIVE | Noted: 2024-08-19

## 2024-08-19 NOTE — TELEPHONE ENCOUNTER
[place]     [facility]    Plan of care with MD signature dated 08/10/2024    All forms need to have an MD signature, CRNP signatures are not allowed without being cosigned by MD.    Please advise,   Thank you.

## 2024-08-19 NOTE — TELEPHONE ENCOUNTER
FAXED ON 08/19/24 TO [facility] at [phone]. FAX CONFIRMATION RECEIVED.

## 2024-08-19 NOTE — TELEPHONE ENCOUNTER
FAXED ON 08/19/24 TO [facility] at [phone]. FAX CONFIRMATION RECEIVED.

## 2024-08-20 ENCOUNTER — TELEPHONE (OUTPATIENT)
Dept: FAMILY MEDICINE CLINIC | Facility: CLINIC | Age: 44
End: 2024-08-20

## 2024-08-20 NOTE — TELEPHONE ENCOUNTER
PCP SIGNATURE NEEDED FOR [facility] FOR[facility] RECEIVED VIA FAX AND PLACED IN PCP FOLDER TO BE DELIVERED AT ASSIGNED TIMES.    Start of Care Date: 08/10/2024    Certification Period: 08/10/2024 -- 10/08/2024

## 2024-08-23 ENCOUNTER — TELEPHONE (OUTPATIENT)
Dept: FAMILY MEDICINE CLINIC | Facility: CLINIC | Age: 44
End: 2024-08-23

## 2024-08-23 NOTE — TELEPHONE ENCOUNTER
PCP SIGNATURE NEEDED FOR [facility] RECEIVED VIA FAX AND PLACED IN PCP FOLDER TO BE DELIVERED AT ASSIGNED TIMES.      Order start date 0718/2024

## 2024-08-27 DIAGNOSIS — K58.9 IRRITABLE BOWEL SYNDROME, UNSPECIFIED TYPE: ICD-10-CM

## 2024-08-27 DIAGNOSIS — L29.9 PRURITUS: ICD-10-CM

## 2024-08-27 DIAGNOSIS — F41.0 ANXIETY ATTACK: ICD-10-CM

## 2024-08-28 ENCOUNTER — TELEPHONE (OUTPATIENT)
Dept: FAMILY MEDICINE CLINIC | Facility: CLINIC | Age: 44
End: 2024-08-28

## 2024-08-28 RX ORDER — HYDROXYZINE HYDROCHLORIDE 50 MG/1
50 TABLET, FILM COATED ORAL 2 TIMES DAILY PRN
Qty: 180 TABLET | Refills: 1 | Status: SHIPPED | OUTPATIENT
Start: 2024-08-28

## 2024-08-28 RX ORDER — DICYCLOMINE HYDROCHLORIDE 10 MG/1
CAPSULE ORAL
Qty: 270 CAPSULE | Refills: 1 | Status: SHIPPED | OUTPATIENT
Start: 2024-08-28

## 2024-08-28 NOTE — TELEPHONE ENCOUNTER
PCP SIGNATURE NEEDED FOR [facility]  FORM RECEIVED VIA FAX AND PLACED IN PCP FOLDER TO BE DELIVERED AT ASSIGNED TIMES.       Order date 08/10/24    Client discharge from all [facility] services as of 08/09/24

## 2024-08-29 ENCOUNTER — OFFICE VISIT (OUTPATIENT)
Dept: WOUND CARE | Facility: CLINIC | Age: 44
End: 2024-08-29
Payer: COMMERCIAL

## 2024-08-29 VITALS
TEMPERATURE: 99 F | RESPIRATION RATE: 18 BRPM | SYSTOLIC BLOOD PRESSURE: 140 MMHG | HEART RATE: 88 BPM | DIASTOLIC BLOOD PRESSURE: 80 MMHG

## 2024-08-29 DIAGNOSIS — L89.154 PRESSURE INJURY OF SACRAL REGION, STAGE 4 (HCC): Primary | ICD-10-CM

## 2024-08-29 DIAGNOSIS — L89.894 PRESSURE ULCER OF OTHER SITE, STAGE 4 (HCC): ICD-10-CM

## 2024-08-29 DIAGNOSIS — Z99.2 TYPE 1 DIABETES MELLITUS WITH CHRONIC KIDNEY DISEASE ON CHRONIC DIALYSIS (HCC): ICD-10-CM

## 2024-08-29 DIAGNOSIS — L89.224 PRESSURE ULCER OF LEFT HIP, STAGE 4 (HCC): ICD-10-CM

## 2024-08-29 DIAGNOSIS — E10.22 TYPE 1 DIABETES MELLITUS WITH CHRONIC KIDNEY DISEASE ON CHRONIC DIALYSIS (HCC): ICD-10-CM

## 2024-08-29 DIAGNOSIS — M86.60 CHRONIC OSTEOMYELITIS (HCC): ICD-10-CM

## 2024-08-29 DIAGNOSIS — L89.324 PRESSURE INJURY OF LEFT ISCHIUM, STAGE 4 (HCC): ICD-10-CM

## 2024-08-29 DIAGNOSIS — G82.20 PARAPLEGIA (HCC): ICD-10-CM

## 2024-08-29 DIAGNOSIS — N18.6 TYPE 1 DIABETES MELLITUS WITH CHRONIC KIDNEY DISEASE ON CHRONIC DIALYSIS (HCC): ICD-10-CM

## 2024-08-29 PROCEDURE — 99214 OFFICE O/P EST MOD 30 MIN: CPT | Performed by: NURSE PRACTITIONER

## 2024-08-29 PROCEDURE — 97598 DBRDMT OPN WND ADDL 20CM/<: CPT | Performed by: NURSE PRACTITIONER

## 2024-08-29 PROCEDURE — 97597 DBRDMT OPN WND 1ST 20 CM/<: CPT | Performed by: NURSE PRACTITIONER

## 2024-08-29 RX ORDER — LIDOCAINE HYDROCHLORIDE 40 MG/ML
5 SOLUTION TOPICAL ONCE
Status: COMPLETED | OUTPATIENT
Start: 2024-08-29 | End: 2024-08-29

## 2024-08-29 RX ORDER — CLOTRIMAZOLE 10 MG/1
10 LOZENGE ORAL
COMMUNITY
Start: 2024-08-29 | End: 2024-09-08

## 2024-08-29 RX ADMIN — LIDOCAINE HYDROCHLORIDE 5 ML: 40 SOLUTION TOPICAL at 14:26

## 2024-08-29 NOTE — PATIENT INSTRUCTIONS
Orders Placed This Encounter   Procedures    Wound cleansing and dressings     Wound cleansing and dressings               Wash your hands with soap and water.  Remove old dressing, discard into plastic bag and place in trash.    Cleanse the wounds with Dakin's solution if there is an odor, IF NO ODOR, cleanse with normal saline or wound wash prior to applying a clean dressing.   Do not use tissue or cotton balls. Do not scrub the wound. Pat dry using gauze.   Shower no; do not get dressing wet      Left Hip Wound:   Apply Dakin's Moistened Kerlix  Cover with  ABD over top   Secure with Medfix tape.   Change dressing daily and top dressing PRN for breakthrough drainage (visiting nurses to do twice per week and family in between)      Sacral, Left ischium, and perineal wound:   Apply Dakin's Moistened Kerlix   Please ensure kerlix is tucked into depth of tunnel 4 o'clock for right perineal wound  Cover with ABD over top   Secure with Medfix tape.   Change dressing daily and top dressing PRN for breakthrough drainage (visiting nurses to do twice per week and family in between)       Continue using Clinitron bed      Continue NO PRESSURE TO ALL WOUNDS AS MUCH AS POSSIBLE, ESPECIALLY PERINEAL WOUND   LIMIT SITTING UPRIGHT IN WHEELCHAIR ON THIS WOUND          Continue visiting nurse skilled 2 x per week for wound care dressing changes.                                  Follow up at the wound center in 3-4 weeks.    Quit smoking or try to cut back on how much you smoke. As discussed smoking slows the ability for a wound to heal by constricting blood vessels for approximately 30 minutes after each cigarette.     Standing Status:   Future     Standing Expiration Date:   9/19/2024

## 2024-08-29 NOTE — PROGRESS NOTES
Wound Procedure Treatment    Performed by: Oneida Thao RN  Authorized by: ERNESTO Avendano    Associated wounds:   Wound Pressure Injury Ischium Left  Wound Pressure Injury Perineum  Wound Pressure Injury Sacrum  Wound Pressure Injury Hip Left;Lateral  Wound cleansed with:  Dakin's 0.25%  Applied primary dressing:  Other  Applied secondary dressing:  ABD  Dressing secured with:  Tape  Comments:  Dakin's soaked kerlix , medfix tape

## 2024-08-29 NOTE — LETTER
[facility]  [address] [address]  Phone#  [phone]  Fax#  [phone]    Patient:  Ovidio Parkinson Jr.  YOB: 1980  Phone:  [phone]  Date of Visit:  8/29/2024    Orders Placed This Encounter   Procedures   • Wound cleansing and dressings     Wound cleansing and dressings               Wash your hands with soap and water.  Remove old dressing, discard into plastic bag and place in trash.    Cleanse the wounds with Dakin's solution if there is an odor, IF NO ODOR, cleanse with normal saline or wound wash prior to applying a clean dressing.   Do not use tissue or cotton balls. Do not scrub the wound. Pat dry using gauze.   Shower no; do not get dressing wet      Left Hip Wound:   Apply Dakin's Moistened Kerlix  Cover with  ABD over top   Secure with Medfix tape.   Change dressing daily and top dressing PRN for breakthrough drainage (visiting nurses to do twice per week and family in between)      Sacral, Left ischium, and perineal wound:   Apply Dakin's Moistened Kerlix   Please ensure kerlix is tucked into depth of tunnel 4 o'clock for right perineal wound  Cover with ABD over top   Secure with Medfix tape.   Change dressing daily and top dressing PRN for breakthrough drainage (visiting nurses to do twice per week and family in between)       Continue using Clinitron bed      Continue NO PRESSURE TO ALL WOUNDS AS MUCH AS POSSIBLE, ESPECIALLY PERINEAL WOUND   LIMIT SITTING UPRIGHT IN WHEELCHAIR ON THIS WOUND          Continue visiting nurse skilled 2 x per week for wound care dressing changes.                                  Follow up at the wound center in 3-4 weeks.    Quit smoking or try to cut back on how much you smoke. As discussed smoking slows the ability for a wound to heal by constricting blood vessels for approximately 30 minutes after each cigarette.     Standing Status:   Future     Standing Expiration Date:   9/19/2024          Electronically signed by ERNESTO Avendano

## 2024-08-30 NOTE — PROGRESS NOTES
Patient ID: Ovidio Parkinson Jr. is a 44 y.o. male Date of Birth 1980     Chief Complaint  Chief Complaint   Patient presents with    Follow Up Wound Care Visit     F/u for multiple pressure wounds, pt recently hospitalized for bacteremia &endocarditis and on antibiotic infusions with his HD treatments.  Also taking an antifungal for thrush.        Allergies  Chlorcyclizine, Chlorhexidine, Ciprofloxacin hcl, Cymbalta [duloxetine hcl], Dm-doxylamine-acetaminophen, Doxycycline, Gabapentin, Hydrocortisone, Lactose - food allergy, Lyrica [pregabalin], Milk (cow), Penicillins, Polymyxin b, Promethazine-phenyleph-codeine, Quinidine, Cefepime, and Rosuvastatin    Assessment:     Diagnoses and all orders for this visit:    Pressure injury of sacral region, stage 4 (HCC)  -     lidocaine (XYLOCAINE) 4 % topical solution 5 mL  -     Cancel: Wound cleansing and dressings; Future  -     Wound cleansing and dressings; Future  -     Wound Procedure Treatment  -     Debridement Pressure Injury Sacrum    Pressure injury of left ischium, stage 4 (HCC)  -     lidocaine (XYLOCAINE) 4 % topical solution 5 mL  -     Cancel: Wound cleansing and dressings; Future  -     Wound cleansing and dressings; Future  -     Wound Procedure Treatment  -     Debridement Pressure Injury Left Ischium    Pressure ulcer of other site, stage 4 (HCC)  -     lidocaine (XYLOCAINE) 4 % topical solution 5 mL  -     Cancel: Wound cleansing and dressings; Future  -     Wound cleansing and dressings; Future  -     Wound Procedure Treatment  -     Debridement    Pressure ulcer of left hip, stage 4 (HCC)  -     lidocaine (XYLOCAINE) 4 % topical solution 5 mL  -     Cancel: Wound cleansing and dressings; Future  -     Wound cleansing and dressings; Future  -     Wound Procedure Treatment  -     Debridement Pressure Injury Left;Lateral Hip    Chronic osteomyelitis (HCC)    Paraplegia (HCC)    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  Consent given by: patient  Time out: Immediately prior to procedure a \"time out\" was called to verify the correct patient, procedure, equipment, support staff and site/side marked as required.  Timeout called at: 8/30/2024 9:29 AM.  Patient identity confirmed: verbally with patient    Debridement Details  Performed by: NP  Debridement type: selective  Pain control: lidocaine 4%      Post-debridement measurements  Length (cm): 6  Width (cm): 5  Depth (cm): 0.9  Percent debrided: 100%  Surface Area (cm^2): 30  Area Debrided (cm^2): 30  Volume (cm^3): 27    Devitalized tissue debrided: biofilm, fibrin and slough  Instrument(s) utilized: curette  Bleeding: small  Hemostasis obtained with: pressure  Procedural pain (0-10): 0  Post-procedural pain: 0   Response to treatment: procedure was tolerated well    Debridement   Wound Pressure Injury Perineum    Universal Protocol:  procedure performed by consultantConsent: Written consent obtained.  Consent given by: patient  Time out: Immediately prior to procedure a \"time out\" was called to verify the correct patient, procedure, equipment, support staff and site/side marked as required.  Timeout called at: 8/30/2024 9:31 AM.  Patient identity confirmed: verbally with patient    Debridement Details  Performed by: NP  Debridement type: selective  Pain control: lidocaine 4%      Post-debridement measurements  Length (cm): 9.4  Width (cm): 6.5  Depth (cm): 0.9  Percent debrided: 100%  Surface Area (cm^2): 61.1  Area Debrided (cm^2): 61.1  Volume (cm^3): 54.99    Devitalized tissue debrided: biofilm, fibrin and slough  Instrument(s) utilized: curette  Bleeding: small  Hemostasis obtained with: pressure  Procedural " "pain (0-10): 0  Post-procedural pain: 0   Response to treatment: procedure was tolerated well    Debridement   Wound Pressure Injury Sacrum    Universal Protocol:  procedure performed by consultantConsent: Written consent obtained.  Consent given by: patient  Time out: Immediately prior to procedure a \"time out\" was called to verify the correct patient, procedure, equipment, support staff and site/side marked as required.  Timeout called at: 8/30/2024 9:31 AM.  Patient identity confirmed: verbally with patient    Debridement Details  Performed by: NP  Debridement type: selective  Pain control: lidocaine 4%      Post-debridement measurements  Length (cm): 8  Width (cm): 9.9  Depth (cm): 1.4  Percent debrided: 100%  Surface Area (cm^2): 79.2  Area Debrided (cm^2): 79.2  Volume (cm^3): 110.88    Devitalized tissue debrided: biofilm, fibrin and slough  Instrument(s) utilized: curette  Bleeding: small  Hemostasis obtained with: pressure  Procedural pain (0-10): 0  Post-procedural pain: 0   Response to treatment: procedure was tolerated well    Debridement   Wound Pressure Injury Hip Left;Lateral    Universal Protocol:  procedure performed by consultantConsent: Written consent obtained.  Consent given by: patient  Time out: Immediately prior to procedure a \"time out\" was called to verify the correct patient, procedure, equipment, support staff and site/side marked as required.  Timeout called at: 8/30/2024 9:32 AM.  Patient identity confirmed: verbally with patient    Debridement Details  Performed by: NP  Debridement type: selective  Pain control: lidocaine 4%      Post-debridement measurements  Length (cm): 2.5  Width (cm): 1.3  Depth (cm): 1  Percent debrided: 100%  Surface Area (cm^2): 3.25  Area Debrided (cm^2): 3.25  Volume (cm^3): 3.25    Devitalized tissue debrided: biofilm, fibrin and slough  Instrument(s) utilized: curette  Bleeding: small  Hemostasis obtained with: pressure  Procedural pain (0-10): " 0  Post-procedural pain: 0   Response to treatment: procedure was tolerated well        Plan:  1.  F/U palliative visit.  Wounds debrided.  Wounds clean and stable.  Continue current plan of care.  2.  A1C results reviewed with the patient today.  Patient maintaining tight glycemic control  3.  Patient will follow-up in 4 weeks     Wound Pressure Injury Ischium Left (Active)   Wound Image Images linked 08/29/24 1403   Wound Description Rolled edges;Epithelialization;Pink;Yellow;Granulation tissue 08/29/24 1403   Pressure Injury Stage 4 08/29/24 1403   Irene-wound Assessment Pink;Scar Tissue 08/29/24 1403   Wound Length (cm) 6 cm 08/29/24 1403   Wound Width (cm) 5 cm 08/29/24 1403   Wound Depth (cm) 0.9 cm 08/29/24 1403   Wound Surface Area (cm^2) 30 cm^2 08/29/24 1403   Wound Volume (cm^3) 27 cm^3 08/29/24 1403   Calculated Wound Volume (cm^3) 27 cm^3 08/29/24 1403   Change in Wound Size % -42.86 08/29/24 1403   Number of underminings 1 08/29/24 1403   Undermining 1 0.3 08/29/24 1403   Undermining 1 is depth extending from 4-5 o'clock 08/29/24 1403   Drainage Amount Moderate 08/29/24 1403   Drainage Description Serosanguineous 08/29/24 1403   Non-staged Wound Description Full thickness 08/29/24 1403       Wound Pressure Injury Perineum (Active)   Wound Image Images linked 08/29/24 1408   Wound Description Pink;Tan;Slough;White;Granulation tissue;Rolled edges 08/29/24 1408   Pressure Injury Stage 4 08/29/24 1408   Irene-wound Assessment Scar Tissue;Turrell 08/29/24 1408   Wound Length (cm) 9.4 cm 08/29/24 1408   Wound Width (cm) 6.5 cm 08/29/24 1408   Wound Depth (cm) 0.9 cm 08/29/24 1408   Wound Surface Area (cm^2) 61.1 cm^2 08/29/24 1408   Wound Volume (cm^3) 54.99 cm^3 08/29/24 1408   Calculated Wound Volume (cm^3) 54.99 cm^3 08/29/24 1408   Change in Wound Size % -423.71 08/29/24 1408   Number of underminings 2 08/29/24 1408   Undermining 1 1.8 (7 o'clock) 08/29/24 1408   Undermining 1 is depth extending from 1-11 o  'clock 08/29/24 1408   Drainage Amount Moderate 08/29/24 1408   Drainage Description Serosanguineous 08/29/24 1408   Non-staged Wound Description Full thickness 08/29/24 1408       Wound Pressure Injury Sacrum (Active)   Wound Image Images linked 08/29/24 1414   Wound Description Epithelialization;Pink;Yellow;Granulation tissue;Slough;Rolled edges;White;Brown;Probes to bone 08/29/24 1414   Pressure Injury Stage 4 08/29/24 1414   Irene-wound Assessment Scar Tissue;Pink 08/29/24 1414   Wound Length (cm) 8 cm 08/29/24 1414   Wound Width (cm) 9.9 cm 08/29/24 1414   Wound Depth (cm) 1.4 cm 08/29/24 1414   Wound Surface Area (cm^2) 79.2 cm^2 08/29/24 1414   Wound Volume (cm^3) 110.88 cm^3 08/29/24 1414   Calculated Wound Volume (cm^3) 110.88 cm^3 08/29/24 1414   Change in Wound Size % -47.84 08/29/24 1414   Number of underminings 2 08/29/24 1414   Undermining 1 0.6 (4 o'clock) 08/29/24 1414   Undermining 2 0.5 08/29/24 1414   Undermining 1 is depth extending from 4-5 o'clock 08/29/24 1414   Undermining 2 is depth extending from 7-9 o'clock 08/29/24 1414   Drainage Amount Large 08/29/24 1414   Drainage Description Serosanguineous 08/29/24 1414   Non-staged Wound Description Full thickness 08/29/24 1414       Wound Pressure Injury Hip Left;Lateral (Active)   Wound Image Images linked 08/29/24 1419   Wound Description Pink;Granulation tissue;Epithelialization 08/29/24 1419   Pressure Injury Stage 4 08/29/24 1419   Irene-wound Assessment Scar Tissue;Intact 08/29/24 1419   Wound Length (cm) 2.5 cm 08/29/24 1419   Wound Width (cm) 1.3 cm 08/29/24 1419   Wound Depth (cm) 1 cm 08/29/24 1419   Wound Surface Area (cm^2) 3.25 cm^2 08/29/24 1419   Wound Volume (cm^3) 3.25 cm^3 08/29/24 1419   Calculated Wound Volume (cm^3) 3.25 cm^3 08/29/24 1419   Number of underminings 1 08/29/24 1419   Undermining 1 4 (9 o'clock) 08/29/24 1419   Undermining 1 is depth extending from 6-12 o'clock 08/29/24 1419   Drainage Amount Moderate 08/29/24 1419    Drainage Description Green;Serosanguineous 08/29/24 1419   Non-staged Wound Description Full thickness 08/29/24 1419       Wound Pressure Injury Ischium Left (Active)   No Date First Assessed or Time First Assessed found.   Primary Wound Type: Pressure Injury  Location: Ischium  Wound Location Orientation: Left  Wound Description (Comments): Left Iscial Stage 4  Wound Outcome: Palliative       Wound Pressure Injury Perineum (Active)   No Date First Assessed or Time First Assessed found.   Primary Wound Type: Pressure Injury  Location: Perineum  Wound Description (Comments): Stage 4  Wound Outcome: Palliative       Wound Pressure Injury Sacrum (Active)   No Date First Assessed or Time First Assessed found.   Pre-Existing Wound: Yes  Primary Wound Type: Pressure Injury  Location: Sacrum  Wound Description (Comments): Stage 4  Wound Outcome: Palliative       Wound Pressure Injury Hip Left;Lateral (Active)   No Date First Assessed or Time First Assessed found.   Primary Wound Type: Pressure Injury  Location: Hip  Wound Location Orientation: Left;Lateral  Wound Outcome: Palliative       [REMOVED] Pressure Ulcer 09/01/16 Hip Left (Removed)   Resolved Date: 08/13/20  Date First Assessed/Time First Assessed: 09/01/16 0259   Pre-Existing Wound: Yes  Location: Hip  Orientation: Left       [REMOVED] Pressure Ulcer 09/01/16 Sacrum (Removed)   Resolved Date: 08/13/20  Date First Assessed/Time First Assessed: 09/01/16 0409   Pre-Existing Wound: Yes  Location: Sacrum       [REMOVED] Pressure Ulcer 03/19/17 Leg Lower;Right Unstagable to lateral lower right stump (Removed)   Resolved Date: 08/13/20  Date First Assessed/Time First Assessed: 03/19/17 1637   Pre-Existing Wound: Yes  Location: Leg  Orientation: Lower;Right  Wound Description (Comments): Unstagable to lateral lower right stump       [REMOVED] Pressure Ulcer 03/23/17 Hip Left Stage 2 skin breakdown (Removed)   Resolved Date: 08/13/20  Date First Assessed/Time First  Assessed: 03/23/17 1700   Location: Hip  Orientation: Left  Wound Description (Comments): Stage 2 skin breakdown       [REMOVED] Pressure Ulcer 06/29/17 Foot Left healing stage III (Removed)   Resolved Date: 08/13/20  Date First Assessed/Time First Assessed: 06/29/17 0230   Pre-Existing Wound: Yes  Location: Foot  Orientation: Left  Wound Description (Comments): healing stage III       [REMOVED] Pressure Ulcer 06/29/17 Foot Left healing stage III (Removed)   Resolved Date: 08/13/20  Date First Assessed/Time First Assessed: 06/29/17 0230   Pre-Existing Wound: Yes  Location: Foot  Orientation: Left  Wound Description (Comments): healing stage III       [REMOVED] Pressure Ulcer Ischium Right (Removed)   Resolved Date: 08/13/20  No Date First Assessed or Time First Assessed found.   Pre-Existing Wound: Yes  Location: Ischium  Orientation: Right       [REMOVED] Wound 09/02/16 Other (Comment) Knee Anterior;Right (Removed)   Resolved Date: 08/13/20  Date First Assessed/Time First Assessed: 09/02/16 1811   Traumatic Wound Type: (c) Other (Comment)  Location: Knee  Wound Location Orientation: Anterior;Right  Dressing Status: Intact;Clean;Dry       [REMOVED] Wound 06/29/17  inferior of anus, appears to be stage 3  (Removed)   Resolved Date: 08/13/20  Date First Assessed/Time First Assessed: 06/29/17 0041   Location: (c)   Wound Description (Comments): inferior of anus, appears to be stage 3   Dressing Status: Intact;Reinforced       [REMOVED] Wound 09/18/17 Other (Comment) Penis ulcer, leaking pus (Removed)   Resolved Date: 08/13/20  Date First Assessed/Time First Assessed: 09/18/17 2137   Traumatic Wound Type: Other (Comment)  Location: Penis  Wound Description (Comments): ulcer, leaking pus  Dressing Status: Open to air       [REMOVED] Incision 09/01/16 Shoulder Right (Removed)   Resolved Date: 08/13/20  Date First Assessed/Time First Assessed: 09/01/16 0441   Pre-Existing Wound: Yes  Location: Shoulder  Wound Location  Orientation: Right       [REMOVED] Other Ulcers 09/01/16 Foot Anterior;Outer (Removed)   Resolved Date: 08/13/20  Date First Assessed/Time First Assessed: 09/01/16 1605   Location: Foot  Orientation: Anterior;Outer       [REMOVED] Other Ulcers 09/20/17 Foot Mid;Left (Removed)   Resolved Date: 08/13/20  Date First Assessed/Time First Assessed: 09/20/17 1530   Location: Foot  Orientation: Mid;Left  Dressing Status: Clean;Dry;Intact       [REMOVED] Wound Pressure Injury Ischium Right (Removed)   Resolved Date: 01/28/21  No Date First Assessed or Time First Assessed found.   Pre-Existing Wound: Yes  Primary Wound Type: Pressure Injury  Location: Ischium  Wound Location Orientation: Right  Wound Description (Comments): Right Ischium Stage 3  Wo...       [REMOVED] Wound 03/08/21 Mouth N/A (Removed)   Resolved Date: 04/22/21  Date First Assessed/Time First Assessed: 03/08/21 1037   Location: Mouth  Wound Location Orientation: N/A       [REMOVED] Wound 05/05/22 Pressure Injury Ischium Right (Removed)   Resolved Date/Resolved Time: 09/01/22 1606  Date First Assessed/Time First Assessed: 05/05/22 1608   Primary Wound Type: Pressure Injury  Location: Ischium  Wound Location Orientation: Right  Wound Outcome: Healed       [REMOVED] Wound 09/01/22 Pressure Injury Scrotum Posterior;Left (Removed)   Resolved Date/Resolved Time: 12/06/22 1425  Date First Assessed/Time First Assessed: 09/01/22 1530   Primary Wound Type: Pressure Injury  Location: Scrotum  Wound Location Orientation: Posterior;Left  Wound Outcome: Healed       [REMOVED] Wound 09/01/22 Pressure Injury Scrotum Posterior;Right (Removed)   Resolved Date/Resolved Time: 12/06/22 1425  Date First Assessed/Time First Assessed: 09/01/22 1607   Primary Wound Type: Pressure Injury  Location: Scrotum  Wound Location Orientation: Posterior;Right  Wound Outcome: Healed       [REMOVED] Wound 01/04/24 Pressure Injury Scrotum Posterior (Removed)   Resolved Date/Resolved Time:  05/23/24 1341  Date First Assessed: 01/04/24   Primary Wound Type: Pressure Injury  Location: Scrotum  Wound Location Orientation: Posterior  Wound Outcome: Healed       Subjective:      .    F/U palliative visit for multiple pressure injuries of his left ischium, perineum, sacrum, and left lateral hip.  No new complaints.  He denies any pain, fevers, or chills.        The following portions of the patient's history were reviewed and updated as appropriate: He  has a past medical history of Acute pulmonary edema ([facility]) (01/13/2022), Ambulatory dysfunction, Anemia, Anemia, iron deficiency, Asymptomatic bacteriuria (09/25/2017), Atrial fibrillation ([facility]), AVM (arteriovenous malformation) of duodenum, acquired, Bacteriuria, asymptomatic, Bipolar disorder ([facility]), C. difficile colitis (06/23/2022), Chronic deep vein thrombosis (DVT) ([facility]), Chronic indwelling Ortega catheter, Chronic kidney disease, Chronic pain, Chronic pain disorder, Chronic suprapubic catheter ([facility]), Clostridium difficile infection (08/11/2016), Colostomy on examination ([facility]), Decubitus ulcer, Delirium, Diabetes mellitus ([facility]), GERD (gastroesophageal reflux disease), Hemodialysis patient ([facility]), Hypertension, Memory impairment (2011), Neurogenic bladder, OAB (overactive bladder), Paraplegia ([facility]), Penile abscess, Pressure injury of left ischium, stage 4 ([facility]) (07/03/2017), Pressure ulcer of left hip, stage 4 ([facility]) (03/12/2021), Pressure ulcer of other site, stage 4 ([facility]) (03/12/2021), Pressure ulcer of sacral region, stage 4 ([facility]) (08/13/2020), S/P unilateral BKA (below knee amputation) ([facility]), Sebaceous cyst (removed in 2017), Seizures ([facility]), Shortness of breath, Tobacco abuse, 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 ([facility])  08/19/2024    Rheumatoid arthritis ([facility]) 07/22/2023    Charcot's arthropathy 03/03/2022    Immunocompromised state ([facility]) 01/13/2022    Severe protein-calorie malnutrition ([facility]) 01/13/2022    Chronic osteomyelitis ([facility]) 01/13/2022    LVH (left ventricular hypertrophy) 12/20/2021    Irritable bowel syndrome 07/28/2021    Chronic diastolic heart failure ([facility]) 02/10/2021    NICM (nonischemic cardiomyopathy) ([facility]) 04/30/2020    Prolonged Q-T interval on ECG 04/14/2020    Bipolar depression ([facility]) 02/27/2020    Seizure ([facility]) 01/20/2020    Onychomycosis 01/10/2020    Wheelchair dependent 08/07/2019    Dialysis patient ([facility]) 05/08/2019    Insulin long-term use ([facility]) 05/08/2019    Hyperlipidemia 09/14/2018    Secondary hyperparathyroidism of renal origin ([facility]) 07/13/2018    Chronic narcotic dependence ([facility]) 02/07/2018    End stage renal disease ([facility]) 02/07/2018    Urinary retention 09/26/2017    History of Clostridium difficile infection 09/25/2017    Stage 5 chronic kidney disease on chronic dialysis ([facility]) 09/18/2017    AVM (arteriovenous malformation) of duodenum, acquired 08/30/2017    Iron deficiency anemia 07/03/2017    HTN (hypertension), benign 07/03/2017    Neurogenic bladder 07/03/2017    GERD (gastroesophageal reflux disease) 07/03/2017    Chronic pain 07/03/2017    Pustular folliculitis 01/19/2017    Ulcer of sacral region, stage 4 ([facility]) 09/01/2016    Paraplegia ([facility])     Atrial fibrillation ([facility])     Chronic suprapubic catheter ([facility])     Colostomy care ([facility])     S/P unilateral BKA (below knee amputation) ([facility])     Tobacco abuse     Type 1 diabetes mellitus with chronic kidney disease on chronic dialysis ([facility])     Diabetic gastroparesis associated with type 1 diabetes mellitus  ([facility]) 01/06/2016    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  His family history includes Diabetes in his paternal grandfather; Hyperlipidemia in his mother; Hypertension in his mother; Leukemia in his brother.  He  reports that he has been smoking cigars. He has been exposed to tobacco smoke. He has never used smokeless tobacco. He reports that he does not currently use alcohol. He reports that he does not currently use drugs after having used the following drugs: Marijuana.  Current Outpatient Medications   Medication Sig Dispense Refill    clotrimazole (MYCELEX) 10 mg bladimir Take 10 mg by mouth 5 (five) times a day      Alcohol Swabs (ALCOHOL PADS) 70 % PADS by Does not apply route 5 (five) times a day 300 each 5    amLODIPine (NORVASC) 10 mg tablet TAKE 1 TABLET BY MOUTH EVERY DAY 90 tablet 1    ammonium lactate (LAC-HYDRIN) 12 % cream       apixaban (ELIQUIS) 5 mg Take 1 tablet (5 mg total) by mouth 2 (two) times a day 180 tablet 3    atorvastatin (LIPITOR) 20 mg tablet TAKE 1 TABLET BY MOUTH EVERYDAY AT BEDTIME 90 tablet 1    azelaic acid (Azelex) 20 % cream Apply topically 2 (two) times a day 50 g 0    B Complex-C-Folic Acid (Super B-Complex/Vit C/FA) TABS TAKE 1 TABLET BY MOUTH EVERY DAY AS DIRECTED 90 tablet 5    SUSAN MICROLET LANCETS lancets Use as instructed to check blood sugar 4 times a day  Dx e10.29 200 each 5    bisacodyl (DULCOLAX) 5 mg EC tablet Take 1 tablet (5 mg total) by mouth once for 1 dose 2 tablet 0    Blood Glucose Monitoring Suppl ([place] CONTOUR NEXT USB MONITOR) w/Device KIT Testing 4 times a day 1 kit 0    calcitriol (ROCALTROL) 0.5 MCG capsule Take 2 mcg by mouth      calcium acetate  (PHOSLO) capsule TAKE 2 CAPSULES BY MOUTH THREE TIMES DAILY WITH MEALS      carvedilol (COREG) 25 mg tablet Take 1 tablet (25 mg total) by mouth 2 (two) times a day 180 tablet 1    cetirizine (ZyrTEC) 10 mg tablet TAKE 1 TABLET BY MOUTH EVERY DAY 90 tablet 1    Cholecalciferol (VITAMIN D-3) 1000 units CAPS Take 2 capsules by mouth daily 30 capsule 0    clindamycin (CLINDAGEL) 1 % gel APPLY TO AFFECTED AREA TWICE A DAY 60 g 2    Contour Next Test test strip USE TO TEST BLOOD SUGAR 3 TIMES DAILY. CONTOUR NEXT STRIPS. E10.29      cyanocobalamin ([employer] Vitamin B-12) 1000 MCG tablet Take 1 tablet (1,000 mcg total) by mouth daily 90 tablet 3    cyclobenzaprine (FLEXERIL) 5 mg tablet       dexamethasone sodium phosphate 0.1 % ophthalmic solution 2 drops in each ear as needed for itching      dexlansoprazole (DEXILANT) 30 MG capsule TAKE 1 CAPSULE BY MOUTH EVERY DAY 90 capsule 2    dicyclomine (BENTYL) 10 mg capsule TAKE 1 CAPSULE BY MOUTH 3 TIMES A DAY BEFORE MEALS 270 capsule 1    Disposable Gloves MISC Use 7 times a day, 4 boxes of gloves XL  Dx type 1 DM, paraplegia 4 each 11    doxazosin (CARDURA) 2 mg tablet TAKE 1 TABLET BY MOUTH EVERY EVENING. 90 tablet 1    epoetin kaushik (EPOGEN,PROCRIT) 20,000 units/mL Inject 10,000 Units under the skin      epoetin kaushik (EPOGEN,PROCRIT) 20,000 units/mL Inject 5,000 Units under the skin      ferrous sulfate 324 (65 Fe) mg Take 1 tablet (324 mg total) by mouth in the morning 90 tablet 3    ferrous sulfate 325 (65 Fe) mg tablet Take 1 tablet (325 mg total) by mouth 3 (three) times a day 90 tablet 3    Fluocinolone Acetonide Body 0.01 % OIL Apply 2 drops topically daily      folic acid (FOLVITE) 1 mg tablet Take 1,000 mcg by mouth daily      Gvoke PFS 1 MG/0.2ML SOSY       hydroxychloroquine (PLAQUENIL) 200 mg tablet Take 200 mg by mouth daily      hydrOXYzine HCL (ATARAX) 50 mg tablet TAKE 1 TABLET (50 MG TOTAL) BY MOUTH 2 (TWO) TIMES A DAY AS NEEDED FOR ITCHING OR ANXIETY 180 tablet 1     Incontinence Supply Disposable (Incontinence Brief Large) MISC Use 6 (six) times a day Size xl 180 each 11    Incontinence Supply Disposable (Undergarment) MISC Use 6 a day, 5 boxes, xl  Dx R51, paraplegia 5 each 11    Incontinence Supply Disposable (Underpads) MISC Use 2 a day 5 each 11    insulin lispro (HumALOG/ADMELOG) 100 units/mL injection USE UP TO 40 UNITS DAILY VIA PUMP. E10.9      insulin lispro protamine-insulin lispro (HumaLOG 50-50) 100 units/mL Inject under the skin 2 (two) times a day before meals      leflunomide (ARAVA) 10 MG tablet Take 10 mg by mouth daily      LEVEMIR FLEXTOUCH 100 units/mL injection pen Inject 14 Units under the skin 2 (two) times a day 15 pen 3    Lokelma 10 g PACK       metroNIDAZOLE (METROGEL) 0.75 % gel Apply topically 2 (two) times a day 45 g 0    Misc. Devices (Wheelchair Cushion) MISC Use daily 1 each 0    Misc. Devices (WHEELCHAIR) MISC by Does not apply route daily Wheelchair ramp, dx g82.20 1 each 0    Multiple Vitamin (Daily-Enriqueta Multivitamin) TABS TAKE 1 TABLET BY MOUTH EVERY DAY 30 tablet 8    NovoLOG FlexPen 100 units/mL injection pen INJECT 3 UNITS UNDER THE SKIN 3 (THREE) TIMES A DAY BEFORE MEALS. (Patient not taking: Reported on 7/13/2023)      ondansetron (ZOFRAN-ODT) 4 mg disintegrating tablet Take 1 tablet (4 mg total) by mouth every 6 (six) hours as needed for nausea or vomiting 30 tablet 2    oxybutynin (DITROPAN XL) 15 MG 24 hr tablet Take 1 tablet (15 mg total) by mouth daily at bedtime 90 tablet 1    oxybutynin (DITROPAN) 5 mg tablet Take 3 tablets (15 mg total) by mouth daily 270 tablet 1    oxyCODONE (ROXICODONE) 10 MG TABS TAKE ONE TABLET BY MOUTH EVERY 12 HOURS AS NEEDED FOR PAIN. ONGOING THERAPY.      polyethylene glycol (GLYCOLAX) 17 GM/SCOOP powder Take 17 g by mouth daily 850 g 1    sertraline (ZOLOFT) 25 mg tablet TAKE 1 TABLET (25 MG TOTAL) BY MOUTH DAILY. 90 tablet 1    sodium hypochlorite (DAKIN'S HALF-STRENGTH) external solution Apply 1  Application topically daily Soak gauze and pack into wounds daily. 473 mL 0    Sodium Zirconium Cyclosilicate 10 g PACK Take 10 g by mouth daily      sucralfate (CARAFATE) 1 g/10 mL suspension Take 10 mL (1 g total) by mouth 4 (four) times a day (with meals and at bedtime) 420 mL 1    SUPER B COMPLEX & C TABS TAKE 1 CAPSULE BY MOUTH ONCE FOR 1 DOSE AS DIRECTED 90 tablet 2    valsartan (DIOVAN) 320 MG tablet       Zinc Sulfate 220 (50 Zn) MG TABS TAKE 1 TABLET BY MOUTH EVERY DAY 90 tablet 1     No current facility-administered medications for this visit.     He is allergic to chlorcyclizine, chlorhexidine, ciprofloxacin hcl, cymbalta [duloxetine hcl], dm-doxylamine-acetaminophen, doxycycline, gabapentin, hydrocortisone, lactose - food allergy, lyrica [pregabalin], milk (cow), penicillins, polymyxin b, promethazine-phenyleph-codeine, quinidine, cefepime, and rosuvastatin..    Review of Systems   Constitutional: Negative.    HENT:  Negative for ear pain and hearing loss.    Eyes:  Negative for pain.   Respiratory:  Negative for chest tightness and shortness of breath.    Cardiovascular:  Negative for chest pain, palpitations and leg swelling.   Gastrointestinal:  Negative for diarrhea, nausea and vomiting.   Genitourinary:  Negative for dysuria.   Musculoskeletal:  Positive for gait problem.   Skin:  Positive for wound.   Neurological:  Positive for numbness. Negative for tremors and weakness.   Psychiatric/Behavioral:  Negative for behavioral problems, confusion and suicidal ideas.          Objective:       Wound Pressure Injury Ischium Left (Active)   Wound Image Images linked 08/29/24 1403   Wound Description Rolled edges;Epithelialization;Pink;Yellow;Granulation tissue 08/29/24 1403   Pressure Injury Stage 4 08/29/24 1403   Irene-wound Assessment Pink;Scar Tissue 08/29/24 1403   Wound Length (cm) 6 cm 08/29/24 1403   Wound Width (cm) 5 cm 08/29/24 1403   Wound Depth (cm) 0.9 cm 08/29/24 1403   Wound Surface Area  (cm^2) 30 cm^2 08/29/24 1403   Wound Volume (cm^3) 27 cm^3 08/29/24 1403   Calculated Wound Volume (cm^3) 27 cm^3 08/29/24 1403   Change in Wound Size % -42.86 08/29/24 1403   Number of underminings 1 08/29/24 1403   Undermining 1 0.3 08/29/24 1403   Undermining 1 is depth extending from 4-5 o'clock 08/29/24 1403   Drainage Amount Moderate 08/29/24 1403   Drainage Description Serosanguineous 08/29/24 1403   Non-staged Wound Description Full thickness 08/29/24 1403       Wound Pressure Injury Perineum (Active)   Wound Image Images linked 08/29/24 1408   Wound Description Pink;Tan;Slough;White;Granulation tissue;Rolled edges 08/29/24 1408   Pressure Injury Stage 4 08/29/24 1408   Irene-wound Assessment Scar Tissue;[place] 08/29/24 1408   Wound Length (cm) 9.4 cm 08/29/24 1408   Wound Width (cm) 6.5 cm 08/29/24 1408   Wound Depth (cm) 0.9 cm 08/29/24 1408   Wound Surface Area (cm^2) 61.1 cm^2 08/29/24 1408   Wound Volume (cm^3) 54.99 cm^3 08/29/24 1408   Calculated Wound Volume (cm^3) 54.99 cm^3 08/29/24 1408   Change in Wound Size % -423.71 08/29/24 1408   Number of underminings 2 08/29/24 1408   Undermining 1 1.8 (7 o'clock) 08/29/24 1408   Undermining 1 is depth extending from 1-11 o 'clock 08/29/24 1408   Drainage Amount Moderate 08/29/24 1408   Drainage Description Serosanguineous 08/29/24 1408   Non-staged Wound Description Full thickness 08/29/24 1408       Wound Pressure Injury Sacrum (Active)   Wound Image Images linked 08/29/24 1414   Wound Description Epithelialization;Pink;Yellow;Granulation tissue;Slough;Rolled edges;White;Brown;Probes to bone 08/29/24 1414   Pressure Injury Stage 4 08/29/24 1414   Irene-wound Assessment Scar Tissue;Pink 08/29/24 1414   Wound Length (cm) 8 cm 08/29/24 1414   Wound Width (cm) 9.9 cm 08/29/24 1414   Wound Depth (cm) 1.4 cm 08/29/24 1414   Wound Surface Area (cm^2) 79.2 cm^2 08/29/24 1414   Wound Volume (cm^3) 110.88 cm^3 08/29/24 1414   Calculated Wound Volume (cm^3) 110.88 cm^3  08/29/24 1414   Change in Wound Size % -47.84 08/29/24 1414   Number of underminings 2 08/29/24 1414   Undermining 1 0.6 (4 o'clock) 08/29/24 1414   Undermining 2 0.5 08/29/24 1414   Undermining 1 is depth extending from 4-5 o'clock 08/29/24 1414   Undermining 2 is depth extending from 7-9 o'clock 08/29/24 1414   Drainage Amount Large 08/29/24 1414   Drainage Description Serosanguineous 08/29/24 1414   Non-staged Wound Description Full thickness 08/29/24 1414       Wound Pressure Injury Hip Left;Lateral (Active)   Wound Image Images linked 08/29/24 1419   Wound Description Pink;Granulation tissue;Epithelialization 08/29/24 1419   Pressure Injury Stage 4 08/29/24 1419   Irene-wound Assessment Scar Tissue;Intact 08/29/24 1419   Wound Length (cm) 2.5 cm 08/29/24 1419   Wound Width (cm) 1.3 cm 08/29/24 1419   Wound Depth (cm) 1 cm 08/29/24 1419   Wound Surface Area (cm^2) 3.25 cm^2 08/29/24 1419   Wound Volume (cm^3) 3.25 cm^3 08/29/24 1419   Calculated Wound Volume (cm^3) 3.25 cm^3 08/29/24 1419   Number of underminings 1 08/29/24 1419   Undermining 1 4 (9 o'clock) 08/29/24 1419   Undermining 1 is depth extending from 6-12 o'clock 08/29/24 1419   Drainage Amount Moderate 08/29/24 1419   Drainage Description Green;Serosanguineous 08/29/24 1419   Non-staged Wound Description Full thickness 08/29/24 1419       /80 (BP Location: Left arm)   Pulse 88   Temp 99 °F (37.2 °C) (Temporal)   Resp 18                       Wound Instructions:  Orders Placed This Encounter   Procedures    Wound cleansing and dressings     Wound cleansing and dressings               Wash your hands with soap and water.  Remove old dressing, discard into plastic bag and place in trash.    Cleanse the wounds with Dakin's solution if there is an odor, IF NO ODOR, cleanse with normal saline or wound wash prior to applying a clean dressing.   Do not use tissue or cotton balls. Do not scrub the wound. Pat dry using gauze.   Shower no; do not get  dressing wet      Left Hip Wound:   Apply Dakin's Moistened Kerlix  Cover with  ABD over top   Secure with Medfix tape.   Change dressing daily and top dressing PRN for breakthrough drainage (visiting nurses to do twice per week and family in between)      Sacral, Left ischium, and perineal wound:   Apply Dakin's Moistened Kerlix   Please ensure kerlix is tucked into depth of tunnel 4 o'clock for right perineal wound  Cover with ABD over top   Secure with Medfix tape.   Change dressing daily and top dressing PRN for breakthrough drainage (visiting nurses to do twice per week and family in between)       Continue using Clinitron bed      Continue NO PRESSURE TO ALL WOUNDS AS MUCH AS POSSIBLE, ESPECIALLY PERINEAL WOUND   LIMIT SITTING UPRIGHT IN WHEELCHAIR ON THIS WOUND          Continue visiting nurse skilled 2 x per week for wound care dressing changes.                                  Follow up at the wound center in 3-4 weeks.    Quit smoking or try to cut back on how much you smoke. As discussed smoking slows the ability for a wound to heal by constricting blood vessels for approximately 30 minutes after each cigarette.     Standing Status:   Future     Standing Expiration Date:   9/19/2024    Wound Procedure Treatment     This order was created via procedure documentation    Debridement Pressure Injury Left Ischium     This order was created via procedure documentation    Debridement     This order was created via procedure documentation    Debridement Pressure Injury Sacrum     This order was created via procedure documentation    Debridement Pressure Injury Left;Lateral Hip     This order was created via procedure documentation        Diagnosis ICD-10-CM Associated Orders   1. Pressure injury of sacral region, stage 4 ([facility])  L89.154 lidocaine (XYLOCAINE) 4 % topical solution 5 mL     Wound cleansing and dressings     Wound Procedure Treatment     Debridement Pressure Injury Sacrum      2. Pressure injury of  left ischium, stage 4 ([facility])  L89.324 lidocaine (XYLOCAINE) 4 % topical solution 5 mL     Wound cleansing and dressings     Wound Procedure Treatment     Debridement Pressure Injury Left Ischium      3. Pressure ulcer of other site, stage 4 ([facility])  L89.894 lidocaine (XYLOCAINE) 4 % topical solution 5 mL     Wound cleansing and dressings     Wound Procedure Treatment     Debridement      4. Pressure ulcer of left hip, stage 4 ([facility])  L89.224 lidocaine (XYLOCAINE) 4 % topical solution 5 mL     Wound cleansing and dressings     Wound Procedure Treatment     Debridement Pressure Injury Left;Lateral Hip      5. Chronic osteomyelitis ([facility])  M86.60       6. Paraplegia ([facility])  G82.20       7. Type 1 diabetes mellitus with chronic kidney disease on chronic dialysis ([facility])  E10.22     N18.6     Z99.2

## 2024-08-31 NOTE — TELEPHONE ENCOUNTER
FAXED ON 08/31/24 TO [facility]  at [phone]. FAX CONFIRMATION RECEIVED.    Scanned into pt chart.

## 2024-08-31 NOTE — TELEPHONE ENCOUNTER
FAXED ON 08/31/24 TO  [facility]   at [phone]. FAX CONFIRMATION RECEIVED.    Scanned into pt chart.

## 2024-09-09 NOTE — TELEPHONE ENCOUNTER
Please give update on the [facility]  form that was placed on 08/23/24 in the folder to be filled out and signed

## 2024-09-12 NOTE — TELEPHONE ENCOUNTER
PCP SIGNATURE NEEDED FOR [facility]  FORM RECEIVED VIA FAX AND PLACED IN PCP FOLDER TO BE DELIVERED AT ASSIGNED TIMES.

## 2024-09-17 ENCOUNTER — TELEPHONE (OUTPATIENT)
Dept: FAMILY MEDICINE CLINIC | Facility: CLINIC | Age: 44
End: 2024-09-17

## 2024-09-17 NOTE — TELEPHONE ENCOUNTER
Mother of the patient call requesting update for pre authorization on following medication       leflunomide (ARAVA) 10 MG tablet

## 2024-09-18 RX ORDER — LEFLUNOMIDE 10 MG/1
10 TABLET ORAL DAILY
OUTPATIENT
Start: 2024-09-18

## 2024-09-19 ENCOUNTER — OFFICE VISIT (OUTPATIENT)
Dept: WOUND CARE | Facility: CLINIC | Age: 44
End: 2024-09-19
Payer: COMMERCIAL

## 2024-09-19 VITALS
TEMPERATURE: 99.8 F | DIASTOLIC BLOOD PRESSURE: 80 MMHG | HEART RATE: 88 BPM | SYSTOLIC BLOOD PRESSURE: 130 MMHG | RESPIRATION RATE: 16 BRPM

## 2024-09-19 DIAGNOSIS — Z99.2 TYPE 1 DIABETES MELLITUS WITH CHRONIC KIDNEY DISEASE ON CHRONIC DIALYSIS (HCC): ICD-10-CM

## 2024-09-19 DIAGNOSIS — L89.324 PRESSURE INJURY OF LEFT ISCHIUM, STAGE 4 (HCC): ICD-10-CM

## 2024-09-19 DIAGNOSIS — N18.6 TYPE 1 DIABETES MELLITUS WITH CHRONIC KIDNEY DISEASE ON CHRONIC DIALYSIS (HCC): ICD-10-CM

## 2024-09-19 DIAGNOSIS — L89.154 PRESSURE INJURY OF SACRAL REGION, STAGE 4 (HCC): Primary | ICD-10-CM

## 2024-09-19 DIAGNOSIS — G82.20 PARAPLEGIA (HCC): ICD-10-CM

## 2024-09-19 DIAGNOSIS — L89.224 PRESSURE ULCER OF LEFT HIP, STAGE 4 (HCC): ICD-10-CM

## 2024-09-19 DIAGNOSIS — E10.22 TYPE 1 DIABETES MELLITUS WITH CHRONIC KIDNEY DISEASE ON CHRONIC DIALYSIS (HCC): ICD-10-CM

## 2024-09-19 DIAGNOSIS — L89.894 PRESSURE ULCER OF OTHER SITE, STAGE 4 (HCC): ICD-10-CM

## 2024-09-19 PROCEDURE — 99214 OFFICE O/P EST MOD 30 MIN: CPT | Performed by: FAMILY MEDICINE

## 2024-09-19 RX ORDER — LIDOCAINE HYDROCHLORIDE 40 MG/ML
5 SOLUTION TOPICAL ONCE
Status: COMPLETED | OUTPATIENT
Start: 2024-09-19 | End: 2024-09-19

## 2024-09-19 RX ADMIN — LIDOCAINE HYDROCHLORIDE 5 ML: 40 SOLUTION TOPICAL at 13:42

## 2024-09-19 NOTE — PROGRESS NOTES
Patient ID: Ovidio Parkinson Jr. is a 44 y.o. male Date of Birth 1980       Chief Complaint   Patient presents with    Follow Up Wound Care Visit       Allergies:  Chlorcyclizine, Chlorhexidine, Ciprofloxacin hcl, Cymbalta [duloxetine hcl], Dm-doxylamine-acetaminophen, Doxycycline, Gabapentin, Hydrocortisone, Lactose - food allergy, Lyrica [pregabalin], Milk (cow), Penicillins, Polymyxin b, Promethazine-phenyleph-codeine, Quinidine, Cefepime, and Rosuvastatin    Diagnosis:      Diagnosis ICD-10-CM Associated Orders   1. Pressure injury of sacral region, stage 4 ([facility])  L89.154 lidocaine (XYLOCAINE) 4 % topical solution 5 mL     Wound cleansing and dressings     Wound Procedure Treatment      2. Pressure injury of left ischium, stage 4 ([facility])  L89.324 lidocaine (XYLOCAINE) 4 % topical solution 5 mL     Wound cleansing and dressings     Wound Procedure Treatment      3. Pressure ulcer of other site, stage 4 ([facility])  L89.894 lidocaine (XYLOCAINE) 4 % topical solution 5 mL     Wound cleansing and dressings     Wound Procedure Treatment      4. Pressure ulcer of left hip, stage 4 ([facility])  L89.224 lidocaine (XYLOCAINE) 4 % topical solution 5 mL     Wound cleansing and dressings     Wound Procedure Treatment              Assessment & Plan:    Ovidio presented to wound center today for follow-up of multiple pressure injuries in setting of chronic osteomyelitis on palliative wound management plan.  His wounds are clean and stable with fibrogranular tissue and scarring.  No debridement was necessary today.  No signs of acute soft tissue infection on exam today.    Stage IV pressure injury of perineum: No longer with large tunneled area.  Largest amount of undermining 0.4 cm @ 4-5 o'clock.   Stage IV pressure injury left hip: Tunneled/undermined area remains from 6-12 o'clock and measuring around 4.7 cm today.  No purulence or malodor.  Stage IV pressure injuries of sacrum and left ischium: Stable, no acute change.  As his wounds  "are clean and stable and without signs of infection or decline, will continue current plan of care with Dakin's moistened Kerlix to all wounds. In the past at various other visits when trial of discontinuing Dakin's was attempted, his wounds had declined.  Ovidio states that his brother is very on top of his wounds with cleansing and care and feels Dakins has been the best.  I again reviewed with Ovidio long-term effects of utilizing Dakin such as possible destruction of healthy tissue.  He expresses understanding and wishes to continue with Dakin's as he states that when alternate dressings have been tried in the past, they have to return to Dakin's.  Continue regular follow-up with [facility]  Emphasized importance of continued offloading and frequent repositioning  And adequate protein intake  Type 1 diabetes: A1C results reviewed with the patient today. 6.6 as of July '24.   Please see below wound care orders for full details  ER precautions reviewed, he expresses understanding  Follow up in 3-4 weeks or sooner if needed     Portions of the record may have been created with voice recognition software. Occasional wrong word or \"sound alike\" substitutions may have occurred due to the inherent limitations of voice recognition software. Read the chart carefully and recognize, using context, where substitutions have occurred.    Subjective:   Please see prev visit notes for HPI summary [id number] 1/4/2024: Ovidio presents to wound center today for follow-up of multiple stage IV pressure injuries of sacrum, left hip, ischium, and perineum.  He reports he believes his lab studies regarding his protein level has improved as well as overall nutrition in general.  Has no acute complaints today and denies fever, chills.  He reports he is currently feeling well/much better from his recent hospitalization which he reports occurred at [facility] 12/5 through 12/13.    Per chart review of discharge summary from that " "hospitalization he was \"found to have Staph Lugudenesis bacteremia on admission blood cultures and was started on vancomycin, which was transitioned to Ancef by Infectious Disease. He will continue Ancef Source was suspected to be prior HD line; known sacral wounds were stable. Repeat blood cultures were negative on discharge. During admission, his HD line was removed (given infection) and replaced with temporary HD line (which, in turn, needed to be replaced due to inability to successfully use). New HD line functioning as of 12/9. PATRICIO performed for evaluation of possible endocarditis was negative. A tunneled HD line was placed 12/13/2023 by IR after repeat blood cultures were negative x48 hrs.\"  He also reports that he may have been spending more time in his chair than previous and continues with difficulty controlling some of the shifting/shearing forces with transferring etc... Continues to use his Clinitron bed.  Also reports that he is still awaiting the custom wheelchair/cushions to help with his pressure injuries but that it is being made.     1/25/2024: Ovidio presents today for follow-up of multiple stage IV pressure injuries of left hip, ischium, sacrum, and perineum as well as stage III pressure injury of scrotum.  He reports that they have been doing well with the Mesalt.  In addition reports that the area under the scrotum his brother sometimes uses Vaseline to this area when it is on the drier side she reports his brother tells him it is looking better.  Sides his chronic issues, he reports he has no acute complaints today and overall is feeling well.  He denies fever, chills.  He reported he recently started sleeping on his left side as this was more comfortable for him.    2/14/2024: Ovidio presents today for follow-up of multiple stage IV pressure injuries of left hip, ischium, sacrum, and perineum as well as stage III pressure injury of scrotum.  He reports that he is not feeling well \"has been " "feeling under the weather\" since Monday.  Feels that has been having an issue with his insulin pump administering insulin.  He states when he had this issue in the past it was because it was inserted an area of scar tissue.  However he did try to move the side of this this morning and glucose was 475.  Also reports he is feeling very dehydrated and dry along with dizzy, lightheaded.  He denies fever, chills.    3/7/2024: Ovidio presents today for follow-up multiple stage IV pressure injuries of left hip ischium sacrum and perineum in addition to stage III pressure injury of scrotum.  At our last visit, recommendation was for patient to go to emergency department which he agreed to however after leaving our wound center patient did not go.  He reports that his sugars have been improved compared to last visit 150s to 180s though sometimes he still has spikes. He states he has been feeling better and denies fever, chills.  He also reports that since he was last here they called the Clinitron wraps to check functioning of his bed and was assured that all seems to be functioning well.    3/28/2024: Ovidio presents today for follow-up of multiple pressure injuries.  He reports that as discussed, he did call [facility] wheelchair clinic a few days ago and has not yet heard back.  Reports that sometimes his glucose is variable in hyper and hypoglycemic ranges but his endocrinology office is aware.  Also states that recently it has been more controlled between 150s and 160s.  Today he reports he is feeling well and denies fever, chills, dizziness, lightheadedness, diaphoresis, shortness of breath, chest pain.  Wound care currently consist of Dakin's packing.  He continues to use his Clinitron bed.  He reports the drainage from his left hip wound is controlled.    5/2/2024: Ovidio presents today for f/u of multiple pressure injuries.  He reports that a few weeks ago he had an appointment with [facility] wheelchair clinic " however he fell ill had to reschedule for the 31st of this month which she confirmed he will be attending.  Reports that his protein levels have improved and he does have some Jim at home.  He states he is feeling well now and feels his glucose has been under better control.  He denies new acute complaints and denies fever, chills.    5/23/2024: Visit w/ [facility] Brooke    6/20/2024: Ovidio presents today for follow-up of multiple pressure injuries.  On 6/6/24, patient brother contacted [facility] due to excessive drainage from one of his wounds.  He was directed to proceed to emergency department.  Per chart review and patient, he did not do this.  He reports his brother return to use of Dakin soaked packing which resolved the issue.  He reports that other than his chronic conditions, he has no new acute complaints.  He denies fever, chills.    7/18/2024 & 8/29/2024: Visits w/ [facility] Brooke    9/19/2024: Ovidio presents today for follow-up of multiple wounds.  He reports that since he was last at wound center he has been doing pretty good.  Feels his diabetes is under better control and he has had no issues with his wounds.  He denies fever, chills.  No other new acute complaints today.        The following portions of the patient's history were reviewed and updated as appropriate:   Patient Active Problem List   Diagnosis    Paraplegia ([facility])    Atrial fibrillation ([facility])    Chronic suprapubic catheter ([facility])    Colostomy care ([facility])    S/P unilateral BKA (below knee amputation) ([facility])    Tobacco abuse    Type 1 diabetes mellitus with chronic kidney disease on chronic dialysis ([facility])    Ulcer of sacral region, stage 4 ([facility])    Iron deficiency anemia    HTN (hypertension), benign    Neurogenic bladder    GERD (gastroesophageal reflux disease)    Chronic pain    Stage 5 chronic kidney disease on chronic dialysis ([facility])    History of Clostridium difficile infection    Urinary retention    Dialysis patient ([facility])     Insulin long-term use (HCC)    Wheelchair dependent    Bipolar depression (HCC)    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HCC)    Seizure (HCC)    AVM (arteriovenous malformation) of duodenum, acquired    Chronic narcotic dependence (HCC)    Chronic diastolic heart failure (HCC)    ED (erectile dysfunction) of organic origin    Irritable bowel syndrome    Onychomycosis    Pustular folliculitis    Prolonged Q-T interval on ECG    Secondary hyperparathyroidism of renal origin (HCC)    Immunocompromised state (HCC)    Severe protein-calorie malnutrition (HCC)    Chronic osteomyelitis (HCC)    Charcot's arthropathy    Diabetic gastroparesis associated with type 1 diabetes mellitus  (HCC)    End stage renal disease (HCC)    Hyperlipidemia    LVH (left ventricular hypertrophy)    NICM (nonischemic cardiomyopathy) ([facility])    Vitamin B deficiency    Vitamin C deficiency    Vitamin D deficiency    Rheumatoid arthritis (HCC)    Benign hypertensive heart and kidney disease with HF and ESRD ([facility])     Past Medical History:   Diagnosis Date    Acute pulmonary edema ([facility]) 01/13/2022    Ambulatory dysfunction     Anemia     Anemia, iron deficiency     transfusion requiring    Asymptomatic bacteriuria 09/25/2017    Atrial fibrillation ([facility])     AVM (arteriovenous malformation) of duodenum, 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    Bacteriuria, asymptomatic     Bipolar disorder ([facility])     C. difficile colitis 06/23/2022    Chronic deep vein thrombosis (DVT) ([facility])     Chronic indwelling Ortega catheter     Chronic kidney disease     Chronic pain     Chronic pain disorder     Chronic suprapubic catheter ([facility])     Clostridium difficile infection 08/11/2016    also positive 9/2016, 5/29/2017, 8/15/2017. S/P fecal transplant    Colostomy on examination ([facility])     Decubitus ulcer     Delirium     Diabetes mellitus (HCC)     GERD (gastroesophageal reflux disease)     Hemodialysis patient ([facility])     Hypertension     Memory impairment 2011    s/p diabetic coma     Neurogenic bladder     OAB (overactive bladder)     Paraplegia ([facility])     T3 transverse myelitis vs spinal stoke (AVM); 2012 extensive epidural abscess C7=> conus 2nd extension from deep parspinal abscess L4-S2/sacral decubitus; cord atrophy/myelomalcia T3=>conus     Penile abscess     Pressure injury of left ischium, stage 4 ([facility]) 07/03/2017    Pressure ulcer of left hip, stage 4 ([facility]) 03/12/2021    Pressure ulcer of other site, stage 4 ([facility]) 03/12/2021    Pressure ulcer of sacral region, stage 4 ([facility]) 08/13/2020    S/P unilateral BKA (below knee amputation) ([facility])     Right    Sebaceous cyst removed in 2017    Seizures ([facility])     Shortness of breath     Tobacco abuse     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[facility])     Urinary retention     Wheelchair dependent     Wound healing, delayed 06/29/2017    Wounds, multiple     pressure ulcers with delayed healing    Wounds, multiple     pressure ulcers with delayed healing       Past Surgical History:   Procedure Laterality Date    BELOW KNEE LEG AMPUTATION Right 2009    COLONOSCOPY N/A 03/27/2017    Procedure: COLONOSCOPY;  Surgeon: Wesley Thibodeaux MD;  Location: [facility];  Service:     COLONOSCOPY N/A 03/29/2017    Procedure: COLONOSCOPY;  Surgeon: Wesley Thibodeaux MD;  Location: [facility];  Service:     COLONOSCOPY N/A 06/15/2017    Procedure: COLONOSCOPY with FMT;  Surgeon: Gerard Marques MD;  Location: [facility];  Service: Gastroenterology    ESOPHAGOGASTRODUODENOSCOPY N/A 03/22/2017    Procedure: ESOPHAGOGASTRODUODENOSCOPY (EGD);  Surgeon: Beverley Ortiz DO;  Location: [facility];  Service:     FL GUIDED NEEDLE PLAC BX/ASP/INJ  6/22/2018    FL LUMBAR PUNCTURE DIAGNOSTIC  1/22/2020    MULTIPLE TOOTH EXTRACTIONS N/A 03/08/2021    Procedure: EXTRACTION TEETH # 2,3,6,10,12,13,14,18,19,20,21,22,23,24,25,26,27,28,29,30;  Surgeon: Brian Kohler DMD;  Location: [facility];  Service: Maxillofacial    SKIN BIOPSY       Family History   Problem Relation Age of Onset    Hyperlipidemia Mother      Hypertension Mother     Leukemia Brother     Diabetes Paternal Grandfather       Social History     Socioeconomic History    Marital status: Single     Spouse name: None    Number of children: None    Years of education: None    Highest education level: None   Occupational History    None   Tobacco Use    Smoking status: Every Day     Types: Cigars     Passive exposure: Current    Smokeless tobacco: Never    Tobacco comments:     2 cigars/day - 2/14/2023   Vaping Use    Vaping status: Never Used   Substance and Sexual Activity    Alcohol use: Not Currently     Comment: 1 cup of wine every 3-4 months    Drug use: Not Currently     Types: Marijuana     Comment: rarely; once every 1-2 years    Sexual activity: None   Other Topics Concern    None   Social History Narrative    None     Social Determinants of Health     Financial Resource Strain: Low Risk  (7/26/2024)    Received from [facility]    Overall Financial Resource Strain (CARDIA)     Difficulty of Paying Living Expenses: Not hard at all   Food Insecurity: No Food Insecurity (7/26/2024)    Received from [facility]    Hunger Vital Sign     Worried About Running Out of Food in the Last Year: Never true     Ran Out of Food in the Last Year: Never true   Transportation Needs: No Transportation Needs (7/26/2024)    Received from [facility]    PRAPARE - Transportation     Lack of Transportation (Medical): No     Lack of Transportation (Non-Medical): No   Physical Activity: Not on file   Stress: Not on file   Social Connections: Not on file   Intimate Partner Violence: Not At Risk (7/26/2024)    Received from [facility]    Humiliation, Afraid, Rape, and Kick questionnaire     Fear of Current or Ex-Partner: No     Emotionally Abused: No     Physically Abused: No     Sexually Abused: No   Housing Stability: Low Risk  (7/26/2024)    Received from [facility]    Housing Stability Vital  Sign     Unable to Pay for Housing in the Last Year: No     Number of Times Moved in the Last Year: 1     Homeless in the Last Year: No        Current Outpatient Medications:     Alcohol Swabs (ALCOHOL PADS) 70 % PADS, by Does not apply route 5 (five) times a day, Disp: 300 each, Rfl: 5    amLODIPine (NORVASC) 10 mg tablet, TAKE 1 TABLET BY MOUTH EVERY DAY, Disp: 90 tablet, Rfl: 1    ammonium lactate (LAC-HYDRIN) 12 % cream, , Disp: , Rfl:     apixaban (ELIQUIS) 5 mg, Take 1 tablet (5 mg total) by mouth 2 (two) times a day, Disp: 180 tablet, Rfl: 3    atorvastatin (LIPITOR) 20 mg tablet, TAKE 1 TABLET BY MOUTH EVERYDAY AT BEDTIME, Disp: 90 tablet, Rfl: 1    azelaic acid (Azelex) 20 % cream, Apply topically 2 (two) times a day, Disp: 50 g, Rfl: 0    B Complex-C-Folic Acid (Super B-Complex/Vit C/FA) TABS, TAKE 1 TABLET BY MOUTH EVERY DAY AS DIRECTED, Disp: 90 tablet, Rfl: 5    TASCET MICROLET LANCETS lancets, Use as instructed to check blood sugar 4 times a day Dx e10.29, Disp: 200 each, Rfl: 5    bisacodyl (DULCOLAX) 5 mg EC tablet, Take 1 tablet (5 mg total) by mouth once for 1 dose, Disp: 2 tablet, Rfl: 0    Blood Glucose Monitoring Suppl (TASCET CONTOUR NEXT USB MONITOR) w/Device KIT, Testing 4 times a day, Disp: 1 kit, Rfl: 0    calcitriol (ROCALTROL) 0.5 MCG capsule, Take 2 mcg by mouth, Disp: , Rfl:     calcium acetate (PHOSLO) capsule, TAKE 2 CAPSULES BY MOUTH THREE TIMES DAILY WITH MEALS, Disp: , Rfl:     carvedilol (COREG) 25 mg tablet, Take 1 tablet (25 mg total) by mouth 2 (two) times a day, Disp: 180 tablet, Rfl: 1    cetirizine (ZyrTEC) 10 mg tablet, TAKE 1 TABLET BY MOUTH EVERY DAY, Disp: 90 tablet, Rfl: 1    Cholecalciferol (VITAMIN D-3) 1000 units CAPS, Take 2 capsules by mouth daily, Disp: 30 capsule, Rfl: 0    clindamycin (CLINDAGEL) 1 % gel, APPLY TO AFFECTED AREA TWICE A DAY, Disp: 60 g, Rfl: 2    Contour Next Test test strip, USE TO TEST BLOOD SUGAR 3 TIMES DAILY. CONTOUR NEXT STRIPS. E10.29, Disp:  , Rfl:     cyanocobalamin ([employer] Vitamin B-12) 1000 MCG tablet, Take 1 tablet (1,000 mcg total) by mouth daily, Disp: 90 tablet, Rfl: 3    cyclobenzaprine (FLEXERIL) 5 mg tablet, , Disp: , Rfl:     dexamethasone sodium phosphate 0.1 % ophthalmic solution, 2 drops in each ear as needed for itching, Disp: , Rfl:     dexlansoprazole (DEXILANT) 30 MG capsule, TAKE 1 CAPSULE BY MOUTH EVERY DAY, Disp: 90 capsule, Rfl: 2    dicyclomine (BENTYL) 10 mg capsule, TAKE 1 CAPSULE BY MOUTH 3 TIMES A DAY BEFORE MEALS, Disp: 270 capsule, Rfl: 1    Disposable Gloves MISC, Use 7 times a day, 4 boxes of gloves XL Dx type 1 DM, paraplegia, Disp: 4 each, Rfl: 11    doxazosin (CARDURA) 2 mg tablet, TAKE 1 TABLET BY MOUTH EVERY EVENING., Disp: 90 tablet, Rfl: 1    epoetin kaushik (EPOGEN,PROCRIT) 20,000 units/mL, Inject 10,000 Units under the skin, Disp: , Rfl:     epoetin kaushik (EPOGEN,PROCRIT) 20,000 units/mL, Inject 5,000 Units under the skin, Disp: , Rfl:     ferrous sulfate 324 (65 Fe) mg, Take 1 tablet (324 mg total) by mouth in the morning, Disp: 90 tablet, Rfl: 3    ferrous sulfate 325 (65 Fe) mg tablet, Take 1 tablet (325 mg total) by mouth 3 (three) times a day, Disp: 90 tablet, Rfl: 3    Fluocinolone Acetonide Body 0.01 % OIL, Apply 2 drops topically daily, Disp: , Rfl:     folic acid (FOLVITE) 1 mg tablet, Take 1,000 mcg by mouth daily, Disp: , Rfl:     Gvoke PFS 1 MG/0.2ML SOSY, , Disp: , Rfl:     hydroxychloroquine (PLAQUENIL) 200 mg tablet, Take 200 mg by mouth daily, Disp: , Rfl:     hydrOXYzine HCL (ATARAX) 50 mg tablet, TAKE 1 TABLET (50 MG TOTAL) BY MOUTH 2 (TWO) TIMES A DAY AS NEEDED FOR ITCHING OR ANXIETY, Disp: 180 tablet, Rfl: 1    Incontinence Supply Disposable (Incontinence Brief Large) MISC, Use 6 (six) times a day Size xl, Disp: 180 each, Rfl: 11    Incontinence Supply Disposable (Undergarment) MISC, Use 6 a day, 5 boxes, xl Dx R51, paraplegia, Disp: 5 each, Rfl: 11    Incontinence Supply Disposable (Underpads) MISC,  Use 2 a day, Disp: 5 each, Rfl: 11    insulin lispro (HumALOG/ADMELOG) 100 units/mL injection, USE UP TO 40 UNITS DAILY VIA PUMP. E10.9, Disp: , Rfl:     insulin lispro protamine-insulin lispro (HumaLOG 50-50) 100 units/mL, Inject under the skin 2 (two) times a day before meals, Disp: , Rfl:     leflunomide (ARAVA) 10 MG tablet, Take 10 mg by mouth daily, Disp: , Rfl:     LEVEMIR FLEXTOUCH 100 units/mL injection pen, Inject 14 Units under the skin 2 (two) times a day, Disp: 15 pen, Rfl: 3    Lokelma 10 g PACK, , Disp: , Rfl:     metroNIDAZOLE (METROGEL) 0.75 % gel, Apply topically 2 (two) times a day, Disp: 45 g, Rfl: 0    Misc. Devices (Wheelchair Cushion) MISC, Use daily, Disp: 1 each, Rfl: 0    Misc. Devices (WHEELCHAIR) MISC, by Does not apply route daily Wheelchair ramp, dx g82.20, Disp: 1 each, Rfl: 0    Multiple Vitamin (Daily-Enriqueta Multivitamin) TABS, TAKE 1 TABLET BY MOUTH EVERY DAY, Disp: 30 tablet, Rfl: 8    NovoLOG FlexPen 100 units/mL injection pen, INJECT 3 UNITS UNDER THE SKIN 3 (THREE) TIMES A DAY BEFORE MEALS. (Patient not taking: Reported on 7/13/2023), Disp: , Rfl:     ondansetron (ZOFRAN-ODT) 4 mg disintegrating tablet, Take 1 tablet (4 mg total) by mouth every 6 (six) hours as needed for nausea or vomiting, Disp: 30 tablet, Rfl: 2    oxybutynin (DITROPAN XL) 15 MG 24 hr tablet, Take 1 tablet (15 mg total) by mouth daily at bedtime, Disp: 90 tablet, Rfl: 1    oxybutynin (DITROPAN) 5 mg tablet, Take 3 tablets (15 mg total) by mouth daily, Disp: 270 tablet, Rfl: 1    oxyCODONE (ROXICODONE) 10 MG TABS, TAKE ONE TABLET BY MOUTH EVERY 12 HOURS AS NEEDED FOR PAIN. ONGOING THERAPY., Disp: , Rfl:     polyethylene glycol (GLYCOLAX) 17 GM/SCOOP powder, Take 17 g by mouth daily, Disp: 850 g, Rfl: 1    sertraline (ZOLOFT) 25 mg tablet, TAKE 1 TABLET (25 MG TOTAL) BY MOUTH DAILY., Disp: 90 tablet, Rfl: 1    sodium hypochlorite (DAKIN'S HALF-STRENGTH) external solution, Apply 1 Application topically daily Soak  gauze and pack into wounds daily., Disp: 473 mL, Rfl: 0    Sodium Zirconium Cyclosilicate 10 g PACK, Take 10 g by mouth daily, Disp: , Rfl:     sucralfate (CARAFATE) 1 g/10 mL suspension, Take 10 mL (1 g total) by mouth 4 (four) times a day (with meals and at bedtime), Disp: 420 mL, Rfl: 1    SUPER B COMPLEX & C TABS, TAKE 1 CAPSULE BY MOUTH ONCE FOR 1 DOSE AS DIRECTED, Disp: 90 tablet, Rfl: 2    valsartan (DIOVAN) 320 MG tablet, , Disp: , Rfl:     Zinc Sulfate 220 (50 Zn) MG TABS, TAKE 1 TABLET BY MOUTH EVERY DAY, Disp: 90 tablet, Rfl: 1  No current facility-administered medications for this visit.    Review of Systems   Constitutional:  Negative for chills and fever.   Respiratory:  Negative for cough and shortness of breath.    Cardiovascular:  Negative for chest pain.   Musculoskeletal:  Positive for gait problem (Paraplegia).        Paraplegic   Skin:  Positive for wound (Multiple pressure injuries).        Multiple pressure injuries   Neurological:  Positive for weakness and numbness. Negative for dizziness.       Objective:  /80   Pulse 88   Temp 99.8 °F (37.7 °C)   Resp 16         Physical Exam  Vitals reviewed.   Constitutional:       General: He is not in acute distress.     Appearance: He is not ill-appearing, toxic-appearing or diaphoretic.      Comments: Very pleasant, NAD   HENT:      Head: Normocephalic.   Cardiovascular:      Rate and Rhythm: Normal rate.   Pulmonary:      Effort: Pulmonary effort is normal. No respiratory distress.   Skin:     General: Skin is warm and dry.      Findings: Wound present. No erythema.             Comments: 1/2/3/4- Stage IV pressure injury sacrum/L Hip/ L ischium: Stable. To all wounds -  no malodor, purulent drainage, acute erythema, induration, fluctuance.    See full wound description for additional details.   Neurological:      Mental Status: He is alert and oriented to person, place, and time.      Gait: Gait abnormal (wc bound).   Psychiatric:          Mood and Affect: Mood normal.         Behavior: Behavior normal.           Wound Pressure Injury Ischium Left (Active)   Wound Image   09/19/24 1326   Wound Description Rolled edges;Epithelialization;Pink;Yellow;Granulation tissue 09/19/24 1327   Pressure Injury Stage 4 08/29/24 1403   Irene-wound Assessment Pink;Scar Tissue 09/19/24 1327   Wound Length (cm) 6 cm 09/19/24 1327   Wound Width (cm) 4.2 cm 09/19/24 1327   Wound Depth (cm) 0.9 cm 09/19/24 1327   Wound Surface Area (cm^2) 25.2 cm^2 09/19/24 1327   Wound Volume (cm^3) 22.68 cm^3 09/19/24 1327   Calculated Wound Volume (cm^3) 22.68 cm^3 09/19/24 1327   Change in Wound Size % -20 09/19/24 1327   Tunneling 1 in depth located at resolved 11/30/23 1351   Number of underminings 2 09/19/24 1327   Undermining 1 0.1 09/19/24 1327   Undermining 2 0.4 09/19/24 1327   Undermining 1 is depth extending from 4-5 o'clock 09/19/24 1327   Undermining 2 is depth extending from 7-10 o'clock 09/19/24 1327   Drainage Amount Large 09/19/24 1327   Drainage Description Serosanguineous 09/19/24 1327   Non-staged Wound Description Full thickness 09/19/24 1327   Treatments Irrigation with NSS 06/20/24 1402   Wound packed? No 05/23/24 1320   Dressing Changed Changed 03/07/24 1113   Patient Tolerance Tolerated well 06/20/24 1402   Dressing Status Removed 06/20/24 1402       Wound Pressure Injury Perineum (Active)   Wound Image   09/19/24 1333   Wound Description Pink;Tan;Slough;White;Granulation tissue;Rolled edges;Probes to bone 09/19/24 1334   Pressure Injury Stage 4 08/29/24 1408   Irene-wound Assessment Scar Tissue;[place] 09/19/24 1334   Wound Length (cm) 9 cm 09/19/24 1334   Wound Width (cm) 6.3 cm 09/19/24 1334   Wound Depth (cm) 1 cm 09/19/24 1334   Wound Surface Area (cm^2) 56.7 cm^2 09/19/24 1334   Wound Volume (cm^3) 56.7 cm^3 09/19/24 1334   Calculated Wound Volume (cm^3) 56.7 cm^3 09/19/24 1334   Change in Wound Size % -440 09/19/24 1334   Tunneling 1 in depth located at 0  10/05/23 1458   Number of underminings 1 09/19/24 1334   Undermining 1 2.3 09/19/24 1334   Undermining 1 is depth extending from 1-11 o'clock 09/19/24 1334   Drainage Amount Large 09/19/24 1334   Drainage Description Serosanguineous 09/19/24 1334   Non-staged Wound Description Full thickness 09/19/24 1334   Treatments Irrigation with NSS 05/23/24 1319   Wound packed? No 05/23/24 1319   Dressing Changed Changed 03/07/24 1107   Patient Tolerance Tolerated well 06/20/24 1402   Dressing Status Removed 06/20/24 1402       Wound Pressure Injury Sacrum (Active)   Wound Image   09/19/24 1337   Wound Description Epithelialization;Pink;Yellow;Granulation tissue;Slough;Rolled edges;White;Brown;Probes to bone 09/19/24 1337   Pressure Injury Stage 4 09/19/24 1337   Ireen-wound Assessment Scar Tissue;[place] 09/19/24 1337   Wound Length (cm) 8.5 cm 09/19/24 1337   Wound Width (cm) 9.5 cm 09/19/24 1337   Wound Depth (cm) 1.4 cm 09/19/24 1337   Wound Surface Area (cm^2) 80.75 cm^2 09/19/24 1337   Wound Volume (cm^3) 113.05 cm^3 09/19/24 1337   Calculated Wound Volume (cm^3) 113.05 cm^3 09/19/24 1337   Change in Wound Size % -50.73 09/19/24 1337   Number of underminings 1 09/19/24 1337   Undermining 1 0.6 09/19/24 1337   Undermining 2 0.5 08/29/24 1414   Undermining 1 is depth extending from 7-9 o'clock 09/19/24 1337   Undermining 2 is depth extending from 7-9 o'clock 08/29/24 1414   Drainage Amount Large 09/19/24 1337   Drainage Description Serosanguineous 09/19/24 1337   Non-staged Wound Description Full thickness 09/19/24 1337   Treatments Irrigation with NSS 06/20/24 1402   Wound packed? No 05/23/24 1319   Dressing Changed Changed 03/07/24 1105   Patient Tolerance Tolerated well 06/20/24 1402   Dressing Status Removed 06/20/24 1402       Wound Pressure Injury Hip Left;Lateral (Active)   Wound Image   09/19/24 1330   Wound Description Pink;Yellow 09/19/24 1330   Pressure Injury Stage 4 08/29/24 1419   Irene-wound Assessment Scar  Tissue;Intact 09/19/24 1330   Wound Length (cm) 1.6 cm 09/19/24 1330   Wound Width (cm) 1.4 cm 09/19/24 1330   Wound Depth (cm) 0.6 cm 09/19/24 1330   Wound Surface Area (cm^2) 2.24 cm^2 09/19/24 1330   Wound Volume (cm^3) 1.344 cm^3 09/19/24 1330   Calculated Wound Volume (cm^3) 1.34 cm^3 09/19/24 1330   Tunneling 1 0 cm 10/05/23 1453   Tunneling 1 in depth located at resolved 10/05/23 1453   Number of underminings 1 09/19/24 1330   Undermining 1 4.7 09/19/24 1330   Undermining 1 is depth extending from 6-12 o'clock 09/19/24 1330   Drainage Amount Moderate 09/19/24 1330   Drainage Description Serosanguineous 09/19/24 1330   Non-staged Wound Description Full thickness 09/19/24 1330   Treatments Irrigation with NSS 06/20/24 1401   Wound packed? Yes 05/23/24 1320   Packing- # removed 1 06/20/24 1401   Dressing Changed Changed 03/07/24 1111   Patient Tolerance Tolerated well 06/20/24 1401   Dressing Status Removed 06/20/24 1401             Lab Results   Component Value Date    HGBA1C 6.6 (H) 07/26/2024       Wound Instructions:  Orders Placed This Encounter   Procedures    Wound cleansing and dressings     Wound cleansing and dressings               Wash your hands with soap and water.  Remove old dressing, discard into plastic bag and place in trash.    Cleanse the wounds with Dakin's solution if there is an odor, IF NO ODOR, cleanse with normal saline or wound wash prior to applying a clean dressing.   Do not use tissue or cotton balls. Do not scrub the wound. Pat dry using gauze.   Shower no; do not get dressing wet      Left Hip Wound:   Apply Dakin's Moistened Kerlix  Cover with  ABD over top   Secure with Medfix tape.   Change dressing daily and top dressing PRN for breakthrough drainage (visiting nurses to do twice per week and family in between)      Sacral, Left ischium, and perineal wound:   Apply Dakin's Moistened Kerlix   Please ensure kerlix is tucked into depth of tunnel 4 o'clock for right perineal  wound  Cover with ABD over top   Secure with Medfix tape.   Change dressing daily and top dressing PRN for breakthrough drainage (visiting nurses to do twice per week and family in between)       Continue using Clinitron bed      Continue NO PRESSURE TO ALL WOUNDS AS MUCH AS POSSIBLE, ESPECIALLY PERINEAL WOUND   LIMIT SITTING UPRIGHT IN WHEELCHAIR ON THIS WOUND          Continue visiting nurse skilled 2 x per week for wound care dressing changes.                                  Follow up at the wound center in 4 weeks.     Quit smoking or try to cut back on how much you smoke. As discussed smoking slows the ability for a wound to heal by constricting blood vessels for approximately 30 minutes after each cigarette.     Standing Status:   Future     Standing Expiration Date:   10/17/2024    Wound Procedure Treatment     This order was created via procedure documentation       Marley Rose MD

## 2024-09-19 NOTE — PROGRESS NOTES
Wound Procedure Treatment    Performed by: Oneida Thao RN  Authorized by: Marley Rose MD    Associated wounds:   Wound Pressure Injury Ischium Left  Wound Pressure Injury Perineum  Wound Pressure Injury Sacrum  Wound Pressure Injury Hip Left;Lateral  Wound cleansed with:  NSS  Applied secondary dressing:  ABD  Dressing secured with:  Tape  Comments:  Dakin's soaked gauze

## 2024-09-19 NOTE — PATIENT INSTRUCTIONS
Orders Placed This Encounter   Procedures    Wound cleansing and dressings     Wound cleansing and dressings               Wash your hands with soap and water.  Remove old dressing, discard into plastic bag and place in trash.    Cleanse the wounds with Dakin's solution if there is an odor, IF NO ODOR, cleanse with normal saline or wound wash prior to applying a clean dressing.   Do not use tissue or cotton balls. Do not scrub the wound. Pat dry using gauze.   Shower no; do not get dressing wet      Left Hip Wound:   Apply Dakin's Moistened Kerlix  Cover with  ABD over top   Secure with Medfix tape.   Change dressing daily and top dressing PRN for breakthrough drainage (visiting nurses to do twice per week and family in between)      Sacral, Left ischium, and perineal wound:   Apply Dakin's Moistened Kerlix   Please ensure kerlix is tucked into depth of tunnel 4 o'clock for right perineal wound  Cover with ABD over top   Secure with Medfix tape.   Change dressing daily and top dressing PRN for breakthrough drainage (visiting nurses to do twice per week and family in between)       Continue using Clinitron bed      Continue NO PRESSURE TO ALL WOUNDS AS MUCH AS POSSIBLE, ESPECIALLY PERINEAL WOUND   LIMIT SITTING UPRIGHT IN WHEELCHAIR ON THIS WOUND          Continue visiting nurse skilled 2 x per week for wound care dressing changes.                                  Follow up at the wound center in 4 weeks.     Quit smoking or try to cut back on how much you smoke. As discussed smoking slows the ability for a wound to heal by constricting blood vessels for approximately 30 minutes after each cigarette.     Standing Status:   Future     Standing Expiration Date:   10/17/2024

## 2024-09-19 NOTE — TELEPHONE ENCOUNTER
FAXED ON 09/19/24 TO [facility] at [phone]. FAX CONFIRMATION RECEIVED.

## 2024-09-20 ENCOUNTER — TELEPHONE (OUTPATIENT)
Dept: FAMILY MEDICINE CLINIC | Facility: CLINIC | Age: 44
End: 2024-09-20

## 2024-09-20 NOTE — TELEPHONE ENCOUNTER
[facility]  form received on 9/20/2024  to be completed by PCP. Copy made and placed in PCP folder.    Forms to be delivered to PCP mailbox at assigned time.     ORDER DATE 8/10/2024

## 2024-09-25 ENCOUNTER — PATIENT MESSAGE (OUTPATIENT)
Dept: FAMILY MEDICINE CLINIC | Facility: CLINIC | Age: 44
End: 2024-09-25

## 2024-09-26 ENCOUNTER — TELEMEDICINE (OUTPATIENT)
Dept: FAMILY MEDICINE CLINIC | Facility: CLINIC | Age: 44
End: 2024-09-26

## 2024-09-26 DIAGNOSIS — J20.9 ACUTE BRONCHITIS, UNSPECIFIED ORGANISM: Primary | ICD-10-CM

## 2024-09-26 PROCEDURE — G2211 COMPLEX E/M VISIT ADD ON: HCPCS | Performed by: PHYSICIAN ASSISTANT

## 2024-09-26 PROCEDURE — 99213 OFFICE O/P EST LOW 20 MIN: CPT | Performed by: PHYSICIAN ASSISTANT

## 2024-09-26 RX ORDER — BENZONATATE 100 MG/1
100 CAPSULE ORAL 3 TIMES DAILY PRN
Qty: 30 CAPSULE | Refills: 0 | Status: SHIPPED | OUTPATIENT
Start: 2024-09-26

## 2024-09-26 RX ORDER — ALBUTEROL SULFATE 90 UG/1
2 INHALANT RESPIRATORY (INHALATION) EVERY 6 HOURS PRN
Qty: 18 G | Refills: 2 | Status: SHIPPED | OUTPATIENT
Start: 2024-09-26

## 2024-09-26 RX ORDER — AZITHROMYCIN 250 MG/1
TABLET, FILM COATED ORAL
Qty: 6 TABLET | Refills: 0 | Status: SHIPPED | OUTPATIENT
Start: 2024-09-26 | End: 2024-10-01

## 2024-09-26 NOTE — PROGRESS NOTES
Virtual Regular Visit  Name: Ovidio Parkinson Jr.      : 1980      MRN: [id number]  Encounter Provider: Kay Ronquillo PA-C  Encounter Date: 2024   Encounter department: [facility] SCOUT    Verification of patient location:    Patient is located at Home in the following state in which I hold an active license [place]    Assessment & Plan  Acute bronchitis, unspecified organism  Acute bronchitis  - Will prescribe Z-Torito, take 500 mg on day 1, followed by 250 mg on days 2, 3, 4, 5.  Advised patient to take entire course of antibiotic even if feeling better before him.  - Will prescribe Tessalon Perles, to be taken as needed for cough/congestion.  - Will prescribe albuterol inhaler, to be used every 6 hours as needed for shortness of breath or wheezing.   - Advised to rest and to stay well-hydrated.  - Advised to contact the office or report to ED if symptoms persist, worsen, or new symptoms arise.    Orders:    azithromycin (Zithromax) 250 mg tablet; Take 2 tablets (500 mg total) by mouth daily for 1 day, THEN 1 tablet (250 mg total) daily for 4 days.    albuterol (Ventolin HFA) 90 mcg/act inhaler; Inhale 2 puffs every 6 (six) hours as needed for wheezing    benzonatate (TESSALON PERLES) 100 mg capsule; Take 1 capsule (100 mg total) by mouth 3 (three) times a day as needed for cough         Encounter provider Kay Ronquillo PA-C    The patient was identified by name and date of birth. Ovidio Parkinson  was informed that this is a telemedicine visit and that the visit is being conducted through the Epic Embedded platform. He agrees to proceed..  My office door was closed. No one else was in the room.  He acknowledged consent and understanding of privacy and security of the video platform. The patient has agreed to participate and understands they can discontinue the visit at any time.    Patient is aware this is a billable service.     History of Present Illness       Acute  Bronchitis  Patient presents for evaluation of bilateral ear congestion, nasal congestion, and productive cough, shortness of breath, sinus pressure, wheezing. Symptoms began 4 days ago and are gradually worsening since that time.  Patient denies any fevers, chills, sore throat, headaches, dizziness, nausea, vomiting.  Patient notes he does feel that his right ear is clogged and he cannot hear very well.  Patient notes he has an appointment scheduled with ENT in about 2 weeks.  Patient notes he was previously prescribed albuterol inhaler, but he no longer has it.      History obtained from : patient  Review of Systems   Constitutional:  Negative for activity change, chills and fever.   HENT:  Positive for congestion and rhinorrhea. Negative for ear pain and sore throat.    Eyes:  Negative for pain.   Respiratory:  Positive for cough, shortness of breath and wheezing.    Cardiovascular:  Negative for chest pain, palpitations and leg swelling.   Gastrointestinal:  Negative for abdominal pain, diarrhea and vomiting.   Neurological:  Negative for dizziness and headaches.   All other systems reviewed and are negative.    Pertinent Medical History     Medical History Reviewed by provider this encounter:  Tobacco  Allergies  Meds  Problems  Med Hx  Surg Hx  Fam Hx       Past Medical History   Past Medical History:   Diagnosis Date    Acute pulmonary edema (HCC) 01/13/2022    Ambulatory dysfunction     Anemia     Anemia, iron deficiency     transfusion requiring    Asymptomatic bacteriuria 09/25/2017    Atrial fibrillation ([facility])     AVM (arteriovenous malformation) of duodenum, 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    Bacteriuria, asymptomatic     Bipolar disorder ([facility])     C. difficile colitis 06/23/2022    Chronic deep vein thrombosis (DVT) ([facility])     Chronic indwelling Ortega catheter     Chronic kidney disease     Chronic pain     Chronic pain disorder     Chronic suprapubic catheter ([facility])     Clostridium difficile  infection 08/11/2016    also positive 9/2016, 5/29/2017, 8/15/2017. S/P fecal transplant    Colostomy on examination ([facility])     Decubitus ulcer     Delirium     Diabetes mellitus (HCC)     GERD (gastroesophageal reflux disease)     Hemodialysis patient ([facility])     Hypertension     Memory impairment 2011    s/p diabetic coma    Neurogenic bladder     OAB (overactive bladder)     Paraplegia ([facility])     T3 transverse myelitis vs spinal stoke (AVM); 2012 extensive epidural abscess C7=> conus 2nd extension from deep parspinal abscess L4-S2/sacral decubitus; cord atrophy/myelomalcia T3=>conus     Penile abscess     Pressure injury of left ischium, stage 4 ([facility]) 07/03/2017    Pressure ulcer of left hip, stage 4 ([facility]) 03/12/2021    Pressure ulcer of other site, stage 4 ([facility]) 03/12/2021    Pressure ulcer of sacral region, stage 4 ([facility]) 08/13/2020    S/P unilateral BKA (below knee amputation) ([facility])     Right    Sebaceous cyst removed in 2017    Seizures ([facility])     Shortness of breath     Tobacco abuse     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[facility])     Urinary retention     Wheelchair dependent     Wound healing, delayed 06/29/2017    Wounds, multiple     pressure ulcers with delayed healing    Wounds, multiple     pressure ulcers with delayed healing       Past Surgical History:   Procedure Laterality Date    BELOW KNEE LEG AMPUTATION Right 2009    COLONOSCOPY N/A 03/27/2017    Procedure: COLONOSCOPY;  Surgeon: Wesley Thibodeaux MD;  Location: [facility];  Service:     COLONOSCOPY N/A 03/29/2017    Procedure: COLONOSCOPY;  Surgeon: Wesley Thibodeaux MD;  Location: [facility];  Service:     COLONOSCOPY N/A 06/15/2017    Procedure: COLONOSCOPY with FMT;  Surgeon: Gerard Marques MD;  Location: [facility];  Service: Gastroenterology    ESOPHAGOGASTRODUODENOSCOPY N/A 03/22/2017    Procedure: ESOPHAGOGASTRODUODENOSCOPY (EGD);  Surgeon: Beverley Ortiz DO;  Location: [facility];  Service:     FL GUIDED NEEDLE PLAC BX/ASP/INJ  6/22/2018    FL LUMBAR PUNCTURE  DIAGNOSTIC  1/22/2020    MULTIPLE TOOTH EXTRACTIONS N/A 03/08/2021    Procedure: EXTRACTION TEETH # 2,3,6,10,12,13,14,18,19,20,21,22,23,24,25,26,27,28,29,30;  Surgeon: Brian Kohler DMD;  Location: [facility];  Service: Maxillofacial    SKIN BIOPSY       Family History   Problem Relation Age of Onset    Hyperlipidemia Mother     Hypertension Mother     Leukemia Brother     Diabetes Paternal Grandfather      Current Outpatient Medications on File Prior to Visit   Medication Sig Dispense Refill    Alcohol Swabs (ALCOHOL PADS) 70 % PADS by Does not apply route 5 (five) times a day 300 each 5    amLODIPine (NORVASC) 10 mg tablet TAKE 1 TABLET BY MOUTH EVERY DAY 90 tablet 1    ammonium lactate (LAC-HYDRIN) 12 % cream       apixaban (ELIQUIS) 5 mg Take 1 tablet (5 mg total) by mouth 2 (two) times a day 180 tablet 3    atorvastatin (LIPITOR) 20 mg tablet TAKE 1 TABLET BY MOUTH EVERYDAY AT BEDTIME 90 tablet 1    azelaic acid (Azelex) 20 % cream Apply topically 2 (two) times a day 50 g 0    B Complex-C-Folic Acid (Super B-Complex/Vit C/FA) TABS TAKE 1 TABLET BY MOUTH EVERY DAY AS DIRECTED 90 tablet 5    SUSAN MICROLET LANCETS lancets Use as instructed to check blood sugar 4 times a day  Dx e10.29 200 each 5    bisacodyl (DULCOLAX) 5 mg EC tablet Take 1 tablet (5 mg total) by mouth once for 1 dose 2 tablet 0    Blood Glucose Monitoring Suppl (SUSAN CONTOUR NEXT USB MONITOR) w/Device KIT Testing 4 times a day 1 kit 0    calcitriol (ROCALTROL) 0.5 MCG capsule Take 2 mcg by mouth      calcium acetate (PHOSLO) capsule TAKE 2 CAPSULES BY MOUTH THREE TIMES DAILY WITH MEALS      carvedilol (COREG) 25 mg tablet Take 1 tablet (25 mg total) by mouth 2 (two) times a day 180 tablet 1    cetirizine (ZyrTEC) 10 mg tablet TAKE 1 TABLET BY MOUTH EVERY DAY 90 tablet 1    Cholecalciferol (VITAMIN D-3) 1000 units CAPS Take 2 capsules by mouth daily 30 capsule 0    clindamycin (CLINDAGEL) 1 % gel APPLY TO AFFECTED AREA TWICE A DAY 60 g 2     Contour Next Test test strip USE TO TEST BLOOD SUGAR 3 TIMES DAILY. CONTOUR NEXT STRIPS. E10.29      cyanocobalamin ([employer] Vitamin B-12) 1000 MCG tablet Take 1 tablet (1,000 mcg total) by mouth daily 90 tablet 3    cyclobenzaprine (FLEXERIL) 5 mg tablet       dexamethasone sodium phosphate 0.1 % ophthalmic solution 2 drops in each ear as needed for itching      dexlansoprazole (DEXILANT) 30 MG capsule TAKE 1 CAPSULE BY MOUTH EVERY DAY 90 capsule 2    dicyclomine (BENTYL) 10 mg capsule TAKE 1 CAPSULE BY MOUTH 3 TIMES A DAY BEFORE MEALS 270 capsule 1    Disposable Gloves MISC Use 7 times a day, 4 boxes of gloves XL  Dx type 1 DM, paraplegia 4 each 11    doxazosin (CARDURA) 2 mg tablet TAKE 1 TABLET BY MOUTH EVERY EVENING. 90 tablet 1    epoetin kaushik (EPOGEN,PROCRIT) 20,000 units/mL Inject 10,000 Units under the skin      epoetin kaushik (EPOGEN,PROCRIT) 20,000 units/mL Inject 5,000 Units under the skin      ferrous sulfate 324 (65 Fe) mg Take 1 tablet (324 mg total) by mouth in the morning 90 tablet 3    ferrous sulfate 325 (65 Fe) mg tablet Take 1 tablet (325 mg total) by mouth 3 (three) times a day 90 tablet 3    Fluocinolone Acetonide Body 0.01 % OIL Apply 2 drops topically daily      folic acid (FOLVITE) 1 mg tablet Take 1,000 mcg by mouth daily      Gvoke PFS 1 MG/0.2ML SOSY       hydroxychloroquine (PLAQUENIL) 200 mg tablet Take 200 mg by mouth daily      hydrOXYzine HCL (ATARAX) 50 mg tablet TAKE 1 TABLET (50 MG TOTAL) BY MOUTH 2 (TWO) TIMES A DAY AS NEEDED FOR ITCHING OR ANXIETY 180 tablet 1    Incontinence Supply Disposable (Incontinence Brief Large) MISC Use 6 (six) times a day Size xl 180 each 11    Incontinence Supply Disposable (Undergarment) MISC Use 6 a day, 5 boxes, xl  Dx R51, paraplegia 5 each 11    Incontinence Supply Disposable (Underpads) MISC Use 2 a day 5 each 11    insulin lispro (HumALOG/ADMELOG) 100 units/mL injection USE UP TO 40 UNITS DAILY VIA PUMP. E10.9      insulin lispro protamine-insulin  lispro (HumaLOG 50-50) 100 units/mL Inject under the skin 2 (two) times a day before meals      leflunomide (ARAVA) 10 MG tablet Take 10 mg by mouth daily      LEVEMIR FLEXTOUCH 100 units/mL injection pen Inject 14 Units under the skin 2 (two) times a day 15 pen 3    Lokelma 10 g PACK       metroNIDAZOLE (METROGEL) 0.75 % gel Apply topically 2 (two) times a day 45 g 0    Misc. Devices (Wheelchair Cushion) MISC Use daily 1 each 0    Misc. Devices (WHEELCHAIR) MISC by Does not apply route daily Wheelchair ramp, dx g82.20 1 each 0    Multiple Vitamin (Daily-Enriqueta Multivitamin) TABS TAKE 1 TABLET BY MOUTH EVERY DAY 30 tablet 8    NovoLOG FlexPen 100 units/mL injection pen INJECT 3 UNITS UNDER THE SKIN 3 (THREE) TIMES A DAY BEFORE MEALS. (Patient not taking: Reported on 7/13/2023)      ondansetron (ZOFRAN-ODT) 4 mg disintegrating tablet Take 1 tablet (4 mg total) by mouth every 6 (six) hours as needed for nausea or vomiting 30 tablet 2    oxybutynin (DITROPAN XL) 15 MG 24 hr tablet Take 1 tablet (15 mg total) by mouth daily at bedtime 90 tablet 1    oxybutynin (DITROPAN) 5 mg tablet Take 3 tablets (15 mg total) by mouth daily 270 tablet 1    oxyCODONE (ROXICODONE) 10 MG TABS TAKE ONE TABLET BY MOUTH EVERY 12 HOURS AS NEEDED FOR PAIN. ONGOING THERAPY.      polyethylene glycol (GLYCOLAX) 17 GM/SCOOP powder Take 17 g by mouth daily 850 g 1    sertraline (ZOLOFT) 25 mg tablet TAKE 1 TABLET (25 MG TOTAL) BY MOUTH DAILY. 90 tablet 1    sodium hypochlorite (DAKIN'S HALF-STRENGTH) external solution Apply 1 Application topically daily Soak gauze and pack into wounds daily. 473 mL 0    Sodium Zirconium Cyclosilicate 10 g PACK Take 10 g by mouth daily      sucralfate (CARAFATE) 1 g/10 mL suspension Take 10 mL (1 g total) by mouth 4 (four) times a day (with meals and at bedtime) 420 mL 1    SUPER B COMPLEX & C TABS TAKE 1 CAPSULE BY MOUTH ONCE FOR 1 DOSE AS DIRECTED 90 tablet 2    valsartan (DIOVAN) 320 MG tablet       Zinc Sulfate  220 (50 Zn) MG TABS TAKE 1 TABLET BY MOUTH EVERY DAY 90 tablet 1     No current facility-administered medications on file prior to visit.     Allergies   Allergen Reactions    Chlorcyclizine Swelling    Chlorhexidine Other (See Comments)    Ciprofloxacin Hcl     Cymbalta [Duloxetine Hcl]     Dm-Doxylamine-Acetaminophen Other (See Comments)    Doxycycline Diarrhea    Gabapentin Tremor    Hydrocortisone Other (See Comments)    Lactose - Food Allergy GI Intolerance     Other reaction(s): Unknown    Lyrica [Pregabalin]     Milk (Cow) GI Intolerance    Penicillins Other (See Comments)     miguel Zosyn 08/28/17  Tolerates Ancef  Miguel Augmentin    Polymyxin B     Promethazine-Phenyleph-Codeine Other (See Comments)    Quinidine Other (See Comments)    Cefepime Other (See Comments)     Other reaction(s): Other (See Comments)  encephalopathy; tolerates cefazolin 6/14, miguel Ceftriaxone  encephalopathy; tolerates cefazolin 6/14, miguel Ceftriaxone  Pt has had cefepime March 2022 and June 2022. Also, cephalexin 3/15/22.    Rosuvastatin Other (See Comments) and Palpitations     Weakness      Current Outpatient Medications on File Prior to Visit   Medication Sig Dispense Refill    Alcohol Swabs (ALCOHOL PADS) 70 % PADS by Does not apply route 5 (five) times a day 300 each 5    amLODIPine (NORVASC) 10 mg tablet TAKE 1 TABLET BY MOUTH EVERY DAY 90 tablet 1    ammonium lactate (LAC-HYDRIN) 12 % cream       apixaban (ELIQUIS) 5 mg Take 1 tablet (5 mg total) by mouth 2 (two) times a day 180 tablet 3    atorvastatin (LIPITOR) 20 mg tablet TAKE 1 TABLET BY MOUTH EVERYDAY AT BEDTIME 90 tablet 1    azelaic acid (Azelex) 20 % cream Apply topically 2 (two) times a day 50 g 0    B Complex-C-Folic Acid (Super B-Complex/Vit C/FA) TABS TAKE 1 TABLET BY MOUTH EVERY DAY AS DIRECTED 90 tablet 5    SUSAN MICROLET LANCETS lancets Use as instructed to check blood sugar 4 times a day  Dx e10.29 200 each 5    bisacodyl (DULCOLAX) 5 mg EC tablet Take 1 tablet (5  mg total) by mouth once for 1 dose 2 tablet 0    Blood Glucose Monitoring Suppl ([employer] CONTOUR NEXT USB MONITOR) w/Device KIT Testing 4 times a day 1 kit 0    calcitriol (ROCALTROL) 0.5 MCG capsule Take 2 mcg by mouth      calcium acetate (PHOSLO) capsule TAKE 2 CAPSULES BY MOUTH THREE TIMES DAILY WITH MEALS      carvedilol (COREG) 25 mg tablet Take 1 tablet (25 mg total) by mouth 2 (two) times a day 180 tablet 1    cetirizine (ZyrTEC) 10 mg tablet TAKE 1 TABLET BY MOUTH EVERY DAY 90 tablet 1    Cholecalciferol (VITAMIN D-3) 1000 units CAPS Take 2 capsules by mouth daily 30 capsule 0    clindamycin (CLINDAGEL) 1 % gel APPLY TO AFFECTED AREA TWICE A DAY 60 g 2    Contour Next Test test strip USE TO TEST BLOOD SUGAR 3 TIMES DAILY. CONTOUR NEXT STRIPS. E10.29      cyanocobalamin ([employer] Vitamin B-12) 1000 MCG tablet Take 1 tablet (1,000 mcg total) by mouth daily 90 tablet 3    cyclobenzaprine (FLEXERIL) 5 mg tablet       dexamethasone sodium phosphate 0.1 % ophthalmic solution 2 drops in each ear as needed for itching      dexlansoprazole (DEXILANT) 30 MG capsule TAKE 1 CAPSULE BY MOUTH EVERY DAY 90 capsule 2    dicyclomine (BENTYL) 10 mg capsule TAKE 1 CAPSULE BY MOUTH 3 TIMES A DAY BEFORE MEALS 270 capsule 1    Disposable Gloves MISC Use 7 times a day, 4 boxes of gloves XL  Dx type 1 DM, paraplegia 4 each 11    doxazosin (CARDURA) 2 mg tablet TAKE 1 TABLET BY MOUTH EVERY EVENING. 90 tablet 1    epoetin kaushik (EPOGEN,PROCRIT) 20,000 units/mL Inject 10,000 Units under the skin      epoetin kaushik (EPOGEN,PROCRIT) 20,000 units/mL Inject 5,000 Units under the skin      ferrous sulfate 324 (65 Fe) mg Take 1 tablet (324 mg total) by mouth in the morning 90 tablet 3    ferrous sulfate 325 (65 Fe) mg tablet Take 1 tablet (325 mg total) by mouth 3 (three) times a day 90 tablet 3    Fluocinolone Acetonide Body 0.01 % OIL Apply 2 drops topically daily      folic acid (FOLVITE) 1 mg tablet Take 1,000 mcg by mouth daily      Gvoke PFS  1 MG/0.2ML SOSY       hydroxychloroquine (PLAQUENIL) 200 mg tablet Take 200 mg by mouth daily      hydrOXYzine HCL (ATARAX) 50 mg tablet TAKE 1 TABLET (50 MG TOTAL) BY MOUTH 2 (TWO) TIMES A DAY AS NEEDED FOR ITCHING OR ANXIETY 180 tablet 1    Incontinence Supply Disposable (Incontinence Brief Large) MISC Use 6 (six) times a day Size xl 180 each 11    Incontinence Supply Disposable (Undergarment) MISC Use 6 a day, 5 boxes, xl  Dx R51, paraplegia 5 each 11    Incontinence Supply Disposable (Underpads) MISC Use 2 a day 5 each 11    insulin lispro (HumALOG/ADMELOG) 100 units/mL injection USE UP TO 40 UNITS DAILY VIA PUMP. E10.9      insulin lispro protamine-insulin lispro (HumaLOG 50-50) 100 units/mL Inject under the skin 2 (two) times a day before meals      leflunomide (ARAVA) 10 MG tablet Take 10 mg by mouth daily      LEVEMIR FLEXTOUCH 100 units/mL injection pen Inject 14 Units under the skin 2 (two) times a day 15 pen 3    Lokelma 10 g PACK       metroNIDAZOLE (METROGEL) 0.75 % gel Apply topically 2 (two) times a day 45 g 0    Misc. Devices (Wheelchair Cushion) MISC Use daily 1 each 0    Misc. Devices (WHEELCHAIR) MISC by Does not apply route daily Wheelchair ramp, dx g82.20 1 each 0    Multiple Vitamin (Daily-Enriqueta Multivitamin) TABS TAKE 1 TABLET BY MOUTH EVERY DAY 30 tablet 8    NovoLOG FlexPen 100 units/mL injection pen INJECT 3 UNITS UNDER THE SKIN 3 (THREE) TIMES A DAY BEFORE MEALS. (Patient not taking: Reported on 7/13/2023)      ondansetron (ZOFRAN-ODT) 4 mg disintegrating tablet Take 1 tablet (4 mg total) by mouth every 6 (six) hours as needed for nausea or vomiting 30 tablet 2    oxybutynin (DITROPAN XL) 15 MG 24 hr tablet Take 1 tablet (15 mg total) by mouth daily at bedtime 90 tablet 1    oxybutynin (DITROPAN) 5 mg tablet Take 3 tablets (15 mg total) by mouth daily 270 tablet 1    oxyCODONE (ROXICODONE) 10 MG TABS TAKE ONE TABLET BY MOUTH EVERY 12 HOURS AS NEEDED FOR PAIN. ONGOING THERAPY.       polyethylene glycol (GLYCOLAX) 17 GM/SCOOP powder Take 17 g by mouth daily 850 g 1    sertraline (ZOLOFT) 25 mg tablet TAKE 1 TABLET (25 MG TOTAL) BY MOUTH DAILY. 90 tablet 1    sodium hypochlorite (DAKIN'S HALF-STRENGTH) external solution Apply 1 Application topically daily Soak gauze and pack into wounds daily. 473 mL 0    Sodium Zirconium Cyclosilicate 10 g PACK Take 10 g by mouth daily      sucralfate (CARAFATE) 1 g/10 mL suspension Take 10 mL (1 g total) by mouth 4 (four) times a day (with meals and at bedtime) 420 mL 1    SUPER B COMPLEX & C TABS TAKE 1 CAPSULE BY MOUTH ONCE FOR 1 DOSE AS DIRECTED 90 tablet 2    valsartan (DIOVAN) 320 MG tablet       Zinc Sulfate 220 (50 Zn) MG TABS TAKE 1 TABLET BY MOUTH EVERY DAY 90 tablet 1     No current facility-administered medications on file prior to visit.      Social History     Tobacco Use    Smoking status: Every Day     Types: Cigars     Passive exposure: Current    Smokeless tobacco: Never    Tobacco comments:     2 cigars/day - 2/14/2023   Vaping Use    Vaping status: Never Used   Substance and Sexual Activity    Alcohol use: Not Currently     Comment: 1 cup of wine every 3-4 months    Drug use: Not Currently     Types: Marijuana     Comment: rarely; once every 1-2 years    Sexual activity: Not on file         Objective     There were no vitals taken for this visit.  Physical Exam  Constitutional:       General: He is not in acute distress.     Appearance: He is well-developed.   HENT:      Head: Normocephalic and atraumatic.      Right Ear: External ear normal.      Left Ear: External ear normal.      Nose: Nose normal.   Eyes:      Conjunctiva/sclera: Conjunctivae normal.   Pulmonary:      Effort: Pulmonary effort is normal. No respiratory distress.   Musculoskeletal:      Cervical back: Normal range of motion and neck supple.   Skin:     General: Skin is dry.   Neurological:      Mental Status: He is alert and oriented to person, place, and time.    Psychiatric:         Speech: Speech normal.         Behavior: Behavior normal.         Visit Time  Total Visit Duration: 7 minutes

## 2024-09-26 NOTE — TELEPHONE ENCOUNTER
FAXED ON 09/26/24 [facility] TO  at [phone] . FAX CONFIRMATION RECEIVED.  Scanned into pt chart.

## 2024-09-26 NOTE — TELEPHONE ENCOUNTER
FAXED ON 09/26/24 TO [facility] at [phone]. FAX CONFIRMATION RECEIVED.    Scanned into pt chart.

## 2024-09-30 ENCOUNTER — TELEPHONE (OUTPATIENT)
Dept: FAMILY MEDICINE CLINIC | Facility: CLINIC | Age: 44
End: 2024-09-30

## 2024-09-30 NOTE — TELEPHONE ENCOUNTER
PCP SIGNATURE NEEDED FOR [facility] FORM RECEIVED VIA FAX AND PLACED IN PCP FOLDER TO BE DELIVERED AT ASSIGNED TIMES.    SOC/RECERTIFICATION CARE SUMMARY

## 2024-10-03 ENCOUNTER — OFFICE VISIT (OUTPATIENT)
Dept: FAMILY MEDICINE CLINIC | Facility: CLINIC | Age: 44
End: 2024-10-03

## 2024-10-03 ENCOUNTER — HOSPITAL ENCOUNTER (OUTPATIENT)
Dept: RADIOLOGY | Facility: HOSPITAL | Age: 44
End: 2024-10-03
Payer: COMMERCIAL

## 2024-10-03 VITALS
HEIGHT: 76 IN | OXYGEN SATURATION: 97 % | HEART RATE: 95 BPM | DIASTOLIC BLOOD PRESSURE: 70 MMHG | BODY MASS INDEX: 18.62 KG/M2 | RESPIRATION RATE: 18 BRPM | TEMPERATURE: 98.5 F | SYSTOLIC BLOOD PRESSURE: 130 MMHG

## 2024-10-03 DIAGNOSIS — I10 HTN (HYPERTENSION), BENIGN: ICD-10-CM

## 2024-10-03 DIAGNOSIS — J20.9 ACUTE BRONCHITIS, UNSPECIFIED ORGANISM: ICD-10-CM

## 2024-10-03 PROCEDURE — G2211 COMPLEX E/M VISIT ADD ON: HCPCS | Performed by: PHYSICIAN ASSISTANT

## 2024-10-03 PROCEDURE — 99213 OFFICE O/P EST LOW 20 MIN: CPT | Performed by: PHYSICIAN ASSISTANT

## 2024-10-03 PROCEDURE — 71046 X-RAY EXAM CHEST 2 VIEWS: CPT

## 2024-10-03 RX ORDER — BENZONATATE 100 MG/1
200 CAPSULE ORAL 3 TIMES DAILY PRN
Qty: 30 CAPSULE | Refills: 0 | Status: SHIPPED | OUTPATIENT
Start: 2024-10-03

## 2024-10-03 RX ORDER — PREDNISONE 20 MG/1
40 TABLET ORAL DAILY
Qty: 10 TABLET | Refills: 0 | Status: SHIPPED | OUTPATIENT
Start: 2024-10-03 | End: 2024-10-08

## 2024-10-03 RX ORDER — CARVEDILOL 25 MG/1
25 TABLET ORAL 2 TIMES DAILY
Qty: 180 TABLET | Refills: 1 | Status: SHIPPED | OUTPATIENT
Start: 2024-10-03

## 2024-10-03 RX ORDER — AMLODIPINE BESYLATE 10 MG/1
10 TABLET ORAL DAILY
Qty: 90 TABLET | Refills: 1 | Status: SHIPPED | OUTPATIENT
Start: 2024-10-03

## 2024-10-03 RX ORDER — DOXAZOSIN 2 MG/1
2 TABLET ORAL EVERY EVENING
Qty: 90 TABLET | Refills: 1 | Status: SHIPPED | OUTPATIENT
Start: 2024-10-03

## 2024-10-03 NOTE — PROGRESS NOTES
Ambulatory Visit  Name: Ovidio Parkinson Jr.      : 1980      MRN: [id number]  Encounter Provider: Kay Ronquillo PA-C  Encounter Date: 10/3/2024   Encounter department: [facility] PRACTICE SCOUT    Assessment & Plan  HTN (hypertension), benign  BP at goal 130/70 - continue current regimen.  Orders:    amLODIPine (NORVASC) 10 mg tablet; Take 1 tablet (10 mg total) by mouth daily    carvedilol (COREG) 25 mg tablet; Take 1 tablet (25 mg total) by mouth 2 (two) times a day    doxazosin (CARDURA) 2 mg tablet; Take 1 tablet (2 mg total) by mouth every evening    Acute bronchitis, unspecified organism  Completed azythromycin  Ongoing cough - requested refill of tesslon perles  Lung exam notable for diffuse inspiratory and expiratory wheezing  Will order CXR and prednisone burst  - Advised to contact the office or report to ED if symptoms persist, worsen, or new symptoms arise.      Orders:    benzonatate (TESSALON PERLES) 100 mg capsule; Take 2 capsules (200 mg total) by mouth 3 (three) times a day as needed for cough    predniSONE 20 mg tablet; Take 2 tablets (40 mg total) by mouth daily for 5 days    XR chest pa and lateral; Future       History of Present Illness         Ovidio Parkinson is a 43 YO M who presents to the office today for follow up of HTN. He reports compliance with his prescribed regimen of amlodipine 10 mg daily, carvedilol 25 mg BID, doxazosin 2 mg daily.       He reports overall improvement in his acute bronchitis after completing the azithromycin. However, he complains of ongoing cough, intermittent wheezing, and muffled hearing. He is requesting refill of tesslon perles.    Review of Systems -   General ROS: negative for - chills or fever  ENT ROS: positive for - hearing change  negative for - headaches or sore throat  Respiratory ROS: positive for - cough  negative for - shortness of breath  Cardiovascular ROS: negative  Gastrointestinal ROS: negative for - abdominal  "pain, change in stools, or nausea/vomiting    Objective     /70 (BP Location: Right arm, Patient Position: Sitting, Cuff Size: Large)   Pulse 95   Temp 98.5 °F (36.9 °C) (Temporal)   Resp 18   Ht 6' 4\" (1.93 m)   SpO2 97%   BMI 18.62 kg/m²     Physical Exam  Vitals and nursing note reviewed.   Constitutional:       General: He is not in acute distress.     Appearance: He is well-developed.      Comments: Seated in powerchair.   HENT:      Head: Normocephalic and atraumatic.      Right Ear: External ear normal. There is impacted cerumen.      Left Ear: External ear normal. There is impacted cerumen.      Nose: Nose normal.      Mouth/Throat:      Pharynx: Uvula midline.   Eyes:      Conjunctiva/sclera: Conjunctivae normal.      Pupils: Pupils are equal, round, and reactive to light.   Cardiovascular:      Rate and Rhythm: Normal rate and regular rhythm.      Pulses: Normal pulses.      Heart sounds: Normal heart sounds. No murmur heard.  Pulmonary:      Effort: Pulmonary effort is normal. No respiratory distress.      Breath sounds: Wheezing present.   Musculoskeletal:      Cervical back: Normal range of motion and neck supple.   Skin:     General: Skin is warm and dry.   Neurological:      Mental Status: He is alert and oriented to person, place, and time.   Psychiatric:         Speech: Speech normal.         Behavior: Behavior normal.         "

## 2024-10-03 NOTE — ASSESSMENT & PLAN NOTE
BP at goal 130/70 - continue current regimen.  Orders:    amLODIPine (NORVASC) 10 mg tablet; Take 1 tablet (10 mg total) by mouth daily    carvedilol (COREG) 25 mg tablet; Take 1 tablet (25 mg total) by mouth 2 (two) times a day    doxazosin (CARDURA) 2 mg tablet; Take 1 tablet (2 mg total) by mouth every evening

## 2024-10-14 ENCOUNTER — TELEPHONE (OUTPATIENT)
Dept: FAMILY MEDICINE CLINIC | Facility: CLINIC | Age: 44
End: 2024-10-14

## 2024-10-14 NOTE — TELEPHONE ENCOUNTER
PCP SIGNATURE NEEDED FOR [facility]  FORM RECEIVED VIA FAX AND PLACED IN PCP FOLDER TO BE DELIVERED AT ASSIGNED TIMES.      Visit Date 10/8/24

## 2024-10-15 ENCOUNTER — TELEPHONE (OUTPATIENT)
Dept: FAMILY MEDICINE CLINIC | Facility: CLINIC | Age: 44
End: 2024-10-15

## 2024-10-15 NOTE — TELEPHONE ENCOUNTER
PCP SIGNATURE NEEDED FOR [employer] FORM RECEIVED VIA FAX AND PLACED IN PCP FOLDER TO BE DELIVERED AT ASSIGNED TIMES.      [employer] Ordered Form

## 2024-10-15 NOTE — TELEPHONE ENCOUNTER
PCP SIGNATURE NEEDED FOR [facility] FORM RECEIVED VIA FAX AND PLACED IN PCP FOLDER TO BE DELIVERED AT ASSIGNED TIMES.    Plan of Care  Certification period 10/09/2024-12/07/2024

## 2024-10-17 NOTE — TELEPHONE ENCOUNTER
FAXED ON 10/17/24 TO [facility] at [phone] FAX CONFIRMATION RECEIVED.

## 2024-10-17 NOTE — TELEPHONE ENCOUNTER
FAXED ON 10/17/24 TO [facility]  at [phone]. FAX CONFIRMATION RECEIVED.

## 2024-10-29 NOTE — TELEPHONE ENCOUNTER
FAXED ON 10/29/24 TO [facility]  at [phone]. FAX CONFIRMATION RECEIVED.

## 2024-10-31 ENCOUNTER — OFFICE VISIT (OUTPATIENT)
Dept: WOUND CARE | Facility: CLINIC | Age: 44
End: 2024-10-31
Payer: COMMERCIAL

## 2024-10-31 VITALS
RESPIRATION RATE: 18 BRPM | TEMPERATURE: 98.6 F | DIASTOLIC BLOOD PRESSURE: 84 MMHG | SYSTOLIC BLOOD PRESSURE: 136 MMHG | HEART RATE: 76 BPM

## 2024-10-31 DIAGNOSIS — L89.224 PRESSURE ULCER OF LEFT HIP, STAGE 4 (HCC): ICD-10-CM

## 2024-10-31 DIAGNOSIS — L89.154 PRESSURE INJURY OF SACRAL REGION, STAGE 4 (HCC): Primary | ICD-10-CM

## 2024-10-31 DIAGNOSIS — G82.20 PARAPLEGIA (HCC): ICD-10-CM

## 2024-10-31 DIAGNOSIS — L89.324 PRESSURE INJURY OF LEFT ISCHIUM, STAGE 4 (HCC): ICD-10-CM

## 2024-10-31 DIAGNOSIS — L89.894 PRESSURE ULCER OF OTHER SITE, STAGE 4 (HCC): ICD-10-CM

## 2024-10-31 PROCEDURE — 99214 OFFICE O/P EST MOD 30 MIN: CPT | Performed by: FAMILY MEDICINE

## 2024-10-31 NOTE — PROGRESS NOTES
Patient ID: Ovidio Parkinson Jr. is a 44 y.o. male Date of Birth 1980       Chief Complaint   Patient presents with    Follow Up Wound Care Visit     Follow up visit for wound to sacral region.  Pt denies any issues or concerns since last visit.        Allergies:  Chlorcyclizine, Chlorhexidine, Ciprofloxacin hcl, Cymbalta [duloxetine hcl], Dm-doxylamine-acetaminophen, Doxycycline, Gabapentin, Hydrocortisone, Lactose - food allergy, Lyrica [pregabalin], Milk (cow), Penicillins, Polymyxin b, Promethazine-phenyleph-codeine, Quinidine, Cefepime, and Rosuvastatin    Diagnosis:      Diagnosis ICD-10-CM Associated Orders   1. Pressure injury of sacral region, stage 4 ([facility])  L89.154 Wound cleansing and dressings      2. Pressure injury of left ischium, stage 4 ([facility])  L89.324 Wound cleansing and dressings      3. Pressure ulcer of other site, stage 4 ([facility])  L89.894 Wound cleansing and dressings      4. Pressure ulcer of left hip, stage 4 ([facility])  L89.224 Wound cleansing and dressings      5. Paraplegia ([facility])  G82.20               Assessment & Plan:    Ovidio presents today for follow-up of multiple pressure injuries in setting of chronic osteomyelitis, palliative wound management plan.  As stated below, his wounds are stable without significant change in size and without acute/infectious/cellulitic change.  Wound beds with predominantly fibrogranular tissue with periwound scarring and hyperkeratosis.     Stage IV pressure injury of perineum: Deepest undermined/tunneled area around 1 cm at 6:00.  Otherwise wound is stable without signs of acute soft tissue infection.  Stage IV pressure injury left hip: Tunneled/undermined area essentially unchanged today and measuring around 4.9 cm.  Wound stable without acute change.  No purulence or malodor.  Stage IV pressure injuries of left sacrum and ischium: These wounds are also stable without acute change.  Continue current plan of care with Dakin's moistened Kerlix to all wounds.  "In the past at various other visits when trial of discontinuing Dakin's was attempted, his wounds had declined.  Ovidio states that his brother is very on top of his wounds with cleansing and care and feels Dakins has been the best.  I again reviewed with Ovidio long-term effects of utilizing Dakin such as possible destruction of healthy tissue.  He expresses understanding and wishes to continue with Dakin's as he states that when alternate dressings have been tried in the past, they have to return to Dakin's.  Patient reports he will be receiving a new wheelchair from [facility] this December which he is looking forward to.  Continue all offloading and frequent repositioning efforts  Continue to maintain adequate protein intake and good nutrition  ER precautions again reviewed with Ovidio, he expresses understanding  Follow-up in 4 to 6 weeks or sooner if needed    Portions of the record may have been created with voice recognition software. Occasional wrong word or \"sound alike\" substitutions may have occurred due to the inherent limitations of voice recognition software. Read the chart carefully and recognize, using context, where substitutions have occurred.    Subjective:   Please see prev visit notes for HPI summary [id number] 1/4/2024: Ovidio presents to wound center today for follow-up of multiple stage IV pressure injuries of sacrum, left hip, ischium, and perineum.  He reports he believes his lab studies regarding his protein level has improved as well as overall nutrition in general.  Has no acute complaints today and denies fever, chills.  He reports he is currently feeling well/much better from his recent hospitalization which he reports occurred at [facility] 12/5 through 12/13.    Per chart review of discharge summary from that hospitalization he was \"found to have Staph Lugudenesis bacteremia on admission blood cultures and was started on vancomycin, which was transitioned to Ancef by " "Infectious Disease. He will continue Ancef Source was suspected to be prior HD line; known sacral wounds were stable. Repeat blood cultures were negative on discharge. During admission, his HD line was removed (given infection) and replaced with temporary HD line (which, in turn, needed to be replaced due to inability to successfully use). New HD line functioning as of 12/9. PATRICIO performed for evaluation of possible endocarditis was negative. A tunneled HD line was placed 12/13/2023 by IR after repeat blood cultures were negative x48 hrs.\"  He also reports that he may have been spending more time in his chair than previous and continues with difficulty controlling some of the shifting/shearing forces with transferring etc... Continues to use his Clinitron bed.  Also reports that he is still awaiting the custom wheelchair/cushions to help with his pressure injuries but that it is being made.     1/25/2024: Ovidio presents today for follow-up of multiple stage IV pressure injuries of left hip, ischium, sacrum, and perineum as well as stage III pressure injury of scrotum.  He reports that they have been doing well with the Mesalt.  In addition reports that the area under the scrotum his brother sometimes uses Vaseline to this area when it is on the drier side she reports his brother tells him it is looking better.  Sides his chronic issues, he reports he has no acute complaints today and overall is feeling well.  He denies fever, chills.  He reported he recently started sleeping on his left side as this was more comfortable for him.    2/14/2024: Ovidio presents today for follow-up of multiple stage IV pressure injuries of left hip, ischium, sacrum, and perineum as well as stage III pressure injury of scrotum.  He reports that he is not feeling well \"has been feeling under the weather\" since Monday.  Feels that has been having an issue with his insulin pump administering insulin.  He states when he had this issue in the " past it was because it was inserted an area of scar tissue.  However he did try to move the side of this this morning and glucose was 475.  Also reports he is feeling very dehydrated and dry along with dizzy, lightheaded.  He denies fever, chills.    3/7/2024: Ovidio presents today for follow-up multiple stage IV pressure injuries of left hip ischium sacrum and perineum in addition to stage III pressure injury of scrotum.  At our last visit, recommendation was for patient to go to emergency department which he agreed to however after leaving our wound center patient did not go.  He reports that his sugars have been improved compared to last visit 150s to 180s though sometimes he still has spikes. He states he has been feeling better and denies fever, chills.  He also reports that since he was last here they called the Clinitron wraps to check functioning of his bed and was assured that all seems to be functioning well.    3/28/2024: Ovidio presents today for follow-up of multiple pressure injuries.  He reports that as discussed, he did call [facility] wheelchair clinic a few days ago and has not yet heard back.  Reports that sometimes his glucose is variable in hyper and hypoglycemic ranges but his endocrinology office is aware.  Also states that recently it has been more controlled between 150s and 160s.  Today he reports he is feeling well and denies fever, chills, dizziness, lightheadedness, diaphoresis, shortness of breath, chest pain.  Wound care currently consist of Dakin's packing.  He continues to use his Clinitron bed.  He reports the drainage from his left hip wound is controlled.    5/2/2024: Ovidio presents today for f/u of multiple pressure injuries.  He reports that a few weeks ago he had an appointment with [facility] wheelchair clinic however he fell ill had to reschedule for the 31st of this month which she confirmed he will be attending.  Reports that his protein levels have improved and he does  have some Jim at home.  He states he is feeling well now and feels his glucose has been under better control.  He denies new acute complaints and denies fever, chills.    5/23/2024: Visit w/ [facility]    6/20/2024: Ovidio presents today for follow-up of multiple pressure injuries.  On 6/6/24, patient brother contacted wound center due to excessive drainage from one of his wounds.  He was directed to proceed to emergency department.  Per chart review and patient, he did not do this.  He reports his brother return to use of Dakin soaked packing which resolved the issue.  He reports that other than his chronic conditions, he has no new acute complaints.  He denies fever, chills.    7/18/2024 & 8/29/2024: Visits sheila/ [facility]    9/19/2024: Ovidio presents today for follow-up of multiple wounds.  He reports that since he was last at wound center he has been doing pretty good.  Feels his diabetes is under better control and he has had no issues with his wounds.  He denies fever, chills.  No other new acute complaints today.    10/31/2024: Ovidio presents today for follow-up.  He reports that since his last visit, he has been scheduled to receive a new wheelchair from [facility] around [place] of this year.  He reports his brother continues doing his dressing changes along with occasional VNA.  Continues to be mindful about offloading but spends prolonged periods of time in his chair.  He has not had fever or chills.  He denies other new acute complaints today.        The following portions of the patient's history were reviewed and updated as appropriate:   Patient Active Problem List   Diagnosis    Paraplegia ([facility])    Atrial fibrillation ([facility])    Chronic suprapubic catheter ([facility])    Colostomy care ([facility])    S/P unilateral BKA (below knee amputation) ([facility])    Tobacco abuse    Type 1 diabetes mellitus with chronic kidney disease on chronic dialysis ([facility])    Ulcer of sacral region,  stage 4 ([facility])    Iron deficiency anemia    HTN (hypertension), benign    Neurogenic bladder    GERD (gastroesophageal reflux disease)    Chronic pain    Stage 5 chronic kidney disease on chronic dialysis (HCC)    History of Clostridium difficile infection    Urinary retention    Dialysis patient (HCC)    Insulin long-term use (HCC)    Wheelchair dependent    Bipolar depression (HCC)    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HCC)    Seizure (HCC)    AVM (arteriovenous malformation) of duodenum, acquired    Chronic narcotic dependence (HCC)    Chronic diastolic heart failure (HCC)    ED (erectile dysfunction) of organic origin    Irritable bowel syndrome    Onychomycosis    Pustular folliculitis    Prolonged Q-T interval on ECG    Secondary hyperparathyroidism of renal origin (HCC)    Immunocompromised state (HCC)    Severe protein-calorie malnutrition (HCC)    Chronic osteomyelitis (HCC)    Charcot's arthropathy    Diabetic gastroparesis associated with type 1 diabetes mellitus  (HCC)    End stage renal disease (HCC)    Hyperlipidemia    LVH (left ventricular hypertrophy)    NICM (nonischemic cardiomyopathy) (HCC)    Vitamin B deficiency    Vitamin C deficiency    Vitamin D deficiency    Rheumatoid arthritis (HCC)    Benign hypertensive heart and kidney disease with HF and ESRD (HCC)     Past Medical History:   Diagnosis Date    Acute pulmonary edema (HCC) 01/13/2022    Ambulatory dysfunction     Anemia     Anemia, iron deficiency     transfusion requiring    Asymptomatic bacteriuria 09/25/2017    Atrial fibrillation (HCC)     AVM (arteriovenous malformation) of duodenum, 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    Bacteriuria, asymptomatic     Bipolar disorder ([facility])     C. difficile colitis 06/23/2022    Chronic deep vein thrombosis (DVT) ([facility])     Chronic indwelling Ortega catheter     Chronic kidney disease     Chronic pain     Chronic pain disorder     Chronic suprapubic catheter (HCC)     Clostridium difficile infection 08/11/2016    also  positive 9/2016, 5/29/2017, 8/15/2017. S/P fecal transplant    Colostomy on examination ([facility])     Decubitus ulcer     Delirium     Diabetes mellitus (HCC)     GERD (gastroesophageal reflux disease)     Hemodialysis patient ([facility])     Hypertension     Memory impairment 2011    s/p diabetic coma    Neurogenic bladder     OAB (overactive bladder)     Paraplegia ([facility])     T3 transverse myelitis vs spinal stoke (AVM); 2012 extensive epidural abscess C7=> conus 2nd extension from deep parspinal abscess L4-S2/sacral decubitus; cord atrophy/myelomalcia T3=>conus     Penile abscess     Pressure injury of left ischium, stage 4 ([facility]) 07/03/2017    Pressure ulcer of left hip, stage 4 ([facility]) 03/12/2021    Pressure ulcer of other site, stage 4 ([facility]) 03/12/2021    Pressure ulcer of sacral region, stage 4 ([facility]) 08/13/2020    S/P unilateral BKA (below knee amputation) ([facility])     Right    Sebaceous cyst removed in 2017    Seizures ([facility])     Shortness of breath     Tobacco abuse     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[facility])     Urinary retention     Wheelchair dependent     Wound healing, delayed 06/29/2017    Wounds, multiple     pressure ulcers with delayed healing    Wounds, multiple     pressure ulcers with delayed healing       Past Surgical History:   Procedure Laterality Date    BELOW KNEE LEG AMPUTATION Right 2009    COLONOSCOPY N/A 03/27/2017    Procedure: COLONOSCOPY;  Surgeon: Wesley Thibodeaux MD;  Location: [facility];  Service:     COLONOSCOPY N/A 03/29/2017    Procedure: COLONOSCOPY;  Surgeon: Wesley Thibodeaux MD;  Location: [facility];  Service:     COLONOSCOPY N/A 06/15/2017    Procedure: COLONOSCOPY with FMT;  Surgeon: Gerard Marques MD;  Location: [facility];  Service: Gastroenterology    ESOPHAGOGASTRODUODENOSCOPY N/A 03/22/2017    Procedure: ESOPHAGOGASTRODUODENOSCOPY (EGD);  Surgeon: Beverley Ortiz DO;  Location: [facility];  Service:     FL GUIDED NEEDLE PLAC BX/ASP/INJ  6/22/2018    FL LUMBAR PUNCTURE DIAGNOSTIC  1/22/2020    MULTIPLE  TOOTH EXTRACTIONS N/A 03/08/2021    Procedure: EXTRACTION TEETH # 2,3,6,10,12,13,14,18,19,20,21,22,23,24,25,26,27,28,29,30;  Surgeon: Brian Kohler DMD;  Location: [facility];  Service: Maxillofacial    SKIN BIOPSY       Family History   Problem Relation Age of Onset    Hyperlipidemia Mother     Hypertension Mother     Leukemia Brother     Diabetes Paternal Grandfather       Social History     Socioeconomic History    Marital status: Single     Spouse name: Not on file    Number of children: Not on file    Years of education: Not on file    Highest education level: Not on file   Occupational History    Not on file   Tobacco Use    Smoking status: Every Day     Types: Cigars     Passive exposure: Current    Smokeless tobacco: Never    Tobacco comments:     2 cigars/day - 2/14/2023   Vaping Use    Vaping status: Never Used   Substance and Sexual Activity    Alcohol use: Not Currently     Comment: 1 cup of wine every 3-4 months    Drug use: Not Currently     Types: Marijuana     Comment: rarely; once every 1-2 years    Sexual activity: Not on file   Other Topics Concern    Not on file   Social History Narrative    Not on file     Social Determinants of Health     Financial Resource Strain: Low Risk  (9/26/2024)    Overall Financial Resource Strain (CARDIA)     Difficulty of Paying Living Expenses: Not very hard   Food Insecurity: No Food Insecurity (7/26/2024)    Received from [facility]    Hunger Vital Sign     Worried About Running Out of Food in the Last Year: Never true     Ran Out of Food in the Last Year: Never true   Transportation Needs: No Transportation Needs (9/26/2024)    PRAPARE - Transportation     Lack of Transportation (Medical): No     Lack of Transportation (Non-Medical): No   Physical Activity: Not on file   Stress: Not on file   Social Connections: Not on file   Intimate Partner Violence: Not At Risk (7/26/2024)    Received from [facility]    Humiliation, Afraid,  Rape, and Kick questionnaire     Fear of Current or Ex-Partner: No     Emotionally Abused: No     Physically Abused: No     Sexually Abused: No   Housing Stability: Unknown (9/26/2024)    Housing Stability Vital Sign     Unable to Pay for Housing in the Last Year: Patient declined     Number of Times Moved in the Last Year: Not on file     Homeless in the Last Year: Not on file        Current Outpatient Medications:     albuterol (Ventolin HFA) 90 mcg/act inhaler, Inhale 2 puffs every 6 (six) hours as needed for wheezing, Disp: 18 g, Rfl: 2    Alcohol Swabs (ALCOHOL PADS) 70 % PADS, by Does not apply route 5 (five) times a day, Disp: 300 each, Rfl: 5    amLODIPine (NORVASC) 10 mg tablet, Take 1 tablet (10 mg total) by mouth daily, Disp: 90 tablet, Rfl: 1    ammonium lactate (LAC-HYDRIN) 12 % cream, , Disp: , Rfl:     apixaban (ELIQUIS) 5 mg, Take 1 tablet (5 mg total) by mouth 2 (two) times a day, Disp: 180 tablet, Rfl: 3    atorvastatin (LIPITOR) 20 mg tablet, TAKE 1 TABLET BY MOUTH EVERYDAY AT BEDTIME, Disp: 90 tablet, Rfl: 1    azelaic acid (Azelex) 20 % cream, Apply topically 2 (two) times a day, Disp: 50 g, Rfl: 0    B Complex-C-Folic Acid (Super B-Complex/Vit C/FA) TABS, TAKE 1 TABLET BY MOUTH EVERY DAY AS DIRECTED, Disp: 90 tablet, Rfl: 5    SUSAN MICROLET LANCETS lancets, Use as instructed to check blood sugar 4 times a day Dx e10.29, Disp: 200 each, Rfl: 5    benzonatate (TESSALON PERLES) 100 mg capsule, Take 2 capsules (200 mg total) by mouth 3 (three) times a day as needed for cough, Disp: 30 capsule, Rfl: 0    bisacodyl (DULCOLAX) 5 mg EC tablet, Take 1 tablet (5 mg total) by mouth once for 1 dose, Disp: 2 tablet, Rfl: 0    Blood Glucose Monitoring Suppl ([employer] CONTOUR NEXT USB MONITOR) w/Device KIT, Testing 4 times a day, Disp: 1 kit, Rfl: 0    calcitriol (ROCALTROL) 0.5 MCG capsule, Take 2 mcg by mouth, Disp: , Rfl:     calcium acetate (PHOSLO) capsule, TAKE 2 CAPSULES BY MOUTH THREE TIMES DAILY WITH  MEALS, Disp: , Rfl:     carvedilol (COREG) 25 mg tablet, Take 1 tablet (25 mg total) by mouth 2 (two) times a day, Disp: 180 tablet, Rfl: 1    cetirizine (ZyrTEC) 10 mg tablet, TAKE 1 TABLET BY MOUTH EVERY DAY, Disp: 90 tablet, Rfl: 1    Cholecalciferol (VITAMIN D-3) 1000 units CAPS, Take 2 capsules by mouth daily, Disp: 30 capsule, Rfl: 0    clindamycin (CLINDAGEL) 1 % gel, APPLY TO AFFECTED AREA TWICE A DAY, Disp: 60 g, Rfl: 2    Contour Next Test test strip, USE TO TEST BLOOD SUGAR 3 TIMES DAILY. CONTOUR NEXT STRIPS. E10.29, Disp: , Rfl:     cyanocobalamin ([employer] Vitamin B-12) 1000 MCG tablet, Take 1 tablet (1,000 mcg total) by mouth daily, Disp: 90 tablet, Rfl: 3    cyclobenzaprine (FLEXERIL) 5 mg tablet, , Disp: , Rfl:     dexamethasone sodium phosphate 0.1 % ophthalmic solution, 2 drops in each ear as needed for itching, Disp: , Rfl:     dexlansoprazole (DEXILANT) 30 MG capsule, TAKE 1 CAPSULE BY MOUTH EVERY DAY, Disp: 90 capsule, Rfl: 2    dicyclomine (BENTYL) 10 mg capsule, TAKE 1 CAPSULE BY MOUTH 3 TIMES A DAY BEFORE MEALS, Disp: 270 capsule, Rfl: 1    Disposable Gloves MISC, Use 7 times a day, 4 boxes of gloves XL Dx type 1 DM, paraplegia, Disp: 4 each, Rfl: 11    doxazosin (CARDURA) 2 mg tablet, Take 1 tablet (2 mg total) by mouth every evening, Disp: 90 tablet, Rfl: 1    epoetin kaushik (EPOGEN,PROCRIT) 20,000 units/mL, Inject 10,000 Units under the skin, Disp: , Rfl:     epoetin kaushik (EPOGEN,PROCRIT) 20,000 units/mL, Inject 5,000 Units under the skin, Disp: , Rfl:     ferrous sulfate 324 (65 Fe) mg, Take 1 tablet (324 mg total) by mouth in the morning, Disp: 90 tablet, Rfl: 3    ferrous sulfate 325 (65 Fe) mg tablet, Take 1 tablet (325 mg total) by mouth 3 (three) times a day, Disp: 90 tablet, Rfl: 3    Fluocinolone Acetonide Body 0.01 % OIL, Apply 2 drops topically daily, Disp: , Rfl:     folic acid (FOLVITE) 1 mg tablet, Take 1,000 mcg by mouth daily, Disp: , Rfl:     Gvoke PFS 1 MG/0.2ML SOSY, , Disp: ,  Rfl:     hydroxychloroquine (PLAQUENIL) 200 mg tablet, Take 200 mg by mouth daily, Disp: , Rfl:     hydrOXYzine HCL (ATARAX) 50 mg tablet, TAKE 1 TABLET (50 MG TOTAL) BY MOUTH 2 (TWO) TIMES A DAY AS NEEDED FOR ITCHING OR ANXIETY, Disp: 180 tablet, Rfl: 1    Incontinence Supply Disposable (Incontinence Brief Large) MISC, Use 6 (six) times a day Size xl, Disp: 180 each, Rfl: 11    Incontinence Supply Disposable (Undergarment) MISC, Use 6 a day, 5 boxes, xl Dx R51, paraplegia, Disp: 5 each, Rfl: 11    Incontinence Supply Disposable (Underpads) MISC, Use 2 a day, Disp: 5 each, Rfl: 11    insulin lispro (HumALOG/ADMELOG) 100 units/mL injection, USE UP TO 40 UNITS DAILY VIA PUMP. E10.9, Disp: , Rfl:     insulin lispro protamine-insulin lispro (HumaLOG 50-50) 100 units/mL, Inject under the skin 2 (two) times a day before meals, Disp: , Rfl:     leflunomide (ARAVA) 10 MG tablet, Take 10 mg by mouth daily, Disp: , Rfl:     LEVEMIR FLEXTOUCH 100 units/mL injection pen, Inject 14 Units under the skin 2 (two) times a day, Disp: 15 pen, Rfl: 3    Lokelma 10 g PACK, , Disp: , Rfl:     metroNIDAZOLE (METROGEL) 0.75 % gel, Apply topically 2 (two) times a day, Disp: 45 g, Rfl: 0    Misc. Devices (Wheelchair Cushion) MISC, Use daily, Disp: 1 each, Rfl: 0    Misc. Devices (WHEELCHAIR) MISC, by Does not apply route daily Wheelchair ramp, dx g82.20, Disp: 1 each, Rfl: 0    Multiple Vitamin (Daily-Enriqueta Multivitamin) TABS, TAKE 1 TABLET BY MOUTH EVERY DAY, Disp: 30 tablet, Rfl: 8    NovoLOG FlexPen 100 units/mL injection pen, INJECT 3 UNITS UNDER THE SKIN 3 (THREE) TIMES A DAY BEFORE MEALS. (Patient not taking: Reported on 7/13/2023), Disp: , Rfl:     ondansetron (ZOFRAN-ODT) 4 mg disintegrating tablet, Take 1 tablet (4 mg total) by mouth every 6 (six) hours as needed for nausea or vomiting, Disp: 30 tablet, Rfl: 2    oxybutynin (DITROPAN XL) 15 MG 24 hr tablet, Take 1 tablet (15 mg total) by mouth daily at bedtime, Disp: 90 tablet, Rfl:  1    oxybutynin (DITROPAN) 5 mg tablet, Take 3 tablets (15 mg total) by mouth daily, Disp: 270 tablet, Rfl: 1    oxyCODONE (ROXICODONE) 10 MG TABS, TAKE ONE TABLET BY MOUTH EVERY 12 HOURS AS NEEDED FOR PAIN. ONGOING THERAPY., Disp: , Rfl:     polyethylene glycol (GLYCOLAX) 17 GM/SCOOP powder, Take 17 g by mouth daily, Disp: 850 g, Rfl: 1    sertraline (ZOLOFT) 25 mg tablet, TAKE 1 TABLET (25 MG TOTAL) BY MOUTH DAILY., Disp: 90 tablet, Rfl: 1    sodium hypochlorite (DAKIN'S HALF-STRENGTH) external solution, Apply 1 Application topically daily Soak gauze and pack into wounds daily., Disp: 473 mL, Rfl: 0    Sodium Zirconium Cyclosilicate 10 g PACK, Take 10 g by mouth daily, Disp: , Rfl:     sucralfate (CARAFATE) 1 g/10 mL suspension, Take 10 mL (1 g total) by mouth 4 (four) times a day (with meals and at bedtime), Disp: 420 mL, Rfl: 1    SUPER B COMPLEX & C TABS, TAKE 1 CAPSULE BY MOUTH ONCE FOR 1 DOSE AS DIRECTED, Disp: 90 tablet, Rfl: 2    valsartan (DIOVAN) 320 MG tablet, , Disp: , Rfl:     Zinc Sulfate 220 (50 Zn) MG TABS, TAKE 1 TABLET BY MOUTH EVERY DAY, Disp: 90 tablet, Rfl: 1    Review of Systems   Constitutional:  Negative for chills and fever.   Respiratory:  Negative for cough and shortness of breath.    Cardiovascular:  Negative for chest pain.   Musculoskeletal:  Positive for gait problem (Paraplegia).        Paraplegic   Skin:  Positive for wound (Multiple pressure injuries).        Multiple pressure injuries   Neurological:  Positive for weakness and numbness. Negative for dizziness.       Objective:  /84   Pulse 76   Temp 98.6 °F (37 °C) (Tympanic)   Resp 18   Pain Score: 0-No pain     Physical Exam  Vitals reviewed.   Constitutional:       General: He is not in acute distress.     Appearance: He is not ill-appearing, toxic-appearing or diaphoretic.      Comments: Very pleasant, NAD   HENT:      Head: Normocephalic.   Cardiovascular:      Rate and Rhythm: Normal rate.   Pulmonary:      Effort:  Pulmonary effort is normal. No respiratory distress.   Skin:     General: Skin is warm and dry.      Findings: Wound present. No erythema.             Comments: 1/2/3/4- Stage IV pressure injury sacrum/L Hip/ L ischium: Stable. To all wounds -  no malodor, purulent drainage, acute erythema, induration, fluctuance.    See full wound description for additional details.   Neurological:      Mental Status: He is alert and oriented to person, place, and time.      Gait: Gait abnormal (wc bound).   Psychiatric:         Mood and Affect: Mood normal.         Behavior: Behavior normal.          Wound Pressure Injury Ischium Left (Active)   Wound Image   10/31/24 1511   Wound Description Rolled edges;Epithelialization;Pink;Yellow;Granulation tissue 10/31/24 1511   Pressure Injury Stage 4 08/29/24 1403   Irene-wound Assessment Pink;Scar Tissue 10/31/24 1511   Wound Length (cm) 7.5 cm 10/31/24 1511   Wound Width (cm) 3.6 cm 10/31/24 1511   Wound Depth (cm) 0.3 cm 10/31/24 1511   Wound Surface Area (cm^2) 27 cm^2 10/31/24 1511   Wound Volume (cm^3) 8.1 cm^3 10/31/24 1511   Calculated Wound Volume (cm^3) 8.1 cm^3 10/31/24 1511   Change in Wound Size % 57.14 10/31/24 1511   Number of tunnels 1 10/31/24 1511   Tunneling 1 0.4 cm 10/31/24 1511   Tunneling 1 in depth located at 6 o'clock 10/31/24 1511   Number of underminings 1 10/31/24 1511   Undermining 1 0.3 10/31/24 1511   Undermining 2 0 10/31/24 1511   Undermining 1 is depth extending from 7-8 o'clock 10/31/24 1511   Undermining 2 is depth extending from resolved 10/31/24 1511   Drainage Amount Moderate 10/31/24 1511   Drainage Description Serosanguineous 10/31/24 1511   Non-staged Wound Description Full thickness 10/31/24 1511   Treatments Irrigation with NSS 10/31/24 1511   Wound packed? No 05/23/24 1320   Dressing Changed Changed 03/07/24 1113   Patient Tolerance Tolerated well 10/31/24 1511   Dressing Status Removed 10/31/24 1511       Wound Pressure Injury Perineum (Active)    Wound Image   10/31/24 1512   Wound Description Pink;Tan;Slough;White;Granulation tissue;Rolled edges;Probes to bone 10/31/24 1518   Pressure Injury Stage 4 08/29/24 1408   Irene-wound Assessment Scar Tissue;Pink 10/31/24 1518   Wound Length (cm) 11 cm 10/31/24 1518   Wound Width (cm) 6.9 cm 10/31/24 1518   Wound Depth (cm) 0.2 cm 10/31/24 1518   Wound Surface Area (cm^2) 75.9 cm^2 10/31/24 1518   Wound Volume (cm^3) 15.18 cm^3 10/31/24 1518   Calculated Wound Volume (cm^3) 15.18 cm^3 10/31/24 1518   Change in Wound Size % -44.57 10/31/24 1518   Tunneling 1 in depth located at 0 10/05/23 1458   Number of underminings 1 09/19/24 1334   Undermining 1 0.7 10/31/24 1518   Undermining 2 1.5 10/31/24 1518   Undermining 1 is depth extending from 3-7 o'clock 10/31/24 1518   Undermining 2 is depth extending from 8 - 9 o'clock 10/31/24 1518   Drainage Amount Moderate 10/31/24 1518   Drainage Description Serosanguineous 10/31/24 1518   Non-staged Wound Description Full thickness 10/31/24 1518   Treatments Irrigation with NSS 10/31/24 1518   Wound packed? No 05/23/24 1319   Dressing Changed Changed 03/07/24 1107   Patient Tolerance Tolerated well 10/31/24 1518   Dressing Status Removed 10/31/24 1518       Wound Pressure Injury Sacrum (Active)   Wound Image   10/31/24 1511   Wound Description Epithelialization;Pink;Yellow;Granulation tissue;Slough;Rolled edges;White;Probes to bone 10/31/24 1511   Pressure Injury Stage 4 09/19/24 1337   Irene-wound Assessment Scar Tissue;Pink 10/31/24 1511   Wound Length (cm) 6.9 cm 10/31/24 1511   Wound Width (cm) 10 cm 10/31/24 1511   Wound Depth (cm) 0.2 cm 10/31/24 1511   Wound Surface Area (cm^2) 69 cm^2 10/31/24 1511   Wound Volume (cm^3) 13.8 cm^3 10/31/24 1511   Calculated Wound Volume (cm^3) 13.8 cm^3 10/31/24 1511   Change in Wound Size % 81.6 10/31/24 1511   Number of underminings 1 09/19/24 1337   Undermining 1 0 10/31/24 1511   Undermining 2 0 10/31/24 1511   Undermining 1 is depth  extending from resolved 10/31/24 1511   Undermining 2 is depth extending from resolved 10/31/24 1511   Drainage Amount Moderate 10/31/24 1511   Drainage Description Serosanguineous 10/31/24 1511   Non-staged Wound Description Full thickness 10/31/24 1511   Treatments Irrigation with NSS 10/31/24 1511   Wound packed? No 05/23/24 1319   Dressing Changed Changed 03/07/24 1105   Patient Tolerance Tolerated well 10/31/24 1511   Dressing Status Removed 10/31/24 1511       Wound Pressure Injury Hip Left;Lateral (Active)   Wound Image   10/31/24 1510   Wound Description Pink;Yellow;White 10/31/24 1510   Pressure Injury Stage 4 08/29/24 1419   Irene-wound Assessment Scar Tissue;Intact 10/31/24 1510   Wound Length (cm) 1.7 cm 10/31/24 1510   Wound Width (cm) 1.9 cm 10/31/24 1510   Wound Depth (cm) 1.2 cm 10/31/24 1510   Wound Surface Area (cm^2) 3.23 cm^2 10/31/24 1510   Wound Volume (cm^3) 3.876 cm^3 10/31/24 1510   Calculated Wound Volume (cm^3) 3.88 cm^3 10/31/24 1510   Tunneling 1 0 cm 10/31/24 1510   Tunneling 1 in depth located at none 10/31/24 1510   Number of underminings 1 09/19/24 1330   Undermining 1 4.9 10/31/24 1510   Undermining 1 is depth extending from 7-12  o'clock 10/31/24 1510   Drainage Amount Moderate 10/31/24 1510   Drainage Description Serosanguineous 10/31/24 1510   Non-staged Wound Description Full thickness 10/31/24 1510   Treatments Irrigation with NSS 10/31/24 1510   Wound packed? Yes 05/23/24 1320   Packing- # removed 1 06/20/24 1401   Dressing Changed Changed 03/07/24 1111   Patient Tolerance Tolerated well 10/31/24 1510   Dressing Status Removed 10/31/24 1510           Lab Results   Component Value Date    HGBA1C 6.6 (H) 07/26/2024       Wound Instructions:  Orders Placed This Encounter   Procedures    Wound cleansing and dressings     Wound cleansing and dressings               Wash your hands with soap and water.  Remove old dressing, discard into plastic bag and place in trash.    Cleanse the  wounds with Dakin's solution if there is an odor, IF NO ODOR, cleanse with normal saline or wound wash prior to applying a clean dressing.   Do not use tissue or cotton balls. Do not scrub the wound. Pat dry using gauze.   Shower no; do not get dressing wet      Left Hip Wound:   Apply Dakin's Moistened Kerlix  Cover with  ABD over top   Secure with Medfix tape.   Change dressing daily and top dressing PRN for breakthrough drainage (visiting nurses to do twice per week and family in between)      Sacral, Left ischium, and perineal wound:   Apply Dakin's Moistened Kerlix   Please ensure kerlix is tucked into depth of wounds   Cover with ABD over top   Secure with Medfix tape.   Change dressing daily and top dressing PRN for breakthrough drainage (visiting nurses to do twice per week and family in between)       Continue using Clinitron bed      Continue NO PRESSURE TO ALL WOUNDS AS MUCH AS POSSIBLE, ESPECIALLY PERINEAL WOUND   LIMIT SITTING UPRIGHT IN WHEELCHAIR ON THIS WOUND       Continue visiting nurse skilled 2 x per week for wound care dressing changes.      Follow up at the wound center in 4 weeks.     Quit smoking or try to cut back on how much you smoke. As discussed smoking slows the ability for a wound to heal by constricting blood vessels for approximately 30 minutes after each cigarette.     Standing Status:   Future     Standing Expiration Date:   11/7/2024     Marley Rose MD

## 2024-10-31 NOTE — PATIENT INSTRUCTIONS
Orders Placed This Encounter   Procedures    Wound cleansing and dressings     Wound cleansing and dressings               Wash your hands with soap and water.  Remove old dressing, discard into plastic bag and place in trash.    Cleanse the wounds with Dakin's solution if there is an odor, IF NO ODOR, cleanse with normal saline or wound wash prior to applying a clean dressing.   Do not use tissue or cotton balls. Do not scrub the wound. Pat dry using gauze.   Shower no; do not get dressing wet      Left Hip Wound:   Apply Dakin's Moistened Kerlix  Cover with  ABD over top   Secure with Medfix tape.   Change dressing daily and top dressing PRN for breakthrough drainage (visiting nurses to do twice per week and family in between)      Sacral, Left ischium, and perineal wound:   Apply Dakin's Moistened Kerlix   Please ensure kerlix is tucked into depth of wounds   Cover with ABD over top   Secure with Medfix tape.   Change dressing daily and top dressing PRN for breakthrough drainage (visiting nurses to do twice per week and family in between)       Continue using Clinitron bed      Continue NO PRESSURE TO ALL WOUNDS AS MUCH AS POSSIBLE, ESPECIALLY PERINEAL WOUND   LIMIT SITTING UPRIGHT IN WHEELCHAIR ON THIS WOUND       Continue visiting nurse skilled 2 x per week for wound care dressing changes.      Follow up at the wound center in 4 weeks.     Quit smoking or try to cut back on how much you smoke. As discussed smoking slows the ability for a wound to heal by constricting blood vessels for approximately 30 minutes after each cigarette.     Standing Status:   Future     Standing Expiration Date:   11/7/2024

## 2024-11-21 ENCOUNTER — OFFICE VISIT (OUTPATIENT)
Dept: FAMILY MEDICINE CLINIC | Facility: CLINIC | Age: 44
End: 2024-11-21

## 2024-11-21 VITALS
SYSTOLIC BLOOD PRESSURE: 134 MMHG | TEMPERATURE: 98.7 F | RESPIRATION RATE: 14 BRPM | DIASTOLIC BLOOD PRESSURE: 80 MMHG | OXYGEN SATURATION: 97 % | HEART RATE: 92 BPM

## 2024-11-21 DIAGNOSIS — K59.00 CONSTIPATION, UNSPECIFIED CONSTIPATION TYPE: Primary | ICD-10-CM

## 2024-11-21 DIAGNOSIS — H65.21 RIGHT CHRONIC SEROUS OTITIS MEDIA: ICD-10-CM

## 2024-11-21 PROCEDURE — 99213 OFFICE O/P EST LOW 20 MIN: CPT | Performed by: PHYSICIAN ASSISTANT

## 2024-11-21 PROCEDURE — G2211 COMPLEX E/M VISIT ADD ON: HCPCS | Performed by: PHYSICIAN ASSISTANT

## 2024-11-21 RX ORDER — BISACODYL 5 MG/1
5 TABLET, DELAYED RELEASE ORAL DAILY PRN
Qty: 30 TABLET | Refills: 2 | Status: SHIPPED | OUTPATIENT
Start: 2024-11-21

## 2024-11-21 NOTE — ASSESSMENT & PLAN NOTE
-Occurs about twice a month.  MiraLAX is somewhat helpful, but patient is looking for a more immediate response.  - Will prescribe Dulcolax 5 mg, daily as needed for constipation.  - Discussed pathophysiology of constipation with patient.    - Increase fluid intake, primarily water.  Increase daily dietary fiber (dark leafy green vegetables, apples with skin, whole grain breads).   - May use OTC fiber source to supplement diet (Metamucil, Citrucel, FiberCon, Benefiber).    Orders:    bisacodyl (DULCOLAX) 5 mg EC tablet; Take 1 tablet (5 mg total) by mouth daily as needed for constipation

## 2024-11-21 NOTE — PROGRESS NOTES
Name: Ovidio Parkinson Jr.      : 1980      MRN: [id number]  Encounter Provider: Kay Ronquillo PA-C  Encounter Date: 2024   Encounter department: [facility] SC[facility]  :  Assessment & Plan  Constipation, unspecified constipation type  -Occurs about twice a month.  MiraLAX is somewhat helpful, but patient is looking for a more immediate response.  - Will prescribe Dulcolax 5 mg, daily as needed for constipation.  - Discussed pathophysiology of constipation with patient.    - Increase fluid intake, primarily water.  Increase daily dietary fiber (dark leafy green vegetables, apples with skin, whole grain breads).   - May use OTC fiber source to supplement diet (Metamucil, Citrucel, FiberCon, Benefiber).    Orders:    bisacodyl (DULCOLAX) 5 mg EC tablet; Take 1 tablet (5 mg total) by mouth daily as needed for constipation    Right chronic serous otitis media  - Reviewed ENT note from 2024.  Waiting to see if this will resolve on its own.  If still present at next visit, may consider tympanostomy tube.               Tobacco Cessation Counseling: Tobacco cessation counseling was provided. The patient is sincerely urged to quit consumption of tobacco. He is ready to quit tobacco. Medication options and side effects of medication discussed. Patient refused medication.       History of Present Illness     Patient is a 44 y.o. male whom  has a past medical history of Acute pulmonary edema (HCC) (2022), Ambulatory dysfunction, Anemia, Anemia, iron deficiency, Asymptomatic bacteriuria (2017), Atrial fibrillation ([facility]), AVM (arteriovenous malformation) of duodenum, acquired, Bacteriuria, asymptomatic, Bipolar disorder ([facility]), C. difficile colitis (2022), Chronic deep vein thrombosis (DVT) ([facility]), Chronic indwelling Ortega catheter, Chronic kidney disease, Chronic pain, Chronic pain disorder, Chronic suprapubic catheter ([facility]), Clostridium difficile infection  (08/11/2016), Colostomy on examination ([facility]), Decubitus ulcer, Delirium, Diabetes mellitus (HCC), GERD (gastroesophageal reflux disease), Hemodialysis patient ([facility]), Hypertension, Memory impairment (2011), Neurogenic bladder, OAB (overactive bladder), Paraplegia ([facility]), Penile abscess, Pressure injury of left ischium, stage 4 ([facility]) (07/03/2017), Pressure ulcer of left hip, stage 4 ([facility]) (03/12/2021), Pressure ulcer of other site, stage 4 ([facility]) (03/12/2021), Pressure ulcer of sacral region, stage 4 ([facility]) (08/13/2020), S/P unilateral BKA (below knee amputation) ([facility]), Sebaceous cyst (removed in 2017), Seizures ([facility]), Shingles (N/A), Shortness of breath, Tobacco abuse, 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 who is seen today in office for constipation.    -Patient notes he has constipation about 2 times per month.  Patient notes he will take MiraLAX which does help, but it takes about 1.5 days to work.  Patient notes he does go to dialysis, so he would like a medication that can work quicker.  Patient notes his albumin and protein levels have been increasing and his wounds have been healing.  He has been trying to increase his protein intake.  Patient notes he still does experience some itchiness of his ears, but it has been improving since he has been using the eardrops.    Review of Systems   Constitutional:  Negative for activity change, chills, fever and unexpected weight change.   HENT:  Negative for congestion, ear discharge, rhinorrhea and sore throat.         Itchiness of ears   Eyes:  Negative for pain and visual disturbance.   Respiratory:  Negative for cough, shortness of breath and wheezing.    Cardiovascular:  Negative for chest pain, palpitations and leg swelling.   Gastrointestinal:  Positive for constipation (occasionally). Negative for abdominal pain, diarrhea and vomiting.   Neurological:  Negative  for dizziness and headaches.   All other systems reviewed and are negative.        Medical History Reviewed by provider this encounter:  Tobacco  Allergies  Meds  Problems  Med Hx  Surg Hx  Fam Hx     .  Past Medical History   Past Medical History:   Diagnosis Date    Acute pulmonary edema ([facility]) 01/13/2022    Ambulatory dysfunction     Anemia     Anemia, iron deficiency     transfusion requiring    Asymptomatic bacteriuria 09/25/2017    Atrial fibrillation ([facility])     AVM (arteriovenous malformation) of duodenum, 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    Bacteriuria, asymptomatic     Bipolar disorder ([facility])     C. difficile colitis 06/23/2022    Chronic deep vein thrombosis (DVT) ([facility])     Chronic indwelling Ortega catheter     Chronic kidney disease     Chronic pain     Chronic pain disorder     Chronic suprapubic catheter ([facility])     Clostridium difficile infection 08/11/2016    also positive 9/2016, 5/29/2017, 8/15/2017. S/P fecal transplant    Colostomy on examination ([facility])     Decubitus ulcer     Delirium     Diabetes mellitus ([facility])     GERD (gastroesophageal reflux disease)     Hemodialysis patient ([facility])     Hypertension     Memory impairment 2011    s/p diabetic coma    Neurogenic bladder     OAB (overactive bladder)     Paraplegia ([facility])     T3 transverse myelitis vs spinal stoke (AVM); 2012 extensive epidural abscess C7=> conus 2nd extension from deep parspinal abscess L4-S2/sacral decubitus; cord atrophy/myelomalcia T3=>conus     Penile abscess     Pressure injury of left ischium, stage 4 ([facility]) 07/03/2017    Pressure ulcer of left hip, stage 4 ([facility]) 03/12/2021    Pressure ulcer of other site, stage 4 ([facility]) 03/12/2021    Pressure ulcer of sacral region, stage 4 ([facility]) 08/13/2020    S/P unilateral BKA (below knee amputation) ([facility])     Right    Sebaceous cyst removed in 2017    Seizures (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)     Urinary retention     Wheelchair  dependent     Wound healing, delayed 06/29/2017    Wounds, multiple     pressure ulcers with delayed healing    Wounds, multiple     pressure ulcers with delayed healing       Past Surgical History:   Procedure Laterality Date    BELOW KNEE LEG AMPUTATION Right 2009    COLONOSCOPY N/A 03/27/2017    Procedure: COLONOSCOPY;  Surgeon: Wesley Thibodeaux MD;  Location: [facility];  Service:     COLONOSCOPY N/A 03/29/2017    Procedure: COLONOSCOPY;  Surgeon: Wesley Thibodeaux MD;  Location: [facility];  Service:     COLONOSCOPY N/A 06/15/2017    Procedure: COLONOSCOPY with FMT;  Surgeon: Gerard Marques MD;  Location: [facility];  Service: Gastroenterology    ESOPHAGOGASTRODUODENOSCOPY N/A 03/22/2017    Procedure: ESOPHAGOGASTRODUODENOSCOPY (EGD);  Surgeon: Beverley Ortiz DO;  Location: [facility];  Service:     FL GUIDED NEEDLE PLAC BX/ASP/INJ  06/22/2018    FL LUMBAR PUNCTURE DIAGNOSTIC  01/22/2020    MULTIPLE TOOTH EXTRACTIONS N/A 03/08/2021    Procedure: EXTRACTION TEETH # 2,3,6,10,12,13,14,18,19,20,21,22,23,24,25,26,27,28,29,30;  Surgeon: Brian Kohler DMD;  Location: [facility];  Service: Maxillofacial    SKIN BIOPSY      SPINE SURGERY       Family History   Problem Relation Age of Onset    Hyperlipidemia Mother     Hypertension Mother     Diabetes Mother     Leukemia Brother     Diabetes Paternal Grandfather       reports that he has been smoking cigars and cigarettes. He has a 5 pack-year smoking history. He has been exposed to tobacco smoke. He has never used smokeless tobacco. He reports that he does not currently use alcohol. He reports that he does not use drugs.  Current Outpatient Medications on File Prior to Visit   Medication Sig Dispense Refill    albuterol (Ventolin HFA) 90 mcg/act inhaler Inhale 2 puffs every 6 (six) hours as needed for wheezing 18 g 2    Alcohol Swabs (ALCOHOL PADS) 70 % PADS by Does not apply route 5 (five) times a day 300 each 5    amLODIPine (NORVASC) 10 mg tablet Take 1 tablet (10 mg total) by  mouth daily 90 tablet 1    ammonium lactate (LAC-HYDRIN) 12 % cream       apixaban (ELIQUIS) 5 mg Take 1 tablet (5 mg total) by mouth 2 (two) times a day 180 tablet 3    atorvastatin (LIPITOR) 20 mg tablet TAKE 1 TABLET BY MOUTH EVERYDAY AT BEDTIME 90 tablet 1    azelaic acid (Azelex) 20 % cream Apply topically 2 (two) times a day 50 g 0    B Complex-C-Folic Acid (Super B-Complex/Vit C/FA) TABS TAKE 1 TABLET BY MOUTH EVERY DAY AS DIRECTED 90 tablet 5    SUSAN MICROLET LANCETS lancets Use as instructed to check blood sugar 4 times a day  Dx e10.29 200 each 5    benzonatate (TESSALON PERLES) 100 mg capsule Take 2 capsules (200 mg total) by mouth 3 (three) times a day as needed for cough 30 capsule 0    Blood Glucose Monitoring Suppl (SUSAN CONTOUR NEXT USB MONITOR) w/Device KIT Testing 4 times a day 1 kit 0    calcitriol (ROCALTROL) 0.5 MCG capsule Take 2 mcg by mouth      calcium acetate (PHOSLO) capsule TAKE 2 CAPSULES BY MOUTH THREE TIMES DAILY WITH MEALS      carvedilol (COREG) 25 mg tablet Take 1 tablet (25 mg total) by mouth 2 (two) times a day 180 tablet 1    cetirizine (ZyrTEC) 10 mg tablet TAKE 1 TABLET BY MOUTH EVERY DAY 90 tablet 1    Cholecalciferol (VITAMIN D-3) 1000 units CAPS Take 2 capsules by mouth daily 30 capsule 0    clindamycin (CLINDAGEL) 1 % gel APPLY TO AFFECTED AREA TWICE A DAY 60 g 2    Contour Next Test test strip USE TO TEST BLOOD SUGAR 3 TIMES DAILY. CONTOUR NEXT STRIPS. E10.29      cyanocobalamin ([employer] Vitamin B-12) 1000 MCG tablet Take 1 tablet (1,000 mcg total) by mouth daily 90 tablet 3    cyclobenzaprine (FLEXERIL) 5 mg tablet       dexamethasone sodium phosphate 0.1 % ophthalmic solution 2 drops in each ear as needed for itching      dexlansoprazole (DEXILANT) 30 MG capsule TAKE 1 CAPSULE BY MOUTH EVERY DAY 90 capsule 2    dicyclomine (BENTYL) 10 mg capsule TAKE 1 CAPSULE BY MOUTH 3 TIMES A DAY BEFORE MEALS 270 capsule 1    Disposable Gloves MISC Use 7 times a day, 4 boxes of gloves  XL  Dx type 1 DM, paraplegia 4 each 11    doxazosin (CARDURA) 2 mg tablet Take 1 tablet (2 mg total) by mouth every evening 90 tablet 1    epoetin kaushik (EPOGEN,PROCRIT) 20,000 units/mL Inject 10,000 Units under the skin      epoetin kaushik (EPOGEN,PROCRIT) 20,000 units/mL Inject 5,000 Units under the skin      ferrous sulfate 324 (65 Fe) mg Take 1 tablet (324 mg total) by mouth in the morning 90 tablet 3    ferrous sulfate 325 (65 Fe) mg tablet Take 1 tablet (325 mg total) by mouth 3 (three) times a day 90 tablet 3    Fluocinolone Acetonide Body 0.01 % OIL Apply 2 drops topically daily      folic acid (FOLVITE) 1 mg tablet Take 1,000 mcg by mouth daily      Gvoke PFS 1 MG/0.2ML SOSY       hydroxychloroquine (PLAQUENIL) 200 mg tablet Take 200 mg by mouth daily      hydrOXYzine HCL (ATARAX) 50 mg tablet TAKE 1 TABLET (50 MG TOTAL) BY MOUTH 2 (TWO) TIMES A DAY AS NEEDED FOR ITCHING OR ANXIETY 180 tablet 1    Incontinence Supply Disposable (Incontinence Brief Large) MISC Use 6 (six) times a day Size xl 180 each 11    Incontinence Supply Disposable (Undergarment) MISC Use 6 a day, 5 boxes, xl  Dx R51, paraplegia 5 each 11    Incontinence Supply Disposable (Underpads) MISC Use 2 a day 5 each 11    insulin lispro (HumALOG/ADMELOG) 100 units/mL injection USE UP TO 40 UNITS DAILY VIA PUMP. E10.9      insulin lispro protamine-insulin lispro (HumaLOG 50-50) 100 units/mL Inject under the skin 2 (two) times a day before meals      leflunomide (ARAVA) 10 MG tablet Take 10 mg by mouth daily      LEVEMIR FLEXTOUCH 100 units/mL injection pen Inject 14 Units under the skin 2 (two) times a day 15 pen 3    Lokelma 10 g PACK       metroNIDAZOLE (METROGEL) 0.75 % gel Apply topically 2 (two) times a day 45 g 0    Misc. Devices (Wheelchair Cushion) MISC Use daily 1 each 0    Misc. Devices (WHEELCHAIR) MISC by Does not apply route daily Wheelchair ramp, dx g82.20 1 each 0    Multiple Vitamin (Daily-Enriqueta Multivitamin) TABS TAKE 1 TABLET BY  MOUTH EVERY DAY 30 tablet 8    ondansetron (ZOFRAN-ODT) 4 mg disintegrating tablet Take 1 tablet (4 mg total) by mouth every 6 (six) hours as needed for nausea or vomiting 30 tablet 2    oxybutynin (DITROPAN XL) 15 MG 24 hr tablet Take 1 tablet (15 mg total) by mouth daily at bedtime 90 tablet 1    oxybutynin (DITROPAN) 5 mg tablet Take 3 tablets (15 mg total) by mouth daily 270 tablet 1    oxyCODONE (ROXICODONE) 10 MG TABS TAKE ONE TABLET BY MOUTH EVERY 12 HOURS AS NEEDED FOR PAIN. ONGOING THERAPY.      polyethylene glycol (GLYCOLAX) 17 GM/SCOOP powder Take 17 g by mouth daily 850 g 1    sertraline (ZOLOFT) 25 mg tablet TAKE 1 TABLET (25 MG TOTAL) BY MOUTH DAILY. 90 tablet 1    sodium hypochlorite (DAKIN'S HALF-STRENGTH) external solution Apply 1 Application topically daily Soak gauze and pack into wounds daily. 473 mL 0    Sodium Zirconium Cyclosilicate 10 g PACK Take 10 g by mouth daily      sucralfate (CARAFATE) 1 g/10 mL suspension Take 10 mL (1 g total) by mouth 4 (four) times a day (with meals and at bedtime) 420 mL 1    SUPER B COMPLEX & C TABS TAKE 1 CAPSULE BY MOUTH ONCE FOR 1 DOSE AS DIRECTED 90 tablet 2    valsartan (DIOVAN) 320 MG tablet       Zinc Sulfate 220 (50 Zn) MG TABS TAKE 1 TABLET BY MOUTH EVERY DAY 90 tablet 1    NovoLOG FlexPen 100 units/mL injection pen INJECT 3 UNITS UNDER THE SKIN 3 (THREE) TIMES A DAY BEFORE MEALS. (Patient not taking: Reported on 7/13/2023)       No current facility-administered medications on file prior to visit.     Allergies   Allergen Reactions    Chlorcyclizine Swelling    Chlorhexidine Other (See Comments)    Ciprofloxacin Hcl     Cymbalta [Duloxetine Hcl]     Dm-Doxylamine-Acetaminophen Other (See Comments)    Doxycycline Diarrhea    Gabapentin Tremor    Hydrocortisone Other (See Comments)    Lactose - Food Allergy GI Intolerance     Other reaction(s): Unknown    Lyrica [Pregabalin]     Milk (Cow) GI Intolerance    Penicillins Other (See Comments)     miguel Zosyn  08/28/17  Tolerates Ancef  Miguel Augmentin    Polymyxin B     Promethazine-Phenyleph-Codeine Other (See Comments)    Quinidine Other (See Comments)    Cefepime Other (See Comments)     Other reaction(s): Other (See Comments)  encephalopathy; tolerates cefazolin 6/14, miguel Ceftriaxone  encephalopathy; tolerates cefazolin 6/14, miguel Ceftriaxone  Pt has had cefepime March 2022 and June 2022. Also, cephalexin 3/15/22.    Rosuvastatin Other (See Comments) and Palpitations     Weakness      Current Outpatient Medications on File Prior to Visit   Medication Sig Dispense Refill    albuterol (Ventolin HFA) 90 mcg/act inhaler Inhale 2 puffs every 6 (six) hours as needed for wheezing 18 g 2    Alcohol Swabs (ALCOHOL PADS) 70 % PADS by Does not apply route 5 (five) times a day 300 each 5    amLODIPine (NORVASC) 10 mg tablet Take 1 tablet (10 mg total) by mouth daily 90 tablet 1    ammonium lactate (LAC-HYDRIN) 12 % cream       apixaban (ELIQUIS) 5 mg Take 1 tablet (5 mg total) by mouth 2 (two) times a day 180 tablet 3    atorvastatin (LIPITOR) 20 mg tablet TAKE 1 TABLET BY MOUTH EVERYDAY AT BEDTIME 90 tablet 1    azelaic acid (Azelex) 20 % cream Apply topically 2 (two) times a day 50 g 0    B Complex-C-Folic Acid (Super B-Complex/Vit C/FA) TABS TAKE 1 TABLET BY MOUTH EVERY DAY AS DIRECTED 90 tablet 5    SUSAN MICROLET LANCETS lancets Use as instructed to check blood sugar 4 times a day  Dx e10.29 200 each 5    benzonatate (TESSALON PERLES) 100 mg capsule Take 2 capsules (200 mg total) by mouth 3 (three) times a day as needed for cough 30 capsule 0    Blood Glucose Monitoring Suppl (SUSAN CONTOUR NEXT USB MONITOR) w/Device KIT Testing 4 times a day 1 kit 0    calcitriol (ROCALTROL) 0.5 MCG capsule Take 2 mcg by mouth      calcium acetate (PHOSLO) capsule TAKE 2 CAPSULES BY MOUTH THREE TIMES DAILY WITH MEALS      carvedilol (COREG) 25 mg tablet Take 1 tablet (25 mg total) by mouth 2 (two) times a day 180 tablet 1    cetirizine  (ZyrTEC) 10 mg tablet TAKE 1 TABLET BY MOUTH EVERY DAY 90 tablet 1    Cholecalciferol (VITAMIN D-3) 1000 units CAPS Take 2 capsules by mouth daily 30 capsule 0    clindamycin (CLINDAGEL) 1 % gel APPLY TO AFFECTED AREA TWICE A DAY 60 g 2    Contour Next Test test strip USE TO TEST BLOOD SUGAR 3 TIMES DAILY. CONTOUR NEXT STRIPS. E10.29      cyanocobalamin ([employer] Vitamin B-12) 1000 MCG tablet Take 1 tablet (1,000 mcg total) by mouth daily 90 tablet 3    cyclobenzaprine (FLEXERIL) 5 mg tablet       dexamethasone sodium phosphate 0.1 % ophthalmic solution 2 drops in each ear as needed for itching      dexlansoprazole (DEXILANT) 30 MG capsule TAKE 1 CAPSULE BY MOUTH EVERY DAY 90 capsule 2    dicyclomine (BENTYL) 10 mg capsule TAKE 1 CAPSULE BY MOUTH 3 TIMES A DAY BEFORE MEALS 270 capsule 1    Disposable Gloves MISC Use 7 times a day, 4 boxes of gloves XL  Dx type 1 DM, paraplegia 4 each 11    doxazosin (CARDURA) 2 mg tablet Take 1 tablet (2 mg total) by mouth every evening 90 tablet 1    epoetin kaushik (EPOGEN,PROCRIT) 20,000 units/mL Inject 10,000 Units under the skin      epoetin kaushik (EPOGEN,PROCRIT) 20,000 units/mL Inject 5,000 Units under the skin      ferrous sulfate 324 (65 Fe) mg Take 1 tablet (324 mg total) by mouth in the morning 90 tablet 3    ferrous sulfate 325 (65 Fe) mg tablet Take 1 tablet (325 mg total) by mouth 3 (three) times a day 90 tablet 3    Fluocinolone Acetonide Body 0.01 % OIL Apply 2 drops topically daily      folic acid (FOLVITE) 1 mg tablet Take 1,000 mcg by mouth daily      Gvoke PFS 1 MG/0.2ML SOSY       hydroxychloroquine (PLAQUENIL) 200 mg tablet Take 200 mg by mouth daily      hydrOXYzine HCL (ATARAX) 50 mg tablet TAKE 1 TABLET (50 MG TOTAL) BY MOUTH 2 (TWO) TIMES A DAY AS NEEDED FOR ITCHING OR ANXIETY 180 tablet 1    Incontinence Supply Disposable (Incontinence Brief Large) MISC Use 6 (six) times a day Size xl 180 each 11    Incontinence Supply Disposable (Undergarment) MISC Use 6 a day, 5  boxes, xl  Dx R51, paraplegia 5 each 11    Incontinence Supply Disposable (Underpads) MISC Use 2 a day 5 each 11    insulin lispro (HumALOG/ADMELOG) 100 units/mL injection USE UP TO 40 UNITS DAILY VIA PUMP. E10.9      insulin lispro protamine-insulin lispro (HumaLOG 50-50) 100 units/mL Inject under the skin 2 (two) times a day before meals      leflunomide (ARAVA) 10 MG tablet Take 10 mg by mouth daily      LEVEMIR FLEXTOUCH 100 units/mL injection pen Inject 14 Units under the skin 2 (two) times a day 15 pen 3    Lokelma 10 g PACK       metroNIDAZOLE (METROGEL) 0.75 % gel Apply topically 2 (two) times a day 45 g 0    Misc. Devices (Wheelchair Cushion) MISC Use daily 1 each 0    Misc. Devices (WHEELCHAIR) MISC by Does not apply route daily Wheelchair ramp, dx g82.20 1 each 0    Multiple Vitamin (Daily-Enriqueta Multivitamin) TABS TAKE 1 TABLET BY MOUTH EVERY DAY 30 tablet 8    ondansetron (ZOFRAN-ODT) 4 mg disintegrating tablet Take 1 tablet (4 mg total) by mouth every 6 (six) hours as needed for nausea or vomiting 30 tablet 2    oxybutynin (DITROPAN XL) 15 MG 24 hr tablet Take 1 tablet (15 mg total) by mouth daily at bedtime 90 tablet 1    oxybutynin (DITROPAN) 5 mg tablet Take 3 tablets (15 mg total) by mouth daily 270 tablet 1    oxyCODONE (ROXICODONE) 10 MG TABS TAKE ONE TABLET BY MOUTH EVERY 12 HOURS AS NEEDED FOR PAIN. ONGOING THERAPY.      polyethylene glycol (GLYCOLAX) 17 GM/SCOOP powder Take 17 g by mouth daily 850 g 1    sertraline (ZOLOFT) 25 mg tablet TAKE 1 TABLET (25 MG TOTAL) BY MOUTH DAILY. 90 tablet 1    sodium hypochlorite (DAKIN'S HALF-STRENGTH) external solution Apply 1 Application topically daily Soak gauze and pack into wounds daily. 473 mL 0    Sodium Zirconium Cyclosilicate 10 g PACK Take 10 g by mouth daily      sucralfate (CARAFATE) 1 g/10 mL suspension Take 10 mL (1 g total) by mouth 4 (four) times a day (with meals and at bedtime) 420 mL 1    SUPER B COMPLEX & C TABS TAKE 1 CAPSULE BY MOUTH  ONCE FOR 1 DOSE AS DIRECTED 90 tablet 2    valsartan (DIOVAN) 320 MG tablet       Zinc Sulfate 220 (50 Zn) MG TABS TAKE 1 TABLET BY MOUTH EVERY DAY 90 tablet 1    NovoLOG FlexPen 100 units/mL injection pen INJECT 3 UNITS UNDER THE SKIN 3 (THREE) TIMES A DAY BEFORE MEALS. (Patient not taking: Reported on 7/13/2023)       No current facility-administered medications on file prior to visit.      Social History     Tobacco Use    Smoking status: Every Day     Current packs/day: 0.25     Average packs/day: 0.3 packs/day for 20.0 years (5.0 ttl pk-yrs)     Types: Cigars, Cigarettes     Passive exposure: Current    Smokeless tobacco: Never    Tobacco comments:     2 cigars/day - 2/14/2023   Vaping Use    Vaping status: Never Used   Substance and Sexual Activity    Alcohol use: Not Currently     Comment: 1 cup of wine every 3-4 months    Drug use: Never     Types: Marijuana     Comment: rarely; once every 1-2 years    Sexual activity: Not Currently     Partners: Female        Objective   /80 (BP Location: Left arm, Patient Position: Sitting, Cuff Size: Standard)   Pulse 92   Temp 98.7 °F (37.1 °C) (Temporal)   Resp 14   SpO2 97%      Physical Exam  Vitals and nursing note reviewed.   Constitutional:       General: He is not in acute distress.     Appearance: He is well-developed.      Comments: Seated in powerchair.   HENT:      Head: Normocephalic and atraumatic.      Right Ear: External ear normal.      Left Ear: External ear normal.      Nose: Nose normal.      Mouth/Throat:      Pharynx: Uvula midline.   Eyes:      Conjunctiva/sclera: Conjunctivae normal.      Pupils: Pupils are equal, round, and reactive to light.   Cardiovascular:      Rate and Rhythm: Normal rate and regular rhythm.      Pulses: Normal pulses.      Heart sounds: Normal heart sounds. No murmur heard.  Pulmonary:      Effort: Pulmonary effort is normal. No respiratory distress.      Breath sounds: Normal breath sounds. No wheezing.    Musculoskeletal:      Cervical back: Normal range of motion and neck supple.   Skin:     General: Skin is warm and dry.   Neurological:      Mental Status: He is alert and oriented to person, place, and time.   Psychiatric:         Speech: Speech normal.         Behavior: Behavior normal.

## 2024-11-21 NOTE — ASSESSMENT & PLAN NOTE
- Reviewed ENT note from October 2024.  Waiting to see if this will resolve on its own.  If still present at next visit, may consider tympanostomy tube.

## 2024-12-03 LAB — HBA1C MFR BLD HPLC: 7 %

## 2024-12-05 ENCOUNTER — TELEPHONE (OUTPATIENT)
Dept: FAMILY MEDICINE CLINIC | Facility: CLINIC | Age: 44
End: 2024-12-05

## 2024-12-05 ENCOUNTER — OFFICE VISIT (OUTPATIENT)
Dept: WOUND CARE | Facility: CLINIC | Age: 44
End: 2024-12-05
Payer: COMMERCIAL

## 2024-12-05 VITALS
SYSTOLIC BLOOD PRESSURE: 100 MMHG | DIASTOLIC BLOOD PRESSURE: 50 MMHG | TEMPERATURE: 98.3 F | RESPIRATION RATE: 12 BRPM | HEART RATE: 88 BPM

## 2024-12-05 DIAGNOSIS — L89.894 PRESSURE ULCER OF OTHER SITE, STAGE 4 (HCC): Primary | ICD-10-CM

## 2024-12-05 DIAGNOSIS — L89.224 PRESSURE ULCER OF LEFT HIP, STAGE 4 (HCC): ICD-10-CM

## 2024-12-05 DIAGNOSIS — L89.154 PRESSURE INJURY OF SACRAL REGION, STAGE 4 (HCC): ICD-10-CM

## 2024-12-05 DIAGNOSIS — L89.324 PRESSURE INJURY OF LEFT ISCHIUM, STAGE 4 (HCC): ICD-10-CM

## 2024-12-05 PROCEDURE — 11045 DBRDMT SUBQ TISS EACH ADDL: CPT | Performed by: FAMILY MEDICINE

## 2024-12-05 PROCEDURE — 11042 DBRDMT SUBQ TIS 1ST 20SQCM/<: CPT | Performed by: FAMILY MEDICINE

## 2024-12-05 RX ORDER — LIDOCAINE HYDROCHLORIDE 40 MG/ML
5 SOLUTION TOPICAL ONCE
Status: COMPLETED | OUTPATIENT
Start: 2024-12-05 | End: 2024-12-05

## 2024-12-05 RX ADMIN — LIDOCAINE HYDROCHLORIDE 5 ML: 40 SOLUTION TOPICAL at 10:32

## 2024-12-05 NOTE — LETTER
[facility]  [address] [facility] [address]  Phone#  [phone]  Fax#  [phone]    Patient:  Ovidio Parkinson Jr.  YOB: 1980  Phone:  [phone]  Date of Visit:  12/5/2024    Orders Placed This Encounter   Procedures   • Wound cleansing and dressings     Wound cleansing and dressings                    Wash your hands with soap and water.  Remove old dressing, discard into plastic bag and place in trash.    Cleanse the wounds with Dakin's solution if there is an odor, IF NO ODOR, cleanse with normal saline or wound wash prior to applying a clean dressing.   Do not use tissue or cotton balls. Do not scrub the wound. Pat dry using gauze.   Shower no; do not get dressing wet      Left Hip Wound:   Apply Dakin's Moistened Kerlix  Cover with  ABD over top   Secure with Medfix tape.   Change dressing daily and top dressing PRN for breakthrough drainage (visiting nurses to do twice per week and family in between)      Sacral, Left ischium, and perineal wound:   Apply Dakin's Moistened Kerlix   Please ensure kerlix is tucked into depth of tunnel 4 o'clock for right perineal wound  Cover with ABD over top   Secure with Medfix tape.   Change dressing daily and top dressing PRN for breakthrough drainage (visiting nurses to do twice per week and family in between)       Continue using Clinitron bed      Continue NO PRESSURE TO ALL WOUNDS AS MUCH AS POSSIBLE, ESPECIALLY PERINEAL WOUND   LIMIT SITTING UPRIGHT IN WHEELCHAIR ON THIS WOUND          Continue visiting nurse skilled 2 x per week for wound care dressing changes.                                  Follow up at the wound center in 4 weeks.     Quit smoking or try to cut back on how much you smoke. As discussed smoking slows the ability for a wound to heal by constricting blood vessels for approximately 30 minutes after each cigarette.     Standing Status:   Future     Expiration Date:   12/12/2024          Electronically signed by Marley Rose MD

## 2024-12-05 NOTE — TELEPHONE ENCOUNTER
PCP SIGNATURE NEEDED FOR [facility]TENDED [facility]  FORM RECEIVED VIA FAX AND PLACED IN PCP FOLDER TO BE DELIVERED AT ASSIGNED TIMES.

## 2024-12-05 NOTE — PROGRESS NOTES
Wound Procedure Treatment    Performed by: Anastacia Saleh RN  Authorized by: Marley Rose MD    Associated wounds:   Wound Pressure Injury Hip Left;Lateral  Wound Pressure Injury Sacrum  Wound Pressure Injury Perineum  Wound Pressure Injury Ischium Left  Wound cleansed with:  Dakin's 0.25% and NSS  Applied secondary dressing:  Gauze and ABD  Dressing secured with:  Kerlix and Tape  Comments:  Dakins soaked kerlix packing applied to wounds.

## 2024-12-05 NOTE — PROGRESS NOTES
Patient ID: Ovidio Parkinson Jr. is a 44 y.o. male Date of Birth 1980       Chief Complaint   Patient presents with    Follow Up Wound Care Visit     Sacral wound       Allergies:  Chlorcyclizine, Chlorhexidine, Ciprofloxacin hcl, Cymbalta [duloxetine hcl], Dm-doxylamine-acetaminophen, Doxycycline, Gabapentin, Hydrocortisone, Lactose - food allergy, Lyrica [pregabalin], Milk (cow), Penicillins, Polymyxin b, Promethazine-phenyleph-codeine, Quinidine, Cefepime, and Rosuvastatin    Diagnosis:      Diagnosis ICD-10-CM Associated Orders   1. Pressure ulcer of other site, stage 4 ([facility])  L89.894 lidocaine (XYLOCAINE) 4 % topical solution 5 mL     Wound cleansing and dressings     Wound Procedure Treatment      2. Pressure injury of sacral region, stage 4 ([facility])  L89.154 lidocaine (XYLOCAINE) 4 % topical solution 5 mL     Wound cleansing and dressings     Wound Procedure Treatment      3. Pressure injury of left ischium, stage 4 ([facility])  L89.324 lidocaine (XYLOCAINE) 4 % topical solution 5 mL     Wound cleansing and dressings     Wound Procedure Treatment      4. Pressure ulcer of left hip, stage 4 (HCC)  L89.224 lidocaine (XYLOCAINE) 4 % topical solution 5 mL     Wound cleansing and dressings     Wound Procedure Treatment              Assessment & Plan:  Stage IV pressure injury of perineum, left hip, sacrum and ischium in setting of chronic osteomyelitis; palliative wounds: Wounds are overall stable without acute change.  No malodor, purulence from any of the wounds.  Periwound's with hyperkeratosis and callusing.  Wound beds with biofilm slough and fibrous tissue debrided as below.  Surgical debridements as below  Continue current plan of care with Dakin's moistened Kerlix to all wounds. In the past at various other visits when trial of discontinuing Dakin's was attempted, his wounds had declined.  Ovidio states that his brother is very on top of his wounds with cleansing and care and feels Dakins has been the best.  I  "again reviewed with Ovidio long-term effects of utilizing Dakin such as possible destruction of healthy tissue.  He expresses understanding and wishes to continue with Dakin's as he states that when alternate dressings have been tried in the past, they have to return to Dakin's.  He reports his new wheelchair from [facility] wheelchair clinic is coming on December 25 for [place] which he is looking forward to!  Continue all offloading and frequent repositioning efforts  Continue to maintain adequate protein intake and good nutrition  ER precautions again reviewed with Ovidio, he expresses understanding  Follow-up in 4 to 5 weeks or sooner if needed     Portions of the record may have been created with voice recognition software. Occasional wrong word or \"sound alike\" substitutions may have occurred due to the inherent limitations of voice recognition software. Read the chart carefully and recognize, using context, where substitutions have occurred.    Subjective:   Please see prev visit notes for HPI summary [id number] 1/4/2024: Ovidio presents to wound center today for follow-up of multiple stage IV pressure injuries of sacrum, left hip, ischium, and perineum.  He reports he believes his lab studies regarding his protein level has improved as well as overall nutrition in general.  Has no acute complaints today and denies fever, chills.  He reports he is currently feeling well/much better from his recent hospitalization which he reports occurred at [facility] 12/5 through 12/13.    Per chart review of discharge summary from that hospitalization he was \"found to have Staph Lugudenesis bacteremia on admission blood cultures and was started on vancomycin, which was transitioned to Ancef by Infectious Disease. He will continue Ancef Source was suspected to be prior HD line; known sacral wounds were stable. Repeat blood cultures were negative on discharge. During admission, his HD line was removed (given infection) and " "replaced with temporary HD line (which, in turn, needed to be replaced due to inability to successfully use). New HD line functioning as of 12/9. PATRICIO performed for evaluation of possible endocarditis was negative. A tunneled HD line was placed 12/13/2023 by IR after repeat blood cultures were negative x48 hrs.\"  He also reports that he may have been spending more time in his chair than previous and continues with difficulty controlling some of the shifting/shearing forces with transferring etc... Continues to use his Clinitron bed.  Also reports that he is still awaiting the custom wheelchair/cushions to help with his pressure injuries but that it is being made.     1/25/2024: Ovidio presents today for follow-up of multiple stage IV pressure injuries of left hip, ischium, sacrum, and perineum as well as stage III pressure injury of scrotum.  He reports that they have been doing well with the [place].  In addition reports that the area under the scrotum his brother sometimes uses Vaseline to this area when it is on the drier side she reports his brother tells him it is looking better.  Sides his chronic issues, he reports he has no acute complaints today and overall is feeling well.  He denies fever, chills.  He reported he recently started sleeping on his left side as this was more comfortable for him.    2/14/2024: Ovidio presents today for follow-up of multiple stage IV pressure injuries of left hip, ischium, sacrum, and perineum as well as stage III pressure injury of scrotum.  He reports that he is not feeling well \"has been feeling under the weather\" since Monday.  Feels that has been having an issue with his insulin pump administering insulin.  He states when he had this issue in the past it was because it was inserted an area of scar tissue.  However he did try to move the side of this this morning and glucose was 475.  Also reports he is feeling very dehydrated and dry along with dizzy, lightheaded.  He denies " fever, chills.    3/7/2024: Ovidio presents today for follow-up multiple stage IV pressure injuries of left hip ischium sacrum and perineum in addition to stage III pressure injury of scrotum.  At our last visit, recommendation was for patient to go to emergency department which he agreed to however after leaving our wound center patient did not go.  He reports that his sugars have been improved compared to last visit 150s to 180s though sometimes he still has spikes. He states he has been feeling better and denies fever, chills.  He also reports that since he was last here they called the Clinitron wraps to check functioning of his bed and was assured that all seems to be functioning well.    3/28/2024: Ovidio presents today for follow-up of multiple pressure injuries.  He reports that as discussed, he did call [facility] wheelchair clinic a few days ago and has not yet heard back.  Reports that sometimes his glucose is variable in hyper and hypoglycemic ranges but his endocrinology office is aware.  Also states that recently it has been more controlled between 150s and 160s.  Today he reports he is feeling well and denies fever, chills, dizziness, lightheadedness, diaphoresis, shortness of breath, chest pain.  Wound care currently consist of Dakin's packing.  He continues to use his Clinitron bed.  He reports the drainage from his left hip wound is controlled.    5/2/2024: Ovidio presents today for f/u of multiple pressure injuries.  He reports that a few weeks [facility] however he fell ill had to reschedule for the 31st of this month which she confirmed he will be attending.  Reports that his protein levels have improved and he does have some Jim at home.  He states he is feeling well now and feels his glucose has been under better control.  He denies new acute complaints and denies fever, chills.    5/23/2024: Visit w/ [facility] Brooke    6/20/2024: Ovidio  "presents today for follow-up of multiple pressure injuries.  On 6/6/24, patient brother contacted wound center due to excessive drainage from one of his wounds.  He was directed to proceed to emergency department.  Per chart review and patient, he did not do this.  He reports his brother return to use of Dakin soaked packing which resolved the issue.  He reports that other than his chronic conditions, he has no new acute complaints.  He denies fever, chills.    7/18/2024 & 8/29/2024: Visits w/ [facility] Brooke    9/19/2024: Ovidio presents today for follow-up of multiple wounds.  He reports that since he was last at wound center he has been doing pretty good.  Feels his diabetes is under better control and he has had no issues with his wounds.  He denies fever, chills.  No other new acute complaints today.    10/31/2024: Ovidio presents today for follow-up.  He reports that since his last visit, he has been scheduled to receive a new wheelchair from [facility] around [place] of this year.  He reports his brother continues doing his dressing changes along with occasional VNA.  Continues to be mindful about offloading but spends prolonged periods of time in his chair.  He has not had fever or chills.  He denies other new acute complaints today.    12/5/2024: Ovidio presents today for follow-up.  He reports that his new wheelchair is still scheduled to arrive around [place] and he is really looking forward to this.  He reports \"no issues since last time, wounds are good, I am doing good!\".  He denies fever, chills.      The following portions of the patient's history were reviewed and updated as appropriate:   Patient Active Problem List   Diagnosis    Paraplegia (HCC)    Atrial fibrillation (HCC)    Chronic suprapubic catheter ([facility])    Colostomy care ([facility])    S/P unilateral BKA (below knee amputation) ([facility])    Tobacco abuse    Type 1 diabetes mellitus with chronic kidney disease on chronic " dialysis (HCC)    Ulcer of sacral region, stage 4 (HCC)    Iron deficiency anemia    HTN (hypertension), benign    Neurogenic bladder    GERD (gastroesophageal reflux disease)    Chronic pain    Stage 5 chronic kidney disease on chronic dialysis (HCC)    History of Clostridium difficile infection    Urinary retention    Dialysis patient (HCC)    Insulin long-term use (HCC)    Wheelchair dependent    Bipolar depression (HCC)    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HCC)    Seizure (HCC)    AVM (arteriovenous malformation) of duodenum, acquired    Chronic narcotic dependence (HCC)    Chronic diastolic heart failure (HCC)    ED (erectile dysfunction) of organic origin    Irritable bowel syndrome    Onychomycosis    Pustular folliculitis    Prolonged Q-T interval on ECG    Secondary hyperparathyroidism of renal origin (HCC)    Immunocompromised state (HCC)    Severe protein-calorie malnutrition (HCC)    Chronic osteomyelitis (HCC)    Charcot's arthropathy    Diabetic gastroparesis associated with type 1 diabetes mellitus  (HCC)    End stage renal disease (HCC)    Hyperlipidemia    LVH (left ventricular hypertrophy)    NICM (nonischemic cardiomyopathy) (HCC)    Vitamin B deficiency    Vitamin C deficiency    Vitamin D deficiency    Rheumatoid arthritis (HCC)    Benign hypertensive heart and kidney disease with HF and ESRD (HCC)    Right chronic serous otitis media    Constipation     Past Medical History:   Diagnosis Date    Acute pulmonary edema ([facility]) 01/13/2022    Ambulatory dysfunction     Anemia     Anemia, iron deficiency     transfusion requiring    Asymptomatic bacteriuria 09/25/2017    Atrial fibrillation (HCC)     AVM (arteriovenous malformation) of duodenum, 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    Bacteriuria, asymptomatic     Bipolar disorder ([facility])     C. difficile colitis 06/23/2022    Chronic deep vein thrombosis (DVT) ([facility])     Chronic indwelling Ortega catheter     Chronic kidney disease     Chronic pain     Chronic pain disorder      Chronic suprapubic catheter ([facility])     Clostridium difficile infection 08/11/2016    also positive 9/2016, 5/29/2017, 8/15/2017. S/P fecal transplant    Colostomy on examination ([facility])     Decubitus ulcer     Delirium     Diabetes mellitus (HCC)     GERD (gastroesophageal reflux disease)     Hemodialysis patient ([facility])     Hypertension     Memory impairment 2011    s/p diabetic coma    Neurogenic bladder     OAB (overactive bladder)     Paraplegia ([facility])     T3 transverse myelitis vs spinal stoke (AVM); 2012 extensive epidural abscess C7=> conus 2nd extension from deep parspinal abscess L4-S2/sacral decubitus; cord atrophy/myelomalcia T3=>conus     Penile abscess     Pressure injury of left ischium, stage 4 ([facility]) 07/03/2017    Pressure ulcer of left hip, stage 4 ([facility]) 03/12/2021    Pressure ulcer of other site, stage 4 ([facility]) 03/12/2021    Pressure ulcer of sacral region, stage 4 ([facility]) 08/13/2020    S/P unilateral BKA (below knee amputation) ([facility])     Right    Sebaceous cyst removed in 2017    Seizures ([facility])     Shingles N/A    Shortness of breath     Tobacco abuse     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[facility])     Urinary retention     Wheelchair dependent     Wound healing, delayed 06/29/2017    Wounds, multiple     pressure ulcers with delayed healing    Wounds, multiple     pressure ulcers with delayed healing       Past Surgical History:   Procedure Laterality Date    BELOW KNEE LEG AMPUTATION Right 2009    COLONOSCOPY N/A 03/27/2017    Procedure: COLONOSCOPY;  Surgeon: Wesley Thibodeaux MD;  Location: [facility];  Service:     COLONOSCOPY N/A 03/29/2017    Procedure: COLONOSCOPY;  Surgeon: Wesley Thibodeaux MD;  Location: [facility];  Service:     COLONOSCOPY N/A 06/15/2017    Procedure: COLONOSCOPY with FMT;  Surgeon: Gerard Marques MD;  Location: [facility];  Service: Gastroenterology    ESOPHAGOGASTRODUODENOSCOPY N/A 03/22/2017    Procedure: ESOPHAGOGASTRODUODENOSCOPY (EGD);  Surgeon: Beverley Ortiz DO;  Location: [facility];  Service:      FL GUIDED NEEDLE PLAC BX/ASP/INJ  06/22/2018    FL LUMBAR PUNCTURE DIAGNOSTIC  01/22/2020    MULTIPLE TOOTH EXTRACTIONS N/A 03/08/2021    Procedure: EXTRACTION TEETH # 2,3,6,10,12,13,14,18,19,20,21,22,23,24,25,26,27,28,29,30;  Surgeon: Brina Kohler DMD;  Location: [facility];  Service: Maxillofacial    SKIN BIOPSY      SPINE SURGERY       Family History   Problem Relation Age of Onset    Hyperlipidemia Mother     Hypertension Mother     Diabetes Mother     Leukemia Brother     Diabetes Paternal Grandfather       Social History     Socioeconomic History    Marital status: Single     Spouse name: Not on file    Number of children: Not on file    Years of education: Not on file    Highest education level: Not on file   Occupational History    Not on file   Tobacco Use    Smoking status: Every Day     Current packs/day: 0.25     Average packs/day: 0.3 packs/day for 20.0 years (5.0 ttl pk-yrs)     Types: Cigars, Cigarettes     Passive exposure: Current    Smokeless tobacco: Never    Tobacco comments:     2 cigars/day - 2/14/2023   Vaping Use    Vaping status: Never Used   Substance and Sexual Activity    Alcohol use: Not Currently     Comment: 1 cup of wine every 3-4 months    Drug use: Never     Types: Marijuana     Comment: rarely; once every 1-2 years    Sexual activity: Not Currently     Partners: Female   Other Topics Concern    Not on file   Social History Narrative    Not on file     Social Drivers of Health     Financial Resource Strain: High Risk (11/21/2024)    Overall Financial Resource Strain (CARDIA)     Difficulty of Paying Living Expenses: Hard   Food Insecurity: Food Insecurity Present (11/21/2024)    Hunger Vital Sign     Worried About Running Out of Food in the Last Year: Often true     Ran Out of Food in the Last Year: Patient declined   Transportation Needs: No Transportation Needs (11/21/2024)    PRAPARE - Transportation     Lack of Transportation (Medical): No     Lack of Transportation  (Non-Medical): No   Physical Activity: Not on file   Stress: Not on file   Social Connections: Not on file   Intimate Partner Violence: Not At Risk (7/26/2024)    Received from [facility]    Humiliation, Afraid, Rape, and Kick questionnaire     Fear of Current or Ex-Partner: No     Emotionally Abused: No     Physically Abused: No     Sexually Abused: No   Housing Stability: Unknown (11/21/2024)    Housing Stability Vital Sign     Unable to Pay for Housing in the Last Year: Patient declined     Number of Times Moved in the Last Year: 0     Homeless in the Last Year: No        Current Outpatient Medications:     albuterol (Ventolin HFA) 90 mcg/act inhaler, Inhale 2 puffs every 6 (six) hours as needed for wheezing, Disp: 18 g, Rfl: 2    Alcohol Swabs (ALCOHOL PADS) 70 % PADS, by Does not apply route 5 (five) times a day, Disp: 300 each, Rfl: 5    amLODIPine (NORVASC) 10 mg tablet, Take 1 tablet (10 mg total) by mouth daily, Disp: 90 tablet, Rfl: 1    ammonium lactate (LAC-HYDRIN) 12 % cream, , Disp: , Rfl:     apixaban (ELIQUIS) 5 mg, Take 1 tablet (5 mg total) by mouth 2 (two) times a day, Disp: 180 tablet, Rfl: 3    atorvastatin (LIPITOR) 20 mg tablet, TAKE 1 TABLET BY MOUTH EVERYDAY AT BEDTIME, Disp: 90 tablet, Rfl: 1    azelaic acid (Azelex) 20 % cream, Apply topically 2 (two) times a day, Disp: 50 g, Rfl: 0    B Complex-C-Folic Acid (Super B-Complex/Vit C/FA) TABS, TAKE 1 TABLET BY MOUTH EVERY DAY AS DIRECTED, Disp: 90 tablet, Rfl: 5    SUSAN MICROLET LANCETS lancets, Use as instructed to check blood sugar 4 times a day Dx e10.29, Disp: 200 each, Rfl: 5    benzonatate (TESSALON PERLES) 100 mg capsule, Take 2 capsules (200 mg total) by mouth 3 (three) times a day as needed for cough, Disp: 30 capsule, Rfl: 0    bisacodyl (DULCOLAX) 5 mg EC tablet, Take 1 tablet (5 mg total) by mouth daily as needed for constipation, Disp: 30 tablet, Rfl: 2    Blood Glucose Monitoring Suppl (Glassmap CONTOUR NEXT USB  MONITOR) w/Device KIT, Testing 4 times a day, Disp: 1 kit, Rfl: 0    calcitriol (ROCALTROL) 0.5 MCG capsule, Take 2 mcg by mouth, Disp: , Rfl:     calcium acetate (PHOSLO) capsule, TAKE 2 CAPSULES BY MOUTH THREE TIMES DAILY WITH MEALS, Disp: , Rfl:     carvedilol (COREG) 25 mg tablet, Take 1 tablet (25 mg total) by mouth 2 (two) times a day, Disp: 180 tablet, Rfl: 1    cetirizine (ZyrTEC) 10 mg tablet, TAKE 1 TABLET BY MOUTH EVERY DAY, Disp: 90 tablet, Rfl: 1    Cholecalciferol (VITAMIN D-3) 1000 units CAPS, Take 2 capsules by mouth daily, Disp: 30 capsule, Rfl: 0    clindamycin (CLINDAGEL) 1 % gel, APPLY TO AFFECTED AREA TWICE A DAY, Disp: 60 g, Rfl: 2    Contour Next Test test strip, USE TO TEST BLOOD SUGAR 3 TIMES DAILY. CONTOUR NEXT STRIPS. E10.29, Disp: , Rfl:     cyanocobalamin ([employer] Vitamin B-12) 1000 MCG tablet, Take 1 tablet (1,000 mcg total) by mouth daily, Disp: 90 tablet, Rfl: 3    cyclobenzaprine (FLEXERIL) 5 mg tablet, , Disp: , Rfl:     dexamethasone sodium phosphate 0.1 % ophthalmic solution, 2 drops in each ear as needed for itching, Disp: , Rfl:     dexlansoprazole (DEXILANT) 30 MG capsule, TAKE 1 CAPSULE BY MOUTH EVERY DAY, Disp: 90 capsule, Rfl: 2    dicyclomine (BENTYL) 10 mg capsule, TAKE 1 CAPSULE BY MOUTH 3 TIMES A DAY BEFORE MEALS, Disp: 270 capsule, Rfl: 1    Disposable Gloves MISC, Use 7 times a day, 4 boxes of gloves XL Dx type 1 DM, paraplegia, Disp: 4 each, Rfl: 11    doxazosin (CARDURA) 2 mg tablet, Take 1 tablet (2 mg total) by mouth every evening, Disp: 90 tablet, Rfl: 1    epoetin kaushik (EPOGEN,PROCRIT) 20,000 units/mL, Inject 10,000 Units under the skin, Disp: , Rfl:     epoetin kaushik (EPOGEN,PROCRIT) 20,000 units/mL, Inject 5,000 Units under the skin, Disp: , Rfl:     ferrous sulfate 324 (65 Fe) mg, Take 1 tablet (324 mg total) by mouth in the morning, Disp: 90 tablet, Rfl: 3    ferrous sulfate 325 (65 Fe) mg tablet, Take 1 tablet (325 mg total) by mouth 3 (three) times a day, Disp:  90 tablet, Rfl: 3    Fluocinolone Acetonide Body 0.01 % OIL, Apply 2 drops topically daily, Disp: , Rfl:     folic acid (FOLVITE) 1 mg tablet, Take 1,000 mcg by mouth daily, Disp: , Rfl:     Gvoke PFS 1 MG/0.2ML SOSY, , Disp: , Rfl:     hydroxychloroquine (PLAQUENIL) 200 mg tablet, Take 200 mg by mouth daily, Disp: , Rfl:     hydrOXYzine HCL (ATARAX) 50 mg tablet, TAKE 1 TABLET (50 MG TOTAL) BY MOUTH 2 (TWO) TIMES A DAY AS NEEDED FOR ITCHING OR ANXIETY, Disp: 180 tablet, Rfl: 1    Incontinence Supply Disposable (Incontinence Brief Large) MISC, Use 6 (six) times a day Size xl, Disp: 180 each, Rfl: 11    Incontinence Supply Disposable (Undergarment) MISC, Use 6 a day, 5 boxes, xl Dx R51, paraplegia, Disp: 5 each, Rfl: 11    Incontinence Supply Disposable (Underpads) MISC, Use 2 a day, Disp: 5 each, Rfl: 11    insulin lispro (HumALOG/ADMELOG) 100 units/mL injection, USE UP TO 40 UNITS DAILY VIA PUMP. E10.9, Disp: , Rfl:     insulin lispro protamine-insulin lispro (HumaLOG 50-50) 100 units/mL, Inject under the skin 2 (two) times a day before meals, Disp: , Rfl:     leflunomide (ARAVA) 10 MG tablet, Take 10 mg by mouth daily, Disp: , Rfl:     LEVEMIR FLEXTOUCH 100 units/mL injection pen, Inject 14 Units under the skin 2 (two) times a day, Disp: 15 pen, Rfl: 3    Lokelma 10 g PACK, , Disp: , Rfl:     metroNIDAZOLE (METROGEL) 0.75 % gel, Apply topically 2 (two) times a day, Disp: 45 g, Rfl: 0    Misc. Devices (Wheelchair Cushion) MISC, Use daily, Disp: 1 each, Rfl: 0    Misc. Devices (WHEELCHAIR) MISC, by Does not apply route daily Wheelchair ramp, dx g82.20, Disp: 1 each, Rfl: 0    Multiple Vitamin (Daily-Enriqueta Multivitamin) TABS, TAKE 1 TABLET BY MOUTH EVERY DAY, Disp: 30 tablet, Rfl: 8    NovoLOG FlexPen 100 units/mL injection pen, INJECT 3 UNITS UNDER THE SKIN 3 (THREE) TIMES A DAY BEFORE MEALS. (Patient not taking: Reported on 7/13/2023), Disp: , Rfl:     ondansetron (ZOFRAN-ODT) 4 mg disintegrating tablet, Take 1  tablet (4 mg total) by mouth every 6 (six) hours as needed for nausea or vomiting, Disp: 30 tablet, Rfl: 2    oxybutynin (DITROPAN XL) 15 MG 24 hr tablet, Take 1 tablet (15 mg total) by mouth daily at bedtime, Disp: 90 tablet, Rfl: 1    oxybutynin (DITROPAN) 5 mg tablet, Take 3 tablets (15 mg total) by mouth daily, Disp: 270 tablet, Rfl: 1    oxyCODONE (ROXICODONE) 10 MG TABS, TAKE ONE TABLET BY MOUTH EVERY 12 HOURS AS NEEDED FOR PAIN. ONGOING THERAPY., Disp: , Rfl:     polyethylene glycol (GLYCOLAX) 17 GM/SCOOP powder, Take 17 g by mouth daily, Disp: 850 g, Rfl: 1    sertraline (ZOLOFT) 25 mg tablet, TAKE 1 TABLET (25 MG TOTAL) BY MOUTH DAILY., Disp: 90 tablet, Rfl: 1    sodium hypochlorite (DAKIN'S HALF-STRENGTH) external solution, Apply 1 Application topically daily Soak gauze and pack into wounds daily., Disp: 473 mL, Rfl: 0    Sodium Zirconium Cyclosilicate 10 g PACK, Take 10 g by mouth daily, Disp: , Rfl:     sucralfate (CARAFATE) 1 g/10 mL suspension, Take 10 mL (1 g total) by mouth 4 (four) times a day (with meals and at bedtime), Disp: 420 mL, Rfl: 1    SUPER B COMPLEX & C TABS, TAKE 1 CAPSULE BY MOUTH ONCE FOR 1 DOSE AS DIRECTED, Disp: 90 tablet, Rfl: 2    valsartan (DIOVAN) 320 MG tablet, , Disp: , Rfl:     Zinc Sulfate 220 (50 Zn) MG TABS, TAKE 1 TABLET BY MOUTH EVERY DAY, Disp: 90 tablet, Rfl: 1  No current facility-administered medications for this visit.    Review of Systems   Constitutional:  Negative for chills and fever.   Respiratory:  Negative for cough and shortness of breath.    Cardiovascular:  Negative for chest pain.   Musculoskeletal:  Positive for gait problem (Paraplegia).        Paraplegic   Skin:  Positive for wound (Multiple pressure injuries).        Multiple pressure injuries   Neurological:  Positive for weakness and numbness. Negative for dizziness.       Objective:  /50   Pulse 88   Temp 98.3 °F (36.8 °C)   Resp 12   Pain Score: 0-No pain     Physical Exam  Vitals  reviewed.   Constitutional:       General: He is not in acute distress.     Appearance: He is not ill-appearing, toxic-appearing or diaphoretic.      Comments: Very pleasant, NAD   HENT:      Head: Normocephalic.   Cardiovascular:      Rate and Rhythm: Normal rate.   Pulmonary:      Effort: Pulmonary effort is normal. No respiratory distress.   Skin:     General: Skin is warm and dry.      Findings: Wound present. No erythema.             Comments: 1/2/3/4- Stage IV pressure injury sacrum/L Hip/ L ischium: Wounds are overall stable without acute change.  No malodor, purulence from any of the wounds.  Periwound's with hyperkeratosis and callusing.  Wound beds with biofilm slough and fibrous tissue debrided as below.    See full wound description for additional details.   Neurological:      Mental Status: He is alert and oriented to person, place, and time.      Gait: Gait abnormal (wc bound).   Psychiatric:         Mood and Affect: Mood normal.         Behavior: Behavior normal.       Wound Pressure Injury Ischium Left (Active)   Wound Image   12/05/24 1016   Wound Description Rolled edges;Epithelialization;Pink;Yellow;Granulation tissue;Beefy red 12/05/24 1020   Pressure Injury Stage 4 08/29/24 1403   Irene-wound Assessment Scar Tissue;Dry 12/05/24 1020   Wound Length (cm) 7.4 cm 12/05/24 1020   Wound Width (cm) 3.4 cm 12/05/24 1020   Wound Depth (cm) 0.4 cm 12/05/24 1020   Wound Surface Area (cm^2) 25.16 cm^2 12/05/24 1020   Wound Volume (cm^3) 10.064 cm^3 12/05/24 1020   Calculated Wound Volume (cm^3) 10.06 cm^3 12/05/24 1020   Change in Wound Size % 46.77 12/05/24 1020   Number of tunnels 1 10/31/24 1511   Tunneling 1 0.2 cm 12/05/24 1020   Tunneling 1 in depth located at 6 oclock 12/05/24 1020   Number of underminings 1 10/31/24 1511   Undermining 1 0.3 10/31/24 1511   Undermining 2 0 10/31/24 1511   Undermining 1 is depth extending from resolved 12/05/24 1020   Undermining 2 is depth extending from resolved  10/31/24 1511   Drainage Amount Moderate 12/05/24 1020   Drainage Description Serosanguineous;Tan 12/05/24 1020   Non-staged Wound Description Full thickness 12/05/24 1020   Treatments Irrigation with NSS 12/05/24 1020   Wound packed? No 05/23/24 1320   Dressing Changed Changed 03/07/24 1113   Patient Tolerance Tolerated well 10/31/24 1511   Dressing Status Removed 10/31/24 1511       Wound Pressure Injury Perineum (Active)   Wound Image   12/05/24 1015   Wound Description Pink;Tan;Slough;White;Granulation tissue;Rolled edges;Probes to bone 12/05/24 1025   Pressure Injury Stage 4 08/29/24 1408   Irene-wound Assessment Scar Tissue;Pink 12/05/24 1025   Wound Length (cm) 8.6 cm 12/05/24 1025   Wound Width (cm) 6.4 cm 12/05/24 1025   Wound Depth (cm) 0.3 cm 12/05/24 1025   Wound Surface Area (cm^2) 55.04 cm^2 12/05/24 1025   Wound Volume (cm^3) 16.512 cm^3 12/05/24 1025   Calculated Wound Volume (cm^3) 16.51 cm^3 12/05/24 1025   Change in Wound Size % -57.24 12/05/24 1025   Tunneling 1 in depth located at 0 10/05/23 1458   Number of underminings 1 12/05/24 1025   Undermining 1 1.5 12/05/24 1025   Undermining 2 1.5 10/31/24 1518   Undermining 1 is depth extending from 1-9 oclock, deepest at 6 12/05/24 1025   Undermining 2 is depth extending from resolved 12/05/24 1025   Drainage Amount Moderate 12/05/24 1025   Drainage Description Serosanguineous 12/05/24 1025   Non-staged Wound Description Full thickness 12/05/24 1025   Treatments Irrigation with NSS 12/05/24 1025   Wound packed? No 05/23/24 1319   Dressing Changed Changed 03/07/24 1107   Patient Tolerance Tolerated well 10/31/24 1518   Dressing Status Removed 10/31/24 1518       Wound Pressure Injury Sacrum (Active)   Wound Image   12/05/24 1016   Wound Description Epithelialization;Pink;Yellow;Granulation tissue;Slough;Rolled edges;White;Probes to bone 12/05/24 1022   Pressure Injury Stage 4 09/19/24 1337   Irene-wound Assessment Scar Tissue;Pink 12/05/24 1022   Wound  Length (cm) 5.8 cm 12/05/24 1022   Wound Width (cm) 8.7 cm 12/05/24 1022   Wound Depth (cm) 0.8 cm 12/05/24 1022   Wound Surface Area (cm^2) 50.46 cm^2 12/05/24 1022   Wound Volume (cm^3) 40.368 cm^3 12/05/24 1022   Calculated Wound Volume (cm^3) 40.37 cm^3 12/05/24 1022   Change in Wound Size % 46.17 12/05/24 1022   Number of underminings 1 12/05/24 1022   Undermining 1 1.5 12/05/24 1022   Undermining 2 0 10/31/24 1511   Undermining 1 is depth extending from 5-7 oclock 12/05/24 1022   Undermining 2 is depth extending from resolved 10/31/24 1511   Drainage Amount Moderate 12/05/24 1022   Drainage Description Serosanguineous 12/05/24 1022   Non-staged Wound Description Full thickness 12/05/24 1022   Treatments Irrigation with NSS 12/05/24 1022   Wound packed? No 05/23/24 1319   Dressing Changed Changed 03/07/24 1105   Patient Tolerance Tolerated well 10/31/24 1511   Dressing Status Removed 10/31/24 1511       Wound Pressure Injury Hip Left;Lateral (Active)   Wound Image   10/31/24 1510   Wound Description Pink;Pale 12/05/24 1017   Pressure Injury Stage 4 08/29/24 1419   Irene-wound Assessment Scar Tissue;Intact 12/05/24 1017   Wound Length (cm) 1.9 cm 12/05/24 1017   Wound Width (cm) 1.9 cm 12/05/24 1017   Wound Depth (cm) 1.2 cm 12/05/24 1017   Wound Surface Area (cm^2) 3.61 cm^2 12/05/24 1017   Wound Volume (cm^3) 4.332 cm^3 12/05/24 1017   Calculated Wound Volume (cm^3) 4.33 cm^3 12/05/24 1017   Tunneling 1 0 cm 10/31/24 1510   Tunneling 1 in depth located at none 10/31/24 1510   Number of underminings 1 09/19/24 1330   Undermining 1 3.4 12/05/24 1017   Undermining 1 is depth extending from 7-1 oclock 12/05/24 1017   Drainage Amount Moderate 12/05/24 1017   Drainage Description Serosanguineous 12/05/24 1017   Non-staged Wound Description Full thickness 12/05/24 1017   Treatments Irrigation with NSS 12/05/24 1017   Wound packed? Yes 05/23/24 1320   Packing- # removed 1 06/20/24 1401   Dressing Changed Changed  "03/07/24 1111   Patient Tolerance Tolerated well 10/31/24 1510   Dressing Status Removed 10/31/24 1510         Debridement   Wound Pressure Injury Perineum    Universal Protocol:  Consent: Verbal consent obtained. Written consent obtained.  Risks and benefits: risks, benefits and alternatives were discussed  Consent given by: patient  Time out: Immediately prior to procedure a \"time out\" was called to verify the correct patient, procedure, equipment, support staff and site/side marked as required.  Patient understanding: patient states understanding of the procedure being performed  Patient identity confirmed: verbally with patient    Debridement Details  Performed by: physician  Debridement type: surgical  Level of debridement: subcutaneous tissue  Pain control: lidocaine 4%      Post-debridement measurements  Length (cm): 8.6  Width (cm): 6.4  Depth (cm): 0.4  Percent debrided: 75%  Surface Area (cm^2): 55.04  Area Debrided (cm^2): 41.28  Volume (cm^3): 22.02    Tissue and other material debrided: subcutaneous tissue  Devitalized tissue debrided: biofilm, callus, fibrin and slough  Instrument(s) utilized: curette  Bleeding: medium  Hemostasis obtained with: pressure and silver nitrate  Procedural pain (0-10): 0  Post-procedural pain: 0   Response to treatment: procedure was tolerated well    Debridement   Wound Pressure Injury Sacrum    Universal Protocol:  Consent: Verbal consent obtained. Written consent obtained.  Risks and benefits: risks, benefits and alternatives were discussed  Consent given by: patient  Time out: Immediately prior to procedure a \"time out\" was called to verify the correct patient, procedure, equipment, support staff and site/side marked as required.  Patient understanding: patient states understanding of the procedure being performed  Patient identity confirmed: verbally with patient    Debridement Details  Performed by: physician  Debridement type: surgical  Level of debridement: " "subcutaneous tissue  Pain control: lidocaine 4%      Post-debridement measurements  Length (cm): 5.8  Width (cm): 8.7  Depth (cm): 0.9  Percent debrided: 50%  Surface Area (cm^2): 50.46  Area Debrided (cm^2): 25.23  Volume (cm^3): 45.41    Tissue and other material debrided: subcutaneous tissue  Devitalized tissue debrided: biofilm, callus, fibrin and slough  Instrument(s) utilized: curette  Bleeding: medium  Hemostasis obtained with: pressure  Procedural pain (0-10): 0  Post-procedural pain: 0   Response to treatment: procedure was tolerated well    Debridement   Wound Pressure Injury Ischium Left    Universal Protocol:  Consent: Verbal consent obtained. Written consent obtained.  Risks and benefits: risks, benefits and alternatives were discussed  Consent given by: patient  Time out: Immediately prior to procedure a \"time out\" was called to verify the correct patient, procedure, equipment, support staff and site/side marked as required.  Patient understanding: patient states understanding of the procedure being performed  Patient identity confirmed: verbally with patient    Debridement Details  Performed by: physician  Debridement type: surgical  Level of debridement: subcutaneous tissue  Pain control: lidocaine 4%      Post-debridement measurements  Length (cm): 7.4  Width (cm): 3.4  Depth (cm): 0.5  Percent debrided: 80%  Surface Area (cm^2): 25.16  Area Debrided (cm^2): 20.13  Volume (cm^3): 12.58    Tissue and other material debrided: subcutaneous tissue  Devitalized tissue debrided: biofilm, callus, fibrin and slough  Instrument(s) utilized: curette  Bleeding: medium  Hemostasis obtained with: pressure and silver nitrate  Procedural pain (0-10): 0  Post-procedural pain: 0   Response to treatment: procedure was tolerated well                               Lab Results   Component Value Date    HGBA1C 6.6 (H) 07/26/2024       Wound Instructions:  Orders Placed This Encounter   Procedures    Wound cleansing and " dressings     Wound cleansing and dressings                    Wash your hands with soap and water.  Remove old dressing, discard into plastic bag and place in trash.    Cleanse the wounds with Dakin's solution if there is an odor, IF NO ODOR, cleanse with normal saline or wound wash prior to applying a clean dressing.   Do not use tissue or cotton balls. Do not scrub the wound. Pat dry using gauze.   Shower no; do not get dressing wet      Left Hip Wound:   Apply Dakin's Moistened Kerlix  Cover with  ABD over top   Secure with Medfix tape.   Change dressing daily and top dressing PRN for breakthrough drainage (visiting nurses to do twice per week and family in between)      Sacral, Left ischium, and perineal wound:   Apply Dakin's Moistened Kerlix   Please ensure kerlix is tucked into depth of tunnel 4 o'clock for right perineal wound  Cover with ABD over top   Secure with Medfix tape.   Change dressing daily and top dressing PRN for breakthrough drainage (visiting nurses to do twice per week and family in between)       Continue using Clinitron bed      Continue NO PRESSURE TO ALL WOUNDS AS MUCH AS POSSIBLE, ESPECIALLY PERINEAL WOUND   LIMIT SITTING UPRIGHT IN WHEELCHAIR ON THIS WOUND          Continue visiting nurse skilled 2 x per week for wound care dressing changes.                                  Follow up at the wound center in 4 weeks.     Quit smoking or try to cut back on how much you smoke. As discussed smoking slows the ability for a wound to heal by constricting blood vessels for approximately 30 minutes after each cigarette.     Standing Status:   Future     Expiration Date:   12/12/2024    Wound Procedure Treatment     This order was created via procedure documentation    Debridement     This order was created via procedure documentation    Debridement     This order was created via procedure documentation    Debridement     This order was created via procedure documentation       Marley Rose  MD

## 2024-12-05 NOTE — PATIENT INSTRUCTIONS
Orders Placed This Encounter   Procedures    Wound cleansing and dressings     Wound cleansing and dressings                    Wash your hands with soap and water.  Remove old dressing, discard into plastic bag and place in trash.    Cleanse the wounds with Dakin's solution if there is an odor, IF NO ODOR, cleanse with normal saline or wound wash prior to applying a clean dressing.   Do not use tissue or cotton balls. Do not scrub the wound. Pat dry using gauze.   Shower no; do not get dressing wet      Left Hip Wound:   Apply Dakin's Moistened Kerlix  Cover with  ABD over top   Secure with Medfix tape.   Change dressing daily and top dressing PRN for breakthrough drainage (visiting nurses to do twice per week and family in between)      Sacral, Left ischium, and perineal wound:   Apply Dakin's Moistened Kerlix   Please ensure kerlix is tucked into depth of tunnel 4 o'clock for right perineal wound  Cover with ABD over top   Secure with Medfix tape.   Change dressing daily and top dressing PRN for breakthrough drainage (visiting nurses to do twice per week and family in between)       Continue using Clinitron bed      Continue NO PRESSURE TO ALL WOUNDS AS MUCH AS POSSIBLE, ESPECIALLY PERINEAL WOUND   LIMIT SITTING UPRIGHT IN WHEELCHAIR ON THIS WOUND          Continue visiting nurse skilled 2 x per week for wound care dressing changes.                                  Follow up at the wound center in 4 weeks.     Quit smoking or try to cut back on how much you smoke. As discussed smoking slows the ability for a wound to heal by constricting blood vessels for approximately 30 minutes after each cigarette.     Standing Status:   Future     Expiration Date:   12/12/2024

## 2024-12-08 DIAGNOSIS — N31.9 NEUROGENIC BLADDER: ICD-10-CM

## 2024-12-10 RX ORDER — OXYBUTYNIN CHLORIDE 15 MG/1
15 TABLET, EXTENDED RELEASE ORAL
Qty: 90 TABLET | Refills: 1 | Status: SHIPPED | OUTPATIENT
Start: 2024-12-10

## 2024-12-12 ENCOUNTER — TELEPHONE (OUTPATIENT)
Dept: FAMILY MEDICINE CLINIC | Facility: CLINIC | Age: 44
End: 2024-12-12

## 2024-12-12 NOTE — TELEPHONE ENCOUNTER
PCP SIGNATURE NEEDED FOR [facility]  FORM RECEIVED VIA FAX AND PLACED IN PCP FOLDER TO BE DELIVERED AT ASSIGNED TIMES.    [facility]   12/8/2024- 2/05/2025

## 2024-12-13 DIAGNOSIS — J20.9 ACUTE BRONCHITIS, UNSPECIFIED ORGANISM: ICD-10-CM

## 2024-12-13 RX ORDER — ALBUTEROL SULFATE 90 UG/1
INHALANT RESPIRATORY (INHALATION)
Qty: 18 G | Refills: 2 | Status: SHIPPED | OUTPATIENT
Start: 2024-12-13

## 2024-12-17 LAB
LEFT EYE DIABETIC RETINOPATHY: POSITIVE
RIGHT EYE DIABETIC RETINOPATHY: POSITIVE
SEVERITY (EYE EXAM): NORMAL

## 2024-12-23 NOTE — TELEPHONE ENCOUNTER
FAXED ON 12/23/24 TO [facility] at [phone]. FAX CONFIRMATION RECEIVED.

## 2025-01-04 DIAGNOSIS — N18.6 TYPE 1 DIABETES MELLITUS WITH CHRONIC KIDNEY DISEASE ON CHRONIC DIALYSIS (HCC): ICD-10-CM

## 2025-01-04 DIAGNOSIS — E10.22 TYPE 1 DIABETES MELLITUS WITH CHRONIC KIDNEY DISEASE ON CHRONIC DIALYSIS (HCC): ICD-10-CM

## 2025-01-04 DIAGNOSIS — Z99.2 TYPE 1 DIABETES MELLITUS WITH CHRONIC KIDNEY DISEASE ON CHRONIC DIALYSIS (HCC): ICD-10-CM

## 2025-01-04 DIAGNOSIS — L98.429 ULCER OF SACRAL REGION, STAGE 4 (HCC): ICD-10-CM

## 2025-01-06 RX ORDER — UREA 10 %
LOTION (ML) TOPICAL
Qty: 100 TABLET | Refills: 1 | Status: SHIPPED | OUTPATIENT
Start: 2025-01-06

## 2025-01-08 ENCOUNTER — TELEPHONE (OUTPATIENT)
Dept: FAMILY MEDICINE CLINIC | Facility: CLINIC | Age: 45
End: 2025-01-08

## 2025-01-08 DIAGNOSIS — J30.9 ALLERGIC RHINITIS, UNSPECIFIED SEASONALITY, UNSPECIFIED TRIGGER: ICD-10-CM

## 2025-01-08 RX ORDER — CETIRIZINE HYDROCHLORIDE 10 MG/1
10 TABLET ORAL DAILY
Qty: 90 TABLET | Refills: 1 | Status: SHIPPED | OUTPATIENT
Start: 2025-01-08

## 2025-01-08 NOTE — TELEPHONE ENCOUNTER
PCP SIGNATURE NEEDED FOR [facility] FORM RECEIVED VIA FAX AND PLACED IN PCP FOLDER TO BE DELIVERED AT ASSIGNED TIMES.      Cert of med necessity eff date 03/17/25

## 2025-01-09 ENCOUNTER — OFFICE VISIT (OUTPATIENT)
Dept: WOUND CARE | Facility: CLINIC | Age: 45
End: 2025-01-09
Payer: COMMERCIAL

## 2025-01-09 VITALS
RESPIRATION RATE: 18 BRPM | TEMPERATURE: 98.8 F | HEART RATE: 84 BPM | SYSTOLIC BLOOD PRESSURE: 142 MMHG | DIASTOLIC BLOOD PRESSURE: 90 MMHG

## 2025-01-09 DIAGNOSIS — L89.154 PRESSURE INJURY OF SACRAL REGION, STAGE 4 (HCC): Primary | ICD-10-CM

## 2025-01-09 DIAGNOSIS — L89.324 PRESSURE INJURY OF LEFT ISCHIUM, STAGE 4 (HCC): ICD-10-CM

## 2025-01-09 DIAGNOSIS — L89.894 PRESSURE ULCER OF OTHER SITE, STAGE 4 (HCC): ICD-10-CM

## 2025-01-09 DIAGNOSIS — L89.224 PRESSURE ULCER OF LEFT HIP, STAGE 4 (HCC): ICD-10-CM

## 2025-01-09 PROCEDURE — 97598 DBRDMT OPN WND ADDL 20CM/<: CPT | Performed by: NURSE PRACTITIONER

## 2025-01-09 PROCEDURE — 97597 DBRDMT OPN WND 1ST 20 CM/<: CPT | Performed by: NURSE PRACTITIONER

## 2025-01-09 RX ORDER — LIDOCAINE HYDROCHLORIDE 40 MG/ML
5 SOLUTION TOPICAL ONCE
Status: COMPLETED | OUTPATIENT
Start: 2025-01-09 | End: 2025-01-09

## 2025-01-09 RX ADMIN — LIDOCAINE HYDROCHLORIDE 5 ML: 40 SOLUTION TOPICAL at 14:02

## 2025-01-09 RX ADMIN — LIDOCAINE HYDROCHLORIDE 5 ML: 40 SOLUTION TOPICAL at 12:00

## 2025-01-09 NOTE — PROGRESS NOTES
Name: Ovidio Parkinson Jr.      : 1980      MRN: [id number]  Encounter Provider: ERNESTO Avendano  Encounter Date: 2025   Encounter department: [facility]  :  Assessment & Plan  Pressure injury of sacral region, stage 4 (HCC)    Orders:    Wound cleansing and dressings; Future    lidocaine (XYLOCAINE) 4 % topical solution 5 mL    Wound Procedure Treatment    lidocaine (XYLOCAINE) 4 % topical solution 5 mL    Pressure injury of left ischium, stage 4 (HCC)    Orders:    Wound cleansing and dressings; Future    lidocaine (XYLOCAINE) 4 % topical solution 5 mL    Wound Procedure Treatment    lidocaine (XYLOCAINE) 4 % topical solution 5 mL    Pressure ulcer of other site, stage 4 (HCC)    Orders:    Wound cleansing and dressings; Future    lidocaine (XYLOCAINE) 4 % topical solution 5 mL    Wound Procedure Treatment    lidocaine (XYLOCAINE) 4 % topical solution 5 mL    Pressure ulcer of left hip, stage 4 (HCC)    Orders:    Wound cleansing and dressings; Future    lidocaine (XYLOCAINE) 4 % topical solution 5 mL    Wound Procedure Treatment    lidocaine (XYLOCAINE) 4 % topical solution 5 mL    Plan:  1.  F/U palliative visit.  Wounds debrided.  Wounds clean and stable.  Continue current plan of care  2.  A1C results reviewed with the patient today.  Patient maintaining tight glycemic control  3.  Patient is to continue offloading pressure from wounds  4.  Patient will follow-up in 4 weeks    History of Present Illness   Chief Complaint   Patient presents with    Follow Up Wound Care Visit     Sacral, ischila, hip, perineum wounds. Arrived via wheelchair with dressings intact.   F/U palliative visit for multiple pressure injuries of his sacrum, left ischium, perineum, and left hip.  No new complaints.  He has been following wound care orders as recommended.  He denies any pain, fevers, or chills.      Objective   /90   Pulse 84   Temp 98.8 °F (37.1 °C)   Resp 18      Wound Pressure Injury Ischium Left (Active)   Wound Image   01/09/25 1137   Wound Description Rolled edges;Epithelialization;Pink;Yellow;Granulation tissue 01/09/25 1152   Pressure Injury Stage 4 01/09/25 1152   Irene-wound Assessment Scar Tissue;Dry;Maceration 01/09/25 1152   Wound Length (cm) 4.5 cm 01/09/25 1152   Wound Width (cm) 3.6 cm 01/09/25 1152   Wound Depth (cm) 1 cm 01/09/25 1152   Wound Surface Area (cm^2) 16.2 cm^2 01/09/25 1152   Wound Volume (cm^3) 16.2 cm^3 01/09/25 1152   Calculated Wound Volume (cm^3) 16.2 cm^3 01/09/25 1152   Change in Wound Size % 14.29 01/09/25 1152   Number of tunnels 1 10/31/24 1511   Tunneling 1 0.07 cm 01/09/25 1152   Tunneling 1 in depth located at 5 oclock 01/09/25 1152   Number of underminings 1 01/09/25 1152   Undermining 1 0.3 10/31/24 1511   Undermining 2 0 10/31/24 1511   Undermining 1 is depth extending from resolved 12/05/24 1020   Undermining 2 is depth extending from resolved 10/31/24 1511   Drainage Amount Large 01/09/25 1152   Drainage Description Serosanguineous;Tan 01/09/25 1152   Non-staged Wound Description Full thickness 12/05/24 1020   Treatments Irrigation with NSS 12/05/24 1020   Wound packed? No 05/23/24 1320   Dressing Changed Changed 03/07/24 1113   Patient Tolerance Tolerated well 10/31/24 1511   Dressing Status Removed 10/31/24 1511       Wound Pressure Injury Perineum (Active)   Wound Image   01/09/25 1136   Wound Description Pink;Slough;White;Granulation tissue;Rolled edges;Other (Comment) 01/09/25 1155   Pressure Injury Stage 4 01/09/25 1155   Irene-wound Assessment Scar Tissue;Pink;Other (Comment) 01/09/25 1155   Wound Length (cm) 10.2 cm 01/09/25 1155   Wound Width (cm) 6.2 cm 01/09/25 1155   Wound Depth (cm) 0.8 cm 01/09/25 1155   Wound Surface Area (cm^2) 63.24 cm^2 01/09/25 1155   Wound Volume (cm^3) 50.592 cm^3 01/09/25 1155   Calculated Wound Volume (cm^3) 50.59 cm^3 01/09/25 1155   Change in Wound Size % -381.81 01/09/25 1155    Tunneling 1 in depth located at 0 10/05/23 1458   Number of underminings 2 01/09/25 1155   Undermining 1 1 01/09/25 1155   Undermining 2 2.1 01/09/25 1155   Undermining 1 is depth extending from 2.4, deepest at 3 01/09/25 1155   Undermining 2 is depth extending from 6-10, deepest at 6 01/09/25 1155   Drainage Amount Large 01/09/25 1155   Drainage Description Serosanguineous 01/09/25 1155   Non-staged Wound Description Full thickness 12/05/24 1025   Treatments Irrigation with NSS 12/05/24 1025   Wound packed? No 05/23/24 1319   Dressing Changed Changed 03/07/24 1107   Patient Tolerance Tolerated well 10/31/24 1518   Dressing Status Removed 10/31/24 1518       Wound Pressure Injury Sacrum (Active)   Wound Image   01/09/25 1136   Wound Description Epithelialization;Pink;Granulation tissue;Slough;Rolled edges;White;Other (Comment) 01/09/25 1159   Pressure Injury Stage 4 09/19/24 1337   Irene-wound Assessment Scar Tissue;Pink;Maceration 01/09/25 1159   Wound Length (cm) 10.1 cm 01/09/25 1159   Wound Width (cm) 5.7 cm 01/09/25 1159   Wound Depth (cm) 0.7 cm 01/09/25 1159   Wound Surface Area (cm^2) 57.57 cm^2 01/09/25 1159   Wound Volume (cm^3) 40.299 cm^3 01/09/25 1159   Calculated Wound Volume (cm^3) 40.3 cm^3 01/09/25 1159   Change in Wound Size % 46.27 01/09/25 1159   Number of underminings 1 12/05/24 1022   Undermining 1 1.5 12/05/24 1022   Undermining 2 0 10/31/24 1511   Undermining 1 is depth extending from 5-7 oclock 12/05/24 1022   Undermining 2 is depth extending from resolved 10/31/24 1511   Drainage Amount Large 01/09/25 1159   Drainage Description Serosanguineous 01/09/25 1159   Non-staged Wound Description Full thickness 12/05/24 1022   Treatments Irrigation with NSS 12/05/24 1022   Wound packed? No 05/23/24 1319   Dressing Changed Changed 03/07/24 1105   Patient Tolerance Tolerated well 10/31/24 1511   Dressing Status Removed 10/31/24 1511       Wound Pressure Injury Hip Left;Lateral (Active)   Wound Image  "  01/09/25 1135   Wound Description Pink;Rolled edges 01/09/25 1201   Pressure Injury Stage 4 01/09/25 1201   Irene-wound Assessment Scar Tissue;Intact;Maceration 01/09/25 1201   Wound Length (cm) 2.6 cm 01/09/25 1201   Wound Width (cm) 2 cm 01/09/25 1201   Wound Depth (cm) 1 cm 01/09/25 1201   Wound Surface Area (cm^2) 5.2 cm^2 01/09/25 1201   Wound Volume (cm^3) 5.2 cm^3 01/09/25 1201   Calculated Wound Volume (cm^3) 5.2 cm^3 01/09/25 1201   Tunneling 1 0 cm 10/31/24 1510   Tunneling 1 in depth located at none 10/31/24 1510   Number of underminings 1 01/09/25 1201   Undermining 1 4.5 01/09/25 1201   Undermining 1 is depth extending from 6-12, deepest at 9 01/09/25 1201   Drainage Amount Large 01/09/25 1201   Drainage Description Serosanguineous 01/09/25 1201   Non-staged Wound Description Full thickness 12/05/24 1017   Treatments Irrigation with NSS 12/05/24 1017   Wound packed? Yes 05/23/24 1320   Packing- # removed 1 06/20/24 1401   Dressing Changed Changed 03/07/24 1111   Patient Tolerance Tolerated well 10/31/24 1510   Dressing Status Removed 10/31/24 1510       Debridement   Wound Pressure Injury Ischium Left    Universal Protocol:  procedure performed by consultantConsent: Written consent obtained.  Consent given by: patient  Time out: Immediately prior to procedure a \"time out\" was called to verify the correct patient, procedure, equipment, support staff and site/side marked as required.  Timeout called at: 1/9/2025 1:59 PM.  Patient identity confirmed: verbally with patient    Debridement Details  Performed by: NP  Debridement type: selective  Pain control: lidocaine 4%      Post-debridement measurements  Length (cm): 4.5  Width (cm): 3.6  Depth (cm): 1  Percent debrided: 100%  Surface Area (cm^2): 16.2  Area Debrided (cm^2): 16.2  Volume (cm^3): 16.2    Devitalized tissue debrided: biofilm, fibrin and slough  Instrument(s) utilized: curette  Bleeding: small  Hemostasis obtained with: pressure  Procedural " "pain (0-10): 0  Post-procedural pain: 0   Response to treatment: procedure was tolerated well    Debridement   Wound Pressure Injury Perineum    Universal Protocol:  procedure performed by consultantConsent: Written consent obtained.  Consent given by: patient  Time out: Immediately prior to procedure a \"time out\" was called to verify the correct patient, procedure, equipment, support staff and site/side marked as required.  Timeout called at: 1/9/2025 2:00 PM.  Patient identity confirmed: verbally with patient    Debridement Details  Performed by: NP  Debridement type: selective  Pain control: lidocaine 4%      Post-debridement measurements  Length (cm): 10.2  Width (cm): 6.2  Depth (cm): 0.8  Percent debrided: 100%  Surface Area (cm^2): 63.24  Area Debrided (cm^2): 63.24  Volume (cm^3): 50.59    Devitalized tissue debrided: biofilm, fibrin and slough  Instrument(s) utilized: curette  Bleeding: small  Hemostasis obtained with: pressure  Procedural pain (0-10): 0  Post-procedural pain: 0   Response to treatment: procedure was tolerated well    Debridement   Wound Pressure Injury Sacrum    Universal Protocol:  procedure performed by consultantConsent: Written consent obtained.  Consent given by: patient  Time out: Immediately prior to procedure a \"time out\" was called to verify the correct patient, procedure, equipment, support staff and site/side marked as required.  Timeout called at: 1/9/2025 2:00 PM.  Patient identity confirmed: verbally with patient    Debridement Details  Performed by: NP  Debridement type: selective  Pain control: lidocaine 4%      Post-debridement measurements  Length (cm): 10.1  Width (cm): 5.7  Depth (cm): 0.7  Percent debrided: 100%  Surface Area (cm^2): 57.57  Area Debrided (cm^2): 57.57  Volume (cm^3): 40.3    Devitalized tissue debrided: biofilm, fibrin and slough  Instrument(s) utilized: curette  Bleeding: small  Hemostasis obtained with: pressure  Procedural pain (0-10): " "0  Post-procedural pain: 0   Response to treatment: procedure was tolerated well    Debridement   Wound Pressure Injury Hip Left;Lateral    Universal Protocol:  procedure performed by consultantConsent: Written consent obtained.  Consent given by: patient  Time out: Immediately prior to procedure a \"time out\" was called to verify the correct patient, procedure, equipment, support staff and site/side marked as required.  Timeout called at: 1/9/2025 2:01 PM.  Patient identity confirmed: verbally with patient    Debridement Details  Performed by: NP  Debridement type: selective  Pain control: lidocaine 4%      Post-debridement measurements  Length (cm): 2.6  Width (cm): 2  Depth (cm): 1  Percent debrided: 100%  Surface Area (cm^2): 5.2  Area Debrided (cm^2): 5.2  Volume (cm^3): 5.2    Devitalized tissue debrided: biofilm, fibrin and slough  Instrument(s) utilized: curette  Bleeding: small  Hemostasis obtained with: pressure  Procedural pain (0-10): 0  Post-procedural pain: 0   Response to treatment: procedure was tolerated well               "

## 2025-01-09 NOTE — PATIENT INSTRUCTIONS
Orders Placed This Encounter   Procedures    Wound cleansing and dressings     Wound cleansing and dressings                                          Wash your hands with soap and water.  Remove old dressing, discard into plastic bag and place in trash.    Cleanse the wounds with Dakin's solution if there is an odor, IF NO ODOR, cleanse with normal saline or wound wash prior to applying a clean dressing.   Do not use tissue or cotton balls. Do not scrub the wound. Pat dry using gauze.   Shower no; do not get dressing wet      Left Hip Wound, Sacral, Left ischium, and perineal wound:   Cleanse with Dakin's Moistened Kerlix  Apply saline moist to dry gauze  Cover with  ABD over top   Secure with Medfix tape.   Change dressing daily and top dressing PRN for breakthrough drainage (visiting nurses to do twice per week and family in between)      Cleanse with Dakin's Moistened Kerlix   Apply saline moistened gauze dressing  Cover with ABD  Cover with ABD over top   Secure with Medfix tape.   Change dressing daily and top dressing PRN for breakthrough drainage (visiting nurses to do twice per week and family in between)       Continue using Clinitron bed      Continue NO PRESSURE TO ALL WOUNDS AS MUCH AS POSSIBLE, ESPECIALLY PERINEAL WOUND   LIMIT SITTING UPRIGHT IN WHEELCHAIR ON THIS WOUND       Continue visiting nurse skilled 2 x per week for wound care dressing changes.                              Follow up at the wound center in 4 weeks.     Quit smoking or try to cut back on how much you smoke. As discussed smoking slows the ability for a wound to heal by constricting blood vessels for approximately 30 minutes after each cigarette.     Standing Status:   Future     Expiration Date:   1/16/2025

## 2025-01-09 NOTE — PROGRESS NOTES
Wound Procedure Treatment    Performed by: Cailin Gibbons RN  Authorized by: ERNESTO Avendano    Associated wounds:   Wound Pressure Injury Ischium Left  Wound Pressure Injury Perineum  Wound Pressure Injury Sacrum  Wound Pressure Injury Hip Left;Lateral  Wound cleansed with:  Dakin's 0.25% and NSS  Applied primary dressing:  Other  Applied secondary dressing:  ABD  Dressing secured with:  Tape  Comments:  Moist saline dressings to wound bed covered with dry gauze and ABDs  Secured with medfix tape

## 2025-01-09 NOTE — PROGRESS NOTES
Patient reported dizziness at end of viasit when transferring to wheelchair. BS 87. Patient given orange juice and peanut butter crackers. He will be going to cafeteria from wound center for lunch.   Patient left wound center in stable condition after dizziness subsided.

## 2025-01-09 NOTE — LETTER
[facility]  [address] [address]  Phone#  [phone]  Fax#  [phone]    Patient:  Ovidio Parkinson Jr.  YOB: 1980  Phone:  [phone]  Date of Visit:  1/9/2025    Orders Placed This Encounter   Procedures   • Wound cleansing and dressings     Wound cleansing and dressings                                          Wash your hands with soap and water.  Remove old dressing, discard into plastic bag and place in trash.    Cleanse the wounds with Dakin's solution if there is an odor, IF NO ODOR, cleanse with normal saline or wound wash prior to applying a clean dressing.   Do not use tissue or cotton balls. Do not scrub the wound. Pat dry using gauze.   Shower no; do not get dressing wet      Left Hip Wound, Sacral, Left ischium, and perineal wound:   Cleanse with Dakin's Moistened Kerlix  Apply saline moist to dry gauze  Cover with  ABD over top   Secure with Medfix tape.   Change dressing daily and top dressing PRN for breakthrough drainage (visiting nurses to do twice per week and family in between)      Cleanse with Dakin's Moistened Kerlix   Apply saline moistened gauze dressing  Cover with ABD  Cover with ABD over top   Secure with Medfix tape.   Change dressing daily and top dressing PRN for breakthrough drainage (visiting nurses to do twice per week and family in between)       Continue using Clinitron bed      Continue NO PRESSURE TO ALL WOUNDS AS MUCH AS POSSIBLE, ESPECIALLY PERINEAL WOUND   LIMIT SITTING UPRIGHT IN WHEELCHAIR ON THIS WOUND       Continue visiting nurse skilled 2 x per week for wound care dressing changes.                              Follow up at the wound center in 4 weeks.     Quit smoking or try to cut back on how much you smoke. As discussed smoking slows the ability for a wound to heal by constricting blood vessels for approximately 30 minutes after each cigarette.     Standing Status:   Future     Expiration  Date:   1/16/2025         Electronically signed by ERNESTO Avendano

## 2025-01-23 ENCOUNTER — TELEPHONE (OUTPATIENT)
Dept: FAMILY MEDICINE CLINIC | Facility: CLINIC | Age: 45
End: 2025-01-23

## 2025-01-23 NOTE — TELEPHONE ENCOUNTER
PCP SIGNATURE NEEDED FOR [facility]  FORM RECEIVED VIA FAX AND PLACED IN PCP FOLDER TO BE DELIVERED AT ASSIGNED TIMES.                            Plan of Care          Physical Therapy Plan of Care    Diagnosis:    G82.20  N18.6  Z99.2  N31.9

## 2025-01-25 DIAGNOSIS — F31.9 BIPOLAR DEPRESSION (HCC): ICD-10-CM

## 2025-01-25 DIAGNOSIS — E78.5 HYPERLIPIDEMIA, UNSPECIFIED HYPERLIPIDEMIA TYPE: ICD-10-CM

## 2025-01-27 DIAGNOSIS — F31.9 BIPOLAR DEPRESSION (HCC): ICD-10-CM

## 2025-01-27 DIAGNOSIS — E78.5 HYPERLIPIDEMIA, UNSPECIFIED HYPERLIPIDEMIA TYPE: ICD-10-CM

## 2025-01-27 RX ORDER — SERTRALINE HYDROCHLORIDE 25 MG/1
25 TABLET, FILM COATED ORAL DAILY
Qty: 90 TABLET | Refills: 1 | Status: SHIPPED | OUTPATIENT
Start: 2025-01-27

## 2025-01-27 RX ORDER — ATORVASTATIN CALCIUM 20 MG/1
20 TABLET, FILM COATED ORAL
Qty: 90 TABLET | Refills: 1 | Status: SHIPPED | OUTPATIENT
Start: 2025-01-27

## 2025-01-27 RX ORDER — ATORVASTATIN CALCIUM 20 MG/1
20 TABLET, FILM COATED ORAL
Qty: 90 TABLET | Refills: 1 | OUTPATIENT
Start: 2025-01-27

## 2025-01-27 RX ORDER — SERTRALINE HYDROCHLORIDE 25 MG/1
25 TABLET, FILM COATED ORAL DAILY
Qty: 90 TABLET | Refills: 1 | OUTPATIENT
Start: 2025-01-27

## 2025-01-29 ENCOUNTER — TELEPHONE (OUTPATIENT)
Dept: FAMILY MEDICINE CLINIC | Facility: CLINIC | Age: 45
End: 2025-01-29

## 2025-01-29 NOTE — TELEPHONE ENCOUNTER
PCP SIGNATURE NEEDED FOR [facility] FORM RECEIVED VIA FAX AND PLACED IN PCP FOLDER TO BE DELIVERED AT ASSIGNED TIMES.      Physicians orders verbal order date 1/21/25

## 2025-02-04 ENCOUNTER — OFFICE VISIT (OUTPATIENT)
Dept: FAMILY MEDICINE CLINIC | Facility: CLINIC | Age: 45
End: 2025-02-04

## 2025-02-04 VITALS
SYSTOLIC BLOOD PRESSURE: 120 MMHG | HEART RATE: 88 BPM | RESPIRATION RATE: 14 BRPM | OXYGEN SATURATION: 90 % | DIASTOLIC BLOOD PRESSURE: 68 MMHG | TEMPERATURE: 97.2 F

## 2025-02-04 DIAGNOSIS — G82.20 PARAPLEGIA (HCC): Chronic | ICD-10-CM

## 2025-02-04 DIAGNOSIS — R21 RASH: ICD-10-CM

## 2025-02-04 DIAGNOSIS — J45.21 MILD INTERMITTENT ASTHMA WITH ACUTE EXACERBATION: Primary | ICD-10-CM

## 2025-02-04 DIAGNOSIS — J20.9 ACUTE BRONCHITIS, UNSPECIFIED ORGANISM: ICD-10-CM

## 2025-02-04 PROCEDURE — 99214 OFFICE O/P EST MOD 30 MIN: CPT | Performed by: PHYSICIAN ASSISTANT

## 2025-02-04 PROCEDURE — G2211 COMPLEX E/M VISIT ADD ON: HCPCS | Performed by: PHYSICIAN ASSISTANT

## 2025-02-04 RX ORDER — AZITHROMYCIN 250 MG/1
TABLET, FILM COATED ORAL
Qty: 6 TABLET | Refills: 0 | Status: SHIPPED | OUTPATIENT
Start: 2025-02-04 | End: 2025-02-09

## 2025-02-04 RX ORDER — PREDNISONE 20 MG/1
40 TABLET ORAL DAILY
Qty: 10 TABLET | Refills: 0 | Status: SHIPPED | OUTPATIENT
Start: 2025-02-04 | End: 2025-02-09

## 2025-02-04 RX ORDER — KETOCONAZOLE 20 MG/ML
1 SHAMPOO, SUSPENSION TOPICAL 2 TIMES WEEKLY
Qty: 240 ML | Refills: 2 | Status: SHIPPED | OUTPATIENT
Start: 2025-02-06

## 2025-02-04 RX ORDER — BROMPHENIRAMINE MALEATE, PSEUDOEPHEDRINE HYDROCHLORIDE, AND DEXTROMETHORPHAN HYDROBROMIDE 2; 30; 10 MG/5ML; MG/5ML; MG/5ML
2.5 SYRUP ORAL 4 TIMES DAILY PRN
Qty: 120 ML | Refills: 0 | Status: SHIPPED | OUTPATIENT
Start: 2025-02-04

## 2025-02-04 RX ORDER — ALBUTEROL SULFATE 0.83 MG/ML
2.5 SOLUTION RESPIRATORY (INHALATION) EVERY 6 HOURS PRN
Qty: 60 ML | Refills: 2 | Status: SHIPPED | OUTPATIENT
Start: 2025-02-04

## 2025-02-04 NOTE — PROGRESS NOTES
"Name: Ovidio Parkinson Jr.      : 1980      MRN: [id number]  Encounter Provider: Kay Ronquillo PA-C  Encounter Date: 2025   Encounter department: [facility] SC[facility]  :  Assessment & Plan  Mild intermittent asthma with acute exacerbation  -Will order nebulizer, to be used every 6 hours as needed for wheezing or shortness of breath.  - Continue albuterol inhaler as needed.  Orders:    albuterol (2.5 mg/3 mL) 0.083 % nebulizer solution; Take 3 mL (2.5 mg total) by nebulization every 6 (six) hours as needed for wheezing or shortness of breath    brompheniramine-pseudoephedrine-DM 30-2-10 MG/5ML syrup; Take 2.5 mL by mouth 4 (four) times a day as needed for cough or congestion    Acute bronchitis, unspecified organism  -Reviewed ED visit from 2025.  Patient diagnosed with pneumonia of left lower lobe.  Patient prescribed cefdinir 300 mg every other day for 10 days.  Patient has completed this, but symptoms have persisted.  - Will prescribe Z-Torito, take 500 mg on day 1, followed by 250 mg on days 2, 3, 4, 5.  Advised patient to take entire course of antibiotic even if feeling better before him.  -Will prescribe prednisone 40 mg daily x 5 days.  - Per review of patient's allergy list, patient is allergic to \"Dm-doxylamine-acetaminophen\", however there is not a reaction documented.  When asking patient about this, he was not aware he is allergic to it.  Patient reports he was taking Mucinex DM earlier today and did not have any reactions.  Will trial Bromfed-DM, 4 times daily as needed for cough/congestion.  Patient aware to take a small amount at a time to further see if he would have a reaction.  - Advised to rest and to stay well-hydrated.  - Advised to contact the office or report to ED if symptoms persist, worsen, or new symptoms arise.      Orders:    azithromycin (Zithromax) 250 mg tablet; Take 2 tablets (500 mg total) by mouth daily for 1 day, THEN 1 tablet (250 mg " total) daily for 4 days.    predniSONE 20 mg tablet; Take 2 tablets (40 mg total) by mouth daily for 5 days    Rash  -Will prescribe ketoconazole shampoo, apply twice weekly as needed.  Orders:    ketoconazole (NIZORAL) 2 % shampoo; Apply 1 Application topically 2 (two) times a week    ammonium lactate (LAC-HYDRIN) 12 % cream; Apply topically as needed for dry skin    Paraplegia (HCC)  - 2/2 Transverse Myelitis, ambulating in wheelchair.     - The patient needs a motorized scooter due to deconditioning, parapelgia, gait dysfunction, and due to frequent falls (5 falls per day) while using walker.  They also meet the following criteria:    - The patient has a mobility limitation that significantly impairs his/her ability to participate in 1 or more mobility related activities of daily living (MRADL) in the home:  Yes, they cannot stand and ambulate without assist of 1.  - The patient is able to safely use the scooter.  Yes, they have good judgment and insight.   Patient is able to safely transfer to and from a scooter.  Patient is able to operate the [employer] steering system.  Patient is able to maintain postural stability and position while operating scooter at home and outside the home.   - The mobility limitation isn't sufficiently and safely resolved by using an appropriately fitted cane or walker:  Patient has been experiencing about 5 falls per day, despite using a walker.  Patient does not have enough arm and hand strength to operate an optimally configured manual wheelchair to perform MRADLs at home and outside the home during a typical day.  - The functional mobility deficit can be sufficiently resolved by use of a scooter:  Yes, using a scooter will significantly improve patient's ability to participate in MRDALs by providing patient the needed support and therefore, greatly reducing fall risk.   - Patient will be able to use the scooter both inside and outside the home.    - The patient does feel the scooter  will be safe to use inside the home.  - The functional mobility deficit can be sufficiently resolved by use of a scooter, and a scooter is necessary to reduce harm from fall risks: Yes, with the support, patient will gain more independence in self-care, be less dependent on others for self-care, and be able to safely attend appointments without risks of falls.                History of Present Illness     Patient is a 44 y.o. male whom  has a past medical history of Acute pulmonary edema ([facility]) (01/13/2022), Ambulatory dysfunction, Anemia, Anemia, iron deficiency, Asymptomatic bacteriuria (09/25/2017), Atrial fibrillation ([facility]), AVM (arteriovenous malformation) of duodenum, acquired, Bacteriuria, asymptomatic, Bipolar disorder ([facility]), C. difficile colitis (06/23/2022), Chronic deep vein thrombosis (DVT) ([facility]), Chronic indwelling Ortega catheter, Chronic kidney disease, Chronic pain, Chronic pain disorder, Chronic suprapubic catheter ([facility]), Clostridium difficile infection (08/11/2016), Colostomy on examination ([facility]), Decubitus ulcer, Delirium, Diabetes mellitus ([facility]), GERD (gastroesophageal reflux disease), Hemodialysis patient ([facility]), Hypertension, Memory impairment (2011), Neurogenic bladder, OAB (overactive bladder), Paraplegia ([facility]), Penile abscess, Pressure injury of left ischium, stage 4 ([facility]) (07/03/2017), Pressure ulcer of left hip, stage 4 ([facility]) (03/12/2021), Pressure ulcer of other site, stage 4 ([facility]) (03/12/2021), Pressure ulcer of sacral region, stage 4 ([facility]) (08/13/2020), S/P unilateral BKA (below knee amputation) ([facility]), Sebaceous cyst (removed in 2017), Seizures ([facility]), Shingles (N/A), Shortness of breath, Tobacco abuse, 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 who is seen today in office for ED visit follow-up.    -Patient had presented to ED on 1/21/2025 due to fever, cough, shortness of breath.  Patient  diagnosed with pneumonia of left lower lobe and prescribed cefdinir.  Today, patient notes he has completed the cefdinir, but continues to experience chills, sweats, wheezing, productive cough, shortness of breath.  Patient notes he has been taking Mucinex with little relief.    -Patient notes he is trying to obtain a new wheelchair and requires documentation for it.    -Patient notes he has fungal rash of his scalp and other parts of his body.  He is requesting a shampoo to help with this.      Review of Systems   Constitutional:  Positive for chills. Negative for activity change, fever and unexpected weight change.   HENT:  Positive for congestion. Negative for ear discharge, rhinorrhea and sore throat.         Itchiness of ears   Eyes:  Negative for pain and visual disturbance.   Respiratory:  Positive for cough, chest tightness, shortness of breath and wheezing.    Cardiovascular:  Negative for chest pain, palpitations and leg swelling.   Gastrointestinal:  Positive for constipation (occasionally). Negative for abdominal pain, diarrhea and vomiting.   Neurological:  Negative for dizziness and headaches.   Psychiatric/Behavioral:  Positive for sleep disturbance.    All other systems reviewed and are negative.      Objective   /68 (BP Location: Left arm, Patient Position: Sitting, Cuff Size: Standard)   Pulse 88   Temp (!) 97.2 °F (36.2 °C) (Temporal)   Resp 14   SpO2 90%      Physical Exam  Vitals and nursing note reviewed.   Constitutional:       General: He is not in acute distress.     Appearance: He is well-developed.      Comments: Seated in powerchair.   HENT:      Head: Normocephalic and atraumatic.      Right Ear: External ear normal.      Left Ear: External ear normal.      Nose: Nose normal.      Mouth/Throat:      Pharynx: Uvula midline.   Eyes:      Conjunctiva/sclera: Conjunctivae normal.      Pupils: Pupils are equal, round, and reactive to light.   Cardiovascular:      Rate and Rhythm:  Normal rate and regular rhythm.      Pulses: Normal pulses.      Heart sounds: Normal heart sounds. No murmur heard.  Pulmonary:      Effort: Pulmonary effort is normal. No respiratory distress.      Breath sounds: Wheezing present.   Musculoskeletal:      Cervical back: Normal range of motion and neck supple.   Skin:     General: Skin is warm and dry.   Neurological:      Mental Status: He is alert and oriented to person, place, and time.   Psychiatric:         Speech: Speech normal.         Behavior: Behavior normal.

## 2025-02-05 ENCOUNTER — TELEPHONE (OUTPATIENT)
Dept: ADMINISTRATIVE | Facility: OTHER | Age: 45
End: 2025-02-05

## 2025-02-05 RX ORDER — AMMONIUM LACTATE 12 G/100G
CREAM TOPICAL AS NEEDED
Qty: 385 G | Refills: 2 | Status: SHIPPED | OUTPATIENT
Start: 2025-02-05

## 2025-02-05 NOTE — ASSESSMENT & PLAN NOTE
- 2/2 Transverse Myelitis, ambulating in wheelchair.     - The patient needs a motorized scooter due to deconditioning, parapelgia, gait dysfunction, and due to frequent falls (5 falls per day) while using walker.  They also meet the following criteria:    - The patient has a mobility limitation that significantly impairs his/her ability to participate in 1 or more mobility related activities of daily living (MRADL) in the home:  Yes, they cannot stand and ambulate without assist of 1.  - The patient is able to safely use the scooter.  Yes, they have good judgment and insight.   Patient is able to safely transfer to and from a scooter.  Patient is able to operate the [employer] steering system.  Patient is able to maintain postural stability and position while operating scooter at home and outside the home.   - The mobility limitation isn't sufficiently and safely resolved by using an appropriately fitted cane or walker:  Patient has been experiencing about 5 falls per day, despite using a walker.  Patient does not have enough arm and hand strength to operate an optimally configured manual wheelchair to perform MRADLs at home and outside the home during a typical day.  - The functional mobility deficit can be sufficiently resolved by use of a scooter:  Yes, using a scooter will significantly improve patient's ability to participate in MRDALs by providing patient the needed support and therefore, greatly reducing fall risk.   - Patient will be able to use the scooter both inside and outside the home.    - The patient does feel the scooter will be safe to use inside the home.  - The functional mobility deficit can be sufficiently resolved by use of a scooter, and a scooter is necessary to reduce harm from fall risks: Yes, with the support, patient will gain more independence in self-care, be less dependent on others for self-care, and be able to safely attend appointments without risks of falls.

## 2025-02-05 NOTE — TELEPHONE ENCOUNTER
----- Message from Kay Ronquillo PA-C sent at 2/3/2025  7:17 PM EST -----  Regarding: DM eye exam  02/03/25 7:17 PM    Hello, our patient Ovidio Parkinson Jr. has had Diabetic Eye Exam completed/performed. Please assist in updating the patient chart by pulling the document from Encounters Tab within Chart Review. The date of service is 12/17/2024.     Thank you,  ALY Rae

## 2025-02-05 NOTE — TELEPHONE ENCOUNTER
----- Message from Kay Ronquillo PA-C sent at 2/3/2025  7:20 PM EST -----  Regarding: HgbA1c  02/03/25 7:20 PM    Hello, our patient Ovidio Parkinson . has had Hemoglobin A1c completed/performed. Please assist in updating the patient chart by pulling the Care Everywhere (CE) document. The date of service is 12/3/2024.    Thank you,  Kay Ronquillo PA-C   ASHANTI BUTTERFIELD

## 2025-02-07 ENCOUNTER — TELEPHONE (OUTPATIENT)
Dept: FAMILY MEDICINE CLINIC | Facility: CLINIC | Age: 45
End: 2025-02-07

## 2025-02-07 NOTE — TELEPHONE ENCOUNTER
Upon review of the In Basket request we were able to locate, review, and update the patient chart as requested for Diabetic Eye Exam and Hemoglobin A1c.    Any additional questions or concerns should be emailed to the Practice Liaisons via the appropriate education email address, please do not reply via In Basket.    Thank you  Rosie Fitzpatrick MA   [facility]

## 2025-02-07 NOTE — TELEPHONE ENCOUNTER
PCP SIGNATURE NEEDED FOR [facility]  FORM RECEIVED VIA FAX AND PLACED IN PCP FOLDER TO BE DELIVERED AT ASSIGNED TIMES.     Date order received: 02/05/25

## 2025-02-07 NOTE — TELEPHONE ENCOUNTER
PCP SIGNATURE NEEDED FOR [facility]  FORM RECEIVED VIA FAX AND PLACED IN PCP FOLDER TO BE DELIVERED AT ASSIGNED TIMES.    Plan of care

## 2025-02-11 ENCOUNTER — TELEPHONE (OUTPATIENT)
Dept: FAMILY MEDICINE CLINIC | Facility: CLINIC | Age: 45
End: 2025-02-11

## 2025-02-11 NOTE — TELEPHONE ENCOUNTER
PCP SIGNATURE NEEDED FOR [facility]  FORM RECEIVED VIA FAX AND PLACED IN PCP FOLDER TO BE DELIVERED AT ASSIGNED TIMES.     Date order received:02/05/25

## 2025-02-12 NOTE — TELEPHONE ENCOUNTER
FAXED ON 02/12/25 TO [facility]  at [phone]. FAX CONFIRMATION RECEIVED.

## 2025-02-13 ENCOUNTER — OFFICE VISIT (OUTPATIENT)
Dept: WOUND CARE | Facility: CLINIC | Age: 45
End: 2025-02-13
Payer: COMMERCIAL

## 2025-02-13 VITALS
SYSTOLIC BLOOD PRESSURE: 142 MMHG | TEMPERATURE: 98.8 F | DIASTOLIC BLOOD PRESSURE: 78 MMHG | HEART RATE: 92 BPM | RESPIRATION RATE: 18 BRPM

## 2025-02-13 DIAGNOSIS — L89.894 PRESSURE ULCER OF OTHER SITE, STAGE 4 (HCC): ICD-10-CM

## 2025-02-13 DIAGNOSIS — L89.154 PRESSURE INJURY OF SACRAL REGION, STAGE 4 (HCC): Primary | ICD-10-CM

## 2025-02-13 DIAGNOSIS — L89.324 PRESSURE INJURY OF LEFT ISCHIUM, STAGE 4 (HCC): ICD-10-CM

## 2025-02-13 DIAGNOSIS — L89.224 PRESSURE ULCER OF LEFT HIP, STAGE 4 (HCC): ICD-10-CM

## 2025-02-13 PROCEDURE — 97598 DBRDMT OPN WND ADDL 20CM/<: CPT | Performed by: NURSE PRACTITIONER

## 2025-02-13 PROCEDURE — 99214 OFFICE O/P EST MOD 30 MIN: CPT | Performed by: NURSE PRACTITIONER

## 2025-02-13 PROCEDURE — 97597 DBRDMT OPN WND 1ST 20 CM/<: CPT | Performed by: NURSE PRACTITIONER

## 2025-02-13 RX ORDER — LIDOCAINE 40 MG/G
CREAM TOPICAL ONCE
Status: COMPLETED | OUTPATIENT
Start: 2025-02-13 | End: 2025-02-13

## 2025-02-13 RX ADMIN — LIDOCAINE: 40 CREAM TOPICAL at 14:00

## 2025-02-13 NOTE — LETTER
[facility]  [address] [address]  Phone#  [phone]  Fax#  [phone]    Patient:  Ovidio Parkinson Jr.  YOB: 1980  Phone:  [phone]  Date of Visit:  2/13/2025    Orders Placed This Encounter   Procedures   • Wound cleansing and dressings     Wound cleansing and dressings                                         Wash your hands with soap and water.  Remove old dressing, discard into plastic bag and place in trash.    Cleanse the wounds with Dakin's solution if there is an odor, IF NO ODOR, cleanse with normal saline or wound wash prior to applying a clean dressing.   Do not use tissue or cotton balls. Do not scrub the wound. Pat dry using gauze.   Shower no; do not get dressing wet      Left Hip Wound, Sacral, Left ischium, and perineal wound:   Cleanse with Dakin's solution  Apply saline moist to dry gauze  Cover with  ABD over top   Secure with Medfix tape.   Change dressing daily and top dressing PRN for breakthrough drainage (visiting nurses to do twice per week and family in between)         Continue using Clinitron bed      Continue NO PRESSURE TO ALL WOUNDS AS MUCH AS POSSIBLE, ESPECIALLY PERINEAL WOUND   LIMIT SITTING UPRIGHT IN WHEELCHAIR ON THIS WOUND       Continue 4-5 serving protein daily in diet, consider restarting Jim 2 x per day if able    Continue visiting nurse skilled 2 x per week for wound care dressing changes.                              Quit smoking or try to cut back on how much you smoke. As discussed smoking slows the ability for a wound to heal by constricting blood vessels for approximately 30 minutes after each cigarette.    Follow up at the wound center in 4 weeks with Estela OROZCO     Standing Status:   Future     Expiration Date:   2/20/2025   • Wound Procedure Treatment     This order was created via procedure documentation   • Debridement Pressure Injury Left Ischium     This order was created  via procedure documentation   • Debridement Pressure Injury Perineum     This order was created via procedure documentation   • Debridement Pressure Injury Sacrum     This order was created via procedure documentation         Electronically signed by ERNESTO Avendano

## 2025-02-13 NOTE — PATIENT INSTRUCTIONS
Orders Placed This Encounter   Procedures    Wound cleansing and dressings     Wound cleansing and dressings                                         Wash your hands with soap and water.  Remove old dressing, discard into plastic bag and place in trash.    Cleanse the wounds with Dakin's solution if there is an odor, IF NO ODOR, cleanse with normal saline or wound wash prior to applying a clean dressing.   Do not use tissue or cotton balls. Do not scrub the wound. Pat dry using gauze.   Shower no; do not get dressing wet      Left Hip Wound, Sacral, Left ischium, and perineal wound:   Cleanse with Dakin's solution  Apply saline moist to dry gauze  Cover with  ABD over top   Secure with Medfix tape.   Change dressing daily and top dressing PRN for breakthrough drainage (visiting nurses to do twice per week and family in between)         Continue using Clinitron bed      Continue NO PRESSURE TO ALL WOUNDS AS MUCH AS POSSIBLE, ESPECIALLY PERINEAL WOUND   LIMIT SITTING UPRIGHT IN WHEELCHAIR ON THIS WOUND       Continue 4-5 serving protein daily in diet, consider restarting Jim 2 x per day if able    Continue visiting nurse skilled 2 x per week for wound care dressing changes.                              Quit smoking or try to cut back on how much you smoke. As discussed smoking slows the ability for a wound to heal by constricting blood vessels for approximately 30 minutes after each cigarette.    Follow up at the wound center in 4 weeks with Estela OROZCO     Standing Status:   Future     Expiration Date:   2/20/2025    Wound Procedure Treatment     This order was created via procedure documentation    Debridement Pressure Injury Left Ischium     This order was created via procedure documentation    Debridement Pressure Injury Perineum     This order was created via procedure documentation    Debridement Pressure Injury Sacrum     This order was created via procedure documentation

## 2025-02-13 NOTE — LETTER
[facility]  [address] [address]  Phone#  [phone]  Fax#  [phone]    Patient:  Ovidio Parkinson Jr.  YOB: 1980  Phone:  [phone]  Date of Visit:  2/13/2025    Orders Placed This Encounter   Procedures   • Wound cleansing and dressings     Wound cleansing and dressings                                         Wash your hands with soap and water.  Remove old dressing, discard into plastic bag and place in trash.    Cleanse the wounds with Dakin's solution if there is an odor, IF NO ODOR, cleanse with normal saline or wound wash prior to applying a clean dressing.   Do not use tissue or cotton balls. Do not scrub the wound. Pat dry using gauze.   Shower no; do not get dressing wet      Left Hip Wound, Sacral, Left ischium, and perineal wound:   Cleanse with Dakin's solution  Apply saline moist to dry gauze  Cover with  ABD over top   Secure with Medfix tape.   Change dressing daily and top dressing PRN for breakthrough drainage (visiting nurses to do twice per week and family in between)         Continue using Clinitron bed      Continue NO PRESSURE TO ALL WOUNDS AS MUCH AS POSSIBLE, ESPECIALLY PERINEAL WOUND   LIMIT SITTING UPRIGHT IN WHEELCHAIR ON THIS WOUND       Continue 4-5 serving protein daily in diet, consider restarting Jim 2 x per day if able    Continue visiting nurse skilled 2 x per week for wound care dressing changes.                              Quit smoking or try to cut back on how much you smoke. As discussed smoking slows the ability for a wound to heal by constricting blood vessels for approximately 30 minutes after each cigarette.    Follow up at the wound center in 4 weeks with Estela OROZCO     Standing Status:   Future     Expiration Date:   2/20/2025   • Wound Procedure Treatment     This order was created via procedure documentation   • Debridement Pressure Injury Left Ischium     This order was created  via procedure documentation   • Debridement Pressure Injury Perineum     This order was created via procedure documentation   • Debridement Pressure Injury Sacrum     This order was created via procedure documentation         Electronically signed by ERNESTO Avendano

## 2025-02-13 NOTE — PROGRESS NOTES
Wound Procedure Treatment    Performed by: Cailin Gibbons RN  Authorized by: ERNESTO Avendano    Associated wounds:   Wound Pressure Injury Ischium Left  Wound Pressure Injury Perineum  Wound Pressure Injury Sacrum  Wound Pressure Injury Hip Left;Lateral  Wound cleansed with:  Dakin's 0.25% and NSS  Applied primary dressing:  Other  Applied secondary dressing:  ABD and Gauze  Dressing secured with:  Tape  Comments:  NSS moistened gauze applied to all wounds, covered with dry gauze and ABD secured with medfix tape dress

## 2025-02-13 NOTE — PROGRESS NOTES
Name: Ovidio Parkinson Jr.      : 1980      MRN: [id number]  Encounter Provider: ERNESTO Avendano  Encounter Date: 2025   Encounter department: [facility] WOUND CARE  :  Assessment & Plan  Pressure injury of sacral region, stage 4 (HCC)    Orders:    Wound cleansing and dressings; Future    lidocaine (LMX) 4 % cream    Wound Procedure Treatment    Pressure injury of left ischium, stage 4 (HCC)    Orders:    Wound cleansing and dressings; Future    lidocaine (LMX) 4 % cream    Wound Procedure Treatment    Pressure ulcer of other site, stage 4 (HCC)    Orders:    Wound cleansing and dressings; Future    lidocaine (LMX) 4 % cream    Wound Procedure Treatment    Pressure ulcer of left hip, stage 4 (HCC)    Orders:    Wound cleansing and dressings; Future    lidocaine (LMX) 4 % cream    Wound Procedure Treatment    Plan:  1.  F/U palliative visit.  Wounds debrided.  Wounds measuring relatively the same but have clean granular wound bases.  Continue current plan of care.  2.  A1C results reviewed with the patient today.  Patient maintaining tight glycemic control  3.  Patient is continue offloading pressure from wounds to enhance wound healing  4.  Patient will follow-up in 4 weeks    History of Present Illness   Chief Complaint   Patient presents with    Follow Up Wound Care Visit     Pressure ulcers of the sacrum, ischium, hip and perineum. Arrived with dressings intact to all wounds. Ovidio was recently hospitalized with pneumonia and still has a persistent cough.   F/u palliative visit multiple pressure ulcers.  Patient reports he was recently hospitalized for pneumonia and an ear infection.  He is currently on antibiotics for treatment.  He has no new complaints with his wounds.  He has been following wound care orders as recommended.  He denies any pain, fevers, or chills.      Objective   /78   Pulse 92   Temp 98.8 °F (37.1 °C)   Resp 18       Wound Pressure Injury  Ischium Left (Active)   Wound Image   02/13/25 1433   Wound Description Rolled edges;Epithelialization;Pink;Yellow 02/13/25 1450   Pressure Injury Stage 4 02/13/25 1450   Irene-wound Assessment Scar Tissue;Dry 02/13/25 1450   Wound Length (cm) 4.1 cm 02/13/25 1450   Wound Width (cm) 3.3 cm 02/13/25 1450   Wound Depth (cm) 0.6 cm 02/13/25 1450   Wound Surface Area (cm^2) 13.53 cm^2 02/13/25 1450   Wound Volume (cm^3) 8.118 cm^3 02/13/25 1450   Calculated Wound Volume (cm^3) 8.12 cm^3 02/13/25 1450   Change in Wound Size % 57.04 02/13/25 1450   Number of tunnels 1 10/31/24 1511   Tunneling 1 0.07 cm 01/09/25 1152   Tunneling 1 in depth located at 5 oclock 01/09/25 1152   Number of underminings 1 02/13/25 1450   Undermining 1 0.9 02/13/25 1450   Undermining 2 0 10/31/24 1511   Undermining 1 is depth extending from 5-6 deepest at 5 02/13/25 1450   Undermining 2 is depth extending from resolved 10/31/24 1511   Drainage Amount Moderate 02/13/25 1450   Drainage Description Serosanguineous;Tan 02/13/25 1450   Non-staged Wound Description Full thickness 12/05/24 1020   Treatments Irrigation with NSS 12/05/24 1020   Wound packed? No 05/23/24 1320   Dressing Changed Changed 03/07/24 1113   Patient Tolerance Tolerated well 10/31/24 1511   Dressing Status Removed 10/31/24 1511       Wound Pressure Injury Perineum (Active)   Wound Image   02/13/25 1433   Wound Description Pink;Slough;White;Rolled edges;Other (Comment) 02/13/25 1452   Pressure Injury Stage 4 02/13/25 1452   Irene-wound Assessment Scar Tissue 02/13/25 1452   Wound Length (cm) 8.5 cm 02/13/25 1452   Wound Width (cm) 5.8 cm 02/13/25 1452   Wound Depth (cm) 0.9 cm 02/13/25 1452   Wound Surface Area (cm^2) 49.3 cm^2 02/13/25 1452   Wound Volume (cm^3) 44.37 cm^3 02/13/25 1452   Calculated Wound Volume (cm^3) 44.37 cm^3 02/13/25 1452   Change in Wound Size % -322.57 02/13/25 1452   Tunneling 1 in depth located at 0 10/05/23 1458   Number of underminings 2 02/13/25 1452    Undermining 1 1 02/13/25 1452   Undermining 2 1.8 02/13/25 1452   Undermining 1 is depth extending from 2-4 deepest at 3 02/13/25 1452   Undermining 2 is depth extending from 5-8 deepest at7 02/13/25 1452   Drainage Amount Large 02/13/25 1452   Drainage Description Serosanguineous 02/13/25 1452   Non-staged Wound Description Full thickness 12/05/24 1025   Treatments Irrigation with NSS 12/05/24 1025   Wound packed? No 05/23/24 1319   Dressing Changed Changed 03/07/24 1107   Patient Tolerance Tolerated well 10/31/24 1518   Dressing Status Removed 10/31/24 1518       Wound Pressure Injury Sacrum (Active)   Wound Image   02/13/25 1432   Wound Description Epithelialization;Pink;Slough;Rolled edges;White 02/13/25 1454   Pressure Injury Stage 4 02/13/25 1454   Irene-wound Assessment Scar Tissue;Dry 02/13/25 1454   Wound Length (cm) 5.5 cm 02/13/25 1454   Wound Width (cm) 9 cm 02/13/25 1454   Wound Depth (cm) 0.8 cm 02/13/25 1454   Wound Surface Area (cm^2) 49.5 cm^2 02/13/25 1454   Wound Volume (cm^3) 39.6 cm^3 02/13/25 1454   Calculated Wound Volume (cm^3) 39.6 cm^3 02/13/25 1454   Change in Wound Size % 47.2 02/13/25 1454   Number of underminings 1 12/05/24 1022   Undermining 1 1.5 12/05/24 1022   Undermining 2 0 10/31/24 1511   Undermining 1 is depth extending from 5-7 oclock 12/05/24 1022   Undermining 2 is depth extending from resolved 10/31/24 1511   Drainage Amount Large 02/13/25 1454   Drainage Description Serosanguineous 02/13/25 1454   Non-staged Wound Description Full thickness 12/05/24 1022   Treatments Irrigation with NSS 12/05/24 1022   Wound packed? No 05/23/24 1319   Dressing Changed Changed 03/07/24 1105   Patient Tolerance Tolerated well 10/31/24 1511   Dressing Status Removed 10/31/24 1511       Wound Pressure Injury Hip Left;Lateral (Active)   Wound Image   02/13/25 1432   Wound Description Pink;Rolled edges 02/13/25 1456   Pressure Injury Stage 4 02/13/25 1456   Irene-wound Assessment Scar  "Tissue;Intact 02/13/25 1456   Wound Length (cm) 1.6 cm 02/13/25 1456   Wound Width (cm) 2.3 cm 02/13/25 1456   Wound Depth (cm) 1.3 cm 02/13/25 1456   Wound Surface Area (cm^2) 3.68 cm^2 02/13/25 1456   Wound Volume (cm^3) 4.784 cm^3 02/13/25 1456   Calculated Wound Volume (cm^3) 4.78 cm^3 02/13/25 1456   Tunneling 1 0 cm 10/31/24 1510   Tunneling 1 in depth located at none 10/31/24 1510   Number of underminings 1 02/13/25 1456   Undermining 1 4.5 02/13/25 1456   Undermining 1 is depth extending from 7-12 deepest at 8 02/13/25 1456   Drainage Amount Large 02/13/25 1456   Drainage Description Serosanguineous 02/13/25 1456   Non-staged Wound Description Full thickness 12/05/24 1017   Treatments Irrigation with NSS 12/05/24 1017   Wound packed? Yes 05/23/24 1320   Packing- # removed 1 06/20/24 1401   Dressing Changed Changed 03/07/24 1111   Patient Tolerance Tolerated well 10/31/24 1510   Dressing Status Removed 10/31/24 1510       Debridement   Wound Pressure Injury Ischium Left    Universal Protocol:  procedure performed by consultantConsent: Written consent obtained.  Consent given by: patient  Time out: Immediately prior to procedure a \"time out\" was called to verify the correct patient, procedure, equipment, support staff and site/side marked as required.  Timeout called at: 2/13/2025 4:00 PM.  Patient identity confirmed: verbally with patient    Debridement Details  Performed by: NP  Debridement type: selective  Pain control: lidocaine 4%      Post-debridement measurements  Length (cm): 4.1  Width (cm): 3.3  Depth (cm): 0.6  Percent debrided: 100%  Surface Area (cm^2): 13.53  Area Debrided (cm^2): 13.53  Volume (cm^3): 8.12    Devitalized tissue debrided: biofilm, fibrin and slough  Instrument(s) utilized: curette  Bleeding: small  Hemostasis obtained with: pressure  Procedural pain (0-10): 0  Post-procedural pain: 0   Response to treatment: procedure was tolerated well    Debridement   Wound Pressure Injury " "Perineum    Universal Protocol:  procedure performed by consultantConsent: Written consent obtained.  Consent given by: patient  Time out: Immediately prior to procedure a \"time out\" was called to verify the correct patient, procedure, equipment, support staff and site/side marked as required.  Timeout called at: 2/13/2025 4:02 PM.  Patient identity confirmed: verbally with patient    Debridement Details  Performed by: NP  Debridement type: selective  Pain control: lidocaine 4%      Post-debridement measurements  Length (cm): 8.5  Width (cm): 5.8  Depth (cm): 0.9  Percent debrided: 100%  Surface Area (cm^2): 49.3  Area Debrided (cm^2): 49.3  Volume (cm^3): 44.37    Devitalized tissue debrided: biofilm, fibrin and slough  Instrument(s) utilized: curette  Bleeding: small  Hemostasis obtained with: pressure  Procedural pain (0-10): 0  Post-procedural pain: 0   Response to treatment: procedure was tolerated well    Debridement   Wound Pressure Injury Sacrum    Universal Protocol:  procedure performed by consultantConsent: Written consent obtained.  Consent given by: patient  Time out: Immediately prior to procedure a \"time out\" was called to verify the correct patient, procedure, equipment, support staff and site/side marked as required.  Timeout called at: 2/13/2025 4:02 PM.  Patient identity confirmed: verbally with patient    Debridement Details  Performed by: NP  Debridement type: selective  Pain control: lidocaine 4%      Post-debridement measurements  Length (cm): 5.5  Width (cm): 9  Depth (cm): 0.8  Percent debrided: 100%  Surface Area (cm^2): 49.5  Area Debrided (cm^2): 49.5  Volume (cm^3): 39.6    Devitalized tissue debrided: biofilm, fibrin and slough  Instrument(s) utilized: curette  Bleeding: small  Hemostasis obtained with: pressure  Procedural pain (0-10): 0  Post-procedural pain: 0   Response to treatment: procedure was tolerated well               "

## 2025-02-18 DIAGNOSIS — I10 HTN (HYPERTENSION), BENIGN: ICD-10-CM

## 2025-02-18 RX ORDER — AMLODIPINE BESYLATE 10 MG/1
10 TABLET ORAL DAILY
Qty: 90 TABLET | Refills: 1 | Status: SHIPPED | OUTPATIENT
Start: 2025-02-18

## 2025-02-19 ENCOUNTER — TELEPHONE (OUTPATIENT)
Dept: FAMILY MEDICINE CLINIC | Facility: CLINIC | Age: 45
End: 2025-02-19

## 2025-02-19 NOTE — TELEPHONE ENCOUNTER
PCP SIGNATURE NEEDED FOR [facility] FORM RECEIVED VIA FAX AND PLACED IN PCP FOLDER TO BE DELIVERED AT ASSIGNED TIMES.    Order number: [id number]

## 2025-02-23 DIAGNOSIS — L29.9 PRURITUS: ICD-10-CM

## 2025-02-23 DIAGNOSIS — K58.9 IRRITABLE BOWEL SYNDROME, UNSPECIFIED TYPE: ICD-10-CM

## 2025-02-23 DIAGNOSIS — F41.0 ANXIETY ATTACK: ICD-10-CM

## 2025-02-24 RX ORDER — DICYCLOMINE HYDROCHLORIDE 10 MG/1
CAPSULE ORAL
Qty: 270 CAPSULE | Refills: 1 | Status: SHIPPED | OUTPATIENT
Start: 2025-02-24

## 2025-02-24 RX ORDER — HYDROXYZINE HYDROCHLORIDE 50 MG/1
50 TABLET, FILM COATED ORAL 2 TIMES DAILY PRN
Qty: 180 TABLET | Refills: 1 | Status: SHIPPED | OUTPATIENT
Start: 2025-02-24

## 2025-03-03 DIAGNOSIS — D50.8 OTHER IRON DEFICIENCY ANEMIA: ICD-10-CM

## 2025-03-03 RX ORDER — FERROUS SULFATE 324(65)MG
324 TABLET, DELAYED RELEASE (ENTERIC COATED) ORAL DAILY
Qty: 30 TABLET | Refills: 11 | Status: SHIPPED | OUTPATIENT
Start: 2025-03-03

## 2025-03-09 DIAGNOSIS — K21.9 GASTROESOPHAGEAL REFLUX DISEASE WITHOUT ESOPHAGITIS: ICD-10-CM

## 2025-03-11 RX ORDER — DEXLANSOPRAZOLE 30 MG/1
1 CAPSULE, DELAYED RELEASE ORAL DAILY
Qty: 90 CAPSULE | Refills: 0 | Status: SHIPPED | OUTPATIENT
Start: 2025-03-11

## 2025-03-13 ENCOUNTER — OFFICE VISIT (OUTPATIENT)
Dept: WOUND CARE | Facility: CLINIC | Age: 45
End: 2025-03-13
Payer: COMMERCIAL

## 2025-03-13 VITALS
RESPIRATION RATE: 16 BRPM | SYSTOLIC BLOOD PRESSURE: 142 MMHG | DIASTOLIC BLOOD PRESSURE: 80 MMHG | HEART RATE: 96 BPM | TEMPERATURE: 97.1 F

## 2025-03-13 DIAGNOSIS — L89.224 PRESSURE ULCER OF LEFT HIP, STAGE 4 (HCC): ICD-10-CM

## 2025-03-13 DIAGNOSIS — L89.324 PRESSURE INJURY OF LEFT ISCHIUM, STAGE 4 (HCC): ICD-10-CM

## 2025-03-13 DIAGNOSIS — L89.894 PRESSURE ULCER OF OTHER SITE, STAGE 4 (HCC): ICD-10-CM

## 2025-03-13 DIAGNOSIS — L89.154 PRESSURE INJURY OF SACRAL REGION, STAGE 4 (HCC): Primary | ICD-10-CM

## 2025-03-13 PROCEDURE — 97597 DBRDMT OPN WND 1ST 20 CM/<: CPT | Performed by: NURSE PRACTITIONER

## 2025-03-13 PROCEDURE — 97598 DBRDMT OPN WND ADDL 20CM/<: CPT | Performed by: NURSE PRACTITIONER

## 2025-03-13 RX ORDER — LIDOCAINE 40 MG/G
CREAM TOPICAL ONCE
Status: COMPLETED | OUTPATIENT
Start: 2025-03-13 | End: 2025-03-13

## 2025-03-13 RX ADMIN — LIDOCAINE: 40 CREAM TOPICAL at 14:31

## 2025-03-13 NOTE — PROGRESS NOTES
Wound Procedure Treatment    Performed by: Orin Nicole RN  Authorized by: ERNESTO Avendano  Associated wounds:   Wound Pressure Injury Ischium Left  Wound Pressure Injury Perineum  Wound Pressure Injury Sacrum  Wound Pressure Injury Hip Left;Lateral    Wound cleansed with:  Wound aggrssively cleansed with NSS and gauze and Dakin's 0.25%   Applied primary dressing:  Packing   Applied secondary dressing:  ABD   Dressing secured with:  Tape   Comments:  Wound covered with dakin's moistened gauze & abd pad.

## 2025-03-13 NOTE — PATIENT INSTRUCTIONS
Orders Placed This Encounter   Procedures    Wound cleansing and dressings     Wound cleansing and dressings                                                          Wash your hands with soap and water.  Remove old dressing, discard into plastic bag and place in trash.    Cleanse the wounds with Dakin's solution if there is an odor, IF NO ODOR, cleanse with normal saline or wound wash prior to applying a clean dressing.   Do not use tissue or cotton balls. Do not scrub the wound. Pat dry using gauze.   Shower no; do not get dressing wet      Left Hip Wound, Sacral, Left ischium, and perineal wound:   Cleanse with Dakin's solution  Apply saline moist to dry gauze  Cover with  ABD over top   Secure with Medfix tape.   Change dressing daily and top dressing PRN for breakthrough drainage (visiting nurses to do twice per week and family in between)     If you would like Estela to examine your ostomy please bring a new appliance to place after exam.         Continue using Clinitron bed      Continue NO PRESSURE TO ALL WOUNDS AS MUCH AS POSSIBLE, ESPECIALLY PERINEAL WOUND   LIMIT SITTING UPRIGHT IN WHEELCHAIR ON THIS WOUND       Continue 4-5 serving protein daily in diet, consider restarting Jim 2 x per day if able     Continue visiting nurse skilled 2 x per week for wound care dressing changes.                      Quit smoking or try to cut back on how much you smoke. As discussed smoking slows the ability for a wound to heal by constricting blood vessels for approximately 30 minutes after each cigarette.     Follow up at the wound center in 4 weeks with Estela OROZCO     Standing Status:   Future     Expiration Date:   3/20/2025    Wound Procedure Treatment     This order was created via procedure documentation

## 2025-03-14 NOTE — PROGRESS NOTES
Name: Ovidio Parkinson Jr.      : 1980      MRN: [id number]  Encounter Provider: ERNESTO Avendano  Encounter Date: 3/13/2025   Encounter department: [facility] WOUND CARE  :  Assessment & Plan  Pressure injury of sacral region, stage 4 (HCC)    Orders:    lidocaine (LMX) 4 % cream    Wound cleansing and dressings; Future    Wound Procedure Treatment    Pressure injury of left ischium, stage 4 (HCC)    Orders:    lidocaine (LMX) 4 % cream    Wound cleansing and dressings; Future    Wound Procedure Treatment    Pressure ulcer of other site, stage 4 (HCC)    Orders:    lidocaine (LMX) 4 % cream    Wound cleansing and dressings; Future    Wound Procedure Treatment    Pressure ulcer of left hip, stage 4 (HCC)    Orders:    lidocaine (LMX) 4 % cream    Wound cleansing and dressings; Future    Wound Procedure Treatment    Plan:  1.  F/U palliative visit.  Wounds debrided.  Wounds are stable with clean granular wound bases.  Continue current plan of care  2.  A1C results reviewed with the patient today.  Patient maintaining tight glycemic control  3.  Patient will follow-up in 4 weeks    History of Present Illness   Chief Complaint   Patient presents with    Follow Up Wound Care Visit     Follow up visit for wound to sacral region.  Pt denies any issues or concerns since last visit.    Here for wound follow up.  F/u palliative visit for multiple pressure injuries.  No new complaints.  He denies any pain, fevers, or chills.      Objective   /80   Pulse 96   Temp (!) 97.1 °F (36.2 °C) (Tympanic)   Resp 16       Wound Pressure Injury Ischium Left (Active)   Wound Image   25 1405   Wound Description Pink 25 1413   Pressure Injury Stage 4 25 1450   Non-staged Wound Description Full thickness 24 1020   Wound Length (cm) 4.2 cm 25 1413   Wound Width (cm) 3.5 cm 25 1413   Wound Depth (cm) 0.7 cm 25 1413   Wound Surface Area (cm^2) 14.7 cm^2  03/13/25 1413   Wound Volume (cm^3) 10.29 cm^3 03/13/25 1413   Calculated Wound Volume (cm^3) 10.29 cm^3 03/13/25 1413   Change in Wound Size % 45.56 03/13/25 1413   Number of tunnels 1 10/31/24 1511   Tunneling 1 0.07 cm 01/09/25 1152   Tunneling 1 in depth located at 5 oclock 01/09/25 1152   Number of underminings 1 02/13/25 1450   Undermining 1 0.6 03/13/25 1413   Undermining 2 0.4 03/13/25 1413   Undermining 1 is depth extending from 4 oclock to 6 oclock, deepest @ 4 03/13/25 1413   Undermining 2 is depth extending from 9 o'clock to 10 o'clock, deepest @ 9 03/13/25 1413   Drainage Amount Large 03/13/25 1413   Drainage Description Serosanguineous;Tan 03/13/25 1413   Irene-wound Assessment Scar Tissue;Dry 03/13/25 1413   Treatments Irrigation with NSS 03/13/25 1413   Wound packed? No 05/23/24 1320   Dressing Changed Changed 03/07/24 1113   Patient Tolerance Tolerated well 03/13/25 1413   Dressing Status Removed 03/13/25 1413       Wound Pressure Injury Perineum (Active)   Wound Image   03/13/25 1419   Wound Description Pink;White;Rolled edges;Probes to bone 03/13/25 1420   Pressure Injury Stage 4 03/13/25 1420   Non-staged Wound Description Full thickness 03/13/25 1420   Wound Length (cm) 8.6 cm 03/13/25 1420   Wound Width (cm) 5.8 cm 03/13/25 1420   Wound Depth (cm) 1.4 cm 03/13/25 1420   Wound Surface Area (cm^2) 49.88 cm^2 03/13/25 1420   Wound Volume (cm^3) 69.832 cm^3 03/13/25 1420   Calculated Wound Volume (cm^3) 69.83 cm^3 03/13/25 1420   Change in Wound Size % -565.05 03/13/25 1420   Tunneling 1 in depth located at 0 10/05/23 1458   Number of underminings 2 02/13/25 1452   Undermining 1 0.6 03/13/25 1420   Undermining 2 2.1 03/13/25 1420   Undermining 1 is depth extending from 1-4 o'clock, deepest @ 3 03/13/25 1420   Undermining 2 is depth extending from 5 to 7 o'clock, deepest @ 7 03/13/25 1420   Drainage Amount Large 03/13/25 1420   Drainage Description Serosanguineous;Yellow 03/13/25 1420   Irene-wound  Assessment Scar Tissue;Dry;Scaly 03/13/25 1420   Treatments Irrigation with NSS 12/05/24 1025   Wound packed? No 05/23/24 1319   Dressing Changed Changed 03/07/24 1107   Patient Tolerance Tolerated well 03/13/25 1420   Dressing Status Remoistened 03/13/25 1420       Wound Pressure Injury Sacrum (Active)   Wound Image   03/13/25 1416   Wound Description Epithelialization;Pink;Slough;Rolled edges;White 03/13/25 1416   Pressure Injury Stage 4 03/13/25 1416   Non-staged Wound Description Full thickness 12/05/24 1022   Wound Length (cm) 2.3 cm 03/13/25 1416   Wound Width (cm) 8.9 cm 03/13/25 1416   Wound Depth (cm) 0.7 cm 03/13/25 1416   Wound Surface Area (cm^2) 20.47 cm^2 03/13/25 1416   Wound Volume (cm^3) 14.329 cm^3 03/13/25 1416   Calculated Wound Volume (cm^3) 14.33 cm^3 03/13/25 1416   Change in Wound Size % 80.89 03/13/25 1416   Number of underminings 1 12/05/24 1022   Undermining 1 0 03/13/25 1416   Undermining 2 0 10/31/24 1511   Undermining 1 is depth extending from resovled 03/13/25 1416   Undermining 2 is depth extending from resolved 10/31/24 1511   Drainage Amount Large 03/13/25 1416   Drainage Description Serosanguineous;Yellow 03/13/25 1416   Irene-wound Assessment Scar Tissue;Dry 03/13/25 1416   Treatments Irrigation with NSS 03/13/25 1416   Wound packed? No 05/23/24 1319   Dressing Changed Changed 03/07/24 1105   Patient Tolerance Tolerated well 03/13/25 1416   Dressing Status Removed 03/13/25 1416       Wound Pressure Injury Hip Left;Lateral (Active)   Wound Image   03/13/25 1423   Wound Description Pink;Rolled edges 03/13/25 1423   Pressure Injury Stage 4 03/13/25 1423   Non-staged Wound Description Full thickness 12/05/24 1017   Wound Length (cm) 2.5 cm 03/13/25 1423   Wound Width (cm) 2.3 cm 03/13/25 1423   Wound Depth (cm) 1.3 cm 03/13/25 1423   Wound Surface Area (cm^2) 5.75 cm^2 03/13/25 1423   Wound Volume (cm^3) 7.475 cm^3 03/13/25 1423   Calculated Wound Volume (cm^3) 7.48 cm^3 03/13/25 1423  "  Tunneling 1 0 cm 10/31/24 1510   Tunneling 1 in depth located at none 10/31/24 1510   Number of underminings 2 03/13/25 1423   Undermining 1 2.3 03/13/25 1423   Undermining 2 4.4 03/13/25 1423   Undermining 1 is depth extending from 12-3 o'clock, deepest @ 12 03/13/25 1423   Undermining 2 is depth extending from 7 - 12 o'clock deepest @ 10 03/13/25 1423   Drainage Amount Large 03/13/25 1423   Drainage Description Serosanguineous;Yellow 03/13/25 1423   Irene-wound Assessment Scar Tissue;Intact 03/13/25 1423   Treatments Irrigation with NSS 03/13/25 1423   Wound packed? Yes 05/23/24 1320   Packing- # removed 1 06/20/24 1401   Dressing Changed Changed 03/07/24 1111   Patient Tolerance Tolerated well 03/13/25 1423   Dressing Status Removed 03/13/25 1423       Debridement   Wound Pressure Injury Ischium Left     Date/Time: 3/13/2025 1:30 PM  Universal Protocol:  procedure performed by consultantConsent: Written consent obtained.  Consent given by: patient  Time out: Immediately prior to procedure a \"time out\" was called to verify the correct patient, procedure, equipment, support staff and site/side marked as required.  Timeout called at: 3/14/2025 10:30 AM.  Patient identity confirmed: verbally with patient    Debridement Details  Performed by: NP  Debridement type: selective  Pain control: lidocaine 4%    Post-debridement measurements  Length (cm): 4.2  Width (cm): 3.5  Depth (cm): 0.7  Percent debrided: 100%  Surface Area (cm^2): 14.7  Area Debrided (cm^2): 14.7  Volume (cm^3): 10.29    Devitalized tissue debrided: biofilm, fibrin and slough  Instrument(s) utilized: curette  Bleeding: small  Hemostasis obtained with: pressure  Procedural pain (0-10): 0  Post-procedural pain: 0   Response to treatment: procedure was tolerated well    Debridement   Wound Pressure Injury Perineum     Date/Time: 3/13/2025 1:30 PM  Universal Protocol:  procedure performed by consultantConsent: Written consent obtained.  Consent given by: " "patient  Time out: Immediately prior to procedure a \"time out\" was called to verify the correct patient, procedure, equipment, support staff and site/side marked as required.  Timeout called at: 3/14/2025 10:30 AM.  Patient identity confirmed: verbally with patient    Debridement Details  Performed by: NP  Debridement type: selective  Pain control: lidocaine 4%    Post-debridement measurements  Length (cm): 8.6  Width (cm): 5.8  Depth (cm): 1.4  Percent debrided: 100%  Surface Area (cm^2): 49.88  Area Debrided (cm^2): 49.88  Volume (cm^3): 69.83    Devitalized tissue debrided: biofilm, fibrin and slough  Instrument(s) utilized: curette  Bleeding: small  Hemostasis obtained with: pressure  Procedural pain (0-10): 0  Post-procedural pain: 0   Response to treatment: procedure was tolerated well    Debridement   Wound Pressure Injury Sacrum     Date/Time: 3/13/2025 1:30 PM  Universal Protocol:  procedure performed by consultantConsent: Written consent obtained.  Consent given by: patient  Time out: Immediately prior to procedure a \"time out\" was called to verify the correct patient, procedure, equipment, support staff and site/side marked as required.  Timeout called at: 3/14/2025 10:34 AM.  Patient identity confirmed: verbally with patient    Debridement Details  Performed by: NP  Debridement type: selective  Pain control: lidocaine 4%    Post-debridement measurements  Length (cm): 2.3  Width (cm): 8.9  Depth (cm): 0.7  Percent debrided: 100%  Surface Area (cm^2): 20.47  Area Debrided (cm^2): 20.47  Volume (cm^3): 14.33    Devitalized tissue debrided: biofilm, fibrin and slough  Instrument(s) utilized: curette  Bleeding: small  Hemostasis obtained with: pressure  Procedural pain (0-10): 0  Post-procedural pain: 0   Response to treatment: procedure was tolerated well    Debridement   Wound Pressure Injury Hip Left;Lateral     Date/Time: 3/13/2025 1:30 PM  Universal Protocol:  procedure performed by consultantConsent: " "Written consent obtained.  Consent given by: patient  Time out: Immediately prior to procedure a \"time out\" was called to verify the correct patient, procedure, equipment, support staff and site/side marked as required.  Timeout called at: 3/14/2025 10:37 AM.  Patient identity confirmed: verbally with patient    Debridement Details  Performed by: NP  Debridement type: selective  Pain control: lidocaine 4%    Post-debridement measurements  Length (cm): 2.5  Width (cm): 2.3  Depth (cm): 1.3  Percent debrided: 100%  Surface Area (cm^2): 5.75  Area Debrided (cm^2): 5.75  Volume (cm^3): 7.48    Devitalized tissue debrided: biofilm, fibrin and slough  Instrument(s) utilized: curette  Bleeding: small  Hemostasis obtained with: pressure  Procedural pain (0-10): 0  Post-procedural pain: 0   Response to treatment: procedure was tolerated well               "

## 2025-03-18 ENCOUNTER — TELEPHONE (OUTPATIENT)
Dept: FAMILY MEDICINE CLINIC | Facility: CLINIC | Age: 45
End: 2025-03-18

## 2025-03-18 NOTE — TELEPHONE ENCOUNTER
PCP SIGNATURE NEEDED FOR [employer] FORM RECEIVED VIA FAX AND PLACED IN PCP FOLDER TO BE DELIVERED AT ASSIGNED TIMES.    Requesting a copy of face to face encounter/visit notes

## 2025-03-21 DIAGNOSIS — R11.0 NAUSEA: ICD-10-CM

## 2025-03-21 RX ORDER — ONDANSETRON 4 MG/1
4 TABLET, ORALLY DISINTEGRATING ORAL EVERY 6 HOURS PRN
Qty: 30 TABLET | Refills: 2 | Status: SHIPPED | OUTPATIENT
Start: 2025-03-21

## 2025-03-24 ENCOUNTER — TELEPHONE (OUTPATIENT)
Dept: FAMILY MEDICINE CLINIC | Facility: CLINIC | Age: 45
End: 2025-03-24

## 2025-03-24 NOTE — TELEPHONE ENCOUNTER
PCP SIGNATURE NEEDED FOR Practitioner Request FORM RECEIVED VIA FAX AND PLACED IN PCP FOLDER TO BE DELIVERED AT ASSIGNED TIMES.

## 2025-03-25 ENCOUNTER — TELEPHONE (OUTPATIENT)
Dept: FAMILY MEDICINE CLINIC | Facility: CLINIC | Age: 45
End: 2025-03-25

## 2025-03-25 DIAGNOSIS — I10 HTN (HYPERTENSION), BENIGN: ICD-10-CM

## 2025-03-25 RX ORDER — DOXAZOSIN 2 MG/1
2 TABLET ORAL EVERY EVENING
Qty: 90 TABLET | Refills: 1 | Status: SHIPPED | OUTPATIENT
Start: 2025-03-25

## 2025-03-25 NOTE — TELEPHONE ENCOUNTER
[facility] called in regards to a fax that they sent over I called and informed her we did not receive a form recently and another form is going to be faxed over.

## 2025-03-26 ENCOUNTER — TELEPHONE (OUTPATIENT)
Dept: FAMILY MEDICINE CLINIC | Facility: CLINIC | Age: 45
End: 2025-03-26

## 2025-03-26 NOTE — TELEPHONE ENCOUNTER
FAXED ON 03/26/25 TO [facility] [employer] [employer]  at [phone]. FAX CONFIRMATION RECEIVED.

## 2025-03-26 NOTE — TELEPHONE ENCOUNTER
PCP SIGNATURE NEEDED FOR [employer]  FORM RECEIVED VIA FAX AND PLACED IN PCP FOLDER TO BE DELIVERED AT ASSIGNED TIMES.      PT EVALUATION

## 2025-03-27 DIAGNOSIS — D50.8 OTHER IRON DEFICIENCY ANEMIA: ICD-10-CM

## 2025-03-27 RX ORDER — FERROUS SULFATE 324(65)MG
324 TABLET, DELAYED RELEASE (ENTERIC COATED) ORAL DAILY
Qty: 90 TABLET | Refills: 4 | Status: SHIPPED | OUTPATIENT
Start: 2025-03-27

## 2025-03-31 NOTE — TELEPHONE ENCOUNTER
FAXED ON 03/31/25 TO  [email or web address]  at [phone]. FAX CONFIRMATION RECEIVED.

## 2025-04-01 ENCOUNTER — OFFICE VISIT (OUTPATIENT)
Dept: GASTROENTEROLOGY | Facility: CLINIC | Age: 45
End: 2025-04-01
Payer: COMMERCIAL

## 2025-04-01 VITALS — BODY MASS INDEX: 20.69 KG/M2 | WEIGHT: 170 LBS | SYSTOLIC BLOOD PRESSURE: 138 MMHG | DIASTOLIC BLOOD PRESSURE: 90 MMHG

## 2025-04-01 DIAGNOSIS — Z99.2 TYPE 1 DM WITH HYPERTENSION AND ESRD ON DIALYSIS (HCC): ICD-10-CM

## 2025-04-01 DIAGNOSIS — L98.429 ULCER OF SACRAL REGION, STAGE 4 (HCC): ICD-10-CM

## 2025-04-01 DIAGNOSIS — N18.6 TYPE 1 DM WITH HYPERTENSION AND ESRD ON DIALYSIS (HCC): ICD-10-CM

## 2025-04-01 DIAGNOSIS — I12.0 TYPE 1 DM WITH HYPERTENSION AND ESRD ON DIALYSIS (HCC): ICD-10-CM

## 2025-04-01 DIAGNOSIS — E10.22 TYPE 1 DM WITH HYPERTENSION AND ESRD ON DIALYSIS (HCC): ICD-10-CM

## 2025-04-01 DIAGNOSIS — K94.01 BLEEDING FROM COLOSTOMY STOMA (HCC): Primary | ICD-10-CM

## 2025-04-01 DIAGNOSIS — Z43.3 COLOSTOMY CARE (HCC): ICD-10-CM

## 2025-04-01 DIAGNOSIS — D64.9 CHRONIC ANEMIA: ICD-10-CM

## 2025-04-01 DIAGNOSIS — K21.9 GASTROESOPHAGEAL REFLUX DISEASE, UNSPECIFIED WHETHER ESOPHAGITIS PRESENT: ICD-10-CM

## 2025-04-01 DIAGNOSIS — Z99.3 WHEELCHAIR DEPENDENT: ICD-10-CM

## 2025-04-01 DIAGNOSIS — Z86.19 HISTORY OF CLOSTRIDIUM DIFFICILE INFECTION: ICD-10-CM

## 2025-04-01 DIAGNOSIS — Z93.59 CHRONIC SUPRAPUBIC CATHETER (HCC): Chronic | ICD-10-CM

## 2025-04-01 PROCEDURE — G2211 COMPLEX E/M VISIT ADD ON: HCPCS | Performed by: PHYSICIAN ASSISTANT

## 2025-04-01 PROCEDURE — 99214 OFFICE O/P EST MOD 30 MIN: CPT | Performed by: PHYSICIAN ASSISTANT

## 2025-04-01 NOTE — PROGRESS NOTES
Name: Ovidio Parkinson Jr.      : 1980      MRN: [id number]  Encounter Provider: Lovely Moy PA-C  Encounter Date: 2025   Encounter department: [facility] [place]  :  Assessment & Plan  Bleeding from colostomy stoma (HCC)  Patient complains of bleeding around ostomy site for the last 3 months.  He suspects this is secondary to irritation from the plastic and sleeping on the side of his stoma.  Picture of stoma was uploaded into patient's chart under media tab.  His hemoglobin has remained stable throughout this time.  He is on Eliquis.  At this time I recommend that patient be seen and evaluated by wound care team for further recommendations.  He does have an appointment scheduled April 10.  I will send a message to Estela Argueat nurse practitioner as an FYI.  All questions answered to the best my ability.  Patient was appreciative of care.       Chronic anemia  Etiology for anemia is likely multifactorial, anemia of chronic disease.  Patient wants to avoid procedures like EGD and colonoscopy if at all possible.  Given multiple comorbidities patient would be high risk for both EGD and colonoscopy.  His hemoglobin is recently at baseline 8-9.  Stool itself is brown.       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          Gastroesophageal reflux disease, unspecified whether esophagitis present  Stable and doing well on Dexilant once daily       Wheelchair dependent  As above.        Colostomy care (HCC)  As above.        Ulcer of sacral region, stage 4 (HCC)  As above.        Chronic suprapubic catheter ([facility])  As above.        History of Clostridium difficile infection  Patient is status post FMT in 2017.         Patient was  "instructed to call the office with any questions, concerns, new/ worsening/ persisting GI symptoms. Advised patient go to the ER with any severe or worsening abdominal pain, fevers/ chills, intractable N/V, chest pain, SOB, dizziness, significant bright red blood in the stool/blood per stoma, black stools.     Patient expressed understanding and is in agreement with treatment plan.       History of Present Illness   HPI  Ovidio Parkinson Jr. is a 44 y.o. 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  Patient was last seen in the office in July 2022, previous office note was reviewed.  Office visit from December 2021 also reviewed.      Patient notes he is struggling with ear infection currently.   He complains of osiel-stomal bleeding that started 3 months ago. He is seeing small to moderate amount of red blood several days a week. He describes the blood coming for a \"cut\" on the mucosa that happened perhaps due to his stoma appliance being too tight.  He states he does lay on his left side and on the stoma often.  He feels like the stoma appliance was rubbing on the mucosa and caused irritation and subsequent bleeding.  He otherwise denies pain around the stoma site or irritation.  No fevers or chills.  He " is scheduled to see wound care 4/10.  He notes he had a colostomy placed 17 years ago due to non-healing sacral ulcer which per patient has finally healed.   Stool itself is normal and brown.   He denies blood in stool or black stool.   He denies abdominal pain.   Tolerating diet. Denies chronic nausea or vomiting.   He is taking Dexilant 30 mg once daily which is controlling heartburn and acid reflux.   Patient tells me that he was scheduled for colonoscopy in the past but he was unable to tolerate the bowel preparation.  He would like to avoid invasive procedures like colonoscopy if at all possible.      Most recent  Hgb 9.5 at baseline, MCV normal.  He has history of iron deficiency and is on oral iron.  He has never had EGD.  He did have colonoscopy through the stoma in 2017 for FMT, with poor prep.     Per chart review patient is S/p EGD 8/28/17 with [facility] with evidence of two small duodenal bulb AVMs that were treated with argon plasma coagulation as well as gastritis.     There is no family history of GI tract cancers.    Review of Systems   Constitutional:  Negative for chills and fever.   HENT:  Negative for ear pain and sore throat.    Eyes:  Negative for pain and visual disturbance.   Respiratory:  Negative for cough and shortness of breath.    Cardiovascular:  Negative for chest pain and palpitations.   Gastrointestinal:  Negative for abdominal pain and vomiting.   Genitourinary:  Negative for dysuria and hematuria.   Musculoskeletal:  Negative for arthralgias and back pain.   Skin:  Negative for color change and rash.   Neurological:  Negative for seizures and syncope.   All other systems reviewed and are negative.      Objective   There were no vitals taken for this visit.     Physical Exam  Vitals reviewed.   Constitutional:       General: He is not in acute distress.     Appearance: He is well-developed.      Comments: Patient in wheelchair   HENT:      Head: Normocephalic and atraumatic.   Eyes:       Conjunctiva/sclera: Conjunctivae normal.   Cardiovascular:      Rate and Rhythm: Normal rate and regular rhythm.      Heart sounds: No murmur heard.  Pulmonary:      Effort: Pulmonary effort is normal. No respiratory distress.      Breath sounds: Normal breath sounds.   Abdominal:      Palpations: Abdomen is soft.      Tenderness: There is no abdominal tenderness.      Comments: Stoma visualized by myself and Ivis ELLIS PA-C my colleague  Picture uploaded in media tab with the patient consent  Minimal bright red blood surrounding mucosa  Mucosa itself appears healthy and pink   No discrete mucosal disruption seen like it cut.       Musculoskeletal:         General: No swelling.      Cervical back: Neck supple.   Skin:     General: Skin is warm and dry.      Capillary Refill: Capillary refill takes less than 2 seconds.   Neurological:      Mental Status: He is alert.   Psychiatric:         Mood and Affect: Mood normal.

## 2025-04-01 NOTE — TELEPHONE ENCOUNTER
PCP SIGNATURE NEEDED FOR [facility] FORM RECEIVED VIA FAX AND PLACED IN PCP FOLDER TO BE DELIVERED AT ASSIGNED TIMES.       Order# [id number]

## 2025-04-04 ENCOUNTER — TELEPHONE (OUTPATIENT)
Dept: FAMILY MEDICINE CLINIC | Facility: CLINIC | Age: 45
End: 2025-04-04

## 2025-04-04 ENCOUNTER — DOCUMENTATION (OUTPATIENT)
Dept: WOUND CARE | Facility: CLINIC | Age: 45
End: 2025-04-04

## 2025-04-04 NOTE — TELEPHONE ENCOUNTER
PCP SIGNATURE NEEDED FOR [facility] FORM RECEIVED VIA FAX AND PLACED IN PCP FOLDER TO BE DELIVERED AT ASSIGNED TIMES.    SOC/Recertification care summary date of home visit 3/11/25

## 2025-04-04 NOTE — PROGRESS NOTES
"Contacted patient to follow up on message received regarding \"bleeding at his stoma\". Ovidio clarified that the bleeding is \"around the outside of the stoma and is not coming from the inside\". He \"just wants someone to look at it\". His next appointment in the wound center is scheduled for 4/10 and he feels it can \"wait until then\". Sooner appointment offered and declined by patient. He was encouraged to contact us if there are changes and he needs to be seen sooner. He is in agreement with this.  "

## 2025-04-07 ENCOUNTER — TELEPHONE (OUTPATIENT)
Dept: FAMILY MEDICINE CLINIC | Facility: CLINIC | Age: 45
End: 2025-04-07

## 2025-04-07 NOTE — TELEPHONE ENCOUNTER
PCP SIGNATURE NEEDED FOR [facility] FOR[facility] RECEIVED VIA FAX AND PLACED IN PCP FOLDER TO BE DELIVERED AT ASSIGNED TIMES.    Plan of care  Certification period 4/7/25-6/5/25

## 2025-04-08 DIAGNOSIS — E53.9 VITAMIN B DEFICIENCY: ICD-10-CM

## 2025-04-08 RX ORDER — LANOLIN ALCOHOL/MO/W.PET/CERES
1000 CREAM (GRAM) TOPICAL DAILY
Qty: 90 TABLET | Refills: 3 | Status: SHIPPED | OUTPATIENT
Start: 2025-04-08

## 2025-04-10 ENCOUNTER — OFFICE VISIT (OUTPATIENT)
Dept: WOUND CARE | Facility: CLINIC | Age: 45
End: 2025-04-10
Payer: COMMERCIAL

## 2025-04-10 ENCOUNTER — TELEPHONE (OUTPATIENT)
Dept: FAMILY MEDICINE CLINIC | Facility: CLINIC | Age: 45
End: 2025-04-10

## 2025-04-10 VITALS
HEART RATE: 64 BPM | SYSTOLIC BLOOD PRESSURE: 142 MMHG | TEMPERATURE: 96.9 F | DIASTOLIC BLOOD PRESSURE: 84 MMHG | RESPIRATION RATE: 16 BRPM

## 2025-04-10 DIAGNOSIS — N18.6 TYPE 1 DIABETES MELLITUS WITH CHRONIC KIDNEY DISEASE ON CHRONIC DIALYSIS (HCC): ICD-10-CM

## 2025-04-10 DIAGNOSIS — Z99.2 TYPE 1 DIABETES MELLITUS WITH CHRONIC KIDNEY DISEASE ON CHRONIC DIALYSIS (HCC): ICD-10-CM

## 2025-04-10 DIAGNOSIS — L89.224 PRESSURE ULCER OF LEFT HIP, STAGE 4 (HCC): ICD-10-CM

## 2025-04-10 DIAGNOSIS — E10.22 TYPE 1 DIABETES MELLITUS WITH CHRONIC KIDNEY DISEASE ON CHRONIC DIALYSIS (HCC): ICD-10-CM

## 2025-04-10 DIAGNOSIS — L89.154 PRESSURE INJURY OF SACRAL REGION, STAGE 4 (HCC): ICD-10-CM

## 2025-04-10 DIAGNOSIS — L89.894 PRESSURE ULCER OF OTHER SITE, STAGE 4 (HCC): ICD-10-CM

## 2025-04-10 DIAGNOSIS — L89.324 PRESSURE INJURY OF LEFT ISCHIUM, STAGE 4 (HCC): Primary | ICD-10-CM

## 2025-04-10 DIAGNOSIS — K94.01 BLEEDING FROM COLOSTOMY STOMA (HCC): ICD-10-CM

## 2025-04-10 DIAGNOSIS — G82.20 PARAPLEGIA (HCC): ICD-10-CM

## 2025-04-10 PROCEDURE — 97597 DBRDMT OPN WND 1ST 20 CM/<: CPT | Performed by: NURSE PRACTITIONER

## 2025-04-10 PROCEDURE — 97598 DBRDMT OPN WND ADDL 20CM/<: CPT | Performed by: NURSE PRACTITIONER

## 2025-04-10 NOTE — TELEPHONE ENCOUNTER
PCP SIGNATURE NEEDED FOR [facility]  FORM RECEIVED VIA FAX AND PLACED IN PCP FOLDER TO BE DELIVERED AT ASSIGNED TIMES.     Certification period 04/07/25-06/05/25

## 2025-04-10 NOTE — PATIENT INSTRUCTIONS
Orders Placed This Encounter   Procedures    Wound cleansing and dressings     Wash your hands with soap and water.  Remove old dressing, discard into plastic bag and place in trash.    Cleanse the wounds with Dakin's solution if there is an odor, IF NO ODOR, cleanse with normal saline or wound wash prior to applying a clean dressing.   Do not use tissue or cotton balls. Do not scrub the wound. Pat dry using gauze.   Shower no; do not get dressing wet.        Left Hip Wound, Sacral, Left ischium, and perineal wounds:   Cleanse with Dakin's solution  Apply saline moist to dry gauze  Cover with  ABD over top   Secure with Medfix tape.   Change dressing daily and top dressing PRN for breakthrough drainage (visiting nurses to do twice per week and family in between)            Continue using Clinitron bed      Continue NO PRESSURE TO ALL WOUNDS AS MUCH AS POSSIBLE, ESPECIALLY PERINEAL WOUND   LIMIT SITTING UPRIGHT IN WHEELCHAIR ON THIS WOUND       Continue 4-5 serving protein daily in diet, consider restarting Ijm 2 x per day if able.       Continue visiting nurse skilled 2 x per week for wound care dressing changes.                           Follow up at the wound center in 4 weeks with Estela OROZCO     Standing Status:   Future     Expiration Date:   4/17/2025

## 2025-04-10 NOTE — PROGRESS NOTES
Wound Procedure Treatment    Performed by: Sanju Abraham RN  Authorized by: ERNESTO Avendano  Associated wounds:   Wound Pressure Injury Hip Left;Lateral  Wound Pressure Injury Sacrum  Wound Pressure Injury Perineum  Wound Pressure Injury Ischium Left    Wound cleansed with:  NSS   Applied secondary dressing:  Gauze and ABD   Dressing secured with:  Tape   Comments:  Saline wet/dry dressing

## 2025-04-10 NOTE — TELEPHONE ENCOUNTER
FAXED ON 04/10/25 TO [facility] at [phone]. FAX CONFIRMATION RECEIVED.

## 2025-04-10 NOTE — LETTER
[facility]  [address] [address]  Phone#  [phone]  Fax#  [phone]    Patient:  Ovidio Parkinson Jr.  YOB: 1980  Phone:  [phone]  Date of Visit:  4/10/2025    Orders Placed This Encounter   Procedures   • Wound cleansing and dressings     Wash your hands with soap and water.  Remove old dressing, discard into plastic bag and place in trash.    Cleanse the wounds with Dakin's solution if there is an odor, IF NO ODOR, cleanse with normal saline or wound wash prior to applying a clean dressing.   Do not use tissue or cotton balls. Do not scrub the wound. Pat dry using gauze.   Shower no; do not get dressing wet.        Left Hip Wound, Sacral, Left ischium, and perineal wounds:   Cleanse with Dakin's solution  Apply saline moist to dry gauze  Cover with  ABD over top   Secure with Medfix tape.   Change dressing daily and top dressing PRN for breakthrough drainage (visiting nurses to do twice per week and family in between)            Continue using Clinitron bed      Continue NO PRESSURE TO ALL WOUNDS AS MUCH AS POSSIBLE, ESPECIALLY PERINEAL WOUND   LIMIT SITTING UPRIGHT IN WHEELCHAIR ON THIS WOUND       Continue 4-5 serving protein daily in diet, consider restarting Jim 2 x per day if able.       Continue visiting nurse skilled 2 x per week for wound care dressing changes.                           Follow up at the wound center in 4 weeks with Estela OROZCO     Standing Status:   Future     Expiration Date:   4/17/2025   • Wound Procedure Treatment     This order was created via procedure documentation         Electronically signed by ERNESTO Avendano

## 2025-04-11 NOTE — ASSESSMENT & PLAN NOTE
Lab Results   Component Value Date    HGBA1C 7.0 12/03/2024       Orders:    Wound cleansing and dressings; Future

## 2025-04-11 NOTE — PROGRESS NOTES
Name: Oivdio Parkinson Jr.      : 1980      MRN: [id number]  Encounter Provider: ERNESTO Avendano  Encounter Date: 4/10/2025   Encounter department: [facility] WOUND CARE  :  Assessment & Plan  Pressure injury of left ischium, stage 4 (HCC)    Orders:    Wound cleansing and dressings; Future    Wound Procedure Treatment    Pressure injury of sacral region, stage 4 (HCC)    Orders:    Wound cleansing and dressings; Future    Wound Procedure Treatment    Pressure ulcer of other site, stage 4 (HCC)    Orders:    Wound cleansing and dressings; Future    Wound Procedure Treatment    Pressure ulcer of left hip, stage 4 (HCC)    Orders:    Wound cleansing and dressings; Future    Wound Procedure Treatment    Paraplegia ([facility])    Orders:    Wound cleansing and dressings; Future    Type 1 diabetes mellitus with chronic kidney disease on chronic dialysis ([facility])    Lab Results   Component Value Date    HGBA1C 7.0 2024       Orders:    Wound cleansing and dressings; Future    Bleeding from colostomy stoma ([facility])         Plan:  1.  S/U palliative visit.  Wounds debrided.  Wounds clean and stable with clean granular wound bases.  Continue current plan of care    2.  Patient presents today complaining of new presence of bleeding from stoma.  On assessment today bleeding appears to be coming from his mucocutaneous junction circumferentially.  Bleeding was able to be reproduced on palpation of his abdomen around his stoma.  Bleeding does not appear to be caused from rubbing of his ostomy appliance against his stoma, and he also does not have any peristomal irritation or skin breakdown secondary to leakage of effluent.  Peristomal skin not erythematous or indurated.  Bleeding does not appear to be a wound or ostomy appliance related issue.  Will reach out to GI for further recommendations.    3.  A1C results reviewed with the patient today.  Patient maintaining tight glycemic control    4.   Patient is to continue to increase his protein intake to enhance wound healing    5.  Patient will follow-up in 4 weeks    History of Present Illness   Chief Complaint   Patient presents with    Follow Up Wound Care Visit     Sacral, ischial and perineal wounds   Here for wound follow up.  F/U palliative visit for multiple pressure injuries of his sacrum, ischium, left hip, and perineal region.  Patient also presents with a new complaint of bleeding around his colostomy.  Patient reports this has been occurring for the past 3 to 4 months.  He is unsure of the exact cause.  Patient reports he has been cutting his ostomy appliance opening slightly larger to prevent rubbing against his stoma.  He is also utilizing strip paste to prevent the ostomy wafer from rubbing.  On assessment today patient has no open wounds present on his stoma or peristomal skin.  Peristomal skin is intact with no erythema, excoriation, or induration.  On palpation of patient's abdomen bleeding is able to be reproduced at the mucocutaneous junction.      Objective   /84   Pulse 64   Temp (!) 96.9 °F (36.1 °C)   Resp 16       Wound Pressure Injury Ischium Left (Active)   Wound Image   04/10/25 1435   Wound Description Pink;Yellow;Epithelialization 04/10/25 1441   Pressure Injury Stage 4 04/10/25 1441   Non-staged Wound Description Full thickness 12/05/24 1020   Wound Length (cm) 4.4 cm 04/10/25 1441   Wound Width (cm) 2.8 cm 04/10/25 1441   Wound Depth (cm) 1 cm 04/10/25 1441   Wound Surface Area (cm^2) 12.32 cm^2 04/10/25 1441   Wound Volume (cm^3) 12.32 cm^3 04/10/25 1441   Calculated Wound Volume (cm^3) 12.32 cm^3 04/10/25 1441   Change in Wound Size % 34.81 04/10/25 1441   Number of tunnels 1 10/31/24 1511   Tunneling 1 0.07 cm 01/09/25 1152   Tunneling 1 in depth located at 5 oclock 01/09/25 1152   Number of underminings 1 02/13/25 1450   Undermining 1 0.9 04/10/25 1441   Undermining 2 0.5 04/10/25 1441   Undermining 1 is depth  extending from 4-5, deepest at 5 04/10/25 1441   Undermining 2 is depth extending from 8-10, deepest at 9 04/10/25 1441   Drainage Amount Moderate 04/10/25 1441   Drainage Description Serosanguineous 04/10/25 1441   Irene-wound Assessment Intact;Dry;Scaly;Scar Tissue 04/10/25 1441   Treatments Irrigation with NSS 04/10/25 1441   Wound packed? No 05/23/24 1320   Dressing Changed Changed 03/07/24 1113   Patient Tolerance Tolerated well 03/13/25 1413   Dressing Status Removed 03/13/25 1413       Wound Pressure Injury Perineum (Active)   Wound Image   04/10/25 1436   Wound Description Pink;White;Hypergranulation 04/10/25 1446   Pressure Injury Stage 4 04/10/25 1446   Non-staged Wound Description Full thickness 03/13/25 1420   Wound Length (cm) 6.6 cm 04/10/25 1446   Wound Width (cm) 5.5 cm 04/10/25 1446   Wound Depth (cm) 1.1 cm 04/10/25 1446   Wound Surface Area (cm^2) 36.3 cm^2 04/10/25 1446   Wound Volume (cm^3) 39.93 cm^3 04/10/25 1446   Calculated Wound Volume (cm^3) 39.93 cm^3 04/10/25 1446   Change in Wound Size % -280.29 04/10/25 1446   Tunneling 1 in depth located at 0 10/05/23 1458   Number of underminings 2 02/13/25 1452   Undermining 1 0.6 04/10/25 1446   Undermining 2 2.3 04/10/25 1446   Undermining 1 is depth extending from 1-3, deepest at 3 04/10/25 1446   Undermining 2 is depth extending from 7-8, deepest at 7 04/10/25 1446   Drainage Amount Moderate 04/10/25 1446   Drainage Description Serosanguineous 04/10/25 1446   Irene-wound Assessment Intact;Dry;Scaly;Scar Tissue 04/10/25 1446   Treatments Irrigation with NSS 04/10/25 1446   Wound packed? No 04/10/25 1446   Dressing Changed Changed 03/07/24 1107   Patient Tolerance Tolerated well 03/13/25 1420   Dressing Status Remoistened 03/13/25 1420       Wound Pressure Injury Sacrum (Active)   Wound Image   04/10/25 1435   Wound Description Pink;Epithelialization;Other (Comment);Yellow;Rolled edges 04/10/25 1438   Pressure Injury Stage 4 04/10/25 1438    Non-staged Wound Description Full thickness 04/10/25 1438   Wound Length (cm) 4.5 cm 04/10/25 1438   Wound Width (cm) 8.4 cm 04/10/25 1438   Wound Depth (cm) 1.1 cm 04/10/25 1438   Wound Surface Area (cm^2) 37.8 cm^2 04/10/25 1438   Wound Volume (cm^3) 41.58 cm^3 04/10/25 1438   Calculated Wound Volume (cm^3) 41.58 cm^3 04/10/25 1438   Change in Wound Size % 44.56 04/10/25 1438   Number of underminings 1 12/05/24 1022   Undermining 1 0 03/13/25 1416   Undermining 2 0 10/31/24 1511   Undermining 1 is depth extending from resovled 03/13/25 1416   Undermining 2 is depth extending from resolved 10/31/24 1511   Drainage Amount Moderate 04/10/25 1438   Drainage Description Serosanguineous 04/10/25 1438   Irene-wound Assessment Intact;Dry;Scaly;Scar Tissue 04/10/25 1438   Treatments Irrigation with NSS 04/10/25 1438   Wound packed? No 04/10/25 1438   Dressing Changed Changed 03/07/24 1105   Patient Tolerance Tolerated well 03/13/25 1416   Dressing Status Removed 03/13/25 1416       Wound Pressure Injury Hip Left;Lateral (Active)   Wound Image   04/10/25 1436   Wound Description Pink;White;Probes to bone;Rolled edges 04/10/25 1443   Pressure Injury Stage 4 04/10/25 1443   Non-staged Wound Description Full thickness 12/05/24 1017   Wound Length (cm) 1.5 cm 04/10/25 1443   Wound Width (cm) 1.7 cm 04/10/25 1443   Wound Depth (cm) 1.6 cm 04/10/25 1443   Wound Surface Area (cm^2) 2.55 cm^2 04/10/25 1443   Wound Volume (cm^3) 4.08 cm^3 04/10/25 1443   Calculated Wound Volume (cm^3) 4.08 cm^3 04/10/25 1443   Tunneling 1 0 cm 10/31/24 1510   Tunneling 1 in depth located at none 10/31/24 1510   Number of underminings 2 03/13/25 1423   Undermining 1 0 04/10/25 1443   Undermining 2 4.5 04/10/25 1443   Undermining 1 is depth extending from resolved 04/10/25 1443   Undermining 2 is depth extending from 6-12, deepest at 8 04/10/25 1443   Drainage Amount Moderate 04/10/25 1443   Drainage Description Serosanguineous 04/10/25 1443  "  Irene-wound Assessment Scar Tissue;Intact 04/10/25 1443   Treatments Irrigation with NSS 04/10/25 1443   Wound packed? Yes 05/23/24 1320   Packing- # removed 1 06/20/24 1401   Dressing Changed Changed 03/07/24 1111   Patient Tolerance Tolerated well 03/13/25 1423   Dressing Status Removed 03/13/25 1423       Debridement   Wound Pressure Injury Ischium Left     Date/Time: 4/10/2025 2:15 PM  Universal Protocol:  procedure performed by consultantConsent: Written consent obtained.  Consent given by: patient  Time out: Immediately prior to procedure a \"time out\" was called to verify the correct patient, procedure, equipment, support staff and site/side marked as required.  Timeout called at: 4/11/2025 11:38 AM.  Patient identity confirmed: verbally with patient    Debridement Details  Performed by: NP  Debridement type: selective  Pain control: lidocaine 4%    Post-debridement measurements  Length (cm): 4.4  Width (cm): 2.8  Depth (cm): 1  Percent debrided: 100%  Surface Area (cm^2): 12.32  Area Debrided (cm^2): 12.32  Volume (cm^3): 12.32    Devitalized tissue debrided: biofilm, fibrin and slough  Instrument(s) utilized: curette  Bleeding: small  Hemostasis obtained with: pressure  Procedural pain (0-10): 0  Post-procedural pain: 0   Response to treatment: procedure was tolerated well    Debridement   Wound Pressure Injury Perineum     Date/Time: 4/10/2025 2:15 PM  Universal Protocol:  procedure performed by consultantConsent: Written consent obtained.  Consent given by: patient  Time out: Immediately prior to procedure a \"time out\" was called to verify the correct patient, procedure, equipment, support staff and site/side marked as required.  Timeout called at: 4/11/2025 11:38 AM.  Patient identity confirmed: verbally with patient    Debridement Details  Performed by: NP  Debridement type: selective  Pain control: lidocaine 4%    Post-debridement measurements  Length (cm): 6.6  Width (cm): 5.5  Depth (cm): " "1.1  Percent debrided: 100%  Surface Area (cm^2): 36.3  Area Debrided (cm^2): 36.3  Volume (cm^3): 39.93    Devitalized tissue debrided: biofilm, fibrin and slough  Instrument(s) utilized: curette  Bleeding: small  Hemostasis obtained with: pressure  Procedural pain (0-10): 0  Post-procedural pain: 0   Response to treatment: procedure was tolerated well    Debridement   Wound Pressure Injury Sacrum     Date/Time: 4/10/2025 2:15 PM  Universal Protocol:  procedure performed by consultantConsent: Written consent obtained.  Consent given by: patient  Time out: Immediately prior to procedure a \"time out\" was called to verify the correct patient, procedure, equipment, support staff and site/side marked as required.  Timeout called at: 4/11/2025 11:39 AM.  Patient identity confirmed: verbally with patient    Debridement Details  Performed by: NP  Debridement type: selective  Pain control: lidocaine 4%    Post-debridement measurements  Length (cm): 4.5  Width (cm): 8.4  Depth (cm): 1.1  Percent debrided: 100%  Surface Area (cm^2): 37.8  Area Debrided (cm^2): 37.8  Volume (cm^3): 41.58    Devitalized tissue debrided: biofilm, fibrin and slough  Instrument(s) utilized: curette  Bleeding: small  Hemostasis obtained with: pressure  Procedural pain (0-10): 0  Post-procedural pain: 0   Response to treatment: procedure was tolerated well    Debridement   Wound Pressure Injury Hip Left;Lateral     Date/Time: 4/10/2025 2:15 PM  Universal Protocol:  procedure performed by consultantConsent: Written consent obtained.  Consent given by: patient  Time out: Immediately prior to procedure a \"time out\" was called to verify the correct patient, procedure, equipment, support staff and site/side marked as required.  Timeout called at: 4/11/2025 11:39 AM.  Patient identity confirmed: verbally with patient    Debridement Details  Performed by: NP  Debridement type: selective  Pain control: lidocaine 4%    Post-debridement measurements  Length " (cm): 1.5  Width (cm): 1.7  Depth (cm): 1.6  Percent debrided: 100%  Surface Area (cm^2): 2.55  Area Debrided (cm^2): 2.55  Volume (cm^3): 4.08    Devitalized tissue debrided: biofilm, fibrin and slough  Instrument(s) utilized: curette  Bleeding: small  Hemostasis obtained with: pressure  Procedural pain (0-10): 0  Post-procedural pain: 0   Response to treatment: procedure was tolerated well

## 2025-04-14 ENCOUNTER — TELEPHONE (OUTPATIENT)
Dept: GASTROENTEROLOGY | Facility: CLINIC | Age: 45
End: 2025-04-14

## 2025-04-14 DIAGNOSIS — Z43.3 COLOSTOMY CARE (HCC): Primary | ICD-10-CM

## 2025-04-14 DIAGNOSIS — K94.01 BLEEDING FROM COLOSTOMY STOMA (HCC): ICD-10-CM

## 2025-04-14 NOTE — TELEPHONE ENCOUNTER
"I received a secure chat message from patient's wound care provider Estela OROZCO regarding this patient's stoma.   Per Estela \"On assessment it looks like the bleeding is coming from the mucocutaneous junction. The bleeding is only able to be reproduced on palpation of his abdomen. No erythema, induration of his abdomen, or pain on palpation. He didn't have any skin breakdown on his stoma or periwound skin, so his bleeding doesn't appear to be a wound care issue or ostomy appliance issue\"     I discussed the patient's case with my attending physician Dr. Cunningham and he reviewed my office visit note and my picture that I uploaded into patient's chart under the media tab  Per Dr. Cunningham, no plans to scope stoma at this time  Dr. Cunningham recommends referral to colorectal surgery for further evaluation  Referral placed  We will send a message over to office staff to communicate this referral with the patient  "

## 2025-04-24 ENCOUNTER — OFFICE VISIT (OUTPATIENT)
Dept: FAMILY MEDICINE CLINIC | Facility: CLINIC | Age: 45
End: 2025-04-24

## 2025-04-24 VITALS
HEART RATE: 91 BPM | HEIGHT: 76 IN | SYSTOLIC BLOOD PRESSURE: 160 MMHG | DIASTOLIC BLOOD PRESSURE: 90 MMHG | RESPIRATION RATE: 18 BRPM | TEMPERATURE: 97.9 F | BODY MASS INDEX: 20.69 KG/M2 | OXYGEN SATURATION: 98 %

## 2025-04-24 DIAGNOSIS — Z00.00 MEDICARE ANNUAL WELLNESS VISIT, SUBSEQUENT: Primary | ICD-10-CM

## 2025-04-24 DIAGNOSIS — G82.20 PARAPLEGIA (HCC): Chronic | ICD-10-CM

## 2025-04-24 DIAGNOSIS — I10 HTN (HYPERTENSION), BENIGN: Chronic | ICD-10-CM

## 2025-04-24 DIAGNOSIS — N18.6 STAGE 5 CHRONIC KIDNEY DISEASE ON CHRONIC DIALYSIS (HCC): Chronic | ICD-10-CM

## 2025-04-24 DIAGNOSIS — L73.9 FOLLICULITIS: ICD-10-CM

## 2025-04-24 DIAGNOSIS — I48.91 ATRIAL FIBRILLATION, UNSPECIFIED TYPE (HCC): Chronic | ICD-10-CM

## 2025-04-24 DIAGNOSIS — H60.8X3 CHRONIC ECZEMATOUS OTITIS EXTERNA OF BOTH EARS: ICD-10-CM

## 2025-04-24 DIAGNOSIS — Z99.2 STAGE 5 CHRONIC KIDNEY DISEASE ON CHRONIC DIALYSIS (HCC): Chronic | ICD-10-CM

## 2025-04-24 DIAGNOSIS — N18.6 TYPE 1 DIABETES MELLITUS WITH CHRONIC KIDNEY DISEASE ON CHRONIC DIALYSIS (HCC): ICD-10-CM

## 2025-04-24 DIAGNOSIS — E10.22 TYPE 1 DIABETES MELLITUS WITH CHRONIC KIDNEY DISEASE ON CHRONIC DIALYSIS (HCC): ICD-10-CM

## 2025-04-24 DIAGNOSIS — Z99.2 TYPE 1 DIABETES MELLITUS WITH CHRONIC KIDNEY DISEASE ON CHRONIC DIALYSIS (HCC): ICD-10-CM

## 2025-04-24 LAB — SL AMB POCT HEMOGLOBIN AIC: 7.2 (ref ?–6.5)

## 2025-04-24 PROCEDURE — G2211 COMPLEX E/M VISIT ADD ON: HCPCS | Performed by: PHYSICIAN ASSISTANT

## 2025-04-24 PROCEDURE — 83036 HEMOGLOBIN GLYCOSYLATED A1C: CPT | Performed by: PHYSICIAN ASSISTANT

## 2025-04-24 PROCEDURE — 99214 OFFICE O/P EST MOD 30 MIN: CPT | Performed by: PHYSICIAN ASSISTANT

## 2025-04-24 PROCEDURE — G0439 PPPS, SUBSEQ VISIT: HCPCS | Performed by: PHYSICIAN ASSISTANT

## 2025-04-24 RX ORDER — IBUPROFEN 600 MG/1
TABLET ORAL
Qty: 1 KIT | Refills: 2 | Status: SHIPPED | OUTPATIENT
Start: 2025-04-24

## 2025-04-24 NOTE — PROGRESS NOTES
Name: Ovidio Parkinson Jr.      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  - Continue medications as prescribed through endocrinology.  - Continue close follow-up with endocrinology as scheduled.  -Reviewed [facility] ophthalmology note from 2025.  Patient educated importance of scheduling with retinal specialist- [facility].    Lab Results   Component Value Date    HGBA1C 7.2 (A) 2025    HGBA1C 7.0 2024    HGBA1C 6.6 (H) 2024       Orders:    POCT hemoglobin A1c    Glucagon, rDNA, (Glucagon Emergency) 1 MG KIT; Use as instructed for hypolglycemia    HTN (hypertension), benign  - Blood pressure slightly elevated today.   - Continue amlodipine 10  mg daily, carvedilol 25 mg twice daily, doxazosin 2 mg daily, and losartan 100 mg daily- as prescribed through Nephrology.   - Reviewed BP target goal with patient.    - Continue checking blood pressure at home and create blood pressure log to bring to next appointment.         Atrial fibrillation, unspecified type (HCC)  - Continue Eliquis.         Stage 5 chronic kidney disease on chronic dialysis (HCC)  - On hemodialysis on MWF.  - Continue follow up with nephrology as scheduled.          Paraplegia (HCC)  - Per patient, his electric wheelchair has now been approved and he is now just waiting on the delivery for the new electric wheelchair.         Folliculitis  -Per patient, he originally had appointment scheduled with advanced dermatology but he was unable to attend due to issues with transportation so he lost the appointment.  Patient notes he is now scheduled with a different dermatologist next week.   Continue as scheduled.       Chronic eczematous otitis externa of both ears  -Reviewed [facility] ENT note from 4/3/2025.  Continue Flonase nasal spray.  Patient advised to perform eustachian tube exercise 3-4 times a day to help keep the ear open.  - Continue follow-up with ENT as scheduled.            Preventive health issues were discussed with patient, and age appropriate screening tests were ordered as noted in patient's After Visit Summary. Personalized health advice and appropriate referrals for health education or preventive services given if needed, as noted in patient's After Visit Summary.    History of Present Illness       Patient is a 44 y.o. male whom  has a past medical history of Acute pulmonary edema ([facility]) (01/13/2022), Ambulatory dysfunction, Anemia, Anemia, iron deficiency, Asymptomatic bacteriuria (09/25/2017), Atrial fibrillation ([facility]), AVM (arteriovenous malformation) of duodenum, acquired, Bacteriuria, asymptomatic, Bipolar disorder ([facility]), C. difficile colitis (06/23/2022), Chronic deep vein thrombosis (DVT) ([facility]), Chronic indwelling Ortega catheter, Chronic kidney disease, Chronic pain, Chronic pain disorder, Chronic suprapubic catheter ([facility]), Clostridium difficile infection (08/11/2016), Colostomy on examination ([facility]), Decubitus ulcer, Delirium, Diabetes mellitus ([facility]), GERD (gastroesophageal reflux disease), Hemodialysis patient ([facility]), Hypertension, Memory impairment (2011), Neurogenic bladder, OAB (overactive bladder), Paraplegia ([facility]), Penile abscess, Pressure injury of left ischium, stage 4 ([facility]) (07/03/2017), Pressure ulcer of left hip, stage 4 ([facility]) (03/12/2021), Pressure ulcer of other site, stage 4 ([facility]) (03/12/2021), Pressure ulcer of sacral region, stage 4 ([facility]) (08/13/2020), S/P unilateral BKA (below knee amputation) ([facility]), Sebaceous cyst (removed in 2017), Seizures ([facility]), Shingles (N/A), Shortness of breath, Tobacco abuse, 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 who is seen today in office for AWV.    -Patient notes he continues to have issues with his ears.  Patient notes they continue to feel like they are clogged.    -Patient notes the folliculitis of his scalp has been worsening recently.  Patient notes has been very itchy.  Patient notes he is scheduled to establish with a new dermatologist next week.  He is unsure the name of the dermatologist.  Patient notes his new electric wheelchair has been approved and he is waiting on the delivery.  Patient notes he continues with some low blood sugar readings.  Patient notes sometimes he will use his insulin before his meal but then does not finish his entire meal.    Patient Care Team:  Kay Ronquillo PA-C as PCP - General (Family Medicine)  Chapo Stallworth MD as PCP - Wound (Wound Care)  Lori Hua MD as PCP - [facility]  MD Lovely Leal PA-C Lino Miele, MD Berhanu Geme, MD as Endoscopist    Review of Systems   Constitutional:  Negative for activity change, chills, fever and unexpected weight change.   HENT:  Negative for congestion, ear discharge, rhinorrhea and sore throat.         Itchiness of ears   Eyes:  Negative for pain and visual disturbance.   Respiratory:  Negative for cough, shortness of breath and wheezing.    Cardiovascular:  Negative for chest pain, palpitations and leg swelling.   Gastrointestinal:  Positive for constipation (occasionally). Negative for abdominal pain, diarrhea and vomiting.   Skin:  Positive for rash.   Neurological:  Negative for dizziness and headaches.   Psychiatric/Behavioral:  Negative for sleep disturbance.    All other systems reviewed and are negative.    Medical History Reviewed by provider this encounter:  Tobacco  Allergies  Meds  Problems  Med Hx  Surg Hx  Fam Hx       Annual Wellness Visit Questionnaire   Ovidio is here for his Subsequent  Wellness visit.     Health Risk Assessment:   Patient rates overall health as good. Patient feels that their physical health rating is much better. Patient is satisfied with their life. Eyesight was rated as slightly worse. Hearing was rated as slightly worse. Patient feels that their emotional and mental health rating is same. Patients states they are never, rarely angry. Patient states they are sometimes unusually tired/fatigued. Pain experienced in the last 7 days has been a lot. Patient's pain rating has been 8/10. Patient states that he has experienced weight loss or gain in last 6 months.     Fall Risk Screening:   In the past year, patient has experienced: no history of falling in past year      Home Safety:  Patient has trouble with stairs inside or outside of their home. Patient has working smoke alarms Home safety hazards include: none.     Nutrition:   Current diet is Regular.     Medications:   Patient is not currently taking any over-the-counter supplements. Patient is able to manage medications.     Activities of Daily Living (ADLs)/Instrumental Activities of Daily Living (IADLs):   Walk and transfer into and out of bed and chair?: Yes  Dress and groom yourself?: Yes    Bathe or shower yourself?: Yes    Feed yourself? Yes  Do your laundry/housekeeping?: No  Manage your money, pay your bills and track your expenses?: Yes  Make your own meals?: Yes    Do your own shopping?: Yes    Previous Hospitalizations:   Any hospitalizations or ED visits within the last 12 months?: Yes      Advance Care Planning:   Living will: No    Advanced directive: No    ACP document given: Yes      Cognitive Screening:   Provider or family/friend/caregiver concerned regarding cognition?: No    Preventive Screenings      Cardiovascular Screening:    General: Screening Not Indicated and History Lipid Disorder      Diabetes Screening:     General: Screening Not Indicated and History Diabetes      Colorectal Cancer Screening:      General: Screening Not Indicated      Prostate Cancer Screening:    General: Screening Not Indicated      Osteoporosis Screening:    General: Screening Not Indicated      Abdominal Aortic Aneurysm (AAA) Screening:    Risk factors include: tobacco use        General: Screening Not Indicated      Lung Cancer Screening:     General: Screening Not Indicated      Hepatitis C Screening:    General: Screening Current    Immunizations:  - Immunizations due: Zoster (Shingrix)    Screening, Brief Intervention, and Referral to Treatment (SBIRT)     Screening  Typical number of drinks in a day: 0  Typical number of drinks in a week: 0  Interpretation: Low risk drinking behavior.    Single Item Drug Screening:  How often have you used an illegal drug (including marijuana) or a prescription medication for non-medical reasons in the past year? never    Single Item Drug Screen Score: 0  Interpretation: Negative screen for possible drug use disorder    Brief Intervention  Alcohol & drug use screenings were reviewed. No concerns regarding substance use disorder identified.     Review of Current Opioid Use    Opioid Risk Tool (ORT) Interpretation: Complete Opioid Risk Tool (ORT)    [place]MP or [place]  reviewed. No red flags were identified    Other Counseling Topics:   Car/seat belt/driving safety, skin self-exam, sunscreen and regular weightbearing exercise and calcium and vitamin D intake.     Social Drivers of Health     Financial Resource Strain: Low Risk  (4/24/2025)    Overall Financial Resource Strain (CARDIA)     Difficulty of Paying Living Expenses: Not hard at all   Food Insecurity: No Food Insecurity (4/24/2025)    Hunger Vital Sign     Worried About Running Out of Food in the Last Year: Never true     Ran Out of Food in the Last Year: Never true   Transportation Needs: No Transportation Needs (4/24/2025)    PRAPARE - Transportation     Lack of Transportation (Medical): No     Lack of Transportation (Non-Medical): No  "  Housing Stability: Low Risk  (4/24/2025)    Housing Stability Vital Sign     Unable to Pay for Housing in the Last Year: No     Number of Times Moved in the Last Year: 0     Homeless in the Last Year: No   Utilities: Not At Risk (4/24/2025)    [facility] Utilities     Threatened with loss of utilities: No     No results found.    Objective   /90 (BP Location: Right arm, Patient Position: Sitting, Cuff Size: Standard)   Pulse 91   Temp 97.9 °F (36.6 °C) (Temporal)   Resp 18   Ht 6' 4\" (1.93 m)   SpO2 98%   BMI 20.69 kg/m²     Physical Exam  Vitals and nursing note reviewed.   Constitutional:       General: He is not in acute distress.     Appearance: He is well-developed.      Comments: Seated in powerchair.   HENT:      Head: Normocephalic and atraumatic.      Right Ear: External ear normal.      Left Ear: External ear normal.      Nose: Nose normal.      Mouth/Throat:      Pharynx: Uvula midline.   Eyes:      Conjunctiva/sclera: Conjunctivae normal.      Pupils: Pupils are equal, round, and reactive to light.   Cardiovascular:      Rate and Rhythm: Normal rate and regular rhythm.      Pulses: Normal pulses.      Heart sounds: Normal heart sounds. No murmur heard.  Pulmonary:      Effort: Pulmonary effort is normal. No respiratory distress.      Breath sounds: Normal breath sounds.   Musculoskeletal:      Cervical back: Normal range of motion and neck supple.   Skin:     General: Skin is warm and dry.   Neurological:      Mental Status: He is alert and oriented to person, place, and time.   Psychiatric:         Speech: Speech normal.         Behavior: Behavior normal.         "

## 2025-04-24 NOTE — PATIENT INSTRUCTIONS
Medicare Preventive Visit Patient Instructions  Thank you for completing your Welcome to Medicare Visit or Medicare Annual Wellness Visit today. Your next wellness visit will be due in one year (4/25/2026).  The screening/preventive services that you may require over the next 5-10 years are detailed below. Some tests may not apply to you based off risk factors and/or age. Screening tests ordered at today's visit but not completed yet may show as past due. Also, please note that scanned in results may not display below.  Preventive Screenings:  Service Recommendations Previous Testing/Comments   Colorectal Cancer Screening  Colonoscopy    Fecal Occult Blood Test (FOBT)/Fecal Immunochemical Test (FIT)  Fecal DNA/Cologuard Test  Flexible Sigmoidoscopy Age: 45-75 years old   Colonoscopy: every 10 years (May be performed more frequently if at higher risk)  OR  FOBT/FIT: every 1 year  OR  Cologuard: every 3 years  OR  Sigmoidoscopy: every 5 years  Screening may be recommended earlier than age 45 if at higher risk for colorectal cancer. Also, an individualized decision between you and your healthcare provider will decide whether screening between the ages of 76-85 would be appropriate. Colonoscopy: Not on file  FOBT/FIT: Not on file  Cologuard: Not on file  Sigmoidoscopy: Not on file          Prostate Cancer Screening Individualized decision between patient and health care provider in men between ages of 55-69   Medicare will cover every 12 months beginning on the day after your 50th birthday PSA: No results in last 5 years     Screening Not Indicated     Hepatitis C Screening Once for adults born between 1945 and 1965  More frequently in patients at high risk for Hepatitis C Hep C Antibody: 02/06/2018    Screening Current   Diabetes Screening 1-2 times per year if you're at risk for diabetes or have pre-diabetes Fasting glucose: No results in last 5 years (No results in last 5 years)  A1C: 7.2 (4/24/2025)  Screening Not  Indicated  History Diabetes   Cholesterol Screening Once every 5 years if you don't have a lipid disorder. May order more often based on risk factors. Lipid panel: 01/11/2023  Screening Not Indicated  History Lipid Disorder      Other Preventive Screenings Covered by Medicare:  Abdominal Aortic Aneurysm (AAA) Screening: covered once if your at risk. You're considered to be at risk if you have a family history of AAA or a male between the age of 65-75 who smoking at least 100 cigarettes in your lifetime.  Lung Cancer Screening: covers low dose CT scan once per year if you meet all of the following conditions: (1) Age 55-77; (2) No signs or symptoms of lung cancer; (3) Current smoker or have quit smoking within the last 15 years; (4) You have a tobacco smoking history of at least 20 pack years (packs per day x number of years you smoked); (5) You get a written order from a healthcare provider.  Glaucoma Screening: covered annually if you're considered high risk: (1) You have diabetes OR (2) Family history of glaucoma OR (3)  aged 50 and older OR (4)  American aged 65 and older  Osteoporosis Screening: covered every 2 years if you meet one of the following conditions: (1) Have a vertebral abnormality; (2) On glucocorticoid therapy for more than 3 months; (3) Have primary hyperparathyroidism; (4) On osteoporosis medications and need to assess response to drug therapy.  HIV Screening: covered annually if you're between the age of 15-65. Also covered annually if you are younger than 15 and older than 65 with risk factors for HIV infection. For pregnant patients, it is covered up to 3 times per pregnancy.    Immunizations:  Immunization Recommendations   Influenza Vaccine Annual influenza vaccination during flu season is recommended for all persons aged >= 6 months who do not have contraindications   Pneumococcal Vaccine   * Pneumococcal conjugate vaccine = PCV13 (Prevnar 13), PCV15 (Vaxneuvance),  PCV20 (Prevnar 20)  * Pneumococcal polysaccharide vaccine = PPSV23 (Pneumovax) Adults 19-65 yo with certain risk factors or if 65+ yo  If never received any pneumonia vaccine: recommend Prevnar 20 (PCV20)  Give PCV20 if previously received 1 dose of PCV13 or PPSV23   Hepatitis B Vaccine 3 dose series if at intermediate or high risk (ex: diabetes, end stage renal disease, liver disease)   Respiratory syncytial virus (RSV) Vaccine - COVERED BY MEDICARE PART D  * RSVPreF3 (Arexvy) CDC recommends that adults 60 years of age and older may receive a single dose of RSV vaccine using shared clinical decision-making (SCDM)   Tetanus (Td) Vaccine - COST NOT COVERED BY MEDICARE PART B Following completion of primary series, a booster dose should be given every 10 years to maintain immunity against tetanus. Td may also be given as tetanus wound prophylaxis.   Tdap Vaccine - COST NOT COVERED BY MEDICARE PART B Recommended at least once for all adults. For pregnant patients, recommended with each pregnancy.   Shingles Vaccine (Shingrix) - COST NOT COVERED BY MEDICARE PART B  2 shot series recommended in those 19 years and older who have or will have weakened immune systems or those 50 years and older     Health Maintenance Due:      Topic Date Due   • HIV Screening  Completed   • Hepatitis C Screening  Completed     Immunizations Due:      Topic Date Due   • COVID-19 Vaccine (3 - Pfizer risk series) 02/23/2022     Advance Directives   What are advance directives?  Advance directives are legal documents that state your wishes and plans for medical care. These plans are made ahead of time in case you lose your ability to make decisions for yourself. Advance directives can apply to any medical decision, such as the treatments you want, and if you want to donate organs.   What are the types of advance directives?  There are many types of advance directives, and each state has rules about how to use them. You may choose a combination  of any of the following:  Living will:  This is a written record of the treatment you want. You can also choose which treatments you do not want, which to limit, and which to stop at a certain time. This includes surgery, medicine, IV fluid, and tube feedings.   Durable power of  for healthcare (DPAHC):  This is a written record that states who you want to make healthcare choices for you when you are unable to make them for yourself. This person, called a proxy, is usually a family member or a friend. You may choose more than 1 proxy.  Do not resuscitate (DNR) order:  A DNR order is used in case your heart stops beating or you stop breathing. It is a request not to have certain forms of treatment, such as CPR. A DNR order may be included in other types of advance directives.  Medical directive:  This covers the care that you want if you are in a coma, near death, or unable to make decisions for yourself. You can list the treatments you want for each condition. Treatment may include pain medicine, surgery, blood transfusions, dialysis, IV or tube feedings, and a ventilator (breathing machine).  Values history:  This document has questions about your views, beliefs, and how you feel and think about life. This information can help others choose the care that you would choose.  Why are advance directives important?  An advance directive helps you control your care. Although spoken wishes may be used, it is better to have your wishes written down. Spoken wishes can be misunderstood, or not followed. Treatments may be given even if you do not want them. An advance directive may make it easier for your family to make difficult choices about your care.   Cigarette Smoking and Your Health   Risks to your health if you smoke:  Nicotine and other chemicals found in tobacco damage every cell in your body. Even if you are a light smoker, you have an increased risk for cancer, heart disease, and lung disease. If you are  pregnant or have diabetes, smoking increases your risk for complications.   Benefits to your health if you stop smoking:   You decrease respiratory symptoms such as coughing, wheezing, and shortness of breath.   You reduce your risk for cancers of the lung, mouth, throat, kidney, bladder, pancreas, stomach, and cervix. If you already have cancer, you increase the benefits of chemotherapy. You also reduce your risk for cancer returning or a second cancer from developing.   You reduce your risk for heart disease, blood clots, heart attack, and stroke.   You reduce your risk for lung infections, and diseases such as pneumonia, asthma, chronic bronchitis, and emphysema.  Your circulation improves. More oxygen can be delivered to your body. If you have diabetes, you lower your risk for complications, such as kidney, artery, and eye diseases. You also lower your risk for nerve damage. Nerve damage can lead to amputations, poor vision, and blindness.  You improve your body's ability to heal and to fight infections.  For more information and support to stop smoking:   [employer]  Phone: 0- 400 - 838-0122  Web Address: www.H.BLOOM  Narcotic (Opioid) Safety    Use narcotics safely:  Take prescribed narcotics exactly as directed  Do not give narcotics to others or take narcotics that belong to someone else  Do not mix narcotics without medicines or alcohol  Do not drive or operate heavy machinery after you take the narcotic  Monitor for side effects and notify your healthcare provider if you experienced side effects such as nausea, sleepiness, itching, or trouble thinking clearly.    Manage constipation:    Constipation is the most common side effect of narcotic medicine. Constipation is when you have hard, dry bowel movements, or you go longer than usual between bowel movements. Tell your healthcare provider about all changes in your bowel movements while you are taking narcotics. He or she may recommend laxative  medicine to help you have a bowel movement. He or she may also change the kind of narcotic you are taking, or change when you take it. The following are more ways you can prevent or relieve constipation:    Drink liquids as directed.  You may need to drink extra liquids to help soften and move your bowels. Ask how much liquid to drink each day and which liquids are best for you.  Eat high-fiber foods.  This may help decrease constipation by adding bulk to your bowel movements. High-fiber foods include fruits, vegetables, whole-grain breads and cereals, and beans. Your healthcare provider or dietitian can help you create a high-fiber meal plan. Your provider may also recommend a fiber supplement if you cannot get enough fiber from food.  Exercise regularly.  Regular physical activity can help stimulate your intestines. Walking is a good exercise to prevent or relieve constipation. Ask which exercises are best for you.  Schedule a time each day to have a bowel movement.  This may help train your body to have regular bowel movements. Bend forward while you are on the toilet to help move the bowel movement out. Sit on the toilet for at least 10 minutes, even if you do not have a bowel movement.    Store narcotics safely:   Store narcotics where others cannot easily get them.  Keep them in a locked cabinet or secure area. Do not  keep them in a purse or other bag you carry with you. A person may be looking for something else and find the narcotics.  Make sure narcotics are stored out of the reach of children.  A child can easily overdose on narcotics. Narcotics may look like candy to a small child.    The best way to dispose of narcotics:      The laws vary by country and area. In the [place], the best way is to return the narcotics through a take-back program. This program is offered by the [place] Drug Enforcement Agency (IZZY). The following are options for using the program:  Take the narcotics to a IZZY collection  site.  The site is often a law enforcement center. Call your local law enforcement center for scheduled take-back days in your area. You will be given information on where to go if the collection site is in a different location.  Take the narcotics to an approved pharmacy or hospital.  A pharmacy or hospital may be set up as a collection site. You will need to ask if it is a IZZY collection site if you were not directed there. A pharmacy or doctor's office may not be able to take back narcotics unless it is a IZZY site.  Use a mail-back system.  This means you are given containers to put the narcotics into. You will then mail them in the containers.  Use a take-back drop box.  This is a place to leave the narcotics at any time. People and animals will not be able to get into the box. Your local law enforcement agency can tell you where to find a drop box in your area.    Other ways to manage pain:   Ask your healthcare provider about non-narcotic medicines to control pain.  Nonprescription medicines include NSAIDs (such as ibuprofen) and acetaminophen. Prescription medicines include muscle relaxers, antidepressants, and steroids.  Pain may be managed without any medicines.  Some ways to relieve pain include massage, aromatherapy, or meditation. Physical or occupational therapy may also help.    For more information:   Drug Enforcement Administration  [address] [place] [address]  Phone: 7- 372 - 377-3279  Web Address: https://www.deadiversion.[employer].gov/drug_disposal/    US Food and Drug Administration  [address] [place] [address]  Phone: 1- 412 - 947-7749  Web Address: http://www.fda.gov     © Copyright [employer] 2018 Information is for End User's use only and may not be sold, redistributed or otherwise used for commercial purposes. All illustrations and images included in CareNotes® are the copyrighted property of A.D.A.M., Inc. or [employer]

## 2025-04-28 PROBLEM — L73.9 FOLLICULITIS: Status: ACTIVE | Noted: 2025-04-28

## 2025-04-28 PROBLEM — H60.8X3 CHRONIC ECZEMATOUS OTITIS EXTERNA OF BOTH EARS: Status: ACTIVE | Noted: 2025-04-28

## 2025-04-29 NOTE — ASSESSMENT & PLAN NOTE
-Reviewed [facility] ENT note from 4/3/2025.  Continue Flonase nasal spray.  Patient advised to perform eustachian tube exercise 3-4 times a day to help keep the ear open.  - Continue follow-up with ENT as scheduled.

## 2025-04-29 NOTE — ASSESSMENT & PLAN NOTE
- Per patient, his electric wheelchair has now been approved and he is now just waiting on the delivery for the new electric wheelchair.

## 2025-04-29 NOTE — ASSESSMENT & PLAN NOTE
- Blood pressure slightly elevated today.   - Continue amlodipine 10  mg daily, carvedilol 25 mg twice daily, doxazosin 2 mg daily, and losartan 100 mg daily- as prescribed through Nephrology.   - Reviewed BP target goal with patient.    - Continue checking blood pressure at home and create blood pressure log to bring to next appointment.

## 2025-04-29 NOTE — ASSESSMENT & PLAN NOTE
- POCT hemoglobin A1c 7.2.  - Continue medications as prescribed through endocrinology.  - Continue close follow-up with endocrinology as scheduled.  -Reviewed [facility] ophthalmology note from 4/22/2025.  Patient educated importance of scheduling with retinal specialist- [facility].    Lab Results   Component Value Date    HGBA1C 7.2 (A) 04/24/2025    HGBA1C 7.0 12/03/2024    HGBA1C 6.6 (H) 07/26/2024       Orders:    POCT hemoglobin A1c    Glucagon, rDNA, (Glucagon Emergency) 1 MG KIT; Use as instructed for hypolglycemia

## 2025-04-29 NOTE — ASSESSMENT & PLAN NOTE
-Per patient, he originally had appointment scheduled with advanced dermatology but he was unable to attend due to issues with transportation so he lost the appointment.  Patient notes he is now scheduled with a different dermatologist next week.  Continue as scheduled.

## 2025-05-02 ENCOUNTER — TELEPHONE (OUTPATIENT)
Dept: FAMILY MEDICINE CLINIC | Facility: CLINIC | Age: 45
End: 2025-05-02

## 2025-05-02 NOTE — TELEPHONE ENCOUNTER
Nurse Kerry Called from  [facility].. Pt has a colonoscopy schedule for 5/8/25. They have been trying to get in contact with us. Pt needs to hold Eliquis for 2 days. That would make Monday 5/5/25 the last day as per Kerry.    She is requesting that a letter be written with our LETTERHEAD saying He needs to hold then fax it to [phone]. ty

## 2025-05-02 NOTE — LETTER
May 2, 2025     Patient: Ovidio Parkinson Jr.  YOB: 1980      To Whom it May Concern:    Ovidio Parkinson is under my professional care. Ovidio has colonoscopy scheduled for 5/8/25. Patient may hold eliquis 2 days prior to procedure.     If you have any questions or concerns, please don't hesitate to call.         Sincerely,          Kay Ronquillo PA-C        CC: No Recipients

## 2025-05-05 ENCOUNTER — TELEPHONE (OUTPATIENT)
Dept: FAMILY MEDICINE CLINIC | Facility: CLINIC | Age: 45
End: 2025-05-05

## 2025-05-05 NOTE — TELEPHONE ENCOUNTER
RN call to patient regarding colonoscopy. Per Kay's letter written for [facility], patient is to hold Eliquis 2 days prior to colonoscopy. Patient expressed understanding. Patient had no other questions at this time.

## 2025-05-05 NOTE — TELEPHONE ENCOUNTER
Patient is asking if he can do a virtual preop for a colonoscopy on Thursday because he has dialysis on Monday, Tuesday, and Wednesday. He is also asking if he has to stop his blood thinner. I saw he is on Eliquis and aside from the HD for ESRD, I see he has afib. I told the front office that I would need to ask the provider to see what their recommendation would be. Please advise at your earliest convenience.   Thank you.

## 2025-05-08 ENCOUNTER — TELEPHONE (OUTPATIENT)
Dept: FAMILY MEDICINE CLINIC | Facility: CLINIC | Age: 45
End: 2025-05-08

## 2025-05-08 NOTE — TELEPHONE ENCOUNTER
PCP SIGNATURE NEEDED FOR [facility] FORM RECEIVED VIA FAX AND PLACED IN PCP FOLDER TO BE DELIVERED AT ASSIGNED TIMES.      Physician order, Hold all HHA services as of 5/5/25 evening

## 2025-05-09 ENCOUNTER — TELEPHONE (OUTPATIENT)
Dept: FAMILY MEDICINE CLINIC | Facility: CLINIC | Age: 45
End: 2025-05-09

## 2025-05-09 NOTE — TELEPHONE ENCOUNTER
PCP SIGNATURE NEEDED FOR [facility]  FORM RECEIVED VIA FAX AND PLACED IN PCP FOLDER TO BE DELIVERED AT ASSIGNED TIMES.    Resume all HHA services as of 5/8/25

## 2025-05-19 ENCOUNTER — TELEPHONE (OUTPATIENT)
Dept: FAMILY MEDICINE CLINIC | Facility: CLINIC | Age: 45
End: 2025-05-19

## 2025-05-19 NOTE — TELEPHONE ENCOUNTER
PCP SIGNATURE NEEDED FOR [facility]  FORM RECEIVED VIA FAX AND PLACED IN PCP FOLDER TO BE DELIVERED AT ASSIGNED TIMES.      Order Number: [id number]

## 2025-05-20 ENCOUNTER — TELEPHONE (OUTPATIENT)
Dept: FAMILY MEDICINE CLINIC | Facility: CLINIC | Age: 45
End: 2025-05-20

## 2025-05-20 NOTE — TELEPHONE ENCOUNTER
PCP SIGNATURE NEEDED FOR [facility].  FORM RECEIVED VIA FAX AND PLACED IN PCP FOLDER TO BE DELIVERED AT ASSIGNED TIMES.      Physician Order  Resume all HHA services as of 5/8/2025.

## 2025-05-22 DIAGNOSIS — N31.9 NEUROGENIC BLADDER: ICD-10-CM

## 2025-05-22 RX ORDER — OXYBUTYNIN CHLORIDE 15 MG/1
15 TABLET, EXTENDED RELEASE ORAL
Qty: 90 TABLET | Refills: 1 | Status: SHIPPED | OUTPATIENT
Start: 2025-05-22

## 2025-05-23 NOTE — TELEPHONE ENCOUNTER
FAXED ON 05/23/25 TO [facility] at [phone]. FAX CONFIRMATION RECEIVED.

## 2025-05-30 ENCOUNTER — TELEPHONE (OUTPATIENT)
Dept: FAMILY MEDICINE CLINIC | Facility: CLINIC | Age: 45
End: 2025-05-30

## 2025-05-30 NOTE — TELEPHONE ENCOUNTER
PCP SIGNATURE NEEDED FOR [facility] FORM RECEIVED VIA FAX AND PLACED IN PCP FOLDER TO BE DELIVERED AT ASSIGNED TIMES.      Physical Therapy Plan Of Care Cert Period 5/27/25

## 2025-06-03 ENCOUNTER — TELEPHONE (OUTPATIENT)
Dept: FAMILY MEDICINE CLINIC | Facility: CLINIC | Age: 45
End: 2025-06-03

## 2025-06-03 NOTE — TELEPHONE ENCOUNTER
[facility] called requesting for pcp to sign order for patient that was sent via email.   Call back number [phone]

## 2025-06-03 NOTE — TELEPHONE ENCOUNTER
Unfortunately we don't receive any forms via email. Please contact company for more info. Thank you

## 2025-06-04 NOTE — TELEPHONE ENCOUNTER
PCP SIGNATURE NEEDED FOR  [facility] FORM RECEIVED VIA FAX AND PLACED IN PCP FOLDER TO BE DELIVERED AT ASSIGNED TIMES.      [facility] order # [id number]

## 2025-06-09 DIAGNOSIS — I48.91 ATRIAL FIBRILLATION, UNSPECIFIED TYPE (HCC): Chronic | ICD-10-CM

## 2025-06-10 ENCOUNTER — TELEPHONE (OUTPATIENT)
Dept: FAMILY MEDICINE CLINIC | Facility: CLINIC | Age: 45
End: 2025-06-10

## 2025-06-10 DIAGNOSIS — R21 RASH: ICD-10-CM

## 2025-06-10 RX ORDER — KETOCONAZOLE 20 MG/ML
1 SHAMPOO, SUSPENSION TOPICAL 2 TIMES WEEKLY
Qty: 240 ML | Refills: 2 | Status: SHIPPED | OUTPATIENT
Start: 2025-06-12

## 2025-06-10 RX ORDER — APIXABAN 5 MG/1
5 TABLET, FILM COATED ORAL 2 TIMES DAILY
Qty: 180 TABLET | Refills: 3 | Status: SHIPPED | OUTPATIENT
Start: 2025-06-10

## 2025-06-10 NOTE — TELEPHONE ENCOUNTER
PCP SIGNATURE NEEDED FOR [facility]  FORM RECEIVED VIA FAX AND PLACED IN PCP FOLDER TO BE DELIVERED AT ASSIGNED TIMES.      PLAN OF CARE

## 2025-06-18 DIAGNOSIS — I10 HTN (HYPERTENSION), BENIGN: ICD-10-CM

## 2025-06-19 ENCOUNTER — OFFICE VISIT (OUTPATIENT)
Dept: WOUND CARE | Facility: CLINIC | Age: 45
End: 2025-06-19
Payer: COMMERCIAL

## 2025-06-19 VITALS
SYSTOLIC BLOOD PRESSURE: 90 MMHG | RESPIRATION RATE: 16 BRPM | TEMPERATURE: 98.7 F | DIASTOLIC BLOOD PRESSURE: 54 MMHG | HEART RATE: 96 BPM

## 2025-06-19 DIAGNOSIS — L89.224 PRESSURE ULCER OF LEFT HIP, STAGE 4 (HCC): ICD-10-CM

## 2025-06-19 DIAGNOSIS — L89.894 PRESSURE ULCER OF OTHER SITE, STAGE 4 (HCC): ICD-10-CM

## 2025-06-19 DIAGNOSIS — L89.154 PRESSURE INJURY OF SACRAL REGION, STAGE 4 (HCC): ICD-10-CM

## 2025-06-19 DIAGNOSIS — L89.324 PRESSURE INJURY OF LEFT ISCHIUM, STAGE 4 (HCC): Primary | ICD-10-CM

## 2025-06-19 DIAGNOSIS — Z48.02 ENCOUNTER FOR REMOVAL OF SUTURES: ICD-10-CM

## 2025-06-19 PROCEDURE — 97597 DBRDMT OPN WND 1ST 20 CM/<: CPT | Performed by: NURSE PRACTITIONER

## 2025-06-19 PROCEDURE — 97598 DBRDMT OPN WND ADDL 20CM/<: CPT | Performed by: NURSE PRACTITIONER

## 2025-06-19 RX ORDER — CARVEDILOL 25 MG/1
25 TABLET ORAL 2 TIMES DAILY
Qty: 180 TABLET | Refills: 1 | Status: SHIPPED | OUTPATIENT
Start: 2025-06-19

## 2025-06-19 NOTE — PATIENT INSTRUCTIONS
Orders Placed This Encounter   Procedures    Wound cleansing and dressings     Wash your hands with soap and water.  Remove old dressing, discard into plastic bag and place in trash.    Cleanse the wounds with Dakin's solution if there is an odor, IF NO ODOR, cleanse with normal saline or wound wash prior to applying a clean dressing.   Do not use tissue or cotton balls. Do not scrub the wound. Pat dry using gauze.   Shower no; do not get dressing wet.        Left Hip Wound, Sacral, Left ischium, and perineal wounds:   Cleanse with Dakin's solution  Apply saline moist to dry gauze  Cover with  ABD over top   Secure with Medfix tape.   Change dressing daily and top dressing PRN for breakthrough drainage (visiting nurses to do twice per week and family in between)            Continue using Clinitron bed      Continue NO PRESSURE TO ALL WOUNDS AS MUCH AS POSSIBLE, ESPECIALLY PERINEAL WOUND   LIMIT SITTING UPRIGHT IN WHEELCHAIR ON THIS WOUND       Continue 3-4 servings of protein daily in your diet.         Continue visiting nurse skilled 2 x per week for wound care dressing changes.                            Follow up at the wound center in 4 weeks with Estela OROZCO.     Standing Status:   Future     Expiration Date:   6/26/2025

## 2025-06-20 NOTE — PROGRESS NOTES
Name: Ovidio Parkinson Jr.      : 1980      MRN: [id number]  Encounter Provider: ERNESOT Avendano  Encounter Date: 2025   Encounter department: [facility], [facility]  :  Assessment & Plan  Pressure injury of left ischium, stage 4 (HCC)    Orders:    Wound cleansing and dressings; Future    Wound Procedure Treatment    Pressure injury of sacral region, stage 4 (HCC)    Orders:    Wound cleansing and dressings; Future    Wound Procedure Treatment    Pressure ulcer of other site, stage 4 (HCC)    Orders:    Wound cleansing and dressings; Future    Wound Procedure Treatment    Pressure ulcer of left hip, stage 4 (HCC)    Orders:    Wound cleansing and dressings; Future    Wound Procedure Treatment    Encounter for removal of sutures         Plan:  1.  F/U palliative visit.  Wounds debrided.  Wounds clean and stable with clean granular wound bases.  Continue current plan of care    2.  Complementary suture removal completed today.  Four sutures removed from patient's left cheek from s/p cyst removal from dermatology.  Silicone bordered foam dressing applied to healed surgical incision and may be kept in place for the next 2 to 3 days.    3.  A1C results reviewed with the patient today.  Patient maintaining tight glycemic control    4.  Patient will follow-up in 4 weeks    History of Present Illness   Chief Complaint   Patient presents with    Follow Up Wound Care Visit     Sacral, ischial and hips wounds   Here for wound follow up.  F/u palliative visit for multiple pressure injuries.  No new complaints.  Patient reports he had a recent cyst removal from dermatology.  He was supposed to follow-up with them today for removal of his sutures, however his dermatology appointment conflicted with his wound care appointment.  Patient checked with dermatology who is okay with sutures being removed in wound care today.  He denies any pain, fevers, or chills.      Objective   BP 90/54    Pulse 96   Temp 98.7 °F (37.1 °C)   Resp 16       Wound Pressure Injury Ischium Left (Active)   Wound Image   06/19/25 1330   Wound Description Pink;Yellow;Epithelialization;Rolled edges 06/19/25 1330   Pressure Injury Stage 4 06/19/25 1330   Non-staged Wound Description Full thickness 12/05/24 1020   Wound Length (cm) 4 cm 06/19/25 1330   Wound Width (cm) 3 cm 06/19/25 1330   Wound Depth (cm) 0.9 cm 06/19/25 1330   Wound Surface Area (cm^2) 9.42 cm^2 06/19/25 1330   Wound Volume (cm^3) 5.655 cm^3 06/19/25 1330   Calculated Wound Volume (cm^3) 10.8 cm^3 06/19/25 1330   Change in Wound Size % 42.86 06/19/25 1330   Number of tunnels 1 10/31/24 1511   Tunneling 1 0.07 cm 01/09/25 1152   Tunneling 1 in depth located at 5 oclock 01/09/25 1152   Number of underminings 1 02/13/25 1450   Undermining 1 0 06/19/25 1330   Undermining 2 0.5 06/19/25 1330   Undermining 1 is depth extending from resolved 06/19/25 1330   Undermining 2 is depth extending from 7-10 06/19/25 1330   Drainage Amount Large 06/19/25 1330   Drainage Description Yellow;Green;Foul smelling 06/19/25 1330   Irene-wound Assessment Intact;Scar Tissue 06/19/25 1330   Treatments Irrigation with NSS 06/19/25 1330   Wound packed? No 06/19/25 1330   Dressing Changed Changed 03/07/24 1113   Patient Tolerance Tolerated well 03/13/25 1413   Dressing Status Removed 03/13/25 1413       Wound Pressure Injury Perineum (Active)   Wound Image   06/19/25 1329   Wound Description Pink;Yellow 06/19/25 1333   Pressure Injury Stage 4 06/19/25 1333   Non-staged Wound Description Full thickness 03/13/25 1420   Wound Length (cm) 7.3 cm 06/19/25 1333   Wound Width (cm) 4.3 cm 06/19/25 1333   Wound Depth (cm) 0.7 cm 06/19/25 1333   Wound Surface Area (cm^2) 24.65 cm^2 06/19/25 1333   Wound Volume (cm^3) 11.505 cm^3 06/19/25 1333   Calculated Wound Volume (cm^3) 21.97 cm^3 06/19/25 1333   Change in Wound Size % -109.24 06/19/25 1333   Tunneling 1 in depth located at 0 10/05/23 6320    Number of underminings 2 02/13/25 1452   Undermining 1 0.8 06/19/25 1333   Undermining 2 0 06/19/25 1333   Undermining 1 is depth extending from 1-4, deepest at 3 06/19/25 1333   Undermining 2 is depth extending from resolved 06/19/25 1333   Drainage Amount Large 06/19/25 1333   Drainage Description Yellow;Green;Foul smelling 06/19/25 1333   Irene-wound Assessment Intact;Scar Tissue;Maceration 06/19/25 1333   Treatments Irrigation with NSS 06/19/25 1333   Wound packed? No 06/19/25 1333   Dressing Changed Changed 03/07/24 1107   Patient Tolerance Tolerated well 03/13/25 1420   Dressing Status Remoistened 03/13/25 1420       Wound Pressure Injury Sacrum (Active)   Wound Image   06/19/25 1329   Wound Description Epithelialization;Pink;Yellow 06/19/25 1336   Pressure Injury Stage 4 06/19/25 1336   Non-staged Wound Description Full thickness 04/10/25 1438   Wound Length (cm) 6 cm 06/19/25 1336   Wound Width (cm) 4 cm 06/19/25 1336   Wound Depth (cm) 0.4 cm 06/19/25 1336   Wound Surface Area (cm^2) 18.85 cm^2 06/19/25 1336   Wound Volume (cm^3) 5.027 cm^3 06/19/25 1336   Calculated Wound Volume (cm^3) 9.6 cm^3 06/19/25 1336   Change in Wound Size % 87.2 06/19/25 1336   Number of underminings 1 12/05/24 1022   Undermining 1 0 03/13/25 1416   Undermining 2 0 10/31/24 1511   Undermining 1 is depth extending from resovled 03/13/25 1416   Undermining 2 is depth extending from resolved 10/31/24 1511   Drainage Amount Large 06/19/25 1336   Drainage Description Yellow;Green;Foul smelling 06/19/25 1336   Irene-wound Assessment Intact;Scar Tissue;Dry;Scaly 06/19/25 1336   Treatments Irrigation with NSS 06/19/25 1336   Wound packed? No 06/19/25 1336   Dressing Changed Changed 03/07/24 1105   Patient Tolerance Tolerated well 03/13/25 1416   Dressing Status Removed 03/13/25 1416       Wound Pressure Injury Hip Left;Lateral (Active)   Wound Image   06/19/25 1330   Wound Description Pink;Yellow;Probes to bone 06/19/25 1337   Pressure  "Injury Stage 4 06/19/25 1337   Non-staged Wound Description Full thickness 12/05/24 1017   Wound Length (cm) 2.8 cm 06/19/25 1337   Wound Width (cm) 2 cm 06/19/25 1337   Wound Depth (cm) 1.5 cm 06/19/25 1337   Wound Surface Area (cm^2) 4.4 cm^2 06/19/25 1337   Wound Volume (cm^3) 4.398 cm^3 06/19/25 1337   Calculated Wound Volume (cm^3) 8.4 cm^3 06/19/25 1337   Tunneling 1 0 cm 10/31/24 1510   Tunneling 1 in depth located at none 10/31/24 1510   Number of underminings 2 03/13/25 1423   Undermining 1 0 04/10/25 1443   Undermining 2 4.4 06/19/25 1337   Undermining 1 is depth extending from resolved 04/10/25 1443   Undermining 2 is depth extending from 6-11, deepest at 9 06/19/25 1337   Drainage Amount Moderate 06/19/25 1337   Drainage Description Yellow 06/19/25 1337   Irene-wound Assessment Scar Tissue;Intact 06/19/25 1337   Treatments Irrigation with NSS 06/19/25 1337   Wound packed? No 06/19/25 1337   Packing- # removed 1 06/20/24 1401   Dressing Changed Changed 03/07/24 1111   Patient Tolerance Tolerated well 03/13/25 1423   Dressing Status Removed 03/13/25 1423       Debridement   Wound Pressure Injury Ischium Left     Date/Time: 6/19/2025 2:15 PM    Universal Protocol:  procedure performed by consultantConsent: Written consent obtained  Consent given by: patient  Time out: Immediately prior to procedure a \"time out\" was called to verify the correct patient, procedure, equipment, support staff and site/side marked as required.  Timeout called at: 6/20/2025 11:21 AM.  Patient identity confirmed: verbally with patient    Debridement Details  Performed by: NP  Debridement type: selective  Pain control: lidocaine 4%    Post-debridement measurements  Length (cm): 4  Width (cm): 3  Depth (cm): 0.9  Percent debrided: 100%  Surface Area (cm^2): 9.42  Area Debrided (cm^2): 9.42  Volume (cm^3): 5.65    Devitalized tissue debrided: biofilm, fibrin and slough  Instrument(s) utilized: curette  Bleeding: small  Hemostasis " "obtained with: pressure  Procedural pain (0-10): 0  Post-procedural pain: 0   Response to treatment: procedure was tolerated well    Debridement   Wound Pressure Injury Perineum     Date/Time: 6/19/2025 2:15 PM    Universal Protocol:  procedure performed by consultantConsent: Written consent obtained  Consent given by: patient  Time out: Immediately prior to procedure a \"time out\" was called to verify the correct patient, procedure, equipment, support staff and site/side marked as required.  Timeout called at: 6/20/2025 11:22 AM.  Patient identity confirmed: verbally with patient    Debridement Details  Performed by: NP  Debridement type: selective  Pain control: lidocaine 4%    Post-debridement measurements  Length (cm): 7.3  Width (cm): 4.3  Depth (cm): 0.7  Percent debrided: 100%  Surface Area (cm^2): 24.65  Area Debrided (cm^2): 24.65  Volume (cm^3): 11.51    Devitalized tissue debrided: biofilm, fibrin and slough  Instrument(s) utilized: curette  Bleeding: small  Hemostasis obtained with: pressure  Procedural pain (0-10): 0  Post-procedural pain: 0   Response to treatment: procedure was tolerated well    Debridement   Wound Pressure Injury Sacrum     Date/Time: 6/19/2025 2:15 PM    Universal Protocol:  procedure performed by consultantConsent: Written consent obtained  Consent given by: patient  Time out: Immediately prior to procedure a \"time out\" was called to verify the correct patient, procedure, equipment, support staff and site/side marked as required.  Timeout called at: 6/20/2025 11:23 AM.  Patient identity confirmed: verbally with patient    Debridement Details  Performed by: NP  Debridement type: selective  Pain control: lidocaine 4%    Post-debridement measurements  Length (cm): 6  Width (cm): 4  Depth (cm): 0.4  Percent debrided: 100%  Surface Area (cm^2): 18.85  Area Debrided (cm^2): 18.85  Volume (cm^3): 5.03    Devitalized tissue debrided: biofilm, fibrin and slough  Instrument(s) utilized: " "curette  Bleeding: small  Hemostasis obtained with: pressure  Procedural pain (0-10): 0  Post-procedural pain: 0   Response to treatment: procedure was tolerated well    Debridement   Wound Pressure Injury Hip Left;Lateral     Date/Time: 6/19/2025 2:15 PM    Universal Protocol:  procedure performed by consultantConsent: Written consent obtained  Consent given by: patient  Time out: Immediately prior to procedure a \"time out\" was called to verify the correct patient, procedure, equipment, support staff and site/side marked as required.  Timeout called at: 6/20/2025 11:23 AM.  Patient identity confirmed: verbally with patient    Debridement Details  Performed by: NP  Debridement type: selective  Pain control: lidocaine 4%    Post-debridement measurements  Length (cm): 2.8  Width (cm): 2  Depth (cm): 1.5  Percent debrided: 100%  Surface Area (cm^2): 4.4  Area Debrided (cm^2): 4.4  Volume (cm^3): 4.4    Devitalized tissue debrided: biofilm, fibrin and slough  Instrument(s) utilized: curette  Bleeding: small  Hemostasis obtained with: pressure  Procedural pain (0-10): 0  Post-procedural pain: 0   Response to treatment: procedure was tolerated well               "

## 2025-06-24 DIAGNOSIS — J30.9 ALLERGIC RHINITIS, UNSPECIFIED SEASONALITY, UNSPECIFIED TRIGGER: ICD-10-CM

## 2025-06-24 RX ORDER — CETIRIZINE HYDROCHLORIDE 10 MG/1
10 TABLET ORAL DAILY
Qty: 90 TABLET | Refills: 1 | Status: SHIPPED | OUTPATIENT
Start: 2025-06-24

## 2025-07-01 ENCOUNTER — TELEPHONE (OUTPATIENT)
Dept: FAMILY MEDICINE CLINIC | Facility: CLINIC | Age: 45
End: 2025-07-01

## 2025-07-01 NOTE — TELEPHONE ENCOUNTER
rosette from home health care is calling in regards to the patient she stated she emailed you a link in regards to home health care orders. Please let me know if you would like them to fax the forms.

## 2025-07-02 NOTE — TELEPHONE ENCOUNTER
PCP SIGNATURE NEEDED FOR [facility] FORM RECEIVED VIA FAX AND PLACED IN PCP FOLDER TO BE DELIVERED AT ASSIGNED TIMES.      [facility]/ Physicians Orders

## 2025-07-16 DIAGNOSIS — F31.9 BIPOLAR DEPRESSION (HCC): ICD-10-CM

## 2025-07-16 DIAGNOSIS — E78.5 HYPERLIPIDEMIA, UNSPECIFIED HYPERLIPIDEMIA TYPE: ICD-10-CM

## 2025-07-16 RX ORDER — SERTRALINE HYDROCHLORIDE 25 MG/1
25 TABLET, FILM COATED ORAL DAILY
Qty: 90 TABLET | Refills: 1 | Status: SHIPPED | OUTPATIENT
Start: 2025-07-16

## 2025-07-16 RX ORDER — ATORVASTATIN CALCIUM 20 MG/1
20 TABLET, FILM COATED ORAL
Qty: 90 TABLET | Refills: 1 | Status: SHIPPED | OUTPATIENT
Start: 2025-07-16

## 2025-07-17 ENCOUNTER — TELEPHONE (OUTPATIENT)
Dept: FAMILY MEDICINE CLINIC | Facility: CLINIC | Age: 45
End: 2025-07-17

## 2025-07-17 NOTE — TELEPHONE ENCOUNTER
"Physicians Statement of Medical Necessity for  Ambulance Transportation    GENERAL INFORMATION     Name: Ariana Martinez  YOB: 1962  Medicare #: [place] [employer]- ID: [id number]  Transport Date: 3/13/2021 (Valid for round trips this date, or for scheduled repetitive trips for 60 days from the date signed below.)  Origin: [facility] Room 372  Destination: [address] [place] Room 2519  Is the Patient's stay covered under Medicare Part A (PPS/DRG?)No   Closest appropriate facility? Yes  If this a hosp-hosp transfer? Yes, describe services needed at 2nd facility not available at 1st facility Infectious Disease  Is this a hospice patient? No    MEDICAL NECESSITY QUESTIONAIRE    Ambulance Transportation is medically necessary only if other means of transportation are contraindicated or would be potentially harmful to the patient.  To meet this requirement, the patient must be either \"bed confined\" or suffer from a condition such that transport by means other than an ambulance is contraindicated by the patient's condition.  The following questions must be answered by the healthcare professional signing below for this form to be valid:     1) Describe the MEDICAL CONDITION (physical and/or mental) of this patient AT THE TIME OF AMBULANCE TRANSPORT that requires the patient to be transported in an ambulance, and why transport by other means is contraindicated by the patient's condition:     Patient's ECHO showed endocarditis and patient is needing transfer to higher level of care for Infectious Disease. Accepting MD is Dr. Andino at [facility]. Patient is a level 3 on the severity ranking.    Past Medical History:   Diagnosis Date   • Angina, class IV (CMS/HCC)    • Anxiety    • Anxiety and depression    • Arthritis    • Benign paroxysmal positional vertigo    • Bladder disorder     has bladder stimulator   • Carpal tunnel syndrome    • CHF (congestive heart " PCP SIGNATURE NEEDED FOR [facility] FORM RECEIVED VIA FAX AND PLACED IN PCP FOLDER TO BE DELIVERED AT ASSIGNED TIMES.    Medication hold request  Eliquis    "failure) (CMS/[facility])    • Chronic pain    • Coronary atherosclerosis     hx CABG 2005.  is followed by Dr Kwon   • Depression    • Diabetes mellitus (CMS/[facility])     Type 2, controlled   • Diabetic polyneuropathy (CMS/[facility])    • Disease of thyroid gland    • Elevated cholesterol    • Female stress incontinence    • Foot pain, left    • Full dentures    • Gastroparesis    • GERD (gastroesophageal reflux disease)    • Hyperlipidemia    • Hypertension    • Low back pain    • Malaise and fatigue    • Multiple joint pain    • Obesity     Refuses to be weighed   • Otalgia     Both   • Perforation of tympanic membrane     Left   • Postoperative wound infection    • Vitamin D deficiency    • Wears glasses     reading      2) Is this patient \"bed confined\" as defined below?No    To be \"bed confined\" the patient must satisfy all three of the following criteria:  (1) unable to get up from bed without assistance; AND (2) unable to ambulate;  AND (3) unable to sit in a chair or wheelchair.  3) Can this patient safely be transported by car or wheelchair van (I.e., may safely sit during transport, without an attendant or monitoring?)No   4. In addition to completing questions 1-3 above, please check any of the following conditions that apply*:          *Note: supporting documentation for any boxes checked must be maintained in the patient's medical records Medical attendant required, Requires oxygen - unable to self administer and Cardiac monitoring required en route      SIGNATURE OF PHYSICIAN OR OTHER AUTHORIZED HEALTHCARE PROFESSIONAL    I certify that the above information is true and correct based on my evaluation of this patient, and represent that the patient requires transport by ambulance and that other forms of transport are contraindicated.  I understand that this information will be used by the Centers for Medicare and Medicaid Services (CMS) to support the determiniation of medical necessity for ambulance services, and " I represent that I have personal knowledge of the patient's condition at the time of transport.       If this box is checked, I also certify that the patient is physically or mentally incapable of signing the ambulance service's claim form and that the institution with which I am affiliated has furnished care, services or assistance to the patient.  My signature below is made on behalf of the patient pursuant to 42 .36(b)(4). In accordance with 42 .37, the specific reason(s) that the patient is physically or mentally incapable of signing the claim for is as follows:     Signature of Physician or Healthcare Professional     PARAMJIT Varghese Date/Time:     3/13/2021     (For Scheduled repetitive transport, this form is not valid for transports performed more than 60 days after this date).                                                                                                                                            --------------------------------------------------------------------------------------------  Printed Name and Credentials of Physician or Authorized Healthcare Professional     Form must be signed by patient's attending physician for scheduled, repetitive transports,.  For non-repetitive ambulance transports, if unable to obtain the signature of the attending physician, any of the following may sign (please select below):     Physician  Clinical Nurse Specialist  Registered Nurse     Physician Assistant x Discharge Planner  Licensed Practical Nurse     Nurse Practitioner x

## 2025-07-19 DIAGNOSIS — Z99.2 TYPE 1 DIABETES MELLITUS WITH CHRONIC KIDNEY DISEASE ON CHRONIC DIALYSIS (HCC): ICD-10-CM

## 2025-07-19 DIAGNOSIS — E10.22 TYPE 1 DIABETES MELLITUS WITH CHRONIC KIDNEY DISEASE ON CHRONIC DIALYSIS (HCC): ICD-10-CM

## 2025-07-19 DIAGNOSIS — L98.429 ULCER OF SACRAL REGION, STAGE 4 (HCC): ICD-10-CM

## 2025-07-19 DIAGNOSIS — N18.6 TYPE 1 DIABETES MELLITUS WITH CHRONIC KIDNEY DISEASE ON CHRONIC DIALYSIS (HCC): ICD-10-CM

## 2025-07-21 ENCOUNTER — TELEPHONE (OUTPATIENT)
Dept: FAMILY MEDICINE CLINIC | Facility: CLINIC | Age: 45
End: 2025-07-21

## 2025-07-21 RX ORDER — UREA 10 %
LOTION (ML) TOPICAL
Qty: 100 TABLET | Refills: 1 | Status: SHIPPED | OUTPATIENT
Start: 2025-07-21

## 2025-07-21 NOTE — TELEPHONE ENCOUNTER
PCP SIGNATURE NEEDED FOR [facility]  FORM RECEIVED VIA FAX AND PLACED IN PCP FOLDER TO BE DELIVERED AT ASSIGNED TIMES.    PHYSICIANS ORDER & SOC/RE-CERTIFICATION

## 2025-07-21 NOTE — TELEPHONE ENCOUNTER
FAXED ON 07/21/25 TO [facility] at [phone]. FAX CONFIRMATION RECEIVED.

## 2025-07-22 ENCOUNTER — TELEPHONE (OUTPATIENT)
Dept: FAMILY MEDICINE CLINIC | Facility: CLINIC | Age: 45
End: 2025-07-22

## 2025-07-23 ENCOUNTER — TELEPHONE (OUTPATIENT)
Dept: FAMILY MEDICINE CLINIC | Facility: CLINIC | Age: 45
End: 2025-07-23

## 2025-07-23 NOTE — TELEPHONE ENCOUNTER
PCP SIGNATURE NEEDED FOR [facility] FORM RECEIVED VIA FAX AND PLACED IN PCP FOLDER TO BE DELIVERED AT ASSIGNED TIMES.      Physician order: resume all HHA services as of 7/17/25 as client was discharged from [facility] to Home

## 2025-07-24 ENCOUNTER — OFFICE VISIT (OUTPATIENT)
Dept: WOUND CARE | Facility: CLINIC | Age: 45
End: 2025-07-24
Payer: COMMERCIAL

## 2025-07-24 VITALS
SYSTOLIC BLOOD PRESSURE: 113 MMHG | DIASTOLIC BLOOD PRESSURE: 69 MMHG | TEMPERATURE: 97.5 F | HEART RATE: 89 BPM | RESPIRATION RATE: 16 BRPM

## 2025-07-24 DIAGNOSIS — L89.894 PRESSURE ULCER OF OTHER SITE, STAGE 4 (HCC): ICD-10-CM

## 2025-07-24 DIAGNOSIS — E10.22 TYPE 1 DIABETES MELLITUS WITH CHRONIC KIDNEY DISEASE ON CHRONIC DIALYSIS (HCC): ICD-10-CM

## 2025-07-24 DIAGNOSIS — Z99.2 TYPE 1 DIABETES MELLITUS WITH CHRONIC KIDNEY DISEASE ON CHRONIC DIALYSIS (HCC): ICD-10-CM

## 2025-07-24 DIAGNOSIS — L89.324 PRESSURE INJURY OF LEFT ISCHIUM, STAGE 4 (HCC): Primary | ICD-10-CM

## 2025-07-24 DIAGNOSIS — L89.224 PRESSURE ULCER OF LEFT HIP, STAGE 4 (HCC): ICD-10-CM

## 2025-07-24 DIAGNOSIS — L89.154 PRESSURE INJURY OF SACRAL REGION, STAGE 4 (HCC): ICD-10-CM

## 2025-07-24 DIAGNOSIS — N18.6 TYPE 1 DIABETES MELLITUS WITH CHRONIC KIDNEY DISEASE ON CHRONIC DIALYSIS (HCC): ICD-10-CM

## 2025-07-24 PROCEDURE — 97598 DBRDMT OPN WND ADDL 20CM/<: CPT | Performed by: NURSE PRACTITIONER

## 2025-07-24 PROCEDURE — 97597 DBRDMT OPN WND 1ST 20 CM/<: CPT | Performed by: NURSE PRACTITIONER

## 2025-07-24 NOTE — PROGRESS NOTES
Wound Procedure Treatment    Performed by: Sanju Abraham RN  Authorized by: ERNESTO Avendano  Associated wounds:   Wound Pressure Injury Ischium Left  Wound Pressure Injury Perineum  Wound Pressure Injury Sacrum  Wound Pressure Injury Hip Left;Lateral    Wound cleansed with:  Dakin's 0.25% and NSS   Applied secondary dressing:  Gauze and ABD   Dressing secured with:  Tape   Comments:  Wet/dry

## 2025-07-24 NOTE — LETTER
[facility]  [address] [facility] [address]  Phone#  [phone]  Fax#  [phone]    Patient:  Ovidio Parkinson Jr.  YOB: 1980  Phone:  [phone]  Date of Visit:  7/24/2025    Orders Placed This Encounter   Procedures   • Wound cleansing and dressings     Wash your hands with soap and water.  Remove old dressing, discard into plastic bag and place in trash.    Cleanse the wounds with Dakin's solution if there is an odor, IF NO ODOR, cleanse with normal saline or wound wash prior to applying a clean dressing.   Do not use tissue or cotton balls. Do not scrub the wound. Pat dry using gauze.   Shower no; do not get dressing wet.        Left Hip Wound, Sacral, Left ischium, and perineal wounds:   Cleanse with Dakin's solution  Apply saline moist to dry gauze  Cover with  ABD over top   Secure with Medfix tape.   Change dressing daily and top dressing PRN for breakthrough drainage (visiting nurses to do twice per week and family in between)            Continue using Clinitron bed      Continue NO PRESSURE TO ALL WOUNDS AS MUCH AS POSSIBLE, ESPECIALLY PERINEAL WOUND   LIMIT SITTING UPRIGHT IN WHEELCHAIR ON THIS WOUND       Continue 3-4 servings of protein daily in your diet.         Continue visiting nurse skilled 2 x per week for wound care dressing changes.                            Follow up at the wound center in 4 weeks with Estela OROZCO.     Standing Status:   Future     Expiration Date:   7/31/2025   • Wound Procedure Treatment     This order was created via procedure documentation         Electronically signed by ERNESTO Avendano

## 2025-07-25 NOTE — PROGRESS NOTES
Name: Ovidio Parkinson Jr.      : 1980      MRN: [id number]  Encounter Provider: ERNESTO Avendano  Encounter Date: 2025   Encounter department: [facility]  :  Assessment & Plan  Pressure injury of left ischium, stage 4 (HCC)    Orders:    Wound cleansing and dressings; Future    Wound Procedure Treatment    Pressure injury of sacral region, stage 4 (HCC)    Orders:    Wound cleansing and dressings; Future    Wound Procedure Treatment    Pressure ulcer of other site, stage 4 (HCC)    Orders:    Wound cleansing and dressings; Future    Wound Procedure Treatment    Pressure ulcer of left hip, stage 4 (HCC)    Orders:    Wound cleansing and dressings; Future    Wound Procedure Treatment    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  F/U palliative visit.  Wounds debrided.  Wounds clean and stable.  Continue current plan of care.  2.  A1C results reviewed with the patient today.  Patient maintaining tight glycemic control  3.  Patient will follow-up in 4 weeks    History of Present Illness   Chief Complaint   Patient presents with    Follow Up Wound Care Visit     Buttock wounds   Here for wound follow up.  F/u palliative visit for multiple pressure injuries.  No new complaints.  He denies any pain, fevers, or chills.      Objective   /69   Pulse 89   Temp 97.5 °F (36.4 °C)   Resp 16       Wound Pressure Injury Ischium Left (Active)   Wound Image   25 1134   Wound Description Pink;Yellow;Epithelialization;Rolled edges;Brown 25 1138   Pressure Injury Stage 4 25 1138   Non-staged Wound Description Full thickness 24 1020   Wound Length (cm) 4 cm 25 1138   Wound Width (cm) 2.8 cm 25 1138   Wound Depth (cm) 0.7 cm 25 1138   Wound Surface Area (cm^2) 8.8 cm^2 25 1138   Wound Volume (cm^3) 4.105  cm^3 07/24/25 1138   Calculated Wound Volume (cm^3) 7.84 cm^3 07/24/25 1138   Change in Wound Size % 58.52 07/24/25 1138   Number of tunnels 1 10/31/24 1511   Tunneling 1 0 cm 07/24/25 1138   Tunneling 1 in depth located at resolved 07/24/25 1138   Number of underminings 1 02/13/25 1450   Undermining 1 0.8 07/24/25 1138   Undermining 2 0.4 07/24/25 1138   Undermining 1 is depth extending from 4-5, deepest at 5 07/24/25 1138   Undermining 2 is depth extending from 10-11, deepest at 10 07/24/25 1138   Drainage Amount Moderate 07/24/25 1138   Drainage Description Serosanguineous;Yellow 07/24/25 1138   Irene-wound Assessment Intact;Scar Tissue 07/24/25 1138   Treatments Irrigation with NSS 06/19/25 1330   Wound packed? No 07/24/25 1138   Dressing Changed Changed 03/07/24 1113   Patient Tolerance Tolerated well 03/13/25 1413   Dressing Status Removed 03/13/25 1413       Wound Pressure Injury Perineum (Active)   Wound Image   07/24/25 1133   Wound Description Pink;Yellow;Probes to bone 07/24/25 1140   Pressure Injury Stage 4 07/24/25 1140   Non-staged Wound Description Full thickness 03/13/25 1420   Wound Length (cm) 8.3 cm 07/24/25 1140   Wound Width (cm) 4.5 cm 07/24/25 1140   Wound Depth (cm) 0.8 cm 07/24/25 1140   Wound Surface Area (cm^2) 29.33 cm^2 07/24/25 1140   Wound Volume (cm^3) 15.645 cm^3 07/24/25 1140   Calculated Wound Volume (cm^3) 29.88 cm^3 07/24/25 1140   Change in Wound Size % -184.57 07/24/25 1140   Tunneling 1 in depth located at 0 10/05/23 1458   Number of underminings 2 02/13/25 1452   Undermining 1 2.4 07/24/25 1140   Undermining 2 0 06/19/25 1333   Undermining 1 is depth extending from 3-8, deepest at 7 07/24/25 1140   Undermining 2 is depth extending from resolved 06/19/25 1333   Drainage Amount Large 07/24/25 1140   Drainage Description Serosanguineous;Green;Foul smelling 07/24/25 1140   Irene-wound Assessment Intact;Scar Tissue;Maceration 07/24/25 1140   Treatments Irrigation with NSS 06/19/25  1333   Wound packed? No 06/19/25 1333   Dressing Changed Changed 03/07/24 1107   Patient Tolerance Tolerated well 03/13/25 1420   Dressing Status Remoistened 03/13/25 1420       Wound Pressure Injury Sacrum (Active)   Wound Image   07/24/25 1133   Wound Description Epithelialization;Pink;Yellow;Rolled edges 07/24/25 1143   Pressure Injury Stage 4 07/24/25 1143   Non-staged Wound Description Full thickness 04/10/25 1438   Wound Length (cm) 5.5 cm 07/24/25 1143   Wound Width (cm) 4 cm 07/24/25 1143   Wound Depth (cm) 0.1 cm 07/24/25 1143   Wound Surface Area (cm^2) 17.28 cm^2 07/24/25 1143   Wound Volume (cm^3) 1.152 cm^3 07/24/25 1143   Calculated Wound Volume (cm^3) 2.2 cm^3 07/24/25 1143   Change in Wound Size % 97.07 07/24/25 1143   Number of underminings 1 12/05/24 1022   Undermining 1 0.5 07/24/25 1143   Undermining 2 0 10/31/24 1511   Undermining 1 is depth extending from 3-4 07/24/25 1143   Undermining 2 is depth extending from resolved 10/31/24 1511   Drainage Amount Large 07/24/25 1143   Drainage Description Serosanguineous;Green;Foul smelling 07/24/25 1143   Irene-wound Assessment Intact;Scar Tissue;Dry;Scaly 07/24/25 1143   Treatments Irrigation with NSS 06/19/25 1336   Wound packed? No 07/24/25 1143   Dressing Changed Changed 03/07/24 1105   Patient Tolerance Tolerated well 03/13/25 1416   Dressing Status Removed 03/13/25 1416       Wound Pressure Injury Hip Left;Lateral (Active)   Wound Image   07/24/25 1134   Wound Description Pink;Yellow;Probes to bone 07/24/25 1135   Pressure Injury Stage 4 07/24/25 1135   Non-staged Wound Description Full thickness 12/05/24 1017   Wound Length (cm) 2.4 cm 07/24/25 1135   Wound Width (cm) 2 cm 07/24/25 1135   Wound Depth (cm) 1.4 cm 07/24/25 1135   Wound Surface Area (cm^2) 3.77 cm^2 07/24/25 1135   Wound Volume (cm^3) 3.519 cm^3 07/24/25 1135   Calculated Wound Volume (cm^3) 6.72 cm^3 07/24/25 1135   Tunneling 1 0 cm 10/31/24 1510   Tunneling 1 in depth located at  "none 10/31/24 1510   Number of underminings 2 03/13/25 1423   Undermining 1 0 04/10/25 1443   Undermining 2 3.1 07/24/25 1135   Undermining 1 is depth extending from resolved 04/10/25 1443   Undermining 2 is depth extending from 6-44, deepest at 7 07/24/25 1135   Drainage Amount Moderate 07/24/25 1135   Drainage Description Serosanguineous;Yellow 07/24/25 1135   Irene-wound Assessment Scar Tissue;Intact 07/24/25 1135   Treatments Irrigation with NSS 06/19/25 1337   Wound packed? No 06/19/25 1337   Packing- # removed 1 06/20/24 1401   Dressing Changed Changed 03/07/24 1111   Patient Tolerance Tolerated well 03/13/25 1423   Dressing Status Removed 03/13/25 1423       Debridement   Wound Pressure Injury Ischium Left     Date/Time: 7/24/2025 11:00 AM    Universal Protocol:  procedure performed by consultantConsent: Written consent obtained  Consent given by: patient  Time out: Immediately prior to procedure a \"time out\" was called to verify the correct patient, procedure, equipment, support staff and site/side marked as required.  Timeout called at: 7/25/2025 8:50 AM.  Patient identity confirmed: verbally with patient    Debridement Details  Performed by: NP  Debridement type: selective  Pain control: lidocaine 4%    Post-debridement measurements  Length (cm): 4  Width (cm): 2.8  Depth (cm): 0.7  Percent debrided: 100%  Surface Area (cm^2): 8.8  Area Debrided (cm^2): 8.8  Volume (cm^3): 4.11    Devitalized tissue debrided: biofilm, fibrin and slough  Instrument(s) utilized: curette  Bleeding: small  Hemostasis obtained with: pressure  Procedural pain (0-10): 0  Post-procedural pain: 0   Response to treatment: procedure was tolerated well    Debridement   Wound Pressure Injury Perineum     Date/Time: 7/24/2025 11:00 AM    Universal Protocol:  procedure performed by consultantConsent: Written consent obtained  Consent given by: patient  Time out: Immediately prior to procedure a \"time out\" was called to verify the correct " "patient, procedure, equipment, support staff and site/side marked as required.  Timeout called at: 7/25/2025 8:51 AM.  Patient identity confirmed: verbally with patient    Debridement Details  Performed by: NP  Debridement type: selective  Pain control: lidocaine 4%    Post-debridement measurements  Length (cm): 8.3  Width (cm): 4.5  Depth (cm): 0.8  Percent debrided: 100%  Surface Area (cm^2): 29.33  Area Debrided (cm^2): 29.33  Volume (cm^3): 15.65    Devitalized tissue debrided: biofilm, fibrin and slough  Instrument(s) utilized: curette  Bleeding: small  Hemostasis obtained with: pressure  Procedural pain (0-10): 0  Post-procedural pain: 0   Response to treatment: procedure was tolerated well    Debridement   Wound Pressure Injury Sacrum     Date/Time: 7/24/2025 11:00 AM    Universal Protocol:  procedure performed by consultantConsent: Written consent obtained  Consent given by: patient  Time out: Immediately prior to procedure a \"time out\" was called to verify the correct patient, procedure, equipment, support staff and site/side marked as required.  Timeout called at: 7/25/2025 8:54 AM.  Patient identity confirmed: verbally with patient    Debridement Details  Performed by: NP  Debridement type: selective  Pain control: lidocaine 4%    Post-debridement measurements  Length (cm): 5.5  Width (cm): 4  Depth (cm): 0.1  Percent debrided: 100%  Surface Area (cm^2): 17.28  Area Debrided (cm^2): 17.28  Volume (cm^3): 1.15    Devitalized tissue debrided: biofilm, fibrin and slough  Instrument(s) utilized: curette  Bleeding: small  Hemostasis obtained with: pressure  Procedural pain (0-10): 0  Post-procedural pain: 0   Response to treatment: procedure was tolerated well    Debridement   Wound Pressure Injury Hip Left;Lateral     Date/Time: 7/24/2025 11:00 AM    Universal Protocol:  procedure performed by consultantConsent: Written consent obtained  Consent given by: patient  Time out: Immediately prior to procedure a " "\"time out\" was called to verify the correct patient, procedure, equipment, support staff and site/side marked as required.  Timeout called at: 7/25/2025 9:13 AM.  Patient identity confirmed: verbally with patient    Debridement Details  Performed by: NP  Debridement type: selective  Pain control: lidocaine 4%    Post-debridement measurements  Length (cm): 2.4  Width (cm): 2  Depth (cm): 1.4  Percent debrided: 100%  Surface Area (cm^2): 3.77  Area Debrided (cm^2): 3.77  Volume (cm^3): 3.52    Devitalized tissue debrided: biofilm, fibrin and slough  Instrument(s) utilized: curette  Bleeding: small  Hemostasis obtained with: pressure  Procedural pain (0-10): 0  Post-procedural pain: 0   Response to treatment: procedure was tolerated well               "

## 2025-08-04 ENCOUNTER — TELEPHONE (OUTPATIENT)
Dept: FAMILY MEDICINE CLINIC | Facility: CLINIC | Age: 45
End: 2025-08-04

## 2025-08-05 ENCOUNTER — TELEPHONE (OUTPATIENT)
Dept: FAMILY MEDICINE CLINIC | Facility: CLINIC | Age: 45
End: 2025-08-05

## 2025-08-07 ENCOUNTER — TELEPHONE (OUTPATIENT)
Dept: FAMILY MEDICINE CLINIC | Facility: CLINIC | Age: 45
End: 2025-08-07

## 2025-08-12 ENCOUNTER — TELEPHONE (OUTPATIENT)
Dept: FAMILY MEDICINE CLINIC | Facility: CLINIC | Age: 45
End: 2025-08-12

## 2025-08-14 ENCOUNTER — TELEPHONE (OUTPATIENT)
Dept: FAMILY MEDICINE CLINIC | Facility: CLINIC | Age: 45
End: 2025-08-14

## 2025-08-20 ENCOUNTER — TELEPHONE (OUTPATIENT)
Dept: FAMILY MEDICINE CLINIC | Facility: CLINIC | Age: 45
End: 2025-08-20

## (undated) DEVICE — 2000CC GUARDIAN II: Brand: GUARDIAN

## (undated) DEVICE — STERILE MANDIBLE PACK: Brand: CARDINAL HEALTH

## (undated) DEVICE — SYRINGE 10ML LL

## (undated) DEVICE — GLOVE SRG BIOGEL ORTHOPEDIC 7.5

## (undated) DEVICE — NEEDLE 25G X 1 1/2